# Patient Record
Sex: MALE | Race: WHITE | NOT HISPANIC OR LATINO | Employment: OTHER | URBAN - NONMETROPOLITAN AREA
[De-identification: names, ages, dates, MRNs, and addresses within clinical notes are randomized per-mention and may not be internally consistent; named-entity substitution may affect disease eponyms.]

---

## 2017-01-03 ENCOUNTER — DOCUMENTATION (OUTPATIENT)
Dept: CARDIOLOGY | Facility: CLINIC | Age: 82
End: 2017-01-03

## 2017-01-03 ENCOUNTER — OFFICE VISIT (OUTPATIENT)
Dept: CARDIOLOGY | Facility: CLINIC | Age: 82
End: 2017-01-03

## 2017-01-03 VITALS
DIASTOLIC BLOOD PRESSURE: 72 MMHG | SYSTOLIC BLOOD PRESSURE: 132 MMHG | WEIGHT: 176 LBS | HEART RATE: 80 BPM | HEIGHT: 67 IN | BODY MASS INDEX: 27.62 KG/M2

## 2017-01-03 DIAGNOSIS — E78.5 HYPERLIPIDEMIA, UNSPECIFIED HYPERLIPIDEMIA TYPE: ICD-10-CM

## 2017-01-03 DIAGNOSIS — I36.1 NON-RHEUMATIC TRICUSPID VALVE INSUFFICIENCY: ICD-10-CM

## 2017-01-03 DIAGNOSIS — I48.0 PAROXYSMAL ATRIAL FIBRILLATION (HCC): ICD-10-CM

## 2017-01-03 DIAGNOSIS — I47.9 PAROXYSMAL TACHYCARDIA (HCC): ICD-10-CM

## 2017-01-03 DIAGNOSIS — R06.00 DYSPNEA, UNSPECIFIED TYPE: ICD-10-CM

## 2017-01-03 DIAGNOSIS — I25.10 CORONARY ARTERY DISEASE INVOLVING NATIVE CORONARY ARTERY OF NATIVE HEART WITHOUT ANGINA PECTORIS: Primary | ICD-10-CM

## 2017-01-03 DIAGNOSIS — J44.9 CHRONIC OBSTRUCTIVE BRONCHITIS (HCC): ICD-10-CM

## 2017-01-03 DIAGNOSIS — R00.1 BRADYCARDIA: ICD-10-CM

## 2017-01-03 PROCEDURE — 93000 ELECTROCARDIOGRAM COMPLETE: CPT | Performed by: INTERNAL MEDICINE

## 2017-01-03 PROCEDURE — 99214 OFFICE O/P EST MOD 30 MIN: CPT | Performed by: INTERNAL MEDICINE

## 2017-01-03 RX ORDER — LISINOPRIL 5 MG/1
5 TABLET ORAL DAILY
COMMUNITY
End: 2017-09-14 | Stop reason: SDUPTHER

## 2017-01-03 RX ORDER — PREDNISONE 1 MG/1
5 TABLET ORAL DAILY
COMMUNITY
End: 2017-03-03

## 2017-01-03 RX ORDER — TIOTROPIUM BROMIDE 18 UG/1
1 CAPSULE ORAL; RESPIRATORY (INHALATION) DAILY
Refills: 0 | COMMUNITY
Start: 2016-12-23 | End: 2017-03-03

## 2017-01-03 RX ORDER — HYDROCODONE BITARTRATE AND ACETAMINOPHEN 7.5; 325 MG/1; MG/1
1 TABLET ORAL EVERY 8 HOURS
COMMUNITY
Start: 2016-12-27 | End: 2021-01-01

## 2017-01-03 RX ORDER — UBIDECARENONE 50 MG
600 CAPSULE ORAL 2 TIMES DAILY
COMMUNITY

## 2017-01-03 RX ORDER — IPRATROPIUM BROMIDE AND ALBUTEROL SULFATE 2.5; .5 MG/3ML; MG/3ML
3 SOLUTION RESPIRATORY (INHALATION) EVERY 4 HOURS PRN
COMMUNITY
End: 2017-03-03 | Stop reason: ALTCHOICE

## 2017-01-03 NOTE — PROGRESS NOTES
Hasmukh Ham  83 y.o. male    01/03/2017  1. Coronary artery disease involving native coronary artery of native heart without angina pectoris    2. Non-rheumatic tricuspid valve insufficiency    3. Paroxysmal atrial fibrillation    4. Bradycardia    5. Dyspnea, unspecified type    6. Hyperlipidemia, unspecified hyperlipidemia type    7. Chronic obstructive bronchitis    8. Paroxysmal tachycardia        History of Present Illness: Reason is posthospital follow-up    This is 80 years old gentleman with a history of for paroxysmal atrial tachycardia leading to atrial fibrillations starting on oral amiodarone and her up in the hospital for increasing shortness breath found to bilateral pneumonia treated and subsequent discharge. H/O CAD with stent in RCA system  The patient readmitted for similar problem.  Was scheduled to undergo transesophageal echocardiographic guided electrical cardioversion however was postponed due to underlying lung disease.  He is on oral anticoagulation for more than a month.  EKG done in the office still in supraventricular arrhythmia (atrial flutter with variable ventricular rate.  Complaining of some palpitations and associated shortness breath with exertional activity.  Denies any chest pain.  Denies any nausea vomiting diarrhea.  Had history of coronary to disease treatable post-PTCA stent of RCA and remarkably well from that point of view.  Hypertension good blood pressure control.          SUBJECTIVE    Allergies   Allergen Reactions   • Atorvastatin Anaphylaxis     myalgia   • Azithromycin    • Pravastatin      Myalgia   • Biaxin [Clarithromycin] Rash         Past Medical History   Diagnosis Date   • Arthritis    • Bradycardia    • Chronic obstructive pulmonary disease (COPD)    • Coronary atherosclerosis of native coronary artery    • Diabetes mellitus    • Hyperlipidemia    • Hypertension    • Neuropathy    • Shortness of breath          Past Surgical History   Procedure  Laterality Date   • Appendectomy     • Hernia repair     • Lung biopsy           History reviewed. No pertinent family history.      Social History     Social History   • Marital status:      Spouse name: N/A   • Number of children: N/A   • Years of education: N/A     Occupational History   • Not on file.     Social History Main Topics   • Smoking status: Former Smoker   • Smokeless tobacco: Never Used   • Alcohol use No   • Drug use: No   • Sexual activity: Defer     Other Topics Concern   • Not on file     Social History Narrative         Current Outpatient Prescriptions   Medication Sig Dispense Refill   • amiodarone (PACERONE) 200 MG tablet Take 1 tablet by mouth Daily. 30 tablet 5   • apixaban (ELIQUIS) 5 MG tablet tablet Take 1 tablet by mouth 2 (Two) Times a Day. 60 tablet 12   • clopidogrel (PLAVIX) 75 MG tablet Take 75 mg by mouth Daily.     • donepezil (ARICEPT) 10 MG tablet Take 10 mg by mouth Daily.  0   • famotidine (PEPCID) 20 MG tablet Take 20 mg by mouth 2 (Two) Times a Day.  0   • glimepiride (AMARYL) 2 MG tablet Take 2 mg by mouth Daily.  0   • HYDROcodone-acetaminophen (NORCO) 7.5-325 MG per tablet Take 1 tablet by mouth Every 8 (Eight) Hours.     • ipratropium-albuterol (DUO-NEB) 0.5-2.5 mg/mL nebulizer Take 3 mL by nebulization Every 4 (Four) Hours As Needed for wheezing.     • isosorbide dinitrate (ISORDIL) 30 MG tablet Take 30 mg by mouth Daily.  0   • lisinopril (PRINIVIL,ZESTRIL) 5 MG tablet Take 5 mg by mouth Daily.     • magnesium oxide (MAGOX) 400 (241.3 MG) MG tablet tablet Take 400 mg by mouth Daily.     • predniSONE (DELTASONE) 5 MG tablet Take 5 mg by mouth Daily.     • QVAR 40 MCG/ACT inhaler Take 1 inhaler by mouth 2 (Two) Times a Day.  11   • Red Yeast Rice 600 MG tablet Take 600 mg by mouth 2 (Two) Times a Day.     • SPIRIVA HANDIHALER 18 MCG per inhalation capsule Take 1 inhaler by mouth Daily.  0   • terbutaline (BRETHINE) 5 MG tablet Take 5 mg by mouth 2 (Two) Times a  "Day.  0     No current facility-administered medications for this visit.          OBJECTIVE    Visit Vitals   • /72   • Pulse 80   • Ht 67\" (170.2 cm)   • Wt 176 lb (79.8 kg)   • BMI 27.57 kg/m2           Review of Systems     Constitutional:  Denies recent weight loss, weight gain, fever or chills, no change in exercise tolerance     HENT:  Denies any hearing loss, epistaxis, hoarseness, or difficulty speaking.     Eyes: Wears eyeglasses or contact lenses     Respiratory:  SEE HPI    Cardiovascular: SEE HPI    Gastrointestinal:  Denies change in bowel habits, dyspepsia, ulcer disease, hematochezia, or melena.     Endocrine: Negative for cold intolerance, heat intolerance, polydipsia, polyphagia and polyuria. Denies any history of weight change, heat/cold intolerance, polydipsia, polyuria     Genitourinary: Negative.      Musculoskeletal: Denies any history of arthritic symptoms or back problems     Skin:  Denies any change in hair or nails, rashes, or skin lesions.     Allergic/Immunologic: Negative.  Negative for environmental allergies, food allergies and immunocompromised state.     Neurological:  Denies any history of recurrent headaches, strokes, TIA, or seizure disorder.     Hematological: Denies any food allergies, seasonal allergies, bleeding disorders, or lymphadenopathy.     Psychiatric/Behavioral: Denies any history of depression, substance abuse, or change in cognitive function.         Physical Exam     Constitutional: Cooperative, alert and oriented, well-developed, well-nourished, in no acute distress.     HENT:   Head: Normocephalic, normal hair patterns, no masses or tenderness.  Ears, Nose, and Throat: No gross abnormalities. No pallor or cyanosis. Dentition good.   Eyes: EOMS intact, PERRL, conjunctivae and lids unremarkable. Fundoscopic exam and visual fields not performed.   Neck: No palpable masses or adenopathy, no thyromegaly, no JVD, carotid pulses are full and equal bilaterally and " without  Bruits.     Cardiovascular: Regular rhythm, S1 and S2 normal, no S3 or S4. Apical impulse not displaced. No murmurs, gallops, or rubs detected.     Pulmonary/Chest: Chest: normal symmetry, no tenderness to palpation, normal respiratory excursion, no intercostal retraction, no use of accessory muscles.            Pulmonary: Normal breath sounds. No rales or ronchi.    Abdominal: Abdomen soft, bowel sounds normoactive, no masses, no hepatosplenomegaly, non-tender, no bruits.     Musculoskeletal: No deformities, clubbing, cyanosis, erythema, or edema observed. There are no spinal abnormalities noted. Normal muscle strength and tone. Pulses full and equal in all extremities, no bruits auscultated.     Neurological: No gross motor or sensory deficits noted, affect appropriate, oriented to time, person, place.     Skin: Warm and dry to the touch, no apparent skin lesions or masses noted.     Psychiatric: He has a normal mood and affect. His behavior is normal. Judgment and thought content normal.           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              Lab Results   Component Value Date    WBC 12.9 (H) 12/23/2016    HGB 11.5 (L) 12/23/2016    HCT 35.3 (L) 12/23/2016    MCV 92.7 12/23/2016     12/23/2016     Lab Results   Component Value Date    GLUCOSE 86 12/23/2016    BUN 23 (H) 12/23/2016    CREATININE 0.9 12/23/2016    CO2 23 12/23/2016    CALCIUM 8.5 12/23/2016    ALBUMIN 2.9 (L) 12/23/2016    AST 27 12/23/2016    ALT 70 12/23/2016     No results found for: CHOL  No results found for: TRIG  No results found for: HDL  No results found for: LDLCALC  No results found for: LDL  No results found for: HDLLDLRATIO  No components found for: CHOLHDL  Lab Results   Component Value Date    HGBA1C 6.0 (H) 10/10/2016     Lab  Results   Component Value Date    TSH 0.85 12/14/2016           ASSESSMENT AND PLAN  Clinically there is no sign of any acute cardiac decompensation based on the clinical history physical finding.  Started on  on amiodarone given the history of paroxysmal atrial fibrillation with a rapid ventricular response.  EKG done in the office currently he is in atrial flutter with controlled ventricular rate.  Had history of for coronary to disease status post PTA stent of RCA doing remarkably well.  No further recurrence of for chest pain and being treated with the Plavix.  To decrease risk of cardiac embolic phenomena patient on maximum 5 mg twice a day.  Amiodarone for rate controlling and in anticipation of electrical cardioversions.  Isosorbide and lisinopril for hypertension cognitive disease.  Bronchodilating given the history of COPD.Amaryl for type 2 diabetes.  No change was made in the medical management as his Cardec status seemed to well compensated.  He still in atrial arrhythmia with a variable response given physical activity.  Pros and cons of electrical cardioversion discussed with the patient.  Understand willing to proceed forward.  Risk and procedure explained.  Risk included but not limited to cardiac and stroke.  Patient and family understand willing to proceed forward.    Hasmukh was seen today for follow-up and weakness.    Diagnoses and all orders for this visit:    Coronary artery disease involving native coronary artery of native heart without angina pectoris    Non-rheumatic tricuspid valve insufficiency    Paroxysmal atrial fibrillation  -     ECG 12 Lead    Bradycardia    Dyspnea, unspecified type    Hyperlipidemia, unspecified hyperlipidemia type    Chronic obstructive bronchitis    Paroxysmal tachycardia        Mana Curtis MD  1/3/2017  2:06 PM

## 2017-01-03 NOTE — MR AVS SNAPSHOT
Hasmukh Ham   1/3/2017 1:15 PM   Office Visit    Dept Phone:  [phone]   Encounter #:  [id number]    Provider:  Mana Curtis MD   Department:  [facility] CARDIOLOGY                Your Full Care Plan              Today's Medication Changes          These changes are accurate as of: 1/3/17  2:13 PM.  If you have any questions, ask your nurse or doctor.               Medication(s)that have changed:     lisinopril 5 MG tablet   Commonly known as:  PRINIVIL,ZESTRIL   Take 5 mg by mouth Daily.   What changed:  Another medication with the same name was removed. Continue taking this medication, and follow the directions you see here.   Changed by:  Mana Curtis MD         Stop taking medication(s)listed here:     terbinafine 250 MG tablet   Commonly known as:  lamiSIL   Stopped by:  Mana Curtis MD                      Your Updated Medication List          This list is accurate as of: 1/3/17  2:13 PM.  Always use your most recent med list.                amiodarone 200 MG tablet   Commonly known as:  PACERONE   Take 1 tablet by mouth Daily.       apixaban 5 MG tablet tablet   Commonly known as:  ELIQUIS   Take 1 tablet by mouth 2 (Two) Times a Day.       clopidogrel 75 MG tablet   Commonly known as:  PLAVIX       donepezil 10 MG tablet   Commonly known as:  ARICEPT       famotidine 20 MG tablet   Commonly known as:  PEPCID       glimepiride 2 MG tablet   Commonly known as:  AMARYL       HYDROcodone-acetaminophen 7.5-325 MG per tablet   Commonly known as:  NORCO       ipratropium-albuterol 0.5-2.5 mg/mL nebulizer   Commonly known as:  DUO-NEB       isosorbide dinitrate 30 MG tablet   Commonly known as:  ISORDIL       lisinopril 5 MG tablet   Commonly known as:  PRINIVIL,ZESTRIL       magnesium oxide 400 (241.3 MG) MG tablet tablet   Commonly known as:  MAGOX       predniSONE 5 MG tablet   Commonly known as:  DELTASONE       QVAR 40 MCG/ACT inhaler   Generic drug:   beclomethasone       Red Yeast Rice 600 MG tablet       SPIRIVA HANDIHALER 18 MCG per inhalation capsule   Generic drug:  tiotropium       terbutaline 5 MG tablet   Commonly known as:  BRETHINE               We Performed the Following     ECG 12 Lead     ECG 12 Lead       You Were Diagnosed With        Codes Comments    Coronary artery disease involving native coronary artery of native heart without angina pectoris    -  Primary ICD-10-CM: I25.10  ICD-9-CM: 414.01     Non-rheumatic tricuspid valve insufficiency     ICD-10-CM: I36.1  ICD-9-CM: 424.2     Paroxysmal atrial fibrillation     ICD-10-CM: I48.0  ICD-9-CM: 427.31     Bradycardia     ICD-10-CM: R00.1  ICD-9-CM: 427.89     Dyspnea, unspecified type     ICD-10-CM: R06.00  ICD-9-CM: 786.09     Hyperlipidemia, unspecified hyperlipidemia type     ICD-10-CM: E78.5  ICD-9-CM: 272.4     Chronic obstructive bronchitis     ICD-10-CM: J44.9  ICD-9-CM: 491.20     Paroxysmal tachycardia     ICD-10-CM: I47.9  ICD-9-CM: 427.2       Instructions     None    Patient Instructions History      Upcoming Appointments     Visit Type Date Time Department    HOSPITAL FOLLOW UP 1/3/2017  1:15 PM [facility]    NEW PATIENT 1/10/2017  2:30 PM [facility]      [facility] Signup     [facility] [place] by [facility] [address] allows you to send messages to your doctor, view your test results, renew your prescriptions, schedule appointments, and more. To sign up, go to [employer] and click on the Sign Up Now link in the New User? box. Enter your "[employer]" Activation Code exactly as it appears below along with the last four digits of your Social Security Number and your Date of Birth () to complete the sign-up process. If you do not sign up before the expiration date, you must request a new code.    "[employer]" Activation Code: [email or web address]  Expires: 2017  2:13 PM    If you have questions, you can email [email or web address] or call [phone] to talk to our "[employer]"  "staff. Remember, MyChart is NOT to be used for urgent needs. For medical emergencies, dial 911.               Other Info from Your Visit           Your Appointments     Cecilio 10, 2017  2:30 PM CST   New Patient with Brea Higginbotham MD   [facility] PULMONARY MEDICINE (--)    [address]  [address] [place] [id number] [phone]           Bring all previous medical records and films, along with current medications and insurance information.              Allergies     Atorvastatin  Anaphylaxis    myalgia    Azithromycin      Pravastatin      Myalgia    Biaxin [Clarithromycin]  Rash      Reason for Visit     Follow-up 10 post op    weakness           Vital Signs     Blood Pressure Pulse Height Weight Body Mass Index Smoking Status    132/72 80 67\" (170.2 cm) 176 lb (79.8 kg) 27.57 kg/m2 Former Smoker      Problems and Diagnoses Noted     Bradycardia    Chronic obstructive bronchitis    Coronary artery disease involving native coronary artery of native heart    Shortness of breath    High cholesterol or triglycerides    Leaky heart valve    Atrial fibrillation (irregular heartbeat)    Fast heart beat        "

## 2017-01-05 ENCOUNTER — HOSPITAL ENCOUNTER (OUTPATIENT)
Dept: OTHER | Facility: HOSPITAL | Age: 82
Setting detail: HOSPITAL OUTPATIENT SURGERY
Discharge: HOME | End: 2017-01-05
Attending: INTERNAL MEDICINE | Admitting: INTERNAL MEDICINE

## 2017-01-05 LAB
ANION GAP SERPL CALCULATED.3IONS-SCNC: 11 MMOL/L (ref 5–15)
APTT PPP: 36.9 SECONDS (ref 20–40.3)
BUN SERPL-MCNC: 28 MG/DL (ref 7–21)
CALCIUM SERPL-MCNC: 8.9 MG/DL (ref 8.4–10.2)
CHLORIDE SERPL-SCNC: 103 MMOL/L (ref 95–110)
CO2 SERPL-SCNC: 27 MMOL/L (ref 22–31)
CREAT SERPL-MCNC: 1 MG/DL (ref 0.7–1.3)
GLUCOSE SERPL-MCNC: 92 MG/DL (ref 60–100)
INR PPP: 1.1 (ref 0.8–1.2)
MAGNESIUM SERPL-MCNC: 1.61 MG/DL (ref 1.6–2.3)
POTASSIUM SERPL-SCNC: 4.3 MMOL/L (ref 3.5–5.1)
PROTHROMBIN TIME: 13.8 SECONDS (ref 11.1–15.3)
SODIUM SERPL-SCNC: 141 MMOL/L (ref 137–145)

## 2017-01-06 LAB
GLUCOSE BLDC GLUCOMTR-MCNC: 93 MG/DL (ref 60–100)
GLUCOSE BLDC GLUCOMTR-MCNC: 94 MG/DL (ref 60–100)

## 2017-02-07 ENCOUNTER — OFFICE VISIT (OUTPATIENT)
Dept: CARDIOLOGY | Facility: CLINIC | Age: 82
End: 2017-02-07

## 2017-02-07 VITALS
HEIGHT: 67 IN | BODY MASS INDEX: 27.78 KG/M2 | SYSTOLIC BLOOD PRESSURE: 140 MMHG | WEIGHT: 177 LBS | HEART RATE: 78 BPM | DIASTOLIC BLOOD PRESSURE: 68 MMHG

## 2017-02-07 DIAGNOSIS — R06.00 DYSPNEA, UNSPECIFIED TYPE: ICD-10-CM

## 2017-02-07 DIAGNOSIS — I77.810 DILATED AORTIC ROOT (HCC): ICD-10-CM

## 2017-02-07 DIAGNOSIS — J44.9 CHRONIC OBSTRUCTIVE BRONCHITIS (HCC): ICD-10-CM

## 2017-02-07 DIAGNOSIS — I25.10 CHRONIC CORONARY ARTERY DISEASE: ICD-10-CM

## 2017-02-07 DIAGNOSIS — I10 BENIGN ESSENTIAL HYPERTENSION: ICD-10-CM

## 2017-02-07 DIAGNOSIS — E11.42 DIABETIC POLYNEUROPATHY ASSOCIATED WITH TYPE 2 DIABETES MELLITUS (HCC): ICD-10-CM

## 2017-02-07 DIAGNOSIS — J42 CHRONIC BRONCHITIS, UNSPECIFIED CHRONIC BRONCHITIS TYPE (HCC): ICD-10-CM

## 2017-02-07 DIAGNOSIS — I47.9 PAROXYSMAL TACHYCARDIA (HCC): ICD-10-CM

## 2017-02-07 DIAGNOSIS — I48.0 PAROXYSMAL ATRIAL FIBRILLATION (HCC): Primary | ICD-10-CM

## 2017-02-07 PROCEDURE — 99213 OFFICE O/P EST LOW 20 MIN: CPT | Performed by: INTERNAL MEDICINE

## 2017-02-07 PROCEDURE — 93000 ELECTROCARDIOGRAM COMPLETE: CPT | Performed by: INTERNAL MEDICINE

## 2017-02-07 NOTE — PROGRESS NOTES
Hasmukh Ham  83 y.o. male    02/07/2017  1. Paroxysmal atrial fibrillation    2. Paroxysmal tachycardia    3. Chronic obstructive bronchitis    4. Diabetic polyneuropathy associated with type 2 diabetes mellitus    5. Chronic coronary artery disease    6. Chronic bronchitis, unspecified chronic bronchitis type    7. Dyspnea, unspecified type    8. Benign essential hypertension    9. Dilated aortic root        History of Present Illness: Reason is posthospital follow-up  83 years old patient with history of coronary to disease status post PTCA and stent history of GI AV mal formation and previous history of bleeding, hypertension, advanced COPD, hyperlipidemia and atrial tachycardia causing atrial fibrillation.  She started on oral intake ablation discontinued due to GI problem as described above. able to tolerate antiplatelets agent.  Patient was started on amiodarone due to symptomatic atrial fibrillation.  Patient currently sinus rhythm EKG done and finding discussed the patient.  The patient had history of for her recurrent pneumonia.  He denied any orthopnea PND has baseline shortness of breath multifactorial in etiology.  Clinically he does note patient congestive heart failure.  And dilated aortic root with diameter 4.1 on the echocardiographic study in 2015.            SUBJECTIVE    Allergies   Allergen Reactions   • Atorvastatin Anaphylaxis     myalgia   • Azithromycin    • Pravastatin      Myalgia   • Biaxin [Clarithromycin] Rash         Past Medical History   Diagnosis Date   • Arthritis    • Bradycardia    • Chronic obstructive pulmonary disease (COPD)    • Coronary atherosclerosis of native coronary artery    • Diabetes mellitus    • Hyperlipidemia    • Hypertension    • Neuropathy    • Pneumonia    • Shortness of breath          Past Surgical History   Procedure Laterality Date   • Appendectomy     • Hernia repair     • Lung biopsy           No family history on file.      Social History  "    Social History   • Marital status:      Spouse name: N/A   • Number of children: N/A   • Years of education: N/A     Occupational History   • Not on file.     Social History Main Topics   • Smoking status: Former Smoker   • Smokeless tobacco: Never Used   • Alcohol use No   • Drug use: No   • Sexual activity: Defer     Other Topics Concern   • Not on file     Social History Narrative         Current Outpatient Prescriptions   Medication Sig Dispense Refill   • amiodarone (PACERONE) 200 MG tablet Take 1 tablet by mouth Daily. 30 tablet 5   • clopidogrel (PLAVIX) 75 MG tablet Take 75 mg by mouth Daily.     • donepezil (ARICEPT) 10 MG tablet Take 10 mg by mouth Daily.  0   • famotidine (PEPCID) 20 MG tablet Take 20 mg by mouth 2 (Two) Times a Day.  0   • glimepiride (AMARYL) 2 MG tablet Take 2 mg by mouth Daily.  0   • HYDROcodone-acetaminophen (NORCO) 7.5-325 MG per tablet Take 1 tablet by mouth Every 8 (Eight) Hours.     • ipratropium-albuterol (DUO-NEB) 0.5-2.5 mg/mL nebulizer Take 3 mL by nebulization Every 4 (Four) Hours As Needed for wheezing.     • isosorbide dinitrate (ISORDIL) 30 MG tablet Take 30 mg by mouth Daily.  0   • lisinopril (PRINIVIL,ZESTRIL) 5 MG tablet Take 5 mg by mouth Daily.     • magnesium oxide (MAGOX) 400 (241.3 MG) MG tablet tablet Take 400 mg by mouth Daily.     • predniSONE (DELTASONE) 5 MG tablet Take 5 mg by mouth Daily.     • QVAR 40 MCG/ACT inhaler Take 1 inhaler by mouth 2 (Two) Times a Day.  11   • Red Yeast Rice 600 MG tablet Take 600 mg by mouth 2 (Two) Times a Day.     • SPIRIVA HANDIHALER 18 MCG per inhalation capsule Take 1 inhaler by mouth Daily.  0   • terbutaline (BRETHINE) 5 MG tablet Take 5 mg by mouth 2 (Two) Times a Day.  0     No current facility-administered medications for this visit.          OBJECTIVE    Visit Vitals   • /68   • Pulse 78   • Ht 67\" (170.2 cm)   • Wt 177 lb (80.3 kg)   • BMI 27.72 kg/m2           Review of Systems     Constitutional:  " Denies recent weight loss, weight gain, fever or chills, no change in exercise tolerance     HENT:  Denies any hearing loss, epistaxis, hoarseness, or difficulty speaking.     Eyes: Wears eyeglasses or contact lenses     Respiratory:  dyspnea with exertion.     Cardiovascular: Negative for palpations, chest pain, orthopnea, PND, peripheral edema, syncope, or claudication.     Gastrointestinal:  Denies change in bowel habits, dyspepsia, ulcer disease, hematochezia, or melena.     Endocrine: Negative for cold intolerance, heat intolerance, polydipsia, polyphagia and polyuria. Denies any history of weight change, heat/cold intolerance, polydipsia, polyuria     Genitourinary: Negative.      Musculoskeletal: history of arthritic symptoms or back problems     Skin:  Denies any change in hair or nails, rashes, or skin lesions.     Allergic/Immunologic: Negative.  Negative for environmental allergies, food allergies and immunocompromised state.     Neurological:  Denies any history of recurrent headaches, strokes, TIA, or seizure disorder.     Hematological: Denies any food allergies, seasonal allergies, bleeding disorders, or lymphadenopathy.     Psychiatric/Behavioral: Denies any history of depression, substance abuse, or change in cognitive function.         Physical Exam     Constitutional: Cooperative, alert and oriented, well-developed, well-nourished, in no acute distress.     HENT:   Head: Normocephalic, normal hair patterns, no masses or tenderness.  Ears, Nose, and Throat: No gross abnormalities. No pallor or cyanosis. Dentition good.   Eyes: EOMS intact, PERRL, conjunctivae and lids unremarkable. Fundoscopic exam and visual fields not performed.   Neck: No palpable masses or adenopathy, no thyromegaly, no JVD, carotid pulses are full and equal bilaterally and without  Bruits.     Cardiovascular: Regular rhythm, S1 and S2 normal, no S3 or S4. Apical impulse not displaced. No murmurs, gallops, or rubs detected.      Pulmonary/Chest: Chest: normal symmetry, no tenderness to palpation, normal respiratory excursion, no intercostal retraction, no use of accessory muscles.            Pulmonary: Normal breath sounds. No rales or ronchi.    Abdominal: Abdomen soft, bowel sounds normoactive, no masses, no hepatosplenomegaly, non-tender, no bruits.     Musculoskeletal: No deformities, clubbing, cyanosis, erythema, or edema observed. There are no spinal abnormalities noted. Normal muscle strength and tone. Pulses full and equal in all extremities, no bruits auscultated.     Neurological: No gross motor or sensory deficits noted, affect appropriate, oriented to time, person, place.     Skin: Warm and dry to the touch, no apparent skin lesions or masses noted.     Psychiatric: He has a normal mood and affect. His behavior is normal. Judgment and thought content normal.           ECG 12 Lead  Date/Time: 2/7/2017 3:08 PM  Performed by: ALETHEA MCGARRY  Authorized by: ALETHEA MCGARRY   Comparison: not compared with previous ECG   Rhythm: sinus rhythm  Conduction: LAFB and 1st degree  Comments: Rhythm with prolonged VT interval, right bundle branch block morphology, old septal infarct and leftward axis.              Lab Results   Component Value Date    WBC 9.8 01/08/2017    HGB 11.9 (L) 01/08/2017    HCT 36.8 (L) 01/08/2017    MCV 94.4 01/08/2017     01/08/2017     Lab Results   Component Value Date    GLUCOSE 93 01/08/2017    BUN 14 01/08/2017    CREATININE 0.9 01/08/2017    CO2 29 01/08/2017    CALCIUM 8.4 01/08/2017    ALBUMIN 3.5 01/08/2017    AST 25 01/08/2017    ALT 42 01/08/2017     No results found for: CHOL  No results found for: TRIG  No results found for: HDL  No results found for: LDLCALC  No results found for: LDL  No results found for: HDLLDLRATIO  No components found for: CHOLHDL  Lab Results   Component Value Date    HGBA1C 6.0 (H) 10/10/2016     Lab Results   Component Value Date    TSH 0.85 12/14/2016            ASSESSMENT AND PLAN  #1 paroxysmal atrial fibrillation #2 paroxysmal atrial tachycardia #3 CAD status post PTCA and stenting #4 COPD with recurrent pneumonia and baseline shortness of breath #5 mildly dilated aortic root with diameter 4.1 on previous echo in 2015.    83 years old gentleman who presented for routine follow-up with the past medical history significant for CAD, PTCA stenting and history of recurrent pneumonia with underlying COPD.  The patient to had to atrial tachycardia causing atrial fibrillation underwent cardioversion is currently maintained sinus rhythm with the help of amiodarone.  The patient had history of AV malformation unable to tolerate oral anticoagulation but able to tolerate antiplatelets agent.  Hypertensions good blood pressure control with the lisinopril 5 mg isosorbide 30 mg with history of coronary to disease no further recurrence.  Pepcid for gastritis and diabetes being managed with oral hypoglycemic agent.  The patient will continue amiodarone to keep him in sinus rhythm.  We'll arrange an echocardiographic study in 6 month to reassess the aortic root and LFT, TSH as a part of amiodarone monitoring.  We'll see him back in 6 month R depends on patient clinical conditions.  The patient is on multiple bronchodilates    Diagnoses and all orders for this visit:    Paroxysmal atrial fibrillation  -     Adult Transthoracic Echo Complete  -     ECG 12 Lead  -     Hepatic Function Panel  -     TSH; Future    Paroxysmal tachycardia    Chronic obstructive bronchitis    Diabetic polyneuropathy associated with type 2 diabetes mellitus    Chronic coronary artery disease    Chronic bronchitis, unspecified chronic bronchitis type    Dyspnea, unspecified type    Benign essential hypertension    Dilated aortic root  -     Adult Transthoracic Echo Complete        Mana Curtis MD  2/7/2017  3:02 PM

## 2017-02-12 ENCOUNTER — RESULTS ENCOUNTER (OUTPATIENT)
Dept: CARDIOLOGY | Facility: CLINIC | Age: 82
End: 2017-02-12

## 2017-02-12 DIAGNOSIS — I48.0 PAROXYSMAL ATRIAL FIBRILLATION (HCC): ICD-10-CM

## 2017-03-03 ENCOUNTER — OFFICE VISIT (OUTPATIENT)
Dept: CARDIOLOGY | Facility: CLINIC | Age: 82
End: 2017-03-03

## 2017-03-03 ENCOUNTER — APPOINTMENT (OUTPATIENT)
Dept: LAB | Facility: HOSPITAL | Age: 82
End: 2017-03-03

## 2017-03-03 ENCOUNTER — OFFICE VISIT (OUTPATIENT)
Dept: PULMONOLOGY | Facility: CLINIC | Age: 82
End: 2017-03-03

## 2017-03-03 VITALS
BODY MASS INDEX: 27.58 KG/M2 | WEIGHT: 182 LBS | DIASTOLIC BLOOD PRESSURE: 64 MMHG | SYSTOLIC BLOOD PRESSURE: 144 MMHG | HEIGHT: 68 IN | HEART RATE: 49 BPM | OXYGEN SATURATION: 98 %

## 2017-03-03 VITALS
OXYGEN SATURATION: 98 % | SYSTOLIC BLOOD PRESSURE: 137 MMHG | WEIGHT: 177 LBS | HEART RATE: 53 BPM | DIASTOLIC BLOOD PRESSURE: 68 MMHG | HEIGHT: 67 IN | BODY MASS INDEX: 27.78 KG/M2

## 2017-03-03 DIAGNOSIS — I73.9 CLAUDICATION (HCC): ICD-10-CM

## 2017-03-03 DIAGNOSIS — I71.20 THORACIC AORTIC ANEURYSM WITHOUT RUPTURE (HCC): ICD-10-CM

## 2017-03-03 DIAGNOSIS — R06.09 DYSPNEA ON EXERTION: Primary | ICD-10-CM

## 2017-03-03 DIAGNOSIS — I10 ESSENTIAL HYPERTENSION: ICD-10-CM

## 2017-03-03 DIAGNOSIS — I27.20 PULMONARY HYPERTENSION (HCC): Primary | ICD-10-CM

## 2017-03-03 DIAGNOSIS — I50.9 HEART FAILURE, UNSPECIFIED HEART FAILURE CHRONICITY, UNSPECIFIED HEART FAILURE TYPE: ICD-10-CM

## 2017-03-03 DIAGNOSIS — E78.2 MIXED HYPERLIPIDEMIA: ICD-10-CM

## 2017-03-03 DIAGNOSIS — R60.0 LOCALIZED EDEMA: ICD-10-CM

## 2017-03-03 DIAGNOSIS — R06.09 DYSPNEA ON EXERTION: ICD-10-CM

## 2017-03-03 DIAGNOSIS — T50.905A BRADYCARDIA, DRUG INDUCED: ICD-10-CM

## 2017-03-03 DIAGNOSIS — Z87.891 HISTORY OF TOBACCO USE: ICD-10-CM

## 2017-03-03 DIAGNOSIS — J30.9 CHRONIC ALLERGIC RHINITIS: ICD-10-CM

## 2017-03-03 DIAGNOSIS — I48.0 PAROXYSMAL ATRIAL FIBRILLATION (HCC): ICD-10-CM

## 2017-03-03 DIAGNOSIS — J44.9 MIXED TYPE COPD (CHRONIC OBSTRUCTIVE PULMONARY DISEASE) (HCC): ICD-10-CM

## 2017-03-03 DIAGNOSIS — R09.89 BRUIT OF RIGHT CAROTID ARTERY: ICD-10-CM

## 2017-03-03 DIAGNOSIS — R00.1 BRADYCARDIA, DRUG INDUCED: ICD-10-CM

## 2017-03-03 PROBLEM — Z83.2 FAMILY HISTORY OF DISEASES OF THE BLOOD AND BLOOD-FORMING ORGANS AND CERTAIN DISORDERS INVOLVING THE IMMUNE MECHANISM: Status: ACTIVE | Noted: 2017-01-11

## 2017-03-03 PROBLEM — J84.10 FIBROSIS OF LUNG (HCC): Status: ACTIVE | Noted: 2017-03-02

## 2017-03-03 LAB
ALBUMIN SERPL-MCNC: 4.2 G/DL (ref 3.4–4.8)
ALBUMIN UR-MCNC: 3.7 MG/L
ALBUMIN/GLOB SERPL: 1.7 G/DL (ref 1.1–1.8)
ALP SERPL-CCNC: 57 U/L (ref 38–126)
ALT SERPL W P-5'-P-CCNC: 100 U/L (ref 21–72)
ANION GAP SERPL CALCULATED.3IONS-SCNC: 13 MMOL/L (ref 5–15)
ARTICHOKE IGE QN: 99 MG/DL (ref 1–129)
AST SERPL-CCNC: 50 U/L (ref 17–59)
BILIRUB SERPL-MCNC: 0.5 MG/DL (ref 0.2–1.3)
BUN BLD-MCNC: 27 MG/DL (ref 7–21)
BUN/CREAT SERPL: 22.7 (ref 7–25)
CALCIUM SPEC-SCNC: 9.4 MG/DL (ref 8.4–10.2)
CHLORIDE SERPL-SCNC: 102 MMOL/L (ref 95–110)
CHOLEST SERPL-MCNC: 174 MG/DL (ref 0–199)
CO2 SERPL-SCNC: 26 MMOL/L (ref 22–31)
CREAT BLD-MCNC: 1.19 MG/DL (ref 0.7–1.3)
DEPRECATED RDW RBC AUTO: 54.7 FL (ref 35.1–43.9)
ERYTHROCYTE [DISTWIDTH] IN BLOOD BY AUTOMATED COUNT: 15.9 % (ref 11.5–14.5)
GFR SERPL CREATININE-BSD FRML MDRD: 58 ML/MIN/1.73 (ref 42–98)
GLOBULIN UR ELPH-MCNC: 2.5 GM/DL (ref 2.3–3.5)
GLUCOSE BLD-MCNC: 73 MG/DL (ref 60–100)
HCT VFR BLD AUTO: 39.5 % (ref 39–49)
HDLC SERPL-MCNC: 47 MG/DL (ref 60–200)
HGB BLD-MCNC: 12.8 G/DL (ref 13.7–17.3)
LDLC/HDLC SERPL: 2.2 {RATIO} (ref 0–3.55)
MCH RBC QN AUTO: 30.3 PG (ref 26.5–34)
MCHC RBC AUTO-ENTMCNC: 32.4 G/DL (ref 31.5–36.3)
MCV RBC AUTO: 93.6 FL (ref 80–98)
NT-PROBNP SERPL-MCNC: 1380 PG/ML (ref 0–1800)
PLATELET # BLD AUTO: 206 10*3/MM3 (ref 150–450)
PMV BLD AUTO: 11.4 FL (ref 8–12)
POTASSIUM BLD-SCNC: 5.1 MMOL/L (ref 3.5–5.1)
PROT SERPL-MCNC: 6.7 G/DL (ref 6.3–8.6)
RBC # BLD AUTO: 4.22 10*6/MM3 (ref 4.37–5.74)
SODIUM BLD-SCNC: 141 MMOL/L (ref 137–145)
TRIGL SERPL-MCNC: 119 MG/DL (ref 20–199)
WBC NRBC COR # BLD: 16 10*3/MM3 (ref 3.2–9.8)

## 2017-03-03 PROCEDURE — 83880 ASSAY OF NATRIURETIC PEPTIDE: CPT | Performed by: INTERNAL MEDICINE

## 2017-03-03 PROCEDURE — 85027 COMPLETE CBC AUTOMATED: CPT | Performed by: INTERNAL MEDICINE

## 2017-03-03 PROCEDURE — 36415 COLL VENOUS BLD VENIPUNCTURE: CPT | Performed by: INTERNAL MEDICINE

## 2017-03-03 PROCEDURE — 80061 LIPID PANEL: CPT | Performed by: INTERNAL MEDICINE

## 2017-03-03 PROCEDURE — 80053 COMPREHEN METABOLIC PANEL: CPT | Performed by: INTERNAL MEDICINE

## 2017-03-03 PROCEDURE — 99214 OFFICE O/P EST MOD 30 MIN: CPT | Performed by: INTERNAL MEDICINE

## 2017-03-03 PROCEDURE — 94010 BREATHING CAPACITY TEST: CPT | Performed by: INTERNAL MEDICINE

## 2017-03-03 PROCEDURE — 99203 OFFICE O/P NEW LOW 30 MIN: CPT | Performed by: INTERNAL MEDICINE

## 2017-03-03 PROCEDURE — 82043 UR ALBUMIN QUANTITATIVE: CPT | Performed by: INTERNAL MEDICINE

## 2017-03-03 RX ORDER — FLUTICASONE PROPIONATE 50 MCG
2 SPRAY, SUSPENSION (ML) NASAL DAILY
Qty: 1 EACH | Refills: 11 | Status: SHIPPED | OUTPATIENT
Start: 2017-03-03 | End: 2017-04-02

## 2017-03-03 RX ORDER — FUROSEMIDE 20 MG/1
20 TABLET ORAL DAILY
Qty: 90 TABLET | Refills: 3 | Status: SHIPPED | OUTPATIENT
Start: 2017-03-03 | End: 2021-01-01

## 2017-03-03 RX ORDER — FUROSEMIDE 20 MG/1
20 TABLET ORAL DAILY
Status: DISCONTINUED | OUTPATIENT
Start: 2017-03-03 | End: 2017-03-03

## 2017-03-03 RX ORDER — PREDNISONE 10 MG/1
10 TABLET ORAL
COMMUNITY
Start: 2017-01-24 | End: 2017-03-03 | Stop reason: ALTCHOICE

## 2017-03-03 RX ORDER — PREDNISONE 10 MG/1
TABLET ORAL
Qty: 27 TABLET | Refills: 0 | Status: SHIPPED | OUTPATIENT
Start: 2017-03-03 | End: 2017-03-28

## 2017-03-03 RX ORDER — ALBUTEROL SULFATE 2.5 MG/3ML
2.5 SOLUTION RESPIRATORY (INHALATION) 4 TIMES DAILY PRN
Qty: 125 VIAL | Refills: 11 | Status: ON HOLD | OUTPATIENT
Start: 2017-03-03 | End: 2017-09-28

## 2017-03-03 NOTE — PROGRESS NOTES
Pulmonary Consultation    Subjective     Chief Complaint   Patient presents with   • Pneumonia        History of Present Illness  Hasmukh Ham is a 83 y.o. male with a PMH significant for COPD, past tobacco, CHF, CAD, a. Fib, DM, and HTN who presents for evaluation of dyspnea and cough. Pt complains of cough, dyspnea and congestion. He states this began in September, but feels his breathing worsened when he was placed on amio in Novemeber. Pt was hospitalized in September and December for pneumonia. He was hospitalized again in January from GIB associated with blood thinners. 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 He states he had a lung biopsy in the 1970's with Dr. Ferrer. Pt has been seen by Dr. Lora and was placed on Spiriva, but he has not been seen recently. He is now taking Arnuity and Incruse since his insurance stopped covering Spiriva and Qvar. Pt admits to dyapne with minimal exertion. He states he was feeling better but after taking amiodarone he has trouble again. Pt states his heart has been in rhythm since January. His daughter states he previously walked 0.5mi to the mailbox but has not been able to do it since January. He noted ankle and abdominal swelling, but he is not on diuretics. Pt has noted HR in the 30's at home. He is on chronic prednisone at 10mg but no recent tapers. Pt previously smoked 2ppd but quit over 30yrs ago. 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     Review of Systems: History obtained from chart review and the patient.  Review of Systems   Constitutional: Positive for fatigue. Negative for fever.   HENT: Positive for congestion and postnasal drip.    Respiratory: Positive for cough, shortness of breath and wheezing.    Cardiovascular: Positive for leg swelling. Negative for chest pain.        Bradycardia     Gastrointestinal: Positive for abdominal distention.   Musculoskeletal: Positive for arthralgias.    Psychiatric/Behavioral: Positive for dysphoric mood.     As described in the HPI. Otherwise, remainder of ROS (14 systems) were negative.    Patient Active Problem List   Diagnosis   • Dilated aortic root   • Benign essential hypertension   • Type 2 diabetes mellitus   • Non-rheumatic tricuspid valve insufficiency   • Coronary artery disease involving native coronary artery of native heart   • Pulmonary emphysema   • Dyspnea   • Atrial fibrillation   • Bradycardia   • Shortness of breath   • Chronic obstructive pulmonary disease   • Chronic coronary artery disease   • Neuropathy   • Abdominal hernia   • Neck pain   • Dementia   • Diabetic neuropathy   • Diabetic polyneuropathy   • Gastroesophageal reflux disease without esophagitis   • Generalized osteoarthritis   • 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   • Nocturia   • Chronic obstructive bronchitis   • Osteoarthritis of multiple joints   • Hyperlipidemia   • Pain in joint involving ankle and foot   • Pneumonia in infectious disease   • Arthropathy of hand   • Paroxysmal tachycardia   • Pneumonia   • Family history of diseases of the blood and blood-forming organs and certain disorders involving the immune mechanism   • Fibrosis of lung         Current Outpatient Prescriptions:   •  amiodarone (PACERONE) 200 MG tablet, Take 1 tablet by mouth Daily., Disp: 30 tablet, Rfl: 5  •  clopidogrel (PLAVIX) 75 MG tablet, Take 75 mg by mouth Daily., Disp: , Rfl:   •  donepezil (ARICEPT) 10 MG tablet, Take 10 mg by mouth Daily., Disp: , Rfl: 0  •  famotidine (PEPCID) 20 MG tablet, Take 20 mg by mouth 2 (Two) Times a Day., Disp: , Rfl: 0  •  glimepiride (AMARYL) 2 MG tablet, Take 2 mg by mouth Daily., Disp: , Rfl: 0  •  HYDROcodone-acetaminophen (NORCO) 7.5-325 MG per tablet, Take 1 tablet by mouth Every 8 (Eight) Hours., Disp: , Rfl:   •  isosorbide dinitrate (ISORDIL) 30 MG tablet, Take 30 mg by mouth Daily., Disp: , Rfl: 0  •  lisinopril (PRINIVIL,ZESTRIL) 5  "MG tablet, Take 5 mg by mouth Daily., Disp: , Rfl:   •  magnesium oxide (MAGOX) 400 (241.3 MG) MG tablet tablet, Take 400 mg by mouth Daily., Disp: , Rfl:   •  Red Yeast Rice 600 MG tablet, Take 600 mg by mouth 2 (Two) Times a Day., Disp: , Rfl:   •  terbutaline (BRETHINE) 5 MG tablet, Take 5 mg by mouth 2 (Two) Times a Day., Disp: , Rfl: 0  •  albuterol (PROVENTIL) (2.5 MG/3ML) 0.083% nebulizer solution, Take 2.5 mg by nebulization 4 (Four) Times a Day As Needed for wheezing., Disp: 125 vial, Rfl: 11  •  fluticasone (FLONASE) 50 MCG/ACT nasal spray, 2 sprays into each nostril Daily for 30 days., Disp: 1 each, Rfl: 11  •  predniSONE (DELTASONE) 10 MG tablet, Take 40mg PO daily x 3d, then 30mg PO x 3d, then 20mg PO x 2d, then 10mg PO x 2d then stop, Disp: 27 tablet, Rfl: 0  •  Umeclidinium-Vilanterol 62.5-25 MCG/INH aerosol powder , Inhale 1 puff Daily., Disp: 1 each, Rfl: 11    Past Medical History   Diagnosis Date   • Arthritis    • Bradycardia    • Chronic obstructive pulmonary disease (COPD)    • Coronary atherosclerosis of native coronary artery    • Diabetes mellitus    • Hyperlipidemia    • Hypertension    • Neuropathy    • Pneumonia    • Shortness of breath      Past Surgical History   Procedure Laterality Date   • Appendectomy     • Hernia repair     • Lung biopsy       Social History     Social History   • Marital status:      Spouse name: N/A   • Number of children: N/A   • Years of education: N/A     Social History Main Topics   • Smoking status: Former Smoker   • Smokeless tobacco: Never Used   • Alcohol use No   • Drug use: No   • Sexual activity: Defer     Other Topics Concern   • None     Social History Narrative     History reviewed. No pertinent family history.       Objective     Blood pressure 137/68, pulse 53, height 67\" (170.2 cm), weight 177 lb (80.3 kg), SpO2 98 %.  Physical Exam   Constitutional: He is oriented to person, place, and time. Vital signs are normal. He appears " well-developed and well-nourished.   HENT:   Head: Normocephalic and atraumatic.   Nose: Mucosal edema and rhinorrhea present.   Mouth/Throat: Uvula is midline, oropharynx is clear and moist and mucous membranes are normal.   Mallampati 2, cobblestoning   Eyes: Conjunctivae, EOM and lids are normal. Pupils are equal, round, and reactive to light.   Neck: Trachea normal and normal range of motion. No tracheal tenderness present. No thyroid mass present.   Cardiovascular: Regular rhythm and normal heart sounds.  Bradycardia present.  Exam reveals no gallop.    No murmur heard.  Pulmonary/Chest: Effort normal. No respiratory distress. He has no wheezes. He has rhonchi in the right lower field and the left lower field. Chest wall is not dull to percussion. He exhibits no tenderness.   Abdominal: Soft. Normal appearance and bowel sounds are normal. He exhibits distension. There is no tenderness.   Musculoskeletal:   1+ pitting edema BLE   Lymphadenopathy:        Head (right side): No submandibular adenopathy present.        Head (left side): No submandibular adenopathy present.     He has no cervical adenopathy.        Right: No supraclavicular adenopathy present.        Left: No supraclavicular adenopathy present.   Neurological: He is alert and oriented to person, place, and time.   Skin: Skin is warm and dry. No cyanosis. Nails show no clubbing.   Psychiatric: He has a normal mood and affect. His speech is normal and behavior is normal. Judgment normal.   Nursing note and vitals reviewed.      PFTs: 3/3/17  Ratio 79  FVC 1.66/ 49%  FEV1 1.3/ 55%  Possible restriction.  Daughter would not allow full PFTs to confirm.  No comparative data available.     Radiology (independently reviewed and interpreted by me): CXR 3/1/17 showed right side granuloma, hyperinflation, cardiomegaly, vascular congestion.       Assessment/Plan     Hasmukh was seen today for pneumonia.    Diagnoses and all orders for this visit:    Dyspnea on  exertion    Mixed type COPD (chronic obstructive pulmonary disease)  -     Umeclidinium-Vilanterol 62.5-25 MCG/INH aerosol powder ; Inhale 1 puff Daily.  -     predniSONE (DELTASONE) 10 MG tablet; Take 40mg PO daily x 3d, then 30mg PO x 3d, then 20mg PO x 2d, then 10mg PO x 2d then stop  -     albuterol (PROVENTIL) (2.5 MG/3ML) 0.083% nebulizer solution; Take 2.5 mg by nebulization 4 (Four) Times a Day As Needed for wheezing.    History of tobacco use    Chronic allergic rhinitis  -     fluticasone (FLONASE) 50 MCG/ACT nasal spray; 2 sprays into each nostril Daily for 30 days.    Bradycardia, drug induced  -     Ambulatory Referral to Cardiology    Heart failure, unspecified heart failure chronicity, unspecified heart failure type  -     Ambulatory Referral to Cardiology         Discussion/ Recommendations:   Spirometry suggests restriction, so there is likely a component of both obstruction and restriction consistent with mixed COPD.  I'm concerned that his complaints are multifactorial from underlying COPD, chronic allergic rhinitis, as well as bradycardia and possible heart failure.  I have asked Dr. Maciel to see the patient today given the possibility of underlying heart failure as well as bradycardia into the 30s at home, and he has graciously agreed.  I'm also concerned that patient is not on proper bronchodilator therapy which may be contributing to this.  He's been on chronic prednisone for some time, and I would like to get him off as soon as possible.    -Stop Arnuity, Incruse, and duo nebs, as I am concerned that ICS may be increasing his risk for recurrent pneumonia and the anticholinergics in his Incruse and duo nebs are antagonistic.  -Start Anoro daily and albuterol nebs as needed.  -10 day prednisone taper to off.  -Start Flonase daily.  Instructed on proper technique.  -Consultation with Dr. Maciel this morning.  Appreciate input.  Anticipate need for echocardiogram and possible modification  of antiarrhythmic regimen.  -Monitor heart rate and weight at home.  -Follow-up in 2 weeks to assess symptoms following prednisone taper and on new bronchodilator regimen.         Return in about 2 weeks (around 3/17/2017).      Thank you for allowing me to participate in the care of Hasmukh Ham. Please do not hesitate to contact me with any questions.         This document has been electronically signed by Brea Higginbotham MD on March 3, 2017 9:40 AM      EMR Dragon/Transcription disclaimer:     Much of this encounter note is an electronic transcription/translation of spoken language to printed text. The electronic translation of spoken language may permit erroneous, or at times, nonsensical words or phrases to be inadvertently transcribed; Although I have reviewed the note for such errors, some may still exist.

## 2017-03-03 NOTE — PATIENT INSTRUCTIONS
-Stop albuterol- ipratroprium nebs. Use albuterol sulfate nebs instead.  -Stop Arnuity and Incruse. Use Anoro once daily instead  -Start flonase (store brand) 2 sprays each nostril daily  -Start prednisone taper as directed  -Monitor HR and weight at home.

## 2017-03-03 NOTE — PROGRESS NOTES
Cardiovascular Medicine   Jeff Maciel M.D., Ph.D., [facility]      Dr. Higginbotham:  Thank you for asking me to see Hasmukh Ham for Dyspnea.    History of Present Illness  This is a 83 y.o. male with:    1. ASCAD  A. S/p RCA PCI, 2004  B. S/p PCI to 2.5 x 13mm to LAD, 2004  2. PAT  3. AF  A. S/p JESUS/DCCV 1/2017 with Dr. Curtis  4. Mild TR, 2015  5. PH  A. PASP: 52mmHg  6. TAA  A. Aortic root: 4.2cm on TTE, 2015      Pulmonary Hypertension  He presents for evaluation and treatment of pulmonary hypertension. Patient's last documented PA systolic pressure was 52 mmHg in 2015.  He has not had subsequent evaluation.  He's never been seen by a PAH-specialist. He has known diastolic dysfunction of unknown degree. He has been having marked dyspnea. This has worsened since last fall. He believes his Amiodarone has worsened this. He has been diagnosed with COPD. He had some medication changes made by Dr. Higginbotham. He has had some LE edema. This is a new development. No prior h/o DVT or PE.     ASCAD  Patient carries a diagnosis of coronary artery disease.  In 2004 he underwent PCI to the RCA. He then experienced ISR. This then required a 3.5 mm x 20 mm vision stent that was proximal to the prior stent.  Thereafter, a 2.75 x 28 mm stent was placed within and distal to the previous area of stenting. Patient had recurrent chest pain later in 2004.  Repeat left heart catheterization revealed a LAD lesion. He status post a 2.5 x 13 mm stent to the mid LAD. 2004 was the last date of his last left heart catheterization.  He did have a Persantine stress test in 2005 without evidence of inducible ischemia.his current medications for CAD include ASA, Plavix, Imdur and red yeast rice. He has had severe myalgias with statin medication. He has had a pressure sensation. The last four or five days he has had some mild pressure. This has been associated with dyspnea.     Atrial arrhythmia  Patient has a history of paroxysmal atrial  tachycardia as well as atrial fibrillation. Most recent cardioversion was in January 2017 by Dr. Curtis. He is currently on amiodarone 200 mg a day. He is not prescribed anticoagulation due to a GIB. He was found to have a VR in the 30s at Dr. Higginbotham's office. He has been having dizziness with exertion.     TAA  His aortic root was dilated in 2015.  Measurement by TTE at that time was 4.2 cm.  He's had no follow-up imaging. No significant family history.     HTN  Concerning the patient's hypertension, the patient is currently taking Lisinopril.  The patient is medication compliant.  Denies side effects.  Patient's laboratory evaluations are followed closely by the PCP.      HLD  Patient has a diagnosis of hyperlipidemia.  He's currently on red yeast rice. He had severe myalgias with statin medications.     LE pain/Edema  The pt has been having increase LE pain and edema. Pain worsens with walking. Denies prior DVT or PE.     Review of Systems - History obtained from chart review and the patient  General ROS: positive for  - fatigue  Respiratory ROS: positive for - shortness of breath  Cardiovascular ROS: positive for - chest pain    family history is not on file.     reports that he has quit smoking. He has never used smokeless tobacco. He reports that he does not drink alcohol or use illicit drugs.    Allergies   Allergen Reactions   • Atorvastatin Anaphylaxis     myalgia   • Azithromycin    • Pravastatin      Myalgia   • Biaxin [Clarithromycin] Rash         Current Outpatient Prescriptions:   •  albuterol (PROVENTIL) (2.5 MG/3ML) 0.083% nebulizer solution, Take 2.5 mg by nebulization 4 (Four) Times a Day As Needed for wheezing., Disp: 125 vial, Rfl: 11  •  clopidogrel (PLAVIX) 75 MG tablet, Take 75 mg by mouth Daily., Disp: , Rfl:   •  donepezil (ARICEPT) 10 MG tablet, Take 10 mg by mouth Daily., Disp: , Rfl: 0  •  famotidine (PEPCID) 20 MG tablet, Take 20 mg by mouth 2 (Two) Times a Day., Disp: , Rfl: 0  •   fluticasone (FLONASE) 50 MCG/ACT nasal spray, 2 sprays into each nostril Daily for 30 days., Disp: 1 each, Rfl: 11  •  glimepiride (AMARYL) 2 MG tablet, Take 2 mg by mouth Daily., Disp: , Rfl: 0  •  HYDROcodone-acetaminophen (NORCO) 7.5-325 MG per tablet, Take 1 tablet by mouth Every 8 (Eight) Hours., Disp: , Rfl:   •  isosorbide dinitrate (ISORDIL) 30 MG tablet, Take 30 mg by mouth Daily., Disp: , Rfl: 0  •  lisinopril (PRINIVIL,ZESTRIL) 5 MG tablet, Take 5 mg by mouth Daily., Disp: , Rfl:   •  magnesium oxide (MAGOX) 400 (241.3 MG) MG tablet tablet, Take 400 mg by mouth Daily., Disp: , Rfl:   •  predniSONE (DELTASONE) 10 MG tablet, Take 40mg PO daily x 3d, then 30mg PO x 3d, then 20mg PO x 2d, then 10mg PO x 2d then stop, Disp: 27 tablet, Rfl: 0  •  Red Yeast Rice 600 MG tablet, Take 600 mg by mouth 2 (Two) Times a Day., Disp: , Rfl:   •  terbutaline (BRETHINE) 5 MG tablet, Take 5 mg by mouth 2 (Two) Times a Day., Disp: , Rfl: 0  •  Umeclidinium-Vilanterol 62.5-25 MCG/INH aerosol powder , Inhale 1 puff Daily., Disp: 1 each, Rfl: 11  •  metoprolol tartrate (LOPRESSOR) 25 MG tablet, Take 0.5 tablets by mouth Daily., Disp: 60 tablet, Rfl: 11    Physical Exam:  Vitals:    03/03/17 1005   BP: 144/64   Pulse:    SpO2:      Body mass index is 28.08 kg/(m^2).    GEN: alert, appears stated age and cooperative  Body Habitus: well-nourished  Neuro: CN II-XII grossly intact.   HEENT: Head: Normocephalic, no lesions, without obvious abnormality.  Neck / Thyroid: Supple, no masses, nodes, nodules or enlargement. No arcus senilis, xanthelasma or xanthomas. PERRL. Normal external ears. No drainage. No thyromegaly. Neck supple. No LAD. Trachea midline. Nose, normal.  JVP: 14 cm of water at 45 degrees HJR: absent      Carotid:  Upstroke: easily palpated bilaterally Volume: Normal.    Carotid Bruit:  Present, right Subclavian Bruit: Not present.    Lymph: No overt LAD.   Back: Normal.  Chest:  Normal Excursion: Good    I:E:  Good  Pulmonary:clear to auscultation, no wheezes, rales or rhonchi, symmetric air entry. Equal chest excursion. Chest physical exam is normal. No tenderness.        Precordium:  No palpable heaves or thrusts. P2 is not palpable.   [place]:  normal size and placement Palpable S4: Not present.   Heart rate: normal  Heart Rhythm: regular     Heart Sounds: S1: normal intensity  S2: normal intensity  S3: absent   S4: absent  Opening Snap: absent  A2-OS:  N/A  Pericardial rub: absent    Ejection click: None      Murmurs: Systolic: none  Diastolic: none  Abdomen: Soft, non-tender, normal bowel sounds; no bruits, organomegaly or masses.  Extremity: 1+ lower extremity edema bilaterally  Pulses: Right radial artery has 2+ (normal) pulse and Left radial artery has 2+ (normal) pulse    DATA REVIEWED:     CT Chest  Conclusion-  Chronic obstructive pulmonary disease.  Bilateral linear scarring.  Old granulomatous disease.  No acute infiltrate.  Rather extensive coronary artery calcifications.  Fatty infiltration of the liver.  2.8 cm and 5.5 cm left renal cysts.     Electronically Signed By- Sam Higginbotham MD On: 2016-10-07 20:30:33      1/2017:  DCCV  PROCEDURE PERFORMED: Successful electrical cardioversion with synchronized 200 joules to underlying sinus rhythm. INDICATION FOR PROCEDURE: Symptomatic atrial fibrillation/atrial flutter.       2015, TTE  1. Normal LV systolic function with an EF of 70% with left ventricular hypertrophy and diastolic relaxation abnormality of the left ventricle. 2. Mildly dilated aortic root at 4.2 cm. 3. No significant valvular abnormalities noted.     [facility], 2004    CORONARIES: Left main - Left main is angiographically normal. It divides into left anterior descending and circumflex branches. LAD - LAD is a normal caliber vessel. It tapers sharply at the mid and distal segment. Left anterior descending gives off four diagonal branches. First diagonal is about 2 mm. Other three diagonals are small  caliber less than 2 mm vessels. Left anterior descending has mid focal 80 percent lesion. Left anterior descending had diffuse mild disease distally about 10 to 20 percent. Left anterior descending does not have any other hemodynamically significant lesion. Circumflex - Circumflex is a normal caliber vessel. It gives off two large OM branches. The first OM branch branches earlier into two upper and lower branches. Circumflex has luminal irregularities. Right coronary artery - Right coronary artery is a normal caliber vessel. It is occluded at the distal end at the previous stenting. That site was occluded in the previous stenting in the beginning of this year. There is a stent in the mid segment of ostia that has about 40 to 50 percent InStent restenosis. The RCA gives collaterals to the left side. The distal PDA is visualized by collaterals from left to right. LEFT VENTRICULOGRAM: Shows EF is 55 to 60 percent. Slight anterior hypokinesis is noted. No MR noted. PRESSURES: Left ventricular pressure 175/13. Aortic pressure is 175/85. INTERVENTION: Pre-PTCA 80 percent mid left anterior descending. Post-PTCA 80 percent to 0 percent stenosis with Cypher stent 2.5 x 13 mm. DX: 1. Two vessel coronary artery disease. 2. Successful percutaneous transluminal coronary angioplasty of left anterior descending artery. 3. Aggressive secondary risk reduction with blood pressure management and lipid control.       Assessment/Plan     1. 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    RV status: Unknown.The patient is in a moderately hypervolemic and well-perfused physiologic state.   -6MWT  -Repeat PAH-protocol TTE   -Deferred RHC, possible VD challenge  -Start Lasix 20mg day  -RTC Monday    2. paroxysmal - 7 days or less Atrial fibrillation Now in sinus jaycee  [id number]: CHADSVASC: CHF, HTN and Age >65  EF is 60%.   Anticoagulation:Contraindicated due to GIB  Rate  control agents:requested.  - Agent: beta blocker-Lopressor 12.5mg day  -D/C Amiodarone due to symptomatic bradycardia  -ZIO patch    3. Concerns for CHF. I suspect this is HFpEF, though HFrEF is in the differential. Chest x-ray was significant for cardiomegaly.Last TTE with LVEF preserved. He is moderately hypervolemic, but perfused.   Start Lasix, May consider subcutaneous Lasix on Monday if he is not engaged and active diuresis  Continue Lisinopril  TTE, BNP and other basic labs    4. ASCAD. EF: 60%  last documented 2015. NYHA stage C; FC-NYHA Class III. CCS class II. The patient has been revascularized with PCI.   ASA, Clopidogrel, Red yeast rice  Start Lopressor next week  Deferred Lexiscan MPS    5. HTN, essential.  HD BP noted to be well controlled today in office.    End-organ damage: None known.  DASH; medication compliance; Low sodium diet  TTE for LVH assessment  Microalbumin, CBC, BMP, EKG   Continue current treatment regimen.     6. TAA, 4.2cm. No high-risk features.  -Chest, Abdomen CT  -TTE    7. Cardiac Risk Assessment: Moderate Risk.   Recommended ASA/Statin intolerance--Red Yeast Rice    8. Dyslipidemia.  Under good control.  -Statin:  CI 2nd to myalgias.  -Recommended moderate-intensity statin therapy  -Lipid-lowering medications: Red Yeast Rice     9. Mild MR.   TTE    10. Tobacco status: Former smoker    11. LE edema, pain, unable to exclude claudication.  -ABIs, LE Venous Duplex     12. Right carotid bruit:  Duplex    Plan for follow-up: in 3 days      EMR Dragon/Transcription disclaimer:     Much of this encounter note is an electronic transcription. This may permit erroneous, or at times, nonsensical words or phrases to be inadvertently transcribed; Although I have reviewed the note for such errors, some may still exist.

## 2017-03-06 ENCOUNTER — OFFICE VISIT (OUTPATIENT)
Dept: CARDIOLOGY | Facility: CLINIC | Age: 82
End: 2017-03-06

## 2017-03-06 VITALS
SYSTOLIC BLOOD PRESSURE: 140 MMHG | HEART RATE: 76 BPM | OXYGEN SATURATION: 95 % | DIASTOLIC BLOOD PRESSURE: 58 MMHG | WEIGHT: 180.6 LBS | HEIGHT: 68 IN | BODY MASS INDEX: 27.37 KG/M2

## 2017-03-06 DIAGNOSIS — I10 ESSENTIAL HYPERTENSION: ICD-10-CM

## 2017-03-06 DIAGNOSIS — R09.89 BILATERAL CAROTID BRUITS: ICD-10-CM

## 2017-03-06 DIAGNOSIS — I25.10 CORONARY ARTERY DISEASE INVOLVING NATIVE CORONARY ARTERY OF NATIVE HEART WITHOUT ANGINA PECTORIS: ICD-10-CM

## 2017-03-06 DIAGNOSIS — I50.9 HEART FAILURE, UNSPECIFIED HEART FAILURE CHRONICITY, UNSPECIFIED HEART FAILURE TYPE: ICD-10-CM

## 2017-03-06 DIAGNOSIS — E78.2 MIXED HYPERLIPIDEMIA: Primary | ICD-10-CM

## 2017-03-06 PROCEDURE — 99214 OFFICE O/P EST MOD 30 MIN: CPT | Performed by: INTERNAL MEDICINE

## 2017-03-06 NOTE — PROGRESS NOTES
Cardiovascular Medicine   Jeff Maciel M.D., Ph.D., [facility]    History of Present Illness  This is a 83 y.o. male with:    1. ASCAD  A. S/p RCA PCI, 2004  B. S/p PCI to 2.5 x 13mm to LAD, 2004  2. PAT  3. AF  A. S/p JESUS/DCCV 1/2017 with Dr. Curtis  4. Mild TR, 2015  5. PH  A. PASP: 52mmHg  6. TAA  A. Aortic root: 4.2cm on TTE, 2015      Pulmonary Hypertension  He returns for of pulmonary hypertension. Patient's last documented PA systolic pressure was 52 mmHg in 2015.  He has known diastolic dysfunction of unknown degree. He has been having marked dyspnea. This has worsened since last fall. He believes his Amiodarone has worsened this. He has been diagnosed with COPD. He had some medication changes made by Dr. Higginbotham. He has had some LE edema. This is a new development. No prior h/o DVT or PE.  At his last visit, I asked him to have a repeat PAH-protocol TTE.    ASCAD  Patient carries a diagnosis of coronary artery disease.  In 2004 he underwent PCI to the RCA. He then experienced ISR. This then required a 3.5 mm x 20 mm vision stent that was proximal to the prior stent.  Thereafter, a 2.75 x 28 mm stent was placed within and distal to the previous area of stenting. Patient had recurrent chest pain later in 2004.  Repeat left heart catheterization revealed a LAD lesion. He status post a 2.5 x 13 mm stent to the mid LAD. 2004 was the last date of his last left heart catheterization.  He did have a Persantine stress test in 2005 without evidence of inducible ischemia.his current medications for CAD include ASA, Plavix, Imdur and red yeast rice. He has had severe myalgias with statin medication. He has had a pressure sensation. The last four or five days he has had some mild pressure. This has been associated with dyspnea.  As last visit, we discussed a deferred ischemia evaluation.    Atrial arrhythmia  Patient has a history of paroxysmal atrial tachycardia as well as atrial fibrillation. Most recent cardioversion  was in January 2017 by Dr. Curtis. He is currently on amiodarone 200 mg a day. He is not prescribed anticoagulation due to a GIB. He was found to have a VR in the 30s at Dr. Higginbotham's office. He has been having dizziness with exertion.     TAA  His aortic root was dilated in 2015.  Measurement by TTE at that time was 4.2 cm.  He's had no follow-up imaging. No significant family history.  His last appointment I arranged a chest abdomen and CT scan to evaluate his aortic measurements.    HTN  Concerning the patient's hypertension, the patient is currently taking Lisinopril.  The patient is medication compliant.  Denies side effects.  Patient's laboratory evaluations are followed closely by the PCP.      HLD  Patient has a diagnosis of hyperlipidemia.  He's currently on red yeast rice. He had severe myalgias with statin medications.     LE pain/Edema  The pt has been having increase LE pain and edema. Pain worsens with walking. Denies prior DVT or PE.     Review of Systems - History obtained from chart review and the patient  General ROS: positive for  - fatigue  Respiratory ROS: positive for - shortness of breath  Cardiovascular ROS: positive for - chest pain    family history includes Diabetes in his other; Heart disease in his other; Hypertension in his other; Other in his other; Stroke in his other.     reports that he has quit smoking. He has never used smokeless tobacco. He reports that he does not drink alcohol or use illicit drugs.    Allergies   Allergen Reactions   • Atorvastatin Anaphylaxis     myalgia   • Azithromycin    • Pravastatin      Myalgia   • Biaxin [Clarithromycin] Rash         Current Outpatient Prescriptions:   •  albuterol (PROVENTIL) (2.5 MG/3ML) 0.083% nebulizer solution, Take 2.5 mg by nebulization 4 (Four) Times a Day As Needed for wheezing., Disp: 125 vial, Rfl: 11  •  clopidogrel (PLAVIX) 75 MG tablet, Take 75 mg by mouth Daily., Disp: , Rfl:   •  donepezil (ARICEPT) 10 MG tablet, Take 10  mg by mouth Daily., Disp: , Rfl: 0  •  famotidine (PEPCID) 20 MG tablet, Take 20 mg by mouth 2 (Two) Times a Day., Disp: , Rfl: 0  •  fluticasone (FLONASE) 50 MCG/ACT nasal spray, 2 sprays into each nostril Daily for 30 days., Disp: 1 each, Rfl: 11  •  furosemide (LASIX) 20 MG tablet, Take 1 tablet by mouth Daily., Disp: 90 tablet, Rfl: 3  •  glimepiride (AMARYL) 2 MG tablet, Take 2 mg by mouth Daily., Disp: , Rfl: 0  •  HYDROcodone-acetaminophen (NORCO) 7.5-325 MG per tablet, Take 1 tablet by mouth Every 8 (Eight) Hours., Disp: , Rfl:   •  isosorbide dinitrate (ISORDIL) 30 MG tablet, Take 30 mg by mouth Daily., Disp: , Rfl: 0  •  lisinopril (PRINIVIL,ZESTRIL) 5 MG tablet, Take 5 mg by mouth Daily., Disp: , Rfl:   •  magnesium oxide (MAGOX) 400 (241.3 MG) MG tablet tablet, Take 400 mg by mouth Daily., Disp: , Rfl:   •  predniSONE (DELTASONE) 10 MG tablet, Take 40mg PO daily x 3d, then 30mg PO x 3d, then 20mg PO x 2d, then 10mg PO x 2d then stop, Disp: 27 tablet, Rfl: 0  •  Red Yeast Rice 600 MG tablet, Take 600 mg by mouth 2 (Two) Times a Day., Disp: , Rfl:   •  terbutaline (BRETHINE) 5 MG tablet, Take 5 mg by mouth 2 (Two) Times a Day., Disp: , Rfl: 0  •  Umeclidinium-Vilanterol 62.5-25 MCG/INH aerosol powder , Inhale 1 puff Daily., Disp: 1 each, Rfl: 11    Physical Exam:  Vitals:    03/06/17 1411   BP: 140/58   Pulse: 76   SpO2: 95%     Body mass index is 27.87 kg/(m^2).    GEN: alert, appears stated age and cooperative  Body Habitus: well-nourished  Neuro: CN II-XII grossly intact.   HEENT: Head: Normocephalic, no lesions, without obvious abnormality.  Neck / Thyroid: Supple, no masses, nodes, nodules or enlargement. No arcus senilis, xanthelasma or xanthomas. PERRL. Normal external ears. No drainage. No thyromegaly. Neck supple. No LAD. Trachea midline. Nose, normal.  JVP: 9 cm of water at 45 degrees HJR: absent      Carotid:  Upstroke: easily palpated bilaterally Volume: Normal.    Carotid Bruit:  Present,  right Subclavian Bruit: Not present.    Lymph: No overt LAD.   Back: Normal.  Chest:  Normal Excursion: Good    I:E: Good  Pulmonary:clear to auscultation, no wheezes, rales or rhonchi, symmetric air entry. Equal chest excursion. Chest physical exam is normal. No tenderness.        Precordium:  No palpable heaves or thrusts. P2 is not palpable.   [place]:  normal size and placement Palpable S4: Not present.   Heart rate: normal  Heart Rhythm: regular     Heart Sounds: S1: normal intensity  S2: normal intensity  S3: absent   S4: absent  Opening Snap: absent  A2-OS:  N/A  Pericardial rub: absent    Ejection click: None      Murmurs: Systolic: none  Diastolic: none  Abdomen: Soft, non-tender, normal bowel sounds; no bruits, organomegaly or masses.  Extremity: trace  lower extremity edema bilaterally  Pulses: Right radial artery has 2+ (normal) pulse and Left radial artery has 2+ (normal) pulse    DATA REVIEWED:     CT Chest  Conclusion-  Chronic obstructive pulmonary disease.  Bilateral linear scarring.  Old granulomatous disease.  No acute infiltrate.  Rather extensive coronary artery calcifications.  Fatty infiltration of the liver.  2.8 cm and 5.5 cm left renal cysts.     Electronically Signed By- Sam Higginbotham MD On: 2016-10-07 20:30:33      1/2017:  DCCV  PROCEDURE PERFORMED: Successful electrical cardioversion with synchronized 200 joules to underlying sinus rhythm. INDICATION FOR PROCEDURE: Symptomatic atrial fibrillation/atrial flutter.       2015, TTE  1. Normal LV systolic function with an EF of 70% with left ventricular hypertrophy and diastolic relaxation abnormality of the left ventricle. 2. Mildly dilated aortic root at 4.2 cm. 3. No significant valvular abnormalities noted.     [facility], 2004    CORONARIES: Left main - Left main is angiographically normal. It divides into left anterior descending and circumflex branches. LAD - LAD is a normal caliber vessel. It tapers sharply at the mid and distal segment. Left  anterior descending gives off four diagonal branches. First diagonal is about 2 mm. Other three diagonals are small caliber less than 2 mm vessels. Left anterior descending has mid focal 80 percent lesion. Left anterior descending had diffuse mild disease distally about 10 to 20 percent. Left anterior descending does not have any other hemodynamically significant lesion. Circumflex - Circumflex is a normal caliber vessel. It gives off two large OM branches. The first OM branch branches earlier into two upper and lower branches. Circumflex has luminal irregularities. Right coronary artery - Right coronary artery is a normal caliber vessel. It is occluded at the distal end at the previous stenting. That site was occluded in the previous stenting in the beginning of this year. There is a stent in the mid segment of ostia that has about 40 to 50 percent InStent restenosis. The RCA gives collaterals to the left side. The distal PDA is visualized by collaterals from left to right. LEFT VENTRICULOGRAM: Shows EF is 55 to 60 percent. Slight anterior hypokinesis is noted. No MR noted. PRESSURES: Left ventricular pressure 175/13. Aortic pressure is 175/85. INTERVENTION: Pre-PTCA 80 percent mid left anterior descending. Post-PTCA 80 percent to 0 percent stenosis with Cypher stent 2.5 x 13 mm. DX: 1. Two vessel coronary artery disease. 2. Successful percutaneous transluminal coronary angioplasty of left anterior descending artery. 3. Aggressive secondary risk reduction with blood pressure management and lipid control.       Assessment/Plan     1. 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    RV status: Unknown.The patient is in a moderately hypervolemic and well-perfused physiologic state.   -6MWT  -Repeat PAH-protocol TTE   -Deferred RHC, possible VD challenge  -Lasix 20mg day    2. paroxysmal - 7 days or less Atrial fibrillation Now in sinus jaycee  [id number]:  CHADSVASC: CHF, HTN and Age >65  EF is 60%.   Anticoagulation:Contraindicated due to GIB  Rate control agents:requested.  - Agent: beta blocker-Lopressor 12.5mg day  -ZIO patch    3. Concerns for CHF. I suspect this is HFpEF, though HFrEF is in the differential. Chest x-ray was significant for cardiomegaly.Last TTE with LVEF preserved. He is mildly hypervolemic, but perfused. He has had adequate diuresis with PO lasix.  Lasix  Continue Lisinopril  TTE    4. ASCAD. EF: 60%  last documented 2015. NYHA stage C; FC-NYHA Class III. CCS class II. The patient has been revascularized with PCI.   ASA, Clopidogrel, Red yeast rice  Start Lopressor next week  Deferred Lexiscan MPS    5. HTN, essential.  HD BP noted to be well controlled today in office.    End-organ damage: None known.  DASH; medication compliance; Low sodium diet  TTE for LVH assessment  Continue current treatment regimen.     6. TAA, 4.2cm. No high-risk features.  -Chest, Abdomen CT  -TTE    7. Cardiac Risk Assessment: Moderate Risk.   Recommended ASA/Statin intolerance--Red Yeast Rice    8. Dyslipidemia.  Under good control.  -Statin:  CI 2nd to myalgias.  -Recommended moderate-intensity statin therapy  -Lipid-lowering medications: Red Yeast Rice     9. Mild MR.   TTE    10. Tobacco status: Former smoker    11. LE edema, pain, unable to exclude claudication.  -ABIs, LE Venous Duplex     12. Right carotid bruit:  Duplex    Plan for follow-up: In 1 month      EMR Dragon/Transcription disclaimer:     Much of this encounter note is an electronic transcription. This may permit erroneous, or at times, nonsensical words or phrases to be inadvertently transcribed; Although I have reviewed the note for such errors, some may still exist.

## 2017-03-07 ENCOUNTER — TELEPHONE (OUTPATIENT)
Dept: CARDIOLOGY | Facility: CLINIC | Age: 82
End: 2017-03-07

## 2017-03-07 NOTE — TELEPHONE ENCOUNTER
Patient was instructed he may start lopressor per dr. Maciel. zio patch is a covered cpt code under his insurance plan per chepe bray.

## 2017-03-09 LAB
BH CV ECHO MEAS - ACS: 2.2 CM
BH CV ECHO MEAS - AO MAX PG (FULL): 3.8 MMHG
BH CV ECHO MEAS - AO MAX PG: 11.8 MMHG
BH CV ECHO MEAS - AO MEAN PG (FULL): 3 MMHG
BH CV ECHO MEAS - AO MEAN PG: 7 MMHG
BH CV ECHO MEAS - AO ROOT AREA: 10.2 CM^2
BH CV ECHO MEAS - AO ROOT DIAM: 3.6 CM
BH CV ECHO MEAS - AO V2 MAX: 172 CM/SEC
BH CV ECHO MEAS - AO V2 MEAN: 126 CM/SEC
BH CV ECHO MEAS - AO V2 VTI: 34.9 CM
BH CV ECHO MEAS - AVA(I,A): 4 CM^2
BH CV ECHO MEAS - AVA(I,D): 4 CM^2
BH CV ECHO MEAS - AVA(V,A): 3.7 CM^2
BH CV ECHO MEAS - AVA(V,D): 3.7 CM^2
BH CV ECHO MEAS - EDV(CUBED): 117.6 ML
BH CV ECHO MEAS - EDV(TEICH): 112.8 ML
BH CV ECHO MEAS - EF(CUBED): 81.3 %
BH CV ECHO MEAS - EF(TEICH): 73.8 %
BH CV ECHO MEAS - ESV(CUBED): 22 ML
BH CV ECHO MEAS - ESV(TEICH): 29.6 ML
BH CV ECHO MEAS - FS: 42.9 %
BH CV ECHO MEAS - IVS/LVPW: 1
BH CV ECHO MEAS - IVSD: 1.2 CM
BH CV ECHO MEAS - LA DIMENSION: 4.1 CM
BH CV ECHO MEAS - LA/AO: 1.1
BH CV ECHO MEAS - LV MASS(C)D: 226.4 GRAMS
BH CV ECHO MEAS - LV MAX PG: 8.1 MMHG
BH CV ECHO MEAS - LV MEAN PG: 4 MMHG
BH CV ECHO MEAS - LV V1 MAX: 142 CM/SEC
BH CV ECHO MEAS - LV V1 MEAN: 94.4 CM/SEC
BH CV ECHO MEAS - LV V1 VTI: 30.9 CM
BH CV ECHO MEAS - LVIDD: 4.9 CM
BH CV ECHO MEAS - LVIDS: 2.8 CM
BH CV ECHO MEAS - LVOT AREA (M): 4.5 CM^2
BH CV ECHO MEAS - LVOT AREA: 4.5 CM^2
BH CV ECHO MEAS - LVOT DIAM: 2.4 CM
BH CV ECHO MEAS - LVPWD: 1.2 CM
BH CV ECHO MEAS - MR MAX PG: 92.9 MMHG
BH CV ECHO MEAS - MR MAX VEL: 482 CM/SEC
BH CV ECHO MEAS - MV A MAX VEL: 101 CM/SEC
BH CV ECHO MEAS - MV DEC SLOPE: 465 CM/SEC^2
BH CV ECHO MEAS - MV E MAX VEL: 99.7 CM/SEC
BH CV ECHO MEAS - MV E/A: 0.99
BH CV ECHO MEAS - MV P1/2T MAX VEL: 113 CM/SEC
BH CV ECHO MEAS - MV P1/2T: 71.2 MSEC
BH CV ECHO MEAS - MVA P1/2T LCG: 1.9 CM^2
BH CV ECHO MEAS - MVA(P1/2T): 3.1 CM^2
BH CV ECHO MEAS - PA MAX PG: 6.1 MMHG
BH CV ECHO MEAS - PA V2 MAX: 123 CM/SEC
BH CV ECHO MEAS - RAP SYSTOLE: 5 MMHG
BH CV ECHO MEAS - RVSP: 62.2 MMHG
BH CV ECHO MEAS - SV(AO): 355.2 ML
BH CV ECHO MEAS - SV(CUBED): 95.7 ML
BH CV ECHO MEAS - SV(LVOT): 139.8 ML
BH CV ECHO MEAS - SV(TEICH): 83.3 ML
BH CV ECHO MEAS - TR MAX VEL: 378 CM/SEC

## 2017-03-10 ENCOUNTER — TELEPHONE (OUTPATIENT)
Dept: CARDIOLOGY | Facility: CLINIC | Age: 82
End: 2017-03-10

## 2017-03-10 NOTE — TELEPHONE ENCOUNTER
Patient was being scanned in the vascular lab and had complaints of shortness of breath with the lopressor he just started. Dr. gee spoke with the patient and will have him d/c lopressor

## 2017-03-11 LAB
BH CV LOWER ARTERIAL LEFT ABI RATIO: 1.45
BH CV LOWER ARTERIAL LEFT DORSALIS PEDIS SYS MAX: 172 MMHG
BH CV LOWER ARTERIAL LEFT POST TIBIAL SYS MAX: 183 MMHG
BH CV LOWER ARTERIAL RIGHT ABI RATIO: 1.55
BH CV LOWER ARTERIAL RIGHT DORSALIS PEDIS SYS MAX: 179 MMHG
BH CV LOWER ARTERIAL RIGHT POST TIBIAL SYS MAX: 195 MMHG
BH CV LOWER VASCULAR LEFT COMMON FEMORAL AUGMENT: NORMAL
BH CV LOWER VASCULAR LEFT COMMON FEMORAL COMPRESS: NORMAL
BH CV LOWER VASCULAR LEFT DISTAL FEMORAL AUGMENT: NORMAL
BH CV LOWER VASCULAR LEFT DISTAL FEMORAL COMPRESS: NORMAL
BH CV LOWER VASCULAR LEFT GREATER SAPH AK AUGMENT: NORMAL
BH CV LOWER VASCULAR LEFT GREATER SAPH AK COMPETENT: NORMAL
BH CV LOWER VASCULAR LEFT GREATER SAPH AK COMPRESS: NORMAL
BH CV LOWER VASCULAR LEFT GREATER SAPH BK AUGMENT: NORMAL
BH CV LOWER VASCULAR LEFT GREATER SAPH BK COMPETENT: NORMAL
BH CV LOWER VASCULAR LEFT GREATER SAPH BK COMPRESS: NORMAL
BH CV LOWER VASCULAR LEFT MID FEMORAL AUGMENT: NORMAL
BH CV LOWER VASCULAR LEFT MID FEMORAL COMPRESS: NORMAL
BH CV LOWER VASCULAR LEFT PERONEAL AUGMENT: NORMAL
BH CV LOWER VASCULAR LEFT POPLITEAL AUGMENT: NORMAL
BH CV LOWER VASCULAR LEFT POPLITEAL COMPRESS: NORMAL
BH CV LOWER VASCULAR LEFT POSTERIOR TIBIAL AUGMENT: NORMAL
BH CV LOWER VASCULAR LEFT POSTERIOR TIBIAL COMPRESS: NORMAL
BH CV LOWER VASCULAR LEFT PROFUNDA FEMORAL AUGMENT: NORMAL
BH CV LOWER VASCULAR LEFT PROXIMAL FEMORAL AUGMENT: NORMAL
BH CV LOWER VASCULAR LEFT PROXIMAL FEMORAL COMPRESS: NORMAL
BH CV LOWER VASCULAR RIGHT COMMON FEMORAL AUGMENT: NORMAL
BH CV LOWER VASCULAR RIGHT COMMON FEMORAL COMPRESS: NORMAL
BH CV LOWER VASCULAR RIGHT DISTAL FEMORAL AUGMENT: NORMAL
BH CV LOWER VASCULAR RIGHT DISTAL FEMORAL COMPRESS: NORMAL
BH CV LOWER VASCULAR RIGHT GREATER SAPH AK AUGMENT: NORMAL
BH CV LOWER VASCULAR RIGHT GREATER SAPH AK COMPETENT: NORMAL
BH CV LOWER VASCULAR RIGHT GREATER SAPH AK COMPRESS: NORMAL
BH CV LOWER VASCULAR RIGHT GREATER SAPH BK AUGMENT: NORMAL
BH CV LOWER VASCULAR RIGHT GREATER SAPH BK COMPETENT: NORMAL
BH CV LOWER VASCULAR RIGHT GREATER SAPH BK COMPRESS: NORMAL
BH CV LOWER VASCULAR RIGHT MID FEMORAL AUGMENT: NORMAL
BH CV LOWER VASCULAR RIGHT MID FEMORAL COMPRESS: NORMAL
BH CV LOWER VASCULAR RIGHT PERONEAL AUGMENT: NORMAL
BH CV LOWER VASCULAR RIGHT POPLITEAL AUGMENT: NORMAL
BH CV LOWER VASCULAR RIGHT POPLITEAL COMPRESS: NORMAL
BH CV LOWER VASCULAR RIGHT POSTERIOR TIBIAL AUGMENT: NORMAL
BH CV LOWER VASCULAR RIGHT PROFUNDA FEMORAL AUGMENT: NORMAL
BH CV LOWER VASCULAR RIGHT PROXIMAL FEMORAL AUGMENT: NORMAL
BH CV LOWER VASCULAR RIGHT PROXIMAL FEMORAL COMPRESS: NORMAL
BH CV XLRA MEAS LEFT DIST CCA EDV: 20 CM/SEC
BH CV XLRA MEAS LEFT DIST CCA PSV: 112 CM/SEC
BH CV XLRA MEAS LEFT DIST ICA EDV: 19 CM/SEC
BH CV XLRA MEAS LEFT DIST ICA PSV: 72 CM/SEC
BH CV XLRA MEAS LEFT MID ICA EDV: 20 CM/SEC
BH CV XLRA MEAS LEFT MID ICA PSV: 72 CM/SEC
BH CV XLRA MEAS LEFT PROX CCA EDV: 13 CM/SEC
BH CV XLRA MEAS LEFT PROX CCA PSV: 108 CM/SEC
BH CV XLRA MEAS LEFT PROX ECA EDV: 9 CM/SEC
BH CV XLRA MEAS LEFT PROX ECA PSV: 108 CM/SEC
BH CV XLRA MEAS LEFT PROX ICA EDV: 18 CM/SEC
BH CV XLRA MEAS LEFT PROX ICA PSV: 73 CM/SEC
BH CV XLRA MEAS LEFT VERTEBRAL A EDV: 15 CM/SEC
BH CV XLRA MEAS LEFT VERTEBRAL A PSV: 48 CM/SEC
BH CV XLRA MEAS RIGHT DIST CCA EDV: 23 CM/SEC
BH CV XLRA MEAS RIGHT DIST CCA PSV: 71 CM/SEC
BH CV XLRA MEAS RIGHT DIST ICA EDV: 12 CM/SEC
BH CV XLRA MEAS RIGHT DIST ICA PSV: 55 CM/SEC
BH CV XLRA MEAS RIGHT MID ICA EDV: 15 CM/SEC
BH CV XLRA MEAS RIGHT MID ICA PSV: 110 CM/SEC
BH CV XLRA MEAS RIGHT PROX CCA EDV: 14 CM/SEC
BH CV XLRA MEAS RIGHT PROX CCA PSV: 81 CM/SEC
BH CV XLRA MEAS RIGHT PROX ICA EDV: 19 CM/SEC
BH CV XLRA MEAS RIGHT PROX ICA PSV: 134 CM/SEC
BH CV XLRA MEAS RIGHT VERTEBRAL A EDV: 7 CM/SEC
BH CV XLRA MEAS RIGHT VERTEBRAL A PSV: 28 CM/SEC
UPPER ARTERIAL LEFT ARM BRACHIAL SYS MAX: 126 MMHG
UPPER ARTERIAL RIGHT ARM BRACHIAL SYS MAX: 118 MMHG

## 2017-03-14 ENCOUNTER — HOSPITAL ENCOUNTER (OUTPATIENT)
Dept: CT IMAGING | Facility: HOSPITAL | Age: 82
Discharge: HOME | End: 2017-03-14
Attending: INTERNAL MEDICINE

## 2017-03-14 ENCOUNTER — HOSPITAL ENCOUNTER (OUTPATIENT)
Dept: CT IMAGING | Facility: HOSPITAL | Age: 82
End: 2017-03-14
Attending: INTERNAL MEDICINE

## 2017-03-22 ENCOUNTER — HOSPITAL ENCOUNTER (OUTPATIENT)
Dept: CT IMAGING | Facility: HOSPITAL | Age: 82
Discharge: HOME | End: 2017-03-22
Attending: INTERNAL MEDICINE | Admitting: INTERNAL MEDICINE

## 2017-03-22 PROCEDURE — 74150 CT ABDOMEN W/O CONTRAST: CPT

## 2017-03-22 PROCEDURE — 71250 CT THORAX DX C-: CPT

## 2017-03-28 ENCOUNTER — OFFICE VISIT (OUTPATIENT)
Dept: PULMONOLOGY | Facility: CLINIC | Age: 82
End: 2017-03-28

## 2017-03-28 VITALS
HEIGHT: 68 IN | OXYGEN SATURATION: 97 % | HEART RATE: 68 BPM | SYSTOLIC BLOOD PRESSURE: 120 MMHG | DIASTOLIC BLOOD PRESSURE: 66 MMHG | WEIGHT: 181 LBS | BODY MASS INDEX: 27.43 KG/M2

## 2017-03-28 DIAGNOSIS — R06.02 SHORTNESS OF BREATH: Primary | ICD-10-CM

## 2017-03-28 DIAGNOSIS — J44.9 CHRONIC OBSTRUCTIVE PULMONARY DISEASE, UNSPECIFIED COPD TYPE (HCC): ICD-10-CM

## 2017-03-28 DIAGNOSIS — R53.81 PHYSICAL DECONDITIONING: ICD-10-CM

## 2017-03-28 DIAGNOSIS — I25.10 CORONARY ARTERY DISEASE INVOLVING NATIVE CORONARY ARTERY OF NATIVE HEART WITHOUT ANGINA PECTORIS: ICD-10-CM

## 2017-03-28 PROBLEM — J84.10 FIBROSIS OF LUNG (HCC): Status: RESOLVED | Noted: 2017-03-02 | Resolved: 2017-03-28

## 2017-03-28 PROCEDURE — 99214 OFFICE O/P EST MOD 30 MIN: CPT | Performed by: INTERNAL MEDICINE

## 2017-03-28 RX ORDER — NITROGLYCERIN 0.4 MG/1
TABLET SUBLINGUAL
Refills: 0 | COMMUNITY
Start: 2017-01-11

## 2017-03-28 NOTE — PROGRESS NOTES
Pulmonary Office Follow-up    Subjective     Hasmukh Ham is seen today at the office for   Chief Complaint   Patient presents with   • Follow-up     SOB         HPI  Hasmukh Ham is a 83 y.o. male with a PMH significant for COPD, past tobacco, HFpEF, diastolic dysfunction, CAD, a. Fib, DM, and HTN who presents for follow-up of COPD. Pt was initially seen by me on 3/3/17 at which time I changed him to Anoro, started Flonase, and gave him a prednisone taper.  I also referred him to Dr. Maciel regarding concern for possible heart failure as well as PH.  Since he saw me earlier this month, he has seen Dr. Maciel again and has had multiple studies performed including an echocardiogram, CT of the chest abdomen pelvis, lower extremity Dopplers, and ABIs.  He states he is doing better than when he was initially seen by me, but he continues to have shortness of breath.  Feels like the Anoro is helping more than his other inhalers.  Patient is using albuterol nebs once a day but does admit he feels he could use it more often.  He continues to have dyspnea on minimal exertion as well as weakness.  He continues to use Flonase and states that his nasal congestion and drainage have improved.  No recent ED visits or steroids since he was last seen by me.      Patient Active Problem List   Diagnosis   • Dilated aortic root   • Benign essential hypertension   • Type 2 diabetes mellitus   • Non-rheumatic tricuspid valve insufficiency   • Coronary artery disease involving native coronary artery of native heart   • Pulmonary emphysema   • Dyspnea   • Atrial fibrillation   • Bradycardia   • Shortness of breath   • Chronic obstructive pulmonary disease   • Chronic coronary artery disease   • Neuropathy   • Abdominal hernia   • Neck pain   • Dementia   • Diabetic neuropathy   • Diabetic polyneuropathy   • Gastroesophageal reflux disease without esophagitis   • Generalized osteoarthritis   • 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 Negative for fever.   HENT: Negative for congestion and postnasal drip.    Respiratory: Positive for shortness of breath and wheezing. Negative for cough.    Cardiovascular: Negative for chest pain and leg swelling.        Bradycardia     Musculoskeletal: Positive for arthralgias.     As described in the HPI. Otherwise, remainder of ROS (14 systems) were negative.    Medications, Allergies, Social, and Family Histories reviewed as per EMR.    Objective     Vitals:    03/28/17 1259   BP: 120/66   Pulse: 68   SpO2: 97%     Physical Exam   Constitutional: He is oriented to person, place, and time. Vital signs are normal. He appears well-developed and well-nourished.   HENT:   Head: Normocephalic and atraumatic.   Nose: No mucosal edema or rhinorrhea.   Mouth/Throat: Uvula is midline, oropharynx is clear and moist and mucous membranes are normal.   Mallampati 2   Eyes: Conjunctivae, EOM and lids are normal. Pupils are equal, round, and reactive to light.   Neck: Trachea normal and normal range of motion. No tracheal tenderness present. No thyroid mass present.   Cardiovascular: Normal rate, regular rhythm and normal heart sounds.  Exam reveals no gallop.    No murmur heard.  Pulmonary/Chest: Effort normal. No respiratory distress. He has decreased breath sounds. He has no wheezes. He has no rhonchi. Chest wall is not dull to percussion. He exhibits no tenderness.   Abdominal: Soft. Normal appearance and bowel sounds are normal. He  exhibits no distension. There is no tenderness.       Vascular Status -  His exam exhibits right foot edema. His exam exhibits left foot edema.  Lymphadenopathy:        Head (right side): No submandibular adenopathy present.        Head (left side): No submandibular adenopathy present.     He has no cervical adenopathy.        Right: No supraclavicular adenopathy present.        Left: No supraclavicular adenopathy present.   Neurological: He is alert and oriented to person, place, and time.   Skin: Skin is warm and dry. No cyanosis. Nails show no clubbing.   Psychiatric: He has a normal mood and affect. His speech is normal and behavior is normal. Judgment normal.   Nursing note and vitals reviewed.    IMAGING: CT chest without contrast 3/22/17 (independently reviewed and interpreted by me) showed prior granulomatous disease, stable peripheral and basilar predominant reticular interstitial changes suggesting mild chronic interstitial lung disease, moderate coronary artery calcifications.      TTE 3/9/17:  · Left ventricular function is normal.  · Estimated EF appears to be in the range of 61 - 65%.  · Left ventricular diastolic dysfunction (grade I a) consistent with impaired relaxation and elevated left atrial pressures.  · Left ventricular wall thickness is consistent with moderate concentric hypertrophy.  · Right Ventricle: Normal cavity size, wall thickness, systolic function and septal motion noted  · Estimated right ventricular systolic pressure from tricuspid regurgitation is moderately elevated (45-55 mmHg).  · Mild to moderate pulmonary hypertension is present.  · There is no evidence of pericardial effusion.    Assessment/Plan     Hasmukh was seen today for follow-up.    Diagnoses and all orders for this visit:    Shortness of breath    Chronic obstructive pulmonary disease, unspecified COPD type    Physical deconditioning  -     Ambulatory Referral to Pulmonary Rehab    Coronary artery disease involving  native coronary artery of native heart without angina pectoris         Discussion/ Recommendations:   He seems to be doing better on Anoro than on prior inhalers.  I think his dyspnea is multifactorial from underlying COPD, cardiac disease, and physical deconditioning.  His CTs have showed some very mild peripheral basilar fibrotic changes, but I do not think this is significant enough to cause his symptoms nor contribute to pulmonary hypertension.    -Continue Anoro daily.  -Use albuterol as needed.  Counseled that it will only help if he is having active COPD symptoms and I do not expected to benefit him with other causes.  -Referral to pulmonary rehabilitation.  Patient is somewhat hesitant as he is adamant he cannot walk on a treadmill and will have difficulty with transport.  I have encouraged him to obese go to the initial consultation appointment so he can get more information and, but the plan for therapy.  I explained that if we do not intervene in such a manner that is deconditioning we'll continue to make him have fatigue and shortness of breath.  -Continue Flonase daily.  -Follow up with Dr. Maciel as scheduled.      Return in about 2 months (around 5/28/2017) for Recheck.          This document has been electronically signed by Brea Higginbotham MD on March 28, 2017 1:25 PM      EMR Dragon/Transcription disclaimer:     Much of this encounter note is an electronic transcription/translation of spoken language to printed text. The electronic translation of spoken language may permit erroneous, or at times, nonsensical words or phrases to be inadvertently transcribed; Although I have reviewed the note for such errors, some may still exist.

## 2017-03-30 ENCOUNTER — TELEPHONE (OUTPATIENT)
Dept: CARDIOLOGY | Facility: CLINIC | Age: 82
End: 2017-03-30

## 2017-03-30 NOTE — TELEPHONE ENCOUNTER
Mrs. mendez states she was inquiring who had ordered o2 for mr. Mendez. She states she found out dr. de la o ordered it.

## 2017-04-18 ENCOUNTER — PROCEDURE VISIT (OUTPATIENT)
Dept: CARDIOLOGY | Facility: CLINIC | Age: 82
End: 2017-04-18

## 2017-04-18 DIAGNOSIS — I27.20 PULMONARY HYPERTENSION (HCC): ICD-10-CM

## 2017-04-18 PROCEDURE — 94620 PR PULMONARY STRESS TESTING,SIMPLE: CPT | Performed by: INTERNAL MEDICINE

## 2017-04-18 NOTE — PROGRESS NOTES
Hasmukh Ham  Procedure: 6 Minute Walk Test   4/18/17  Indication:pulmonary hypertension    Pretest: BP:114/60               HR:73               Sa02:93               Dyspnea:5               Fatigue:3    Post Test: BP:118/64               HR:73               Sa02:96               Dyspnea:3               Fatigue:3    First 6MWT:yes    Supplemental oxygen during test:no    Stopped before 6 minutes:no    Pauses:2    Results in distance walked:184.46 m    Did individual experience any pain or discomfort:no    I was present for the above test and agree with the data.     NYHA Functional Class IV    Jeff Maciel MD PhD

## 2017-05-30 ENCOUNTER — OFFICE VISIT (OUTPATIENT)
Dept: CARDIOLOGY | Facility: CLINIC | Age: 82
End: 2017-05-30

## 2017-05-30 VITALS
SYSTOLIC BLOOD PRESSURE: 110 MMHG | DIASTOLIC BLOOD PRESSURE: 54 MMHG | HEIGHT: 68 IN | BODY MASS INDEX: 27.8 KG/M2 | WEIGHT: 183.4 LBS | OXYGEN SATURATION: 97 % | HEART RATE: 42 BPM

## 2017-05-30 DIAGNOSIS — I73.9 CLAUDICATION (HCC): ICD-10-CM

## 2017-05-30 DIAGNOSIS — I65.21 STENOSIS OF RIGHT CAROTID ARTERY: ICD-10-CM

## 2017-05-30 DIAGNOSIS — E78.2 MIXED HYPERLIPIDEMIA: ICD-10-CM

## 2017-05-30 DIAGNOSIS — I48.0 PAROXYSMAL ATRIAL FIBRILLATION (HCC): ICD-10-CM

## 2017-05-30 DIAGNOSIS — I10 ESSENTIAL HYPERTENSION: ICD-10-CM

## 2017-05-30 DIAGNOSIS — R06.09 DYSPNEA ON EXERTION: Primary | ICD-10-CM

## 2017-05-30 DIAGNOSIS — I27.20 PULMONARY HYPERTENSION (HCC): ICD-10-CM

## 2017-05-30 DIAGNOSIS — I25.10 CORONARY ARTERY DISEASE INVOLVING NATIVE CORONARY ARTERY OF NATIVE HEART WITHOUT ANGINA PECTORIS: ICD-10-CM

## 2017-05-30 PROCEDURE — 99214 OFFICE O/P EST MOD 30 MIN: CPT | Performed by: INTERNAL MEDICINE

## 2017-05-30 RX ORDER — CEFDINIR 300 MG/1
300 CAPSULE ORAL
COMMUNITY
Start: 2017-05-24 | End: 2017-06-01

## 2017-05-30 RX ORDER — SODIUM CHLORIDE 0.9 % (FLUSH) 0.9 %
1-10 SYRINGE (ML) INJECTION AS NEEDED
Status: CANCELLED | OUTPATIENT
Start: 2017-05-30

## 2017-05-31 ENCOUNTER — TELEPHONE (OUTPATIENT)
Dept: CARDIOLOGY | Facility: CLINIC | Age: 82
End: 2017-05-31

## 2017-06-06 ENCOUNTER — HOSPITAL ENCOUNTER (OUTPATIENT)
Facility: HOSPITAL | Age: 82
Setting detail: HOSPITAL OUTPATIENT SURGERY
Discharge: HOME | End: 2017-06-06
Attending: INTERNAL MEDICINE | Admitting: INTERNAL MEDICINE

## 2017-06-06 VITALS
RESPIRATION RATE: 20 BRPM | DIASTOLIC BLOOD PRESSURE: 74 MMHG | BODY MASS INDEX: 28.48 KG/M2 | WEIGHT: 181.44 LBS | HEIGHT: 67 IN | TEMPERATURE: 96.9 F | HEART RATE: 47 BPM | SYSTOLIC BLOOD PRESSURE: 171 MMHG | OXYGEN SATURATION: 95 %

## 2017-06-06 DIAGNOSIS — I27.20 PULMONARY HYPERTENSION (HCC): ICD-10-CM

## 2017-06-06 DIAGNOSIS — R06.09 DYSPNEA ON EXERTION: ICD-10-CM

## 2017-06-06 LAB — OXYGEN SATURATION, PULMONARY ARTERY: 66.9 % (ref 94–100)

## 2017-06-06 PROCEDURE — 93451 RIGHT HEART CATH: CPT | Performed by: INTERNAL MEDICINE

## 2017-06-06 PROCEDURE — C1769 GUIDE WIRE: HCPCS | Performed by: INTERNAL MEDICINE

## 2017-06-06 PROCEDURE — C1894 INTRO/SHEATH, NON-LASER: HCPCS | Performed by: INTERNAL MEDICINE

## 2017-06-06 PROCEDURE — 82810 BLOOD GASES O2 SAT ONLY: CPT | Performed by: INTERNAL MEDICINE

## 2017-06-06 RX ORDER — SODIUM CHLORIDE 0.9 % (FLUSH) 0.9 %
1-10 SYRINGE (ML) INJECTION AS NEEDED
Status: DISCONTINUED | OUTPATIENT
Start: 2017-06-06 | End: 2017-06-06 | Stop reason: HOSPADM

## 2017-06-06 RX ORDER — LIDOCAINE HYDROCHLORIDE 20 MG/ML
INJECTION, SOLUTION INFILTRATION; PERINEURAL AS NEEDED
Status: DISCONTINUED | OUTPATIENT
Start: 2017-06-06 | End: 2017-06-06 | Stop reason: HOSPADM

## 2017-06-06 RX ADMIN — Medication 10 ML: at 09:10

## 2017-06-06 NOTE — H&P (VIEW-ONLY)
Cardiovascular Medicine   Jeff Maciel M.D., Ph.D., [facility]    History of Present Illness  This is a 83 y.o. male with:    1. ASCAD  A. S/p RCA PCI, 2004  B. S/p PCI to 2.5 x 13mm to LAD, 2004  2. PAT  3. AF  A. S/p JESUS/DCCV 1/2017 with Dr. Curtis  4. Mild TR  5. PH  A. PASP: 52mmHg, 2015  B. PASP >60mmHg, 2017  6. TAA, excluded  A. CTA 2017  7.  Right carotid bulb stenosis documented 2017      Pulmonary Hypertension  He returns for of pulmonary hypertension. Patient's PA systolic pressure was 52 mmHg in 2015.  He has known diastolic dysfunction. He has been having marked dyspnea. This has worsened since last fall. He believes his Amiodarone has worsened this. He has been diagnosed with COPD. He had some medication changes made by Dr. Higginbotham. He has had some LE edema. This is a new development. No prior h/o DVT or PE.  At his last visit, I asked him to have a repeat PAH-protocol TTE.  His repeat echocardiogram showed a PA systolic pressure greater than 60 mmHg.    ASCAD  Patient carries a diagnosis of coronary artery disease.  In 2004 he underwent PCI to the RCA. He then experienced ISR. This then required a 3.5 mm x 20 mm vision stent that was proximal to the prior stent.  Thereafter, a 2.75 x 28 mm stent was placed within and distal to the previous area of stenting. Patient had recurrent chest pain later in 2004.  Repeat left heart catheterization revealed a LAD lesion. He status post a 2.5 x 13 mm stent to the mid LAD. 2004 was the last date of his last left heart catheterization.  He did have a Persantine stress test in 2005 without evidence of inducible ischemia.his current medications for CAD include ASA, Plavix, Imdur and red yeast rice. He has had severe myalgias with statin medication. He has had a pressure sensation. The last four or five days he has had some mild pressure. This has been associated with dyspnea.  As last visit, we discussed a deferred ischemia evaluation.    Atrial arrhythmia  Patient  has a history of paroxysmal atrial tachycardia as well as atrial fibrillation. Most recent cardioversion was in January 2017 by Dr. Curtis. He is currently on amiodarone 200 mg a day. He is not prescribed anticoagulation due to a GIB. He was found to have a VR in the 30s at Dr. Higginbotham's office. He has been having dizziness with exertion.     HTN  Concerning the patient's hypertension, the patient is currently taking Lisinopril.  The patient is medication compliant.  Denies side effects.  Patient's laboratory evaluations are followed closely by the PCP.      HLD  Patient has a diagnosis of hyperlipidemia.  He's currently on red yeast rice. He had severe myalgias with statin medications.     Review of Systems - History obtained from chart review and the patient  General ROS: positive for  - fatigue  Respiratory ROS: positive for - shortness of breath  Cardiovascular ROS: positive for - chest pain    family history includes Diabetes in his other; Heart disease in his other; Hypertension in his other; Other in his other; Stroke in his other.     reports that he has quit smoking. He has never used smokeless tobacco. He reports that he does not drink alcohol or use illicit drugs.    Allergies   Allergen Reactions   • Atorvastatin Anaphylaxis     myalgia   • Azithromycin    • Pravastatin      Myalgia   • Biaxin [Clarithromycin] Rash         Current Outpatient Prescriptions:   •  albuterol (PROVENTIL) (2.5 MG/3ML) 0.083% nebulizer solution, Take 2.5 mg by nebulization 4 (Four) Times a Day As Needed for wheezing., Disp: 125 vial, Rfl: 11  •  clopidogrel (PLAVIX) 75 MG tablet, Take 75 mg by mouth Daily., Disp: , Rfl:   •  donepezil (ARICEPT) 10 MG tablet, Take 10 mg by mouth Daily., Disp: , Rfl: 0  •  famotidine (PEPCID) 20 MG tablet, Take 20 mg by mouth 2 (Two) Times a Day., Disp: , Rfl: 0  •  furosemide (LASIX) 20 MG tablet, Take 1 tablet by mouth Daily., Disp: 90 tablet, Rfl: 3  •  glimepiride (AMARYL) 2 MG tablet, Take 2  mg by mouth Daily., Disp: , Rfl: 0  •  HYDROcodone-acetaminophen (NORCO) 7.5-325 MG per tablet, Take 1 tablet by mouth Every 8 (Eight) Hours., Disp: , Rfl:   •  isosorbide dinitrate (ISORDIL) 30 MG tablet, Take 30 mg by mouth Daily., Disp: , Rfl: 0  •  lisinopril (PRINIVIL,ZESTRIL) 5 MG tablet, Take 5 mg by mouth Daily., Disp: , Rfl:   •  magnesium oxide (MAGOX) 400 (241.3 MG) MG tablet tablet, Take 400 mg by mouth Daily., Disp: , Rfl:   •  nitroglycerin (NITROSTAT) 0.4 MG SL tablet, place 1 tablet under the tongue if needed every 5 minutes for hair...  (REFER TO PRESCRIPTION NOTES)., Disp: , Rfl: 0  •  Red Yeast Rice 600 MG tablet, Take 600 mg by mouth 2 (Two) Times a Day., Disp: , Rfl:   •  terbutaline (BRETHINE) 5 MG tablet, Take 5 mg by mouth 2 (Two) Times a Day., Disp: , Rfl: 0  •  Umeclidinium-Vilanterol 62.5-25 MCG/INH aerosol powder , Inhale 1 puff Daily., Disp: 1 each, Rfl: 11    Physical Exam:  There were no vitals filed for this visit.  There is no height or weight on file to calculate BMI.    GEN: alert, appears stated age and cooperative  Body Habitus: well-nourished  Neuro: CN II-XII grossly intact.   HEENT: Head: Normocephalic, no lesions, without obvious abnormality.  Neck / Thyroid: Supple, no masses, nodes, nodules or enlargement. No arcus senilis, xanthelasma or xanthomas. PERRL. Normal external ears. No drainage. No thyromegaly. Neck supple. No LAD. Trachea midline. Nose, normal.  JVP: 9 cm of water at 45 degrees HJR: absent      Carotid:  Upstroke: easily palpated bilaterally Volume: Normal.    Carotid Bruit:  Present, right Subclavian Bruit: Not present.    Lymph: No overt LAD.   Back: Normal.  Chest:  Normal Excursion: Good    I:E: Good  Pulmonary:clear to auscultation, no wheezes, rales or rhonchi, symmetric air entry. Equal chest excursion. Chest physical exam is normal. No tenderness.        Precordium:  No palpable heaves or thrusts. P2 is not palpable.   [place]:  normal size and  placement Palpable S4: Not present.   Heart rate: normal  Heart Rhythm: regular     Heart Sounds: S1: normal intensity  S2: normal intensity  S3: absent   S4: absent  Opening Snap: absent  A2-OS:  N/A  Pericardial rub: absent    Ejection click: None      Murmurs: Systolic: none  Diastolic: none  Abdomen: Soft, non-tender, normal bowel sounds; no bruits, organomegaly or masses.  Extremity: trace  lower extremity edema bilaterally  Pulses: Right radial artery has 2+ (normal) pulse and Left radial artery has 2+ (normal) pulse    DATA REVIEWED:     2017, TTE  [facility] CARDIOLOGY  [address] [place] [address] [address]  [facility] [address] [place] [id number] [phone]             Hasmukh Ham   Acquired echocardiogram 2D complete   Order# [id number]   Reading physician: Jeff Maciel MD PhD Ordering physician: Jeff Maciel MD PhD Study date: 3/9/17   Patient Information   Patient Name MRN Sex  (Age)   Hasmukh Ham [id number] Male 1933 (83 y.o.)   Interpretation Summary   · Left ventricular function is normal.  · Estimated EF appears to be in the range of 61 - 65%.  · Left ventricular diastolic dysfunction (grade I a) consistent with impaired relaxation and elevated left atrial pressures.  · Left ventricular wall thickness is consistent with moderate concentric hypertrophy.  · Right Ventricle: Normal cavity size, wall thickness, systolic function and septal motion noted  · Estimated right ventricular systolic pressure from tricuspid regurgitation is moderately elevated (45-55 mmHg).  · Mild to moderate pulmonary hypertension is present.  · There is no evidence of pericardial effusion.     Carotid Duplex, 2017  [facility] CARDIOTHORACIC AND VASCULAR SURGERY  [address] [place] [address] [address]  [facility] [address] [place] [id number] [phone]             [facility]   Duplex scan of extracranial arteries using B-mode, color flow, and/or spectral Doppler;  bilateral   Order# [id number]   Reading physician: Delon Calvillo MD Ordering physician: Jeff Maciel MD PhD Study date: 3/10/17   Patient Information   Patient Name MRN Sex  (Age)   Hasmukh Ham [id number] Male 1933 (83 y.o.)   Interpretation Summary   · RT BULB 50-69%  · Right internal carotid artery stenosis of 0-49%.  · Left internal carotid artery stenosis of 0-49%.         CT Chest  Conclusion-  Chronic obstructive pulmonary disease.  Bilateral linear scarring.  Old granulomatous disease.  No acute infiltrate.  Rather extensive coronary artery calcifications.  Fatty infiltration of the liver.  2.8 cm and 5.5 cm left renal cysts.     Electronically Signed By- Sam Higginbotham MD On: 2016-10-07 20:30:33      2017:  DCCV  PROCEDURE PERFORMED: Successful electrical cardioversion with synchronized 200 joules to underlying sinus rhythm. INDICATION FOR PROCEDURE: Symptomatic atrial fibrillation/atrial flutter.       2015, TTE  1. Normal LV systolic function with an EF of 70% with left ventricular hypertrophy and diastolic relaxation abnormality of the left ventricle. 2. Mildly dilated aortic root at 4.2 cm. 3. No significant valvular abnormalities noted.     [facility], 2004    CORONARIES: Left main - Left main is angiographically normal. It divides into left anterior descending and circumflex branches. LAD - LAD is a normal caliber vessel. It tapers sharply at the mid and distal segment. Left anterior descending gives off four diagonal branches. First diagonal is about 2 mm. Other three diagonals are small caliber less than 2 mm vessels. Left anterior descending has mid focal 80 percent lesion. Left anterior descending had diffuse mild disease distally about 10 to 20 percent. Left anterior descending does not have any other hemodynamically significant lesion. Circumflex - Circumflex is a normal caliber vessel. It gives off two large OM branches. The first OM branch branches earlier into two upper  and lower branches. Circumflex has luminal irregularities. Right coronary artery - Right coronary artery is a normal caliber vessel. It is occluded at the distal end at the previous stenting. That site was occluded in the previous stenting in the beginning of this year. There is a stent in the mid segment of ostia that has about 40 to 50 percent InStent restenosis. The RCA gives collaterals to the left side. The distal PDA is visualized by collaterals from left to right. LEFT VENTRICULOGRAM: Shows EF is 55 to 60 percent. Slight anterior hypokinesis is noted. No MR noted. PRESSURES: Left ventricular pressure 175/13. Aortic pressure is 175/85. INTERVENTION: Pre-PTCA 80 percent mid left anterior descending. Post-PTCA 80 percent to 0 percent stenosis with Cypher stent 2.5 x 13 mm. DX: 1. Two vessel coronary artery disease. 2. Successful percutaneous transluminal coronary angioplasty of left anterior descending artery. 3. Aggressive secondary risk reduction with blood pressure management and lipid control.       Assessment/Plan     1. 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    RV status: Adapted The patient is in a mildly hypervolemic and well-perfused physiologic state.  I had an extended discussion with the patient today.  His PA systolic pressures are beginning to elevate in a manner that are likely out-of-proportion to his known lung disease.  It's also possible he's developed an element of HFpEF.  However, his proBNP was normal.  Despite this he continues to have symptoms that are class III.  Recent 6MWT was also consistent with class III symptoms.  I do want to exclude CpC-PAH.  This will require right heart catheterization.  He was agreeable to this approach.  The indications, risks and benefits of diagnostic right  heart cardiac catheterization, angiography, conscious sedation, and possible blood transfusion were discussed in detail with the  patient. The increased risks that are present with pulmonary arterial hypertension with right heart catheterization were emphasized. I have cited a complication rate of 1.1% with total adverse events. This includes a 0.3% risk of inpatient admission due to a complication. I have cited a 0.5% risk of fatality. This includes the risk of PA perforation. I have cited a risk of hematoma, vagal reactions and pneumothorax as <1%. Pulmonary vasoreactivity testing, if indicated, can cause hypotension, bronchospasm, chest pain and SOB. Pulmonary angiography, if indicated, can rarely cause bronchospasm and one reported death has been cited due to intrapulmonary hemorrhage. I have also discussed the possible risks, though low, of heart block and the need for emergency venous pacing. Additionally, I went over that if a rare complication requires a thoracotomy or median sternotomy that this would be performed emergently by CTS. The patient is willing to proceed. ASA class is ASA 3 - Patient with moderate systemic disease with functional limitations; Mallampati is II (hard and soft palate, upper portion of tonsils anduvula visible).  -RHC, RUE access, possible VD challenge  -continue treatment for hypertension  -Lasix 20mg day    2. paroxysmal - 7 days or less Atrial fibrillation Now in sinus jaycee  [id number]: CHADSVASC: CHF, HTN and Age >65  EF is 60%.   Anticoagulation:Contraindicated due to GIB  Rate control agents:requested.  - Agent: beta blocker-Lopressor 12.5mg day      3. ASCAD. EF: 60%  last documented 2015. NYHA stage C; FC-NYHA Class III. CCS class II. The patient has been revascularized with PCI.   ASA, Clopidogrel, Red yeast rice  Recommend Lexiscan MPS    4.  Lower extremity symptoms that could be consistent with claudication.  ABIs were elevated and consistent with calcification.  -Referral to vascular    5.  Right carotid bulb stenosis, asymptomatic.  -Continue aspirin and Red Yeast Rice is a patient has  documented statin intolerance  -Referral to vascular for surveillance    6. HTN, essential.  HD BP noted to be well controlled today in office.    Continue current treatment regimen.     7. Cardiac Risk Assessment: Moderate Risk.   Recommended ASA/Statin intolerance--Red Yeast Rice    8. Dyslipidemia.  Under good control.  -Statin:  CI 2nd to myalgias.  -Recommended moderate-intensity statin therapy  -Lipid-lowering medications: Red Yeast Rice     9. Tobacco status: Former smoker    Plan for follow-up:  In 2 months      EMR Dragon/Transcription disclaimer:     Much of this encounter note is an electronic transcription. This may permit erroneous, or at times, nonsensical words or phrases to be inadvertently transcribed; Although I have reviewed the note for such errors, some may still exist.

## 2017-06-06 NOTE — DISCHARGE INSTRUCTIONS
Minor Surgery  Outpatient Instructions    General Information  You have had a minor surgical procedure and are not expected to require extensive treatment or a long recovery period.  However, the following information/instructions that are listed serve as guidelines to help you recover at home.    Activity:  Resume normal activity tomorrow    Hygiene:  May shower    Dressing/Wound Care:  Remove dressing tomorrow  Diet:  regular    Important Points:  -Call your doctor to report any of the following:   -unusual or excessive bleeding/drainage   -pain not reduced/controlled by medication   -elevated temperature consistently greater that 100 degrees F   -increased swelling, redness, bruising, or tenderness in surgical area  -Take medications as prescribed by your physician  -If you have any questions, please contact your doctor or the Same Day Surgery Unit at   [phone]  -If a post-operative emergency occurs, report to your nearest ER

## 2017-06-06 NOTE — PLAN OF CARE
Problem: Patient Care Overview (Adult)  Goal: Plan of Care Review  Outcome: Outcome(s) achieved Date Met:  06/06/17  Discharge criteria met

## 2017-06-12 ENCOUNTER — TELEPHONE (OUTPATIENT)
Dept: CARDIOLOGY | Facility: CLINIC | Age: 82
End: 2017-06-12

## 2017-06-12 NOTE — TELEPHONE ENCOUNTER
Patient's daughter contacted the office stating they were told to contact dr. gee should mr. mendez have a headache after his right heart cath. paco lundberg has spoken with ninoska who states they are on their way to be seen at urgent care located in the [facility].

## 2017-06-19 ENCOUNTER — OFFICE VISIT (OUTPATIENT)
Dept: ORTHOPEDIC SURGERY | Facility: CLINIC | Age: 82
End: 2017-06-19

## 2017-06-19 VITALS — WEIGHT: 181 LBS | HEIGHT: 67 IN | BODY MASS INDEX: 28.41 KG/M2

## 2017-06-19 DIAGNOSIS — M25.511 RIGHT SHOULDER PAIN, UNSPECIFIED CHRONICITY: Primary | ICD-10-CM

## 2017-06-19 DIAGNOSIS — M25.511 ACUTE PAIN OF RIGHT SHOULDER: Primary | ICD-10-CM

## 2017-06-19 PROCEDURE — 99213 OFFICE O/P EST LOW 20 MIN: CPT | Performed by: NURSE PRACTITIONER

## 2017-06-19 RX ORDER — CEFUROXIME AXETIL 500 MG/1
TABLET ORAL
Refills: 0 | COMMUNITY
Start: 2017-06-13 | End: 2017-07-10

## 2017-06-19 RX ORDER — PROMETHAZINE HYDROCHLORIDE 25 MG/1
TABLET ORAL
Refills: 0 | COMMUNITY
Start: 2017-06-13 | End: 2017-07-10 | Stop reason: ALTCHOICE

## 2017-06-19 RX ORDER — FLUTICASONE PROPIONATE 50 MCG
2 SPRAY, SUSPENSION (ML) NASAL DAILY
COMMUNITY

## 2017-06-19 RX ORDER — DOXYCYCLINE HYCLATE 100 MG/1
CAPSULE ORAL
Refills: 0 | COMMUNITY
Start: 2017-06-12 | End: 2017-08-07

## 2017-06-19 NOTE — PROGRESS NOTES
Hasmukh Ham is a 84 y.o. male   Primary provider:  Paolo Rey MD       Chief Complaint   Patient presents with   • Right Shoulder - Establish Care     Xray done today in office.        HISTORY OF PRESENT ILLNESS:    Shoulder Injury    The incident occurred at home. The right shoulder is affected. Incident onset: 6/13/17. Injury mechanism: lifting garbage bag. The quality of the pain is described as stabbing. The pain radiates to the right arm. The pain is at a severity of 5/10. The pain is mild. Associated symptoms include muscle weakness. The symptoms are aggravated by movement and overhead lifting. He has tried heat and ice (Norco) for the symptoms. The treatment provided no relief.        CONCURRENT MEDICAL HISTORY:    Past Medical History:   Diagnosis Date   • Acute exacerbation of chronic obstructive airways disease    • Acute interstitial pneumonia     Hamman-Rich Syndrome   • Arthritis    • Backache    • Bradycardia    • Chronic obstructive lung disease    • Chronic obstructive pulmonary disease (COPD)    • Coronary arteriosclerosis    • Coronary atherosclerosis of native coronary artery    • Degenerative joint disease involving multiple joints    • Diabetes mellitus    • Duodenal ulcer disease    • Hernia of abdominal wall    • History of echocardiogram 04/13/2015    Echocardiogram W/ color flow 31669 (1) - Normal LV systolic function with Ef of 70% with LVH and diastolic relaxation abnormality of the left ventricle. Mildly dilated aortic root. No sig.valvular abnormalities.   • Hyperlipidemia    • Hypertension    • Hypertensive disorder    • Left lower lobe pneumonia     Left lower zone pneumonia - clinically improved   • Neuropathy    • Pneumonia     Recent improving   • Shortness of breath    • Type 2 diabetes mellitus        Allergies   Allergen Reactions   • Atorvastatin Anaphylaxis     myalgia   • Pravastatin      Myalgia   • Azithromycin Rash   • Biaxin [Clarithromycin] Rash          Current Outpatient Prescriptions:   •  albuterol (PROVENTIL) (2.5 MG/3ML) 0.083% nebulizer solution, Take 2.5 mg by nebulization 4 (Four) Times a Day As Needed for wheezing., Disp: 125 vial, Rfl: 11  •  clopidogrel (PLAVIX) 75 MG tablet, Take 75 mg by mouth Daily., Disp: , Rfl:   •  donepezil (ARICEPT) 10 MG tablet, Take 10 mg by mouth Daily., Disp: , Rfl: 0  •  famotidine (PEPCID) 20 MG tablet, Take 20 mg by mouth 2 (Two) Times a Day., Disp: , Rfl: 0  •  fluticasone (FLONASE) 50 MCG/ACT nasal spray, 2 sprays into each nostril Daily., Disp: , Rfl:   •  Fluticasone Furoate 200 MCG/ACT aerosol powder , Inhale., Disp: , Rfl:   •  furosemide (LASIX) 20 MG tablet, Take 1 tablet by mouth Daily., Disp: 90 tablet, Rfl: 3  •  glimepiride (AMARYL) 2 MG tablet, Take 2 mg by mouth Daily., Disp: , Rfl: 0  •  HYDROcodone-acetaminophen (NORCO) 7.5-325 MG per tablet, Take 1 tablet by mouth Every 8 (Eight) Hours., Disp: , Rfl:   •  isosorbide dinitrate (ISORDIL) 30 MG tablet, Take 30 mg by mouth Daily., Disp: , Rfl: 0  •  lisinopril (PRINIVIL,ZESTRIL) 5 MG tablet, Take 5 mg by mouth Daily., Disp: , Rfl:   •  magnesium oxide (MAGOX) 400 (241.3 MG) MG tablet tablet, Take 400 mg by mouth Daily., Disp: , Rfl:   •  nitroglycerin (NITROSTAT) 0.4 MG SL tablet, place 1 tablet under the tongue if needed every 5 minutes for hair...  (REFER TO PRESCRIPTION NOTES)., Disp: , Rfl: 0  •  Red Yeast Rice 600 MG tablet, Take 600 mg by mouth 2 (Two) Times a Day., Disp: , Rfl:   •  Umeclidinium-Vilanterol 62.5-25 MCG/INH aerosol powder , Inhale 1 puff Daily., Disp: 1 each, Rfl: 11  •  cefuroxime (CEFTIN) 500 MG tablet, take 1 tablet by mouth twice a day, Disp: , Rfl: 0  •  doxycycline (VIBRAMYCIN) 100 MG capsule, take 1 capsule by mouth twice a day, Disp: , Rfl: 0  •  O2 (OXYGEN), Inhale 2 L/min Every Night. W/ CPAP, Disp: , Rfl:   •  promethazine (PHENERGAN) 25 MG tablet, take 1 tablet by mouth every 8 hours if needed for nausea and vomiting,  "Disp: , Rfl: 0  •  terbutaline (BRETHINE) 5 MG tablet, Take 5 mg by mouth 2 (Two) Times a Day., Disp: , Rfl: 0    Past Surgical History:   Procedure Laterality Date   • APPENDECTOMY     • CARDIAC CATHETERIZATION N/A 6/6/2017    Procedure: Right Heart Cath;  Surgeon: Jeff Maciel MD PhD;  Location: [facility] INVASIVE LOCATION;  Service:    • HERNIA REPAIR     • LUNG BIOPSY     • NECK SURGERY     • THORACOTOMY Left 1977    left with lung biopsy   • VENTRAL HERNIA REPAIR         Family History   Problem Relation Age of Onset   • Diabetes Other    • Heart disease Other    • Hypertension Other    • Other Other      Respiratory disorder   • Stroke Other         Social History     Social History   • Marital status:      Spouse name: N/A   • Number of children: N/A   • Years of education: N/A     Occupational History   • Not on file.     Social History Main Topics   • Smoking status: Former Smoker   • Smokeless tobacco: Never Used   • Alcohol use No   • Drug use: No   • Sexual activity: Defer      Comment:      Other Topics Concern   • Not on file     Social History Narrative        Review of Systems   Constitutional: Positive for fatigue.   Musculoskeletal: Positive for arthralgias, back pain, joint swelling and neck pain.        Right shoulder pain.        PHYSICAL EXAMINATION:       Ht 67\" (170.2 cm)  Wt 181 lb (82.1 kg)  BMI 28.35 kg/m2    Physical Exam   Constitutional: He is oriented to person, place, and time. Vital signs are normal. He appears well-developed and well-nourished.   HENT:   Head: Normocephalic.   Pulmonary/Chest: Effort normal. No respiratory distress.   Abdominal: Soft. He exhibits no distension.   Neurological: He is alert and oriented to person, place, and time. GCS eye subscore is 4. GCS verbal subscore is 5. GCS motor subscore is 6.   Skin: Skin is warm, dry and intact.   Psychiatric: He has a normal mood and affect. His speech is normal and behavior is normal. Judgment " and thought content normal. Cognition and memory are normal.   Vitals reviewed.      GAIT:     [x]  Normal  []  Antalgic    Assistive device: [x]  None  []  Walker     []  Crutches  []  Cane     []  Wheelchair  []  Stretcher    Right Shoulder Exam     Tenderness   Right shoulder tenderness location: Diffuse tenderness.     Range of Motion   Active Abduction: abnormal   Passive Abduction: abnormal   Extension: abnormal   Forward Flexion: abnormal   External Rotation: abnormal   Internal Rotation 0 degrees: abnormal   Internal Rotation 90 degrees: abnormal     Muscle Strength   Abduction: 3/5   Supraspinatus: 3/5     Other   Erythema: absent  Sensation: normal  Pulse: present    Comments:  Significant pain with ROM assessment, especially with forward flexion and abduction. Unable to attempt internal rotation due to pain. Weakness noted with ROM.       Left Shoulder Exam   Left shoulder exam is normal.              Xr Shoulder 2+ View Right    Result Date: 6/19/2017  Narrative: 2 views of the right shoulder reveal degenerative changes with no evidence of fracture.  No acute radiologic abnormalities noted at this time.06/19/17 at 1:49 PM by EVE Abdullahi       ASSESSMENT:    Diagnoses and all orders for this visit:    Acute pain of right shoulder  -     CT shoulder right wo contrast; Future  -     FL Arthrogram Shoulder Right; Future      PLAN    X-rays of right shoulder reviewed today. Recommend CT arthrogram of right shoulder due to significant pain and weakness after injury. Patient is unable to have MRI due to implant. Sling provided per patient and spouse request. Patient instructed to remove his arm from the sling several times daily and perform ROM as tolerated based on pain. Continue Norco 7.5mg. For pain as previously prescribed. Patient unable to take NSAIDS due to hx of CAD. Follow up after CT arthrogram.         Return after CT arthrogram.    EVE Abdullahi

## 2017-06-30 ENCOUNTER — OFFICE VISIT (OUTPATIENT)
Dept: ORTHOPEDIC SURGERY | Facility: CLINIC | Age: 82
End: 2017-06-30

## 2017-06-30 VITALS — BODY MASS INDEX: 28.56 KG/M2 | HEIGHT: 67 IN | WEIGHT: 182 LBS

## 2017-06-30 DIAGNOSIS — M25.511 CHRONIC RIGHT SHOULDER PAIN: ICD-10-CM

## 2017-06-30 DIAGNOSIS — G89.29 CHRONIC RIGHT SHOULDER PAIN: ICD-10-CM

## 2017-06-30 DIAGNOSIS — M75.101 ROTATOR CUFF SYNDROME, RIGHT: Primary | ICD-10-CM

## 2017-06-30 DIAGNOSIS — S46.811A PARTIAL TEAR OF SUBSCAPULARIS TENDON, RIGHT, INITIAL ENCOUNTER: ICD-10-CM

## 2017-06-30 DIAGNOSIS — IMO0001 SUPRASPINATUS TENDON TEAR, RIGHT, INITIAL ENCOUNTER: ICD-10-CM

## 2017-06-30 DIAGNOSIS — S46.811A INFRASPINATUS TENDON TEAR, RIGHT, INITIAL ENCOUNTER: ICD-10-CM

## 2017-06-30 PROBLEM — S46.819A INFRASPINATUS TENDON TEAR: Status: ACTIVE | Noted: 2017-06-30

## 2017-06-30 PROBLEM — M75.100 ROTATOR CUFF SYNDROME: Status: ACTIVE | Noted: 2017-06-30

## 2017-06-30 PROBLEM — S46.819A PARTIAL TEAR OF SUBSCAPULARIS TENDON: Status: ACTIVE | Noted: 2017-06-30

## 2017-06-30 PROBLEM — M75.100 SUPRASPINATUS TENDON TEAR: Status: ACTIVE | Noted: 2017-06-30

## 2017-06-30 PROCEDURE — 20610 DRAIN/INJ JOINT/BURSA W/O US: CPT | Performed by: NURSE PRACTITIONER

## 2017-06-30 PROCEDURE — 99214 OFFICE O/P EST MOD 30 MIN: CPT | Performed by: NURSE PRACTITIONER

## 2017-06-30 RX ORDER — LIDOCAINE HYDROCHLORIDE 10 MG/ML
2 INJECTION, SOLUTION INFILTRATION; PERINEURAL
Status: COMPLETED | OUTPATIENT
Start: 2017-06-30 | End: 2017-06-30

## 2017-06-30 RX ORDER — TRIAMCINOLONE ACETONIDE 40 MG/ML
40 INJECTION, SUSPENSION INTRA-ARTICULAR; INTRAMUSCULAR
Status: COMPLETED | OUTPATIENT
Start: 2017-06-30 | End: 2017-06-30

## 2017-06-30 RX ADMIN — TRIAMCINOLONE ACETONIDE 40 MG: 40 INJECTION, SUSPENSION INTRA-ARTICULAR; INTRAMUSCULAR at 09:50

## 2017-06-30 RX ADMIN — LIDOCAINE HYDROCHLORIDE 2 ML: 10 INJECTION, SOLUTION INFILTRATION; PERINEURAL at 09:50

## 2017-06-30 NOTE — PROGRESS NOTES
Hasmukh Dawn is a 84 y.o. male returns for     Chief Complaint   Patient presents with   • Right Shoulder - Results     CT arthrogram at [facility] 6/28/17       HISTORY OF PRESENT ILLNESS:  Patient is here for review of CT arthrogram of the right shoulder. Continues to complain of right shoulder pain, slightly improved from previous visit. Uses sling and heating pad intermittently for comfort.        CONCURRENT MEDICAL HISTORY:    Past Medical History:   Diagnosis Date   • Acute exacerbation of chronic obstructive airways disease    • Acute interstitial pneumonia     Hamman-Rich Syndrome   • Arthritis    • Backache    • Bradycardia    • Chronic obstructive lung disease    • Chronic obstructive pulmonary disease (COPD)    • Coronary arteriosclerosis    • Coronary atherosclerosis of native coronary artery    • Degenerative joint disease involving multiple joints    • Diabetes mellitus    • Duodenal ulcer disease    • Hernia of abdominal wall    • History of echocardiogram 04/13/2015    Echocardiogram W/ color flow 78157 (1) - Normal LV systolic function with Ef of 70% with LVH and diastolic relaxation abnormality of the left ventricle. Mildly dilated aortic root. No sig.valvular abnormalities.   • Hyperlipidemia    • Hypertension    • Hypertensive disorder    • Left lower lobe pneumonia     Left lower zone pneumonia - clinically improved   • Neuropathy    • Pneumonia     Recent improving   • Shortness of breath    • Type 2 diabetes mellitus        Allergies   Allergen Reactions   • Atorvastatin Anaphylaxis     myalgia   • Pravastatin      Myalgia   • Azithromycin Rash   • Biaxin [Clarithromycin] Rash         Current Outpatient Prescriptions:   •  cefuroxime (CEFTIN) 500 MG tablet, take 1 tablet by mouth twice a day, Disp: , Rfl: 0  •  clopidogrel (PLAVIX) 75 MG tablet, Take 75 mg by mouth Daily., Disp: , Rfl:   •  donepezil (ARICEPT) 10 MG tablet, Take 10 mg by mouth Daily., Disp: , Rfl: 0  •  doxycycline  (VIBRAMYCIN) 100 MG capsule, take 1 capsule by mouth twice a day, Disp: , Rfl: 0  •  famotidine (PEPCID) 20 MG tablet, Take 20 mg by mouth 2 (Two) Times a Day., Disp: , Rfl: 0  •  fluticasone (FLONASE) 50 MCG/ACT nasal spray, 2 sprays into each nostril Daily., Disp: , Rfl:   •  Fluticasone Furoate 200 MCG/ACT aerosol powder , Inhale., Disp: , Rfl:   •  furosemide (LASIX) 20 MG tablet, Take 1 tablet by mouth Daily., Disp: 90 tablet, Rfl: 3  •  glimepiride (AMARYL) 2 MG tablet, Take 2 mg by mouth Daily., Disp: , Rfl: 0  •  HYDROcodone-acetaminophen (NORCO) 7.5-325 MG per tablet, Take 1 tablet by mouth Every 8 (Eight) Hours., Disp: , Rfl:   •  isosorbide dinitrate (ISORDIL) 30 MG tablet, Take 30 mg by mouth Daily., Disp: , Rfl: 0  •  lisinopril (PRINIVIL,ZESTRIL) 5 MG tablet, Take 5 mg by mouth Daily., Disp: , Rfl:   •  magnesium oxide (MAGOX) 400 (241.3 MG) MG tablet tablet, Take 400 mg by mouth Daily., Disp: , Rfl:   •  nitroglycerin (NITROSTAT) 0.4 MG SL tablet, place 1 tablet under the tongue if needed every 5 minutes for hair...  (REFER TO PRESCRIPTION NOTES)., Disp: , Rfl: 0  •  O2 (OXYGEN), Inhale 2 L/min Every Night. W/ CPAP, Disp: , Rfl:   •  promethazine (PHENERGAN) 25 MG tablet, take 1 tablet by mouth every 8 hours if needed for nausea and vomiting, Disp: , Rfl: 0  •  Red Yeast Rice 600 MG tablet, Take 600 mg by mouth 2 (Two) Times a Day., Disp: , Rfl:   •  terbutaline (BRETHINE) 5 MG tablet, Take 5 mg by mouth 2 (Two) Times a Day., Disp: , Rfl: 0  •  Umeclidinium-Vilanterol 62.5-25 MCG/INH aerosol powder , Inhale 1 puff Daily., Disp: 1 each, Rfl: 11  •  albuterol (PROVENTIL) (2.5 MG/3ML) 0.083% nebulizer solution, Take 2.5 mg by nebulization 4 (Four) Times a Day As Needed for wheezing., Disp: 125 vial, Rfl: 11    Past Surgical History:   Procedure Laterality Date   • APPENDECTOMY     • CARDIAC CATHETERIZATION N/A 6/6/2017    Procedure: Right Heart Cath;  Surgeon: Jeff Maciel MD PhD;  Location:   "[facility] INVASIVE LOCATION;  Service:    • HERNIA REPAIR     • LUNG BIOPSY     • NECK SURGERY     • THORACOTOMY Left 1977    left with lung biopsy   • VENTRAL HERNIA REPAIR         ROS  No fevers or chills.  No chest pain or shortness of air.  No GI or  disturbances.    PHYSICAL EXAMINATION:       Ht 67\" (170.2 cm)  Wt 182 lb (82.6 kg)  BMI 28.51 kg/m2    Physical Exam   Constitutional: He is oriented to person, place, and time. Vital signs are normal. He appears well-developed and well-nourished.   HENT:   Head: Normocephalic.   Pulmonary/Chest: Effort normal. No respiratory distress.   Abdominal: Soft. He exhibits no distension.   Neurological: He is alert and oriented to person, place, and time. GCS eye subscore is 4. GCS verbal subscore is 5. GCS motor subscore is 6.   Skin: Skin is warm, dry and intact.   Psychiatric: He has a normal mood and affect. His speech is normal and behavior is normal. Judgment and thought content normal. Cognition and memory are normal.   Vitals reviewed.      GAIT:     [x]  Normal  []  Antalgic    Assistive device: [x]  None  []  Walker     []  Crutches  []  Cane     []  Wheelchair  []  Stretcher    Right Shoulder Exam     Tenderness   Right shoulder tenderness location: Diffuse, mild.    Range of Motion   Active Abduction: abnormal   Passive Abduction: abnormal   Extension: abnormal   Forward Flexion: abnormal   External Rotation: abnormal   Internal Rotation 0 degrees: abnormal   Internal Rotation 90 degrees: abnormal     Muscle Strength   Abduction: 3/5   Supraspinatus: 3/5     Other   Erythema: absent  Sensation: normal  Pulse: present    Comments:  Pain with ROM assessment, especially forward flexion and abduction. Weakness present.       Left Shoulder Exam   Left shoulder exam is normal.            Large Joint Arthrocentesis  Date/Time: 6/30/2017 9:50 AM  Consent given by: patient  Timeout: Immediately prior to procedure a time out was called to verify the correct patient, " procedure, equipment, support staff and site/side marked as required   Supporting Documentation  Indications: pain and diagnostic evaluation   Procedure Details  Location: shoulder - R subacromial bursa  Preparation: Patient was prepped and draped in the usual sterile fashion  Needle size: 22 G  Approach: posterior  Medications administered: 2 mL lidocaine 1 %; 40 mg triamcinolone acetonide 40 MG/ML  Patient tolerance: patient tolerated the procedure well with no immediate complications        Xr Shoulder 2+ View Right    Result Date: 6/19/2017  Narrative: 2 views of the right shoulder reveal degenerative changes with no evidence of fracture.  No acute radiologic abnormalities noted at this time.06/19/17 at 1:49 PM by Marika Tuttle, APRN       2017 June  X-ray arthrogram shoulder right per contrast protocol 6/28/2017  [facility]  Result Narrative   Indication:  Right shoulder pain.    Right Shoulder Arthrogram:    Procedure:  Following standard sterile precautions, fluoroscopy was used to guide a 25-gauge needle into the shoulder joint.  2 cc of Lidocaine and 8 cc of Omnipaque 240 were injected uneventfully.  There was 1 minute 45 seconds of fluoroscopy.  There   were 6 spot films.    Findings:  There is an extensive rotator cuff tear and the humeral head lies under the acromion.  The patient was sent to CT.   Status Results Details     Encounter Summary   CT shoulder right with contrast per contrast protocol 6/28/2017  [facility]  Result Impression       1.  Supraspinatus, infraspinatus, and long head of the biceps are chronically torn and the muscle bellies are atrophic.    2.  The humeral head has migrated superiorly and articulates under the acromion and the AC joint.    3.  The glenohumeral joint is in satisfactory condition.    4.  Most of the subscapularis tendon is attached, but a small portion of the upper tendon is torn.   Result Narrative   Indication:  Shoulder pain.    CT, Right Shoulder  after arthrogram:  No destructive lesion or fracture.  The humeral head has migrated superiorly and is articulating under the acromion and the hypertrophied AC joint.  The supraspinatus and infraspinous tendons are chronically torn and   retracted with a atrophic muscle bellies.  The long head of the biceps is not seen and is also probably chronically torn.  The glenohumeral joint is in satisfactory condition.  The upper third of the subscapularis tendon is chronically torn with most of   the tendon remaining attached.           ASSESSMENT:    Diagnoses and all orders for this visit:    Rotator cuff syndrome, right    Chronic right shoulder pain    Supraspinatus tendon tear, right, initial encounter    Infraspinatus tendon tear, right, initial encounter    Partial tear of subscapularis tendon, right, initial encounter    Other orders  -     Large Joint Arthrocentesis      PLAN    CT arthrogram of right shoulder reviewed today and findings discussed with patient.  Discussion focused on conservative management options for right shoulder pain based on the multiple, chronic abnormalities of his right shoulder scan. Recommend subacromial injection of the right shoulder today for pain management.  Discussed need to monitor blood sugars and diabetic diet more closely following injection due to risk of hyperglycemia. Continue Norco as previously prescribed for pain. NSAID therapy not recommended due to risks related to hx of CAD and diabetes. Continue to wear sling for support to right arm intermittently for comfort (patient states this really helps at times) with progressive, gentle ROM to right arm. Recommend activity modification based on pain. Continue heat therapy for comfort with heating pad (patient states this provides him temporary pain relief as well). Discussed physical therapy referral, but patient states he has been unable to do PT in the past due to his pulmonary problems. Patient declines PT at this time.  Follow up as needed for worsening/returning symptoms. Patient aware that the subacromial injection can be repeated as early as 9/30/17 when needed.       Return if symptoms worsen or fail to improve.    Marika Tuttle, APRN

## 2017-07-10 ENCOUNTER — OFFICE VISIT (OUTPATIENT)
Dept: CARDIAC SURGERY | Facility: CLINIC | Age: 82
End: 2017-07-10

## 2017-07-10 VITALS
DIASTOLIC BLOOD PRESSURE: 60 MMHG | HEART RATE: 45 BPM | WEIGHT: 179.9 LBS | HEIGHT: 68 IN | OXYGEN SATURATION: 97 % | BODY MASS INDEX: 27.26 KG/M2 | SYSTOLIC BLOOD PRESSURE: 123 MMHG

## 2017-07-10 DIAGNOSIS — J43.1 PANLOBULAR EMPHYSEMA (HCC): ICD-10-CM

## 2017-07-10 DIAGNOSIS — I65.23 BILATERAL CAROTID ARTERY STENOSIS: ICD-10-CM

## 2017-07-10 DIAGNOSIS — I48.0 PAROXYSMAL ATRIAL FIBRILLATION (HCC): Primary | ICD-10-CM

## 2017-07-10 DIAGNOSIS — I25.10 CORONARY ARTERY DISEASE INVOLVING NATIVE CORONARY ARTERY OF NATIVE HEART WITHOUT ANGINA PECTORIS: ICD-10-CM

## 2017-07-10 DIAGNOSIS — E78.2 MIXED HYPERLIPIDEMIA: ICD-10-CM

## 2017-07-10 DIAGNOSIS — I10 BENIGN ESSENTIAL HYPERTENSION: ICD-10-CM

## 2017-07-10 PROCEDURE — 99204 OFFICE O/P NEW MOD 45 MIN: CPT | Performed by: THORACIC SURGERY (CARDIOTHORACIC VASCULAR SURGERY)

## 2017-07-10 RX ORDER — PREDNISONE 10 MG/1
10 TABLET ORAL DAILY
COMMUNITY
End: 2017-09-20 | Stop reason: HOSPADM

## 2017-07-10 RX ORDER — ASPIRIN 81 MG/1
81 TABLET, CHEWABLE ORAL DAILY
COMMUNITY
End: 2018-02-15 | Stop reason: HOSPADM

## 2017-07-10 NOTE — PROGRESS NOTES
7/10/2017    Hasmukh Ham  6/1/1933      Chief Complaint:    Chief Complaint   Patient presents with   • Carotid Artery Disease       HPI:      PCP:  Paolo Rey MD  Cardiology:  Dr Maciel    84 y.o. male with COPD(severe, home O2, stable), CAD(stable), HTN(stable), hyperlipidemia(stable).  former smoker.  Asymptomatic carotid stenosis noted on imaging.  Recent hospitalization for atrial fibrillation, pneumonia, currently on abx.  Moderate leg cramps at night x 2 years.  Wears O2 at night. No TIA stroke amaurosis..  No other associated signs, symptoms or modifying factors.    12/2016 CXR:  Mild pulmonary vascular congestion, improving pneumonia.  3/3/2017 Carotid duplex:  ARNIE 0-49% (134/19cm/s), carotid bulb 50-69%, antegrade vert.   LICA 0-49% (73cm/s), antegrade vert.  3/3/2017 CLARE:  RIGHT 1.6 triphasic.  LEFT 1.5 triphasic.  3/3/2017 Lower extremity venous duplex:  Bilateral no dvt or reflux.    3/9/2017 Echocardiogram:  Estimated EF appears to be in the range of 61 - 65%.  Left ventricular wall thickness is consistent with moderate concentric hypertrophy. Estimated right ventricular systolic pressure from tricuspid regurgitation is moderately elevated (45-55 mmHg). Mild to moderate pulmonary hypertension is present.  6/6/2017 RIGHT heart cath:  PA 66/27 (46), wedge 24    The following portions of the patient's history were reviewed and updated as appropriate: allergies, current medications, past family history, past medical history, past social history, past surgical history and problem list.  Recent images independently reviewed.  Available laboratory values reviewed.    PMH:  Past Medical History:   Diagnosis Date   • Acute exacerbation of chronic obstructive airways disease    • Acute interstitial pneumonia     Hamman-Rich Syndrome   • Arthritis    • Backache    • Bradycardia    • Chronic obstructive lung disease    • Chronic obstructive pulmonary disease (COPD)    • Coronary arteriosclerosis     • Coronary atherosclerosis of native coronary artery    • Degenerative joint disease involving multiple joints    • Diabetes mellitus    • Duodenal ulcer disease    • Hernia of abdominal wall    • History of echocardiogram 04/13/2015    Echocardiogram W/ color flow 14608 (1) - Normal LV systolic function with Ef of 70% with LVH and diastolic relaxation abnormality of the left ventricle. Mildly dilated aortic root. No sig.valvular abnormalities.   • Hyperlipidemia    • Hypertension    • Hypertensive disorder    • Left lower lobe pneumonia     Left lower zone pneumonia - clinically improved   • Neuropathy    • Pneumonia     Recent improving   • Shortness of breath    • Type 2 diabetes mellitus        ALLERGIES:  Allergies   Allergen Reactions   • Atorvastatin Anaphylaxis     myalgia   • Pravastatin      Myalgia   • Azithromycin Rash   • Biaxin [Clarithromycin] Rash         MEDICATIONS:    Current Outpatient Prescriptions:   •  albuterol (PROVENTIL) (2.5 MG/3ML) 0.083% nebulizer solution, Take 2.5 mg by nebulization 4 (Four) Times a Day As Needed for wheezing., Disp: 125 vial, Rfl: 11  •  aspirin 81 MG chewable tablet, Chew 81 mg Daily., Disp: , Rfl:   •  clopidogrel (PLAVIX) 75 MG tablet, Take 75 mg by mouth Daily., Disp: , Rfl:   •  donepezil (ARICEPT) 10 MG tablet, Take 10 mg by mouth Daily., Disp: , Rfl: 0  •  doxycycline (VIBRAMYCIN) 100 MG capsule, take 1 capsule by mouth twice a day, Disp: , Rfl: 0  •  famotidine (PEPCID) 20 MG tablet, Take 20 mg by mouth 2 (Two) Times a Day., Disp: , Rfl: 0  •  fluticasone (FLONASE) 50 MCG/ACT nasal spray, 2 sprays into each nostril Daily., Disp: , Rfl:   •  Fluticasone Furoate 200 MCG/ACT aerosol powder , Inhale., Disp: , Rfl:   •  furosemide (LASIX) 20 MG tablet, Take 1 tablet by mouth Daily., Disp: 90 tablet, Rfl: 3  •  glimepiride (AMARYL) 2 MG tablet, Take 2 mg by mouth Daily., Disp: , Rfl: 0  •  HYDROcodone-acetaminophen (NORCO) 7.5-325 MG per tablet, Take 1 tablet by  mouth Every 8 (Eight) Hours., Disp: , Rfl:   •  isosorbide dinitrate (ISORDIL) 30 MG tablet, Take 30 mg by mouth Daily., Disp: , Rfl: 0  •  lisinopril (PRINIVIL,ZESTRIL) 5 MG tablet, Take 5 mg by mouth Daily., Disp: , Rfl:   •  magnesium oxide (MAGOX) 400 (241.3 MG) MG tablet tablet, Take 400 mg by mouth Daily., Disp: , Rfl:   •  nitroglycerin (NITROSTAT) 0.4 MG SL tablet, place 1 tablet under the tongue if needed every 5 minutes for hair...  (REFER TO PRESCRIPTION NOTES)., Disp: , Rfl: 0  •  O2 (OXYGEN), Inhale 2 L/min Every Night. W/ CPAP, Disp: , Rfl:   •  predniSONE (DELTASONE) 10 MG tablet, Take 10 mg by mouth Daily., Disp: , Rfl:   •  Red Yeast Rice 600 MG tablet, Take 600 mg by mouth 2 (Two) Times a Day., Disp: , Rfl:   •  terbutaline (BRETHINE) 5 MG tablet, Take 5 mg by mouth 2 (Two) Times a Day., Disp: , Rfl: 0  •  Umeclidinium-Vilanterol 62.5-25 MCG/INH aerosol powder , Inhale 1 puff Daily., Disp: 1 each, Rfl: 11    Review of Systems   Review of Systems   Constitution: Positive for malaise/fatigue. Negative for night sweats and weight loss.   HENT: Negative for hearing loss, hoarse voice and stridor.    Eyes: Negative for vision loss in left eye, vision loss in right eye and visual disturbance.   Cardiovascular: Positive for irregular heartbeat and palpitations. Negative for chest pain and leg swelling.   Respiratory: Positive for cough, shortness of breath, sputum production and wheezing. Negative for hemoptysis.    Hematologic/Lymphatic: Positive for bleeding problem. Negative for adenopathy. Bruises/bleeds easily.   Skin: Positive for poor wound healing. Negative for color change and rash.   Musculoskeletal: Positive for arthritis, back pain, muscle weakness and neck pain.   Gastrointestinal: Negative for abdominal pain, dysphagia and heartburn.   Neurological: Negative for dizziness, numbness and seizures.   Psychiatric/Behavioral: Negative for altered mental status, depression and memory loss. The  patient is not nervous/anxious.        Physical Exam   Physical Exam   Constitutional: He is oriented to person, place, and time. He is active and cooperative. He does not appear ill. No distress.   HENT:   Head: Atraumatic.   Right Ear: Hearing normal.   Left Ear: Hearing normal.   Nose: No nasal deformity. No epistaxis.   Mouth/Throat: He does not have dentures. Normal dentition.   Eyes: Conjunctivae and lids are normal. Right pupil is round and reactive. Left pupil is round and reactive.   Neck: No JVD present. Carotid bruit is not present. No tracheal deviation present. No thyroid mass and no thyromegaly present.   Cardiovascular: Normal rate and regular rhythm.    No murmur heard.  Pulses:       Carotid pulses are 2+ on the right side with bruit, and 2+ on the left side.       Radial pulses are 2+ on the right side, and 2+ on the left side.        Dorsalis pedis pulses are 2+ on the right side, and 2+ on the left side.        Posterior tibial pulses are 1+ on the right side, and 1+ on the left side.   Pulmonary/Chest: Effort normal. He has rhonchi in the right upper field, the right lower field, the left upper field and the left lower field.   Abdominal: Soft. He exhibits no distension and no mass. There is no splenomegaly or hepatomegaly. There is no tenderness.   Musculoskeletal: He exhibits no deformity.   Gait normal.    Lymphadenopathy:     He has no cervical adenopathy.        Right: No supraclavicular adenopathy present.        Left: No supraclavicular adenopathy present.   Neurological: He is alert and oriented to person, place, and time. He has normal strength.   Skin: Skin is warm and dry. No cyanosis or erythema. No pallor.   No venous staining   Psychiatric: He has a normal mood and affect. His speech is normal. Judgment and thought content normal.     Results for CAMRYN MCKAY (MRN [id number]) as of 7/10/2017 14:27   Ref. Range 3/3/2017 11:27   Creatinine Latest Ref Range: 0.70 - 1.30 mg/dL  1.19   BUN Latest Ref Range: 7 - 21 mg/dL 27 (H)     ASSESSMENT:  Hasmukh was seen today for carotid artery disease.    Diagnoses and all orders for this visit:    Paroxysmal atrial fibrillation    Benign essential hypertension    Coronary artery disease involving native coronary artery of native heart without angina pectoris    Mixed hyperlipidemia    Panlobular emphysema    Bilateral carotid artery stenosis  -     Duplex Carotid Ultrasound CAR; Future    PLAN:  Detailed discussion with Mr Ham regarding situation and options.  New moderate RIGHT carotid stenosis.  Multiple risk factors with severe comorbidities.  No intervention indicated at this time.  Will follow with interval imaging.  Risks, benefits discussed.  Understands and wishes to proceed with plan.    Return in 1 year with Carotid Duplex    Recommended regular physical activity, progressive walking program.  Continue current medications as directed.  Will Obtain relevant old records.    Thank you for the opportunity to participate in this patient's care.    Copy to primary care provider.    EMR Dragon/Transcription disclaimer:   Much of this encounter note is an electronic transcription/translation of spoken language to printed text. The electronic translation of spoken language may permit erroneous, or at times, nonsensical words or phrases to be inadvertently transcribed; Although I have reviewed the note for such errors, some may still exist.

## 2017-08-07 ENCOUNTER — OFFICE VISIT (OUTPATIENT)
Dept: CARDIOLOGY | Facility: CLINIC | Age: 82
End: 2017-08-07

## 2017-08-07 VITALS
HEART RATE: 83 BPM | OXYGEN SATURATION: 96 % | WEIGHT: 183.5 LBS | HEIGHT: 68 IN | SYSTOLIC BLOOD PRESSURE: 136 MMHG | DIASTOLIC BLOOD PRESSURE: 68 MMHG | BODY MASS INDEX: 27.81 KG/M2

## 2017-08-07 DIAGNOSIS — I48.0 PAROXYSMAL ATRIAL FIBRILLATION (HCC): ICD-10-CM

## 2017-08-07 DIAGNOSIS — I27.20 PULMONARY HYPERTENSION (HCC): Primary | ICD-10-CM

## 2017-08-07 DIAGNOSIS — I25.10 CORONARY ARTERY DISEASE INVOLVING NATIVE CORONARY ARTERY OF NATIVE HEART WITHOUT ANGINA PECTORIS: ICD-10-CM

## 2017-08-07 DIAGNOSIS — I25.10 CORONARY ARTERY DISEASE INVOLVING NATIVE HEART WITHOUT ANGINA PECTORIS, UNSPECIFIED VESSEL OR LESION TYPE: Primary | ICD-10-CM

## 2017-08-07 PROCEDURE — 93000 ELECTROCARDIOGRAM COMPLETE: CPT | Performed by: INTERNAL MEDICINE

## 2017-08-07 PROCEDURE — 99214 OFFICE O/P EST MOD 30 MIN: CPT | Performed by: INTERNAL MEDICINE

## 2017-08-07 RX ORDER — IPRATROPIUM BROMIDE AND ALBUTEROL SULFATE 2.5; .5 MG/3ML; MG/3ML
3 SOLUTION RESPIRATORY (INHALATION)
Status: ON HOLD | COMMUNITY
Start: 2017-08-03 | End: 2017-09-28

## 2017-08-07 NOTE — PROGRESS NOTES
"Cardiovascular Medicine   Jeff Maciel M.D., Ph.D., [facility]    History of Present Illness  This is a 84 y.o. male with:    1. ASCAD  A. S/p RCA PCI,   B. S/p PCI to 2.5 x 13mm to LAD,   2. PAT  3. AF  A. S/p JESUS/DCCV 2017 with Dr. Curtis  4. Mild TR  5. PH  A. PASP: 52mmHg,   B. PASP >60mmHg,   C. RHC, 2017  Right heart pressures:  RA:                 27/23 with a mean of 22                                    RV:                 64/22 with a mean of 24                                    PA:                  66/37 with a mean of 46                                    PCWP:            24                                                                   DP  Resistance:  SVR:               1590 dynes · sec/cm5              PVR: 622 dynes · sec/cm5  Saturations:  PA: 67%  Cardiac Outputs:  Thermal CO: 4.4  Thermal CI: 2.3  Isabell CO: 4.8  Isabell CI: 2.5  6. TAA, excluded      Pulmonary Hypertension  He returns for of pulmonary hypertension. Patient's PA systolic pressure was 52 mmHg in .  He has known diastolic dysfunction. He has been having marked dyspnea.  He has been diagnosed with COPD. He had some medication changes made by Dr. Higginbotham. He has had some LE edema. This is a new development. No prior h/o DVT or PE.  At his last visit, I asked him to have a repeat PAH-protocol TTE.  His repeat echocardiogram showed a PA systolic pressure greater than 60 mmHg. he was continuing to have dyspnea.  His PASP was out-of-proportion to his expected pressure so I recommended we proceed with RHC.  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 His RV was normal, so PAH-specific medications were not initiated.  DPG was 13.    Today, he tells me that his breathing is stable.  He does continue to see Dr. Higginbotham.  She did refer him to pulmonary rehabilitation. He has refused to do this. He tells me his breathing is \"not good.\" He is using nocturnal oxygen.     ASCAD  Patient carries a diagnosis of " coronary artery disease.  In 2004 he underwent PCI to the RCA. He then experienced ISR. This then required a 3.5 mm x 20 mm vision stent that was proximal to the prior stent.  Thereafter, a 2.75 x 28 mm stent was placed within and distal to the previous area of stenting. Patient had recurrent chest pain later in 2004.  Repeat left heart catheterization revealed a LAD lesion. He status post a 2.5 x 13 mm stent to the mid LAD. 2004 was the last date of his last left heart catheterization.  He did have a Persantine stress test in 2005 without evidence of inducible ischemia.his current medications for CAD include ASA, Plavix, Imdur and red yeast rice. He has had severe myalgias with statin medication. He has had a pressure sensation. The last four or five days he has had some mild pressure. This has been associated with dyspnea.  As last visit, we discussed a myocardial perfusion stress.  This was actually scheduled, but the patient decided not to proceed with this.  Today, he tells me that his chest has improved.  He does not desire ischemia evaluation at this time.    Atrial arrhythmia  Patient has a history of paroxysmal atrial tachycardia as well as atrial fibrillation. Most recent cardioversion was in January 2017 by Dr. Curtis. He is currently on amiodarone 200 mg a day. He is not prescribed anticoagulation due to a GIB.  He remains on low-dose Lopressor.  He tells me that he's tolerating this well.    HLD  Patient has a diagnosis of hyperlipidemia.  He's currently on red yeast rice. He had severe myalgias with statin medications.     Review of Systems - History obtained from chart review and the patient  General ROS: positive for  - fatigue  Respiratory ROS: positive for - shortness of breath  Cardiovascular ROS: positive for - chest pain    family history includes Diabetes in his other; Heart disease in his other; Hypertension in his other; Other in his other; Stroke in his other.     reports that he has quit  smoking. He has never used smokeless tobacco. He reports that he does not drink alcohol or use illicit drugs.    Allergies   Allergen Reactions   • Atorvastatin Anaphylaxis     myalgia   • Pravastatin      Myalgia   • Azithromycin Rash   • Biaxin [Clarithromycin] Rash         Current Outpatient Prescriptions:   •  albuterol (PROVENTIL) (2.5 MG/3ML) 0.083% nebulizer solution, Take 2.5 mg by nebulization 4 (Four) Times a Day As Needed for wheezing., Disp: 125 vial, Rfl: 11  •  aspirin 81 MG chewable tablet, Chew 81 mg Daily., Disp: , Rfl:   •  clopidogrel (PLAVIX) 75 MG tablet, Take 75 mg by mouth Daily., Disp: , Rfl:   •  donepezil (ARICEPT) 10 MG tablet, Take 10 mg by mouth Daily., Disp: , Rfl: 0  •  doxycycline (VIBRAMYCIN) 100 MG capsule, take 1 capsule by mouth twice a day, Disp: , Rfl: 0  •  famotidine (PEPCID) 20 MG tablet, Take 20 mg by mouth 2 (Two) Times a Day., Disp: , Rfl: 0  •  fluticasone (FLONASE) 50 MCG/ACT nasal spray, 2 sprays into each nostril Daily., Disp: , Rfl:   •  Fluticasone Furoate 200 MCG/ACT aerosol powder , Inhale., Disp: , Rfl:   •  furosemide (LASIX) 20 MG tablet, Take 1 tablet by mouth Daily., Disp: 90 tablet, Rfl: 3  •  glimepiride (AMARYL) 2 MG tablet, Take 2 mg by mouth Daily., Disp: , Rfl: 0  •  HYDROcodone-acetaminophen (NORCO) 7.5-325 MG per tablet, Take 1 tablet by mouth Every 8 (Eight) Hours., Disp: , Rfl:   •  isosorbide dinitrate (ISORDIL) 30 MG tablet, Take 30 mg by mouth Daily., Disp: , Rfl: 0  •  lisinopril (PRINIVIL,ZESTRIL) 5 MG tablet, Take 5 mg by mouth Daily., Disp: , Rfl:   •  magnesium oxide (MAGOX) 400 (241.3 MG) MG tablet tablet, Take 400 mg by mouth Daily., Disp: , Rfl:   •  nitroglycerin (NITROSTAT) 0.4 MG SL tablet, place 1 tablet under the tongue if needed every 5 minutes for hair...  (REFER TO PRESCRIPTION NOTES)., Disp: , Rfl: 0  •  O2 (OXYGEN), Inhale 2 L/min Every Night. W/ CPAP, Disp: , Rfl:   •  predniSONE (DELTASONE) 10 MG tablet, Take 10 mg by mouth  Daily., Disp: , Rfl:   •  Red Yeast Rice 600 MG tablet, Take 600 mg by mouth 2 (Two) Times a Day., Disp: , Rfl:   •  terbutaline (BRETHINE) 5 MG tablet, Take 5 mg by mouth 2 (Two) Times a Day., Disp: , Rfl: 0  •  Umeclidinium-Vilanterol 62.5-25 MCG/INH aerosol powder , Inhale 1 puff Daily., Disp: 1 each, Rfl: 11    Physical Exam:  Vitals:    08/07/17 1341   BP: 136/68   Pulse: 83   SpO2: 96%     Body mass index is 28.32 kg/(m^2).    GEN: alert, appears stated age and cooperative  Body Habitus: well-nourished  HEENT: Head: Normocephalic, no lesions, without obvious abnormality.  JVP: 9 cm of water at 45 degrees HJR: absent      Chest:  Normal Excursion: Good    I:E: Good  Pulmonary:clear to auscultation, no wheezes, rales or rhonchi, symmetric air entry. Equal chest excursion. Chest physical exam is normal. No tenderness.        Precordium:  No palpable heaves or thrusts. P2 is not palpable.   Bixby:  normal size and placement Palpable S4: Not present.   Heart rate: normal  Heart Rhythm: regular     Heart Sounds: S1: normal intensity  S2: normal intensity  S3: absent   S4: absent  Opening Snap: absent  A2-OS:  N/A  Pericardial rub: absent    Ejection click: None      Murmurs: Systolic: none  Diastolic: none  Extremity: trace  lower extremity edema bilaterally  Pulses: Right radial artery has 2+ (normal) pulse and Left radial artery has 2+ (normal) pulse    DATA REVIEWED:    EKG was performed in the office today and interpreted today.  Sinus mechanism with a first-degree AV block and a left anterior fascicular block.  LVH with QRS widening.    2017, TTE  [facility] CARDIOLOGY  [address] [place] [address] [address]  [facility] [address] [place] [id number] [phone]             Hasmukh Ham   Acquired echocardiogram 2D complete   Order# [id number]   Reading physician: Jeff Maciel MD PhD Ordering physician: Jeff Maciel MD PhD Study date: 3/9/17   Patient Information   Patient Name MRN  Sex  (Age)   Hasmukh Ham [id number] Male 1933 (83 y.o.)   Interpretation Summary   · Left ventricular function is normal.  · Estimated EF appears to be in the range of 61 - 65%.  · Left ventricular diastolic dysfunction (grade I a) consistent with impaired relaxation and elevated left atrial pressures.  · Left ventricular wall thickness is consistent with moderate concentric hypertrophy.  · Right Ventricle: Normal cavity size, wall thickness, systolic function and septal motion noted  · Estimated right ventricular systolic pressure from tricuspid regurgitation is moderately elevated (45-55 mmHg).  · Mild to moderate pulmonary hypertension is present.  · There is no evidence of pericardial effusion.     Carotid Duplex, 2017  [facility] CARDIOTHORACIC AND VASCULAR SURGERY  [address] [place] [address]  [facility] [address] [place] [id number] [phone]             Hasmukh Ham   Duplex scan of extracranial arteries using B-mode, color flow, and/or spectral Doppler; bilateral   Order# [id number]   Reading physician: Delon Calvillo MD Ordering physician: Jeff Maciel MD PhD Study date: 3/10/17   Patient Information   Patient Name MRN Sex  (Age)   Hasmukh Ham [id number] Male 1933 (83 y.o.)   Interpretation Summary   · RT BULB 50-69%  · Right internal carotid artery stenosis of 0-49%.  · Left internal carotid artery stenosis of 0-49%.         CT Chest  Conclusion-  Chronic obstructive pulmonary disease.  Bilateral linear scarring.  Old granulomatous disease.  No acute infiltrate.  Rather extensive coronary artery calcifications.  Fatty infiltration of the liver.  2.8 cm and 5.5 cm left renal cysts.     Electronically Signed By- Sam Higginbotham MD On: 2016-10-07 20:30:33      2017:  DCCV  PROCEDURE PERFORMED: Successful electrical cardioversion with synchronized 200 joules to underlying sinus rhythm. INDICATION FOR PROCEDURE: Symptomatic atrial  fibrillation/atrial flutter.       2015, TTE  1. Normal LV systolic function with an EF of 70% with left ventricular hypertrophy and diastolic relaxation abnormality of the left ventricle. 2. Mildly dilated aortic root at 4.2 cm. 3. No significant valvular abnormalities noted.     [facility], 2004    CORONARIES: Left main - Left main is angiographically normal. It divides into left anterior descending and circumflex branches. LAD - LAD is a normal caliber vessel. It tapers sharply at the mid and distal segment. Left anterior descending gives off four diagonal branches. First diagonal is about 2 mm. Other three diagonals are small caliber less than 2 mm vessels. Left anterior descending has mid focal 80 percent lesion. Left anterior descending had diffuse mild disease distally about 10 to 20 percent. Left anterior descending does not have any other hemodynamically significant lesion. Circumflex - Circumflex is a normal caliber vessel. It gives off two large OM branches. The first OM branch branches earlier into two upper and lower branches. Circumflex has luminal irregularities. Right coronary artery - Right coronary artery is a normal caliber vessel. It is occluded at the distal end at the previous stenting. That site was occluded in the previous stenting in the beginning of this year. There is a stent in the mid segment of ostia that has about 40 to 50 percent InStent restenosis. The RCA gives collaterals to the left side. The distal PDA is visualized by collaterals from left to right. LEFT VENTRICULOGRAM: Shows EF is 55 to 60 percent. Slight anterior hypokinesis is noted. No MR noted. PRESSURES: Left ventricular pressure 175/13. Aortic pressure is 175/85. INTERVENTION: Pre-PTCA 80 percent mid left anterior descending. Post-PTCA 80 percent to 0 percent stenosis with Cypher stent 2.5 x 13 mm. DX: 1. Two vessel coronary artery disease. 2. Successful percutaneous transluminal coronary angioplasty of left anterior descending  "artery. 3. Aggressive secondary risk reduction with blood pressure management and lipid control.       Assessment/Plan     1. 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 RV status: Adapted The patient is in a mildly hypervolemic and well-perfused physiologic state. 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  At this point, I have not initiated PAH-specific medications. He says he is not going to see pulmonary any longer here. He is not happy that pulmonology said he does not have \"black lung.\" He says they are trying to \"go against my compensation.\"   -continue treatment for hypertension  -I strongly recommended pulmonary rehab, but he has refused.  -6MWT prior to next appt  -Recommend sleep eval. He has refused.   -I recommended he see pulmonary, but he has refused    2. paroxysmal - 7 days or less Atrial fibrillation. Now in normal rhythm.   EF is 60%.   Anticoagulation:Contraindicated due to GIB  Rate control agents:requested.  - Agent: beta blocker-Lopressor 12.5mg day    3. ASCAD.  CCS class I. The patient has been revascularized with PCI.  He was having chest pain at his last appointment.  I arranged a nuclear stress test.  He actually ended up refusing this.  Today, he tells me he's had no further chest pain.  He does not desire an ischemia evaluation.  ASA, Clopidogrel, Red yeast rice    4. Cardiac Risk Assessment: Moderate Risk.   Recommended ASA/Statin intolerance--Red Yeast Rice    5. Tobacco status: Former smoker    Plan for follow-up:  6 months          This document has been electronically signed by Jeff Maciel MD PhD on August 7, 2017 1:21 PM      "

## 2017-09-14 ENCOUNTER — OFFICE VISIT (OUTPATIENT)
Dept: CARDIOLOGY | Facility: CLINIC | Age: 82
End: 2017-09-14

## 2017-09-14 ENCOUNTER — TELEPHONE (OUTPATIENT)
Dept: CARDIOLOGY | Facility: CLINIC | Age: 82
End: 2017-09-14

## 2017-09-14 VITALS
OXYGEN SATURATION: 94 % | HEIGHT: 67 IN | HEART RATE: 74 BPM | DIASTOLIC BLOOD PRESSURE: 66 MMHG | SYSTOLIC BLOOD PRESSURE: 142 MMHG | BODY MASS INDEX: 29.25 KG/M2 | WEIGHT: 186.38 LBS

## 2017-09-14 DIAGNOSIS — I10 BENIGN ESSENTIAL HYPERTENSION: ICD-10-CM

## 2017-09-14 DIAGNOSIS — I50.9 CONGESTIVE HEART FAILURE, UNSPECIFIED CONGESTIVE HEART FAILURE CHRONICITY, UNSPECIFIED CONGESTIVE HEART FAILURE TYPE: Primary | ICD-10-CM

## 2017-09-14 DIAGNOSIS — I50.30 (HFPEF) HEART FAILURE WITH PRESERVED EJECTION FRACTION (HCC): ICD-10-CM

## 2017-09-14 DIAGNOSIS — I27.20 PULMONARY HTN (HCC): Primary | ICD-10-CM

## 2017-09-14 DIAGNOSIS — J43.2 CENTRILOBULAR EMPHYSEMA (HCC): ICD-10-CM

## 2017-09-14 PROCEDURE — 96372 THER/PROPH/DIAG INJ SC/IM: CPT | Performed by: NURSE PRACTITIONER

## 2017-09-14 PROCEDURE — 99214 OFFICE O/P EST MOD 30 MIN: CPT | Performed by: NURSE PRACTITIONER

## 2017-09-14 RX ORDER — FUROSEMIDE 10 MG/ML
40 INJECTION INTRAMUSCULAR; INTRAVENOUS ONCE
Status: DISCONTINUED | OUTPATIENT
Start: 2017-09-14 | End: 2017-09-20 | Stop reason: HOSPADM

## 2017-09-14 RX ORDER — FUROSEMIDE 10 MG/ML
20 INJECTION INTRAMUSCULAR; INTRAVENOUS ONCE
Status: DISCONTINUED | OUTPATIENT
Start: 2017-09-14 | End: 2017-09-20 | Stop reason: HOSPADM

## 2017-09-14 RX ORDER — LISINOPRIL 10 MG/1
10 TABLET ORAL DAILY
Qty: 30 TABLET | Refills: 6 | Status: SHIPPED | OUTPATIENT
Start: 2017-09-14 | End: 2017-09-20 | Stop reason: HOSPADM

## 2017-09-14 RX ORDER — LISINOPRIL 10 MG/1
10 TABLET ORAL DAILY
Qty: 30 TABLET | Refills: 6 | Status: SHIPPED | OUTPATIENT
Start: 2017-09-14 | End: 2017-09-14 | Stop reason: SDUPTHER

## 2017-09-14 RX ADMIN — FUROSEMIDE 20 MG: 10 INJECTION INTRAMUSCULAR; INTRAVENOUS at 15:51

## 2017-09-14 RX ADMIN — FUROSEMIDE 40 MG: 10 INJECTION INTRAMUSCULAR; INTRAVENOUS at 15:52

## 2017-09-14 NOTE — TELEPHONE ENCOUNTER
Patient is having problems breathing and fluid retention. They have been made an appt to see paco lundberg this afternoon.

## 2017-09-14 NOTE — PROGRESS NOTES
"[facility] CHF CLINIC OFFICE VISIT    Subjective:     Congestive Heart Failure (chief complaint )      History of Present Illness    Urgent care yesterday, saw Mariela Velazco:  \"Pt here for increased sob, and swelling of feet. He also complains of uri symptoms, he has been seen in urgent care and had antibiotics and steroids, he is finishing up levaquin-still has a couple more days. He reports the levaquin is helping, that the congestion is breaking up and the color is getting lighter. He has COPD, is on supplemental O2 as needed, has diabetes well controlled, and multiple comorbidities. He is on a maintenance dose of steroids\"     Mr. Ham is an 84-year-old patient well-known to Dr. Maciel for pulmonary hypertension and HFpEF.  Seen yesterday at [facility] for COPD exacerbation.  He called today concerning increase in weight and leg swelling.  He has persistent dyspnea.  He wears oxygen at night.  He uses his nebulizer treatments 4 times a day.    PCP: Dr. Rey  Cardiologist: Dr. Maciel  Pulmonologist: Had been Dr. Higginbotham- stopped seeing?    Hospitalizations:    Past Medical History:   Diagnosis Date   • Acute exacerbation of chronic obstructive airways disease    • Acute interstitial pneumonia     Hamman-Rich Syndrome   • Arthritis    • Backache    • Bradycardia    • Chronic obstructive lung disease    • Chronic obstructive pulmonary disease (COPD)    • Coronary arteriosclerosis    • Coronary atherosclerosis of native coronary artery    • Degenerative joint disease involving multiple joints    • Diabetes mellitus    • Duodenal ulcer disease    • Hernia of abdominal wall    • History of echocardiogram 04/13/2015    Echocardiogram W/ color flow 62802 (1) - Normal LV systolic function with Ef of 70% with LVH and diastolic relaxation abnormality of the left ventricle. Mildly dilated aortic root. No sig.valvular abnormalities.   • Hyperlipidemia    • Hypertension    • Hypertensive disorder    • Left lower lobe " pneumonia     Left lower zone pneumonia - clinically improved   • Neuropathy    • Pneumonia     Recent improving   • Shortness of breath    • Type 2 diabetes mellitus      Past Surgical History:   Procedure Laterality Date   • APPENDECTOMY     • CARDIAC CATHETERIZATION N/A 6/6/2017    Procedure: Right Heart Cath;  Surgeon: Jeff Maciel MD PhD;  Location: [facility] INVASIVE LOCATION;  Service:    • HERNIA REPAIR     • LUNG BIOPSY     • NECK SURGERY     • THORACOTOMY Left 1977    left with lung biopsy   • VENTRAL HERNIA REPAIR       Social History     Social History   • Marital status:      Spouse name: N/A   • Number of children: N/A   • Years of education: N/A     Social History Main Topics   • Smoking status: Former Smoker   • Smokeless tobacco: Never Used   • Alcohol use No   • Drug use: No   • Sexual activity: Defer      Comment:      Other Topics Concern   • None     Social History Narrative     Allergies   Allergen Reactions   • Atorvastatin Anaphylaxis     myalgia   • Pravastatin      Myalgia   • Azithromycin Rash   • Biaxin [Clarithromycin] Rash       Review of Systems   Cardiovascular: Positive for dyspnea on exertion, leg swelling and orthopnea. Negative for chest pain.   Respiratory: Positive for cough, shortness of breath and wheezing.        Current Outpatient Prescriptions   Medication Sig Dispense Refill   • albuterol (PROVENTIL) (2.5 MG/3ML) 0.083% nebulizer solution Take 2.5 mg by nebulization 4 (Four) Times a Day As Needed for wheezing. 125 vial 11   • aspirin 81 MG chewable tablet Chew 81 mg Daily.     • clopidogrel (PLAVIX) 75 MG tablet Take 75 mg by mouth Daily.     • donepezil (ARICEPT) 10 MG tablet Take 10 mg by mouth Daily.  0   • famotidine (PEPCID) 20 MG tablet Take 20 mg by mouth 2 (Two) Times a Day.  0   • fluticasone (FLONASE) 50 MCG/ACT nasal spray 2 sprays into each nostril Daily.     • furosemide (LASIX) 20 MG tablet Take 1 tablet by mouth Daily. 90 tablet 3  "  • glimepiride (AMARYL) 2 MG tablet Take 2 mg by mouth Daily.  0   • HYDROcodone-acetaminophen (NORCO) 7.5-325 MG per tablet Take 1 tablet by mouth Every 8 (Eight) Hours.     • ipratropium-albuterol (DUO-NEB) 0.5-2.5 mg/mL nebulizer Inhale 3 mL.     • isosorbide dinitrate (ISORDIL) 30 MG tablet Take 30 mg by mouth Daily.  0   • lisinopril (PRINIVIL,ZESTRIL) 10 MG tablet Take 1 tablet by mouth Daily. 30 tablet 6   • magnesium oxide (MAGOX) 400 (241.3 MG) MG tablet tablet Take 400 mg by mouth Daily.     • nitroglycerin (NITROSTAT) 0.4 MG SL tablet place 1 tablet under the tongue if needed every 5 minutes for hair...  (REFER TO PRESCRIPTION NOTES).  0   • O2 (OXYGEN) Inhale 2 L/min Every Night. W/ CPAP     • predniSONE (DELTASONE) 10 MG tablet Take 10 mg by mouth Daily.     • Red Yeast Rice 600 MG tablet Take 600 mg by mouth 2 (Two) Times a Day.     • terbutaline (BRETHINE) 5 MG tablet Take 5 mg by mouth 2 (Two) Times a Day.  0   • Umeclidinium-Vilanterol 62.5-25 MCG/INH aerosol powder  Inhale 1 puff Daily. 1 each 11     No current facility-administered medications for this visit.         Objective:     Vitals:    09/14/17 1517   BP: 142/66   BP Location: Left arm   Patient Position: Sitting   Cuff Size: Adult   Pulse: 74   SpO2: 94%   Weight: 186 lb 6 oz (84.5 kg)   Height: 67\" (170.2 cm)       Wt Readings from Last 3 Encounters:   09/14/17 186 lb 6 oz (84.5 kg)   08/07/17 183 lb 8 oz (83.2 kg)   07/10/17 179 lb 14.4 oz (81.6 kg)          Physical Exam   Constitutional: He is oriented to person, place, and time. He appears well-developed and well-nourished. No distress.   HENT:   Head: Normocephalic.   Neck: JVD (10cm) present.   Cardiovascular: Normal rate, regular rhythm, S1 normal, S2 normal, normal heart sounds and intact distal pulses.    No murmur heard.  Pulmonary/Chest: Breath sounds normal. Accessory muscle usage present. Tachypnea noted. No respiratory distress. He has no wheezes. He has no rales. "   Abdominal: Bowel sounds are normal. He exhibits distension.   Musculoskeletal: Normal range of motion. He exhibits no edema.   Neurological: He is alert and oriented to person, place, and time.   Skin: Skin is warm and dry. No erythema.   Psychiatric: He has a normal mood and affect. His behavior is normal. Judgment and thought content normal.       Cardiographics  Echocardiogram: 3/9/2017  Interpretation Summary   · Left ventricular function is normal.  · Estimated EF appears to be in the range of 61 - 65%.  · Left ventricular diastolic dysfunction (grade I a) consistent with impaired relaxation and elevated left atrial pressures.  · Left ventricular wall thickness is consistent with moderate concentric hypertrophy.  · Right Ventricle: Normal cavity size, wall thickness, systolic function and septal motion noted  · Estimated right ventricular systolic pressure from tricuspid regurgitation is moderately elevated (45-55 mmHg).  · Mild to moderate pulmonary hypertension is present.  · There is no evidence of pericardial effusion.     JIMENEZO 3/2017  Study Description  Monitor hooked-up on 3/23/2017. The patient was monitored for 12 days 13 hours. Indications for this exam include palpitations. Average HR: 63. Min HR: 30. Max HR: 133.   Study Findings  No symptoms reported during the monitoring period. No complications noted. The predominant rhythm noted during the testing period was sinus rhythm. Premature atrial contractions occured rarely. There was no evidence of atrial arrhythmias. There were two episodes of supraventricular tachycardia. The peak heart rate was 124 beats per minute. Premature ventricular contractions occured rarely. There were no episodes of ventricular tachycardia. Pauses were noted in the sinoatrial node. There were 1 pauses. 3 seconds was the longest pause. No atrioventricular block noted.   Study Impressions  A relatively benign monitor study.     Normal ABIs, Normal venous duplex    RHC  2017  Right heart pressures:  RA:                 27/23 with a mean of 22                                    RV:                 64/22 with a mean of 24                                    PA:                  66/37 with a mean of 46                                    PCWP:            24                                                                   DP     Resistance:  SVR:               1590 dynes · sec/cm5              PVR: 622 dynes · sec/cm5     Saturations:  PA: 67%     Cardiac Outputs:  Thermal CO: 4.4  Thermal CI: 2.3  Isabell CO: 4.8  Isabell CI: 2.5     Medications:  1. 2% Lidocaine     Procedure Logistics:   Fluoro: 1.6 minutes     Structural:  RA pressure is elevated. With inspiration there is adequate inspiratory decrease.  RV diastolic pressure is elevated.  PA pressure is markedly elevated.  Wedge pressure is elevated. Pressures measured in both right and left wedge positions are similiar.        IMPRESSION:   1. Left atrial hypertension.  2. CpC-PAH  3. Clinically HFpEF  4. Elevated left-sided filling pressures.   5. Elevated right-sided filling pressures.     Imaging  Chest x-ray: 2017 @ [facility]  Result Impression      No active disease.   Result Narrative   Indication:  Short of breath, COPD.    PA and Lateral Chest:  The bones are intact.  The heart is normal.  There is an old discrete-appearing infiltrate in the left base with mild associated pleural thickening; this is unchanged from .  There is a granuloma in the right mid lung field.    No heart failure.     Lab Review                  The following portions of the patient's history were reviewed and updated as appropriate: allergies, current medications, past family history, past medical history, past social history, past surgical history and problem list.     Assessment/Plan:   Severe lung disease. COPD and PH. Breathing issue is COPD exacerbation and not HFpEF. Yesterday, he received evaluation [facility].  He received  steroids by mouth as well as antibiotics.  Wife is more concerned about breathing the patient has.  At first glance, he is NYHA class IV, however he is not in respiratory distress.  Labs reviewed from yesterday.  He has normal BUN/creatinine as well as negative BNP.  Chest x-ray was within normal limits.  No edema lower extremities.  Severely distended abdomen impending his ability to breathe well.      Diagnosis Plan   1. Pulmonary HTN  Afterload reducing medication increased.  Lisinopril 10 mg daily- take 20 mg in the a.m.    Will titrate Isordil if needed     Oxygen at night and as needed        2. Centrilobular emphysema  Nebulizers 4 times a day as well as inhalers.       3. (HFpEF) heart failure with preserved ejection fraction   EF: 65%. NYHA Class IV, Stage C. Patient appears euvolemic and in a well perfused physiologic state. Hemodynamics are unacceptable  BETA-BLOCKER:    Not given?:severe COPD, bradycardia  ACE/ARB: Lisinopril  ENTRESTO: no  DIURETIC:  ALDOSTERONT ANTAGONIST: Not currently indicated.  IMDUR/HYDRALAZINE: N/A  DIGOXIN: N/A  Fluid restriction: 2 L  Sodium restriction:2 grams       4. Benign essential hypertension  HTN, essential.   BP noted to be moderately elevated today in office.    LVH: moderate.  LVEF:Normal.   Diastolic function:Abnormal:    DASH; medication compliance    Isordil 30mg daily  Lisinopril 5mg increased to 10mg daily  Take 2 tablets in the AM and one extra tonight  Reported BP at home of 180 and 190 systolic.           DUE TO FINDINGS OF HYPERVOLEMIA IN THE SETTING OF HFpEF WE WILL PROCEED WITH LASIX 60 MG SUBCUTANEOUS INFUSION.   TIME:1600  LASIX 60/6ML SUBCUTANEOUS left LOWER QUADRANT OF ABDOMEN INFUSED OVER 10 MINUTES FOLLOWED BY 0.9% NA+ CHLORIDE FLUSH OF 10ML INFUSION PER EVE JACKSON.     Follow-up tomorrow at 9:30    Wife has concerns regarding hospitalization.  I asked her to allow the above interventions to work overnight and we would reevaluate  hospitalization tomorrow.  I honestly do not find him in an acute distressed state, however, if the patient and his wife wish to proceed to the hospital we will certainly accommodate that request.

## 2017-09-14 NOTE — PATIENT INSTRUCTIONS
10mg of Lisinopril at [facility]     Take one tonight    In the morning take 2 tablets of the 10mg in the morning    Lasix take 2 tablets in the morning

## 2017-09-15 ENCOUNTER — APPOINTMENT (OUTPATIENT)
Dept: INTERVENTIONAL RADIOLOGY/VASCULAR | Facility: HOSPITAL | Age: 82
End: 2017-09-15

## 2017-09-15 ENCOUNTER — HOSPITAL ENCOUNTER (INPATIENT)
Facility: HOSPITAL | Age: 82
LOS: 5 days | Discharge: HOME HEALTH | End: 2017-09-20
Attending: FAMILY MEDICINE | Admitting: INTERNAL MEDICINE

## 2017-09-15 ENCOUNTER — APPOINTMENT (OUTPATIENT)
Dept: GENERAL RADIOLOGY | Facility: HOSPITAL | Age: 82
End: 2017-09-15

## 2017-09-15 ENCOUNTER — APPOINTMENT (OUTPATIENT)
Dept: ULTRASOUND IMAGING | Facility: HOSPITAL | Age: 82
End: 2017-09-15

## 2017-09-15 ENCOUNTER — OFFICE VISIT (OUTPATIENT)
Dept: CARDIOLOGY | Facility: CLINIC | Age: 82
End: 2017-09-15

## 2017-09-15 VITALS
HEART RATE: 83 BPM | WEIGHT: 184.25 LBS | SYSTOLIC BLOOD PRESSURE: 134 MMHG | BODY MASS INDEX: 28.92 KG/M2 | HEIGHT: 67 IN | OXYGEN SATURATION: 96 % | DIASTOLIC BLOOD PRESSURE: 60 MMHG

## 2017-09-15 DIAGNOSIS — IMO0001 SUPRASPINATUS TENDON TEAR, RIGHT, INITIAL ENCOUNTER: ICD-10-CM

## 2017-09-15 DIAGNOSIS — I48.0 PAROXYSMAL ATRIAL FIBRILLATION (HCC): ICD-10-CM

## 2017-09-15 DIAGNOSIS — R00.1 BRADYCARDIA: ICD-10-CM

## 2017-09-15 DIAGNOSIS — J44.9 CHRONIC OBSTRUCTIVE BRONCHITIS (HCC): ICD-10-CM

## 2017-09-15 DIAGNOSIS — I25.10 CORONARY ARTERIOSCLEROSIS: ICD-10-CM

## 2017-09-15 DIAGNOSIS — R06.02 SHORTNESS OF BREATH: ICD-10-CM

## 2017-09-15 DIAGNOSIS — I25.10 CHRONIC CORONARY ARTERY DISEASE: ICD-10-CM

## 2017-09-15 DIAGNOSIS — I69.959: ICD-10-CM

## 2017-09-15 DIAGNOSIS — I77.810 DILATED AORTIC ROOT (HCC): ICD-10-CM

## 2017-09-15 DIAGNOSIS — I65.23 BILATERAL CAROTID ARTERY STENOSIS: ICD-10-CM

## 2017-09-15 DIAGNOSIS — I50.30 (HFPEF) HEART FAILURE WITH PRESERVED EJECTION FRACTION (HCC): Primary | ICD-10-CM

## 2017-09-15 DIAGNOSIS — R35.1 NOCTURIA: ICD-10-CM

## 2017-09-15 DIAGNOSIS — I36.1 NON-RHEUMATIC TRICUSPID VALVE INSUFFICIENCY: ICD-10-CM

## 2017-09-15 DIAGNOSIS — M75.101 ROTATOR CUFF SYNDROME, RIGHT: ICD-10-CM

## 2017-09-15 DIAGNOSIS — M19.049 ARTHROPATHY OF HAND: ICD-10-CM

## 2017-09-15 DIAGNOSIS — S46.811A INFRASPINATUS TENDON TEAR, RIGHT, INITIAL ENCOUNTER: ICD-10-CM

## 2017-09-15 DIAGNOSIS — E78.2 MIXED HYPERLIPIDEMIA: ICD-10-CM

## 2017-09-15 DIAGNOSIS — J44.1 ACUTE EXACERBATION OF CHRONIC OBSTRUCTIVE AIRWAYS DISEASE (HCC): ICD-10-CM

## 2017-09-15 DIAGNOSIS — I47.9 PAROXYSMAL TACHYCARDIA (HCC): ICD-10-CM

## 2017-09-15 DIAGNOSIS — S46.811A PARTIAL TEAR OF SUBSCAPULARIS TENDON, RIGHT, INITIAL ENCOUNTER: ICD-10-CM

## 2017-09-15 DIAGNOSIS — M15.9 GENERALIZED OSTEOARTHRITIS: ICD-10-CM

## 2017-09-15 DIAGNOSIS — J43.2 CENTRILOBULAR EMPHYSEMA (HCC): ICD-10-CM

## 2017-09-15 DIAGNOSIS — I27.20 PULMONARY HTN (HCC): ICD-10-CM

## 2017-09-15 DIAGNOSIS — M54.2 NECK PAIN: ICD-10-CM

## 2017-09-15 DIAGNOSIS — J44.1 COPD WITH EXACERBATION (HCC): ICD-10-CM

## 2017-09-15 DIAGNOSIS — K21.9 GASTROESOPHAGEAL REFLUX DISEASE WITHOUT ESOPHAGITIS: ICD-10-CM

## 2017-09-15 DIAGNOSIS — J44.1 COPD EXACERBATION (HCC): ICD-10-CM

## 2017-09-15 DIAGNOSIS — I25.10 CORONARY ARTERY DISEASE INVOLVING NATIVE CORONARY ARTERY OF NATIVE HEART WITHOUT ANGINA PECTORIS: ICD-10-CM

## 2017-09-15 DIAGNOSIS — Z78.9 IMPAIRED MOBILITY AND ACTIVITIES OF DAILY LIVING: ICD-10-CM

## 2017-09-15 DIAGNOSIS — I50.30 (HFPEF) HEART FAILURE WITH PRESERVED EJECTION FRACTION (HCC): ICD-10-CM

## 2017-09-15 DIAGNOSIS — M25.511 CHRONIC RIGHT SHOULDER PAIN: ICD-10-CM

## 2017-09-15 DIAGNOSIS — Z74.09 IMPAIRED MOBILITY AND ACTIVITIES OF DAILY LIVING: ICD-10-CM

## 2017-09-15 DIAGNOSIS — M15.9: ICD-10-CM

## 2017-09-15 DIAGNOSIS — Z83.2 FAMILY HISTORY OF DISEASES OF THE BLOOD AND BLOOD-FORMING ORGANS AND CERTAIN DISORDERS INVOLVING THE IMMUNE MECHANISM: ICD-10-CM

## 2017-09-15 DIAGNOSIS — I10 BENIGN ESSENTIAL HYPERTENSION: Primary | ICD-10-CM

## 2017-09-15 DIAGNOSIS — G62.9 NEUROPATHY: ICD-10-CM

## 2017-09-15 DIAGNOSIS — G89.29 CHRONIC RIGHT SHOULDER PAIN: ICD-10-CM

## 2017-09-15 DIAGNOSIS — J84.9 ACUTE INTERSTITIAL PNEUMONIA (HCC): ICD-10-CM

## 2017-09-15 DIAGNOSIS — Z74.09 IMPAIRED PHYSICAL MOBILITY: ICD-10-CM

## 2017-09-15 LAB
ALBUMIN SERPL-MCNC: 3.7 G/DL (ref 3.4–4.8)
ALBUMIN/GLOB SERPL: 1.7 G/DL (ref 1.1–1.8)
ALP SERPL-CCNC: 54 U/L (ref 38–126)
ALT SERPL W P-5'-P-CCNC: 41 U/L (ref 21–72)
ANION GAP SERPL CALCULATED.3IONS-SCNC: 12 MMOL/L (ref 5–15)
AST SERPL-CCNC: 24 U/L (ref 17–59)
BASOPHILS # BLD AUTO: 0.01 10*3/MM3 (ref 0–0.2)
BASOPHILS NFR BLD AUTO: 0.1 % (ref 0–2)
BILIRUB SERPL-MCNC: 0.5 MG/DL (ref 0.2–1.3)
BUN BLD-MCNC: 33 MG/DL (ref 7–21)
BUN/CREAT SERPL: 29.2 (ref 7–25)
CALCIUM SPEC-SCNC: 9.6 MG/DL (ref 8.4–10.2)
CHLORIDE SERPL-SCNC: 101 MMOL/L (ref 95–110)
CK MB SERPL-CCNC: 5.75 NG/ML (ref 0–5)
CK MB SERPL-CCNC: 6.2 NG/ML (ref 0–5)
CK SERPL-CCNC: 184 U/L (ref 55–170)
CK SERPL-CCNC: 188 U/L (ref 55–170)
CO2 SERPL-SCNC: 24 MMOL/L (ref 22–31)
CREAT BLD-MCNC: 1.13 MG/DL (ref 0.7–1.3)
DEPRECATED RDW RBC AUTO: 52.8 FL (ref 35.1–43.9)
EOSINOPHIL # BLD AUTO: 0 10*3/MM3 (ref 0–0.7)
EOSINOPHIL NFR BLD AUTO: 0 % (ref 0–7)
ERYTHROCYTE [DISTWIDTH] IN BLOOD BY AUTOMATED COUNT: 15.6 % (ref 11.5–14.5)
GFR SERPL CREATININE-BSD FRML MDRD: 62 ML/MIN/1.73 (ref 60–98)
GLOBULIN UR ELPH-MCNC: 2.2 GM/DL (ref 2.3–3.5)
GLUCOSE BLD-MCNC: 168 MG/DL (ref 60–100)
GLUCOSE BLDC GLUCOMTR-MCNC: 103 MG/DL (ref 70–130)
GLUCOSE BLDC GLUCOMTR-MCNC: 219 MG/DL (ref 70–130)
HCT VFR BLD AUTO: 37.7 % (ref 39–49)
HGB BLD-MCNC: 12.3 G/DL (ref 13.7–17.3)
HOLD SPECIMEN: NORMAL
IMM GRANULOCYTES # BLD: 0.14 10*3/MM3 (ref 0–0.02)
IMM GRANULOCYTES NFR BLD: 0.9 % (ref 0–0.5)
LYMPHOCYTES # BLD AUTO: 0.43 10*3/MM3 (ref 0.6–4.2)
LYMPHOCYTES NFR BLD AUTO: 2.9 % (ref 10–50)
MAGNESIUM SERPL-MCNC: 1.9 MG/DL (ref 1.6–2.3)
MCH RBC QN AUTO: 30.3 PG (ref 26.5–34)
MCHC RBC AUTO-ENTMCNC: 32.6 G/DL (ref 31.5–36.3)
MCV RBC AUTO: 92.9 FL (ref 80–98)
MONOCYTES # BLD AUTO: 0.28 10*3/MM3 (ref 0–0.9)
MONOCYTES NFR BLD AUTO: 1.9 % (ref 0–12)
NEUTROPHILS # BLD AUTO: 13.98 10*3/MM3 (ref 2–8.6)
NEUTROPHILS NFR BLD AUTO: 94.2 % (ref 37–80)
PLATELET # BLD AUTO: 182 10*3/MM3 (ref 150–450)
PMV BLD AUTO: 11.2 FL (ref 8–12)
POTASSIUM BLD-SCNC: 4.7 MMOL/L (ref 3.5–5.1)
PROT SERPL-MCNC: 5.9 G/DL (ref 6.3–8.6)
RBC # BLD AUTO: 4.06 10*6/MM3 (ref 4.37–5.74)
S PNEUM AG SPEC QL LA: NEGATIVE
SODIUM BLD-SCNC: 137 MMOL/L (ref 137–145)
TROPONIN I SERPL-MCNC: 0.06 NG/ML
TROPONIN I SERPL-MCNC: 0.08 NG/ML
WBC NRBC COR # BLD: 14.84 10*3/MM3 (ref 3.2–9.8)
WHOLE BLOOD HOLD SPECIMEN: NORMAL

## 2017-09-15 PROCEDURE — 85025 COMPLETE CBC W/AUTO DIFF WBC: CPT | Performed by: INTERNAL MEDICINE

## 2017-09-15 PROCEDURE — 93005 ELECTROCARDIOGRAM TRACING: CPT | Performed by: INTERNAL MEDICINE

## 2017-09-15 PROCEDURE — 82553 CREATINE MB FRACTION: CPT | Performed by: INTERNAL MEDICINE

## 2017-09-15 PROCEDURE — 87086 URINE CULTURE/COLONY COUNT: CPT | Performed by: INTERNAL MEDICINE

## 2017-09-15 PROCEDURE — 25010000002 LEVOFLOXACIN PER 250 MG: Performed by: INTERNAL MEDICINE

## 2017-09-15 PROCEDURE — 93010 ELECTROCARDIOGRAM REPORT: CPT | Performed by: INTERNAL MEDICINE

## 2017-09-15 PROCEDURE — 94799 UNLISTED PULMONARY SVC/PX: CPT

## 2017-09-15 PROCEDURE — G0378 HOSPITAL OBSERVATION PER HR: HCPCS

## 2017-09-15 PROCEDURE — 82962 GLUCOSE BLOOD TEST: CPT

## 2017-09-15 PROCEDURE — 87899 AGENT NOS ASSAY W/OPTIC: CPT | Performed by: INTERNAL MEDICINE

## 2017-09-15 PROCEDURE — 25010000002 FUROSEMIDE PER 20 MG: Performed by: INTERNAL MEDICINE

## 2017-09-15 PROCEDURE — 94760 N-INVAS EAR/PLS OXIMETRY 1: CPT

## 2017-09-15 PROCEDURE — 80053 COMPREHEN METABOLIC PANEL: CPT | Performed by: INTERNAL MEDICINE

## 2017-09-15 PROCEDURE — 82550 ASSAY OF CK (CPK): CPT | Performed by: INTERNAL MEDICINE

## 2017-09-15 PROCEDURE — 83735 ASSAY OF MAGNESIUM: CPT | Performed by: INTERNAL MEDICINE

## 2017-09-15 PROCEDURE — 76937 US GUIDE VASCULAR ACCESS: CPT

## 2017-09-15 PROCEDURE — 87040 BLOOD CULTURE FOR BACTERIA: CPT | Performed by: INTERNAL MEDICINE

## 2017-09-15 PROCEDURE — 84484 ASSAY OF TROPONIN QUANT: CPT | Performed by: INTERNAL MEDICINE

## 2017-09-15 PROCEDURE — C1751 CATH, INF, PER/CENT/MIDLINE: HCPCS

## 2017-09-15 PROCEDURE — 25010000002 HEPARIN (PORCINE) PER 1000 UNITS: Performed by: INTERNAL MEDICINE

## 2017-09-15 PROCEDURE — 71020 HC CHEST PA AND LATERAL: CPT

## 2017-09-15 PROCEDURE — 94640 AIRWAY INHALATION TREATMENT: CPT

## 2017-09-15 PROCEDURE — 99214 OFFICE O/P EST MOD 30 MIN: CPT | Performed by: NURSE PRACTITIONER

## 2017-09-15 PROCEDURE — 02HV33Z INSERTION OF INFUSION DEVICE INTO SUPERIOR VENA CAVA, PERCUTANEOUS APPROACH: ICD-10-PCS | Performed by: INTERNAL MEDICINE

## 2017-09-15 PROCEDURE — B548ZZA ULTRASONOGRAPHY OF SUPERIOR VENA CAVA, GUIDANCE: ICD-10-PCS | Performed by: INTERNAL MEDICINE

## 2017-09-15 RX ORDER — ONDANSETRON 4 MG/1
4 TABLET, ORALLY DISINTEGRATING ORAL EVERY 6 HOURS PRN
Status: DISCONTINUED | OUTPATIENT
Start: 2017-09-15 | End: 2017-09-15 | Stop reason: SDUPTHER

## 2017-09-15 RX ORDER — ASPIRIN 81 MG/1
81 TABLET, CHEWABLE ORAL DAILY
Status: DISCONTINUED | OUTPATIENT
Start: 2017-09-15 | End: 2017-09-20 | Stop reason: HOSPADM

## 2017-09-15 RX ORDER — BISACODYL 10 MG
10 SUPPOSITORY, RECTAL RECTAL DAILY PRN
Status: DISCONTINUED | OUTPATIENT
Start: 2017-09-15 | End: 2017-09-20 | Stop reason: HOSPADM

## 2017-09-15 RX ORDER — ONDANSETRON 4 MG/1
4 TABLET, ORALLY DISINTEGRATING ORAL EVERY 6 HOURS PRN
Status: DISCONTINUED | OUTPATIENT
Start: 2017-09-15 | End: 2017-09-20 | Stop reason: HOSPADM

## 2017-09-15 RX ORDER — DONEPEZIL HYDROCHLORIDE 10 MG/1
10 TABLET, FILM COATED ORAL DAILY
Status: DISCONTINUED | OUTPATIENT
Start: 2017-09-15 | End: 2017-09-20 | Stop reason: HOSPADM

## 2017-09-15 RX ORDER — SODIUM CHLORIDE 9 MG/ML
100 INJECTION, SOLUTION INTRAVENOUS CONTINUOUS
Status: DISCONTINUED | OUTPATIENT
Start: 2017-09-15 | End: 2017-09-15

## 2017-09-15 RX ORDER — DOCUSATE SODIUM 100 MG/1
200 CAPSULE, LIQUID FILLED ORAL 2 TIMES DAILY
Status: DISCONTINUED | OUTPATIENT
Start: 2017-09-15 | End: 2017-09-15 | Stop reason: SDUPTHER

## 2017-09-15 RX ORDER — IPRATROPIUM BROMIDE AND ALBUTEROL SULFATE 2.5; .5 MG/3ML; MG/3ML
3 SOLUTION RESPIRATORY (INHALATION)
Status: DISCONTINUED | OUTPATIENT
Start: 2017-09-15 | End: 2017-09-20 | Stop reason: HOSPADM

## 2017-09-15 RX ORDER — DOCUSATE SODIUM 100 MG/1
200 CAPSULE, LIQUID FILLED ORAL 2 TIMES DAILY
Status: DISCONTINUED | OUTPATIENT
Start: 2017-09-15 | End: 2017-09-20 | Stop reason: HOSPADM

## 2017-09-15 RX ORDER — HEPARIN SODIUM 5000 [USP'U]/ML
5000 INJECTION, SOLUTION INTRAVENOUS; SUBCUTANEOUS EVERY 8 HOURS SCHEDULED
Status: DISCONTINUED | OUTPATIENT
Start: 2017-09-15 | End: 2017-09-20 | Stop reason: HOSPADM

## 2017-09-15 RX ORDER — HYDRALAZINE HYDROCHLORIDE 20 MG/ML
10 INJECTION INTRAMUSCULAR; INTRAVENOUS EVERY 6 HOURS PRN
Status: DISCONTINUED | OUTPATIENT
Start: 2017-09-15 | End: 2017-09-20 | Stop reason: HOSPADM

## 2017-09-15 RX ORDER — FAMOTIDINE 20 MG/1
20 TABLET, FILM COATED ORAL 2 TIMES DAILY
Status: DISCONTINUED | OUTPATIENT
Start: 2017-09-15 | End: 2017-09-20 | Stop reason: HOSPADM

## 2017-09-15 RX ORDER — IPRATROPIUM BROMIDE AND ALBUTEROL SULFATE 2.5; .5 MG/3ML; MG/3ML
3 SOLUTION RESPIRATORY (INHALATION)
Status: DISCONTINUED | OUTPATIENT
Start: 2017-09-15 | End: 2017-09-15 | Stop reason: SDUPTHER

## 2017-09-15 RX ORDER — HYDROCODONE BITARTRATE AND ACETAMINOPHEN 7.5; 325 MG/1; MG/1
1 TABLET ORAL EVERY 8 HOURS
Status: DISCONTINUED | OUTPATIENT
Start: 2017-09-15 | End: 2017-09-20 | Stop reason: HOSPADM

## 2017-09-15 RX ORDER — TERBUTALINE SULFATE 2.5 MG/1
5 TABLET ORAL 2 TIMES DAILY
Status: DISCONTINUED | OUTPATIENT
Start: 2017-09-15 | End: 2017-09-20 | Stop reason: HOSPADM

## 2017-09-15 RX ORDER — FAMOTIDINE 20 MG/1
20 TABLET, FILM COATED ORAL 2 TIMES DAILY
Status: DISCONTINUED | OUTPATIENT
Start: 2017-09-15 | End: 2017-09-15 | Stop reason: SDUPTHER

## 2017-09-15 RX ORDER — ISOSORBIDE DINITRATE 30 MG/1
30 TABLET ORAL DAILY
Status: DISCONTINUED | OUTPATIENT
Start: 2017-09-15 | End: 2017-09-20 | Stop reason: HOSPADM

## 2017-09-15 RX ORDER — LEVOFLOXACIN 5 MG/ML
500 INJECTION, SOLUTION INTRAVENOUS EVERY 24 HOURS
Status: DISCONTINUED | OUTPATIENT
Start: 2017-09-15 | End: 2017-09-16

## 2017-09-15 RX ORDER — ONDANSETRON 2 MG/ML
4 INJECTION INTRAMUSCULAR; INTRAVENOUS EVERY 6 HOURS PRN
Status: DISCONTINUED | OUTPATIENT
Start: 2017-09-15 | End: 2017-09-20 | Stop reason: HOSPADM

## 2017-09-15 RX ORDER — SODIUM CHLORIDE 0.9 % (FLUSH) 0.9 %
1-10 SYRINGE (ML) INJECTION AS NEEDED
Status: DISCONTINUED | OUTPATIENT
Start: 2017-09-15 | End: 2017-09-20 | Stop reason: HOSPADM

## 2017-09-15 RX ORDER — NITROGLYCERIN 0.4 MG/1
0.4 TABLET SUBLINGUAL
Status: DISCONTINUED | OUTPATIENT
Start: 2017-09-15 | End: 2017-09-20 | Stop reason: HOSPADM

## 2017-09-15 RX ORDER — FUROSEMIDE 10 MG/ML
20 INJECTION INTRAMUSCULAR; INTRAVENOUS 3 TIMES DAILY
Status: DISCONTINUED | OUTPATIENT
Start: 2017-09-15 | End: 2017-09-20 | Stop reason: HOSPADM

## 2017-09-15 RX ORDER — FLUTICASONE PROPIONATE 50 MCG
2 SPRAY, SUSPENSION (ML) NASAL DAILY
Status: DISCONTINUED | OUTPATIENT
Start: 2017-09-15 | End: 2017-09-20 | Stop reason: HOSPADM

## 2017-09-15 RX ORDER — ONDANSETRON 4 MG/1
4 TABLET, FILM COATED ORAL EVERY 6 HOURS PRN
Status: DISCONTINUED | OUTPATIENT
Start: 2017-09-15 | End: 2017-09-20 | Stop reason: HOSPADM

## 2017-09-15 RX ORDER — ALBUTEROL SULFATE 2.5 MG/3ML
2.5 SOLUTION RESPIRATORY (INHALATION) 4 TIMES DAILY PRN
Status: DISCONTINUED | OUTPATIENT
Start: 2017-09-15 | End: 2017-09-20 | Stop reason: HOSPADM

## 2017-09-15 RX ORDER — TERBUTALINE SULFATE 5 MG/1
5 TABLET ORAL 2 TIMES DAILY
Status: DISCONTINUED | OUTPATIENT
Start: 2017-09-15 | End: 2017-09-15

## 2017-09-15 RX ORDER — LISINOPRIL 10 MG/1
10 TABLET ORAL DAILY
Status: DISCONTINUED | OUTPATIENT
Start: 2017-09-15 | End: 2017-09-19

## 2017-09-15 RX ORDER — CLOPIDOGREL BISULFATE 75 MG/1
75 TABLET ORAL DAILY
Status: DISCONTINUED | OUTPATIENT
Start: 2017-09-15 | End: 2017-09-20 | Stop reason: HOSPADM

## 2017-09-15 RX ORDER — GLIPIZIDE 5 MG/1
5 TABLET ORAL
Status: DISCONTINUED | OUTPATIENT
Start: 2017-09-16 | End: 2017-09-20 | Stop reason: HOSPADM

## 2017-09-15 RX ORDER — ACETAMINOPHEN 325 MG/1
650 TABLET ORAL EVERY 4 HOURS PRN
Status: DISCONTINUED | OUTPATIENT
Start: 2017-09-15 | End: 2017-09-20 | Stop reason: HOSPADM

## 2017-09-15 RX ORDER — PANTOPRAZOLE SODIUM 40 MG/1
40 TABLET, DELAYED RELEASE ORAL
Status: DISCONTINUED | OUTPATIENT
Start: 2017-09-16 | End: 2017-09-20 | Stop reason: HOSPADM

## 2017-09-15 RX ADMIN — LEVOFLOXACIN 500 MG: 5 INJECTION, SOLUTION INTRAVENOUS at 15:44

## 2017-09-15 RX ADMIN — HEPARIN SODIUM 5000 UNITS: 5000 INJECTION, SOLUTION INTRAVENOUS; SUBCUTANEOUS at 21:55

## 2017-09-15 RX ADMIN — TERBUTALINE SULFATE 5 MG: 2.5 TABLET ORAL at 17:35

## 2017-09-15 RX ADMIN — IPRATROPIUM BROMIDE AND ALBUTEROL SULFATE 3 ML: 2.5; .5 SOLUTION RESPIRATORY (INHALATION) at 14:54

## 2017-09-15 RX ADMIN — FUROSEMIDE 20 MG: 10 INJECTION, SOLUTION INTRAVENOUS at 15:52

## 2017-09-15 RX ADMIN — DONEPEZIL HYDROCHLORIDE 10 MG: 10 TABLET, FILM COATED ORAL at 15:53

## 2017-09-15 RX ADMIN — IPRATROPIUM BROMIDE AND ALBUTEROL SULFATE 3 ML: 2.5; .5 SOLUTION RESPIRATORY (INHALATION) at 22:40

## 2017-09-15 RX ADMIN — HYDROCODONE BITARTRATE AND ACETAMINOPHEN 1 TABLET: 7.5; 325 TABLET ORAL at 15:52

## 2017-09-15 RX ADMIN — HYDROCODONE BITARTRATE AND ACETAMINOPHEN 1 TABLET: 7.5; 325 TABLET ORAL at 21:54

## 2017-09-15 RX ADMIN — HEPARIN SODIUM 5000 UNITS: 5000 INJECTION, SOLUTION INTRAVENOUS; SUBCUTANEOUS at 15:52

## 2017-09-15 RX ADMIN — FLUTICASONE PROPIONATE 2 SPRAY: 50 SPRAY, METERED NASAL at 15:54

## 2017-09-15 RX ADMIN — FUROSEMIDE 20 MG: 10 INJECTION, SOLUTION INTRAVENOUS at 21:55

## 2017-09-15 RX ADMIN — IPRATROPIUM BROMIDE AND ALBUTEROL SULFATE 3 ML: 2.5; .5 SOLUTION RESPIRATORY (INHALATION) at 19:09

## 2017-09-15 RX ADMIN — FAMOTIDINE 20 MG: 20 TABLET ORAL at 17:34

## 2017-09-15 NOTE — H&P
[facility] Medicine Services  INPATIENT HISTORY AND PHYSICAL       Patient Care Team:  Paolo Rey MD as PCP - General    Chief complaint No chief complaint on file.      Subjective     Patient is a 84 y.o. male presents with complaint of having shortness of breath.  Patient states she's been having shortness of breath ongoing for past few weeks.  Patient states his shortness of breath has been worsening.  He has been having coughing spells with thick yellowish to greenish colored sputum.  No nausea vomiting have been reported.  Patient does complain of chest tightness with coughing spells.  Patient states he has been having worsening orthopnea of 3-4 pillows.  Patient also admits of having reduced excess tolerance.  Patient does complain of worsening swelling.  No urinary complaints have been reported.  Patient states he has been compliant with his medications.  Patient states he was recently treated with IV steroids and nebulizer treatment without any benefit.    Review of Systems   Review of Systems   Constitutional: Positive for activity change and fever. Negative for appetite change, chills and diaphoresis.   HENT: Negative for congestion, rhinorrhea, sore throat and trouble swallowing.    Eyes: Negative for visual disturbance.   Respiratory: Positive for cough, chest tightness, shortness of breath and wheezing.    Cardiovascular: Positive for leg swelling. Negative for chest pain and palpitations.   Gastrointestinal: Negative for abdominal pain, blood in stool, diarrhea, nausea and vomiting.   Endocrine: Negative for cold intolerance and heat intolerance.   Genitourinary: Negative for decreased urine volume and difficulty urinating.   Musculoskeletal: Negative for back pain, gait problem and neck pain.   Skin: Negative for rash.   Neurological: Negative for dizziness, syncope, weakness, light-headedness, numbness and headaches.   Psychiatric/Behavioral: The  patient is not nervous/anxious.        History  Past Medical History:   Diagnosis Date   • Acute exacerbation of chronic obstructive airways disease    • Acute interstitial pneumonia     Hamman-Rich Syndrome   • Arthritis    • Backache    • Bradycardia    • Chronic obstructive lung disease    • Chronic obstructive pulmonary disease (COPD)    • Coronary arteriosclerosis    • Coronary atherosclerosis of native coronary artery    • Degenerative joint disease involving multiple joints    • Diabetes mellitus    • Duodenal ulcer disease    • Hernia of abdominal wall    • History of echocardiogram 04/13/2015    Echocardiogram W/ color flow 27505 (1) - Normal LV systolic function with Ef of 70% with LVH and diastolic relaxation abnormality of the left ventricle. Mildly dilated aortic root. No sig.valvular abnormalities.   • Hyperlipidemia    • Hypertension    • Hypertensive disorder    • Left lower lobe pneumonia     Left lower zone pneumonia - clinically improved   • Neuropathy    • Pneumonia     Recent improving   • Shortness of breath    • Type 2 diabetes mellitus      Past Surgical History:   Procedure Laterality Date   • APPENDECTOMY     • CARDIAC CATHETERIZATION N/A 6/6/2017    Procedure: Right Heart Cath;  Surgeon: Jeff Maciel MD PhD;  Location: [facility] INVASIVE LOCATION;  Service:    • HERNIA REPAIR     • LUNG BIOPSY     • NECK SURGERY     • THORACOTOMY Left 1977    left with lung biopsy   • VENTRAL HERNIA REPAIR       Family History   Problem Relation Age of Onset   • Diabetes Other    • Heart disease Other    • Hypertension Other    • Other Other      Respiratory disorder   • Stroke Other      Social History   Substance Use Topics   • Smoking status: Former Smoker   • Smokeless tobacco: Never Used   • Alcohol use No     Facility-Administered Medications Prior to Admission   Medication Dose Route Frequency Provider Last Rate Last Dose   • [COMPLETED] furosemide (LASIX) injection 20 mg  20 mg  Subcutaneous Once Gauri Vargas APRN   20 mg at 09/14/17 1551   • [COMPLETED] furosemide (LASIX) injection 40 mg  40 mg Subcutaneous Once EVE Sewell   40 mg at 09/14/17 1552     Prescriptions Prior to Admission   Medication Sig Dispense Refill Last Dose   • aspirin 81 MG chewable tablet Chew 81 mg Daily.   9/15/2017 at Unknown time   • clopidogrel (PLAVIX) 75 MG tablet Take 75 mg by mouth Daily.   9/15/2017 at Unknown time   • donepezil (ARICEPT) 10 MG tablet Take 10 mg by mouth Daily.  0 9/15/2017 at Unknown time   • famotidine (PEPCID) 20 MG tablet Take 20 mg by mouth 2 (Two) Times a Day.  0 9/15/2017 at Unknown time   • fluticasone (FLONASE) 50 MCG/ACT nasal spray 2 sprays into each nostril Daily.   9/15/2017 at Unknown time   • furosemide (LASIX) 20 MG tablet Take 1 tablet by mouth Daily. 90 tablet 3 9/15/2017 at Unknown time   • glimepiride (AMARYL) 2 MG tablet Take 2 mg by mouth Daily.  0 9/15/2017 at Unknown time   • HYDROcodone-acetaminophen (NORCO) 7.5-325 MG per tablet Take 1 tablet by mouth Every 8 (Eight) Hours.   9/15/2017 at Unknown time   • isosorbide dinitrate (ISORDIL) 30 MG tablet Take 30 mg by mouth Daily.  0 9/15/2017 at Unknown time   • lisinopril (PRINIVIL,ZESTRIL) 10 MG tablet Take 1 tablet by mouth Daily. 30 tablet 6 9/15/2017 at Unknown time   • magnesium oxide (MAGOX) 400 (241.3 MG) MG tablet tablet Take 400 mg by mouth Daily.   9/15/2017 at Unknown time   • O2 (OXYGEN) Inhale 2 L/min Every Night. W/ CPAP   9/14/2017 at Unknown time   • predniSONE (DELTASONE) 10 MG tablet Take 10 mg by mouth Daily.   9/15/2017 at Unknown time   • Red Yeast Rice 600 MG tablet Take 600 mg by mouth 2 (Two) Times a Day.   9/15/2017 at Unknown time   • terbutaline (BRETHINE) 5 MG tablet Take 5 mg by mouth 2 (Two) Times a Day.  0 9/15/2017 at Unknown time   • Umeclidinium-Vilanterol 62.5-25 MCG/INH aerosol powder  Inhale 1 puff Daily. 1 each 11 9/15/2017 at Unknown time   • albuterol (PROVENTIL)  (2.5 MG/3ML) 0.083% nebulizer solution Take 2.5 mg by nebulization 4 (Four) Times a Day As Needed for wheezing. 125 vial 11 Taking   • ipratropium-albuterol (DUO-NEB) 0.5-2.5 mg/mL nebulizer Inhale 3 mL.   Taking   • nitroglycerin (NITROSTAT) 0.4 MG SL tablet place 1 tablet under the tongue if needed every 5 minutes for hair...  (REFER TO PRESCRIPTION NOTES).  0 Taking     Allergies:  Atorvastatin; Pravastatin; Azithromycin; and Biaxin [clarithromycin]  Prior to Admission medications    Medication Sig Start Date End Date Taking? Authorizing Provider   aspirin 81 MG chewable tablet Chew 81 mg Daily.   Yes Historical Provider, MD   clopidogrel (PLAVIX) 75 MG tablet Take 75 mg by mouth Daily. 2/3/16  Yes Historical Provider, MD   donepezil (ARICEPT) 10 MG tablet Take 10 mg by mouth Daily. 11/12/16  Yes Historical Provider, MD   famotidine (PEPCID) 20 MG tablet Take 20 mg by mouth 2 (Two) Times a Day. 10/20/16  Yes Historical Provider, MD   fluticasone (FLONASE) 50 MCG/ACT nasal spray 2 sprays into each nostril Daily.   Yes Historical Provider, MD   furosemide (LASIX) 20 MG tablet Take 1 tablet by mouth Daily. 3/3/17  Yes Jeff Maciel MD PhD   glimepiride (AMARYL) 2 MG tablet Take 2 mg by mouth Daily. 10/12/16  Yes Historical Provider, MD   HYDROcodone-acetaminophen (NORCO) 7.5-325 MG per tablet Take 1 tablet by mouth Every 8 (Eight) Hours. 12/27/16  Yes Historical Provider, MD   isosorbide dinitrate (ISORDIL) 30 MG tablet Take 30 mg by mouth Daily. 9/27/16  Yes Historical Provider, MD   lisinopril (PRINIVIL,ZESTRIL) 10 MG tablet Take 1 tablet by mouth Daily. 9/14/17  Yes EVE Sewlel   magnesium oxide (MAGOX) 400 (241.3 MG) MG tablet tablet Take 400 mg by mouth Daily.   Yes Historical Provider, MD   O2 (OXYGEN) Inhale 2 L/min Every Night. W/ CPAP   Yes Historical Provider, MD   predniSONE (DELTASONE) 10 MG tablet Take 10 mg by mouth Daily.   Yes Historical Provider, MD   Red Yeast Rice 600 MG tablet  Take 600 mg by mouth 2 (Two) Times a Day.   Yes Historical Provider, MD   terbutaline (BRETHINE) 5 MG tablet Take 5 mg by mouth 2 (Two) Times a Day. 10/14/16  Yes Historical Provider, MD   Umeclidinium-Vilanterol 62.5-25 MCG/INH aerosol powder  Inhale 1 puff Daily. 3/3/17  Yes Brea Higginbotham MD   albuterol (PROVENTIL) (2.5 MG/3ML) 0.083% nebulizer solution Take 2.5 mg by nebulization 4 (Four) Times a Day As Needed for wheezing. 3/3/17   Brea Higginbotham MD   ipratropium-albuterol (DUO-NEB) 0.5-2.5 mg/mL nebulizer Inhale 3 mL. 8/3/17   Historical Provider, MD   nitroglycerin (NITROSTAT) 0.4 MG SL tablet place 1 tablet under the tongue if needed every 5 minutes for hair...  (REFER TO PRESCRIPTION NOTES). 1/11/17   Historical Provider, MD       Objective     Vital Signs    Heart Rate:  [50-83] 50  Resp:  [20] 20  BP: (122-134)/(58-60) 122/58    Physical Exam:      Physical Exam   Constitutional: He is oriented to person, place, and time. He appears well-developed and well-nourished.   HENT:   Head: Normocephalic and atraumatic.   Nose: Nose normal.   Eyes: Conjunctivae and EOM are normal. Pupils are equal, round, and reactive to light.   Neck: Normal range of motion. Neck supple. No JVD present. No tracheal deviation present. No thyromegaly present.   Cardiovascular: Normal rate, regular rhythm, normal heart sounds and intact distal pulses.    Pulmonary/Chest: Effort normal. No respiratory distress. He has wheezes. He has rales. He exhibits no tenderness.   Abdominal: Soft. Bowel sounds are normal. He exhibits no distension. There is no tenderness. There is no rebound and no guarding.   Musculoskeletal: Normal range of motion. He exhibits edema.   Lymphadenopathy:     He has no cervical adenopathy.   Neurological: He is alert and oriented to person, place, and time. He has normal reflexes. No cranial nerve deficit.   Skin: Skin is warm and dry.   Intact   Psychiatric: He has a normal mood and affect.   Nursing note  and vitals reviewed.      Results Review:           Invalid input(s): LABALBU, PROT                    Imaging Results (last 7 days)     ** No results found for the last 168 hours. **          Assessment/Plan     Principal Problem:    Acute exacerbation of chronic obstructive airways disease  Active Problems:    Benign essential hypertension    Type 2 diabetes mellitus    Non-rheumatic tricuspid valve insufficiency    Coronary artery disease involving native coronary artery of native heart    Pulmonary emphysema    Atrial fibrillation    Shortness of breath    COPD with exacerbation    (HFpEF) heart failure with preserved ejection fraction    Pulmonary HTN    Acute interstitial pneumonia    Chronic obstructive lung disease    Coronary arteriosclerosis    Diabetes mellitus      -We'll continue with IV antibiotics.  -We'll also give IV diuretics.    -we'll continue with nebulizer treatments.  -We'll resume patient's home medication as prior to coming hospital.  -DVT and GI prophylaxis in place.  -We'll continue monitoring patient hospital setting and treat patient as course dictates.  -We'll recommend aggressive PT OT.    I discussed the patients findings and my recommendations with patient.         This document has been electronically signed by Pamela Griffin MD on September 15, 2017 1:23 PM        Total Time Spent: 45 mins    EMR Dragon/Transcription disclaimer:   Dictated utilizing Dragon dictation.

## 2017-09-15 NOTE — PROGRESS NOTES
"[facility] CHF CLINIC OFFICE VISIT    Subjective:     Congestive Heart Failure (chief complaint )      Congestive Heart Failure   Associated symptoms include shortness of breath. Pertinent negatives include no chest pain.       Urgent care yesterday, saw Mariela Velazco:  \"Pt here for increased sob, and swelling of feet. He also complains of uri symptoms, he has been seen in urgent care and had antibiotics and steroids, he is finishing up levaquin-still has a couple more days. He reports the levaquin is helping, that the congestion is breaking up and the color is getting lighter. He has COPD, is on supplemental O2 as needed, has diabetes well controlled, and multiple comorbidities. He is on a maintenance dose of steroids\"     Mr. Ham is an 84-year-old patient well-known to Dr. Maciel for pulmonary hypertension and HFpEF.  Seen yesterday at [facility] for COPD exacerbation.  He called today concerning increase in weight and leg swelling.  He has persistent dyspnea.  He wears oxygen at night.  He uses his nebulizer treatments 4 times a day.    PCP: Dr. Rey  Cardiologist: Dr. Maciel  Pulmonologist: Had been Dr. Higginbotham- stopped seeing?    Hospitalizations:  9/15/2017-     Past Medical History:   Diagnosis Date   • Acute exacerbation of chronic obstructive airways disease    • Acute interstitial pneumonia     Hamman-Rich Syndrome   • Arthritis    • Backache    • Bradycardia    • Chronic obstructive lung disease    • Chronic obstructive pulmonary disease (COPD)    • Coronary arteriosclerosis    • Coronary atherosclerosis of native coronary artery    • Degenerative joint disease involving multiple joints    • Diabetes mellitus    • Duodenal ulcer disease    • Hernia of abdominal wall    • History of echocardiogram 04/13/2015    Echocardiogram W/ color flow 13208 (1) - Normal LV systolic function with Ef of 70% with LVH and diastolic relaxation abnormality of the left ventricle. Mildly dilated aortic root. No " sig.valvular abnormalities.   • Hyperlipidemia    • Hypertension    • Hypertensive disorder    • Left lower lobe pneumonia     Left lower zone pneumonia - clinically improved   • Neuropathy    • Pneumonia     Recent improving   • Shortness of breath    • Type 2 diabetes mellitus      Past Surgical History:   Procedure Laterality Date   • APPENDECTOMY     • CARDIAC CATHETERIZATION N/A 6/6/2017    Procedure: Right Heart Cath;  Surgeon: Jeff Maciel MD PhD;  Location: [facility] INVASIVE LOCATION;  Service:    • HERNIA REPAIR     • LUNG BIOPSY     • NECK SURGERY     • THORACOTOMY Left 1977    left with lung biopsy   • VENTRAL HERNIA REPAIR       Social History     Social History   • Marital status:      Spouse name: N/A   • Number of children: N/A   • Years of education: N/A     Social History Main Topics   • Smoking status: Former Smoker   • Smokeless tobacco: Never Used   • Alcohol use No   • Drug use: No   • Sexual activity: Defer      Comment:      Other Topics Concern   • None     Social History Narrative     Allergies   Allergen Reactions   • Atorvastatin Anaphylaxis     myalgia   • Pravastatin      Myalgia   • Azithromycin Rash   • Biaxin [Clarithromycin] Rash       Review of Systems   Cardiovascular: Positive for dyspnea on exertion, leg swelling and orthopnea. Negative for chest pain.   Respiratory: Positive for cough, shortness of breath and wheezing.        Current Outpatient Prescriptions   Medication Sig Dispense Refill   • albuterol (PROVENTIL) (2.5 MG/3ML) 0.083% nebulizer solution Take 2.5 mg by nebulization 4 (Four) Times a Day As Needed for wheezing. 125 vial 11   • aspirin 81 MG chewable tablet Chew 81 mg Daily.     • clopidogrel (PLAVIX) 75 MG tablet Take 75 mg by mouth Daily.     • donepezil (ARICEPT) 10 MG tablet Take 10 mg by mouth Daily.  0   • famotidine (PEPCID) 20 MG tablet Take 20 mg by mouth 2 (Two) Times a Day.  0   • fluticasone (FLONASE) 50 MCG/ACT nasal spray 2  "sprays into each nostril Daily.     • furosemide (LASIX) 20 MG tablet Take 1 tablet by mouth Daily. 90 tablet 3   • glimepiride (AMARYL) 2 MG tablet Take 2 mg by mouth Daily.  0   • HYDROcodone-acetaminophen (NORCO) 7.5-325 MG per tablet Take 1 tablet by mouth Every 8 (Eight) Hours.     • ipratropium-albuterol (DUO-NEB) 0.5-2.5 mg/mL nebulizer Inhale 3 mL.     • isosorbide dinitrate (ISORDIL) 30 MG tablet Take 30 mg by mouth Daily.  0   • lisinopril (PRINIVIL,ZESTRIL) 10 MG tablet Take 1 tablet by mouth Daily. 30 tablet 6   • magnesium oxide (MAGOX) 400 (241.3 MG) MG tablet tablet Take 400 mg by mouth Daily.     • nitroglycerin (NITROSTAT) 0.4 MG SL tablet place 1 tablet under the tongue if needed every 5 minutes for hair...  (REFER TO PRESCRIPTION NOTES).  0   • O2 (OXYGEN) Inhale 2 L/min Every Night. W/ CPAP     • predniSONE (DELTASONE) 10 MG tablet Take 10 mg by mouth Daily.     • Red Yeast Rice 600 MG tablet Take 600 mg by mouth 2 (Two) Times a Day.     • terbutaline (BRETHINE) 5 MG tablet Take 5 mg by mouth 2 (Two) Times a Day.  0   • Umeclidinium-Vilanterol 62.5-25 MCG/INH aerosol powder  Inhale 1 puff Daily. 1 each 11     No current facility-administered medications for this visit.         Objective:     Vitals:    09/15/17 0931   BP: 134/60   BP Location: Left arm   Patient Position: Sitting   Cuff Size: Adult   Pulse: 83   SpO2: 96%   Weight: 184 lb 4 oz (83.6 kg)   Height: 67\" (170.2 cm)       Wt Readings from Last 3 Encounters:   09/15/17 184 lb 4 oz (83.6 kg)   09/14/17 186 lb 6 oz (84.5 kg)   08/07/17 183 lb 8 oz (83.2 kg)          Physical Exam   Constitutional: He is oriented to person, place, and time. He appears well-developed and well-nourished. No distress.   HENT:   Head: Normocephalic.   Neck: JVD (10cm) present.   Cardiovascular: Normal rate, regular rhythm, S1 normal, S2 normal, normal heart sounds and intact distal pulses.    No murmur heard.  Pulmonary/Chest: Breath sounds normal. Accessory " muscle usage present. Tachypnea noted. No respiratory distress. He has no wheezes. He has no rales.   Abdominal: Bowel sounds are normal. He exhibits distension.   Musculoskeletal: Normal range of motion. He exhibits no edema.   Neurological: He is alert and oriented to person, place, and time.   Skin: Skin is warm and dry. No erythema.   Psychiatric: He has a normal mood and affect. His behavior is normal. Judgment and thought content normal.       Cardiographics  Echocardiogram: 3/9/2017  Interpretation Summary   · Left ventricular function is normal.  · Estimated EF appears to be in the range of 61 - 65%.  · Left ventricular diastolic dysfunction (grade I a) consistent with impaired relaxation and elevated left atrial pressures.  · Left ventricular wall thickness is consistent with moderate concentric hypertrophy.  · Right Ventricle: Normal cavity size, wall thickness, systolic function and septal motion noted  · Estimated right ventricular systolic pressure from tricuspid regurgitation is moderately elevated (45-55 mmHg).  · Mild to moderate pulmonary hypertension is present.  · There is no evidence of pericardial effusion.     FERNANDA 3/2017  Study Description  Monitor hooked-up on 3/23/2017. The patient was monitored for 12 days 13 hours. Indications for this exam include palpitations. Average HR: 63. Min HR: 30. Max HR: 133.   Study Findings  No symptoms reported during the monitoring period. No complications noted. The predominant rhythm noted during the testing period was sinus rhythm. Premature atrial contractions occured rarely. There was no evidence of atrial arrhythmias. There were two episodes of supraventricular tachycardia. The peak heart rate was 124 beats per minute. Premature ventricular contractions occured rarely. There were no episodes of ventricular tachycardia. Pauses were noted in the sinoatrial node. There were 1 pauses. 3 seconds was the longest pause. No atrioventricular block noted.   Study  Impressions  A relatively benign monitor study.     Normal ABIs, Normal venous duplex    RHC 2017  Right heart pressures:  RA:                 27/23 with a mean of 22                                    RV:                 64/22 with a mean of 24                                    PA:                  66/37 with a mean of 46                                    PCWP:            24                                                                   DP     Resistance:  SVR:               1590 dynes · sec/cm5              PVR: 622 dynes · sec/cm5     Saturations:  PA: 67%     Cardiac Outputs:  Thermal CO: 4.4  Thermal CI: 2.3  Isabell CO: 4.8  Isabell CI: 2.5     Medications:  1. 2% Lidocaine     Procedure Logistics:   Fluoro: 1.6 minutes     Structural:  RA pressure is elevated. With inspiration there is adequate inspiratory decrease.  RV diastolic pressure is elevated.  PA pressure is markedly elevated.  Wedge pressure is elevated. Pressures measured in both right and left wedge positions are similiar.        IMPRESSION:   1. Left atrial hypertension.  2. CpC-PAH  3. Clinically HFpEF  4. Elevated left-sided filling pressures.   5. Elevated right-sided filling pressures.     Imaging  Chest x-ray: 2017 @ [facility]  Result Impression      No active disease.   Result Narrative   Indication:  Short of breath, COPD.    PA and Lateral Chest:  The bones are intact.  The heart is normal.  There is an old discrete-appearing infiltrate in the left base with mild associated pleural thickening; this is unchanged from .  There is a granuloma in the right mid lung field.    No heart failure.     Lab Review                  The following portions of the patient's history were reviewed and updated as appropriate: allergies, current medications, past family history, past medical history, past social history, past surgical history and problem list.     Assessment/Plan:   Severe lung disease. COPD and PH. Breathing issue is  COPD exacerbation and not HFpEF. Yesterday, he received evaluation At [facility].  He received steroids by mouth as well as antibiotics.  Wife is more concerned about breathing the patient has.  At first glance, he is NYHA class IV, however he is not in respiratory distress.  Labs reviewed from yesterday.  He has normal BUN/creatinine as well as negative BNP.  Chest x-ray was within normal limits.  No edema lower extremities.  Severely distended abdomen impending his ability to breathe well.      Diagnosis Plan   1. Pulmonary HTN  Afterload reducing medication increased.  Lisinopril 10 mg daily- take 20 mg in the a.m.    Will titrate Isordil if needed     Oxygen at night and as needed        2. Centrilobular emphysema  Current COPD exacerbation.  PO steroids and abx prescribed by urgent care visit 2 days ago.  Nebulizers 4 times a day as well as inhalers.       3. (HFpEF) heart failure with preserved ejection fraction   EF: 65%. NYHA Class IV, Stage C. Patient appears euvolemic and in a well perfused physiologic state. Hemodynamics are unacceptable  BETA-BLOCKER:    Not given?:severe COPD, bradycardia  ACE/ARB: Lisinopril 10mg daily  ENTRESTO: no  DIURETIC: Lasix 20mg daily  ALDOSTERONT ANTAGONIST: Not currently indicated.  IMDUR/HYDRALAZINE: N/A  DIGOXIN: N/A  Fluid restriction: 2 L  Sodium restriction:2 grams    SQ 60mg Lasix given yesterday. Demonstrated 2lb weight loss but not improvement in SOA.       4. Benign essential hypertension  HTN, essential.   BP noted to be moderately elevated today in office.    LVH: moderate.  LVEF:Normal.   Diastolic function:Abnormal:    DASH; medication compliance    Isordil 30mg daily  Lisinopril 5mg increased to 10mg daily  Take 2 tablets in the AM and one extra tonight  Reported BP at home of 180 and 190 systolic.           Wife has concerns regarding hospitalization. She wishes to proceed. I discussed the possibility of observation status to evaluate overnight. She was  agreeable to this.    Recommend hospital admission/observation. Contacted hospitalist.

## 2017-09-15 NOTE — PROGRESS NOTES
TWO PATIENT IDENTIFIERS WERE USED. CONSENT WAS SIGNED PER PATIENT EDUCATION MATERIAL WAS GIVEN TO PATIENT AND / OR FAMILY. THE PATIENT WAS DRAPED WITH FULL BODY DRAPE AND PATIENT'SRIGHT   ARM WAS PREPPED WITH CHLORAPREP.  ULTRASOUND WAS USED TO LOCALIZE THERIGHT BASILIC VEIN. SUBCUTANEOUS TISSUE AT THE CATHETER SITE WAS INFILTRATED WITH 2% LIDOCAINE. UNDER ULTRASOUND GUIDANCE, THE VEIN WAS ACCESSED WITH A 21GAUGE  NEEDLE. AN 0.018 WIRE WAS THEN THREADED THROUGH THE NEEDLE INTO THE CENTRAL VENOUS SYSTEM. THE 21GAUGE  NEEDLE WAS REMOVED AND A 6 FRENCHPEEL AWAY SHEATH WAS PLACED OVER THE WIRE. THE PICC LINE CATHETER WAS CUT AT 38 CM. THE PICC LINE CATHETER WAS THEN PLACED OVER THE WIRE INTO THE VEIN, THE SHEATH WAS PEELED AWAY,WIRE WAS REMOVED. CATHETER WAS FLUSHED WITH NORMAL SALINE AND TIPS APPLIED. BIOPATCH PLACED. CATHETER SECURED WITHSTATLOCK  AND TEGADERM. PATIENT TOLERATED PROCEDURE WELL. THIS WAS DONE IN   ANGIOSUITE      IMPRESSION: SUCCESSFUL PLACEMENT OF TRIPLE LUMEN SOLO PICC        Mya Joshua RN  9/15/2017  2:39 PM

## 2017-09-16 PROBLEM — J44.1 COPD EXACERBATION: Status: ACTIVE | Noted: 2017-09-16

## 2017-09-16 LAB
ALBUMIN SERPL-MCNC: 3.7 G/DL (ref 3.4–4.8)
ALBUMIN/GLOB SERPL: 1.8 G/DL (ref 1.1–1.8)
ALP SERPL-CCNC: 51 U/L (ref 38–126)
ALT SERPL W P-5'-P-CCNC: 42 U/L (ref 21–72)
ANION GAP SERPL CALCULATED.3IONS-SCNC: 10 MMOL/L (ref 5–15)
AST SERPL-CCNC: 24 U/L (ref 17–59)
BASOPHILS # BLD AUTO: 0.02 10*3/MM3 (ref 0–0.2)
BASOPHILS NFR BLD AUTO: 0.2 % (ref 0–2)
BILIRUB SERPL-MCNC: 0.5 MG/DL (ref 0.2–1.3)
BUN BLD-MCNC: 42 MG/DL (ref 7–21)
BUN/CREAT SERPL: 26.8 (ref 7–25)
CALCIUM SPEC-SCNC: 9.3 MG/DL (ref 8.4–10.2)
CHLORIDE SERPL-SCNC: 98 MMOL/L (ref 95–110)
CO2 SERPL-SCNC: 31 MMOL/L (ref 22–31)
CREAT BLD-MCNC: 1.57 MG/DL (ref 0.7–1.3)
DEPRECATED RDW RBC AUTO: 53.1 FL (ref 35.1–43.9)
EOSINOPHIL # BLD AUTO: 0.01 10*3/MM3 (ref 0–0.7)
EOSINOPHIL NFR BLD AUTO: 0.1 % (ref 0–7)
ERYTHROCYTE [DISTWIDTH] IN BLOOD BY AUTOMATED COUNT: 15.7 % (ref 11.5–14.5)
GFR SERPL CREATININE-BSD FRML MDRD: 42 ML/MIN/1.73 (ref 42–98)
GLOBULIN UR ELPH-MCNC: 2.1 GM/DL (ref 2.3–3.5)
GLUCOSE BLD-MCNC: 122 MG/DL (ref 60–100)
GLUCOSE BLDC GLUCOMTR-MCNC: 112 MG/DL (ref 70–130)
GLUCOSE BLDC GLUCOMTR-MCNC: 125 MG/DL (ref 70–130)
GLUCOSE BLDC GLUCOMTR-MCNC: 130 MG/DL (ref 70–130)
GLUCOSE BLDC GLUCOMTR-MCNC: 73 MG/DL (ref 70–130)
HCT VFR BLD AUTO: 38.2 % (ref 39–49)
HGB BLD-MCNC: 12.3 G/DL (ref 13.7–17.3)
IMM GRANULOCYTES # BLD: 0.14 10*3/MM3 (ref 0–0.02)
IMM GRANULOCYTES NFR BLD: 1.2 % (ref 0–0.5)
LYMPHOCYTES # BLD AUTO: 1.19 10*3/MM3 (ref 0.6–4.2)
LYMPHOCYTES NFR BLD AUTO: 9.9 % (ref 10–50)
MAGNESIUM SERPL-MCNC: 2 MG/DL (ref 1.6–2.3)
MCH RBC QN AUTO: 30.2 PG (ref 26.5–34)
MCHC RBC AUTO-ENTMCNC: 32.2 G/DL (ref 31.5–36.3)
MCV RBC AUTO: 93.9 FL (ref 80–98)
MONOCYTES # BLD AUTO: 0.72 10*3/MM3 (ref 0–0.9)
MONOCYTES NFR BLD AUTO: 6 % (ref 0–12)
NEUTROPHILS # BLD AUTO: 9.91 10*3/MM3 (ref 2–8.6)
NEUTROPHILS NFR BLD AUTO: 82.6 % (ref 37–80)
PLATELET # BLD AUTO: 174 10*3/MM3 (ref 150–450)
PMV BLD AUTO: 11.6 FL (ref 8–12)
POTASSIUM BLD-SCNC: 4.1 MMOL/L (ref 3.5–5.1)
PROT SERPL-MCNC: 5.8 G/DL (ref 6.3–8.6)
RBC # BLD AUTO: 4.07 10*6/MM3 (ref 4.37–5.74)
SODIUM BLD-SCNC: 139 MMOL/L (ref 137–145)
WBC NRBC COR # BLD: 11.99 10*3/MM3 (ref 3.2–9.8)

## 2017-09-16 PROCEDURE — 94760 N-INVAS EAR/PLS OXIMETRY 1: CPT

## 2017-09-16 PROCEDURE — 94799 UNLISTED PULMONARY SVC/PX: CPT

## 2017-09-16 PROCEDURE — 25010000002 HEPARIN (PORCINE) PER 1000 UNITS: Performed by: INTERNAL MEDICINE

## 2017-09-16 PROCEDURE — 25010000002 LEVOFLOXACIN PER 250 MG: Performed by: NURSE PRACTITIONER

## 2017-09-16 PROCEDURE — 97116 GAIT TRAINING THERAPY: CPT

## 2017-09-16 PROCEDURE — 97162 PT EVAL MOD COMPLEX 30 MIN: CPT

## 2017-09-16 PROCEDURE — G8979 MOBILITY GOAL STATUS: HCPCS

## 2017-09-16 PROCEDURE — 82962 GLUCOSE BLOOD TEST: CPT

## 2017-09-16 PROCEDURE — 25010000002 FUROSEMIDE PER 20 MG: Performed by: INTERNAL MEDICINE

## 2017-09-16 PROCEDURE — G8978 MOBILITY CURRENT STATUS: HCPCS

## 2017-09-16 PROCEDURE — 85025 COMPLETE CBC W/AUTO DIFF WBC: CPT | Performed by: INTERNAL MEDICINE

## 2017-09-16 PROCEDURE — 83735 ASSAY OF MAGNESIUM: CPT | Performed by: INTERNAL MEDICINE

## 2017-09-16 PROCEDURE — 80053 COMPREHEN METABOLIC PANEL: CPT | Performed by: INTERNAL MEDICINE

## 2017-09-16 PROCEDURE — 97530 THERAPEUTIC ACTIVITIES: CPT

## 2017-09-16 RX ORDER — LEVOFLOXACIN 5 MG/ML
250 INJECTION, SOLUTION INTRAVENOUS EVERY 24 HOURS
Status: DISCONTINUED | OUTPATIENT
Start: 2017-09-16 | End: 2017-09-20 | Stop reason: HOSPADM

## 2017-09-16 RX ADMIN — IPRATROPIUM BROMIDE AND ALBUTEROL SULFATE 3 ML: 2.5; .5 SOLUTION RESPIRATORY (INHALATION) at 02:46

## 2017-09-16 RX ADMIN — ASPIRIN 81 MG 81 MG: 81 TABLET ORAL at 08:50

## 2017-09-16 RX ADMIN — FAMOTIDINE 20 MG: 20 TABLET ORAL at 17:24

## 2017-09-16 RX ADMIN — IPRATROPIUM BROMIDE AND ALBUTEROL SULFATE 3 ML: 2.5; .5 SOLUTION RESPIRATORY (INHALATION) at 20:37

## 2017-09-16 RX ADMIN — HEPARIN SODIUM 5000 UNITS: 5000 INJECTION, SOLUTION INTRAVENOUS; SUBCUTANEOUS at 06:11

## 2017-09-16 RX ADMIN — IPRATROPIUM BROMIDE AND ALBUTEROL SULFATE 3 ML: 2.5; .5 SOLUTION RESPIRATORY (INHALATION) at 23:56

## 2017-09-16 RX ADMIN — HYDROCODONE BITARTRATE AND ACETAMINOPHEN 1 TABLET: 7.5; 325 TABLET ORAL at 06:10

## 2017-09-16 RX ADMIN — MAGNESIUM OXIDE TAB 400 MG (241.3 MG ELEMENTAL MG) 400 MG: 400 (241.3 MG) TAB at 08:50

## 2017-09-16 RX ADMIN — GLIPIZIDE 5 MG: 5 TABLET ORAL at 08:50

## 2017-09-16 RX ADMIN — ISOSORBIDE DINITRATE 30 MG: 30 TABLET ORAL at 08:50

## 2017-09-16 RX ADMIN — HYDROCODONE BITARTRATE AND ACETAMINOPHEN 1 TABLET: 7.5; 325 TABLET ORAL at 13:16

## 2017-09-16 RX ADMIN — LEVOFLOXACIN 250 MG: 5 INJECTION, SOLUTION INTRAVENOUS at 13:16

## 2017-09-16 RX ADMIN — FUROSEMIDE 20 MG: 10 INJECTION, SOLUTION INTRAVENOUS at 09:00

## 2017-09-16 RX ADMIN — IPRATROPIUM BROMIDE AND ALBUTEROL SULFATE 3 ML: 2.5; .5 SOLUTION RESPIRATORY (INHALATION) at 15:15

## 2017-09-16 RX ADMIN — DOCUSATE SODIUM 200 MG: 100 CAPSULE, LIQUID FILLED ORAL at 08:50

## 2017-09-16 RX ADMIN — DONEPEZIL HYDROCHLORIDE 10 MG: 10 TABLET, FILM COATED ORAL at 08:50

## 2017-09-16 RX ADMIN — HYDROCODONE BITARTRATE AND ACETAMINOPHEN 1 TABLET: 7.5; 325 TABLET ORAL at 21:15

## 2017-09-16 RX ADMIN — FLUTICASONE PROPIONATE 2 SPRAY: 50 SPRAY, METERED NASAL at 09:00

## 2017-09-16 RX ADMIN — FUROSEMIDE 20 MG: 10 INJECTION, SOLUTION INTRAVENOUS at 21:15

## 2017-09-16 RX ADMIN — CLOPIDOGREL BISULFATE 75 MG: 75 TABLET ORAL at 08:50

## 2017-09-16 RX ADMIN — FAMOTIDINE 20 MG: 20 TABLET ORAL at 08:50

## 2017-09-16 RX ADMIN — LISINOPRIL 10 MG: 10 TABLET ORAL at 08:50

## 2017-09-16 RX ADMIN — IPRATROPIUM BROMIDE AND ALBUTEROL SULFATE 3 ML: 2.5; .5 SOLUTION RESPIRATORY (INHALATION) at 07:37

## 2017-09-16 RX ADMIN — HEPARIN SODIUM 5000 UNITS: 5000 INJECTION, SOLUTION INTRAVENOUS; SUBCUTANEOUS at 21:15

## 2017-09-16 RX ADMIN — FUROSEMIDE 20 MG: 10 INJECTION, SOLUTION INTRAVENOUS at 15:21

## 2017-09-16 RX ADMIN — HEPARIN SODIUM 5000 UNITS: 5000 INJECTION, SOLUTION INTRAVENOUS; SUBCUTANEOUS at 13:15

## 2017-09-16 RX ADMIN — TERBUTALINE SULFATE 5 MG: 2.5 TABLET ORAL at 08:50

## 2017-09-16 RX ADMIN — PANTOPRAZOLE SODIUM 40 MG: 40 TABLET, DELAYED RELEASE ORAL at 06:10

## 2017-09-16 RX ADMIN — TERBUTALINE SULFATE 5 MG: 2.5 TABLET ORAL at 17:24

## 2017-09-16 NOTE — THERAPY EVALUATION
Acute Care - Physical Therapy Initial Evaluation  [facility]     Patient Name: Hasmukh Ham  : 1933  MRN: [id number]  Today's Date: 2017   Onset of Illness/Injury or Date of Surgery Date: 09/15/17  Date of Referral to PT: 09/15/17  Referring Physician: EDI Griffin MD      Admit Date: 9/15/2017     Visit Dx:    ICD-10-CM ICD-9-CM   1. Pulmonary HTN I27.2 416.8   2. Impaired physical mobility Z74.09 781.99     Patient Active Problem List   Diagnosis   • Dilated aortic root   • Benign essential hypertension   • Type 2 diabetes mellitus   • Non-rheumatic tricuspid valve insufficiency   • Coronary artery disease involving native coronary artery of native heart   • Pulmonary emphysema   • Dyspnea   • Atrial fibrillation   • Bradycardia   • Shortness of breath   • COPD with exacerbation   • Chronic coronary artery disease   • Neuropathy   • Abdominal hernia   • Neck pain   • Dementia   • Diabetic neuropathy   • Diabetic polyneuropathy   • Gastroesophageal reflux disease without esophagitis   • Generalized osteoarthritis   • 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   • Nocturia   • Chronic obstructive bronchitis   • Osteoarthritis of multiple joints   • Hyperlipidemia   • Pain in joint involving ankle and foot   • Arthropathy of hand   • Paroxysmal tachycardia   • Family history of diseases of the blood and blood-forming organs and certain disorders involving the immune mechanism   • Osteoarthrosis involving more than one site but not generalized   • Right shoulder pain   • Rotator cuff syndrome   • Partial tear of subscapularis tendon   • Infraspinatus tendon tear   • Supraspinatus tendon tear   • Bilateral carotid artery stenosis   • (HFpEF) heart failure with preserved ejection fraction   • Pulmonary HTN   • Acute exacerbation of chronic obstructive airways disease   • Acute interstitial pneumonia   • Chronic obstructive lung disease   • Coronary arteriosclerosis   • Diabetes mellitus      Past Medical History:   Diagnosis Date   • Acute exacerbation of chronic obstructive airways disease    • Acute interstitial pneumonia     Hamman-Rich Syndrome   • Arthritis    • Backache    • Bradycardia    • Chronic obstructive lung disease    • Chronic obstructive pulmonary disease (COPD)    • Coronary arteriosclerosis    • Coronary atherosclerosis of native coronary artery    • Degenerative joint disease involving multiple joints    • Diabetes mellitus    • Duodenal ulcer disease    • Hernia of abdominal wall    • History of echocardiogram 04/13/2015    Echocardiogram W/ color flow 85541 (1) - Normal LV systolic function with Ef of 70% with LVH and diastolic relaxation abnormality of the left ventricle. Mildly dilated aortic root. No sig.valvular abnormalities.   • Hyperlipidemia    • Hypertension    • Hypertensive disorder    • Left lower lobe pneumonia     Left lower zone pneumonia - clinically improved   • Neuropathy    • Pneumonia     Recent improving   • Shortness of breath    • Type 2 diabetes mellitus      Past Surgical History:   Procedure Laterality Date   • APPENDECTOMY     • CARDIAC CATHETERIZATION N/A 6/6/2017    Procedure: Right Heart Cath;  Surgeon: Jeff Maciel MD PhD;  Location: [facility] CATH INVASIVE LOCATION;  Service:    • HERNIA REPAIR     • LUNG BIOPSY     • NECK SURGERY     • THORACOTOMY Left 1977    left with lung biopsy   • VENTRAL HERNIA REPAIR            PT ASSESSMENT (last 72 hours)      PT Evaluation       09/16/17 0845 09/15/17 1155    Rehab Evaluation    Document Type evaluation  -CZ     Subjective Information agree to therapy  -CZ     Patient Effort, Rehab Treatment good  -CZ     Symptoms Noted During/After Treatment none  -CZ     General Information    Patient Profile Review yes  -CZ     Onset of Illness/Injury or Date of Surgery Date 09/15/17  -CZ     Referring Physician EDI Griffin MD  -CZ     General Observations Patient supine in bed, supplemental O2 removed per  nurse, very talkative, cardiac monitor, no apparent distress.  -CZ     Pertinent History Of Current Problem Patient admitted with SOB.  -CZ     Precautions/Limitations fall precautions  -CZ     Prior Level of Function independent:  -CZ     Equipment Currently Used at Home oxygen   O2 at night.  Has SPC, FWW and 4WW, but does not use.  -CZ none  -ME    Plans/Goals Discussed With patient  -CZ     Benefits Reviewed patient:;improve function;increase independence;increase strength;increase balance  -CZ     Living Environment    Lives With spouse  -CZ spouse  -ME    Living Arrangements mobile home  -CZ mobile home  -ME    Home Accessibility stairs to enter home;stairs (2 railings present)  -CZ stairs (2 railings present)  -ME    Number of Stairs to Enter Home 5  -CZ     Stair Railings at Home outside, present at both sides  -CZ none  -ME    Type of Financial/Environmental Concern  none  -ME    Transportation Available  car  -ME    Clinical Impression    Date of Referral to PT 09/15/17  -CZ     PT Diagnosis Impaired physical mobility  -CZ     Prognosis good  -CZ     Functional Level At Time Of Evaluation (I) with bed mobility, SPV with transfers, CGA with gait.   -CZ     Criteria for Skilled Therapeutic Interventions Met treatment indicated  -CZ     Pathology/Pathophysiology Noted (Describe Specifically for Each System) musculoskeletal;pulmonary  -CZ     Impairments Found (describe specific impairments) aerobic capacity/endurance;gait, locomotion, and balance  -CZ     Rehab Potential good, to achieve stated therapy goals  -CZ     Predicted Duration of Therapy Intervention (days/wks) 1-3 days  -CZ     Vital Signs    Pre Systolic BP Rehab 125  -CZ     Pre Treatment Diastolic BP 81  -CZ     Post Systolic BP Rehab 139  -CZ     Post Treatment Diastolic BP 75  -CZ     Pretreatment Heart Rate (beats/min) 56  -CZ     Intratreatment Heart Rate (beats/min) 105   after gait  -CZ     Posttreatment Heart Rate (beats/min) 87  -CZ      Pre SpO2 (%) 98  -CZ     O2 Delivery Pre Treatment supplemental O2   2 LPM  -CZ     Intra SpO2 (%) 97  -CZ     O2 Delivery Intra Treatment room air   supplemental O2 removed per nurse.  -CZ     Post SpO2 (%) 94  -CZ     Pre Patient Position Supine  -CZ     Post Patient Position Sitting  -CZ     Pain Assessment    Pain Assessment 0-10  -CZ     Pain Score 0  -CZ     Post Pain Score 7  -CZ     Pain Type Chronic pain  -CZ     Pain Location Hip  -CZ     Pain Orientation Right  -CZ     Pain Intervention(s) Medication (See MAR)  -CZ     Vision Assessment/Intervention    Visual Impairment WFL  -CZ     Cognitive Assessment/Intervention    Current Cognitive/Communication Assessment functional  -CZ     Orientation Status oriented x 4  -CZ     Follows Commands/Answers Questions 100% of the time  -CZ     ROM (Range of Motion)    General ROM no range of motion deficits identified  -CZ     General ROM Detail WFL BLEs  -CZ     MMT (Manual Muscle Testing)    General MMT Assessment lower extremity strength deficits identified  -CZ     General MMT Assessment Detail BLEs: 3+/5 grossly  -CZ     Bed Mobility, Assessment/Treatment    Bed Mobility, Assistive Device head of bed elevated   Home bed is elevated.  -CZ     Bed Mob, Supine to Sit, [place] conditional independence  -CZ     Bed Mob, Sit to Supine, [place] conditional independence  -CZ     Bed Mobility, Comment successful   -CZ     Transfer Assessment/Treatment    Transfers, Sit-Stand [place] supervision required  -CZ     Transfers, Stand-Sit [place] supervision required  -CZ     Transfers, Sit-Stand-Sit, Assist Device rolling walker  -CZ     Gait Assessment/Treatment    Gait, [place] Level contact guard assist  -CZ     Gait, Assistive Device rolling walker  -CZ     Gait, Distance (Feet) 75   75'x2  -CZ     Gait, Gait Pattern Analysis swing-through gait  -CZ     Gait, Comment Decreased stance time on R, secondary to arthritis  -CZ     Sensory  Assessment/Intervention    Light Touch LLE;RLE  -CZ     LLE Light Touch mild impairment  -CZ     RLE Light Touch mild impairment  -CZ     Positioning and Restraints    Pre-Treatment Position in bed  -CZ     Post Treatment Position bed  -CZ     In Bed supine;call light within reach;exit alarm on  -CZ       User Key  (r) = Recorded By, (t) = Taken By, (c) = Cosigned By    Initials Name Provider Type    ME Yoly Mai, RN Registered Nurse     Bud Lovelace, PT Physical Therapist          Physical Therapy Education     Title: PT OT SLP Therapies (Active)     Topic: Physical Therapy (Active)     Point: Precautions (Active)    Learning Progress Summary    Learner Readiness Method Response Comment Documented by Status   Patient Acceptance E NR Tinetti assessed: 21/28 or moderate fall risk. Discussed benefits of using walker until fall risk is lowered.  09/16/17 1108 Active                      User Key     Initials Effective Dates Name Provider Type Discipline     02/17/17 -  Bud Lovelace, PT Physical Therapist PT                PT Recommendation and Plan  Anticipated Discharge Disposition: home with home health  Planned Therapy Interventions: balance training, gait training, home exercise program, stair training, strengthening, transfer training, patient/family education  PT Frequency: other (see comments) (5-14x/week)  Plan of Care Review  Plan Of Care Reviewed With: patient  Outcome Summary/Follow up Plan: Initial PT evaluation complete.  Patient is alert and cooperative, very talkative.  Patient demonstrates (I) with bed mobility, requires SPV with transfers, CGA with gait, ambulates 75'x1 with a FWW.  Tinetti assessed: 21/28 or moderate fall risk.  Continue skilled PT.           IP PT Goals       09/16/17 0845          Transfer Training PT STG    Transfer Training PT STG, Date Established 09/16/17  -CZ      Transfer Training PT STG, Time to Achieve 2 days  -CZ      Transfer Training PT STG, Activity  Type bed to chair /chair to bed;sit to stand/stand to sit  -CZ      Transfer Training PT STG, [place] Level conditional independence  -CZ      Transfer Training PT STG, Date Goal Reviewed 09/16/17  -CZ      Transfer Training PT STG, Outcome goal ongoing  -CZ      Gait Training PT STG    Gait Training Goal PT STG, Date Established 09/16/17  -CZ      Gait Training Goal PT STG, Time to Achieve 2 days  -CZ      Gait Training Goal PT STG, [place] Level conditional independence  -CZ      Gait Training Goal PT STG, Assist Device walker, rolling  -CZ      Gait Training Goal PT STG, Distance to Achieve 150'x1.  -CZ      Gait Training Goal PT STG, Additional Goal SpO2 >90%.  -CZ      Gait Training Goal PT STG, Date Goal Reviewed 09/16/17  -CZ      Gait Training Goal PT STG, Outcome goal ongoing  -CZ      Stair Training PT LTG    Stair Training Goal PT LTG, Date Established 09/16/17  -CZ      Stair Training Goal PT LTG, Time to Achieve by discharge  -CZ      Stair Training Goal PT LTG, Number of Steps 5  -CZ      Stair Training Goal PT LTG, [place] Level conditional independence  -CZ      Stair Training Goal PT LTG, Assist Device 2 handrails  -CZ      Stair Training Goal PT LTG, Date Goal Reviewed 09/16/17  -CZ      Stair Training Goal PT LTG, Outcome goal ongoing  -CZ      Physical Therapy PT LTG    Physical Therapy PT LTG, Date Established 09/16/17  -CZ      Physical Therapy PT LTG, Time to Achieve by discharge  -CZ      Physical Therapy PT LTG, Activity Type Tinetti fall risk assessment.   -CZ      Physical Therapy PT LTG, Addisional Goal Score 24/28 or low fall risk.   -CZ      Physical Therapy PT LTG, Date Goal Reviewed 09/16/17  -CZ      Physical Therapy PT LTG, Outcome goal ongoing  -CZ        User Key  (r) = Recorded By, (t) = Taken By, (c) = Cosigned By    Initials Name Provider Type    RONDA Lovelace PT Physical Therapist                Outcome Measures       09/16/17 0882          How much help  "from another person do you currently need...    Turning from your back to your side while in flat bed without using bedrails? 4  -CZ      Moving from lying on back to sitting on the side of a flat bed without bedrails? 4  -CZ      Moving to and from a bed to a chair (including a wheelchair)? 3  -CZ      Standing up from a chair using your arms (e.g., wheelchair, bedside chair)? 3  -CZ      Climbing 3-5 steps with a railing? 3  -CZ      To walk in hospital room? 3  -CZ      AM-PAC 6 Clicks Score 20  -CZ      Tinetti Assessment    Tinetti Assessment yes  -CZ      Sitting Balance 1  -CZ      Arises 2  -CZ      Attempts to Rise 2  -CZ      Immediate Standing Balance (first 5 sec) 2  -CZ      Standing Balance 1  -CZ      Sternal Nudge (feet close together) 1  -CZ      Eyes Closed (feet close together) 0  -CZ      Turning 360 Degrees- Steps 1  -CZ      Turning 360 Degrees- Steadiness 1  -CZ      Sitting Down 2  -CZ      Tinetti Balance Score 13  -CZ      Gait Initiation (immediate after told \"go\") 1  -CZ      Step Length- Right Swing 1  -CZ      Step Length- Left Swing 1  -CZ      Foot Clearance- Right Foot 1  -CZ      Foot Clearance- Left Foot 1  -CZ      Step Symmetry 0  -CZ      Step Continuity 1  -CZ      Path (excursion) 1  -CZ      Trunk 0  -CZ      Base of Support 1  -CZ      Gait Score 8  -CZ      Tinetti Total Score 21  -CZ      Tinetti Assistive Device rolling walker  -CZ      Functional Assessment    Outcome Measure Options Tinetti;AM-PAC 6 Clicks Basic Mobility (PT)  -CZ        User Key  (r) = Recorded By, (t) = Taken By, (c) = Cosigned By    Initials Name Provider Type    RONDA Lovelace PT Physical Therapist           Time Calculation:         PT Charges       09/16/17 1113          Time Calculation    Start Time 0845  -CZ      Stop Time 0946  -CZ      Time Calculation (min) 61 min  -CZ      PT Received On 09/16/17  -      PT Goal Re-Cert Due Date 09/29/17  -CZ      Time Calculation- PT    Total Timed " Code Minutes- PT 46 minute(s)  -CZ        User Key  (r) = Recorded By, (t) = Taken By, (c) = Cosigned By    Initials Name Provider Type    RONDA Lovelace, PT Physical Therapist          Therapy Charges for Today     Code Description Service Date Service Provider Modifiers Qty    52215881856 HC PT MOBILITY CURRENT 9/16/2017 Bud Lovelace, PT GP, CJ 1    07164452357 HC PT MOBILITY PROJECTED 9/16/2017 Bud Lovelace, PT GP, CI 1    [id number] HC PT EVAL MOD COMPLEXITY 1 9/16/2017 Bud Lovelace, PT GP 1    71499113077 HC PT THERAPEUTIC ACT EA 15 MIN 9/16/2017 Bud Lovelace, PT GP 1    96258431471 HC GAIT TRAINING EA 15 MIN 9/16/2017 Bud Lovelace, PT GP 2          PT G-Codes  PT Professional Judgement Used?: Yes  Outcome Measure Options: Francisco J AM-PAC 6 Clicks Basic Mobility (PT)  Score: 21  Functional Limitation: Mobility: Walking and moving around  Mobility: Walking and Moving Around Current Status (): At least 20 percent but less than 40 percent impaired, limited or restricted  Mobility: Walking and Moving Around Goal Status (): At least 1 percent but less than 20 percent impaired, limited or restricted      Bud Lovelace PT  9/16/2017

## 2017-09-16 NOTE — PLAN OF CARE
Problem: Patient Care Overview (Adult)  Goal: Plan of Care Review  Outcome: Ongoing (interventions implemented as appropriate)    09/16/17 0948   Coping/Psychosocial Response Interventions   Plan Of Care Reviewed With patient   Outcome Evaluation   Outcome Summary/Follow up Plan Initial PT evaluation complete. Patient is alert and cooperative, very talkative. Patient demonstrates (I) with bed mobility, requires SPV with transfers, CGA with gait, ambulates 75'x1 with a FWW. Tinetti assessed: 21/28 or moderate fall risk. Continue skilled PT.          Problem: Inpatient Physical Therapy  Goal: Transfer Training Goal 1 STG- PT  Outcome: Ongoing (interventions implemented as appropriate)    09/16/17 0845   Transfer Training PT STG   Transfer Training PT STG, Date Established 09/16/17   Transfer Training PT STG, Time to Achieve 2 days   Transfer Training PT STG, Activity Type bed to chair /chair to bed;sit to stand/stand to sit   Transfer Training PT STG, [place] Level conditional independence   Transfer Training PT STG, Date Goal Reviewed 09/16/17   Transfer Training PT STG, Outcome goal ongoing       Goal: Gait Training Goal STG- PT  Outcome: Ongoing (interventions implemented as appropriate)    09/16/17 0845   Gait Training PT STG   Gait Training Goal PT STG, Date Established 09/16/17   Gait Training Goal PT STG, Time to Achieve 2 days   Gait Training Goal PT STG, [place] Level conditional independence   Gait Training Goal PT STG, Assist Device walker, rolling   Gait Training Goal PT STG, Distance to Achieve 150'x1.   Gait Training Goal PT STG, Additional Goal SpO2 >90%.   Gait Training Goal PT STG, Date Goal Reviewed 09/16/17   Gait Training Goal PT STG, Outcome goal ongoing       Goal: Stair Training Goal LTG- PT  Outcome: Ongoing (interventions implemented as appropriate)    09/16/17 0845   Stair Training PT LTG   Stair Training Goal PT LTG, Date Established 09/16/17   Stair Training Goal PT LTG, Time to  Achieve by discharge   Stair Training Goal PT LTG, Number of Steps 5   Stair Training Goal PT LTG, [place] Level conditional independence   Stair Training Goal PT LTG, Assist Device 2 handrails   Stair Training Goal PT LTG, Date Goal Reviewed 09/16/17   Stair Training Goal PT LTG, Outcome goal ongoing       Goal: Physical Therapy Goal LTG- PT  Outcome: Ongoing (interventions implemented as appropriate)    09/16/17 0845   Physical Therapy PT LTG   Physical Therapy PT LTG, Date Established 09/16/17   Physical Therapy PT LTG, Time to Achieve by discharge   Physical Therapy PT LTG, Activity Type Tinetti fall risk assessment.    Physical Therapy PT LTG, Addisional Goal Score 24/28 or low fall risk.    Physical Therapy PT LTG, Date Goal Reviewed 09/16/17   Physical Therapy PT LTG, Outcome goal ongoing

## 2017-09-16 NOTE — PROGRESS NOTES
[facility] Medicine Services  INPATIENT PROGRESS NOTE    Length of Stay: 0  Date of Admission: 9/15/2017  Primary Care Physician: Paolo Rey MD    Subjective   Chief Complaint: No complaints    HPI:      9/16/2017:  The patient has been up walking with physical therapy this morning.  He states he feels a little better this morning, but still has some significant wheezes noted.     9/15/2017 H&P by Dr. Griffin:  Patient is a 84 y.o. male presents with complaint of having shortness of breath.  Patient states she's been having shortness of breath ongoing for past few weeks.  Patient states his shortness of breath has been worsening.  He has been having coughing spells with thick yellowish to greenish colored sputum.  No nausea vomiting have been reported.  Patient does complain of chest tightness with coughing spells.  Patient states he has been having worsening orthopnea of 3-4 pillows.  Patient also admits of having reduced excess tolerance.  Patient does complain of worsening swelling.  No urinary complaints have been reported.  Patient states he has been compliant with his medications.  Patient states he was recently treated with IV steroids and nebulizer treatment without any benefit.    Review of Systems   Constitutional: Positive for fatigue.   Respiratory: Positive for shortness of breath and wheezing.    Cardiovascular: Positive for leg swelling.   Gastrointestinal: Positive for abdominal distention.   All other systems reviewed and are negative.     All pertinent negatives and positives are as above. All other systems have been reviewed and are negative unless otherwise stated.     Objective    Temp:  [97.2 °F (36.2 °C)-97.8 °F (36.6 °C)] 97.4 °F (36.3 °C)  Heart Rate:  [39-91] 58  Resp:  [18-22] 18  BP: (122-164)/(58-75) 156/75    Physical Exam   Constitutional: He is oriented to person, place, and time. He appears well-developed and well-nourished.   HENT:    Head: Normocephalic and atraumatic.   Eyes: EOM are normal. Pupils are equal, round, and reactive to light.   Neck: Normal range of motion. Neck supple.   Cardiovascular: Normal rate and regular rhythm.    Pulmonary/Chest: Effort normal. He has wheezes.   Abdominal: Soft. Bowel sounds are normal.   Neurological: He is alert and oriented to person, place, and time.   Skin: Skin is warm and dry.   Psychiatric: He has a normal mood and affect.     Results Review:  I have reviewed the labs, radiology results, and diagnostic studies.    Laboratory Data:     Results from last 7 days  Lab Units 09/16/17  0503 09/15/17  1457   SODIUM mmol/L 139 137   POTASSIUM mmol/L 4.1 4.7   CHLORIDE mmol/L 98 101   CO2 mmol/L 31.0 24.0   BUN mg/dL 42* 33*   CREATININE mg/dL 1.57* 1.13   GLUCOSE mg/dL 122* 168*   CALCIUM mg/dL 9.3 9.6   BILIRUBIN mg/dL 0.5 0.5   ALK PHOS U/L 51 54   ALT (SGPT) U/L 42 41   AST (SGOT) U/L 24 24   ANION GAP mmol/L 10.0 12.0     Estimated Creatinine Clearance: 36.2 mL/min (by C-G formula based on Cr of 1.57).    Results from last 7 days  Lab Units 09/16/17  0503 09/15/17  1457   MAGNESIUM mg/dL 2.0 1.9           Results from last 7 days  Lab Units 09/16/17  0503 09/15/17  1457   WBC 10*3/mm3 11.99* 14.84*   HEMOGLOBIN g/dL 12.3* 12.3*   HEMATOCRIT % 38.2* 37.7*   PLATELETS 10*3/mm3 174 182           Culture Data:   Blood Culture   Date Value Ref Range Status   09/15/2017 No growth at less than 24 hours  Preliminary   09/15/2017 No growth at less than 24 hours  Preliminary     Urine Culture   Date Value Ref Range Status   09/15/2017 Culture in progress  Preliminary     No results found for: RESPCX  No results found for: WOUNDCX  No results found for: STOOLCX  No components found for: BODYFLD    Radiology Data:   Imaging Results (last 24 hours)     Procedure Component Value Units Date/Time    IR PICC wo fluoro guidance [[id number]] Resulted:  09/15/17 1438     Updated:  09/15/17 1438    Narrative:       This  procedure was auto-finalized with no dictation required.    US Guided Vascular Access [[id number]] Resulted:  09/15/17 1510     Updated:  09/15/17 1510    Narrative:       This procedure was auto-finalized with no dictation required.    XR Chest PA & Lateral [[id number]] Collected:  09/15/17 1429     Updated:  09/15/17 1833    Narrative:         [facility] [place]    Patient Name: CAMRYN MCKAY    ORDERING: BEBETO WILLIS     ATTENDING: SHERI REYEZ     REFERRING: BEBETO WILLIS    -----------------------    PROCEDURE: Two-view chest    COMPARISON: None.    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     Devices: Right upper extremity PICC terminates in the right  atrium. Spinal stimulator lead noted.    Lungs/Pleura: Linear opacities in the mid to lower left lung  could be due to pneumonia, atelectasis, or scarring. Small amount  of linear atelectasis or scarring noted in the right midlung  field. Calcified nodule in the right lung likely due to old  granulomatous disease. The lungs otherwise appear clear.    Cardiomediastinal structures: Normal         Impression:       CONCLUSION:    Tip of the right upper extremity PICC in the right atrium.  Linear opacities in the mid to lower left lung could be due to  pneumonia, atelectasis, or scarring.    Electronically signed by:  Pedro Novoa MD  9/15/2017 6:32 PM CDT  Workstation: [id number]          I have reviewed the patient current medications.     Assessment/Plan     Hospital Problem List     * (Principal)Acute exacerbation of chronic obstructive airways disease    Benign essential hypertension    Type 2 diabetes mellitus    Non-rheumatic tricuspid valve insufficiency    Coronary artery disease involving native coronary artery of native heart    Pulmonary emphysema    Atrial fibrillation    Shortness of breath    COPD with exacerbation    (HFpEF)  heart failure with preserved ejection fraction    Pulmonary HTN    Acute interstitial pneumonia    Overview Signed 9/15/2017  1:22 PM by Pamela Griffin MD     Hamman-Rich Syndrome         Chronic obstructive lung disease    Coronary arteriosclerosis    Diabetes mellitus          Plan:    1.  COPD exacerbation:  Continue antibiotics, nebulizers, and PT/OT.  2.  Heart failure with preserved ejection fraction:  Continue fluid and sodium restrictions.   3.  Acute kidney injury:  Will decrease the strength of Levaquin and cut lasix 20 mg back to twice daily instead of three times daily.  CMP in am.   4. Pulmonary hypertension:  Patient sees Dr. Maciel.  Continue Lisinopril.                Discharge Planning: I expect patient to be discharged to home in 1-2 days.      This document has been electronically signed by EVE Gomez on September 16, 2017 9:26 AM

## 2017-09-16 NOTE — PLAN OF CARE
Problem: Patient Care Overview (Adult)  Goal: Plan of Care Review  Outcome: Ongoing (interventions implemented as appropriate)    Problem: Activity Intolerance (Adult)  Goal: Activity Tolerance  Outcome: Ongoing (interventions implemented as appropriate)  Goal: Effective Energy Conservation Techniques  Outcome: Ongoing (interventions implemented as appropriate)    Problem: Fluid Volume Excess (Adult,Obstetrics,Pediatric)  Goal: Identify Related Risk Factors and Signs and Symptoms  Outcome: Outcome(s) achieved Date Met:  09/16/17  Goal: Stable Weight  Outcome: Ongoing (interventions implemented as appropriate)  Goal: Balanced Intake/Output  Outcome: Ongoing (interventions implemented as appropriate)    Problem: Pain, Chronic (Adult)  Goal: Identify Related Risk Factors and Signs and Symptoms  Outcome: Outcome(s) achieved Date Met:  09/16/17  Goal: Acceptable Pain Control/Comfort Level  Outcome: Ongoing (interventions implemented as appropriate)    Problem: Respiratory Insufficiency (Adult)  Goal: Identify Related Risk Factors and Signs and Symptoms  Outcome: Ongoing (interventions implemented as appropriate)  Goal: Acid/Base Balance  Outcome: Ongoing (interventions implemented as appropriate)  Goal: Effective Ventilation  Outcome: Ongoing (interventions implemented as appropriate)

## 2017-09-17 LAB
ALBUMIN SERPL-MCNC: 4.1 G/DL (ref 3.4–4.8)
ALBUMIN/GLOB SERPL: 1.5 G/DL (ref 1.1–1.8)
ALP SERPL-CCNC: 60 U/L (ref 38–126)
ALT SERPL W P-5'-P-CCNC: 46 U/L (ref 21–72)
ANION GAP SERPL CALCULATED.3IONS-SCNC: 13 MMOL/L (ref 5–15)
AST SERPL-CCNC: 30 U/L (ref 17–59)
BACTERIA SPEC AEROBE CULT: NORMAL
BASOPHILS # BLD AUTO: 0.07 10*3/MM3 (ref 0–0.2)
BASOPHILS NFR BLD AUTO: 0.6 % (ref 0–2)
BILIRUB SERPL-MCNC: 0.7 MG/DL (ref 0.2–1.3)
BUN BLD-MCNC: 50 MG/DL (ref 7–21)
BUN/CREAT SERPL: 36 (ref 7–25)
CALCIUM SPEC-SCNC: 9.4 MG/DL (ref 8.4–10.2)
CHLORIDE SERPL-SCNC: 98 MMOL/L (ref 95–110)
CO2 SERPL-SCNC: 26 MMOL/L (ref 22–31)
CREAT BLD-MCNC: 1.39 MG/DL (ref 0.7–1.3)
DEPRECATED RDW RBC AUTO: 52.5 FL (ref 35.1–43.9)
EOSINOPHIL # BLD AUTO: 0.07 10*3/MM3 (ref 0–0.7)
EOSINOPHIL NFR BLD AUTO: 0.6 % (ref 0–7)
ERYTHROCYTE [DISTWIDTH] IN BLOOD BY AUTOMATED COUNT: 15.5 % (ref 11.5–14.5)
GFR SERPL CREATININE-BSD FRML MDRD: 49 ML/MIN/1.73 (ref 60–98)
GLOBULIN UR ELPH-MCNC: 2.8 GM/DL (ref 2.3–3.5)
GLUCOSE BLD-MCNC: 103 MG/DL (ref 60–100)
GLUCOSE BLDC GLUCOMTR-MCNC: 106 MG/DL (ref 70–130)
GLUCOSE BLDC GLUCOMTR-MCNC: 113 MG/DL (ref 70–130)
GLUCOSE BLDC GLUCOMTR-MCNC: 182 MG/DL (ref 70–130)
GLUCOSE BLDC GLUCOMTR-MCNC: 96 MG/DL (ref 70–130)
HCT VFR BLD AUTO: 44.1 % (ref 39–49)
HGB BLD-MCNC: 14.6 G/DL (ref 13.7–17.3)
IMM GRANULOCYTES # BLD: 0.31 10*3/MM3 (ref 0–0.02)
IMM GRANULOCYTES NFR BLD: 2.6 % (ref 0–0.5)
LYMPHOCYTES # BLD AUTO: 1.74 10*3/MM3 (ref 0.6–4.2)
LYMPHOCYTES NFR BLD AUTO: 14.4 % (ref 10–50)
MAGNESIUM SERPL-MCNC: 2 MG/DL (ref 1.6–2.3)
MCH RBC QN AUTO: 30.5 PG (ref 26.5–34)
MCHC RBC AUTO-ENTMCNC: 33.1 G/DL (ref 31.5–36.3)
MCV RBC AUTO: 92.3 FL (ref 80–98)
MONOCYTES # BLD AUTO: 1.05 10*3/MM3 (ref 0–0.9)
MONOCYTES NFR BLD AUTO: 8.7 % (ref 0–12)
NEUTROPHILS # BLD AUTO: 8.85 10*3/MM3 (ref 2–8.6)
NEUTROPHILS NFR BLD AUTO: 73.1 % (ref 37–80)
PLATELET # BLD AUTO: 168 10*3/MM3 (ref 150–450)
PMV BLD AUTO: 11.3 FL (ref 8–12)
POTASSIUM BLD-SCNC: 4.3 MMOL/L (ref 3.5–5.1)
PROT SERPL-MCNC: 6.9 G/DL (ref 6.3–8.6)
RBC # BLD AUTO: 4.78 10*6/MM3 (ref 4.37–5.74)
RBC MORPH BLD: NORMAL
SMALL PLATELETS BLD QL SMEAR: ADEQUATE
SODIUM BLD-SCNC: 137 MMOL/L (ref 137–145)
WBC MORPH BLD: NORMAL
WBC NRBC COR # BLD: 12.09 10*3/MM3 (ref 3.2–9.8)
WHOLE BLOOD HOLD SPECIMEN: NORMAL

## 2017-09-17 PROCEDURE — 85025 COMPLETE CBC W/AUTO DIFF WBC: CPT | Performed by: INTERNAL MEDICINE

## 2017-09-17 PROCEDURE — 83735 ASSAY OF MAGNESIUM: CPT | Performed by: INTERNAL MEDICINE

## 2017-09-17 PROCEDURE — 97116 GAIT TRAINING THERAPY: CPT

## 2017-09-17 PROCEDURE — 25010000002 LEVOFLOXACIN PER 250 MG: Performed by: NURSE PRACTITIONER

## 2017-09-17 PROCEDURE — 85007 BL SMEAR W/DIFF WBC COUNT: CPT | Performed by: INTERNAL MEDICINE

## 2017-09-17 PROCEDURE — 82962 GLUCOSE BLOOD TEST: CPT

## 2017-09-17 PROCEDURE — 97165 OT EVAL LOW COMPLEX 30 MIN: CPT

## 2017-09-17 PROCEDURE — G8988 SELF CARE GOAL STATUS: HCPCS

## 2017-09-17 PROCEDURE — 80053 COMPREHEN METABOLIC PANEL: CPT | Performed by: INTERNAL MEDICINE

## 2017-09-17 PROCEDURE — 97530 THERAPEUTIC ACTIVITIES: CPT

## 2017-09-17 PROCEDURE — 94799 UNLISTED PULMONARY SVC/PX: CPT

## 2017-09-17 PROCEDURE — 97110 THERAPEUTIC EXERCISES: CPT

## 2017-09-17 PROCEDURE — 25010000002 HEPARIN (PORCINE) PER 1000 UNITS: Performed by: INTERNAL MEDICINE

## 2017-09-17 PROCEDURE — 94760 N-INVAS EAR/PLS OXIMETRY 1: CPT

## 2017-09-17 PROCEDURE — 25010000002 METHYLPREDNISOLONE PER 40 MG: Performed by: NURSE PRACTITIONER

## 2017-09-17 PROCEDURE — G8987 SELF CARE CURRENT STATUS: HCPCS

## 2017-09-17 PROCEDURE — 25010000002 FUROSEMIDE PER 20 MG: Performed by: INTERNAL MEDICINE

## 2017-09-17 RX ORDER — METHYLPREDNISOLONE SODIUM SUCCINATE 40 MG/ML
20 INJECTION, POWDER, LYOPHILIZED, FOR SOLUTION INTRAMUSCULAR; INTRAVENOUS EVERY 8 HOURS
Status: DISCONTINUED | OUTPATIENT
Start: 2017-09-17 | End: 2017-09-18

## 2017-09-17 RX ADMIN — HEPARIN SODIUM 5000 UNITS: 5000 INJECTION, SOLUTION INTRAVENOUS; SUBCUTANEOUS at 06:45

## 2017-09-17 RX ADMIN — PANTOPRAZOLE SODIUM 40 MG: 40 TABLET, DELAYED RELEASE ORAL at 06:45

## 2017-09-17 RX ADMIN — Medication 10 ML: at 15:10

## 2017-09-17 RX ADMIN — TERBUTALINE SULFATE 5 MG: 2.5 TABLET ORAL at 18:21

## 2017-09-17 RX ADMIN — FAMOTIDINE 20 MG: 20 TABLET ORAL at 18:21

## 2017-09-17 RX ADMIN — HEPARIN SODIUM 5000 UNITS: 5000 INJECTION, SOLUTION INTRAVENOUS; SUBCUTANEOUS at 21:18

## 2017-09-17 RX ADMIN — Medication 10 ML: at 11:35

## 2017-09-17 RX ADMIN — HEPARIN SODIUM 5000 UNITS: 5000 INJECTION, SOLUTION INTRAVENOUS; SUBCUTANEOUS at 13:42

## 2017-09-17 RX ADMIN — HYDROCODONE BITARTRATE AND ACETAMINOPHEN 1 TABLET: 7.5; 325 TABLET ORAL at 13:42

## 2017-09-17 RX ADMIN — FAMOTIDINE 20 MG: 20 TABLET ORAL at 08:25

## 2017-09-17 RX ADMIN — FLUTICASONE PROPIONATE 2 SPRAY: 50 SPRAY, METERED NASAL at 08:23

## 2017-09-17 RX ADMIN — METHYLPREDNISOLONE SODIUM SUCCINATE 20 MG: 40 INJECTION, POWDER, FOR SOLUTION INTRAMUSCULAR; INTRAVENOUS at 11:35

## 2017-09-17 RX ADMIN — IPRATROPIUM BROMIDE AND ALBUTEROL SULFATE 3 ML: 2.5; .5 SOLUTION RESPIRATORY (INHALATION) at 22:48

## 2017-09-17 RX ADMIN — TERBUTALINE SULFATE 5 MG: 2.5 TABLET ORAL at 08:25

## 2017-09-17 RX ADMIN — FUROSEMIDE 20 MG: 10 INJECTION, SOLUTION INTRAVENOUS at 15:10

## 2017-09-17 RX ADMIN — LEVOFLOXACIN 250 MG: 5 INJECTION, SOLUTION INTRAVENOUS at 13:42

## 2017-09-17 RX ADMIN — FUROSEMIDE 20 MG: 10 INJECTION, SOLUTION INTRAVENOUS at 08:25

## 2017-09-17 RX ADMIN — HYDROCODONE BITARTRATE AND ACETAMINOPHEN 1 TABLET: 7.5; 325 TABLET ORAL at 06:45

## 2017-09-17 RX ADMIN — MAGNESIUM OXIDE TAB 400 MG (241.3 MG ELEMENTAL MG) 400 MG: 400 (241.3 MG) TAB at 08:25

## 2017-09-17 RX ADMIN — GLIPIZIDE 5 MG: 5 TABLET ORAL at 08:25

## 2017-09-17 RX ADMIN — Medication 10 ML: at 08:26

## 2017-09-17 RX ADMIN — HYDROCODONE BITARTRATE AND ACETAMINOPHEN 1 TABLET: 7.5; 325 TABLET ORAL at 21:18

## 2017-09-17 RX ADMIN — IPRATROPIUM BROMIDE AND ALBUTEROL SULFATE 3 ML: 2.5; .5 SOLUTION RESPIRATORY (INHALATION) at 19:32

## 2017-09-17 RX ADMIN — METHYLPREDNISOLONE SODIUM SUCCINATE 20 MG: 40 INJECTION, POWDER, FOR SOLUTION INTRAMUSCULAR; INTRAVENOUS at 18:20

## 2017-09-17 RX ADMIN — IPRATROPIUM BROMIDE AND ALBUTEROL SULFATE 3 ML: 2.5; .5 SOLUTION RESPIRATORY (INHALATION) at 03:57

## 2017-09-17 RX ADMIN — IPRATROPIUM BROMIDE AND ALBUTEROL SULFATE 3 ML: 2.5; .5 SOLUTION RESPIRATORY (INHALATION) at 11:30

## 2017-09-17 RX ADMIN — ASPIRIN 81 MG 81 MG: 81 TABLET ORAL at 08:25

## 2017-09-17 RX ADMIN — DOCUSATE SODIUM 100 MG: 100 CAPSULE, LIQUID FILLED ORAL at 08:25

## 2017-09-17 RX ADMIN — ISOSORBIDE DINITRATE 30 MG: 30 TABLET ORAL at 08:25

## 2017-09-17 RX ADMIN — FUROSEMIDE 20 MG: 10 INJECTION, SOLUTION INTRAVENOUS at 21:22

## 2017-09-17 RX ADMIN — LISINOPRIL 10 MG: 10 TABLET ORAL at 08:25

## 2017-09-17 RX ADMIN — DONEPEZIL HYDROCHLORIDE 10 MG: 10 TABLET, FILM COATED ORAL at 08:25

## 2017-09-17 RX ADMIN — CLOPIDOGREL BISULFATE 75 MG: 75 TABLET ORAL at 08:25

## 2017-09-17 RX ADMIN — IPRATROPIUM BROMIDE AND ALBUTEROL SULFATE 3 ML: 2.5; .5 SOLUTION RESPIRATORY (INHALATION) at 07:42

## 2017-09-17 NOTE — PROGRESS NOTES
[facility] Medicine Services  INPATIENT PROGRESS NOTE    Length of Stay: 1  Date of Admission: 9/15/2017  Primary Care Physician: Paolo Rey MD    Subjective   Chief Complaint: No complaints    HPI:      9/17/2017:  The patient still has significant wheezes this am.  Will start IV steroids.      9/16/2017:  The patient has been up walking with physical therapy this morning.  He states he feels a little better this morning, but still has some significant wheezes noted.     9/15/2017 H&P by Dr. Griffin:  Patient is a 84 y.o. male presents with complaint of having shortness of breath.  Patient states she's been having shortness of breath ongoing for past few weeks.  Patient states his shortness of breath has been worsening.  He has been having coughing spells with thick yellowish to greenish colored sputum.  No nausea vomiting have been reported.  Patient does complain of chest tightness with coughing spells.  Patient states he has been having worsening orthopnea of 3-4 pillows.  Patient also admits of having reduced excess tolerance.  Patient does complain of worsening swelling.  No urinary complaints have been reported.  Patient states he has been compliant with his medications.  Patient states he was recently treated with IV steroids and nebulizer treatment without any benefit.    Review of Systems   Constitutional: Positive for fatigue.   Respiratory: Positive for shortness of breath and wheezing.    Cardiovascular: Positive for leg swelling.   Gastrointestinal: Positive for abdominal distention.   All other systems reviewed and are negative.     All pertinent negatives and positives are as above. All other systems have been reviewed and are negative unless otherwise stated.     Objective    Temp:  [97.1 °F (36.2 °C)-97.6 °F (36.4 °C)] 97.6 °F (36.4 °C)  Heart Rate:  [] 97  Resp:  [18-20] 20  BP: (108-146)/(61-78) 146/78    Physical Exam   Constitutional: He is  oriented to person, place, and time. He appears well-developed and well-nourished.   HENT:   Head: Normocephalic and atraumatic.   Eyes: EOM are normal. Pupils are equal, round, and reactive to light.   Neck: Normal range of motion. Neck supple.   Cardiovascular: Normal rate and regular rhythm.    Pulmonary/Chest: Effort normal. He has wheezes.   Abdominal: Soft. Bowel sounds are normal.   Neurological: He is alert and oriented to person, place, and time.   Skin: Skin is warm and dry.   Psychiatric: He has a normal mood and affect.     Results Review:  I have reviewed the labs, radiology results, and diagnostic studies.    Laboratory Data:     Results from last 7 days  Lab Units 09/17/17  0747 09/16/17  0503 09/15/17  1457   SODIUM mmol/L 137 139 137   POTASSIUM mmol/L 4.3 4.1 4.7   CHLORIDE mmol/L 98 98 101   CO2 mmol/L 26.0 31.0 24.0   BUN mg/dL 50* 42* 33*   CREATININE mg/dL 1.39* 1.57* 1.13   GLUCOSE mg/dL 103* 122* 168*   CALCIUM mg/dL 9.4 9.3 9.6   BILIRUBIN mg/dL 0.7 0.5 0.5   ALK PHOS U/L 60 51 54   ALT (SGPT) U/L 46 42 41   AST (SGOT) U/L 30 24 24   ANION GAP mmol/L 13.0 10.0 12.0     Estimated Creatinine Clearance: 40.6 mL/min (by C-G formula based on Cr of 1.39).    Results from last 7 days  Lab Units 09/17/17  0747 09/16/17  0503 09/15/17  1457   MAGNESIUM mg/dL 2.0 2.0 1.9           Results from last 7 days  Lab Units 09/17/17  0616 09/16/17  0503 09/15/17  1457   WBC 10*3/mm3 12.09* 11.99* 14.84*   HEMOGLOBIN g/dL 14.6 12.3* 12.3*   HEMATOCRIT % 44.1 38.2* 37.7*   PLATELETS 10*3/mm3 168 174 182           Culture Data:   Blood Culture   Date Value Ref Range Status   09/15/2017 No growth at 24 hours  Preliminary   09/15/2017 No growth at 24 hours  Preliminary     Urine Culture   Date Value Ref Range Status   09/15/2017 No growth at 24 hours  Final     No results found for: RESPCX  No results found for: WOUNDCX  No results found for: STOOLCX  No components found for: BODYFLD    Radiology Data:   Imaging  Results (last 24 hours)     ** No results found for the last 24 hours. **          I have reviewed the patient current medications.     Assessment/Plan     Hospital Problem List     * (Principal)Acute exacerbation of chronic obstructive airways disease    Benign essential hypertension    Type 2 diabetes mellitus    Non-rheumatic tricuspid valve insufficiency    Coronary artery disease involving native coronary artery of native heart    Pulmonary emphysema    Atrial fibrillation    Shortness of breath    COPD with exacerbation    (HFpEF) heart failure with preserved ejection fraction    Pulmonary HTN    Acute interstitial pneumonia    Overview Signed 9/15/2017  1:22 PM by Pamela Griffin MD     Hamman-Rich Syndrome         Chronic obstructive lung disease    Coronary arteriosclerosis    Diabetes mellitus    COPD exacerbation          Plan:    1.  COPD exacerbation:  Continue antibiotics, nebulizers, and PT/OT.  Methylprednisone 20 mg IV q 8 hours.   2.  Heart failure with preserved ejection fraction:  Continue fluid and sodium restrictions.   3.  Acute kidney injury:  Will decrease the strength of Levaquin and cut lasix 20 mg back to twice daily instead of three times daily. Creatinine improved from 1.57 to 1.39 today.   CMP in am.   4. Pulmonary hypertension:  Patient sees Dr. Maciel.  Continue Lisinopril.          Discharge Planning: I expect patient to be discharged to home in 1-2 days.      This document has been electronically signed by EVE Gomez on September 17, 2017 9:37 AM

## 2017-09-17 NOTE — PLAN OF CARE
Problem: Patient Care Overview (Adult)  Goal: Discharge Needs Assessment    09/17/17 8495   Discharge Needs Assessment   Discharge Facility/Level Of Care Needs home with home health

## 2017-09-17 NOTE — THERAPY TREATMENT NOTE
Acute Care - Physical Therapy Treatment Note  [facility]     Patient Name: Hasmukh Ham  : 1933  MRN: [id number]  Today's Date: 2017  Onset of Illness/Injury or Date of Surgery Date: 09/15/17  Date of Referral to PT: 09/15/17  Referring Physician: KETTY Griffin    Admit Date: 9/15/2017    Visit Dx:    ICD-10-CM ICD-9-CM   1. Pulmonary HTN I27.2 416.8   2. Impaired physical mobility Z74.09 781.99   3. Impaired mobility and activities of daily living Z74.09 799.89     Patient Active Problem List   Diagnosis   • Dilated aortic root   • Benign essential hypertension   • Type 2 diabetes mellitus   • Non-rheumatic tricuspid valve insufficiency   • Coronary artery disease involving native coronary artery of native heart   • Pulmonary emphysema   • Dyspnea   • Atrial fibrillation   • Bradycardia   • Shortness of breath   • COPD with exacerbation   • Chronic coronary artery disease   • Neuropathy   • Abdominal hernia   • Neck pain   • Dementia   • Diabetic neuropathy   • Diabetic polyneuropathy   • Gastroesophageal reflux disease without esophagitis   • Generalized osteoarthritis   • 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   • Nocturia   • Chronic obstructive bronchitis   • Osteoarthritis of multiple joints   • Hyperlipidemia   • Pain in joint involving ankle and foot   • Arthropathy of hand   • Paroxysmal tachycardia   • Family history of diseases of the blood and blood-forming organs and certain disorders involving the immune mechanism   • Osteoarthrosis involving more than one site but not generalized   • Right shoulder pain   • Rotator cuff syndrome   • Partial tear of subscapularis tendon   • Infraspinatus tendon tear   • Supraspinatus tendon tear   • Bilateral carotid artery stenosis   • (HFpEF) heart failure with preserved ejection fraction   • Pulmonary HTN   • Acute exacerbation of chronic obstructive airways disease   • Acute interstitial pneumonia   • Chronic obstructive lung disease    • Coronary arteriosclerosis   • Diabetes mellitus   • COPD exacerbation               Adult Rehabilitation Note       09/17/17 1468          Rehab Assessment/Intervention    Discipline physical therapy assistant  -TA      Document Type therapy note (daily note)  -TA      Subjective Information agree to therapy  -TA      Patient Effort, Rehab Treatment good  -TA      Precautions/Limitations fall precautions  -TA      Recorded by [TA] Kayli Kumar PTA      Vital Signs    Post Systolic BP Rehab 128  -TA      Post Treatment Diastolic BP 66  -TA      Pretreatment Heart Rate (beats/min) 83  -TA      Intratreatment Heart Rate (beats/min) 87  -TA      Posttreatment Heart Rate (beats/min) 85  -TA      Pre SpO2 (%) 92  -TA      O2 Delivery Pre Treatment supplemental O2  -TA      Intra SpO2 (%) 96  -TA      Post SpO2 (%) 95  -TA      Pre Patient Position Supine  -TA      Post Patient Position Supine  -TA      Recorded by [TA] Kayli Kumar PTA      Pain Assessment    Pain Assessment 0-10  -TA      Pain Score 8  -TA      Post Pain Score 8  -TA      Pain Type Chronic pain  -TA      Pain Location Generalized  -TA      Pain Intervention(s) Medication (See MAR)  -TA      Recorded by [TA] Kayli Kumar PTA      Cognitive Assessment/Intervention    Current Cognitive/Communication Assessment functional  -TA      Orientation Status oriented x 4  -TA      Follows Commands/Answers Questions 100% of the time  -TA      Personal Safety WNL/WFL  -TA      Personal Safety Interventions gait belt;nonskid shoes/slippers when out of bed  -TA      Recorded by [TA] Kayli Kumar PTA      Bed Mobility, Assessment/Treatment    Bed Mobility, Roll Left, [place] independent  -TA      Bed Mob, Supine to Sit, [place] independent  -TA      Bed Mob, Sit to Supine, [place] independent  -TA      Recorded by [TA] Kayli Kumar PTA      Transfer Assessment/Treatment    Transfers, Sit-Stand [place] stand by assist  -TA       Transfers, Stand-Sit [place] stand by assist  -TA      Transfers, Sit-Stand-Sit, Assist Device rolling walker  -TA      Recorded by [TA] Kayli Kumar PTA      Gait Assessment/Treatment    Gait, [place] Level contact guard assist  -TA      Gait, Assistive Device rolling walker  -TA      Gait, Distance (Feet) 350   & 250`  -TA      Gait, Gait Pattern Analysis swing-through gait  -TA      Gait, Gait Deviations step length decreased  -TA      Recorded by [TA] Kayli Kumar PTA      Therapy Exercises    Bilateral Lower Extremities AROM:;10 reps;sitting;ankle pumps/circles;hip abduction/adduction;hip flexion;LAQ  -TA      Recorded by [WALT] Kayli Kumar PTA      Positioning and Restraints    Pre-Treatment Position in bed  -TA      Post Treatment Position bed  -TA      In Bed supine;call light within reach;with family/caregiver;with other staff  -TA      Recorded by [WALT] Kayli Kumar PTA        User Key  (r) = Recorded By, (t) = Taken By, (c) = Cosigned By    Initials Name Effective Dates    TA Kayli uKmar PTA 10/17/16 -                 IP PT Goals       09/16/17 0845          Transfer Training PT STG    Transfer Training PT STG, Date Established 09/16/17  -CZ      Transfer Training PT STG, Time to Achieve 2 days  -CZ      Transfer Training PT STG, Activity Type bed to chair /chair to bed;sit to stand/stand to sit  -CZ      Transfer Training PT STG, [place] Level conditional independence  -CZ      Transfer Training PT STG, Date Goal Reviewed 09/16/17  -CZ      Transfer Training PT STG, Outcome goal ongoing  -CZ      Gait Training PT STG    Gait Training Goal PT STG, Date Established 09/16/17  -CZ      Gait Training Goal PT STG, Time to Achieve 2 days  -CZ      Gait Training Goal PT STG, [place] Level conditional independence  -CZ      Gait Training Goal PT STG, Assist Device walker, rolling  -CZ      Gait Training Goal PT STG, Distance to Achieve 150'x1.  -CZ      Gait Training Goal PT STG,  Additional Goal SpO2 >90%.  -CZ      Gait Training Goal PT STG, Date Goal Reviewed 09/16/17  -CZ      Gait Training Goal PT STG, Outcome goal ongoing  -      Stair Training PT LTG    Stair Training Goal PT LTG, Date Established 09/16/17  -      Stair Training Goal PT LTG, Time to Achieve by discharge  -      Stair Training Goal PT LTG, Number of Steps 5  -      Stair Training Goal PT LTG, [place] Level conditional independence  -      Stair Training Goal PT LTG, Assist Device 2 handrails  -      Stair Training Goal PT LTG, Date Goal Reviewed 09/16/17  -CZ      Stair Training Goal PT LTG, Outcome goal ongoing  -      Physical Therapy PT LTG    Physical Therapy PT LTG, Date Established 09/16/17  -      Physical Therapy PT LTG, Time to Achieve by discharge  -      Physical Therapy PT LTG, Activity Type Tinetti fall risk assessment.   -      Physical Therapy PT LTG, Addisional Goal Score 24/28 or low fall risk.   -      Physical Therapy PT LTG, Date Goal Reviewed 09/16/17  -      Physical Therapy PT LTG, Outcome goal ongoing  -        User Key  (r) = Recorded By, (t) = Taken By, (c) = Cosigned By    Initials Name Provider Type     Bud Lovelace, PT Physical Therapist          Physical Therapy Education     Title: PT OT SLP Therapies (Active)     Topic: Physical Therapy (Active)     Point: Mobility training (Done)    Learning Progress Summary    Learner Readiness Method Response Comment Documented by Status   Patient Acceptance E DU,VU benefits of exercise & mobility TA 09/17/17 1507 Done               Point: Precautions (Active)    Learning Progress Summary    Learner Readiness Method Response Comment Documented by Status   Patient Acceptance E NR Tinetti assessed: 21/28 or moderate fall risk. Discussed benefits of using walker until fall risk is lowered. CZ 09/16/17 1108 Active                      User Key     Initials Effective Dates Name Provider Type Discipline    TA 10/17/16 -   Kayli Kumar, PTA Physical Therapy Assistant PT    CZ 02/17/17 -  Bud Lovelace, PT Physical Therapist PT                    PT Recommendation and Plan  Anticipated Discharge Disposition: home with home health  Planned Therapy Interventions: balance training, gait training, home exercise program, stair training, strengthening, transfer training, patient/family education  PT Frequency: other (see comments) (5-14x/week)  Plan of Care Review  Progress: improving  Outcome Summary/Follow up Plan: pt transfers with SBA- Independent, pt ambulated 350` with RW & CGA, pt would benefit from HHPT @ D/C          Outcome Measures       09/17/17 1500 09/17/17 0942 09/16/17 0845    How much help from another person do you currently need...    Turning from your back to your side while in flat bed without using bedrails? 4  -TA  4  -CZ    Moving from lying on back to sitting on the side of a flat bed without bedrails? 4  -TA  4  -CZ    Moving to and from a bed to a chair (including a wheelchair)? 3  -TA  3  -CZ    Standing up from a chair using your arms (e.g., wheelchair, bedside chair)? 3  -TA  3  -CZ    Climbing 3-5 steps with a railing? 3  -TA  3  -CZ    To walk in hospital room? 3  -TA  3  -CZ    AM-PAC 6 Clicks Score 20  -TA  20  -CZ    How much help from another is currently needed...    Putting on and taking off regular lower body clothing?  3  -RB     Bathing (including washing, rinsing, and drying)  3  -RB     Toileting (which includes using toilet bed pan or urinal)  3  -RB     Putting on and taking off regular upper body clothing  4  -RB     Taking care of personal grooming (such as brushing teeth)  4  -RB     Eating meals  4  -RB     Score  21  -RB     Tinetti Assessment    Tinetti Assessment   yes  -CZ    Sitting Balance   1  -CZ    Arises   2  -CZ    Attempts to Rise   2  -CZ    Immediate Standing Balance (first 5 sec)   2  -CZ    Standing Balance   1  -CZ    Sternal Nudge (feet close together)   1  -CZ    Eyes  "Closed (feet close together)   0  -CZ    Turning 360 Degrees- Steps   1  -CZ    Turning 360 Degrees- Steadiness   1  -CZ    Sitting Down   2  -CZ    Tinetti Balance Score   13  -CZ    Gait Initiation (immediate after told \"go\")   1  -CZ    Step Length- Right Swing   1  -CZ    Step Length- Left Swing   1  -CZ    Foot Clearance- Right Foot   1  -CZ    Foot Clearance- Left Foot   1  -CZ    Step Symmetry   0  -CZ    Step Continuity   1  -CZ    Path (excursion)   1  -CZ    Trunk   0  -CZ    Base of Support   1  -CZ    Gait Score   8  -CZ    Tinetti Total Score   21  -CZ    Tinetti Assistive Device   rolling walker  -CZ    Functional Assessment    Outcome Measure Options AM-PAC 6 Clicks Basic Mobility (PT)  -TA AM-PAC 6 Clicks Daily Activity (OT)  -RB [place] J;AM-PAC 6 Clicks Basic Mobility (PT)  -CZ      User Key  (r) = Recorded By, (t) = Taken By, (c) = Cosigned By    Initials Name Provider Type    RB Carrington Marin, OT Occupational Therapist    WALT Kumar PTA Physical Therapy Assistant    RONDA Lovelace, PT Physical Therapist           Time Calculation:         PT Charges       09/17/17 1512          Time Calculation    Start Time 1358  -TA      Stop Time 1452  -TA      Time Calculation (min) 54 min  -TA      Time Calculation- PT    Total Timed Code Minutes- PT 54 minute(s)  -TA        User Key  (r) = Recorded By, (t) = Taken By, (c) = Cosigned By    Initials Name Provider Type    WALT Kumar PTA Physical Therapy Assistant          Therapy Charges for Today     Code Description Service Date Service Provider Modifiers Qty    28756794144 HC GAIT TRAINING EA 15 MIN 9/17/2017 Kayli Kumar PTA GP 2    16681958929 HC PT THER PROC EA 15 MIN 9/17/2017 Kayli Kumar PTA GP 1    50051043565 HC PT THERAPEUTIC ACT EA 15 MIN 9/17/2017 Kayli Kumar PTA GP 1          PT G-Codes  PT Professional Judgement Used?: Yes  Outcome Measure Options: AM-PAC 6 Clicks Basic Mobility (PT)  Score: 21  Functional Limitation: " Mobility: Walking and moving around  Mobility: Walking and Moving Around Current Status (): At least 20 percent but less than 40 percent impaired, limited or restricted  Mobility: Walking and Moving Around Goal Status (): At least 1 percent but less than 20 percent impaired, limited or restricted    Kayli Kumar, PTA  9/17/2017

## 2017-09-17 NOTE — PLAN OF CARE
Problem: Patient Care Overview (Adult)  Goal: Plan of Care Review  Outcome: Ongoing (interventions implemented as appropriate)    09/17/17 1350   Coping/Psychosocial Response Interventions   Plan Of Care Reviewed With patient   Outcome Evaluation   Outcome Summary/Follow up Plan OT navya complete this date. Pt required SBA /CGA for ambulation. He needed CGA for log roll from supine to sit.. Pt may benefit from OT services to increase independence with ADLs and functional mobility/transfers.       Goal: Discharge Needs Assessment  Outcome: Ongoing (interventions implemented as appropriate)    Problem: Inpatient Occupational Therapy  Goal: Transfer Training Goal 1 LTG- OT  Outcome: Ongoing (interventions implemented as appropriate)    09/17/17 1350   Transfer Training OT LTG   Transfer Training OT LTG, Date Established 09/17/17   Transfer Training OT LTG, Time to Achieve by discharge   Transfer Training OT LTG, Activity Type all transfers   Transfer Training OT LTG, [place] Level independent;conditional independence   Transfer Training OT LTG, Assist Device walker, rolling       Goal: Dynamic Standing Balance Goal LTG-OT  Outcome: Ongoing (interventions implemented as appropriate)    09/17/17 1350   Dynamic Standing Balance OT LTG   Dynamic Standing Balance OT LTG, Date Established 09/17/17   Dynamic Standing Balance OT LTG, Time to Achieve by discharge   Dynamic Standing Balance OT LTG, [place] Level independent;conditional independence  (5 minutes with functional activity.)   Dynamic Standing Balance OT LTG, Assist Device assistive Device  (R/W.)       Goal: Patient Education Goal LTG- OT  Outcome: Ongoing (interventions implemented as appropriate)    09/17/17 1350   Patient Education OT LTG   Patient Education OT LTG, Date Established 09/17/17   Patient Education OT LTG, Time to Achieve by discharge   Patient Education OT LTG, Education Type home safety;adaptive breathing;joint protection;work  simplification  (fall prevention.)   Patient Education OT LTG, Education Understanding demonstrates adequately;verbalizes understanding       Goal: ADL Goal LTG- OT  Outcome: Ongoing (interventions implemented as appropriate)    09/17/17 1350   ADL OT LTG   ADL OT LTG, Date Established 09/17/17   ADL OT LTG, Time to Achieve by discharge   ADL OT LTG, Activity Type ADL skills  (Sponge bath and dress or walk-in shower.)   ADL OT LTG, [place] Level independent with device;assistive device  (R/W if needed.)

## 2017-09-17 NOTE — THERAPY EVALUATION
Acute Care - Occupational Therapy Initial Evaluation  [facility]     Patient Name: Hasmukh Ham  : 1933  MRN: [id number]  Today's Date: 2017  Onset of Illness/Injury or Date of Surgery Date: 09/15/17  Date of Referral to OT: 09/15/17  Referring Physician: KETTY Griffin    Admit Date: 9/15/2017       ICD-10-CM ICD-9-CM   1. Pulmonary HTN I27.2 416.8   2. Impaired physical mobility Z74.09 781.99   3. Impaired mobility and activities of daily living Z74.09 799.89     Patient Active Problem List   Diagnosis   • Dilated aortic root   • Benign essential hypertension   • Type 2 diabetes mellitus   • Non-rheumatic tricuspid valve insufficiency   • Coronary artery disease involving native coronary artery of native heart   • Pulmonary emphysema   • Dyspnea   • Atrial fibrillation   • Bradycardia   • Shortness of breath   • COPD with exacerbation   • Chronic coronary artery disease   • Neuropathy   • Abdominal hernia   • Neck pain   • Dementia   • Diabetic neuropathy   • Diabetic polyneuropathy   • Gastroesophageal reflux disease without esophagitis   • Generalized osteoarthritis   • 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   • Nocturia   • Chronic obstructive bronchitis   • Osteoarthritis of multiple joints   • Hyperlipidemia   • Pain in joint involving ankle and foot   • Arthropathy of hand   • Paroxysmal tachycardia   • Family history of diseases of the blood and blood-forming organs and certain disorders involving the immune mechanism   • Osteoarthrosis involving more than one site but not generalized   • Right shoulder pain   • Rotator cuff syndrome   • Partial tear of subscapularis tendon   • Infraspinatus tendon tear   • Supraspinatus tendon tear   • Bilateral carotid artery stenosis   • (HFpEF) heart failure with preserved ejection fraction   • Pulmonary HTN   • Acute exacerbation of chronic obstructive airways disease   • Acute interstitial pneumonia   • Chronic obstructive lung disease   •  Coronary arteriosclerosis   • Diabetes mellitus   • COPD exacerbation     Past Medical History:   Diagnosis Date   • Acute exacerbation of chronic obstructive airways disease    • Acute interstitial pneumonia     Hamman-Rich Syndrome   • Arthritis    • Backache    • Bradycardia    • Chronic obstructive lung disease    • Chronic obstructive pulmonary disease (COPD)    • Coronary arteriosclerosis    • Coronary atherosclerosis of native coronary artery    • Degenerative joint disease involving multiple joints    • Diabetes mellitus    • Duodenal ulcer disease    • Hernia of abdominal wall    • History of echocardiogram 04/13/2015    Echocardiogram W/ color flow 95705 (1) - Normal LV systolic function with Ef of 70% with LVH and diastolic relaxation abnormality of the left ventricle. Mildly dilated aortic root. No sig.valvular abnormalities.   • Hyperlipidemia    • Hypertension    • Hypertensive disorder    • Left lower lobe pneumonia     Left lower zone pneumonia - clinically improved   • Neuropathy    • Pneumonia     Recent improving   • Shortness of breath    • Type 2 diabetes mellitus      Past Surgical History:   Procedure Laterality Date   • APPENDECTOMY     • CARDIAC CATHETERIZATION N/A 6/6/2017    Procedure: Right Heart Cath;  Surgeon: Jeff Maciel MD PhD;  Location: [facility] INVASIVE LOCATION;  Service:    • HERNIA REPAIR     • LUNG BIOPSY     • NECK SURGERY     • THORACOTOMY Left 1977    left with lung biopsy   • VENTRAL HERNIA REPAIR            OT ASSESSMENT FLOWSHEET (last 72 hours)      OT Evaluation       09/17/17 0942 09/16/17 0845 09/15/17 1155          Rehab Evaluation    Document Type evaluation  -RB evaluation  -CZ       Subjective Information agree to therapy;complains of;fatigue  -RB agree to therapy  -CZ       Patient Effort, Rehab Treatment good  -RB good  -CZ       Symptoms Noted During/After Treatment significant change in vital signs  -RB none  -CZ       General Information     Patient Profile Review yes  -RB yes  -CZ       Onset of Illness/Injury or Date of Surgery Date 09/15/17  -RB 09/15/17  -CZ       Referring Physician KETTY Griffin  -RB EDI Griffin MD  -CZ       General Observations Supine in bed with nasal canula and telemetry.  -RB Patient supine in bed, supplemental O2 removed per nurse, very talkative, cardiac monitor, no apparent distress.  -CZ       Pertinent History Of Current Problem Hos with SOA.  Dx with pulmonary HTN.  -RB Patient admitted with SOB.  -CZ       Precautions/Limitations fall precautions  -RB fall precautions  -CZ       Prior Level of Function independent:;gait;transfer;ADL's  -RB independent:  -CZ       Equipment Currently Used at Home oxygen   Has R/W and rollator.  -RB oxygen   O2 at night.  Has SPC, FWW and 4WW, but does not use.  -CZ none  -ME      Plans/Goals Discussed With patient  -RB patient  -CZ       Risks Reviewed patient:  -RB        Benefits Reviewed patient:  -RB patient:;improve function;increase independence;increase strength;increase balance  -CZ       Living Environment    Lives With spouse  -RB spouse  -CZ spouse  -ME      Living Arrangements mobile home  -RB mobile home  -CZ mobile home  -ME      Home Accessibility stairs to enter home  -RB stairs to enter home;stairs (2 railings present)  -CZ stairs (2 railings present)  -ME      Number of Stairs to Enter Home 5  -RB 5  -CZ       Stair Railings at Home outside, present at both sides  -RB outside, present at both sides  -CZ none  -ME      Type of Financial/Environmental Concern   none  -ME      Transportation Available family or friend will provide;car  -RB  car  -ME      Clinical Impression    Date of Referral to OT 09/15/17  -RB        OT Diagnosis Impaired mobility and ADLs.  -RB        Functional Level At Time Of Evaluation Impaired mobility and ADLs.  -RB        Impairments Found (describe specific impairments) gait, locomotion, and balance  -RB        Criteria for Skilled Therapeutic  Interventions Met yes;treatment indicated  -RB        Rehab Potential good, to achieve stated therapy goals  -RB        Therapy Frequency --   3-14x/wk  -RB        Predicted Duration of Therapy Intervention (days/wks) Until goals met.  -RB        Anticipated Discharge Disposition home with home health  -RB        Functional Level Prior    Ambulation 0-->independent  -RB  0-->independent  -ME      Transferring 0-->independent  -RB  0-->independent  -ME      Toileting 0-->independent  -RB  0-->independent  -ME      Bathing 0-->independent  -RB  0-->independent  -ME      Dressing 0-->independent  -RB  0-->independent  -ME      Eating 0-->independent  -RB  0-->independent  -ME      Communication 0-->understands/communicates without difficulty  -RB  0-->understands/communicates without difficulty  -ME      Swallowing 0-->swallows foods/liquids without difficulty  -RB  0-->swallows foods/liquids without difficulty  -ME      Prior Functional Level Comment 896  -RB        Vital Signs    Pre Systolic BP Rehab 86  -  -CZ       Pre Treatment Diastolic BP 57  -RB 81  -CZ       Post Systolic BP Rehab 112  -  -CZ       Post Treatment Diastolic BP 82  -RB 75  -CZ       Pretreatment Heart Rate (beats/min) 132  -RB 56  -CZ       Intratreatment Heart Rate (beats/min) 102  -   after gait  -CZ       Posttreatment Heart Rate (beats/min) 103  -RB 87  -CZ       Pre SpO2 (%) 97  -RB 98  -CZ       O2 Delivery Pre Treatment supplemental O2   2 L.  -RB supplemental O2   2 LPM  -CZ       Intra SpO2 (%) 95  -RB 97  -CZ       O2 Delivery Intra Treatment supplemental O2  -RB room air   supplemental O2 removed per nurse.  -CZ       Post SpO2 (%) 95  -RB 94  -CZ       O2 Delivery Post Treatment supplemental O2  -RB        Pre Patient Position Supine  -RB Supine  -CZ       Intra Patient Position Standing  -RB        Post Patient Position Supine  -RB Sitting  -CZ       Pain Assessment    Pain Assessment 0-10  -RB 0-10  -CZ        Pain Score 7  -RB 0  -CZ       Post Pain Score 7  -RB 7  -CZ       Pain Type Chronic pain  -RB Chronic pain  -CZ       Pain Location Generalized  -RB Hip  -CZ       Pain Orientation  Right  -CZ       Pain Intervention(s) Medication (See MAR)  -RB Medication (See MAR)  -CZ       Vision Assessment/Intervention    Visual Impairment WFL with corrective lenses  -RB WFL  -CZ       Visual Impairment Comment Readers.  -RB        Cognitive Assessment/Intervention    Current Cognitive/Communication Assessment functional  -RB functional  -CZ       Orientation Status oriented x 4  -RB oriented x 4  -CZ       Follows Commands/Answers Questions 100% of the time  -% of the time  -CZ       Personal Safety WNL/WFL  -RB        Personal Safety Interventions gait belt;nonskid shoes/slippers when out of bed;supervised activity  -RB        ROM (Range of Motion)    General ROM no range of motion deficits identified  -RB no range of motion deficits identified  -CZ       General ROM Detail B UEs.  -RB WFL BLEs  -CZ       MMT (Manual Muscle Testing)    General MMT Assessment upper extremity strength deficits identified  -RB lower extremity strength deficits identified  -CZ       General MMT Assessment Detail 3+/5 for B shoulders and 4+/5 for rest of UEs  -RB BLEs: 3+/5 grossly  -CZ       Bed Mobility, Assessment/Treatment    Bed Mobility, Assistive Device  head of bed elevated   Home bed is elevated.  -CZ       Bed Mob, Supine to Sit, [place] supervision required   Log roll technique due to back pain.  -RB conditional independence  -CZ       Bed Mob, Sit to Supine, [place] conditional independence  -RB conditional independence  -CZ       Bed Mobility, Comment  successful   -CZ       Transfer Assessment/Treatment    Transfers, Sit-Stand [place] supervision required  -RB supervision required  -CZ       Transfers, Stand-Sit [place] supervision required  -RB supervision required  -CZ       Transfers, Sit-Stand-Sit,  Assist Device rolling walker  -RB rolling walker  -CZ       Toilet Transfer, [place] supervision required  -RB        Toilet Transfer, Assistive Device rolling walker  -RB        Functional Mobility    Functional Mobility- Ind. Level supervision required;contact guard assist  -RB        Functional Mobility- Device rolling walker  -RB        Functional Mobility-Distance (Feet) 150  -RB        Functional Mobility- Safety Issues step length decreased  -RB        Sensory Assessment/Intervention    Light Touch LUE;RUE  -RB LLE;RLE  -CZ       LUE Light Touch WNL  -RB        RUE Light Touch WNL  -RB        LLE Light Touch  mild impairment  -CZ       RLE Light Touch  mild impairment  -CZ       General Therapy Interventions    Planned Therapy Interventions activity intolerance;ADL retraining;balance training;transfer training;strengthening;adaptive equipment training  -RB        Activity Intolerance fair  -RB        Positioning and Restraints    Pre-Treatment Position in bed  -RB in bed  -CZ       Post Treatment Position bed  -RB bed  -CZ       In Bed supine;call light within reach;encouraged to call for assist  -RB supine;call light within reach;exit alarm on  -CZ         User Key  (r) = Recorded By, (t) = Taken By, (c) = Cosigned By    Initials Name Effective Dates    RB Carrington Marin, OT 06/15/16 -     ME Yoly Mai RN 10/17/16 -     CZ Bud Lovelace, PT 02/17/17 -            Occupational Therapy Education     Title: PT OT SLP Therapies (Active)     Topic: Occupational Therapy (Active)     Point: Precautions (Done)    Description: Instruct learner(s) on prescribed precautions during self-care and functional transfers.    Learning Progress Summary    Learner Readiness Method Response Comment Documented by Status   Patient Acceptance E VU,NR Edu pt on use of gait belt and non skid socks when OOB. RB 09/17/17 1347 Done                      User Key     Initials Effective Dates Name Provider Type Discipline      06/15/16 -  Carrington Marin, OT Occupational Therapist OT                  OT Recommendation and Plan  Anticipated Discharge Disposition: home with home health  Planned Therapy Interventions: activity intolerance, ADL retraining, balance training, transfer training, strengthening, adaptive equipment training  Therapy Frequency:  (3-14x/wk)  Plan of Care Review  Plan Of Care Reviewed With: patient  Outcome Summary/Follow up Plan: OT navya complete this date.  Pt required SBA /CGA for ambulation.  He needed CGA for log roll from supine to sit.. Pt  may benefit from OT services to increase independence with ADLs and functional mobility/transfers.          OT Goals       09/17/17 1350          Transfer Training OT LTG    Transfer Training OT LTG, Date Established 09/17/17  -RB      Transfer Training OT LTG, Time to Achieve by discharge  -RB      Transfer Training OT LTG, Activity Type all transfers  -RB      Transfer Training OT LTG, [place] Level independent;conditional independence  -RB      Transfer Training OT LTG, Assist Device walker, rolling  -RB      Dynamic Standing Balance OT LTG    Dynamic Standing Balance OT LTG, Date Established 09/17/17  -RB      Dynamic Standing Balance OT LTG, Time to Achieve by discharge  -RB      Dynamic Standing Balance OT LTG, [place] Level independent;conditional independence   5 minutes with functional activity.  -RB      Dynamic Standing Balance OT LTG, Assist Device assistive Device   R/W.  -RB      Patient Education OT LTG    Patient Education OT LTG, Date Established 09/17/17  -RB      Patient Education OT LTG, Time to Achieve by discharge  -RB      Patient Education OT LTG, Education Type home safety;adaptive breathing;joint protection;work simplification   fall prevention.  -RB      Patient Education OT LTG, Education Understanding demonstrates adequately;verbalizes understanding  -RB      ADL OT LTG    ADL OT LTG, Date Established 09/17/17  -RB      ADL OT LTG, Time  "to Achieve by discharge  -RB      ADL OT LTG, Activity Type ADL skills   Sponge bath and dress or walk-in shower.  -RB      ADL OT LTG, [place] Level independent with device;assistive device   R/W if needed.  -RB        User Key  (r) = Recorded By, (t) = Taken By, (c) = Cosigned By    Initials Name Provider Type    RB Carrington Marin OT Occupational Therapist                Outcome Measures       09/17/17 0942 09/16/17 0845       How much help from another person do you currently need...    Turning from your back to your side while in flat bed without using bedrails?  4  -CZ     Moving from lying on back to sitting on the side of a flat bed without bedrails?  4  -CZ     Moving to and from a bed to a chair (including a wheelchair)?  3  -CZ     Standing up from a chair using your arms (e.g., wheelchair, bedside chair)?  3  -CZ     Climbing 3-5 steps with a railing?  3  -CZ     To walk in hospital room?  3  -CZ     AM-PAC 6 Clicks Score  20  -CZ     How much help from another is currently needed...    Putting on and taking off regular lower body clothing? 3  -RB      Bathing (including washing, rinsing, and drying) 3  -RB      Toileting (which includes using toilet bed pan or urinal) 3  -RB      Putting on and taking off regular upper body clothing 4  -RB      Taking care of personal grooming (such as brushing teeth) 4  -RB      Eating meals 4  -RB      Score 21  -RB      Tinetti Assessment    Tinetti Assessment  yes  -CZ     Sitting Balance  1  -CZ     Arises  2  -CZ     Attempts to Rise  2  -CZ     Immediate Standing Balance (first 5 sec)  2  -CZ     Standing Balance  1  -CZ     Sternal Nudge (feet close together)  1  -CZ     Eyes Closed (feet close together)  0  -CZ     Turning 360 Degrees- Steps  1  -CZ     Turning 360 Degrees- Steadiness  1  -CZ     Sitting Down  2  -CZ     Tinetti Balance Score  13  -CZ     Gait Initiation (immediate after told \"go\")  1  -CZ     Step Length- Right Swing  1  -CZ     Step " Length- Left Swing  1  -CZ     Foot Clearance- Right Foot  1  -CZ     Foot Clearance- Left Foot  1  -CZ     Step Symmetry  0  -CZ     Step Continuity  1  -CZ     Path (excursion)  1  -CZ     Trunk  0  -CZ     Base of Support  1  -CZ     Gait Score  8  -CZ     Tinetti Total Score  21  -CZ     T[place]etti Assistive Device  rolling walker  -CZ     Functional Assessment    Outcome Measure Options AM-PAC 6 Clicks Daily Activity (OT)  -RB Tinetti;AM-PAC 6 Clicks Basic Mobility (PT)  -CZ       User Key  (r) = Recorded By, (t) = Taken By, (c) = Cosigned By    Initials Name Provider Type    RB Carrington Marin OT Occupational Therapist    CZ Bud Lovelace, PT Physical Therapist          Time Calculation:   OT Start Time: 0942  OT Stop Time: 1010  OT Time Calculation (min): 28 min    Therapy Charges for Today     Code Description Service Date Service Provider Modifiers Qty    [id number]  OT SELFCARE CURRENT 9/17/2017 Carrington Marin OT GO, CJ 1    [id number]  OT SELFCARE PROJECTED 9/17/2017 Carrington Marin OT GO, CI 1    [id number]  OT EVAL LOW COMPLEXITY 2 9/17/2017 Carrington Marin OT GO 1          OT G-codes  OT Professional Judgement Used?: Yes  OT Functional Scales Options: AM-PAC 6 Clicks Daily Activity (OT)  Score: 21  Functional Limitation: Self care  Self Care Current Status (): At least 20 percent but less than 40 percent impaired, limited or restricted  Self Care Goal Status (): At least 1 percent but less than 20 percent impaired, limited or restricted    Carrington Marin OT  9/17/2017

## 2017-09-17 NOTE — PLAN OF CARE
Problem: Patient Care Overview (Adult)  Goal: Plan of Care Review  Outcome: Ongoing (interventions implemented as appropriate)    09/17/17 1350 09/17/17 1358   Coping/Psychosocial Response Interventions   Plan Of Care Reviewed With patient --    Patient Care Overview   Progress --  improving   Outcome Evaluation   Outcome Summary/Follow up Plan --  pt transfers with SBA- Independent, pt ambulated 350` with RW & CGA, pt would benefit from HHPT @ D/C         Problem: Inpatient Physical Therapy  Goal: Transfer Training Goal 1 STG- PT  Outcome: Ongoing (interventions implemented as appropriate)    09/16/17 0845   Transfer Training PT STG   Transfer Training PT STG, Date Established 09/16/17   Transfer Training PT STG, Time to Achieve 2 days   Transfer Training PT STG, Activity Type bed to chair /chair to bed;sit to stand/stand to sit   Transfer Training PT STG, [place] Level conditional independence   Transfer Training PT STG, Date Goal Reviewed 09/16/17   Transfer Training PT STG, Outcome goal ongoing       Goal: Gait Training Goal STG- PT  Outcome: Ongoing (interventions implemented as appropriate)    09/16/17 0845   Gait Training PT STG   Gait Training Goal PT STG, Date Established 09/16/17   Gait Training Goal PT STG, Time to Achieve 2 days   Gait Training Goal PT STG, [place] Level conditional independence   Gait Training Goal PT STG, Assist Device walker, rolling   Gait Training Goal PT STG, Distance to Achieve 150'x1.   Gait Training Goal PT STG, Additional Goal SpO2 >90%.   Gait Training Goal PT STG, Date Goal Reviewed 09/16/17   Gait Training Goal PT STG, Outcome goal ongoing       Goal: Stair Training Goal LTG- PT  Outcome: Ongoing (interventions implemented as appropriate)    09/16/17 0845   Stair Training PT LTG   Stair Training Goal PT LTG, Date Established 09/16/17   Stair Training Goal PT LTG, Time to Achieve by discharge   Stair Training Goal PT LTG, Number of Steps 5   Stair Training Goal PT  LTG, [place] Level conditional independence   Stair Training Goal PT LTG, Assist Device 2 handrails   Stair Training Goal PT LTG, Date Goal Reviewed 09/16/17   Stair Training Goal PT LTG, Outcome goal ongoing       Goal: Physical Therapy Goal LTG- PT  Outcome: Ongoing (interventions implemented as appropriate)    09/16/17 0845   Physical Therapy PT LTG   Physical Therapy PT LTG, Date Established 09/16/17   Physical Therapy PT LTG, Time to Achieve by discharge   Physical Therapy PT LTG, Activity Type [place] fall risk assessment.    Physical Therapy PT LTG, Addisional Goal Score 24/28 or low fall risk.    Physical Therapy PT LTG, Date Goal Reviewed 09/16/17   Physical Therapy PT LTG, Outcome goal ongoing

## 2017-09-17 NOTE — PLAN OF CARE
Problem: Activity Intolerance (Adult)  Goal: Identify Related Risk Factors and Signs and Symptoms  Outcome: Outcome(s) achieved Date Met:  09/16/17  Goal: Activity Tolerance  Outcome: Ongoing (interventions implemented as appropriate)  Goal: Effective Energy Conservation Techniques  Outcome: Ongoing (interventions implemented as appropriate)    Problem: Fluid Volume Excess (Adult,Obstetrics,Pediatric)  Goal: Stable Weight  Outcome: Ongoing (interventions implemented as appropriate)  Goal: Balanced Intake/Output  Outcome: Ongoing (interventions implemented as appropriate)    Problem: Pain, Chronic (Adult)  Goal: Acceptable Pain Control/Comfort Level  Outcome: Ongoing (interventions implemented as appropriate)    Problem: Respiratory Insufficiency (Adult)  Goal: Identify Related Risk Factors and Signs and Symptoms  Outcome: Outcome(s) achieved Date Met:  09/16/17  Goal: Acid/Base Balance  Outcome: Ongoing (interventions implemented as appropriate)  Goal: Effective Ventilation  Outcome: Ongoing (interventions implemented as appropriate)

## 2017-09-18 LAB
ALBUMIN SERPL-MCNC: 4.2 G/DL (ref 3.4–4.8)
ALBUMIN/GLOB SERPL: 1.5 G/DL (ref 1.1–1.8)
ALP SERPL-CCNC: 64 U/L (ref 38–126)
ALT SERPL W P-5'-P-CCNC: 39 U/L (ref 21–72)
ANION GAP SERPL CALCULATED.3IONS-SCNC: 14 MMOL/L (ref 5–15)
AST SERPL-CCNC: 23 U/L (ref 17–59)
BASOPHILS # BLD AUTO: 0.02 10*3/MM3 (ref 0–0.2)
BASOPHILS NFR BLD AUTO: 0.1 % (ref 0–2)
BILIRUB SERPL-MCNC: 0.8 MG/DL (ref 0.2–1.3)
BILIRUB UR QL STRIP: NEGATIVE
BUN BLD-MCNC: 61 MG/DL (ref 7–21)
BUN/CREAT SERPL: 38.6 (ref 7–25)
CALCIUM SPEC-SCNC: 9 MG/DL (ref 8.4–10.2)
CHLORIDE SERPL-SCNC: 96 MMOL/L (ref 95–110)
CLARITY UR: CLEAR
CO2 SERPL-SCNC: 27 MMOL/L (ref 22–31)
COLOR UR: YELLOW
CREAT BLD-MCNC: 1.58 MG/DL (ref 0.7–1.3)
DEPRECATED RDW RBC AUTO: 53.2 FL (ref 35.1–43.9)
EOSINOPHIL # BLD AUTO: 0 10*3/MM3 (ref 0–0.7)
EOSINOPHIL NFR BLD AUTO: 0 % (ref 0–7)
ERYTHROCYTE [DISTWIDTH] IN BLOOD BY AUTOMATED COUNT: 15.6 % (ref 11.5–14.5)
GFR SERPL CREATININE-BSD FRML MDRD: 42 ML/MIN/1.73 (ref 60–98)
GLOBULIN UR ELPH-MCNC: 2.8 GM/DL (ref 2.3–3.5)
GLUCOSE BLD-MCNC: 145 MG/DL (ref 60–100)
GLUCOSE BLDC GLUCOMTR-MCNC: 121 MG/DL (ref 70–130)
GLUCOSE BLDC GLUCOMTR-MCNC: 152 MG/DL (ref 70–130)
GLUCOSE BLDC GLUCOMTR-MCNC: 169 MG/DL (ref 70–130)
GLUCOSE BLDC GLUCOMTR-MCNC: 196 MG/DL (ref 70–130)
GLUCOSE UR STRIP-MCNC: NEGATIVE MG/DL
HCT VFR BLD AUTO: 44 % (ref 39–49)
HGB BLD-MCNC: 14.2 G/DL (ref 13.7–17.3)
HGB UR QL STRIP.AUTO: NEGATIVE
IMM GRANULOCYTES # BLD: 0.29 10*3/MM3 (ref 0–0.02)
IMM GRANULOCYTES NFR BLD: 1.6 % (ref 0–0.5)
KETONES UR QL STRIP: NEGATIVE
LEUKOCYTE ESTERASE UR QL STRIP.AUTO: NEGATIVE
LYMPHOCYTES # BLD AUTO: 1.14 10*3/MM3 (ref 0.6–4.2)
LYMPHOCYTES NFR BLD AUTO: 6.2 % (ref 10–50)
MAGNESIUM SERPL-MCNC: 2.1 MG/DL (ref 1.6–2.3)
MCH RBC QN AUTO: 30 PG (ref 26.5–34)
MCHC RBC AUTO-ENTMCNC: 32.3 G/DL (ref 31.5–36.3)
MCV RBC AUTO: 93 FL (ref 80–98)
MONOCYTES # BLD AUTO: 0.81 10*3/MM3 (ref 0–0.9)
MONOCYTES NFR BLD AUTO: 4.4 % (ref 0–12)
NEUTROPHILS # BLD AUTO: 16.24 10*3/MM3 (ref 2–8.6)
NEUTROPHILS NFR BLD AUTO: 87.7 % (ref 37–80)
NITRITE UR QL STRIP: NEGATIVE
PH UR STRIP.AUTO: <=5 [PH] (ref 5–9)
PLATELET # BLD AUTO: 203 10*3/MM3 (ref 150–450)
PMV BLD AUTO: 11.8 FL (ref 8–12)
POTASSIUM BLD-SCNC: 4.5 MMOL/L (ref 3.5–5.1)
PROT SERPL-MCNC: 7 G/DL (ref 6.3–8.6)
PROT UR QL STRIP: NEGATIVE
RBC # BLD AUTO: 4.73 10*6/MM3 (ref 4.37–5.74)
SODIUM BLD-SCNC: 137 MMOL/L (ref 137–145)
SP GR UR STRIP: 1.02 (ref 1–1.03)
UROBILINOGEN UR QL STRIP: NORMAL
WBC NRBC COR # BLD: 18.5 10*3/MM3 (ref 3.2–9.8)

## 2017-09-18 PROCEDURE — 80053 COMPREHEN METABOLIC PANEL: CPT | Performed by: INTERNAL MEDICINE

## 2017-09-18 PROCEDURE — 82962 GLUCOSE BLOOD TEST: CPT

## 2017-09-18 PROCEDURE — 97110 THERAPEUTIC EXERCISES: CPT

## 2017-09-18 PROCEDURE — 94760 N-INVAS EAR/PLS OXIMETRY 1: CPT

## 2017-09-18 PROCEDURE — 94799 UNLISTED PULMONARY SVC/PX: CPT

## 2017-09-18 PROCEDURE — 25010000002 HEPARIN (PORCINE) PER 1000 UNITS: Performed by: INTERNAL MEDICINE

## 2017-09-18 PROCEDURE — 97116 GAIT TRAINING THERAPY: CPT

## 2017-09-18 PROCEDURE — 81003 URINALYSIS AUTO W/O SCOPE: CPT | Performed by: INTERNAL MEDICINE

## 2017-09-18 PROCEDURE — 25010000002 LEVOFLOXACIN PER 250 MG: Performed by: NURSE PRACTITIONER

## 2017-09-18 PROCEDURE — 83735 ASSAY OF MAGNESIUM: CPT | Performed by: INTERNAL MEDICINE

## 2017-09-18 PROCEDURE — 97530 THERAPEUTIC ACTIVITIES: CPT

## 2017-09-18 PROCEDURE — 97535 SELF CARE MNGMENT TRAINING: CPT

## 2017-09-18 PROCEDURE — 85025 COMPLETE CBC W/AUTO DIFF WBC: CPT | Performed by: INTERNAL MEDICINE

## 2017-09-18 PROCEDURE — 25010000002 METHYLPREDNISOLONE PER 40 MG: Performed by: NURSE PRACTITIONER

## 2017-09-18 PROCEDURE — 25010000002 FUROSEMIDE PER 20 MG: Performed by: INTERNAL MEDICINE

## 2017-09-18 RX ORDER — METHYLPREDNISOLONE SODIUM SUCCINATE 40 MG/ML
20 INJECTION, POWDER, LYOPHILIZED, FOR SOLUTION INTRAMUSCULAR; INTRAVENOUS EVERY 12 HOURS
Status: DISCONTINUED | OUTPATIENT
Start: 2017-09-18 | End: 2017-09-19

## 2017-09-18 RX ADMIN — TERBUTALINE SULFATE 5 MG: 2.5 TABLET ORAL at 09:10

## 2017-09-18 RX ADMIN — CLOPIDOGREL BISULFATE 75 MG: 75 TABLET ORAL at 09:10

## 2017-09-18 RX ADMIN — METHYLPREDNISOLONE SODIUM SUCCINATE 20 MG: 40 INJECTION, POWDER, FOR SOLUTION INTRAMUSCULAR; INTRAVENOUS at 03:00

## 2017-09-18 RX ADMIN — LEVOFLOXACIN 250 MG: 5 INJECTION, SOLUTION INTRAVENOUS at 16:11

## 2017-09-18 RX ADMIN — HEPARIN SODIUM 5000 UNITS: 5000 INJECTION, SOLUTION INTRAVENOUS; SUBCUTANEOUS at 06:24

## 2017-09-18 RX ADMIN — HEPARIN SODIUM 5000 UNITS: 5000 INJECTION, SOLUTION INTRAVENOUS; SUBCUTANEOUS at 22:34

## 2017-09-18 RX ADMIN — HYDROCODONE BITARTRATE AND ACETAMINOPHEN 1 TABLET: 7.5; 325 TABLET ORAL at 06:23

## 2017-09-18 RX ADMIN — FAMOTIDINE 20 MG: 20 TABLET ORAL at 09:10

## 2017-09-18 RX ADMIN — METHYLPREDNISOLONE SODIUM SUCCINATE 20 MG: 40 INJECTION, POWDER, FOR SOLUTION INTRAMUSCULAR; INTRAVENOUS at 09:11

## 2017-09-18 RX ADMIN — METHYLPREDNISOLONE SODIUM SUCCINATE 20 MG: 40 INJECTION, POWDER, FOR SOLUTION INTRAMUSCULAR; INTRAVENOUS at 21:17

## 2017-09-18 RX ADMIN — GLIPIZIDE 5 MG: 5 TABLET ORAL at 09:10

## 2017-09-18 RX ADMIN — FAMOTIDINE 20 MG: 20 TABLET ORAL at 17:16

## 2017-09-18 RX ADMIN — FUROSEMIDE 20 MG: 10 INJECTION, SOLUTION INTRAVENOUS at 21:17

## 2017-09-18 RX ADMIN — DONEPEZIL HYDROCHLORIDE 10 MG: 10 TABLET, FILM COATED ORAL at 09:10

## 2017-09-18 RX ADMIN — MAGNESIUM OXIDE TAB 400 MG (241.3 MG ELEMENTAL MG) 400 MG: 400 (241.3 MG) TAB at 09:10

## 2017-09-18 RX ADMIN — ISOSORBIDE DINITRATE 30 MG: 30 TABLET ORAL at 09:10

## 2017-09-18 RX ADMIN — LISINOPRIL 10 MG: 10 TABLET ORAL at 09:10

## 2017-09-18 RX ADMIN — IPRATROPIUM BROMIDE AND ALBUTEROL SULFATE 3 ML: 2.5; .5 SOLUTION RESPIRATORY (INHALATION) at 11:20

## 2017-09-18 RX ADMIN — TERBUTALINE SULFATE 5 MG: 2.5 TABLET ORAL at 17:16

## 2017-09-18 RX ADMIN — HYDROCODONE BITARTRATE AND ACETAMINOPHEN 1 TABLET: 7.5; 325 TABLET ORAL at 16:12

## 2017-09-18 RX ADMIN — IPRATROPIUM BROMIDE AND ALBUTEROL SULFATE 3 ML: 2.5; .5 SOLUTION RESPIRATORY (INHALATION) at 23:27

## 2017-09-18 RX ADMIN — HEPARIN SODIUM 5000 UNITS: 5000 INJECTION, SOLUTION INTRAVENOUS; SUBCUTANEOUS at 16:12

## 2017-09-18 RX ADMIN — IPRATROPIUM BROMIDE AND ALBUTEROL SULFATE 3 ML: 2.5; .5 SOLUTION RESPIRATORY (INHALATION) at 03:18

## 2017-09-18 RX ADMIN — PANTOPRAZOLE SODIUM 40 MG: 40 TABLET, DELAYED RELEASE ORAL at 06:23

## 2017-09-18 RX ADMIN — FLUTICASONE PROPIONATE 2 SPRAY: 50 SPRAY, METERED NASAL at 09:11

## 2017-09-18 RX ADMIN — IPRATROPIUM BROMIDE AND ALBUTEROL SULFATE 3 ML: 2.5; .5 SOLUTION RESPIRATORY (INHALATION) at 07:17

## 2017-09-18 RX ADMIN — FUROSEMIDE 20 MG: 10 INJECTION, SOLUTION INTRAVENOUS at 09:12

## 2017-09-18 RX ADMIN — IPRATROPIUM BROMIDE AND ALBUTEROL SULFATE 3 ML: 2.5; .5 SOLUTION RESPIRATORY (INHALATION) at 19:43

## 2017-09-18 RX ADMIN — HYDROCODONE BITARTRATE AND ACETAMINOPHEN 1 TABLET: 7.5; 325 TABLET ORAL at 22:34

## 2017-09-18 RX ADMIN — ASPIRIN 81 MG 81 MG: 81 TABLET ORAL at 09:10

## 2017-09-18 RX ADMIN — FUROSEMIDE 20 MG: 10 INJECTION, SOLUTION INTRAVENOUS at 17:15

## 2017-09-18 NOTE — PAYOR COMM NOTE
"New inpatient admission   Pending Auth# [id number]    Nicole  [facility]  [phone] phone  [phone] fax        Hasmukh Ham (84 y.o. Male)     Date of Birth Social Security Number Address Home Phone MRN    06/01/1933  [address] [facility] [id number] [phone] [id number]    Confucianist Marital Status          Gnosticism        Admission Date Admission Type Admitting Provider Attending Provider Department, Room/Bed    9/15/17 Urgent Pamela Griffin MD Amsler, Mariola HALE MD [address], 402/1    Discharge Date Discharge Disposition Discharge Destination                      Attending Provider: Mariola Arrieta MD     Allergies:  Atorvastatin, Pravastatin, Azithromycin, Biaxin [Clarithromycin]    Isolation:  None   Infection:  None   Code Status:  FULL    Ht:  67\" (170.2 cm)   Wt:  181 lb (82.1 kg)    Admission Cmt:  None   Principal Problem:  Acute exacerbation of chronic obstructive airways disease [J44.1]                 Active Insurance as of 9/15/2017     Primary Coverage     Payor Plan Insurance Group Employer/Plan Group    AETNA MEDICARE REPLACEMENT AETNA [id number]     Payor Plan Address Payor Plan Phone Number Effective From Effective To    [address] [phone] 4/1/2017     [place] [address]       Subscriber Name Subscriber Birth Date Member ID       HASMUKH HAM 6/1/1933 [id number]           Secondary Coverage     Payor Plan Insurance Group Employer/Plan Group    [employer] [place] [id number]     Payor Plan Address Payor Plan Phone Number Effective From Effective To    [address] [address] [phone] 9/15/2017     [place] [address]       Subscriber Name Subscriber Birth Date Member ID       HASMKUH HAM 6/1/1933 [id number]                 Emergency Contacts      (Rel.) Home Phone Work Phone Mobile Phone    RcGeovanna (Daughter) [phone] -- [phone]    FatoumataRimma (Spouse) " [phone] -- --            Problem List           Codes Noted - Resolved       Hospital    COPD exacerbation ICD-10-CM: J44.1  ICD-9-CM: 491.21 9/16/2017 - Present    * (Principal)Acute exacerbation of chronic obstructive airways disease ICD-10-CM: J44.1  ICD-9-CM: 491.21 Unknown - Present    Acute interstitial pneumonia ICD-10-CM: J84.9  ICD-9-CM: 516.8 Unknown - Present    Chronic obstructive lung disease ICD-10-CM: J44.9  ICD-9-CM: 496 Unknown - Present    Coronary arteriosclerosis ICD-10-CM: I25.10  ICD-9-CM: 414.00 Unknown - Present    Diabetes mellitus ICD-10-CM: E11.9  ICD-9-CM: 250.00 Unknown - Present    (HFpEF) heart failure with preserved ejection fraction ICD-10-CM: I50.30  ICD-9-CM: 428.9 9/14/2017 - Present    Pulmonary HTN ICD-10-CM: I27.2  ICD-9-CM: 416.8 9/14/2017 - Present    Shortness of breath ICD-10-CM: R06.02  ICD-9-CM: 786.05 Unknown - Present    COPD with exacerbation ICD-10-CM: J44.1  ICD-9-CM: 491.21 Unknown - Present    Benign essential hypertension ICD-10-CM: I10  ICD-9-CM: 401.1 12/13/2016 - Present    Type 2 diabetes mellitus ICD-10-CM: E11.9  ICD-9-CM: 250.00 12/13/2016 - Present    Non-rheumatic tricuspid valve insufficiency ICD-10-CM: I36.1  ICD-9-CM: 424.2 12/13/2016 - Present    Coronary artery disease involving native coronary artery of native heart ICD-10-CM: I25.10  ICD-9-CM: 414.01 12/13/2016 - Present    Pulmonary emphysema ICD-10-CM: J43.9  ICD-9-CM: 492.8 12/13/2016 - Present    Atrial fibrillation ICD-10-CM: I48.91  ICD-9-CM: 427.31 12/13/2016 - Present       Non-Hospital    Bilateral carotid artery stenosis ICD-10-CM: I65.23  ICD-9-CM: 433.10, 433.30 7/10/2017 - Present    Rotator cuff syndrome ICD-10-CM: M75.100  ICD-9-CM: 726.10 6/30/2017 - Present    Partial tear of subscapularis tendon ICD-10-CM: S43.80XA  ICD-9-CM: 840.5 6/30/2017 - Present    Infraspinatus tendon tear ICD-10-CM: S46.819A  ICD-9-CM: 840.3 6/30/2017 - Present    Supraspinatus tendon tear ICD-10-CM:  S46.819A  ICD-9-CM: 840.6 6/30/2017 - Present    Right shoulder pain ICD-10-CM: M25.511  ICD-9-CM: 719.41 6/19/2017 - Present    Family history of diseases of the blood and blood-forming organs and certain disorders involving the immune mechanism ICD-10-CM: Z83.2  ICD-9-CM: V18.3 1/11/2017 - Present    Paroxysmal tachycardia ICD-10-CM: I47.9  ICD-9-CM: 427.2 1/3/2017 - Present    Bradycardia ICD-10-CM: R00.1  ICD-9-CM: 427.89 Unknown - Present    Chronic coronary artery disease ICD-10-CM: I25.10  ICD-9-CM: 414.00 Unknown - Present    Neuropathy ICD-10-CM: G62.9  ICD-9-CM: 355.9 Unknown - Present    Dilated aortic root ICD-10-CM: I77.810  ICD-9-CM: 447.71 12/13/2016 - Present    Dyspnea ICD-10-CM: R06.00  ICD-9-CM: 786.09 12/13/2016 - Present    Gastroesophageal reflux disease without esophagitis ICD-10-CM: K21.9  ICD-9-CM: 530.81 2/3/2016 - Present    Nocturia ICD-10-CM: R35.1  ICD-9-CM: 788.43 2/2/2016 - Present    Dementia ICD-10-CM: F03.90  ICD-9-CM: 294.20 12/4/2012 - Present    Diabetic neuropathy ICD-10-CM: E11.40  ICD-9-CM: 250.60, 357.2 8/8/2012 - Present    Abdominal hernia ICD-10-CM: K46.9  ICD-9-CM: 553.9 6/14/2012 - Present    Neck pain ICD-10-CM: M54.2  ICD-9-CM: 723.1 7/18/2011 - Present    Hyperlipidemia ICD-10-CM: E78.5  ICD-9-CM: 272.4 11/14/2010 - Present    Diabetic polyneuropathy ICD-10-CM: E11.42  ICD-9-CM: 250.60, 357.2 2/23/2010 - Present    Generalized osteoarthritis ICD-10-CM: M15.9  ICD-9-CM: 715.00 2/23/2010 - Present    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 ICD-10-CM: I69.959  ICD-9-CM: 438.20 2/23/2010 - Present    Chronic obstructive bronchitis ICD-10-CM: J44.9  ICD-9-CM: 491.20 2/23/2010 - Present    Osteoarthritis of multiple joints ICD-10-CM: M15.9  ICD-9-CM: 715.89 2/23/2010 - Present    Pain in joint involving ankle and foot ICD-10-CM: M25.579  ICD-9-CM: 719.47 2/23/2010 - Present    Arthropathy of hand ICD-10-CM: M12.9  ICD-9-CM: 716.94 2/23/2010 - Present     Osteoarthrosis involving more than one site but not generalized ICD-10-CM: M15.9  ICD-9-CM: 715.80 2/23/2010 - Present             History & Physical      Pamela Griffin MD at 9/15/2017  1:23 PM                [facility] Medicine Services  INPATIENT HISTORY AND PHYSICAL       Patient Care Team:  Paolo Rey MD as PCP - General    Chief complaint No chief complaint on file.      Subjective     Patient is a 84 y.o. male presents with complaint of having shortness of breath.  Patient states she's been having shortness of breath ongoing for past few weeks.  Patient states his shortness of breath has been worsening.  He has been having coughing spells with thick yellowish to greenish colored sputum.  No nausea vomiting have been reported.  Patient does complain of chest tightness with coughing spells.  Patient states he has been having worsening orthopnea of 3-4 pillows.  Patient also admits of having reduced excess tolerance.  Patient does complain of worsening swelling.  No urinary complaints have been reported.  Patient states he has been compliant with his medications.  Patient states he was recently treated with IV steroids and nebulizer treatment without any benefit.    Review of Systems   Review of Systems   Constitutional: Positive for activity change and fever. Negative for appetite change, chills and diaphoresis.   HENT: Negative for congestion, rhinorrhea, sore throat and trouble swallowing.    Eyes: Negative for visual disturbance.   Respiratory: Positive for cough, chest tightness, shortness of breath and wheezing.    Cardiovascular: Positive for leg swelling. Negative for chest pain and palpitations.   Gastrointestinal: Negative for abdominal pain, blood in stool, diarrhea, nausea and vomiting.   Endocrine: Negative for cold intolerance and heat intolerance.   Genitourinary: Negative for decreased urine volume and difficulty urinating.   Musculoskeletal:  Negative for back pain, gait problem and neck pain.   Skin: Negative for rash.   Neurological: Negative for dizziness, syncope, weakness, light-headedness, numbness and headaches.   Psychiatric/Behavioral: The patient is not nervous/anxious.        History  Past Medical History:   Diagnosis Date   • Acute exacerbation of chronic obstructive airways disease    • Acute interstitial pneumonia     Hamman-Rich Syndrome   • Arthritis    • Backache    • Bradycardia    • Chronic obstructive lung disease    • Chronic obstructive pulmonary disease (COPD)    • Coronary arteriosclerosis    • Coronary atherosclerosis of native coronary artery    • Degenerative joint disease involving multiple joints    • Diabetes mellitus    • Duodenal ulcer disease    • Hernia of abdominal wall    • History of echocardiogram 04/13/2015    Echocardiogram W/ color flow 85354 (1) - Normal LV systolic function with Ef of 70% with LVH and diastolic relaxation abnormality of the left ventricle. Mildly dilated aortic root. No sig.valvular abnormalities.   • Hyperlipidemia    • Hypertension    • Hypertensive disorder    • Left lower lobe pneumonia     Left lower zone pneumonia - clinically improved   • Neuropathy    • Pneumonia     Recent improving   • Shortness of breath    • Type 2 diabetes mellitus      Past Surgical History:   Procedure Laterality Date   • APPENDECTOMY     • CARDIAC CATHETERIZATION N/A 6/6/2017    Procedure: Right Heart Cath;  Surgeon: Jeff Maciel MD PhD;  Location: [facility] INVASIVE LOCATION;  Service:    • HERNIA REPAIR     • LUNG BIOPSY     • NECK SURGERY     • THORACOTOMY Left 1977    left with lung biopsy   • VENTRAL HERNIA REPAIR       Family History   Problem Relation Age of Onset   • Diabetes Other    • Heart disease Other    • Hypertension Other    • Other Other      Respiratory disorder   • Stroke Other      Social History   Substance Use Topics   • Smoking status: Former Smoker   • Smokeless tobacco: Never  Used   • Alcohol use No     Facility-Administered Medications Prior to Admission   Medication Dose Route Frequency Provider Last Rate Last Dose   • [COMPLETED] furosemide (LASIX) injection 20 mg  20 mg Subcutaneous Once Gauri Vargas APRN   20 mg at 09/14/17 1551   • [COMPLETED] furosemide (LASIX) injection 40 mg  40 mg Subcutaneous Once Gauri Vargas, APRN   40 mg at 09/14/17 1552     Prescriptions Prior to Admission   Medication Sig Dispense Refill Last Dose   • aspirin 81 MG chewable tablet Chew 81 mg Daily.   9/15/2017 at Unknown time   • clopidogrel (PLAVIX) 75 MG tablet Take 75 mg by mouth Daily.   9/15/2017 at Unknown time   • donepezil (ARICEPT) 10 MG tablet Take 10 mg by mouth Daily.  0 9/15/2017 at Unknown time   • famotidine (PEPCID) 20 MG tablet Take 20 mg by mouth 2 (Two) Times a Day.  0 9/15/2017 at Unknown time   • fluticasone (FLONASE) 50 MCG/ACT nasal spray 2 sprays into each nostril Daily.   9/15/2017 at Unknown time   • furosemide (LASIX) 20 MG tablet Take 1 tablet by mouth Daily. 90 tablet 3 9/15/2017 at Unknown time   • glimepiride (AMARYL) 2 MG tablet Take 2 mg by mouth Daily.  0 9/15/2017 at Unknown time   • HYDROcodone-acetaminophen (NORCO) 7.5-325 MG per tablet Take 1 tablet by mouth Every 8 (Eight) Hours.   9/15/2017 at Unknown time   • isosorbide dinitrate (ISORDIL) 30 MG tablet Take 30 mg by mouth Daily.  0 9/15/2017 at Unknown time   • lisinopril (PRINIVIL,ZESTRIL) 10 MG tablet Take 1 tablet by mouth Daily. 30 tablet 6 9/15/2017 at Unknown time   • magnesium oxide (MAGOX) 400 (241.3 MG) MG tablet tablet Take 400 mg by mouth Daily.   9/15/2017 at Unknown time   • O2 (OXYGEN) Inhale 2 L/min Every Night. W/ CPAP   9/14/2017 at Unknown time   • predniSONE (DELTASONE) 10 MG tablet Take 10 mg by mouth Daily.   9/15/2017 at Unknown time   • Red Yeast Rice 600 MG tablet Take 600 mg by mouth 2 (Two) Times a Day.   9/15/2017 at Unknown time   • terbutaline (BRETHINE) 5 MG tablet Take 5 mg  by mouth 2 (Two) Times a Day.  0 9/15/2017 at Unknown time   • Umeclidinium-Vilanterol 62.5-25 MCG/INH aerosol powder  Inhale 1 puff Daily. 1 each 11 9/15/2017 at Unknown time   • albuterol (PROVENTIL) (2.5 MG/3ML) 0.083% nebulizer solution Take 2.5 mg by nebulization 4 (Four) Times a Day As Needed for wheezing. 125 vial 11 Taking   • ipratropium-albuterol (DUO-NEB) 0.5-2.5 mg/mL nebulizer Inhale 3 mL.   Taking   • nitroglycerin (NITROSTAT) 0.4 MG SL tablet place 1 tablet under the tongue if needed every 5 minutes for hair...  (REFER TO PRESCRIPTION NOTES).  0 Taking     Allergies:  Atorvastatin; Pravastatin; Azithromycin; and Biaxin [clarithromycin]  Prior to Admission medications    Medication Sig Start Date End Date Taking? Authorizing Provider   aspirin 81 MG chewable tablet Chew 81 mg Daily.   Yes Historical Provider, MD   clopidogrel (PLAVIX) 75 MG tablet Take 75 mg by mouth Daily. 2/3/16  Yes Historical Provider, MD   donepezil (ARICEPT) 10 MG tablet Take 10 mg by mouth Daily. 11/12/16  Yes Historical Provider, MD   famotidine (PEPCID) 20 MG tablet Take 20 mg by mouth 2 (Two) Times a Day. 10/20/16  Yes Historical Provider, MD   fluticasone (FLONASE) 50 MCG/ACT nasal spray 2 sprays into each nostril Daily.   Yes Historical Provider, MD   furosemide (LASIX) 20 MG tablet Take 1 tablet by mouth Daily. 3/3/17  Yes Jeff Maciel MD PhD   glimepiride (AMARYL) 2 MG tablet Take 2 mg by mouth Daily. 10/12/16  Yes Historical Provider, MD   HYDROcodone-acetaminophen (NORCO) 7.5-325 MG per tablet Take 1 tablet by mouth Every 8 (Eight) Hours. 12/27/16  Yes Historical Provider, MD   isosorbide dinitrate (ISORDIL) 30 MG tablet Take 30 mg by mouth Daily. 9/27/16  Yes Historical Provider, MD   lisinopril (PRINIVIL,ZESTRIL) 10 MG tablet Take 1 tablet by mouth Daily. 9/14/17  Yes EVE Sewell   magnesium oxide (MAGOX) 400 (241.3 MG) MG tablet tablet Take 400 mg by mouth Daily.   Yes Historical Provider, MD   O2  (OXYGEN) Inhale 2 L/min Every Night. W/ CPAP   Yes Historical Provider, MD   predniSONE (DELTASONE) 10 MG tablet Take 10 mg by mouth Daily.   Yes Historical Provider, MD   Red Yeast Rice 600 MG tablet Take 600 mg by mouth 2 (Two) Times a Day.   Yes Historical Provider, MD   terbutaline (BRETHINE) 5 MG tablet Take 5 mg by mouth 2 (Two) Times a Day. 10/14/16  Yes Historical Provider, MD   Umeclidinium-Vilanterol 62.5-25 MCG/INH aerosol powder  Inhale 1 puff Daily. 3/3/17  Yes Brea Higginbotham MD   albuterol (PROVENTIL) (2.5 MG/3ML) 0.083% nebulizer solution Take 2.5 mg by nebulization 4 (Four) Times a Day As Needed for wheezing. 3/3/17   Brea Higginbotham MD   ipratropium-albuterol (DUO-NEB) 0.5-2.5 mg/mL nebulizer Inhale 3 mL. 8/3/17   Historical Provider, MD   nitroglycerin (NITROSTAT) 0.4 MG SL tablet place 1 tablet under the tongue if needed every 5 minutes for hair...  (REFER TO PRESCRIPTION NOTES). 1/11/17   Historical Provider, MD       Objective     Vital Signs    Heart Rate:  [50-83] 50  Resp:  [20] 20  BP: (122-134)/(58-60) 122/58    Physical Exam:      Physical Exam   Constitutional: He is oriented to person, place, and time. He appears well-developed and well-nourished.   HENT:   Head: Normocephalic and atraumatic.   Nose: Nose normal.   Eyes: Conjunctivae and EOM are normal. Pupils are equal, round, and reactive to light.   Neck: Normal range of motion. Neck supple. No JVD present. No tracheal deviation present. No thyromegaly present.   Cardiovascular: Normal rate, regular rhythm, normal heart sounds and intact distal pulses.    Pulmonary/Chest: Effort normal. No respiratory distress. He has wheezes. He has rales. He exhibits no tenderness.   Abdominal: Soft. Bowel sounds are normal. He exhibits no distension. There is no tenderness. There is no rebound and no guarding.   Musculoskeletal: Normal range of motion. He exhibits edema.   Lymphadenopathy:     He has no cervical adenopathy.   Neurological: He is  alert and oriented to person, place, and time. He has normal reflexes. No cranial nerve deficit.   Skin: Skin is warm and dry.   Intact   Psychiatric: He has a normal mood and affect.   Nursing note and vitals reviewed.      Results Review:           Invalid input(s): LABALBU, PROT                    Imaging Results (last 7 days)     ** No results found for the last 168 hours. **          Assessment/Plan     Principal Problem:    Acute exacerbation of chronic obstructive airways disease  Active Problems:    Benign essential hypertension    Type 2 diabetes mellitus    Non-rheumatic tricuspid valve insufficiency    Coronary artery disease involving native coronary artery of native heart    Pulmonary emphysema    Atrial fibrillation    Shortness of breath    COPD with exacerbation    (HFpEF) heart failure with preserved ejection fraction    Pulmonary HTN    Acute interstitial pneumonia    Chronic obstructive lung disease    Coronary arteriosclerosis    Diabetes mellitus      -We'll continue with IV antibiotics.  -We'll also give IV diuretics.    -we'll continue with nebulizer treatments.  -We'll resume patient's home medication as prior to coming hospital.  -DVT and GI prophylaxis in place.  -We'll continue monitoring patient hospital setting and treat patient as course dictates.  -We'll recommend aggressive PT OT.    I discussed the patients findings and my recommendations with patient.         This document has been electronically signed by Pamela Griffin MD on September 15, 2017 1:23 PM        Total Time Spent: 45 mins    EMR Dragon/Transcription disclaimer:   Dictated utilizing Dragon dictation.            Electronically signed by Pamela Griffin MD at 9/15/2017  7:25 PM        Emergency Department Notes     No notes of this type exist for this encounter.        Vital Signs (last 72 hrs)       09/15 0700  -  09/16 0659 09/16 0700  -  09/17 0659 09/17 0700  -  09/18 0659 09/18 0700  -  09/18 1504    Most Recent    Temp (°F) 97.2 -  97.8    97.1 -  97.6    97.2 -  98.8    96.4 -  96.8     96.6 (35.9)    Heart Rate (!)39 -  91    56 -  89    73 -  101    89 -  107     92    Resp 18 -  22    18 -  20    18 -  20      18     18    /58 -  164/70    108/77 -  156/75    124/65 -  146/78    (!)88/52 -  147/85     (!) 88/52    SpO2 (%) 94 -  98    94 -  98    93 -  98    93 -  96     94          Intake & Output (last 3 days)       09/15 0701 - 09/16 0700 09/16 0701 - 09/17 0700 09/17 0701 - 09/18 0700 09/18 0701 - 09/19 0700    P.O. 1300 100 640 250    Total Intake(mL/kg) 1300 (15.5) 100 (1.2) 640 (7.8) 250 (3)    Net +1300 +100 +640 +250            Unmeasured Urine Occurrence 6 x 10 x 11 x 2 x    Unmeasured Stool Occurrence  2 x 4 x         Lines, Drains & Airways    Active LDAs     Name:   Placement date:   Placement time:   Site:   Days:    PICC Line - Triple Lumen 09/15/17 1436 basilic vein (medial side of arm), right 6 Fr;length (specify)  09/15/17    1436      3                Hospital Medications (active)       Dose Frequency Start End    acetaminophen (TYLENOL) tablet 650 mg 650 mg Every 4 Hours PRN 9/15/2017     Sig - Route: Take 2 tablets by mouth Every 4 (Four) Hours As Needed for Mild Pain . - Oral    albuterol (PROVENTIL) nebulizer solution 0.083% 2.5 mg/3mL 2.5 mg 4 Times Daily PRN 9/15/2017     Sig - Route: Take 2.5 mg by nebulization 4 (Four) Times a Day As Needed for Wheezing. - Nebulization    aspirin chewable tablet 81 mg 81 mg Daily 9/15/2017     Sig - Route: Chew 1 tablet Daily. - Oral    bisacodyl (DULCOLAX) suppository 10 mg 10 mg Daily PRN 9/15/2017     Sig - Route: Insert 1 suppository into the rectum Daily As Needed for Constipation. - Rectal    clopidogrel (PLAVIX) tablet 75 mg 75 mg Daily 9/15/2017     Sig - Route: Take 1 tablet by mouth Daily. - Oral    docusate sodium (COLACE) capsule 200 mg 200 mg 2 Times Daily 9/15/2017     Sig - Route: Take 2 capsules by mouth 2 (Two) Times a Day.  - Oral    donepezil (ARICEPT) tablet 10 mg 10 mg Daily 9/15/2017     Sig - Route: Take 1 tablet by mouth Daily. - Oral    famotidine (PEPCID) tablet 20 mg 20 mg 2 Times Daily 9/15/2017     Sig - Route: Take 1 tablet by mouth 2 (Two) Times a Day. - Oral    fluticasone (FLONASE) 50 MCG/ACT nasal spray 2 spray 2 spray Daily 9/15/2017     Sig - Route: 2 sprays into each nostril Daily. - Nasal    furosemide (LASIX) injection 20 mg 20 mg 3 Times Daily 9/15/2017     Sig - Route: Infuse 2 mL into a venous catheter 3 (Three) Times a Day. - Intravenous    glipiZIDE (GLUCOTROL) tablet 5 mg 5 mg Every Morning Before Breakfast 9/16/2017     Sig - Route: Take 1 tablet by mouth Every Morning Before Breakfast. - Oral    heparin (porcine) 5000 UNIT/ML injection 5,000 Units 5,000 Units Every 8 Hours Scheduled 9/15/2017     Sig - Route: Inject 1 mL under the skin Every 8 (Eight) Hours. - Subcutaneous    hydrALAZINE (APRESOLINE) injection 10 mg 10 mg Every 6 Hours PRN 9/15/2017     Sig - Route: Infuse 0.5 mL into a venous catheter Every 6 (Six) Hours As Needed for High Blood Pressure (SBP > 160). - Intravenous    HYDROcodone-acetaminophen (NORCO) 7.5-325 MG per tablet 1 tablet 1 tablet Every 8 Hours 9/15/2017     Sig - Route: Take 1 tablet by mouth Every 8 (Eight) Hours. - Oral    ipratropium-albuterol (DUO-NEB) nebulizer solution 3 mL 3 mL Every 4 Hours - RT 9/15/2017     Sig - Route: Take 3 mL by nebulization Every 4 (Four) Hours. - Nebulization    isosorbide dinitrate (ISORDIL) tablet 30 mg 30 mg Daily 9/15/2017     Sig - Route: Take 1 tablet by mouth Daily. - Oral    levoFLOXacin (LEVAQUIN) 250 mg/50 mL D5W (premix) 250 mg 250 mg Every 24 Hours 9/16/2017     Sig - Route: Infuse 50 mL into a venous catheter Daily. - Intravenous    lisinopril (PRINIVIL,ZESTRIL) tablet 10 mg 10 mg Daily 9/15/2017     Sig - Route: Take 1 tablet by mouth Daily. - Oral    magnesium hydroxide (MILK OF MAGNESIA) suspension 2400 mg/10mL 10 mL 10 mL Daily  "PRN 9/15/2017     Sig - Route: Take 10 mL by mouth Daily As Needed for Constipation. - Oral    magnesium oxide (MAGOX) tablet 400 mg 400 mg Daily 9/15/2017     Sig - Route: Take 1 tablet by mouth Daily. - Oral    methylPREDNISolone sodium succinate (SOLU-Medrol) injection 20 mg 20 mg Every 12 Hours 9/18/2017     Sig - Route: Infuse 0.5 mL into a venous catheter Every 12 (Twelve) Hours. - Intravenous    nitroglycerin (NITROSTAT) SL tablet 0.4 mg 0.4 mg Every 5 Minutes PRN 9/15/2017     Sig - Route: Place 1 tablet under the tongue Every 5 (Five) Minutes As Needed for Chest Pain. - Sublingual    ondansetron (ZOFRAN) injection 4 mg 4 mg Every 6 Hours PRN 9/15/2017     Sig - Route: Infuse 2 mL into a venous catheter Every 6 (Six) Hours As Needed for Nausea or Vomiting. - Intravenous    Linked Group 1:  \"Or\" Linked Group Details        ondansetron (ZOFRAN) tablet 4 mg 4 mg Every 6 Hours PRN 9/15/2017     Sig - Route: Take 1 tablet by mouth Every 6 (Six) Hours As Needed for Nausea or Vomiting. - Oral    Linked Group 1:  \"Or\" Linked Group Details        ondansetron ODT (ZOFRAN-ODT) disintegrating tablet 4 mg 4 mg Every 6 Hours PRN 9/15/2017     Sig - Route: Take 1 tablet by mouth Every 6 (Six) Hours As Needed for Nausea or Vomiting. - Oral    Linked Group 1:  \"Or\" Linked Group Details        pantoprazole (PROTONIX) EC tablet 40 mg 40 mg Every Early Morning 9/16/2017     Sig - Route: Take 1 tablet by mouth Every Morning. - Oral    sodium chloride 0.9 % flush 1-10 mL 1-10 mL As Needed 9/15/2017     Sig - Route: Infuse 1-10 mL into a venous catheter As Needed for Line Care. - Intravenous    terbutaline (BRETHINE) tablet 5 mg 5 mg 2 Times Daily 9/15/2017     Sig - Route: Take 2 tablets by mouth 2 (Two) Times a Day. - Oral    methylPREDNISolone sodium succinate (SOLU-Medrol) injection 20 mg (Discontinued) 20 mg Every 8 Hours 9/17/2017 9/18/2017    Sig - Route: Infuse 0.5 mL into a venous catheter Every 8 (Eight) Hours. - " Intravenous          Lab Results (last 72 hours)     Procedure Component Value Units Date/Time    CBC & Differential [[id number]] Collected:  09/15/17 1457    Specimen:  Blood Updated:  09/15/17 1506    Narrative:       The following orders were created for panel order CBC & Differential.  Procedure                               Abnormality         Status                     ---------                               -----------         ------                     CBC Auto Differential[[id number]]        Abnormal            Final result                 Please view results for these tests on the individual orders.    CBC Auto Differential [[id number]]  (Abnormal) Collected:  09/15/17 1457    Specimen:  Blood Updated:  09/15/17 1506     WBC 14.84 (H) 10*3/mm3      RBC 4.06 (L) 10*6/mm3      Hemoglobin 12.3 (L) g/dL      Hematocrit 37.7 (L) %      MCV 92.9 fL      MCH 30.3 pg      MCHC 32.6 g/dL      RDW 15.6 (H) %      RDW-SD 52.8 (H) fl      MPV 11.2 fL      Platelets 182 10*3/mm3      Neutrophil % 94.2 (H) %      Lymphocyte % 2.9 (L) %      Monocyte % 1.9 %      Eosinophil % 0.0 %      Basophil % 0.1 %      Immature Grans % 0.9 (H) %      Neutrophils, Absolute 13.98 (H) 10*3/mm3      Lymphocytes, Absolute 0.43 (L) 10*3/mm3      Monocytes, Absolute 0.28 10*3/mm3      Eosinophils, Absolute 0.00 10*3/mm3      Basophils, Absolute 0.01 10*3/mm3      Immature Grans, Absolute 0.14 (H) 10*3/mm3     Magnesium [[id number]]  (Normal) Collected:  09/15/17 1457    Specimen:  Blood Updated:  09/15/17 1518     Magnesium 1.9 mg/dL     CK [[id number]]  (Abnormal) Collected:  09/15/17 1457    Specimen:  Blood Updated:  09/15/17 1518     Creatine Kinase 188 (H) U/L     Comprehensive Metabolic Panel [[id number]]  (Abnormal) Collected:  09/15/17 1457    Specimen:  Blood Updated:  09/15/17 1518     Glucose 168 (H) mg/dL      BUN 33 (H) mg/dL      Creatinine 1.13 mg/dL      Sodium 137 mmol/L      Potassium 4.7 mmol/L      Chloride 101 mmol/L       CO2 24.0 mmol/L      Calcium 9.6 mg/dL      Total Protein 5.9 (L) g/dL      Albumin 3.70 g/dL      ALT (SGPT) 41 U/L      AST (SGOT) 24 U/L      Alkaline Phosphatase 54 U/L      Total Bilirubin 0.5 mg/dL      eGFR Non African Amer 62 mL/min/1.73      Globulin 2.2 (L) gm/dL      A/G Ratio 1.7 g/dL      BUN/Creatinine Ratio 29.2 (H)     Anion Gap 12.0 mmol/L     Narrative:       The MDRD GFR formula is only valid for adults with stable renal function between ages 18 and 70.    Troponin [[id number]]  (Abnormal) Collected:  09/15/17 1457    Specimen:  Blood Updated:  09/15/17 1529     Troponin I 0.064 (H) ng/mL     CK-MB [[id number]]  (Abnormal) Collected:  09/15/17 1457    Specimen:  Blood Updated:  09/15/17 1529     CKMB 5.75 (H) ng/mL     Extra Tubes [[id number]] Collected:  09/15/17 1457    Specimen:  Blood from Blood, Venous Line Updated:  09/15/17 1601    Narrative:       The following orders were created for panel order Extra Tubes.  Procedure                               Abnormality         Status                     ---------                               -----------         ------                     Light Blue Top[[id number]]                                   Final result               Green Top (Gel)[[id number]]                                  Final result                 Please view results for these tests on the individual orders.    Light Blue Top [[id number]] Collected:  09/15/17 1457    Specimen:  Blood Updated:  09/15/17 1601     Extra Tube hold for add-on      Auto resulted       Green Top (Gel) [[id number]] Collected:  09/15/17 1457    Specimen:  Blood Updated:  09/15/17 1601     Extra Tube Hold for add-ons.      Auto resulted.       POC Glucose Fingerstick [[id number]]  (Abnormal) Collected:  09/15/17 1629    Specimen:  Blood Updated:  09/15/17 1747     Glucose 219 (H) mg/dL       RN NotifiedMeter: [id number]: [id number] ANA PAULA Jackson Urine or CSF [[id number]]  (Normal)  Collected:  09/15/17 1734    Specimen:  Urine from Urine, Clean Catch Updated:  09/15/17 1757     Strep Pneumo Ag Negative    CK [[id number]]  (Abnormal) Collected:  09/15/17 1940    Specimen:  Blood Updated:  09/15/17 2023     Creatine Kinase 184 (H) U/L     POC Glucose Fingerstick [[id number]]  (Normal) Collected:  09/15/17 1940    Specimen:  Blood Updated:  09/15/17 2035     Glucose 103 mg/dL       Meter: [id number]: [id number] IRVING IRWIN       CK-MB [[id number]]  (Abnormal) Collected:  09/15/17 1940    Specimen:  Blood Updated:  09/15/17 2035     CKMB 6.20 (H) ng/mL     Troponin [[id number]]  (Abnormal) Collected:  09/15/17 1940    Specimen:  Blood Updated:  09/15/17 2046     Troponin I 0.078 (H) ng/mL     CBC & Differential [[id number]] Collected:  09/16/17 0503    Specimen:  Blood Updated:  09/16/17 0603    Narrative:       The following orders were created for panel order CBC & Differential.  Procedure                               Abnormality         Status                     ---------                               -----------         ------                     CBC Auto Differential[[id number]]        Abnormal            Final result                 Please view results for these tests on the individual orders.    CBC Auto Differential [[id number]]  (Abnormal) Collected:  09/16/17 0503    Specimen:  Blood Updated:  09/16/17 0603     WBC 11.99 (H) 10*3/mm3      RBC 4.07 (L) 10*6/mm3      Hemoglobin 12.3 (L) g/dL      Hematocrit 38.2 (L) %      MCV 93.9 fL      MCH 30.2 pg      MCHC 32.2 g/dL      RDW 15.7 (H) %      RDW-SD 53.1 (H) fl      MPV 11.6 fL      Platelets 174 10*3/mm3      Neutrophil % 82.6 (H) %      Lymphocyte % 9.9 (L) %      Monocyte % 6.0 %      Eosinophil % 0.1 %      Basophil % 0.2 %      Immature Grans % 1.2 (H) %      Neutrophils, Absolute 9.91 (H) 10*3/mm3      Lymphocytes, Absolute 1.19 10*3/mm3      Monocytes, Absolute 0.72 10*3/mm3      Eosinophils, Absolute 0.01 10*3/mm3       Basophils, Absolute 0.02 10*3/mm3      Immature Grans, Absolute 0.14 (H) 10*3/mm3     Magnesium [[id number]]  (Normal) Collected:  09/16/17 0503    Specimen:  Blood Updated:  09/16/17 0629     Magnesium 2.0 mg/dL     Comprehensive Metabolic Panel [[id number]]  (Abnormal) Collected:  09/16/17 0503    Specimen:  Blood Updated:  09/16/17 0647     Glucose 122 (H) mg/dL      BUN 42 (H) mg/dL      Creatinine 1.57 (H) mg/dL      Sodium 139 mmol/L      Potassium 4.1 mmol/L      Chloride 98 mmol/L      CO2 31.0 mmol/L      Calcium 9.3 mg/dL      Total Protein 5.8 (L) g/dL      Albumin 3.70 g/dL      ALT (SGPT) 42 U/L      AST (SGOT) 24 U/L      Alkaline Phosphatase 51 U/L      Total Bilirubin 0.5 mg/dL      eGFR Non African Amer 42 mL/min/1.73      Globulin 2.1 (L) gm/dL      A/G Ratio 1.8 g/dL      BUN/Creatinine Ratio 26.8 (H)     Anion Gap 10.0 mmol/L     Narrative:       The MDRD GFR formula is only valid for adults with stable renal function between ages 18 and 70.    POC Glucose Fingerstick [[id number]]  (Normal) Collected:  09/16/17 0736    Specimen:  Blood Updated:  09/16/17 0909     Glucose 125 mg/dL       RN NotifiedMeter: [id number]: [id number] FUCHS BRANDYN       POC Glucose Fingerstick [[id number]]  (Normal) Collected:  09/16/17 1114    Specimen:  Blood Updated:  09/16/17 1541     Glucose 73 mg/dL       RN NotifiedMeter: [id number]: [id number] FUCHS BRANDYN       POC Glucose Fingerstick [[id number]]  (Normal) Collected:  09/16/17 1606    Specimen:  Blood Updated:  09/16/17 1733     Glucose 112 mg/dL       RN NotifiedMeter: [id number]: [id number] FUCHS BRANDYN       POC Glucose Fingerstick [[id number]]  (Normal) Collected:  09/16/17 1950    Specimen:  Blood Updated:  09/16/17 2006     Glucose 130 mg/dL       RN NotifiedMeter: [id number]: [id number] [facility] TAMIA       Urine Culture [[id number]]  (Normal) Collected:  09/15/17 1124    Specimen:  Urine from Urine, Clean  Catch Updated:  09/17/17 0601     Urine Culture No growth at 24 hours    POC Glucose Fingerstick [[id number]]  (Normal) Collected:  09/17/17 0602    Specimen:  Blood Updated:  09/17/17 0631     Glucose 113 mg/dL       RN NotifiedMeter: [id number]: [id number] REJI VENCES       CBC Auto Differential [[id number]]  (Abnormal) Collected:  09/17/17 0616    Specimen:  Blood Updated:  09/17/17 0702     WBC 12.09 (H) 10*3/mm3      RBC 4.78 10*6/mm3      Hemoglobin 14.6 g/dL      Hematocrit 44.1 %      MCV 92.3 fL      MCH 30.5 pg      MCHC 33.1 g/dL      RDW 15.5 (H) %      RDW-SD 52.5 (H) fl      MPV 11.3 fL      Platelets 168 10*3/mm3      Neutrophil % 73.1 %      Lymphocyte % 14.4 %      Monocyte % 8.7 %      Eosinophil % 0.6 %      Basophil % 0.6 %      Immature Grans % 2.6 (H) %      Neutrophils, Absolute 8.85 (H) 10*3/mm3      Lymphocytes, Absolute 1.74 10*3/mm3      Monocytes, Absolute 1.05 (H) 10*3/mm3      Eosinophils, Absolute 0.07 10*3/mm3      Basophils, Absolute 0.07 10*3/mm3      Immature Grans, Absolute 0.31 (H) 10*3/mm3     POC Glucose Fingerstick [[id number]]  (Normal) Collected:  09/17/17 0722    Specimen:  Blood Updated:  09/17/17 0734     Glucose 106 mg/dL       Meter: [id number]: [id number] HERNESTO LITTLEJOHN       CBC & Differential [[id number]] Collected:  09/17/17 0616    Specimen:  Blood Updated:  09/17/17 0756    Narrative:       The following orders were created for panel order CBC & Differential.  Procedure                               Abnormality         Status                     ---------                               -----------         ------                     Scan Slide[[id number]]                                       Final result               CBC Auto Differential[[id number]]        Abnormal            Final result                 Please view results for these tests on the individual orders.    Scan Slide [[id number]] Collected:  09/17/17 0616    Specimen:  Blood Updated:   09/17/17 0756     RBC Morphology Normal     WBC Morphology Normal     Platelet Estimate Adequate    Magnesium [[id number]]  (Normal) Collected:  09/17/17 0747    Specimen:  Blood Updated:  09/17/17 0814     Magnesium 2.0 mg/dL     Comprehensive Metabolic Panel [[id number]]  (Abnormal) Collected:  09/17/17 0747    Specimen:  Blood Updated:  09/17/17 0821     Glucose 103 (H) mg/dL      BUN 50 (H) mg/dL      Creatinine 1.39 (H) mg/dL      Sodium 137 mmol/L      Potassium 4.3 mmol/L      Chloride 98 mmol/L      CO2 26.0 mmol/L      Calcium 9.4 mg/dL      Total Protein 6.9 g/dL      Albumin 4.10 g/dL      ALT (SGPT) 46 U/L      AST (SGOT) 30 U/L      Alkaline Phosphatase 60 U/L      Total Bilirubin 0.7 mg/dL      eGFR Non African Amer 49 (L) mL/min/1.73      Globulin 2.8 gm/dL      A/G Ratio 1.5 g/dL      BUN/Creatinine Ratio 36.0 (H)     Anion Gap 13.0 mmol/L     Narrative:       The MDRD GFR formula is only valid for adults with stable renal function between ages 18 and 70.    Extra Tubes [[id number]] Collected:  09/17/17 0753    Specimen:  Blood from Blood, Venous Line Updated:  09/17/17 1101    Narrative:       The following orders were created for panel order Extra Tubes.  Procedure                               Abnormality         Status                     ---------                               -----------         ------                     Lavender Top[[id number]]                                     Final result                 Please view results for these tests on the individual orders.    Lavender Top [[id number]] Collected:  09/17/17 0753    Specimen:  Blood Updated:  09/17/17 1101     Extra Tube hold for add-on      Auto resulted       POC Glucose Fingerstick [[id number]]  (Normal) Collected:  09/17/17 1048    Specimen:  Blood Updated:  09/17/17 1103     Glucose 96 mg/dL       Meter: [id number]: [id number] HERNESTO LITTLEJOHN       Blood Culture [[id number]]  (Normal) Collected:  09/15/17 1447    Specimen:   Blood from Arm, Left Updated:  09/17/17 1601     Blood Culture No growth at 2 days    Blood Culture [[id number]]  (Normal) Collected:  09/15/17 1457    Specimen:  Blood from Arm, Right Updated:  09/17/17 1601     Blood Culture No growth at 2 days    POC Glucose Fingerstick [[id number]]  (Abnormal) Collected:  09/17/17 1614    Specimen:  Blood Updated:  09/17/17 1641     Glucose 182 (H) mg/dL       RN NotifiedMeter: [id number]: [id number] HERNESTO LITTLEJOHN       POC Glucose Fingerstick [[id number]]  (Abnormal) Collected:  09/17/17 2000    Specimen:  Blood Updated:  09/18/17 0318     Glucose 196 (H) mg/dL       Meter: [id number]: [id number] NARENDRA ANTONIO       CBC & Differential [[id number]] Collected:  09/18/17 0520    Specimen:  Blood Updated:  09/18/17 0632    Narrative:       The following orders were created for panel order CBC & Differential.  Procedure                               Abnormality         Status                     ---------                               -----------         ------                     CBC Auto Differential[[id number]]        Abnormal            Final result                 Please view results for these tests on the individual orders.    CBC Auto Differential [[id number]]  (Abnormal) Collected:  09/18/17 0520    Specimen:  Blood Updated:  09/18/17 0632     WBC 18.50 (H) 10*3/mm3      RBC 4.73 10*6/mm3      Hemoglobin 14.2 g/dL      Hematocrit 44.0 %      MCV 93.0 fL      MCH 30.0 pg      MCHC 32.3 g/dL      RDW 15.6 (H) %      RDW-SD 53.2 (H) fl      MPV 11.8 fL      Platelets 203 10*3/mm3      Neutrophil % 87.7 (H) %      Lymphocyte % 6.2 (L) %      Monocyte % 4.4 %      Eosinophil % 0.0 %      Basophil % 0.1 %      Immature Grans % 1.6 (H) %      Neutrophils, Absolute 16.24 (H) 10*3/mm3      Lymphocytes, Absolute 1.14 10*3/mm3      Monocytes, Absolute 0.81 10*3/mm3      Eosinophils, Absolute 0.00 10*3/mm3      Basophils, Absolute 0.02 10*3/mm3      Immature Grans,  Absolute 0.29 (H) 10*3/mm3     Comprehensive Metabolic Panel [[id number]]  (Abnormal) Collected:  09/18/17 0520    Specimen:  Blood Updated:  09/18/17 0645     Glucose 145 (H) mg/dL      BUN 61 (H) mg/dL      Creatinine 1.58 (H) mg/dL      Sodium 137 mmol/L      Potassium 4.5 mmol/L      Chloride 96 mmol/L      CO2 27.0 mmol/L      Calcium 9.0 mg/dL      Total Protein 7.0 g/dL      Albumin 4.20 g/dL      ALT (SGPT) 39 U/L      AST (SGOT) 23 U/L      Alkaline Phosphatase 64 U/L      Total Bilirubin 0.8 mg/dL      eGFR Non African Amer 42 (L) mL/min/1.73      Globulin 2.8 gm/dL      A/G Ratio 1.5 g/dL      BUN/Creatinine Ratio 38.6 (H)     Anion Gap 14.0 mmol/L     Narrative:       The MDRD GFR formula is only valid for adults with stable renal function between ages 18 and 70.    Magnesium [[id number]]  (Normal) Collected:  09/18/17 0520    Specimen:  Blood Updated:  09/18/17 0645     Magnesium 2.1 mg/dL     POC Glucose Fingerstick [[id number]]  (Abnormal) Collected:  09/18/17 0729    Specimen:  Blood Updated:  09/18/17 0808     Glucose 152 (H) mg/dL       RN NotifiedMeter: [id number]: [id number] ANA PAULA MIRANDA       POC Glucose Fingerstick [[id number]]  (Normal) Collected:  09/18/17 1034    Specimen:  Blood Updated:  09/18/17 1127     Glucose 121 mg/dL       RN NotifiedMeter: [id number]: [id number] ANA PAULA MIRANDA             Imaging Results (most recent)     Procedure Component Value Units Date/Time    IR PICC wo fluoro guidance [[id number]] Resulted:  09/15/17 1438     Updated:  09/15/17 1438    Narrative:       This procedure was auto-finalized with no dictation required.    US Guided Vascular Access [[id number]] Resulted:  09/15/17 1510     Updated:  09/15/17 1510    Narrative:       This procedure was auto-finalized with no dictation required.    XR Chest PA & Lateral [[id number]] Collected:  09/15/17 1429     Updated:  09/15/17 1833    Narrative:         [facility]  [place]    Patient Name: CAMRYN MCKAY    ORDERING: BEBETO WILLIS     ATTENDING: SHERI REYEZ     REFERRING: BEBETO WILLIS    -----------------------    PROCEDURE: Two-view chest    COMPARISON: None.    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     Devices: Right upper extremity PICC terminates in the right  atrium. Spinal stimulator lead noted.    Lungs/Pleura: Linear opacities in the mid to lower left lung  could be due to pneumonia, atelectasis, or scarring. Small amount  of linear atelectasis or scarring noted in the right midlung  field. Calcified nodule in the right lung likely due to old  granulomatous disease. The lungs otherwise appear clear.    Cardiomediastinal structures: Normal         Impression:       CONCLUSION:    Tip of the right upper extremity PICC in the right atrium.  Linear opacities in the mid to lower left lung could be due to  pneumonia, atelectasis, or scarring.    Electronically signed by:  Pedro Novoa MD  9/15/2017 6:32 PM CDT  Workstation: [id number]          ECG/EMG Results (most recent)     Procedure Component Value Units Date/Time    ECG 12 Lead [[id number]] Collected:  09/15/17 1401     Updated:  09/16/17 0956    Narrative:       Test Reason : SOA  Blood Pressure : **/** mmHG  Vent. Rate : 070 BPM     Atrial Rate : 070 BPM     P-R Int : 246 ms          QRS Dur : 132 ms      QT Int : 436 ms       P-R-T Axes : 052 -51 008 degrees     QTc Int : 470 ms    Sinus rhythm with 1st degree AV block with premature atrial complexes  Left axis deviation  Right bundle branch block  Left anterior fascicular block  ** Bifascicular block **  Voltage criteria for left ventricular hypertrophy  Possible Lateral infarct (cited on or before 05-JAN-2017)  Inferior infarct , age undetermined  Abnormal ECG  When compared with ECG of 07-AUG-2017 13:31,  premature atrial complexes are now present  Right  bundle branch block is now present  Confirmed by TREVER SAXENA MD ([id number]) on 9/16/2017 9:56:25 AM    Referred By:             Confirmed By:TREVER SAXENA MD           Physician Progress Notes (most recent note)      EVE Gomez at 9/18/2017 11:17 AM  Version 1 of 1    Attestation signed by Florencio Garner MD at 9/18/2017  2:12 PM        I agree with the above note and assessment and plan.    Physical exam:    Temp:  [96.4 °F (35.8 °C)-98.8 °F (37.1 °C)] 96.4 °F (35.8 °C)  Heart Rate:  [] 107  Resp:  [18] 18  BP: ()/(65-85) 94/69    CVS: S1S2 RRR  I have reviewed the documentation above and agree.                                     [facility] Medicine Services  INPATIENT PROGRESS NOTE    Length of Stay: 2  Date of Admission: 9/15/2017  Primary Care Physician: Paolo Rey MD    Subjective   Chief Complaint: No complaints    HPI:      9/18/17:  The patient states his breathing is improved today.  Wheezes and no one have decreased.    9/17/2017:  The patient still has significant wheezes this am.  Will start IV steroids.      9/16/2017:  The patient has been up walking with physical therapy this morning.  He states he feels a little better this morning, but still has some significant wheezes noted.     9/15/2017 H&P by Dr. Griffin:  Patient is a 84 y.o. male presents with complaint of having shortness of breath.  Patient states she's been having shortness of breath ongoing for past few weeks.  Patient states his shortness of breath has been worsening.  He has been having coughing spells with thick yellowish to greenish colored sputum.  No nausea vomiting have been reported.  Patient does complain of chest tightness with coughing spells.  Patient states he has been having worsening orthopnea of 3-4 pillows.  Patient also admits of having reduced excess tolerance.  Patient does complain of worsening swelling.  No urinary complaints have been reported.   Patient states he has been compliant with his medications.  Patient states he was recently treated with IV steroids and nebulizer treatment without any benefit.    Review of Systems   Constitutional: Positive for fatigue.   Respiratory: Positive for shortness of breath and wheezing.    Cardiovascular: Positive for leg swelling.   Gastrointestinal: Positive for abdominal distention.   All other systems reviewed and are negative.     All pertinent negatives and positives are as above. All other systems have been reviewed and are negative unless otherwise stated.     Objective    Temp:  [96.8 °F (36 °C)-98.8 °F (37.1 °C)] 96.8 °F (36 °C)  Heart Rate:  [73-94] 91  Resp:  [18-20] 18  BP: (124-147)/(65-85) 147/85    Physical Exam   Constitutional: He is oriented to person, place, and time. He appears well-developed and well-nourished.   HENT:   Head: Normocephalic and atraumatic.   Eyes: EOM are normal. Pupils are equal, round, and reactive to light.   Neck: Normal range of motion. Neck supple.   Cardiovascular: Normal rate and regular rhythm.    Pulmonary/Chest: Effort normal. He has wheezes.   Abdominal: Soft. Bowel sounds are normal.   Neurological: He is alert and oriented to person, place, and time.   Skin: Skin is warm and dry.   Psychiatric: He has a normal mood and affect.     Results Review:  I have reviewed the labs, radiology results, and diagnostic studies.    Laboratory Data:     Results from last 7 days  Lab Units 09/18/17  0520 09/17/17  0747 09/16/17  0503   SODIUM mmol/L 137 137 139   POTASSIUM mmol/L 4.5 4.3 4.1   CHLORIDE mmol/L 96 98 98   CO2 mmol/L 27.0 26.0 31.0   BUN mg/dL 61* 50* 42*   CREATININE mg/dL 1.58* 1.39* 1.57*   GLUCOSE mg/dL 145* 103* 122*   CALCIUM mg/dL 9.0 9.4 9.3   BILIRUBIN mg/dL 0.8 0.7 0.5   ALK PHOS U/L 64 60 51   ALT (SGPT) U/L 39 46 42   AST (SGOT) U/L 23 30 24   ANION GAP mmol/L 14.0 13.0 10.0     Estimated Creatinine Clearance: 35.7 mL/min (by C-G formula based on Cr of  1.58).    Results from last 7 days  Lab Units 09/18/17  0520 09/17/17  0747 09/16/17  0503   MAGNESIUM mg/dL 2.1 2.0 2.0           Results from last 7 days  Lab Units 09/18/17  0520 09/17/17  0616 09/16/17  0503 09/15/17  1457   WBC 10*3/mm3 18.50* 12.09* 11.99* 14.84*   HEMOGLOBIN g/dL 14.2 14.6 12.3* 12.3*   HEMATOCRIT % 44.0 44.1 38.2* 37.7*   PLATELETS 10*3/mm3 203 168 174 182           Culture Data:   Blood Culture   Date Value Ref Range Status   09/15/2017 No growth at 2 days  Preliminary   09/15/2017 No growth at 2 days  Preliminary     Urine Culture   Date Value Ref Range Status   09/15/2017 No growth at 24 hours  Final     No results found for: RESPCX  No results found for: WOUNDCX  No results found for: STOOLCX  No components found for: BODYFLD    Radiology Data:   Imaging Results (last 24 hours)     ** No results found for the last 24 hours. **          I have reviewed the patient current medications.     Assessment/Plan     Hospital Problem List     * (Principal)Acute exacerbation of chronic obstructive airways disease    Benign essential hypertension    Type 2 diabetes mellitus    Non-rheumatic tricuspid valve insufficiency    Coronary artery disease involving native coronary artery of native heart    Pulmonary emphysema    Atrial fibrillation    Shortness of breath    COPD with exacerbation    (HFpEF) heart failure with preserved ejection fraction    Pulmonary HTN    Acute interstitial pneumonia    Overview Signed 9/15/2017  1:22 PM by Pamela Griffin MD     Hamman-Rich Syndrome         Chronic obstructive lung disease    Coronary arteriosclerosis    Diabetes mellitus    COPD exacerbation          Plan:    1.  COPD exacerbation:  Continue antibiotics, nebulizers, and PT/OT.  Methylprednisone 20 mg IV q 12 hours.   2.  Heart failure with preserved ejection fraction:  Continue fluid and sodium restrictions.   3.  Acute kidney injury:  Will decrease the strength of Levaquin and cut lasix 20 mg  back to twice daily instead of three times daily. Creatinine worsened from 1.39 to 1.58 today.   CMP in am.  Will decrease his steroids from 20 mg every 8 hours to every 12 hours.  4. Pulmonary hypertension:  Patient sees Dr. Maciel.  Continue Lisinopril.          Discharge Planning: I expect patient to be discharged to home in 1-2 days.      This document has been electronically signed by EVE Gomez on September 18, 2017 11:17 AM

## 2017-09-18 NOTE — PLAN OF CARE
Problem: Patient Care Overview (Adult)  Goal: Plan of Care Review  Outcome: Ongoing (interventions implemented as appropriate)    Problem: Activity Intolerance (Adult)  Goal: Activity Tolerance  Outcome: Ongoing (interventions implemented as appropriate)  Goal: Effective Energy Conservation Techniques  Outcome: Ongoing (interventions implemented as appropriate)    Problem: Fluid Volume Excess (Adult,Obstetrics,Pediatric)  Goal: Stable Weight  Outcome: Ongoing (interventions implemented as appropriate)  Goal: Balanced Intake/Output  Outcome: Ongoing (interventions implemented as appropriate)    Problem: Pain, Chronic (Adult)  Goal: Acceptable Pain Control/Comfort Level  Outcome: Ongoing (interventions implemented as appropriate)    Problem: Respiratory Insufficiency (Adult)  Goal: Acid/Base Balance  Outcome: Ongoing (interventions implemented as appropriate)  Goal: Effective Ventilation  Outcome: Ongoing (interventions implemented as appropriate)

## 2017-09-18 NOTE — THERAPY TREATMENT NOTE
Acute Care - Physical Therapy Treatment Note  [facility]     Patient Name: Hasmukh Ham  : 1933  MRN: [id number]  Today's Date: 2017  Onset of Illness/Injury or Date of Surgery Date: 09/15/17  Date of Referral to PT: 09/15/17  Referring Physician: KETTY Griffin    Admit Date: 9/15/2017    Visit Dx:    ICD-10-CM ICD-9-CM   1. Pulmonary HTN I27.2 416.8   2. Impaired physical mobility Z74.09 781.99   3. Impaired mobility and activities of daily living Z74.09 799.89     Patient Active Problem List   Diagnosis   • Dilated aortic root   • Benign essential hypertension   • Type 2 diabetes mellitus   • Non-rheumatic tricuspid valve insufficiency   • Coronary artery disease involving native coronary artery of native heart   • Pulmonary emphysema   • Dyspnea   • Atrial fibrillation   • Bradycardia   • Shortness of breath   • COPD with exacerbation   • Chronic coronary artery disease   • Neuropathy   • Abdominal hernia   • Neck pain   • Dementia   • Diabetic neuropathy   • Diabetic polyneuropathy   • Gastroesophageal reflux disease without esophagitis   • Generalized osteoarthritis   • 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   • Nocturia   • Chronic obstructive bronchitis   • Osteoarthritis of multiple joints   • Hyperlipidemia   • Pain in joint involving ankle and foot   • Arthropathy of hand   • Paroxysmal tachycardia   • Family history of diseases of the blood and blood-forming organs and certain disorders involving the immune mechanism   • Osteoarthrosis involving more than one site but not generalized   • Right shoulder pain   • Rotator cuff syndrome   • Partial tear of subscapularis tendon   • Infraspinatus tendon tear   • Supraspinatus tendon tear   • Bilateral carotid artery stenosis   • (HFpEF) heart failure with preserved ejection fraction   • Pulmonary HTN   • Acute exacerbation of chronic obstructive airways disease   • Acute interstitial pneumonia   • Chronic obstructive lung disease    • Coronary arteriosclerosis   • Diabetes mellitus   • COPD exacerbation               Adult Rehabilitation Note       09/18/17 0950 09/17/17 1358       Rehab Assessment/Intervention    Discipline physical therapy assistant  -ZULLY physical therapy assistant  -TA     Document Type therapy note (daily note)  -ZULLY therapy note (daily note)  -TA     Subjective Information agree to therapy;complains of;pain;dyspnea  -ZULLY agree to therapy  -TA     Patient Effort, Rehab Treatment  good  -TA     Symptoms Noted Comment  r  -ZULLY     Precautions/Limitations fall precautions;oxygen therapy device and L/min  -ZULLY fall precautions  -TA     Precautions/Limitations, Hearing hearing impairment, bilaterally  -ZULLY      Recorded by [ZULLY] Sridevi Walters PTA [ZULLY] Sridevi Walters PTA  [TA] Kayli Kumar PTA     Vital Signs    Pre Systolic BP Rehab 117  -ZULLY      Pre Treatment Diastolic BP 61  -ZULLY      Post Systolic BP Rehab 99  -ZULLY 128  -TA     Post Treatment Diastolic BP  66  -TA     Pretreatment Heart Rate (beats/min)  83  -TA     Intratreatment Heart Rate (beats/min)  87  -TA     Posttreatment Heart Rate (beats/min)  85  -TA     Pre SpO2 (%) 96  -ZULLY 92  -TA     O2 Delivery Pre Treatment room air  -ZULLY supplemental O2  -TA     Intra SpO2 (%) 94  -ZULLY 96  -TA     O2 Delivery Intra Treatment room air  -ZULLY      Post SpO2 (%) 96  -ZULLY 95  -TA     O2 Delivery Post Treatment supplemental O2  -ZULLY      Pre Patient Position Supine  -ZULLY Supine  -TA     Intra Patient Position Standing  -ZULLY      Post Patient Position  Supine  -TA     Recorded by [ZULLY] Sridevi Walters PTA [TA] Kayli Kumar PTA     Pain Assessment    Pain Assessment 0-10  -ZULLY 0-10  -TA     Pain Score 10  -ZULLY 8  -TA     Post Pain Score 10  -ZULLY 8  -TA     Pain Type Chronic pain  -ZULLY Chronic pain  -TA     Pain Location Back  -ZULLY Generalized  -TA     Pain Intervention(s) Declines  -ZULLY Medication (See MAR)  -TA     Recorded by [ZULLY] Sridevi Walters PTA [TA] Kayli Kumar PTA     Cognitive  Assessment/Intervention    Current Cognitive/Communication Assessment functional  -ZULLY functional  -TA     Orientation Status oriented x 4  -ZULLY oriented x 4  -TA     Follows Commands/Answers Questions 100% of the time  -ZULLY 100% of the time  -TA     Personal Safety WNL/WFL  -ZULLY WNL/WFL  -TA     Personal Safety Interventions gait belt;supervised activity;nonskid shoes/slippers when out of bed  -ZULLY gait belt;nonskid shoes/slippers when out of bed  -TA     Recorded by [ZULLY] Sridevi Walters PTA [TA] Kayli Kumar PTA     Bed Mobility, Assessment/Treatment    Bed Mobility, Roll Left, [place] independent  -ZULLY independent  -TA     Bed Mob, Supine to Sit, [place] independent  -ZULLY independent  -TA     Bed Mob, Sit to Supine, [place] independent  -ZULLY independent  -TA     Recorded by [ZULLY] Sridevi Walters PTA [TA] Kayli Kumar PTA     Transfer Assessment/Treatment    Transfers, Sit-Stand [place] conditional independence;independent  -ZULLY stand by assist  -TA     Transfers, Stand-Sit [place] conditional independence;independent  -ZULLY stand by assist  -TA     Transfers, Sit-Stand-Sit, Assist Device rolling walker  -ZULLY rolling walker  -TA     Recorded by [ZULLY] Sridevi Walters PTA [TA] Kayli Kumar, NEFTALI     Gait Assessment/Treatment    Gait, [place] Level conditional independence;stand by assist  -ZULLY contact guard assist  -TA     Gait, Assistive Device rolling walker  -ZULLY rolling walker  -TA     Gait, Distance (Feet) 150   250,200  -ZULLY 350   & 250`  -TA     Gait, Gait Pattern Analysis swing-through gait  -ZULLY swing-through gait  -TA     Gait, Gait Deviations step length decreased  -ZULLY step length decreased  -TA     Recorded by [ZULLY] Sridevi Walters PTA [TA] Kayli Kumar PTA     Therapy Exercises    Bilateral Lower Extremities AROM:;25 reps;sitting;ankle pumps/circles;heel raises;LAQ;hip flexion  -ZULLY AROM:;10 reps;sitting;ankle pumps/circles;hip abduction/adduction;hip flexion;LAQ  -TA     Recorded by  [ZULLY] Sridevi Walters, PTA [TA] Kayli Kumar PTA     Positioning and Restraints    Pre-Treatment Position in bed  -ZULLY in bed  -TA     Post Treatment Position chair  -ZULLY bed  -TA     In Bed  supine;call light within reach;with family/caregiver;with other staff  -TA     In Chair sitting;call light within reach  -ZULLY      Recorded by [ZULLY] Sridevi Walters, NEFTALI [TA] Kayli Kumar PTA       User Key  (r) = Recorded By, (t) = Taken By, (c) = Cosigned By    Initials Name Effective Dates    TA Kayli Kumar, PTA 10/17/16 -     ZULLY Sridevi Walters, NEFTALI 10/17/16 -                 IP PT Goals       09/18/17 0950 09/16/17 0845       Transfer Training PT STG    Transfer Training PT STG, Date Established  09/16/17  -CZ     Transfer Training PT STG, Time to Achieve  2 days  -CZ     Transfer Training PT STG, Activity Type  bed to chair /chair to bed;sit to stand/stand to sit  -CZ     Transfer Training PT STG, [place] Level  conditional independence  -CZ     Transfer Training PT STG, Date Goal Reviewed 09/18/17  -ZULLY 09/16/17  -CZ     Transfer Training PT STG, Outcome goal met  -ZULLY goal ongoing  -CZ     Gait Training PT STG    Gait Training Goal PT STG, Date Established  09/16/17  -CZ     Gait Training Goal PT STG, Time to Achieve  2 days  -CZ     Gait Training Goal PT STG, [place] Level  conditional independence  -CZ     Gait Training Goal PT STG, Assist Device  walker, rolling  -CZ     Gait Training Goal PT STG, Distance to Achieve  150'x1.  -CZ     Gait Training Goal PT STG, Additional Goal  SpO2 >90%.  -CZ     Gait Training Goal PT STG, Date Goal Reviewed 09/18/17  -ZULLY 09/16/17  -CZ     Gait Training Goal PT STG, Outcome goal met  -ZULLY goal ongoing  -CZ     Stair Training PT LTG    Stair Training Goal PT LTG, Date Established  09/16/17  -CZ     Stair Training Goal PT LTG, Time to Achieve  by discharge  -CZ     Stair Training Goal PT LTG, Number of Steps  5  -CZ     Stair Training Goal PT LTG, [place] Level   conditional independence  -     Stair Training Goal PT LTG, Assist Device  2 handrails  -     Stair Training Goal PT LTG, Date Goal Reviewed 09/18/17  -ZULLY 09/16/17  -     Stair Training Goal PT LTG, Outcome goal ongoing  - goal ongoing  -     Physical Therapy PT LTG    Physical Therapy PT LTG, Date Established  09/16/17  -     Physical Therapy PT LTG, Time to Achieve  by discharge  -     Physical Therapy PT LTG, Activity Type  Tinetti fall risk assessment.   -     Physical Therapy PT LTG, Addisional Goal  Score 24/28 or low fall risk.   -     Physical Therapy PT LTG, Date Goal Reviewed 09/18/17  -ZULLY 09/16/17  -     Physical Therapy PT LTG, Outcome goal ongoing  - goal ongoing  -       User Key  (r) = Recorded By, (t) = Taken By, (c) = Cosigned By    Initials Name Provider Type    ZULLY Sridevi Walters, PTA Physical Therapy Assistant     Bud Lovelace, PT Physical Therapist          Physical Therapy Education     Title: PT OT SLP Therapies (Active)     Topic: Physical Therapy (Active)     Point: Mobility training (Done)    Learning Progress Summary    Learner Readiness Method Response Comment Documented by Status   Patient Acceptance E DU,VU benefits of exercise & mobility TA 09/17/17 1507 Done               Point: Precautions (Active)    Learning Progress Summary    Learner Readiness Method Response Comment Documented by Status   Patient Acceptance E NR Tinetti assessed: 21/28 or moderate fall risk. Discussed benefits of using walker until fall risk is lowered.  09/16/17 1108 Active                      User Key     Initials Effective Dates Name Provider Type Discipline    TA 10/17/16 -  Kayli Kumar PTA Physical Therapy Assistant PT     02/17/17 -  Bud Lovelace, PT Physical Therapist PT                    PT Recommendation and Plan  Anticipated Discharge Disposition: home with home health  Planned Therapy Interventions: balance training, gait training, home exercise program, stair  training, strengthening, transfer training, patient/family education  PT Frequency: other (see comments) (5-14x/week)  Plan of Care Review  Plan Of Care Reviewed With: patient  Progress: improving  Outcome Summary/Follow up Plan: pt met 2 new goals and demonstrated good safety and endurance with activity.  Pt is progressing well and is expected to return home at D/C.           Outcome Measures       09/18/17 0950 09/17/17 1500 09/17/17 0942    How much help from another person do you currently need...    Turning from your back to your side while in flat bed without using bedrails? 4  -ZULLY 4  -TA     Moving from lying on back to sitting on the side of a flat bed without bedrails? 4  -ZULLY 4  -TA     Moving to and from a bed to a chair (including a wheelchair)? 3  -ZULLY 3  -TA     Standing up from a chair using your arms (e.g., wheelchair, bedside chair)? 3  -ZULLY 3  -TA     Climbing 3-5 steps with a railing? 3  -ZULLY 3  -TA     To walk in hospital room? 3  -ZULLY 3  -TA     AM-PAC 6 Clicks Score 20  -ZULLY 20  -TA     How much help from another is currently needed...    Putting on and taking off regular lower body clothing?   3  -RB    Bathing (including washing, rinsing, and drying)   3  -RB    Toileting (which includes using toilet bed pan or urinal)   3  -RB    Putting on and taking off regular upper body clothing   4  -RB    Taking care of personal grooming (such as brushing teeth)   4  -RB    Eating meals   4  -RB    Score   21  -RB    Functional Assessment    Outcome Measure Options  AM-PAC 6 Clicks Basic Mobility (PT)  -TA AM-PAC 6 Clicks Daily Activity (OT)  -RB      09/16/17 0845          How much help from another person do you currently need...    Turning from your back to your side while in flat bed without using bedrails? 4  -CZ      Moving from lying on back to sitting on the side of a flat bed without bedrails? 4  -CZ      Moving to and from a bed to a chair (including a wheelchair)? 3  -CZ      Standing up from a  "chair using your arms (e.g., wheelchair, bedside chair)? 3  -CZ      Climbing 3-5 steps with a railing? 3  -CZ      To walk in hospital room? 3  -CZ      AM-PAC 6 Clicks Score 20  -CZ      Tinetti Assessment    Tinetti Assessment yes  -CZ      Sitting Balance 1  -CZ      Arises 2  -CZ      Attempts to Rise 2  -CZ      Immediate Standing Balance (first 5 sec) 2  -CZ      Standing Balance 1  -CZ      Sternal Nudge (feet close together) 1  -CZ      Eyes Closed (feet close together) 0  -CZ      Turning 360 Degrees- Steps 1  -CZ      Turning 360 Degrees- Steadiness 1  -CZ      Sitting Down 2  -CZ      Tinetti Balance Score 13  -CZ      Gait Initiation (immediate after told \"go\") 1  -CZ      Step Length- Right Swing 1  -CZ      Step Length- Left Swing 1  -CZ      Foot Clearance- Right Foot 1  -CZ      Foot Clearance- Left Foot 1  -CZ      Step Symmetry 0  -CZ      Step Continuity 1  -CZ      Path (excursion) 1  -CZ      Trunk 0  -CZ      Base of Support 1  -CZ      Gait Score 8  -CZ      Tinetti Total Score 21  -CZ      Tinetti Assistive Device rolling walker  -CZ      Functional Assessment    Outcome Measure Options Tinetti;AM-PAC 6 Clicks Basic Mobility (PT)  -CZ        User Key  (r) = Recorded By, (t) = Taken By, (c) = Cosigned By    Initials Name Provider Type    SOFIA Marin, OT Occupational Therapist    WALT Kumar, [facility] Physical Therapy Assistant    ZULLY Walters, [facility] Physical Therapy Assistant    RONDA Lovelace, PT Physical Therapist           Time Calculation:         PT Charges       09/18/17 1105          Time Calculation    Start Time 0950  -      Stop Time 1048  -      Time Calculation (min) 58 min  -      Time Calculation- PT    Total Timed Code Minutes- PT 58 minute(s)  -        User Key  (r) = Recorded By, (t) = Taken By, (c) = Cosigned By    Initials Name Provider Type    ZULLY Walters, [facility] Physical Therapy Assistant          Therapy Charges for Today     Code Description " Service Date Service Provider Modifiers Qty    79800635844 HC GAIT TRAINING EA 15 MIN 9/18/2017 Sridevi Walters, NEFTALI GP 2    56615761513 HC PT THER PROC EA 15 MIN 9/18/2017 Sridevi Walters, NEFTALI GP 1    61754679695 HC PT THERAPEUTIC ACT EA 15 MIN 9/18/2017 Sridevi Walters PTA GP 1          PT G-Codes  PT Professional Judgement Used?: Yes  Outcome Measure Options: AM-PAC 6 Clicks Basic Mobility (PT)  Score: 21  Functional Limitation: Mobility: Walking and moving around  Mobility: Walking and Moving Around Current Status (): At least 20 percent but less than 40 percent impaired, limited or restricted  Mobility: Walking and Moving Around Goal Status (): At least 1 percent but less than 20 percent impaired, limited or restricted    Sridevi Walters PTA  9/18/2017

## 2017-09-18 NOTE — PROGRESS NOTES
Discharge Planning Assessment  [facility]     Patient Name: Hasmukh Ham  MRN: [id number]  Today's Date: 9/18/2017    Admit Date: 9/15/2017          Discharge Needs Assessment       09/18/17 1118    Living Environment    Lives With spouse    Living Arrangements mobile home    Home Accessibility bed and bath on same level;grab bars present (bathtub);stairs (2 railings present);stairs to enter home    Number of Stairs to Enter Home 5    Stair Railings at Home outside, present at both sides    Type of Financial/Environmental Concern none    Transportation Available family or friend will provide    Living Environment    Provides Primary Care For no one    Quality Of Family Relationships supportive    Able to Return to Prior Living Arrangements yes    Discharge Needs Assessment    Concerns To Be Addressed no discharge needs identified    Outpatient/Agency/Support Group Needs clinic(s) (specify)    Community Agency Name(S) HF clinic    Anticipated Changes Related to Illness none    Equipment Currently Used at Home shower chair;walker, rolling;cane, straight;respiratory supplies;ramp;grab bar;oxygen;glucometer    Equipment Needed After Discharge none    Discharge Facility/Level Of Care Needs home with home health    Discharge Disposition home healthcare service;home or self-care            Discharge Plan       09/18/17 1116    Case Management/Social Work Plan    Plan Home with HH vs Home with transition visit    Patient/Family In Agreement With Plan yes    Additional Comments LACE completed. Facesheet info verified. Pt has all DME needed. O2 supplied with [employer]. Check sugar with glucometer at home. Has prx coverage. Pt agreeable to HH services as well as HH transitional visit. CM to follow MD recommendations..JACOB Meléndez.      09/18/17 0915    Case Management/Social Work Plan    Additional Comments Team rounding completed- Pt breathing improving, start decreasing IV steroids.  CHF- Heart Failure Clinic  consult.   Nicole POSEY RNCM        Discharge Placement     No information found        Expected Discharge Date and Time     Expected Discharge Date Expected Discharge Time    Sep 19, 2017               Demographic Summary     None            Functional Status       09/18/17 1118    Functional Status Prior    Ambulation 1-->assistive equipment    Transferring 0-->independent    Toileting 0-->independent    Bathing 1-->assistive equipment    Dressing 0-->independent    Eating 0-->independent    Communication 0-->understands/communicates without difficulty    Swallowing 0-->swallows foods/liquids without difficulty    IADL    Medications assistive person    Meal Preparation assistive person    Housekeeping assistive person    Laundry assistive person    Shopping assistive person    Oral Care independent    Cognitive/Perceptual/Developmental    Current Mental Status/Cognitive Functioning no deficits noted    Recent Changes in Mental Status/Cognitive Functioning no changes            Psychosocial     None            Abuse/Neglect     None            Legal     None            Substance Abuse     None            Patient Forms     None          Shweta Maldonado

## 2017-09-18 NOTE — THERAPY TREATMENT NOTE
Acute Care - Occupational Therapy Treatment Note  [facility]     Patient Name: Hasmukh Ham  : 1933  MRN: [id number]  Today's Date: 2017  Onset of Illness/Injury or Date of Surgery Date: 09/15/17  Date of Referral to OT: 09/15/17  Referring Physician: KETTY Griffin      Admit Date: 9/15/2017    Visit Dx:     ICD-10-CM ICD-9-CM   1. Pulmonary HTN I27.2 416.8   2. Impaired physical mobility Z74.09 781.99   3. Impaired mobility and activities of daily living Z74.09 799.89     Patient Active Problem List   Diagnosis   • Dilated aortic root   • Benign essential hypertension   • Type 2 diabetes mellitus   • Non-rheumatic tricuspid valve insufficiency   • Coronary artery disease involving native coronary artery of native heart   • Pulmonary emphysema   • Dyspnea   • Atrial fibrillation   • Bradycardia   • Shortness of breath   • COPD with exacerbation   • Chronic coronary artery disease   • Neuropathy   • Abdominal hernia   • Neck pain   • Dementia   • Diabetic neuropathy   • Diabetic polyneuropathy   • Gastroesophageal reflux disease without esophagitis   • Generalized osteoarthritis   • 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   • Nocturia   • Chronic obstructive bronchitis   • Osteoarthritis of multiple joints   • Hyperlipidemia   • Pain in joint involving ankle and foot   • Arthropathy of hand   • Paroxysmal tachycardia   • Family history of diseases of the blood and blood-forming organs and certain disorders involving the immune mechanism   • Osteoarthrosis involving more than one site but not generalized   • Right shoulder pain   • Rotator cuff syndrome   • Partial tear of subscapularis tendon   • Infraspinatus tendon tear   • Supraspinatus tendon tear   • Bilateral carotid artery stenosis   • (HFpEF) heart failure with preserved ejection fraction   • Pulmonary HTN   • Acute exacerbation of chronic obstructive airways disease   • Acute interstitial pneumonia   • Chronic obstructive lung  disease   • Coronary arteriosclerosis   • Diabetes mellitus   • COPD exacerbation             Adult Rehabilitation Note       09/18/17 1345 09/18/17 0950 09/17/17 1358    Rehab Assessment/Intervention    Discipline occupational therapy assistant  -KD physical therapy assistant  -ZULLY physical therapy assistant  -TA    Document Type therapy note (daily note)  -KD therapy note (daily note)  -ZULLY therapy note (daily note)  -TA    Subjective Information agree to therapy  -KD agree to therapy;complains of;pain;dyspnea  -ZULLY agree to therapy  -TA    Patient Effort, Rehab Treatment   good  -TA    Symptoms Noted Comment   r  -ZULLY    Precautions/Limitations fall precautions;oxygen therapy device and L/min  -KD fall precautions;oxygen therapy device and L/min  -ZULLY fall precautions  -TA    Precautions/Limitations, Hearing  hearing impairment, bilaterally  -ZULLY     Recorded by [KD] KRYSTIN Gilmore/GEOFFREY [ZULLY] Sridevi Walters, PTA [ZULLY] Sridevi Walters, PTA  [TA] Kayli Kumar, NEFTALI    Vital Signs    Pre Systolic BP Rehab 143  -  -ZULLY     Pre Treatment Diastolic BP 77  -KD 61  -ZULLY     Post Systolic BP Rehab  99  -ZULLY 128  -TA    Post Treatment Diastolic BP   66  -TA    Pretreatment Heart Rate (beats/min) 96  -KD  83  -TA    Intratreatment Heart Rate (beats/min)   87  -TA    Posttreatment Heart Rate (beats/min) 90  -KD  85  -TA    Pre SpO2 (%) 93  -KD 96  -ZULLY 92  -TA    O2 Delivery Pre Treatment room air  -KD room air  -ZULLY supplemental O2  -TA    Intra SpO2 (%)  94  -ZULLY 96  -TA    O2 Delivery Intra Treatment  room air  -ZULLY     Post SpO2 (%) 94  -KD 96  -ZULLY 95  -TA    O2 Delivery Post Treatment room air  -KD supplemental O2  -ZULLY     Pre Patient Position Sitting  -KD Supine  -ZULLY Supine  -TA    Intra Patient Position Standing  -KD Standing  -ZULLY     Post Patient Position Sitting  -KD  Supine  -TA    Recorded by [KD] KRYSTIN Gilmore/GEOFFREY [ZULLY] Sridevi Walters, PTA [TA] Kayli Kumar PTA    Pain Assessment    Pain Assessment 0-10  -KD  0-10  -ZULLY 0-10  -TA    Pain Score 9  -KD 10  -ZULLY 8  -TA    Post Pain Score 9  -KD 10  -ZULLY 8  -TA    Pain Type Chronic pain  -KD Chronic pain  -ZULLY Chronic pain  -TA    Pain Location Back   neck  -KD Back  -ZULLY Generalized  -TA    Pain Intervention(s) Declines  -KD Declines  -ZULLY Medication (See MAR)  -TA    Recorded by [KD] Erlinda Aguilar, MCCLELLAND/L [ZULLY] Sridevi Walters, PTA [TA] Kayli Kumar PTA    Cognitive Assessment/Intervention    Current Cognitive/Communication Assessment functional  -KD functional  -ZULLY functional  -TA    Orientation Status oriented x 4  -KD oriented x 4  -ZULLY oriented x 4  -TA    Follows Commands/Answers Questions 100% of the time  -% of the time  -ZULLY 100% of the time  -TA    Personal Safety WNL/WFL  -KD WNL/WFL  -ZULLY WNL/WFL  -TA    Personal Safety Interventions gait belt;nonskid shoes/slippers when out of bed  -KD gait belt;supervised activity;nonskid shoes/slippers when out of bed  -ZULLY gait belt;nonskid shoes/slippers when out of bed  -TA    Recorded by [KD] Erlinda Aguilar, MCCLELLAND/L [ZULLY] Sridevi Walters, PTA [TA] Kayli Kumar PTA    Bed Mobility, Assessment/Treatment    Bed Mobility, Roll Left, [place]  independent  -ZULLY independent  -TA    Bed Mob, Supine to Sit, [place]  independent  -ZULLY independent  -TA    Bed Mob, Sit to Supine, [place]  independent  -ZULLY independent  -TA    Recorded by  [ZULLY] Sridevi Walters, PTA [TA] Kayli Kumar PTA    Transfer Assessment/Treatment    Transfers, Sit-Stand [place] conditional independence  -KD conditional independence;independent  -ZULLY stand by assist  -TA    Transfers, Stand-Sit [place] conditional independence  -KD conditional independence;independent  -ZULLY stand by assist  -TA    Transfers, Sit-Stand-Sit, Assist Device --   none  -KD rolling walker  -ZULLY rolling walker  -TA    Toilet Transfer, [place] conditional independence  -KD      Toilet Transfer, Assistive Device --   none  -KD      Recorded by [KD] Erlinda Aguilar,  SONIA [ZULLY] Sridevi Walters PTA [TA] Kayli Kumar PTA    Gait Assessment/Treatment    Gait, [place] Level  conditional independence;stand by assist  -ZULLY contact guard assist  -TA    Gait, Assistive Device  rolling walker  -ZULLY rolling walker  -TA    Gait, Distance (Feet)  150   250,200  -ZULLY 350   & 250`  -TA    Gait, Gait Pattern Analysis  swing-through gait  -ZULLY swing-through gait  -TA    Gait, Gait Deviations  step length decreased  -ZULLY step length decreased  -TA    Recorded by  [ZULLY] Sridevi Walters PTA [TA] Kayli Kumar PTA    Functional Mobility    Functional Mobility- Ind. Level conditional independence  -KD      Functional Mobility- Device --   none  -KD      Functional Mobility-Distance (Feet) --   150 x 2  -KD      Recorded by [KD] SONIA Gilmore      Lower Body Dressing Assessment/Training    LB Dressing Assess/Train, Clothing Type donning:;pants;slipper socks  -KD      LB Dressing Assess/Train, Position standing;sitting  -KD      Recorded by [KD] SONIA Gilmore      Toileting Assessment/Training    Toileting Assess/Train, Assistive Device grab bars  -KD      Toileting Assess/Train, Position sitting  -KD      Toileting Assess/Train, Indepen Level conditional independence  -KD      Recorded by [KD] SONIA Gilmore      Therapy Exercises    Bilateral Lower Extremities  AROM:;25 reps;sitting;ankle pumps/circles;heel raises;LAQ;hip flexion  -ZULLY AROM:;10 reps;sitting;ankle pumps/circles;hip abduction/adduction;hip flexion;LAQ  -TA    Bilateral Upper Extremity AROM:;20 reps;sitting;elbow flexion/extension;pronation/supination;shoulder abduction/adduction;shoulder extension/flexion;shoulder ER/IR  -KD      BUE Resistance manual resistance- minimal  -KD      Recorded by [KD] KRYSTIN Gilmore/GEOFFREY [ZULLY] Sridevi Walters PTA [TA] Kayli Kumar PTA    Positioning and Restraints    Pre-Treatment Position in bed  -KD in bed  -ZULLY in bed  -TA    Post Treatment Position chair  -KD chair   -ZULLY bed  -TA    In Bed   supine;call light within reach;with family/caregiver;with other staff  -TA    In Chair call light within reach;encouraged to call for assist;exit alarm on  -KD sitting;call light within reach  -ZULLY     Recorded by [KD] Erlinda Aguilar, MCCLELLAND/L [ZULLY] Sridevi Walters, PTA [TA] Kayli Kumar, PTA      User Key  (r) = Recorded By, (t) = Taken By, (c) = Cosigned By    Initials Name Effective Dates    TA Kayli Kumar, PTA 10/17/16 -     ZULLY Sridevi Walters, PTA 10/17/16 -     KD Erlinda Aguilar, MCCLELLAND/L 10/17/16 -                 OT Goals       09/18/17 1345 09/17/17 1350       Transfer Training OT LTG    Transfer Training OT LTG, Date Established  09/17/17  -RB     Transfer Training OT LTG, Time to Achieve  by discharge  -RB     Transfer Training OT LTG, Activity Type  all transfers  -RB     Transfer Training OT LTG, [place] Level  independent;conditional independence  -RB     Transfer Training OT LTG, Assist Device  walker, rolling  -RB     Transfer Training OT LTG, Date Goal Reviewed 09/18/17  -KD      Transfer Training OT LTG, Outcome goal not met  -KD      Dynamic Standing Balance OT LTG    Dynamic Standing Balance OT LTG, Date Established  09/17/17  -RB     Dynamic Standing Balance OT LTG, Time to Achieve  by discharge  -RB     Dynamic Standing Balance OT LTG, [place] Level  independent;conditional independence   5 minutes with functional activity.  -RB     Dynamic Standing Balance OT LTG, Assist Device  assistive Device   R/W.  -RB     Dynamic Standing Balance OT LTG, Date Goal Reviewed 09/18/17  -KD      Dynamic Standing Balance OT LTG, Outcome goal not met  -KD      Patient Education OT LTG    Patient Education OT LTG, Date Established  09/17/17  -RB     Patient Education OT LTG, Time to Achieve  by discharge  -RB     Patient Education OT LTG, Education Type  home safety;adaptive breathing;joint protection;work simplification   fall prevention.  -RB     Patient Education OT LTG,  Education Understanding  demonstrates adequately;verbalizes understanding  -     Patient Education OT LTG, Date Goal Reviewed 09/18/17  -      Patient Education OT LTG Outcome goal not met  -KD      ADL OT LTG    ADL OT LTG, Date Established  09/17/17  -     ADL OT LTG, Time to Achieve  by discharge  -RB     ADL OT LTG, Activity Type  ADL skills   Sponge bath and dress or walk-in shower.  -RB     ADL OT LTG, [place] Level  independent with device;assistive device   R/W if needed.  -RB     ADL OT LTG, Date Goal Reviewed 09/18/17  -      ADL OT LTG, Outcome goal not met  -        User Key  (r) = Recorded By, (t) = Taken By, (c) = Cosigned By    Initials Name Provider Type    RB Carrington Marin, OT Occupational Therapist    SONIA Roca Occupational Therapy Assistant          Occupational Therapy Education     Title: PT OT SLP Therapies (Active)     Topic: Occupational Therapy (Active)     Point: Precautions (Done)    Description: Instruct learner(s) on prescribed precautions during self-care and functional transfers.    Learning Progress Summary    Learner Readiness Method Response Comment Documented by Status   Patient Acceptance E VU,NR Edu pt on use of gait belt and non skid socks when OOB. RB 09/17/17 1347 Done                      User Key     Initials Effective Dates Name Provider Type Discipline     06/15/16 -  Carrington Marin OT Occupational Therapist OT                  OT Recommendation and Plan  Anticipated Discharge Disposition: home with home health  Planned Therapy Interventions: activity intolerance, ADL retraining, balance training, transfer training, strengthening, adaptive equipment training  Therapy Frequency:  (3-14x/wk)  Plan of Care Review  Outcome Summary/Follow up Plan: No new goals met this date        Outcome Measures       09/18/17 1345 09/18/17 0950 09/17/17 1500    How much help from another person do you currently need...    Turning from your back to your side  while in flat bed without using bedrails?  4  -ZULLY 4  -TA    Moving from lying on back to sitting on the side of a flat bed without bedrails?  4  -ZULLY 4  -TA    Moving to and from a bed to a chair (including a wheelchair)?  3  -ZULLY 3  -TA    Standing up from a chair using your arms (e.g., wheelchair, bedside chair)?  3  -ZULLY 3  -TA    Climbing 3-5 steps with a railing?  3  -ZULLY 3  -TA    To walk in hospital room?  3  -ZULLY 3  -TA    AM-PAC 6 Clicks Score  20  -ZULLY 20  -TA    How much help from another is currently needed...    Putting on and taking off regular lower body clothing? 3  -KD      Bathing (including washing, rinsing, and drying) 3  -KD      Toileting (which includes using toilet bed pan or urinal) 3  -KD      Putting on and taking off regular upper body clothing 4  -KD      Taking care of personal grooming (such as brushing teeth) 4  -KD      Eating meals 4  -KD      Score 21  -KD      Functional Assessment    Outcome Measure Options   AM-PAC 6 Clicks Basic Mobility (PT)  -TA      09/17/17 0942 09/16/17 0845       How much help from another person do you currently need...    Turning from your back to your side while in flat bed without using bedrails?  4  -CZ     Moving from lying on back to sitting on the side of a flat bed without bedrails?  4  -CZ     Moving to and from a bed to a chair (including a wheelchair)?  3  -CZ     Standing up from a chair using your arms (e.g., wheelchair, bedside chair)?  3  -CZ     Climbing 3-5 steps with a railing?  3  -CZ     To walk in hospital room?  3  -CZ     AM-PAC 6 Clicks Score  20  -CZ     How much help from another is currently needed...    Putting on and taking off regular lower body clothing? 3  -RB      Bathing (including washing, rinsing, and drying) 3  -RB      Toileting (which includes using toilet bed pan or urinal) 3  -RB      Putting on and taking off regular upper body clothing 4  -RB      Taking care of personal grooming (such as brushing teeth) 4  -RB       "Eating meals 4  -RB      Score 21  -RB      Tinetti Assessment    T[place] Assessment  yes  -CZ     Sitting Balance  1  -CZ     Arises  2  -CZ     Attempts to Rise  2  -CZ     Immediate Standing Balance (first 5 sec)  2  -CZ     Standing Balance  1  -CZ     Sternal Nudge (feet close together)  1  -CZ     Eyes Closed (feet close together)  0  -CZ     Turning 360 Degrees- Steps  1  -CZ     Turning 360 Degrees- Steadiness  1  -CZ     Sitting Down  2  -CZ     T[place]etti Balance Score  13  -CZ     Gait Initiation (immediate after told \"go\")  1  -CZ     Step Length- Right Swing  1  -CZ     Step Length- Left Swing  1  -CZ     Foot Clearance- Right Foot  1  -CZ     Foot Clearance- Left Foot  1  -CZ     Step Symmetry  0  -CZ     Step Continuity  1  -CZ     Path (excursion)  1  -CZ     Trunk  0  -CZ     Base of Support  1  -CZ     Gait Score  8  -CZ     Tinetti Total Score  21  -CZ     T[place] Assistive Device  rolling walker  -CZ     Functional Assessment    Outcome Measure Options AM-PAC 6 Clicks Daily Activity (OT)  -RB Tinetti;AM-PAC 6 Clicks Basic Mobility (PT)  -CZ       User Key  (r) = Recorded By, (t) = Taken By, (c) = Cosigned By    Initials Name Provider Type    RB Carrington Marin, OT Occupational Therapist    TA Kayli Kumar, PTA Physical Therapy Assistant    ZULLY Walters, PTA Physical Therapy Assistant    KRYSTIN Roca/L Occupational Therapy Assistant    RONDA Lovelace, PT Physical Therapist           Time Calculation:         Time Calculation- OT       09/18/17 1457          Time Calculation- OT    OT Start Time 1345  -KD      OT Stop Time 1423  -KD      OT Time Calculation (min) 38 min  -KD      Total Timed Code Minutes- OT 38 minute(s)  -KD      OT Non-Billable Time (min) 15 min  -KD      OT Received On 09/18/17  -KD        User Key  (r) = Recorded By, (t) = Taken By, (c) = Cosigned By    Initials Name Provider Type    KRYSTIN Roca/L Occupational Therapy Assistant           Therapy " Charges for Today     Code Description Service Date Service Provider Modifiers Qty    28562678340 HC OT THER PROC EA 15 MIN 9/18/2017 KRYSTIN Gilmore/L GO 1    39579122287 HC OT THERAPEUTIC ACT EA 15 MIN 9/18/2017 KRYSTIN Gilmore/L GO 1    53368293390 HC OT SELF CARE/MGMT/TRAIN EA 15 MIN 9/18/2017 KRYSTIN Gilmore/L GO 1    42041469820 HC OT THER SUPP EA 15 MIN 9/18/2017 KRYSTIN Gilmore/L GO 1          OT G-codes  OT Professional Judgement Used?: Yes  OT Functional Scales Options: AM-PAC 6 Clicks Daily Activity (OT)  Score: 21  Functional Limitation: Self care  Self Care Current Status (): At least 20 percent but less than 40 percent impaired, limited or restricted  Self Care Goal Status (): At least 1 percent but less than 20 percent impaired, limited or restricted    KRYSTIN Gilmore/GEOFFREY  9/18/2017

## 2017-09-18 NOTE — PLAN OF CARE
Problem: Patient Care Overview (Adult)  Goal: Plan of Care Review  Outcome: Ongoing (interventions implemented as appropriate)    09/18/17 0950   Coping/Psychosocial Response Interventions   Plan Of Care Reviewed With patient   Patient Care Overview   Progress improving   Outcome Evaluation   Outcome Summary/Follow up Plan pt met 2 new goals and demonstrated good safety and endurance with activity. Pt is progressing well and is expected to return home at D/C.          Problem: Inpatient Physical Therapy  Goal: Transfer Training Goal 1 STG- PT  Outcome: Outcome(s) achieved Date Met:  09/18/17 09/16/17 0845 09/18/17 0950   Transfer Training PT STG   Transfer Training PT STG, Date Established 09/16/17 --    Transfer Training PT STG, Time to Achieve 2 days --    Transfer Training PT STG, Activity Type bed to chair /chair to bed;sit to stand/stand to sit --    Transfer Training PT STG, [place] Level conditional independence --    Transfer Training PT STG, Date Goal Reviewed --  09/18/17   Transfer Training PT STG, Outcome --  goal met       Goal: Gait Training Goal STG- PT  Outcome: Outcome(s) achieved Date Met:  09/18/17 09/16/17 0845 09/18/17 0950   Gait Training PT STG   Gait Training Goal PT STG, Date Established 09/16/17 --    Gait Training Goal PT STG, Time to Achieve 2 days --    Gait Training Goal PT STG, [place] Level conditional independence --    Gait Training Goal PT STG, Assist Device walker, rolling --    Gait Training Goal PT STG, Distance to Achieve 150'x1. --    Gait Training Goal PT STG, Additional Goal SpO2 >90%. --    Gait Training Goal PT STG, Date Goal Reviewed --  09/18/17   Gait Training Goal PT STG, Outcome --  goal met       Goal: Stair Training Goal LTG- PT  Outcome: Ongoing (interventions implemented as appropriate)    09/16/17 0845 09/18/17 0950   Stair Training PT LTG   Stair Training Goal PT LTG, Date Established 09/16/17 --    Stair Training Goal PT LTG, Time to Achieve by  discharge --    Stair Training Goal PT LTG, Number of Steps 5 --    Stair Training Goal PT LTG, [place] Level conditional independence --    Stair Training Goal PT LTG, Assist Device 2 handrails --    Stair Training Goal PT LTG, Date Goal Reviewed --  09/18/17   Stair Training Goal PT LTG, Outcome --  goal ongoing       Goal: Physical Therapy Goal LTG- PT  Outcome: Ongoing (interventions implemented as appropriate)    09/16/17 0845 09/18/17 0950   Physical Therapy PT LTG   Physical Therapy PT LTG, Date Established 09/16/17 --    Physical Therapy PT LTG, Time to Achieve by discharge --    Physical Therapy PT LTG, Activity Type [place] fall risk assessment.  --    Physical Therapy PT LTG, Addisional Goal Score 24/28 or low fall risk.  --    Physical Therapy PT LTG, Date Goal Reviewed --  09/18/17   Physical Therapy PT LTG, Outcome --  goal ongoing

## 2017-09-18 NOTE — PROGRESS NOTES
[facility] Medicine Services  INPATIENT PROGRESS NOTE    Length of Stay: 2  Date of Admission: 9/15/2017  Primary Care Physician: Paolo Rey MD    Subjective   Chief Complaint: No complaints    HPI:      9/18/17:  The patient states his breathing is improved today.  Wheezes and no one have decreased.    9/17/2017:  The patient still has significant wheezes this am.  Will start IV steroids.      9/16/2017:  The patient has been up walking with physical therapy this morning.  He states he feels a little better this morning, but still has some significant wheezes noted.     9/15/2017 H&P by Dr. Griffin:  Patient is a 84 y.o. male presents with complaint of having shortness of breath.  Patient states she's been having shortness of breath ongoing for past few weeks.  Patient states his shortness of breath has been worsening.  He has been having coughing spells with thick yellowish to greenish colored sputum.  No nausea vomiting have been reported.  Patient does complain of chest tightness with coughing spells.  Patient states he has been having worsening orthopnea of 3-4 pillows.  Patient also admits of having reduced excess tolerance.  Patient does complain of worsening swelling.  No urinary complaints have been reported.  Patient states he has been compliant with his medications.  Patient states he was recently treated with IV steroids and nebulizer treatment without any benefit.    Review of Systems   Constitutional: Positive for fatigue.   Respiratory: Positive for shortness of breath and wheezing.    Cardiovascular: Positive for leg swelling.   Gastrointestinal: Positive for abdominal distention.   All other systems reviewed and are negative.     All pertinent negatives and positives are as above. All other systems have been reviewed and are negative unless otherwise stated.     Objective    Temp:  [96.8 °F (36 °C)-98.8 °F (37.1 °C)] 96.8 °F (36 °C)  Heart Rate:  [73-94]  91  Resp:  [18-20] 18  BP: (124-147)/(65-85) 147/85    Physical Exam   Constitutional: He is oriented to person, place, and time. He appears well-developed and well-nourished.   HENT:   Head: Normocephalic and atraumatic.   Eyes: EOM are normal. Pupils are equal, round, and reactive to light.   Neck: Normal range of motion. Neck supple.   Cardiovascular: Normal rate and regular rhythm.    Pulmonary/Chest: Effort normal. He has wheezes.   Abdominal: Soft. Bowel sounds are normal.   Neurological: He is alert and oriented to person, place, and time.   Skin: Skin is warm and dry.   Psychiatric: He has a normal mood and affect.     Results Review:  I have reviewed the labs, radiology results, and diagnostic studies.    Laboratory Data:     Results from last 7 days  Lab Units 09/18/17  0520 09/17/17  0747 09/16/17  0503   SODIUM mmol/L 137 137 139   POTASSIUM mmol/L 4.5 4.3 4.1   CHLORIDE mmol/L 96 98 98   CO2 mmol/L 27.0 26.0 31.0   BUN mg/dL 61* 50* 42*   CREATININE mg/dL 1.58* 1.39* 1.57*   GLUCOSE mg/dL 145* 103* 122*   CALCIUM mg/dL 9.0 9.4 9.3   BILIRUBIN mg/dL 0.8 0.7 0.5   ALK PHOS U/L 64 60 51   ALT (SGPT) U/L 39 46 42   AST (SGOT) U/L 23 30 24   ANION GAP mmol/L 14.0 13.0 10.0     Estimated Creatinine Clearance: 35.7 mL/min (by C-G formula based on Cr of 1.58).    Results from last 7 days  Lab Units 09/18/17  0520 09/17/17  0747 09/16/17  0503   MAGNESIUM mg/dL 2.1 2.0 2.0           Results from last 7 days  Lab Units 09/18/17  0520 09/17/17  0616 09/16/17  0503 09/15/17  1457   WBC 10*3/mm3 18.50* 12.09* 11.99* 14.84*   HEMOGLOBIN g/dL 14.2 14.6 12.3* 12.3*   HEMATOCRIT % 44.0 44.1 38.2* 37.7*   PLATELETS 10*3/mm3 203 168 174 182           Culture Data:   Blood Culture   Date Value Ref Range Status   09/15/2017 No growth at 2 days  Preliminary   09/15/2017 No growth at 2 days  Preliminary     Urine Culture   Date Value Ref Range Status   09/15/2017 No growth at 24 hours  Final     No results found for:  RESPCX  No results found for: WOUNDCX  No results found for: STOOLCX  No components found for: BODYFLD    Radiology Data:   Imaging Results (last 24 hours)     ** No results found for the last 24 hours. **          I have reviewed the patient current medications.     Assessment/Plan     Hospital Problem List     * (Principal)Acute exacerbation of chronic obstructive airways disease    Benign essential hypertension    Type 2 diabetes mellitus    Non-rheumatic tricuspid valve insufficiency    Coronary artery disease involving native coronary artery of native heart    Pulmonary emphysema    Atrial fibrillation    Shortness of breath    COPD with exacerbation    (HFpEF) heart failure with preserved ejection fraction    Pulmonary HTN    Acute interstitial pneumonia    Overview Signed 9/15/2017  1:22 PM by Pamela Griffin MD     Hamman-Rich Syndrome         Chronic obstructive lung disease    Coronary arteriosclerosis    Diabetes mellitus    COPD exacerbation          Plan:    1.  COPD exacerbation:  Continue antibiotics, nebulizers, and PT/OT.  Methylprednisone 20 mg IV q 12 hours.   2.  Heart failure with preserved ejection fraction:  Continue fluid and sodium restrictions.   3.  Acute kidney injury:  Will decrease the strength of Levaquin and cut lasix 20 mg back to twice daily instead of three times daily. Creatinine worsened from 1.39 to 1.58 today.   CMP in am.  Will decrease his steroids from 20 mg every 8 hours to every 12 hours.  4. Pulmonary hypertension:  Patient sees Dr. Maciel.  Continue Lisinopril.          Discharge Planning: I expect patient to be discharged to home in 1-2 days.      This document has been electronically signed by EVE Gomez on September 18, 2017 11:17 AM

## 2017-09-18 NOTE — PLAN OF CARE
Problem: Patient Care Overview (Adult)  Goal: Adult Individualization and Mutuality  Outcome: Ongoing (interventions implemented as appropriate)    Problem: Activity Intolerance (Adult)  Goal: Effective Energy Conservation Techniques  Outcome: Ongoing (interventions implemented as appropriate)    Problem: Fluid Volume Excess (Adult,Obstetrics,Pediatric)  Goal: Stable Weight  Outcome: Ongoing (interventions implemented as appropriate)  Goal: Balanced Intake/Output  Outcome: Ongoing (interventions implemented as appropriate)    Problem: Pain, Chronic (Adult)  Goal: Acceptable Pain Control/Comfort Level  Outcome: Ongoing (interventions implemented as appropriate)

## 2017-09-18 NOTE — PLAN OF CARE
Problem: Patient Care Overview (Adult)  Goal: Plan of Care Review  Outcome: Ongoing (interventions implemented as appropriate)    09/18/17 0950 09/18/17 1453   Coping/Psychosocial Response Interventions   Plan Of Care Reviewed With patient --    Patient Care Overview   Progress improving --    Outcome Evaluation   Outcome Summary/Follow up Plan --  No new goals met this date       Goal: Discharge Needs Assessment  Outcome: Ongoing (interventions implemented as appropriate)    09/18/17 1118   Discharge Needs Assessment   Concerns To Be Addressed no discharge needs identified   Community Agency Name(S) HF clinic   Equipment Needed After Discharge none   Discharge Facility/Level Of Care Needs home with home health   Discharge Disposition home healthcare service;home or self-care   Current Health   Outpatient/Agency/Support Group Needs clinic(s) (specify)   Anticipated Changes Related to Illness none   Living Environment   Transportation Available family or friend will provide   Self-Care   Equipment Currently Used at Home shower chair;walker, rolling;cane, straight;respiratory supplies;ramp;grab bar;oxygen;glucometer         Problem: Inpatient Occupational Therapy  Goal: Transfer Training Goal 1 LTG- OT  Outcome: Ongoing (interventions implemented as appropriate)    09/17/17 1350 09/18/17 1345   Transfer Training OT LTG   Transfer Training OT LTG, Date Established 09/17/17 --    Transfer Training OT LTG, Time to Achieve by discharge --    Transfer Training OT LTG, Activity Type all transfers --    Transfer Training OT LTG, [place] Level independent;conditional independence --    Transfer Training OT LTG, Assist Device walker, rolling --    Transfer Training OT LTG, Date Goal Reviewed --  09/18/17   Transfer Training OT LTG, Outcome --  goal not met       Goal: Dynamic Standing Balance Goal LTG-OT  Outcome: Ongoing (interventions implemented as appropriate)  Goal: Patient Education Goal LTG- OT  Outcome: Ongoing  (interventions implemented as appropriate)  Goal: ADL Goal LTG- OT  Outcome: Ongoing (interventions implemented as appropriate)    09/17/17 1350 09/18/17 1345   ADL OT LTG   ADL OT LTG, Date Established 09/17/17 --    ADL OT LTG, Time to Achieve by discharge --    ADL OT LTG, Activity Type ADL skills  (Sponge bath and dress or walk-in shower.) --    ADL OT LTG, [place] Level independent with device;assistive device  (R/W if needed.) --    ADL OT LTG, Date Goal Reviewed --  09/18/17   ADL OT LTG, Outcome --  goal not met

## 2017-09-19 LAB
ALBUMIN SERPL-MCNC: 4.4 G/DL (ref 3.4–4.8)
ALBUMIN/GLOB SERPL: 1.8 G/DL (ref 1.1–1.8)
ALP SERPL-CCNC: 56 U/L (ref 38–126)
ALT SERPL W P-5'-P-CCNC: 47 U/L (ref 21–72)
ANION GAP SERPL CALCULATED.3IONS-SCNC: 13 MMOL/L (ref 5–15)
AST SERPL-CCNC: 23 U/L (ref 17–59)
BASOPHILS # BLD AUTO: 0.03 10*3/MM3 (ref 0–0.2)
BASOPHILS NFR BLD AUTO: 0.2 % (ref 0–2)
BILIRUB SERPL-MCNC: 0.7 MG/DL (ref 0.2–1.3)
BUN BLD-MCNC: 72 MG/DL (ref 7–21)
BUN/CREAT SERPL: 41.1 (ref 7–25)
CALCIUM SPEC-SCNC: 8.9 MG/DL (ref 8.4–10.2)
CHLORIDE SERPL-SCNC: 94 MMOL/L (ref 95–110)
CO2 SERPL-SCNC: 29 MMOL/L (ref 22–31)
CREAT BLD-MCNC: 1.75 MG/DL (ref 0.7–1.3)
DEPRECATED RDW RBC AUTO: 53.5 FL (ref 35.1–43.9)
EOSINOPHIL # BLD AUTO: 0 10*3/MM3 (ref 0–0.7)
EOSINOPHIL NFR BLD AUTO: 0 % (ref 0–7)
ERYTHROCYTE [DISTWIDTH] IN BLOOD BY AUTOMATED COUNT: 15.8 % (ref 11.5–14.5)
GFR SERPL CREATININE-BSD FRML MDRD: 37 ML/MIN/1.73 (ref 42–98)
GLOBULIN UR ELPH-MCNC: 2.5 GM/DL (ref 2.3–3.5)
GLUCOSE BLD-MCNC: 150 MG/DL (ref 60–100)
GLUCOSE BLDC GLUCOMTR-MCNC: 116 MG/DL (ref 70–130)
GLUCOSE BLDC GLUCOMTR-MCNC: 137 MG/DL (ref 70–130)
GLUCOSE BLDC GLUCOMTR-MCNC: 185 MG/DL (ref 70–130)
GLUCOSE BLDC GLUCOMTR-MCNC: 63 MG/DL (ref 70–130)
GLUCOSE BLDC GLUCOMTR-MCNC: 84 MG/DL (ref 70–130)
HCT VFR BLD AUTO: 42.1 % (ref 39–49)
HGB BLD-MCNC: 13.5 G/DL (ref 13.7–17.3)
IMM GRANULOCYTES # BLD: 0.4 10*3/MM3 (ref 0–0.02)
IMM GRANULOCYTES NFR BLD: 2.2 % (ref 0–0.5)
LYMPHOCYTES # BLD AUTO: 0.92 10*3/MM3 (ref 0.6–4.2)
LYMPHOCYTES NFR BLD AUTO: 5 % (ref 10–50)
MAGNESIUM SERPL-MCNC: 2.3 MG/DL (ref 1.6–2.3)
MCH RBC QN AUTO: 29.9 PG (ref 26.5–34)
MCHC RBC AUTO-ENTMCNC: 32.1 G/DL (ref 31.5–36.3)
MCV RBC AUTO: 93.1 FL (ref 80–98)
MONOCYTES # BLD AUTO: 0.67 10*3/MM3 (ref 0–0.9)
MONOCYTES NFR BLD AUTO: 3.6 % (ref 0–12)
NEUTROPHILS # BLD AUTO: 16.4 10*3/MM3 (ref 2–8.6)
NEUTROPHILS NFR BLD AUTO: 89 % (ref 37–80)
PLATELET # BLD AUTO: 196 10*3/MM3 (ref 150–450)
PMV BLD AUTO: 11.9 FL (ref 8–12)
POTASSIUM BLD-SCNC: 4.8 MMOL/L (ref 3.5–5.1)
PROT SERPL-MCNC: 6.9 G/DL (ref 6.3–8.6)
RBC # BLD AUTO: 4.52 10*6/MM3 (ref 4.37–5.74)
SODIUM BLD-SCNC: 136 MMOL/L (ref 137–145)
WBC NRBC COR # BLD: 18.42 10*3/MM3 (ref 3.2–9.8)

## 2017-09-19 PROCEDURE — 93010 ELECTROCARDIOGRAM REPORT: CPT | Performed by: INTERNAL MEDICINE

## 2017-09-19 PROCEDURE — 97530 THERAPEUTIC ACTIVITIES: CPT

## 2017-09-19 PROCEDURE — 83735 ASSAY OF MAGNESIUM: CPT | Performed by: INTERNAL MEDICINE

## 2017-09-19 PROCEDURE — 25010000002 FUROSEMIDE PER 20 MG: Performed by: INTERNAL MEDICINE

## 2017-09-19 PROCEDURE — 25010000002 LEVOFLOXACIN PER 250 MG: Performed by: NURSE PRACTITIONER

## 2017-09-19 PROCEDURE — 82962 GLUCOSE BLOOD TEST: CPT

## 2017-09-19 PROCEDURE — 85025 COMPLETE CBC W/AUTO DIFF WBC: CPT | Performed by: INTERNAL MEDICINE

## 2017-09-19 PROCEDURE — 25010000002 METHYLPREDNISOLONE PER 40 MG: Performed by: NURSE PRACTITIONER

## 2017-09-19 PROCEDURE — 94799 UNLISTED PULMONARY SVC/PX: CPT

## 2017-09-19 PROCEDURE — 97116 GAIT TRAINING THERAPY: CPT

## 2017-09-19 PROCEDURE — 25010000002 HEPARIN (PORCINE) PER 1000 UNITS: Performed by: INTERNAL MEDICINE

## 2017-09-19 PROCEDURE — 80053 COMPREHEN METABOLIC PANEL: CPT | Performed by: INTERNAL MEDICINE

## 2017-09-19 PROCEDURE — 93005 ELECTROCARDIOGRAM TRACING: CPT | Performed by: INTERNAL MEDICINE

## 2017-09-19 PROCEDURE — 97535 SELF CARE MNGMENT TRAINING: CPT

## 2017-09-19 PROCEDURE — 94760 N-INVAS EAR/PLS OXIMETRY 1: CPT

## 2017-09-19 RX ORDER — DEXTROSE MONOHYDRATE 25 G/50ML
50 INJECTION, SOLUTION INTRAVENOUS
Status: DISCONTINUED | OUTPATIENT
Start: 2017-09-19 | End: 2017-09-20 | Stop reason: HOSPADM

## 2017-09-19 RX ORDER — METHYLPREDNISOLONE SODIUM SUCCINATE 40 MG/ML
20 INJECTION, POWDER, LYOPHILIZED, FOR SOLUTION INTRAMUSCULAR; INTRAVENOUS EVERY 8 HOURS
Status: DISCONTINUED | OUTPATIENT
Start: 2017-09-19 | End: 2017-09-20 | Stop reason: HOSPADM

## 2017-09-19 RX ADMIN — FAMOTIDINE 20 MG: 20 TABLET ORAL at 17:08

## 2017-09-19 RX ADMIN — IPRATROPIUM BROMIDE AND ALBUTEROL SULFATE 3 ML: 2.5; .5 SOLUTION RESPIRATORY (INHALATION) at 06:56

## 2017-09-19 RX ADMIN — HEPARIN SODIUM 5000 UNITS: 5000 INJECTION, SOLUTION INTRAVENOUS; SUBCUTANEOUS at 05:41

## 2017-09-19 RX ADMIN — IPRATROPIUM BROMIDE AND ALBUTEROL SULFATE 3 ML: 2.5; .5 SOLUTION RESPIRATORY (INHALATION) at 03:09

## 2017-09-19 RX ADMIN — METHYLPREDNISOLONE SODIUM SUCCINATE 20 MG: 40 INJECTION, POWDER, FOR SOLUTION INTRAMUSCULAR; INTRAVENOUS at 17:12

## 2017-09-19 RX ADMIN — CLOPIDOGREL BISULFATE 75 MG: 75 TABLET ORAL at 09:24

## 2017-09-19 RX ADMIN — HYDROCODONE BITARTRATE AND ACETAMINOPHEN 1 TABLET: 7.5; 325 TABLET ORAL at 05:41

## 2017-09-19 RX ADMIN — MAGNESIUM OXIDE TAB 400 MG (241.3 MG ELEMENTAL MG) 400 MG: 400 (241.3 MG) TAB at 09:24

## 2017-09-19 RX ADMIN — METHYLPREDNISOLONE SODIUM SUCCINATE 20 MG: 40 INJECTION, POWDER, FOR SOLUTION INTRAMUSCULAR; INTRAVENOUS at 09:25

## 2017-09-19 RX ADMIN — IPRATROPIUM BROMIDE AND ALBUTEROL SULFATE 3 ML: 2.5; .5 SOLUTION RESPIRATORY (INHALATION) at 23:09

## 2017-09-19 RX ADMIN — TERBUTALINE SULFATE 5 MG: 2.5 TABLET ORAL at 17:08

## 2017-09-19 RX ADMIN — ASPIRIN 81 MG 81 MG: 81 TABLET ORAL at 09:26

## 2017-09-19 RX ADMIN — FUROSEMIDE 20 MG: 10 INJECTION, SOLUTION INTRAVENOUS at 17:03

## 2017-09-19 RX ADMIN — HYDROCODONE BITARTRATE AND ACETAMINOPHEN 1 TABLET: 7.5; 325 TABLET ORAL at 14:43

## 2017-09-19 RX ADMIN — GLIPIZIDE 5 MG: 5 TABLET ORAL at 09:24

## 2017-09-19 RX ADMIN — LEVOFLOXACIN 250 MG: 5 INJECTION, SOLUTION INTRAVENOUS at 15:10

## 2017-09-19 RX ADMIN — HEPARIN SODIUM 5000 UNITS: 5000 INJECTION, SOLUTION INTRAVENOUS; SUBCUTANEOUS at 14:43

## 2017-09-19 RX ADMIN — HEPARIN SODIUM 5000 UNITS: 5000 INJECTION, SOLUTION INTRAVENOUS; SUBCUTANEOUS at 21:43

## 2017-09-19 RX ADMIN — FUROSEMIDE 20 MG: 10 INJECTION, SOLUTION INTRAVENOUS at 10:40

## 2017-09-19 RX ADMIN — ISOSORBIDE DINITRATE 30 MG: 30 TABLET ORAL at 09:24

## 2017-09-19 RX ADMIN — DONEPEZIL HYDROCHLORIDE 10 MG: 10 TABLET, FILM COATED ORAL at 09:24

## 2017-09-19 RX ADMIN — HYDROCODONE BITARTRATE AND ACETAMINOPHEN 1 TABLET: 7.5; 325 TABLET ORAL at 21:43

## 2017-09-19 RX ADMIN — LISINOPRIL 10 MG: 10 TABLET ORAL at 09:25

## 2017-09-19 RX ADMIN — IPRATROPIUM BROMIDE AND ALBUTEROL SULFATE 3 ML: 2.5; .5 SOLUTION RESPIRATORY (INHALATION) at 14:48

## 2017-09-19 RX ADMIN — PANTOPRAZOLE SODIUM 40 MG: 40 TABLET, DELAYED RELEASE ORAL at 05:41

## 2017-09-19 RX ADMIN — DOCUSATE SODIUM 200 MG: 100 CAPSULE, LIQUID FILLED ORAL at 17:08

## 2017-09-19 RX ADMIN — FAMOTIDINE 20 MG: 20 TABLET ORAL at 09:24

## 2017-09-19 RX ADMIN — FUROSEMIDE 20 MG: 10 INJECTION, SOLUTION INTRAVENOUS at 21:43

## 2017-09-19 RX ADMIN — TERBUTALINE SULFATE 5 MG: 2.5 TABLET ORAL at 09:24

## 2017-09-19 RX ADMIN — FLUTICASONE PROPIONATE 2 SPRAY: 50 SPRAY, METERED NASAL at 09:24

## 2017-09-19 NOTE — PLAN OF CARE
Problem: Patient Care Overview (Adult)  Goal: Plan of Care Review  Outcome: Ongoing (interventions implemented as appropriate)    09/19/17 1111   Coping/Psychosocial Response Interventions   Plan Of Care Reviewed With patient   Outcome Evaluation   Outcome Summary/Follow up Plan Pt met physical therapy goal this tx. Pt demonstrated good balance with gait without AD and with gait balance activities without LOB. Pt will continue to benefit from skilled therapy services with HHPT upon D/C       Goal: Discharge Needs Assessment  Outcome: Ongoing (interventions implemented as appropriate)    09/18/17 1118   Discharge Needs Assessment   Concerns To Be Addressed no discharge needs identified   Community Agency Name(S) HF clinic   Equipment Needed After Discharge none   Discharge Facility/Level Of Care Needs home with home health   Discharge Disposition home healthcare service;home or self-care   Current Health   Outpatient/Agency/Support Group Needs clinic(s) (specify)   Anticipated Changes Related to Illness none   Living Environment   Transportation Available family or friend will provide   Self-Care   Equipment Currently Used at Home shower chair;walker, rolling;cane, straight;respiratory supplies;ramp;grab bar;oxygen;glucometer         Problem: Inpatient Physical Therapy  Goal: Stair Training Goal LTG- PT  Outcome: Ongoing (interventions implemented as appropriate)    09/16/17 0845 09/19/17 1111   Stair Training PT LTG   Stair Training Goal PT LTG, Date Established 09/16/17 --    Stair Training Goal PT LTG, Time to Achieve by discharge --    Stair Training Goal PT LTG, Number of Steps 5 --    Stair Training Goal PT LTG, [place] Level conditional independence --    Stair Training Goal PT LTG, Assist Device 2 handrails --    Stair Training Goal PT LTG, Date Goal Reviewed --  09/19/17   Stair Training Goal PT LTG, Outcome --  goal ongoing       Goal: Physical Therapy Goal LTG- PT  Outcome: Outcome(s) achieved Date  Met:  09/19/17 09/16/17 0845 09/19/17 1111   Physical Therapy PT LTG   Physical Therapy PT LTG, Date Established 09/16/17 --    Physical Therapy PT LTG, Time to Achieve by discharge --    Physical Therapy PT LTG, Activity Type [place] fall risk assessment.  --    Physical Therapy PT LTG, Addisional Goal Score 24/28 or low fall risk.  --    Physical Therapy PT LTG, Date Goal Reviewed --  09/19/17   Physical Therapy PT LTG, Outcome --  goal met

## 2017-09-19 NOTE — THERAPY TREATMENT NOTE
Acute Care - Occupational Therapy Treatment Note  [facility]     Patient Name: Hasmukh Ham  : 1933  MRN: [id number]  Today's Date: 2017  Onset of Illness/Injury or Date of Surgery Date: 09/15/17  Date of Referral to OT: 09/15/17  Referring Physician: KETTY Griffin      Admit Date: 9/15/2017    Visit Dx:     ICD-10-CM ICD-9-CM   1. Pulmonary HTN I27.2 416.8   2. Impaired physical mobility Z74.09 781.99   3. Impaired mobility and activities of daily living Z74.09 799.89     Patient Active Problem List   Diagnosis   • Dilated aortic root   • Benign essential hypertension   • Type 2 diabetes mellitus   • Non-rheumatic tricuspid valve insufficiency   • Coronary artery disease involving native coronary artery of native heart   • Pulmonary emphysema   • Dyspnea   • Atrial fibrillation   • Bradycardia   • Shortness of breath   • COPD with exacerbation   • Chronic coronary artery disease   • Neuropathy   • Abdominal hernia   • Neck pain   • Dementia   • Diabetic neuropathy   • Diabetic polyneuropathy   • Gastroesophageal reflux disease without esophagitis   • Generalized osteoarthritis   • 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   • Nocturia   • Chronic obstructive bronchitis   • Osteoarthritis of multiple joints   • Hyperlipidemia   • Pain in joint involving ankle and foot   • Arthropathy of hand   • Paroxysmal tachycardia   • Family history of diseases of the blood and blood-forming organs and certain disorders involving the immune mechanism   • Osteoarthrosis involving more than one site but not generalized   • Right shoulder pain   • Rotator cuff syndrome   • Partial tear of subscapularis tendon   • Infraspinatus tendon tear   • Supraspinatus tendon tear   • Bilateral carotid artery stenosis   • (HFpEF) heart failure with preserved ejection fraction   • Pulmonary HTN   • Acute exacerbation of chronic obstructive airways disease   • Acute interstitial pneumonia   • Chronic obstructive lung  disease   • Coronary arteriosclerosis   • Diabetes mellitus   • COPD exacerbation             Adult Rehabilitation Note       09/19/17 1006 09/19/17 0845 09/18/17 1345    Rehab Assessment/Intervention    Discipline physical therapy assistant  -CH occupational therapy assistant  -LW occupational therapy assistant  -KD    Document Type therapy note (daily note)  -CH therapy note (daily note)  -LW therapy note (daily note)  -KD    Subjective Information agree to therapy  -CH agree to therapy  -LW agree to therapy  -KD    Patient Effort, Rehab Treatment good  -CH good  -LW     Precautions/Limitations fall precautions;oxygen therapy device and L/min  -CH fall precautions;oxygen therapy device and L/min  -LW fall precautions;oxygen therapy device and L/min  -KD    Recorded by [CH] Aviva Lundberg PTA [LW] CHANDU LacyA/GEOFFREY [KD] CHANDU GilmoreA/L    Vital Signs    Pre Systolic BP Rehab 103  -  -  -KD    Pre Treatment Diastolic BP 64  -CH 69  -LW 77  -KD    Post Systolic BP Rehab 112  -CH      Post Treatment Diastolic BP 88  -CH      Pretreatment Heart Rate (beats/min) 88  -CH 86  -LW 96  -KD    Intratreatment Heart Rate (beats/min)  87  -LW     Posttreatment Heart Rate (beats/min) 91  -CH 84  -LW 90  -KD    Pre SpO2 (%) 97  -CH 98  -LW 93  -KD    O2 Delivery Pre Treatment supplemental O2  -CH room air  -LW room air  -KD    Intra SpO2 (%) 95   Pt requested to ambulate on room air  -CH 96  -LW     O2 Delivery Intra Treatment room air  -CH room air  -LW     Post SpO2 (%) 96  -CH 97  -LW 94  -KD    O2 Delivery Post Treatment room air  -CH room air  -LW room air  -KD    Pre Patient Position Supine  -CH Supine  -LW Sitting  -KD    Intra Patient Position Standing  -CH Sitting  -LW Standing  -KD    Post Patient Position Sitting  -CH Sitting  -LW Sitting  -KD    Recorded by [CH] Aviva Lundberg PTA [LW] CHANDU LacyA/GEOFFREY [KD] CHANDU GilmoreA/L    Pain Assessment    Pain Assessment No/denies  pain  - No/denies pain  -LW 0-10  -KD    Pain Score 0  -CH  9  -KD    Post Pain Score 0  -CH  9  -KD    Pain Type   Chronic pain  -KD    Pain Location   Back   neck  -KD    Pain Intervention(s)   Declines  -KD    Recorded by [CH] Aviva Lundberg PTA [LW] Berenice Hickman, MCCLELLAND/L [KD] Erlinda Aguilar, [place]/L    Vision Assessment/Intervention    Visual Impairment WFL with corrective lenses  -CH WFL with corrective lenses  -LW     Visual Impairment Comment Readers  - Readers  -LW     Recorded by [CH] Aviva Lundberg PTA [LW] Berenice Hickman, [place]/L     Cognitive Assessment/Intervention    Current Cognitive/Communication Assessment functional  -CH functional  -LW functional  -KD    Orientation Status oriented x 4  -CH oriented x 4  -LW oriented x 4  -KD    Follows Commands/Answers Questions 100% of the time  -% of the time  -% of the time  -KD    Personal Safety WNL/WFL  -CH WNL/WFL  -LW WNL/WFL  -KD    Personal Safety Interventions fall prevention program maintained;gait belt;muscle strengthening facilitated;nonskid shoes/slippers when out of bed;supervised activity  - fall prevention program maintained  -LW gait belt;nonskid shoes/slippers when out of bed  -KD    Recorded by [CH] Aviva Lundberg PTA [LW] Berenice Hickman, [place]/L [KD] Erlinda Aguilar, [place]/L    Bed Mobility, Assessment/Treatment    Bed Mobility, Assistive Device head of bed elevated  -      Bed Mobility, Roll Left, [place] not tested  -      Bed Mobility, Roll Right, [place] not tested  -      Bed Mob, Supine to Sit, [place] independent  -CH      Bed Mob, Sit to Supine, [place] not tested  -      Recorded by [CH] Aviva Lundberg PTA      Transfer Assessment/Treatment    Transfers, Sit-Stand [place] conditional independence  -CH conditional independence  -LW conditional independence  -KD    Transfers, Stand-Sit [place] conditional independence  -CH conditional independence  -LW conditional  independence  -KD    Transfers, Sit-Stand-Sit, Assist Device other (see comments)   none  -CH --   none  -LW --   none  -KD    Toilet Transfer, [place]  conditional independence  -LW conditional independence  -KD    Toilet Transfer, Assistive Device   --   none  -KD    Recorded by [] Aviva Lundberg PTA [LW] Berenice Hickman, [place]/L [KD] Erlinda Aguilar, [place]/L    Gait Assessment/Treatment    Gait, [place] Level stand by assist  -      Gait, Assistive Device other (see comments)   none  -CH      Gait, Distance (Feet) 200   Gait 2- 150, Gait 3- 78ft, Gait 4- 78ft  -      Gait, Gait Pattern Analysis swing-through gait  -      Gait, Gait Deviations step length decreased  -      Recorded by [] Aviva Lundberg PTA      Stairs Assessment/Treatment    Number of Stairs 6  -      Stairs, Handrail Location both sides  -      Stairs, [place] Level supervision required  -      Recorded by [CH] Aviva Lundberg PTA      Functional Mobility    Functional Mobility- Ind. Level   conditional independence  -KD    Functional Mobility- Device   --   none  -KD    Functional Mobility-Distance (Feet)   --   150 x 2  -KD    Recorded by   [KD] Erlinda Aguilar, [place]/L    Lower Body Dressing Assessment/Training    LB Dressing Assess/Train, Clothing Type   donning:;pants;slipper socks  -KD    LB Dressing Assess/Train, Position   standing;sitting  -KD    Recorded by   [KD] Erlinda Aguilar, [place]/L    Toileting Assessment/Training    Toileting Assess/Train, Assistive Device  grab bars  -LW grab bars  -KD    Toileting Assess/Train, Position  sitting  -LW sitting  -KD    Toileting Assess/Train, Indepen Level  conditional independence  -LW conditional independence  -KD    Recorded by  [LW] Berenice Hickman, [place]/L [KD] Erlinda Aguilar MCCLE[place]AND/L    Grooming Assessment/Training    Grooming Assess/Train, Position  standing;sink side  -LW     Grooming Assess/Train, Indepen Level  independent  -LW     Grooming Assess/Train,  Comment  Wash face hands comb hair oral care, shave.  -LW     Recorded by  [LW] CHANDU LacyA/L     Balance Skills Training    Gait Balance-Level of Assistance Close supervision  -      Gait Balance Support No upper extremity supported  -      Gait Balance Activities stepping over object   weaving in/out of cones, object retrieval  -      Gait Balance # of Minutes 15  -CH      Recorded by [CH] Aviva Lundberg PTA      Therapy Exercises    Bilateral Upper Extremity   AROM:;20 reps;sitting;elbow flexion/extension;pronation/supination;shoulder abduction/adduction;shoulder extension/flexion;shoulder ER/IR  -KD    BUE Resistance   manual resistance- minimal  -KD    Recorded by   [KD] CHANDU GilmoreA/L    Positioning and Restraints    Pre-Treatment Position in bed  -CH in bed  -LW in bed  -KD    Post Treatment Position bed  -CH bed  -LW chair  -KD    In Bed sitting EOB;call light within reach;encouraged to call for assist  - sitting EOB  -LW     In Chair  call light within reach;encouraged to call for assist;exit alarm on  -LW call light within reach;encouraged to call for assist;exit alarm on  -KD    Recorded by [CH] Aviva Lundberg PTA [LW] CHANDU LacyA/GEOFFREY [KD] CHANDU GilmoreA/GEOFFREY      09/18/17 0950 09/17/17 1358       Rehab Assessment/Intervention    Discipline physical therapy assistant  -ZULLY physical therapy assistant  -TA     Document Type therapy note (daily note)  -ZULLY therapy note (daily note)  -TA     Subjective Information agree to therapy;complains of;pain;dyspnea  -ZULLY agree to therapy  -TA     Patient Effort, Rehab Treatment  good  -TA     Symptoms Noted Comment  r  -ZULLY     Precautions/Limitations fall precautions;oxygen therapy device and L/min  -ZULLY fall precautions  -TA     Precautions/Limitations, Hearing hearing impairment, bilaterally  -ZULLY      Recorded by [ZULLY] Sridevi Walters, PTA [ZULLY] Sridevi Walters, NEFTALI  [TA] Kayli Kumar, NEFTALI     Vital Signs    Pre Systolic BP Rehab  117  -ZULLY      Pre Treatment Diastolic BP 61  -ZULLY      Post Systolic BP Rehab 99  -ZULLY 128  -TA     Post Treatment Diastolic BP  66  -TA     Pretreatment Heart Rate (beats/min)  83  -TA     Intratreatment Heart Rate (beats/min)  87  -TA     Posttreatment Heart Rate (beats/min)  85  -TA     Pre SpO2 (%) 96  -ZULLY 92  -TA     O2 Delivery Pre Treatment room air  -ZULLY supplemental O2  -TA     Intra SpO2 (%) 94  -ZULLY 96  -TA     O2 Delivery Intra Treatment room air  -ZULLY      Post SpO2 (%) 96  -ZULLY 95  -TA     O2 Delivery Post Treatment supplemental O2  -ZULLY      Pre Patient Position Supine  -ZULLY Supine  -TA     Intra Patient Position Standing  -ZULLY      Post Patient Position  Supine  -TA     Recorded by [ZULLY] Sridevi Walters PTA [TA] Kayli Kumar PTA     Pain Assessment    Pain Assessment 0-10  -ZULLY 0-10  -TA     Pain Score 10  -ZULLY 8  -TA     Post Pain Score 10  -ZULLY 8  -TA     Pain Type Chronic pain  -ZULLY Chronic pain  -TA     Pain Location Back  -ZULLY Generalized  -TA     Pain Intervention(s) Declines  -ZULLY Medication (See MAR)  -TA     Recorded by [ZULLY] Sridevi Walters PTA [TA] Kayli Kumar PTA     Cognitive Assessment/Intervention    Current Cognitive/Communication Assessment functional  -ZULLY functional  -TA     Orientation Status oriented x 4  -ZULLY oriented x 4  -TA     Follows Commands/Answers Questions 100% of the time  -ZULLY 100% of the time  -TA     Personal Safety WNL/WFL  -ZULLY WNL/WFL  -TA     Personal Safety Interventions gait belt;supervised activity;nonskid shoes/slippers when out of bed  -ZULLY gait belt;nonskid shoes/slippers when out of bed  -TA     Recorded by [ZULLY] Sridevi Walters PTA [TA] Kayli Kumar PTA     Bed Mobility, Assessment/Treatment    Bed Mobility, Roll Left, [place] independent  -ZULLY independent  -TA     Bed Mob, Supine to Sit, [place] independent  -ZULLY independent  -TA     Bed Mob, Sit to Supine, [place] independent  -ZULLY independent  -TA     Recorded by [ZULLY] Sridevi Walters PTA [TA] Kayli SWIFT  NEFTALI Kumar     Transfer Assessment/Treatment    Transfers, Sit-Stand [place] conditional independence;independent  -ZULLY stand by assist  -TA     Transfers, Stand-Sit [place] conditional independence;independent  -ZULLY stand by assist  -TA     Transfers, Sit-Stand-Sit, Assist Device rolling walker  -ZULLY rolling walker  -TA     Recorded by [ZULLY] Sridevi Walters PTA [TA] Kayli Kumar PTA     Gait Assessment/Treatment    Gait, [place] Level conditional independence;stand by assist  -ZULLY contact guard assist  -TA     Gait, Assistive Device rolling walker  -ZULLY rolling walker  -TA     Gait, Distance (Feet) 150   250,200  -ZULLY 350   & 250`  -TA     Gait, Gait Pattern Analysis swing-through gait  -ZULLY swing-through gait  -TA     Gait, Gait Deviations step length decreased  -ZULLY step length decreased  -TA     Recorded by [ZULLY] Sridevi Walters PTA [TA] Kayli Kumar PTA     Therapy Exercises    Bilateral Lower Extremities AROM:;25 reps;sitting;ankle pumps/circles;heel raises;LAQ;hip flexion  -ZULLY AROM:;10 reps;sitting;ankle pumps/circles;hip abduction/adduction;hip flexion;LAQ  -TA     Recorded by [ZULLY] Sridevi Walters PTA [TA] Kayli Kumar PTA     Positioning and Restraints    Pre-Treatment Position in bed  -ZULLY in bed  -TA     Post Treatment Position chair  -ZULLY bed  -TA     In Bed  supine;call light within reach;with family/caregiver;with other staff  -TA     In Chair sitting;call light within reach  -      Recorded by [ZULLY] Sridevi Walters PTA [TA] Kayli Kumar PTA       User Key  (r) = Recorded By, (t) = Taken By, (c) = Cosigned By    Initials Name Effective Dates    TA Kayli Kumar, NEFTALI 10/17/16 -     ZULLY Walters, \[facility]\"" 10/17/16 -     CH Aviva Lundberg, \[facility]\"" 10/17/16 -     KD Erlinda Aguilar, MCCLELLAND/L 10/17/16 -     LW Berenice Hickman, [place]/L 10/17/16 -                 OT Goals       09/19/17 1200 09/18/17 1345 09/17/17 1350    Transfer Training OT LTG    Transfer Training OT LTG, Date Established    09/17/17  -RB    Transfer Training OT LTG, Time to Achieve   by discharge  -RB    Transfer Training OT LTG, Activity Type   all transfers  -RB    Transfer Training OT LTG, [place] Level   independent;conditional independence  -RB    Transfer Training OT LTG, Assist Device   walker, rolling  -RB    Transfer Training OT LTG, Date Goal Reviewed 09/19/17  -LW 09/18/17  -KD     Transfer Training OT LTG, Outcome  goal not met  -KD     Dynamic Standing Balance OT LTG    Dynamic Standing Balance OT LTG, Date Established   09/17/17  -RB    Dynamic Standing Balance OT LTG, Time to Achieve   by discharge  -RB    Dynamic Standing Balance OT LTG, [place] Level   independent;conditional independence   5 minutes with functional activity.  -RB    Dynamic Standing Balance OT LTG, Assist Device   assistive Device   R/W.  -RB    Dynamic Standing Balance OT LTG, Date Goal Reviewed 09/19/17  -LW 09/18/17  -KD     Dynamic Standing Balance OT LTG, Outcome goal partially met  -LW goal not met  -KD     Patient Education OT LTG    Patient Education OT LTG, Date Established   09/17/17  -RB    Patient Education OT LTG, Time to Achieve   by discharge  -RB    Patient Education OT LTG, Education Type   home safety;adaptive breathing;joint protection;work simplification   fall prevention.  -RB    Patient Education OT LTG, Education Understanding   demonstrates adequately;verbalizes understanding  -RB    Patient Education OT LTG, Date Goal Reviewed 09/19/17  -LW 09/18/17  -KD     Patient Education OT LTG Outcome goal not met  -LW goal not met  -KD     ADL OT LTG    ADL OT LTG, Date Established   09/17/17  -RB    ADL OT LTG, Time to Achieve   by discharge  -RB    ADL OT LTG, Activity Type   ADL skills   Sponge bath and dress or walk-in shower.  -RB    ADL OT LTG, [place] Level   independent with device;assistive device   R/W if needed.  -RB    ADL OT LTG, Date Goal Reviewed 09/19/17  -LW 09/18/17  -KD     ADL OT LTG, Outcome goal not  met  -LW goal not met  -KD       User Key  (r) = Recorded By, (t) = Taken By, (c) = Cosigned By    Initials Name Provider Type    RB Carrington Marin, OT Occupational Therapist    KRYSTIN Roca/L Occupational Therapy Assistant    KRYSTIN Bonilla/L Occupational Therapy Assistant          Occupational Therapy Education     Title: PT OT SLP Therapies (Active)     Topic: Occupational Therapy (Done)     Point: ADL training (Done)    Description: Instruct learner(s) on proper safety adaptation and remediation techniques during self care or transfers.   Instruct in proper use of assistive devices.    Learning Progress Summary    Learner Readiness Method Response Comment Documented by Status   Patient Acceptance E VU Discussed fall precautions LW 09/19/17 1200 Done               Point: Home exercise program (Done)    Description: Instruct learner(s) on appropriate technique for monitoring, assisting and/or progressing therapeutic exercises/activities.    Learning Progress Summary    Learner Readiness Method Response Comment Documented by Status   Patient Acceptance E VU Discussed fall precautions LW 09/19/17 1200 Done               Point: Precautions (Done)    Description: Instruct learner(s) on prescribed precautions during self-care and functional transfers.    Learning Progress Summary    Learner Readiness Method Response Comment Documented by Status   Patient Acceptance E VU Discussed fall precautions LW 09/19/17 1200 Done    Acceptance E JANICE,NR Edu pt on use of gait belt and non skid socks when OOB. RB 09/17/17 1347 Done               Point: Body mechanics (Done)    Description: Instruct learner(s) on proper positioning and spine alignment during self-care, functional mobility activities and/or exercises.    Learning Progress Summary    Learner Readiness Method Response Comment Documented by Status   Patient Acceptance E VU Discussed fall precautions LW 09/19/17 1200 Done                      User Key      Initials Effective Dates Name Provider Type Discipline    RB 06/15/16 -  Carrington Marin OT Occupational Therapist OT    LW 10/17/16 -  SONIA Lacy Occupational Therapy Assistant OT                  OT Recommendation and Plan  Anticipated Discharge Disposition: home with home health  Planned Therapy Interventions: activity intolerance, ADL retraining, balance training, transfer training, strengthening, adaptive equipment training  Therapy Frequency:  (3-14x/wk)  Plan of Care Review  Progress: no change  Outcome Summary/Follow up Plan: Pt is doing very well this morning Will continue working on POC        Outcome Measures       09/19/17 1007 09/19/17 1006 09/19/17 0845    How much help from another person do you currently need...    Turning from your back to your side while in flat bed without using bedrails?  4  -CH     Moving from lying on back to sitting on the side of a flat bed without bedrails?  4  -CH     Moving to and from a bed to a chair (including a wheelchair)?  3  -CH     Standing up from a chair using your arms (e.g., wheelchair, bedside chair)?  3  -CH     Climbing 3-5 steps with a railing?  3  -CH     To walk in hospital room?  3  -CH     AM-PAC 6 Clicks Score  20  -CH     How much help from another is currently needed...    Putting on and taking off regular lower body clothing?   3  -LW    Bathing (including washing, rinsing, and drying)   3  -LW    Toileting (which includes using toilet bed pan or urinal)   3  -LW    Putting on and taking off regular upper body clothing   4  -LW    Taking care of personal grooming (such as brushing teeth)   4  -LW    Eating meals   4  -LW    Score   21  -LW    Tinetti Assessment    Tinetti Assessment yes  -CH      Sitting Balance 1  -CH      Arises 2  -CH      Attempts to Rise 2  -CH      Immediate Standing Balance (first 5 sec) 2  -CH      Standing Balance 1  -CH      Sternal Nudge (feet close together) 2  -CH      Eyes Closed (feet close together) 1  -CH  "     Turning 360 Degrees- Steps 1  -CH      Turning 360 Degrees- Steadiness 1  -CH      Sitting Down 2  -CH      Tinetti Balance Score 15  -CH      Gait Initiation (immediate after told \"go\") 1  -CH      Step Length- Right Swing 1  -CH      Step Length- Left Swing 1  -CH      Foot Clearance- Right Foot 1  -CH      Foot Clearance- Left Foot 1  -CH      Step Symmetry 0  -CH      Step Continuity 1  -CH      Path (excursion) 1  -CH      Trunk 1  -CH      Base of Support 1  -CH      Gait Score 9  -CH      Tinetti Total Score 24  -CH      Functional Assessment    Outcome Measure Options Tinetti  -CH AM-PAC 6 Clicks Basic Mobility (PT)  -CH       09/18/17 1345 09/18/17 0950 09/17/17 1500    How much help from another person do you currently need...    Turning from your back to your side while in flat bed without using bedrails?  4  -ZULLY 4  -TA    Moving from lying on back to sitting on the side of a flat bed without bedrails?  4  -ZULLY 4  -TA    Moving to and from a bed to a chair (including a wheelchair)?  3  -ZULLY 3  -TA    Standing up from a chair using your arms (e.g., wheelchair, bedside chair)?  3  -ZULLY 3  -TA    Climbing 3-5 steps with a railing?  3  -ZULLY 3  -TA    To walk in hospital room?  3  -ZULLY 3  -TA    AM-PAC 6 Clicks Score  20  -ZULLY 20  -TA    How much help from another is currently needed...    Putting on and taking off regular lower body clothing? 3  -KD      Bathing (including washing, rinsing, and drying) 3  -KD      Toileting (which includes using toilet bed pan or urinal) 3  -KD      Putting on and taking off regular upper body clothing 4  -KD      Taking care of personal grooming (such as brushing teeth) 4  -KD      Eating meals 4  -KD      Score 21  -KD      Functional Assessment    Outcome Measure Options   AM-PAC 6 Clicks Basic Mobility (PT)  -TA      09/17/17 0942          How much help from another is currently needed...    Putting on and taking off regular lower body clothing? 3  -RB      Bathing " (including washing, rinsing, and drying) 3  -RB      Toileting (which includes using toilet bed pan or urinal) 3  -RB      Putting on and taking off regular upper body clothing 4  -RB      Taking care of personal grooming (such as brushing teeth) 4  -RB      Eating meals 4  -RB      Score 21  -RB      Functional Assessment    Outcome Measure Options AM-PAC 6 Clicks Daily Activity (OT)  -RB        User Key  (r) = Recorded By, (t) = Taken By, (c) = Cosigned By    Initials Name Provider Type    RB Carrington Marin, OT Occupational Therapist    WALT Kumar, PTA Physical Therapy Assistant    ZULLY Sridevi Walters, PTA Physical Therapy Assistant    CH Aviva Lundberg, PTA Physical Therapy Assistant    KD Erlinda Aguilar, M[place]LE[place]/L Occupational Therapy Assistant    KRYSTIN Bonilla/L Occupational Therapy Assistant           Time Calculation:         Time Calculation- OT       09/19/17 1203          Time Calculation- OT    OT Start Time 0845  -LW      OT Stop Time 0930  -LW      OT Time Calculation (min) 45 min  -LW      OT Received On 09/19/17  -        User Key  (r) = Recorded By, (t) = Taken By, (c) = Cosigned By    Initials Name Provider Type    LW KRYSTIN Lacy/L Occupational Therapy Assistant           Therapy Charges for Today     Code Description Service Date Service Provider Modifiers Qty    85309951365 HC OT SELF CARE/MGMT/TRAIN EA 15 MIN 9/19/2017 KRYSTIN Lacy/L GO 2    [id number] HC OT THERAPEUTIC ACT EA 15 MIN 9/19/2017 SONIA Lacy GO 1          OT G-codes  OT Professional Judgement Used?: Yes  OT Functional Scales Options: AM-PAC 6 Clicks Daily Activity (OT)  Score: 21  Functional Limitation: Self care  Self Care Current Status (): At least 20 percent but less than 40 percent impaired, limited or restricted  Self Care Goal Status (): At least 1 percent but less than 20 percent impaired, limited or restricted    SONIA Lacy  9/19/2017

## 2017-09-19 NOTE — PLAN OF CARE
Problem: Patient Care Overview (Adult)  Goal: Plan of Care Review  Outcome: Ongoing (interventions implemented as appropriate)    09/19/17 0335   Coping/Psychosocial Response Interventions   Plan Of Care Reviewed With patient   Patient Care Overview   Progress no change   Outcome Evaluation   Outcome Summary/Follow up Plan No new complaints at this time. Will continue to monitor.        Goal: Adult Individualization and Mutuality  Outcome: Ongoing (interventions implemented as appropriate)  Goal: Discharge Needs Assessment  Outcome: Ongoing (interventions implemented as appropriate)    Problem: Activity Intolerance (Adult)  Goal: Activity Tolerance  Outcome: Ongoing (interventions implemented as appropriate)  Goal: Effective Energy Conservation Techniques  Outcome: Ongoing (interventions implemented as appropriate)    Problem: Fluid Volume Excess (Adult,Obstetrics,Pediatric)  Goal: Stable Weight  Outcome: Ongoing (interventions implemented as appropriate)  Goal: Balanced Intake/Output  Outcome: Ongoing (interventions implemented as appropriate)    Problem: Pain, Chronic (Adult)  Goal: Acceptable Pain Control/Comfort Level  Outcome: Ongoing (interventions implemented as appropriate)    Problem: Respiratory Insufficiency (Adult)  Goal: Acid/Base Balance  Outcome: Ongoing (interventions implemented as appropriate)  Goal: Effective Ventilation  Outcome: Ongoing (interventions implemented as appropriate)

## 2017-09-19 NOTE — SIGNIFICANT NOTE
09/19/17 1555   Rehab Treatment   Discipline physical therapy assistant   Treatment Not Performed patient/family declined treatment  (Pt deferred due to blood sugar becoming very low this AM and not feeling well. )   Recommendation   PT - Next Appointment 09/20/17

## 2017-09-19 NOTE — PROGRESS NOTES
[facility]  INPATIENT PROGRESS NOTE    Length of Stay: 3  Date of Admission: 9/15/2017  Primary Care Physician: Paolo Rey MD    Subjective   Chief Complaint: No complaints    HPI:      9/19/2017:  The patient sounds worse today.  He states his breathing is not as good today.  Kidney function has increased.  Will discontinue his Lisinopril for now and just use PRN Hydralazine for blood pressure control.   Will increase the frequency of his steroids.     9/18/17:  The patient states his breathing is improved today.  Wheezes and no one have decreased.    9/17/2017:  The patient still has significant wheezes this am.  Will start IV steroids.      9/16/2017:  The patient has been up walking with physical therapy this morning.  He states he feels a little better this morning, but still has some significant wheezes noted.     9/15/2017 H&P by Dr. Griffin:  Patient is a 84 y.o. male presents with complaint of having shortness of breath.  Patient states she's been having shortness of breath ongoing for past few weeks.  Patient states his shortness of breath has been worsening.  He has been having coughing spells with thick yellowish to greenish colored sputum.  No nausea vomiting have been reported.  Patient does complain of chest tightness with coughing spells.  Patient states he has been having worsening orthopnea of 3-4 pillows.  Patient also admits of having reduced excess tolerance.  Patient does complain of worsening swelling.  No urinary complaints have been reported.  Patient states he has been compliant with his medications.  Patient states he was recently treated with IV steroids and nebulizer treatment without any benefit.    Review of Systems   Constitutional: Positive for fatigue.   Respiratory: Positive for shortness of breath and wheezing.    Cardiovascular: Positive for leg swelling.   Gastrointestinal: Positive for abdominal distention.   All  other systems reviewed and are negative.     All pertinent negatives and positives are as above. All other systems have been reviewed and are negative unless otherwise stated.     Objective    Temp:  [96.6 °F (35.9 °C)-98.1 °F (36.7 °C)] 97.7 °F (36.5 °C)  Heart Rate:  [54-93] 88  Resp:  [18-32] 22  BP: ()/(51-84) 137/66    Physical Exam   Constitutional: He is oriented to person, place, and time. He appears well-developed and well-nourished.   HENT:   Head: Normocephalic and atraumatic.   Eyes: EOM are normal. Pupils are equal, round, and reactive to light.   Neck: Normal range of motion. Neck supple.   Cardiovascular: Normal rate and regular rhythm.    Pulmonary/Chest: Effort normal. He has wheezes.   Abdominal: Soft. Bowel sounds are normal.   Neurological: He is alert and oriented to person, place, and time.   Skin: Skin is warm and dry.   Psychiatric: He has a normal mood and affect.     Results Review:  I have reviewed the labs, radiology results, and diagnostic studies.    Laboratory Data:     Results from last 7 days  Lab Units 09/19/17  0518 09/18/17  0520 09/17/17  0747   SODIUM mmol/L 136* 137 137   POTASSIUM mmol/L 4.8 4.5 4.3   CHLORIDE mmol/L 94* 96 98   CO2 mmol/L 29.0 27.0 26.0   BUN mg/dL 72* 61* 50*   CREATININE mg/dL 1.75* 1.58* 1.39*   GLUCOSE mg/dL 150* 145* 103*   CALCIUM mg/dL 8.9 9.0 9.4   BILIRUBIN mg/dL 0.7 0.8 0.7   ALK PHOS U/L 56 64 60   ALT (SGPT) U/L 47 39 46   AST (SGOT) U/L 23 23 30   ANION GAP mmol/L 13.0 14.0 13.0     Estimated Creatinine Clearance: 35.2 mL/min (by C-G formula based on Cr of 1.75).    Results from last 7 days  Lab Units 09/19/17  0518 09/18/17  0520 09/17/17  0747   MAGNESIUM mg/dL 2.3 2.1 2.0           Results from last 7 days  Lab Units 09/19/17  0518 09/18/17  0520 09/17/17  0616 09/16/17  0503 09/15/17  1457   WBC 10*3/mm3 18.42* 18.50* 12.09* 11.99* 14.84*   HEMOGLOBIN g/dL 13.5* 14.2 14.6 12.3* 12.3*   HEMATOCRIT % 42.1 44.0 44.1 38.2* 37.7*   PLATELETS  10*3/mm3 196 203 168 174 182           Culture Data:   No results found for: BLOODCX  No results found for: URINECX  No results found for: RESPCX  No results found for: WOUNDCX  No results found for: STOOLCX  No components found for: BODYFLD    Radiology Data:   Imaging Results (last 24 hours)     ** No results found for the last 24 hours. **          I have reviewed the patient current medications.     Assessment/Plan     Hospital Problem List     * (Principal)Acute exacerbation of chronic obstructive airways disease    Benign essential hypertension    Type 2 diabetes mellitus    Non-rheumatic tricuspid valve insufficiency    Coronary artery disease involving native coronary artery of native heart    Pulmonary emphysema    Atrial fibrillation    Shortness of breath    COPD with exacerbation    (HFpEF) heart failure with preserved ejection fraction    Pulmonary HTN    Acute interstitial pneumonia    Overview Signed 9/15/2017  1:22 PM by Pamela Griffin MD     Hamman-Rich Syndrome         Chronic obstructive lung disease    Coronary arteriosclerosis    Diabetes mellitus    COPD exacerbation          Plan:    1.  COPD exacerbation:  Continue antibiotics, nebulizers, and PT/OT.  Methylprednisone 20 mg IV q 8 hours.   2.  Heart failure with preserved ejection fraction:  Continue fluid and sodium restrictions.   3.  Acute kidney injury:  Will decrease the strength of Levaquin and cut lasix 20 mg back to twice daily instead of three times daily. Creatinine 1.75 today.   CMP in am.    4. Pulmonary hypertension:  Patient sees Dr. Maciel.            Discharge Planning: I expect patient to be discharged to home in 1-2 days.      This document has been electronically signed by EVE Gomez on September 19, 2017 1:04 PM

## 2017-09-19 NOTE — THERAPY TREATMENT NOTE
Acute Care - Physical Therapy Treatment Note  [facility]     Patient Name: Hasmukh Ham  : 1933  MRN: [id number]  Today's Date: 2017  Onset of Illness/Injury or Date of Surgery Date: 09/15/17  Date of Referral to PT: 09/15/17  Referring Physician: KETTY Griffin    Admit Date: 9/15/2017    Visit Dx:    ICD-10-CM ICD-9-CM   1. Pulmonary HTN I27.2 416.8   2. Impaired physical mobility Z74.09 781.99   3. Impaired mobility and activities of daily living Z74.09 799.89     Patient Active Problem List   Diagnosis   • Dilated aortic root   • Benign essential hypertension   • Type 2 diabetes mellitus   • Non-rheumatic tricuspid valve insufficiency   • Coronary artery disease involving native coronary artery of native heart   • Pulmonary emphysema   • Dyspnea   • Atrial fibrillation   • Bradycardia   • Shortness of breath   • COPD with exacerbation   • Chronic coronary artery disease   • Neuropathy   • Abdominal hernia   • Neck pain   • Dementia   • Diabetic neuropathy   • Diabetic polyneuropathy   • Gastroesophageal reflux disease without esophagitis   • Generalized osteoarthritis   • 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   • Nocturia   • Chronic obstructive bronchitis   • Osteoarthritis of multiple joints   • Hyperlipidemia   • Pain in joint involving ankle and foot   • Arthropathy of hand   • Paroxysmal tachycardia   • Family history of diseases of the blood and blood-forming organs and certain disorders involving the immune mechanism   • Osteoarthrosis involving more than one site but not generalized   • Right shoulder pain   • Rotator cuff syndrome   • Partial tear of subscapularis tendon   • Infraspinatus tendon tear   • Supraspinatus tendon tear   • Bilateral carotid artery stenosis   • (HFpEF) heart failure with preserved ejection fraction   • Pulmonary HTN   • Acute exacerbation of chronic obstructive airways disease   • Acute interstitial pneumonia   • Chronic obstructive lung disease    • Coronary arteriosclerosis   • Diabetes mellitus   • COPD exacerbation               Adult Rehabilitation Note       09/19/17 1006 09/18/17 1345 09/18/17 0950    Rehab Assessment/Intervention    Discipline physical therapy assistant  -CH occupational therapy assistant  -KD physical therapy assistant  -ZULLY    Document Type therapy note (daily note)  -CH therapy note (daily note)  -KD therapy note (daily note)  -ZULLY    Subjective Information agree to therapy  - agree to therapy  -KD agree to therapy;complains of;pain;dyspnea  -ZULLY    Patient Effort, Rehab Treatment good  -      Precautions/Limitations fall precautions;oxygen therapy device and L/min  -CH fall precautions;oxygen therapy device and L/min  -KD fall precautions;oxygen therapy device and L/min  -ZULLY    Precautions/Limitations, Hearing   hearing impairment, bilaterally  -ZULLY    Recorded by [CH] Aviva Lundberg PTA [KD] KRYSTIN Gilmore/L [ZULLY] Sridevi Walters, NEFTALI    Vital Signs    Pre Systolic BP Rehab 103  -  -  -ZULLY    Pre Treatment Diastolic BP 64  -CH 77  -KD 61  -ZULLY    Post Systolic BP Rehab 112  -CH  99  -ZULLY    Post Treatment Diastolic BP 88  -CH      Pretreatment Heart Rate (beats/min) 88  -CH 96  -KD     Posttreatment Heart Rate (beats/min) 91  -CH 90  -KD     Pre SpO2 (%) 97  -CH 93  -KD 96  -ZULLY    O2 Delivery Pre Treatment supplemental O2  -CH room air  -KD room air  -ZULLY    Intra SpO2 (%) 95   Pt requested to ambulate on room air  -CH  94  -ZULLY    O2 Delivery Intra Treatment room air  -  room air  -ZULLY    Post SpO2 (%) 96  -CH 94  -KD 96  -ZULLY    O2 Delivery Post Treatment room air  - room air  -KD supplemental O2  -ZULLY    Pre Patient Position Supine  -CH Sitting  -KD Supine  -ZULLY    Intra Patient Position Standing  -CH Standing  -KD Standing  -ZULLY    Post Patient Position Sitting  -CH Sitting  -KD     Recorded by [CH] Aviva Lundberg, NEFTALI [KD] KRYSTIN Gilmore/L [ZULLY] Sridevi Walters PTA    Pain Assessment    Pain Assessment  No/denies pain  -CH 0-10  -KD 0-10  -ZULLY    Pain Score 0  -CH 9  -KD 10  -ZULLY    Post Pain Score 0  -CH 9  -KD 10  -ZULLY    Pain Type  Chronic pain  -KD Chronic pain  -ZULLY    Pain Location  Back   neck  -KD Back  -ZULLY    Pain Intervention(s)  Declines  -KD Declines  -ZULLY    Recorded by [CH] Aviva Lundberg PTA [KD] Erlinda Aguilar, MCCLELLAND/L [ZULLY] Sridevi Walters PTA    Vision Assessment/Intervention    Visual Impairment WFL with corrective lenses  -      Visual Impairment Comment Readers  -CH      Recorded by [CH] Aviva Lundberg PTA      Cognitive Assessment/Intervention    Current Cognitive/Communication Assessment functional  -CH functional  -KD functional  -ZULLY    Orientation Status oriented x 4  -CH oriented x 4  -KD oriented x 4  -ZULLY    Follows Commands/Answers Questions 100% of the time  -% of the time  -% of the time  -ZULLY    Personal Safety WNL/WFL  -CH WNL/WFL  -KD WNL/WFL  -ZULLY    Personal Safety Interventions fall prevention program maintained;gait belt;muscle strengthening facilitated;nonskid shoes/slippers when out of bed;supervised activity  - gait belt;nonskid shoes/slippers when out of bed  -KD gait belt;supervised activity;nonskid shoes/slippers when out of bed  -ZULLY    Recorded by [CH] Aviva Lundberg PTA [KD] Erlinda Aguilar, MCCLELLAND/L [ZULLY] Sridevi Walters PTA    Bed Mobility, Assessment/Treatment    Bed Mobility, Assistive Device head of bed elevated  -      Bed Mobility, Roll Left, [place]rell[place] not tested  -  independent  -ZULLY    Bed Mobility, Roll Right, [place] not tested  -      Bed Mob, Supine to Sit, [place] independent  -CH  independent  -ZULLY    Bed Mob, Sit to Supine, [place] not tested  -  independent  -ZULLY    Recorded by [] Aviva Lundberg PTA  [ZULLY] rSidevi Walters PTA    Transfer Assessment/Treatment    Transfers, Sit-Stand [place]rell[place] conditional independence  - conditional independence  -KD conditional independence;independent  -ZULLY    Transfers,  Stand-Sit [place] conditional independence  -CH conditional independence  -KD conditional independence;independent  -ZULLY    Transfers, Sit-Stand-Sit, Assist Device other (see comments)   none  -CH --   none  -KD rolling walker  -ZULLY    Toilet Transfer, Wol[place]  conditional independence  -KD     Toilet Transfer, Assistive Device  --   none  -KD     Recorded by [] Aviva Lundberg, PTA [KD] CHANDU GilmoreA/L [ZULLY] Sridevi Walters PTA    Gait Assessment/Treatment    Gait, Wol[place] Level stand by assist  -  conditional independence;stand by assist  -ZULLY    Gait, Assistive Device other (see comments)   none  -CH  rolling walker  -ZULLY    Gait, Distance (Feet) 200   Gait 2- 150, Gait 3- 78ft, Gait 4- 78ft  -  150   250,200  -ZULLY    Gait, Gait Pattern Analysis swing-through gait  -  swing-through gait  -    Gait, Gait Deviations step length decreased  -  step length decreased  -ZULLY    Recorded by [CH] Aviva Lundberg PTA  [ZULLY] Sridevi Walters PTA    Stairs Assessment/Treatment    Number of Stairs 6  -      Stairs, Handrail Location both sides  -      Stairs, Wol[place] Level supervision required  -      Recorded by [CH] Aviva Lundberg PTA      Functional Mobility    Functional Mobility- Ind. Level  conditional independence  -KD     Functional Mobility- Device  --   none  -     Functional Mobility-Distance (Feet)  --   150 x 2  -KD     Recorded by  [KD] CHANDU GilmoreA/L     Lower Body Dressing Assessment/Training    LB Dressing Assess/Train, Clothing Type  donning:;pants;slipper socks  -     LB Dressing Assess/Train, Position  standing;sitting  -KD     Recorded by  [KD] CHANDU GilmoreA/L     Toileting Assessment/Training    Toileting Assess/Train, Assistive Device  grab bars  -KD     Toileting Assess/Train, Position  sitting  -KD     Toileting Assess/Train, Indepen Level  conditional independence  -KD     Recorded by  [KD] CHANDU GilmoreA/L     Balance Skills Training     Gait Balance-Level of Assistance Close supervision  -      Gait Balance Support No upper extremity supported  -      Gait Balance Activities stepping over object   weaving in/out of cones, object retrieval  -      Gait Balance # of Minutes 15  -CH      Recorded by [CH] Aviva Lundberg PTA      Therapy Exercises    Bilateral Lower Extremities   AROM:;25 reps;sitting;ankle pumps/circles;heel raises;LAQ;hip flexion  -    Bilateral Upper Extremity  AROM:;20 reps;sitting;elbow flexion/extension;pronation/supination;shoulder abduction/adduction;shoulder extension/flexion;shoulder ER/IR  -KD     BUE Resistance  manual resistance- minimal  -     Recorded by  [KD] KRYSTIN Gilmore/GEOFFREY [ZULLY] Sridevi Walters PTA    Positioning and Restraints    Pre-Treatment Position in bed  - in bed  -KD in bed  -    Post Treatment Position bed  - chair  -KD chair  -ZULLY    In Bed sitting EOB;call light within reach;encouraged to call for assist  -      In Chair  call light within reach;encouraged to call for assist;exit alarm on  -KD sitting;call light within reach  -ZULLY    Recorded by [CH] Aviva Lundberg PTA [KD] KRYSTIN Gilmore/GEOFFREY [ZULLY] Sridevi Walters PTA      09/17/17 6968          Rehab Assessment/Intervention    Discipline physical therapy assistant  -TA      Document Type therapy note (daily note)  -TA      Subjective Information agree to therapy  -TA      Patient Effort, Rehab Treatment good  -TA      Symptoms Noted Comment r  -ZULLY      Precautions/Limitations fall precautions  -TA      Recorded by [ZULLY] Sridevi Walters PTA  [TA] Kayli Kumar PTA      Vital Signs    Post Systolic BP Rehab 128  -TA      Post Treatment Diastolic BP 66  -TA      Pretreatment Heart Rate (beats/min) 83  -TA      Intratreatment Heart Rate (beats/min) 87  -TA      Posttreatment Heart Rate (beats/min) 85  -TA      Pre SpO2 (%) 92  -TA      O2 Delivery Pre Treatment supplemental O2  -TA      Intra SpO2 (%) 96  -TA      Post SpO2  (%) 95  -TA      Pre Patient Position Supine  -TA      Post Patient Position Supine  -TA      Recorded by [TA] Kayli Kumar PTA      Pain Assessment    Pain Assessment 0-10  -TA      Pain Score 8  -TA      Post Pain Score 8  -TA      Pain Type Chronic pain  -TA      Pain Location Generalized  -TA      Pain Intervention(s) Medication (See MAR)  -TA      Recorded by [TA] Kayli Kumar PTA      Cognitive Assessment/Intervention    Current Cognitive/Communication Assessment functional  -TA      Orientation Status oriented x 4  -TA      Follows Commands/Answers Questions 100% of the time  -TA      Personal Safety WNL/WFL  -TA      Personal Safety Interventions gait belt;nonskid shoes/slippers when out of bed  -TA      Recorded by [TA] Kayli Kumar PTA      Bed Mobility, Assessment/Treatment    Bed Mobility, Roll Left, [place] independent  -TA      Bed Mob, Supine to Sit, [place] independent  -TA      Bed Mob, Sit to Supine, [place] independent  -TA      Recorded by [TA] Kayli Kumar PTA      Transfer Assessment/Treatment    Transfers, Sit-Stand [place] stand by assist  -TA      Transfers, Stand-Sit [place] stand by assist  -TA      Transfers, Sit-Stand-Sit, Assist Device rolling walker  -TA      Recorded by [TA] Kayli Kumar PTA      Gait Assessment/Treatment    Gait, Gaines Level contact guard assist  -TA      Gait, Assistive Device rolling walker  -TA      Gait, Distance (Feet) 350   & 250`  -TA      Gait, Gait Pattern Analysis swing-through gait  -TA      Gait, Gait Deviations step length decreased  -TA      Recorded by [TA] Kayli Kumar PTA      Therapy Exercises    Bilateral Lower Extremities AROM:;10 reps;sitting;ankle pumps/circles;hip abduction/adduction;hip flexion;LAQ  -TA      Recorded by [TA] Kayli Kumar PTA      Positioning and Restraints    Pre-Treatment Position in bed  -TA      Post Treatment Position bed  -TA      In Bed supine;call light within  reach;with family/caregiver;with other staff  -TA      Recorded by [TA] Kayli Kumar, PTA        User Key  (r) = Recorded By, (t) = Taken By, (c) = Cosigned By    Initials Name Effective Dates    TA Kayli Kumar, PTA 10/17/16 -     ZULLY Sridevi AGUSTIN Romeo, PTA 10/17/16 -     CH Aviva A Toño, PTA 10/17/16 -     KD Erlinda Aguilar, MCCLELLAND/L 10/17/16 -                 IP PT Goals       09/19/17 1111 09/18/17 0950 09/16/17 0845    Transfer Training PT STG    Transfer Training PT STG, Date Established   09/16/17  -CZ    Transfer Training PT STG, Time to Achieve   2 days  -CZ    Transfer Training PT STG, Activity Type   bed to chair /chair to bed;sit to stand/stand to sit  -CZ    Transfer Training PT STG, [place] Level   conditional independence  -CZ    Transfer Training PT STG, Date Goal Reviewed  09/18/17  -ZULLY 09/16/17  -CZ    Transfer Training PT STG, Outcome  goal met  - goal ongoing  -CZ    Gait Training PT STG    Gait Training Goal PT STG, Date Established   09/16/17  -CZ    Gait Training Goal PT STG, Time to Achieve   2 days  -CZ    Gait Training Goal PT STG, [place] Level   conditional independence  -CZ    Gait Training Goal PT STG, Assist Device   walker, rolling  -CZ    Gait Training Goal PT STG, Distance to Achieve   150'x1.  -CZ    Gait Training Goal PT STG, Additional Goal   SpO2 >90%.  -CZ    Gait Training Goal PT STG, Date Goal Reviewed  09/18/17  -ZULLY 09/16/17  -CZ    Gait Training Goal PT STG, Outcome  goal met  - goal ongoing  -CZ    Stair Training PT LTG    Stair Training Goal PT LTG, Date Established   09/16/17  -CZ    Stair Training Goal PT LTG, Time to Achieve   by discharge  -CZ    Stair Training Goal PT LTG, Number of Steps   5  -CZ    Stair Training Goal PT LTG, [place] Level   conditional independence  -CZ    Stair Training Goal PT LTG, Assist Device   2 handrails  -CZ    Stair Training Goal PT LTG, Date Goal Reviewed 09/19/17  -CH 09/18/17  -ZULLY 09/16/17  -CZ    Stair Training  Goal PT LTG, Outcome goal ongoing  - goal ongoing  - goal ongoing  -    Physical Therapy PT LTG    Physical Therapy PT LTG, Date Established   09/16/17  -    Physical Therapy PT LTG, Time to Achieve   by discharge  -    Physical Therapy PT LTG, Activity Type   Tinetti fall risk assessment.   -    Physical Therapy PT LTG, Addisional Goal   Score 24/28 or low fall risk.   -    Physical Therapy PT LTG, Date Goal Reviewed 09/19/17  - 09/18/17  -ZULLY 09/16/17  -    Physical Therapy PT LTG, Outcome goal met  - goal ongoing  - goal ongoing  -      User Key  (r) = Recorded By, (t) = Taken By, (c) = Cosigned By    Initials Name Provider Type    ZULLY Sridevi Walters, PTA Physical Therapy Assistant     Aviva Lundberg, PTA Physical Therapy Assistant     Bud Lovelace, PT Physical Therapist          Physical Therapy Education     Title: PT OT SLP Therapies (Active)     Topic: Physical Therapy (Active)     Point: Mobility training (Done)    Learning Progress Summary    Learner Readiness Method Response Comment Documented by Status   Patient Acceptance E DU,VU benefits of exercise & mobility  09/17/17 1507 Done               Point: Precautions (Active)    Learning Progress Summary    Learner Readiness Method Response Comment Documented by Status   Patient Acceptance E NR Tinetti assessed: 21/28 or moderate fall risk. Discussed benefits of using walker until fall risk is lowered.  09/16/17 1108 Active                      User Key     Initials Effective Dates Name Provider Type Discipline    TA 10/17/16 -  Kayli Kumar PTA Physical Therapy Assistant PT     02/17/17 -  Bud Lovelace, PT Physical Therapist PT                    PT Recommendation and Plan  Anticipated Discharge Disposition: home with home health  Planned Therapy Interventions: balance training, gait training, home exercise program, stair training, strengthening, transfer training, patient/family education  PT Frequency: other (see  "comments) (5-14x/week)  Plan of Care Review  Plan Of Care Reviewed With: patient  Outcome Summary/Follow up Plan: Pt met physical therapy goal this tx. Pt demonstrated good balance with gait without AD and with gait balance activities without LOB. Pt will continue to benefit from skilled therapy services with HHPT upon D/C          Outcome Measures       09/19/17 1007 09/19/17 1006 09/18/17 1345    How much help from another person do you currently need...    Turning from your back to your side while in flat bed without using bedrails?  4  -CH     Moving from lying on back to sitting on the side of a flat bed without bedrails?  4  -CH     Moving to and from a bed to a chair (including a wheelchair)?  3  -CH     Standing up from a chair using your arms (e.g., wheelchair, bedside chair)?  3  -CH     Climbing 3-5 steps with a railing?  3  -CH     To walk in hospital room?  3  -CH     AM-PAC 6 Clicks Score  20  -CH     How much help from another is currently needed...    Putting on and taking off regular lower body clothing?   3  -KD    Bathing (including washing, rinsing, and drying)   3  -KD    Toileting (which includes using toilet bed pan or urinal)   3  -KD    Putting on and taking off regular upper body clothing   4  -KD    Taking care of personal grooming (such as brushing teeth)   4  -KD    Eating meals   4  -KD    Score   21  -KD    Tinetti Assessment    Tinetti Assessment yes  -CH      Sitting Balance 1  -CH      Arises 2  -CH      Attempts to Rise 2  -CH      Immediate Standing Balance (first 5 sec) 2  -CH      Standing Balance 1  -CH      Sternal Nudge (feet close together) 2  -CH      Eyes Closed (feet close together) 1  -CH      Turning 360 Degrees- Steps 1  -CH      Turning 360 Degrees- Steadiness 1  -CH      Sitting Down 2  -CH      Tinetti Balance Score 15  -CH      Gait Initiation (immediate after told \"go\") 1  -CH      Step Length- Right Swing 1  -CH      Step Length- Left Swing 1  -CH      Foot " Clearance- Right Foot 1  -CH      Foot Clearance- Left Foot 1  -CH      Step Symmetry 0  -CH      Step Continuity 1  -CH      Path (excursion) 1  -CH      Trunk 1  -CH      Base of Support 1  -CH      Gait Score 9  -CH      Tinetti Total Score 24  -CH      Functional Assessment    Outcome Measure Options Tinetti  -CH AM-PAC 6 Clicks Basic Mobility (PT)  -CH       09/18/17 0950 09/17/17 1500 09/17/17 0942    How much help from another person do you currently need...    Turning from your back to your side while in flat bed without using bedrails? 4  -ZULLY 4  -TA     Moving from lying on back to sitting on the side of a flat bed without bedrails? 4  -ZULLY 4  -TA     Moving to and from a bed to a chair (including a wheelchair)? 3  -ZULLY 3  -TA     Standing up from a chair using your arms (e.g., wheelchair, bedside chair)? 3  -ZULLY 3  -TA     Climbing 3-5 steps with a railing? 3  -ZULLY 3  -TA     To walk in hospital room? 3  -ZULLY 3  -TA     AM-PAC 6 Clicks Score 20  -ZULLY 20  -TA     How much help from another is currently needed...    Putting on and taking off regular lower body clothing?   3  -RB    Bathing (including washing, rinsing, and drying)   3  -RB    Toileting (which includes using toilet bed pan or urinal)   3  -RB    Putting on and taking off regular upper body clothing   4  -RB    Taking care of personal grooming (such as brushing teeth)   4  -RB    Eating meals   4  -RB    Score   21  -RB    Functional Assessment    Outcome Measure Options  AM-PAC 6 Clicks Basic Mobility (PT)  -TA AM-PAC 6 Clicks Daily Activity (OT)  -RB      User Key  (r) = Recorded By, (t) = Taken By, (c) = Cosigned By    Initials Name Provider Type    SOFIA Marin, OT Occupational Therapist    WALT Kumar, PTA Physical Therapy Assistant    ZULLY Walters, PTA Physical Therapy Assistant    SABINA Lundberg, PTA Physical Therapy Assistant    PAOLA Aguilar, MCCLELLAND/L Occupational Therapy Assistant           Time Calculation:         PT  Charges       09/19/17 1113          Time Calculation    Start Time 1006  -CH      Stop Time 1059  -CH      Time Calculation (min) 53 min  -CH      PT Received On 09/19/17  -CH      Time Calculation- PT    Total Timed Code Minutes- PT 53 minute(s)  -        User Key  (r) = Recorded By, (t) = Taken By, (c) = Cosigned By    Initials Name Provider Type     Aviva Lundberg PTA Physical Therapy Assistant          Therapy Charges for Today     Code Description Service Date Service Provider Modifiers Qty    31558913354 HC PT THERAPEUTIC ACT EA 15 MIN 9/19/2017 Aviva Lundberg PTA GP 2    42770360649 HC GAIT TRAINING EA 15 MIN 9/19/2017 Aviva Lundberg, NEFTALI GP 2          PT G-Codes  PT Professional Judgement Used?: Yes  Outcome Measure Options: Tinetti  Score: 21  Functional Limitation: Mobility: Walking and moving around  Mobility: Walking and Moving Around Current Status (): At least 20 percent but less than 40 percent impaired, limited or restricted  Mobility: Walking and Moving Around Goal Status (): At least 1 percent but less than 20 percent impaired, limited or restricted    Aviva Lundberg PTA  9/19/2017

## 2017-09-19 NOTE — PLAN OF CARE
Problem: Patient Care Overview (Adult)  Goal: Plan of Care Review  Outcome: Ongoing (interventions implemented as appropriate)    Problem: Inpatient Occupational Therapy  Goal: Transfer Training Goal 1 LTG- OT  Outcome: Ongoing (interventions implemented as appropriate)    09/17/17 1350 09/18/17 1345 09/19/17 1200   Transfer Training OT LTG   Transfer Training OT LTG, Date Established 09/17/17 --  --    Transfer Training OT LTG, Time to Achieve by discharge --  --    Transfer Training OT LTG, Activity Type all transfers --  --    Transfer Training OT LTG, [place] Level independent;conditional independence --  --    Transfer Training OT LTG, Assist Device walker, rolling --  --    Transfer Training OT LTG, Date Goal Reviewed --  --  09/19/17   Transfer Training OT LTG, Outcome --  goal not met --        Goal: Dynamic Standing Balance Goal LTG-OT  Outcome: Outcome(s) achieved Date Met:  09/19/17 09/17/17 1350 09/19/17 1200   Dynamic Standing Balance OT LTG   Dynamic Standing Balance OT LTG, Date Established 09/17/17 --    Dynamic Standing Balance OT LTG, Time to Achieve by discharge --    Dynamic Standing Balance OT LTG, [place] Level independent;conditional independence  (5 minutes with functional activity.) --    Dynamic Standing Balance OT LTG, Assist Device assistive Device  (R/W.) --    Dynamic Standing Balance OT LTG, Date Goal Reviewed --  09/19/17   Dynamic Standing Balance OT LTG, Outcome --  goal partially met       Goal: Patient Education Goal LTG- OT  Outcome: Ongoing (interventions implemented as appropriate)    09/17/17 1350 09/19/17 1200   Patient Education OT LTG   Patient Education OT LTG, Date Established 09/17/17 --    Patient Education OT LTG, Time to Achieve by discharge --    Patient Education OT LTG, Education Type home safety;adaptive breathing;joint protection;work simplification  (fall prevention.) --    Patient Education OT LTG, Education Understanding demonstrates  adequately;verbalizes understanding --    Patient Education OT LTG, Date Goal Reviewed --  09/19/17   Patient Education OT LTG Outcome --  goal not met       Goal: ADL Goal LTG- OT  Outcome: Ongoing (interventions implemented as appropriate)    09/17/17 1350 09/19/17 1200   ADL OT LTG   ADL OT LTG, Date Established 09/17/17 --    ADL OT LTG, Time to Achieve by discharge --    ADL OT LTG, Activity Type ADL skills  (Sponge bath and dress or walk-in shower.) --    ADL OT LTG, [place] Level independent with device;assistive device  (R/W if needed.) --    ADL OT LTG, Date Goal Reviewed --  09/19/17   ADL OT LTG, Outcome --  goal not met

## 2017-09-20 VITALS
HEART RATE: 59 BPM | WEIGHT: 172.2 LBS | BODY MASS INDEX: 27.03 KG/M2 | RESPIRATION RATE: 20 BRPM | DIASTOLIC BLOOD PRESSURE: 71 MMHG | OXYGEN SATURATION: 95 % | HEIGHT: 67 IN | SYSTOLIC BLOOD PRESSURE: 142 MMHG | TEMPERATURE: 97.9 F

## 2017-09-20 LAB
ALBUMIN SERPL-MCNC: 3.8 G/DL (ref 3.4–4.8)
ALBUMIN/GLOB SERPL: 1.6 G/DL (ref 1.1–1.8)
ALP SERPL-CCNC: 64 U/L (ref 38–126)
ALT SERPL W P-5'-P-CCNC: 43 U/L (ref 21–72)
ANION GAP SERPL CALCULATED.3IONS-SCNC: 13 MMOL/L (ref 5–15)
AST SERPL-CCNC: 23 U/L (ref 17–59)
BACTERIA SPEC AEROBE CULT: NORMAL
BACTERIA SPEC AEROBE CULT: NORMAL
BASOPHILS # BLD AUTO: 0.03 10*3/MM3 (ref 0–0.2)
BASOPHILS NFR BLD AUTO: 0.2 % (ref 0–2)
BILIRUB SERPL-MCNC: 0.5 MG/DL (ref 0.2–1.3)
BUN BLD-MCNC: 65 MG/DL (ref 7–21)
BUN/CREAT SERPL: 45.8 (ref 7–25)
CALCIUM SPEC-SCNC: 8.8 MG/DL (ref 8.4–10.2)
CHLORIDE SERPL-SCNC: 96 MMOL/L (ref 95–110)
CO2 SERPL-SCNC: 24 MMOL/L (ref 22–31)
CREAT BLD-MCNC: 1.42 MG/DL (ref 0.7–1.3)
DEPRECATED RDW RBC AUTO: 52.4 FL (ref 35.1–43.9)
EOSINOPHIL # BLD AUTO: 0 10*3/MM3 (ref 0–0.7)
EOSINOPHIL NFR BLD AUTO: 0 % (ref 0–7)
ERYTHROCYTE [DISTWIDTH] IN BLOOD BY AUTOMATED COUNT: 15.7 % (ref 11.5–14.5)
GFR SERPL CREATININE-BSD FRML MDRD: 47 ML/MIN/1.73 (ref 60–98)
GLOBULIN UR ELPH-MCNC: 2.4 GM/DL (ref 2.3–3.5)
GLUCOSE BLD-MCNC: 189 MG/DL (ref 60–100)
GLUCOSE BLDC GLUCOMTR-MCNC: 141 MG/DL (ref 70–130)
GLUCOSE BLDC GLUCOMTR-MCNC: 158 MG/DL (ref 70–130)
GLUCOSE BLDC GLUCOMTR-MCNC: 173 MG/DL (ref 70–130)
GLUCOSE BLDC GLUCOMTR-MCNC: 204 MG/DL (ref 70–130)
HCT VFR BLD AUTO: 38.6 % (ref 39–49)
HGB BLD-MCNC: 12.7 G/DL (ref 13.7–17.3)
IMM GRANULOCYTES # BLD: 0.37 10*3/MM3 (ref 0–0.02)
IMM GRANULOCYTES NFR BLD: 2.8 % (ref 0–0.5)
LYMPHOCYTES # BLD AUTO: 0.55 10*3/MM3 (ref 0.6–4.2)
LYMPHOCYTES NFR BLD AUTO: 4.2 % (ref 10–50)
MAGNESIUM SERPL-MCNC: 2.2 MG/DL (ref 1.6–2.3)
MCH RBC QN AUTO: 30.2 PG (ref 26.5–34)
MCHC RBC AUTO-ENTMCNC: 32.9 G/DL (ref 31.5–36.3)
MCV RBC AUTO: 91.7 FL (ref 80–98)
MONOCYTES # BLD AUTO: 0.33 10*3/MM3 (ref 0–0.9)
MONOCYTES NFR BLD AUTO: 2.5 % (ref 0–12)
NEUTROPHILS # BLD AUTO: 11.97 10*3/MM3 (ref 2–8.6)
NEUTROPHILS NFR BLD AUTO: 90.3 % (ref 37–80)
PLATELET # BLD AUTO: 149 10*3/MM3 (ref 150–450)
PMV BLD AUTO: 11.7 FL (ref 8–12)
POTASSIUM BLD-SCNC: 5.1 MMOL/L (ref 3.5–5.1)
PROT SERPL-MCNC: 6.2 G/DL (ref 6.3–8.6)
RBC # BLD AUTO: 4.21 10*6/MM3 (ref 4.37–5.74)
SODIUM BLD-SCNC: 133 MMOL/L (ref 137–145)
WBC NRBC COR # BLD: 13.25 10*3/MM3 (ref 3.2–9.8)

## 2017-09-20 PROCEDURE — 82962 GLUCOSE BLOOD TEST: CPT

## 2017-09-20 PROCEDURE — 25010000002 HEPARIN (PORCINE) PER 1000 UNITS: Performed by: INTERNAL MEDICINE

## 2017-09-20 PROCEDURE — 25010000002 FUROSEMIDE PER 20 MG: Performed by: INTERNAL MEDICINE

## 2017-09-20 PROCEDURE — 83735 ASSAY OF MAGNESIUM: CPT | Performed by: INTERNAL MEDICINE

## 2017-09-20 PROCEDURE — 97116 GAIT TRAINING THERAPY: CPT

## 2017-09-20 PROCEDURE — 25010000002 METHYLPREDNISOLONE PER 40 MG: Performed by: NURSE PRACTITIONER

## 2017-09-20 PROCEDURE — 94799 UNLISTED PULMONARY SVC/PX: CPT

## 2017-09-20 PROCEDURE — G0008 ADMIN INFLUENZA VIRUS VAC: HCPCS | Performed by: INTERNAL MEDICINE

## 2017-09-20 PROCEDURE — 80053 COMPREHEN METABOLIC PANEL: CPT | Performed by: INTERNAL MEDICINE

## 2017-09-20 PROCEDURE — 85025 COMPLETE CBC W/AUTO DIFF WBC: CPT | Performed by: INTERNAL MEDICINE

## 2017-09-20 PROCEDURE — 97535 SELF CARE MNGMENT TRAINING: CPT

## 2017-09-20 PROCEDURE — 94760 N-INVAS EAR/PLS OXIMETRY 1: CPT

## 2017-09-20 PROCEDURE — G8978 MOBILITY CURRENT STATUS: HCPCS

## 2017-09-20 PROCEDURE — 97530 THERAPEUTIC ACTIVITIES: CPT

## 2017-09-20 PROCEDURE — 25010000002 INFLUENZA VAC SPLIT QUAD SUSPENSION: Performed by: INTERNAL MEDICINE

## 2017-09-20 PROCEDURE — G8979 MOBILITY GOAL STATUS: HCPCS

## 2017-09-20 PROCEDURE — 90682 RIV4 VACC RECOMBINANT DNA IM: CPT | Performed by: INTERNAL MEDICINE

## 2017-09-20 RX ORDER — AMLODIPINE BESYLATE 2.5 MG/1
2.5 TABLET ORAL
Status: DISCONTINUED | OUTPATIENT
Start: 2017-09-20 | End: 2017-09-20 | Stop reason: HOSPADM

## 2017-09-20 RX ORDER — LEVOFLOXACIN 250 MG/1
250 TABLET ORAL DAILY
Qty: 5 TABLET | Refills: 0 | Status: SHIPPED | OUTPATIENT
Start: 2017-09-20 | End: 2017-09-28 | Stop reason: HOSPADM

## 2017-09-20 RX ORDER — PREDNISONE 10 MG/1
TABLET ORAL
Qty: 48 EACH | Refills: 0 | Status: SHIPPED | OUTPATIENT
Start: 2017-09-20 | End: 2017-09-28 | Stop reason: HOSPADM

## 2017-09-20 RX ORDER — HYDRALAZINE HYDROCHLORIDE 10 MG/1
10 TABLET, FILM COATED ORAL EVERY 12 HOURS SCHEDULED
Status: DISCONTINUED | OUTPATIENT
Start: 2017-09-20 | End: 2017-09-20 | Stop reason: HOSPADM

## 2017-09-20 RX ORDER — HYDRALAZINE HYDROCHLORIDE 10 MG/1
10 TABLET, FILM COATED ORAL EVERY 12 HOURS SCHEDULED
Qty: 60 TABLET | Refills: 0 | Status: SHIPPED | OUTPATIENT
Start: 2017-09-20 | End: 2017-10-09 | Stop reason: ALTCHOICE

## 2017-09-20 RX ORDER — ISOSORBIDE DINITRATE 10 MG/1
10 TABLET ORAL 3 TIMES DAILY
Qty: 90 TABLET | Refills: 0 | Status: SHIPPED | OUTPATIENT
Start: 2017-09-20 | End: 2017-10-27 | Stop reason: SDUPTHER

## 2017-09-20 RX ADMIN — ISOSORBIDE DINITRATE 30 MG: 30 TABLET ORAL at 10:09

## 2017-09-20 RX ADMIN — TERBUTALINE SULFATE 5 MG: 2.5 TABLET ORAL at 08:23

## 2017-09-20 RX ADMIN — METHYLPREDNISOLONE SODIUM SUCCINATE 20 MG: 40 INJECTION, POWDER, FOR SOLUTION INTRAMUSCULAR; INTRAVENOUS at 08:24

## 2017-09-20 RX ADMIN — HYDRALAZINE HYDROCHLORIDE 10 MG: 10 TABLET, FILM COATED ORAL at 14:43

## 2017-09-20 RX ADMIN — GLIPIZIDE 5 MG: 5 TABLET ORAL at 08:23

## 2017-09-20 RX ADMIN — FLUTICASONE PROPIONATE 2 SPRAY: 50 SPRAY, METERED NASAL at 08:23

## 2017-09-20 RX ADMIN — HEPARIN SODIUM 5000 UNITS: 5000 INJECTION, SOLUTION INTRAVENOUS; SUBCUTANEOUS at 15:00

## 2017-09-20 RX ADMIN — ASPIRIN 81 MG 81 MG: 81 TABLET ORAL at 08:22

## 2017-09-20 RX ADMIN — IPRATROPIUM BROMIDE AND ALBUTEROL SULFATE 3 ML: 2.5; .5 SOLUTION RESPIRATORY (INHALATION) at 07:10

## 2017-09-20 RX ADMIN — HYDROCODONE BITARTRATE AND ACETAMINOPHEN 1 TABLET: 7.5; 325 TABLET ORAL at 06:09

## 2017-09-20 RX ADMIN — FUROSEMIDE 20 MG: 10 INJECTION, SOLUTION INTRAVENOUS at 08:23

## 2017-09-20 RX ADMIN — DOCUSATE SODIUM 200 MG: 100 CAPSULE, LIQUID FILLED ORAL at 08:22

## 2017-09-20 RX ADMIN — MAGNESIUM OXIDE TAB 400 MG (241.3 MG ELEMENTAL MG) 400 MG: 400 (241.3 MG) TAB at 08:23

## 2017-09-20 RX ADMIN — CLOPIDOGREL BISULFATE 75 MG: 75 TABLET ORAL at 08:23

## 2017-09-20 RX ADMIN — IPRATROPIUM BROMIDE AND ALBUTEROL SULFATE 3 ML: 2.5; .5 SOLUTION RESPIRATORY (INHALATION) at 03:12

## 2017-09-20 RX ADMIN — AMLODIPINE BESYLATE 2.5 MG: 2.5 TABLET ORAL at 10:06

## 2017-09-20 RX ADMIN — METHYLPREDNISOLONE SODIUM SUCCINATE 20 MG: 40 INJECTION, POWDER, FOR SOLUTION INTRAMUSCULAR; INTRAVENOUS at 00:53

## 2017-09-20 RX ADMIN — ACETAMINOPHEN 650 MG: 325 TABLET ORAL at 12:09

## 2017-09-20 RX ADMIN — HEPARIN SODIUM 5000 UNITS: 5000 INJECTION, SOLUTION INTRAVENOUS; SUBCUTANEOUS at 06:11

## 2017-09-20 RX ADMIN — PANTOPRAZOLE SODIUM 40 MG: 40 TABLET, DELAYED RELEASE ORAL at 06:09

## 2017-09-20 RX ADMIN — IPRATROPIUM BROMIDE AND ALBUTEROL SULFATE 3 ML: 2.5; .5 SOLUTION RESPIRATORY (INHALATION) at 10:20

## 2017-09-20 RX ADMIN — INFLUENZA A VIRUS A/MICHIGAN/45/2015 X-275 (H1N1) ANTIGEN (FORMALDEHYDE INACTIVATED), INFLUENZA A VIRUS A/HONG KONG/4801/2014 X-263B (H3N2) ANTIGEN (FORMALDEHYDE INACTIVATED), INFLUENZA B VIRUS B/PHUKET/3073/2013 ANTIGEN (FORMALDEHYDE INACTIVATED), AND INFLUENZA B VIRUS B/BRISBANE/60/2008 ANTIGEN (FORMALDEHYDE INACTIVATED) 0.5 ML: 15; 15; 15; 15 INJECTION, SUSPENSION INTRAMUSCULAR at 12:48

## 2017-09-20 RX ADMIN — FAMOTIDINE 20 MG: 20 TABLET ORAL at 08:23

## 2017-09-20 RX ADMIN — DONEPEZIL HYDROCHLORIDE 10 MG: 10 TABLET, FILM COATED ORAL at 08:23

## 2017-09-20 RX ADMIN — HYDROCODONE BITARTRATE AND ACETAMINOPHEN 1 TABLET: 7.5; 325 TABLET ORAL at 15:00

## 2017-09-20 NOTE — CONSULTS
Referring Provider:EVE Birch  Reason for Consultation: Pulm Rehab    Discussed out pt pulm rehab with pt and family; will contact pt once discharged from home health care; pt given pulm rehab info et contact numbers.    Nicole Westfall RRT  09/20/17  2:38 PM

## 2017-09-20 NOTE — THERAPY TREATMENT NOTE
Acute Care - Occupational Therapy Treatment Note  [facility]     Patient Name: Hasmukh Ham  : 1933  MRN: [id number]  Today's Date: 2017  Onset of Illness/Injury or Date of Surgery Date: 09/15/17  Date of Referral to OT: 09/15/17  Referring Physician: KETTY Griffin      Admit Date: 9/15/2017    Visit Dx:     ICD-10-CM ICD-9-CM   1. Pulmonary HTN I27.2 416.8   2. Impaired physical mobility Z74.09 781.99   3. Impaired mobility and activities of daily living Z74.09 799.89     Patient Active Problem List   Diagnosis   • Dilated aortic root   • Benign essential hypertension   • Type 2 diabetes mellitus   • Non-rheumatic tricuspid valve insufficiency   • Coronary artery disease involving native coronary artery of native heart   • Pulmonary emphysema   • Dyspnea   • Atrial fibrillation   • Bradycardia   • Shortness of breath   • COPD with exacerbation   • Chronic coronary artery disease   • Neuropathy   • Abdominal hernia   • Neck pain   • Dementia   • Diabetic neuropathy   • Diabetic polyneuropathy   • Gastroesophageal reflux disease without esophagitis   • Generalized osteoarthritis   • 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   • Nocturia   • Chronic obstructive bronchitis   • Osteoarthritis of multiple joints   • Hyperlipidemia   • Pain in joint involving ankle and foot   • Arthropathy of hand   • Paroxysmal tachycardia   • Family history of diseases of the blood and blood-forming organs and certain disorders involving the immune mechanism   • Osteoarthrosis involving more than one site but not generalized   • Right shoulder pain   • Rotator cuff syndrome   • Partial tear of subscapularis tendon   • Infraspinatus tendon tear   • Supraspinatus tendon tear   • Bilateral carotid artery stenosis   • (HFpEF) heart failure with preserved ejection fraction   • Pulmonary HTN   • Acute exacerbation of chronic obstructive airways disease   • Acute interstitial pneumonia   • Chronic obstructive lung  disease   • Coronary arteriosclerosis   • Diabetes mellitus   • COPD exacerbation             Adult Rehabilitation Note       09/20/17 0825 09/19/17 1006 09/19/17 0845    Rehab Assessment/Intervention    Discipline occupational therapy assistant  -LW physical therapy assistant  -CH occupational therapy assistant  -LW    Document Type therapy note (daily note)  -LW therapy note (daily note)  -CH therapy note (daily note)  -LW    Subjective Information agree to therapy  -LW agree to therapy  -CH agree to therapy  -LW    Patient Effort, Rehab Treatment good  -LW good  -CH good  -LW    Precautions/Limitations  fall precautions;oxygen therapy device and L/min  -CH fall precautions;oxygen therapy device and L/min  -LW    Precautions/Limitations, Hearing hearing aid, bilaterally  -LW      Recorded by [LW] KRYSTIN Lacy/GEOFFREY [CH] Aviva Lundberg PTA [LW] CHANDU LacyA/L    Vital Signs    Pre Systolic BP Rehab 109  -  -  -LW    Pre Treatment Diastolic BP 66  -LW 64  -CH 69  -LW    Post Systolic BP Rehab  112  -CH     Post Treatment Diastolic BP  88  -CH     Pretreatment Heart Rate (beats/min) 83  -LW 88  -CH 86  -LW    Intratreatment Heart Rate (beats/min) 79  -LW  87  -LW    Posttreatment Heart Rate (beats/min) 82  -LW 91  -CH 84  -LW    Pre SpO2 (%) 98  -LW 97  -CH 98  -LW    O2 Delivery Pre Treatment room air  -LW supplemental O2  -CH room air  -LW    Intra SpO2 (%) 98  -LW 95   Pt requested to ambulate on room air  -CH 96  -LW    O2 Delivery Intra Treatment room air  -LW room air  -CH room air  -LW    Post SpO2 (%) 97  -LW 96  -CH 97  -LW    O2 Delivery Post Treatment room air  -LW room air  -CH room air  -LW    Pre Patient Position Supine  -LW Supine  -CH Supine  -LW    Intra Patient Position Sitting  -LW Standing  -CH Sitting  -LW    Post Patient Position Sitting  -LW Sitting  -CH Sitting  -LW    Recorded by [LW] KRYSTIN Lacy/GEOFFREY [CH] Aviva Lundberg PTA [LW] CHANDU LacyA/L     Pain Assessment    Pain Assessment No/denies pain  -LW No/denies pain  -CH No/denies pain  -LW    Pain Score  0  -CH     Post Pain Score  0  -CH     Recorded by [LW] KRYSTIN Lacy/GEOFFREY [CH] Aviva Lundberg PTA [LW] CHANDU LacyA/L    Vision Assessment/Intervention    Visual Impairment WFL with corrective lenses  -LW WFL with corrective lenses  -CH WFL with corrective lenses  -LW    Visual Impairment Comment  Readers  -CH Readers  -LW    Recorded by [LW] KRYSTIN Lacy/GEOFFREY [CH] Aviva Lundberg PTA [LW] CHANDU LacyA/L    Cognitive Assessment/Intervention    Current Cognitive/Communication Assessment functional  -LW functional  -CH functional  -LW    Orientation Status oriented x 4  -LW oriented x 4  -CH oriented x 4  -LW    Follows Commands/Answers Questions 100% of the time  -% of the time  -% of the time  -LW    Personal Safety WNL/WFL  -LW WNL/WFL  -CH WNL/WFL  -LW    Personal Safety Interventions fall prevention program maintained;nonskid shoes/slippers when out of bed  -LW fall prevention program maintained;gait belt;muscle strengthening facilitated;nonskid shoes/slippers when out of bed;supervised activity  - fall prevention program maintained  -LW    Recorded by [LW] KRYSTIN Lacy/GEOFFREY [CH] Aviva Lundberg PTA [LW] CHANDU LacyA/L    Bed Mobility, Assessment/Treatment    Bed Mobility, Assistive Device  head of bed elevated  -CH     Bed Mobility, Roll Left, [place]  not tested  -CH     Bed Mobility, Roll Right, [place]  not tested  -CH     Bed Mob, Supine to Sit, [place]  independent  -CH     Bed Mob, Sit to Supine, [place]  not tested  -CH     Recorded by  [CH] Aviva Lundberg PTA     Transfer Assessment/Treatment    Transfers, Sit-Stand [place] conditional independence  -LW conditional independence  -CH conditional independence  -LW    Transfers, Stand-Sit [place] conditional independence  -LW conditional  independence  -CH conditional independence  -LW    Transfers, Sit-Stand-Sit, Assist Device other (see comments)   no AE  -LW other (see comments)   none  -CH --   none  -LW    Toilet Transfer, [place] conditional independence  -LW  conditional independence  -LW    Toilet Transfer, Assistive Device other (see comments)   No AE  -LW      Recorded by [LW] KRYSTIN Lacy/L [CH] Aviva Lundberg PTA [LW] CHANDU LacyA/L    Gait Assessment/Treatment    Gait, [place] Level  stand by assist  -CH     Gait, Assistive Device  other (see comments)   none  -CH     Gait, Distance (Feet)  200   Gait 2- 150, Gait 3- 78ft, Gait 4- 78ft  -     Gait, Gait Pattern Analysis  swing-through gait  -     Gait, Gait Deviations  step length decreased  -     Recorded by  [CH] Aviva Lundberg PTA     Stairs Assessment/Treatment    Number of Stairs  6  -CH     Stairs, Handrail Location  both sides  -     Stairs, [place] Level  supervision required  -     Recorded by  [CH] Aviva Lundberg PTA     Functional Mobility    Functional Mobility- Ind. Level conditional independence  -LW      Functional Mobility- Device other (see comments)   No AE  -LW      Recorded by [LW] CHANDU LacyA/L      Toileting Assessment/Training    Toileting Assess/Train, Assistive Device grab bars  -LW  grab bars  -LW    Toileting Assess/Train, Position standing  -LW  sitting  -LW    Toileting Assess/Train, Indepen Level conditional independence  -LW  conditional independence  -LW    Recorded by [LW] CHANDU LacyA/L  [LW] CHANDU LacyA/L    Grooming Assessment/Training    Grooming Assess/Train, Position sitting;sink side  -LW  standing;sink side  -LW    Grooming Assess/Train, Indepen Level independent  -LW  independent  -LW    Grooming Assess/Train, Comment Wash hands, face  -LW  Wash face hands comb hair oral care, shave.  -LW    Recorded by [LW] KRYSTIN Lacy/GEOFFREY  [LW] CHANDU LacyA/L     Balance Skills Training    Gait Balance-Level of Assistance  Close supervision  -     Gait Balance Support  No upper extremity supported  -     Gait Balance Activities  stepping over object   weaving in/out of cones, object retrieval  -     Gait Balance # of Minutes  15  -CH     Recorded by  [CH] Aviva Lundberg PTA     Positioning and Restraints    Pre-Treatment Position in bed  -LW in bed  -CH in bed  -LW    Post Treatment Position chair  -LW bed  -CH bed  -LW    In Bed  sitting EOB;call light within reach;encouraged to call for assist  - sitting EOB  -LW    In Chair call light within reach;encouraged to call for assist;exit alarm on  -LW  call light within reach;encouraged to call for assist;exit alarm on  -LW    Recorded by [LW] KRYSTIN Lacy/GEOFFREY [CH] Aviva Lundberg PTA [LW] KRYSTIN Lacy/GEOFFREY      09/18/17 1345 09/18/17 0950 09/17/17 1358    Rehab Assessment/Intervention    Discipline occupational therapy assistant  -KD physical therapy assistant  -ZULLY physical therapy assistant  -TA    Document Type therapy note (daily note)  -KD therapy note (daily note)  -ZULLY therapy note (daily note)  -TA    Subjective Information agree to therapy  -KD agree to therapy;complains of;pain;dyspnea  -ZULLY agree to therapy  -TA    Patient Effort, Rehab Treatment   good  -TA    Symptoms Noted Comment   r  -ZULLY    Precautions/Limitations fall precautions;oxygen therapy device and L/min  -KD fall precautions;oxygen therapy device and L/min  -ZULLY fall precautions  -TA    Precautions/Limitations, Hearing  hearing impairment, bilaterally  -ZULLY     Recorded by [KD] KRYSTIN Gilmore/GEOFFREY [ZULLY] Sridevi Walters PTA [ZULLY] Sridevi Walters, NEFTALI  [TA] Kayli Kumar PTA    Vital Signs    Pre Systolic BP Rehab 143  -  -ZULLY     Pre Treatment Diastolic BP 77  -KD 61  -ZULLY     Post Systolic BP Rehab  99  -ZULLY 128  -TA    Post Treatment Diastolic BP   66  -TA    Pretreatment Heart Rate (beats/min) 96  -KD  83  -TA     Intratreatment Heart Rate (beats/min)   87  -TA    Posttreatment Heart Rate (beats/min) 90  -KD  85  -TA    Pre SpO2 (%) 93  -KD 96  -ZULLY 92  -TA    O2 Delivery Pre Treatment room air  -KD room air  -ZULLY supplemental O2  -TA    Intra SpO2 (%)  94  -ZULLY 96  -TA    O2 Delivery Intra Treatment  room air  -ZULLY     Post SpO2 (%) 94  -KD 96  -ZULLY 95  -TA    O2 Delivery Post Treatment room air  -KD supplemental O2  -ZULLY     Pre Patient Position Sitting  -KD Supine  -ZULLY Supine  -TA    Intra Patient Position Standing  -KD Standing  -ZULLY     Post Patient Position Sitting  -KD  Supine  -TA    Recorded by [KD] CHANDU GilmoreA/L [ZULLY] Sridevi Walters, PTA [TA] Kayli Kumar PTA    Pain Assessment    Pain Assessment 0-10  -KD 0-10  -ZULLY 0-10  -TA    Pain Score 9  -KD 10  -ZULLY 8  -TA    Post Pain Score 9  -KD 10  -ZULLY 8  -TA    Pain Type Chronic pain  -KD Chronic pain  -ZULLY Chronic pain  -TA    Pain Location Back   neck  -KD Back  -ZULLY Generalized  -TA    Pain Intervention(s) Declines  -KD Declines  -ZULLY Medication (See MAR)  -TA    Recorded by [KD] KRYSTIN Gilmore/L [ZULLY] Sridevi Walters, NEFTALI [TA] Kayli Kumar PTA    Cognitive Assessment/Intervention    Current Cognitive/Communication Assessment functional  -KD functional  -ZULLY functional  -TA    Orientation Status oriented x 4  -KD oriented x 4  -ZULLY oriented x 4  -TA    Follows Commands/Answers Questions 100% of the time  -% of the time  -ZULLY 100% of the time  -TA    Personal Safety WNL/WFL  -KD WNL/WFL  -ZULLY WNL/WFL  -TA    Personal Safety Interventions gait belt;nonskid shoes/slippers when out of bed  -KD gait belt;supervised activity;nonskid shoes/slippers when out of bed  -ZULLY gait belt;nonskid shoes/slippers when out of bed  -TA    Recorded by [KD] KRYSTIN Gilmore/L [ZULLY] Sridevi Walters, PTA [TA] Kayli Kumar PTA    Bed Mobility, Assessment/Treatment    Bed Mobility, Roll Left, [place]  independent  -ZULLY independent  -TA    Bed Mob, Supine to Sit, [place]   independent  -ZULLY independent  -TA    Bed Mob, Sit to Supine, [place]  independent  -ZULLY independent  -TA    Recorded by  [ZULLY] Sridevi Walters, PTA [TA] Kayli Kumar, NEFTALI    Transfer Assessment/Treatment    Transfers, Sit-Stand [place] conditional independence  -KD conditional independence;independent  -ZULLY stand by assist  -TA    Transfers, Stand-Sit [place] conditional independence  -KD conditional independence;independent  -ZULLY stand by assist  -TA    Transfers, Sit-Stand-Sit, Assist Device --   none  -KD rolling walker  -ZULLY rolling walker  -TA    Toilet Transfer, [place] conditional independence  -KD      Toilet Transfer, Assistive Device --   none  -KD      Recorded by [KD] KRYSTIN Gilmore/L [ZULLY] Sridevi Walters, PTA [TA] Kayli Kumar, NEFTALI    Gait Assessment/Treatment    Gait, [place] Level  conditional independence;stand by assist  -ZULLY contact guard assist  -TA    Gait, Assistive Device  rolling walker  -ZULLY rolling walker  -TA    Gait, Distance (Feet)  150   250,200  -ZULLY 350   & 250`  -TA    Gait, Gait Pattern Analysis  swing-through gait  -ZULLY swing-through gait  -TA    Gait, Gait Deviations  step length decreased  -ZULLY step length decreased  -TA    Recorded by  [ZULLY] Sridevi Walters, PTA [TA] Kayli Kumar, PTA    Functional Mobility    Functional Mobility- Ind. Level conditional independence  -KD      Functional Mobility- Device --   none  -KD      Functional Mobility-Distance (Feet) --   150 x 2  -KD      Recorded by [KD] CHANDU GilmoreA/L      Lower Body Dressing Assessment/Training    LB Dressing Assess/Train, Clothing Type donning:;pants;slipper socks  -KD      LB Dressing Assess/Train, Position standing;sitting  -KD      Recorded by [KD] CHANDU GilmoreA/L      Toileting Assessment/Training    Toileting Assess/Train, Assistive Device grab bars  -KD      Toileting Assess/Train, Position sitting  -KD      Toileting Assess/Train, Indepen Level conditional independence   -KD      Recorded by [KD] CHANDU GilmoreA/L      Therapy Exercises    Bilateral Lower Extremities  AROM:;25 reps;sitting;ankle pumps/circles;heel raises;LAQ;hip flexion  -ZULLY AROM:;10 reps;sitting;ankle pumps/circles;hip abduction/adduction;hip flexion;LAQ  -TA    Bilateral Upper Extremity AROM:;20 reps;sitting;elbow flexion/extension;pronation/supination;shoulder abduction/adduction;shoulder extension/flexion;shoulder ER/IR  -KD      BUE Resistance manual resistance- minimal  -KD      Recorded by [KD] CHANDU GilmoreA/L [ZULLY] rSidevi Walters PTA [TA] Kayli Kumar PTA    Positioning and Restraints    Pre-Treatment Position in bed  -KD in bed  -ZULLY in bed  -TA    Post Treatment Position chair  -KD chair  -ZULLY bed  -TA    In Bed   supine;call light within reach;with family/caregiver;with other staff  -TA    In Chair call light within reach;encouraged to call for assist;exit alarm on  -KD sitting;call light within reach  -ZULLY     Recorded by [KD] KRYSTIN Gilmore/GEOFFREY [ZULLY] Sridevi Walters PTA [TA] Kayli Kumar PTA      User Key  (r) = Recorded By, (t) = Taken By, (c) = Cosigned By    Initials Name Effective Dates    TA Kayli Kumar, PTA 10/17/16 -     ZULLY Sridevi Walters, PTA 10/17/16 -     CH Aviva Lundberg, PTA 10/17/16 -     KD CHANDU GilmoreA/L 10/17/16 -     LW Berenice Hickman MCCLELLAND/L 10/17/16 -                 OT Goals       09/20/17 1306 09/19/17 1200 09/18/17 1345    Transfer Training OT LTG    Transfer Training OT LTG, Date Goal Reviewed 09/20/17  -LW 09/19/17  -LW 09/18/17  -KD    Transfer Training OT LTG, Outcome goal met  -LW  goal not met  -KD    Dynamic Standing Balance OT LTG    Dynamic Standing Balance OT LTG, Date Goal Reviewed  09/19/17  -LW 09/18/17  -KD    Dynamic Standing Balance OT LTG, Outcome  goal partially met  -LW goal not met  -KD    Patient Education OT LTG    Patient Education OT LTG, Date Goal Reviewed 09/20/17  -LW 09/19/17  -LW 09/18/17  -KD    Patient Education OT LTG  Outcome goal met  -LW goal not met  -LW goal not met  -KD    ADL OT LTG    ADL OT LTG, Date Goal Reviewed 09/20/17  -LW 09/19/17  -LW 09/18/17  -KD    ADL OT LTG, Outcome goal not met  -LW goal not met  -LW goal not met  -KD      09/17/17 1350          Transfer Training OT LTG    Transfer Training OT LTG, Date Established 09/17/17  -RB      Transfer Training OT LTG, Time to Achieve by discharge  -RB      Transfer Training OT LTG, Activity Type all transfers  -RB      Transfer Training OT LTG, [place] Level independent;conditional independence  -RB      Transfer Training OT LTG, Assist Device walker, rolling  -RB      Dynamic Standing Balance OT LTG    Dynamic Standing Balance OT LTG, Date Established 09/17/17  -RB      Dynamic Standing Balance OT LTG, Time to Achieve by discharge  -RB      Dynamic Standing Balance OT LTG, [place] Level independent;conditional independence   5 minutes with functional activity.  -RB      Dynamic Standing Balance OT LTG, Assist Device assistive Device   R/W.  -RB      Patient Education OT LTG    Patient Education OT LTG, Date Established 09/17/17  -RB      Patient Education OT LTG, Time to Achieve by discharge  -RB      Patient Education OT LTG, Education Type home safety;adaptive breathing;joint protection;work simplification   fall prevention.  -RB      Patient Education OT LTG, Education Understanding demonstrates adequately;verbalizes understanding  -RB      ADL OT LTG    ADL OT LTG, Date Established 09/17/17  -RB      ADL OT LTG, Time to Achieve by discharge  -RB      ADL OT LTG, Activity Type ADL skills   Sponge bath and dress or walk-in shower.  -RB      ADL OT LTG, [place] Level independent with device;assistive device   R/W if needed.  -RB        User Key  (r) = Recorded By, (t) = Taken By, (c) = Cosigned By    Initials Name Provider Type    SOFIA Marin OT Occupational Therapist    SONIA Roca Occupational Therapy Assistant    YESSI CARSON  SONIA Hickman Occupational Therapy Assistant          Occupational Therapy Education     Title: PT OT SLP Therapies (Active)     Topic: Occupational Therapy (Done)     Point: ADL training (Done)    Description: Instruct learner(s) on proper safety adaptation and remediation techniques during self care or transfers.   Instruct in proper use of assistive devices.    Learning Progress Summary    Learner Readiness Method Response Comment Documented by Status   Patient Acceptance E,H VU Home Safety, EC/WS LW 09/20/17 1305 Done    Acceptance E VU Discussed fall precautions LW 09/19/17 1200 Done               Point: Home exercise program (Done)    Description: Instruct learner(s) on appropriate technique for monitoring, assisting and/or progressing therapeutic exercises/activities.    Learning Progress Summary    Learner Readiness Method Response Comment Documented by Status   Patient Acceptance E,H VU Home Safety, EC/WS LW 09/20/17 1305 Done    Acceptance E VU Discussed fall precautions LW 09/19/17 1200 Done               Point: Precautions (Done)    Description: Instruct learner(s) on prescribed precautions during self-care and functional transfers.    Learning Progress Summary    Learner Readiness Method Response Comment Documented by Status   Patient Acceptance E,H VU Home Safety, EC/WS LW 09/20/17 1305 Done    Acceptance E VU Discussed fall precautions LW 09/19/17 1200 Done    Acceptance E VU,NR Edu pt on use of gait belt and non skid socks when OOB. RB 09/17/17 1347 Done               Point: Body mechanics (Done)    Description: Instruct learner(s) on proper positioning and spine alignment during self-care, functional mobility activities and/or exercises.    Learning Progress Summary    Learner Readiness Method Response Comment Documented by Status   Patient Acceptance E,H VU Home Safety, EC/WS LW 09/20/17 1305 Done    Acceptance E VU Discussed fall precautions LW 09/19/17 1200 Done                      User Key      Initials Effective Dates Name Provider Type Discipline    RB 06/15/16 -  Carrington Marin OT Occupational Therapist OT    LW 10/17/16 -  SONIA Lacy Occupational Therapy Assistant OT                  OT Recommendation and Plan  Anticipated Discharge Disposition: home with home health  Planned Therapy Interventions: activity intolerance, ADL retraining, balance training, transfer training, strengthening, adaptive equipment training  Therapy Frequency:  (3-14x/wk)  Plan of Care Review  Progress: improving  Outcome Summary/Follow up Plan: Pt doing very well Will contnue POC        Outcome Measures       09/20/17 0825 09/19/17 1007 09/19/17 1006    How much help from another person do you currently need...    Turning from your back to your side while in flat bed without using bedrails?   4  -CH    Moving from lying on back to sitting on the side of a flat bed without bedrails?   4  -CH    Moving to and from a bed to a chair (including a wheelchair)?   3  -CH    Standing up from a chair using your arms (e.g., wheelchair, bedside chair)?   3  -CH    Climbing 3-5 steps with a railing?   3  -CH    To walk in hospital room?   3  -CH    AM-PAC 6 Clicks Score   20  -CH    How much help from another is currently needed...    Putting on and taking off regular lower body clothing? 4  -LW      Bathing (including washing, rinsing, and drying) 4  -LW      Toileting (which includes using toilet bed pan or urinal) 4  -LW      Putting on and taking off regular upper body clothing 4  -LW      Taking care of personal grooming (such as brushing teeth) 4  -LW      Eating meals 4  -LW      Score 24  -LW      Tinetti Assessment    Tinetti Assessment  yes  -CH     Sitting Balance  1  -CH     Arises  2  -CH     Attempts to Rise  2  -CH     Immediate Standing Balance (first 5 sec)  2  -CH     Standing Balance  1  -CH     Sternal Nudge (feet close together)  2  -CH     Eyes Closed (feet close together)  1  -CH     Turning 360  "Degrees- Steps  1  -CH     Turning 360 Degrees- Steadiness  1  -CH     Sitting Down  2  -CH     Tinetti Balance Score  15  -CH     Gait Initiation (immediate after told \"go\")  1  -CH     Step Length- Right Swing  1  -CH     Step Length- Left Swing  1  -CH     Foot Clearance- Right Foot  1  -CH     Foot Clearance- Left Foot  1  -CH     Step Symmetry  0  -CH     Step Continuity  1  -CH     Path (excursion)  1  -CH     Trunk  1  -CH     Base of Support  1  -CH     Gait Score  9  -CH     Tinetti Total Score  24  -CH     Functional Assessment    Outcome Measure Options  Tinetti  -CH AM-PAC 6 Clicks Basic Mobility (PT)  -CH      09/19/17 0845 09/18/17 1345 09/18/17 0950    How much help from another person do you currently need...    Turning from your back to your side while in flat bed without using bedrails?   4  -ZULLY    Moving from lying on back to sitting on the side of a flat bed without bedrails?   4  -ZULLY    Moving to and from a bed to a chair (including a wheelchair)?   3  -ZULLY    Standing up from a chair using your arms (e.g., wheelchair, bedside chair)?   3  -ZULLY    Climbing 3-5 steps with a railing?   3  -ZULLY    To walk in hospital room?   3  -ZULLY    AM-PAC 6 Clicks Score   20  -ZULLY    How much help from another is currently needed...    Putting on and taking off regular lower body clothing? 3  -LW 3  -KD     Bathing (including washing, rinsing, and drying) 3  -LW 3  -KD     Toileting (which includes using toilet bed pan or urinal) 3  -LW 3  -KD     Putting on and taking off regular upper body clothing 4  -LW 4  -KD     Taking care of personal grooming (such as brushing teeth) 4  -LW 4  -KD     Eating meals 4  -LW 4  -KD     Score 21  -LW 21  -KD       09/17/17 1500          How much help from another person do you currently need...    Turning from your back to your side while in flat bed without using bedrails? 4  -TA      Moving from lying on back to sitting on the side of a flat bed without bedrails? 4  -TA      " Moving to and from a bed to a chair (including a wheelchair)? 3  -TA      Standing up from a chair using your arms (e.g., wheelchair, bedside chair)? 3  -TA      Climbing 3-5 steps with a railing? 3  -TA      To walk in hospital room? 3  -TA      AM-PAC 6 Clicks Score 20  -TA      Functional Assessment    Outcome Measure Options AM-PAC 6 Clicks Basic Mobility (PT)  -TA        User Key  (r) = Recorded By, (t) = Taken By, (c) = Cosigned By    Initials Name Provider Type    TA Kayli Kumar, PTA Physical Therapy Assistant    ZULLY Waletrs, PTA Physical Therapy Assistant    CH Aviva Lundberg, PTA Physical Therapy Assistant    KD Erlinda Aguilar, MCCLELLAND/L Occupational Therapy Assistant    KRYSTIN Bonilla/L Occupational Therapy Assistant           Time Calculation:         Time Calculation- OT       09/20/17 1308          Time Calculation- OT    OT Start Time 0825  -LW      OT Stop Time 0925  -LW      OT Time Calculation (min) 60 min  -LW      Total Timed Code Minutes- OT 60 minute(s)  -LW      OT Received On 09/20/17  -LW        User Key  (r) = Recorded By, (t) = Taken By, (c) = Cosigned By    Initials Name Provider Type    LW KRYSTIN Lacy/L Occupational Therapy Assistant           Therapy Charges for Today     Code Description Service Date Service Provider Modifiers Qty    09735242208 HC OT SELF CARE/MGMT/TRAIN EA 15 MIN 9/19/2017 KRYSTIN Lacy/L GO 2    84241866394 HC OT THERAPEUTIC ACT EA 15 MIN 9/19/2017 KRYSTIN Lacy/L GO 1    14290418007 HC OT SELF CARE/MGMT/TRAIN EA 15 MIN 9/20/2017 CHANDU LacyA/L GO 3    73558525055 HC OT THERAPEUTIC ACT EA 15 MIN 9/20/2017 KRYSTIN Lacy/L GO 1          OT G-codes  OT Professional Judgement Used?: Yes  OT Functional Scales Options: AM-PAC 6 Clicks Daily Activity (OT)  Score: 21  Functional Limitation: Self care  Self Care Current Status (): At least 20 percent but less than 40 percent impaired, limited or  restricted  Self Care Goal Status (): At least 1 percent but less than 20 percent impaired, limited or restricted    KRYSTIN Lacy/GEOFFREY  9/20/2017

## 2017-09-20 NOTE — PLAN OF CARE
Problem: Patient Care Overview (Adult)  Goal: Plan of Care Review  Outcome: Ongoing (interventions implemented as appropriate)    09/20/17 1306   Patient Care Overview   Progress improving   Outcome Evaluation   Outcome Summary/Follow up Plan Pt doing very well Will contnue POC         Problem: Inpatient Occupational Therapy  Goal: Transfer Training Goal 1 LTG- OT  Outcome: Outcome(s) achieved Date Met:  09/20/17 09/17/17 1350 09/20/17 1306   Transfer Training OT LTG   Transfer Training OT LTG, Date Established 09/17/17 --    Transfer Training OT LTG, Time to Achieve by discharge --    Transfer Training OT LTG, Activity Type all transfers --    Transfer Training OT LTG, [place] Level independent;conditional independence --    Transfer Training OT LTG, Assist Device walker, rolling --    Transfer Training OT LTG, Date Goal Reviewed --  09/20/17   Transfer Training OT LTG, Outcome --  goal met       Goal: Patient Education Goal LTG- OT  Outcome: Outcome(s) achieved Date Met:  09/20/17 09/17/17 1350 09/20/17 1306   Patient Education OT LTG   Patient Education OT LTG, Date Established 09/17/17 --    Patient Education OT LTG, Time to Achieve by discharge --    Patient Education OT LTG, Education Type home safety;adaptive breathing;joint protection;work simplification  (fall prevention.) --    Patient Education OT LTG, Education Understanding demonstrates adequately;verbalizes understanding --    Patient Education OT LTG, Date Goal Reviewed --  09/20/17   Patient Education OT LTG Outcome --  goal met       Goal: ADL Goal LTG- OT  Outcome: Ongoing (interventions implemented as appropriate)    09/17/17 1350 09/20/17 1306   ADL OT LTG   ADL OT LTG, Date Established 09/17/17 --    ADL OT LTG, Time to Achieve by discharge --    ADL OT LTG, Activity Type ADL skills  (Sponge bath and dress or walk-in shower.) --    ADL OT LTG, [place] Level independent with device;assistive device  (R/W if needed.) --    ADL OT  LTG, Date Goal Reviewed --  09/20/17   ADL OT LTG, Outcome --  goal not met

## 2017-09-20 NOTE — PLAN OF CARE
Problem: Patient Care Overview (Adult)  Goal: Plan of Care Review  Outcome: Ongoing (interventions implemented as appropriate)    09/20/17 1522   Coping/Psychosocial Response Interventions   Plan Of Care Reviewed With patient;spouse;daughter   Outcome Evaluation   Outcome Summary/Follow up Plan Patient seen for PT treatment; goals revised. Patient demonstrates (I) with bed mobility and transfers, requires SPV with gait to improve posture, 200'x2, maintains SpO2 >90%. Continue skilled PT. Very good progress.         Problem: Inpatient Physical Therapy  Goal: Stair Training Goal LTG- PT  Outcome: Ongoing (interventions implemented as appropriate)    09/16/17 0845 09/20/17 1522   Stair Training PT LTG   Stair Training Goal PT LTG, Date Established --  09/20/17   Stair Training Goal PT LTG, Time to Achieve by discharge --    Stair Training Goal PT LTG, Number of Steps 5 --    Stair Training Goal PT LTG, [place] Level conditional independence --    Stair Training Goal PT LTG, Assist Device 2 handrails --    Stair Training Goal PT LTG, Date Goal Reviewed --  09/20/17   Stair Training Goal PT LTG, Outcome --  goal ongoing       Goal: Physical Therapy Goal LTG- PT  Outcome: Revised    09/16/17 0845 09/20/17 1522   Physical Therapy PT LTG   Physical Therapy PT LTG, Date Established --  09/20/17   Physical Therapy PT LTG, Time to Achieve --  by discharge   Physical Therapy PT LTG, Activity Type [place] fall risk assessment.  --    Physical Therapy PT LTG, Addisional Goal --  Score 28/28 or low fall risk.    Physical Therapy PT LTG, Date Goal Reviewed --  09/20/17   Physical Therapy PT LTG, Outcome --  goal ongoing

## 2017-09-20 NOTE — THERAPY PROGRESS REPORT/RE-CERT
Acute Care - Physical Therapy Progress Note  [facility]     Patient Name: Hasmukh Ham  : 1933  MRN: [id number]  Today's Date: 2017  Onset of Illness/Injury or Date of Surgery Date: 09/15/17  Date of Referral to PT: 09/15/17  Referring Physician: KETTY Griffin    Admit Date: 9/15/2017    Visit Dx:    ICD-10-CM ICD-9-CM   1. Benign essential hypertension I10 401.1   2. Pulmonary HTN I27.2 416.8   3. Impaired physical mobility Z74.09 781.99   4. Impaired mobility and activities of daily living Z74.09 799.89   5. Non-rheumatic tricuspid valve insufficiency I36.1 424.2   6. Coronary artery disease involving native coronary artery of native heart without angina pectoris I25.10 414.01   7. Centrilobular emphysema J43.2 492.8   8. Paroxysmal atrial fibrillation I48.0 427.31   9. Shortness of breath R06.02 786.05   10. COPD with exacerbation J44.1 491.21   11. (HFpEF) heart failure with preserved ejection fraction I50.30 428.9   12. Acute exacerbation of chronic obstructive airways disease J44.1 491.21   13. Acute interstitial pneumonia J84.9 516.8   14. Coronary arteriosclerosis I25.10 414.00   15. COPD exacerbation J44.1 491.21   16. Dilated aortic root I77.810 447.71   17. Bradycardia R00.1 427.89   18. Chronic coronary artery disease I25.10 414.00   19. Neuropathy G62.9 355.9   20. Neck pain M54.2 723.1   21. Gastroesophageal reflux disease without esophagitis K21.9 530.81   22. Generalized osteoarthritis M15.9 715.00   23. 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 Nocturia R35.1 788.43   25. Chronic obstructive bronchitis J44.9 491.20   26. Mixed hyperlipidemia E78.2 272.2   27. Arthropathy of hand M12.9 716.94   28. Paroxysmal tachycardia I47.9 427.2   29. Family history of diseases of the blood and blood-forming organs and certain disorders involving the immune mechanism Z83.2 V18.3   30. Osteoarthrosis involving more than one site but not generalized M15.9 715.80   31.  Chronic right shoulder pain M25.511 719.41    G89.29 338.29   32. Rotator cuff syndrome, right M75.101 726.10   33. Partial tear of subscapularis tendon, right, initial encounter S43.81XA 840.5   34. Infraspinatus tendon tear, right, initial encounter S46.811A 840.3   35. Supraspinatus tendon tear, right, initial encounter S46.811A 840.6   36. Bilateral carotid artery stenosis I65.23 433.10     433.30     Patient Active Problem List   Diagnosis   • Dilated aortic root   • Benign essential hypertension   • Type 2 diabetes mellitus   • Non-rheumatic tricuspid valve insufficiency   • Coronary artery disease involving native coronary artery of native heart   • Pulmonary emphysema   • Dyspnea   • Atrial fibrillation   • Bradycardia   • Shortness of breath   • COPD with exacerbation   • Chronic coronary artery disease   • Neuropathy   • Abdominal hernia   • Neck pain   • Dementia   • Diabetic neuropathy   • Diabetic polyneuropathy   • Gastroesophageal reflux disease without esophagitis   • Generalized osteoarthritis   • 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   • Nocturia   • Chronic obstructive bronchitis   • Osteoarthritis of multiple joints   • Hyperlipidemia   • Pain in joint involving ankle and foot   • Arthropathy of hand   • Paroxysmal tachycardia   • Family history of diseases of the blood and blood-forming organs and certain disorders involving the immune mechanism   • Osteoarthrosis involving more than one site but not generalized   • Right shoulder pain   • Rotator cuff syndrome   • Partial tear of subscapularis tendon   • Infraspinatus tendon tear   • Supraspinatus tendon tear   • Bilateral carotid artery stenosis   • (HFpEF) heart failure with preserved ejection fraction   • Pulmonary HTN   • Acute exacerbation of chronic obstructive airways disease   • Acute interstitial pneumonia   • Chronic obstructive lung disease   • Coronary arteriosclerosis   • Diabetes mellitus   • COPD exacerbation                Adult Rehabilitation Note       09/20/17 1522 09/20/17 0825 09/19/17 1006    Rehab Assessment/Intervention    Discipline physical therapist  -CZ occupational therapy assistant  -LW physical therapy assistant  -CH    Document Type therapy note (daily note)  -CZ therapy note (daily note)  -LW therapy note (daily note)  -CH    Subjective Information  agree to therapy  -LW agree to therapy  -CH    Patient Effort, Rehab Treatment excellent  -CZ good  -LW good  -CH    Symptoms Noted During/After Treatment none  -CZ      Precautions/Limitations no known precautions/limitations  -CZ  fall precautions;oxygen therapy device and L/min  -CH    Precautions/Limitations, Hearing  hearing aid, bilaterally  -LW     Recorded by [CZ] Bud Lovelace, PT [LW] KRYSTNI Lacy/GEOFFREY [CH] Aviva Lundberg, PTA    Vital Signs    Pre Systolic BP Rehab 177  -  -  -CH    Pre Treatment Diastolic BP 89  -CZ 66  -LW 64  -CH    Intra Systolic BP Rehab 164  -CZ      Intra Treatment Diastolic BP 80  -CZ      Post Systolic BP Rehab 151  -CZ  112  -CH    Post Treatment Diastolic BP 70  -CZ  88  -CH    Pretreatment Heart Rate (beats/min) 84  -CZ 83  -LW 88  -CH    Intratreatment Heart Rate (beats/min) 92  -CZ 79  -LW     Posttreatment Heart Rate (beats/min) 91  -CZ 82  -LW 91  -CH    Pre SpO2 (%) 96  -CZ 98  -LW 97  -CH    O2 Delivery Pre Treatment room air  -CZ room air  -LW supplemental O2  -CH    Intra SpO2 (%) 94  -CZ 98  -LW 95   Pt requested to ambulate on room air  -CH    O2 Delivery Intra Treatment room air  -CZ room air  -LW room air  -CH    Post SpO2 (%) 95  -CZ 97  -LW 96  -CH    O2 Delivery Post Treatment  room air  -LW room air  -CH    Pre Patient Position Sitting  -CZ Supine  -LW Supine  -CH    Intra Patient Position Sitting  -CZ Sitting  -LW Standing  -CH    Post Patient Position Sitting  -CZ Sitting  -LW Sitting  -CH    Recorded by [CZ] Bud Lovelace, PT [LW] KRYSTIN Lacy/GEOFFREY [CH] Aviva Lundberg,  PTA    Pain Assessment    Pain Assessment 0-10  -CZ No/denies pain  -LW No/denies pain  -CH    Pain Score 7  -CZ  0  -CH    Post Pain Score 0  -CZ  0  -CH    Pain Type Chronic pain  -CZ      Pain Location Back  -CZ      Recorded by [CZ] Bud Lovelace, PT [LW] KRYSTIN Lacy/GEOFFREY [CH] Aviva Lundberg PTA    Vision Assessment/Intervention    Visual Impairment  WFL with corrective lenses  -LW WFL with corrective lenses  -CH    Visual Impairment Comment   Readers  -CH    Recorded by  [LW] KRYSTIN Lacy/GEOFFREY [CH] Aviva Lundberg PTA    Cognitive Assessment/Intervention    Current Cognitive/Communication Assessment functional  -CZ functional  -LW functional  -CH    Orientation Status  oriented x 4  -LW oriented x 4  -CH    Follows Commands/Answers Questions 100% of the time  -% of the time  -% of the time  -CH    Personal Safety  WNL/WFL  -LW WNL/WFL  -CH    Personal Safety Interventions gait belt;nonskid shoes/slippers when out of bed  -CZ fall prevention program maintained;nonskid shoes/slippers when out of bed  -LW fall prevention program maintained;gait belt;muscle strengthening facilitated;nonskid shoes/slippers when out of bed;supervised activity  -CH    Recorded by [CZ] Bud Lovelace, PT [LW] KRYSTIN Lacy/GEOFFREY [CH] Aviva Lundberg PTA    Bed Mobility, Assessment/Treatment    Bed Mobility, Assistive Device head of bed elevated  -CZ  head of bed elevated  -CH    Bed Mobility, Roll Left, [place]   not tested  -CH    Bed Mobility, Roll Right, [place]   not tested  -CH    Bed Mob, Supine to Sit, [place] conditional independence  -CZ  independent  -CH    Bed Mob, Sit to Supine, [place] conditional independence  -CZ  not tested  -CH    Recorded by [CZ] Bud Lovelace, PT  [CH] Aviva Lundberg PTA    Transfer Assessment/Treatment    Transfers, Sit-Stand [place] conditional independence  -CZ conditional independence  -LW conditional independence  -CH     Transfers, Stand-Sit [place] conditional independence  -CZ conditional independence  -LW conditional independence  -CH    Transfers, Sit-Stand-Sit, Assist Device  other (see comments)   no AE  -LW other (see comments)   none  -CH    Toilet Transfer, [place]  conditional independence  -LW     Toilet Transfer, Assistive Device  other (see comments)   No AE  -LW     Recorded by [CZ] Bud Lovelace, PT [LW] KRYSTIN Lacy/GEOFFREY [CH] Aviva Lundberg, PTA    Gait Assessment/Treatment    Gait, [place] Level supervision required  -  stand by assist  -    Gait, Assistive Device --   No AD.  -CZ  other (see comments)   none  -CH    Gait, Distance (Feet) 200   200 x 2  -CZ  200   Gait 2- 150, Gait 3- 78ft, Gait 4- 78ft  -    Gait, Gait Pattern Analysis   swing-through gait  -    Gait, Gait Deviations   step length decreased  -    Gait, Comment No LOB.   -CZ      Recorded by [CZ] Bud Lovelace, PT  [CH] Aviva Lundberg, NEFTALI    Stairs Assessment/Treatment    Number of Stairs   6  -    Stairs, Handrail Location   both sides  -    Stairs, [place] Level   supervision required  -    Recorded by   [CH] Aviva Lundberg PTA    Functional Mobility    Functional Mobility- Ind. Level  conditional independence  -LW     Functional Mobility- Device  other (see comments)   No AE  -LW     Recorded by  [LW] KRYSTIN Lacy/L     Toileting Assessment/Training    Toileting Assess/Train, Assistive Device  grab bars  -LW     Toileting Assess/Train, Position  standing  -LW     Toileting Assess/Train, Indepen Level  conditional independence  -LW     Recorded by  [LW] KRYSTIN Lacy/L     Grooming Assessment/Training    Grooming Assess/Train, Position  sitting;sink side  -LW     Grooming Assess/Train, Indepen Level  independent  -LW     Grooming Assess/Train, Comment  Wash hands, face  -LW     Recorded by  [LW] KRYSTIN Lacy/L     Balance Skills Training    Gait Balance-Level of  Assistance   Close supervision  -    Gait Balance Support   No upper extremity supported  -    Gait Balance Activities   stepping over object   weaving in/out of cones, object retrieval  -    Gait Balance # of Minutes   15  -CH    Recorded by   [CH] Aviva Lundberg PTA    Positioning and Restraints    Pre-Treatment Position in bed  -CZ in bed  -LW in bed  -CH    Post Treatment Position bed  -CZ chair  -LW bed  -CH    In Bed supine;call light within reach;with family/caregiver  -  sitting EOB;call light within reach;encouraged to call for assist  -    In Chair  call light within reach;encouraged to call for assist;exit alarm on  -LW     Recorded by [CZ] Bud Lovelace, PT [LW] KRYSTIN Lacy/GEOFFREY [CH] Aviva Lundberg PTA      09/19/17 0845 09/18/17 1345 09/18/17 0950    Rehab Assessment/Intervention    Discipline occupational therapy assistant  -LW occupational therapy assistant  -KD physical therapy assistant  -ZULLY    Document Type therapy note (daily note)  -LW therapy note (daily note)  -KD therapy note (daily note)  -ZULLY    Subjective Information agree to therapy  -LW agree to therapy  -KD agree to therapy;complains of;pain;dyspnea  -ZULLY    Patient Effort, Rehab Treatment good  -LW      Precautions/Limitations fall precautions;oxygen therapy device and L/min  -LW fall precautions;oxygen therapy device and L/min  -KD fall precautions;oxygen therapy device and L/min  -ZULLY    Precautions/Limitations, Hearing   hearing impairment, bilaterally  -ZULLY    Recorded by [LW] KRYSTIN Lacy/GEOFFREY [KD] KRYSTIN Gilmore/GEOFFREY [ZULLY] Sridevi Walters, NEFTALI    Vital Signs    Pre Systolic BP Rehab 122  -  -  -ZULLY    Pre Treatment Diastolic BP 69  -LW 77  -KD 61  -ZULLY    Post Systolic BP Rehab   99  -ZULLY    Pretreatment Heart Rate (beats/min) 86  -LW 96  -KD     Intratreatment Heart Rate (beats/min) 87  -LW      Posttreatment Heart Rate (beats/min) 84  -LW 90  -KD     Pre SpO2 (%) 98  -LW 93  -KD 96  -ZULLY    O2  Delivery Pre Treatment room air  -LW room air  -KD room air  -ZULLY    Intra SpO2 (%) 96  -LW  94  -ZULLY    O2 Delivery Intra Treatment room air  -LW  room air  -ZULLY    Post SpO2 (%) 97  -LW 94  -KD 96  -ZULLY    O2 Delivery Post Treatment room air  -LW room air  -KD supplemental O2  -ZULLY    Pre Patient Position Supine  -LW Sitting  -KD Supine  -ZULLY    Intra Patient Position Sitting  -LW Standing  -KD Standing  -ZULLY    Post Patient Position Sitting  -LW Sitting  -KD     Recorded by [LW] CHANDU LacyA/L [KD] CHANDU GilmoreA/L [ZULLY] Sridevi Walters, NEFTALI    Pain Assessment    Pain Assessment No/denies pain  -LW 0-10  -KD 0-10  -ZULLY    Pain Score  9  -KD 10  -ZULLY    Post Pain Score  9  -KD 10  -ZULLY    Pain Type  Chronic pain  -KD Chronic pain  -ZULLY    Pain Location  Back   neck  -KD Back  -ZULLY    Pain Intervention(s)  Declines  -KD Declines  -ZULLY    Recorded by [LW] CHANDU LacyA/GEOFFREY [KD] CHANDU GilmoreA/GEOFFREY [ZULLY] Sridevi Walters, NEFTALI    Vision Assessment/Intervention    Visual Impairment WFL with corrective lenses  -LW      Visual Impairment Comment Readers  -LW      Recorded by [LW] CHANDU LacyA/L      Cognitive Assessment/Intervention    Current Cognitive/Communication Assessment functional  -LW functional  -KD functional  -ZULLY    Orientation Status oriented x 4  -LW oriented x 4  -KD oriented x 4  -ZULLY    Follows Commands/Answers Questions 100% of the time  -% of the time  -% of the time  -ZULLY    Personal Safety WNL/WFL  -LW WNL/WFL  -KD WNL/WFL  -ZULLY    Personal Safety Interventions fall prevention program maintained  -LW gait belt;nonskid shoes/slippers when out of bed  -KD gait belt;supervised activity;nonskid shoes/slippers when out of bed  -ZULLY    Recorded by [LW] CHANDU LacyA/L [KD] CHANDU GilmoreA/GEOFFREY [ZULLY] Sridevi Walters, NEFTALI    Bed Mobility, Assessment/Treatment    Bed Mobility, Roll Left, [place]   independent  -ZULLY    Bed Mob, Supine to Sit, [place]   independent  -ZULLY     Bed Mob, Sit to Supine, [place]   independent  -ZULLY    Recorded by   [ZULLY] Sridevi Walters, PTA    Transfer Assessment/Treatment    Transfers, Sit-Stand [place] conditional independence  -LW conditional independence  -KD conditional independence;independent  -ZULLY    Transfers, Stand-Sit [place] conditional independence  -LW conditional independence  -KD conditional independence;independent  -ZULLY    Transfers, Sit-Stand-Sit, Assist Device --   none  -LW --   none  -KD rolling walker  -ZULLY    Toilet Transfer, [place] conditional independence  -LW conditional independence  -KD     Toilet Transfer, Assistive Device  --   none  -KD     Recorded by [LW] Berenice Hickman, [place]/L [KD] Erlinda Aguilar [place]/L [ZULLY] Sridevi Walters, PTA    Gait Assessment/Treatment    Gait, [place] Level   conditional independence;stand by assist  -ZULLY    Gait, Assistive Device   rolling walker  -    Gait, Distance (Feet)   150   250,200  -ZULLY    Gait, Gait Pattern Analysis   swing-through gait  -    Gait, Gait Deviations   step length decreased  -ZULLY    Recorded by   [ZULLY] Sridevi Walters, PTA    Functional Mobility    Functional Mobility- Ind. Level  conditional independence  -KD     Functional Mobility- Device  --   none  -KD     Functional Mobility-Distance (Feet)  --   150 x 2  -KD     Recorded by  [KD] Erlinda Aguilar, [place]/L     Lower Body Dressing Assessment/Training    LB Dressing Assess/Train, Clothing Type  donning:;pants;slipper socks  -KD     LB Dressing Assess/Train, Position  standing;sitting  -KD     Recorded by  [KD] Erlinda Aguilar, [place]/L     Toileting Assessment/Training    Toileting Assess/Train, Assistive Device grab bars  -LW grab bars  -KD     Toileting Assess/Train, Position sitting  -LW sitting  -KD     Toileting Assess/Train, Indepen Level conditional independence  -LW conditional independence  -KD     Recorded by [LW] Berenice Hickman, [place]/L [KD] CHANDU GilmoreA/GEOFFREY     Grooming  Assessment/Training    Grooming Assess/Train, Position standing;sink side  -LW      Grooming Assess/Train, Indepen Level independent  -LW      Grooming Assess/Train, Comment Wash face hands comb hair oral care, shave.  -LW      Recorded by [LW] CHANDU LacyA/L      Therapy Exercises    Bilateral Lower Extremities   AROM:;25 reps;sitting;ankle pumps/circles;heel raises;LAQ;hip flexion  -ZULLY    Bilateral Upper Extremity  AROM:;20 reps;sitting;elbow flexion/extension;pronation/supination;shoulder abduction/adduction;shoulder extension/flexion;shoulder ER/IR  -KD     BUE Resistance  manual resistance- minimal  -KD     Recorded by  [KD] Erlinda Aguilar [place]/L [ZULLY] Sridevi Walters, NEFTALI    Positioning and Restraints    Pre-Treatment Position in bed  -LW in bed  -KD in bed  -ZULLY    Post Treatment Position bed  -LW chair  -KD chair  -ZULLY    In Bed sitting EOB  -LW      In Chair call light within reach;encouraged to call for assist;exit alarm on  -LW call light within reach;encouraged to call for assist;exit alarm on  -KD sitting;call light within reach  -ZULLY    Recorded by [LW] CHANDU LacyA/GEOFFREY [KD] Erlinda Aguilar [place]/L [ZULLY] Sridevi Walters, PTA      User Key  (r) = Recorded By, (t) = Taken By, (c) = Cosigned By    Initials Name Effective Dates     Sridevi Walters, PTA 10/17/16 -     CH Aviva Lundberg, PTA 10/17/16 -     KD Erlinda Aguilar [place]/L 10/17/16 -     LW Berenice Hickman, [place]/L 10/17/16 -     CZ Bud Lovelace, PT 02/17/17 -                 IP PT Goals       09/20/17 1522 09/19/17 1111 09/18/17 0950    Transfer Training PT STG    Transfer Training PT STG, Date Goal Reviewed   09/18/17  -ZULLY    Transfer Training PT STG, Outcome   goal met  -ZULLY    Gait Training PT STG    Gait Training Goal PT STG, Date Goal Reviewed   09/18/17  -ZULLY    Gait Training Goal PT STG, Outcome   goal met  -ZULLY    Stair Training PT LTG    Stair Training Goal PT LTG, Date Established 09/20/17  -CZ      Stair Training Goal PT LTG,  Date Goal Reviewed 09/20/17  -CZ 09/19/17  -CH 09/18/17  -ZULLY    Stair Training Goal PT LTG, Outcome goal ongoing  -CZ goal ongoing  -CH goal ongoing  -ZULLY    Physical Therapy PT LTG    Physical Therapy PT LTG, Date Established 09/20/17  -CZ      Physical Therapy PT LTG, Time to Achieve by discharge  -CZ      Physical Therapy PT LTG, Addisional Goal Score 28/28 or low fall risk.   -CZ      Physical Therapy PT LTG, Date Goal Reviewed 09/20/17  -CZ 09/19/17  -CH 09/18/17  -ZULLY    Physical Therapy PT LTG, Outcome goal ongoing  -CZ goal met  -CH goal ongoing  -ZULLY      09/16/17 0845          Transfer Training PT STG    Transfer Training PT STG, Date Established 09/16/17  -CZ      Transfer Training PT STG, Time to Achieve 2 days  -CZ      Transfer Training PT STG, Activity Type bed to chair /chair to bed;sit to stand/stand to sit  -CZ      Transfer Training PT STG, [place] Level conditional independence  -CZ      Transfer Training PT STG, Date Goal Reviewed 09/16/17  -CZ      Transfer Training PT STG, Outcome goal ongoing  -CZ      Gait Training PT STG    Gait Training Goal PT STG, Date Established 09/16/17  -CZ      Gait Training Goal PT STG, Time to Achieve 2 days  -CZ      Gait Training Goal PT STG, [place] Level conditional independence  -CZ      Gait Training Goal PT STG, Assist Device walker, rolling  -CZ      Gait Training Goal PT STG, Distance to Achieve 150'x1.  -CZ      Gait Training Goal PT STG, Additional Goal SpO2 >90%.  -CZ      Gait Training Goal PT STG, Date Goal Reviewed 09/16/17  -CZ      Gait Training Goal PT STG, Outcome goal ongoing  -CZ      Stair Training PT LTG    Stair Training Goal PT LTG, Date Established 09/16/17  -CZ      Stair Training Goal PT LTG, Time to Achieve by discharge  -CZ      Stair Training Goal PT LTG, Number of Steps 5  -CZ      Stair Training Goal PT LTG, [place] Level conditional independence  -CZ      Stair Training Goal PT LTG, Assist Device 2 handrails  -CZ       Stair Training Goal PT LTG, Date Goal Reviewed 09/16/17  -      Stair Training Goal PT LTG, Outcome goal ongoing  -      Physical Therapy PT LTG    Physical Therapy PT LTG, Date Established 09/16/17  -      Physical Therapy PT LTG, Time to Achieve by discharge  -      Physical Therapy PT LTG, Activity Type Tinetti fall risk assessment.   -      Physical Therapy PT LTG, Addisional Goal Score 24/28 or low fall risk.   -      Physical Therapy PT LTG, Date Goal Reviewed 09/16/17  -      Physical Therapy PT LTG, Outcome goal ongoing  -        User Key  (r) = Recorded By, (t) = Taken By, (c) = Cosigned By    Initials Name Provider Type    ZULLY Walters, PTA Physical Therapy Assistant     Aviva Lundberg, PTA Physical Therapy Assistant     Bud Lovelace, PT Physical Therapist          Physical Therapy Education     Title: PT OT SLP Therapies (Resolved)     Topic: Physical Therapy (Resolved)     Point: Mobility training (Resolved)    Learning Progress Summary    Learner Readiness Method Response Comment Documented by Status   Patient Acceptance E YAKELIN,VU benefits of exercise & mobility  09/17/17 1507 Done               Point: Body mechanics (Resolved)    Learning Progress Summary    Learner Readiness Method Response Comment Documented by Status   Patient Acceptance E VU Discussed proper posture with gait to decrease c/o LBP.  09/20/17 1607 Done               Point: Precautions (Resolved)    Learning Progress Summary    Learner Readiness Method Response Comment Documented by Status   Patient Acceptance E NR Tinetti assessed: 21/28 or moderate fall risk. Discussed benefits of using walker until fall risk is lowered.  09/16/17 1108 Active                      User Key     Initials Effective Dates Name Provider Type Discipline    TA 10/17/16 -  Kayli Kumar, PTA Physical Therapy Assistant PT     02/17/17 -  Bud Lovelace, PT Physical Therapist PT                    PT Recommendation and  Plan  Anticipated Discharge Disposition: home with home health  Planned Therapy Interventions: balance training, gait training, home exercise program, stair training, strengthening, transfer training, patient/family education  PT Frequency: other (see comments) (5-14x/week)  Plan of Care Review  Plan Of Care Reviewed With: patient, spouse, daughter  Outcome Summary/Follow up Plan: Patient seen for PT treatment; goals revised.  Patient demonstrates (I) with bed mobility and transfers, requires SPV with gait  to improve posture, 200'x2, maintains SpO2 >90%.  Continue skilled PT.  Very good progress.          Outcome Measures       09/20/17 0825 09/19/17 1007 09/19/17 1006    How much help from another person do you currently need...    Turning from your back to your side while in flat bed without using bedrails?   4  -CH    Moving from lying on back to sitting on the side of a flat bed without bedrails?   4  -CH    Moving to and from a bed to a chair (including a wheelchair)?   3  -CH    Standing up from a chair using your arms (e.g., wheelchair, bedside chair)?   3  -CH    Climbing 3-5 steps with a railing?   3  -CH    To walk in hospital room?   3  -CH    AM-PAC 6 Clicks Score   20  -CH    How much help from another is currently needed...    Putting on and taking off regular lower body clothing? 4  -LW      Bathing (including washing, rinsing, and drying) 4  -LW      Toileting (which includes using toilet bed pan or urinal) 4  -LW      Putting on and taking off regular upper body clothing 4  -LW      Taking care of personal grooming (such as brushing teeth) 4  -LW      Eating meals 4  -LW      Score 24  -LW      Tinetti Assessment    Tinetti Assessment  yes  -CH     Sitting Balance  1  -CH     Arises  2  -CH     Attempts to Rise  2  -CH     Immediate Standing Balance (first 5 sec)  2  -CH     Standing Balance  1  -CH     Sternal Nudge (feet close together)  2  -CH     Eyes Closed (feet close together)  1  -CH      "Turning 360 Degrees- Steps  1  -CH     Turning 360 Degrees- Steadiness  1  -CH     Sitting Down  2  -CH     Tinetti Balance Score  15  -CH     Gait Initiation (immediate after told \"go\")  1  -CH     Step Length- Right Swing  1  -CH     Step Length- Left Swing  1  -CH     Foot Clearance- Right Foot  1  -CH     Foot Clearance- Left Foot  1  -CH     Step Symmetry  0  -CH     Step Continuity  1  -CH     Path (excursion)  1  -CH     Trunk  1  -CH     Base of Support  1  -CH     Gait Score  9  -CH     Tinetti Total Score  24  -CH     Functional Assessment    Outcome Measure Options  Tinetti  -CH AM-PAC 6 Clicks Basic Mobility (PT)  -      09/19/17 0845 09/18/17 1345 09/18/17 0950    How much help from another person do you currently need...    Turning from your back to your side while in flat bed without using bedrails?   4  -ZULLY    Moving from lying on back to sitting on the side of a flat bed without bedrails?   4  -ZULLY    Moving to and from a bed to a chair (including a wheelchair)?   3  -ZULLY    Standing up from a chair using your arms (e.g., wheelchair, bedside chair)?   3  -ZULLY    Climbing 3-5 steps with a railing?   3  -ZULLY    To walk in hospital room?   3  -ZULLY    AM-PAC 6 Clicks Score   20  -ZULLY    How much help from another is currently needed...    Putting on and taking off regular lower body clothing? 3  -LW 3  -KD     Bathing (including washing, rinsing, and drying) 3  -LW 3  -KD     Toileting (which includes using toilet bed pan or urinal) 3  -LW 3  -KD     Putting on and taking off regular upper body clothing 4  -LW 4  -KD     Taking care of personal grooming (such as brushing teeth) 4  -LW 4  -KD     Eating meals 4  -LW 4  -KD     Score 21  -LW 21  -KD       User Key  (r) = Recorded By, (t) = Taken By, (c) = Cosigned By    Initials Name Provider Type    ZULLY Walters, PTA Physical Therapy Assistant    SABINA Lundberg PTA Physical Therapy Assistant     Erlinda Aguilar, M[place]LELLAND/L Occupational Therapy " Assistant    KRYSTIN Bonilla/L Occupational Therapy Assistant           Time Calculation:         PT Charges       09/20/17 1611          Time Calculation    Start Time 1522  -CZ      Stop Time 1555  -CZ      Time Calculation (min) 33 min  -CZ      PT Received On 09/20/17  -CZ      PT Goal Re-Cert Due Date 10/03/17  -CZ      Time Calculation- PT    Total Timed Code Minutes- PT 33 minute(s)  -CZ        User Key  (r) = Recorded By, (t) = Taken By, (c) = Cosigned By    Initials Name Provider Type     Bud Lovelace, PT Physical Therapist          Therapy Charges for Today     Code Description Service Date Service Provider Modifiers Qty    03618825198 HC PT MOBILITY CURRENT 9/20/2017 Bud Lovelace, PT GP, CI 1    [id number] HC PT MOBILITY PROJECTED 9/20/2017 Bud Lovelace, PT GP, [place] 1    64411169554 HC GAIT TRAINING EA 15 MIN 9/20/2017 Bud Lovelace, PT GP 2          PT G-Codes  PT Professional Judgement Used?: Yes  Outcome Measure Options: Tinetti  Score: 24  Functional Limitation: Mobility: Walking and moving around  Mobility: Walking and Moving Around Current Status (): At least 1 percent but less than 20 percent impaired, limited or restricted  Mobility: Walking and Moving Around Goal Status (): 0 percent impaired, limited or restricted    Bud Lovelace, PT  9/20/2017

## 2017-09-20 NOTE — PAYOR COMM NOTE
"Clinical update for [facility]# [id number]    Nicole  [facility]  [phone] phone  [phone] fax          Hasmukh Ham (84 y.o. Male)     Date of Birth Social Security Number Address Home Phone MRN    06/01/1933  [address] [facility] [address] [phone] [id number]    [employer] Marital Status          [employer]ehovah's witness        Admission Date Admission Type Admitting Provider Attending Provider Department, Room/Bed    9/15/17 Urgent Pamela Griffin MD Amsler, Mariola HALE MD [address], 402/1    Discharge Date Discharge Disposition Discharge Destination                      Attending Provider: Mariola Arrieta MD     Allergies:  Atorvastatin, Pravastatin, Azithromycin, Biaxin [Clarithromycin]    Isolation:  None   Infection:  None   Code Status:  FULL    Ht:  67\" (170.2 cm)   Wt:  172 lb 3.2 oz (78.1 kg)    Admission Cmt:  None   Principal Problem:  Acute exacerbation of chronic obstructive airways disease [J44.1]                 Active Insurance as of 9/15/2017     Primary Coverage     Payor Plan Insurance Group Employer/Plan Group    AETNA MEDICARE REPLACEMENT AETNA [id number]     Payor Plan Address Payor Plan Phone Number Effective From Effective To    [address] [phone] 4/1/2017     [place] [id number]       Subscriber Name Subscriber Birth Date Member ID       HASMUKH HAM 6/1/1933 [id number]           Secondary Coverage     Payor Plan Insurance Group Employer/Plan Group    HEALTH[employer]OPE BENEFITS HEALTH[employer]OPE BENEFITS [id number]     Payor Plan Address Payor Plan Phone Number Effective From Effective To    [address] [address] [phone] 9/15/2017     [place] [address]       Subscriber Name Subscriber Birth Date Member ID       HASMUKH HAM 6/1/1933 [id number]                 Emergency Contacts      (Rel.) Home Phone Work Phone Mobile Phone    Geovanna Tatum (Daughter) [phone] -- [phone]    Rimma Ham (Spouse) [phone] " -- --            Problem List           Codes Noted - Resolved       Hospital    COPD exacerbation ICD-10-CM: J44.1  ICD-9-CM: 491.21 9/16/2017 - Present    * (Principal)Acute exacerbation of chronic obstructive airways disease ICD-10-CM: J44.1  ICD-9-CM: 491.21 Unknown - Present    Acute interstitial pneumonia ICD-10-CM: J84.9  ICD-9-CM: 516.8 Unknown - Present    Chronic obstructive lung disease ICD-10-CM: J44.9  ICD-9-CM: 496 Unknown - Present    Coronary arteriosclerosis ICD-10-CM: I25.10  ICD-9-CM: 414.00 Unknown - Present    Diabetes mellitus ICD-10-CM: E11.9  ICD-9-CM: 250.00 Unknown - Present    (HFpEF) heart failure with preserved ejection fraction ICD-10-CM: I50.30  ICD-9-CM: 428.9 9/14/2017 - Present    Pulmonary HTN ICD-10-CM: I27.2  ICD-9-CM: 416.8 9/14/2017 - Present    Shortness of breath ICD-10-CM: R06.02  ICD-9-CM: 786.05 Unknown - Present    COPD with exacerbation ICD-10-CM: J44.1  ICD-9-CM: 491.21 Unknown - Present    Benign essential hypertension ICD-10-CM: I10  ICD-9-CM: 401.1 12/13/2016 - Present    Type 2 diabetes mellitus ICD-10-CM: E11.9  ICD-9-CM: 250.00 12/13/2016 - Present    Non-rheumatic tricuspid valve insufficiency ICD-10-CM: I36.1  ICD-9-CM: 424.2 12/13/2016 - Present    Coronary artery disease involving native coronary artery of native heart ICD-10-CM: I25.10  ICD-9-CM: 414.01 12/13/2016 - Present    Pulmonary emphysema ICD-10-CM: J43.9  ICD-9-CM: 492.8 12/13/2016 - Present    Atrial fibrillation ICD-10-CM: I48.91  ICD-9-CM: 427.31 12/13/2016 - Present       Non-Hospital    Bilateral carotid artery stenosis ICD-10-CM: I65.23  ICD-9-CM: 433.10, 433.30 7/10/2017 - Present    Rotator cuff syndrome ICD-10-CM: M75.100  ICD-9-CM: 726.10 6/30/2017 - Present    Partial tear of subscapularis tendon ICD-10-CM: S43.80XA  ICD-9-CM: 840.5 6/30/2017 - Present    Infraspinatus tendon tear ICD-10-CM: S46.819A  ICD-9-CM: 840.3 6/30/2017 - Present    Supraspinatus tendon tear ICD-10-CM:  S46.819A  ICD-9-CM: 840.6 6/30/2017 - Present    Right shoulder pain ICD-10-CM: M25.511  ICD-9-CM: 719.41 6/19/2017 - Present    Family history of diseases of the blood and blood-forming organs and certain disorders involving the immune mechanism ICD-10-CM: Z83.2  ICD-9-CM: V18.3 1/11/2017 - Present    Paroxysmal tachycardia ICD-10-CM: I47.9  ICD-9-CM: 427.2 1/3/2017 - Present    Bradycardia ICD-10-CM: R00.1  ICD-9-CM: 427.89 Unknown - Present    Chronic coronary artery disease ICD-10-CM: I25.10  ICD-9-CM: 414.00 Unknown - Present    Neuropathy ICD-10-CM: G62.9  ICD-9-CM: 355.9 Unknown - Present    Dilated aortic root ICD-10-CM: I77.810  ICD-9-CM: 447.71 12/13/2016 - Present    Dyspnea ICD-10-CM: R06.00  ICD-9-CM: 786.09 12/13/2016 - Present    Gastroesophageal reflux disease without esophagitis ICD-10-CM: K21.9  ICD-9-CM: 530.81 2/3/2016 - Present    Nocturia ICD-10-CM: R35.1  ICD-9-CM: 788.43 2/2/2016 - Present    Dementia ICD-10-CM: F03.90  ICD-9-CM: 294.20 12/4/2012 - Present    Diabetic neuropathy ICD-10-CM: E11.40  ICD-9-CM: 250.60, 357.2 8/8/2012 - Present    Abdominal hernia ICD-10-CM: K46.9  ICD-9-CM: 553.9 6/14/2012 - Present    Neck pain ICD-10-CM: M54.2  ICD-9-CM: 723.1 7/18/2011 - Present    Hyperlipidemia ICD-10-CM: E78.5  ICD-9-CM: 272.4 11/14/2010 - Present    Diabetic polyneuropathy ICD-10-CM: E11.42  ICD-9-CM: 250.60, 357.2 2/23/2010 - Present    Generalized osteoarthritis ICD-10-CM: M15.9  ICD-9-CM: 715.00 2/23/2010 - Present    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 ICD-10-CM: I69.959  ICD-9-CM: 438.20 2/23/2010 - Present    Chronic obstructive bronchitis ICD-10-CM: J44.9  ICD-9-CM: 491.20 2/23/2010 - Present    Osteoarthritis of multiple joints ICD-10-CM: M15.9  ICD-9-CM: 715.89 2/23/2010 - Present    Pain in joint involving ankle and foot ICD-10-CM: M25.579  ICD-9-CM: 719.47 2/23/2010 - Present    Arthropathy of hand ICD-10-CM: M12.9  ICD-9-CM: 716.94 2/23/2010 - Present     Osteoarthrosis involving more than one site but not generalized ICD-10-CM: M15.9  ICD-9-CM: 715.80 2/23/2010 - Present             History & Physical      Pamela Griffin MD at 9/15/2017  1:23 PM                [facility]  INPATIENT HISTORY AND PHYSICAL       Patient Care Team:  Paolo Rey MD as PCP - General    Chief complaint No chief complaint on file.      Subjective     Patient is a 84 y.o. male presents with complaint of having shortness of breath.  Patient states she's been having shortness of breath ongoing for past few weeks.  Patient states his shortness of breath has been worsening.  He has been having coughing spells with thick yellowish to greenish colored sputum.  No nausea vomiting have been reported.  Patient does complain of chest tightness with coughing spells.  Patient states he has been having worsening orthopnea of 3-4 pillows.  Patient also admits of having reduced excess tolerance.  Patient does complain of worsening swelling.  No urinary complaints have been reported.  Patient states he has been compliant with his medications.  Patient states he was recently treated with IV steroids and nebulizer treatment without any benefit.    Review of Systems   Review of Systems   Constitutional: Positive for activity change and fever. Negative for appetite change, chills and diaphoresis.   HENT: Negative for congestion, rhinorrhea, sore throat and trouble swallowing.    Eyes: Negative for visual disturbance.   Respiratory: Positive for cough, chest tightness, shortness of breath and wheezing.    Cardiovascular: Positive for leg swelling. Negative for chest pain and palpitations.   Gastrointestinal: Negative for abdominal pain, blood in stool, diarrhea, nausea and vomiting.   Endocrine: Negative for cold intolerance and heat intolerance.   Genitourinary: Negative for decreased urine volume and difficulty urinating.   Musculoskeletal:  Negative for back pain, gait problem and neck pain.   Skin: Negative for rash.   Neurological: Negative for dizziness, syncope, weakness, light-headedness, numbness and headaches.   Psychiatric/Behavioral: The patient is not nervous/anxious.        History  Past Medical History:   Diagnosis Date   • Acute exacerbation of chronic obstructive airways disease    • Acute interstitial pneumonia     Hamman-Rich Syndrome   • Arthritis    • Backache    • Bradycardia    • Chronic obstructive lung disease    • Chronic obstructive pulmonary disease (COPD)    • Coronary arteriosclerosis    • Coronary atherosclerosis of native coronary artery    • Degenerative joint disease involving multiple joints    • Diabetes mellitus    • Duodenal ulcer disease    • Hernia of abdominal wall    • History of echocardiogram 04/13/2015    Echocardiogram W/ color flow 75923 (1) - Normal LV systolic function with Ef of 70% with LVH and diastolic relaxation abnormality of the left ventricle. Mildly dilated aortic root. No sig.valvular abnormalities.   • Hyperlipidemia    • Hypertension    • Hypertensive disorder    • Left lower lobe pneumonia     Left lower zone pneumonia - clinically improved   • Neuropathy    • Pneumonia     Recent improving   • Shortness of breath    • Type 2 diabetes mellitus      Past Surgical History:   Procedure Laterality Date   • APPENDECTOMY     • CARDIAC CATHETERIZATION N/A 6/6/2017    Procedure: Right Heart Cath;  Surgeon: Jeff Maciel MD PhD;  Location: [facility] INVASIVE LOCATION;  Service:    • HERNIA REPAIR     • LUNG BIOPSY     • NECK SURGERY     • THORACOTOMY Left 1977    left with lung biopsy   • VENTRAL HERNIA REPAIR       Family History   Problem Relation Age of Onset   • Diabetes Other    • Heart disease Other    • Hypertension Other    • Other Other      Respiratory disorder   • Stroke Other      Social History   Substance Use Topics   • Smoking status: Former Smoker   • Smokeless tobacco: Never  Used   • Alcohol use No     Facility-Administered Medications Prior to Admission   Medication Dose Route Frequency Provider Last Rate Last Dose   • [COMPLETED] furosemide (LASIX) injection 20 mg  20 mg Subcutaneous Once Gauri Vargas APRN   20 mg at 09/14/17 1551   • [COMPLETED] furosemide (LASIX) injection 40 mg  40 mg Subcutaneous Once Gauri Vargas, APRN   40 mg at 09/14/17 1552     Prescriptions Prior to Admission   Medication Sig Dispense Refill Last Dose   • aspirin 81 MG chewable tablet Chew 81 mg Daily.   9/15/2017 at Unknown time   • clopidogrel (PLAVIX) 75 MG tablet Take 75 mg by mouth Daily.   9/15/2017 at Unknown time   • donepezil (ARICEPT) 10 MG tablet Take 10 mg by mouth Daily.  0 9/15/2017 at Unknown time   • famotidine (PEPCID) 20 MG tablet Take 20 mg by mouth 2 (Two) Times a Day.  0 9/15/2017 at Unknown time   • fluticasone (FLONASE) 50 MCG/ACT nasal spray 2 sprays into each nostril Daily.   9/15/2017 at Unknown time   • furosemide (LASIX) 20 MG tablet Take 1 tablet by mouth Daily. 90 tablet 3 9/15/2017 at Unknown time   • glimepiride (AMARYL) 2 MG tablet Take 2 mg by mouth Daily.  0 9/15/2017 at Unknown time   • HYDROcodone-acetaminophen (NORCO) 7.5-325 MG per tablet Take 1 tablet by mouth Every 8 (Eight) Hours.   9/15/2017 at Unknown time   • isosorbide dinitrate (ISORDIL) 30 MG tablet Take 30 mg by mouth Daily.  0 9/15/2017 at Unknown time   • lisinopril (PRINIVIL,ZESTRIL) 10 MG tablet Take 1 tablet by mouth Daily. 30 tablet 6 9/15/2017 at Unknown time   • magnesium oxide (MAGOX) 400 (241.3 MG) MG tablet tablet Take 400 mg by mouth Daily.   9/15/2017 at Unknown time   • O2 (OXYGEN) Inhale 2 L/min Every Night. W/ CPAP   9/14/2017 at Unknown time   • predniSONE (DELTASONE) 10 MG tablet Take 10 mg by mouth Daily.   9/15/2017 at Unknown time   • Red Yeast Rice 600 MG tablet Take 600 mg by mouth 2 (Two) Times a Day.   9/15/2017 at Unknown time   • terbutaline (BRETHINE) 5 MG tablet Take 5 mg  by mouth 2 (Two) Times a Day.  0 9/15/2017 at Unknown time   • Umeclidinium-Vilanterol 62.5-25 MCG/INH aerosol powder  Inhale 1 puff Daily. 1 each 11 9/15/2017 at Unknown time   • albuterol (PROVENTIL) (2.5 MG/3ML) 0.083% nebulizer solution Take 2.5 mg by nebulization 4 (Four) Times a Day As Needed for wheezing. 125 vial 11 Taking   • ipratropium-albuterol (DUO-NEB) 0.5-2.5 mg/mL nebulizer Inhale 3 mL.   Taking   • nitroglycerin (NITROSTAT) 0.4 MG SL tablet place 1 tablet under the tongue if needed every 5 minutes for hair...  (REFER TO PRESCRIPTION NOTES).  0 Taking     Allergies:  Atorvastatin; Pravastatin; Azithromycin; and Biaxin [clarithromycin]  Prior to Admission medications    Medication Sig Start Date End Date Taking? Authorizing Provider   aspirin 81 MG chewable tablet Chew 81 mg Daily.   Yes Historical Provider, MD   clopidogrel (PLAVIX) 75 MG tablet Take 75 mg by mouth Daily. 2/3/16  Yes Historical Provider, MD   donepezil (ARICEPT) 10 MG tablet Take 10 mg by mouth Daily. 11/12/16  Yes Historical Provider, MD   famotidine (PEPCID) 20 MG tablet Take 20 mg by mouth 2 (Two) Times a Day. 10/20/16  Yes Historical Provider, MD   fluticasone (FLONASE) 50 MCG/ACT nasal spray 2 sprays into each nostril Daily.   Yes Historical Provider, MD   furosemide (LASIX) 20 MG tablet Take 1 tablet by mouth Daily. 3/3/17  Yes Jeff Maciel MD PhD   glimepiride (AMARYL) 2 MG tablet Take 2 mg by mouth Daily. 10/12/16  Yes Historical Provider, MD   HYDROcodone-acetaminophen (NORCO) 7.5-325 MG per tablet Take 1 tablet by mouth Every 8 (Eight) Hours. 12/27/16  Yes Historical Provider, MD   isosorbide dinitrate (ISORDIL) 30 MG tablet Take 30 mg by mouth Daily. 9/27/16  Yes Historical Provider, MD   lisinopril (PRINIVIL,ZESTRIL) 10 MG tablet Take 1 tablet by mouth Daily. 9/14/17  Yes EVE Sewell   magnesium oxide (MAGOX) 400 (241.3 MG) MG tablet tablet Take 400 mg by mouth Daily.   Yes Historical Provider, MD   O2  (OXYGEN) Inhale 2 L/min Every Night. W/ CPAP   Yes Historical Provider, MD   predniSONE (DELTASONE) 10 MG tablet Take 10 mg by mouth Daily.   Yes Historical Provider, MD   Red Yeast Rice 600 MG tablet Take 600 mg by mouth 2 (Two) Times a Day.   Yes Historical Provider, MD   terbutaline (BRETHINE) 5 MG tablet Take 5 mg by mouth 2 (Two) Times a Day. 10/14/16  Yes Historical Provider, MD   Umeclidinium-Vilanterol 62.5-25 MCG/INH aerosol powder  Inhale 1 puff Daily. 3/3/17  Yes Brea Higginbotham MD   albuterol (PROVENTIL) (2.5 MG/3ML) 0.083% nebulizer solution Take 2.5 mg by nebulization 4 (Four) Times a Day As Needed for wheezing. 3/3/17   Brea Higginbotham MD   ipratropium-albuterol (DUO-NEB) 0.5-2.5 mg/mL nebulizer Inhale 3 mL. 8/3/17   Historical Provider, MD   nitroglycerin (NITROSTAT) 0.4 MG SL tablet place 1 tablet under the tongue if needed every 5 minutes for hair...  (REFER TO PRESCRIPTION NOTES). 1/11/17   Historical Provider, MD       Objective     Vital Signs    Heart Rate:  [50-83] 50  Resp:  [20] 20  BP: (122-134)/(58-60) 122/58    Physical Exam:      Physical Exam   Constitutional: He is oriented to person, place, and time. He appears well-developed and well-nourished.   HENT:   Head: Normocephalic and atraumatic.   Nose: Nose normal.   Eyes: Conjunctivae and EOM are normal. Pupils are equal, round, and reactive to light.   Neck: Normal range of motion. Neck supple. No JVD present. No tracheal deviation present. No thyromegaly present.   Cardiovascular: Normal rate, regular rhythm, normal heart sounds and intact distal pulses.    Pulmonary/Chest: Effort normal. No respiratory distress. He has wheezes. He has rales. He exhibits no tenderness.   Abdominal: Soft. Bowel sounds are normal. He exhibits no distension. There is no tenderness. There is no rebound and no guarding.   Musculoskeletal: Normal range of motion. He exhibits edema.   Lymphadenopathy:     He has no cervical adenopathy.   Neurological: He is  alert and oriented to person, place, and time. He has normal reflexes. No cranial nerve deficit.   Skin: Skin is warm and dry.   Intact   Psychiatric: He has a normal mood and affect.   Nursing note and vitals reviewed.      Results Review:           Invalid input(s): LABALBU, PROT                    Imaging Results (last 7 days)     ** No results found for the last 168 hours. **          Assessment/Plan     Principal Problem:    Acute exacerbation of chronic obstructive airways disease  Active Problems:    Benign essential hypertension    Type 2 diabetes mellitus    Non-rheumatic tricuspid valve insufficiency    Coronary artery disease involving native coronary artery of native heart    Pulmonary emphysema    Atrial fibrillation    Shortness of breath    COPD with exacerbation    (HFpEF) heart failure with preserved ejection fraction    Pulmonary HTN    Acute interstitial pneumonia    Chronic obstructive lung disease    Coronary arteriosclerosis    Diabetes mellitus      -We'll continue with IV antibiotics.  -We'll also give IV diuretics.    -we'll continue with nebulizer treatments.  -We'll resume patient's home medication as prior to coming hospital.  -DVT and GI prophylaxis in place.  -We'll continue monitoring patient hospital setting and treat patient as course dictates.  -We'll recommend aggressive PT OT.    I discussed the patients findings and my recommendations with patient.         This document has been electronically signed by Pamela Griffin MD on September 15, 2017 1:23 PM        Total Time Spent: 45 mins    EMR Dragon/Transcription disclaimer:   Dictated utilizing Dragon dictation.            Electronically signed by Pamela Griffin MD at 9/15/2017  7:25 PM        Emergency Department Notes     No notes of this type exist for this encounter.        Vital Signs (last 72 hrs)       09/17 0700  -  09/18 0659 09/18 0700  -  09/19 0659 09/19 0700  -  09/20 0659 09/20 0700  -  09/20 1147    Most Recent    Temp (°F) 97.2 -  98.8    96.4 -  98.1    97.3 -  98.9      98.2     98.2 (36.8)    Heart Rate 73 -  101    54 -  107    54 -  93    55 -  91     91    Resp 18 -  20    18 -  (!)32    20 -  22    20 -  24     24    /65 -  146/78    (!)88/52 -  150/84    120/63 -  137/66      146/71     146/71    SpO2 (%) 93 -  98    (!)89 -  98    94 -  98    92 -  96     95          Intake & Output (last 3 days)       09/17 0701 - 09/18 0700 09/18 0701 - 09/19 0700 09/19 0701 - 09/20 0700 09/20 0701 - 09/21 0700    P.O. 640 830 960 360    Total Intake(mL/kg) 640 (7.8) 830 (10.5) 960 (12.3) 360 (4.6)    Urine (mL/kg/hr)   250 (0.1)     Total Output     250      Net +640 +830 +710 +360            Unmeasured Urine Occurrence 11 x 6 x 2 x     Unmeasured Stool Occurrence 4 x           Lines, Drains & Airways    Active LDAs     Name:   Placement date:   Placement time:   Site:   Days:    PICC Line - Triple Lumen 09/15/17 1436 basilic vein (medial side of arm), right 6 Fr;length (specify)  09/15/17    1436      4                Hospital Medications (active)       Dose Frequency Start End    acetaminophen (TYLENOL) tablet 650 mg 650 mg Every 4 Hours PRN 9/15/2017     Sig - Route: Take 2 tablets by mouth Every 4 (Four) Hours As Needed for Mild Pain . - Oral    albuterol (PROVENTIL) nebulizer solution 0.083% 2.5 mg/3mL 2.5 mg 4 Times Daily PRN 9/15/2017     Sig - Route: Take 2.5 mg by nebulization 4 (Four) Times a Day As Needed for Wheezing. - Nebulization    amLODIPine (NORVASC) tablet 2.5 mg 2.5 mg Every 24 Hours Scheduled 9/20/2017     Sig - Route: Take 1 tablet by mouth Daily. - Oral    aspirin chewable tablet 81 mg 81 mg Daily 9/15/2017     Sig - Route: Chew 1 tablet Daily. - Oral    bisacodyl (DULCOLAX) suppository 10 mg 10 mg Daily PRN 9/15/2017     Sig - Route: Insert 1 suppository into the rectum Daily As Needed for Constipation. - Rectal    clopidogrel (PLAVIX) tablet 75 mg 75 mg Daily 9/15/2017     Sig -  Route: Take 1 tablet by mouth Daily. - Oral    dextrose (D50W) solution 50 mL 50 mL Every 1 Hour PRN 9/19/2017     Sig - Route: Infuse 50 mL into a venous catheter Every 1 (One) Hour As Needed for Low Blood Sugar (for blood sugar less than 60 please). - Intravenous    docusate sodium (COLACE) capsule 200 mg 200 mg 2 Times Daily 9/15/2017     Sig - Route: Take 2 capsules by mouth 2 (Two) Times a Day. - Oral    donepezil (ARICEPT) tablet 10 mg 10 mg Daily 9/15/2017     Sig - Route: Take 1 tablet by mouth Daily. - Oral    famotidine (PEPCID) tablet 20 mg 20 mg 2 Times Daily 9/15/2017     Sig - Route: Take 1 tablet by mouth 2 (Two) Times a Day. - Oral    fluticasone (FLONASE) 50 MCG/ACT nasal spray 2 spray 2 spray Daily 9/15/2017     Sig - Route: 2 sprays into each nostril Daily. - Nasal    furosemide (LASIX) injection 20 mg 20 mg 3 Times Daily 9/15/2017     Sig - Route: Infuse 2 mL into a venous catheter 3 (Three) Times a Day. - Intravenous    glipiZIDE (GLUCOTROL) tablet 5 mg 5 mg Every Morning Before Breakfast 9/16/2017     Sig - Route: Take 1 tablet by mouth Every Morning Before Breakfast. - Oral    heparin (porcine) 5000 UNIT/ML injection 5,000 Units 5,000 Units Every 8 Hours Scheduled 9/15/2017     Sig - Route: Inject 1 mL under the skin Every 8 (Eight) Hours. - Subcutaneous    hydrALAZINE (APRESOLINE) injection 10 mg 10 mg Every 6 Hours PRN 9/15/2017     Sig - Route: Infuse 0.5 mL into a venous catheter Every 6 (Six) Hours As Needed for High Blood Pressure (SBP > 160). - Intravenous    HYDROcodone-acetaminophen (NORCO) 7.5-325 MG per tablet 1 tablet 1 tablet Every 8 Hours 9/15/2017     Sig - Route: Take 1 tablet by mouth Every 8 (Eight) Hours. - Oral    influenza vac split quad (FLUZONE QUADRIVALENT) IM suspension 0.5 mL 0.5 mL During Hospitalization 9/20/2017     Sig - Route: Inject 0.5 mL into the shoulder, thigh, or buttocks During Hospitalization for Immunization. - Intramuscular    ipratropium-albuterol  "(DUO-NEB) nebulizer solution 3 mL 3 mL Every 4 Hours - RT 9/15/2017     Sig - Route: Take 3 mL by nebulization Every 4 (Four) Hours. - Nebulization    isosorbide dinitrate (ISORDIL) tablet 30 mg 30 mg Daily 9/15/2017     Sig - Route: Take 1 tablet by mouth Daily. - Oral    levoFLOXacin (LEVAQUIN) 250 mg/50 mL D5W (premix) 250 mg 250 mg Every 24 Hours 9/16/2017     Sig - Route: Infuse 50 mL into a venous catheter Daily. - Intravenous    magnesium hydroxide (MILK OF MAGNESIA) suspension 2400 mg/10mL 10 mL 10 mL Daily PRN 9/15/2017     Sig - Route: Take 10 mL by mouth Daily As Needed for Constipation. - Oral    magnesium oxide (MAGOX) tablet 400 mg 400 mg Daily 9/15/2017     Sig - Route: Take 1 tablet by mouth Daily. - Oral    methylPREDNISolone sodium succinate (SOLU-Medrol) injection 20 mg 20 mg Every 8 Hours 9/19/2017     Sig - Route: Infuse 0.5 mL into a venous catheter Every 8 (Eight) Hours. - Intravenous    nitroglycerin (NITROSTAT) SL tablet 0.4 mg 0.4 mg Every 5 Minutes PRN 9/15/2017     Sig - Route: Place 1 tablet under the tongue Every 5 (Five) Minutes As Needed for Chest Pain. - Sublingual    ondansetron (ZOFRAN) injection 4 mg 4 mg Every 6 Hours PRN 9/15/2017     Sig - Route: Infuse 2 mL into a venous catheter Every 6 (Six) Hours As Needed for Nausea or Vomiting. - Intravenous    Linked Group 1:  \"Or\" Linked Group Details        ondansetron (ZOFRAN) tablet 4 mg 4 mg Every 6 Hours PRN 9/15/2017     Sig - Route: Take 1 tablet by mouth Every 6 (Six) Hours As Needed for Nausea or Vomiting. - Oral    Linked Group 1:  \"Or\" Linked Group Details        ondansetron ODT (ZOFRAN-ODT) disintegrating tablet 4 mg 4 mg Every 6 Hours PRN 9/15/2017     Sig - Route: Take 1 tablet by mouth Every 6 (Six) Hours As Needed for Nausea or Vomiting. - Oral    Linked Group 1:  \"Or\" Linked Group Details        pantoprazole (PROTONIX) EC tablet 40 mg 40 mg Every Early Morning 9/16/2017     Sig - Route: Take 1 tablet by mouth Every " Morning. - Oral    sodium chloride 0.9 % flush 1-10 mL 1-10 mL As Needed 9/15/2017     Sig - Route: Infuse 1-10 mL into a venous catheter As Needed for Line Care. - Intravenous    terbutaline (BRETHINE) tablet 5 mg 5 mg 2 Times Daily 9/15/2017     Sig - Route: Take 2 tablets by mouth 2 (Two) Times a Day. - Oral          Lab Results (last 72 hours)     Procedure Component Value Units Date/Time    POC Glucose Fingerstick [[id number]]  (Abnormal) Collected:  09/17/17 1614    Specimen:  Blood Updated:  09/17/17 1641     Glucose 182 (H) mg/dL       RN NotifiedMeter: [id number]: [id number] HERNESTO LITTLEJOHN       POC Glucose Fingerstick [[id number]]  (Abnormal) Collected:  09/17/17 2000    Specimen:  Blood Updated:  09/18/17 0318     Glucose 196 (H) mg/dL       Meter: [id number]: [id number] NARENDRA ALVAREZ       CBC & Differential [[id number]] Collected:  09/18/17 0520    Specimen:  Blood Updated:  09/18/17 0632    Narrative:       The following orders were created for panel order CBC & Differential.  Procedure                               Abnormality         Status                     ---------                               -----------         ------                     CBC Auto Differential[[id number]]        Abnormal            Final result                 Please view results for these tests on the individual orders.    CBC Auto Differential [[id number]]  (Abnormal) Collected:  09/18/17 0520    Specimen:  Blood Updated:  09/18/17 0632     WBC 18.50 (H) 10*3/mm3      RBC 4.73 10*6/mm3      Hemoglobin 14.2 g/dL      Hematocrit 44.0 %      MCV 93.0 fL      MCH 30.0 pg      MCHC 32.3 g/dL      RDW 15.6 (H) %      RDW-SD 53.2 (H) fl      MPV 11.8 fL      Platelets 203 10*3/mm3      Neutrophil % 87.7 (H) %      Lymphocyte % 6.2 (L) %      Monocyte % 4.4 %      Eosinophil % 0.0 %      Basophil % 0.1 %      Immature Grans % 1.6 (H) %      Neutrophils, Absolute 16.24 (H) 10*3/mm3      Lymphocytes, Absolute 1.14  10*3/mm3      Monocytes, Absolute 0.81 10*3/mm3      Eosinophils, Absolute 0.00 10*3/mm3      Basophils, Absolute 0.02 10*3/mm3      Immature Grans, Absolute 0.29 (H) 10*3/mm3     Comprehensive Metabolic Panel [[id number]]  (Abnormal) Collected:  09/18/17 0520    Specimen:  Blood Updated:  09/18/17 0645     Glucose 145 (H) mg/dL      BUN 61 (H) mg/dL      Creatinine 1.58 (H) mg/dL      Sodium 137 mmol/L      Potassium 4.5 mmol/L      Chloride 96 mmol/L      CO2 27.0 mmol/L      Calcium 9.0 mg/dL      Total Protein 7.0 g/dL      Albumin 4.20 g/dL      ALT (SGPT) 39 U/L      AST (SGOT) 23 U/L      Alkaline Phosphatase 64 U/L      Total Bilirubin 0.8 mg/dL      eGFR Non African Amer 42 (L) mL/min/1.73      Globulin 2.8 gm/dL      A/G Ratio 1.5 g/dL      BUN/Creatinine Ratio 38.6 (H)     Anion Gap 14.0 mmol/L     Narrative:       The MDRD GFR formula is only valid for adults with stable renal function between ages 18 and 70.    Magnesium [[id number]]  (Normal) Collected:  09/18/17 0520    Specimen:  Blood Updated:  09/18/17 0645     Magnesium 2.1 mg/dL     POC Glucose Fingerstick [[id number]]  (Abnormal) Collected:  09/18/17 0729    Specimen:  Blood Updated:  09/18/17 0808     Glucose 152 (H) mg/dL       RN NotifiedMeter: [id number]: [id number] ANA PAULA MIRANDA       POC Glucose Fingerstick [[id number]]  (Normal) Collected:  09/18/17 1034    Specimen:  Blood Updated:  09/18/17 1127     Glucose 121 mg/dL       RN NotifiedMeter: [id number]: [id number] ANA PAULA MIRANDA       POC Glucose Fingerstick [[id number]]  (Abnormal) Collected:  09/18/17 1612    Specimen:  Blood Updated:  09/18/17 1624     Glucose 169 (H) mg/dL       RN NotifiedMeter: [id number]: [id number] ANA PAULA MIRANDA       Urinalysis With / Microscopic If Indicated [[id number]]  (Normal) Collected:  09/18/17 1743    Specimen:  Urine from Urine, Clean Catch Updated:  09/18/17 1749     Color, UA Yellow     Appearance, UA Clear     pH, UA  <=5.0     Specific Gravity, UA 1.020     Glucose, UA Negative     Ketones, UA Negative     Bilirubin, UA Negative     Blood, UA Negative     Protein, UA Negative     Leuk Esterase, UA Negative     Nitrite, UA Negative     Urobilinogen, UA 0.2 E.U./dL    Narrative:       Urine microscopic not indicated.    CBC & Differential [[id number]] Collected:  09/19/17 0518    Specimen:  Blood Updated:  09/19/17 0646    Narrative:       The following orders were created for panel order CBC & Differential.  Procedure                               Abnormality         Status                     ---------                               -----------         ------                     CBC Auto Differential[[id number]]        Abnormal            Final result                 Please view results for these tests on the individual orders.    CBC Auto Differential [[id number]]  (Abnormal) Collected:  09/19/17 0518    Specimen:  Blood Updated:  09/19/17 0646     WBC 18.42 (H) 10*3/mm3      RBC 4.52 10*6/mm3      Hemoglobin 13.5 (L) g/dL      Hematocrit 42.1 %      MCV 93.1 fL      MCH 29.9 pg      MCHC 32.1 g/dL      RDW 15.8 (H) %      RDW-SD 53.5 (H) fl      MPV 11.9 fL      Platelets 196 10*3/mm3      Neutrophil % 89.0 (H) %      Lymphocyte % 5.0 (L) %      Monocyte % 3.6 %      Eosinophil % 0.0 %      Basophil % 0.2 %      Immature Grans % 2.2 (H) %      Neutrophils, Absolute 16.40 (H) 10*3/mm3      Lymphocytes, Absolute 0.92 10*3/mm3      Monocytes, Absolute 0.67 10*3/mm3      Eosinophils, Absolute 0.00 10*3/mm3      Basophils, Absolute 0.03 10*3/mm3      Immature Grans, Absolute 0.40 (H) 10*3/mm3     Comprehensive Metabolic Panel [[id number]]  (Abnormal) Collected:  09/19/17 0518    Specimen:  Blood Updated:  09/19/17 0712     Glucose 150 (H) mg/dL      BUN 72 (H) mg/dL      Creatinine 1.75 (H) mg/dL      Sodium 136 (L) mmol/L      Potassium 4.8 mmol/L      Chloride 94 (L) mmol/L      CO2 29.0 mmol/L      Calcium 8.9 mg/dL      Total  Protein 6.9 g/dL      Albumin 4.40 g/dL      ALT (SGPT) 47 U/L      AST (SGOT) 23 U/L      Alkaline Phosphatase 56 U/L      Total Bilirubin 0.7 mg/dL      eGFR Non African Amer 37 (L) mL/min/1.73      Globulin 2.5 gm/dL      A/G Ratio 1.8 g/dL      BUN/Creatinine Ratio 41.1 (H)     Anion Gap 13.0 mmol/L     Narrative:       The MDRD GFR formula is only valid for adults with stable renal function between ages 18 and 70.    Magnesium [[id number]]  (Normal) Collected:  09/19/17 0518    Specimen:  Blood Updated:  09/19/17 0712     Magnesium 2.3 mg/dL     POC Glucose Fingerstick [[id number]]  (Abnormal) Collected:  09/19/17 0729    Specimen:  Blood Updated:  09/19/17 0830     Glucose 137 (H) mg/dL       RN NotifiedMeter: [id number]: [id number] ANNETTEER [employer]CONSUELO       POC Glucose Fingerstick [[id number]]  (Abnormal) Collected:  09/18/17 2027    Specimen:  Blood Updated:  09/19/17 1102     Glucose 185 (H) mg/dL       RN NotifiedMeter: [id number]: [id number] REJI RU       POC Glucose Fingerstick [[id number]]  (Abnormal) Collected:  09/19/17 1137    Specimen:  Blood Updated:  09/19/17 1151     Glucose 63 (L) mg/dL       Meter: [id number]: [id number] WASHER [employer]       POC Glucose Fingerstick [[id number]]  (Normal) Collected:  09/19/17 1220    Specimen:  Blood Updated:  09/19/17 1232     Glucose 84 mg/dL       Meter: [id number]: [id number] WASHER KAYLIEANNE       POC Glucose Fingerstick [[id number]]  (Normal) Collected:  09/19/17 1538    Specimen:  Blood Updated:  09/19/17 1553     Glucose 116 mg/dL       RN NotifiedMeter: [id number]: [id number] WASHER KAYLZet UniverseANNE       Blood Culture [[id number]]  (Normal) Collected:  09/15/17 1447    Specimen:  Blood from Arm, Left Updated:  09/19/17 1601     Blood Culture No growth at 4 days    Blood Culture [[id number]]  (Normal) Collected:  09/15/17 1457    Specimen:  Blood from Arm, Right Updated:  09/19/17 1601     Blood Culture No  growth at 4 days    CBC & Differential [[id number]] Collected:  09/20/17 0524    Specimen:  Blood Updated:  09/20/17 0631    Narrative:       The following orders were created for panel order CBC & Differential.  Procedure                               Abnormality         Status                     ---------                               -----------         ------                     CBC Auto Differential[[id number]]        Abnormal            Final result                 Please view results for these tests on the individual orders.    CBC Auto Differential [[id number]]  (Abnormal) Collected:  09/20/17 0524    Specimen:  Blood Updated:  09/20/17 0631     WBC 13.25 (H) 10*3/mm3      RBC 4.21 (L) 10*6/mm3      Hemoglobin 12.7 (L) g/dL      Hematocrit 38.6 (L) %      MCV 91.7 fL      MCH 30.2 pg      MCHC 32.9 g/dL      RDW 15.7 (H) %      RDW-SD 52.4 (H) fl      MPV 11.7 fL      Platelets 149 (L) 10*3/mm3      Neutrophil % 90.3 (H) %      Lymphocyte % 4.2 (L) %      Monocyte % 2.5 %      Eosinophil % 0.0 %      Basophil % 0.2 %      Immature Grans % 2.8 (H) %      Neutrophils, Absolute 11.97 (H) 10*3/mm3      Lymphocytes, Absolute 0.55 (L) 10*3/mm3      Monocytes, Absolute 0.33 10*3/mm3      Eosinophils, Absolute 0.00 10*3/mm3      Basophils, Absolute 0.03 10*3/mm3      Immature Grans, Absolute 0.37 (H) 10*3/mm3     POC Glucose Fingerstick [[id number]]  (Abnormal) Collected:  09/19/17 2057    Specimen:  Blood Updated:  09/20/17 0644     Glucose 204 (H) mg/dL       RN NotifiedMeter: [id number]: [id number] [facility] [place]       Comprehensive Metabolic Panel [[id number]]  (Abnormal) Collected:  09/20/17 0524    Specimen:  Blood Updated:  09/20/17 0727     Glucose 189 (H) mg/dL      BUN 65 (H) mg/dL      Creatinine 1.42 (H) mg/dL      Sodium 133 (L) mmol/L      Potassium 5.1 mmol/L      Chloride 96 mmol/L      CO2 24.0 mmol/L      Calcium 8.8 mg/dL      Total Protein 6.2 (L) g/dL      Albumin 3.80 g/dL      ALT  (SGPT) 43 U/L      AST (SGOT) 23 U/L      Alkaline Phosphatase 64 U/L      Total Bilirubin 0.5 mg/dL      eGFR Non African Amer 47 (L) mL/min/1.73      Globulin 2.4 gm/dL      A/G Ratio 1.6 g/dL      BUN/Creatinine Ratio 45.8 (H)     Anion Gap 13.0 mmol/L     Narrative:       The MDRD GFR formula is only valid for adults with stable renal function between ages 18 and 70.    Magnesium [[id number]]  (Normal) Collected:  09/20/17 0524    Specimen:  Blood Updated:  09/20/17 0727     Magnesium 2.2 mg/dL     POC Glucose Fingerstick [[id number]]  (Abnormal) Collected:  09/20/17 0723    Specimen:  Blood Updated:  09/20/17 0816     Glucose 173 (H) mg/dL       RN NotifiedMeter: [id number]: [id number] WASHER KAYLIEANNE       POC Glucose Fingerstick [[id number]]  (Abnormal) Collected:  09/20/17 0925    Specimen:  Blood Updated:  09/20/17 0937     Glucose 158 (H) mg/dL       RN NotifiedMeter: [id number]: [id number] WASHER KAYLIEANNE       POC Glucose Fingerstick [[id number]]  (Abnormal) Collected:  09/20/17 1025    Specimen:  Blood Updated:  09/20/17 1037     Glucose 141 (H) mg/dL       RN NotifiedMeter: [id number]: [id number] WASHER KAYLIEANNE             Imaging Results (last 72 hours)     ** No results found for the last 72 hours. **        ECG/EMG Results (most recent)     Procedure Component Value Units Date/Time    ECG 12 Lead [[id number]] Collected:  09/15/17 1401     Updated:  09/16/17 0956    Narrative:       Test Reason : SOA  Blood Pressure : **/** mmHG  Vent. Rate : 070 BPM     Atrial Rate : 070 BPM     P-R Int : 246 ms          QRS Dur : 132 ms      QT Int : 436 ms       P-R-T Axes : 052 -51 008 degrees     QTc Int : 470 ms    Sinus rhythm with 1st degree AV block with premature atrial complexes  Left axis deviation  Right bundle branch block  Left anterior fascicular block  ** Bifascicular block **  Voltage criteria for left ventricular hypertrophy  Possible Lateral infarct (cited on or  before 05-JAN-2017)  Inferior infarct , age undetermined  Abnormal ECG  When compared with ECG of 07-AUG-2017 13:31,  premature atrial complexes are now present  Right bundle branch block is now present  Confirmed by TREVER SAXENA MD ([id number]) on 9/16/2017 9:56:25 AM    Referred By:             Confirmed By:TREVER SAXENA MD    SCANNED EKG [[id number]] Resulted:  09/15/17      Updated:  09/19/17 0752    ECG 12 Lead [[id number]] Collected:  09/19/17 0014     Updated:  09/19/17 2109    Narrative:       Test Reason : telemetry wanted ekg done pt having pvc's  Blood Pressure : **/** mmHG  Vent. Rate : 056 BPM     Atrial Rate : 056 BPM     P-R Int : 246 ms          QRS Dur : 122 ms      QT Int : 438 ms       P-R-T Axes : 062 -36 080 degrees     QTc Int : 422 ms    Sinus bradycardia with 1st degree AV block  Left axis deviation  Right bundle branch block  Left ventricular hypertrophy with QRS widening and repolarization  abnormality  Possible Inferior infarct (cited on or before 05-JAN-2017)  Abnormal ECG  When compared with ECG of 15-SEP-2017 14:01,  premature atrial complexes are no longer present  T wave inversion now evident in Lateral leads  Confirmed by REANNA CASANOVA MD ([id number]),  JA POWELL ([id number]) on 9/19/2017  9:09:31 PM    Referred By:             Confirmed By:REANNA CASANOVA MD           Physician Progress Notes (most recent note)      EVE Gomez at 9/19/2017  1:04 PM  Version 1 of 1    Attestation signed by Florencio Garner MD at 9/19/2017  2:25 PM        I agree with the above note and assessment and plan.    Physical exam:    Temp:  [96.6 °F (35.9 °C)-98.1 °F (36.7 °C)] 97.7 °F (36.5 °C)  Heart Rate:  [54-93] 88  Resp:  [18-32] 22  BP: ()/(51-84) 137/66    CVS: S1S2 RRR  I have reviewed the documentation above and agree.                                     [facility] Medicine Services  INPATIENT PROGRESS NOTE    Length of Stay: 3  Date of Admission:  9/15/2017  Primary Care Physician: Paolo Rey MD    Subjective   Chief Complaint: No complaints    HPI:      9/19/2017:  The patient sounds worse today.  He states his breathing is not as good today.  Kidney function has increased.  Will discontinue his Lisinopril for now and just use PRN Hydralazine for blood pressure control.   Will increase the frequency of his steroids.     9/18/17:  The patient states his breathing is improved today.  Wheezes and no one have decreased.    9/17/2017:  The patient still has significant wheezes this am.  Will start IV steroids.      9/16/2017:  The patient has been up walking with physical therapy this morning.  He states he feels a little better this morning, but still has some significant wheezes noted.     9/15/2017 H&P by Dr. Griffin:  Patient is a 84 y.o. male presents with complaint of having shortness of breath.  Patient states she's been having shortness of breath ongoing for past few weeks.  Patient states his shortness of breath has been worsening.  He has been having coughing spells with thick yellowish to greenish colored sputum.  No nausea vomiting have been reported.  Patient does complain of chest tightness with coughing spells.  Patient states he has been having worsening orthopnea of 3-4 pillows.  Patient also admits of having reduced excess tolerance.  Patient does complain of worsening swelling.  No urinary complaints have been reported.  Patient states he has been compliant with his medications.  Patient states he was recently treated with IV steroids and nebulizer treatment without any benefit.    Review of Systems   Constitutional: Positive for fatigue.   Respiratory: Positive for shortness of breath and wheezing.    Cardiovascular: Positive for leg swelling.   Gastrointestinal: Positive for abdominal distention.   All other systems reviewed and are negative.     All pertinent negatives and positives are as above. All other systems have been reviewed and  are negative unless otherwise stated.     Objective    Temp:  [96.6 °F (35.9 °C)-98.1 °F (36.7 °C)] 97.7 °F (36.5 °C)  Heart Rate:  [54-93] 88  Resp:  [18-32] 22  BP: ()/(51-84) 137/66    Physical Exam   Constitutional: He is oriented to person, place, and time. He appears well-developed and well-nourished.   HENT:   Head: Normocephalic and atraumatic.   Eyes: EOM are normal. Pupils are equal, round, and reactive to light.   Neck: Normal range of motion. Neck supple.   Cardiovascular: Normal rate and regular rhythm.    Pulmonary/Chest: Effort normal. He has wheezes.   Abdominal: Soft. Bowel sounds are normal.   Neurological: He is alert and oriented to person, place, and time.   Skin: Skin is warm and dry.   Psychiatric: He has a normal mood and affect.     Results Review:  I have reviewed the labs, radiology results, and diagnostic studies.    Laboratory Data:     Results from last 7 days  Lab Units 09/19/17 0518 09/18/17  0520 09/17/17  0747   SODIUM mmol/L 136* 137 137   POTASSIUM mmol/L 4.8 4.5 4.3   CHLORIDE mmol/L 94* 96 98   CO2 mmol/L 29.0 27.0 26.0   BUN mg/dL 72* 61* 50*   CREATININE mg/dL 1.75* 1.58* 1.39*   GLUCOSE mg/dL 150* 145* 103*   CALCIUM mg/dL 8.9 9.0 9.4   BILIRUBIN mg/dL 0.7 0.8 0.7   ALK PHOS U/L 56 64 60   ALT (SGPT) U/L 47 39 46   AST (SGOT) U/L 23 23 30   ANION GAP mmol/L 13.0 14.0 13.0     Estimated Creatinine Clearance: 35.2 mL/min (by C-G formula based on Cr of 1.75).    Results from last 7 days  Lab Units 09/19/17 0518 09/18/17  0520 09/17/17  0747   MAGNESIUM mg/dL 2.3 2.1 2.0           Results from last 7 days  Lab Units 09/19/17 0518 09/18/17  0520 09/17/17  0616 09/16/17  0503 09/15/17  1457   WBC 10*3/mm3 18.42* 18.50* 12.09* 11.99* 14.84*   HEMOGLOBIN g/dL 13.5* 14.2 14.6 12.3* 12.3*   HEMATOCRIT % 42.1 44.0 44.1 38.2* 37.7*   PLATELETS 10*3/mm3 196 203 168 174 182           Culture Data:   No results found for: BLOODCX  No results found for: URINECX  No results found  for: RESPCX  No results found for: WOUNDCX  No results found for: STOOLCX  No components found for: BODYFLD    Radiology Data:   Imaging Results (last 24 hours)     ** No results found for the last 24 hours. **          I have reviewed the patient current medications.     Assessment/Plan     Hospital Problem List     * (Principal)Acute exacerbation of chronic obstructive airways disease    Benign essential hypertension    Type 2 diabetes mellitus    Non-rheumatic tricuspid valve insufficiency    Coronary artery disease involving native coronary artery of native heart    Pulmonary emphysema    Atrial fibrillation    Shortness of breath    COPD with exacerbation    (HFpEF) heart failure with preserved ejection fraction    Pulmonary HTN    Acute interstitial pneumonia    Overview Signed 9/15/2017  1:22 PM by Pamela Griffin MD     Hamman-Rich Syndrome         Chronic obstructive lung disease    Coronary arteriosclerosis    Diabetes mellitus    COPD exacerbation          Plan:    1.  COPD exacerbation:  Continue antibiotics, nebulizers, and PT/OT.  Methylprednisone 20 mg IV q 8 hours.   2.  Heart failure with preserved ejection fraction:  Continue fluid and sodium restrictions.   3.  Acute kidney injury:  Will decrease the strength of Levaquin and cut lasix 20 mg back to twice daily instead of three times daily. Creatinine 1.75 today.   CMP in am.    4. Pulmonary hypertension:  Patient sees Dr. Maciel.            Discharge Planning: I expect patient to be discharged to home in 1-2 days.      This document has been electronically signed by EVE Gomez on September 19, 2017 1:04 PM

## 2017-09-20 NOTE — DISCHARGE SUMMARY
[facility] Medicine Services  DISCHARGE SUMMARY       Date of Admission: 9/15/2017  Date of Discharge:  9/20/2017  Primary Care Physician: Paolo Rey MD    Presenting Problem/History of Present Illness:  Pulmonary HTN [I27.2]  COPD exacerbation [J44.1]       Final Discharge Diagnoses:  Hospital Problem List     * (Principal)Acute exacerbation of chronic obstructive airways disease    Benign essential hypertension    Type 2 diabetes mellitus    Non-rheumatic tricuspid valve insufficiency    Coronary artery disease involving native coronary artery of native heart    Pulmonary emphysema    Atrial fibrillation    Shortness of breath    COPD with exacerbation    (HFpEF) heart failure with preserved ejection fraction    Pulmonary HTN    Acute interstitial pneumonia    Overview Signed 9/15/2017  1:22 PM by Pamela Griffin MD     Hamman-Rich Syndrome         Chronic obstructive lung disease    Coronary arteriosclerosis    Diabetes mellitus    COPD exacerbation          Consults:   Consults     No orders found from 8/17/2017 to 9/16/2017.                  Pertinent Test Results:   Imaging Results (last 24 hours)     ** No results found for the last 24 hours. **        Lab Results (last 24 hours)     Procedure Component Value Units Date/Time    POC Glucose Fingerstick [[id number]]  (Normal) Collected:  09/19/17 1538    Specimen:  Blood Updated:  09/19/17 1553     Glucose 116 mg/dL       RN NotifiedMeter: [id number]: [id number] MELANIE GRACIA       Blood Culture [[id number]]  (Normal) Collected:  09/15/17 1447    Specimen:  Blood from Arm, Left Updated:  09/19/17 1601     Blood Culture No growth at 4 days    Blood Culture [[id number]]  (Normal) Collected:  09/15/17 1457    Specimen:  Blood from Arm, Right Updated:  09/19/17 1601     Blood Culture No growth at 4 days    CBC & Differential [[id number]] Collected:  09/20/17 0524    Specimen:  Blood Updated:   09/20/17 0631    Narrative:       The following orders were created for panel order CBC & Differential.  Procedure                               Abnormality         Status                     ---------                               -----------         ------                     CBC Auto Differential[[id number]]        Abnormal            Final result                 Please view results for these tests on the individual orders.    CBC Auto Differential [[id number]]  (Abnormal) Collected:  09/20/17 0524    Specimen:  Blood Updated:  09/20/17 0631     WBC 13.25 (H) 10*3/mm3      RBC 4.21 (L) 10*6/mm3      Hemoglobin 12.7 (L) g/dL      Hematocrit 38.6 (L) %      MCV 91.7 fL      MCH 30.2 pg      MCHC 32.9 g/dL      RDW 15.7 (H) %      RDW-SD 52.4 (H) fl      MPV 11.7 fL      Platelets 149 (L) 10*3/mm3      Neutrophil % 90.3 (H) %      Lymphocyte % 4.2 (L) %      Monocyte % 2.5 %      Eosinophil % 0.0 %      Basophil % 0.2 %      Immature Grans % 2.8 (H) %      Neutrophils, Absolute 11.97 (H) 10*3/mm3      Lymphocytes, Absolute 0.55 (L) 10*3/mm3      Monocytes, Absolute 0.33 10*3/mm3      Eosinophils, Absolute 0.00 10*3/mm3      Basophils, Absolute 0.03 10*3/mm3      Immature Grans, Absolute 0.37 (H) 10*3/mm3     POC Glucose Fingerstick [[id number]]  (Abnormal) Collected:  09/19/17 2057    Specimen:  Blood Updated:  09/20/17 0644     Glucose 204 (H) mg/dL       RN NotifiedMeter: [id number]: [id number] [facility]       Comprehensive Metabolic Panel [[id number]]  (Abnormal) Collected:  09/20/17 0524    Specimen:  Blood Updated:  09/20/17 0727     Glucose 189 (H) mg/dL      BUN 65 (H) mg/dL      Creatinine 1.42 (H) mg/dL      Sodium 133 (L) mmol/L      Potassium 5.1 mmol/L      Chloride 96 mmol/L      CO2 24.0 mmol/L      Calcium 8.8 mg/dL      Total Protein 6.2 (L) g/dL      Albumin 3.80 g/dL      ALT (SGPT) 43 U/L      AST (SGOT) 23 U/L      Alkaline Phosphatase 64 U/L      Total Bilirubin 0.5 mg/dL      eGFR  Non  Amer 47 (L) mL/min/1.73      Globulin 2.4 gm/dL      A/G Ratio 1.6 g/dL      BUN/Creatinine Ratio 45.8 (H)     Anion Gap 13.0 mmol/L     Narrative:       The MDRD GFR formula is only valid for adults with stable renal function between ages 18 and 70.    Magnesium [[id number]]  (Normal) Collected:  09/20/17 0524    Specimen:  Blood Updated:  09/20/17 0727     Magnesium 2.2 mg/dL     POC Glucose Fingerstick [[id number]]  (Abnormal) Collected:  09/20/17 0723    Specimen:  Blood Updated:  09/20/17 0816     Glucose 173 (H) mg/dL       RN NotifiedMeter: [id number]: [id number] ANNETTEER TRUECONSUELO       POC Glucose Fingerstick [[id number]]  (Abnormal) Collected:  09/20/17 0925    Specimen:  Blood Updated:  09/20/17 0937     Glucose 158 (H) mg/dL       RN NotifiedMeter: [id number]: [id number] ANNETTEER TRUEFELISAE       POC Glucose Fingerstick [[id number]]  (Abnormal) Collected:  09/20/17 1025    Specimen:  Blood Updated:  09/20/17 1037     Glucose 141 (H) mg/dL       RN NotifiedMeter: [id number]: [id number] MELANIE BASIL             Chief Complaint on Day of Discharge:  No complaints    Hospital Course:    9/20/2017:  The patient states he feels better today. His breathing is better today.  He function has improved with the discontinuation of lisinopril.  Pulmonary rehabilitation has come spoke with the patient and we'll contact him about future appointments.  Spoke with Gauri PRADO, about blood pressure concerns and the discontinuation of lisinopril.  I will start patient on hydralazine 10 mg twice a day.  Follow-up with Mrs. Vargas next week.  Patient is also given for Levaquin and prednisone taper.      9/19/2017:  The patient sounds worse today.  He states his breathing is not as good today.  Kidney function has increased.  Will discontinue his Lisinopril for now and just use PRN Hydralazine for blood pressure control.   Will increase the frequency of his steroids.      9/18/17:   "The patient states his breathing is improved today.  Wheezes and no one have decreased.     9/17/2017:  The patient still has significant wheezes this am.  Will start IV steroids.       9/16/2017:  The patient has been up walking with physical therapy this morning.  He states he feels a little better this morning, but still has some significant wheezes noted.      9/15/2017 H&P by Dr. Griffin:  Patient is a 84 y.o. male presents with complaint of having shortness of breath.  Patient states she's been having shortness of breath ongoing for past few weeks.  Patient states his shortness of breath has been worsening.  He has been having coughing spells with thick yellowish to greenish colored sputum.  No nausea vomiting have been reported.  Patient does complain of chest tightness with coughing spells.  Patient states he has been having worsening orthopnea of 3-4 pillows.  Patient also admits of having reduced excess tolerance.  Patient does complain of worsening swelling.  No urinary complaints have been reported.  Patient states he has been compliant with his medications.  Patient states he was recently treated with IV steroids and nebulizer treatment without any benefit.       Condition on Discharge:  Stable    Physical Exam on Discharge:  /98 (BP Location: Left arm)  Pulse 82  Temp 98.2 °F (36.8 °C) (Oral)   Resp 20  Ht 67\" (170.2 cm)  Wt 172 lb 3.2 oz (78.1 kg)  SpO2 97%  BMI 26.97 kg/m2  Physical Exam   Constitutional: He is oriented to person, place, and time. He appears well-developed and well-nourished.   HENT:   Head: Normocephalic and atraumatic.   Eyes: EOM are normal. Pupils are equal, round, and reactive to light.   Neck: Normal range of motion. Neck supple.   Cardiovascular: Normal rate and regular rhythm.    Pulmonary/Chest: Effort normal. He has wheezes.   Improvement in wheeze   Abdominal: Soft. Bowel sounds are normal.   Musculoskeletal: Normal range of motion.   Neurological: He is alert " and oriented to person, place, and time.   Skin: Skin is warm and dry.   Psychiatric: He has a normal mood and affect.     Discharge Disposition:  Home with home health      Discharge Medications:   Hasmukh Ham   Home Medication Instructions SEBASTIAN:[id number]    Printed on:09/20/17 7156   Medication Information                      albuterol (PROVENTIL) (2.5 MG/3ML) 0.083% nebulizer solution  Take 2.5 mg by nebulization 4 (Four) Times a Day As Needed for wheezing.             aspirin 81 MG chewable tablet  Chew 81 mg Daily.             clopidogrel (PLAVIX) 75 MG tablet  Take 75 mg by mouth Daily.             donepezil (ARICEPT) 10 MG tablet  Take 10 mg by mouth Daily.             famotidine (PEPCID) 20 MG tablet  Take 20 mg by mouth 2 (Two) Times a Day.             fluticasone (FLONASE) 50 MCG/ACT nasal spray  2 sprays into each nostril Daily.             furosemide (LASIX) 20 MG tablet  Take 1 tablet by mouth Daily.             hydrALAZINE (APRESOLINE) 10 MG tablet  Take 1 tablet by mouth Every 12 (Twelve) Hours.             HYDROcodone-acetaminophen (NORCO) 7.5-325 MG per tablet  Take 1 tablet by mouth Every 8 (Eight) Hours.             ipratropium-albuterol (DUO-NEB) 0.5-2.5 mg/mL nebulizer  Inhale 3 mL.             isosorbide dinitrate (ISORDIL) 10 MG tablet  Take 1 tablet by mouth 3 (Three) Times a Day.             levoFLOXacin (LEVAQUIN) 250 MG tablet  Take 1 tablet by mouth Daily.             magnesium oxide (MAGOX) 400 (241.3 MG) MG tablet tablet  Take 400 mg by mouth Daily.             nitroglycerin (NITROSTAT) 0.4 MG SL tablet  place 1 tablet under the tongue if needed every 5 minutes for hair...  (REFER TO PRESCRIPTION NOTES).             O2 (OXYGEN)  Inhale 2 L/min Every Night. W/ CPAP             PredniSONE (DELTASONE) 10 MG (48) tablet pack  Take as directed             Red Yeast Rice 600 MG tablet  Take 600 mg by mouth 2 (Two) Times a Day.             terbutaline (BRETHINE) 5 MG tablet  Take 5 mg  by mouth 2 (Two) Times a Day.             Umeclidinium-Vilanterol 62.5-25 MCG/INH aerosol powder   Inhale 1 puff Daily.                 Discharge Diet:   Diet Instructions     Diet: Cardiac       Discharge Diet:  Cardiac   Low sodium  Fluid restrictions 1.5 liters daily                 Activity at Discharge:   Activity Instructions     Activity as Tolerated                     Discharge Care Plan/Instructions:  Prescriptions given for Levaquin, Hydralazine, and Prednisone taper.  Home health will follow up for CHF/COPD management, PT/OT. Patient will see Dr. Rey in a few days for follow up.  He will return to see GIRMA PRADO with cardiology next week.  Pulmonary rehab will call him to set up an appointment after he is finished with home health.      Follow-up Appointments:   Future Appointments  Date Time Provider Department Center   9/29/2017 1:40 PM EVE Abdullahi [facility] None   1/23/2018 11:00 AM Jeff Maciel MD PhD [facility] None   2/7/2018 1:30 PM Jeff Maciel MD PhD [facility] None   7/12/2018 1:40 PM Delon Calvillo MD [facility] None       Test Results Pending at Discharge:  Order Current Status    Blood Culture Preliminary result    Blood Culture Preliminary result            This document has been electronically signed by EVE Gomez on September 20, 2017 2:44 PM        Time: Greater than 30 minutes.

## 2017-09-21 PROCEDURE — 83036 HEMOGLOBIN GLYCOSYLATED A1C: CPT | Performed by: FAMILY MEDICINE

## 2017-09-21 NOTE — THERAPY DISCHARGE NOTE
Acute Care - Physical Therapy Discharge Summary  [facility]       Patient Name: Hasmukh Ham  : 1933  MRN: [id number]    Today's Date: 2017  Onset of Illness/Injury or Date of Surgery Date: 09/15/17    Date of Referral to PT: 09/15/17  Referring Physician: KETTY Griffin      Admit Date: 9/15/2017      PT Recommendation and Plan    Visit Dx:    ICD-10-CM ICD-9-CM   1. Benign essential hypertension I10 401.1   2. Pulmonary HTN I27.2 416.8   3. Impaired physical mobility Z74.09 781.99   4. Impaired mobility and activities of daily living Z74.09 799.89   5. Non-rheumatic tricuspid valve insufficiency I36.1 424.2   6. Coronary artery disease involving native coronary artery of native heart without angina pectoris I25.10 414.01   7. Centrilobular emphysema J43.2 492.8   8. Paroxysmal atrial fibrillation I48.0 427.31   9. Shortness of breath R06.02 786.05   10. COPD with exacerbation J44.1 491.21   11. (HFpEF) heart failure with preserved ejection fraction I50.30 428.9   12. Acute exacerbation of chronic obstructive airways disease J44.1 491.21   13. Acute interstitial pneumonia J84.9 516.8   14. Coronary arteriosclerosis I25.10 414.00   15. COPD exacerbation J44.1 491.21   16. Dilated aortic root I77.810 447.71   17. Bradycardia R00.1 427.89   18. Chronic coronary artery disease I25.10 414.00   19. Neuropathy G62.9 355.9   20. Neck pain M54.2 723.1   21. Gastroesophageal reflux disease without esophagitis K21.9 530.81   22. Generalized osteoarthritis M15.9 715.00   23. 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 Nocturia R35.1 788.43   25. Chronic obstructive bronchitis J44.9 491.20   26. Mixed hyperlipidemia E78.2 272.2   27. Arthropathy of hand M12.9 716.94   28. Paroxysmal tachycardia I47.9 427.2   29. Family history of diseases of the blood and blood-forming organs and certain disorders involving the immune mechanism Z83.2 V18.3   30. Osteoarthrosis involving more than one  site but not generalized M15.9 715.80   31. Chronic right shoulder pain M25.511 719.41    G89.29 338.29   32. Rotator cuff syndrome, right M75.101 726.10   33. Partial tear of subscapularis tendon, right, initial encounter S43.81XA 840.5   34. Infraspinatus tendon tear, right, initial encounter S46.811A 840.3   35. Supraspinatus tendon tear, right, initial encounter S46.811A 840.6   36. Bilateral carotid artery stenosis I65.23 433.10     433.30             Outcome Measures       09/20/17 0825 09/19/17 1007 09/19/17 1006    How much help from another person do you currently need...    Turning from your back to your side while in flat bed without using bedrails?   4  -CH    Moving from lying on back to sitting on the side of a flat bed without bedrails?   4  -CH    Moving to and from a bed to a chair (including a wheelchair)?   3  -CH    Standing up from a chair using your arms (e.g., wheelchair, bedside chair)?   3  -CH    Climbing 3-5 steps with a railing?   3  -CH    To walk in hospital room?   3  -CH    AM-PAC 6 Clicks Score   20  -CH    How much help from another is currently needed...    Putting on and taking off regular lower body clothing? 4  -LW      Bathing (including washing, rinsing, and drying) 4  -LW      Toileting (which includes using toilet bed pan or urinal) 4  -LW      Putting on and taking off regular upper body clothing 4  -LW      Taking care of personal grooming (such as brushing teeth) 4  -LW      Eating meals 4  -LW      Score 24  -LW      Tinetti Assessment    Tinetti Assessment  yes  -CH     Sitting Balance  1  -CH     Arises  2  -CH     Attempts to Rise  2  -CH     Immediate Standing Balance (first 5 sec)  2  -CH     Standing Balance  1  -CH     Sternal Nudge (feet close together)  2  -CH     Eyes Closed (feet close together)  1  -CH     Turning 360 Degrees- Steps  1  -CH     Turning 360 Degrees- Steadiness  1  -CH     Sitting Down  2  -CH     Tinetti Balance Score  15  -CH     Gait  "Initiation (immediate after told \"go\")  1  -CH     Step Length- Right Swing  1  -CH     Step Length- Left Swing  1  -CH     Foot Clearance- Right Foot  1  -CH     Foot Clearance- Left Foot  1  -CH     Step Symmetry  0  -CH     Step Continuity  1  -CH     Path (excursion)  1  -CH     Trunk  1  -CH     Base of Support  1  -CH     Gait Score  9  -CH     Tinetti Total Score  24  -CH     Functional Assessment    Outcome Measure Options  Tinetti  -CH AM-PAC 6 Clicks Basic Mobility (PT)  -      09/19/17 0845          How much help from another is currently needed...    Putting on and taking off regular lower body clothing? 3  -LW      Bathing (including washing, rinsing, and drying) 3  -LW      Toileting (which includes using toilet bed pan or urinal) 3  -LW      Putting on and taking off regular upper body clothing 4  -LW      Taking care of personal grooming (such as brushing teeth) 4  -LW      Eating meals 4  -LW      Score 21  -LW        User Key  (r) = Recorded By, (t) = Taken By, (c) = Cosigned By    Initials Name Provider Type     Aviva Lundberg, NEFTALI Physical Therapy Assistant    LW KRYSTIN Lacy/L Occupational Therapy Assistant                      IP PT Goals       09/21/17 1617 09/20/17 1522 09/19/17 1111    Stair Training PT LTG    Stair Training Goal PT LTG, Date Established  09/20/17  -     Stair Training Goal PT LTG, Date Goal Reviewed 09/21/17  - 09/20/17  - 09/19/17  -    Stair Training Goal PT LTG, Outcome goal not met  - goal ongoing  - goal ongoing  -    Stair Training Goal PT LTG, Reason Goal Not Met discharged from facility  -      Physical Therapy PT LTG    Physical Therapy PT LTG, Date Established  09/20/17  -     Physical Therapy PT LTG, Time to Achieve  by discharge  -     Physical Therapy PT LTG, Addisional Goal  Score 28/28 or low fall risk.   -     Physical Therapy PT LTG, Date Goal Reviewed 09/21/17  - 09/20/17  - 09/19/17  -    Physical Therapy PT LTG, " Outcome goal not met  -CZ goal ongoing  - goal met  -    Physical Therapy PT LTG, Reason Goal Not Met discharged from facility  -CZ        09/18/17 0950 09/16/17 0845       Transfer Training PT STG    Transfer Training PT STG, Date Established  09/16/17  -CZ     Transfer Training PT STG, Time to Achieve  2 days  -CZ     Transfer Training PT STG, Activity Type  bed to chair /chair to bed;sit to stand/stand to sit  -CZ     Transfer Training PT STG, [place] Level  conditional independence  -CZ     Transfer Training PT STG, Date Goal Reviewed 09/18/17  -ZULLY 09/16/17  -CZ     Transfer Training PT STG, Outcome goal met  - goal ongoing  -     Gait Training PT STG    Gait Training Goal PT STG, Date Established  09/16/17  -CZ     Gait Training Goal PT STG, Time to Achieve  2 days  -CZ     Gait Training Goal PT STG, [place] Level  conditional independence  -CZ     Gait Training Goal PT STG, Assist Device  walker, rolling  -CZ     Gait Training Goal PT STG, Distance to Achieve  150'x1.  -CZ     Gait Training Goal PT STG, Additional Goal  SpO2 >90%.  -CZ     Gait Training Goal PT STG, Date Goal Reviewed 09/18/17  -ZULLY 09/16/17  -CZ     Gait Training Goal PT STG, Outcome goal met  - goal ongoing  -     Stair Training PT LTG    Stair Training Goal PT LTG, Date Established  09/16/17  -     Stair Training Goal PT LTG, Time to Achieve  by discharge  -     Stair Training Goal PT LTG, Number of Steps  5  -CZ     Stair Training Goal PT LTG, [place] Level  conditional independence  -CZ     Stair Training Goal PT LTG, Assist Device  2 handrails  -CZ     Stair Training Goal PT LTG, Date Goal Reviewed 09/18/17  -ZULLY 09/16/17  -     Stair Training Goal PT LTG, Outcome goal ongoing  - goal ongoing  -     Physical Therapy PT LTG    Physical Therapy PT LTG, Date Established  09/16/17  -     Physical Therapy PT LTG, Time to Achieve  by discharge  -     Physical Therapy PT LTG, Activity Type  Tinetti fall  risk assessment.   -CZ     Physical Therapy PT LTG, Addisional Goal  Score 24/28 or low fall risk.   -CZ     Physical Therapy PT LTG, Date Goal Reviewed 09/18/17  -ZULLY 09/16/17  -     Physical Therapy PT LTG, Outcome goal ongoing  -ZULLY goal ongoing  -       User Key  (r) = Recorded By, (t) = Taken By, (c) = Cosigned By    Initials Name Provider Type    ZULLY Walters, PTA Physical Therapy Assistant     Aviva Lundberg, PTA Physical Therapy Assistant     Bud Lovelace, PT Physical Therapist          Therapy Charges for Today     Code Description Service Date Service Provider Modifiers Qty    44943123254 HC PT MOBILITY CURRENT 9/20/2017 Bud Lovelace, PT GP, CI 1    25443640740 HC PT MOBILITY PROJECTED 9/20/2017 Bud Lovelace, PT GP, CH 1    [id number] HC GAIT TRAINING EA 15 MIN 9/20/2017 Bud Lovelace, PT GP 2          PT Discharge Summary  Anticipated Discharge Disposition: home with home health  Reason for Discharge: Per MD order, Discharge from facility  Outcomes Achieved: Patient able to partially acheive established goals  Discharge Destination: Home with home health      Bud Lovelace, PT   9/21/2017

## 2017-09-21 NOTE — PLAN OF CARE
Problem: Inpatient Occupational Therapy  Goal: Transfer Training Goal 1 LTG- OT  Outcome: Outcome(s) achieved Date Met:  09/21/17 09/17/17 1350 09/21/17 1711   Transfer Training OT LTG   Transfer Training OT LTG, Date Established 09/17/17 --    Transfer Training OT LTG, Time to Achieve by discharge --    Transfer Training OT LTG, Activity Type all transfers --    Transfer Training OT LTG, [place] Level independent;conditional independence --    Transfer Training OT LTG, Assist Device walker, rolling --    Transfer Training OT LTG, Date Goal Reviewed --  09/21/17   Transfer Training OT LTG, Outcome --  goal met       Goal: Dynamic Standing Balance Goal LTG-OT  Outcome: Unable to achieve outcome(s) by discharge Date Met:  09/21/17 09/17/17 1350 09/21/17 1711   Dynamic Standing Balance OT LTG   Dynamic Standing Balance OT LTG, Date Established 09/17/17 --    Dynamic Standing Balance OT LTG, Time to Achieve by discharge --    Dynamic Standing Balance OT LTG, [place] Level independent;conditional independence  (5 minutes with functional activity.) --    Dynamic Standing Balance OT LTG, Assist Device assistive Device  (R/W.) --    Dynamic Standing Balance OT LTG, Date Goal Reviewed --  09/21/17   Dynamic Standing Balance OT LTG, Outcome --  goal partially met   Dynamic Standing Balance OT LTG, Reason Goal Not Met --  discharged from facility       Goal: Patient Education Goal LTG- OT  Outcome: Outcome(s) achieved Date Met:  09/21/17 09/17/17 1350 09/21/17 1711   Patient Education OT LTG   Patient Education OT LTG, Date Established 09/17/17 --    Patient Education OT LTG, Time to Achieve by discharge --    Patient Education OT LTG, Education Type home safety;adaptive breathing;joint protection;work simplification  (fall prevention.) --    Patient Education OT LTG, Education Understanding demonstrates adequately;verbalizes understanding --    Patient Education OT LTG, Date Goal Reviewed --  09/21/17    Patient Education OT LTG Outcome --  goal met       Goal: ADL Goal LTG- OT  Outcome: Unable to achieve outcome(s) by discharge Date Met:  09/21/17 09/17/17 1350 09/21/17 1711   ADL OT LTG   ADL OT LTG, Date Established 09/17/17 --    ADL OT LTG, Time to Achieve by discharge --    ADL OT LTG, Activity Type ADL skills  (Sponge bath and dress or walk-in shower.) --    ADL OT LTG, [place] Level independent with device;assistive device  (R/W if needed.) --    ADL OT LTG, Outcome --  goal not met   ADL OT LTG, Reason Goal Not Met --  discharged from facility

## 2017-09-21 NOTE — THERAPY DISCHARGE NOTE
Acute Care - Occupational Therapy Initial Eval/Discharge  [facility]     Patient Name: Hasmukh Ham  : 1933  MRN: [id number]  Today's Date: 2017  Onset of Illness/Injury or Date of Surgery Date: 09/15/17  Date of Referral to OT: 09/15/17  Referring Physician: KETTY Griffin      Admit Date: 9/15/2017       ICD-10-CM ICD-9-CM   1. Benign essential hypertension I10 401.1   2. Pulmonary HTN I27.2 416.8   3. Impaired physical mobility Z74.09 781.99   4. Impaired mobility and activities of daily living Z74.09 799.89   5. Non-rheumatic tricuspid valve insufficiency I36.1 424.2   6. Coronary artery disease involving native coronary artery of native heart without angina pectoris I25.10 414.01   7. Centrilobular emphysema J43.2 492.8   8. Paroxysmal atrial fibrillation I48.0 427.31   9. Shortness of breath R06.02 786.05   10. COPD with exacerbation J44.1 491.21   11. (HFpEF) heart failure with preserved ejection fraction I50.30 428.9   12. Acute exacerbation of chronic obstructive airways disease J44.1 491.21   13. Acute interstitial pneumonia J84.9 516.8   14. Coronary arteriosclerosis I25.10 414.00   15. COPD exacerbation J44.1 491.21   16. Dilated aortic root I77.810 447.71   17. Bradycardia R00.1 427.89   18. Chronic coronary artery disease I25.10 414.00   19. Neuropathy G62.9 355.9   20. Neck pain M54.2 723.1   21. Gastroesophageal reflux disease without esophagitis K21.9 530.81   22. Generalized osteoarthritis M15.9 715.00   23. 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 Nocturia R35.1 788.43   25. Chronic obstructive bronchitis J44.9 491.20   26. Mixed hyperlipidemia E78.2 272.2   27. Arthropathy of hand M12.9 716.94   28. Paroxysmal tachycardia I47.9 427.2   29. Family history of diseases of the blood and blood-forming organs and certain disorders involving the immune mechanism Z83.2 V18.3   30. Osteoarthrosis involving more than one site but not generalized M15.9 714.70    31. Chronic right shoulder pain M25.511 719.41    G89.29 338.29   32. Rotator cuff syndrome, right M75.101 726.10   33. Partial tear of subscapularis tendon, right, initial encounter S43.81XA 840.5   34. Infraspinatus tendon tear, right, initial encounter S46.811A 840.3   35. Supraspinatus tendon tear, right, initial encounter S46.811A 840.6   36. Bilateral carotid artery stenosis I65.23 433.10     433.30     Patient Active Problem List   Diagnosis   • Dilated aortic root   • Benign essential hypertension   • Type 2 diabetes mellitus   • Non-rheumatic tricuspid valve insufficiency   • Coronary artery disease involving native coronary artery of native heart   • Pulmonary emphysema   • Dyspnea   • Atrial fibrillation   • Bradycardia   • Shortness of breath   • COPD with exacerbation   • Chronic coronary artery disease   • Neuropathy   • Abdominal hernia   • Neck pain   • Dementia   • Diabetic neuropathy   • Diabetic polyneuropathy   • Gastroesophageal reflux disease without esophagitis   • Generalized osteoarthritis   • 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   • Nocturia   • Chronic obstructive bronchitis   • Osteoarthritis of multiple joints   • Hyperlipidemia   • Pain in joint involving ankle and foot   • Arthropathy of hand   • Paroxysmal tachycardia   • Family history of diseases of the blood and blood-forming organs and certain disorders involving the immune mechanism   • Osteoarthrosis involving more than one site but not generalized   • Right shoulder pain   • Rotator cuff syndrome   • Partial tear of subscapularis tendon   • Infraspinatus tendon tear   • Supraspinatus tendon tear   • Bilateral carotid artery stenosis   • (HFpEF) heart failure with preserved ejection fraction   • Pulmonary HTN   • Acute exacerbation of chronic obstructive airways disease   • Acute interstitial pneumonia   • Chronic obstructive lung disease   • Coronary arteriosclerosis   • Diabetes mellitus   • COPD exacerbation      Past Medical History:   Diagnosis Date   • Acute exacerbation of chronic obstructive airways disease    • Acute interstitial pneumonia     Hamman-Rich Syndrome   • Arthritis    • Backache    • Bradycardia    • Chronic obstructive lung disease    • Chronic obstructive pulmonary disease (COPD)    • Coronary arteriosclerosis    • Coronary atherosclerosis of native coronary artery    • Degenerative joint disease involving multiple joints    • Diabetes mellitus    • Duodenal ulcer disease    • Hernia of abdominal wall    • History of echocardiogram 04/13/2015    Echocardiogram W/ color flow 25742 (1) - Normal LV systolic function with Ef of 70% with LVH and diastolic relaxation abnormality of the left ventricle. Mildly dilated aortic root. No sig.valvular abnormalities.   • Hyperlipidemia    • Hypertension    • Hypertensive disorder    • Left lower lobe pneumonia     Left lower zone pneumonia - clinically improved   • Neuropathy    • Pneumonia     Recent improving   • Shortness of breath    • Type 2 diabetes mellitus      Past Surgical History:   Procedure Laterality Date   • APPENDECTOMY     • CARDIAC CATHETERIZATION N/A 6/6/2017    Procedure: Right Heart Cath;  Surgeon: Jeff Maciel MD PhD;  Location: [facility] CATH INVASIVE LOCATION;  Service:    • HERNIA REPAIR     • LUNG BIOPSY     • NECK SURGERY     • THORACOTOMY Left 1977    left with lung biopsy   • VENTRAL HERNIA REPAIR            OT ASSESSMENT FLOWSHEET (last 72 hours)      OT Evaluation       09/21/17 1717 09/20/17 1522 09/20/17 0825 09/19/17 1006 09/19/17 0845    Rehab Evaluation    Document Type  therapy note (daily note)  -CZ therapy note (daily note)  -LW therapy note (daily note)  -CH therapy note (daily note)  -LW    Subjective Information   agree to therapy  -LW agree to therapy  -CH agree to therapy  -LW    Patient Effort, Rehab Treatment  excellent  -CZ good  -LW good  -CH good  -LW    Symptoms Noted During/After Treatment  none  -CZ        General Information    Precautions/Limitations  no known precautions/limitations  -CZ  fall precautions;oxygen therapy device and L/min  -CH fall precautions;oxygen therapy device and L/min  -LW    Clinical Impression    Anticipated Discharge Disposition home with home health  -RB        Vital Signs    Pre Systolic BP Rehab  177  -  -  -  -LW    Pre Treatment Diastolic BP  89  -CZ 66  -LW 64  -CH 69  -LW    Intra Systolic BP Rehab  164  -CZ       Intra Treatment Diastolic BP  80  -CZ       Post Systolic BP Rehab  151  -CZ  112  -CH     Post Treatment Diastolic BP  70  -CZ  88  -CH     Pretreatment Heart Rate (beats/min)  84  -CZ 83  -LW 88  -CH 86  -LW    Intratreatment Heart Rate (beats/min)  92  -CZ 79  -LW  87  -LW    Posttreatment Heart Rate (beats/min)  91  -CZ 82  -LW 91  -CH 84  -LW    Pre SpO2 (%)  96  -CZ 98  -LW 97  -CH 98  -LW    O2 Delivery Pre Treatment  room air  -CZ room air  -LW supplemental O2  - room air  -LW    Intra SpO2 (%)  94  -CZ 98  -LW 95   Pt requested to ambulate on room air  - 96  -LW    O2 Delivery Intra Treatment  room air  -CZ room air  -LW room air  - room air  -LW    Post SpO2 (%)  95  -CZ 97  -LW 96  -CH 97  -LW    O2 Delivery Post Treatment   room air  -LW room air  - room air  -LW    Pre Patient Position  Sitting  -CZ Supine  -LW Supine  -CH Supine  -LW    Intra Patient Position  Sitting  -CZ Sitting  -LW Standing  -CH Sitting  -LW    Post Patient Position  Sitting  -CZ Sitting  -LW Sitting  - Sitting  -LW    Pain Assessment    Pain Assessment  0-10  -CZ No/denies pain  -LW No/denies pain  - No/denies pain  -LW    Pain Score  7  -CZ  0  -CH     Post Pain Score  0  -CZ  0  -CH     Pain Type  Chronic pain  -CZ       Pain Location  Back  -CZ       Vision Assessment/Intervention    Visual Impairment   WFL with corrective lenses  -LW WFL with corrective lenses  - WFL with corrective lenses  -LW    Visual Impairment Comment    Readers  - Readers  -LW     Cognitive Assessment/Intervention    Current Cognitive/Communication Assessment  functional  -CZ functional  -LW functional  -CH functional  -LW    Orientation Status   oriented x 4  -LW oriented x 4  -CH oriented x 4  -LW    Follows Commands/Answers Questions  100% of the time  -% of the time  -% of the time  -% of the time  -LW    Personal Safety   WNL/WFL  -LW WNL/WFL  -CH WNL/WFL  -LW    Personal Safety Interventions  gait belt;nonskid shoes/slippers when out of bed  -CZ fall prevention program maintained;nonskid shoes/slippers when out of bed  -LW fall prevention program maintained;gait belt;muscle strengthening facilitated;nonskid shoes/slippers when out of bed;supervised activity  - fall prevention program maintained  -LW    Bed Mobility, Assessment/Treatment    Bed Mobility, Assistive Device  head of bed elevated  -CZ  head of bed elevated  -CH     Bed Mobility, Roll Left, [place]    not tested  -CH     Bed Mobility, Roll Right, [place]    not tested  -CH     Bed Mob, Supine to Sit, [place]  conditional independence  -CZ  independent  -CH     Bed Mob, Sit to Supine, [place]  conditional independence  -CZ  not tested  -CH     Transfer Assessment/Treatment    Transfers, Sit-Stand [place]  conditional independence  -CZ conditional independence  -LW conditional independence  -CH conditional independence  -LW    Transfers, Stand-Sit [place]  conditional independence  -CZ conditional independence  -LW conditional independence  -CH conditional independence  -LW    Transfers, Sit-Stand-Sit, Assist Device   other (see comments)   no AE  -LW other (see comments)   none  -CH --   none  -LW    Toilet Transfer, [place]   conditional independence  -LW  conditional independence  -LW    Toilet Transfer, Assistive Device   other (see comments)   No AE  -LW      Functional Mobility    Functional Mobility- Ind. Level   conditional independence  -LW      Functional  Mobility- Device   other (see comments)   No AE  -LW      Stairs Assessment/Treatment    Number of Stairs    6  -CH     Stairs, Handrail Location    both sides  -     Stairs, [place] Level    supervision required  -     Toileting Assessment/Training    Toileting Assess/Train, Assistive Device   grab bars  -LW  grab bars  -LW    Toileting Assess/Train, Position   standing  -LW  sitting  -LW    Toileting Assess/Train, Indepen Level   conditional independence  -LW  conditional independence  -LW    Grooming Assessment/Training    Grooming Assess/Train, Position   sitting;sink side  -LW  standing;sink side  -LW    Grooming Assess/Train, Indepen Level   independent  -LW  independent  -LW    Grooming Assess/Train, Comment   Wash hands, face  -LW  Wash face hands comb hair oral care, shave.  -    Balance Skills Training    Gait Balance-Level of Assistance    Close supervision  -     Gait Balance Support    No upper extremity supported  -     Gait Balance Activities    stepping over object   weaving in/out of cones, object retrieval  -     Gait Balance # of Minutes    15  -     Positioning and Restraints    Pre-Treatment Position  in bed  -CZ in bed  -LW in bed  -CH in bed  -LW    Post Treatment Position  bed  -CZ chair  -LW bed  -CH bed  -LW    In Bed  supine;call light within reach;with family/caregiver  -  sitting EOB;call light within reach;encouraged to call for assist  - sitting EOB  -LW    In Chair   call light within reach;encouraged to call for assist;exit alarm on  -LW  call light within reach;encouraged to call for assist;exit alarm on  -LW      User Key  (r) = Recorded By, (t) = Taken By, (c) = Cosigned By    Initials Name Effective Dates    SOFIA Marin, GOSIA 06/15/16 -     CH Aviva Lundberg, PTA 10/17/16 -     LW Berenice Hickman, MCCLELLAND/L 10/17/16 -     CZ Bud Lovelace, PT 02/17/17 -           Occupational Therapy Education     Title: PT OT SLP Therapies (Resolved)     Topic:  Occupational Therapy (Resolved)     Point: ADL training (Resolved)    Description: Instruct learner(s) on proper safety adaptation and remediation techniques during self care or transfers.   Instruct in proper use of assistive devices.    Learning Progress Summary    Learner Readiness Method Response Comment Documented by Status   Patient Acceptance E,H VU Home Safety, EC/WS LW 09/20/17 1305 Done    Acceptance E VU Discussed fall precautions LW 09/19/17 1200 Done               Point: Home exercise program (Resolved)    Description: Instruct learner(s) on appropriate technique for monitoring, assisting and/or progressing therapeutic exercises/activities.    Learning Progress Summary    Learner Readiness Method Response Comment Documented by Status   Patient Acceptance E,H VU Home Safety, EC/WS LW 09/20/17 1305 Done    Acceptance E VU Discussed fall precautions LW 09/19/17 1200 Done               Point: Precautions (Resolved)    Description: Instruct learner(s) on prescribed precautions during self-care and functional transfers.    Learning Progress Summary    Learner Readiness Method Response Comment Documented by Status   Patient Acceptance E,H VU Home Safety, EC/WS LW 09/20/17 1305 Done    Acceptance E VU Discussed fall precautions LW 09/19/17 1200 Done    Acceptance E VU,NR Edu pt on use of gait belt and non skid socks when OOB. RB 09/17/17 1347 Done               Point: Body mechanics (Resolved)    Description: Instruct learner(s) on proper positioning and spine alignment during self-care, functional mobility activities and/or exercises.    Learning Progress Summary    Learner Readiness Method Response Comment Documented by Status   Patient Acceptance E,H VU Home Safety, EC/WS LW 09/20/17 1305 Done    Acceptance E VU Discussed fall precautions LW 09/19/17 1200 Done                      User Key     Initials Effective Dates Name Provider Type Discipline     06/15/16 -  Carrington Marin OT Occupational Therapist OT     LW 10/17/16 -  KRYSTIN Lacy/L Occupational Therapy Assistant OT                OT Recommendation and Plan  Anticipated Discharge Disposition: home with home health  Planned Therapy Interventions: activity intolerance, ADL retraining, balance training, transfer training, strengthening, adaptive equipment training  Therapy Frequency:  (3-14x/wk)  Plan of Care Review  Plan Of Care Reviewed With: patient  Outcome Summary/Follow up Plan: OT navya complete this date.  Pt required SBA /CGA for ambulation.  He needed CGA for log roll from supine to sit.. Pt  may benefit from OT services to increase independence with ADLs and functional mobility/transfers.           OT Goals       09/21/17 1711 09/20/17 1306 09/19/17 1200    Transfer Training OT LTG    Transfer Training OT LTG, Date Goal Reviewed 09/21/17  -RB 09/20/17  -LW 09/19/17  -LW    Transfer Training OT LTG, Outcome goal met  -RB goal met  -LW     Dynamic Standing Balance OT LTG    Dynamic Standing Balance OT LTG, Date Goal Reviewed 09/21/17  -RB  09/19/17  -LW    Dynamic Standing Balance OT LTG, Outcome goal partially met  -RB  goal partially met  -LW    Dynamic Standing Balance OT LTG, Reason Goal Not Met discharged from facility  -RB      Patient Education OT LTG    Patient Education OT LTG, Date Goal Reviewed 09/21/17  -RB 09/20/17  -LW 09/19/17  -LW    Patient Education OT LTG Outcome goal met  -RB goal met  -LW goal not met  -LW    ADL OT LTG    ADL OT LTG, Date Goal Reviewed  09/20/17  -LW 09/19/17  -LW    ADL OT LTG, Outcome goal not met  -RB goal not met  -LW goal not met  -LW    ADL OT LTG, Reason Goal Not Met discharged from facility  -RB        09/18/17 1345 09/17/17 1350       Transfer Training OT LTG    Transfer Training OT LTG, Date Established  09/17/17  -RB     Transfer Training OT LTG, Time to Achieve  by discharge  -RB     Transfer Training OT LTG, Activity Type  all transfers  -RB     Transfer Training OT LTG, [place] Level   independent;conditional independence  -RB     Transfer Training OT LTG, Assist Device  walker, rolling  -RB     Transfer Training OT LTG, Date Goal Reviewed 09/18/17  -KD      Transfer Training OT LTG, Outcome goal not met  -KD      Dynamic Standing Balance OT LTG    Dynamic Standing Balance OT LTG, Date Established  09/17/17  -RB     Dynamic Standing Balance OT LTG, Time to Achieve  by discharge  -RB     Dynamic Standing Balance OT LTG, [place] Level  independent;conditional independence   5 minutes with functional activity.  -RB     Dynamic Standing Balance OT LTG, Assist Device  assistive Device   R/W.  -RB     Dynamic Standing Balance OT LTG, Date Goal Reviewed 09/18/17  -KD      Dynamic Standing Balance OT LTG, Outcome goal not met  -KD      Patient Education OT LTG    Patient Education OT LTG, Date Established  09/17/17  -RB     Patient Education OT LTG, Time to Achieve  by discharge  -RB     Patient Education OT LTG, Education Type  home safety;adaptive breathing;joint protection;work simplification   fall prevention.  -RB     Patient Education OT LTG, Education Understanding  demonstrates adequately;verbalizes understanding  -RB     Patient Education OT LTG, Date Goal Reviewed 09/18/17  -KD      Patient Education OT LTG Outcome goal not met  -KD      ADL OT LTG    ADL OT LTG, Date Established  09/17/17  -RB     ADL OT LTG, Time to Achieve  by discharge  -RB     ADL OT LTG, Activity Type  ADL skills   Sponge bath and dress or walk-in shower.  -RB     ADL OT LTG, [place] Level  independent with device;assistive device   R/W if needed.  -RB     ADL OT LTG, Date Goal Reviewed 09/18/17  -KD      ADL OT LTG, Outcome goal not met  -KD        User Key  (r) = Recorded By, (t) = Taken By, (c) = Cosigned By    Initials Name Provider Type    SOFIA Marin, OT Occupational Therapist    KRYSTIN Roca/L Occupational Therapy Assistant    KRYSTIN Bonilla/L Occupational Therapy Assistant     "            Outcome Measures       09/20/17 0825 09/19/17 1007 09/19/17 1006    How much help from another person do you currently need...    Turning from your back to your side while in flat bed without using bedrails?   4  -CH    Moving from lying on back to sitting on the side of a flat bed without bedrails?   4  -CH    Moving to and from a bed to a chair (including a wheelchair)?   3  -CH    Standing up from a chair using your arms (e.g., wheelchair, bedside chair)?   3  -CH    Climbing 3-5 steps with a railing?   3  -CH    To walk in hospital room?   3  -CH    AM-PAC 6 Clicks Score   20  -CH    How much help from another is currently needed...    Putting on and taking off regular lower body clothing? 4  -LW      Bathing (including washing, rinsing, and drying) 4  -LW      Toileting (which includes using toilet bed pan or urinal) 4  -LW      Putting on and taking off regular upper body clothing 4  -LW      Taking care of personal grooming (such as brushing teeth) 4  -LW      Eating meals 4  -LW      Score 24  -LW      Tinetti Assessment    Tinetti Assessment  yes  -CH     Sitting Balance  1  -CH     Arises  2  -CH     Attempts to Rise  2  -CH     Immediate Standing Balance (first 5 sec)  2  -CH     Standing Balance  1  -CH     Sternal Nudge (feet close together)  2  -CH     Eyes Closed (feet close together)  1  -CH     Turning 360 Degrees- Steps  1  -CH     Turning 360 Degrees- Steadiness  1  -CH     Sitting Down  2  -CH     Tinetti Balance Score  15  -CH     Gait Initiation (immediate after told \"go\")  1  -CH     Step Length- Right Swing  1  -CH     Step Length- Left Swing  1  -CH     Foot Clearance- Right Foot  1  -CH     Foot Clearance- Left Foot  1  -CH     Step Symmetry  0  -CH     Step Continuity  1  -CH     Path (excursion)  1  -CH     Trunk  1  -CH     Base of Support  1  -CH     Gait Score  9  -CH     Tinetti Total Score  24  -CH     Functional Assessment    Outcome Measure Options  Tinetti  -CH AM-PAC " 6 Clicks Basic Mobility (PT)  -      09/19/17 0845          How much help from another is currently needed...    Putting on and taking off regular lower body clothing? 3  -LW      Bathing (including washing, rinsing, and drying) 3  -LW      Toileting (which includes using toilet bed pan or urinal) 3  -LW      Putting on and taking off regular upper body clothing 4  -LW      Taking care of personal grooming (such as brushing teeth) 4  -LW      Eating meals 4  -LW      Score 21  -LW        User Key  (r) = Recorded By, (t) = Taken By, (c) = Cosigned By    Initials Name Provider Type     Aviva Lundberg PTA Physical Therapy Assistant    LW SONIA Lacy Occupational Therapy Assistant          Time Calculation:           OT G-codes  OT Professional Judgement Used?: Yes  OT Functional Scales Options: AM-PAC 6 Clicks Daily Activity (OT)  Score: 21  Functional Limitation: Self care  Self Care Current Status (): At least 20 percent but less than 40 percent impaired, limited or restricted  Self Care Goal Status (): At least 1 percent but less than 20 percent impaired, limited or restricted    OT Discharge Summary  Anticipated Discharge Disposition: home with home health  Reason for Discharge: Per MD order, Discharge from facility  Outcomes Achieved: Patient able to partially acheive established goals  Discharge Destination: Home with home health    Carrington Marin OT  9/21/2017

## 2017-09-21 NOTE — PLAN OF CARE
Problem: Inpatient Physical Therapy  Goal: Stair Training Goal LTG- PT  Outcome: Unable to achieve outcome(s) by discharge Date Met:  09/21/17 09/16/17 0845 09/20/17 1522 09/21/17 1617   Stair Training PT LTG   Stair Training Goal PT LTG, Date Established --  09/20/17 --    Stair Training Goal PT LTG, Time to Achieve by discharge --  --    Stair Training Goal PT LTG, Number of Steps 5 --  --    Stair Training Goal PT LTG, [place] Level conditional independence --  --    Stair Training Goal PT LTG, Assist Device 2 handrails --  --    Stair Training Goal PT LTG, Date Goal Reviewed --  --  09/21/17   Stair Training Goal PT LTG, Outcome --  --  goal not met   Stair Training Goal PT LTG, Reason Goal Not Met --  --  discharged from facility       Goal: Physical Therapy Goal LTG- PT  Outcome: Unable to achieve outcome(s) by discharge Date Met:  09/21/17 09/16/17 0845 09/20/17 1522 09/21/17 1617   Physical Therapy PT LTG   Physical Therapy PT LTG, Date Established --  09/20/17 --    Physical Therapy PT LTG, Time to Achieve --  by discharge --    Physical Therapy PT LTG, Activity Type Tinetti fall risk assessment.  --  --    Physical Therapy PT LTG, Addisional Goal --  Score 28/28 or low fall risk.  --    Physical Therapy PT LTG, Date Goal Reviewed --  --  09/21/17   Physical Therapy PT LTG, Outcome --  --  goal not met   Physical Therapy PT LTG, Reason Goal Not Met --  --  discharged from facility

## 2017-09-21 NOTE — PAYOR COMM NOTE
"Hasmukh Ham (84 y.o. Male)     Date of Birth Social Security Number Address Home Phone MRN    06/01/1933  [address] [facility] [address] [phone] [id number]    Quaker Marital Status          Gnosticist        Admission Date Admission Type Admitting Provider Attending Provider Department, Room/Bed    9/15/17 Urgent Pamela Griffin MD  [address], 402/1    Discharge Date Discharge Disposition Discharge Destination        9/20/2017 Home-Health Care Svc             Attending Provider: (none)    Allergies:  Atorvastatin, Pravastatin, Azithromycin, Biaxin [Clarithromycin]    Isolation:  None   Infection:  None   Code Status:  Prior    Ht:  67\" (170.2 cm)   Wt:  172 lb 3.2 oz (78.1 kg)    Admission Cmt:  None   Principal Problem:  Acute exacerbation of chronic obstructive airways disease [J44.1]                 Active Insurance as of 9/15/2017     Primary Coverage     Payor Plan Insurance Group Employer/Plan Group    AETNA MEDICARE REPLACEMENT AETNA [id number]     Payor Plan Address Payor Plan Phone Number Effective From Effective To    [address] [phone] 4/1/2017     [place] [address]       Subscriber Name Subscriber Birth Date Member ID       HASMUKH HAM 6/1/1933 [id number]           Secondary Coverage     Payor Plan Insurance Group Employer/Plan Group    [employer] BENEFITS HE[employer] BENEFITS [id number]     Payor Plan Address Payor Plan Phone Number Effective From Effective To    [address] [address] [phone] 9/15/2017     [place] [address]       Subscriber Name Subscriber Birth Date Member ID       HASMUKH HAM 6/1/1933 [id number]                 Emergency Contacts      (Rel.) Home Phone Work Phone Mobile Phone    Geovanna Tatum (Daughter) [phone] -- [phone]    FatoumataRimma marinelli (Spouse) [phone] -- --               Discharge Summary      EVE Gomez at 9/20/2017  2:44 PM     Attestation signed " by Florencio Garner MD at 9/20/2017  3:04 PM        I agree with the above note and assessment and plan.    Physical exam:    Temp:  [97.3 °F (36.3 °C)-98.9 °F (37.2 °C)] 97.9 °F (36.6 °C)  Heart Rate:  [54-93] 89  Resp:  [20-24] 20  BP: (120-148)/(63-98) 142/71    CVS: S1S2 RRR                                     [facility]  DISCHARGE SUMMARY       Date of Admission: 9/15/2017  Date of Discharge:  9/20/2017  Primary Care Physician: Paolo Rey MD    Presenting Problem/History of Present Illness:  Pulmonary HTN [I27.2]  COPD exacerbation [J44.1]       Final Discharge Diagnoses:  Hospital Problem List     * (Principal)Acute exacerbation of chronic obstructive airways disease    Benign essential hypertension    Type 2 diabetes mellitus    Non-rheumatic tricuspid valve insufficiency    Coronary artery disease involving native coronary artery of native heart    Pulmonary emphysema    Atrial fibrillation    Shortness of breath    COPD with exacerbation    (HFpEF) heart failure with preserved ejection fraction    Pulmonary HTN    Acute interstitial pneumonia    Overview Signed 9/15/2017  1:22 PM by Pamela Griffin MD     Hamman-Rich Syndrome         Chronic obstructive lung disease    Coronary arteriosclerosis    Diabetes mellitus    COPD exacerbation          Consults:   Consults     No orders found from 8/17/2017 to 9/16/2017.                  Pertinent Test Results:   Imaging Results (last 24 hours)     ** No results found for the last 24 hours. **        Lab Results (last 24 hours)     Procedure Component Value Units Date/Time    POC Glucose Fingerstick [[id number]]  (Normal) Collected:  09/19/17 1538    Specimen:  Blood Updated:  09/19/17 1553     Glucose 116 mg/dL       RN NotifiedMeter: [id number]: [id number] MELANIE GRACIA       Blood Culture [[id number]]  (Normal) Collected:  09/15/17 1447    Specimen:  Blood from Arm, Left Updated:   09/19/17 1601     Blood Culture No growth at 4 days    Blood Culture [[id number]]  (Normal) Collected:  09/15/17 1457    Specimen:  Blood from Arm, Right Updated:  09/19/17 1601     Blood Culture No growth at 4 days    CBC & Differential [[id number]] Collected:  09/20/17 0524    Specimen:  Blood Updated:  09/20/17 0631    Narrative:       The following orders were created for panel order CBC & Differential.  Procedure                               Abnormality         Status                     ---------                               -----------         ------                     CBC Auto Differential[[id number]]        Abnormal            Final result                 Please view results for these tests on the individual orders.    CBC Auto Differential [[id number]]  (Abnormal) Collected:  09/20/17 0524    Specimen:  Blood Updated:  09/20/17 0631     WBC 13.25 (H) 10*3/mm3      RBC 4.21 (L) 10*6/mm3      Hemoglobin 12.7 (L) g/dL      Hematocrit 38.6 (L) %      MCV 91.7 fL      MCH 30.2 pg      MCHC 32.9 g/dL      RDW 15.7 (H) %      RDW-SD 52.4 (H) fl      MPV 11.7 fL      Platelets 149 (L) 10*3/mm3      Neutrophil % 90.3 (H) %      Lymphocyte % 4.2 (L) %      Monocyte % 2.5 %      Eosinophil % 0.0 %      Basophil % 0.2 %      Immature Grans % 2.8 (H) %      Neutrophils, Absolute 11.97 (H) 10*3/mm3      Lymphocytes, Absolute 0.55 (L) 10*3/mm3      Monocytes, Absolute 0.33 10*3/mm3      Eosinophils, Absolute 0.00 10*3/mm3      Basophils, Absolute 0.03 10*3/mm3      Immature Grans, Absolute 0.37 (H) 10*3/mm3     POC Glucose Fingerstick [[id number]]  (Abnormal) Collected:  09/19/17 2057    Specimen:  Blood Updated:  09/20/17 0644     Glucose 204 (H) mg/dL       RN NotifiedMeter: [id number]: [id number] NARENDRA ANTONIO       Comprehensive Metabolic Panel [[id number]]  (Abnormal) Collected:  09/20/17 0524    Specimen:  Blood Updated:  09/20/17 0727     Glucose 189 (H) mg/dL      BUN 65 (H) mg/dL      Creatinine 1.42  (H) mg/dL      Sodium 133 (L) mmol/L      Potassium 5.1 mmol/L      Chloride 96 mmol/L      CO2 24.0 mmol/L      Calcium 8.8 mg/dL      Total Protein 6.2 (L) g/dL      Albumin 3.80 g/dL      ALT (SGPT) 43 U/L      AST (SGOT) 23 U/L      Alkaline Phosphatase 64 U/L      Total Bilirubin 0.5 mg/dL      eGFR Non African Amer 47 (L) mL/min/1.73      Globulin 2.4 gm/dL      A/G Ratio 1.6 g/dL      BUN/Creatinine Ratio 45.8 (H)     Anion Gap 13.0 mmol/L     Narrative:       The MDRD GFR formula is only valid for adults with stable renal function between ages 18 and 70.    Magnesium [[id number]]  (Normal) Collected:  09/20/17 0524    Specimen:  Blood Updated:  09/20/17 0727     Magnesium 2.2 mg/dL     POC Glucose Fingerstick [[id number]]  (Abnormal) Collected:  09/20/17 0723    Specimen:  Blood Updated:  09/20/17 0816     Glucose 173 (H) mg/dL       RN NotifiedMeter: [id number]: [id number] WASHER KAYLJEIMY       POC Glucose Fingerstick [[id number]]  (Abnormal) Collected:  09/20/17 0925    Specimen:  Blood Updated:  09/20/17 0937     Glucose 158 (H) mg/dL       RN NotifiedMeter: [id number]: [id number] WASHER KAYLIEANNE       POC Glucose Fingerstick [[id number]]  (Abnormal) Collected:  09/20/17 1025    Specimen:  Blood Updated:  09/20/17 1037     Glucose 141 (H) mg/dL       RN NotifiedMeter: [id number]: [id number] WASHER KANICOLÁS             Chief Complaint on Day of Discharge:  No complaints    Hospital Course:    9/20/2017:  The patient states he feels better today. His breathing is better today.  He function has improved with the discontinuation of lisinopril.  Pulmonary rehabilitation has come spoke with the patient and we'll contact him about future appointments.  Spoke with Gauri PRADO, about blood pressure concerns and the discontinuation of lisinopril.  I will start patient on hydralazine 10 mg twice a day.  Follow-up with Mrs. Vargas next week.  Patient is also given for Levaquin and  "prednisone taper.      9/19/2017:  The patient sounds worse today.  He states his breathing is not as good today.  Kidney function has increased.  Will discontinue his Lisinopril for now and just use PRN Hydralazine for blood pressure control.   Will increase the frequency of his steroids.      9/18/17:  The patient states his breathing is improved today.  Wheezes and no one have decreased.     9/17/2017:  The patient still has significant wheezes this am.  Will start IV steroids.       9/16/2017:  The patient has been up walking with physical therapy this morning.  He states he feels a little better this morning, but still has some significant wheezes noted.      9/15/2017 H&P by Dr. Griffin:  Patient is a 84 y.o. male presents with complaint of having shortness of breath.  Patient states she's been having shortness of breath ongoing for past few weeks.  Patient states his shortness of breath has been worsening.  He has been having coughing spells with thick yellowish to greenish colored sputum.  No nausea vomiting have been reported.  Patient does complain of chest tightness with coughing spells.  Patient states he has been having worsening orthopnea of 3-4 pillows.  Patient also admits of having reduced excess tolerance.  Patient does complain of worsening swelling.  No urinary complaints have been reported.  Patient states he has been compliant with his medications.  Patient states he was recently treated with IV steroids and nebulizer treatment without any benefit.       Condition on Discharge:  Stable    Physical Exam on Discharge:  /98 (BP Location: Left arm)  Pulse 82  Temp 98.2 °F (36.8 °C) (Oral)   Resp 20  Ht 67\" (170.2 cm)  Wt 172 lb 3.2 oz (78.1 kg)  SpO2 97%  BMI 26.97 kg/m2  Physical Exam   Constitutional: He is oriented to person, place, and time. He appears well-developed and well-nourished.   HENT:   Head: Normocephalic and atraumatic.   Eyes: EOM are normal. Pupils are equal, round, and " reactive to light.   Neck: Normal range of motion. Neck supple.   Cardiovascular: Normal rate and regular rhythm.    Pulmonary/Chest: Effort normal. He has wheezes.   Improvement in wheeze   Abdominal: Soft. Bowel sounds are normal.   Musculoskeletal: Normal range of motion.   Neurological: He is alert and oriented to person, place, and time.   Skin: Skin is warm and dry.   Psychiatric: He has a normal mood and affect.     Discharge Disposition:  Home with home health      Discharge Medications:   Hasmukh Ham   Home Medication Instructions SEBASTIAN:[id number]    Printed on:09/20/17 9683   Medication Information                      albuterol (PROVENTIL) (2.5 MG/3ML) 0.083% nebulizer solution  Take 2.5 mg by nebulization 4 (Four) Times a Day As Needed for wheezing.             aspirin 81 MG chewable tablet  Chew 81 mg Daily.             clopidogrel (PLAVIX) 75 MG tablet  Take 75 mg by mouth Daily.             donepezil (ARICEPT) 10 MG tablet  Take 10 mg by mouth Daily.             famotidine (PEPCID) 20 MG tablet  Take 20 mg by mouth 2 (Two) Times a Day.             fluticasone (FLONASE) 50 MCG/ACT nasal spray  2 sprays into each nostril Daily.             furosemide (LASIX) 20 MG tablet  Take 1 tablet by mouth Daily.             hydrALAZINE (APRESOLINE) 10 MG tablet  Take 1 tablet by mouth Every 12 (Twelve) Hours.             HYDROcodone-acetaminophen (NORCO) 7.5-325 MG per tablet  Take 1 tablet by mouth Every 8 (Eight) Hours.             ipratropium-albuterol (DUO-NEB) 0.5-2.5 mg/mL nebulizer  Inhale 3 mL.             isosorbide dinitrate (ISORDIL) 10 MG tablet  Take 1 tablet by mouth 3 (Three) Times a Day.             levoFLOXacin (LEVAQUIN) 250 MG tablet  Take 1 tablet by mouth Daily.             magnesium oxide (MAGOX) 400 (241.3 MG) MG tablet tablet  Take 400 mg by mouth Daily.             nitroglycerin (NITROSTAT) 0.4 MG SL tablet  place 1 tablet under the tongue if needed every 5 minutes for hair...   (REFER TO PRESCRIPTION NOTES).             O2 (OXYGEN)  Inhale 2 L/min Every Night. W/ CPAP             PredniSONE (DELTASONE) 10 MG (48) tablet pack  Take as directed             Red Yeast Rice 600 MG tablet  Take 600 mg by mouth 2 (Two) Times a Day.             terbutaline (BRETHINE) 5 MG tablet  Take 5 mg by mouth 2 (Two) Times a Day.             Umeclidinium-Vilanterol 62.5-25 MCG/INH aerosol powder   Inhale 1 puff Daily.                 Discharge Diet:   Diet Instructions     Diet: Cardiac       Discharge Diet:  Cardiac   Low sodium  Fluid restrictions 1.5 liters daily                 Activity at Discharge:   Activity Instructions     Activity as Tolerated                     Discharge Care Plan/Instructions:  Prescriptions given for Levaquin, Hydralazine, and Prednisone taper.  Home health will follow up for CHF/COPD management, PT/OT. Patient will see Dr. Rey in a few days for follow up.  He will return to see GIRMA PRADO with cardiology next week.  Pulmonary rehab will call him to set up an appointment after he is finished with home health.      Follow-up Appointments:   Future Appointments  Date Time Provider Department Center   9/29/2017 1:40 PM EVE Abdullahi [facility] None   1/23/2018 11:00 AM Jeff Maciel MD PhD [facility] None   2/7/2018 1:30 PM Jeff Maciel MD PhD [facility] None   7/12/2018 1:40 PM Delon Calvillo MD [facility] None       Test Results Pending at Discharge:  Order Current Status    Blood Culture Preliminary result    Blood Culture Preliminary result            This document has been electronically signed by EVE Gomez on September 20, 2017 2:44 PM        Time: Greater than 30 minutes.                  Electronically signed by Florencio Garner MD at 9/20/2017  3:04 PM

## 2017-09-22 ENCOUNTER — TELEPHONE (OUTPATIENT)
Dept: CARDIOLOGY | Facility: CLINIC | Age: 82
End: 2017-09-22

## 2017-09-22 NOTE — TELEPHONE ENCOUNTER
Mrs. Ham called regarding Mr. Ham's heart rate. She said his pulse rate has been as high as 107 after activity. I reviewed his hospital notes. HR varied from . No BB at home due to severe COPD and history of Bradycardia. With recent escalation of steroids and excitement at home- I told Mrs. Ham to focus on how Mr. Ham was feeling and not his pulse rate. It varies. Went back and reviewed ZIO patch from March- HR varied from 30 to 134.   Considered Clonidine if BP not well controlled. However, with the addition of Hydralazine + TID Isordil, his BP has been better controlled in the 130s.   Mr. Ham is coming in to see me in office on Wednesday. Will see sooner if needed.   Of course, if SOA occurs or dizziness/palpitations present to the ER for evaluation.

## 2017-09-25 ENCOUNTER — TELEPHONE (OUTPATIENT)
Dept: CARDIOLOGY | Facility: CLINIC | Age: 82
End: 2017-09-25

## 2017-09-25 ENCOUNTER — HOSPITAL ENCOUNTER (OUTPATIENT)
Facility: HOSPITAL | Age: 82
Setting detail: OBSERVATION
Discharge: HOME HEALTH | End: 2017-09-28
Attending: INTERNAL MEDICINE | Admitting: INTERNAL MEDICINE

## 2017-09-25 DIAGNOSIS — J42 CHRONIC BRONCHITIS, UNSPECIFIED CHRONIC BRONCHITIS TYPE (HCC): Chronic | ICD-10-CM

## 2017-09-25 DIAGNOSIS — I48.91 ATRIAL FIBRILLATION AND FLUTTER (HCC): Primary | Chronic | ICD-10-CM

## 2017-09-25 DIAGNOSIS — I48.92 ATRIAL FIBRILLATION AND FLUTTER (HCC): Primary | Chronic | ICD-10-CM

## 2017-09-25 DIAGNOSIS — J44.9 MIXED TYPE COPD (CHRONIC OBSTRUCTIVE PULMONARY DISEASE) (HCC): ICD-10-CM

## 2017-09-25 PROBLEM — R07.9 CHEST PAIN: Status: ACTIVE | Noted: 2017-09-25

## 2017-09-25 PROBLEM — I50.30 (HFPEF) HEART FAILURE WITH PRESERVED EJECTION FRACTION (HCC): Chronic | Status: ACTIVE | Noted: 2017-09-14

## 2017-09-25 PROBLEM — I10 HYPERTENSION: Chronic | Status: ACTIVE | Noted: 2017-09-25

## 2017-09-25 LAB — GLUCOSE BLDC GLUCOMTR-MCNC: 276 MG/DL (ref 70–130)

## 2017-09-25 PROCEDURE — G0378 HOSPITAL OBSERVATION PER HR: HCPCS

## 2017-09-25 PROCEDURE — 96365 THER/PROPH/DIAG IV INF INIT: CPT

## 2017-09-25 PROCEDURE — 82962 GLUCOSE BLOOD TEST: CPT

## 2017-09-25 PROCEDURE — 94640 AIRWAY INHALATION TREATMENT: CPT

## 2017-09-25 PROCEDURE — 25010000002 FUROSEMIDE PER 20 MG: Performed by: INTERNAL MEDICINE

## 2017-09-25 PROCEDURE — 96375 TX/PRO/DX INJ NEW DRUG ADDON: CPT

## 2017-09-25 RX ORDER — SODIUM CHLORIDE 0.9 % (FLUSH) 0.9 %
1-10 SYRINGE (ML) INJECTION AS NEEDED
Status: DISCONTINUED | OUTPATIENT
Start: 2017-09-25 | End: 2017-09-28 | Stop reason: HOSPADM

## 2017-09-25 RX ORDER — HYDRALAZINE HYDROCHLORIDE 10 MG/1
10 TABLET, FILM COATED ORAL EVERY 12 HOURS SCHEDULED
Status: DISCONTINUED | OUTPATIENT
Start: 2017-09-25 | End: 2017-09-28 | Stop reason: HOSPADM

## 2017-09-25 RX ORDER — ISOSORBIDE DINITRATE 5 MG/1
10 TABLET ORAL 3 TIMES DAILY
Status: DISCONTINUED | OUTPATIENT
Start: 2017-09-25 | End: 2017-09-28 | Stop reason: HOSPADM

## 2017-09-25 RX ORDER — FUROSEMIDE 10 MG/ML
20 INJECTION INTRAMUSCULAR; INTRAVENOUS EVERY 12 HOURS
Status: DISCONTINUED | OUTPATIENT
Start: 2017-09-25 | End: 2017-09-26

## 2017-09-25 RX ORDER — NICOTINE POLACRILEX 4 MG
15 LOZENGE BUCCAL
Status: DISCONTINUED | OUTPATIENT
Start: 2017-09-25 | End: 2017-09-28 | Stop reason: HOSPADM

## 2017-09-25 RX ORDER — FAMOTIDINE 20 MG/1
20 TABLET, FILM COATED ORAL 2 TIMES DAILY
Status: DISCONTINUED | OUTPATIENT
Start: 2017-09-25 | End: 2017-09-28 | Stop reason: HOSPADM

## 2017-09-25 RX ORDER — CLOPIDOGREL BISULFATE 75 MG/1
75 TABLET ORAL DAILY
Status: DISCONTINUED | OUTPATIENT
Start: 2017-09-26 | End: 2017-09-28 | Stop reason: HOSPADM

## 2017-09-25 RX ORDER — HYDROCODONE BITARTRATE AND ACETAMINOPHEN 7.5; 325 MG/1; MG/1
1 TABLET ORAL EVERY 8 HOURS
Status: DISCONTINUED | OUTPATIENT
Start: 2017-09-25 | End: 2017-09-28 | Stop reason: HOSPADM

## 2017-09-25 RX ORDER — ALBUTEROL SULFATE 2.5 MG/3ML
2.5 SOLUTION RESPIRATORY (INHALATION) EVERY 4 HOURS PRN
Status: DISCONTINUED | OUTPATIENT
Start: 2017-09-25 | End: 2017-09-28 | Stop reason: HOSPADM

## 2017-09-25 RX ORDER — DONEPEZIL HYDROCHLORIDE 10 MG/1
10 TABLET, FILM COATED ORAL NIGHTLY
Status: DISCONTINUED | OUTPATIENT
Start: 2017-09-25 | End: 2017-09-28 | Stop reason: HOSPADM

## 2017-09-25 RX ORDER — DEXTROSE MONOHYDRATE 25 G/50ML
25 INJECTION, SOLUTION INTRAVENOUS
Status: DISCONTINUED | OUTPATIENT
Start: 2017-09-25 | End: 2017-09-28 | Stop reason: HOSPADM

## 2017-09-25 RX ORDER — IPRATROPIUM BROMIDE AND ALBUTEROL SULFATE 2.5; .5 MG/3ML; MG/3ML
3 SOLUTION RESPIRATORY (INHALATION)
Status: DISCONTINUED | OUTPATIENT
Start: 2017-09-25 | End: 2017-09-28 | Stop reason: HOSPADM

## 2017-09-25 RX ADMIN — DILTIAZEM HYDROCHLORIDE 2.5 MG/HR: 5 INJECTION INTRAVENOUS at 22:58

## 2017-09-25 RX ADMIN — HYDRALAZINE HYDROCHLORIDE 10 MG: 10 TABLET, FILM COATED ORAL at 21:12

## 2017-09-25 RX ADMIN — DONEPEZIL HYDROCHLORIDE 10 MG: 10 TABLET, FILM COATED ORAL at 21:12

## 2017-09-25 RX ADMIN — HYDROCODONE BITARTRATE AND ACETAMINOPHEN 1 TABLET: 7.5; 325 TABLET ORAL at 21:11

## 2017-09-25 RX ADMIN — IPRATROPIUM BROMIDE AND ALBUTEROL SULFATE 3 ML: 2.5; .5 SOLUTION RESPIRATORY (INHALATION) at 20:00

## 2017-09-25 RX ADMIN — FAMOTIDINE 20 MG: 20 TABLET ORAL at 21:12

## 2017-09-25 RX ADMIN — FUROSEMIDE 20 MG: 10 INJECTION, SOLUTION INTRAVENOUS at 21:11

## 2017-09-25 RX ADMIN — ISOSORBIDE DINITRATE 10 MG: 5 TABLET ORAL at 21:12

## 2017-09-25 NOTE — TELEPHONE ENCOUNTER
9/25/17  Called this morning with continuing shortness of breath. I spoke with him on the phone and he was short of breath between sentences. He told me his vital signs: 137/77, HF 75, SPO2 95%. Weight was 172lbs. He said that he gained two pounds overnight. OK to take additional 20mg Lasix this AM.   ue to continued dyspnea that is chronic, most likely COPD/PH in nature. He is still finishing medrol pack. He has had two nebulizer treatments this am.   I encouraged an ER visit should his dyspnea worsen. He does not seem terribly concerned, however, his wife does. I spoke with him briefly about the worsening of his condition and the possibility of him not feeling much better. He may need the addition of a pallative care approach vs. Hospitalization.     9/22/2017  Mrs. Ham called regarding Mr. Ham's heart rate. She said his pulse rate has been as high as 107 after activity. I reviewed his hospital notes. HR varied from . No BB at home due to severe COPD and history of Bradycardia. With recent escalation of steroids and excitement at home- I told Mrs. Ham to focus on how Mr. Ham was feeling and not his pulse rate. It varies. Went back and reviewed ZIO patch from March- HR varied from 30 to 134.   Considered Clonidine if BP not well controlled. However, with the addition of Hydralazine + TID Isordil, his BP has been better controlled in the 130s.   Mr. Ham is coming in to see me in office on Wednesday. Will see sooner if needed.   Of course, if SOA occurs or dizziness/palpitations present to the ER for evaluation.

## 2017-09-26 ENCOUNTER — APPOINTMENT (OUTPATIENT)
Dept: GENERAL RADIOLOGY | Facility: HOSPITAL | Age: 82
End: 2017-09-26

## 2017-09-26 ENCOUNTER — APPOINTMENT (OUTPATIENT)
Dept: CARDIOLOGY | Facility: HOSPITAL | Age: 82
End: 2017-09-26
Attending: INTERNAL MEDICINE

## 2017-09-26 LAB
ANION GAP SERPL CALCULATED.3IONS-SCNC: 12 MMOL/L (ref 5–15)
BH CV ECHO MEAS - ACS: 1.9 CM
BH CV ECHO MEAS - AO MAX PG (FULL): 4.3 MMHG
BH CV ECHO MEAS - AO MAX PG: 8.9 MMHG
BH CV ECHO MEAS - AO MEAN PG (FULL): 1.6 MMHG
BH CV ECHO MEAS - AO MEAN PG: 4.2 MMHG
BH CV ECHO MEAS - AO ROOT AREA (BSA CORRECTED): 2
BH CV ECHO MEAS - AO ROOT AREA: 8.7 CM^2
BH CV ECHO MEAS - AO ROOT DIAM: 3.3 CM
BH CV ECHO MEAS - AO V2 MAX: 149 CM/SEC
BH CV ECHO MEAS - AO V2 MEAN: 96.3 CM/SEC
BH CV ECHO MEAS - AO V2 VTI: 22.1 CM
BH CV ECHO MEAS - AVA(I,A): 3.3 CM^2
BH CV ECHO MEAS - AVA(I,D): 3.3 CM^2
BH CV ECHO MEAS - AVA(V,A): 2.9 CM^2
BH CV ECHO MEAS - AVA(V,D): 2.9 CM^2
BH CV ECHO MEAS - BSA(HAYCOCK): 1.6 M^2
BH CV ECHO MEAS - BSA: 1.7 M^2
BH CV ECHO MEAS - BZI_BMI: 19.7 KILOGRAMS/M^2
BH CV ECHO MEAS - BZI_METRIC_HEIGHT: 170 CM
BH CV ECHO MEAS - BZI_METRIC_WEIGHT: 57 KG
BH CV ECHO MEAS - EDV(CUBED): 105.9 ML
BH CV ECHO MEAS - EDV(TEICH): 103.9 ML
BH CV ECHO MEAS - EF(CUBED): 54.7 %
BH CV ECHO MEAS - EF(TEICH): 46.4 %
BH CV ECHO MEAS - ESV(CUBED): 48 ML
BH CV ECHO MEAS - ESV(TEICH): 55.7 ML
BH CV ECHO MEAS - FS: 23.2 %
BH CV ECHO MEAS - IVS/LVPW: 1.8
BH CV ECHO MEAS - IVSD: 1.6 CM
BH CV ECHO MEAS - LA DIMENSION: 4.1 CM
BH CV ECHO MEAS - LA/AO: 1.2
BH CV ECHO MEAS - LV MASS(C)D: 237.8 GRAMS
BH CV ECHO MEAS - LV MASS(C)DI: 143.4 GRAMS/M^2
BH CV ECHO MEAS - LV MAX PG: 4.6 MMHG
BH CV ECHO MEAS - LV MEAN PG: 2.6 MMHG
BH CV ECHO MEAS - LV V1 MAX: 107 CM/SEC
BH CV ECHO MEAS - LV V1 MEAN: 76.5 CM/SEC
BH CV ECHO MEAS - LV V1 VTI: 18.3 CM
BH CV ECHO MEAS - LVIDD: 4.7 CM
BH CV ECHO MEAS - LVIDS: 3.6 CM
BH CV ECHO MEAS - LVOT AREA: 4 CM^2
BH CV ECHO MEAS - LVOT DIAM: 2.3 CM
BH CV ECHO MEAS - LVPWD: 0.94 CM
BH CV ECHO MEAS - MR MAX PG: 38.9 MMHG
BH CV ECHO MEAS - MR MAX VEL: 312 CM/SEC
BH CV ECHO MEAS - MV A MAX VEL: 29.5 CM/SEC
BH CV ECHO MEAS - MV E MAX VEL: 113.7 CM/SEC
BH CV ECHO MEAS - MV E/A: 3.9
BH CV ECHO MEAS - MV MAX PG: 4.4 MMHG
BH CV ECHO MEAS - MV MEAN PG: 1.9 MMHG
BH CV ECHO MEAS - MV P1/2T MAX VEL: 104 CM/SEC
BH CV ECHO MEAS - MV V2 MAX: 105 CM/SEC
BH CV ECHO MEAS - MV V2 MEAN: 63.7 CM/SEC
BH CV ECHO MEAS - MV V2 VTI: 22.1 CM
BH CV ECHO MEAS - MVA P1/2T LCG: 2.1 CM^2
BH CV ECHO MEAS - MVA(VTI): 3.3 CM^2
BH CV ECHO MEAS - PA MAX PG: 4.9 MMHG
BH CV ECHO MEAS - PA V2 MAX: 111.2 CM/SEC
BH CV ECHO MEAS - PI END-D VEL: 56.3 CM/SEC
BH CV ECHO MEAS - RAP SYSTOLE: 5 MMHG
BH CV ECHO MEAS - RVDD: 3.4 CM
BH CV ECHO MEAS - RVSP: 35 MMHG
BH CV ECHO MEAS - SI(AO): 115.4 ML/M^2
BH CV ECHO MEAS - SI(CUBED): 34.9 ML/M^2
BH CV ECHO MEAS - SI(LVOT): 43.8 ML/M^2
BH CV ECHO MEAS - SI(TEICH): 29.1 ML/M^2
BH CV ECHO MEAS - SV(AO): 191.4 ML
BH CV ECHO MEAS - SV(CUBED): 57.9 ML
BH CV ECHO MEAS - SV(LVOT): 72.7 ML
BH CV ECHO MEAS - SV(TEICH): 48.3 ML
BUN BLD-MCNC: 48 MG/DL (ref 7–21)
BUN/CREAT SERPL: 39.7 (ref 7–25)
CALCIUM SPEC-SCNC: 9.5 MG/DL (ref 8.4–10.2)
CHLORIDE SERPL-SCNC: 100 MMOL/L (ref 95–110)
CO2 SERPL-SCNC: 25 MMOL/L (ref 22–31)
CREAT BLD-MCNC: 1.21 MG/DL (ref 0.7–1.3)
DEPRECATED RDW RBC AUTO: 53.1 FL (ref 35.1–43.9)
ERYTHROCYTE [DISTWIDTH] IN BLOOD BY AUTOMATED COUNT: 15.8 % (ref 11.5–14.5)
GFR SERPL CREATININE-BSD FRML MDRD: 57 ML/MIN/1.73 (ref 42–98)
GLUCOSE BLD-MCNC: 102 MG/DL (ref 60–100)
GLUCOSE BLDC GLUCOMTR-MCNC: 101 MG/DL (ref 70–130)
GLUCOSE BLDC GLUCOMTR-MCNC: 114 MG/DL (ref 70–130)
GLUCOSE BLDC GLUCOMTR-MCNC: 118 MG/DL (ref 70–130)
GLUCOSE BLDC GLUCOMTR-MCNC: 123 MG/DL (ref 70–130)
GLUCOSE BLDC GLUCOMTR-MCNC: 129 MG/DL (ref 70–130)
GLUCOSE BLDC GLUCOMTR-MCNC: 174 MG/DL (ref 70–130)
HCT VFR BLD AUTO: 36.8 % (ref 39–49)
HGB BLD-MCNC: 12.4 G/DL (ref 13.7–17.3)
MCH RBC QN AUTO: 30.8 PG (ref 26.5–34)
MCHC RBC AUTO-ENTMCNC: 33.7 G/DL (ref 31.5–36.3)
MCV RBC AUTO: 91.5 FL (ref 80–98)
PLATELET # BLD AUTO: 172 10*3/MM3 (ref 150–450)
PMV BLD AUTO: 10.4 FL (ref 8–12)
POTASSIUM BLD-SCNC: 5.1 MMOL/L (ref 3.5–5.1)
RBC # BLD AUTO: 4.02 10*6/MM3 (ref 4.37–5.74)
SODIUM BLD-SCNC: 137 MMOL/L (ref 137–145)
WBC NRBC COR # BLD: 23.05 10*3/MM3 (ref 3.2–9.8)

## 2017-09-26 PROCEDURE — 94760 N-INVAS EAR/PLS OXIMETRY 1: CPT

## 2017-09-26 PROCEDURE — 96376 TX/PRO/DX INJ SAME DRUG ADON: CPT

## 2017-09-26 PROCEDURE — 93005 ELECTROCARDIOGRAM TRACING: CPT | Performed by: INTERNAL MEDICINE

## 2017-09-26 PROCEDURE — 71020 HC CHEST PA AND LATERAL: CPT

## 2017-09-26 PROCEDURE — 93306 TTE W/DOPPLER COMPLETE: CPT | Performed by: INTERNAL MEDICINE

## 2017-09-26 PROCEDURE — 82962 GLUCOSE BLOOD TEST: CPT

## 2017-09-26 PROCEDURE — 93306 TTE W/DOPPLER COMPLETE: CPT

## 2017-09-26 PROCEDURE — 94799 UNLISTED PULMONARY SVC/PX: CPT

## 2017-09-26 PROCEDURE — 80048 BASIC METABOLIC PNL TOTAL CA: CPT | Performed by: INTERNAL MEDICINE

## 2017-09-26 PROCEDURE — 99222 1ST HOSP IP/OBS MODERATE 55: CPT | Performed by: NURSE PRACTITIONER

## 2017-09-26 PROCEDURE — 96366 THER/PROPH/DIAG IV INF ADDON: CPT

## 2017-09-26 PROCEDURE — 85027 COMPLETE CBC AUTOMATED: CPT | Performed by: INTERNAL MEDICINE

## 2017-09-26 PROCEDURE — G0378 HOSPITAL OBSERVATION PER HR: HCPCS

## 2017-09-26 PROCEDURE — 93010 ELECTROCARDIOGRAM REPORT: CPT | Performed by: INTERNAL MEDICINE

## 2017-09-26 PROCEDURE — 25010000002 FUROSEMIDE PER 20 MG: Performed by: INTERNAL MEDICINE

## 2017-09-26 PROCEDURE — 63710000001 INSULIN ASPART PER 5 UNITS: Performed by: INTERNAL MEDICINE

## 2017-09-26 RX ORDER — DILTIAZEM HYDROCHLORIDE 240 MG/1
240 CAPSULE, COATED, EXTENDED RELEASE ORAL
Status: DISCONTINUED | OUTPATIENT
Start: 2017-09-26 | End: 2017-09-28 | Stop reason: HOSPADM

## 2017-09-26 RX ORDER — FUROSEMIDE 20 MG/1
20 TABLET ORAL DAILY
Status: DISCONTINUED | OUTPATIENT
Start: 2017-09-26 | End: 2017-09-28 | Stop reason: HOSPADM

## 2017-09-26 RX ADMIN — DILTIAZEM HYDROCHLORIDE 240 MG: 240 CAPSULE, COATED, EXTENDED RELEASE ORAL at 12:26

## 2017-09-26 RX ADMIN — ISOSORBIDE DINITRATE 10 MG: 5 TABLET ORAL at 09:13

## 2017-09-26 RX ADMIN — ISOSORBIDE DINITRATE 10 MG: 5 TABLET ORAL at 20:21

## 2017-09-26 RX ADMIN — HYDROCODONE BITARTRATE AND ACETAMINOPHEN 1 TABLET: 7.5; 325 TABLET ORAL at 20:21

## 2017-09-26 RX ADMIN — IPRATROPIUM BROMIDE AND ALBUTEROL SULFATE 3 ML: 2.5; .5 SOLUTION RESPIRATORY (INHALATION) at 18:47

## 2017-09-26 RX ADMIN — INSULIN ASPART 2 UNITS: 100 INJECTION, SOLUTION INTRAVENOUS; SUBCUTANEOUS at 20:22

## 2017-09-26 RX ADMIN — CLOPIDOGREL BISULFATE 75 MG: 75 TABLET ORAL at 09:13

## 2017-09-26 RX ADMIN — FAMOTIDINE 20 MG: 20 TABLET ORAL at 09:13

## 2017-09-26 RX ADMIN — HYDROCODONE BITARTRATE AND ACETAMINOPHEN 1 TABLET: 7.5; 325 TABLET ORAL at 11:48

## 2017-09-26 RX ADMIN — ISOSORBIDE DINITRATE 10 MG: 5 TABLET ORAL at 15:45

## 2017-09-26 RX ADMIN — DONEPEZIL HYDROCHLORIDE 10 MG: 10 TABLET, FILM COATED ORAL at 20:21

## 2017-09-26 RX ADMIN — FAMOTIDINE 20 MG: 20 TABLET ORAL at 18:37

## 2017-09-26 RX ADMIN — FUROSEMIDE 20 MG: 10 INJECTION, SOLUTION INTRAVENOUS at 09:13

## 2017-09-26 RX ADMIN — MAGNESIUM OXIDE TAB 400 MG (241.3 MG ELEMENTAL MG) 400 MG: 400 (241.3 MG) TAB at 09:13

## 2017-09-26 RX ADMIN — HYDRALAZINE HYDROCHLORIDE 10 MG: 10 TABLET, FILM COATED ORAL at 20:21

## 2017-09-26 RX ADMIN — HYDROCODONE BITARTRATE AND ACETAMINOPHEN 1 TABLET: 7.5; 325 TABLET ORAL at 03:18

## 2017-09-26 RX ADMIN — HYDRALAZINE HYDROCHLORIDE 10 MG: 10 TABLET, FILM COATED ORAL at 09:13

## 2017-09-27 LAB
GLUCOSE BLDC GLUCOMTR-MCNC: 105 MG/DL (ref 70–130)
GLUCOSE BLDC GLUCOMTR-MCNC: 119 MG/DL (ref 70–130)
GLUCOSE BLDC GLUCOMTR-MCNC: 149 MG/DL (ref 70–130)
GLUCOSE BLDC GLUCOMTR-MCNC: 209 MG/DL (ref 70–130)

## 2017-09-27 PROCEDURE — 82962 GLUCOSE BLOOD TEST: CPT

## 2017-09-27 PROCEDURE — 94760 N-INVAS EAR/PLS OXIMETRY 1: CPT

## 2017-09-27 PROCEDURE — G0378 HOSPITAL OBSERVATION PER HR: HCPCS

## 2017-09-27 PROCEDURE — 94799 UNLISTED PULMONARY SVC/PX: CPT

## 2017-09-27 PROCEDURE — 63710000001 INSULIN ASPART PER 5 UNITS: Performed by: INTERNAL MEDICINE

## 2017-09-27 RX ADMIN — HYDRALAZINE HYDROCHLORIDE 10 MG: 10 TABLET, FILM COATED ORAL at 08:40

## 2017-09-27 RX ADMIN — FAMOTIDINE 20 MG: 20 TABLET ORAL at 08:39

## 2017-09-27 RX ADMIN — HYDROCODONE BITARTRATE AND ACETAMINOPHEN 1 TABLET: 7.5; 325 TABLET ORAL at 05:25

## 2017-09-27 RX ADMIN — HYDROCODONE BITARTRATE AND ACETAMINOPHEN 1 TABLET: 7.5; 325 TABLET ORAL at 20:28

## 2017-09-27 RX ADMIN — DILTIAZEM HYDROCHLORIDE 240 MG: 240 CAPSULE, COATED, EXTENDED RELEASE ORAL at 08:39

## 2017-09-27 RX ADMIN — IPRATROPIUM BROMIDE AND ALBUTEROL SULFATE 3 ML: 2.5; .5 SOLUTION RESPIRATORY (INHALATION) at 06:39

## 2017-09-27 RX ADMIN — FUROSEMIDE 20 MG: 20 TABLET ORAL at 08:47

## 2017-09-27 RX ADMIN — CLOPIDOGREL BISULFATE 75 MG: 75 TABLET ORAL at 08:39

## 2017-09-27 RX ADMIN — FAMOTIDINE 20 MG: 20 TABLET ORAL at 17:20

## 2017-09-27 RX ADMIN — DONEPEZIL HYDROCHLORIDE 10 MG: 10 TABLET, FILM COATED ORAL at 20:27

## 2017-09-27 RX ADMIN — ISOSORBIDE DINITRATE 10 MG: 5 TABLET ORAL at 08:39

## 2017-09-27 RX ADMIN — IPRATROPIUM BROMIDE AND ALBUTEROL SULFATE 3 ML: 2.5; .5 SOLUTION RESPIRATORY (INHALATION) at 19:41

## 2017-09-27 RX ADMIN — ISOSORBIDE DINITRATE 10 MG: 5 TABLET ORAL at 17:20

## 2017-09-27 RX ADMIN — IPRATROPIUM BROMIDE AND ALBUTEROL SULFATE 3 ML: 2.5; .5 SOLUTION RESPIRATORY (INHALATION) at 12:12

## 2017-09-27 RX ADMIN — HYDROCODONE BITARTRATE AND ACETAMINOPHEN 1 TABLET: 7.5; 325 TABLET ORAL at 13:03

## 2017-09-27 RX ADMIN — HYDRALAZINE HYDROCHLORIDE 10 MG: 10 TABLET, FILM COATED ORAL at 20:27

## 2017-09-27 RX ADMIN — INSULIN ASPART 3 UNITS: 100 INJECTION, SOLUTION INTRAVENOUS; SUBCUTANEOUS at 17:20

## 2017-09-27 RX ADMIN — MAGNESIUM OXIDE TAB 400 MG (241.3 MG ELEMENTAL MG) 400 MG: 400 (241.3 MG) TAB at 08:40

## 2017-09-27 RX ADMIN — ISOSORBIDE DINITRATE 10 MG: 5 TABLET ORAL at 20:27

## 2017-09-28 VITALS
HEIGHT: 67 IN | HEART RATE: 82 BPM | TEMPERATURE: 97.1 F | BODY MASS INDEX: 27.69 KG/M2 | OXYGEN SATURATION: 95 % | RESPIRATION RATE: 18 BRPM | WEIGHT: 176.4 LBS | SYSTOLIC BLOOD PRESSURE: 142 MMHG | DIASTOLIC BLOOD PRESSURE: 71 MMHG

## 2017-09-28 LAB — GLUCOSE BLDC GLUCOMTR-MCNC: 140 MG/DL (ref 70–130)

## 2017-09-28 PROCEDURE — 93010 ELECTROCARDIOGRAM REPORT: CPT | Performed by: INTERNAL MEDICINE

## 2017-09-28 PROCEDURE — 94799 UNLISTED PULMONARY SVC/PX: CPT

## 2017-09-28 PROCEDURE — G0378 HOSPITAL OBSERVATION PER HR: HCPCS

## 2017-09-28 PROCEDURE — 82962 GLUCOSE BLOOD TEST: CPT

## 2017-09-28 PROCEDURE — 94760 N-INVAS EAR/PLS OXIMETRY 1: CPT

## 2017-09-28 PROCEDURE — 93005 ELECTROCARDIOGRAM TRACING: CPT | Performed by: NURSE PRACTITIONER

## 2017-09-28 RX ORDER — IPRATROPIUM BROMIDE AND ALBUTEROL SULFATE 2.5; .5 MG/3ML; MG/3ML
3 SOLUTION RESPIRATORY (INHALATION) 4 TIMES DAILY
Qty: 120 VIAL | Refills: 1 | Status: SHIPPED | OUTPATIENT
Start: 2017-09-28

## 2017-09-28 RX ORDER — ALBUTEROL SULFATE 2.5 MG/3ML
2.5 SOLUTION RESPIRATORY (INHALATION) EVERY 4 HOURS PRN
Qty: 125 VIAL | Refills: 11 | Status: SHIPPED | OUTPATIENT
Start: 2017-09-28 | End: 2017-12-23

## 2017-09-28 RX ORDER — DILTIAZEM HYDROCHLORIDE 240 MG/1
240 CAPSULE, COATED, EXTENDED RELEASE ORAL
Qty: 30 CAPSULE | Refills: 1 | Status: SHIPPED | OUTPATIENT
Start: 2017-09-29 | End: 2017-10-17 | Stop reason: HOSPADM

## 2017-09-28 RX ORDER — LOSARTAN POTASSIUM 25 MG/1
12.5 TABLET ORAL DAILY
Qty: 30 TABLET | Refills: 1 | Status: SHIPPED | OUTPATIENT
Start: 2017-09-28 | End: 2017-10-17 | Stop reason: HOSPADM

## 2017-09-28 RX ADMIN — IPRATROPIUM BROMIDE AND ALBUTEROL SULFATE 3 ML: 2.5; .5 SOLUTION RESPIRATORY (INHALATION) at 06:24

## 2017-09-28 RX ADMIN — FUROSEMIDE 20 MG: 20 TABLET ORAL at 08:50

## 2017-09-28 RX ADMIN — DILTIAZEM HYDROCHLORIDE 240 MG: 240 CAPSULE, COATED, EXTENDED RELEASE ORAL at 08:50

## 2017-09-28 RX ADMIN — HYDROCODONE BITARTRATE AND ACETAMINOPHEN 1 TABLET: 7.5; 325 TABLET ORAL at 04:30

## 2017-09-28 RX ADMIN — CLOPIDOGREL BISULFATE 75 MG: 75 TABLET ORAL at 08:49

## 2017-09-28 RX ADMIN — FAMOTIDINE 20 MG: 20 TABLET ORAL at 08:50

## 2017-09-28 RX ADMIN — IPRATROPIUM BROMIDE AND ALBUTEROL SULFATE 3 ML: 2.5; .5 SOLUTION RESPIRATORY (INHALATION) at 12:15

## 2017-09-28 RX ADMIN — HYDRALAZINE HYDROCHLORIDE 10 MG: 10 TABLET, FILM COATED ORAL at 08:50

## 2017-09-28 RX ADMIN — MAGNESIUM OXIDE TAB 400 MG (241.3 MG ELEMENTAL MG) 400 MG: 400 (241.3 MG) TAB at 08:50

## 2017-09-28 RX ADMIN — ISOSORBIDE DINITRATE 10 MG: 5 TABLET ORAL at 08:49

## 2017-10-09 ENCOUNTER — APPOINTMENT (OUTPATIENT)
Dept: GENERAL RADIOLOGY | Facility: HOSPITAL | Age: 82
End: 2017-10-09

## 2017-10-09 ENCOUNTER — HOSPITAL ENCOUNTER (OUTPATIENT)
Facility: HOSPITAL | Age: 82
Setting detail: OBSERVATION
Discharge: HOME HEALTH | End: 2017-10-17
Attending: EMERGENCY MEDICINE | Admitting: FAMILY MEDICINE

## 2017-10-09 ENCOUNTER — TELEPHONE (OUTPATIENT)
Dept: CARDIOLOGY | Facility: CLINIC | Age: 82
End: 2017-10-09

## 2017-10-09 DIAGNOSIS — J18.9 PNEUMONIA OF BOTH LOWER LOBES DUE TO INFECTIOUS ORGANISM: ICD-10-CM

## 2017-10-09 DIAGNOSIS — R77.8 TROPONIN I ABOVE REFERENCE RANGE: ICD-10-CM

## 2017-10-09 DIAGNOSIS — R06.02 SHORTNESS OF BREATH: Primary | ICD-10-CM

## 2017-10-09 DIAGNOSIS — J42 CHRONIC BRONCHITIS, UNSPECIFIED CHRONIC BRONCHITIS TYPE (HCC): Chronic | ICD-10-CM

## 2017-10-09 DIAGNOSIS — Z74.09 IMPAIRED FUNCTIONAL MOBILITY, BALANCE, GAIT, AND ENDURANCE: ICD-10-CM

## 2017-10-09 DIAGNOSIS — Z78.9 IMPAIRED MOBILITY AND ADLS: ICD-10-CM

## 2017-10-09 DIAGNOSIS — I50.30 (HFPEF) HEART FAILURE WITH PRESERVED EJECTION FRACTION (HCC): ICD-10-CM

## 2017-10-09 DIAGNOSIS — L27.0 DRUG RASH: ICD-10-CM

## 2017-10-09 DIAGNOSIS — Z74.09 IMPAIRED MOBILITY AND ADLS: ICD-10-CM

## 2017-10-09 DIAGNOSIS — J43.2 CENTRILOBULAR EMPHYSEMA (HCC): ICD-10-CM

## 2017-10-09 LAB
ALBUMIN SERPL-MCNC: 3.7 G/DL (ref 3.4–4.8)
ALBUMIN/GLOB SERPL: 1.5 G/DL (ref 1.1–1.8)
ALP SERPL-CCNC: 72 U/L (ref 38–126)
ALT SERPL W P-5'-P-CCNC: 41 U/L (ref 21–72)
ANION GAP SERPL CALCULATED.3IONS-SCNC: 13 MMOL/L (ref 5–15)
ANION GAP SERPL CALCULATED.3IONS-SCNC: 13 MMOL/L (ref 5–15)
APTT PPP: 34 SECONDS (ref 20–40.3)
AST SERPL-CCNC: 21 U/L (ref 17–59)
BASOPHILS # BLD AUTO: 0.01 10*3/MM3 (ref 0–0.2)
BASOPHILS # BLD AUTO: 0.01 10*3/MM3 (ref 0–0.2)
BASOPHILS NFR BLD AUTO: 0.1 % (ref 0–2)
BASOPHILS NFR BLD AUTO: 0.1 % (ref 0–2)
BILIRUB SERPL-MCNC: 0.4 MG/DL (ref 0.2–1.3)
BUN BLD-MCNC: 20 MG/DL (ref 7–21)
BUN BLD-MCNC: 22 MG/DL (ref 7–21)
BUN/CREAT SERPL: 20.4 (ref 7–25)
BUN/CREAT SERPL: 21.2 (ref 7–25)
CALCIUM SPEC-SCNC: 8.9 MG/DL (ref 8.4–10.2)
CALCIUM SPEC-SCNC: 9.1 MG/DL (ref 8.4–10.2)
CHLORIDE SERPL-SCNC: 102 MMOL/L (ref 95–110)
CHLORIDE SERPL-SCNC: 103 MMOL/L (ref 95–110)
CO2 SERPL-SCNC: 24 MMOL/L (ref 22–31)
CO2 SERPL-SCNC: 24 MMOL/L (ref 22–31)
CREAT BLD-MCNC: 0.98 MG/DL (ref 0.7–1.3)
CREAT BLD-MCNC: 1.04 MG/DL (ref 0.7–1.3)
D-LACTATE SERPL-SCNC: 3.9 MMOL/L (ref 0.5–2)
D-LACTATE SERPL-SCNC: 4.7 MMOL/L (ref 0.5–2)
DEPRECATED RDW RBC AUTO: 53.5 FL (ref 35.1–43.9)
DEPRECATED RDW RBC AUTO: 53.6 FL (ref 35.1–43.9)
EOSINOPHIL # BLD AUTO: 0 10*3/MM3 (ref 0–0.7)
EOSINOPHIL # BLD AUTO: 0.01 10*3/MM3 (ref 0–0.7)
EOSINOPHIL NFR BLD AUTO: 0 % (ref 0–7)
EOSINOPHIL NFR BLD AUTO: 0.1 % (ref 0–7)
ERYTHROCYTE [DISTWIDTH] IN BLOOD BY AUTOMATED COUNT: 15.6 % (ref 11.5–14.5)
ERYTHROCYTE [DISTWIDTH] IN BLOOD BY AUTOMATED COUNT: 15.8 % (ref 11.5–14.5)
GFR SERPL CREATININE-BSD FRML MDRD: 68 ML/MIN/1.73 (ref 42–98)
GFR SERPL CREATININE-BSD FRML MDRD: 73 ML/MIN/1.73 (ref 60–98)
GLOBULIN UR ELPH-MCNC: 2.4 GM/DL (ref 2.3–3.5)
GLUCOSE BLD-MCNC: 124 MG/DL (ref 60–100)
GLUCOSE BLD-MCNC: 206 MG/DL (ref 60–100)
GLUCOSE BLDC GLUCOMTR-MCNC: 164 MG/DL (ref 70–130)
HCT VFR BLD AUTO: 33.3 % (ref 39–49)
HCT VFR BLD AUTO: 34.6 % (ref 39–49)
HGB BLD-MCNC: 10.7 G/DL (ref 13.7–17.3)
HGB BLD-MCNC: 11 G/DL (ref 13.7–17.3)
HOLD SPECIMEN: NORMAL
HOLD SPECIMEN: NORMAL
IMM GRANULOCYTES # BLD: 0.06 10*3/MM3 (ref 0–0.02)
IMM GRANULOCYTES # BLD: 0.09 10*3/MM3 (ref 0–0.02)
IMM GRANULOCYTES NFR BLD: 0.4 % (ref 0–0.5)
IMM GRANULOCYTES NFR BLD: 0.6 % (ref 0–0.5)
INR PPP: 1.03 (ref 0.8–1.2)
LYMPHOCYTES # BLD AUTO: 0.36 10*3/MM3 (ref 0.6–4.2)
LYMPHOCYTES # BLD AUTO: 0.52 10*3/MM3 (ref 0.6–4.2)
LYMPHOCYTES NFR BLD AUTO: 2.6 % (ref 10–50)
LYMPHOCYTES NFR BLD AUTO: 3.6 % (ref 10–50)
MAGNESIUM SERPL-MCNC: 2 MG/DL (ref 1.6–2.3)
MCH RBC QN AUTO: 30.1 PG (ref 26.5–34)
MCH RBC QN AUTO: 30.4 PG (ref 26.5–34)
MCHC RBC AUTO-ENTMCNC: 31.8 G/DL (ref 31.5–36.3)
MCHC RBC AUTO-ENTMCNC: 32.1 G/DL (ref 31.5–36.3)
MCV RBC AUTO: 94.5 FL (ref 80–98)
MCV RBC AUTO: 94.6 FL (ref 80–98)
MONOCYTES # BLD AUTO: 0.28 10*3/MM3 (ref 0–0.9)
MONOCYTES # BLD AUTO: 0.47 10*3/MM3 (ref 0–0.9)
MONOCYTES NFR BLD AUTO: 2 % (ref 0–12)
MONOCYTES NFR BLD AUTO: 3.2 % (ref 0–12)
NEUTROPHILS # BLD AUTO: 12.96 10*3/MM3 (ref 2–8.6)
NEUTROPHILS # BLD AUTO: 13.39 10*3/MM3 (ref 2–8.6)
NEUTROPHILS NFR BLD AUTO: 92.4 % (ref 37–80)
NEUTROPHILS NFR BLD AUTO: 94.9 % (ref 37–80)
NT-PROBNP SERPL-MCNC: 444 PG/ML (ref 0–1800)
NT-PROBNP SERPL-MCNC: 494 PG/ML (ref 0–1800)
PHOSPHATE SERPL-MCNC: 2.8 MG/DL (ref 2.4–4.4)
PLATELET # BLD AUTO: 239 10*3/MM3 (ref 150–450)
PLATELET # BLD AUTO: 251 10*3/MM3 (ref 150–450)
PMV BLD AUTO: 10.8 FL (ref 8–12)
PMV BLD AUTO: 11.1 FL (ref 8–12)
POTASSIUM BLD-SCNC: 4 MMOL/L (ref 3.5–5.1)
POTASSIUM BLD-SCNC: 4.3 MMOL/L (ref 3.5–5.1)
PROT SERPL-MCNC: 6.1 G/DL (ref 6.3–8.6)
PROTHROMBIN TIME: 13.4 SECONDS (ref 11.1–15.3)
RBC # BLD AUTO: 3.52 10*6/MM3 (ref 4.37–5.74)
RBC # BLD AUTO: 3.66 10*6/MM3 (ref 4.37–5.74)
SODIUM BLD-SCNC: 139 MMOL/L (ref 137–145)
SODIUM BLD-SCNC: 140 MMOL/L (ref 137–145)
T4 FREE SERPL-MCNC: 0.83 NG/DL (ref 0.78–2.19)
TROPONIN I SERPL-MCNC: 0.04 NG/ML
TROPONIN I SERPL-MCNC: 0.04 NG/ML
TROPONIN I SERPL-MCNC: 0.05 NG/ML
WBC NRBC COR # BLD: 13.67 10*3/MM3 (ref 3.2–9.8)
WBC NRBC COR # BLD: 14.49 10*3/MM3 (ref 3.2–9.8)
WHOLE BLOOD HOLD SPECIMEN: NORMAL
WHOLE BLOOD HOLD SPECIMEN: NORMAL

## 2017-10-09 PROCEDURE — 93005 ELECTROCARDIOGRAM TRACING: CPT | Performed by: EMERGENCY MEDICINE

## 2017-10-09 PROCEDURE — 71020 HC CHEST PA AND LATERAL: CPT

## 2017-10-09 PROCEDURE — 85025 COMPLETE CBC W/AUTO DIFF WBC: CPT | Performed by: EMERGENCY MEDICINE

## 2017-10-09 PROCEDURE — 93010 ELECTROCARDIOGRAM REPORT: CPT | Performed by: INTERNAL MEDICINE

## 2017-10-09 PROCEDURE — 94799 UNLISTED PULMONARY SVC/PX: CPT

## 2017-10-09 PROCEDURE — 94760 N-INVAS EAR/PLS OXIMETRY 1: CPT

## 2017-10-09 PROCEDURE — 85730 THROMBOPLASTIN TIME PARTIAL: CPT | Performed by: EMERGENCY MEDICINE

## 2017-10-09 PROCEDURE — 96361 HYDRATE IV INFUSION ADD-ON: CPT

## 2017-10-09 PROCEDURE — 87040 BLOOD CULTURE FOR BACTERIA: CPT | Performed by: EMERGENCY MEDICINE

## 2017-10-09 PROCEDURE — 63710000001 INSULIN ASPART PER 5 UNITS: Performed by: FAMILY MEDICINE

## 2017-10-09 PROCEDURE — 84484 ASSAY OF TROPONIN QUANT: CPT | Performed by: FAMILY MEDICINE

## 2017-10-09 PROCEDURE — 99285 EMERGENCY DEPT VISIT HI MDM: CPT

## 2017-10-09 PROCEDURE — 25010000002 PIPERACILLIN-TAZOBACTAM: Performed by: FAMILY MEDICINE

## 2017-10-09 PROCEDURE — 83735 ASSAY OF MAGNESIUM: CPT | Performed by: EMERGENCY MEDICINE

## 2017-10-09 PROCEDURE — 84439 ASSAY OF FREE THYROXINE: CPT | Performed by: EMERGENCY MEDICINE

## 2017-10-09 PROCEDURE — G0378 HOSPITAL OBSERVATION PER HR: HCPCS

## 2017-10-09 PROCEDURE — 84100 ASSAY OF PHOSPHORUS: CPT | Performed by: EMERGENCY MEDICINE

## 2017-10-09 PROCEDURE — 84484 ASSAY OF TROPONIN QUANT: CPT | Performed by: EMERGENCY MEDICINE

## 2017-10-09 PROCEDURE — 96374 THER/PROPH/DIAG INJ IV PUSH: CPT

## 2017-10-09 PROCEDURE — 82962 GLUCOSE BLOOD TEST: CPT

## 2017-10-09 PROCEDURE — 94640 AIRWAY INHALATION TREATMENT: CPT

## 2017-10-09 PROCEDURE — 25010000002 METHYLPREDNISOLONE PER 125 MG: Performed by: FAMILY MEDICINE

## 2017-10-09 PROCEDURE — 85610 PROTHROMBIN TIME: CPT | Performed by: EMERGENCY MEDICINE

## 2017-10-09 PROCEDURE — 83880 ASSAY OF NATRIURETIC PEPTIDE: CPT | Performed by: FAMILY MEDICINE

## 2017-10-09 PROCEDURE — 83880 ASSAY OF NATRIURETIC PEPTIDE: CPT | Performed by: EMERGENCY MEDICINE

## 2017-10-09 PROCEDURE — 25010000002 LEVOFLOXACIN PER 250 MG: Performed by: EMERGENCY MEDICINE

## 2017-10-09 PROCEDURE — 85025 COMPLETE CBC W/AUTO DIFF WBC: CPT | Performed by: FAMILY MEDICINE

## 2017-10-09 PROCEDURE — 80053 COMPREHEN METABOLIC PANEL: CPT | Performed by: EMERGENCY MEDICINE

## 2017-10-09 PROCEDURE — 83605 ASSAY OF LACTIC ACID: CPT | Performed by: EMERGENCY MEDICINE

## 2017-10-09 RX ORDER — HYDRALAZINE HYDROCHLORIDE 10 MG/1
10 TABLET, FILM COATED ORAL EVERY 12 HOURS SCHEDULED
Status: DISCONTINUED | OUTPATIENT
Start: 2017-10-09 | End: 2017-10-17 | Stop reason: HOSPADM

## 2017-10-09 RX ORDER — ISOSORBIDE DINITRATE 5 MG/1
10 TABLET ORAL 3 TIMES DAILY
Status: DISCONTINUED | OUTPATIENT
Start: 2017-10-09 | End: 2017-10-17 | Stop reason: HOSPADM

## 2017-10-09 RX ORDER — NITROGLYCERIN 0.4 MG/1
0.4 TABLET SUBLINGUAL
Status: DISCONTINUED | OUTPATIENT
Start: 2017-10-09 | End: 2017-10-17 | Stop reason: HOSPADM

## 2017-10-09 RX ORDER — SODIUM CHLORIDE 9 MG/ML
25 INJECTION, SOLUTION INTRAVENOUS CONTINUOUS
Status: DISCONTINUED | OUTPATIENT
Start: 2017-10-09 | End: 2017-10-11

## 2017-10-09 RX ORDER — DOXYCYCLINE HYCLATE 100 MG/1
100 CAPSULE ORAL 2 TIMES DAILY
COMMUNITY
Start: 2017-10-06 | End: 2017-10-17

## 2017-10-09 RX ORDER — DONEPEZIL HYDROCHLORIDE 10 MG/1
10 TABLET, FILM COATED ORAL DAILY
Status: DISCONTINUED | OUTPATIENT
Start: 2017-10-10 | End: 2017-10-17 | Stop reason: HOSPADM

## 2017-10-09 RX ORDER — GLIMEPIRIDE 2 MG/1
2 TABLET ORAL
COMMUNITY

## 2017-10-09 RX ORDER — NITROGLYCERIN 0.4 MG/1
0.4 TABLET SUBLINGUAL
Status: DISCONTINUED | OUTPATIENT
Start: 2017-10-09 | End: 2017-10-09 | Stop reason: SDUPTHER

## 2017-10-09 RX ORDER — ASPIRIN 81 MG/1
81 TABLET, CHEWABLE ORAL DAILY
Status: DISCONTINUED | OUTPATIENT
Start: 2017-10-10 | End: 2017-10-17 | Stop reason: HOSPADM

## 2017-10-09 RX ORDER — ASPIRIN 81 MG/1
81 TABLET, CHEWABLE ORAL ONCE
Status: COMPLETED | OUTPATIENT
Start: 2017-10-09 | End: 2017-10-09

## 2017-10-09 RX ORDER — FAMOTIDINE 20 MG/1
20 TABLET, FILM COATED ORAL 2 TIMES DAILY
Status: DISCONTINUED | OUTPATIENT
Start: 2017-10-09 | End: 2017-10-17 | Stop reason: HOSPADM

## 2017-10-09 RX ORDER — ASPIRIN 81 MG/1
81 TABLET ORAL DAILY
Status: DISCONTINUED | OUTPATIENT
Start: 2017-10-10 | End: 2017-10-09 | Stop reason: SDUPTHER

## 2017-10-09 RX ORDER — LEVOFLOXACIN 5 MG/ML
750 INJECTION, SOLUTION INTRAVENOUS ONCE
Status: COMPLETED | OUTPATIENT
Start: 2017-10-09 | End: 2017-10-09

## 2017-10-09 RX ORDER — SODIUM CHLORIDE 0.9 % (FLUSH) 0.9 %
1-10 SYRINGE (ML) INJECTION AS NEEDED
Status: DISCONTINUED | OUTPATIENT
Start: 2017-10-09 | End: 2017-10-09 | Stop reason: SDUPTHER

## 2017-10-09 RX ORDER — PANTOPRAZOLE SODIUM 40 MG/1
40 TABLET, DELAYED RELEASE ORAL
Status: DISCONTINUED | OUTPATIENT
Start: 2017-10-10 | End: 2017-10-17 | Stop reason: HOSPADM

## 2017-10-09 RX ORDER — ASPIRIN 81 MG/1
324 TABLET, CHEWABLE ORAL ONCE
Status: COMPLETED | OUTPATIENT
Start: 2017-10-09 | End: 2017-10-09

## 2017-10-09 RX ORDER — FUROSEMIDE 20 MG/1
20 TABLET ORAL DAILY
Status: DISCONTINUED | OUTPATIENT
Start: 2017-10-10 | End: 2017-10-17 | Stop reason: HOSPADM

## 2017-10-09 RX ORDER — DIPHENHYDRAMINE HCL 25 MG
25 CAPSULE ORAL EVERY 8 HOURS PRN
Status: DISCONTINUED | OUTPATIENT
Start: 2017-10-09 | End: 2017-10-17 | Stop reason: HOSPADM

## 2017-10-09 RX ORDER — IPRATROPIUM BROMIDE AND ALBUTEROL SULFATE 2.5; .5 MG/3ML; MG/3ML
3 SOLUTION RESPIRATORY (INHALATION) 4 TIMES DAILY
Status: DISCONTINUED | OUTPATIENT
Start: 2017-10-09 | End: 2017-10-09 | Stop reason: SDUPTHER

## 2017-10-09 RX ORDER — ONDANSETRON 2 MG/ML
4 INJECTION INTRAMUSCULAR; INTRAVENOUS EVERY 6 HOURS PRN
Status: DISCONTINUED | OUTPATIENT
Start: 2017-10-09 | End: 2017-10-17 | Stop reason: HOSPADM

## 2017-10-09 RX ORDER — METHYLPREDNISOLONE SODIUM SUCCINATE 125 MG/2ML
60 INJECTION, POWDER, LYOPHILIZED, FOR SOLUTION INTRAMUSCULAR; INTRAVENOUS EVERY 8 HOURS
Status: DISCONTINUED | OUTPATIENT
Start: 2017-10-09 | End: 2017-10-14

## 2017-10-09 RX ORDER — IPRATROPIUM BROMIDE AND ALBUTEROL SULFATE 2.5; .5 MG/3ML; MG/3ML
3 SOLUTION RESPIRATORY (INHALATION)
Status: DISCONTINUED | OUTPATIENT
Start: 2017-10-09 | End: 2017-10-17 | Stop reason: HOSPADM

## 2017-10-09 RX ORDER — HYDROCODONE BITARTRATE AND ACETAMINOPHEN 7.5; 325 MG/1; MG/1
1 TABLET ORAL EVERY 8 HOURS
Status: DISCONTINUED | OUTPATIENT
Start: 2017-10-09 | End: 2017-10-17 | Stop reason: HOSPADM

## 2017-10-09 RX ORDER — LEVOFLOXACIN 5 MG/ML
500 INJECTION, SOLUTION INTRAVENOUS EVERY 24 HOURS
Status: DISCONTINUED | OUTPATIENT
Start: 2017-10-10 | End: 2017-10-11

## 2017-10-09 RX ORDER — DILTIAZEM HYDROCHLORIDE 240 MG/1
240 CAPSULE, COATED, EXTENDED RELEASE ORAL
Status: DISCONTINUED | OUTPATIENT
Start: 2017-10-10 | End: 2017-10-11

## 2017-10-09 RX ORDER — DEXTROSE MONOHYDRATE 25 G/50ML
25 INJECTION, SOLUTION INTRAVENOUS
Status: DISCONTINUED | OUTPATIENT
Start: 2017-10-09 | End: 2017-10-17 | Stop reason: HOSPADM

## 2017-10-09 RX ORDER — FLUTICASONE PROPIONATE 50 MCG
2 SPRAY, SUSPENSION (ML) NASAL DAILY
Status: DISCONTINUED | OUTPATIENT
Start: 2017-10-10 | End: 2017-10-17 | Stop reason: HOSPADM

## 2017-10-09 RX ORDER — NICOTINE POLACRILEX 4 MG
15 LOZENGE BUCCAL
Status: DISCONTINUED | OUTPATIENT
Start: 2017-10-09 | End: 2017-10-17 | Stop reason: HOSPADM

## 2017-10-09 RX ORDER — FAMOTIDINE 20 MG/1
20 TABLET, FILM COATED ORAL DAILY
Status: DISCONTINUED | OUTPATIENT
Start: 2017-10-09 | End: 2017-10-09 | Stop reason: DRUGHIGH

## 2017-10-09 RX ORDER — ALBUTEROL SULFATE 2.5 MG/3ML
2.5 SOLUTION RESPIRATORY (INHALATION) EVERY 4 HOURS PRN
Status: DISCONTINUED | OUTPATIENT
Start: 2017-10-09 | End: 2017-10-17 | Stop reason: HOSPADM

## 2017-10-09 RX ADMIN — ASPIRIN 81 MG 324 MG: 81 TABLET ORAL at 16:57

## 2017-10-09 RX ADMIN — HYDROCODONE BITARTRATE AND ACETAMINOPHEN 1 TABLET: 7.5; 325 TABLET ORAL at 22:21

## 2017-10-09 RX ADMIN — ISOSORBIDE DINITRATE 10 MG: 5 TABLET ORAL at 22:05

## 2017-10-09 RX ADMIN — INSULIN ASPART 2 UNITS: 100 INJECTION, SOLUTION INTRAVENOUS; SUBCUTANEOUS at 22:05

## 2017-10-09 RX ADMIN — METHYLPREDNISOLONE SODIUM SUCCINATE 60 MG: 125 INJECTION, POWDER, FOR SOLUTION INTRAMUSCULAR; INTRAVENOUS at 19:57

## 2017-10-09 RX ADMIN — ASPIRIN 81 MG 81 MG: 81 TABLET ORAL at 22:06

## 2017-10-09 RX ADMIN — SODIUM CHLORIDE 100 ML/HR: 9 INJECTION, SOLUTION INTRAVENOUS at 22:04

## 2017-10-09 RX ADMIN — HYDRALAZINE HYDROCHLORIDE 10 MG: 10 TABLET, FILM COATED ORAL at 22:05

## 2017-10-09 RX ADMIN — IPRATROPIUM BROMIDE AND ALBUTEROL SULFATE 3 ML: 2.5; .5 SOLUTION RESPIRATORY (INHALATION) at 20:16

## 2017-10-09 RX ADMIN — SODIUM CHLORIDE 1000 ML: 9 INJECTION, SOLUTION INTRAVENOUS at 18:21

## 2017-10-09 RX ADMIN — FAMOTIDINE 20 MG: 20 TABLET ORAL at 22:05

## 2017-10-09 RX ADMIN — PIPERACILLIN SODIUM,TAZOBACTAM SODIUM 3.38 G: 3; .375 INJECTION, POWDER, FOR SOLUTION INTRAVENOUS at 22:05

## 2017-10-09 RX ADMIN — LEVOFLOXACIN 750 MG: 5 INJECTION, SOLUTION INTRAVENOUS at 16:58

## 2017-10-09 NOTE — ED TRIAGE NOTES
Pt reports started on 2 new medicines (losartan and cartia) when DC from hospital last week. Woke up with rash and SOA on Friday morning. Has been seen daily since without improvement. Started on Medrol Dosepak 10/8/17. Been taking Benadryl a few times each day. Reports rash is worse today. Fine, red raised rash over trunk/extremities.

## 2017-10-09 NOTE — ED NOTES
Pt is presented to ED with c/o generalized hives with itching and shortness of air.  Pt states this has been going on since Friday.  Pt states he has been seen at [facility] Friday, Saturday and Sunday.     Theresa Naranjo RN  10/09/17 3072

## 2017-10-09 NOTE — H&P
[facility] Admission      Date of Admission: 10/9/2017      Primary Care Physician: Paolo Rey MD      Chief Complaint   Patient presents with   • Shortness of Breath   • Itching       HPI:  Patient complains of 3 days history of cough, dyspnea, chills, and rash on his chest and abdomen .patient was recently discharged from the hospital and started don losartan and cardizem .  Patient denies headache , dizziness, visual changes, change in appetite , chest pain or palpitations, , abdominal pain , N/V/D , blood in the stool or urine or urinary symptoms, weakness numbness or tingling in the extremities.    Past Medical History:   Past Medical History:   Diagnosis Date   • Bilateral carotid artery stenosis    • Chronic obstructive pulmonary disease (COPD)    • Coronary arteriosclerosis    • Degenerative joint disease involving multiple joints    • Dementia    • Diabetes mellitus    • Hyperlipidemia    • Hypertension        Past Surgical History:   Past Surgical History:   Procedure Laterality Date   • CARDIAC CATHETERIZATION N/A 6/6/2017    Procedure: Right Heart Cath;  Surgeon: Jeff Maciel MD PhD;  Location: [facility] INVASIVE LOCATION;  Service:    • HERNIA REPAIR     • LUNG BIOPSY     • THORACOTOMY Left 1977   • VENTRAL HERNIA REPAIR         Family History:   Family History   Problem Relation Age of Onset   • Hypertension Father        Social History:   Social History     Social History   • Marital status:      Spouse name: N/A   • Number of children: N/A   • Years of education: N/A     Social History Main Topics   • Smoking status: Former Smoker   • Smokeless tobacco: Never Used   • Alcohol use No   • Drug use: No   • Sexual activity: Not Currently      Comment:      Other Topics Concern   • None     Social History Narrative       Allergies:   Allergies   Allergen Reactions   • Atorvastatin Anaphylaxis   • Pravastatin      Myalgia    • Azithromycin Rash   • Biaxin [Clarithromycin] Rash       Medications:   Prior to Admission medications    Medication Sig Start Date End Date Taking? Authorizing Provider   albuterol (PROVENTIL) (2.5 MG/3ML) 0.083% nebulizer solution Take 2.5 mg by nebulization Every 4 (Four) Hours As Needed for Wheezing. 9/28/17  Yes Jc Alba MD   aspirin 81 MG chewable tablet Chew 81 mg Daily.   Yes Historical Provider, MD   diltiaZEM CD (CARDIZEM CD) 240 MG 24 hr capsule Take 1 capsule by mouth Daily. 9/29/17  Yes Jc Alba MD   donepezil (ARICEPT) 10 MG tablet Take 10 mg by mouth Daily. 11/12/16  Yes Historical Provider, MD   doxycycline (VIBRAMYCIN) 100 MG capsule Take 100 mg by mouth 2 (Two) Times a Day. 10/6/17 10/17/17 Yes Historical Provider, MD   famotidine (PEPCID) 20 MG tablet Take 20 mg by mouth 2 (Two) Times a Day. 10/20/16  Yes Historical Provider, MD   fluticasone (FLONASE) 50 MCG/ACT nasal spray 2 sprays into each nostril Daily.   Yes Historical Provider, MD   furosemide (LASIX) 20 MG tablet Take 1 tablet by mouth Daily. 3/3/17  Yes Jeff Maciel MD PhD   hydrALAZINE (APRESOLINE) 10 MG tablet Take 1 tablet by mouth Every 12 (Twelve) Hours. 9/20/17  Yes Daylin G Levill, APRN   HYDROcodone-acetaminophen (NORCO) 7.5-325 MG per tablet Take 1 tablet by mouth Every 8 (Eight) Hours. 12/27/16  Yes Historical Provider, MD   ipratropium-albuterol (DUO-NEB) 0.5-2.5 mg/mL nebulizer Take 3 mL by nebulization 4 (Four) Times a Day. 9/28/17  Yes Jc Alba MD   isosorbide dinitrate (ISORDIL) 10 MG tablet Take 1 tablet by mouth 3 (Three) Times a Day. 9/20/17  Yes Daylin G Levill, APRN   losartan (COZAAR) 25 MG tablet Take 0.5 tablets by mouth Daily. 9/28/17  Yes Jc Alba MD   magnesium oxide (MAGOX) 400 (241.3 MG) MG tablet tablet Take 400 mg by mouth Daily.   Yes Historical Provider, MD   nitroglycerin (NITROSTAT) 0.4 MG SL tablet place 1 tablet under the tongue if needed every 5 minutes for hair...   (REFER TO PRESCRIPTION NOTES). 1/11/17  Yes Historical Provider, MD   O2 (OXYGEN) Inhale 2 L/min Every Night. W/ CPAP   Yes Historical Provider, MD   Red Yeast Rice 600 MG tablet Take 600 mg by mouth 2 (Two) Times a Day.   Yes Historical Provider, MD   clopidogrel (PLAVIX) 75 MG tablet Take 75 mg by mouth Daily. 2/3/16   Historical Provider, MD   Umeclidinium-Vilanterol 62.5-25 MCG/INH aerosol powder  Inhale 1 puff Daily. 3/3/17   Brea Higginbotham MD       Review of Systems:    Otherwise complete ROS is negative except as mentioned above.    Physical Exam:    Physical Exam     Temp:  [98 °F (36.7 °C)] 98 °F (36.7 °C)  Heart Rate:  [80-86] 81  Resp:  [18-20] 18  BP: (106-132)/(56-69) 106/69    -General:Patient is oriented to person, place, and time. Patient appears well-developed and well-nourished. No distress.   -HEENT: Head: Normocephalic and atraumatic. Right Ear: External ear normal. Left Ear: External ear normal. Nose: Nose normal. Mouth/Throat: Oropharynx is clear and moist.   Eyes: Conjunctivae and EOM are normal. Pupils are equal, round, and reactive to light. Right eye exhibits no discharge. Left eye exhibits no discharge.   Neck: Normal range of motion. Neck supple. No JVD present. No tracheal deviation present. No thyromegaly present.   -CVS: Normal rate, regular rhythm, normal heart sounds and intact distal pulses.  Exam reveals no gallop and no friction rub.    No murmur heard.  -Pulmonary: dismished b/l. B/l wheezes .  -Abdominal: Soft, Bowel sounds are normal. No distension and no mass. There is no tenderness. There is no rebound and no guarding. No hernia.   -Musc: No Cyanosis clubbing or edema.  -Lymph: No cervical adenopathy.   -Neuro: patient is alert and oriented to person, place, and time.Patient has normal reflexes. No cranial nerve deficit. Patient exhibits normal muscle tone , strength and sensation bilaterally. Coordination normal.   -Skin:macuopapular rash on chest abd , back and  thighs  -Psych: Patient  has a normal mood and affect. behavior is normal. Judgment and thought content normal. Denies suicidal ideations or plans.    Nursing note and vitals reviewed.    Results Reviewed:  I have personally reviewed current lab, radiology, and data and agree with results.  Lab Results (last 24 hours)     Procedure Component Value Units Date/Time    CBC & Differential [[id number]] Collected:  10/09/17 1532    Specimen:  Blood Updated:  10/09/17 1553    Narrative:       The following orders were created for panel order CBC & Differential.  Procedure                               Abnormality         Status                     ---------                               -----------         ------                     CBC Auto Differential[[id number]]        Abnormal            Final result                 Please view results for these tests on the individual orders.    CBC Auto Differential [[id number]]  (Abnormal) Collected:  10/09/17 1532    Specimen:  Blood Updated:  10/09/17 1553     WBC 14.49 (H) 10*3/mm3      RBC 3.66 (L) 10*6/mm3      Hemoglobin 11.0 (L) g/dL      Hematocrit 34.6 (L) %      MCV 94.5 fL      MCH 30.1 pg      MCHC 31.8 g/dL      RDW 15.6 (H) %      RDW-SD 53.6 (H) fl      MPV 10.8 fL      Platelets 239 10*3/mm3      Neutrophil % 92.4 (H) %      Lymphocyte % 3.6 (L) %      Monocyte % 3.2 %      Eosinophil % 0.1 %      Basophil % 0.1 %      Immature Grans % 0.6 (H) %      Neutrophils, Absolute 13.39 (H) 10*3/mm3      Lymphocytes, Absolute 0.52 (L) 10*3/mm3      Monocytes, Absolute 0.47 10*3/mm3      Eosinophils, Absolute 0.01 10*3/mm3      Basophils, Absolute 0.01 10*3/mm3      Immature Grans, Absolute 0.09 (H) 10*3/mm3     Protime-INR [[id number]]  (Normal) Collected:  10/09/17 1532    Specimen:  Blood Updated:  10/09/17 1553     Protime 13.4 Seconds      INR 1.03    Narrative:       Therapeutic range for most indications is 2.0-3.0 INR,  or 2.5-3.5 for mechanical heart valves.    aPTT  [[id number]]  (Normal) Collected:  10/09/17 1532    Specimen:  Blood Updated:  10/09/17 1553     PTT 34.0 seconds     Narrative:       The recommended Heparin therapeutic range is 68-97 seconds.    Phosphorus [[id number]]  (Normal) Collected:  10/09/17 1532    Specimen:  Blood Updated:  10/09/17 1605     Phosphorus 2.8 mg/dL     Magnesium [[id number]]  (Normal) Collected:  10/09/17 1532    Specimen:  Blood Updated:  10/09/17 1605     Magnesium 2.0 mg/dL     Comprehensive Metabolic Panel [[id number]]  (Abnormal) Collected:  10/09/17 1532    Specimen:  Blood Updated:  10/09/17 1607     Glucose 124 (H) mg/dL      BUN 22 (H) mg/dL      Creatinine 1.04 mg/dL      Sodium 140 mmol/L      Potassium 4.0 mmol/L      Chloride 103 mmol/L      CO2 24.0 mmol/L      Calcium 9.1 mg/dL      Total Protein 6.1 (L) g/dL      Albumin 3.70 g/dL      ALT (SGPT) 41 U/L      AST (SGOT) 21 U/L      Alkaline Phosphatase 72 U/L      Total Bilirubin 0.4 mg/dL      eGFR Non African Amer 68 mL/min/1.73      Globulin 2.4 gm/dL      A/G Ratio 1.5 g/dL      BUN/Creatinine Ratio 21.2     Anion Gap 13.0 mmol/L     Narrative:       The MDRD GFR formula is only valid for adults with stable renal function between ages 18 and 70.    BNP [[id number]]  (Normal) Collected:  10/09/17 1532    Specimen:  Blood Updated:  10/09/17 1614     proBNP 494.0 pg/mL     Troponin [[id number]]  (Abnormal) Collected:  10/09/17 1532    Specimen:  Blood Updated:  10/09/17 1619     Troponin I 0.048 (H) ng/mL     T4, Free [[id number]]  (Normal) Collected:  10/09/17 1532    Specimen:  Blood Updated:  10/09/17 1619     Free T4 0.83 ng/dL     Lactic Acid, Plasma [[id number]] Collected:  10/09/17 1638    Specimen:  Blood Updated:  10/09/17 1657    Blood Culture - Blood, [[id number]] Collected:  10/09/17 1638    Specimen:  Blood from Arm, Left Updated:  10/09/17 1659        Imaging Results (last 24 hours)     Procedure Component Value Units Date/Time    XR Chest 2 View [[id number]] Collected:   10/09/17 1427     Updated:  10/09/17 1440    Narrative:       Patient Name:  CAMRYN MCKAY  Patient ID:  [id number]   Ordering:  RAMBO DEE  Attending:  RAMBO DEE  Referring:  RAMBO DEE  ------------------------------------------------    TWO VIEW CHEST    HISTORY: Shortness of breath. Hives.    Frontal and lateral films of the chest were obtained.  COMPARISON: September 26, 2017    Subsegmental atelectasis or infiltrate each lung base.  Old granulomatous disease is present.  The heart is not enlarged.  The pulmonary vasculature is not increased.  No pleural effusion.  No pneumothorax.  No acute osseous abnormality.  Epidural stimulator electrodes mid thoracic spine.      Impression:       CONCLUSION:  Subsegmental atelectasis or infiltrate each lung base.    [id number]    Electronically signed by:  Sam Higginbotham MD  10/9/2017 2:39 PM CDT  Workstation: [employer]          Assessment/Plan         Hospital Problem List     Shortness of breath          Assessment / Plan  --dyspnea and cough  WBC 14.4  CXR as above  Pnemonia  IV zosyn and levaquin ,O2    --COPD exacerbation  IV solumedeol , duonebs, AB    --maculopapular rash  Possible Drug reaction  Held newly started losartan  solumedrol as above    --elevtated trop  0.048  Trop on 9/15/17  0.078  Trend trops  --hx of CAD    --dementia  Aricept    --afib   Rate controlled  carizem    --hx of GI bleed      I discussed the patients findings and my recommendations with the patient, and the patient verbalized understanding of the plan, risks and benefits of the plan.    This document has been electronically signed by Florencio Garner MD on October 9, 2017 5:24 PM

## 2017-10-09 NOTE — ED NOTES
TC to lab-spoke with Olga. Informed of need for someone to draw labs due to pt already being stuck x 2 unsuccessfully. Olga states she will send someone.     Siri Astudillo RN  10/09/17 5014

## 2017-10-09 NOTE — ED NOTES
Called tele-confirmed pt showing up on tele. Called [phone] to update that 20g PIV has been started in left hand and pt is currently receiving first bolus. Informed that ER physician wants pt re-evaluated before continuing with further boluses due to pt with CHF.      Siri Astudillo RN  10/09/17 3328

## 2017-10-09 NOTE — ED NOTES
Per Dr. Narvaez, pt's lactic acid noted to be elevated. States pt has hx of CHF. Orders to infuse 1L NS at this time bolus and MD will re-evaluate after bolus.     Siri Astudillo RN  10/09/17 1429

## 2017-10-09 NOTE — ED NOTES
TC to lab re: drawing second set of blood cultures. States someone will come.     Siri Astudillo RN  10/09/17 5015

## 2017-10-09 NOTE — ED NOTES
Attempt x 2 for second iv-unsuccessful. Charge nurse notified of need for IV access.     Siri Astudillo RN  10/09/17 8198

## 2017-10-09 NOTE — ED PROVIDER NOTES
"Subjective   HPI Comments: Is 84-year-old gentleman that was recently admitted to our facility for an abnormal heart rate\" fluid overload\".  Patient was discharged Thursday on new medicines for his abnormal heart rhythm.  Patient states that since that time he has been having continuous shortness of breath and difficulty breathing.  He says he has been feeling somewhat febrile with chills, but has no thermometer at home.  He does have exhibits bilateral lower extremity edema that is new since his just prior to his hospital admission.  He denies any chest pain but does state that he is dizzy and has \"staggers\".  Patient was sent to the emergency department by his primary care doctor for admission and further evaluation.    Of note, the patient does have a diffuse blanching erythematous rash.  He states that it is quite itchy but he is not scratching it.  He states that this rash began after starting his cardiac medications in the hospital.    Patient is a 84 y.o. male presenting with shortness of breath.   History provided by:  Patient and spouse   used: No    Shortness of Breath   Severity:  Mild  Onset quality:  Gradual  Duration:  4 days  Timing:  Constant  Associated symptoms: fever (no thermometer ahome the james been feeling febrilewith chills), rash (use rash over the chest, lower abdomen, the entire back.  Patient states that brandyh started icines when he was discharged home from the hospital rece) and wheezing    Risk factors: obesity        Review of Systems   Constitutional: Positive for fever (no thermometer ahome the james been feeling febrilewith chills).   Respiratory: Positive for shortness of breath and wheezing.    Skin: Positive for rash (use rash over the chest, lower abdomen, the entire back.  Patient states that christine started icines when he was discharged home from the hospital recen).   Neurological: Positive for dizziness.   All other systems reviewed and are " negative.      Past Medical History:   Diagnosis Date   • Bilateral carotid artery stenosis    • Chronic obstructive pulmonary disease (COPD)    • Coronary arteriosclerosis    • Degenerative joint disease involving multiple joints    • Dementia    • Diabetes mellitus    • Hyperlipidemia    • Hypertension        Allergies   Allergen Reactions   • Atorvastatin Anaphylaxis   • Pravastatin      Myalgia   • Azithromycin Rash   • Biaxin [Clarithromycin] Rash       Past Surgical History:   Procedure Laterality Date   • CARDIAC CATHETERIZATION N/A 6/6/2017    Procedure: Right Heart Cath;  Surgeon: Jeff Maciel MD PhD;  Location: [facility] INVASIVE LOCATION;  Service:    • HERNIA REPAIR     • LUNG BIOPSY     • THORACOTOMY Left 1977   • VENTRAL HERNIA REPAIR         Family History   Problem Relation Age of Onset   • Hypertension Father        Social History     Social History   • Marital status:      Spouse name: N/A   • Number of children: N/A   • Years of education: N/A     Social History Main Topics   • Smoking status: Former Smoker   • Smokeless tobacco: Never Used   • Alcohol use No   • Drug use: No   • Sexual activity: Not Currently      Comment:      Other Topics Concern   • None     Social History Narrative           Objective   Physical Exam   Constitutional: He is oriented to person, place, and time. He appears well-developed and well-nourished.   HENT:   Head: Normocephalic and atraumatic.   Neck: Normal range of motion. Neck supple.   Cardiovascular: Normal rate and regular rhythm.    Pulmonary/Chest: No respiratory distress. He has wheezes. He has rales.   Diffuse coarse breath sounds, with diminished lung sounds on the left compared to the right.   Abdominal: Soft.   Genitourinary: Penis normal.   Musculoskeletal: He exhibits edema (lower extremity edema bilaterally, 1+ pitting to mid calf.).   Neurological: He is alert and oriented to person, place, and time.   Skin: There is erythema  (diffuse erythematous rash to the chest, lower abdomen and groin, and entire back.).   Psychiatric: He has a normal mood and affect. His behavior is normal. Judgment and thought content normal.   Nursing note and vitals reviewed.      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  On rhythm strips while in the emergency department patient does appear to be going in and out of A. fib or a flutter into a normal sinus rhythm.                 ED Course  ED Course                  MDM  Number of Diagnoses or Management Options  Drug rash: new and requires workup  Pneumonia of both lower lobes due to infectious organism: new and requires workup  Shortness of breath: new and requires workup  Troponin I above reference range: established and improving  Diagnosis management comments: 74-year-old gentleman who was recently discharged from our facility to be diagnosed with a cardiac arrhythmia and be treated with meds.  Now he returns with continued/worsening shortness of breath, some night sweats, and some persistent lower extremity edema..  His BNP is normal, his chest x-ray reveals bilateral lower lobe infiltrates left greater than right.  His oxygen saturation is in the high 90s on room air, but he does have decreased breath sounds on the left.  He has an elevated white blood cell count and a mildly elevated troponin.  His EKG is unchanged from previous, with first-degree AV block, left axis deviation, and findings consistent with a left ventricular hypertrophy.  We will admit the  patient to the hospitalist service for continued evaluation and treatment.  The cultures are pending and Levaquin was started in the emergency department.       Amount and/or Complexity of Data Reviewed  Clinical lab tests: ordered and reviewed  Tests in the radiology section of CPT®: ordered and reviewed    Risk of Complications, Morbidity, and/or Mortality  Presenting problems: high  Diagnostic procedures: low  Management options: low    Patient Progress  Patient progress: stable      Final diagnoses:   Shortness of breath   Pneumonia of both lower lobes due to infectious organism   Troponin I above reference range   Drug rash            Chino Narvaez MD  10/10/17 0218

## 2017-10-10 ENCOUNTER — TELEPHONE (OUTPATIENT)
Dept: CARDIOLOGY | Facility: CLINIC | Age: 82
End: 2017-10-10

## 2017-10-10 LAB
ANION GAP SERPL CALCULATED.3IONS-SCNC: 13 MMOL/L (ref 5–15)
ARTICHOKE IGE QN: 119 MG/DL (ref 1–129)
BASOPHILS # BLD AUTO: 0.01 10*3/MM3 (ref 0–0.2)
BASOPHILS NFR BLD AUTO: 0.1 % (ref 0–2)
BUN BLD-MCNC: 26 MG/DL (ref 7–21)
BUN/CREAT SERPL: 25 (ref 7–25)
CALCIUM SPEC-SCNC: 8.8 MG/DL (ref 8.4–10.2)
CHLORIDE SERPL-SCNC: 105 MMOL/L (ref 95–110)
CHOLEST SERPL-MCNC: 170 MG/DL (ref 0–199)
CO2 SERPL-SCNC: 23 MMOL/L (ref 22–31)
CREAT BLD-MCNC: 1.04 MG/DL (ref 0.7–1.3)
CRP SERPL-MCNC: 1.7 MG/DL (ref 0–1)
D-DIMER, QUANTITATIVE (MAD,POW, STR): 616 NG/ML (FEU) (ref 0–470)
D-LACTATE SERPL-SCNC: 4.4 MMOL/L (ref 0.5–2)
DEPRECATED RDW RBC AUTO: 55.5 FL (ref 35.1–43.9)
EOSINOPHIL # BLD AUTO: 0 10*3/MM3 (ref 0–0.7)
EOSINOPHIL NFR BLD AUTO: 0 % (ref 0–7)
ERYTHROCYTE [DISTWIDTH] IN BLOOD BY AUTOMATED COUNT: 16 % (ref 11.5–14.5)
FOLATE SERPL-MCNC: 12.5 NG/ML (ref 2.76–21)
GFR SERPL CREATININE-BSD FRML MDRD: 68 ML/MIN/1.73 (ref 42–98)
GLUCOSE BLD-MCNC: 172 MG/DL (ref 60–100)
GLUCOSE BLDC GLUCOMTR-MCNC: 156 MG/DL (ref 70–130)
GLUCOSE BLDC GLUCOMTR-MCNC: 166 MG/DL (ref 70–130)
GLUCOSE BLDC GLUCOMTR-MCNC: 193 MG/DL (ref 70–130)
GLUCOSE BLDC GLUCOMTR-MCNC: 198 MG/DL (ref 70–130)
HCT VFR BLD AUTO: 32.7 % (ref 39–49)
HDLC SERPL-MCNC: 38 MG/DL (ref 60–200)
HGB BLD-MCNC: 10.4 G/DL (ref 13.7–17.3)
HOLD SPECIMEN: NORMAL
IMM GRANULOCYTES # BLD: 0.13 10*3/MM3 (ref 0–0.02)
IMM GRANULOCYTES NFR BLD: 1.1 % (ref 0–0.5)
IRON 24H UR-MRATE: 19 MCG/DL (ref 49–181)
IRON SATN MFR SERPL: 7 % (ref 20–55)
LDLC/HDLC SERPL: 2.85 {RATIO} (ref 0–3.55)
LYMPHOCYTES # BLD AUTO: 0.43 10*3/MM3 (ref 0.6–4.2)
LYMPHOCYTES NFR BLD AUTO: 3.5 % (ref 10–50)
MAGNESIUM SERPL-MCNC: 1.9 MG/DL (ref 1.6–2.3)
MCH RBC QN AUTO: 30.3 PG (ref 26.5–34)
MCHC RBC AUTO-ENTMCNC: 31.8 G/DL (ref 31.5–36.3)
MCV RBC AUTO: 95.3 FL (ref 80–98)
MONOCYTES # BLD AUTO: 0.23 10*3/MM3 (ref 0–0.9)
MONOCYTES NFR BLD AUTO: 1.9 % (ref 0–12)
NEUTROPHILS # BLD AUTO: 11.43 10*3/MM3 (ref 2–8.6)
NEUTROPHILS NFR BLD AUTO: 93.4 % (ref 37–80)
PLATELET # BLD AUTO: 241 10*3/MM3 (ref 150–450)
PMV BLD AUTO: 10.9 FL (ref 8–12)
POTASSIUM BLD-SCNC: 4.6 MMOL/L (ref 3.5–5.1)
RBC # BLD AUTO: 3.43 10*6/MM3 (ref 4.37–5.74)
SODIUM BLD-SCNC: 141 MMOL/L (ref 137–145)
TIBC SERPL-MCNC: 278 MCG/DL (ref 261–462)
TRIGL SERPL-MCNC: 119 MG/DL (ref 20–199)
TROPONIN I SERPL-MCNC: 0.04 NG/ML
VIT B12 BLD-MCNC: 936 PG/ML (ref 239–931)
WBC NRBC COR # BLD: 12.23 10*3/MM3 (ref 3.2–9.8)

## 2017-10-10 PROCEDURE — 83540 ASSAY OF IRON: CPT | Performed by: FAMILY MEDICINE

## 2017-10-10 PROCEDURE — 86618 LYME DISEASE ANTIBODY: CPT | Performed by: FAMILY MEDICINE

## 2017-10-10 PROCEDURE — 85379 FIBRIN DEGRADATION QUANT: CPT | Performed by: FAMILY MEDICINE

## 2017-10-10 PROCEDURE — 25010000002 METHYLPREDNISOLONE PER 125 MG: Performed by: FAMILY MEDICINE

## 2017-10-10 PROCEDURE — 80061 LIPID PANEL: CPT | Performed by: FAMILY MEDICINE

## 2017-10-10 PROCEDURE — G0378 HOSPITAL OBSERVATION PER HR: HCPCS

## 2017-10-10 PROCEDURE — 94799 UNLISTED PULMONARY SVC/PX: CPT

## 2017-10-10 PROCEDURE — 82607 VITAMIN B-12: CPT | Performed by: FAMILY MEDICINE

## 2017-10-10 PROCEDURE — 25010000002 PIPERACILLIN-TAZOBACTAM: Performed by: FAMILY MEDICINE

## 2017-10-10 PROCEDURE — 63710000001 INSULIN ASPART PER 5 UNITS: Performed by: FAMILY MEDICINE

## 2017-10-10 PROCEDURE — 82962 GLUCOSE BLOOD TEST: CPT

## 2017-10-10 PROCEDURE — 84484 ASSAY OF TROPONIN QUANT: CPT | Performed by: FAMILY MEDICINE

## 2017-10-10 PROCEDURE — 83735 ASSAY OF MAGNESIUM: CPT | Performed by: FAMILY MEDICINE

## 2017-10-10 PROCEDURE — 63710000001 DIPHENHYDRAMINE PER 50 MG: Performed by: FAMILY MEDICINE

## 2017-10-10 PROCEDURE — 80048 BASIC METABOLIC PNL TOTAL CA: CPT | Performed by: FAMILY MEDICINE

## 2017-10-10 PROCEDURE — 25010000002 LEVOFLOXACIN PER 250 MG: Performed by: FAMILY MEDICINE

## 2017-10-10 PROCEDURE — 85025 COMPLETE CBC W/AUTO DIFF WBC: CPT | Performed by: FAMILY MEDICINE

## 2017-10-10 PROCEDURE — 83550 IRON BINDING TEST: CPT | Performed by: FAMILY MEDICINE

## 2017-10-10 PROCEDURE — 83605 ASSAY OF LACTIC ACID: CPT | Performed by: FAMILY MEDICINE

## 2017-10-10 PROCEDURE — 96376 TX/PRO/DX INJ SAME DRUG ADON: CPT

## 2017-10-10 PROCEDURE — 82746 ASSAY OF FOLIC ACID SERUM: CPT | Performed by: FAMILY MEDICINE

## 2017-10-10 PROCEDURE — 86140 C-REACTIVE PROTEIN: CPT | Performed by: FAMILY MEDICINE

## 2017-10-10 PROCEDURE — 94760 N-INVAS EAR/PLS OXIMETRY 1: CPT

## 2017-10-10 RX ADMIN — LEVOFLOXACIN 500 MG: 5 INJECTION, SOLUTION INTRAVENOUS at 17:06

## 2017-10-10 RX ADMIN — FAMOTIDINE 20 MG: 20 TABLET ORAL at 08:23

## 2017-10-10 RX ADMIN — PIPERACILLIN SODIUM,TAZOBACTAM SODIUM 3.38 G: 3; .375 INJECTION, POWDER, FOR SOLUTION INTRAVENOUS at 13:37

## 2017-10-10 RX ADMIN — HYDROCODONE BITARTRATE AND ACETAMINOPHEN 1 TABLET: 7.5; 325 TABLET ORAL at 13:38

## 2017-10-10 RX ADMIN — IPRATROPIUM BROMIDE AND ALBUTEROL SULFATE 3 ML: 2.5; .5 SOLUTION RESPIRATORY (INHALATION) at 07:19

## 2017-10-10 RX ADMIN — ISOSORBIDE DINITRATE 10 MG: 5 TABLET ORAL at 22:00

## 2017-10-10 RX ADMIN — MAGNESIUM OXIDE TAB 400 MG (241.3 MG ELEMENTAL MG) 400 MG: 400 (241.3 MG) TAB at 08:23

## 2017-10-10 RX ADMIN — METHYLPREDNISOLONE SODIUM SUCCINATE 60 MG: 125 INJECTION, POWDER, FOR SOLUTION INTRAMUSCULAR; INTRAVENOUS at 01:49

## 2017-10-10 RX ADMIN — DIPHENHYDRAMINE HYDROCHLORIDE 25 MG: 25 CAPSULE ORAL at 13:38

## 2017-10-10 RX ADMIN — ISOSORBIDE DINITRATE 10 MG: 5 TABLET ORAL at 08:22

## 2017-10-10 RX ADMIN — DILTIAZEM HYDROCHLORIDE 240 MG: 240 CAPSULE, COATED, EXTENDED RELEASE ORAL at 08:23

## 2017-10-10 RX ADMIN — PANTOPRAZOLE SODIUM 40 MG: 40 TABLET, DELAYED RELEASE ORAL at 06:32

## 2017-10-10 RX ADMIN — METHYLPREDNISOLONE SODIUM SUCCINATE 60 MG: 125 INJECTION, POWDER, FOR SOLUTION INTRAMUSCULAR; INTRAVENOUS at 13:28

## 2017-10-10 RX ADMIN — INSULIN ASPART 2 UNITS: 100 INJECTION, SOLUTION INTRAVENOUS; SUBCUTANEOUS at 13:27

## 2017-10-10 RX ADMIN — ASPIRIN 81 MG 81 MG: 81 TABLET ORAL at 08:29

## 2017-10-10 RX ADMIN — IPRATROPIUM BROMIDE AND ALBUTEROL SULFATE 3 ML: 2.5; .5 SOLUTION RESPIRATORY (INHALATION) at 19:47

## 2017-10-10 RX ADMIN — FAMOTIDINE 20 MG: 20 TABLET ORAL at 17:05

## 2017-10-10 RX ADMIN — IPRATROPIUM BROMIDE AND ALBUTEROL SULFATE 3 ML: 2.5; .5 SOLUTION RESPIRATORY (INHALATION) at 11:23

## 2017-10-10 RX ADMIN — FLUTICASONE PROPIONATE 2 SPRAY: 50 SPRAY, METERED NASAL at 08:25

## 2017-10-10 RX ADMIN — HYDRALAZINE HYDROCHLORIDE 10 MG: 10 TABLET, FILM COATED ORAL at 22:00

## 2017-10-10 RX ADMIN — DONEPEZIL HYDROCHLORIDE 10 MG: 10 TABLET, FILM COATED ORAL at 08:23

## 2017-10-10 RX ADMIN — METHYLPREDNISOLONE SODIUM SUCCINATE 60 MG: 125 INJECTION, POWDER, FOR SOLUTION INTRAMUSCULAR; INTRAVENOUS at 17:04

## 2017-10-10 RX ADMIN — INSULIN ASPART 2 UNITS: 100 INJECTION, SOLUTION INTRAVENOUS; SUBCUTANEOUS at 22:01

## 2017-10-10 RX ADMIN — DIPHENHYDRAMINE HYDROCHLORIDE 25 MG: 25 CAPSULE ORAL at 19:31

## 2017-10-10 RX ADMIN — ISOSORBIDE DINITRATE 10 MG: 5 TABLET ORAL at 17:05

## 2017-10-10 RX ADMIN — PIPERACILLIN SODIUM,TAZOBACTAM SODIUM 3.38 G: 3; .375 INJECTION, POWDER, FOR SOLUTION INTRAVENOUS at 01:50

## 2017-10-10 RX ADMIN — INSULIN ASPART 2 UNITS: 100 INJECTION, SOLUTION INTRAVENOUS; SUBCUTANEOUS at 18:31

## 2017-10-10 RX ADMIN — PIPERACILLIN SODIUM,TAZOBACTAM SODIUM 3.38 G: 3; .375 INJECTION, POWDER, FOR SOLUTION INTRAVENOUS at 22:00

## 2017-10-10 RX ADMIN — PIPERACILLIN SODIUM,TAZOBACTAM SODIUM 3.38 G: 3; .375 INJECTION, POWDER, FOR SOLUTION INTRAVENOUS at 08:29

## 2017-10-10 RX ADMIN — IPRATROPIUM BROMIDE AND ALBUTEROL SULFATE 3 ML: 2.5; .5 SOLUTION RESPIRATORY (INHALATION) at 15:30

## 2017-10-10 RX ADMIN — FUROSEMIDE 20 MG: 20 TABLET ORAL at 08:22

## 2017-10-10 RX ADMIN — INSULIN ASPART 2 UNITS: 100 INJECTION, SOLUTION INTRAVENOUS; SUBCUTANEOUS at 08:23

## 2017-10-10 RX ADMIN — HYDROCODONE BITARTRATE AND ACETAMINOPHEN 1 TABLET: 7.5; 325 TABLET ORAL at 06:32

## 2017-10-10 RX ADMIN — DOXYCYCLINE 100 MG: 100 INJECTION, POWDER, LYOPHILIZED, FOR SOLUTION INTRAVENOUS at 18:31

## 2017-10-10 RX ADMIN — HYDROCODONE BITARTRATE AND ACETAMINOPHEN 1 TABLET: 7.5; 325 TABLET ORAL at 22:01

## 2017-10-10 RX ADMIN — HYDRALAZINE HYDROCHLORIDE 10 MG: 10 TABLET, FILM COATED ORAL at 08:23

## 2017-10-10 NOTE — PLAN OF CARE
Problem: Patient Care Overview (Adult)  Goal: Plan of Care Review  Outcome: Ongoing (interventions implemented as appropriate)    10/10/17 9428   Coping/Psychosocial Response Interventions   Plan Of Care Reviewed With patient;spouse   Patient Care Overview   Progress no change

## 2017-10-10 NOTE — PROGRESS NOTES
[facility]  INPATIENT PROGRESS NOTE    Length of Stay: 1  Date of Admission: 10/9/2017  Primary Care Physician: Paolo Rey MD    Subjective   Chief Complaint:   HPI:  ( Copied from H and P done by  )  Patient complains of 3 days history of cough, dyspnea, chills, and rash on his chest and abdomen .patient was recently discharged from the hospital and started don losartan and cardizem .  Patient denies headache , dizziness, visual changes, change in appetite , chest pain or palpitations, , abdominal pain , N/V/D , blood in the stool or urine or urinary symptoms, weakness numbness or tingling in the extremities.    September 10, 2017: Patient has no shortness shortness of air.  Patient has diffuse macular papular rash on his back, groin and petechiae on his legs.      Review of Systems   Constitutional: Negative for activity change, appetite change, fatigue and fever.   HENT: Negative for ear pain and sore throat.    Eyes: Negative for pain and visual disturbance.   Respiratory: Negative for cough and shortness of breath.    Cardiovascular: Negative for chest pain and palpitations.   Gastrointestinal: Negative for abdominal pain and nausea.   Endocrine: Negative for cold intolerance and heat intolerance.   Genitourinary: Negative for difficulty urinating and dysuria.   Musculoskeletal: Negative for arthralgias and gait problem.   Skin: Negative for color change and rash.   Neurological: Negative for dizziness, weakness and headaches.   Hematological: Negative for adenopathy. Does not bruise/bleed easily.   Psychiatric/Behavioral: Negative for agitation, confusion and sleep disturbance.        All pertinent negatives and positives are as above. All other systems have been reviewed and are negative unless otherwise stated.     Objective    Temp:  [96.7 °F (35.9 °C)-97.8 °F (36.6 °C)] 97.5 °F (36.4 °C)  Heart Rate:  [53-88] 76  Resp:  [18-24]  20  BP: (132-149)/(63-69) 136/64    Physical Exam   Constitutional: He is oriented to person, place, and time. He appears well-developed and well-nourished. No distress.   HENT:   Head: Normocephalic and atraumatic.   Right Ear: External ear normal.   Left Ear: External ear normal.   Mouth/Throat: Oropharynx is clear and moist.   Eyes: Conjunctivae and EOM are normal. Pupils are equal, round, and reactive to light. Left eye exhibits no discharge. No scleral icterus.   Neck: No tracheal deviation present. No thyromegaly present.   Cardiovascular: Normal rate, regular rhythm and normal heart sounds.  Exam reveals no gallop and no friction rub.    No murmur heard.  Pulmonary/Chest: Effort normal and breath sounds normal. No stridor. No respiratory distress. He has no wheezes. He has no rales.   Abdominal: Soft. Bowel sounds are normal. He exhibits no distension. There is no tenderness. There is no rebound.   Musculoskeletal: He exhibits no edema, tenderness or deformity.   Neurological: He is alert and oriented to person, place, and time.   Skin: Skin is warm and dry. No rash noted. No erythema. No pallor.   Psychiatric: He has a normal mood and affect. His behavior is normal. Thought content normal.           aspirin 81 mg Oral Daily   diltiaZEM  mg Oral Q24H   donepezil 10 mg Oral Daily   famotidine 20 mg Oral BID   fluticasone 2 spray Nasal Daily   furosemide 20 mg Oral Daily   hydrALAZINE 10 mg Oral Q12H   HYDROcodone-acetaminophen 1 tablet Oral Q8H   insulin aspart 0-7 Units Subcutaneous 4x Daily AC & at Bedtime   ipratropium-albuterol 3 mL Nebulization 4x Daily - RT   isosorbide dinitrate 10 mg Oral TID   levoFLOXacin 500 mg Intravenous Q24H   magnesium oxide 400 mg Oral Daily   methylPREDNISolone sodium succinate 60 mg Intravenous Q8H   pantoprazole 40 mg Oral Q AM   piperacillin-tazobactam 3.375 g Intravenous Q6H         Results Review:  I have reviewed the labs, radiology results, and diagnostic  studies.    Laboratory Data:     Results from last 7 days  Lab Units 10/10/17  0600 10/09/17  1720 10/09/17  1532   SODIUM mmol/L 141 139 140   POTASSIUM mmol/L 4.6 4.3 4.0   CHLORIDE mmol/L 105 102 103   CO2 mmol/L 23.0 24.0 24.0   BUN mg/dL 26* 20 22*   CREATININE mg/dL 1.04 0.98 1.04   GLUCOSE mg/dL 172* 206* 124*   CALCIUM mg/dL 8.8 8.9 9.1   BILIRUBIN mg/dL  --   --  0.4   ALK PHOS U/L  --   --  72   ALT (SGPT) U/L  --   --  41   AST (SGOT) U/L  --   --  21   ANION GAP mmol/L 13.0 13.0 13.0     Estimated Creatinine Clearance: 53.5 mL/min (by C-G formula based on Cr of 1.04).    Results from last 7 days  Lab Units 10/10/17  0600 10/09/17  1532   MAGNESIUM mg/dL 1.9 2.0   PHOSPHORUS mg/dL  --  2.8           Results from last 7 days  Lab Units 10/10/17  0600 10/09/17  1720 10/09/17  1532   WBC 10*3/mm3 12.23* 13.67* 14.49*   HEMOGLOBIN g/dL 10.4* 10.7* 11.0*   HEMATOCRIT % 32.7* 33.3* 34.6*   PLATELETS 10*3/mm3 241 251 239       Results from last 7 days  Lab Units 10/09/17  1532   INR  1.03       Culture Data:   Blood Culture   Date Value Ref Range Status   10/09/2017 No growth at less than 24 hours  Preliminary   10/09/2017 No growth at 24 hours  Preliminary     No results found for: URINECX  No results found for: RESPCX  No results found for: WOUNDCX  No results found for: STOOLCX  No components found for: BODYFLD    Radiology Data:   Imaging Results (last 24 hours)     ** No results found for the last 24 hours. **          I have reviewed the patient current medications.     Assessment/Plan     Hospital Problem List     Shortness of breath        1.  Maculopapular papular rash  This could be because of Cardizem or losartan because these are the  2 new medicines started.  We'll continue current Cardizem and discontinue losartan.  Will order tick panel and patient will be started on doxycycline.     #2.  Shortness of air could be due to pneumonia versus PE versus CHF versus: Coronary artery disease  CHF: BNP is  444  PE: D dimer pending   CAD: 0.038-->0.038-->0.038  EKG: Afib; currently in NSR       #3.  Community-acquired pneumonia  Continue with Zosyn and Levaquin  CrP:pending   Sputum cultures:pending   Tapering steroids     #4.  Elevated lactic acid  Will order  sputum culture, blood culture, urine culture  Continue with  IV fluids    #5.  Acute blood loss anemia  Will order iron studies and Hemoccult    #6. Afib  -Rhythm control:Cardizem  -Rate control: Controlled  -Anticoagulation: None; will d/w cardiology to start him on anticoagulation.           DVT Prophylaxis: SCD/NADINE     Discharge Planning: I expect patient to be discharged to home in 3-4 days.          This document has been electronically signed by Joo Bowser MD on October 10, 2017 5:52 PM

## 2017-10-10 NOTE — TELEPHONE ENCOUNTER
Attempted to contact the patient several times and was unable to reach him. Evidently he reported to the er and is currently admitted.

## 2017-10-10 NOTE — PLAN OF CARE
Problem: Patient Care Overview (Adult)  Goal: Plan of Care Review  Outcome: Ongoing (interventions implemented as appropriate)    10/10/17 1017   Coping/Psychosocial Response Interventions   Plan Of Care Reviewed With patient   Patient Care Overview   Progress no change         Problem: Acute Coronary Syndrome (ACS) (Adult)  Goal: Signs and Symptoms of Listed Potential Problems Will be Absent or Manageable (Acute Coronary Syndrome)  Outcome: Ongoing (interventions implemented as appropriate)    Problem: Diabetes, Type 2 (Adult)  Goal: Signs and Symptoms of Listed Potential Problems Will be Absent or Manageable (Diabetes, Type 2)  Outcome: Ongoing (interventions implemented as appropriate)    Problem: Pain, Chronic (Adult)  Goal: Identify Related Risk Factors and Signs and Symptoms  Outcome: Ongoing (interventions implemented as appropriate)    Problem: COPD, Chronic Bronchitis/Emphysema (Adult)  Goal: Signs and Symptoms of Listed Potential Problems Will be Absent or Manageable (COPD, Chronic Bronchitis/Emphysema)  Outcome: Ongoing (interventions implemented as appropriate)

## 2017-10-11 LAB
ANION GAP SERPL CALCULATED.3IONS-SCNC: 15 MMOL/L (ref 5–15)
ARTERIAL PATENCY WRIST A: ABNORMAL
ATMOSPHERIC PRESS: ABNORMAL MMHG
BASE EXCESS BLDA CALC-SCNC: -0.4 MMOL/L (ref -2.4–2.4)
BASOPHILS # BLD AUTO: 0.02 10*3/MM3 (ref 0–0.2)
BASOPHILS NFR BLD AUTO: 0.1 % (ref 0–2)
BDY SITE: ABNORMAL
BUN BLD-MCNC: 28 MG/DL (ref 7–21)
BUN/CREAT SERPL: 24.3 (ref 7–25)
CA-I BLD-MCNC: 4.6 MG/DL (ref 4.5–4.9)
CALCIUM SPEC-SCNC: 8.8 MG/DL (ref 8.4–10.2)
CHLORIDE SERPL-SCNC: 104 MMOL/L (ref 95–110)
CO2 BLDA-SCNC: 24.9 MMOL/L (ref 23–27)
CO2 SERPL-SCNC: 23 MMOL/L (ref 22–31)
CREAT BLD-MCNC: 1.15 MG/DL (ref 0.7–1.3)
D-LACTATE SERPL-SCNC: 4.6 MMOL/L (ref 0.5–2)
DEPRECATED RDW RBC AUTO: 55.3 FL (ref 35.1–43.9)
EOSINOPHIL # BLD AUTO: 0 10*3/MM3 (ref 0–0.7)
EOSINOPHIL NFR BLD AUTO: 0 % (ref 0–7)
ERYTHROCYTE [DISTWIDTH] IN BLOOD BY AUTOMATED COUNT: 16.2 % (ref 11.5–14.5)
GFR SERPL CREATININE-BSD FRML MDRD: 61 ML/MIN/1.73 (ref 42–98)
GLUCOSE BLD-MCNC: 130 MG/DL (ref 60–100)
GLUCOSE BLDA-MCNC: 134 MMOL/L
GLUCOSE BLDC GLUCOMTR-MCNC: 150 MG/DL (ref 70–130)
GLUCOSE BLDC GLUCOMTR-MCNC: 162 MG/DL (ref 70–130)
GLUCOSE BLDC GLUCOMTR-MCNC: 166 MG/DL (ref 70–130)
GLUCOSE BLDC GLUCOMTR-MCNC: 176 MG/DL (ref 70–130)
HCO3 BLDA-SCNC: 23.7 MMOL/L (ref 22–26)
HCT VFR BLD AUTO: 33.9 % (ref 39–49)
HCT VFR BLD CALC: 33 % (ref 40–54)
HGB BLD-MCNC: 10.7 G/DL (ref 13.7–17.3)
HGB BLDA-MCNC: 11.3 G/DL (ref 14–18)
HOLD SPECIMEN: NORMAL
IMM GRANULOCYTES # BLD: 0.24 10*3/MM3 (ref 0–0.02)
IMM GRANULOCYTES NFR BLD: 1.5 % (ref 0–0.5)
LYMPHOCYTES # BLD AUTO: 0.85 10*3/MM3 (ref 0.6–4.2)
LYMPHOCYTES NFR BLD AUTO: 5.2 % (ref 10–50)
MCH RBC QN AUTO: 29.9 PG (ref 26.5–34)
MCHC RBC AUTO-ENTMCNC: 31.6 G/DL (ref 31.5–36.3)
MCV RBC AUTO: 94.7 FL (ref 80–98)
MODALITY: ABNORMAL
MONOCYTES # BLD AUTO: 1 10*3/MM3 (ref 0–0.9)
MONOCYTES NFR BLD AUTO: 6.1 % (ref 0–12)
NEUTROPHILS # BLD AUTO: 14.35 10*3/MM3 (ref 2–8.6)
NEUTROPHILS NFR BLD AUTO: 87.1 % (ref 37–80)
NRBC BLD MANUAL-RTO: 0 /100 WBC (ref 0–0)
PCO2 BLDA: 37 MM HG (ref 35–45)
PH BLDA: 7.42 PH UNITS (ref 7.35–7.45)
PLATELET # BLD AUTO: 276 10*3/MM3 (ref 150–450)
PMV BLD AUTO: 10.6 FL (ref 8–12)
PO2 BLDA: 95.4 MM HG (ref 80–105)
POTASSIUM BLD-SCNC: 4.3 MMOL/L (ref 3.5–5.1)
POTASSIUM BLDA-SCNC: 3.77 MMOL/L (ref 3.6–4.9)
RBC # BLD AUTO: 3.58 10*6/MM3 (ref 4.37–5.74)
SAO2 % BLDCOA: 96.9 %
SODIUM BLD-SCNC: 142 MMOL/L (ref 137–145)
SODIUM BLDA-SCNC: 137.3 MMOL/L (ref 138–146)
WBC NRBC COR # BLD: 16.46 10*3/MM3 (ref 3.2–9.8)
WHOLE BLOOD HOLD SPECIMEN: NORMAL
WHOLE BLOOD HOLD SPECIMEN: NORMAL

## 2017-10-11 PROCEDURE — 82803 BLOOD GASES ANY COMBINATION: CPT | Performed by: FAMILY MEDICINE

## 2017-10-11 PROCEDURE — 94760 N-INVAS EAR/PLS OXIMETRY 1: CPT

## 2017-10-11 PROCEDURE — G0378 HOSPITAL OBSERVATION PER HR: HCPCS

## 2017-10-11 PROCEDURE — 82962 GLUCOSE BLOOD TEST: CPT

## 2017-10-11 PROCEDURE — 83605 ASSAY OF LACTIC ACID: CPT | Performed by: FAMILY MEDICINE

## 2017-10-11 PROCEDURE — 63710000001 INSULIN ASPART PER 5 UNITS: Performed by: FAMILY MEDICINE

## 2017-10-11 PROCEDURE — 96376 TX/PRO/DX INJ SAME DRUG ADON: CPT

## 2017-10-11 PROCEDURE — 87086 URINE CULTURE/COLONY COUNT: CPT | Performed by: FAMILY MEDICINE

## 2017-10-11 PROCEDURE — 25010000002 METHYLPREDNISOLONE PER 125 MG: Performed by: FAMILY MEDICINE

## 2017-10-11 PROCEDURE — 80048 BASIC METABOLIC PNL TOTAL CA: CPT | Performed by: FAMILY MEDICINE

## 2017-10-11 PROCEDURE — 25010000002 PIPERACILLIN-TAZOBACTAM: Performed by: FAMILY MEDICINE

## 2017-10-11 PROCEDURE — 85025 COMPLETE CBC W/AUTO DIFF WBC: CPT | Performed by: FAMILY MEDICINE

## 2017-10-11 PROCEDURE — 94799 UNLISTED PULMONARY SVC/PX: CPT

## 2017-10-11 PROCEDURE — 36600 WITHDRAWAL OF ARTERIAL BLOOD: CPT

## 2017-10-11 RX ORDER — LEVOFLOXACIN 5 MG/ML
250 INJECTION, SOLUTION INTRAVENOUS EVERY 24 HOURS
Status: DISCONTINUED | OUTPATIENT
Start: 2017-10-12 | End: 2017-10-11

## 2017-10-11 RX ORDER — LEVOFLOXACIN 5 MG/ML
500 INJECTION, SOLUTION INTRAVENOUS ONCE
Status: DISCONTINUED | OUTPATIENT
Start: 2017-10-11 | End: 2017-10-11

## 2017-10-11 RX ADMIN — HYDRALAZINE HYDROCHLORIDE 10 MG: 10 TABLET, FILM COATED ORAL at 21:42

## 2017-10-11 RX ADMIN — DOXYCYCLINE 100 MG: 100 INJECTION, POWDER, LYOPHILIZED, FOR SOLUTION INTRAVENOUS at 06:11

## 2017-10-11 RX ADMIN — INSULIN ASPART 2 UNITS: 100 INJECTION, SOLUTION INTRAVENOUS; SUBCUTANEOUS at 13:06

## 2017-10-11 RX ADMIN — ISOSORBIDE DINITRATE 10 MG: 5 TABLET ORAL at 21:43

## 2017-10-11 RX ADMIN — FLUTICASONE PROPIONATE 2 SPRAY: 50 SPRAY, METERED NASAL at 08:38

## 2017-10-11 RX ADMIN — PIPERACILLIN SODIUM,TAZOBACTAM SODIUM 3.38 G: 3; .375 INJECTION, POWDER, FOR SOLUTION INTRAVENOUS at 14:42

## 2017-10-11 RX ADMIN — PIPERACILLIN SODIUM,TAZOBACTAM SODIUM 3.38 G: 3; .375 INJECTION, POWDER, FOR SOLUTION INTRAVENOUS at 08:38

## 2017-10-11 RX ADMIN — METHYLPREDNISOLONE SODIUM SUCCINATE 60 MG: 125 INJECTION, POWDER, FOR SOLUTION INTRAMUSCULAR; INTRAVENOUS at 17:54

## 2017-10-11 RX ADMIN — HYDROCODONE BITARTRATE AND ACETAMINOPHEN 1 TABLET: 7.5; 325 TABLET ORAL at 21:43

## 2017-10-11 RX ADMIN — IPRATROPIUM BROMIDE AND ALBUTEROL SULFATE 3 ML: 2.5; .5 SOLUTION RESPIRATORY (INHALATION) at 15:57

## 2017-10-11 RX ADMIN — FAMOTIDINE 20 MG: 20 TABLET ORAL at 08:38

## 2017-10-11 RX ADMIN — ISOSORBIDE DINITRATE 10 MG: 5 TABLET ORAL at 08:39

## 2017-10-11 RX ADMIN — METHYLPREDNISOLONE SODIUM SUCCINATE 60 MG: 125 INJECTION, POWDER, FOR SOLUTION INTRAMUSCULAR; INTRAVENOUS at 08:40

## 2017-10-11 RX ADMIN — HYDROCODONE BITARTRATE AND ACETAMINOPHEN 1 TABLET: 7.5; 325 TABLET ORAL at 13:11

## 2017-10-11 RX ADMIN — DONEPEZIL HYDROCHLORIDE 10 MG: 10 TABLET, FILM COATED ORAL at 08:39

## 2017-10-11 RX ADMIN — FAMOTIDINE 20 MG: 20 TABLET ORAL at 17:54

## 2017-10-11 RX ADMIN — HYDRALAZINE HYDROCHLORIDE 10 MG: 10 TABLET, FILM COATED ORAL at 08:38

## 2017-10-11 RX ADMIN — IPRATROPIUM BROMIDE AND ALBUTEROL SULFATE 3 ML: 2.5; .5 SOLUTION RESPIRATORY (INHALATION) at 20:13

## 2017-10-11 RX ADMIN — PIPERACILLIN SODIUM,TAZOBACTAM SODIUM 3.38 G: 3; .375 INJECTION, POWDER, FOR SOLUTION INTRAVENOUS at 01:43

## 2017-10-11 RX ADMIN — ISOSORBIDE DINITRATE 10 MG: 5 TABLET ORAL at 17:53

## 2017-10-11 RX ADMIN — HYDROCODONE BITARTRATE AND ACETAMINOPHEN 1 TABLET: 7.5; 325 TABLET ORAL at 06:11

## 2017-10-11 RX ADMIN — ASPIRIN 81 MG 81 MG: 81 TABLET ORAL at 08:40

## 2017-10-11 RX ADMIN — DILTIAZEM HYDROCHLORIDE 240 MG: 240 CAPSULE, COATED, EXTENDED RELEASE ORAL at 08:39

## 2017-10-11 RX ADMIN — METHYLPREDNISOLONE SODIUM SUCCINATE 60 MG: 125 INJECTION, POWDER, FOR SOLUTION INTRAMUSCULAR; INTRAVENOUS at 01:43

## 2017-10-11 RX ADMIN — MAGNESIUM OXIDE TAB 400 MG (241.3 MG ELEMENTAL MG) 400 MG: 400 (241.3 MG) TAB at 08:39

## 2017-10-11 RX ADMIN — FUROSEMIDE 20 MG: 20 TABLET ORAL at 08:39

## 2017-10-11 RX ADMIN — IPRATROPIUM BROMIDE AND ALBUTEROL SULFATE 3 ML: 2.5; .5 SOLUTION RESPIRATORY (INHALATION) at 07:20

## 2017-10-11 RX ADMIN — PANTOPRAZOLE SODIUM 40 MG: 40 TABLET, DELAYED RELEASE ORAL at 06:11

## 2017-10-11 NOTE — PROGRESS NOTES
[facility]  INPATIENT PROGRESS NOTE    Length of Stay: 2  Date of Admission: 10/9/2017  Primary Care Physician: Paolo Rey MD    Subjective   Chief Complaint:   HPI:  ( Copied from H and P done by  )  Patient complains of 3 days history of cough, dyspnea, chills, and rash on his chest and abdomen .patient was recently discharged from the hospital and started don losartan and cardizem .  Patient denies headache , dizziness, visual changes, change in appetite , chest pain or palpitations, , abdominal pain , N/V/D , blood in the stool or urine or urinary symptoms, weakness numbness or tingling in the extremities.    October 10, 2017: Patient has no shortness shortness of air.  Patient has diffuse macular papular rash on his back, groin and petechiae on his legs.    October 11, 2017: Patient has itching on the rash.  Patient wife said that rash has not improved.  Patient  also short of breath because of the fluids.    Review of Systems   Constitutional: Negative for activity change, appetite change, fatigue and fever.   HENT: Negative for ear pain and sore throat.    Eyes: Negative for pain and visual disturbance.   Respiratory: Negative for cough and shortness of breath.    Cardiovascular: Negative for chest pain and palpitations.   Gastrointestinal: Negative for abdominal pain and nausea.   Endocrine: Negative for cold intolerance and heat intolerance.   Genitourinary: Negative for difficulty urinating and dysuria.   Musculoskeletal: Negative for arthralgias and gait problem.   Skin: Positive for rash. Negative for color change.   Neurological: Negative for dizziness, weakness and headaches.   Hematological: Negative for adenopathy. Does not bruise/bleed easily.   Psychiatric/Behavioral: Negative for agitation, confusion and sleep disturbance.        All pertinent negatives and positives are as above. All other systems have been reviewed and are  negative unless otherwise stated.     Objective    Temp:  [96.7 °F (35.9 °C)-98.2 °F (36.8 °C)] 97 °F (36.1 °C)  Heart Rate:  [60-95] 82  Resp:  [20-22] 20  BP: (110-179)/(52-79) 179/79    Physical Exam   Constitutional: He is oriented to person, place, and time. He appears well-developed and well-nourished. No distress.   HENT:   Head: Normocephalic and atraumatic.   Right Ear: External ear normal.   Left Ear: External ear normal.   Mouth/Throat: Oropharynx is clear and moist.   Eyes: Conjunctivae and EOM are normal. Pupils are equal, round, and reactive to light. Left eye exhibits no discharge. No scleral icterus.   Neck: No tracheal deviation present. No thyromegaly present.   Cardiovascular: Normal rate, regular rhythm and normal heart sounds.  Exam reveals no gallop and no friction rub.    No murmur heard.  Pulmonary/Chest: Effort normal and breath sounds normal. No stridor. No respiratory distress. He has no wheezes. He has no rales.   Abdominal: Soft. Bowel sounds are normal. He exhibits no distension. There is no tenderness. There is no rebound.   Musculoskeletal: He exhibits no edema, tenderness or deformity.   Neurological: He is alert and oriented to person, place, and time.   Skin: Skin is warm and dry. No rash noted. No erythema. No pallor.        Psychiatric: He has a normal mood and affect. His behavior is normal. Thought content normal.           aspirin 81 mg Oral Daily   diltiaZEM  mg Oral Q24H   donepezil 10 mg Oral Daily   doxycycline 100 mg Intravenous Q12H   famotidine 20 mg Oral BID   fluticasone 2 spray Nasal Daily   furosemide 20 mg Oral Daily   hydrALAZINE 10 mg Oral Q12H   HYDROcodone-acetaminophen 1 tablet Oral Q8H   insulin aspart 0-7 Units Subcutaneous 4x Daily AC & at Bedtime   ipratropium-albuterol 3 mL Nebulization 4x Daily - RT   isosorbide dinitrate 10 mg Oral TID   levoFLOXacin 500 mg Intravenous Q24H   magnesium oxide 400 mg Oral Daily   methylPREDNISolone sodium succinate  60 mg Intravenous Q8H   pantoprazole 40 mg Oral Q AM   piperacillin-tazobactam 3.375 g Intravenous Q6H         Results Review:  I have reviewed the labs, radiology results, and diagnostic studies.    Laboratory Data:     Results from last 7 days  Lab Units 10/11/17  0701 10/10/17  0600 10/09/17  1720 10/09/17  1532   SODIUM mmol/L 142 141 139 140   POTASSIUM mmol/L 4.3 4.6 4.3 4.0   CHLORIDE mmol/L 104 105 102 103   CO2 mmol/L 23.0 23.0 24.0 24.0   BUN mg/dL 28* 26* 20 22*   CREATININE mg/dL 1.15 1.04 0.98 1.04   GLUCOSE mg/dL 130* 172* 206* 124*   CALCIUM mg/dL 8.8 8.8 8.9 9.1   BILIRUBIN mg/dL  --   --   --  0.4   ALK PHOS U/L  --   --   --  72   ALT (SGPT) U/L  --   --   --  41   AST (SGOT) U/L  --   --   --  21   ANION GAP mmol/L 15.0 13.0 13.0 13.0     Estimated Creatinine Clearance: 48.4 mL/min (by C-G formula based on Cr of 1.15).    Results from last 7 days  Lab Units 10/10/17  0600 10/09/17  1532   MAGNESIUM mg/dL 1.9 2.0   PHOSPHORUS mg/dL  --  2.8           Results from last 7 days  Lab Units 10/11/17  0701 10/10/17  0600 10/09/17  1720 10/09/17  1532   WBC 10*3/mm3 16.46* 12.23* 13.67* 14.49*   HEMOGLOBIN g/dL 10.7* 10.4* 10.7* 11.0*   HEMATOCRIT % 33.9* 32.7* 33.3* 34.6*   PLATELETS 10*3/mm3 276 241 251 239       Results from last 7 days  Lab Units 10/09/17  1532   INR  1.03       Culture Data:   Blood Culture   Date Value Ref Range Status   10/09/2017 No growth at 24 hours  Preliminary   10/09/2017 No growth at 24 hours  Preliminary     No results found for: URINECX  No results found for: RESPCX  No results found for: WOUNDCX  No results found for: STOOLCX  No components found for: BODYFLD    Radiology Data:   Imaging Results (last 24 hours)     ** No results found for the last 24 hours. **          I have reviewed the patient current medications.     Assessment/Plan     Hospital Problem List     Shortness of breath        1.  Maculopapular papular rash  I have discontinued losartan and Cardizem  because the only 2 medicines which was started recently.  If patient's heart rate increases we'll start patient on Lopressor.  If rash does not improve we will talk to he hematology oncology regarding petechia.  Take panel is pending but patient is currently on doxycycline    #2.  Shortness of air could be due to pneumonia versus PE versus CHF versus: Coronary artery disease  CHF: BNP is 444  PE: 600's; will obtain V/Q scan  CAD: 0.038-->0.038-->0.038  EKG: Afib; currently in NSR       #3.  Community-acquired pneumonia  Continue with doxycycline; w  CrP:pending   Sputum cultures:pending   Tapering steroids     #4.  Elevated lactic acid  sputum culture, blood culture, urine culture pending  Discontinue with  IV fluids  Elevated white count due to steroids   Patient clinically doesn't look sick     #5.  Acute blood loss anemia  Will order iron studies and Hemoccult    #6. Afib  -Rhythm control:Cardizem  -Rate control: Controlled  -Anticoagulation: None; will d/w cardiology to start him on anticoagulation.           DVT Prophylaxis: SCD/NADINE     Discharge Planning: I expect patient to be discharged to home in 3-4 days.          This document has been electronically signed by Joo Bowser MD on October 11, 2017 2:35 PM

## 2017-10-11 NOTE — PLAN OF CARE
Problem: Patient Care Overview (Adult)  Goal: Plan of Care Review  Outcome: Ongoing (interventions implemented as appropriate)    10/11/17 6071   Coping/Psychosocial Response Interventions   Plan Of Care Reviewed With patient;spouse   Patient Care Overview   Progress no change

## 2017-10-11 NOTE — PAYOR COMM NOTE
"Hasmukh Ham (84 y.o. Male)     Date of Birth Social Security Number Address Home Phone MRN    06/01/1933  [address] [facility] [address] [phone] [id number]    Sabianist Marital Status          Orthodox        Admission Date Admission Type Admitting Provider Attending Provider Department, Room/Bed    10/9/17 Emergency Florencio Garner MD Bleibel, Fadi, MD [address], 331/[address]    Discharge Date Discharge Disposition Discharge Destination                      Attending Provider: Florencio Garner MD     Allergies:  Atorvastatin, Pravastatin, Azithromycin, Biaxin [Clarithromycin]    Isolation:  None   Infection:  None   Code Status:  FULL    Ht:  66\" (167.6 cm)   Wt:  183 lb (83 kg)    Admission Cmt:  None   Principal Problem:  None                Active Insurance as of 10/9/2017     Primary Coverage     Payor Plan Insurance Group Employer/Plan Group    AETNA MEDICARE REPLACEMENT AETNA [id number]     Payor Plan Address Payor Plan Phone Number Effective From Effective To    [address] [phone] 4/1/2017     [place] [address]       Subscriber Name Subscriber Birth Date Member ID       HASMUKH HAM 6/1/1933 [id number]                 Emergency Contacts      (Rel.) Home Phone Work Phone Mobile Phone    Geovanna Tatum (Daughter) [phone] -- [phone]    Rimma Ham (Spouse) [phone] -- --               History & Physical      Florencio Garner MD at 10/9/2017  5:23 PM                [facility] Medicine Admission      Date of Admission: 10/9/2017      Primary Care Physician: Paolo Rey MD      Chief Complaint   Patient presents with   • Shortness of Breath   • Itching       HPI:  Patient complains of 3 days history of cough, dyspnea, chills, and rash on his chest and abdomen .patient was recently discharged from the hospital and started don losartan and cardizem .  Patient denies headache , " dizziness, visual changes, change in appetite , chest pain or palpitations, , abdominal pain , N/V/D , blood in the stool or urine or urinary symptoms, weakness numbness or tingling in the extremities.    Past Medical History:   Past Medical History:   Diagnosis Date   • Bilateral carotid artery stenosis    • Chronic obstructive pulmonary disease (COPD)    • Coronary arteriosclerosis    • Degenerative joint disease involving multiple joints    • Dementia    • Diabetes mellitus    • Hyperlipidemia    • Hypertension        Past Surgical History:   Past Surgical History:   Procedure Laterality Date   • CARDIAC CATHETERIZATION N/A 6/6/2017    Procedure: Right Heart Cath;  Surgeon: Jeff Maciel MD PhD;  Location: [facility] INVASIVE LOCATION;  Service:    • HERNIA REPAIR     • LUNG BIOPSY     • THORACOTOMY Left 1977   • VENTRAL HERNIA REPAIR         Family History:   Family History   Problem Relation Age of Onset   • Hypertension Father        Social History:   Social History     Social History   • Marital status:      Spouse name: N/A   • Number of children: N/A   • Years of education: N/A     Social History Main Topics   • Smoking status: Former Smoker   • Smokeless tobacco: Never Used   • Alcohol use No   • Drug use: No   • Sexual activity: Not Currently      Comment:      Other Topics Concern   • None     Social History Narrative       Allergies:   Allergies   Allergen Reactions   • Atorvastatin Anaphylaxis   • Pravastatin      Myalgia   • Azithromycin Rash   • Biaxin [Clarithromycin] Rash       Medications:   Prior to Admission medications    Medication Sig Start Date End Date Taking? Authorizing Provider   albuterol (PROVENTIL) (2.5 MG/3ML) 0.083% nebulizer solution Take 2.5 mg by nebulization Every 4 (Four) Hours As Needed for Wheezing. 9/28/17  Yes Jc Alba MD   aspirin 81 MG chewable tablet Chew 81 mg Daily.   Yes Historical Provider, MD   diltiaZEM CD (CARDIZEM CD) 240 MG 24 hr  capsule Take 1 capsule by mouth Daily. 9/29/17  Yes Jc Alba MD   donepezil (ARICEPT) 10 MG tablet Take 10 mg by mouth Daily. 11/12/16  Yes Historical Provider, MD   doxycycline (VIBRAMYCIN) 100 MG capsule Take 100 mg by mouth 2 (Two) Times a Day. 10/6/17 10/17/17 Yes Historical Provider, MD   famotidine (PEPCID) 20 MG tablet Take 20 mg by mouth 2 (Two) Times a Day. 10/20/16  Yes Historical Provider, MD   fluticasone (FLONASE) 50 MCG/ACT nasal spray 2 sprays into each nostril Daily.   Yes Historical Provider, MD   furosemide (LASIX) 20 MG tablet Take 1 tablet by mouth Daily. 3/3/17  Yes Jeff Maciel MD PhD   hydrALAZINE (APRESOLINE) 10 MG tablet Take 1 tablet by mouth Every 12 (Twelve) Hours. 9/20/17  Yes Daylin G Levill, APRN   HYDROcodone-acetaminophen (NORCO) 7.5-325 MG per tablet Take 1 tablet by mouth Every 8 (Eight) Hours. 12/27/16  Yes Historical Provider, MD   ipratropium-albuterol (DUO-NEB) 0.5-2.5 mg/mL nebulizer Take 3 mL by nebulization 4 (Four) Times a Day. 9/28/17  Yes Jc Alba MD   isosorbide dinitrate (ISORDIL) 10 MG tablet Take 1 tablet by mouth 3 (Three) Times a Day. 9/20/17  Yes Daylin G Domill, APRN   losartan (COZAAR) 25 MG tablet Take 0.5 tablets by mouth Daily. 9/28/17  Yes Jc Alba MD   magnesium oxide (MAGOX) 400 (241.3 MG) MG tablet tablet Take 400 mg by mouth Daily.   Yes Historical Provider, MD   nitroglycerin (NITROSTAT) 0.4 MG SL tablet place 1 tablet under the tongue if needed every 5 minutes for hair...  (REFER TO PRESCRIPTION NOTES). 1/11/17  Yes Historical Provider, MD   O2 (OXYGEN) Inhale 2 L/min Every Night. W/ CPAP   Yes Historical Provider, MD   Red Yeast Rice 600 MG tablet Take 600 mg by mouth 2 (Two) Times a Day.   Yes Historical Provider, MD   clopidogrel (PLAVIX) 75 MG tablet Take 75 mg by mouth Daily. 2/3/16   Historical Provider, MD   Umeclidinium-Vilanterol 62.5-25 MCG/INH aerosol powder  Inhale 1 puff Daily. 3/3/17   Brea Higginbotham MD        Review of Systems:    Otherwise complete ROS is negative except as mentioned above.    Physical Exam:    Physical Exam     Temp:  [98 °F (36.7 °C)] 98 °F (36.7 °C)  Heart Rate:  [80-86] 81  Resp:  [18-20] 18  BP: (106-132)/(56-69) 106/69    -General:Patient is oriented to person, place, and time. Patient appears well-developed and well-nourished. No distress.   -HEENT: Head: Normocephalic and atraumatic. Right Ear: External ear normal. Left Ear: External ear normal. Nose: Nose normal. Mouth/Throat: Oropharynx is clear and moist.   Eyes: Conjunctivae and EOM are normal. Pupils are equal, round, and reactive to light. Right eye exhibits no discharge. Left eye exhibits no discharge.   Neck: Normal range of motion. Neck supple. No JVD present. No tracheal deviation present. No thyromegaly present.   -CVS: Normal rate, regular rhythm, normal heart sounds and intact distal pulses.  Exam reveals no gallop and no friction rub.    No murmur heard.  -Pulmonary: dismished b/l. B/l wheezes .  -Abdominal: Soft, Bowel sounds are normal. No distension and no mass. There is no tenderness. There is no rebound and no guarding. No hernia.   -Musc: No Cyanosis clubbing or edema.  -Lymph: No cervical adenopathy.   -Neuro: patient is alert and oriented to person, place, and time.Patient has normal reflexes. No cranial nerve deficit. Patient exhibits normal muscle tone , strength and sensation bilaterally. Coordination normal.   -Skin:macuopapular rash on chest abd , back and thighs  -Psych: Patient  has a normal mood and affect. behavior is normal. Judgment and thought content normal. Denies suicidal ideations or plans.    Nursing note and vitals reviewed.    Results Reviewed:  I have personally reviewed current lab, radiology, and data and agree with results.  Lab Results (last 24 hours)     Procedure Component Value Units Date/Time    CBC & Differential [[id number]] Collected:  10/09/17 1532    Specimen:  Blood Updated:  10/09/17  8133    Narrative:       The following orders were created for panel order CBC & Differential.  Procedure                               Abnormality         Status                     ---------                               -----------         ------                     CBC Auto Differential[[id number]]        Abnormal            Final result                 Please view results for these tests on the individual orders.    CBC Auto Differential [[id number]]  (Abnormal) Collected:  10/09/17 1532    Specimen:  Blood Updated:  10/09/17 1553     WBC 14.49 (H) 10*3/mm3      RBC 3.66 (L) 10*6/mm3      Hemoglobin 11.0 (L) g/dL      Hematocrit 34.6 (L) %      MCV 94.5 fL      MCH 30.1 pg      MCHC 31.8 g/dL      RDW 15.6 (H) %      RDW-SD 53.6 (H) fl      MPV 10.8 fL      Platelets 239 10*3/mm3      Neutrophil % 92.4 (H) %      Lymphocyte % 3.6 (L) %      Monocyte % 3.2 %      Eosinophil % 0.1 %      Basophil % 0.1 %      Immature Grans % 0.6 (H) %      Neutrophils, Absolute 13.39 (H) 10*3/mm3      Lymphocytes, Absolute 0.52 (L) 10*3/mm3      Monocytes, Absolute 0.47 10*3/mm3      Eosinophils, Absolute 0.01 10*3/mm3      Basophils, Absolute 0.01 10*3/mm3      Immature Grans, Absolute 0.09 (H) 10*3/mm3     Protime-INR [[id number]]  (Normal) Collected:  10/09/17 1532    Specimen:  Blood Updated:  10/09/17 1553     Protime 13.4 Seconds      INR 1.03    Narrative:       Therapeutic range for most indications is 2.0-3.0 INR,  or 2.5-3.5 for mechanical heart valves.    aPTT [[id number]]  (Normal) Collected:  10/09/17 1532    Specimen:  Blood Updated:  10/09/17 1553     PTT 34.0 seconds     Narrative:       The recommended Heparin therapeutic range is 68-97 seconds.    Phosphorus [[id number]]  (Normal) Collected:  10/09/17 1532    Specimen:  Blood Updated:  10/09/17 1605     Phosphorus 2.8 mg/dL     Magnesium [[id number]]  (Normal) Collected:  10/09/17 1532    Specimen:  Blood Updated:  10/09/17 1605     Magnesium 2.0 mg/dL      Comprehensive Metabolic Panel [[id number]]  (Abnormal) Collected:  10/09/17 1532    Specimen:  Blood Updated:  10/09/17 1607     Glucose 124 (H) mg/dL      BUN 22 (H) mg/dL      Creatinine 1.04 mg/dL      Sodium 140 mmol/L      Potassium 4.0 mmol/L      Chloride 103 mmol/L      CO2 24.0 mmol/L      Calcium 9.1 mg/dL      Total Protein 6.1 (L) g/dL      Albumin 3.70 g/dL      ALT (SGPT) 41 U/L      AST (SGOT) 21 U/L      Alkaline Phosphatase 72 U/L      Total Bilirubin 0.4 mg/dL      eGFR Non African Amer 68 mL/min/1.73      Globulin 2.4 gm/dL      A/G Ratio 1.5 g/dL      BUN/Creatinine Ratio 21.2     Anion Gap 13.0 mmol/L     Narrative:       The MDRD GFR formula is only valid for adults with stable renal function between ages 18 and 70.    BNP [[id number]]  (Normal) Collected:  10/09/17 1532    Specimen:  Blood Updated:  10/09/17 1614     proBNP 494.0 pg/mL     Troponin [[id number]]  (Abnormal) Collected:  10/09/17 1532    Specimen:  Blood Updated:  10/09/17 1619     Troponin I 0.048 (H) ng/mL     T4, Free [[id number]]  (Normal) Collected:  10/09/17 1532    Specimen:  Blood Updated:  10/09/17 1619     Free T4 0.83 ng/dL     Lactic Acid, Plasma [[id number]] Collected:  10/09/17 1638    Specimen:  Blood Updated:  10/09/17 1657    Blood Culture - Blood, [[id number]] Collected:  10/09/17 1638    Specimen:  Blood from Arm, Left Updated:  10/09/17 1659        Imaging Results (last 24 hours)     Procedure Component Value Units Date/Time    XR Chest 2 View [[id number]] Collected:  10/09/17 1427     Updated:  10/09/17 1440    Narrative:       Patient Name:  CAMRYN MCKAY  Patient ID:  [id number]   Ordering:  RAMBO DEE  Attending:  RAMBO DEE  Referring:  RAMBO DEE  ------------------------------------------------    TWO VIEW CHEST    HISTORY: Shortness of breath. Hives.    Frontal and lateral films of the chest were obtained.  COMPARISON: September 26, 2017    Subsegmental atelectasis or infiltrate each lung  base.  Old granulomatous disease is present.  The heart is not enlarged.  The pulmonary vasculature is not increased.  No pleural effusion.  No pneumothorax.  No acute osseous abnormality.  Epidural stimulator electrodes mid thoracic spine.      Impression:       CONCLUSION:  Subsegmental atelectasis or infiltrate each lung base.    [id number]    Electronically signed by:  Sam Higginbotham MD  10/9/2017 2:39 PM CDT  Workstation: [employer]          Assessment/Plan         Hospital Problem List     Shortness of breath          Assessment / Plan  --dyspnea and cough  WBC 14.4  CXR as above  Pnemonia  IV zosyn and levaquin ,O2    --COPD exacerbation  IV solumedeol , duonebs, AB    --maculopapular rash  Possible Drug reaction  Held newly started losartan  solumedrol as above    --elevtated trop  0.048  Trop on 9/15/17  0.078  Trend trops  --hx of CAD    --dementia  Aricept    --afib   Rate controlled  carizem    --hx of GI bleed      I discussed the patients findings and my recommendations with the patient, and the patient verbalized understanding of the plan, risks and benefits of the plan.    This document has been electronically signed by Florencio Garner MD on October 9, 2017 5:24 PM                       Electronically signed by Florencio Garner MD at 10/9/2017  5:51 PM           Emergency Department Notes      Theresa Naranjo RN at 10/9/2017  1:15 PM          Pt is presented to ED with c/o generalized hives with itching and shortness of air.  Pt states this has been going on since Friday.  [facility].     Theresa Naranjo RN  10/09/17 1316       Electronically signed by Theresa Naranjo RN at 10/9/2017  1:16 PM      Siri Astudillo RN at 10/9/2017  1:33 PM          Pt reports started on 2 new medicines (losartan and cartia) when DC from hospital last week. Woke up with rash and SOA on Friday morning. Has been seen daily since without improvement. Started on Medrol Dosepak  "10/8/17. Been taking Benadryl a few times each day. Reports rash is worse today. Fine, red raised rash over trunk/extremities.     Electronically signed by Siri Astudillo RN at 10/9/2017  1:36 PM      Siri Astudillo RN at 10/9/2017  2:31 PM          TC to lab-spoke with Olga. Informed of need for someone to draw labs due to pt already being stuck x 2 unsuccessfully. Olga states she will send someone.     Siri Astudillo RN  10/09/17 1431       Electronically signed by Siri Astudillo RN at 10/9/2017  2:31 PM      Chino Narvaez MD at 10/9/2017  2:38 PM      Procedure Orders:    1. ECG 12 Lead [[id number]] ordered by Chino Narvaez MD at 10/09/17 1413                Subjective   HPI Comments: Is 84-year-old gentleman that was recently admitted to our facility for an abnormal heart rate\" fluid overload\".  Patient was discharged Thursday on new medicines for his abnormal heart rhythm.  Patient states that since that time he has been having continuous shortness of breath and difficulty breathing.  He says he has been feeling somewhat febrile with chills, but has no thermometer at home.  He does have exhibits bilateral lower extremity edema that is new since his just prior to his hospital admission.  He denies any chest pain but does state that he is dizzy and has \"staggers\".  Patient was sent to the emergency department by his primary care doctor for admission and further evaluation.    Of note, the patient does have a diffuse blanching erythematous rash.  He states that it is quite itchy but he is not scratching it.  He states that this rash began after starting his cardiac medications in the hospital.    Patient is a 84 y.o. male presenting with shortness of breath.   History provided by:  Patient and spouse   used: No    Shortness of Breath   Severity:  Mild  Onset quality:  Gradual  Duration:  4 days  Timing:  Constant  Associated symptoms: fever (no thermometer ahome the james been " feeling febrilewith chills), rash (use rash over the chest, lower abdomen, the entire back.  Patient states that thisrash started icines when he was discharged home from the hospital [facility]) and wheezing    Risk factors: obesity        Review of Systems   Constitutional: Positive for fever (no thermometer ahome the james been feeling febrilewith chills).   Respiratory: Positive for shortness of breath and wheezing.    Skin: Positive for rash (use rash over the chest, lower abdomen, the entire back.  Patient states that christine started icines when he was discharged home from the hospital [facility]).   Neurological: Positive for dizziness.   All other systems reviewed and are negative.      Past Medical History:   Diagnosis Date   • Bilateral carotid artery stenosis    • Chronic obstructive pulmonary disease (COPD)    • Coronary arteriosclerosis    • Degenerative joint disease involving multiple joints    • Dementia    • Diabetes mellitus    • Hyperlipidemia    • Hypertension        Allergies   Allergen Reactions   • Atorvastatin Anaphylaxis   • Pravastatin      Myalgia   • Azithromycin Rash   • Biaxin [Clarithromycin] Rash       Past Surgical History:   Procedure Laterality Date   • CARDIAC CATHETERIZATION N/A 6/6/2017    Procedure: Right Heart Cath;  Surgeon: Jeff Maciel MD PhD;  Location: [facility] INVASIVE LOCATION;  Service:    • HERNIA REPAIR     • LUNG BIOPSY     • THORACOTOMY Left 1977   • VENTRAL HERNIA REPAIR         Family History   Problem Relation Age of Onset   • Hypertension Father        Social History     Social History   • Marital status:      Spouse name: N/A   • Number of children: N/A   • Years of education: N/A     Social History Main Topics   • Smoking status: Former Smoker   • Smokeless tobacco: Never Used   • Alcohol use No   • Drug use: No   • Sexual activity: Not Currently      Comment:      Other Topics Concern   • None     Social History Narrative           Objective    Physical Exam   Constitutional: He is oriented to person, place, and time. He appears well-developed and well-nourished.   HENT:   Head: Normocephalic and atraumatic.   Neck: Normal range of motion. Neck supple.   Cardiovascular: Normal rate and regular rhythm.    Pulmonary/Chest: No respiratory distress. He has wheezes. He has rales.   Diffuse coarse breath sounds, with diminished lung sounds on the left compared to the right.   Abdominal: Soft.   Genitourinary: Penis normal.   Musculoskeletal: He exhibits edema (lower extremity edema bilaterally, 1+ pitting to mid calf.).   Neurological: He is alert and oriented to person, place, and time.   Skin: There is erythema (diffuse erythematous rash to the chest, lower abdomen and groin, and entire back.).   Psychiatric: He has a normal mood and affect. His behavior is normal. Judgment and thought content normal.   Nursing note and vitals reviewed.      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  On rhythm strips while in the emergency department patient does appear to be going in and out of A. fib or a flutter into a normal sinus rhythm.                ED Course  ED Course                  MDM  Number of Diagnoses or Management Options  Drug rash: new and requires workup  Pneumonia of both lower lobes due to infectious organism: new and requires workup  Shortness of breath: new and requires workup  Troponin I above reference range: established and improving  Diagnosis management comments: 74-year-old gentleman who was  recently discharged from our facility to be diagnosed with a cardiac arrhythmia and be treated with meds.  Now he returns with continued/worsening shortness of breath, some night sweats, and some persistent lower extremity edema..  His BNP is normal, his chest x-ray reveals bilateral lower lobe infiltrates left greater than right.  His oxygen saturation is in the high 90s on room air, but he does have decreased breath sounds on the left.  He has an elevated white blood cell count and a mildly elevated troponin.  His EKG is unchanged from previous, with first-degree AV block, left axis deviation, and findings consistent with a left ventricular hypertrophy.  We will admit the patient to the hospitalist service for continued evaluation and treatment.  The cultures are pending and Levaquin was started in the emergency department.       Amount and/or Complexity of Data Reviewed  Clinical lab tests: ordered and reviewed  Tests in the radiology section of CPT®:  ordered and reviewed    Risk of Complications, Morbidity, and/or Mortality  Presenting problems: high  Diagnostic procedures: low  Management options: low    Patient Progress  Patient progress: stable      Final diagnoses:   Shortness of breath   Pneumonia of both lower lobes due to infectious organism   Troponin I above reference range   Drug rash            Chino Narvaez MD  10/10/17 0218       Electronically signed by Chino Narvaez MD at 10/10/2017  2:18 AM      Siri Astudillo RN at 10/9/2017  2:48 PM          Lab at bedside attempting to draw specimens     Siri Astudillo RN  10/09/17 1446       Electronically signed by Siri Astudillo RN at 10/9/2017  2:48 PM      Gracy Carpio RN at 10/9/2017  2:50 PM           to bedside for blood sample collection      Gracy Carpio RN  10/09/17 8683       Electronically signed by Gracy Carpio RN at 10/9/2017  2:51 PM      Siri Astudillo RN at 10/9/2017  4:51 PM          TC to lab re:  drawing second set of blood cultures. States someone will come.     Siri Astudillo RN  10/09/17 1659       Electronically signed by Siri Astudillo RN at 10/9/2017  4:59 PM      Siri Astudillo RN at 10/9/2017  5:08 PM          Orders noted for admission. Awaiting room assignment.     Siri Astudillo RN  10/09/17 1709       Electronically signed by Siri Astudillo RN at 10/9/2017  5:09 PM      Siri Astudillo RN at 10/9/2017  5:21 PM          Lab at bedside for second set of blood cultures     Siri Astudillo RN  10/09/17 1721       Electronically signed by Siri Astudillo RN at 10/9/2017  5:21 PM      Siri Astudillo RN at 10/9/2017  5:29 PM          Per Dr. Narvaez, pt's lactic acid noted to be elevated. States pt has hx of CHF. Orders to infuse 1L NS at this time bolus and MD will re-evaluate after bolus.     Siri Astudillo RN  10/09/17 3590       Electronically signed by Siri Astudillo RN at 10/9/2017  5:30 PM      Siri Astudillo RN at 10/9/2017  5:50 PM          Attempt x 2 for second iv-unsuccessful. Charge nurse notified of need for IV access.     Siri Astudillo RN  10/09/17 5765       Electronically signed by Siri Astudillo RN at 10/9/2017  5:50 PM      Siri Astudillo RN at 10/9/2017  6:08 PM          Nursing report called to ANA Silva [id number]     Siri Astudillo RN  10/09/17 1512       Electronically signed by Siri Astudillo RN at 10/9/2017  6:09 PM      Siri Astudillo RN at 10/9/2017  6:25 PM          Called tele-confirmed pt showing up on tele. Called [phone] to update that 20g PIV has been started in left hand and pt is currently receiving first bolus. Informed that ER physician wants pt re-evaluated before continuing with further boluses due to pt with CHF.      Siri Astudillo RN  10/09/17 8397       Electronically signed by Siri Astudillo RN at 10/9/2017  6:26 PM        Hospital Medications (active)       Dose Frequency Start End     albuterol (PROVENTIL) nebulizer solution 0.083% 2.5 mg/3mL 2.5 mg Every 4 Hours PRN 10/9/2017     Sig - Route: Take 2.5 mg by nebulization Every 4 (Four) Hours As Needed for Wheezing. - Nebulization    aspirin chewable tablet 81 mg 81 mg Daily 10/10/2017     Sig - Route: Chew 1 tablet Daily. - Oral    dextrose (D50W) solution 25 g 25 g Every 15 Minutes PRN 10/9/2017     Sig - Route: Infuse 50 mL into a venous catheter Every 15 (Fifteen) Minutes As Needed for Low Blood Sugar (Blood Sugar Less Than 70, Patient Has IV Access - Unresponsive, NPO or Unable To Safely Swallow). - Intravenous    dextrose (GLUTOSE) oral gel 15 g 15 g Every 15 Minutes PRN 10/9/2017     Sig - Route: Take 15 g by mouth Every 15 (Fifteen) Minutes As Needed for Low Blood Sugar (Blood Sugar Less Than 70, Patient Alert, Is Not NPO & Can Safely Swallow). - Oral    diphenhydrAMINE (BENADRYL) capsule 25 mg 25 mg Every 8 Hours PRN 10/9/2017     Sig - Route: Take 1 capsule by mouth Every 8 (Eight) Hours As Needed for Itching. - Oral    donepezil (ARICEPT) tablet 10 mg 10 mg Daily 10/10/2017     Sig - Route: Take 1 tablet by mouth Daily. - Oral    doxycycline (VIBRAMYCIN) 100 mg/100 mL 0.9% NS  mg Every 12 Hours 10/10/2017     Sig - Route: Infuse 100 mL into a venous catheter Every 12 (Twelve) Hours. - Intravenous    famotidine (PEPCID) tablet 20 mg 20 mg 2 Times Daily 10/9/2017     Sig - Route: Take 1 tablet by mouth 2 (Two) Times a Day. - Oral    fluticasone (FLONASE) 50 MCG/ACT nasal spray 2 spray 2 spray Daily 10/10/2017     Sig - Route: 2 sprays into each nostril Daily. - Nasal    furosemide (LASIX) tablet 20 mg 20 mg Daily 10/10/2017     Sig - Route: Take 1 tablet by mouth Daily. - Oral    glucagon (human recombinant) (GLUCAGEN DIAGNOSTIC) injection 1 mg 1 mg Every 15 Minutes PRN 10/9/2017     Sig - Route: Inject 1 mg under the skin Every 15 (Fifteen) Minutes As Needed (Blood Glucose Less Than 70 - Patient Without IV Access - Unresponsive,  NPO or Unable To Safely Swallow). - Subcutaneous    hydrALAZINE (APRESOLINE) tablet 10 mg 10 mg Every 12 Hours Scheduled 10/9/2017     Sig - Route: Take 1 tablet by mouth Every 12 (Twelve) Hours. - Oral    HYDROcodone-acetaminophen (NORCO) 7.5-325 MG per tablet 1 tablet 1 tablet Every 8 Hours 10/9/2017     Sig - Route: Take 1 tablet by mouth Every 8 (Eight) Hours. - Oral    insulin aspart (novoLOG) injection 0-7 Units 0-7 Units 4 Times Daily Before Meals & Nightly 10/9/2017     Sig - Route: Inject 0-7 Units under the skin 4 (Four) Times a Day Before Meals & at Bedtime. - Subcutaneous    ipratropium-albuterol (DUO-NEB) nebulizer solution 3 mL 3 mL 4 Times Daily - RT 10/9/2017     Sig - Route: Take 3 mL by nebulization 4 (Four) Times a Day. - Nebulization    isosorbide dinitrate (ISORDIL) tablet 10 mg 10 mg 3 Times Daily 10/9/2017     Sig - Route: Take 2 tablets by mouth 3 (Three) Times a Day. - Oral    levoFLOXacin (LEVAQUIN) 250 mg/50 mL D5W (premix) 250 mg 250 mg Every 24 Hours 10/12/2017 10/21/2017    Sig - Route: Infuse 50 mL into a venous catheter Daily. - Intravenous    Cosign for Ordering: Required by Joo Bowser MD    levoFLOXacin (LEVAQUIN) 500 mg/100 mL D5W (premix) (LEVAQUIN) 500 mg 500 mg Once 10/11/2017     Sig - Route: Infuse 100 mL into a venous catheter 1 (One) Time. - Intravenous    magnesium oxide (MAGOX) tablet 400 mg 400 mg Daily 10/10/2017     Sig - Route: Take 1 tablet by mouth Daily. - Oral    methylPREDNISolone sodium succinate (SOLU-Medrol) injection 60 mg 60 mg Every 8 Hours 10/9/2017     Sig - Route: Infuse 0.96 mL into a venous catheter Every 8 (Eight) Hours. - Intravenous    nitroglycerin (NITROSTAT) SL tablet 0.4 mg 0.4 mg Every 5 Minutes PRN 10/9/2017     Sig - Route: Place 1 tablet under the tongue Every 5 (Five) Minutes As Needed for Chest Pain. - Sublingual    ondansetron (ZOFRAN) injection 4 mg 4 mg Every 6 Hours PRN 10/9/2017     Sig - Route: Infuse 2 mL into a venous  catheter Every 6 (Six) Hours As Needed for Nausea or Vomiting. - Intravenous    pantoprazole (PROTONIX) EC tablet 40 mg 40 mg Every Early Morning 10/10/2017     Sig - Route: Take 1 tablet by mouth Every Morning. - Oral    Pharmacy Consult  Continuous PRN 10/11/2017     Sig - Route: Continuous As Needed for Consult. - Does not apply    Pharmacy to Dose Zosyn  Continuous PRN 10/11/2017     Sig - Route: Continuous As Needed for Consult. - Does not apply    piperacillin-tazobactam (ZOSYN) 3.375 g/100 mL 0.9% NS IVPB (mbp) 3.375 g Every 6 Hours 10/11/2017     Sig - Route: Infuse 100 mL into a venous catheter Every 6 (Six) Hours. - Intravenous    sodium chloride 0.9 % infusion 25 mL/hr Continuous 10/9/2017     Sig - Route: Infuse 25 mL/hr into a venous catheter Continuous. - Intravenous    diltiaZEM CD (CARDIZEM CD) 24 hr capsule 240 mg (Discontinued) 240 mg Every 24 Hours Scheduled 10/10/2017 10/11/2017    Sig - Route: Take 1 capsule by mouth Daily. - Oral    levoFLOXacin (LEVAQUIN) 500 mg/100 mL D5W (premix) (LEVAQUIN) 500 mg (Discontinued) 500 mg Every 24 Hours 10/10/2017 10/11/2017    Sig - Route: Infuse 100 mL into a venous catheter Daily. - Intravenous    Pharmacy Consult (Discontinued)  Continuous PRN 10/11/2017 10/11/2017    Sig - Route: Continuous As Needed for Consult. - Does not apply    Pharmacy to Dose Zosyn (Discontinued)  Continuous PRN 10/9/2017 10/11/2017    Sig - Route: Continuous As Needed for Consult. - Does not apply    Reason for Discontinue: Duplicate order    piperacillin-tazobactam (ZOSYN) 3.375 g/100 mL 0.9% NS IVPB (mbp) (Discontinued) 3.375 g Every 6 Hours 10/9/2017 10/11/2017    Sig - Route: Infuse 100 mL into a venous catheter Every 6 (Six) Hours. - Intravenous          Orders (last 24 hrs)     Start     Ordered    10/12/17 1600  levoFLOXacin (LEVAQUIN) 250 mg/50 mL D5W (premix) 250 mg  Every 24 Hours      10/11/17 1527    10/11/17 2100  piperacillin-tazobactam (ZOSYN) 3.375 g/100 mL 0.9% NS  IVPB (mbp)  Every 6 Hours      10/11/17 1523    10/11/17 1530  levoFLOXacin (LEVAQUIN) 500 mg/100 mL D5W (premix) (LEVAQUIN) 500 mg  Once      10/11/17 1516    10/11/17 1517  Pharmacy Consult  Continuous PRN      10/11/17 1517    10/11/17 1516  Pharmacy Consult  Continuous PRN,   Status:  Discontinued      10/11/17 1516    10/11/17 1515  Pharmacy to Dose Zosyn  Continuous PRN      10/11/17 1516    10/11/17 1515  Lactic Acid, Plasma  Every 24 Hours      10/11/17 1514    10/11/17 1038  POC Glucose Fingerstick  Once      10/11/17 1037    10/11/17 0913  Extra Tubes  Once      10/11/17 0912    10/11/17 0913  Light Blue Top  PROCEDURE ONCE      10/11/17 0912    10/11/17 0913  Lavender Top  PROCEDURE ONCE      10/11/17 0912    10/11/17 0913  Gold Top - SST  PROCEDURE ONCE,   Status:  Canceled      10/11/17 0912    10/11/17 0913  Green Top (Gel)  PROCEDURE ONCE      10/11/17 0912    10/11/17 0837  [place] Spotted Fever, IgM  Once      10/10/17 1743    10/11/17 0837  Ehrlichia Antibody Panel  Once      10/10/17 1743    10/11/17 0837  Babesia Microti Antibody Panel  Once      10/10/17 1743    10/11/17 0723  POC Glucose Fingerstick  Once      10/11/17 0722    10/11/17 0600  CBC Auto Differential  PROCEDURE ONCE      10/11/17 0000    10/10/17 2232  POC Glucose Fingerstick  Once      10/10/17 2231    10/10/17 1815  doxycycline (VIBRAMYCIN) 100 mg/100 mL 0.9% NS MBP  Every 12 Hours      10/10/17 1743    10/10/17 1749  Iron Profile  Once      10/10/17 1748    10/10/17 1749  Folate  Once      10/10/17 1748    10/10/17 1749  Occult Blood X 1, Stool - Stool, Per Rectum  Once      10/10/17 1748    10/10/17 1749  Vitamin B12  Once      10/10/17 1748    10/10/17 1748  Respiratory Culture - Sputum, Nasopharynx  Once      10/10/17 1747    10/10/17 1748  Urine Culture - Urine, Urine, Clean Catch  Once      10/10/17 1747    10/10/17 1747  C-reactive Protein  Every 48 Hours      10/10/17 1746    10/10/17 1747  D-dimer, Quantitative   Once      10/10/17 1746    10/10/17 1744  [facility] Spotted Fever, IgG  Once      10/10/17 1743    10/10/17 1744  B. Burgdorferi Antibodies, WB Reflex  Once      10/10/17 1743    10/10/17 1700  levoFLOXacin (LEVAQUIN) 500 mg/100 mL D5W (premix) (LEVAQUIN) 500 mg  Every 24 Hours,   Status:  Discontinued      10/09/17 1748    10/10/17 1658  POC Glucose Fingerstick  Once      10/10/17 1657    10/10/17 1654  Cardiac Monitoring  Continuous      10/10/17 1653    10/10/17 0900  donepezil (ARICEPT) tablet 10 mg  Daily      10/09/17 2059    10/10/17 0900  magnesium oxide (MAGOX) tablet 400 mg  Daily      10/09/17 2059    10/10/17 0900  furosemide (LASIX) tablet 20 mg  Daily      10/09/17 2059    10/10/17 0900  fluticasone (FLONASE) 50 MCG/ACT nasal spray 2 spray  Daily      10/09/17 2059    10/10/17 0900  aspirin chewable tablet 81 mg  Daily      10/09/17 2059    10/10/17 0900  diltiaZEM CD (CARDIZEM CD) 24 hr capsule 240 mg  Every 24 Hours Scheduled,   Status:  Discontinued      10/09/17 2059    10/10/17 0600  pantoprazole (PROTONIX) EC tablet 40 mg  Every Early Morning      10/09/17 1841    10/09/17 2130  famotidine (PEPCID) tablet 20 mg  2 Times Daily      10/09/17 2059    10/09/17 2130  HYDROcodone-acetaminophen (NORCO) 7.5-325 MG per tablet 1 tablet  Every 8 Hours      10/09/17 2059    10/09/17 2130  isosorbide dinitrate (ISORDIL) tablet 10 mg  3 Times Daily      10/09/17 2059    10/09/17 2130  hydrALAZINE (APRESOLINE) tablet 10 mg  Every 12 Hours Scheduled      10/09/17 2059    10/09/17 2100  POC Glucose Fingerstick  4 Times Daily Before Meals & at Bedtime      10/09/17 1731    10/09/17 2100  insulin aspart (novoLOG) injection 0-7 Units  4 Times Daily Before Meals & Nightly      10/09/17 1731    10/09/17 2059  albuterol (PROVENTIL) nebulizer solution 0.083% 2.5 mg/3mL  Every 4 Hours PRN      10/09/17 2059    10/09/17 2030  ipratropium-albuterol (DUO-NEB) nebulizer solution 3 mL  4 Times Daily - RT      10/09/17 2967     10/09/17 1945  piperacillin-tazobactam (ZOSYN) 3.375 g/100 mL 0.9% NS IVPB (mbp)  Every 6 Hours,   Status:  Discontinued      10/09/17 1904    10/09/17 1915  sodium chloride 0.9 % infusion  Continuous      10/09/17 1841    10/09/17 1842  Basic Metabolic Panel  Daily      10/09/17 1841    10/09/17 1842  CBC & Differential  Daily      10/09/17 1841    10/09/17 1841  nitroglycerin (NITROSTAT) SL tablet 0.4 mg  Every 5 Minutes PRN      10/09/17 1841    10/09/17 1840  ondansetron (ZOFRAN) injection 4 mg  Every 6 Hours PRN      10/09/17 1841    10/09/17 1748  diphenhydrAMINE (BENADRYL) capsule 25 mg  Every 8 Hours PRN      10/09/17 1749    10/09/17 1731  dextrose (GLUTOSE) oral gel 15 g  Every 15 Minutes PRN      10/09/17 1731    10/09/17 1731  dextrose (D50W) solution 25 g  Every 15 Minutes PRN      10/09/17 1731    10/09/17 1731  glucagon (human recombinant) (GLUCAGEN DIAGNOSTIC) injection 1 mg  Every 15 Minutes PRN      10/09/17 1731    10/09/17 1725  methylPREDNISolone sodium succinate (SOLU-Medrol) injection 60 mg  Every 8 Hours      10/09/17 1723    10/09/17 1723  Pharmacy to Dose Zosyn  Continuous PRN,   Status:  Discontinued      10/09/17 1723    10/06/17 0000  doxycycline (VIBRAMYCIN) 100 MG capsule  2 Times Daily      10/09/17 1622    Unscheduled  ECG 12 Lead  As Needed     Comments:  Nurse to Release ECG Order for Unrelieved or New Chest Pain    10/09/17 1841    --  glimepiride (AMARYL) 2 MG tablet  Every Morning Before Breakfast      10/09/17 1733    --  SCANNED - TELEMETRY        10/09/17 0000    --  SCANNED - TELEMETRY        10/09/17 0000    --  SCANNED - TELEMETRY        10/09/17 0000    --  SCANNED EKG      10/09/17 0000    --  SCANNED EKG      10/09/17 0000    --  SCANNED - TELEMETRY        10/09/17 0000    --  SCANNED - TELEMETRY        10/09/17 0000    --  SCANNED - TELEMETRY        10/09/17 0000    --  SCANNED - TELEMETRY        10/09/17 0000             Physician Progress Notes (last 24 hours) (Notes  from 10/10/2017  3:29 PM through 10/11/2017  3:29 PM)      Joo Bowser MD at 10/10/2017  5:36 PM  Version 1 of 1           [facility] Medicine Services  INPATIENT PROGRESS NOTE    Length of Stay: 1  Date of Admission: 10/9/2017  Primary Care Physician: Paolo Rey MD    Subjective   Chief Complaint:   HPI:  ( Copied from H and P done by  )  Patient complains of 3 days history of cough, dyspnea, chills, and rash on his chest and abdomen .patient was recently discharged from the hospital and started don losartan and cardizem .  Patient denies headache , dizziness, visual changes, change in appetite , chest pain or palpitations, , abdominal pain , N/V/D , blood in the stool or urine or urinary symptoms, weakness numbness or tingling in the extremities.    September 10, 2017: Patient has no shortness shortness of air.  Patient has diffuse macular papular rash on his back, groin and petechiae on his legs.      Review of Systems   Constitutional: Negative for activity change, appetite change, fatigue and fever.   HENT: Negative for ear pain and sore throat.    Eyes: Negative for pain and visual disturbance.   Respiratory: Negative for cough and shortness of breath.    Cardiovascular: Negative for chest pain and palpitations.   Gastrointestinal: Negative for abdominal pain and nausea.   Endocrine: Negative for cold intolerance and heat intolerance.   Genitourinary: Negative for difficulty urinating and dysuria.   Musculoskeletal: Negative for arthralgias and gait problem.   Skin: Negative for color change and rash.   Neurological: Negative for dizziness, weakness and headaches.   Hematological: Negative for adenopathy. Does not bruise/bleed easily.   Psychiatric/Behavioral: Negative for agitation, confusion and sleep disturbance.        All pertinent negatives and positives are as above. All other systems have been reviewed and are negative unless otherwise  stated.     Objective    Temp:  [96.7 °F (35.9 °C)-97.8 °F (36.6 °C)] 97.5 °F (36.4 °C)  Heart Rate:  [53-88] 76  Resp:  [18-24] 20  BP: (132-149)/(63-69) 136/64    Physical Exam   Constitutional: He is oriented to person, place, and time. He appears well-developed and well-nourished. No distress.   HENT:   Head: Normocephalic and atraumatic.   Right Ear: External ear normal.   Left Ear: External ear normal.   Mouth/Throat: Oropharynx is clear and moist.   Eyes: Conjunctivae and EOM are normal. Pupils are equal, round, and reactive to light. Left eye exhibits no discharge. No scleral icterus.   Neck: No tracheal deviation present. No thyromegaly present.   Cardiovascular: Normal rate, regular rhythm and normal heart sounds.  Exam reveals no gallop and no friction rub.    No murmur heard.  Pulmonary/Chest: Effort normal and breath sounds normal. No stridor. No respiratory distress. He has no wheezes. He has no rales.   Abdominal: Soft. Bowel sounds are normal. He exhibits no distension. There is no tenderness. There is no rebound.   Musculoskeletal: He exhibits no edema, tenderness or deformity.   Neurological: He is alert and oriented to person, place, and time.   Skin: Skin is warm and dry. No rash noted. No erythema. No pallor.   Psychiatric: He has a normal mood and affect. His behavior is normal. Thought content normal.           aspirin 81 mg Oral Daily   diltiaZEM  mg Oral Q24H   donepezil 10 mg Oral Daily   famotidine 20 mg Oral BID   fluticasone 2 spray Nasal Daily   furosemide 20 mg Oral Daily   hydrALAZINE 10 mg Oral Q12H   HYDROcodone-acetaminophen 1 tablet Oral Q8H   insulin aspart 0-7 Units Subcutaneous 4x Daily AC & at Bedtime   ipratropium-albuterol 3 mL Nebulization 4x Daily - RT   isosorbide dinitrate 10 mg Oral TID   levoFLOXacin 500 mg Intravenous Q24H   magnesium oxide 400 mg Oral Daily   methylPREDNISolone sodium succinate 60 mg Intravenous Q8H   pantoprazole 40 mg Oral Q AM    piperacillin-tazobactam 3.375 g Intravenous Q6H         Results Review:  I have reviewed the labs, radiology results, and diagnostic studies.    Laboratory Data:     Results from last 7 days  Lab Units 10/10/17  0600 10/09/17  1720 10/09/17  1532   SODIUM mmol/L 141 139 140   POTASSIUM mmol/L 4.6 4.3 4.0   CHLORIDE mmol/L 105 102 103   CO2 mmol/L 23.0 24.0 24.0   BUN mg/dL 26* 20 22*   CREATININE mg/dL 1.04 0.98 1.04   GLUCOSE mg/dL 172* 206* 124*   CALCIUM mg/dL 8.8 8.9 9.1   BILIRUBIN mg/dL  --   --  0.4   ALK PHOS U/L  --   --  72   ALT (SGPT) U/L  --   --  41   AST (SGOT) U/L  --   --  21   ANION GAP mmol/L 13.0 13.0 13.0     Estimated Creatinine Clearance: 53.5 mL/min (by C-G formula based on Cr of 1.04).    Results from last 7 days  Lab Units 10/10/17  0600 10/09/17  1532   MAGNESIUM mg/dL 1.9 2.0   PHOSPHORUS mg/dL  --  2.8           Results from last 7 days  Lab Units 10/10/17  0600 10/09/17  1720 10/09/17  1532   WBC 10*3/mm3 12.23* 13.67* 14.49*   HEMOGLOBIN g/dL 10.4* 10.7* 11.0*   HEMATOCRIT % 32.7* 33.3* 34.6*   PLATELETS 10*3/mm3 241 251 239       Results from last 7 days  Lab Units 10/09/17  1532   INR  1.03       Culture Data:   Blood Culture   Date Value Ref Range Status   10/09/2017 No growth at less than 24 hours  Preliminary   10/09/2017 No growth at 24 hours  Preliminary     No results found for: URINECX  No results found for: RESPCX  No results found for: WOUNDCX  No results found for: STOOLCX  No components found for: BODYFLD    Radiology Data:   Imaging Results (last 24 hours)     ** No results found for the last 24 hours. **          I have reviewed the patient current medications.     Assessment/Plan     Hospital Problem List     Shortness of breath        1.  Maculopapular papular rash  This could be because of Cardizem or losartan because these are the  2 new medicines started.  We'll continue current Cardizem and discontinue losartan.  Will order tick panel and patient will be started  on doxycycline.     #2.  Shortness of air could be due to pneumonia versus PE versus CHF versus: Coronary artery disease  CHF: BNP is 444  PE: D dimer pending   CAD: 0.038-->0.038-->0.038  EKG: Afib; currently in NSR       #3.  Community-acquired pneumonia  Continue with Zosyn and Levaquin  CrP:pending   Sputum cultures:pending   Tapering steroids     #4.  Elevated lactic acid  Will order  sputum culture, blood culture, urine culture  Continue with  IV fluids    #5.  Acute blood loss anemia  Will order iron studies and Hemoccult    #6. Afib  -Rhythm control:Cardizem  -Rate control: Controlled  -Anticoagulation: None; will d/w cardiology to start him on anticoagulation.           DVT Prophylaxis: SCD/NADINE     Discharge Planning: I expect patient to be discharged to home in 3-4 days.          This document has been electronically signed by Joo Bowser MD on October 10, 2017 5:52 PM               Electronically signed by Joo Bowser MD at 10/10/2017  5:52 PM      Joo Bowser MD at 10/11/2017  2:35 PM  Version 1 of 1           [facility] Medicine Services  INPATIENT PROGRESS NOTE    Length of Stay: 2  Date of Admission: 10/9/2017  Primary Care Physician: Paolo Rey MD    Subjective   Chief Complaint:   HPI:  ( Copied from H and P done by  )  Patient complains of 3 days history of cough, dyspnea, chills, and rash on his chest and abdomen .patient was recently discharged from the hospital and started don losartan and cardizem .  Patient denies headache , dizziness, visual changes, change in appetite , chest pain or palpitations, , abdominal pain , N/V/D , blood in the stool or urine or urinary symptoms, weakness numbness or tingling in the extremities.    October 10, 2017: Patient has no shortness shortness of air.  Patient has diffuse macular papular rash on his back, groin and petechiae on his legs.    October 11, 2017: Patient has itching  on the rash.  Patient wife said that rash has not improved.  Patient  also short of breath because of the fluids.    Review of Systems   Constitutional: Negative for activity change, appetite change, fatigue and fever.   HENT: Negative for ear pain and sore throat.    Eyes: Negative for pain and visual disturbance.   Respiratory: Negative for cough and shortness of breath.    Cardiovascular: Negative for chest pain and palpitations.   Gastrointestinal: Negative for abdominal pain and nausea.   Endocrine: Negative for cold intolerance and heat intolerance.   Genitourinary: Negative for difficulty urinating and dysuria.   Musculoskeletal: Negative for arthralgias and gait problem.   Skin: Positive for rash. Negative for color change.   Neurological: Negative for dizziness, weakness and headaches.   Hematological: Negative for adenopathy. Does not bruise/bleed easily.   Psychiatric/Behavioral: Negative for agitation, confusion and sleep disturbance.        All pertinent negatives and positives are as above. All other systems have been reviewed and are negative unless otherwise stated.     Objective    Temp:  [96.7 °F (35.9 °C)-98.2 °F (36.8 °C)] 97 °F (36.1 °C)  Heart Rate:  [60-95] 82  Resp:  [20-22] 20  BP: (110-179)/(52-79) 179/79    Physical Exam   Constitutional: He is oriented to person, place, and time. He appears well-developed and well-nourished. No distress.   HENT:   Head: Normocephalic and atraumatic.   Right Ear: External ear normal.   Left Ear: External ear normal.   Mouth/Throat: Oropharynx is clear and moist.   Eyes: Conjunctivae and EOM are normal. Pupils are equal, round, and reactive to light. Left eye exhibits no discharge. No scleral icterus.   Neck: No tracheal deviation present. No thyromegaly present.   Cardiovascular: Normal rate, regular rhythm and normal heart sounds.  Exam reveals no gallop and no friction rub.    No murmur heard.  Pulmonary/Chest: Effort normal and breath sounds normal. No  stridor. No respiratory distress. He has no wheezes. He has no rales.   Abdominal: Soft. Bowel sounds are normal. He exhibits no distension. There is no tenderness. There is no rebound.   Musculoskeletal: He exhibits no edema, tenderness or deformity.   Neurological: He is alert and oriented to person, place, and time.   Skin: Skin is warm and dry. No rash noted. No erythema. No pallor.        Psychiatric: He has a normal mood and affect. His behavior is normal. Thought content normal.           aspirin 81 mg Oral Daily   diltiaZEM  mg Oral Q24H   donepezil 10 mg Oral Daily   doxycycline 100 mg Intravenous Q12H   famotidine 20 mg Oral BID   fluticasone 2 spray Nasal Daily   furosemide 20 mg Oral Daily   hydrALAZINE 10 mg Oral Q12H   HYDROcodone-acetaminophen 1 tablet Oral Q8H   insulin aspart 0-7 Units Subcutaneous 4x Daily AC & at Bedtime   ipratropium-albuterol 3 mL Nebulization 4x Daily - RT   isosorbide dinitrate 10 mg Oral TID   levoFLOXacin 500 mg Intravenous Q24H   magnesium oxide 400 mg Oral Daily   methylPREDNISolone sodium succinate 60 mg Intravenous Q8H   pantoprazole 40 mg Oral Q AM   piperacillin-tazobactam 3.375 g Intravenous Q6H         Results Review:  I have reviewed the labs, radiology results, and diagnostic studies.    Laboratory Data:     Results from last 7 days  Lab Units 10/11/17  0701 10/10/17  0600 10/09/17  1720 10/09/17  1532   SODIUM mmol/L 142 141 139 140   POTASSIUM mmol/L 4.3 4.6 4.3 4.0   CHLORIDE mmol/L 104 105 102 103   CO2 mmol/L 23.0 23.0 24.0 24.0   BUN mg/dL 28* 26* 20 22*   CREATININE mg/dL 1.15 1.04 0.98 1.04   GLUCOSE mg/dL 130* 172* 206* 124*   CALCIUM mg/dL 8.8 8.8 8.9 9.1   BILIRUBIN mg/dL  --   --   --  0.4   ALK PHOS U/L  --   --   --  72   ALT (SGPT) U/L  --   --   --  41   AST (SGOT) U/L  --   --   --  21   ANION GAP mmol/L 15.0 13.0 13.0 13.0     Estimated Creatinine Clearance: 48.4 mL/min (by C-G formula based on Cr of 1.15).    Results from last 7 days  Lab  Units 10/10/17  0600 10/09/17  1532   MAGNESIUM mg/dL 1.9 2.0   PHOSPHORUS mg/dL  --  2.8           Results from last 7 days  Lab Units 10/11/17  0701 10/10/17  0600 10/09/17  1720 10/09/17  1532   WBC 10*3/mm3 16.46* 12.23* 13.67* 14.49*   HEMOGLOBIN g/dL 10.7* 10.4* 10.7* 11.0*   HEMATOCRIT % 33.9* 32.7* 33.3* 34.6*   PLATELETS 10*3/mm3 276 241 251 239       Results from last 7 days  Lab Units 10/09/17  1532   INR  1.03       Culture Data:   Blood Culture   Date Value Ref Range Status   10/09/2017 No growth at 24 hours  Preliminary   10/09/2017 No growth at 24 hours  Preliminary     No results found for: URINECX  No results found for: RESPCX  No results found for: WOUNDCX  No results found for: STOOLCX  No components found for: BODYFLD    Radiology Data:   Imaging Results (last 24 hours)     ** No results found for the last 24 hours. **          I have reviewed the patient current medications.     Assessment/Plan     Hospital Problem List     Shortness of breath        1.  Maculopapular papular rash  I have discontinued losartan and Cardizem because the only 2 medicines which was started recently.  If patient's heart rate increases we'll start patient on Lopressor.  If rash does not improve we will talk to he hematology oncology regarding petechia.  Take panel is pending but patient is currently on doxycycline    #2.  Shortness of air could be due to pneumonia versus PE versus CHF versus: Coronary artery disease  CHF: BNP is 444  PE: 600's; will obtain V/Q scan  CAD: 0.038-->0.038-->0.038  EKG: Afib; currently in NSR       #3.  Community-acquired pneumonia  Continue with doxycycline; w  CrP:pending   Sputum cultures:pending   Tapering steroids     #4.  Elevated lactic acid  sputum culture, blood culture, urine culture pending  Discontinue with  IV fluids  Elevated white count due to steroids   Patient clinically doesn't look sick     #5.  Acute blood loss anemia  Will order iron studies and Hemoccult    #6.  Afib  -Rhythm control:Cardizem  -Rate control: Controlled  -Anticoagulation: None; will d/w cardiology to start him on anticoagulation.           DVT Prophylaxis: SCD/NADINE     Discharge Planning: I expect patient to be discharged to home in 3-4 days.          This document has been electronically signed by Joo Bowser MD on October 11, 2017 2:35 PM               Electronically signed by Joo Bowser MD at 10/11/2017  3:17 PM      ref#[id number]  Henrietta Rose RN [facility]  [phone]  Fax [phone]

## 2017-10-11 NOTE — PROGRESS NOTES
Discharge Planning Assessment  [facility]     Patient Name: Hasmukh Ham  MRN: [id number]  Today's Date: 10/11/2017    Admit Date: 10/9/2017          Discharge Needs Assessment       10/11/17 1101    Living Environment    Lives With spouse    Living Arrangements house   Contact information on face sheet confirmed with patient.     Transportation Available car;family or friend will provide   Driving is limited. Patient reports spouse and/or daughter assist with transportation.     Discharge Needs Assessment    Concerns To Be Addressed other (see comments)   Patient is agreeable to continue with home health services at d/c. Patient was active prior to admission.     Readmission Within The Last 30 Days other (see comments)   Patient admitted on 9- dx: afib wiht RVR. Presents with SOB and rash.     Outpatient/Agency/Support Group Needs other (see comments)   Patient has HF clinic appointment scheduled on 10-    Community Agency Name(S) Patient uses DME provided by [facility] and [facility] services for therapy and CHF telehealth.    Anticipated Changes Related to Illness none    Equipment Currently Used at Home cane, straight;walker, rolling;other (see comments);nebulizer;oxygen   Telehealth machine  thru home health. Patient also has home oxygen 2 liters for nightly use.     Equipment Needed After Discharge none    Discharge Facility/Level Of Care Needs home with home health    Current Discharge Risk chronically ill   COPD and CHF            Discharge Plan       10/11/17 1115    Case Management/Social Work Plan    Plan home with home health services    Additional Comments LACE complete. Note left for MD re: restart orders for home health PT/OT and telehealth COPD/CHF. Patient reports compliance with performing daily weights, Bp checks and low sodium diet. Patient reports visit with [facility] Friday, Saturday and Sunday secondary to rash. Patient reports he was not improving and  presented to ER for further treatment. Patient was active with telehealth and use of home nebulizer 4 x per time. Patient agreeable to resume HH at d/c.....Shari JAMES      10/11/17 1042    Case Management/Social Work Plan    Additional Comments team rounding complete, pt still complaining of rash all over body, pt has SOA, per MD she is trending lactic acid and will d/c IVF's, continue with IV antibiotics. ...Henrietta Rose RN        Discharge Placement     No information found        Expected Discharge Date and Time     Expected Discharge Date Expected Discharge Time    Oct 16, 2017               Demographic Summary       10/11/17 1059    Referral Information    Admission Type inpatient    Arrived From home or self-care    Referral Source high risk screening    Reason For Consult discharge planning    Record Reviewed clinical discipline documentation;history and physical;medical record    Primary Care Physician Information    Name Dr Paolo Rey            Functional Status       10/11/17 1059    Functional Status Prior    Ambulation 0-->independent   Patient has a walker available for home use, if needed. Patient reports being a short distance ambulator.     Transferring 0-->independent    Toileting 0-->independent    Bathing 0-->independent    Dressing 0-->independent    Eating 0-->independent    Communication 0-->understands/communicates without difficulty    Swallowing 0-->swallows foods/liquids without difficulty    IADL    Medications independent   Pharmacy, [facility], and coverage confirmed with patient.     Meal Preparation assistive person    Housekeeping assistive person    Laundry assistive person    Shopping assistive person    Oral Care independent    IADL Comments Patient has supprt/assistance fro his spouse, as needed, for IADL's.     Activity Tolerance    Usual Activity Tolerance good    Cognitive/Perceptual/Developmental    Current Mental Status/Cognitive Functioning no deficits noted    Recent  Changes in Mental Status/Cognitive Functioning no changes    Developmental Stage (Eriksson's Stages of Development) Stage 8 (65 years-death/Late Adulthood) Integrity vs. Despair            Psychosocial     None            Abuse/Neglect     None            Legal     None            Substance Abuse     None            Patient Forms     None          Mercedez Singh, JACOB

## 2017-10-11 NOTE — PLAN OF CARE
Problem: Patient Care Overview (Adult)  Goal: Plan of Care Review  Outcome: Ongoing (interventions implemented as appropriate)    Problem: Acute Coronary Syndrome (ACS) (Adult)  Goal: Signs and Symptoms of Listed Potential Problems Will be Absent or Manageable (Acute Coronary Syndrome)  Outcome: Ongoing (interventions implemented as appropriate)    Problem: Diabetes, Type 2 (Adult)  Goal: Signs and Symptoms of Listed Potential Problems Will be Absent or Manageable (Diabetes, Type 2)  Outcome: Ongoing (interventions implemented as appropriate)    Problem: Pain, Chronic (Adult)  Goal: Identify Related Risk Factors and Signs and Symptoms  Outcome: Ongoing (interventions implemented as appropriate)    Problem: COPD, Chronic Bronchitis/Emphysema (Adult)  Goal: Signs and Symptoms of Listed Potential Problems Will be Absent or Manageable (COPD, Chronic Bronchitis/Emphysema)  Outcome: Ongoing (interventions implemented as appropriate)

## 2017-10-12 ENCOUNTER — APPOINTMENT (OUTPATIENT)
Dept: NUCLEAR MEDICINE | Facility: HOSPITAL | Age: 82
End: 2017-10-12

## 2017-10-12 ENCOUNTER — APPOINTMENT (OUTPATIENT)
Dept: GENERAL RADIOLOGY | Facility: HOSPITAL | Age: 82
End: 2017-10-12

## 2017-10-12 LAB
ANION GAP SERPL CALCULATED.3IONS-SCNC: 9 MMOL/L (ref 5–15)
B BURGDOR IGG+IGM SER-ACNC: <0.91 ISR (ref 0–0.9)
B BURGDOR IGM SER-ACNC: <0.8 INDEX (ref 0–0.79)
BASOPHILS # BLD AUTO: 0.01 10*3/MM3 (ref 0–0.2)
BASOPHILS NFR BLD AUTO: 0.1 % (ref 0–2)
BUN BLD-MCNC: 32 MG/DL (ref 7–21)
BUN/CREAT SERPL: 34.4 (ref 7–25)
CALCIUM SPEC-SCNC: 8.3 MG/DL (ref 8.4–10.2)
CHLORIDE SERPL-SCNC: 104 MMOL/L (ref 95–110)
CO2 SERPL-SCNC: 24 MMOL/L (ref 22–31)
CREAT BLD-MCNC: 0.93 MG/DL (ref 0.7–1.3)
CRP SERPL-MCNC: 0.8 MG/DL (ref 0–1)
D-LACTATE SERPL-SCNC: 4 MMOL/L (ref 0.5–2)
DEPRECATED RDW RBC AUTO: 56.4 FL (ref 35.1–43.9)
EOSINOPHIL # BLD AUTO: 0 10*3/MM3 (ref 0–0.7)
EOSINOPHIL NFR BLD AUTO: 0 % (ref 0–7)
ERYTHROCYTE [DISTWIDTH] IN BLOOD BY AUTOMATED COUNT: 16.5 % (ref 11.5–14.5)
GFR SERPL CREATININE-BSD FRML MDRD: 77 ML/MIN/1.73 (ref 42–98)
GLUCOSE BLD-MCNC: 181 MG/DL (ref 60–100)
GLUCOSE BLDC GLUCOMTR-MCNC: 145 MG/DL (ref 70–130)
GLUCOSE BLDC GLUCOMTR-MCNC: 163 MG/DL (ref 70–130)
GLUCOSE BLDC GLUCOMTR-MCNC: 167 MG/DL (ref 70–130)
GLUCOSE BLDC GLUCOMTR-MCNC: 218 MG/DL (ref 70–130)
HCT VFR BLD AUTO: 31.2 % (ref 39–49)
HEMOCCULT STL QL: NEGATIVE
HGB BLD-MCNC: 10.3 G/DL (ref 13.7–17.3)
IMM GRANULOCYTES # BLD: 0.35 10*3/MM3 (ref 0–0.02)
IMM GRANULOCYTES NFR BLD: 2.5 % (ref 0–0.5)
LYMPHOCYTES # BLD AUTO: 0.67 10*3/MM3 (ref 0.6–4.2)
LYMPHOCYTES NFR BLD AUTO: 4.8 % (ref 10–50)
MCH RBC QN AUTO: 30.7 PG (ref 26.5–34)
MCHC RBC AUTO-ENTMCNC: 33 G/DL (ref 31.5–36.3)
MCV RBC AUTO: 93.1 FL (ref 80–98)
MONOCYTES # BLD AUTO: 0.83 10*3/MM3 (ref 0–0.9)
MONOCYTES NFR BLD AUTO: 5.9 % (ref 0–12)
NEUTROPHILS # BLD AUTO: 12.16 10*3/MM3 (ref 2–8.6)
NEUTROPHILS NFR BLD AUTO: 86.7 % (ref 37–80)
NRBC BLD MANUAL-RTO: 0.6 /100 WBC (ref 0–0)
PLATELET # BLD AUTO: 270 10*3/MM3 (ref 150–450)
PMV BLD AUTO: 10.1 FL (ref 8–12)
POTASSIUM BLD-SCNC: 4 MMOL/L (ref 3.5–5.1)
RBC # BLD AUTO: 3.35 10*6/MM3 (ref 4.37–5.74)
SODIUM BLD-SCNC: 137 MMOL/L (ref 137–145)
WBC NRBC COR # BLD: 14.02 10*3/MM3 (ref 3.2–9.8)
WHOLE BLOOD HOLD SPECIMEN: NORMAL

## 2017-10-12 PROCEDURE — 86003 ALLG SPEC IGE CRUDE XTRC EA: CPT | Performed by: NURSE PRACTITIONER

## 2017-10-12 PROCEDURE — G0378 HOSPITAL OBSERVATION PER HR: HCPCS

## 2017-10-12 PROCEDURE — 93010 ELECTROCARDIOGRAM REPORT: CPT | Performed by: INTERNAL MEDICINE

## 2017-10-12 PROCEDURE — 86140 C-REACTIVE PROTEIN: CPT | Performed by: FAMILY MEDICINE

## 2017-10-12 PROCEDURE — 25010000002 HYDRALAZINE PER 20 MG: Performed by: FAMILY MEDICINE

## 2017-10-12 PROCEDURE — 94799 UNLISTED PULMONARY SVC/PX: CPT

## 2017-10-12 PROCEDURE — 80048 BASIC METABOLIC PNL TOTAL CA: CPT | Performed by: FAMILY MEDICINE

## 2017-10-12 PROCEDURE — 63710000001 INSULIN ASPART PER 5 UNITS: Performed by: FAMILY MEDICINE

## 2017-10-12 PROCEDURE — 85025 COMPLETE CBC W/AUTO DIFF WBC: CPT | Performed by: FAMILY MEDICINE

## 2017-10-12 PROCEDURE — 93005 ELECTROCARDIOGRAM TRACING: CPT | Performed by: FAMILY MEDICINE

## 2017-10-12 PROCEDURE — 86666 EHRLICHIA ANTIBODY: CPT | Performed by: FAMILY MEDICINE

## 2017-10-12 PROCEDURE — 94760 N-INVAS EAR/PLS OXIMETRY 1: CPT

## 2017-10-12 PROCEDURE — 82272 OCCULT BLD FECES 1-3 TESTS: CPT | Performed by: FAMILY MEDICINE

## 2017-10-12 PROCEDURE — 63710000001 DIPHENHYDRAMINE PER 50 MG: Performed by: FAMILY MEDICINE

## 2017-10-12 PROCEDURE — 71010 HC CHEST PA OR AP: CPT

## 2017-10-12 PROCEDURE — 96376 TX/PRO/DX INJ SAME DRUG ADON: CPT

## 2017-10-12 PROCEDURE — 82962 GLUCOSE BLOOD TEST: CPT

## 2017-10-12 PROCEDURE — 83605 ASSAY OF LACTIC ACID: CPT | Performed by: FAMILY MEDICINE

## 2017-10-12 PROCEDURE — 25010000002 METHYLPREDNISOLONE PER 125 MG: Performed by: FAMILY MEDICINE

## 2017-10-12 RX ORDER — HYDRALAZINE HYDROCHLORIDE 20 MG/ML
20 INJECTION INTRAMUSCULAR; INTRAVENOUS EVERY 6 HOURS PRN
Status: DISCONTINUED | OUTPATIENT
Start: 2017-10-12 | End: 2017-10-17 | Stop reason: HOSPADM

## 2017-10-12 RX ORDER — LORAZEPAM 0.5 MG/1
0.5 TABLET ORAL ONCE
Status: COMPLETED | OUTPATIENT
Start: 2017-10-12 | End: 2017-10-12

## 2017-10-12 RX ORDER — PROMETHAZINE HYDROCHLORIDE AND CODEINE PHOSPHATE 6.25; 1 MG/5ML; MG/5ML
5 SYRUP ORAL EVERY 4 HOURS PRN
Status: DISCONTINUED | OUTPATIENT
Start: 2017-10-12 | End: 2017-10-17 | Stop reason: HOSPADM

## 2017-10-12 RX ADMIN — HYDRALAZINE HYDROCHLORIDE 10 MG: 10 TABLET, FILM COATED ORAL at 08:34

## 2017-10-12 RX ADMIN — FAMOTIDINE 20 MG: 20 TABLET ORAL at 17:05

## 2017-10-12 RX ADMIN — LORAZEPAM 0.5 MG: 0.5 TABLET ORAL at 19:53

## 2017-10-12 RX ADMIN — FAMOTIDINE 20 MG: 20 TABLET ORAL at 08:26

## 2017-10-12 RX ADMIN — HYDROCODONE BITARTRATE AND ACETAMINOPHEN 1 TABLET: 7.5; 325 TABLET ORAL at 06:23

## 2017-10-12 RX ADMIN — INSULIN ASPART 3 UNITS: 100 INJECTION, SOLUTION INTRAVENOUS; SUBCUTANEOUS at 21:09

## 2017-10-12 RX ADMIN — ISOSORBIDE DINITRATE 10 MG: 5 TABLET ORAL at 08:27

## 2017-10-12 RX ADMIN — DIPHENHYDRAMINE HYDROCHLORIDE 25 MG: 25 CAPSULE ORAL at 13:46

## 2017-10-12 RX ADMIN — INSULIN ASPART 2 UNITS: 100 INJECTION, SOLUTION INTRAVENOUS; SUBCUTANEOUS at 11:06

## 2017-10-12 RX ADMIN — IPRATROPIUM BROMIDE AND ALBUTEROL SULFATE 3 ML: 2.5; .5 SOLUTION RESPIRATORY (INHALATION) at 08:07

## 2017-10-12 RX ADMIN — METHYLPREDNISOLONE SODIUM SUCCINATE 60 MG: 125 INJECTION, POWDER, FOR SOLUTION INTRAMUSCULAR; INTRAVENOUS at 01:12

## 2017-10-12 RX ADMIN — ASPIRIN 81 MG 81 MG: 81 TABLET ORAL at 08:27

## 2017-10-12 RX ADMIN — HYDROCODONE BITARTRATE AND ACETAMINOPHEN 1 TABLET: 7.5; 325 TABLET ORAL at 20:28

## 2017-10-12 RX ADMIN — PANTOPRAZOLE SODIUM 40 MG: 40 TABLET, DELAYED RELEASE ORAL at 06:24

## 2017-10-12 RX ADMIN — FUROSEMIDE 20 MG: 20 TABLET ORAL at 08:27

## 2017-10-12 RX ADMIN — DIPHENHYDRAMINE HYDROCHLORIDE 25 MG: 25 CAPSULE ORAL at 04:28

## 2017-10-12 RX ADMIN — HYDRALAZINE HYDROCHLORIDE 10 MG: 10 TABLET, FILM COATED ORAL at 21:04

## 2017-10-12 RX ADMIN — IPRATROPIUM BROMIDE AND ALBUTEROL SULFATE 3 ML: 2.5; .5 SOLUTION RESPIRATORY (INHALATION) at 16:18

## 2017-10-12 RX ADMIN — METHYLPREDNISOLONE SODIUM SUCCINATE 60 MG: 125 INJECTION, POWDER, FOR SOLUTION INTRAMUSCULAR; INTRAVENOUS at 08:26

## 2017-10-12 RX ADMIN — ISOSORBIDE DINITRATE 10 MG: 5 TABLET ORAL at 15:15

## 2017-10-12 RX ADMIN — ISOSORBIDE DINITRATE 10 MG: 5 TABLET ORAL at 21:55

## 2017-10-12 RX ADMIN — MAGNESIUM OXIDE TAB 400 MG (241.3 MG ELEMENTAL MG) 400 MG: 400 (241.3 MG) TAB at 08:27

## 2017-10-12 RX ADMIN — METOPROLOL TARTRATE 12.5 MG: 25 TABLET, FILM COATED ORAL at 21:55

## 2017-10-12 RX ADMIN — DONEPEZIL HYDROCHLORIDE 10 MG: 10 TABLET, FILM COATED ORAL at 08:27

## 2017-10-12 RX ADMIN — METHYLPREDNISOLONE SODIUM SUCCINATE 60 MG: 125 INJECTION, POWDER, FOR SOLUTION INTRAMUSCULAR; INTRAVENOUS at 17:06

## 2017-10-12 RX ADMIN — FLUTICASONE PROPIONATE 2 SPRAY: 50 SPRAY, METERED NASAL at 08:26

## 2017-10-12 RX ADMIN — HYDROCODONE BITARTRATE AND ACETAMINOPHEN 1 TABLET: 7.5; 325 TABLET ORAL at 13:46

## 2017-10-12 RX ADMIN — IPRATROPIUM BROMIDE AND ALBUTEROL SULFATE 3 ML: 2.5; .5 SOLUTION RESPIRATORY (INHALATION) at 19:30

## 2017-10-12 RX ADMIN — IPRATROPIUM BROMIDE AND ALBUTEROL SULFATE 3 ML: 2.5; .5 SOLUTION RESPIRATORY (INHALATION) at 10:58

## 2017-10-12 RX ADMIN — INSULIN ASPART 2 UNITS: 100 INJECTION, SOLUTION INTRAVENOUS; SUBCUTANEOUS at 07:54

## 2017-10-12 NOTE — PLAN OF CARE
Problem: Patient Care Overview (Adult)  Goal: Plan of Care Review  Outcome: Ongoing (interventions implemented as appropriate)    10/12/17 3019   Coping/Psychosocial Response Interventions   Plan Of Care Reviewed With patient;spouse   Patient Care Overview   Progress improving

## 2017-10-12 NOTE — PROGRESS NOTES
[facility] Medicine Services  INPATIENT PROGRESS NOTE    Length of Stay: 3  Date of Admission: 10/9/2017  Primary Care Physician: Paolo Rey MD    Subjective   Chief Complaint:   HPI:  ( Copied from H and P done by  )  Patient complains of 3 days history of cough, dyspnea, chills, and rash on his chest and abdomen .patient was recently discharged from the hospital and started don losartan and cardizem .  Patient denies headache , dizziness, visual changes, change in appetite , chest pain or palpitations, , abdominal pain , N/V/D , blood in the stool or urine or urinary symptoms, weakness numbness or tingling in the extremities.    October 10, 2017: Patient has no shortness shortness of air.  Patient has diffuse macular papular rash on his back, groin and petechiae on his legs.    October 11, 2017: Patient has itching on the rash.  Patient wife said that rash has not improved.  Patient  also short of breath because of the fluids.    October 12, 2017: Pt noticed improvement in rash; feels better.       Review of Systems   Constitutional: Negative for activity change, appetite change, fatigue and fever.   HENT: Negative for ear pain and sore throat.    Eyes: Negative for pain and visual disturbance.   Respiratory: Negative for cough and shortness of breath.    Cardiovascular: Negative for chest pain and palpitations.   Gastrointestinal: Negative for abdominal pain and nausea.   Endocrine: Negative for cold intolerance and heat intolerance.   Genitourinary: Negative for difficulty urinating and dysuria.   Musculoskeletal: Negative for arthralgias and gait problem.   Skin: Positive for rash. Negative for color change.   Neurological: Negative for dizziness, weakness and headaches.   Hematological: Negative for adenopathy. Does not bruise/bleed easily.   Psychiatric/Behavioral: Negative for agitation, confusion and sleep disturbance.        All pertinent negatives  and positives are as above. All other systems have been reviewed and are negative unless otherwise stated.     Objective    Temp:  [97 °F (36.1 °C)-97.7 °F (36.5 °C)] 97.2 °F (36.2 °C)  Heart Rate:  [65-94] 65  Resp:  [18-20] 18  BP: (133-163)/(65-81) 133/72    Physical Exam   Constitutional: He is oriented to person, place, and time. He appears well-developed and well-nourished. No distress.   HENT:   Head: Normocephalic and atraumatic.   Right Ear: External ear normal.   Left Ear: External ear normal.   Mouth/Throat: Oropharynx is clear and moist.   Eyes: Conjunctivae and EOM are normal. Pupils are equal, round, and reactive to light. Left eye exhibits no discharge. No scleral icterus.   Neck: No tracheal deviation present. No thyromegaly present.   Cardiovascular: Normal rate, regular rhythm and normal heart sounds.  Exam reveals no gallop and no friction rub.    No murmur heard.  Pulmonary/Chest: Effort normal and breath sounds normal. No stridor. No respiratory distress. He has no wheezes. He has no rales.   Abdominal: Soft. Bowel sounds are normal. He exhibits no distension. There is no tenderness. There is no rebound.   Musculoskeletal: He exhibits no edema, tenderness or deformity.   Neurological: He is alert and oriented to person, place, and time.   Skin: Skin is warm and dry. No rash noted. No erythema. No pallor.        Psychiatric: He has a normal mood and affect. His behavior is normal. Thought content normal.           aspirin 81 mg Oral Daily   donepezil 10 mg Oral Daily   famotidine 20 mg Oral BID   fluticasone 2 spray Nasal Daily   furosemide 20 mg Oral Daily   hydrALAZINE 10 mg Oral Q12H   HYDROcodone-acetaminophen 1 tablet Oral Q8H   insulin aspart 0-7 Units Subcutaneous 4x Daily AC & at Bedtime   ipratropium-albuterol 3 mL Nebulization 4x Daily - RT   isosorbide dinitrate 10 mg Oral TID   magnesium oxide 400 mg Oral Daily   methylPREDNISolone sodium succinate 60 mg Intravenous Q8H   pantoprazole  40 mg Oral Q AM         Results Review:  I have reviewed the labs, radiology results, and diagnostic studies.    Laboratory Data:     Results from last 7 days  Lab Units 10/12/17  0522 10/11/17  1720 10/11/17  0701 10/10/17  0600  10/09/17  1532   SODIUM mmol/L 137  --  142 141  < > 140   SODIUM, ARTERIAL mmol/L  --  137.3*  --   --   --   --    POTASSIUM mmol/L 4.0  --  4.3 4.6  < > 4.0   CHLORIDE mmol/L 104  --  104 105  < > 103   CO2 mmol/L 24.0  --  23.0 23.0  < > 24.0   BUN mg/dL 32*  --  28* 26*  < > 22*   CREATININE mg/dL 0.93  --  1.15 1.04  < > 1.04   GLUCOSE mg/dL 181*  --  130* 172*  < > 124*   GLUCOSE, ARTERIAL mmol/L  --  134  --   --   --   --    CALCIUM mg/dL 8.3*  --  8.8 8.8  < > 9.1   BILIRUBIN mg/dL  --   --   --   --   --  0.4   ALK PHOS U/L  --   --   --   --   --  72   ALT (SGPT) U/L  --   --   --   --   --  41   AST (SGOT) U/L  --   --   --   --   --  21   ANION GAP mmol/L 9.0  --  15.0 13.0  < > 13.0   < > = values in this interval not displayed.  Estimated Creatinine Clearance: 60 mL/min (by C-G formula based on Cr of 0.93).    Results from last 7 days  Lab Units 10/10/17  0600 10/09/17  1532   MAGNESIUM mg/dL 1.9 2.0   PHOSPHORUS mg/dL  --  2.8           Results from last 7 days  Lab Units 10/12/17  0522 10/11/17  0701 10/10/17  0600 10/09/17  1720 10/09/17  1532   WBC 10*3/mm3 14.02* 16.46* 12.23* 13.67* 14.49*   HEMOGLOBIN g/dL 10.3* 10.7* 10.4* 10.7* 11.0*   HEMATOCRIT % 31.2* 33.9* 32.7* 33.3* 34.6*   PLATELETS 10*3/mm3 270 276 241 251 239       Results from last 7 days  Lab Units 10/09/17  1532   INR  1.03       Culture Data:   Blood Culture   Date Value Ref Range Status   10/09/2017 No growth at 2 days  Preliminary   10/09/2017 No growth at 2 days  Preliminary     Urine Culture   Date Value Ref Range Status   10/11/2017 Culture in progress  Preliminary     No results found for: RESPCX  No results found for: WOUNDCX  No results found for: STOOLCX  No components found for:  [facility]    Radiology Data:   Imaging Results (last 24 hours)     ** No results found for the last 24 hours. **          I have reviewed the patient current medications.     Assessment/Plan     Hospital Problem List     Shortness of breath        1.  Maculopapular papular rash  Discontinue losartan and Cardizem.  Patient's rash is improving.  Continue with tapering dose of steroids..  Start Metoporolol     #2.  Shortness of air could be due to pneumonia versus PE versus CHF versus: Coronary artery disease  CHF: BNP is 444  PE: 600's; will obtain V/Q scan  CAD: 0.038-->0.038-->0.038  EKG: Afib; currently in NSR; started low dose metoprolol; stopped cardizem       #3.  Community-acquired pneumonia  Discontinued doxy; patient's lactic acid improving   CrP:0.80  Sputum cultures:pending   Tapering steroids     #4.  Elevated lactic acid  Trending downwards after stopping losartan and Cardizem.    #5.  Acute blood loss anemia  Will order iron studies and Hemoccult    #6. Afib  -Rhythm control: None due to lung condition.  -Rate control:Cardizem is continued.  Started on metoprolol  -Anticoagulation: None; will d/w cardiology to start him on anticoagulation. Has-bled score is 3; major bleeding           DVT Prophylaxis: SCD/NADINE     Discharge Planning: I expect patient to be discharged to home in 3-4 days.          This document has been electronically signed by Joo Bowser MD on October 12, 2017 4:37 PM

## 2017-10-13 LAB
ANION GAP SERPL CALCULATED.3IONS-SCNC: 12 MMOL/L (ref 5–15)
ANISOCYTOSIS BLD QL: ABNORMAL
BACTERIA SPEC AEROBE CULT: NORMAL
BUN BLD-MCNC: 34 MG/DL (ref 7–21)
BUN/CREAT SERPL: 35.1 (ref 7–25)
CALCIUM SPEC-SCNC: 8.2 MG/DL (ref 8.4–10.2)
CHLORIDE SERPL-SCNC: 102 MMOL/L (ref 95–110)
CO2 SERPL-SCNC: 26 MMOL/L (ref 22–31)
CREAT BLD-MCNC: 0.97 MG/DL (ref 0.7–1.3)
D-LACTATE SERPL-SCNC: 4.3 MMOL/L (ref 0.5–2)
DEPRECATED RDW RBC AUTO: 54.7 FL (ref 35.1–43.9)
ERYTHROCYTE [DISTWIDTH] IN BLOOD BY AUTOMATED COUNT: 16.1 % (ref 11.5–14.5)
GFR SERPL CREATININE-BSD FRML MDRD: 74 ML/MIN/1.73 (ref 60–98)
GLUCOSE BLD-MCNC: 161 MG/DL (ref 60–100)
GLUCOSE BLDC GLUCOMTR-MCNC: 136 MG/DL (ref 70–130)
GLUCOSE BLDC GLUCOMTR-MCNC: 181 MG/DL (ref 70–130)
HCT VFR BLD AUTO: 32.6 % (ref 39–49)
HGB BLD-MCNC: 10.4 G/DL (ref 13.7–17.3)
LYMPHOCYTES # BLD MANUAL: 0.75 10*3/MM3 (ref 0.6–4.2)
LYMPHOCYTES NFR BLD MANUAL: 4 % (ref 0–12)
LYMPHOCYTES NFR BLD MANUAL: 5 % (ref 10–50)
MCH RBC QN AUTO: 30 PG (ref 26.5–34)
MCHC RBC AUTO-ENTMCNC: 31.9 G/DL (ref 31.5–36.3)
MCV RBC AUTO: 93.9 FL (ref 80–98)
METAMYELOCYTES NFR BLD MANUAL: 1 % (ref 0–0)
MONOCYTES # BLD AUTO: 0.6 10*3/MM3 (ref 0–0.9)
MYELOCYTES NFR BLD MANUAL: 2 % (ref 0–0)
NEUTROPHILS # BLD AUTO: 13.2 10*3/MM3 (ref 2–8.6)
NEUTROPHILS NFR BLD MANUAL: 86 % (ref 37–80)
NEUTS BAND NFR BLD MANUAL: 2 % (ref 0–5)
NRBC SPEC MANUAL: 4 /100 WBC (ref 0–0)
PLATELET # BLD AUTO: 284 10*3/MM3 (ref 150–450)
PMV BLD AUTO: 10.9 FL (ref 8–12)
POLYCHROMASIA BLD QL SMEAR: ABNORMAL
POTASSIUM BLD-SCNC: 4.2 MMOL/L (ref 3.5–5.1)
R RICKETTSI IGG SER QL IA: NEGATIVE
RBC # BLD AUTO: 3.47 10*6/MM3 (ref 4.37–5.74)
SMALL PLATELETS BLD QL SMEAR: ADEQUATE
SODIUM BLD-SCNC: 140 MMOL/L (ref 137–145)
WBC MORPH BLD: NORMAL
WBC NRBC COR # BLD: 15 10*3/MM3 (ref 3.2–9.8)

## 2017-10-13 PROCEDURE — 94799 UNLISTED PULMONARY SVC/PX: CPT

## 2017-10-13 PROCEDURE — 82962 GLUCOSE BLOOD TEST: CPT

## 2017-10-13 PROCEDURE — 25010000002 HYDRALAZINE PER 20 MG: Performed by: FAMILY MEDICINE

## 2017-10-13 PROCEDURE — 85007 BL SMEAR W/DIFF WBC COUNT: CPT | Performed by: FAMILY MEDICINE

## 2017-10-13 PROCEDURE — 83605 ASSAY OF LACTIC ACID: CPT | Performed by: FAMILY MEDICINE

## 2017-10-13 PROCEDURE — 96376 TX/PRO/DX INJ SAME DRUG ADON: CPT

## 2017-10-13 PROCEDURE — 80048 BASIC METABOLIC PNL TOTAL CA: CPT | Performed by: FAMILY MEDICINE

## 2017-10-13 PROCEDURE — 85025 COMPLETE CBC W/AUTO DIFF WBC: CPT | Performed by: FAMILY MEDICINE

## 2017-10-13 PROCEDURE — 96375 TX/PRO/DX INJ NEW DRUG ADDON: CPT

## 2017-10-13 PROCEDURE — 25010000002 METHYLPREDNISOLONE PER 125 MG: Performed by: FAMILY MEDICINE

## 2017-10-13 PROCEDURE — G0378 HOSPITAL OBSERVATION PER HR: HCPCS

## 2017-10-13 PROCEDURE — 63710000001 INSULIN ASPART PER 5 UNITS: Performed by: FAMILY MEDICINE

## 2017-10-13 RX ORDER — SODIUM CHLORIDE 9 MG/ML
INJECTION, SOLUTION INTRAVENOUS
Status: DISPENSED
Start: 2017-10-13 | End: 2017-10-14

## 2017-10-13 RX ORDER — LEVOFLOXACIN 750 MG/1
750 TABLET ORAL EVERY 24 HOURS
Status: DISCONTINUED | OUTPATIENT
Start: 2017-10-13 | End: 2017-10-17 | Stop reason: HOSPADM

## 2017-10-13 RX ORDER — ENALAPRILAT 2.5 MG/2ML
1.25 INJECTION INTRAVENOUS EVERY 6 HOURS PRN
Status: DISCONTINUED | OUTPATIENT
Start: 2017-10-13 | End: 2017-10-17 | Stop reason: HOSPADM

## 2017-10-13 RX ORDER — HEPARIN SODIUM 5000 [USP'U]/ML
5000 INJECTION, SOLUTION INTRAVENOUS; SUBCUTANEOUS EVERY 8 HOURS SCHEDULED
Status: DISCONTINUED | OUTPATIENT
Start: 2017-10-13 | End: 2017-10-13

## 2017-10-13 RX ADMIN — ENALAPRILAT 1.25 MG: 1.25 INJECTION INTRAVENOUS at 14:42

## 2017-10-13 RX ADMIN — ASPIRIN 81 MG 81 MG: 81 TABLET ORAL at 08:11

## 2017-10-13 RX ADMIN — PANTOPRAZOLE SODIUM 40 MG: 40 TABLET, DELAYED RELEASE ORAL at 05:48

## 2017-10-13 RX ADMIN — ISOSORBIDE DINITRATE 10 MG: 5 TABLET ORAL at 21:25

## 2017-10-13 RX ADMIN — HYDROCODONE BITARTRATE AND ACETAMINOPHEN 1 TABLET: 7.5; 325 TABLET ORAL at 13:12

## 2017-10-13 RX ADMIN — METHYLPREDNISOLONE SODIUM SUCCINATE 60 MG: 125 INJECTION, POWDER, FOR SOLUTION INTRAMUSCULAR; INTRAVENOUS at 10:38

## 2017-10-13 RX ADMIN — IPRATROPIUM BROMIDE AND ALBUTEROL SULFATE 3 ML: 2.5; .5 SOLUTION RESPIRATORY (INHALATION) at 20:42

## 2017-10-13 RX ADMIN — HYDRALAZINE HYDROCHLORIDE 20 MG: 20 INJECTION INTRAMUSCULAR; INTRAVENOUS at 12:09

## 2017-10-13 RX ADMIN — METOPROLOL TARTRATE 12.5 MG: 25 TABLET, FILM COATED ORAL at 21:24

## 2017-10-13 RX ADMIN — LEVOFLOXACIN 750 MG: 750 TABLET, FILM COATED ORAL at 21:24

## 2017-10-13 RX ADMIN — FAMOTIDINE 20 MG: 20 TABLET ORAL at 21:25

## 2017-10-13 RX ADMIN — HYDROCODONE BITARTRATE AND ACETAMINOPHEN 1 TABLET: 7.5; 325 TABLET ORAL at 21:31

## 2017-10-13 RX ADMIN — INSULIN ASPART 2 UNITS: 100 INJECTION, SOLUTION INTRAVENOUS; SUBCUTANEOUS at 06:09

## 2017-10-13 RX ADMIN — FUROSEMIDE 20 MG: 20 TABLET ORAL at 08:12

## 2017-10-13 RX ADMIN — IPRATROPIUM BROMIDE AND ALBUTEROL SULFATE 3 ML: 2.5; .5 SOLUTION RESPIRATORY (INHALATION) at 07:24

## 2017-10-13 RX ADMIN — HYDROCODONE BITARTRATE AND ACETAMINOPHEN 1 TABLET: 7.5; 325 TABLET ORAL at 05:48

## 2017-10-13 RX ADMIN — HYDRALAZINE HYDROCHLORIDE 10 MG: 10 TABLET, FILM COATED ORAL at 08:12

## 2017-10-13 RX ADMIN — HYDRALAZINE HYDROCHLORIDE 10 MG: 10 TABLET, FILM COATED ORAL at 21:26

## 2017-10-13 RX ADMIN — ALBUTEROL SULFATE 2.5 MG: 2.5 SOLUTION RESPIRATORY (INHALATION) at 11:55

## 2017-10-13 RX ADMIN — METHYLPREDNISOLONE SODIUM SUCCINATE 60 MG: 125 INJECTION, POWDER, FOR SOLUTION INTRAMUSCULAR; INTRAVENOUS at 02:40

## 2017-10-13 RX ADMIN — METOPROLOL TARTRATE 12.5 MG: 25 TABLET, FILM COATED ORAL at 08:15

## 2017-10-13 RX ADMIN — ISOSORBIDE DINITRATE 10 MG: 5 TABLET ORAL at 08:12

## 2017-10-13 RX ADMIN — INSULIN ASPART 2 UNITS: 100 INJECTION, SOLUTION INTRAVENOUS; SUBCUTANEOUS at 21:36

## 2017-10-13 RX ADMIN — FAMOTIDINE 20 MG: 20 TABLET ORAL at 08:12

## 2017-10-13 RX ADMIN — DONEPEZIL HYDROCHLORIDE 10 MG: 10 TABLET, FILM COATED ORAL at 08:12

## 2017-10-13 RX ADMIN — MAGNESIUM OXIDE TAB 400 MG (241.3 MG ELEMENTAL MG) 400 MG: 400 (241.3 MG) TAB at 08:11

## 2017-10-13 RX ADMIN — ISOSORBIDE DINITRATE 10 MG: 5 TABLET ORAL at 15:07

## 2017-10-13 NOTE — PROGRESS NOTES
Pt was in nuclear medicine to get his V/Q scan done; had respiratory distress. Improved after few seconds. His BP was 183/100 and pulse ox was 98%.   Currently he is in ICU; comfortable. Ordered portable CXR.           This document has been electronically signed by Joo Bowser MD on October 12, 2017 7:30 PM

## 2017-10-13 NOTE — NURSING NOTE
Patient post RRT for altered mental status, diaphoretic while in nuclear medicine. When arrived to nuclear scan patient was talking but anxious and short of breath with very diminished lung bases. Patient is on 2 L nasal canula, rash generalized throughout legs, chest, abdomen. Patient is now alert and oriented x 4. Placed patient on stretcher and took to CCU bed 8- settled patient in bed until Samia RN arrived to care as nurse. Dr Bowser at bedside and explaining overall status to patient and wife. Gave patient ativan and reported off to Samia at 2000

## 2017-10-13 NOTE — PLAN OF CARE
Problem: Patient Care Overview (Adult)  Goal: Plan of Care Review  Outcome: Ongoing (interventions implemented as appropriate)    10/13/17 0727   Coping/Psychosocial Response Interventions   Plan Of Care Reviewed With patient   Patient Care Overview   Progress improving   Outcome Evaluation   Outcome Summary/Follow up Plan pt rested well. pt bp controlled. pt alert when awake. pt ambulates well. pt sat's % on 2L NC.        Goal: Adult Individualization and Mutuality  Outcome: Ongoing (interventions implemented as appropriate)    Problem: Acute Coronary Syndrome (ACS) (Adult)  Goal: Signs and Symptoms of Listed Potential Problems Will be Absent or Manageable (Acute Coronary Syndrome)  Outcome: Ongoing (interventions implemented as appropriate)    Problem: Diabetes, Type 2 (Adult)  Goal: Signs and Symptoms of Listed Potential Problems Will be Absent or Manageable (Diabetes, Type 2)  Outcome: Ongoing (interventions implemented as appropriate)    Problem: Pain, Chronic (Adult)  Goal: Acceptable Pain Control/Comfort Level  Outcome: Ongoing (interventions implemented as appropriate)    Problem: COPD, Chronic Bronchitis/Emphysema (Adult)  Goal: Signs and Symptoms of Listed Potential Problems Will be Absent or Manageable (COPD, Chronic Bronchitis/Emphysema)  Outcome: Ongoing (interventions implemented as appropriate)    Problem: Skin Integrity Impairment, Risk/Actual (Adult)  Goal: Identify Related Risk Factors and Signs and Symptoms  Outcome: Ongoing (interventions implemented as appropriate)  Goal: Skin Integrity/Wound Healing  Outcome: Ongoing (interventions implemented as appropriate)    Problem: Hypertensive Disease/Crisis (Arterial) (Adult)  Goal: Signs and Symptoms of Listed Potential Problems Will be Absent or Manageable (Hypertensive Disease/Crisis)  Outcome: Ongoing (interventions implemented as appropriate)

## 2017-10-13 NOTE — CONSULTS
"Adult Nutrition  Assessment    Patient Name:  Hasmukh Ham  YOB: 1933  MRN: [id number]  Admit Date:  10/9/2017    Assessment Date:  10/13/2017    Comments:  Pt currently eating well.  Denies any hx of wt loss or change in appetite.  Follows a Heart healthy diet at home.  Suspected medication reaction being the reason for hospitalization.  RD will monitor prn.           Reason for Assessment       10/13/17 1502    Reason for Assessment    Reason For Assessment/Visit multidisciplinary rounds    Identified At Risk By Screening Criteria other (see comments)   CCU LOS                Nutrition/Diet History       10/13/17 1503    Nutrition/Diet History    Patient Reported Diet/Restrictions/Preferences low salt;heart healthy    Typical Food/Fluid Intake Pt claims that his appetite is good.  HE follows a low sodium diet at home.  NO hx of wt loss              Labs/Tests/Procedures/Meds       10/13/17 1504    Labs/Tests/Procedures/Meds    Labs/Tests Review Reviewed    Medication Review Reviewed, pertinent            Physical Findings       10/13/17 1504    Physical Findings/Assessment    Additional Documentation Physical Appearance (Group)    Physical Appearance    Overall Physical Appearance on oxygen therapy            Estimated/Assessed Needs       10/13/17 1504    Calculation Measurements    Height Used for Calculations 1.702 m (5' 7\")            Nutrition Prescription Ordered       10/13/17 1504    Nutrition Prescription PO    Current PO Diet Regular    Fluid Consistency Thin    Common Modifiers Cardiac            Evaluation of Received Nutrient/Fluid Intake       10/13/17 1504    PO Evaluation    Number of Days PO Intake Evaluated Insufficient Data    Number of Meals 2    % PO Intake 50/75            Electronically signed by:  Zehra Zee RD  10/13/17 3:10 PM  "

## 2017-10-13 NOTE — PROGRESS NOTES
[facility] Medicine Services  INPATIENT PROGRESS NOTE    Length of Stay: 3  Date of Admission: 10/9/2017  Primary Care Physician: Paolo Rey MD    Subjective   Chief Complaint: SOA  HPI:  Patient reports SOA improving. He went for a nuclear scan yesterday, however had an acute episode of shortness of breath. For closer monitoring, patient was transferred to CCU. Today, patient states that he is breathing better. No chest pain or tightness reported. HR stable. No additional events overnight. Oxygenating well. CXR was done last night, infiltrates shown. From patient history, it appears he was being treated outpatient for a pneumonia infection.      Review of Systems   Constitutional: Negative for appetite change, chills and fever.   HENT: Negative for congestion, ear pain, rhinorrhea, sneezing and sore throat.    Respiratory: Positive for shortness of breath. Negative for cough.    Cardiovascular: Negative for chest pain, palpitations and leg swelling.   Gastrointestinal: Negative for diarrhea, nausea and vomiting.   Endocrine: Negative for polyphagia and polyuria.   Musculoskeletal: Negative for back pain and neck pain.   Skin: Negative for color change and rash.   Neurological: Negative for dizziness, syncope and weakness.   Psychiatric/Behavioral: Negative for agitation, confusion and dysphoric mood.        All pertinent negatives and positives are as above. All other systems have been reviewed and are negative unless otherwise stated.     Objective    Temp:  [97.2 °F (36.2 °C)-98.5 °F (36.9 °C)] 98.5 °F (36.9 °C)  Heart Rate:  [40-98] 96  Resp:  [16-24] 16  BP: (114-194)/() 175/79    Physical Exam   Constitutional: He is oriented to person, place, and time. He appears well-developed and well-nourished.   Cardiovascular: Normal rate, regular rhythm and normal heart sounds.  Exam reveals no gallop and no friction rub.    No murmur heard.  Pulmonary/Chest: Effort  normal and breath sounds normal. No respiratory distress. He has no wheezes. He has no rales.   Abdominal: Soft. Bowel sounds are normal. There is no tenderness.   Musculoskeletal: Normal range of motion. He exhibits no edema.   Neurological: He is alert and oriented to person, place, and time.   Skin: Skin is warm and dry.   Psychiatric: He has a normal mood and affect. His behavior is normal.   Vitals reviewed.          Results Review:  I have reviewed the labs, radiology results, and diagnostic studies.    Laboratory Data:     Results from last 7 days  Lab Units 10/13/17  0416 10/12/17  0522 10/11/17  1720 10/11/17  0701  10/09/17  1532   SODIUM mmol/L 140 137  --  142  < > 140   SODIUM, ARTERIAL mmol/L  --   --  137.3*  --   --   --    POTASSIUM mmol/L 4.2 4.0  --  4.3  < > 4.0   CHLORIDE mmol/L 102 104  --  104  < > 103   CO2 mmol/L 26.0 24.0  --  23.0  < > 24.0   BUN mg/dL 34* 32*  --  28*  < > 22*   CREATININE mg/dL 0.97 0.93  --  1.15  < > 1.04   GLUCOSE mg/dL 161* 181*  --  130*  < > 124*   GLUCOSE, ARTERIAL mmol/L  --   --  134  --   --   --    CALCIUM mg/dL 8.2* 8.3*  --  8.8  < > 9.1   BILIRUBIN mg/dL  --   --   --   --   --  0.4   ALK PHOS U/L  --   --   --   --   --  72   ALT (SGPT) U/L  --   --   --   --   --  41   AST (SGOT) U/L  --   --   --   --   --  21   ANION GAP mmol/L 12.0 9.0  --  15.0  < > 13.0   < > = values in this interval not displayed.  Estimated Creatinine Clearance: 59.1 mL/min (by C-G formula based on Cr of 0.97).    Results from last 7 days  Lab Units 10/10/17  0600 10/09/17  1532   MAGNESIUM mg/dL 1.9 2.0   PHOSPHORUS mg/dL  --  2.8           Results from last 7 days  Lab Units 10/13/17  0416 10/12/17  0522 10/11/17  0701 10/10/17  0600 10/09/17  1720   WBC 10*3/mm3 15.00* 14.02* 16.46* 12.23* 13.67*   HEMOGLOBIN g/dL 10.4* 10.3* 10.7* 10.4* 10.7*   HEMATOCRIT % 32.6* 31.2* 33.9* 32.7* 33.3*   PLATELETS 10*3/mm3 284 270 276 241 251       Results from last 7 days  Lab Units  10/09/17  1532   INR  1.03       Culture Data:   No results found for: BLOODCX  Urine Culture   Date Value Ref Range Status   10/11/2017 No growth at 24 hours  Final     No results found for: RESPCX  No results found for: WOUNDCX  No results found for: STOOLCX  No components found for: BODYFLD    Radiology Data:   Imaging Results (last 24 hours)     Procedure Component Value Units Date/Time    XR Chest 1 View [[id number]] Collected:  10/12/17 1941     Updated:  10/12/17 2003    Narrative:       Patient Name:  CAMRYN MCKAY  Patient ID:  [id number]   Ordering:  SHERI REYEZ  Attending:  FELIPE SAL  Referring:  SHERI REYEZ  ------------------------------------------------    PORTABLE CHEST    HISTORY: Dyspnea. Pneumonia.    Portable AP film of the chest was obtained at 7:42 PM.  COMPARISON: October 9, 2017    Increased atelectasis or infiltrate at the left lung base.  Improved right lung base.  Old granulomatous disease is present.  The heart is not enlarged.  The pulmonary vasculature is not increased.  No pleural effusion.  No pneumothorax.  No acute osseous abnormality.  Epidural electrodes mid to lower thoracic spine.      Impression:       CONCLUSION:  Increased atelectasis or infiltrate at the left lung base.  Improved right lung base.    [id number]    Electronically signed by:  Sam Higginbotham MD  10/12/2017 8:02 PM  CDT Workstation: [employer]          I have reviewed the patient current medications.     Assessment/Plan     Hospital Problem List     Shortness of breath        1) Acute dyspnea - Patient's SOA improving. Likely secondary to patient's underlying pneumonia infection. V/Q scan was attempted, but patient could not tolerate procedure, if continues to have SOA, may need CTA to rule out PE.  Wean oxygen as tolerated, follow sputum cultures. Patient was on doxycycline, recent CXR showed increasing infiltrate. Will plan on restarting Doxycycline to complete full course of CAP treatment.  Continue SoluMedrol, taper as tolerated.   2) Atrial fibrillation - Continue Metoprolol and Cardizem. Holding anticoagulation d/t history of GI bleed  3) Anemia - Currently stable. FOBT negative  4) DVT PPx - SCDs/TEDs bilaterally    Transfer to medical floor            Discharge Planning: I expect patient to be discharged to home in 1-2 days.    Nik Boyer MD   10/13/17   3:19 PM

## 2017-10-14 ENCOUNTER — APPOINTMENT (OUTPATIENT)
Dept: GENERAL RADIOLOGY | Facility: HOSPITAL | Age: 82
End: 2017-10-14

## 2017-10-14 LAB
ANION GAP SERPL CALCULATED.3IONS-SCNC: 14 MMOL/L (ref 5–15)
BACTERIA SPEC AEROBE CULT: NORMAL
BACTERIA SPEC AEROBE CULT: NORMAL
BASOPHILS # BLD AUTO: 0.15 10*3/MM3 (ref 0–0.2)
BASOPHILS NFR BLD AUTO: 0.9 % (ref 0–2)
BUN BLD-MCNC: 45 MG/DL (ref 7–21)
BUN/CREAT SERPL: 41.3 (ref 7–25)
CALCIUM SPEC-SCNC: 8.1 MG/DL (ref 8.4–10.2)
CHLORIDE SERPL-SCNC: 101 MMOL/L (ref 95–110)
CO2 SERPL-SCNC: 24 MMOL/L (ref 22–31)
CREAT BLD-MCNC: 1.09 MG/DL (ref 0.7–1.3)
CRP SERPL-MCNC: <0.5 MG/DL (ref 0–1)
D-LACTATE SERPL-SCNC: 3.6 MMOL/L (ref 0.5–2)
DEPRECATED RDW RBC AUTO: 53.8 FL (ref 35.1–43.9)
EOSINOPHIL # BLD AUTO: 0 10*3/MM3 (ref 0–0.7)
EOSINOPHIL NFR BLD AUTO: 0 % (ref 0–7)
ERYTHROCYTE [DISTWIDTH] IN BLOOD BY AUTOMATED COUNT: 16.3 % (ref 11.5–14.5)
GFR SERPL CREATININE-BSD FRML MDRD: 64 ML/MIN/1.73 (ref 60–98)
GLUCOSE BLD-MCNC: 168 MG/DL (ref 60–100)
GLUCOSE BLDC GLUCOMTR-MCNC: 120 MG/DL (ref 70–130)
GLUCOSE BLDC GLUCOMTR-MCNC: 152 MG/DL (ref 70–130)
GLUCOSE BLDC GLUCOMTR-MCNC: 165 MG/DL (ref 70–130)
GLUCOSE BLDC GLUCOMTR-MCNC: 186 MG/DL (ref 70–130)
HCT VFR BLD AUTO: 36.2 % (ref 39–49)
HGB BLD-MCNC: 12.3 G/DL (ref 13.7–17.3)
IMM GRANULOCYTES # BLD: 1.49 10*3/MM3 (ref 0–0.02)
IMM GRANULOCYTES NFR BLD: 9.2 % (ref 0–0.5)
LYMPHOCYTES # BLD AUTO: 0.5 10*3/MM3 (ref 0.6–4.2)
LYMPHOCYTES NFR BLD AUTO: 3.1 % (ref 10–50)
MCH RBC QN AUTO: 31.4 PG (ref 26.5–34)
MCHC RBC AUTO-ENTMCNC: 34 G/DL (ref 31.5–36.3)
MCV RBC AUTO: 92.3 FL (ref 80–98)
MONOCYTES # BLD AUTO: 1.1 10*3/MM3 (ref 0–0.9)
MONOCYTES NFR BLD AUTO: 6.8 % (ref 0–12)
NEUTROPHILS # BLD AUTO: 12.88 10*3/MM3 (ref 2–8.6)
NEUTROPHILS NFR BLD AUTO: 80 % (ref 37–80)
NRBC BLD MANUAL-RTO: 4.9 /100 WBC (ref 0–0)
PLATELET # BLD AUTO: 319 10*3/MM3 (ref 150–450)
PMV BLD AUTO: 10.3 FL (ref 8–12)
POTASSIUM BLD-SCNC: 4.4 MMOL/L (ref 3.5–5.1)
RBC # BLD AUTO: 3.92 10*6/MM3 (ref 4.37–5.74)
SODIUM BLD-SCNC: 139 MMOL/L (ref 137–145)
WBC NRBC COR # BLD: 16.12 10*3/MM3 (ref 3.2–9.8)

## 2017-10-14 PROCEDURE — 86140 C-REACTIVE PROTEIN: CPT | Performed by: FAMILY MEDICINE

## 2017-10-14 PROCEDURE — 94760 N-INVAS EAR/PLS OXIMETRY 1: CPT

## 2017-10-14 PROCEDURE — 82962 GLUCOSE BLOOD TEST: CPT

## 2017-10-14 PROCEDURE — 94799 UNLISTED PULMONARY SVC/PX: CPT

## 2017-10-14 PROCEDURE — 73630 X-RAY EXAM OF FOOT: CPT

## 2017-10-14 PROCEDURE — 85025 COMPLETE CBC W/AUTO DIFF WBC: CPT | Performed by: FAMILY MEDICINE

## 2017-10-14 PROCEDURE — 25010000002 METHYLPREDNISOLONE PER 125 MG: Performed by: FAMILY MEDICINE

## 2017-10-14 PROCEDURE — G0378 HOSPITAL OBSERVATION PER HR: HCPCS

## 2017-10-14 PROCEDURE — 83605 ASSAY OF LACTIC ACID: CPT | Performed by: FAMILY MEDICINE

## 2017-10-14 PROCEDURE — 80048 BASIC METABOLIC PNL TOTAL CA: CPT | Performed by: FAMILY MEDICINE

## 2017-10-14 PROCEDURE — 96376 TX/PRO/DX INJ SAME DRUG ADON: CPT

## 2017-10-14 PROCEDURE — 63710000001 INSULIN ASPART PER 5 UNITS: Performed by: FAMILY MEDICINE

## 2017-10-14 PROCEDURE — 25010000002 METHYLPREDNISOLONE PER 125 MG: Performed by: NURSE PRACTITIONER

## 2017-10-14 RX ORDER — METHYLPREDNISOLONE SODIUM SUCCINATE 40 MG/ML
40 INJECTION, POWDER, LYOPHILIZED, FOR SOLUTION INTRAMUSCULAR; INTRAVENOUS EVERY 8 HOURS
Status: DISCONTINUED | OUTPATIENT
Start: 2017-10-14 | End: 2017-10-15

## 2017-10-14 RX ADMIN — IPRATROPIUM BROMIDE AND ALBUTEROL SULFATE 3 ML: 2.5; .5 SOLUTION RESPIRATORY (INHALATION) at 12:19

## 2017-10-14 RX ADMIN — INSULIN ASPART 3 UNITS: 100 INJECTION, SOLUTION INTRAVENOUS; SUBCUTANEOUS at 20:34

## 2017-10-14 RX ADMIN — IPRATROPIUM BROMIDE AND ALBUTEROL SULFATE 3 ML: 2.5; .5 SOLUTION RESPIRATORY (INHALATION) at 19:28

## 2017-10-14 RX ADMIN — FUROSEMIDE 20 MG: 20 TABLET ORAL at 09:21

## 2017-10-14 RX ADMIN — DONEPEZIL HYDROCHLORIDE 10 MG: 10 TABLET, FILM COATED ORAL at 09:21

## 2017-10-14 RX ADMIN — ISOSORBIDE DINITRATE 10 MG: 5 TABLET ORAL at 09:21

## 2017-10-14 RX ADMIN — INSULIN ASPART 2 UNITS: 100 INJECTION, SOLUTION INTRAVENOUS; SUBCUTANEOUS at 16:59

## 2017-10-14 RX ADMIN — ISOSORBIDE DINITRATE 10 MG: 5 TABLET ORAL at 15:54

## 2017-10-14 RX ADMIN — HYDROCODONE BITARTRATE AND ACETAMINOPHEN 1 TABLET: 7.5; 325 TABLET ORAL at 05:34

## 2017-10-14 RX ADMIN — MAGNESIUM OXIDE TAB 400 MG (241.3 MG ELEMENTAL MG) 400 MG: 400 (241.3 MG) TAB at 09:21

## 2017-10-14 RX ADMIN — IPRATROPIUM BROMIDE AND ALBUTEROL SULFATE 3 ML: 2.5; .5 SOLUTION RESPIRATORY (INHALATION) at 16:01

## 2017-10-14 RX ADMIN — HYDRALAZINE HYDROCHLORIDE 10 MG: 10 TABLET, FILM COATED ORAL at 09:21

## 2017-10-14 RX ADMIN — FAMOTIDINE 20 MG: 20 TABLET ORAL at 09:21

## 2017-10-14 RX ADMIN — ASPIRIN 81 MG 81 MG: 81 TABLET ORAL at 09:22

## 2017-10-14 RX ADMIN — METHYLPREDNISOLONE SODIUM SUCCINATE 60 MG: 125 INJECTION, POWDER, FOR SOLUTION INTRAMUSCULAR; INTRAVENOUS at 01:48

## 2017-10-14 RX ADMIN — LEVOFLOXACIN 750 MG: 750 TABLET, FILM COATED ORAL at 20:32

## 2017-10-14 RX ADMIN — PANTOPRAZOLE SODIUM 40 MG: 40 TABLET, DELAYED RELEASE ORAL at 05:34

## 2017-10-14 RX ADMIN — METOPROLOL TARTRATE 12.5 MG: 25 TABLET, FILM COATED ORAL at 20:32

## 2017-10-14 RX ADMIN — METHYLPREDNISOLONE SODIUM SUCCINATE 40 MG: 125 INJECTION, POWDER, FOR SOLUTION INTRAMUSCULAR; INTRAVENOUS at 16:59

## 2017-10-14 RX ADMIN — HYDROCODONE BITARTRATE AND ACETAMINOPHEN 1 TABLET: 7.5; 325 TABLET ORAL at 13:33

## 2017-10-14 RX ADMIN — ISOSORBIDE DINITRATE 10 MG: 5 TABLET ORAL at 20:32

## 2017-10-14 RX ADMIN — HYDROCODONE BITARTRATE AND ACETAMINOPHEN 1 TABLET: 7.5; 325 TABLET ORAL at 20:32

## 2017-10-14 RX ADMIN — FLUTICASONE PROPIONATE 2 SPRAY: 50 SPRAY, METERED NASAL at 09:23

## 2017-10-14 RX ADMIN — IPRATROPIUM BROMIDE AND ALBUTEROL SULFATE 3 ML: 2.5; .5 SOLUTION RESPIRATORY (INHALATION) at 08:23

## 2017-10-14 RX ADMIN — FAMOTIDINE 20 MG: 20 TABLET ORAL at 17:00

## 2017-10-14 RX ADMIN — HYDRALAZINE HYDROCHLORIDE 10 MG: 10 TABLET, FILM COATED ORAL at 20:32

## 2017-10-14 RX ADMIN — METOPROLOL TARTRATE 12.5 MG: 25 TABLET, FILM COATED ORAL at 09:21

## 2017-10-14 RX ADMIN — METHYLPREDNISOLONE SODIUM SUCCINATE 60 MG: 125 INJECTION, POWDER, FOR SOLUTION INTRAMUSCULAR; INTRAVENOUS at 09:23

## 2017-10-14 RX ADMIN — INSULIN ASPART 2 UNITS: 100 INJECTION, SOLUTION INTRAVENOUS; SUBCUTANEOUS at 11:11

## 2017-10-14 NOTE — PROGRESS NOTES
[facility] Medicine Services  INPATIENT PROGRESS NOTE    Length of Stay: 3  Date of Admission: 10/9/2017  Primary Care Physician: Paolo Rey MD    Subjective   Chief Complaint: No complaints     HPI:      This is an 84-year-old man presented to the emergency room 10/9/2017 with complaints of cough, dyspnea, chills and rash on his chest and abdomen.  He has a history of COPD and was found to have left lower lobe pneumonia.  The patient states his breathing is improved today and reports no chest pain.    Review of Systems   Constitutional: Positive for fatigue.   Respiratory: Positive for shortness of breath.    All other systems reviewed and are negative.     All pertinent negatives and positives are as above. All other systems have been reviewed and are negative unless otherwise stated.     Objective    Temp:  [96.3 °F (35.7 °C)-98.2 °F (36.8 °C)] 96.8 °F (36 °C)  Heart Rate:  [] 83  Resp:  [18-24] 20  BP: (118-171)/(69-83) 118/83    Physical Exam   Constitutional: He is oriented to person, place, and time. He appears well-developed and well-nourished.   HENT:   Head: Normocephalic and atraumatic.   Eyes: EOM are normal. Pupils are equal, round, and reactive to light.   Neck: Normal range of motion. Neck supple.   Cardiovascular: Normal rate and regular rhythm.    Pulmonary/Chest: Effort normal and breath sounds normal.   Abdominal: Soft. Bowel sounds are normal.   Musculoskeletal: Normal range of motion.   Neurological: He is alert and oriented to person, place, and time.   Skin: Skin is warm.   Psychiatric: He has a normal mood and affect.     Results Review:  I have reviewed the labs, radiology results, and diagnostic studies.    Laboratory Data:     Results from last 7 days  Lab Units 10/14/17  0853 10/13/17  0416 10/12/17  0522  10/09/17  1532   SODIUM mmol/L 139 140 137  < > 140   SODIUM, ARTERIAL   --   --   --   < >  --    POTASSIUM mmol/L 4.4 4.2 4.0  <  > 4.0   CHLORIDE mmol/L 101 102 104  < > 103   CO2 mmol/L 24.0 26.0 24.0  < > 24.0   BUN mg/dL 45* 34* 32*  < > 22*   CREATININE mg/dL 1.09 0.97 0.93  < > 1.04   GLUCOSE mg/dL 168* 161* 181*  < > 124*   GLUCOSE, ARTERIAL   --   --   --   < >  --    CALCIUM mg/dL 8.1* 8.2* 8.3*  < > 9.1   BILIRUBIN mg/dL  --   --   --   --  0.4   ALK PHOS U/L  --   --   --   --  72   ALT (SGPT) U/L  --   --   --   --  41   AST (SGOT) U/L  --   --   --   --  21   ANION GAP mmol/L 14.0 12.0 9.0  < > 13.0   < > = values in this interval not displayed.  Estimated Creatinine Clearance: 52.6 mL/min (by C-G formula based on Cr of 1.09).    Results from last 7 days  Lab Units 10/10/17  0600 10/09/17  1532   MAGNESIUM mg/dL 1.9 2.0   PHOSPHORUS mg/dL  --  2.8           Results from last 7 days  Lab Units 10/14/17  0853 10/13/17  0416 10/12/17  0522 10/11/17  0701 10/10/17  0600   WBC 10*3/mm3 16.12* 15.00* 14.02* 16.46* 12.23*   HEMOGLOBIN g/dL 12.3* 10.4* 10.3* 10.7* 10.4*   HEMATOCRIT % 36.2* 32.6* 31.2* 33.9* 32.7*   PLATELETS 10*3/mm3 319 284 270 276 241       Results from last 7 days  Lab Units 10/09/17  1532   INR  1.03       Culture Data:   No results found for: BLOODCX  Urine Culture   Date Value Ref Range Status   10/11/2017 No growth at 24 hours  Final     No results found for: RESPCX  No results found for: WOUNDCX  No results found for: STOOLCX  No components found for: BODYFLD    Radiology Data:   Imaging Results (last 24 hours)     ** No results found for the last 24 hours. **          I have reviewed the patient current medications.     Assessment/Plan     Plan:    1.  Pneumonia, left lung/ COPD:  Day #4 Levaquin. Blood cultures negative. Methylprednisolone decreased from 60 mg q 8 hrs to 40 q 8 hours.  Continue nebulizers. PT/OT and incentive spirometer ordered.   2. Atrial fibrillation:  Continue Metoprolol and Cardizem.  Hold anticoagulation due to history of GI bleed.   3.  Anemia, improved:  Will continue to monitor.            Discharge Planning: I expect patient to be discharged to home in 1-2 days.      This document has been electronically signed by EVE Gomez on October 14, 2017 2:34 PM

## 2017-10-14 NOTE — PLAN OF CARE
Problem: Patient Care Overview (Adult)  Goal: Plan of Care Review  Outcome: Ongoing (interventions implemented as appropriate)    10/14/17 0443   Coping/Psychosocial Response Interventions   Plan Of Care Reviewed With patient   Patient Care Overview   Progress no change   Outcome Evaluation   Outcome Summary/Follow up Plan Pt rested at times, blood pressure meds given at HS, remains elevated , monitoring pt        Goal: Adult Individualization and Mutuality  Outcome: Ongoing (interventions implemented as appropriate)  Goal: Discharge Needs Assessment  Outcome: Ongoing (interventions implemented as appropriate)    Problem: Acute Coronary Syndrome (ACS) (Adult)  Goal: Signs and Symptoms of Listed Potential Problems Will be Absent or Manageable (Acute Coronary Syndrome)  Outcome: Ongoing (interventions implemented as appropriate)    Problem: Diabetes, Type 2 (Adult)  Goal: Signs and Symptoms of Listed Potential Problems Will be Absent or Manageable (Diabetes, Type 2)  Outcome: Ongoing (interventions implemented as appropriate)    Problem: Pain, Chronic (Adult)  Goal: Acceptable Pain Control/Comfort Level  Outcome: Ongoing (interventions implemented as appropriate)    Problem: COPD, Chronic Bronchitis/Emphysema (Adult)  Goal: Signs and Symptoms of Listed Potential Problems Will be Absent or Manageable (COPD, Chronic Bronchitis/Emphysema)  Outcome: Ongoing (interventions implemented as appropriate)    Problem: Skin Integrity Impairment, Risk/Actual (Adult)  Goal: Identify Related Risk Factors and Signs and Symptoms  Outcome: Outcome(s) achieved Date Met:  10/14/17  Goal: Skin Integrity/Wound Healing  Outcome: Ongoing (interventions implemented as appropriate)    Problem: Hypertensive Disease/Crisis (Arterial) (Adult)  Goal: Signs and Symptoms of Listed Potential Problems Will be Absent or Manageable (Hypertensive Disease/Crisis)  Outcome: Ongoing (interventions implemented as appropriate)

## 2017-10-15 LAB
ANION GAP SERPL CALCULATED.3IONS-SCNC: 9 MMOL/L (ref 5–15)
BASOPHILS # BLD AUTO: 0.23 10*3/MM3 (ref 0–0.2)
BASOPHILS NFR BLD AUTO: 1.4 % (ref 0–2)
BUN BLD-MCNC: 38 MG/DL (ref 7–21)
BUN/CREAT SERPL: 40 (ref 7–25)
CALCIUM SPEC-SCNC: 8.5 MG/DL (ref 8.4–10.2)
CHLORIDE SERPL-SCNC: 99 MMOL/L (ref 95–110)
CO2 SERPL-SCNC: 27 MMOL/L (ref 22–31)
CREAT BLD-MCNC: 0.95 MG/DL (ref 0.7–1.3)
D-LACTATE SERPL-SCNC: 4.3 MMOL/L (ref 0.5–2)
DEPRECATED RDW RBC AUTO: 52 FL (ref 35.1–43.9)
EOSINOPHIL # BLD AUTO: 0 10*3/MM3 (ref 0–0.7)
EOSINOPHIL NFR BLD AUTO: 0 % (ref 0–7)
ERYTHROCYTE [DISTWIDTH] IN BLOOD BY AUTOMATED COUNT: 15.9 % (ref 11.5–14.5)
GFR SERPL CREATININE-BSD FRML MDRD: 76 ML/MIN/1.73 (ref 42–98)
GLUCOSE BLD-MCNC: 150 MG/DL (ref 60–100)
GLUCOSE BLDC GLUCOMTR-MCNC: 126 MG/DL (ref 70–130)
GLUCOSE BLDC GLUCOMTR-MCNC: 144 MG/DL (ref 70–130)
GLUCOSE BLDC GLUCOMTR-MCNC: 151 MG/DL (ref 70–130)
GLUCOSE BLDC GLUCOMTR-MCNC: 221 MG/DL (ref 70–130)
HCT VFR BLD AUTO: 36 % (ref 39–49)
HGB BLD-MCNC: 12.2 G/DL (ref 13.7–17.3)
IMM GRANULOCYTES # BLD: 1.39 10*3/MM3 (ref 0–0.02)
IMM GRANULOCYTES NFR BLD: 8.7 % (ref 0–0.5)
LYMPHOCYTES # BLD AUTO: 0.34 10*3/MM3 (ref 0.6–4.2)
LYMPHOCYTES NFR BLD AUTO: 2.1 % (ref 10–50)
MCH RBC QN AUTO: 30.7 PG (ref 26.5–34)
MCHC RBC AUTO-ENTMCNC: 33.9 G/DL (ref 31.5–36.3)
MCV RBC AUTO: 90.7 FL (ref 80–98)
MONOCYTES # BLD AUTO: 1.16 10*3/MM3 (ref 0–0.9)
MONOCYTES NFR BLD AUTO: 7.3 % (ref 0–12)
NEUTROPHILS # BLD AUTO: 12.8 10*3/MM3 (ref 2–8.6)
NEUTROPHILS NFR BLD AUTO: 80.5 % (ref 37–80)
NRBC BLD MANUAL-RTO: 2.2 /100 WBC (ref 0–0)
PLATELET # BLD AUTO: 293 10*3/MM3 (ref 150–450)
PMV BLD AUTO: 10.3 FL (ref 8–12)
POTASSIUM BLD-SCNC: 4.3 MMOL/L (ref 3.5–5.1)
RBC # BLD AUTO: 3.97 10*6/MM3 (ref 4.37–5.74)
SODIUM BLD-SCNC: 135 MMOL/L (ref 137–145)
WBC NRBC COR # BLD: 15.92 10*3/MM3 (ref 3.2–9.8)

## 2017-10-15 PROCEDURE — 96376 TX/PRO/DX INJ SAME DRUG ADON: CPT

## 2017-10-15 PROCEDURE — 94760 N-INVAS EAR/PLS OXIMETRY 1: CPT

## 2017-10-15 PROCEDURE — 97530 THERAPEUTIC ACTIVITIES: CPT

## 2017-10-15 PROCEDURE — 63710000001 INSULIN ASPART PER 5 UNITS: Performed by: FAMILY MEDICINE

## 2017-10-15 PROCEDURE — 63710000001 DIPHENHYDRAMINE PER 50 MG: Performed by: FAMILY MEDICINE

## 2017-10-15 PROCEDURE — 94799 UNLISTED PULMONARY SVC/PX: CPT

## 2017-10-15 PROCEDURE — 82962 GLUCOSE BLOOD TEST: CPT

## 2017-10-15 PROCEDURE — G0378 HOSPITAL OBSERVATION PER HR: HCPCS

## 2017-10-15 PROCEDURE — 80048 BASIC METABOLIC PNL TOTAL CA: CPT | Performed by: FAMILY MEDICINE

## 2017-10-15 PROCEDURE — G8978 MOBILITY CURRENT STATUS: HCPCS

## 2017-10-15 PROCEDURE — 97116 GAIT TRAINING THERAPY: CPT

## 2017-10-15 PROCEDURE — 83605 ASSAY OF LACTIC ACID: CPT | Performed by: FAMILY MEDICINE

## 2017-10-15 PROCEDURE — G8979 MOBILITY GOAL STATUS: HCPCS

## 2017-10-15 PROCEDURE — 97162 PT EVAL MOD COMPLEX 30 MIN: CPT

## 2017-10-15 PROCEDURE — 25010000002 METHYLPREDNISOLONE PER 40 MG: Performed by: NURSE PRACTITIONER

## 2017-10-15 PROCEDURE — 85025 COMPLETE CBC W/AUTO DIFF WBC: CPT | Performed by: FAMILY MEDICINE

## 2017-10-15 RX ORDER — METHYLPREDNISOLONE SODIUM SUCCINATE 40 MG/ML
40 INJECTION, POWDER, LYOPHILIZED, FOR SOLUTION INTRAMUSCULAR; INTRAVENOUS EVERY 12 HOURS
Status: DISCONTINUED | OUTPATIENT
Start: 2017-10-15 | End: 2017-10-16

## 2017-10-15 RX ADMIN — FAMOTIDINE 20 MG: 20 TABLET ORAL at 18:00

## 2017-10-15 RX ADMIN — FLUTICASONE PROPIONATE 2 SPRAY: 50 SPRAY, METERED NASAL at 10:32

## 2017-10-15 RX ADMIN — FUROSEMIDE 20 MG: 20 TABLET ORAL at 10:34

## 2017-10-15 RX ADMIN — INSULIN ASPART 2 UNITS: 100 INJECTION, SOLUTION INTRAVENOUS; SUBCUTANEOUS at 18:03

## 2017-10-15 RX ADMIN — HYDROCODONE BITARTRATE AND ACETAMINOPHEN 1 TABLET: 7.5; 325 TABLET ORAL at 20:47

## 2017-10-15 RX ADMIN — ASPIRIN 81 MG 81 MG: 81 TABLET ORAL at 10:33

## 2017-10-15 RX ADMIN — METOPROLOL TARTRATE 12.5 MG: 25 TABLET, FILM COATED ORAL at 10:32

## 2017-10-15 RX ADMIN — IPRATROPIUM BROMIDE AND ALBUTEROL SULFATE 3 ML: 2.5; .5 SOLUTION RESPIRATORY (INHALATION) at 21:06

## 2017-10-15 RX ADMIN — METOPROLOL TARTRATE 12.5 MG: 25 TABLET, FILM COATED ORAL at 20:29

## 2017-10-15 RX ADMIN — HYDRALAZINE HYDROCHLORIDE 10 MG: 10 TABLET, FILM COATED ORAL at 20:29

## 2017-10-15 RX ADMIN — ISOSORBIDE DINITRATE 10 MG: 5 TABLET ORAL at 10:31

## 2017-10-15 RX ADMIN — IPRATROPIUM BROMIDE AND ALBUTEROL SULFATE 3 ML: 2.5; .5 SOLUTION RESPIRATORY (INHALATION) at 11:05

## 2017-10-15 RX ADMIN — FAMOTIDINE 20 MG: 20 TABLET ORAL at 10:32

## 2017-10-15 RX ADMIN — DIPHENHYDRAMINE HYDROCHLORIDE 25 MG: 25 CAPSULE ORAL at 15:57

## 2017-10-15 RX ADMIN — PANTOPRAZOLE SODIUM 40 MG: 40 TABLET, DELAYED RELEASE ORAL at 05:29

## 2017-10-15 RX ADMIN — HYDROCODONE BITARTRATE AND ACETAMINOPHEN 1 TABLET: 7.5; 325 TABLET ORAL at 05:28

## 2017-10-15 RX ADMIN — HYDRALAZINE HYDROCHLORIDE 10 MG: 10 TABLET, FILM COATED ORAL at 10:31

## 2017-10-15 RX ADMIN — IPRATROPIUM BROMIDE AND ALBUTEROL SULFATE 3 ML: 2.5; .5 SOLUTION RESPIRATORY (INHALATION) at 07:45

## 2017-10-15 RX ADMIN — METHYLPREDNISOLONE SODIUM SUCCINATE 40 MG: 40 INJECTION, POWDER, FOR SOLUTION INTRAMUSCULAR; INTRAVENOUS at 22:14

## 2017-10-15 RX ADMIN — ISOSORBIDE DINITRATE 10 MG: 5 TABLET ORAL at 15:59

## 2017-10-15 RX ADMIN — DONEPEZIL HYDROCHLORIDE 10 MG: 10 TABLET, FILM COATED ORAL at 10:33

## 2017-10-15 RX ADMIN — METHYLPREDNISOLONE SODIUM SUCCINATE 40 MG: 125 INJECTION, POWDER, FOR SOLUTION INTRAMUSCULAR; INTRAVENOUS at 01:13

## 2017-10-15 RX ADMIN — HYDROCODONE BITARTRATE AND ACETAMINOPHEN 1 TABLET: 7.5; 325 TABLET ORAL at 13:48

## 2017-10-15 RX ADMIN — MAGNESIUM OXIDE TAB 400 MG (241.3 MG ELEMENTAL MG) 400 MG: 400 (241.3 MG) TAB at 10:33

## 2017-10-15 RX ADMIN — INSULIN ASPART 2 UNITS: 100 INJECTION, SOLUTION INTRAVENOUS; SUBCUTANEOUS at 20:30

## 2017-10-15 RX ADMIN — ISOSORBIDE DINITRATE 10 MG: 5 TABLET ORAL at 20:29

## 2017-10-15 RX ADMIN — IPRATROPIUM BROMIDE AND ALBUTEROL SULFATE 3 ML: 2.5; .5 SOLUTION RESPIRATORY (INHALATION) at 15:23

## 2017-10-15 RX ADMIN — METHYLPREDNISOLONE SODIUM SUCCINATE 40 MG: 125 INJECTION, POWDER, FOR SOLUTION INTRAMUSCULAR; INTRAVENOUS at 10:34

## 2017-10-15 RX ADMIN — LEVOFLOXACIN 750 MG: 750 TABLET, FILM COATED ORAL at 20:29

## 2017-10-15 NOTE — PLAN OF CARE
Problem: Patient Care Overview (Adult)  Goal: Plan of Care Review  Outcome: Ongoing (interventions implemented as appropriate)    10/15/17 1336   Coping/Psychosocial Response Interventions   Plan Of Care Reviewed With patient;spouse   Outcome Evaluation   Outcome Summary/Follow up Plan Pt evaluation completed. Pt ambulated 100ft withSBA. function limited primarily by decreased strength, balance, and tolerance for functional mobility and activities. Pt will benefit from skilled PT to regain lost function.If pt discharged home prior to goals being met, PT recommends home with assisance with HHPT/OT to follow.          Problem: Inpatient Physical Therapy  Goal: Transfer Training Goal 1 LTG- PT  Outcome: Ongoing (interventions implemented as appropriate)    10/15/17 1336   Transfer Training PT LTG   Transfer Training PT LTG, Date Established 10/15/17   Transfer Training PT LTG, Time to Achieve by discharge   Transfer Training PT LTG, Activity Type bed to chair /chair to bed;sit to stand/stand to sit   Transfer Training PT LTG, [place] Level independent   Transfer Training PT LTG, Outcome goal ongoing       Goal: Gait Training Goal LTG- PT  Outcome: Ongoing (interventions implemented as appropriate)    10/15/17 1336   Gait Training PT LTG   Gait Training Goal PT LTG, Date Established 10/15/17   Gait Training Goal PT LTG, Time to Achieve by discharge   Gait Training Goal PT LTG, [place] Level independent   Gait Training Goal PT LTG, Distance to Achieve 150ft;O2 sats will not drop below 92%       Goal: Stair Training Goal LTG- PT  Outcome: Ongoing (interventions implemented as appropriate)    10/15/17 1336   Stair Training PT LTG   Stair Training Goal PT LTG, Date Established 10/15/17   Stair Training Goal PT LTG, Number of Steps 5   Stair Training Goal PT LTG, [place] Level conditional independence   Stair Training Goal PT LTG, Assist Device 2 handrails       Goal: Patient Education Goal LTG- PT  Outcome:  Ongoing (interventions implemented as appropriate)    10/15/17 1336   Patient Education PT LTG   Patient Education PT LTG, Date Established 10/15/17   Patient Education PT LTG, Time to Achieve by discharge   Patient Education PT LTG, Education Type gait;transfers;home safety   Patient Education PT LTG Outcome goal ongoing

## 2017-10-15 NOTE — THERAPY EVALUATION
Acute Care - Physical Therapy Initial Evaluation  [facility]     Patient Name: Hasmukh Ham  : 1933  MRN: [id number]  Today's Date: 10/15/2017   Onset of Illness/Injury or Date of Surgery Date: 10/09/17  Date of Referral to PT: 10/14/17  Referring Physician: EVE Birch      Admit Date: 10/9/2017     Visit Dx:    ICD-10-CM ICD-9-CM   1. Shortness of breath R06.02 786.05   2. Pneumonia of both lower lobes due to infectious organism J18.9 483.8   3. Troponin I above reference range R74.8 790.6   4. Drug rash L27.0 693.0   5. 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   • Nocturia   • Osteoarthritis of multiple joints   • Hyperlipidemia   • Pain in joint involving ankle and foot   • Arthropathy of hand   • Paroxysmal tachycardia   • Osteoarthrosis involving more than one site but not generalized   • Right shoulder pain   • Rotator cuff syndrome   • Partial tear of subscapularis tendon   • Infraspinatus tendon tear   • Supraspinatus tendon tear   • Bilateral carotid artery stenosis   • (HFpEF) heart failure with preserved ejection fraction   • Pulmonary HTN   • Acute interstitial pneumonia   • Chronic obstructive lung disease   • Coronary arteriosclerosis   • Diabetes mellitus   • Hypertension   • Chest pain   • Shortness of breath     Past Medical History:   Diagnosis Date   • Bilateral carotid artery stenosis    • Chronic obstructive pulmonary disease (COPD)    • Coronary arteriosclerosis    • Degenerative joint  disease involving multiple joints    • Dementia    • Diabetes mellitus    • Hyperlipidemia    • Hypertension      Past Surgical History:   Procedure Laterality Date   • CARDIAC CATHETERIZATION N/A 6/6/2017    Procedure: Right Heart Cath;  Surgeon: Jeff Maciel MD PhD;  Location: [facility] CATH INVASIVE LOC[facility];  Service:    • HERNIA REPAIR     • LUNG BIOPSY     • THORACOTOMY Left 1977   • VENTRAL HERNIA REPAIR            PT ASSESSMENT (last 72 hours)      PT Evaluation       10/15/17 1013       Rehab Evaluation    Document Type evaluation  -TESS     Subjective Information agree to therapy  -TESS     Patient Effort, Rehab Treatment good  -TESS     Symptoms Noted During/After Treatment shortness of breath  -TESS     General Information    Patient Profile Review yes  -TESS     Onset of Illness/Injury or Date of Surgery Date 10/09/17  -TESS     Referring Physician EVE Birch  -TESS     General Observations pt sitting  in bedside chair;noted O2 per nasal cannula, IV, telemetry  -TESS     Pertinent History Of Current Problem Pt admitted to [facility] with pneumonia, COPD and atrial fib  -TESS     Precautions/Limitations oxygen therapy device and L/min;other (see comments)   vital signs  -TESS     Prior Level of Function independent:;all household mobility;ADL's;gait;transfer;using stairs  -TESS     Equipment Currently Used at Home oxygen   has RW, SC  -TESS     Plans/Goals Discussed With patient;spouse/S.O.  -TESS     Risks Reviewed patient:;spouse/S.O.:  -TESS     Benefits Reviewed patient:;spouse/S.O.:  -TESS     Barriers to Rehab none identified  -TESS     Living Environment    Lives With spouse  -TESS     Living Arrangements house  -TESS     Home Accessibility stairs to enter home;stairs (2 railings present)  -TESS     Number of Stairs to Enter Home 5  -TESS     Stair Railings at Home outside, present at both sides  -TESS     Transportation Available car;family or friend will provide  -TESS     Clinical Impression    Date of Referral to PT 10/14/17   "-TESS     PT Diagnosis impaired  mobility due to deconditioning from pneumonia, COPD, and atrial fib  -TESS     Prognosis per MD  -TESS     Patient/Family Goals Statement return to PLOF  -TESS     Criteria for Skilled Therapeutic Interventions Met yes  -TESS     Rehab Potential good, to achieve stated therapy goals  -     Predicted Duration of Therapy Intervention (days/wks) until discharge or goals met  -TESS     Vital Signs    Pre Systolic BP Rehab 144  -TESS     Pre Treatment Diastolic BP 76  -TESS     Post Systolic BP Rehab 150  -TESS     Post Treatment Diastolic BP 80  -TESS     Pretreatment Heart Rate (beats/min) 85  -TESS     Intratreatment Heart Rate (beats/min) 124  -TESS     Posttreatment Heart Rate (beats/min) 98  -TESS     Pre SpO2 (%) 98  -TESS     O2 Delivery Pre Treatment supplemental O2  -TESS     Intra SpO2 (%) 96  -TESS     O2 Delivery Intra Treatment supplemental O2  -TESS     Post SpO2 (%) 96  -TESS     O2 Delivery Post Treatment supplemental O2  -TESS     Pre Patient Position Sitting  -TESS     Intra Patient Position Standing  -TESS     Post Patient Position Supine  -TESS     Pain Assessment    Pain Assessment No/denies pain  -TESS     Vision Assessment/Intervention    Visual Impairment WFL with corrective lenses  -     Cognitive Assessment/Intervention    Current Cognitive/Communication Assessment functional  -     Orientation Status oriented x 4  -TESS     Follows Commands/Answers Questions 100% of the time  -TESS     Personal Safety decreased awareness, need for safety;decreased awareness, need for assist  -TESS     Personal Safety Interventions gait belt;nonskid shoes/slippers when out of bed;supervised activity  -TESS     ROM (Range of Motion)    General ROM upper extremity range of motion deficits identified  -     General ROM Detail AROM WFL BLE's  -     General UE Assessment    ROM Detail AROM WFL BUE's except R shoulder limited to 100 degrees active-assistive flexion due to \"rotator cuff iinjury\"  -TESS     MMT (Manual Muscle " Testing)    General MMT Assessment upper extremity strength deficits identified;lower extremity strength deficits identified  -TESS     Upper Extremity    Upper Ext Manual Muscle Testing Detail RUE: 5/5 , wrist, 4/5 elbow, shoulder not formally tested due to pain;LUE: 5/5 grossly  -TESS     Lower Extremity    Lower Ext Manual Muscle Testing Detail BLE: 4/5 ankles/feet, knees hips  -TESS     Bed Mobility, Assessment/Treatment    Bed Mob, Supine to Sit, [place] supervision required  -TESS     Bed Mob, Sit to Supine, [place] supervision required  -TESS     Transfer Assessment/Treatment    Transfers, Chair-Bed [place] supervision required  -TESS     Transfers, Sit-Stand [place] supervision required  -TESS     Transfers, Stand-Sit [place] supervision required  -TESS     Gait Assessment/Treatment    Gait, [place] Level supervision required  -TESS     Gait, Distance (Feet) 100   20+80  -TESS     Gait, Comment Pt SOA with ambulation. O2 sats remained high throughout on O2. HR clara to 124 bpm and recovered to 98Bpm in 1 minute  -TESS     Edema Management    Edema Amount right:;left:;minimal  -TESS     Positioning and Restraints    Pre-Treatment Position sitting in chair/recliner  -TESS     Post Treatment Position bed  -TESS     In Bed call light within reach;encouraged to call for assist;with family/caregiver  -       User Key  (r) = Recorded By, (t) = Taken By, (c) = Cosigned By    Initials Name Provider Type    TESS Joshua PT Physical Therapist          Physical Therapy Education     Title: PT OT SLP Therapies (Active)     Topic: Physical Therapy (Active)     Point: Mobility training (Active)    Learning Progress Summary    Learner Readiness Method Response Comment Documented by Status   Patient Acceptance E NR   10/15/17 1336 Active   Significant Other Acceptance E NR   10/15/17 1336 Active               Point: Precautions (Active)    Learning Progress Summary    Learner Readiness Method Response Comment  Documented by Status   Patient Acceptance E NR   10/15/17 1336 Active   Significant Other Acceptance E NR   10/15/17 1336 Active                      User Key     Initials Effective Dates Name Provider Type Discipline     10/17/16 -  Gilda Joshua, PT Physical Therapist PT                PT Recommendation and Plan  Anticipated Discharge Disposition: home with assist, home with home health  Planned Therapy Interventions: balance training, gait training, patient/family education, stair training, transfer training  PT Frequency: other (see comments) (5-14 times per week)  Plan of Care Review  Plan Of Care Reviewed With: patient, spouse  Outcome Summary/Follow up Plan: Pt evaluation completed. Pt ambulated 100ft withSBA. function limited primarily by decreased strength, balance, and tolerance for functional mobility and activities. Pt will benefit from skilled PT to regain lost function.If pt discharged home prior to goals being met, PT recommends home with assisance with HHPT/OT to follow.           IP PT Goals       10/15/17 1336          Transfer Training PT LTG    Transfer Training PT LTG, Date Established 10/15/17  -      Transfer Training PT LTG, Time to Achieve by discharge  -      Transfer Training PT LTG, Activity Type bed to chair /chair to bed;sit to stand/stand to sit  -      Transfer Training PT LTG, [place] Level independent  -      Transfer Training PT LTG, Outcome goal ongoing  -      Gait Training PT LTG    Gait Training Goal PT LTG, Date Established 10/15/17  -      Gait Training Goal PT LTG, Time to Achieve by discharge  -      Gait Training Goal PT LTG, [place] Level independent  -      Gait Training Goal PT LTG, Distance to Achieve 150ft;O2 sats will not drop below 92%  -      Stair Training PT LTG    Stair Training Goal PT LTG, Date Established 10/15/17  -      Stair Training Goal PT LTG, Number of Steps 5  -      Stair Training Goal PT LTG, [place] Level  conditional independence  -      Stair Training Goal PT LTG, Assist Device 2 handrails  -      Patient Education PT LTG    Patient Education PT LTG, Date Established 10/15/17  -      Patient Education PT LTG, Time to Achieve by discharge  -      Patient Education PT LTG, Education Type gait;transfers;home safety  -      Patient Education PT LTG Outcome goal ongoing  -        User Key  (r) = Recorded By, (t) = Taken By, (c) = Cosigned By    Initials Name Provider Type    TESS Joshua PT Physical Therapist                Outcome Measures       10/15/17 1013          How much help from another person do you currently need...    Turning from your back to your side while in flat bed without using bedrails? 4  -TESS      Moving from lying on back to sitting on the side of a flat bed without bedrails? 3  -TESS      Moving to and from a bed to a chair (including a wheelchair)? 3  -TESS      Standing up from a chair using your arms (e.g., wheelchair, bedside chair)? 3  -TESS      Climbing 3-5 steps with a railing? 3  -TESS      To walk in hospital room? 3  -TESS      AM-[facility] 6 Clicks Score 19  -      Functional Assessment    Outcome Measure Options AM-PAC 6 Clicks Basic Mobility (PT)  -        User Key  (r) = Recorded By, (t) = Taken By, (c) = Cosigned By    Initials Name Provider Type    TESS Joshua PT Physical Therapist           Time Calculation:         PT Charges       10/15/17 1340          Time Calculation    Start Time 1013  -      Stop Time 1100  -      Time Calculation (min) 47 min  -      PT Received On 10/15/17  -      PT Goal Re-Cert Due Date 10/28/17  -      Time Calculation- PT    Total Timed Code Minutes- PT 32 minute(s)  -        User Key  (r) = Recorded By, (t) = Taken By, (c) = Cosigned By    Initials Name Provider Type    TESS Joshua PT Physical Therapist          Therapy Charges for Today     Code Description Service Date Service Provider Modifiers Qty    89087421940  PT  MOBILITY CURRENT 10/15/2017 Gilda Joshua, PT GP,  1    73577834153 HC PT MOBILITY PROJECTED 10/15/2017 Gilda Joshua, PT GP,  1    00259832575 HC PT EVAL MOD COMPLEXITY 1 10/15/2017 Gilda Joshua, PT GP 1    36147632444 HC GAIT TRAINING EA 15 MIN 10/15/2017 Gilda Joshua, PT GP 1    60055298038 HC PT THERAPEUTIC ACT EA 15 MIN 10/15/2017 Gilda Joshua, PT GP 1          PT G-Codes  PT Professional Judgement Used?: Yes  Outcome Measure Options: AM-PAC 6 Clicks Basic Mobility (PT)  Score: 19  Functional Limitation: Mobility: Walking and moving around  Mobility: Walking and Moving Around Current Status (): At least 20 percent but less than 40 percent impaired, limited or restricted  Mobility: Walking and Moving Around Goal Status (): 0 percent impaired, limited or restricted      Gilda oJshua, PT  10/15/2017

## 2017-10-15 NOTE — PROGRESS NOTES
[facility] Medicine Services  INPATIENT PROGRESS NOTE    Length of Stay: 3  Date of Admission: 10/9/2017  Primary Care Physician: Paolo Rye MD    Subjective   Chief Complaint: No complaints     HPI:      10/15/2017:    Patient states his breathing is better today.  He has been working well with PT OT.  During the afternoon the patient developed a rash on his back and legs.  The rash was red but not raised.  The patient was given Levaquin the night before but did not have Levaquin that day, as it is a nighttime medication.  We will order a food allergy test and a tick titer to rule out other causes of rash.  The patient had recently taken a Lortab within the hour, but takes it regularly at home.  The patient had no stridor, respiratory distress, or feeling of narrowing of airway.    This is an 84-year-old man presented to the emergency room 10/9/2017 with complaints of cough, dyspnea, chills and rash on his chest and abdomen.  He has a history of COPD and was found to have left lower lobe pneumonia.  The patient states his breathing is improved today and reports no chest pain.    Review of Systems   Constitutional: Positive for fatigue.   Respiratory: Positive for shortness of breath.    All other systems reviewed and are negative.     All pertinent negatives and positives are as above. All other systems have been reviewed and are negative unless otherwise stated.     Objective    Temp:  [96.9 °F (36.1 °C)-97.8 °F (36.6 °C)] 97.4 °F (36.3 °C)  Heart Rate:  [] 81  Resp:  [20-28] 20  BP: (135-142)/(77-94) 139/77    Physical Exam   Constitutional: He is oriented to person, place, and time. He appears well-developed and well-nourished.   HENT:   Head: Normocephalic and atraumatic.   Eyes: EOM are normal. Pupils are equal, round, and reactive to light.   Neck: Normal range of motion. Neck supple.   Cardiovascular: Normal rate and regular rhythm.    Pulmonary/Chest:  Effort normal and breath sounds normal.   Abdominal: Soft. Bowel sounds are normal.   Musculoskeletal: Normal range of motion.   Neurological: He is alert and oriented to person, place, and time.   Skin: Skin is warm.   Psychiatric: He has a normal mood and affect.     Results Review:  I have reviewed the labs, radiology results, and diagnostic studies.    Laboratory Data:     Results from last 7 days  Lab Units 10/15/17  0526 10/14/17  0853 10/13/17  0416  10/09/17  1532   SODIUM mmol/L 135* 139 140  < > 140   SODIUM, ARTERIAL   --   --   --   < >  --    POTASSIUM mmol/L 4.3 4.4 4.2  < > 4.0   CHLORIDE mmol/L 99 101 102  < > 103   CO2 mmol/L 27.0 24.0 26.0  < > 24.0   BUN mg/dL 38* 45* 34*  < > 22*   CREATININE mg/dL 0.95 1.09 0.97  < > 1.04   GLUCOSE mg/dL 150* 168* 161*  < > 124*   GLUCOSE, ARTERIAL   --   --   --   < >  --    CALCIUM mg/dL 8.5 8.1* 8.2*  < > 9.1   BILIRUBIN mg/dL  --   --   --   --  0.4   ALK PHOS U/L  --   --   --   --  72   ALT (SGPT) U/L  --   --   --   --  41   AST (SGOT) U/L  --   --   --   --  21   ANION GAP mmol/L 9.0 14.0 12.0  < > 13.0   < > = values in this interval not displayed.  Estimated Creatinine Clearance: 59.4 mL/min (by C-G formula based on Cr of 0.95).    Results from last 7 days  Lab Units 10/10/17  0600 10/09/17  1532   MAGNESIUM mg/dL 1.9 2.0   PHOSPHORUS mg/dL  --  2.8           Results from last 7 days  Lab Units 10/15/17  0526 10/14/17  0853 10/13/17  0416 10/12/17  0522 10/11/17  0701   WBC 10*3/mm3 15.92* 16.12* 15.00* 14.02* 16.46*   HEMOGLOBIN g/dL 12.2* 12.3* 10.4* 10.3* 10.7*   HEMATOCRIT % 36.0* 36.2* 32.6* 31.2* 33.9*   PLATELETS 10*3/mm3 293 319 284 270 276       Results from last 7 days  Lab Units 10/09/17  1532   INR  1.03       Culture Data:   No results found for: BLOODCX  No results found for: URINECX  No results found for: RESPCX  No results found for: WOUNDCX  No results found for: STOOLCX  No components found for: BODYFLD    Radiology Data:   Imaging  Results (last 24 hours)     Procedure Component Value Units Date/Time    XR Foot 3+ View Right [[id number]] Collected:  10/14/17 2123     Updated:  10/14/17 2138    Narrative:       Right foot three view on 10/14/2017    CLINICAL INDICATION: Acute pain in right lateral heel    COMPARISON: None    FINDINGS: Vascular calcifications are noted. There are no  fractures. Visualized joints are well aligned. No bony  abnormality is noted.      Impression:       No acute bony abnormality.    Electronically signed by:  Thong Lennon  10/14/2017 9:36 PM  CDT Workstation: [id number]          I have reviewed the patient current medications.     Assessment/Plan     Plan:    1.  Pneumonia, left lung/ COPD:  Day #5 Levaquin. Blood cultures negative. Methylprednisolone decreased to 40 mg q 12 hours.  Continue nebulizers. PT/OT and incentive spirometer ordered.   2. Atrial fibrillation/ rate controlled:  Continue Metoprolol.  Hold anticoagulation due to history of GI bleed.   3.  Anemia, improved:  Will continue to monitor.     4.  Rash, unknown cause:  The patient has had several episodes of rash, which prompted the discontinuation of Penicillins, Losartan and Cardizem.  Food allergy panel and Rickettsial serology ordered. Benadryl ordered prn.         Discharge Planning: I expect patient to be discharged to home in 1-2 days.      This document has been electronically signed by EVE Gomez on October 15, 2017 4:23 PM

## 2017-10-15 NOTE — PLAN OF CARE
Problem: Patient Care Overview (Adult)  Goal: Plan of Care Review  Outcome: Ongoing (interventions implemented as appropriate)    10/15/17 5486   Coping/Psychosocial Response Interventions   Plan Of Care Reviewed With patient   Patient Care Overview   Progress improving   Outcome Evaluation   Outcome Summary/Follow up Plan Pt experiencing some SOA when active. Urine output good, hypertension well controlled today. Pt skin rash has exacerbated this afternoon, benadryl given, MD aware, testing ordered.       Goal: Adult Individualization and Mutuality  Outcome: Ongoing (interventions implemented as appropriate)    Problem: Acute Coronary Syndrome (ACS) (Adult)  Goal: Signs and Symptoms of Listed Potential Problems Will be Absent or Manageable (Acute Coronary Syndrome)  Outcome: Ongoing (interventions implemented as appropriate)    Problem: Diabetes, Type 2 (Adult)  Goal: Signs and Symptoms of Listed Potential Problems Will be Absent or Manageable (Diabetes, Type 2)  Outcome: Ongoing (interventions implemented as appropriate)    Problem: Pain, Chronic (Adult)  Goal: Acceptable Pain Control/Comfort Level  Outcome: Ongoing (interventions implemented as appropriate)    Problem: COPD, Chronic Bronchitis/Emphysema (Adult)  Goal: Signs and Symptoms of Listed Potential Problems Will be Absent or Manageable (COPD, Chronic Bronchitis/Emphysema)  Outcome: Ongoing (interventions implemented as appropriate)    Problem: Skin Integrity Impairment, Risk/Actual (Adult)  Goal: Skin Integrity/Wound Healing  Outcome: Ongoing (interventions implemented as appropriate)    Problem: Hypertensive Disease/Crisis (Arterial) (Adult)  Goal: Signs and Symptoms of Listed Potential Problems Will be Absent or Manageable (Hypertensive Disease/Crisis)  Outcome: Ongoing (interventions implemented as appropriate)

## 2017-10-16 LAB
A PHAGOCYTOPH IGM TITR SER IF: NEGATIVE {TITER}
ANION GAP SERPL CALCULATED.3IONS-SCNC: 10 MMOL/L (ref 5–15)
BASOPHILS # BLD AUTO: 0.09 10*3/MM3 (ref 0–0.2)
BASOPHILS NFR BLD AUTO: 0.6 % (ref 0–2)
BUN BLD-MCNC: 42 MG/DL (ref 7–21)
BUN/CREAT SERPL: 40.8 (ref 7–25)
CALCIUM SPEC-SCNC: 8.8 MG/DL (ref 8.4–10.2)
CHLORIDE SERPL-SCNC: 97 MMOL/L (ref 95–110)
CO2 SERPL-SCNC: 29 MMOL/L (ref 22–31)
CONV HGE IGG TITER: NEGATIVE
CREAT BLD-MCNC: 1.03 MG/DL (ref 0.7–1.3)
CRP SERPL-MCNC: <0.5 MG/DL (ref 0–1)
D-LACTATE SERPL-SCNC: 2.9 MMOL/L (ref 0.5–2)
D-LACTATE SERPL-SCNC: 4.3 MMOL/L (ref 0.5–2)
DEPRECATED RDW RBC AUTO: 53.2 FL (ref 35.1–43.9)
E CHAFFEENSIS IGG TITR SER IF: NEGATIVE {TITER}
E. CHAFFEENSIS (HME) IGM TITER: NEGATIVE
EOSINOPHIL # BLD AUTO: 0.01 10*3/MM3 (ref 0–0.7)
EOSINOPHIL NFR BLD AUTO: 0.1 % (ref 0–7)
ERYTHROCYTE [DISTWIDTH] IN BLOOD BY AUTOMATED COUNT: 15.8 % (ref 11.5–14.5)
GFR SERPL CREATININE-BSD FRML MDRD: 69 ML/MIN/1.73 (ref 42–98)
GLUCOSE BLD-MCNC: 151 MG/DL (ref 60–100)
GLUCOSE BLDC GLUCOMTR-MCNC: 116 MG/DL (ref 70–130)
GLUCOSE BLDC GLUCOMTR-MCNC: 127 MG/DL (ref 70–130)
GLUCOSE BLDC GLUCOMTR-MCNC: 135 MG/DL (ref 70–130)
GLUCOSE BLDC GLUCOMTR-MCNC: 144 MG/DL (ref 70–130)
GLUCOSE BLDC GLUCOMTR-MCNC: 191 MG/DL (ref 70–130)
HCT VFR BLD AUTO: 37.5 % (ref 39–49)
HGB BLD-MCNC: 12.1 G/DL (ref 13.7–17.3)
IMM GRANULOCYTES # BLD: 1.61 10*3/MM3 (ref 0–0.02)
IMM GRANULOCYTES NFR BLD: 10.4 % (ref 0–0.5)
LYMPHOCYTES # BLD AUTO: 1 10*3/MM3 (ref 0.6–4.2)
LYMPHOCYTES NFR BLD AUTO: 6.5 % (ref 10–50)
MCH RBC QN AUTO: 30.3 PG (ref 26.5–34)
MCHC RBC AUTO-ENTMCNC: 32.3 G/DL (ref 31.5–36.3)
MCV RBC AUTO: 93.8 FL (ref 80–98)
MONOCYTES # BLD AUTO: 0.87 10*3/MM3 (ref 0–0.9)
MONOCYTES NFR BLD AUTO: 5.6 % (ref 0–12)
NEUTROPHILS # BLD AUTO: 11.85 10*3/MM3 (ref 2–8.6)
NEUTROPHILS NFR BLD AUTO: 76.8 % (ref 37–80)
PLATELET # BLD AUTO: 260 10*3/MM3 (ref 150–450)
PMV BLD AUTO: 10.8 FL (ref 8–12)
POTASSIUM BLD-SCNC: 4.9 MMOL/L (ref 3.5–5.1)
R RICKETTSI IGM TITR SER: 0.22 INDEX (ref 0–0.89)
RBC # BLD AUTO: 4 10*6/MM3 (ref 4.37–5.74)
SODIUM BLD-SCNC: 136 MMOL/L (ref 137–145)
WBC NRBC COR # BLD: 15.43 10*3/MM3 (ref 3.2–9.8)

## 2017-10-16 PROCEDURE — 86140 C-REACTIVE PROTEIN: CPT | Performed by: FAMILY MEDICINE

## 2017-10-16 PROCEDURE — 96376 TX/PRO/DX INJ SAME DRUG ADON: CPT

## 2017-10-16 PROCEDURE — 97110 THERAPEUTIC EXERCISES: CPT

## 2017-10-16 PROCEDURE — G0378 HOSPITAL OBSERVATION PER HR: HCPCS

## 2017-10-16 PROCEDURE — 83605 ASSAY OF LACTIC ACID: CPT | Performed by: FAMILY MEDICINE

## 2017-10-16 PROCEDURE — 80048 BASIC METABOLIC PNL TOTAL CA: CPT | Performed by: FAMILY MEDICINE

## 2017-10-16 PROCEDURE — 25010000002 METHYLPREDNISOLONE PER 40 MG: Performed by: NURSE PRACTITIONER

## 2017-10-16 PROCEDURE — 94799 UNLISTED PULMONARY SVC/PX: CPT

## 2017-10-16 PROCEDURE — 94760 N-INVAS EAR/PLS OXIMETRY 1: CPT

## 2017-10-16 PROCEDURE — 97116 GAIT TRAINING THERAPY: CPT

## 2017-10-16 PROCEDURE — 83605 ASSAY OF LACTIC ACID: CPT | Performed by: INTERNAL MEDICINE

## 2017-10-16 PROCEDURE — 85025 COMPLETE CBC W/AUTO DIFF WBC: CPT | Performed by: FAMILY MEDICINE

## 2017-10-16 PROCEDURE — 63710000001 DIPHENHYDRAMINE PER 50 MG: Performed by: FAMILY MEDICINE

## 2017-10-16 PROCEDURE — 82962 GLUCOSE BLOOD TEST: CPT

## 2017-10-16 PROCEDURE — 99218 PR INITIAL OBSERVATION CARE/DAY 30 MINUTES: CPT | Performed by: NURSE PRACTITIONER

## 2017-10-16 RX ORDER — METHYLPREDNISOLONE SODIUM SUCCINATE 40 MG/ML
40 INJECTION, POWDER, LYOPHILIZED, FOR SOLUTION INTRAMUSCULAR; INTRAVENOUS DAILY
Status: DISCONTINUED | OUTPATIENT
Start: 2017-10-17 | End: 2017-10-17 | Stop reason: HOSPADM

## 2017-10-16 RX ORDER — LISINOPRIL 10 MG/1
10 TABLET ORAL
Status: DISCONTINUED | OUTPATIENT
Start: 2017-10-16 | End: 2017-10-17 | Stop reason: HOSPADM

## 2017-10-16 RX ADMIN — ISOSORBIDE DINITRATE 10 MG: 5 TABLET ORAL at 15:49

## 2017-10-16 RX ADMIN — PANTOPRAZOLE SODIUM 40 MG: 40 TABLET, DELAYED RELEASE ORAL at 06:19

## 2017-10-16 RX ADMIN — DIPHENHYDRAMINE HYDROCHLORIDE 25 MG: 25 CAPSULE ORAL at 21:27

## 2017-10-16 RX ADMIN — HYDROCODONE BITARTRATE AND ACETAMINOPHEN 1 TABLET: 7.5; 325 TABLET ORAL at 21:24

## 2017-10-16 RX ADMIN — HYDROCODONE BITARTRATE AND ACETAMINOPHEN 1 TABLET: 7.5; 325 TABLET ORAL at 06:19

## 2017-10-16 RX ADMIN — LISINOPRIL 10 MG: 10 TABLET ORAL at 11:47

## 2017-10-16 RX ADMIN — METOPROLOL TARTRATE 12.5 MG: 25 TABLET, FILM COATED ORAL at 08:12

## 2017-10-16 RX ADMIN — HYDRALAZINE HYDROCHLORIDE 10 MG: 10 TABLET, FILM COATED ORAL at 21:24

## 2017-10-16 RX ADMIN — ISOSORBIDE DINITRATE 10 MG: 5 TABLET ORAL at 21:25

## 2017-10-16 RX ADMIN — IPRATROPIUM BROMIDE AND ALBUTEROL SULFATE 3 ML: 2.5; .5 SOLUTION RESPIRATORY (INHALATION) at 08:53

## 2017-10-16 RX ADMIN — FAMOTIDINE 20 MG: 20 TABLET ORAL at 17:13

## 2017-10-16 RX ADMIN — MAGNESIUM OXIDE TAB 400 MG (241.3 MG ELEMENTAL MG) 400 MG: 400 (241.3 MG) TAB at 08:11

## 2017-10-16 RX ADMIN — METHYLPREDNISOLONE SODIUM SUCCINATE 40 MG: 40 INJECTION, POWDER, FOR SOLUTION INTRAMUSCULAR; INTRAVENOUS at 11:00

## 2017-10-16 RX ADMIN — FAMOTIDINE 20 MG: 20 TABLET ORAL at 08:12

## 2017-10-16 RX ADMIN — IPRATROPIUM BROMIDE AND ALBUTEROL SULFATE 3 ML: 2.5; .5 SOLUTION RESPIRATORY (INHALATION) at 13:06

## 2017-10-16 RX ADMIN — ISOSORBIDE DINITRATE 10 MG: 5 TABLET ORAL at 08:11

## 2017-10-16 RX ADMIN — HYDRALAZINE HYDROCHLORIDE 10 MG: 10 TABLET, FILM COATED ORAL at 08:11

## 2017-10-16 RX ADMIN — FLUTICASONE PROPIONATE 2 SPRAY: 50 SPRAY, METERED NASAL at 08:13

## 2017-10-16 RX ADMIN — LEVOFLOXACIN 750 MG: 750 TABLET, FILM COATED ORAL at 21:24

## 2017-10-16 RX ADMIN — DONEPEZIL HYDROCHLORIDE 10 MG: 10 TABLET, FILM COATED ORAL at 08:09

## 2017-10-16 RX ADMIN — IPRATROPIUM BROMIDE AND ALBUTEROL SULFATE 3 ML: 2.5; .5 SOLUTION RESPIRATORY (INHALATION) at 19:49

## 2017-10-16 RX ADMIN — ASPIRIN 81 MG 81 MG: 81 TABLET ORAL at 08:08

## 2017-10-16 RX ADMIN — HYDROCODONE BITARTRATE AND ACETAMINOPHEN 1 TABLET: 7.5; 325 TABLET ORAL at 14:24

## 2017-10-16 RX ADMIN — FUROSEMIDE 20 MG: 20 TABLET ORAL at 08:13

## 2017-10-16 RX ADMIN — METOPROLOL TARTRATE 12.5 MG: 25 TABLET, FILM COATED ORAL at 21:25

## 2017-10-16 NOTE — SIGNIFICANT NOTE
10/16/17 1614   Rehab Treatment   Discipline occupational therapist   Rehab Evaluation   Evaluation Not Performed unable to evaluate, medical status change  (Per nursing Heart rate 130's-to 140's hold this date.)

## 2017-10-16 NOTE — CONSULTS
"[facility]   LOS: 3 days   Patient Care Team:  Paolo Rey MD as PCP - General    Referring Provider: EVE Birch    Date of Consult: 10/16/17    Chief Complaint:    Chief Complaint   Patient presents with   • Shortness of Breath   • Itching        Subjective     HPI:  Mr. Ham is a 84 year old patient known to Dr. Maciel who was readmitted due to rash, SOA, and pneumonia. He appears to possible have an allergic reaction to new medication. However, the source is not known exactly. New medications include Cardizem PO and Losartan from last admission for AF RVR.   He does not appear to be in CHF exacerbation at this time. According to him and his wife, \"he is better than yesterday. Yesterday his breathing was bad\".   The Atrial flutter he was found to be in last admission was a new finding. The tachy/jaycee that he had on his ZIO report and on telemetry is not. Rate control medications have been cautiously added due to history of bradycardia. He had not been on BB secondary to wheezing and COPD but appears to be tolerating it at this time. His heart rate is very responsive to activity and anxiety. Cardizem and Losartan had been on hold since arrival- and rash returned last night.     History  Past Medical History:   Diagnosis Date   • Bilateral carotid artery stenosis    • Chronic obstructive pulmonary disease (COPD)    • Coronary arteriosclerosis    • Degenerative joint disease involving multiple joints    • Dementia    • Diabetes mellitus    • Hyperlipidemia    • Hypertension    , Past Surgical History:   Procedure Laterality Date   • CARDIAC CATHETERIZATION N/A 6/6/2017    Procedure: Right Heart Cath;  Surgeon: Jeff Maciel MD PhD;  Location: [facility] INVASIVE LOCATION;  Service:    • HERNIA REPAIR     • LUNG BIOPSY     • THORACOTOMY Left 1977   • VENTRAL HERNIA REPAIR     , Family History   Problem Relation Age of Onset   • Hypertension Father    , Social History " "  Substance Use Topics   • Smoking status: Former Smoker   • Smokeless tobacco: Never Used   • Alcohol use No   , ALLERGIES: Atorvastatin; Penicillins; Pravastatin; Azithromycin; and Biaxin [clarithromycin]    Review of Systems:   Review of Systems   Respiratory: Positive for shortness of breath.    Cardiovascular: Negative for chest pain, palpitations and leg swelling.   Gastrointestinal: Positive for abdominal distention.   Skin: Positive for rash.   Allergic/Immunologic:        Non-raised red rash. No itching- c/o burning       Objective     Vital Sign Min/Max for last 24 hours  Temp  Min: 97.2 °F (36.2 °C)  Max: 97.8 °F (36.6 °C)   BP  Min: 137/87  Max: 169/77   Pulse  Min: 79  Max: 105   Resp  Min: 18  Max: 22   SpO2  Min: 95 %  Max: 99 %   Flow (L/min)  Min: 2  Max: 2   Weight  Min: 177 lb 3.2 oz (80.4 kg)  Max: 177 lb 3.2 oz (80.4 kg)     Flowsheet Rows         First Filed Value    Admission Height  66\" (167.6 cm) Documented at 10/09/2017 1317    Admission Weight  180 lb (81.6 kg) Documented at 10/09/2017 1317        Last 3 weights    10/13/17  0600 10/15/17  0608 10/16/17  0500   Weight: 187 lb 13.3 oz (85.2 kg) 181 lb 4.8 oz (82.2 kg) (with 2 pillows and 2 blankets ) 177 lb 3.2 oz (80.4 kg)       Physical Exam:  Physical Exam   Constitutional: He is oriented to person, place, and time. He appears well-developed and well-nourished. No distress.   HENT:   Head: Normocephalic.   Eyes: Conjunctivae are normal.   Neck: No JVD present.   Cardiovascular: Normal rate, regular rhythm, S1 normal, S2 normal, normal heart sounds and intact distal pulses.  Exam reveals no gallop and no friction rub.    No murmur heard.  Pulmonary/Chest: Effort normal. No respiratory distress. He has decreased breath sounds. He has no wheezes. He has no rales.   Abdominal: Soft. Bowel sounds are normal. He exhibits no distension.   Musculoskeletal: Normal range of motion. He exhibits no edema.   Neurological: He is alert and oriented to " person, place, and time.   Skin: Skin is warm and dry. Rash (gerda thighs, abdomen and back) noted. He is not diaphoretic. No erythema.   Psychiatric: He has a normal mood and affect. His behavior is normal. Judgment and thought content normal.   Nursing note and vitals reviewed.          Results Review:     Results from last 7 days  Lab Units 10/16/17  0229 10/15/17  0526 10/14/17  0853  10/09/17  1532   SODIUM mmol/L 136* 135* 139  < > 140   SODIUM, ARTERIAL   --   --   --   < >  --    POTASSIUM mmol/L 4.9 4.3 4.4  < > 4.0   CHLORIDE mmol/L 97 99 101  < > 103   CO2 mmol/L 29.0 27.0 24.0  < > 24.0   BUN mg/dL 42* 38* 45*  < > 22*   CREATININE mg/dL 1.03 0.95 1.09  < > 1.04   CALCIUM mg/dL 8.8 8.5 8.1*  < > 9.1   BILIRUBIN mg/dL  --   --   --   --  0.4   ALK PHOS U/L  --   --   --   --  72   ALT (SGPT) U/L  --   --   --   --  41   AST (SGOT) U/L  --   --   --   --  21   GLUCOSE mg/dL 151* 150* 168*  < > 124*   GLUCOSE, ARTERIAL   --   --   --   < >  --    < > = values in this interval not displayed.    Estimated Creatinine Clearance: 54.2 mL/min (by C-G formula based on Cr of 1.03).      Results from last 7 days  Lab Units 10/10/17  0600 10/09/17  1532   MAGNESIUM mg/dL 1.9 2.0   PHOSPHORUS mg/dL  --  2.8       Results from last 7 days  Lab Units 10/16/17  0229 10/15/17  0526 10/14/17  0853 10/13/17  0416 10/12/17  0522   WBC 10*3/mm3 15.43* 15.92* 16.12* 15.00* 14.02*   HEMOGLOBIN g/dL 12.1* 12.2* 12.3* 10.4* 10.3*   PLATELETS 10*3/mm3 260 293 319 284 270       Lab Results   Component Value Date    HGBA1C 6.6 (H) 09/21/2017       Lab Results   Component Value Date    CKTOTAL 184 (H) 09/15/2017    CKMB 6.20 (H) 09/15/2017    TROPONINI 0.038 (H) 10/10/2017       I/O last 3 completed shifts:  In: 1460 [P.O.:1460]  Out: 2100 [Urine:2100]    Cardiographics  ECG/EMG Results (last 24 hours)     ** No results found for the last 24 hours. **          Intake/Output       10/15/17 0700 - 10/16/17 0659    Intake (ml) 1460     Output (ml) 2100    Net (ml) -640    Last Weight 177 lb 3.2 oz (80.4 kg)            Medication Review:   Prescriptions Prior to Admission   Medication Sig Dispense Refill Last Dose   • albuterol (PROVENTIL) (2.5 MG/3ML) 0.083% nebulizer solution Take 2.5 mg by nebulization Every 4 (Four) Hours As Needed for Wheezing. 125 vial 11    • aspirin 81 MG chewable tablet Chew 81 mg Daily.   9/25/2017   • clopidogrel (PLAVIX) 75 MG tablet Take 75 mg by mouth Daily.   10/9/2017 at Unknown time   • diltiaZEM CD (CARDIZEM CD) 240 MG 24 hr capsule Take 1 capsule by mouth Daily. 30 capsule 1    • donepezil (ARICEPT) 10 MG tablet Take 10 mg by mouth Daily.  0 9/24/2017 at Unknown time   • doxycycline (VIBRAMYCIN) 100 MG capsule Take 100 mg by mouth 2 (Two) Times a Day.      • famotidine (PEPCID) 20 MG tablet Take 20 mg by mouth 2 (Two) Times a Day.  0 9/25/2017 at Unknown time   • fluticasone (FLONASE) 50 MCG/ACT nasal spray 2 sprays into each nostril Daily.   9/25/2017 at Unknown time   • furosemide (LASIX) 20 MG tablet Take 1 tablet by mouth Daily. 90 tablet 3 9/25/2017 at Unknown time   • glimepiride (AMARYL) 2 MG tablet Take 2 mg by mouth Every Morning Before Breakfast.   10/9/2017 at Unknown time   • HYDROcodone-acetaminophen (NORCO) 7.5-325 MG per tablet Take 1 tablet by mouth Every 8 (Eight) Hours.   9/25/2017 at Unknown time   • ipratropium-albuterol (DUO-NEB) 0.5-2.5 mg/mL nebulizer Take 3 mL by nebulization 4 (Four) Times a Day. 120 vial 1    • isosorbide dinitrate (ISORDIL) 10 MG tablet Take 1 tablet by mouth 3 (Three) Times a Day. 90 tablet 0 9/25/2017 at Unknown time   • losartan (COZAAR) 25 MG tablet Take 0.5 tablets by mouth Daily. 30 tablet 1    • magnesium oxide (MAGOX) 400 (241.3 MG) MG tablet tablet Take 400 mg by mouth Daily.   9/25/2017 at Unknown time   • nitroglycerin (NITROSTAT) 0.4 MG SL tablet place 1 tablet under the tongue if needed every 5 minutes for hair...  (REFER TO PRESCRIPTION NOTES).  0 Unknown  at Unknown time   • O2 (OXYGEN) Inhale 2 L/min Every Night. W/ CPAP   9/24/2017 at Unknown time   • Red Yeast Rice 600 MG tablet Take 600 mg by mouth 2 (Two) Times a Day.   9/25/2017 at Unknown time   • Umeclidinium-Vilanterol 62.5-25 MCG/INH aerosol powder  Inhale 1 puff Daily. 1 each 11 9/25/2017 at Unknown time       Current Facility-Administered Medications:   •  albuterol (PROVENTIL) nebulizer solution 0.083% 2.5 mg/3mL, 2.5 mg, Nebulization, Q4H PRN, Florencio Garner MD, 2.5 mg at 10/13/17 1155  •  aspirin chewable tablet 81 mg, 81 mg, Oral, Daily, Florencio Garner MD, 81 mg at 10/16/17 0808  •  dextrose (D50W) solution 25 g, 25 g, Intravenous, Q15 Min PRN, Florencio Garner MD  •  dextrose (GLUTOSE) oral gel 15 g, 15 g, Oral, Q15 Min PRN, Florencio Garner MD  •  diphenhydrAMINE (BENADRYL) capsule 25 mg, 25 mg, Oral, Q8H PRN, Florencio Garner MD, 25 mg at 10/15/17 1557  •  donepezil (ARICEPT) tablet 10 mg, 10 mg, Oral, Daily, Florencio Garner MD, 10 mg at 10/16/17 0809  •  enalaprilat (VASOTEC) injection 1.25 mg, 1.25 mg, Intravenous, Q6H PRN, Nik Boyer MD, 1.25 mg at 10/13/17 1442  •  famotidine (PEPCID) tablet 20 mg, 20 mg, Oral, BID, Florencio Garner MD, 20 mg at 10/16/17 0812  •  fluticasone (FLONASE) 50 MCG/ACT nasal spray 2 spray, 2 spray, Nasal, Daily, Florencio Garner MD, 2 spray at 10/16/17 0813  •  furosemide (LASIX) tablet 20 mg, 20 mg, Oral, Daily, Florencio Garner MD, 20 mg at 10/16/17 0813  •  glucagon (human recombinant) (GLUCAGEN DIAGNOSTIC) injection 1 mg, 1 mg, Subcutaneous, Q15 Min PRN, Florencio Garner MD  •  hydrALAZINE (APRESOLINE) injection 20 mg, 20 mg, Intravenous, Q6H PRN, Joo Bowser MD, 20 mg at 10/13/17 1209  •  hydrALAZINE (APRESOLINE) tablet 10 mg, 10 mg, Oral, Q12H, Joo Bowser MD, 10 mg at 10/16/17 0811  •  HYDROcodone-acetaminophen (NORCO) 7.5-325 MG per tablet 1 tablet, 1 tablet, Oral, Q8H, Florencio Garner MD, 1 tablet at 10/16/17 0619  •  insulin aspart (novoLOG) injection 0-7  Units, 0-7 Units, Subcutaneous, 4x Daily AC & at Bedtime, Florencio Garner MD, 2 Units at 10/15/17 2030  •  ipratropium-albuterol (DUO-NEB) nebulizer solution 3 mL, 3 mL, Nebulization, 4x Daily - RT, Florencio Garner MD, 3 mL at 10/16/17 0853  •  isosorbide dinitrate (ISORDIL) tablet 10 mg, 10 mg, Oral, TID, Florencio Garner MD, 10 mg at 10/16/17 0811  •  levoFLOXacin (LEVAQUIN) tablet 750 mg, 750 mg, Oral, Q24H, Florencio Garner MD, 750 mg at 10/15/17 2029  •  lisinopril (PRINIVIL,ZESTRIL) tablet 10 mg, 10 mg, Oral, Q24H, Daylin G Levill, APRN  •  magnesium oxide (MAGOX) tablet 400 mg, 400 mg, Oral, Daily, Florencio Garner MD, 400 mg at 10/16/17 0811  •  methylPREDNISolone sodium succinate (SOLU-Medrol) injection 40 mg, 40 mg, Intravenous, Q12H, Daylin G Levill, APRN, 40 mg at 10/16/17 1100  •  metoprolol tartrate (LOPRESSOR) half tablet 12.5 mg, 12.5 mg, Oral, Q12H, Joo Bowser MD, 12.5 mg at 10/16/17 0812  •  nitroglycerin (NITROSTAT) SL tablet 0.4 mg, 0.4 mg, Sublingual, Q5 Min PRN, Florencio Garner MD  •  ondansetron (ZOFRAN) injection 4 mg, 4 mg, Intravenous, Q6H PRN, Florencio Garner MD  •  pantoprazole (PROTONIX) EC tablet 40 mg, 40 mg, Oral, Q AM, Florencio Garner MD, 40 mg at 10/16/17 0619  •  Pharmacy to Dose LevoFLOXacin (LEVAQUIN), , Does not apply, Continuous PRN, Florencio Garner MD  •  promethazine-codeine (PHENERGAN with CODEINE) 6.25-10 MG/5ML syrup 5 mL, 5 mL, Oral, Q4H PRN, Joo Bowser MD    Assessment/Plan     Active Problems:  Chronic HFpEF  - HTN control  - DC Losartan and restart Lisinopril 10mg that he was on prior. GFR ok- 69  - Isordil/Hydralazine  - Lasix 20mg daily    Atrial Flutter  - No AC due to GI bleed history  - cautious with rate control secondary to bradycardia.  - Metoprolol tartrate 12.5mg BID currently  - DC cardizem secondary to possible allergy    Pulmonary HTN  COPD  Allergic reaction  - continue supportive care      Plan for disposition: He had appt today with [facility]  follow up- CARDIOLOGY. Will need follow-up with Dr. Maciel in 2-3 weeks regarding new medications. Can see APRN as needed as determined by Dr. Maciel         This document has been electronically signed by EVE Antony on October 16, 2017 11:07 AM

## 2017-10-16 NOTE — SIGNIFICANT NOTE
10/16/17 1530   Rehab Treatment   Discipline physical therapy assistant   Treatment Not Performed other (see comments)  (Pt's HR elevated in 130's-140's and nursing requests that pt rest this date)   Recommendation   PT - Next Appointment 10/17/17

## 2017-10-16 NOTE — THERAPY TREATMENT NOTE
Acute Care - Physical Therapy Treatment Note  [facility]     Patient Name: Hasmukh Ham  : 1933  MRN: [id number]  Today's Date: 10/16/2017  Onset of Illness/Injury or Date of Surgery Date: 10/09/17  Date of Referral to PT: 10/14/17  Referring Physician: EVE Birch    Admit Date: 10/9/2017    Visit Dx:    ICD-10-CM ICD-9-CM   1. Shortness of breath R06.02 786.05   2. Pneumonia of both lower lobes due to infectious organism J18.9 483.8   3. Troponin I above reference range R74.8 790.6   4. Drug rash L27.0 693.0   5. 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   • Nocturia   • Osteoarthritis of multiple joints   • Hyperlipidemia   • Pain in joint involving ankle and foot   • Arthropathy of hand   • Paroxysmal tachycardia   • Osteoarthrosis involving more than one site but not generalized   • Right shoulder pain   • Rotator cuff syndrome   • Partial tear of subscapularis tendon   • Infraspinatus tendon tear   • Supraspinatus tendon tear   • Bilateral carotid artery stenosis   • (HFpEF) heart failure with preserved ejection fraction   • Pulmonary HTN   • Acute interstitial pneumonia   • Chronic obstructive lung disease   • Coronary arteriosclerosis   • Diabetes mellitus   • Hypertension   • Chest pain   • Shortness of breath               Adult Rehabilitation Note       10/16/17 1106          Rehab Assessment/Intervention    Discipline physical therapy assistant  -      Document Type therapy note (daily note)  -       Subjective Information agree to therapy;no complaints  -      Patient Effort, Rehab Treatment good  -      Precautions/Limitations oxygen therapy device and L/min   vital signs  -      Recorded by [CH] Aviva Lundberg PTA      Vital Signs    Pre Systolic BP Rehab 155  -CH      Pre Treatment Diastolic BP 83  -CH      Post Systolic BP Rehab 162  -CH      Post Treatment Diastolic BP 93  -CH      Pretreatment Heart Rate (beats/min) 96  -CH      Posttreatment Heart Rate (beats/min) 91  -CH      Pre SpO2 (%) 98  -CH      O2 Delivery Pre Treatment supplemental O2  -CH      Post SpO2 (%) 98  -CH      O2 Delivery Post Treatment supplemental O2  -CH      Pre Patient Position Supine  -CH      Intra Patient Position Standing  -CH      Post Patient Position Supine  -CH      Recorded by [CH] Aviva Lundberg PTA      Pain Assessment    Pain Assessment No/denies pain  -CH      Recorded by [CH] Aviva Lundberg PTA      Vision Assessment/Intervention    Visual Impairment WFL with corrective lenses  -      Recorded by [CH] Aviva Lundberg PTA      Cognitive Assessment/Intervention    Current Cognitive/Communication Assessment functional  -      Orientation Status oriented x 4  -      Follows Commands/Answers Questions 100% of the time  -      Personal Safety decreased awareness, need for assist;decreased awareness, need for safety  -      Personal Safety Interventions fall prevention program maintained;gait belt;muscle strengthening facilitated;nonskid shoes/slippers when out of bed;supervised activity  -      Recorded by [CH] Aviva Lundberg PTA      Bed Mobility, Assessment/Treatment    Bed Mobility, Roll Left, [place] not tested  -      Bed Mobility, Roll Right, [place] not tested  -      Bed Mobility, Scoot/Bridge, [place] supervision required  -      Bed Mob, Supine to Sit, [place] supervision required  -      Bed Mob, Sit to Supine, [place] supervision required  -       Recorded by [] Aviva Lundberg PTA      Transfer Assessment/Treatment    Transfers, Sit-Stand [place] stand by assist  -CH      Transfers, Stand-Sit [place] stand by assist  -CH      Transfers, Sit-Stand-Sit, Assist Device other (see comments)   none  -CH      Toilet Transfer, [place] contact guard assist  -CH      Toilet Transfer, Assistive Device other (see comments)   none  -CH      Transfer, Comment pt defers use of RW  -CH      Recorded by [] Aviva Lundberg PTA      Gait Assessment/Treatment    Gait, [place] Level stand by assist  -CH      Gait, Assistive Device other (see comments)   none  -CH      Gait, Distance (Feet) 120   X2  -CH      Gait, Comment Pt SOA with ambulation, pt's O2 98% throughout gait. Pt's HR 91-98 throughout gait  -CH      Recorded by [] Aviva Lundberg PTA      Therapy Exercises    Bilateral Lower Extremities AROM:;20 reps;sitting;ankle pumps/circles;glut sets;hip abduction/adduction;hip flexion;LAQ  -CH      Recorded by [] Aviva Lundberg PTA      Positioning and Restraints    Pre-Treatment Position in bed  -      Post Treatment Position bed  -      In Bed supine;call light within reach;encouraged to call for assist;with family/caregiver;side rails up x2  -CH      Recorded by [] Aviva Lundberg PTA        User Key  (r) = Recorded By, (t) = Taken By, (c) = Cosigned By    Initials Name Effective Dates     Aviva Lundberg PTA 10/17/16 -                 IP PT Goals       10/16/17 1156 10/15/17 1336       Transfer Training PT LTG    Transfer Training PT LTG, Date Established  10/15/17  -TESS     Transfer Training PT LTG, Time to Achieve  by discharge  -     Transfer Training PT LTG, Activity Type  bed to chair /chair to bed;sit to stand/stand to sit  -TESS     Transfer Training PT LTG, [place] Level  independent  -TESS     Transfer Training PT  LTG, Date Goal Reviewed 10/16/17  -      Transfer Training PT LTG, Outcome goal ongoing  -  goal ongoing  -     Gait Training PT LTG    Gait Training Goal PT LTG, Date Established  10/15/17  -     Gait Training Goal PT LTG, Time to Achieve  by discharge  -     Gait Training Goal PT LTG, [place] Level  independent  -     Gait Training Goal PT LTG, Distance to Achieve  150ft;O2 sats will not drop below 92%  -     Gait Training Goal PT LTG, Date Goal Reviewed 10/16/17  -      Gait Training Goal PT LTG, Outcome goal ongoing  -      Stair Training PT LTG    Stair Training Goal PT LTG, Date Established  10/15/17  -     Stair Training Goal PT LTG, Number of Steps  5  -     Stair Training Goal PT LTG, [place] Level  conditional independence  -     Stair Training Goal PT LTG, Assist Device  2 handrails  -     Stair Training Goal PT LTG, Date Goal Reviewed 10/16/17  -      Stair Training Goal PT LTG, Outcome goal ongoing  [facility]      Patient Education PT LTG    Patient Education PT LTG, Date Established  10/15/17  -     Patient Education PT LTG, Time to Achieve  by discharge  -     Patient Education PT LTG, Education Type  gait;transfers;home safety  -     Patient Education PT LTG, Date Goal Reviewed 10/16/17  -      Patient Education PT LTG Outcome goal ongoing  - goal ongoing  -       User Key  (r) = Recorded By, (t) = Taken By, (c) = Cosigned By    Initials Name Provider Type    TESS Joshua, PT Physical Therapist    SABINA Lundberg, PTA Physical Therapy Assistant          Physical Therapy Education     Title: PT OT SLP Therapies (Active)     Topic: Physical Therapy (Active)     Point: Mobility training (Active)    Learning Progress Summary    Learner Readiness Method Response Comment Documented by Status   Patient Acceptance E NR   10/15/17 1336 Active   Significant Other Acceptance E NR  TESS 10/15/17 1336 Active               Point: Precautions (Active)    Learning Progress Summary    Learner Readiness Method Response Comment Documented by Status   Patient  Acceptance E NR   10/15/17 1336 Active   Significant Other Acceptance E NR   10/15/17 1336 Active                      User Key     Initials Effective Dates Name Provider Type Discipline     10/17/16 -  Gilda Joshua, PT Physical Therapist PT                    PT Recommendation and Plan  Anticipated Discharge Disposition: home with assist, home with home health  Planned Therapy Interventions: balance training, gait training, patient/family education, stair training, transfer training  PT Frequency: other (see comments) (5-14 times per week)  Plan of Care Review  Plan Of Care Reviewed With: patient  Outcome Summary/Follow up Plan: No new goals met this tx. Pt ambulated 120ft X2 trips CGA X1 with VSS and no LOB. Pt will continue to benefit from skilled therapy services upon D/C          Outcome Measures       10/16/17 1106 10/15/17 1013       How much help from another person do you currently need...    Turning from your back to your side while in flat bed without using bedrails? 4  - 4  -TESS     Moving from lying on back to sitting on the side of a flat bed without bedrails? 4  - 3  -TESS     Moving to and from a bed to a chair (including a wheelchair)? 3  - 3  -TESS     Standing up from a chair using your arms (e.g., wheelchair, bedside chair)? 3  - 3  -TESS     Climbing 3-5 steps with a railing? 3  -CH 3  -TESS     To walk in hospital room? 3  -CH 3  -TESS     AM-PAC 6 Clicks Score 20  - 19  -TESS     Functional Assessment    Outcome Measure Options AM-PAC 6 Clicks Basic Mobility (PT)  - AM-PAC 6 Clicks Basic Mobility (PT)  -TESS       User Key  (r) = Recorded By, (t) = Taken By, (c) = Cosigned By    Initials Name Provider Type    TESS Joshua, PT Physical Therapist    CH Aviva Lundberg, NEFTALI Physical Therapy Assistant           Time Calculation:         PT Charges       10/16/17 1159          Time Calculation    Start Time 1106  -      Stop Time 1142  -      Time Calculation (min) 36 min  -CH      PT  Received On 10/16/17  -      Time Calculation- PT    Total Timed Code Minutes- PT 36 minute(s)  -        User Key  (r) = Recorded By, (t) = Taken By, (c) = Cosigned By    Initials Name Provider Type     Aviva Lundberg PTA Physical Therapy Assistant          Therapy Charges for Today     Code Description Service Date Service Provider Modifiers Qty    [id number] HC GAIT TRAINING EA 15 MIN 10/16/2017 Aviva Lundberg PTA GP 1     Duration:  20m (11:06 AM - 11:26 AM)      03494972926 HC PT THER PROC EA 15 MIN 10/16/2017 Aviva Lundberg PTA GP 1     Duration:  16m (11:26 AM - 11:42 AM)            PT G-Codes  PT Professional Judgement Used?: Yes  Outcome Measure Options: AM-PAC 6 Clicks Basic Mobility (PT)  Score: 19  Functional Limitation: Mobility: Walking and moving around  Mobility: Walking and Moving Around Current Status (): At least 20 percent but less than 40 percent impaired, limited or restricted  Mobility: Walking and Moving Around Goal Status (): 0 percent impaired, limited or restricted    Aviva Lundberg PTA  10/16/2017

## 2017-10-16 NOTE — PLAN OF CARE
Problem: Patient Care Overview (Adult)  Goal: Plan of Care Review  Outcome: Ongoing (interventions implemented as appropriate)    10/16/17 0614   Coping/Psychosocial Response Interventions   Plan Of Care Reviewed With patient   Patient Care Overview   Progress improving   Outcome Evaluation   Outcome Summary/Follow up Plan Pt rested well, experiences SOA with activity, blood pressure controlled, takes scheduled pain med for chronic pain; rash improved; no reactions noted this PM/AM monitoring pt ,       Goal: Adult Individualization and Mutuality  Outcome: Ongoing (interventions implemented as appropriate)  Goal: Discharge Needs Assessment  Outcome: Ongoing (interventions implemented as appropriate)    Problem: Acute Coronary Syndrome (ACS) (Adult)  Goal: Signs and Symptoms of Listed Potential Problems Will be Absent or Manageable (Acute Coronary Syndrome)  Outcome: Ongoing (interventions implemented as appropriate)    Problem: Diabetes, Type 2 (Adult)  Goal: Signs and Symptoms of Listed Potential Problems Will be Absent or Manageable (Diabetes, Type 2)  Outcome: Ongoing (interventions implemented as appropriate)    Problem: Pain, Chronic (Adult)  Goal: Acceptable Pain Control/Comfort Level  Outcome: Ongoing (interventions implemented as appropriate)    Problem: COPD, Chronic Bronchitis/Emphysema (Adult)  Goal: Signs and Symptoms of Listed Potential Problems Will be Absent or Manageable (COPD, Chronic Bronchitis/Emphysema)  Outcome: Ongoing (interventions implemented as appropriate)    Problem: Skin Integrity Impairment, Risk/Actual (Adult)  Goal: Skin Integrity/Wound Healing  Outcome: Ongoing (interventions implemented as appropriate)    Problem: Hypertensive Disease/Crisis (Arterial) (Adult)  Goal: Signs and Symptoms of Listed Potential Problems Will be Absent or Manageable (Hypertensive Disease/Crisis)  Outcome: Ongoing (interventions implemented as appropriate)

## 2017-10-16 NOTE — PROGRESS NOTES
[facility] Medicine Services  INPATIENT PROGRESS NOTE    Length of Stay: 3  Date of Admission: 10/9/2017  Primary Care Physician: Paolo Rey MD    Subjective   Chief Complaint: No complaints     HPI:      10/16/2017:  The patient continues to improve.  Areas are still present where the rash appeared yesterday.  If he continues to improve, we will most likely discharge him tomorrow.     10/15/2017:    Patient states his breathing is better today.  He has been working well with PT OT.  During the afternoon the patient developed a rash on his back and legs.  The rash was red but not raised.  The patient was given Levaquin the night before but did not have Levaquin that day, as it is a nighttime medication.  We will order a food allergy test and a tick titer to rule out other causes of rash.  The patient had recently taken a Lortab within the hour, but takes it regularly at home.  The patient had no stridor, respiratory distress, or feeling of narrowing of airway.    This is an 84-year-old man presented to the emergency room 10/9/2017 with complaints of cough, dyspnea, chills and rash on his chest and abdomen.  He has a history of COPD and was found to have left lower lobe pneumonia.  The patient states his breathing is improved today and reports no chest pain.    Review of Systems   Constitutional: Positive for fatigue.   Respiratory: Positive for shortness of breath.    All other systems reviewed and are negative.     All pertinent negatives and positives are as above. All other systems have been reviewed and are negative unless otherwise stated.     Objective    Temp:  [97.2 °F (36.2 °C)-98 °F (36.7 °C)] 98 °F (36.7 °C)  Heart Rate:  [] 85  Resp:  [18-22] 18  BP: (137-169)/(70-87) 154/80    Physical Exam   Constitutional: He is oriented to person, place, and time. He appears well-developed and well-nourished.   HENT:   Head: Normocephalic and atraumatic.   Eyes:  EOM are normal. Pupils are equal, round, and reactive to light.   Neck: Normal range of motion. Neck supple.   Cardiovascular: Normal rate and regular rhythm.    Pulmonary/Chest: Effort normal and breath sounds normal.   Abdominal: Soft. Bowel sounds are normal.   Musculoskeletal: Normal range of motion.   Neurological: He is alert and oriented to person, place, and time.   Skin: Skin is warm.   Psychiatric: He has a normal mood and affect.     Results Review:  I have reviewed the labs, radiology results, and diagnostic studies.    Laboratory Data:     Results from last 7 days  Lab Units 10/16/17  0229 10/15/17  0526 10/14/17  0853   SODIUM mmol/L 136* 135* 139   POTASSIUM mmol/L 4.9 4.3 4.4   CHLORIDE mmol/L 97 99 101   CO2 mmol/L 29.0 27.0 24.0   BUN mg/dL 42* 38* 45*   CREATININE mg/dL 1.03 0.95 1.09   GLUCOSE mg/dL 151* 150* 168*   CALCIUM mg/dL 8.8 8.5 8.1*   ANION GAP mmol/L 10.0 9.0 14.0     Estimated Creatinine Clearance: 54.2 mL/min (by C-G formula based on Cr of 1.03).    Results from last 7 days  Lab Units 10/10/17  0600   MAGNESIUM mg/dL 1.9           Results from last 7 days  Lab Units 10/16/17  0229 10/15/17  0526 10/14/17  0853 10/13/17  0416 10/12/17  0522   WBC 10*3/mm3 15.43* 15.92* 16.12* 15.00* 14.02*   HEMOGLOBIN g/dL 12.1* 12.2* 12.3* 10.4* 10.3*   HEMATOCRIT % 37.5* 36.0* 36.2* 32.6* 31.2*   PLATELETS 10*3/mm3 260 293 319 284 270           Culture Data:   No results found for: BLOODCX  No results found for: URINECX  No results found for: RESPCX  No results found for: WOUNDCX  No results found for: STOOLCX  No components found for: BODYFLD    Radiology Data:   Imaging Results (last 24 hours)     ** No results found for the last 24 hours. **          I have reviewed the patient current medications.     Assessment/Plan     Plan:    1.  Pneumonia, left lung/ COPD:  Day #6 Levaquin. Blood cultures negative. Methylprednisolone decreased to 40 mg q daily.  Continue nebulizers. PT/OT and incentive  spirometer ordered.   2. Atrial fibrillation/ rate controlled:  Continue Metoprolol.  Hold anticoagulation due to history of GI bleed.   3.  Anemia, improved:  Will continue to monitor.     4.  Rash, unknown cause:  The patient has had several episodes of rash, which prompted the discontinuation of Penicillins, Losartan and Cardizem.  Food allergy panel and Rickettsial serology ordered. Benadryl ordered prn.         Discharge Planning: I expect patient to be discharged to home in 1-2 days.      This document has been electronically signed by EVE Gomez on October 16, 2017 3:45 PM

## 2017-10-16 NOTE — PLAN OF CARE
Problem: Patient Care Overview (Adult)  Goal: Plan of Care Review  Outcome: Ongoing (interventions implemented as appropriate)    10/16/17 1156   Coping/Psychosocial Response Interventions   Plan Of Care Reviewed With patient   Outcome Evaluation   Outcome Summary/Follow up Plan No new goals met this tx. Pt ambulated 120ft X2 trips CGA X1 with VSS and no LOB. Pt will continue to benefit from skilled therapy services upon D/C       Goal: Discharge Needs Assessment  Outcome: Ongoing (interventions implemented as appropriate)    10/11/17 1101 10/12/17 0404 10/14/17 0443   Self-Care   Equipment Currently Used at Home --  --  --    Living Environment   Transportation Available --  --  --    Discharge Needs Assessment   Concerns To Be Addressed --  --  denies needs/concerns at this time   Readmission Within The Last 30 Days other (see comments)  (Patient admitted on 9- dx: jo-ann lynch RVR. Presents with SOB and rash. ) --  --    Community Agency Name(S) Patient uses DME provided by [facility] and [facility] services for therapy and CHF telehealth. --  --    Equipment Needed After Discharge none --  --    Discharge Facility/Level Of Care Needs home with home health --  --    Current Discharge Risk --  chronically ill --    Discharge Disposition --  still a patient --    Current Health   Outpatient/Agency/Support Group Needs other (see comments)  (Patient has HF clinic appointment scheduled on 10-) --  --    Anticipated Changes Related to Illness none --  --      10/15/17 1013   Self-Care   Equipment Currently Used at Home oxygen  (has RW, [place])   Living Environment   Transportation Available car;family or friend will provide   Discharge Needs Assessment   Concerns To Be Addressed --    Readmission Within The Last 30 Days --    Community Agency Name(S) --    Equipment Needed After Discharge --    Discharge Facility/Level Of Care Needs --    Current Discharge Risk --    Discharge Disposition --     Current Health   Outpatient/Agency/Support Group Needs --    Anticipated Changes Related to Illness --          Problem: Inpatient Physical Therapy  Goal: Transfer Training Goal 1 LTG- PT  Outcome: Ongoing (interventions implemented as appropriate)    10/15/17 1336 10/16/17 1156   Transfer Training PT LTG   Transfer Training PT LTG, Date Established 10/15/17 --    Transfer Training PT LTG, Time to Achieve by discharge --    Transfer Training PT LTG, Activity Type bed to chair /chair to bed;sit to stand/stand to sit --    Transfer Training PT LTG, [place] Level independent --    Transfer Training PT LTG, Date Goal Reviewed --  10/16/17   Transfer Training PT LTG, Outcome --  goal ongoing       Goal: Gait Training Goal LTG- PT  Outcome: Ongoing (interventions implemented as appropriate)    10/15/17 1336 10/16/17 1156   Gait Training PT LTG   Gait Training Goal PT LTG, Date Established 10/15/17 --    Gait Training Goal PT LTG, Time to Achieve by discharge --    Gait Training Goal PT LTG, [place] Level independent --    Gait Training Goal PT LTG, Distance to Achieve 150ft;O2 sats will not drop below 92% --    Gait Training Goal PT LTG, Date Goal Reviewed --  10/16/17   Gait Training Goal PT LTG, Outcome --  goal ongoing       Goal: Stair Training Goal LTG- PT  Outcome: Ongoing (interventions implemented as appropriate)    10/15/17 1336 10/16/17 1156   Stair Training PT LTG   Stair Training Goal PT LTG, Date Established 10/15/17 --    Stair Training Goal PT LTG, Number of Steps 5 --    Stair Training Goal PT LTG, [place] Level conditional independence --    Stair Training Goal PT LTG, Assist Device 2 handrails --    Stair Training Goal PT LTG, Date Goal Reviewed --  10/16/17   Stair Training Goal PT LTG, Outcome --  goal ongoing       Goal: Patient Education Goal LTG- PT  Outcome: Ongoing (interventions implemented as appropriate)    10/15/17 1336 10/16/17 1156   Patient Education PT LTG   Patient  Education PT LTG, Date Established 10/15/17 --    Patient Education PT LTG, Time to Achieve by discharge --    Patient Education PT LTG, Education Type gait;transfers;home safety --    Patient Education PT LTG, Date Goal Reviewed --  10/16/17   Patient Education PT LTG Outcome --  goal ongoing

## 2017-10-17 VITALS
TEMPERATURE: 97.4 F | HEART RATE: 87 BPM | DIASTOLIC BLOOD PRESSURE: 95 MMHG | HEIGHT: 67 IN | SYSTOLIC BLOOD PRESSURE: 153 MMHG | RESPIRATION RATE: 20 BRPM | BODY MASS INDEX: 27.44 KG/M2 | WEIGHT: 174.8 LBS | OXYGEN SATURATION: 98 %

## 2017-10-17 LAB
ANION GAP SERPL CALCULATED.3IONS-SCNC: 9 MMOL/L (ref 5–15)
ANISOCYTOSIS BLD QL: ABNORMAL
B MICROTI IGG TITR SER: NORMAL {TITER}
B MICROTI IGM TITR SER: NORMAL {TITER}
BUN BLD-MCNC: 53 MG/DL (ref 7–21)
BUN/CREAT SERPL: 46.5 (ref 7–25)
CALCIUM SPEC-SCNC: 9 MG/DL (ref 8.4–10.2)
CHLORIDE SERPL-SCNC: 99 MMOL/L (ref 95–110)
CO2 SERPL-SCNC: 27 MMOL/L (ref 22–31)
CREAT BLD-MCNC: 1.14 MG/DL (ref 0.7–1.3)
D-LACTATE SERPL-SCNC: 3.6 MMOL/L (ref 0.5–2)
DEPRECATED RDW RBC AUTO: 54 FL (ref 35.1–43.9)
ERYTHROCYTE [DISTWIDTH] IN BLOOD BY AUTOMATED COUNT: 16.2 % (ref 11.5–14.5)
GFR SERPL CREATININE-BSD FRML MDRD: 61 ML/MIN/1.73 (ref 42–98)
GLUCOSE BLD-MCNC: 100 MG/DL (ref 60–100)
GLUCOSE BLDC GLUCOMTR-MCNC: 108 MG/DL (ref 70–130)
GLUCOSE BLDC GLUCOMTR-MCNC: 136 MG/DL (ref 70–130)
GLUCOSE BLDC GLUCOMTR-MCNC: 92 MG/DL (ref 70–130)
HCT VFR BLD AUTO: 37.9 % (ref 39–49)
HGB BLD-MCNC: 12.4 G/DL (ref 13.7–17.3)
LYMPHOCYTES # BLD MANUAL: 0.91 10*3/MM3 (ref 0.6–4.2)
LYMPHOCYTES NFR BLD MANUAL: 6 % (ref 0–12)
LYMPHOCYTES NFR BLD MANUAL: 6 % (ref 10–50)
MCH RBC QN AUTO: 30.5 PG (ref 26.5–34)
MCHC RBC AUTO-ENTMCNC: 32.7 G/DL (ref 31.5–36.3)
MCV RBC AUTO: 93.3 FL (ref 80–98)
METAMYELOCYTES NFR BLD MANUAL: 4 % (ref 0–0)
MONOCYTES # BLD AUTO: 0.91 10*3/MM3 (ref 0–0.9)
NEUTROPHILS # BLD AUTO: 12.57 10*3/MM3 (ref 2–8.6)
NEUTROPHILS NFR BLD MANUAL: 82 % (ref 37–80)
NEUTS BAND NFR BLD MANUAL: 1 % (ref 0–5)
NRBC SPEC MANUAL: 2 /100 WBC (ref 0–0)
PLATELET # BLD AUTO: 243 10*3/MM3 (ref 150–450)
PMV BLD AUTO: 10.9 FL (ref 8–12)
POLYCHROMASIA BLD QL SMEAR: ABNORMAL
POTASSIUM BLD-SCNC: 4.6 MMOL/L (ref 3.5–5.1)
RBC # BLD AUTO: 4.06 10*6/MM3 (ref 4.37–5.74)
SMALL PLATELETS BLD QL SMEAR: ADEQUATE
SODIUM BLD-SCNC: 135 MMOL/L (ref 137–145)
VARIANT LYMPHS NFR BLD MANUAL: 1 % (ref 0–5)
WBC MORPH BLD: NORMAL
WBC NRBC COR # BLD: 15.14 10*3/MM3 (ref 3.2–9.8)

## 2017-10-17 PROCEDURE — 85007 BL SMEAR W/DIFF WBC COUNT: CPT | Performed by: FAMILY MEDICINE

## 2017-10-17 PROCEDURE — 97535 SELF CARE MNGMENT TRAINING: CPT

## 2017-10-17 PROCEDURE — 97530 THERAPEUTIC ACTIVITIES: CPT

## 2017-10-17 PROCEDURE — 94799 UNLISTED PULMONARY SVC/PX: CPT

## 2017-10-17 PROCEDURE — 25010000002 METHYLPREDNISOLONE PER 40 MG: Performed by: NURSE PRACTITIONER

## 2017-10-17 PROCEDURE — G0378 HOSPITAL OBSERVATION PER HR: HCPCS

## 2017-10-17 PROCEDURE — 80048 BASIC METABOLIC PNL TOTAL CA: CPT | Performed by: FAMILY MEDICINE

## 2017-10-17 PROCEDURE — 85025 COMPLETE CBC W/AUTO DIFF WBC: CPT | Performed by: FAMILY MEDICINE

## 2017-10-17 PROCEDURE — 96376 TX/PRO/DX INJ SAME DRUG ADON: CPT

## 2017-10-17 PROCEDURE — G8987 SELF CARE CURRENT STATUS: HCPCS

## 2017-10-17 PROCEDURE — 83605 ASSAY OF LACTIC ACID: CPT | Performed by: FAMILY MEDICINE

## 2017-10-17 PROCEDURE — G8988 SELF CARE GOAL STATUS: HCPCS

## 2017-10-17 PROCEDURE — 82962 GLUCOSE BLOOD TEST: CPT

## 2017-10-17 PROCEDURE — 97110 THERAPEUTIC EXERCISES: CPT

## 2017-10-17 PROCEDURE — 97166 OT EVAL MOD COMPLEX 45 MIN: CPT

## 2017-10-17 PROCEDURE — 94760 N-INVAS EAR/PLS OXIMETRY 1: CPT

## 2017-10-17 PROCEDURE — 97116 GAIT TRAINING THERAPY: CPT

## 2017-10-17 RX ORDER — LISINOPRIL 10 MG/1
10 TABLET ORAL
Qty: 30 TABLET | Refills: 0 | Status: SHIPPED | OUTPATIENT
Start: 2017-10-18 | End: 2017-11-10 | Stop reason: SDUPTHER

## 2017-10-17 RX ORDER — LEVOFLOXACIN 750 MG/1
750 TABLET ORAL EVERY 24 HOURS
Qty: 5 TABLET | Refills: 0 | Status: SHIPPED | OUTPATIENT
Start: 2017-10-17 | End: 2017-11-10

## 2017-10-17 RX ADMIN — PANTOPRAZOLE SODIUM 40 MG: 40 TABLET, DELAYED RELEASE ORAL at 05:48

## 2017-10-17 RX ADMIN — DONEPEZIL HYDROCHLORIDE 10 MG: 10 TABLET, FILM COATED ORAL at 08:58

## 2017-10-17 RX ADMIN — HYDRALAZINE HYDROCHLORIDE 10 MG: 10 TABLET, FILM COATED ORAL at 08:58

## 2017-10-17 RX ADMIN — METOPROLOL TARTRATE 12.5 MG: 25 TABLET, FILM COATED ORAL at 08:59

## 2017-10-17 RX ADMIN — FAMOTIDINE 20 MG: 20 TABLET ORAL at 08:58

## 2017-10-17 RX ADMIN — FUROSEMIDE 20 MG: 20 TABLET ORAL at 08:58

## 2017-10-17 RX ADMIN — IPRATROPIUM BROMIDE AND ALBUTEROL SULFATE 3 ML: 2.5; .5 SOLUTION RESPIRATORY (INHALATION) at 10:50

## 2017-10-17 RX ADMIN — LISINOPRIL 10 MG: 10 TABLET ORAL at 08:58

## 2017-10-17 RX ADMIN — HYDROCODONE BITARTRATE AND ACETAMINOPHEN 1 TABLET: 7.5; 325 TABLET ORAL at 05:48

## 2017-10-17 RX ADMIN — ISOSORBIDE DINITRATE 10 MG: 5 TABLET ORAL at 08:58

## 2017-10-17 RX ADMIN — ASPIRIN 81 MG 81 MG: 81 TABLET ORAL at 08:58

## 2017-10-17 RX ADMIN — HYDROCODONE BITARTRATE AND ACETAMINOPHEN 1 TABLET: 7.5; 325 TABLET ORAL at 12:31

## 2017-10-17 RX ADMIN — IPRATROPIUM BROMIDE AND ALBUTEROL SULFATE 3 ML: 2.5; .5 SOLUTION RESPIRATORY (INHALATION) at 07:23

## 2017-10-17 RX ADMIN — METHYLPREDNISOLONE SODIUM SUCCINATE 40 MG: 40 INJECTION, POWDER, FOR SOLUTION INTRAMUSCULAR; INTRAVENOUS at 08:59

## 2017-10-17 RX ADMIN — MAGNESIUM OXIDE TAB 400 MG (241.3 MG ELEMENTAL MG) 400 MG: 400 (241.3 MG) TAB at 08:58

## 2017-10-17 RX ADMIN — FLUTICASONE PROPIONATE 2 SPRAY: 50 SPRAY, METERED NASAL at 08:59

## 2017-10-17 NOTE — DISCHARGE INSTR - OTHER ORDERS
[facility] will contact you with date and time of their visit  [phone]

## 2017-10-17 NOTE — PLAN OF CARE
Problem: Patient Care Overview (Adult)  Goal: Plan of Care Review  Outcome: Ongoing (interventions implemented as appropriate)    10/17/17 1022   Coping/Psychosocial Response Interventions   Plan Of Care Reviewed With patient   Patient Care Overview   Progress no change   Outcome Evaluation   Outcome Summary/Follow up Plan Patient was able to work with therapy today. No complaints of pain. Still some shortness of air and wheezing at time. Will continue to monitor.         Problem: Diabetes, Type 2 (Adult)  Goal: Signs and Symptoms of Listed Potential Problems Will be Absent or Manageable (Diabetes, Type 2)  Outcome: Ongoing (interventions implemented as appropriate)    Problem: Pain, Chronic (Adult)  Goal: Acceptable Pain Control/Comfort Level  Outcome: Ongoing (interventions implemented as appropriate)    Problem: COPD, Chronic Bronchitis/Emphysema (Adult)  Goal: Signs and Symptoms of Listed Potential Problems Will be Absent or Manageable (COPD, Chronic Bronchitis/Emphysema)  Outcome: Ongoing (interventions implemented as appropriate)

## 2017-10-17 NOTE — PLAN OF CARE
Problem: Inpatient Occupational Therapy  Goal: Dynamic Sitting Balance Goal LTG- OT  Outcome: Ongoing (interventions implemented as appropriate)    10/17/17 1159   Dynamic Sitting Balance OT LTG   Dynamic Sitting Balance OT LTG, Date Established 10/17/17   Dynamic Sitting Balance OT LTG, Time to Achieve 5 days   Dynamic Sitting Balance OT LTG, [place] conditional independence   Dynamic Sitting Balance OT LTG, Additional Goal To complete bathing at EOB with Heart Rate.          10/17/17 1159   Dynamic Sitting Balance OT LTG   Dynamic Sitting Balance OT LTG, Date Established 10/17/17   Dynamic Sitting Balance OT LTG, Time to Achieve 5 days   Dynamic Sitting Balance OT LTG, [place] conditional independence   Dynamic Sitting Balance OT LTG, Additional Goal To complete bathing at EOB with Heart Rate.        Goal: Patient Education Goal STG- OT  Outcome: Ongoing (interventions implemented as appropriate)    10/17/17 1159   Patient Education OT STG   Patient Education OT STG, Date Established 10/17/17   Patient Education OT STG, Time to Achieve 5 days   Patient Education OT STG, Education Type HEP   Patient Education OT STG, Additional Goal BUE pulley exercises. RUE deficits with pain, no weights       Goal: ADL Goal LTG- OT  Outcome: Ongoing (interventions implemented as appropriate)    10/17/17 1159   ADL OT LTG   ADL OT LTG, Date Established 10/17/17   ADL OT LTG, Time to Achieve 5 days   ADL OT LTG, [place] modified independent   ADL OT LTG, Additional Goal dressing and bathing.

## 2017-10-17 NOTE — THERAPY TREATMENT NOTE
Acute Care - Physical Therapy Treatment Note  [facility]     Patient Name: Hasmukh Ham  : 1933  MRN: [id number]  Today's Date: 10/17/2017  Onset of Illness/Injury or Date of Surgery Date: 10/09/17  Date of Referral to PT: 10/14/17  Referring Physician: EVE Birch    Admit Date: 10/9/2017    Visit Dx:    ICD-10-CM ICD-9-CM   1. Shortness of breath R06.02 786.05   2. Pneumonia of both lower lobes due to infectious organism J18.9 483.8   3. Troponin I above reference range R74.8 790.6   4. Drug rash L27.0 693.0   5. 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   • Nocturia   • Osteoarthritis of multiple joints   • Hyperlipidemia   • Pain in joint involving ankle and foot   • Arthropathy of hand   • Paroxysmal tachycardia   • Osteoarthrosis involving more than one site but not generalized   • Right shoulder pain   • Rotator cuff syndrome   • Partial tear of subscapularis tendon   • Infraspinatus tendon tear   • Supraspinatus tendon tear   • Bilateral carotid artery stenosis   • (HFpEF) heart failure with preserved ejection fraction   • Pulmonary HTN   • Acute interstitial pneumonia   • Chronic obstructive lung disease   • Coronary arteriosclerosis   • Diabetes mellitus   • Hypertension   • Chest pain   • Shortness of breath               Adult Rehabilitation Note       10/17/17 0815 10/16/17 1106       Rehab Assessment/Intervention    Discipline physical therapy assistant  -JW physical therapy assistant  -CH      Document Type therapy note (daily note)  - therapy note (daily note)  -     Subjective Information agree to therapy  - agree to therapy;no complaints  -     Patient Effort, Rehab Treatment good  - good  -     Symptoms Noted During/After Treatment shortness of breath  -      Precautions/Limitations oxygen therapy device and L/min;other (see comments)   vital signs  - oxygen therapy device and L/min   vital signs  -     Recorded by [JW] Kayli Albarran PTA [] Aviva Lundberg PTA     Vital Signs    Pre Systolic BP Rehab  155  -CH     Pre Treatment Diastolic BP  83  -CH     Post Systolic BP Rehab  162  -CH     Post Treatment Diastolic BP  93  -CH     Pretreatment Heart Rate (beats/min) 99  -JW 96  -CH     Intratreatment Heart Rate (beats/min) 121  -      Posttreatment Heart Rate (beats/min) 112  -JW 91  -CH     Pre SpO2 (%) 98  -JW 98  -CH     O2 Delivery Pre Treatment supplemental O2  - supplemental O2  -     Intra SpO2 (%) 98  -      O2 Delivery Intra Treatment supplemental O2  -      Post SpO2 (%) 98  - 98  -CH     O2 Delivery Post Treatment supplemental O2  - supplemental O2  -CH     Pre Patient Position Supine  -JW Supine  -CH     Intra Patient Position  Standing  -CH     Post Patient Position Supine  - Supine  -CH     Recorded by [JW] Kayli Albarran PTA [] Aviva Lundberg PTA     Pain Assessment    Pain Assessment  No/denies pain  -     Recorded by  [] Aviva Lundberg PTA     Vision Assessment/Intervention    Visual Impairment  WFL with corrective lenses  -     Recorded by  [] Aviva Lundberg PTA     Cognitive Assessment/Intervention    Current Cognitive/Communication Assessment functional  - functional  -     Orientation Status oriented x 4  -JW oriented x 4  -     Follows Commands/Answers Questions 100% of the time  - 100% of the time  -     Personal Safety  decreased awareness, need for assist;decreased awareness, need for safety  -      Personal Safety Interventions fall prevention program maintained;gait belt;nonskid shoes/slippers when out of bed  - fall prevention program maintained;gait belt;muscle strengthening facilitated;nonskid shoes/slippers when out of bed;supervised activity  -     Recorded by [JW] Kayli Albarran, NEFTALI [CH] Aviva Lundberg PTA     Bed Mobility, Assessment/Treatment    Bed Mobility, Assistive Device bed rails;head of bed elevated  -      Bed Mobility, Roll Left, Upper Tract  not tested  -CH     Bed Mobility, Roll Right, Upper Tract  not tested  -     Bed Mobility, Scoot/Bridge, Upper Tract  supervision required  -     Bed Mob, Supine to Sit, Upper Tract conditional independence  - supervision required  -     Bed Mob, Sit to Supine, Upper Tract conditional independence  - supervision required  -     Recorded by [JW] Kayli Albarran, PTA [CH] Aviva Lundberg PTA     Transfer Assessment/Treatment    Transfers, Sit-Stand Upper Tract independent  - stand by assist  -     Transfers, Stand-Sit Upper Tract independent  - stand by assist  -     Transfers, Sit-Stand-Sit, Assist Device other (see comments)   none  - other (see comments)   none  -     Toilet Transfer, Upper Tract  contact guard assist  -     Toilet Transfer, Assistive Device  other (see comments)   none  -     Transfer, Comment  pt defers use of RW  -     Recorded by [JW] Kayli Albarran, PTA [CH] Aviva Lundberg PTA     Gait Assessment/Treatment    Gait, Upper Tract Level stand by assist  - stand by assist  -     Gait, Assistive Device other (see comments)   none  - other (see comments)   none  -     Gait, Distance (Feet) 186   236  -   X2  -     Gait, Safety Issues supplemental O2  -      Gait, Comment Pt SOA w/ gait, but good O2 sats   - Pt SOA with ambulation, pt's O2 98% throughout gait. Pt's HR 91-98 throughout gait  -     Recorded by [JW] Kayli Albarran, PTA [CH]  Aviva Lundberg PTA     Therapy Exercises    Bilateral Lower Extremities AROM:;20 reps;sitting;ankle pumps/circles;glut sets;hip flexion   LAQ x 10 reps secondary to knee pain  - AROM:;20 reps;sitting;ankle pumps/circles;glut sets;hip abduction/adduction;hip flexion;LAQ  -CH     Recorded by [JW] Kayli Albarran, NEFTALI [CH] Aviva Lundberg PTA     Positioning and Restraints    Pre-Treatment Position in bed  -JW in bed  -CH     Post Treatment Position bed  -JW bed  -CH     In Bed supine;call light within reach;encouraged to call for assist;with family/caregiver  -JW supine;call light within reach;encouraged to call for assist;with family/caregiver;side rails up x2  -CH     Recorded by [JW] Kayli Albarran, NEFTALI [CH] Aviva Lundberg PTA       User Key  (r) = Recorded By, (t) = Taken By, (c) = Cosigned By    Initials Name Effective Dates     Kayli Albarran, PTA 08/11/15 -      Aviva Lundberg, \[facility] [place] [facility]\"" 10/17/16 -                 IP PT Goals       10/17/17 0815 10/16/17 1156 10/15/17 1336    Transfer Training PT LTG    Transfer Training PT LTG, Date Established   10/15/17  -    Transfer Training PT LTG, Time to Achieve   by discharge  -    Transfer Training PT LTG, Activity Type   bed to chair /chair to bed;sit to stand/stand to sit  -    Transfer Training PT LTG, [place] Level   independent  -    Transfer Training PT  LTG, Date Goal Reviewed 10/17/17  - 10/16/17  -     Transfer Training PT LTG, Outcome goal partially met  - goal ongoing  - goal ongoing  -    Gait Training PT LTG    Gait Training Goal PT LTG, Date Established   10/15/17  -    Gait Training Goal PT LTG, Time to Achieve   by discharge  -    Gait Training Goal PT LTG, [place] Level   independent  -    Gait Training Goal PT LTG, Distance to Achieve   150ft;O2 sats will not drop below 92%  -    Gait Training Goal PT LTG, Date Goal Reviewed 10/17/17  -JW 10/16/17  -CH     Gait Training Goal PT LTG,  Outcome goal partially met  - goal ongoing  -     Stair Training PT LTG    Stair Training Goal PT LTG, Date Established   10/15/17  -    Stair Training Goal PT LTG, Number of Steps   5  -    Stair Training Goal PT LTG, [place] Level   conditional independence  -    Stair Training Goal PT LTG, Assist Device   2 handrails  -    Stair Training Goal PT LTG, Date Goal Reviewed 10/17/17  - 10/16/17  -     Stair Training Goal PT LTG, Outcome  goal ongoing  -     Patient Education PT LTG    Patient Education PT LTG, Date Established   10/15/17  -    Patient Education PT LTG, Time to Achieve   by discharge  -    Patient Education PT LTG, Education Type   gait;transfers;home safety  -    Patient Education PT LTG, Date Goal Reviewed 10/17/17  - 10/16/17  -     Patient Education PT LTG Outcome goal ongoing  - goal ongoing  - goal ongoing  -      User Key  (r) = Recorded By, (t) = Taken By, (c) = Cosigned By    Initials Name Provider Type     Kayli Albarran, PTA Physical Therapy Assistant    TESS Joshua, PT Physical Therapist     Aviva Lundberg, PTA Physical Therapy Assistant          Physical Therapy Education     Title: PT OT SLP Therapies (Active)     Topic: Physical Therapy (Active)     Point: Mobility training (Active)    Learning Progress Summary    Learner Readiness Method Response Comment Documented by Status   Patient Acceptance E NR   10/15/17 1336 Active   Significant Other Acceptance E NR   10/15/17 1336 Active               Point: Precautions (Active)    Learning Progress Summary    Learner Readiness Method Response Comment Documented by Status   Patient Acceptance E NR   10/15/17 1336 Active   Significant Other Acceptance E NR   10/15/17 1336 Active                      User Key     Initials Effective Dates Name Provider Type [facility] 10/17/16 -  Gilda Joshua, PT Physical Therapist PT                    PT Recommendation and Plan  Anticipated  Discharge Disposition: home with assist, home with home health  Planned Therapy Interventions: balance training, gait training, patient/family education, stair training, transfer training  PT Frequency: other (see comments) (5-14 times per week)  Plan of Care Review  Plan Of Care Reviewed With: patient  Progress: improving  Outcome Summary/Follow up Plan: pt w/ Mod Ind w/ bed mobility, Ind sit<->stands, ambulates w/ no AD ~186, 236 w/ SBA, pt becomes SOA w/ gt but his sats didn't drop below 98 throughout tx, plan steps next tx, if d/c home today pt could benefit from  PT          Outcome Measures       10/17/17 0815 10/16/17 1106 10/15/17 1013    How much help from another person do you currently need...    Turning from your back to your side while in flat bed without using bedrails? 4  - 4  - 4  -TESS    Moving from lying on back to sitting on the side of a flat bed without bedrails? 4  - 4  - 3  -TESS    Moving to and from a bed to a chair (including a wheelchair)? 3  - 3  - 3  -TESS    Standing up from a chair using your arms (e.g., wheelchair, bedside chair)? 3  - 3  - 3  -TESS    Climbing 3-5 steps with a railing? 3  - 3  -CH 3  -TESS    To walk in hospital room? 3  - 3  -CH 3  -TESS    AM-PAC 6 Clicks Score 20  - 20  - 19  -    Functional Assessment    Outcome Measure Options AM-PAC 6 Clicks Basic Mobility (PT)  - AM-PAC 6 Clicks Basic Mobility (PT)  - AM-PAC 6 Clicks Basic Mobility (PT)  -      User Key  (r) = Recorded By, (t) = Taken By, (c) = Cosigned By    Initials Name Provider Type    SAGE Albarran, PTA Physical Therapy Assistant    TESS Joshua, PT Physical Therapist    SABINA Lundberg, PTA Physical Therapy Assistant           Time Calculation:         PT Charges       10/17/17 0815          Time Calculation    Start Time 0815  -      Stop Time 0845  -      Time Calculation (min) 30 min  -      PT Received On 10/17/17  -      Time Calculation- PT    Total  Timed Code Minutes- PT 30 minute(s)  -SAGE        User Key  (r) = Recorded By, (t) = Taken By, (c) = Cosigned By    Initials Name Provider Type    SAGE Albarran PTA Physical Therapy Assistant          Therapy Charges for Today     Code Description Service Date Service Provider Modifiers Qty    25550793572 HC GAIT TRAINING EA 15 MIN 10/17/2017 Kayli Albarran PTA GP 1    91715809042 HC PT THER PROC EA 15 MIN 10/17/2017 Kayli Albarran PTA GP 1          PT G-Codes  PT Professional Judgement Used?: Yes  Outcome Measure Options: AM-PAC 6 Clicks Basic Mobility (PT)  Score: 19  Functional Limitation: Mobility: Walking and moving around  Mobility: Walking and Moving Around Current Status (): At least 20 percent but less than 40 percent impaired, limited or restricted  Mobility: Walking and Moving Around Goal Status (): 0 percent impaired, limited or restricted    Kayli Albarran PTA  10/17/2017

## 2017-10-17 NOTE — THERAPY EVALUATION
Acute Care - Occupational Therapy Initial Evaluation  [facility]     Patient Name: Hasmukh Ham  : 1933  MRN: [id number]  Today's Date: 10/17/2017  Onset of Illness/Injury or Date of Surgery Date: 10/09/17  Date of Referral to OT: 10/14/17  Referring Physician: EVE Birch    Admit Date: 10/9/2017       ICD-10-CM ICD-9-CM   1. Shortness of breath R06.02 786.05   2. Pneumonia of both lower lobes due to infectious organism J18.9 483.8   3. Troponin I above reference range R74.8 790.6   4. Drug rash L27.0 693.0   5. Impaired functional mobility, balance, gait, and endurance Z74.09 V49.89   6. Impaired mobility and ADLs Z74.09 799.89     Patient Active Problem List   Diagnosis   • Dilated aortic root   • Non-rheumatic tricuspid valve insufficiency   • Pulmonary emphysema   • Atrial fibrillation and flutter   • Bradycardia   • Chronic coronary artery disease   • Neuropathy   • Abdominal hernia   • Neck pain   • Dementia   • Diabetic neuropathy   • Gastroesophageal reflux disease without esophagitis   • Generalized osteoarthritis   • 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   • Nocturia   • Osteoarthritis of multiple joints   • Hyperlipidemia   • Pain in joint involving ankle and foot   • Arthropathy of hand   • Paroxysmal tachycardia   • Osteoarthrosis involving more than one site but not generalized   • Right shoulder pain   • Rotator cuff syndrome   • Partial tear of subscapularis tendon   • Infraspinatus tendon tear   • Supraspinatus tendon tear   • Bilateral carotid artery stenosis   • (HFpEF) heart failure with preserved ejection fraction   • Pulmonary HTN   • Acute interstitial pneumonia   • Chronic obstructive lung disease   • Coronary arteriosclerosis   • Diabetes mellitus   • Hypertension   • Chest pain   • Shortness of breath     Past Medical History:   Diagnosis Date   • Bilateral carotid artery stenosis    • Chronic obstructive pulmonary disease (COPD)    • Coronary  arteriosclerosis    • Degenerative joint disease involving multiple joints    • Dementia    • Diabetes mellitus    • Hyperlipidemia    • Hypertension      Past Surgical History:   Procedure Laterality Date   • CARDIAC CATHETERIZATION N/A 6/6/2017    Procedure: Right Heart Cath;  Surgeon: Jeff Maciel MD PhD;  Location: Health system CATH INVASIVE LOCATION;  Service:    • HERNIA REPAIR     • LUNG BIOPSY     • THORACOTOMY Left 1977   • VENTRAL HERNIA REPAIR            OT ASSESSMENT FLOWSHEET (last 72 hours)      OT Evaluation       10/17/17 1008 10/17/17 0815 10/16/17 1614 10/16/17 1106 10/15/17 1013    Rehab Evaluation    Document Type evaluation  -SG therapy note (daily note)  -JW  therapy note (daily note)  - evaluation  -TESS    Subjective Information agree to therapy;complains of;pain  -SG agree to therapy  -JW  agree to therapy;no complaints  - agree to therapy  -TESS    Evaluation Not Performed   unable to evaluate, medical status change   Per nursing Heart rate 130's-to 140's hold this date.  -SG      Patient Effort, Rehab Treatment good  -SG good  -JW  good  -CH good  -TESS    Symptoms Noted During/After Treatment shortness of breath  -SG shortness of breath  -JW   shortness of breath  -TESS    General Information    Patient Profile Review yes  -SG    yes  -TESS    Onset of Illness/Injury or Date of Surgery Date 10/09/17  -SG    10/09/17  -TESS    Referring Physician EVE Birch  -EVE To  -TESS    General Observations     pt sitting  in bedside chair;noted O2 per nasal cannula, IV, telemetry  -TESS    Pertinent History Of Current Problem Bilateral Lower Lung pneumonia.    -SG    Pt admitted to [facility] with pneumonia, COPD and atrial fib  -TESS    Precautions/Limitations fall precautions;oxygen therapy device and L/min   RUE shoulder joint is very arthritic  -SG oxygen therapy device and L/min;other (see comments)   vital signs  -JW  oxygen therapy device and L/min   vital signs  - oxygen therapy  device and L/min;other (see comments)   vital signs  -TESS    Prior Level of Function independent:;all household mobility;ADL's;gait;transfer;feeding;grooming;dressing;bathing;cooking;cleaning  -SG    independent:;all household mobility;ADL's;gait;transfer;using stairs  -TESS    Equipment Currently Used at Home none  -SG    oxygen   has RW, SC  -TESS    Plans/Goals Discussed With     patient;spouse/S.O.  -TESS    Risks Reviewed     patient:;spouse/S.O.:  -TESS    Benefits Reviewed     patient:;spouse/S.O.:  -TESS    Barriers to Rehab     none identified  -TESS    Living Environment    Lives With spouse  -SG    spouse  -TESS    Living Arrangements mobile home  -SG    house  -TESS    Home Accessibility stairs to enter home  -SG    stairs to enter home;stairs (2 railings present)  -TESS    Number of Stairs to Enter Home --   5  -SG    5  -TESS    Stair Railings at Home outside, present at both sides  -SG    outside, present at both sides  -TESS    Transportation Available family or friend will provide  -SG    car;family or friend will provide  -TESS    Clinical Impression    Date of Referral to OT 10/14/17  -SG        OT Diagnosis Impaired Mobility and ADL's  -SG        Functional Level At Time Of Evaluation Resident alert and cooperative.  To EOB, to bathroom.    -SG        Impairments Found (describe specific impairments) aerobic capacity/endurance;ergonomics and body mechanics;gait, locomotion, and balance;muscle performance;ROM  -SG        Criteria for Skilled Therapeutic Interventions Met yes  -SG        Rehab Potential good, to achieve stated therapy goals  -SG        Therapy Frequency --   3/14 Times/wk  -SG        Predicted Duration of Therapy Intervention (days/wks) --   2 weeks  -SG        Anticipated Discharge Disposition home with home health  -SG        Functional Level Prior    Ambulation 0-->independent  -SG        Transferring 0-->independent  -SG        Toileting 0-->independent  -SG        Bathing 0-->independent  -SG         Dressing 0-->independent  -SG        Eating 0-->independent  -SG        Communication 0-->understands/communicates without difficulty  -SG        Swallowing 0-->swallows foods/liquids without difficulty  -SG        Vital Signs    Pre Systolic BP Rehab 153  -SG   155  -  -TESS    Pre Treatment Diastolic BP 72  -SG   83  -CH 76  -TESS    Post Systolic BP Rehab 152  -SG   162  -  -TESS    Post Treatment Diastolic BP 74  -SG   93  -CH 80  -TESS    Pretreatment Heart Rate (beats/min) 85  -SG 99  -JW  96  -CH 85  -TESS    Intratreatment Heart Rate (beats/min)  121  -JW   124  -TESS    Posttreatment Heart Rate (beats/min) 85  -  -JW  91  -CH 98  -TESS    Pre SpO2 (%) 99  -SG 98  -JW  98  -CH 98  -TESS    O2 Delivery Pre Treatment supplemental O2  -SG supplemental O2  -JW  supplemental O2  - supplemental O2  -TESS    Intra SpO2 (%) 98  -SG 98  -JW   96  -TESS    O2 Delivery Intra Treatment supplemental O2  -SG supplemental O2  -JW   supplemental O2  -TESS    Post SpO2 (%) 98  -SG 98  -JW  98  - 96  -TESS    O2 Delivery Post Treatment supplemental O2  -SG supplemental O2  -JW  supplemental O2  - supplemental O2  -TESS    Pre Patient Position  Supine  -  Supine  - Sitting  -TESS    Intra Patient Position    Standing  - Standing  -TESS    Post Patient Position  Supine  -  Supine  - Supine  -TESS    Pain Assessment    Pain Assessment 0-10  -SG   No/denies pain  - No/denies pain  -    Pain Score 7  -SG        Post Pain Score 7  -SG        Pain Type Chronic pain  -        Pain Location Back  -SG        Pain Orientation Lower  -SG        Pain Intervention(s) Medication (See MAR)  -SG        Vision Assessment/Intervention    Visual Impairment WFL with corrective lenses  -   WFL with corrective lenses  - WFL with corrective lenses  -    Cognitive Assessment/Intervention    Current Cognitive/Communication Assessment functional  - functional  -  functional  - functional  -    Orientation Status oriented x 4  -SG  "oriented x 4  -JW  oriented x 4  -CH oriented x 4  -TESS    Follows Commands/Answers Questions 100% of the time  -% of the time  -JW  100% of the time  - 100% of the time  -TESS    Personal Safety    decreased awareness, need for assist;decreased awareness, need for safety  - decreased awareness, need for safety;decreased awareness, need for assist  -TESS    Personal Safety Interventions nonskid shoes/slippers when out of bed;supervised activity  -SG fall prevention program maintained;gait belt;nonskid shoes/slippers when out of bed  -  fall prevention program maintained;gait belt;muscle strengthening facilitated;nonskid shoes/slippers when out of bed;supervised activity  - gait belt;nonskid shoes/slippers when out of bed;supervised activity  -    ROM (Range of Motion)    General ROM no range of motion deficits identified;upper extremity range of motion deficits identified  -    upper extremity range of motion deficits identified  -    General ROM Detail     AROM WFL BLE's  -TESS    Additional Documentation --   RUE shoulder flexon, painful with joint stiffness  -        General UE Assessment    ROM Detail     AROM WFL BUE's except R shoulder limited to 100 degrees active-assistive flexion due to \"rotator cuff iinjury\"  -    MMT (Manual Muscle Testing)    General MMT Assessment upper extremity strength deficits identified  -    upper extremity strength deficits identified;lower extremity strength deficits identified  -    Additional Documentation --   Right UE  -        Upper Extremity    Upper Ext Manual Muscle Testing Detail     RUE: 5/5 , wrist, 4/5 elbow, shoulder not formally tested due to pain;LUE: 5/5 grossly  -TESS    Bed Mobility, Assessment/Treatment    Bed Mobility, Assistive Device bed rails;head of bed elevated  -SG bed rails;head of bed elevated  -JW       Bed Mobility, Roll Left, [place]    not tested  -     Bed Mobility, Roll Right, [place] conditional independence "  -SG   not tested  -CH     Bed Mobility, Scoot/Bridge, [place]    supervision required  -CH     Bed Mob, Supine to Sit, [place] conditional independence  -SG conditional independence  -JW  supervision required  -CH supervision required  -TESS    Bed Mob, Sit to Supine, [place] conditional independence  -SG conditional independence  -JW  supervision required  -CH supervision required  -TESS    Transfer Assessment/Treatment    Transfers, Chair-Bed [place]     supervision required  -TESS    Transfers, Sit-Stand [place] conditional independence  -SG independent  -JW  stand by assist  -CH supervision required  -TESS    Transfers, Stand-Sit [place] conditional independence  -SG independent  -JW  stand by assist  -CH supervision required  -TESS    Transfers, Sit-Stand-Sit, Assist Device  other (see comments)   none  -JW  other (see comments)   none  -CH     Toilet Transfer, [place] supervision required  -SG   contact guard assist  -CH     Toilet Transfer, Assistive Device    other (see comments)   none  -CH     Transfer, Comment    pt defers use of RW  -CH     Therapy Exercises    Bilateral Lower Extremities  AROM:;20 reps;sitting;ankle pumps/circles;glut sets;hip flexion   LAQ x 10 reps secondary to knee pain  -JW  AROM:;20 reps;sitting;ankle pumps/circles;glut sets;hip abduction/adduction;hip flexion;LAQ  -CH     Bilateral Upper Extremity AROM:;15 reps;elbow flexion/extension;pronation/supination;shoulder abduction/adduction;shoulder extension/flexion  -SG        Edema Management    Edema Amount     right:;left:;minimal  -TESS    General Therapy Interventions    Planned Therapy Interventions activity intolerance;ADL retraining;balance training;energy conservation;home exercise program;neuromuscular re-education;ROM (Range of Motion);strengthening;stretching  -SG        Positioning and Restraints    Pre-Treatment Position in bed  -SG in bed  -JW  in bed  -CH sitting in chair/recliner  -TESS    Post  Treatment Position bed  -SG bed  -JW  bed  -CH bed  -TESS    In Bed supine;call light within reach;encouraged to call for assist  -SG supine;call light within reach;encouraged to call for assist;with family/caregiver  -JW  supine;call light within reach;encouraged to call for assist;with family/caregiver;side rails up x2  -CH call light within reach;encouraged to call for assist;with family/caregiver  -TESS    Lower Extremity    Lower Ext Manual Muscle Testing Detail     BLE: 4/5 ankles/feet, knees hips  -TESS      User Key  (r) = Recorded By, (t) = Taken By, (c) = Cosigned By    Initials Name Effective Dates    JW Kayli JAMISON Albarran, PTA 08/11/15 -     TESS Gilda Joshua, PT 10/17/16 -     CH Aviva Lundberg, PTA 10/17/16 -     SG Kriss Marino, OT 08/03/17 -            Occupational Therapy Education     Title: PT OT SLP Therapies (Active)     Topic: Occupational Therapy (Done)     Point: ADL training (Done)    Description: Instruct learner(s) on proper safety adaptation and remediation techniques during self care or transfers.   Instruct in proper use of assistive devices.    Learning Progress Summary    Learner Readiness Method Response Comment Documented by Status   Patient Eager E VU Use call light for assistance.  10/17/17 1151 Done               Point: Home exercise program (Done)    Description: Instruct learner(s) on appropriate technique for monitoring, assisting and/or progressing therapeutic exercises/activities.    Learning Progress Summary    Learner Readiness Method Response Comment Documented by Status   Patient Eager E VU Use call light for assistance.  10/17/17 1151 Done               Point: Precautions (Done)    Description: Instruct learner(s) on prescribed precautions during self-care and functional transfers.    Learning Progress Summary    Learner Readiness Method Response Comment Documented by Status   Patient Eager E VU Use call light for assistance.  10/17/17 1151 Done               Point:  Body mechanics (Done)    Description: Instruct learner(s) on proper positioning and spine alignment during self-care, functional mobility activities and/or exercises.    Learning Progress Summary    Learner Readiness Method Response Comment Documented by Status   Patient Eager E VU Use call light for assistance.  10/17/17 1151 Done                      User Key     Initials Effective Dates Name Provider Type Discipline     08/03/17 -  Kriss Marino OT Occupational Therapist OT                  OT Recommendation and Plan  Anticipated Discharge Disposition: home with home health  Planned Therapy Interventions: activity intolerance, ADL retraining, balance training, energy conservation, home exercise program, neuromuscular re-education, ROM (Range of Motion), strengthening, stretching  Therapy Frequency:  (3/14 Times/wk)             OT Goals       10/17/17 1159          Dynamic Sitting Balance OT LTG    Dynamic Sitting Balance OT LTG, Date Established 10/17/17  -      Dynamic Sitting Balance OT LTG, Time to Achieve 5 days  -SG      Dynamic Sitting Balance OT LTG, [place] Level conditional independence  -SG      Dynamic Sitting Balance OT LTG, Additional Goal To complete bathing at EOB with Heart Rate.    -SG      Patient Education OT STG    Patient Education OT STG, Date Established 10/17/17  -      Patient Education OT STG, Time to Achieve 5 days  -SG      Patient Education OT STG, Education Type HEP  -SG      Patient Education OT STG, Additional Goal BUE pulley exercises.  RUE deficits with pain, no weights  -SG      ADL OT LTG    ADL OT LTG, Date Established 10/17/17  -SG      ADL OT LTG, Time to Achieve 5 days  -SG      ADL OT LTG, [place] Level modified independent  -SG      ADL OT LTG, Additional Goal dressing and bathing.  -        User Key  (r) = Recorded By, (t) = Taken By, (c) = Cosigned By    Initials Name Provider Type    SG Kriss Marino OT Occupational Therapist                Outcome  Measures       10/17/17 1008 10/17/17 0815 10/16/17 1106    How much help from another person do you currently need...    Turning from your back to your side while in flat bed without using bedrails?  4  -JW 4  -CH    Moving from lying on back to sitting on the side of a flat bed without bedrails?  4  - 4  -CH    Moving to and from a bed to a chair (including a wheelchair)?  3  - 3  -CH    Standing up from a chair using your arms (e.g., wheelchair, bedside chair)?  3  - 3  -CH    Climbing 3-5 steps with a railing?  3  -JW 3  -CH    To walk in hospital room?  3  - 3  -CH    AM-PAC 6 Clicks Score  20  - 20  -CH    How much help from another is currently needed...    Putting on and taking off regular lower body clothing? 3  -SG      Bathing (including washing, rinsing, and drying) 3  -SG      Toileting (which includes using toilet bed pan or urinal) 4  -SG      Putting on and taking off regular upper body clothing 4  -SG      Taking care of personal grooming (such as brushing teeth) 4  -SG      Eating meals 4  -SG      Score 22  -SG      Functional Assessment    Outcome Measure Options AM-PAC 6 Clicks Daily Activity (OT)  -[facility] 6 Clicks Basic Mobility (PT)  -[facility] 6 Clicks Basic Mobility (PT)  -      10/15/17 1013          How much help from another person do you currently need...    Turning from your back to your side while in flat bed without using bedrails? 4  -TESS      Moving from lying on back to sitting on the side of a flat bed without bedrails? 3  -TESS      Moving to and from a bed to a chair (including a wheelchair)? 3  -TESS      Standing up from a chair using your arms (e.g., wheelchair, bedside chair)? 3  -TESS      Climbing 3-5 steps with a railing? 3  -TESS      To walk in hospital room? 3  -TESS      AM-PAC 6 Clicks Score 19  -TESS      Functional Assessment    Outcome Measure Options AM-PAC 6 Clicks Basic Mobility (PT)  -        User Key  (r) = Recorded By, (t) = Taken By, (c) = Cosigned By     Initials Name Provider Type    JW Kayli Albarran, PTA Physical Therapy Assistant    TESS Gilda Joshua, PT Physical Therapist    CH Aviva Lundberg, PTA Physical Therapy Assistant    SG Kriss Marino OT Occupational Therapist          Time Calculation:   OT Start Time: 1008  OT Stop Time: 1050  OT Time Calculation (min): 42 min    Therapy Charges for Today     Code Description Service Date Service Provider Modifiers Qty    48331494734 HC OT SELFCARE CURRENT 10/17/2017 Kriss GOSIA Marino GO, CJ 1    88058421397 HC OT SELFCARE PROJECTED 10/17/2017 Kriss GOSIA Marino GO, CI 1    91086961382 HC OT EVAL MOD COMPLEXITY 1 10/17/2017 Kriss Ned OT GO 1    21809110626 HC OT THERAPEUTIC ACT EA 15 MIN 10/17/2017 Kriss Marino OT GO 1    80574230193 HC OT SELF CARE/MGMT/TRAIN EA 15 MIN 10/17/2017 Kriss Ned OT GO 1          OT G-codes  OT Professional Judgement Used?: Yes  OT Functional Scales Options: AM-PAC 6 Clicks Daily Activity (OT)  Score: 22  Functional Limitation: Self care  Self Care Current Status (): At least 20 percent but less than 40 percent impaired, limited or restricted  Self Care Goal Status (): At least 1 percent but less than 20 percent impaired, limited or restricted    Kriss Marino OT  10/17/2017

## 2017-10-17 NOTE — SIGNIFICANT NOTE
10/17/17 1517   Rehab Treatment   Discipline physical therapy assistant   Treatment Not Performed patient/family declined treatment  (pt states he is getting his things ready and discussing things w/ his family concerning his d/c this pm, defers gt, steps, HEP etc.)

## 2017-10-17 NOTE — NURSING NOTE
Called report to Kelley PEREZ at [facility]. Follow-up appointments made and documented in AVS.

## 2017-10-17 NOTE — DISCHARGE SUMMARY
"    [facility] Medicine Services  DISCHARGE SUMMARY       Date of Admission: 10/9/2017  Date of Discharge:  10/17/2017  Primary Care Physician: Paolo Rey MD    Presenting Problem/History of Present Illness:  Shortness of breath [R06.02]  Drug rash [L27.0]  Troponin I above reference range [R74.8]  Pneumonia of both lower lobes due to infectious organism [J18.9]  Shortness of breath [R06.02]       Final Discharge Diagnoses:  Hospital Problem List     Shortness of breath          Consults:   Consults     Date and Time Order Name Status Description    10/9/2017 1640 Hospitalist (on-call MD unless specified)      9/25/2017 1927 Inpatient Consult to Cardiology Completed           Procedures Performed:                 Pertinent Test Results:  WBC 15.14 and trending down.      Chief Complaint on Day of Discharge: Shortness of breath resolving.    Hospital Course:  The patient is a 84 y.o. male who presented to [facility] with cough and shortness of breath.  He also had a rash that was attributed to medication.  He was noted to have pneumonia, and was started on appropriate antibiotics.  He was seen by cardiology as he is followed in the heart failure clinic.  Medication changes were made in an attempt to ascertain which drug caused the rash.  He was kept off of anticoagulation due to history of GI bleed.  Steroids were weaned.  He was discharged in good condition after ambulating without significant shortness of breath.  He has home O2 already.    Condition on Discharge:  Stable    Physical Exam on Discharge:  /95 (BP Location: Right arm, Patient Position: Lying)  Pulse 87  Temp 97.4 °F (36.3 °C) (Temporal Artery )   Resp 20  Ht 67\" (170.2 cm)  Wt 174 lb 12.8 oz (79.3 kg)  SpO2 98%  BMI 27.38 kg/m2     Physical Exam   Constitutional: He appears well-developed and well-nourished.   HENT:   Head: Normocephalic and atraumatic.   Eyes: EOM are normal. " Pupils are equal, round, and reactive to light.   Neck: Normal range of motion. Neck supple.   Cardiovascular: Normal rate, regular rhythm, normal heart sounds and intact distal pulses.  Exam reveals no gallop and no friction rub.    No murmur heard.  Pulmonary/Chest: Effort normal and breath sounds normal. No respiratory distress. He has no wheezes. He has no rales. He exhibits no tenderness.   Abdominal: Soft. Bowel sounds are normal. He exhibits no distension. There is no tenderness.   Psychiatric: He has a normal mood and affect. His behavior is normal.   Vitals reviewed.        Discharge Disposition:  [facility] [place] [facility]    Discharge Medications:   Hasmukh Ham   Home Medication Instructions SEBASTIAN:[id number]    Printed on:10/17/17 5490   Medication Information                      albuterol (PROVENTIL) (2.5 MG/3ML) 0.083% nebulizer solution  Take 2.5 mg by nebulization Every 4 (Four) Hours As Needed for Wheezing.             aspirin 81 MG chewable tablet  Chew 81 mg Daily.             clopidogrel (PLAVIX) 75 MG tablet  Take 75 mg by mouth Daily.             donepezil (ARICEPT) 10 MG tablet  Take 10 mg by mouth Daily.             doxycycline (VIBRAMYCIN) 100 MG capsule  Take 100 mg by mouth 2 (Two) Times a Day.             famotidine (PEPCID) 20 MG tablet  Take 20 mg by mouth 2 (Two) Times a Day.             fluticasone (FLONASE) 50 MCG/ACT nasal spray  2 sprays into each nostril Daily.             furosemide (LASIX) 20 MG tablet  Take 1 tablet by mouth Daily.             glimepiride (AMARYL) 2 MG tablet  Take 2 mg by mouth Every Morning Before Breakfast.             HYDROcodone-acetaminophen (NORCO) 7.5-325 MG per tablet  Take 1 tablet by mouth Every 8 (Eight) Hours.             ipratropium-albuterol (DUO-NEB) 0.5-2.5 mg/mL nebulizer  Take 3 mL by nebulization 4 (Four) Times a Day.             isosorbide dinitrate (ISORDIL) 10 MG tablet  Take 1 tablet by mouth 3 (Three) Times a Day.              levoFLOXacin (LEVAQUIN) 750 MG tablet  Take 1 tablet by mouth Daily. Indications: Pneumonia             lisinopril (PRINIVIL,ZESTRIL) 10 MG tablet  Take 1 tablet by mouth Daily.             magnesium oxide (MAGOX) 400 (241.3 MG) MG tablet tablet  Take 400 mg by mouth Daily.             metoprolol tartrate (LOPRESSOR) 25 MG tablet  Take 0.5 tablets by mouth Every 12 (Twelve) Hours.             nitroglycerin (NITROSTAT) 0.4 MG SL tablet  place 1 tablet under the tongue if needed every 5 minutes for hair...  (REFER TO PRESCRIPTION NOTES).             O2 (OXYGEN)  Inhale 2 L/min Every Night. W/ CPAP             Red Yeast Rice 600 MG tablet  Take 600 mg by mouth 2 (Two) Times a Day.             Umeclidinium-Vilanterol 62.5-25 MCG/INH aerosol powder   Inhale 1 puff Daily.                 Discharge Diet:   Diet Instructions     Diet: Regular, Consistent Carbohydrate; Thin       Discharge Diet:   Regular  Consistent Carbohydrate      Fluid Consistency:  Thin   Fluid Restriction per day:  1500 mL Fluid                 Activity at Discharge:   Activity Instructions     Activity as Tolerated                         Follow-up Appointments:   Future Appointments  Date Time Provider Department Center   1/23/2018 11:00 AM Jeff Maciel MD PhD [facility] None   2/7/2018 1:30 PM Jeff Maciel MD PhD [facility] None   7/12/2018 1:40 PM Delon Calvillo MD [facility] None   Dr. Maciel 2 weeks  Dr. Rey 1 week    Test Results Pending at Discharge:  Order Current Status    Babesia Microti Antibody Panel In process    Food Allergy Profile In process    Lactic Acid, Plasma In process              This document has been electronically signed by Tay Puente MD on October 17, 2017 3:26 PM      Time: 40 min

## 2017-10-17 NOTE — PLAN OF CARE
Problem: Patient Care Overview (Adult)  Goal: Adult Individualization and Mutuality  Outcome: Ongoing (interventions implemented as appropriate)    Problem: Skin Integrity Impairment, Risk/Actual (Adult)  Goal: Skin Integrity/Wound Healing  Outcome: Ongoing (interventions implemented as appropriate)    Problem: Hypertensive Disease/Crisis (Arterial) (Adult)  Goal: Signs and Symptoms of Listed Potential Problems Will be Absent or Manageable (Hypertensive Disease/Crisis)  Outcome: Ongoing (interventions implemented as appropriate)

## 2017-10-17 NOTE — PLAN OF CARE
Problem: Patient Care Overview (Adult)  Goal: Plan of Care Review  Outcome: Ongoing (interventions implemented as appropriate)    10/17/17 0815   Coping/Psychosocial Response Interventions   Plan Of Care Reviewed With patient   Patient Care Overview   Progress improving   Outcome Evaluation   Outcome Summary/Follow up Plan pt w/ Mod Ind w/ bed mobility, Ind sit<->stands, ambulates w/ no AD ~186, 236 w/ SBA, pt becomes SOA w/ gt but his sats didn't drop below 98 throughout tx, plan steps next tx, if d/c home today pt could benefit from HH PT         Problem: Inpatient Physical Therapy  Goal: Transfer Training Goal 1 LTG- PT  Outcome: Ongoing (interventions implemented as appropriate)  Goal: Gait Training Goal LTG- PT  Outcome: Ongoing (interventions implemented as appropriate)  Goal: Stair Training Goal LTG- PT  Outcome: Ongoing (interventions implemented as appropriate)    10/15/17 1336 10/16/17 1156 10/17/17 0815   Stair Training PT LTG   Stair Training Goal PT LTG, Date Established 10/15/17 --  --    Stair Training Goal PT LTG, Number of Steps 5 --  --    Stair Training Goal PT LTG, [place] Level conditional independence --  --    Stair Training Goal PT LTG, Assist Device 2 handrails --  --    Stair Training Goal PT LTG, Date Goal Reviewed --  --  10/17/17   Stair Training Goal PT LTG, Outcome --  goal ongoing --        Goal: Patient Education Goal LTG- PT  Outcome: Ongoing (interventions implemented as appropriate)    10/15/17 1336 10/17/17 0815   Patient Education PT LTG   Patient Education PT LTG, Date Established 10/15/17 --    Patient Education PT LTG, Time to Achieve by discharge --    Patient Education PT LTG, Education Type gait;transfers;home safety --    Patient Education PT LTG, Date Goal Reviewed --  10/17/17   Patient Education PT LTG Outcome --  goal ongoing

## 2017-10-18 NOTE — THERAPY DISCHARGE NOTE
Acute Care - Physical Therapy Discharge Summary  [facility]       Patient Name: Hasmukh Ham  : 1933  MRN: [id number]    Today's Date: 10/18/2017  Onset of Illness/Injury or Date of Surgery Date: 10/09/17    Date of Referral to PT: 10/14/17  Referring Physician: EVE Birch      Admit Date: 10/9/2017      PT Recommendation and Plan    Visit Dx:    ICD-10-CM ICD-9-CM   1. Shortness of breath R06.02 786.05   2. Pneumonia of both lower lobes due to infectious organism J18.9 483.8   3. Troponin I above reference range R74.8 790.6   4. Drug rash L27.0 693.0   5. Impaired functional mobility, balance, gait, and endurance Z74.09 V49.89   6. Impaired mobility and ADLs Z74.09 799.89   7. Centrilobular emphysema J43.2 492.8   8. (HFpEF) heart failure with preserved ejection fraction I50.30 428.9   9. Chronic bronchitis, unspecified chronic bronchitis type J42 491.9             Outcome Measures       10/17/17 1008 10/17/17 0815 10/16/17 1106    How much help from another person do you currently need...    Turning from your back to your side while in flat bed without using bedrails?  4  -JW 4  -CH    Moving from lying on back to sitting on the side of a flat bed without bedrails?  4  -JW 4  -CH    Moving to and from a bed to a chair (including a wheelchair)?  3  -JW 3  -CH    Standing up from a chair using your arms (e.g., wheelchair, bedside chair)?  3  -JW 3  -CH    Climbing 3-5 steps with a railing?  3  -JW 3  -CH    To walk in hospital room?  3  -JW 3  -CH    AM-PAC 6 Clicks Score  20  -JW 20  -CH    How much help from another is currently needed...    Putting on and taking off regular lower body clothing? 3  -SG      Bathing (including washing, rinsing, and drying) 3  -SG      Toileting (which includes using toilet bed pan or urinal) 4  -SG      Putting on and taking off regular upper body clothing 4  -SG      Taking care of personal grooming (such as brushing teeth) 4  -SG      Eating meals 4  -SG       Score 22  -      Functional Assessment    Outcome Measure Options AM-[facility] 6 Clicks Daily Activity (OT)  -[facility] 6 Clicks Basic Mobility (PT)  -[facility] 6 Clicks Basic Mobility (PT)  -      10/15/17 1013          How much help from another person do you currently need...    Turning from your back to your side while in flat bed without using bedrails? 4  -TESS      Moving from lying on back to sitting on the side of a flat bed without bedrails? 3  -TESS      Moving to and from a bed to a chair (including a wheelchair)? 3  -TESS      Standing up from a chair using your arms (e.g., wheelchair, bedside chair)? 3  -TESS      Climbing 3-5 steps with a railing? 3  -TESS      To walk in hospital room? 3  -TESS      AM-[facility] 6 Clicks Score 19  -      Functional Assessment    Outcome Measure Options AM-[facility] 6 Clicks Basic Mobility (PT)  -        User Key  (r) = Recorded By, (t) = Taken By, (c) = Cosigned By    Initials Name Provider Type     Kayli Albarran, PTA Physical Therapy Assistant    TESS Joshua, PT Physical Therapist     Aviva Lundberg, PTA Physical Therapy Assistant     Kriss Marino, OT Occupational Therapist                      IP PT Goals       10/17/17 0815 10/16/17 1156 10/15/17 1336    Transfer Training PT LTG    Transfer Training PT LTG, Date Established   10/15/17  -    Transfer Training PT LTG, Time to Achieve   by discharge  -    Transfer Training PT LTG, Activity Type   bed to chair /chair to bed;sit to stand/stand to sit  -    Transfer Training PT LTG, [place] Level   independent  -    Transfer Training PT  LTG, Date Goal Reviewed 10/17/17  - 10/16/17  -     Transfer Training PT LTG, Outcome goal partially met  - goal ongoing  - goal ongoing  -    Gait Training PT LTG    Gait Training Goal PT LTG, Date Established   10/15/17  -    Gait Training Goal PT LTG, Time to Achieve   by discharge  -    Gait Training Goal PT LTG, [place] Level   independent  -     Gait Training Goal PT LTG, Distance to Achieve   150ft;O2 sats will not drop below 92%  -    Gait Training Goal PT LTG, Date Goal Reviewed 10/17/17  - 10/16/17  -     Gait Training Goal PT LTG, Outcome goal partially met  - goal ongoing  -     Stair Training PT LTG    Stair Training Goal PT LTG, Date Established   10/15/17  -    Stair Training Goal PT LTG, Number of Steps   5  -    Stair Training Goal PT LTG, [place] Level   conditional independence  -    Stair Training Goal PT LTG, Assist Device   2 handrails  -    Stair Training Goal PT LTG, Date Goal Reviewed 10/17/17  - 10/16/17  -     Stair Training Goal PT LTG, Outcome  goal ongoing  -     Patient Education PT LTG    Patient Education PT LTG, Date Established   10/15/17  -    Patient Education PT LTG, Time to Achieve   by discharge  -    Patient Education PT LTG, Education Type   gait;transfers;home safety  -    Patient Education PT LTG, Date Goal Reviewed 10/17/17  - 10/16/17  -     Patient Education PT LTG Outcome goal ongoing  - goal ongoing  - goal ongoing  -      User Key  (r) = Recorded By, (t) = Taken By, (c) = Cosigned By    Initials Name Provider Type     Kayli Albarran, PTA Physical Therapy Assistant    TESS Joshua, PT Physical Therapist     Aviva Lundberg, PTA Physical Therapy Assistant              PT Discharge Summary  Anticipated Discharge Disposition: home with assist, home with home health  Reason for Discharge: Discharge from facility, Per MD order  Outcomes Achieved: Unable to make functional progress toward goals at this time  Discharge Destination: Home with home health      Manjinder Mcduffie, PTA   10/18/2017

## 2017-10-20 LAB
CALIF WALNUT POLN IGE QN: <0.1 KU/L
CLAM IGE QN: <0.1 KU/L
CODFISH IGE QN: <0.1 KU/L
CONV CLASS DESCRIPTION: NORMAL
CORN IGE QN: <0.1 KU/L
COW MILK IGE QN: <0.1 KU/L
EGG WHITE IGE QN: <0.1 KU/L
PEANUT IGE QN: <0.1 KU/L
SCALLOP IGE QN: <0.1 KU/L
SESAME SEED IGE: <0.1 KU/L
SHRIMP IGE: <0.1 KU/L
SOYBEAN IGE QN: <0.1 KU/L
WHEAT IGE QN: <0.1 KU/L

## 2017-10-20 NOTE — PLAN OF CARE
Problem: Inpatient Occupational Therapy  Goal: Dynamic Sitting Balance Goal LTG- OT  Outcome: Unable to achieve outcome(s) by discharge Date Met:  10/20/17    10/17/17 1159 10/20/17 0811   Dynamic Sitting Balance OT LTG   Dynamic Sitting Balance OT LTG, Date Established 10/17/17 --    Dynamic Sitting Balance OT LTG, Time to Achieve 5 days --    Dynamic Sitting Balance OT LTG, [place] Level conditional independence --    Dynamic Sitting Balance OT LTG, Additional Goal To complete bathing at EOB with Heart Rate.  --    Dynamic Sitting Balance OT LTG, Date Goal Reviewed --  10/20/17   Dynamic Sitting Balance OT LTG, Outcome --  goal not met   Dynamic Sitting Balance OT LTG, Reason Goal Not Met --  discharged from facility       Goal: Patient Education Goal STG- OT  Outcome: Unable to achieve outcome(s) by discharge Date Met:  10/20/17    10/17/17 1159 10/20/17 0811   Patient Education OT STG   Patient Education OT STG, Date Established 10/17/17 --    Patient Education OT STG, Time to Achieve 5 days --    Patient Education OT STG, Education Type HEP --    Patient Education OT STG, Additional Goal BUE pulley exercises. RUE deficits with pain, no weights --    Patient Education OT STG, Date Goal Reviewed --  10/20/17   Patient Education OT STG Outcome --  goal not met   Patient Education OT STG, Reason Goal Not Met --  discharged from facility       Goal: ADL Goal LTG- OT  Outcome: Unable to achieve outcome(s) by discharge Date Met:  10/20/17    10/20/17 0811   ADL OT LTG   ADL OT LTG, Date Goal Reviewed 10/20/17   ADL OT LTG, Outcome goal not met   ADL OT LTG, Reason Goal Not Met discharged from facility

## 2017-10-20 NOTE — THERAPY DISCHARGE NOTE
Acute Care - Occupational Therapy Initial Eval/Discharge  [facility]     Patient Name: Hasmukh Ham  : 1933  MRN: [id number]  Today's Date: 10/20/2017  Onset of Illness/Injury or Date of Surgery Date: 10/09/17  Date of Referral to OT: 10/14/17  Referring Physician: EVE Birch      Admit Date: 10/9/2017       ICD-10-CM ICD-9-CM   1. Shortness of breath R06.02 786.05   2. Pneumonia of both lower lobes due to infectious organism J18.9 483.8   3. Troponin I above reference range R74.8 790.6   4. Drug rash L27.0 693.0   5. Impaired functional mobility, balance, gait, and endurance Z74.09 V49.89   6. Impaired mobility and ADLs Z74.09 799.89   7. Centrilobular emphysema J43.2 492.8   8. (HFpEF) heart failure with preserved ejection fraction I50.30 428.9   9. Chronic bronchitis, unspecified chronic bronchitis type J42 491.9     Patient Active Problem List   Diagnosis   • Dilated aortic root   • Non-rheumatic tricuspid valve insufficiency   • Pulmonary emphysema   • Atrial fibrillation and flutter   • Bradycardia   • Chronic coronary artery disease   • Neuropathy   • Abdominal hernia   • Neck pain   • Dementia   • Diabetic neuropathy   • Gastroesophageal reflux disease without esophagitis   • Generalized osteoarthritis   • 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   • Nocturia   • Osteoarthritis of multiple joints   • Hyperlipidemia   • Pain in joint involving ankle and foot   • Arthropathy of hand   • Paroxysmal tachycardia   • Osteoarthrosis involving more than one site but not generalized   • Right shoulder pain   • Rotator cuff syndrome   • Partial tear of subscapularis tendon   • Infraspinatus tendon tear   • Supraspinatus tendon tear   • Bilateral carotid artery stenosis   • (HFpEF) heart failure with preserved ejection fraction   • Pulmonary HTN   • Acute interstitial pneumonia   • Chronic obstructive lung disease   • Coronary arteriosclerosis   • Diabetes mellitus   • Hypertension    • Chest pain   • Shortness of breath     Past Medical History:   Diagnosis Date   • Bilateral carotid artery stenosis    • Chronic obstructive pulmonary disease (COPD)    • Coronary arteriosclerosis    • Degenerative joint disease involving multiple joints    • Dementia    • Diabetes mellitus    • Hyperlipidemia    • Hypertension      Past Surgical History:   Procedure Laterality Date   • CARDIAC CATHETERIZATION N/A 6/6/2017    Procedure: Right Heart Cath;  Surgeon: Jeff Maciel MD PhD;  Location: [facility] INVASIVE LOCATION;  Service:    • HERNIA REPAIR     • LUNG BIOPSY     • THORACOTOMY Left 1977   • VENTRAL HERNIA REPAIR            OT ASSESSMENT FLOWSHEET (last 72 hours)      OT Evaluation       10/20/17 0813 10/17/17 1008             Rehab Evaluation    Document Type  evaluation  -SG       Subjective Information  agree to therapy;complains of;pain  -SG       Patient Effort, Rehab Treatment  good  -SG       Symptoms Noted During/After Treatment  shortness of breath  -SG       General Information    Patient Profile Review  yes  -SG       Onset of Illness/Injury or Date of Surgery Date  10/09/17  -SG       Referring Physician  EVE Birch  -SG       Pertinent History Of Current Problem  Bilateral Lower Lung pneumonia.    -SG       Precautions/Limitations  fall precautions;oxygen therapy device and L/min   RUE shoulder joint is very arthritic  -SG       Prior Level of Function  independent:;all household mobility;ADL's;gait;transfer;feeding;grooming;dressing;bathing;cooking;cleaning  -SG       Equipment Currently Used at Home  none  -SG       Living Environment    Lives With  spouse  -SG       Living Arrangements  mobile home  -SG       Home Accessibility  stairs to enter home  -SG       Number of Stairs to Enter Home  --   5  -SG       Stair Railings at Home  outside, present at both sides  -SG       Transportation Available  family or friend will provide  -SG       Clinical Impression    Date  of Referral to OT  10/14/17  -SG       OT Diagnosis  Impaired Mobility and ADL's  -SG       Functional Level At Time Of Evaluation  Resident alert and cooperative.  To EOB, to bathroom.    -SG       Impairments Found (describe specific impairments)  aerobic capacity/endurance;ergonomics and body mechanics;gait, locomotion, and balance;muscle performance;ROM  -SG       Criteria for Skilled Therapeutic Interventions Met  yes  -SG       Rehab Potential  good, to achieve stated therapy goals  -SG       Therapy Frequency  --   3/14 Times/wk  -SG       Predicted Duration of Therapy Intervention (days/wks)  --   2 weeks  -SG       Anticipated Discharge Disposition home with home health  -RB home with home health  -SG       Functional Level Prior    Ambulation  0-->independent  -SG       Transferring  0-->independent  -SG       Toileting  0-->independent  -SG       Bathing  0-->independent  -SG       Dressing  0-->independent  -SG       Eating  0-->independent  -SG       Communication  0-->understands/communicates without difficulty  -SG       Swallowing  0-->swallows foods/liquids without difficulty  -SG       Vital Signs    Pre Systolic BP Rehab  153  -SG       Pre Treatment Diastolic BP  72  -SG       Post Systolic BP Rehab  152  -SG       Post Treatment Diastolic BP  74  -SG       Pretreatment Heart Rate (beats/min)  85  -SG       Posttreatment Heart Rate (beats/min)  85  -SG       Pre SpO2 (%)  99  -SG       O2 Delivery Pre Treatment  supplemental O2  -SG       Intra SpO2 (%)  98  -SG       O2 Delivery Intra Treatment  supplemental O2  -SG       Post SpO2 (%)  98  -SG       O2 Delivery Post Treatment  supplemental O2  -SG       Pain Assessment    Pain Assessment  0-10  -SG       Pain Score  7  -SG       Post Pain Score  7  -SG       Pain Type  Chronic pain  -SG       Pain Location  Back  -SG       Pain Orientation  Lower  -SG       Pain Intervention(s)  Medication (See MAR)  -SG       Vision Assessment/Intervention     Visual Impairment  WFL with corrective lenses  -SG       Cognitive Assessment/Intervention    Current Cognitive/Communication Assessment  functional  -SG       Orientation Status  oriented x 4  -SG       Follows Commands/Answers Questions  100% of the time  -SG       Personal Safety Interventions  nonskid shoes/slippers when out of bed;supervised activity  -SG       ROM (Range of Motion)    General ROM  no range of motion deficits identified;upper extremity range of motion deficits identified  -SG       Additional Documentation  --   RUE shoulder flexon, painful with joint stiffness  -SG       MMT (Manual Muscle Testing)    General MMT Assessment  upper extremity strength deficits identified  -SG       Additional Documentation  --   Right UE  -SG       Bed Mobility, Assessment/Treatment    Bed Mobility, Assistive Device  bed rails;head of bed elevated  -SG       Bed Mobility, Roll Right, [place]  conditional independence  -SG       Bed Mob, Supine to Sit, [place]  conditional independence  -SG       Bed Mob, Sit to Supine, [place]  conditional independence  -SG       Transfer Assessment/Treatment    Transfers, Sit-Stand [place]  conditional independence  -SG       Transfers, Stand-Sit [place]  conditional independence  -SG       Toilet Transfer, [place]  supervision required  -SG       Therapy Exercises    Bilateral Upper Extremity  AROM:;15 reps;elbow flexion/extension;pronation/supination;shoulder abduction/adduction;shoulder extension/flexion  -SG       General Therapy Interventions    Planned Therapy Interventions  activity intolerance;ADL retraining;balance training;energy conservation;home exercise program;neuromuscular re-education;ROM (Range of Motion);strengthening;stretching  -SG       Positioning and Restraints    Pre-Treatment Position  in bed  -SG       Post Treatment Position  bed  -SG       In Bed  supine;call light within reach;encouraged to call for assist  -SG          User Key  (r) = Recorded By, (t) = Taken By, (c) = Cosigned By    Initials Name Effective Dates    RB Carrington Marin OT 06/15/16 -      Kriss Marino OT 08/03/17 -           Occupational Therapy Education     Title: PT OT SLP Therapies (Resolved)     Topic: Occupational Therapy (Resolved)     Point: ADL training (Resolved)    Description: Instruct learner(s) on proper safety adaptation and remediation techniques during self care or transfers.   Instruct in proper use of assistive devices.    Learning Progress Summary    Learner Readiness Method Response Comment Documented by Status   Patient Eager E VU Use call light for assistance.  10/17/17 1151 Done               Point: Home exercise program (Resolved)    Description: Instruct learner(s) on appropriate technique for monitoring, assisting and/or progressing therapeutic exercises/activities.    Learning Progress Summary    Learner Readiness Method Response Comment Documented by Status   Patient Eager E VU Use call light for assistance.  10/17/17 1151 Done               Point: Precautions (Resolved)    Description: Instruct learner(s) on prescribed precautions during self-care and functional transfers.    Learning Progress Summary    Learner Readiness Method Response Comment Documented by Status   Patient Eager E VU Use call light for assistance.  10/17/17 1151 Done               Point: Body mechanics (Resolved)    Description: Instruct learner(s) on proper positioning and spine alignment during self-care, functional mobility activities and/or exercises.    Learning Progress Summary    Learner Readiness Method Response Comment Documented by Status   Patient Eager E VU Use call light for assistance.  10/17/17 1151 Done                      User Key     Initials Effective Dates Name Provider Type Discipline     08/03/17 -  Kriss Marino, OT Occupational Therapist OT                OT Recommendation and Plan  Anticipated Discharge Disposition: home with home  health  Planned Therapy Interventions: activity intolerance, ADL retraining, balance training, energy conservation, home exercise program, neuromuscular re-education, ROM (Range of Motion), strengthening, stretching  Therapy Frequency:  (3/14 Times/wk)              OT Goals       10/20/17 0811 10/17/17 1159       Dynamic Sitting Balance OT LTG    Dynamic Sitting Balance OT LTG, Date Established  10/17/17  -SG     Dynamic Sitting Balance OT LTG, Time to Achieve  5 days  -SG     Dynamic Sitting Balance OT LTG, [place] Level  conditional independence  -SG     Dynamic Sitting Balance OT LTG, Additional Goal  To complete bathing at EOB with Heart Rate.    -SG     Dynamic Sitting Balance OT LTG, Date Goal Reviewed 10/20/17  -RB      Dynamic Sitting Balance OT LTG, Outcome goal not met  -RB      Dynamic Sitting Balance OT LTG, Reason Goal Not Met discharged from facility  -RB      Patient Education OT STG    Patient Education OT STG, Date Established  10/17/17  -SG     Patient Education OT STG, Time to Achieve  5 days  -SG     Patient Education OT STG, Education Type  HEP  -SG     Patient Education OT STG, Additional Goal  BUE pulley exercises.  RUE deficits with pain, no weights  -SG     Patient Education OT STG, Date Goal Reviewed 10/20/17  -RB      Patient Education OT STG Outcome goal not met  -RB      Patient Education OT STG, Reason Goal Not Met discharged from facility  -RB      ADL OT LTG    ADL OT LTG, Date Established  10/17/17  -SG     ADL OT LTG, Time to Achieve  5 days  -SG     ADL OT LTG, [place] Level  modified independent  -SG     ADL OT LTG, Additional Goal  dressing and bathing.  -SG     ADL OT LTG, Date Goal Reviewed 10/20/17  -RB      ADL OT LTG, Outcome goal not met  -RB      ADL OT LTG, Reason Goal Not Met discharged from facility  -RB        User Key  (r) = Recorded By, (t) = Taken By, (c) = Cosigned By    Initials Name Provider Type    SOFIA Marin, OT Occupational Therapist    TREVON Monterroso  GOSIA Marino Occupational Therapist                Outcome Measures       10/17/17 1008          How much help from another is currently needed...    Putting on and taking off regular lower body clothing? 3  -SG      Bathing (including washing, rinsing, and drying) 3  -SG      Toileting (which includes using toilet bed pan or urinal) 4  -SG      Putting on and taking off regular upper body clothing 4  -SG      Taking care of personal grooming (such as brushing teeth) 4  -SG      Eating meals 4  -SG      Score 22  -SG      Functional Assessment    Outcome Measure Options AM-PAC 6 Clicks Daily Activity (OT)  -SG        User Key  (r) = Recorded By, (t) = Taken By, (c) = Cosigned By    Initials Name Provider Type    TREVON Marino OT Occupational Therapist          Time Calculation:           OT G-codes  OT Professional Judgement Used?: Yes  OT Functional Scales Options: AM-PAC 6 Clicks Daily Activity (OT)  Score: 22  Functional Limitation: Self care  Self Care Current Status (): At least 20 percent but less than 40 percent impaired, limited or restricted  Self Care Goal Status (): At least 1 percent but less than 20 percent impaired, limited or restricted    OT Discharge Summary  Anticipated Discharge Disposition: home with home health  Reason for Discharge: Per MD order, Discharge from facility  Outcomes Achieved: Discharge from facility occurred on same date as evluation  Discharge Destination: Home with home health    Carrington Marin OT  10/20/2017

## 2017-10-26 ENCOUNTER — TELEPHONE (OUTPATIENT)
Dept: CARDIOLOGY | Facility: CLINIC | Age: 82
End: 2017-10-26

## 2017-10-26 NOTE — TELEPHONE ENCOUNTER
HH nurse called regarding HR of 48 and difficulty to arouse. He had a similar occurrence on Saturday and was evaluated by the paramedics. They monitored and released.   I asked them to stop the Metoprolol 12.5mg BID.   He had received benadryl the past two nights- Asked them to stop that also.   Concerning the difficulty arousing: he was up for breakfast and most of the morning. He laid down to take a nap and the daughter had a difficult time waking him. After a few shakes, he woke up and was A&O x3. Most likely just sleepy from benadryl.     Should concerns of dyspnea and decreased LOC persist, do not hesitate to take to the ER for ABG testing and evaluation.   Otherwise, we will see in office at 1pm tomorrow.

## 2017-10-27 ENCOUNTER — OFFICE VISIT (OUTPATIENT)
Dept: CARDIOLOGY | Facility: CLINIC | Age: 82
End: 2017-10-27

## 2017-10-27 VITALS
OXYGEN SATURATION: 96 % | BODY MASS INDEX: 27.47 KG/M2 | HEIGHT: 67 IN | WEIGHT: 175 LBS | SYSTOLIC BLOOD PRESSURE: 104 MMHG | DIASTOLIC BLOOD PRESSURE: 68 MMHG | HEART RATE: 105 BPM

## 2017-10-27 DIAGNOSIS — J43.8 OTHER EMPHYSEMA (HCC): Primary | ICD-10-CM

## 2017-10-27 DIAGNOSIS — I27.20 PULMONARY HTN (HCC): ICD-10-CM

## 2017-10-27 PROCEDURE — 99214 OFFICE O/P EST MOD 30 MIN: CPT | Performed by: NURSE PRACTITIONER

## 2017-10-27 RX ORDER — ISOSORBIDE DINITRATE 10 MG/1
10 TABLET ORAL 3 TIMES DAILY
Qty: 90 TABLET | Refills: 3 | Status: SHIPPED | OUTPATIENT
Start: 2017-10-27 | End: 2018-02-15 | Stop reason: HOSPADM

## 2017-10-27 NOTE — PROGRESS NOTES
[facility] CHF CLINIC OFFICE VISIT    Subjective:     Congestive Heart Failure      Congestive Heart Failure   Presents for follow-up visit. Associated symptoms include shortness of breath. Pertinent negatives include no abdominal pain, chest pain, claudication or palpitations. The symptoms have been stable. Compliance with total regimen is 51-75%. Compliance with diet is 51-75%. Compliance with exercise is 51-75%. Compliance with medications is %.     Mr. Ham is an 84-year-old patient well-known to Dr. Maciel for pulmonary hypertension and HFpEF. He has persistent dyspnea.  He wears oxygen at night.  He uses his nebulizer treatments 4 times a day. He has had recent hospitalizations regarding COPD, Atrial flutter, and allergic reaction.   After being discharged from the AF trip, he had medication reaction. It was suspected to be Cardizem.   Cardizem was stopped and Metoprolol started.  He experienced Bradycardia and 43 and home health nurse called to notify. Metoprolol was stopped. Rate varies from 60s-130s as it has in the past.     PCP: Dr. Rey  Cardiologist: Dr. Maciel  Pulmonologist: Had been Dr. Higginbotham- stopped seeing?    Hospitalizations:  9/15/2017-     Past Medical History:   Diagnosis Date   • Bilateral carotid artery stenosis    • Chronic obstructive pulmonary disease (COPD)    • Coronary arteriosclerosis    • Degenerative joint disease involving multiple joints    • Dementia    • Diabetes mellitus    • Hyperlipidemia    • Hypertension      Past Surgical History:   Procedure Laterality Date   • CARDIAC CATHETERIZATION N/A 6/6/2017    Procedure: Right Heart Cath;  Surgeon: Jeff Maciel MD PhD;  Location: [facility] INVASIVE LOCATION;  Service:    • HERNIA REPAIR     • LUNG BIOPSY     • THORACOTOMY Left 1977   • VENTRAL HERNIA REPAIR       Social History     Social History   • Marital status:      Spouse name: N/A   • Number of children: N/A   • Years of education: N/A      Social History Main Topics   • Smoking status: Former Smoker   • Smokeless tobacco: Never Used   • Alcohol use No   • Drug use: No   • Sexual activity: Not Currently      Comment:      Other Topics Concern   • None     Social History Narrative     Allergies   Allergen Reactions   • Atorvastatin Anaphylaxis   • Penicillins Rash   • Pravastatin      Myalgia   • Azithromycin Rash   • Biaxin [Clarithromycin] Rash       Review of Systems   Constitution: Negative for chills, decreased appetite, fever and weakness.   HENT: Negative.    Eyes: Negative.    Cardiovascular: Positive for dyspnea on exertion, leg swelling and orthopnea. Negative for chest pain, claudication, irregular heartbeat and palpitations.   Respiratory: Positive for cough, shortness of breath and wheezing.    Endocrine: Negative.    Skin: Negative for dry skin, flushing and rash.   Musculoskeletal: Negative for falls and myalgias.   Gastrointestinal: Negative for abdominal pain, change in bowel habit and melena.   Genitourinary: Negative for frequency and hematuria.   Neurological: Negative for dizziness, light-headedness and loss of balance.   Psychiatric/Behavioral: Negative for altered mental status and memory loss. The patient is not nervous/anxious.        Current Outpatient Prescriptions   Medication Sig Dispense Refill   • albuterol (PROVENTIL) (2.5 MG/3ML) 0.083% nebulizer solution Take 2.5 mg by nebulization Every 4 (Four) Hours As Needed for Wheezing. 125 vial 11   • aspirin 81 MG chewable tablet Chew 81 mg Daily.     • clopidogrel (PLAVIX) 75 MG tablet Take 75 mg by mouth Daily.     • donepezil (ARICEPT) 10 MG tablet Take 10 mg by mouth Daily.  0   • famotidine (PEPCID) 20 MG tablet Take 20 mg by mouth 2 (Two) Times a Day.  0   • fluticasone (FLONASE) 50 MCG/ACT nasal spray 2 sprays into each nostril Daily.     • furosemide (LASIX) 20 MG tablet Take 1 tablet by mouth Daily. 90 tablet 3   • glimepiride (AMARYL) 2 MG tablet Take 2 mg  "by mouth Every Morning Before Breakfast.     • HYDROcodone-acetaminophen (NORCO) 7.5-325 MG per tablet Take 1 tablet by mouth Every 8 (Eight) Hours.     • ipratropium-albuterol (DUO-NEB) 0.5-2.5 mg/mL nebulizer Take 3 mL by nebulization 4 (Four) Times a Day. 120 vial 1   • isosorbide dinitrate (ISORDIL) 10 MG tablet Take 1 tablet by mouth 3 (Three) Times a Day. 90 tablet 0   • levoFLOXacin (LEVAQUIN) 750 MG tablet Take 1 tablet by mouth Daily. Indications: Pneumonia 5 tablet 0   • lisinopril (PRINIVIL,ZESTRIL) 10 MG tablet Take 1 tablet by mouth Daily. 30 tablet 0   • magnesium oxide (MAGOX) 400 (241.3 MG) MG tablet tablet Take 400 mg by mouth Daily.     • nitroglycerin (NITROSTAT) 0.4 MG SL tablet place 1 tablet under the tongue if needed every 5 minutes for hair...  (REFER TO PRESCRIPTION NOTES).  0   • O2 (OXYGEN) Inhale 2 L/min Every Night. W/ CPAP     • Red Yeast Rice 600 MG tablet Take 600 mg by mouth 2 (Two) Times a Day.     • Umeclidinium-Vilanterol 62.5-25 MCG/INH aerosol powder  Inhale 1 puff Daily. 1 each 11     No current facility-administered medications for this visit.         Objective:     Vitals:    10/27/17 1306   BP: 104/68   BP Location: Left arm   Patient Position: Sitting   Cuff Size: Adult   Pulse: 105   SpO2: 96%   Weight: 175 lb (79.4 kg)   Height: 67\" (170.2 cm)       Wt Readings from Last 3 Encounters:   10/27/17 175 lb (79.4 kg)   10/17/17 174 lb 12.8 oz (79.3 kg)   09/28/17 176 lb 6.4 oz (80 kg)        Physical Exam   Constitutional: He is oriented to person, place, and time. He appears well-developed and well-nourished. No distress.   HENT:   Head: Normocephalic.   Neck: No JVD present.   Cardiovascular: Normal rate, regular rhythm, S1 normal, S2 normal, normal heart sounds and intact distal pulses.    No murmur heard.  Pulmonary/Chest: Breath sounds normal. Tachypnea noted. No respiratory distress. He has no wheezes. He has no rales.   Abdominal: Bowel sounds are normal. He exhibits no " distension.   Musculoskeletal: Normal range of motion. He exhibits no edema.   Neurological: He is alert and oriented to person, place, and time.   Skin: Skin is warm and dry. No erythema.   Psychiatric: He has a normal mood and affect. His behavior is normal. Judgment and thought content normal.       Cardiographics  Echocardiogram: 3/9/2017  Interpretation Summary   · Left ventricular function is normal.  · Estimated EF appears to be in the range of 61 - 65%.  · Left ventricular diastolic dysfunction (grade I a) consistent with impaired relaxation and elevated left atrial pressures.  · Left ventricular wall thickness is consistent with moderate concentric hypertrophy.  · Right Ventricle: Normal cavity size, wall thickness, systolic function and septal motion noted  · Estimated right ventricular systolic pressure from tricuspid regurgitation is moderately elevated (45-55 mmHg).  · Mild to moderate pulmonary hypertension is present.  · There is no evidence of pericardial effusion.     JIMENEZO 3/2017  Study Description  Monitor hooked-up on 3/23/2017. The patient was monitored for 12 days 13 hours. Indications for this exam include palpitations. Average HR: 63. Min HR: 30. Max HR: 133.   Study Findings  No symptoms reported during the monitoring period. No complications noted. The predominant rhythm noted during the testing period was sinus rhythm. Premature atrial contractions occured rarely. There was no evidence of atrial arrhythmias. There were two episodes of supraventricular tachycardia. The peak heart rate was 124 beats per minute. Premature ventricular contractions occured rarely. There were no episodes of ventricular tachycardia. Pauses were noted in the sinoatrial node. There were 1 pauses. 3 seconds was the longest pause. No atrioventricular block noted.   Study Impressions  A relatively benign monitor study.     Normal ABIs, Normal venous duplex    RHC 6/6/2017  Right heart pressures:  RA:                  27/23 with a mean of 22                                    RV:                 64/22 with a mean of 24                                    PA:                  66/37 with a mean of 46                                    PCWP:            24                                                                   DP     Resistance:  SVR:               1590 dynes · sec/cm5              PVR: 622 dynes · sec/cm5     Saturations:  PA: 67%     Cardiac Outputs:  Thermal CO: 4.4  Thermal CI: 2.3  Isabell CO: 4.8  Isabell CI: 2.5     Medications:  1. 2% Lidocaine     Procedure Logistics:   Fluoro: 1.6 minutes     Structural:  RA pressure is elevated. With inspiration there is adequate inspiratory decrease.  RV diastolic pressure is elevated.  PA pressure is markedly elevated.  Wedge pressure is elevated. Pressures measured in both right and left wedge positions are similiar.        IMPRESSION:   1. Left atrial hypertension.  2. CpC-PAH  3. Clinically HFpEF  4. Elevated left-sided filling pressures.   5. Elevated right-sided filling pressures.     Imaging  Chest x-ray: 2017 @ [facility]  Result Impression      No active disease.   Result Narrative   Indication:  Short of breath, COPD.    PA and Lateral Chest:  The bones are intact.  The heart is normal.  There is an old discrete-appearing infiltrate in the left base with mild associated pleural thickening; this is unchanged from .  There is a granuloma in the right mid lung field.    No heart failure.     Lab Review    Ref. Range 10/17/2017 06:09   Glucose Latest Ref Range: 60 - 100 mg/dL 100   Sodium Latest Ref Range: 137 - 145 mmol/L 135 (L)   Potassium Latest Ref Range: 3.5 - 5.1 mmol/L 4.6   CO2 Latest Ref Range: 22.0 - 31.0 mmol/L 27.0   Chloride Latest Ref Range: 95 - 110 mmol/L 99   Anion Gap Latest Ref Range: 5.0 - 15.0 mmol/L 9.0   Creatinine Latest Ref Range: 0.70 - 1.30 mg/dL 1.14   BUN Latest Ref Range: 7 - 21 mg/dL 53 (H)   BUN/Creatinine Ratio Latest Ref Range:  "7.0 - 25.0  46.5 (H)   Calcium Latest Ref Range: 8.4 - 10.2 mg/dL 9.0   eGFR Non African Amer Latest Ref Range: 42 - 98 mL/min/1.73 61   WBC Latest Ref Range: 3.20 - 9.80 10*3/mm3 15.14 (H)   RBC Latest Ref Range: 4.37 - 5.74 10*6/mm3 4.06 (L)   Hemoglobin Latest Ref Range: 13.7 - 17.3 g/dL 12.4 (L)   Hematocrit Latest Ref Range: 39.0 - 49.0 % 37.9 (L)   RDW Latest Ref Range: 11.5 - 14.5 % 16.2 (H)   MCV Latest Ref Range: 80.0 - 98.0 fL 93.3   MCH Latest Ref Range: 26.5 - 34.0 pg 30.5   MCHC Latest Ref Range: 31.5 - 36.3 g/dL 32.7   MPV Latest Ref Range: 8.0 - 12.0 fL 10.9   Platelets Latest Ref Range: 150 - 450 10*3/mm3 243       The following portions of the patient's history were reviewed and updated as appropriate: allergies, current medications, past family history, past medical history, past social history, past surgical history and problem list.     Assessment/Plan:   Severe lung disease. COPD and PH. Breathing issue is COPD exacerbation and not HFpEF.  He is not in clinical CHF.  Complaints are unable to be addressed at this visit. He is \"tired of taking medicine\". He is \"tired of feeling bad\". I offered hospice for the 2nd time. They are absolutely unwilling to even discuss the possibility. Daughter is insistent that \"she will take him home with her if they can't care for himself\". I explained that unfortunately that may not be the case. He will need morphine/anxiety medications that Hospice specifically provides for relief during his final times.     Mr. Ham is not acutely exacerbated from either of his illnesses in my opinion. Should he have worsening symptoms he should present to the ER for evaluation.      Diagnosis Plan   1. Pulmonary HTN  Afterload reducing medication increased.  Lisinopril 10 mg daily- take 20 mg in the a.m.    Will titrate Isordil if needed     Oxygen 2L continuous       2. Centrilobular emphysema  COPD exacerbation.  Nebulizers 4 times a day as well as inhalers.       3. " "(HFpEF) heart failure with preserved ejection fraction   EF: 65%. NYHA Class IV, Stage C. Patient appears euvolemic and in a well perfused physiologic state. Hemodynamics are acceptable  BETA-BLOCKER:    Not given?:severe COPD, bradycardia  ACE/ARB: Lisinopril 10mg daily  ENTRESTO: no  DIURETIC: Lasix 20mg daily  ALDOSTERONT ANTAGONIST: Not currently indicated.  IMDUR/HYDRALAZINE: N/A  DIGOXIN: N/A  Fluid restriction: 2 L  Sodium restriction:2 grams       4. Benign essential hypertension  HTN, essential.   BP noted to be controlled today in office.    LVH: moderate.  LVEF:Normal.   Diastolic function:Abnormal:    DASH; medication compliance    Lisinopril 10mg daily  Isordil 10mg TID- Dr. Rey has changed to 30mg Imdur due to patient reported medication fatigue. Well, not he is reporting \"not feeling right\" since the change. So, I changed it back to the TID.            Follow up as scheduled with Rosa M next week for CARDIOLOGY/hospital DC. No CHF exacerbation.   "

## 2017-11-10 ENCOUNTER — OFFICE VISIT (OUTPATIENT)
Dept: CARDIOLOGY | Facility: CLINIC | Age: 82
End: 2017-11-10

## 2017-11-10 VITALS
WEIGHT: 176 LBS | DIASTOLIC BLOOD PRESSURE: 70 MMHG | SYSTOLIC BLOOD PRESSURE: 126 MMHG | HEART RATE: 80 BPM | BODY MASS INDEX: 27.62 KG/M2 | HEIGHT: 67 IN

## 2017-11-10 DIAGNOSIS — I48.92 ATRIAL FIBRILLATION AND FLUTTER (HCC): Chronic | ICD-10-CM

## 2017-11-10 DIAGNOSIS — I50.30 (HFPEF) HEART FAILURE WITH PRESERVED EJECTION FRACTION (HCC): Primary | ICD-10-CM

## 2017-11-10 DIAGNOSIS — E78.2 MIXED HYPERLIPIDEMIA: ICD-10-CM

## 2017-11-10 DIAGNOSIS — I10 ESSENTIAL HYPERTENSION: Chronic | ICD-10-CM

## 2017-11-10 DIAGNOSIS — I48.91 ATRIAL FIBRILLATION AND FLUTTER (HCC): Chronic | ICD-10-CM

## 2017-11-10 PROCEDURE — 99214 OFFICE O/P EST MOD 30 MIN: CPT | Performed by: INTERNAL MEDICINE

## 2017-11-10 RX ORDER — LISINOPRIL 10 MG/1
5 TABLET ORAL
Qty: 30 TABLET | Refills: 6
Start: 2017-11-10 | End: 2021-01-01 | Stop reason: HOSPADM

## 2017-11-10 NOTE — PROGRESS NOTES
Hasmukh Ham  84 y.o. male    11/10/2017  1. (HFpEF) heart failure with preserved ejection fraction    2. Atrial fibrillation and flutter    3. Essential hypertension    4. Mixed hyperlipidemia        History of Present Illness    Mr. Ham is an 84-year-old patient with long-standing history of  HFpEF, paroxysmal atrial fibrillation, COPD on home oxygen.  He is here to establish with me.  Apparently he has been seen by Dr. Maciel, and several other cardiologists in our system.  Patterns are there was a discussion about hospice but the patient and family is not ready for it.  He has had several hospital admissions for shortness of breath and the most recent one was for rash which the family strongly believes is secondary to penicillin.  They believe that the allergic reaction is what caused all his problems and worsening of shortness of breath.   He has persistent dyspnea.  He wears oxygen at night.  He uses his nebulizer treatments 4 times a day. He has had recent hospitalizations regarding COPD, Atrial flutter, and allergic reaction.    Apparently he has had problems with both Cardizem and metoprolol with bradycardia.  His last EKG in October 2017 showed sinus rhythm with right bundle branch block, left axis deviation, left and hypertrophy and nonspecific ST-T changes.  His past history was reviewed in detail.  Fortunately he seems to be tolerating his present combination of medicines.  He denied any chest pain or palpitation.  He has been unable to tolerate anticoagulation secondary to bleeding.        SUBJECTIVE    Allergies   Allergen Reactions   • Atorvastatin Anaphylaxis   • Penicillins Rash   • Pravastatin      Myalgia   • Azithromycin Rash   • Biaxin [Clarithromycin] Rash         Past Medical History:   Diagnosis Date   • Bilateral carotid artery stenosis    • Chronic obstructive pulmonary disease (COPD)    • Coronary arteriosclerosis    • Degenerative joint disease involving multiple joints    •  Dementia    • Diabetes mellitus    • Hyperlipidemia    • Hypertension    • Myocardial infarction          Past Surgical History:   Procedure Laterality Date   • CARDIAC CATHETERIZATION N/A 6/6/2017    Procedure: Right Heart Cath;  Surgeon: Jeff Maciel MD PhD;  Location: [facility] CATH INVASIVE LOCATION;  Service:    • CORONARY STENT PLACEMENT     • HERNIA REPAIR     • LUNG BIOPSY     • NECK SURGERY     • THORACOTOMY Left 1977   • VENTRAL HERNIA REPAIR           Family History   Problem Relation Age of Onset   • Hypertension Father          Social History     Social History   • Marital status:      Spouse name: N/A   • Number of children: N/A   • Years of education: N/A     Occupational History   • Not on file.     Social History Main Topics   • Smoking status: Former Smoker   • Smokeless tobacco: Never Used   • Alcohol use No   • Drug use: No   • Sexual activity: Not Currently      Comment:      Other Topics Concern   • Not on file     Social History Narrative         Current Outpatient Prescriptions   Medication Sig Dispense Refill   • albuterol (PROVENTIL) (2.5 MG/3ML) 0.083% nebulizer solution Take 2.5 mg by nebulization Every 4 (Four) Hours As Needed for Wheezing. 125 vial 11   • aspirin 81 MG chewable tablet Chew 81 mg Daily.     • clopidogrel (PLAVIX) 75 MG tablet Take 75 mg by mouth Daily.     • donepezil (ARICEPT) 10 MG tablet Take 10 mg by mouth Daily.  0   • famotidine (PEPCID) 20 MG tablet Take 20 mg by mouth 2 (Two) Times a Day.  0   • fluticasone (FLONASE) 50 MCG/ACT nasal spray 2 sprays into each nostril Daily.     • Fluticasone Furoate (ARNUITY ELLIPTA) 200 MCG/ACT aerosol powder  Inhale.     • furosemide (LASIX) 20 MG tablet Take 1 tablet by mouth Daily. 90 tablet 3   • glimepiride (AMARYL) 2 MG tablet Take 2 mg by mouth Every Morning Before Breakfast.     • HYDROcodone-acetaminophen (NORCO) 7.5-325 MG per tablet Take 1 tablet by mouth Every 8 (Eight) Hours.     •  "ipratropium-albuterol (DUO-NEB) 0.5-2.5 mg/mL nebulizer Take 3 mL by nebulization 4 (Four) Times a Day. 120 vial 1   • isosorbide dinitrate (ISORDIL) 10 MG tablet Take 1 tablet by mouth 3 (Three) Times a Day. 90 tablet 3   • lisinopril (PRINIVIL,ZESTRIL) 10 MG tablet Take 0.5 tablets by mouth Daily. 30 tablet 6   • magnesium oxide (MAGOX) 400 (241.3 MG) MG tablet tablet Take 400 mg by mouth Daily.     • nitroglycerin (NITROSTAT) 0.4 MG SL tablet place 1 tablet under the tongue if needed every 5 minutes for hair...  (REFER TO PRESCRIPTION NOTES).  0   • O2 (OXYGEN) Inhale 2 L/min Every Night. W/ CPAP     • Red Yeast Rice 600 MG tablet Take 600 mg by mouth 2 (Two) Times a Day.     • Umeclidinium-Vilanterol 62.5-25 MCG/INH aerosol powder  Inhale 1 puff Daily. 1 each 11     No current facility-administered medications for this visit.          OBJECTIVE    /70  Pulse 80  Ht 67\" (170.2 cm)  Wt 176 lb (79.8 kg)  BMI 27.57 kg/m2        Review of Systems     Constitutional:  Denies recent weight loss, weight gain, fever or chills     HENT:  Denies any hearing loss, epistaxis, hoarseness, or difficulty speaking.     Eyes: Wears eyeglasses or contact lenses     Respiratory:  Dyspnea NYHA 3, no cough, wheezing, or hemoptysis.     Cardiovascular: Negative for palpations, chest pain, orthopnea, PND, peripheral edema, syncope, or claudication.     Gastrointestinal:  Denies change in bowel habits, dyspepsia, ulcer disease, hematochezia, or melena.     Endocrine: Negative for cold intolerance, heat intolerance, polydipsia, polyphagia and polyuria.     Genitourinary: Negative.      Musculoskeletal: Denies any history of arthritic symptoms or back problems     Skin:  Denies any change in hair or nails, rashes, or skin lesions.     Allergic/Immunologic: Negative.  Negative for environmental allergies, food allergies and immunocompromised state.     Neurological:  ? Dementia    Hematological: h/o bleeding. ? Von Willebrand's " disease    Psychiatric/Behavioral: Denies any history of depression, substance abuse, or change in cognitive function.         Physical Exam     Constitutional: Cooperative, alert and oriented,  in no acute distress.  on home oxygen    HENT:   Head: Normocephalic, normal hair patterns, no masses or tenderness.  Ears, Nose, and Throat: No gross abnormalities. No pallor or cyanosis.   Eyes: EOMS intact, PERRL, conjunctivae and lids unremarkable. Fundoscopic exam and visual fields not performed.   Neck: No palpable masses or adenopathy, no thyromegaly, no JVD, carotid pulses are full and equal bilaterally and without  Bruits.     Cardiovascular: Regular rhythm, S1 and S2 normal, no S3 or S4.  No murmurs, gallops, or rubs detected.     Pulmonary/Chest: Chest: Increased AP diameter of the chest, no intercostal retraction, no use of accessory muscles.            Pulmonary: Normal breath sounds. No rales or ronchi.    Abdominal: Abdomen soft, bowel sounds normoactive, no masses, no hepatosplenomegaly, non-tender, no bruits.     Musculoskeletal: No deformities, clubbing, cyanosis, erythema, or edema observed.     Neurological: No gross motor or sensory deficits noted, affect appropriate, oriented to time, person, place.     Skin: Warm and dry to the touch, no apparent skin lesions or masses noted.     Psychiatric: He has a normal mood and affect. His behavior is normal. Judgment and thought content normal.         Procedures      Lab Results   Component Value Date    WBC 15.14 (H) 10/17/2017    HGB 12.4 (L) 10/17/2017    HCT 37.9 (L) 10/17/2017    MCV 93.3 10/17/2017     10/17/2017     Lab Results   Component Value Date    GLUCOSE 100 10/17/2017    BUN 53 (H) 10/17/2017    CREATININE 1.14 10/17/2017    EGFRIFNONA 61 10/17/2017    BCR 46.5 (H) 10/17/2017    CO2 27.0 10/17/2017    CALCIUM 9.0 10/17/2017    ALBUMIN 3.70 10/09/2017    LABIL2 1.5 10/09/2017    AST 21 10/09/2017    ALT 41 10/09/2017     Lab Results    Component Value Date    CHOL 170 10/10/2017    CHOL 174 03/03/2017     Lab Results   Component Value Date    TRIG 119 10/10/2017    TRIG 119 03/03/2017     Lab Results   Component Value Date    HDL 38 (L) 10/10/2017    HDL 47 (L) 03/03/2017     No results found for: LDLCALC  No results found for: LDL  No results found for: HDLLDLRATIO  No components found for: CHOLHDL  Lab Results   Component Value Date    HGBA1C 6.6 (H) 09/21/2017     Lab Results   Component Value Date    TSH 0.85 12/14/2016           ASSESSMENT AND PLAN    Mr. Ham has multiple medical issues as described above, fortunately is stable at the present time.  He feels that the present combination of medicines is working for him.  His present antiplatelet therapy with aspirin and Plavix, low-dose Lasix 20 mg daily, low-dose isosorbide dinitrate 10 mg 3 times a day, lisinopril 10 mg daily has been continued.  There was a combination and dosing is unusual, this seems to be working for him.  He has multiple comorbidities, and is at high risk for infections, reoccurrence of arrhythmia, angina.      Hasmukh was seen today for establish care.    Diagnoses and all orders for this visit:    (HFpEF) heart failure with preserved ejection fraction    Atrial fibrillation and flutter    Essential hypertension    Mixed hyperlipidemia    Other orders  -     lisinopril (PRINIVIL,ZESTRIL) 10 MG tablet; Take 0.5 tablets by mouth Daily.        Caitlyn Garcia MD  11/10/2017  5:44 PM

## 2017-12-13 ENCOUNTER — APPOINTMENT (OUTPATIENT)
Dept: GENERAL RADIOLOGY | Facility: HOSPITAL | Age: 82
End: 2017-12-13

## 2017-12-13 ENCOUNTER — HOSPITAL ENCOUNTER (INPATIENT)
Facility: HOSPITAL | Age: 82
LOS: 6 days | Discharge: HOME HEALTH | End: 2017-12-19
Attending: EMERGENCY MEDICINE | Admitting: FAMILY MEDICINE

## 2017-12-13 ENCOUNTER — APPOINTMENT (OUTPATIENT)
Dept: CT IMAGING | Facility: HOSPITAL | Age: 82
End: 2017-12-13

## 2017-12-13 DIAGNOSIS — R93.89 ABNORMAL X-RAY: ICD-10-CM

## 2017-12-13 DIAGNOSIS — R77.8 ELEVATED TROPONIN: ICD-10-CM

## 2017-12-13 DIAGNOSIS — Z74.09 IMPAIRED FUNCTIONAL MOBILITY, BALANCE, GAIT, AND ENDURANCE: Primary | ICD-10-CM

## 2017-12-13 DIAGNOSIS — R07.9 CHEST PAIN, UNSPECIFIED TYPE: ICD-10-CM

## 2017-12-13 DIAGNOSIS — Z74.09 IMPAIRED MOBILITY AND ADLS: ICD-10-CM

## 2017-12-13 DIAGNOSIS — Z78.9 IMPAIRED MOBILITY AND ADLS: ICD-10-CM

## 2017-12-13 LAB
ALBUMIN SERPL-MCNC: 4 G/DL (ref 3.4–4.8)
ALBUMIN/GLOB SERPL: 1.4 G/DL (ref 1.1–1.8)
ALP SERPL-CCNC: 56 U/L (ref 38–126)
ALT SERPL W P-5'-P-CCNC: 39 U/L (ref 21–72)
ANION GAP SERPL CALCULATED.3IONS-SCNC: 10 MMOL/L (ref 5–15)
APTT PPP: 31.5 SECONDS (ref 20–40.3)
AST SERPL-CCNC: 27 U/L (ref 17–59)
BASOPHILS # BLD AUTO: 0.02 10*3/MM3 (ref 0–0.2)
BASOPHILS NFR BLD AUTO: 0.2 % (ref 0–2)
BILIRUB SERPL-MCNC: 0.5 MG/DL (ref 0.2–1.3)
BUN BLD-MCNC: 31 MG/DL (ref 7–21)
BUN/CREAT SERPL: 28.4 (ref 7–25)
CALCIUM SPEC-SCNC: 9.4 MG/DL (ref 8.4–10.2)
CHLORIDE SERPL-SCNC: 103 MMOL/L (ref 95–110)
CO2 SERPL-SCNC: 26 MMOL/L (ref 22–31)
CREAT BLD-MCNC: 1.09 MG/DL (ref 0.7–1.3)
D-DIMER, QUANTITATIVE (MAD,POW, STR): 629 NG/ML (FEU) (ref 0–470)
D-LACTATE SERPL-SCNC: 1.4 MMOL/L (ref 0.5–2)
DEPRECATED RDW RBC AUTO: 54.8 FL (ref 35.1–43.9)
EOSINOPHIL # BLD AUTO: 0.1 10*3/MM3 (ref 0–0.7)
EOSINOPHIL NFR BLD AUTO: 0.9 % (ref 0–7)
ERYTHROCYTE [DISTWIDTH] IN BLOOD BY AUTOMATED COUNT: 16.1 % (ref 11.5–14.5)
GFR SERPL CREATININE-BSD FRML MDRD: 64 ML/MIN/1.73 (ref 42–98)
GLOBULIN UR ELPH-MCNC: 2.8 GM/DL (ref 2.3–3.5)
GLUCOSE BLD-MCNC: 82 MG/DL (ref 60–100)
GLUCOSE BLDC GLUCOMTR-MCNC: 106 MG/DL (ref 70–130)
GLUCOSE BLDC GLUCOMTR-MCNC: 114 MG/DL (ref 70–130)
HCT VFR BLD AUTO: 39.3 % (ref 39–49)
HGB BLD-MCNC: 12.5 G/DL (ref 13.7–17.3)
HOLD SPECIMEN: NORMAL
HOLD SPECIMEN: NORMAL
IMM GRANULOCYTES # BLD: 0.1 10*3/MM3 (ref 0–0.02)
IMM GRANULOCYTES NFR BLD: 0.9 % (ref 0–0.5)
INR PPP: 0.92 (ref 0.8–1.2)
LIPASE SERPL-CCNC: 212 U/L (ref 23–300)
LYMPHOCYTES # BLD AUTO: 2.21 10*3/MM3 (ref 0.6–4.2)
LYMPHOCYTES NFR BLD AUTO: 20.3 % (ref 10–50)
MCH RBC QN AUTO: 29.6 PG (ref 26.5–34)
MCHC RBC AUTO-ENTMCNC: 31.8 G/DL (ref 31.5–36.3)
MCV RBC AUTO: 93.1 FL (ref 80–98)
MONOCYTES # BLD AUTO: 0.77 10*3/MM3 (ref 0–0.9)
MONOCYTES NFR BLD AUTO: 7.1 % (ref 0–12)
NEUTROPHILS # BLD AUTO: 7.69 10*3/MM3 (ref 2–8.6)
NEUTROPHILS NFR BLD AUTO: 70.6 % (ref 37–80)
NT-PROBNP SERPL-MCNC: 215 PG/ML (ref 0–1800)
PLATELET # BLD AUTO: 211 10*3/MM3 (ref 150–450)
PMV BLD AUTO: 10.8 FL (ref 8–12)
POTASSIUM BLD-SCNC: 4.7 MMOL/L (ref 3.5–5.1)
PROT SERPL-MCNC: 6.8 G/DL (ref 6.3–8.6)
PROTHROMBIN TIME: 12.3 SECONDS (ref 11.1–15.3)
RBC # BLD AUTO: 4.22 10*6/MM3 (ref 4.37–5.74)
SODIUM BLD-SCNC: 139 MMOL/L (ref 137–145)
TROPONIN I SERPL-MCNC: 0.04 NG/ML
TROPONIN I SERPL-MCNC: 0.04 NG/ML
TROPONIN I SERPL-MCNC: 0.05 NG/ML
WBC NRBC COR # BLD: 10.89 10*3/MM3 (ref 3.2–9.8)
WHOLE BLOOD HOLD SPECIMEN: NORMAL

## 2017-12-13 PROCEDURE — 85610 PROTHROMBIN TIME: CPT | Performed by: EMERGENCY MEDICINE

## 2017-12-13 PROCEDURE — G0378 HOSPITAL OBSERVATION PER HR: HCPCS

## 2017-12-13 PROCEDURE — 94760 N-INVAS EAR/PLS OXIMETRY 1: CPT

## 2017-12-13 PROCEDURE — 85025 COMPLETE CBC W/AUTO DIFF WBC: CPT | Performed by: EMERGENCY MEDICINE

## 2017-12-13 PROCEDURE — 0 IOPAMIDOL PER 1 ML: Performed by: EMERGENCY MEDICINE

## 2017-12-13 PROCEDURE — 93005 ELECTROCARDIOGRAM TRACING: CPT | Performed by: EMERGENCY MEDICINE

## 2017-12-13 PROCEDURE — 82962 GLUCOSE BLOOD TEST: CPT

## 2017-12-13 PROCEDURE — 84484 ASSAY OF TROPONIN QUANT: CPT | Performed by: NURSE PRACTITIONER

## 2017-12-13 PROCEDURE — 83605 ASSAY OF LACTIC ACID: CPT | Performed by: NURSE PRACTITIONER

## 2017-12-13 PROCEDURE — 25010000002 LEVOFLOXACIN PER 250 MG: Performed by: NURSE PRACTITIONER

## 2017-12-13 PROCEDURE — 25010000002 METHYLPREDNISOLONE PER 125 MG: Performed by: NURSE PRACTITIONER

## 2017-12-13 PROCEDURE — 25010000002 FUROSEMIDE PER 20 MG: Performed by: NURSE PRACTITIONER

## 2017-12-13 PROCEDURE — 80053 COMPREHEN METABOLIC PANEL: CPT | Performed by: EMERGENCY MEDICINE

## 2017-12-13 PROCEDURE — 71275 CT ANGIOGRAPHY CHEST: CPT

## 2017-12-13 PROCEDURE — 85730 THROMBOPLASTIN TIME PARTIAL: CPT | Performed by: EMERGENCY MEDICINE

## 2017-12-13 PROCEDURE — 93010 ELECTROCARDIOGRAM REPORT: CPT | Performed by: INTERNAL MEDICINE

## 2017-12-13 PROCEDURE — 99285 EMERGENCY DEPT VISIT HI MDM: CPT

## 2017-12-13 PROCEDURE — 84484 ASSAY OF TROPONIN QUANT: CPT | Performed by: EMERGENCY MEDICINE

## 2017-12-13 PROCEDURE — 94799 UNLISTED PULMONARY SVC/PX: CPT

## 2017-12-13 PROCEDURE — 85379 FIBRIN DEGRADATION QUANT: CPT | Performed by: NURSE PRACTITIONER

## 2017-12-13 PROCEDURE — 71250 CT THORAX DX C-: CPT

## 2017-12-13 PROCEDURE — 71020 HC CHEST PA AND LATERAL: CPT

## 2017-12-13 PROCEDURE — 83880 ASSAY OF NATRIURETIC PEPTIDE: CPT | Performed by: EMERGENCY MEDICINE

## 2017-12-13 PROCEDURE — 83690 ASSAY OF LIPASE: CPT | Performed by: EMERGENCY MEDICINE

## 2017-12-13 RX ORDER — IPRATROPIUM BROMIDE AND ALBUTEROL SULFATE 2.5; .5 MG/3ML; MG/3ML
3 SOLUTION RESPIRATORY (INHALATION)
Status: DISCONTINUED | OUTPATIENT
Start: 2017-12-13 | End: 2017-12-19 | Stop reason: HOSPADM

## 2017-12-13 RX ORDER — FLUTICASONE PROPIONATE 50 MCG
2 SPRAY, SUSPENSION (ML) NASAL DAILY
Status: DISCONTINUED | OUTPATIENT
Start: 2017-12-13 | End: 2017-12-19 | Stop reason: HOSPADM

## 2017-12-13 RX ORDER — FAMOTIDINE 20 MG/1
20 TABLET, FILM COATED ORAL 2 TIMES DAILY
Status: DISCONTINUED | OUTPATIENT
Start: 2017-12-13 | End: 2017-12-13 | Stop reason: SDUPTHER

## 2017-12-13 RX ORDER — SODIUM CHLORIDE 0.9 % (FLUSH) 0.9 %
1-10 SYRINGE (ML) INJECTION AS NEEDED
Status: DISCONTINUED | OUTPATIENT
Start: 2017-12-13 | End: 2017-12-19 | Stop reason: HOSPADM

## 2017-12-13 RX ORDER — ASPIRIN 81 MG/1
81 TABLET, CHEWABLE ORAL DAILY
Status: DISCONTINUED | OUTPATIENT
Start: 2017-12-13 | End: 2017-12-19 | Stop reason: HOSPADM

## 2017-12-13 RX ORDER — FUROSEMIDE 10 MG/ML
40 INJECTION INTRAMUSCULAR; INTRAVENOUS EVERY 12 HOURS
Status: DISCONTINUED | OUTPATIENT
Start: 2017-12-13 | End: 2017-12-14

## 2017-12-13 RX ORDER — HYDROCODONE BITARTRATE AND ACETAMINOPHEN 7.5; 325 MG/1; MG/1
1 TABLET ORAL EVERY 6 HOURS PRN
Status: DISCONTINUED | OUTPATIENT
Start: 2017-12-13 | End: 2017-12-19 | Stop reason: HOSPADM

## 2017-12-13 RX ORDER — ALBUTEROL SULFATE 2.5 MG/3ML
2.5 SOLUTION RESPIRATORY (INHALATION) EVERY 4 HOURS PRN
Status: DISCONTINUED | OUTPATIENT
Start: 2017-12-13 | End: 2017-12-19 | Stop reason: HOSPADM

## 2017-12-13 RX ORDER — ACETAMINOPHEN 325 MG/1
650 TABLET ORAL EVERY 4 HOURS PRN
Status: DISCONTINUED | OUTPATIENT
Start: 2017-12-13 | End: 2017-12-19 | Stop reason: HOSPADM

## 2017-12-13 RX ORDER — LEVOFLOXACIN 5 MG/ML
500 INJECTION, SOLUTION INTRAVENOUS EVERY 24 HOURS
Status: DISCONTINUED | OUTPATIENT
Start: 2017-12-13 | End: 2017-12-15

## 2017-12-13 RX ORDER — IPRATROPIUM BROMIDE AND ALBUTEROL SULFATE 2.5; .5 MG/3ML; MG/3ML
3 SOLUTION RESPIRATORY (INHALATION)
Status: DISCONTINUED | OUTPATIENT
Start: 2017-12-13 | End: 2017-12-13 | Stop reason: SDUPTHER

## 2017-12-13 RX ORDER — LISINOPRIL 5 MG/1
5 TABLET ORAL
Status: DISCONTINUED | OUTPATIENT
Start: 2017-12-14 | End: 2017-12-19 | Stop reason: HOSPADM

## 2017-12-13 RX ORDER — IPRATROPIUM BROMIDE AND ALBUTEROL SULFATE 2.5; .5 MG/3ML; MG/3ML
3 SOLUTION RESPIRATORY (INHALATION) 4 TIMES DAILY
Status: DISCONTINUED | OUTPATIENT
Start: 2017-12-13 | End: 2017-12-13 | Stop reason: SDUPTHER

## 2017-12-13 RX ORDER — SODIUM CHLORIDE 0.9 % (FLUSH) 0.9 %
10 SYRINGE (ML) INJECTION AS NEEDED
Status: DISCONTINUED | OUTPATIENT
Start: 2017-12-13 | End: 2017-12-19 | Stop reason: HOSPADM

## 2017-12-13 RX ORDER — DONEPEZIL HYDROCHLORIDE 10 MG/1
10 TABLET, FILM COATED ORAL DAILY
Status: DISCONTINUED | OUTPATIENT
Start: 2017-12-13 | End: 2017-12-19 | Stop reason: HOSPADM

## 2017-12-13 RX ORDER — DEXTROSE MONOHYDRATE 25 G/50ML
25 INJECTION, SOLUTION INTRAVENOUS
Status: DISCONTINUED | OUTPATIENT
Start: 2017-12-13 | End: 2017-12-19 | Stop reason: HOSPADM

## 2017-12-13 RX ORDER — NICOTINE POLACRILEX 4 MG
15 LOZENGE BUCCAL
Status: DISCONTINUED | OUTPATIENT
Start: 2017-12-13 | End: 2017-12-19 | Stop reason: HOSPADM

## 2017-12-13 RX ORDER — NITROGLYCERIN 0.4 MG/1
0.4 TABLET SUBLINGUAL
Status: DISCONTINUED | OUTPATIENT
Start: 2017-12-13 | End: 2017-12-19 | Stop reason: HOSPADM

## 2017-12-13 RX ORDER — CLOPIDOGREL BISULFATE 75 MG/1
75 TABLET ORAL DAILY
Status: DISCONTINUED | OUTPATIENT
Start: 2017-12-13 | End: 2017-12-19 | Stop reason: HOSPADM

## 2017-12-13 RX ORDER — ISOSORBIDE DINITRATE 5 MG/1
10 TABLET ORAL 3 TIMES DAILY
Status: DISCONTINUED | OUTPATIENT
Start: 2017-12-13 | End: 2017-12-14

## 2017-12-13 RX ORDER — FAMOTIDINE 40 MG/1
40 TABLET, FILM COATED ORAL DAILY
Status: DISCONTINUED | OUTPATIENT
Start: 2017-12-14 | End: 2017-12-19 | Stop reason: HOSPADM

## 2017-12-13 RX ORDER — ONDANSETRON 2 MG/ML
4 INJECTION INTRAMUSCULAR; INTRAVENOUS EVERY 6 HOURS PRN
Status: DISCONTINUED | OUTPATIENT
Start: 2017-12-13 | End: 2017-12-19 | Stop reason: HOSPADM

## 2017-12-13 RX ORDER — METHYLPREDNISOLONE SODIUM SUCCINATE 125 MG/2ML
60 INJECTION, POWDER, LYOPHILIZED, FOR SOLUTION INTRAMUSCULAR; INTRAVENOUS EVERY 6 HOURS
Status: DISCONTINUED | OUTPATIENT
Start: 2017-12-13 | End: 2017-12-15

## 2017-12-13 RX ORDER — GLIPIZIDE 5 MG/1
5 TABLET ORAL
Status: DISCONTINUED | OUTPATIENT
Start: 2017-12-14 | End: 2017-12-19 | Stop reason: HOSPADM

## 2017-12-13 RX ADMIN — CLOPIDOGREL BISULFATE 75 MG: 75 TABLET ORAL at 18:15

## 2017-12-13 RX ADMIN — HYDROCODONE BITARTRATE AND ACETAMINOPHEN 1 TABLET: 7.5; 325 TABLET ORAL at 18:20

## 2017-12-13 RX ADMIN — IPRATROPIUM BROMIDE AND ALBUTEROL SULFATE 3 ML: 2.5; .5 SOLUTION RESPIRATORY (INHALATION) at 16:51

## 2017-12-13 RX ADMIN — IOPAMIDOL 78 ML: 755 INJECTION, SOLUTION INTRAVENOUS at 14:01

## 2017-12-13 RX ADMIN — FUROSEMIDE 40 MG: 10 INJECTION, SOLUTION INTRAMUSCULAR; INTRAVENOUS at 15:40

## 2017-12-13 RX ADMIN — MAGNESIUM OXIDE TAB 400 MG (241.3 MG ELEMENTAL MG) 400 MG: 400 (241.3 MG) TAB at 18:15

## 2017-12-13 RX ADMIN — IPRATROPIUM BROMIDE AND ALBUTEROL SULFATE 3 ML: 2.5; .5 SOLUTION RESPIRATORY (INHALATION) at 21:14

## 2017-12-13 RX ADMIN — NITROGLYCERIN 1 INCH: 20 OINTMENT TOPICAL at 09:21

## 2017-12-13 RX ADMIN — ISOSORBIDE DINITRATE 10 MG: 5 TABLET ORAL at 20:52

## 2017-12-13 RX ADMIN — DONEPEZIL HYDROCHLORIDE 10 MG: 10 TABLET ORAL at 18:15

## 2017-12-13 RX ADMIN — LEVOFLOXACIN 500 MG: 5 INJECTION, SOLUTION INTRAVENOUS at 18:17

## 2017-12-13 RX ADMIN — METHYLPREDNISOLONE SODIUM SUCCINATE 60 MG: 125 INJECTION, POWDER, FOR SOLUTION INTRAMUSCULAR; INTRAVENOUS at 23:00

## 2017-12-13 RX ADMIN — METHYLPREDNISOLONE SODIUM SUCCINATE 60 MG: 125 INJECTION, POWDER, FOR SOLUTION INTRAMUSCULAR; INTRAVENOUS at 18:16

## 2017-12-13 RX ADMIN — FLUTICASONE PROPIONATE 2 SPRAY: 50 SPRAY, METERED NASAL at 18:16

## 2017-12-13 NOTE — H&P
[facility] Medicine Admission      Date of Admission: 12/13/2017      Primary Care Physician: Paolo Rey MD      Chief Complaint: Chest pain and shortness of air    HPI: This is an 84-year-old gentleman that presented to [facility] on 12/13/2017 with complaints of shortness of air and chest pain. The patient has known COPD, HFpEF, and paroxysmal atrial fibrillation.  The patient has chronic persistent dyspnea and has home oxygen.  From previous notes, there have been discussions about hospice, but the patient and family were not ready to address that at this point.  The patient states he would like to be a full code.  The patient was found to be bradycardic in the emergency room, and during my examination with him his heart rate dropped to 34 without any symptoms from the patient.  Metoprolol and Cardizem have previously been discontinued due to bradycardia and the patient currently takes no medications that should cause this.      Concurrent Medical History:  has a past medical history of Bilateral carotid artery stenosis; CHF (congestive heart failure); Chronic obstructive pulmonary disease (COPD); Coronary arteriosclerosis; Degenerative joint disease involving multiple joints; Dementia; Diabetes mellitus; Hyperlipidemia; Hypertension; and Myocardial infarction.    Past Surgical History:  has a past surgical history that includes Hernia repair; Lung biopsy; Ventral hernia repair; Thoracotomy (Left, 1977); Cardiac catheterization (N/A, 6/6/2017); Coronary stent placement; and Neck surgery.    Family History: family history includes Hypertension in his father.    Social History:  reports that he has quit smoking. He has never used smokeless tobacco. He reports that he does not drink alcohol or use illicit drugs.    Allergies:   Allergies   Allergen Reactions   • Atorvastatin Anaphylaxis   • Penicillins Rash   • Pravastatin      Myalgia   •  Azithromycin Rash   • Biaxin [Clarithromycin] Rash       Medications:   Prior to Admission medications    Medication Sig Start Date End Date Taking? Authorizing Provider   albuterol (PROVENTIL) (2.5 MG/3ML) 0.083% nebulizer solution Take 2.5 mg by nebulization Every 4 (Four) Hours As Needed for Wheezing. 9/28/17  Yes Jc Alba MD   aspirin 81 MG chewable tablet Chew 81 mg Daily.   Yes Historical Provider, MD   clopidogrel (PLAVIX) 75 MG tablet Take 75 mg by mouth Daily. 2/3/16  Yes Historical Provider, MD   fluticasone (FLONASE) 50 MCG/ACT nasal spray 2 sprays into each nostril Daily.   Yes Historical Provider, MD   Fluticasone Furoate (ARNUITY ELLIPTA) 200 MCG/ACT aerosol powder  Inhale.   Yes Historical Provider, MD   furosemide (LASIX) 20 MG tablet Take 1 tablet by mouth Daily. 3/3/17  Yes Jeff Maciel MD PhD   glimepiride (AMARYL) 2 MG tablet Take 2 mg by mouth Every Morning Before Breakfast.   Yes Historical Provider, MD   HYDROcodone-acetaminophen (NORCO) 7.5-325 MG per tablet Take 1 tablet by mouth Every 8 (Eight) Hours. 12/27/16  Yes Historical Provider, MD   ipratropium-albuterol (DUO-NEB) 0.5-2.5 mg/mL nebulizer Take 3 mL by nebulization 4 (Four) Times a Day. 9/28/17  Yes Jc Alba MD   isosorbide dinitrate (ISORDIL) 10 MG tablet Take 1 tablet by mouth 3 (Three) Times a Day. 10/27/17  Yes EVE Sewell   lisinopril (PRINIVIL,ZESTRIL) 10 MG tablet Take 0.5 tablets by mouth Daily. 11/10/17  Yes Caitlyn Garcia MD   magnesium oxide (MAGOX) 400 (241.3 MG) MG tablet tablet Take 400 mg by mouth Daily.   Yes Historical Provider, MD   nitroglycerin (NITROSTAT) 0.4 MG SL tablet place 1 tablet under the tongue if needed every 5 minutes for hair...  (REFER TO PRESCRIPTION NOTES). 1/11/17  Yes Historical Provider, MD   O2 (OXYGEN) Inhale 2 L/min Every Night. W/ CPAP   Yes Historical Provider, MD   Red Yeast Rice 600 MG tablet Take 600 mg by mouth 2 (Two) Times a Day.   Yes  Historical Provider, MD   Umeclidinium-Vilanterol 62.5-25 MCG/INH aerosol powder  Inhale 1 puff Daily. 3/3/17  Yes Brea Higginbotham MD   donepezil (ARICEPT) 10 MG tablet Take 10 mg by mouth Daily. 11/12/16 12/13/17 Yes Historical Provider, MD   famotidine (PEPCID) 20 MG tablet Take 20 mg by mouth 2 (Two) Times a Day. 10/20/16 12/13/17 Yes Historical Provider, MD       Review of Systems:  Review of Systems   Respiratory: Positive for shortness of breath.    Cardiovascular: Positive for chest pain.   All other systems reviewed and are negative.     Otherwise complete ROS is negative except as mentioned above.    Physical Exam:   Temp:  [97.7 °F (36.5 °C)] 97.7 °F (36.5 °C)  Heart Rate:  [43-55] 43  Resp:  [18-20] 18  BP: (127-160)/(57-76) 133/63  Physical Exam   Constitutional: He is oriented to person, place, and time. He appears well-developed and well-nourished.   HENT:   Head: Normocephalic and atraumatic.   Eyes: EOM are normal. Pupils are equal, round, and reactive to light.   Neck: Normal range of motion. Neck supple.   Cardiovascular: Normal rate and regular rhythm.    Pulmonary/Chest: Effort normal.   Abdominal: Soft. Bowel sounds are normal.   Musculoskeletal: Normal range of motion.   Neurological: He is alert and oriented to person, place, and time.   Skin: Skin is warm and dry.   Psychiatric: He has a normal mood and affect.     Results Reviewed:  I have personally reviewed current lab, radiology, and data and agree with results.  Lab Results (last 24 hours)     Procedure Component Value Units Date/Time    CBC & Differential [[id number]] Collected:  12/13/17 0840    Specimen:  Blood Updated:  12/13/17 0844    Narrative:       The following orders were created for panel order CBC & Differential.  Procedure                               Abnormality         Status                     ---------                               -----------         ------                     CBC Auto Differential[[id number]]         Abnormal            Final result                 Please view results for these tests on the individual orders.    CBC Auto Differential [[id number]]  (Abnormal) Collected:  12/13/17 0840    Specimen:  Blood Updated:  12/13/17 0844     WBC 10.89 (H) 10*3/mm3      RBC 4.22 (L) 10*6/mm3      Hemoglobin 12.5 (L) g/dL      Hematocrit 39.3 %      MCV 93.1 fL      MCH 29.6 pg      MCHC 31.8 g/dL      RDW 16.1 (H) %      RDW-SD 54.8 (H) fl      MPV 10.8 fL      Platelets 211 10*3/mm3      Neutrophil % 70.6 %      Lymphocyte % 20.3 %      Monocyte % 7.1 %      Eosinophil % 0.9 %      Basophil % 0.2 %      Immature Grans % 0.9 (H) %      Neutrophils, Absolute 7.69 10*3/mm3      Lymphocytes, Absolute 2.21 10*3/mm3      Monocytes, Absolute 0.77 10*3/mm3      Eosinophils, Absolute 0.10 10*3/mm3      Basophils, Absolute 0.02 10*3/mm3      Immature Grans, Absolute 0.10 (H) 10*3/mm3     Comprehensive Metabolic Panel [[id number]]  (Abnormal) Collected:  12/13/17 0840    Specimen:  Blood Updated:  12/13/17 0856     Glucose 82 mg/dL      BUN 31 (H) mg/dL      Creatinine 1.09 mg/dL      Sodium 139 mmol/L      Potassium 4.7 mmol/L      Chloride 103 mmol/L      CO2 26.0 mmol/L      Calcium 9.4 mg/dL      Total Protein 6.8 g/dL      Albumin 4.00 g/dL      ALT (SGPT) 39 U/L      AST (SGOT) 27 U/L      Alkaline Phosphatase 56 U/L      Total Bilirubin 0.5 mg/dL      eGFR Non African Amer 64 mL/min/1.73      Globulin 2.8 gm/dL      A/G Ratio 1.4 g/dL      BUN/Creatinine Ratio 28.4 (H)     Anion Gap 10.0 mmol/L     Narrative:       The MDRD GFR formula is only valid for adults with stable renal function between ages 18 and 70.    Lipase [[id number]]  (Normal) Collected:  12/13/17 0840    Specimen:  Blood Updated:  12/13/17 0856     Lipase 212 U/L     BNP [[id number]]  (Normal) Collected:  12/13/17 0840    Specimen:  Blood Updated:  12/13/17 0909     proBNP 215.0 pg/mL     Troponin [[id number]]  (Abnormal) Collected:  12/13/17 0840    Specimen:   Blood Updated:  12/13/17 0909     Troponin I 0.042 (H) ng/mL     aPTT [[id number]]  (Normal) Collected:  12/13/17 0840    Specimen:  Blood Updated:  12/13/17 0915     PTT 31.5 seconds     Narrative:       The recommended Heparin therapeutic range is 68-97 seconds.    Protime-INR [[id number]]  (Normal) Collected:  12/13/17 0840    Specimen:  Blood Updated:  12/13/17 0915     Protime 12.3 Seconds      INR 0.92    Narrative:       Therapeutic range for most indications is 2.0-3.0 INR,  or 2.5-3.5 for mechanical heart valves.    [facility] Draw [[id number]] Collected:  12/13/17 0840    Specimen:  Blood Updated:  12/13/17 0946    Narrative:       The following orders were created for panel order [facility] Draw.  Procedure                               Abnormality         Status                     ---------                               -----------         ------                     Light Blue Top[[id number]]                                   Final result               Green Top (Gel)[[id number]]                                  Final result               Lavender Top[[id number]]                                     Final result               Gold Top - SST[[id number]]                                   Final result                 Please view results for these tests on the individual orders.    Light Blue Top [[id number]] Collected:  12/13/17 0840    Specimen:  Blood Updated:  12/13/17 0946     Extra Tube hold for add-on      Auto resulted       Green Top (Gel) [[id number]] Collected:  12/13/17 0840    Specimen:  Blood Updated:  12/13/17 0946     Extra Tube Hold for add-ons.      Auto resulted.       Lavender Top [[id number]] Collected:  12/13/17 0840    Specimen:  Blood Updated:  12/13/17 0946     Extra Tube hold for add-on      Auto resulted       Gold Top - SST [[id number]] Collected:  12/13/17 0840    Specimen:  Blood Updated:  12/13/17 0946     Extra Tube Hold for add-ons.      Auto resulted.       D-dimer, Quantitative [[id number]]   (Abnormal) Collected:  12/13/17 0840    Specimen:  Blood Updated:  12/13/17 1137     D-Dimer, Quantitative 629 (H) ng/mL (FEU)     Narrative:       Dimer values <500 ng/ml FEU are FDA approved as aid in diagnosis of deep venous thrombosis and pulmonary embolism.  This test should not be used in an exclusion strategy with pretest probability alone.    A recent guideline regarding diagnosis for pulmonary thomboembolism recommends an adjusted exclusion criterion of age x 10 ng/ml FEU for patients >50 years of age (Jenny Intern Med 2015; 163: 701-711).        Imaging Results (last 24 hours)     Procedure Component Value Units Date/Time    XR Chest 2 View [[id number]] Collected:  12/13/17 0856     Updated:  12/13/17 0919    Narrative:       Chest pain.    Chest, AP and lateral views.    Comparison is made with study dated to October 12, 2017.    Examination again revealed stable density in the left midlung  field and left lung base consistent with atelectasis or  infiltrate. There are mild bilateral interstitial markings. No  pleural effusion is identified. No acute bony abnormality is  seen.      Impression:       CONCLUSION: Persistent density in the left midlung field and left  lung base. Recommend CT chest for further evaluation.    Electronically signed by:  Moisés Lopez MD  12/13/2017 9:18  AM CST Workstation: [id number]    CT Chest Without Contrast [[id number]] Collected:  12/13/17 1147     Updated:  12/13/17 1208    Narrative:         EXAMINATION:  Computed Tomography      REGION:    Chest         INDICATION:   cp, xray abnormality, R07.9 Chest pain, unspecified  R74.8 Abnormal levels of other serum enzymes R93.8 Abnormal  findings on diagnostic imaging of other specified body structures     HISTORY:  PAUL[place]. IMAGING:  CXR: 12/13/17           TECHNIQUE:         - reconstructions:    axial, coronal, sagittal         - contrast:    oral:  none  ;  intravenous:  None  This exam was performed according to the  departmental  dose-optimization program which includes automated exposure  control, adjustment of the mA and/or kV according to patient size  and/or use of iterative reconstruction technique.             COMMENTS:              PULMONARY PARENCHYMA:            - air spaces:      negative            - interstitium:     grossly within normal limits for age              - misc.:      no pulmonary nodules or mass           MEDIASTINUM / RIO:           - heart:      normal size , no pericardial fluid           - aorta/great vessels:    normal caliber and configuration  for age          - misc.:      no mediastinal mass / significant adenopathy           PLEURAL COMPARTMENT:            - misc.:      no pleural fluid or mass          MISC:          - inferior neck:     negative          - osseous/body wall:  negative          - subdiaphragmatic:    as visualized, limited, grossly  unremarkable          - misc:  .       Impression:       CONCLUSION:          1. No pulmonary mass or airspace disease visualized.  2. Chest radiographic findings likely correspond to  pericardial/pleural fat.                          Electronically signed by:  GOOD Joyce MD  12/13/2017 12:07  PM CST Workstation: [id number]    CT Angiogram Chest With Contrast [[id number]] Collected:  12/13/17 1355     Updated:  12/13/17 1428    Narrative:         EXAM:  Computed Tomography with CTA         REGION:  Chest       INDICATION:   Dyspnea, elevated d-dimer, chest pain    - rule out pulmonary embolism       CLINICAL HISTORY:  CORRELATIVE IMAGING:  None                         TECHNIQUE:     - PE / vascular protocol     - reconstructions:  axial, coronal, sagittal, obliques     - computer-generated 3D reconstructions (MIPS) were performed.     - contrast:  intravenous Isovue 370, 78 mL                                 This exam was performed according to the departmental  dose-optimization program which includes automated exposure  control, adjustment  of the mA and/or kV according to patient size  and/or use of iterative reconstruction technique.         COMMENTS:    - Pulmonary arterial system:      - Main pulmonary artery trunk:  negative      - Left, right main pulmonary arteries: negative      - Lobar arteries: negative       - Segmental arteries: negative      - Systemic vascularity (as visualized):        - Aorta:  grossly negative / normal caliber / no dissection        - roots of great vessels:  grossly negative / normal  caliber        - SVC / IVC:  grossly negative / normal caliber     - Misc (limited visualization):      - pulmonary parenchyma:  negative      - pleura:  negative      - mediastinal / danyelle:  negative      - neck, inferior:  grossly wnl      - subdiaphragmatic structures:  grossly negative (limited  evaluation)       - osseous:      - misc:       .        Impression:       CONCLUSION:          1.  No evidence of pulmonary embolism.            2.  No evidence of pathology associated with the visualized  aorta.                                              Electronically signed by:  GOOD Joyce MD  12/13/2017 2:27  PM CST Workstation: [id number]              Assessment/ Plan:  1.  Chest pain/ HFpEF:  Consult to Dr. Garcia and CHF clinic (currently at patient).  Will start IV Lasix.  Sodium and fluid restrictions. Troponins are chronically elevated.  Daily weights.   2.  COPD:  IV steroids, duonebs and oxygen.  Levaquin started due to leukocytosis. PT/OT.  3.  Hypertension: Continue lisinopril.   4.  Paroxsymal atrial fibrillation:  Unable to tolerate anticoagulation due to bleeding. TEDs/SCDs.  5.  Diabetes mellitis, type II:  SSI initiated.    6.  Bradycardia:  Will give Atropine if the patient becomes symptomatic.   7.  Lactic acid elevation:  Serial monitoring until resolved.     I discussed the patients findings and my recommendations with: Patient and wife.      This document has been electronically signed by EVE Gomez  on December 13, 2017 3:16 PM

## 2017-12-13 NOTE — PLAN OF CARE
Problem: Acute Coronary Syndrome (ACS) (Adult)  Goal: Signs and Symptoms of Listed Potential Problems Will be Absent or Manageable (Acute Coronary Syndrome)  Outcome: Ongoing (interventions implemented as appropriate)    12/13/17 8541   Acute Coronary Syndrome (ACS)   Problems Assessed (Acute Coronary Syndrome (ACS)) all   Problems Present (Acute Coronary Syndrome (ACS)) dysrhythmia/arrhythmia         Problem: Pain, Acute (Adult)  Goal: Identify Related Risk Factors and Signs and Symptoms  Outcome: Ongoing (interventions implemented as appropriate)  Goal: Acceptable Pain Control/Comfort Level  Outcome: Ongoing (interventions implemented as appropriate)    Problem: Patient Care Overview (Adult)  Goal: Plan of Care Review  Outcome: Ongoing (interventions implemented as appropriate)  Goal: Adult Individualization and Mutuality  Outcome: Ongoing (interventions implemented as appropriate)  Goal: Discharge Needs Assessment  Outcome: Ongoing (interventions implemented as appropriate)

## 2017-12-13 NOTE — ED PROVIDER NOTES
Subjective   History of Present Illness  Patient is an 84-year-old male who per her eyes via EMS.  Apparently said chest pain in morning past couple mornings.  He is asymptomatic at this time.  He is here to be checked out to 'find out if this is his heart'  Patient of Dr. Garcia.  He has a history of 5 stents.  Also has a history of CHF and COPD.  Review of his previous medical records reveal that there has been some discussion of hospice placement.  Review of Systems   Respiratory: Positive for shortness of breath. Negative for apnea, cough, choking, chest tightness, wheezing and stridor.    Cardiovascular: Positive for chest pain. Negative for palpitations and leg swelling.   Gastrointestinal: Negative for abdominal distention, abdominal pain, anal bleeding, blood in stool, constipation, diarrhea, nausea, rectal pain and vomiting.   All other systems reviewed and are negative.      Past Medical History:   Diagnosis Date   • Bilateral carotid artery stenosis    • CHF (congestive heart failure)    • Chronic obstructive pulmonary disease (COPD)    • Coronary arteriosclerosis    • Degenerative joint disease involving multiple joints    • Dementia    • Diabetes mellitus    • Hyperlipidemia    • Hypertension    • Myocardial infarction        Allergies   Allergen Reactions   • Atorvastatin Anaphylaxis   • Penicillins Rash   • Pravastatin      Myalgia   • Azithromycin Rash   • Biaxin [Clarithromycin] Rash       Past Surgical History:   Procedure Laterality Date   • CARDIAC CATHETERIZATION N/A 6/6/2017    Procedure: Right Heart Cath;  Surgeon: Jeff Maciel MD PhD;  Location: [facility] CATH INVASIVE LOCATION;  Service:    • CORONARY STENT PLACEMENT     • HERNIA REPAIR     • LUNG BIOPSY     • NECK SURGERY     • THORACOTOMY Left 1977   • VENTRAL HERNIA REPAIR         Family History   Problem Relation Age of Onset   • Hypertension Father        Social History     Social History   • Marital status:      Spouse  name: N/A   • Number of children: N/A   • Years of education: N/A     Social History Main Topics   • Smoking status: Former Smoker   • Smokeless tobacco: Never Used   • Alcohol use No   • Drug use: No   • Sexual activity: Not Currently      Comment:      Other Topics Concern   • None     Social History Narrative   • None           Objective   Physical Exam   Constitutional: He is oriented to person, place, and time. He appears well-developed and well-nourished. No distress.   HENT:   Head: Normocephalic and atraumatic.   Mouth/Throat: Oropharynx is clear and moist.   Neck: Normal range of motion. Neck supple. No JVD present.   Cardiovascular:   Bradycardia 38 however with stimulation his heart rate clara to 60.  Irregularly irregular rhythm   Pulmonary/Chest:   Bilateral coarseness and rhonchi.  Patient had no orthopnea.  He also does not appear to have any current dyspnea.   Abdominal: Soft. Bowel sounds are normal. He exhibits no distension. There is no tenderness. There is no rebound and no guarding.   Easily reducible superior ventral hernia.   Musculoskeletal: He exhibits no edema.   Neurological: He is alert and oriented to person, place, and time. He exhibits normal muscle tone.   Skin: Skin is warm and dry. No rash noted. He is not diaphoretic. No erythema. No pallor.   Psychiatric: He has a normal mood and affect. His behavior is normal. Judgment and thought content normal.   Nursing note and vitals reviewed.      ECG 12 Lead    Date/Time: 12/13/2017 8:15 AM  Performed by: ANURAG RUFFIN  Authorized by: ANURAG RUFFIN   Interpreted by physician  Comparison: not compared with previous ECG   Rhythm: sinus bradycardia and atrial fibrillation  Rate: normal  BPM: 38  QRS axis: left  Conduction: incomplete RBBB  Clinical impression: abnormal ECG  Comments: No acute injury pattern.               ED Course  ED Course    Patient initially told me he was pain-free.  Then he mentioned to nursing staff that he  was still 7 out of 10.  Inconsistent answers from him.  However notable bradycardia on arrival.  Slightly elevated troponin of 0.042.  He has an abnormal chest x-ray.  Radiologist suggests CT chest.  I spoke with Dr. Parth Birmingham of the hospitalist service who will admit.              MDM  Number of Diagnoses or Management Options     Amount and/or Complexity of Data Reviewed  Clinical lab tests: ordered and reviewed  Tests in the radiology section of CPT®: ordered and reviewed  Tests in the medicine section of CPT®: ordered and reviewed  Decide to obtain previous medical records or to obtain history from someone other than the patient: yes  Review and summarize past medical records: yes  Independent visualization of images, tracings, or specimens: yes    Risk of Complications, Morbidity, and/or Mortality  Presenting problems: high  Diagnostic procedures: high  Management options: high        Final diagnoses:   Chest pain, unspecified type   Elevated troponin   Abnormal x-ray            Jose Angel Galarza MD  12/13/17 9316

## 2017-12-14 ENCOUNTER — APPOINTMENT (OUTPATIENT)
Dept: CARDIOLOGY | Facility: HOSPITAL | Age: 82
End: 2017-12-14

## 2017-12-14 LAB
ANION GAP SERPL CALCULATED.3IONS-SCNC: 14 MMOL/L (ref 5–15)
BASOPHILS # BLD AUTO: 0.01 10*3/MM3 (ref 0–0.2)
BASOPHILS NFR BLD AUTO: 0.1 % (ref 0–2)
BH CV ECHO MEAS - ACS: 2.2 CM
BH CV ECHO MEAS - AO ISTHMUS: 2.7 CM
BH CV ECHO MEAS - AO MAX PG (FULL): 0.89 MMHG
BH CV ECHO MEAS - AO MAX PG: 6 MMHG
BH CV ECHO MEAS - AO MEAN PG (FULL): 0.78 MMHG
BH CV ECHO MEAS - AO MEAN PG: 3.3 MMHG
BH CV ECHO MEAS - AO ROOT AREA (BSA CORRECTED): 1.7
BH CV ECHO MEAS - AO ROOT AREA: 8.3 CM^2
BH CV ECHO MEAS - AO ROOT DIAM: 3.3 CM
BH CV ECHO MEAS - AO V2 MAX: 122.7 CM/SEC
BH CV ECHO MEAS - AO V2 MEAN: 84.9 CM/SEC
BH CV ECHO MEAS - AO V2 VTI: 15.5 CM
BH CV ECHO MEAS - ASC AORTA: 2.9 CM
BH CV ECHO MEAS - AVA(I,A): 4.2 CM^2
BH CV ECHO MEAS - AVA(I,D): 4.2 CM^2
BH CV ECHO MEAS - AVA(V,A): 3.8 CM^2
BH CV ECHO MEAS - AVA(V,D): 3.8 CM^2
BH CV ECHO MEAS - BSA(HAYCOCK): 1.9 M^2
BH CV ECHO MEAS - BSA: 1.9 M^2
BH CV ECHO MEAS - BZI_BMI: 26 KILOGRAMS/M^2
BH CV ECHO MEAS - BZI_METRIC_HEIGHT: 170.2 CM
BH CV ECHO MEAS - BZI_METRIC_WEIGHT: 75.3 KG
BH CV ECHO MEAS - EDV(CUBED): 52.8 ML
BH CV ECHO MEAS - EDV(TEICH): 60.1 ML
BH CV ECHO MEAS - EF(CUBED): 57.9 %
BH CV ECHO MEAS - EF(TEICH): 50.3 %
BH CV ECHO MEAS - ESV(CUBED): 22.3 ML
BH CV ECHO MEAS - ESV(TEICH): 29.9 ML
BH CV ECHO MEAS - FS: 25 %
BH CV ECHO MEAS - IVS/LVPW: 2.2
BH CV ECHO MEAS - IVSD: 1.8 CM
BH CV ECHO MEAS - LA DIMENSION: 3.7 CM
BH CV ECHO MEAS - LA/AO: 1.1
BH CV ECHO MEAS - LV MASS(C)D: 168.8 GRAMS
BH CV ECHO MEAS - LV MASS(C)DI: 90.4 GRAMS/M^2
BH CV ECHO MEAS - LV MAX PG: 5.1 MMHG
BH CV ECHO MEAS - LV MEAN PG: 2.5 MMHG
BH CV ECHO MEAS - LV V1 MAX: 113.2 CM/SEC
BH CV ECHO MEAS - LV V1 MEAN: 73.7 CM/SEC
BH CV ECHO MEAS - LV V1 VTI: 15.6 CM
BH CV ECHO MEAS - LVIDD: 3.8 CM
BH CV ECHO MEAS - LVIDS: 2.8 CM
BH CV ECHO MEAS - LVOT AREA (M): 4.2 CM^2
BH CV ECHO MEAS - LVOT AREA: 4.2 CM^2
BH CV ECHO MEAS - LVOT DIAM: 2.3 CM
BH CV ECHO MEAS - LVPWD: 0.8 CM
BH CV ECHO MEAS - MV A MAX VEL: 85.9 CM/SEC
BH CV ECHO MEAS - MV DEC SLOPE: 355.9 CM/SEC^2
BH CV ECHO MEAS - MV E MAX VEL: 43.2 CM/SEC
BH CV ECHO MEAS - MV E/A: 0.5
BH CV ECHO MEAS - MV MAX PG: 3.5 MMHG
BH CV ECHO MEAS - MV MEAN PG: 1.3 MMHG
BH CV ECHO MEAS - MV P1/2T MAX VEL: 48.9 CM/SEC
BH CV ECHO MEAS - MV P1/2T: 40.2 MSEC
BH CV ECHO MEAS - MV V2 MAX: 93.1 CM/SEC
BH CV ECHO MEAS - MV V2 MEAN: 52.8 CM/SEC
BH CV ECHO MEAS - MV V2 VTI: 17 CM
BH CV ECHO MEAS - MVA P1/2T LCG: 4.5 CM^2
BH CV ECHO MEAS - MVA(P1/2T): 5.5 CM^2
BH CV ECHO MEAS - MVA(VTI): 3.8 CM^2
BH CV ECHO MEAS - PA MAX PG: 6.1 MMHG
BH CV ECHO MEAS - PA V2 MAX: 123.8 CM/SEC
BH CV ECHO MEAS - PI END-D VEL: 61.9 CM/SEC
BH CV ECHO MEAS - RVDD: 2.8 CM
BH CV ECHO MEAS - SI(AO): 68.8 ML/M^2
BH CV ECHO MEAS - SI(CUBED): 16.4 ML/M^2
BH CV ECHO MEAS - SI(LVOT): 34.6 ML/M^2
BH CV ECHO MEAS - SI(TEICH): 16.2 ML/M^2
BH CV ECHO MEAS - SV(AO): 128.4 ML
BH CV ECHO MEAS - SV(CUBED): 30.6 ML
BH CV ECHO MEAS - SV(LVOT): 64.7 ML
BH CV ECHO MEAS - SV(TEICH): 30.2 ML
BH CV ECHO MEAS - TR MAX VEL: 212.4 CM/SEC
BILIRUB UR QL STRIP: NEGATIVE
BUN BLD-MCNC: 41 MG/DL (ref 7–21)
BUN/CREAT SERPL: 30.1 (ref 7–25)
CALCIUM SPEC-SCNC: 10.1 MG/DL (ref 8.4–10.2)
CHLORIDE SERPL-SCNC: 99 MMOL/L (ref 95–110)
CLARITY UR: CLEAR
CO2 SERPL-SCNC: 23 MMOL/L (ref 22–31)
COLOR UR: YELLOW
CREAT BLD-MCNC: 1.36 MG/DL (ref 0.7–1.3)
CRP SERPL-MCNC: <0.5 MG/DL (ref 0–1)
D-LACTATE SERPL-SCNC: 2.4 MMOL/L (ref 0.5–2)
D-LACTATE SERPL-SCNC: 2.5 MMOL/L (ref 0.5–2)
D-LACTATE SERPL-SCNC: 3.6 MMOL/L (ref 0.5–2)
D-LACTATE SERPL-SCNC: 3.9 MMOL/L (ref 0.5–2)
D-LACTATE SERPL-SCNC: 4.8 MMOL/L (ref 0.5–2)
DEPRECATED RDW RBC AUTO: 52.2 FL (ref 35.1–43.9)
EOSINOPHIL # BLD AUTO: 0 10*3/MM3 (ref 0–0.7)
EOSINOPHIL NFR BLD AUTO: 0 % (ref 0–7)
ERYTHROCYTE [DISTWIDTH] IN BLOOD BY AUTOMATED COUNT: 15.7 % (ref 11.5–14.5)
GFR SERPL CREATININE-BSD FRML MDRD: 50 ML/MIN/1.73 (ref 60–98)
GLUCOSE BLD-MCNC: 159 MG/DL (ref 60–100)
GLUCOSE BLDC GLUCOMTR-MCNC: 113 MG/DL (ref 70–130)
GLUCOSE BLDC GLUCOMTR-MCNC: 149 MG/DL (ref 70–130)
GLUCOSE BLDC GLUCOMTR-MCNC: 170 MG/DL (ref 70–130)
GLUCOSE BLDC GLUCOMTR-MCNC: 171 MG/DL (ref 70–130)
GLUCOSE UR STRIP-MCNC: NEGATIVE MG/DL
HCT VFR BLD AUTO: 39.8 % (ref 39–49)
HGB BLD-MCNC: 13 G/DL (ref 13.7–17.3)
HGB UR QL STRIP.AUTO: NEGATIVE
HOLD SPECIMEN: NORMAL
IMM GRANULOCYTES # BLD: 0.06 10*3/MM3 (ref 0–0.02)
IMM GRANULOCYTES NFR BLD: 0.5 % (ref 0–0.5)
KETONES UR QL STRIP: NEGATIVE
LEUKOCYTE ESTERASE UR QL STRIP.AUTO: NEGATIVE
LYMPHOCYTES # BLD AUTO: 0.83 10*3/MM3 (ref 0.6–4.2)
LYMPHOCYTES NFR BLD AUTO: 7.3 % (ref 10–50)
MAXIMAL PREDICTED HEART RATE: 136 BPM
MCH RBC QN AUTO: 29.7 PG (ref 26.5–34)
MCHC RBC AUTO-ENTMCNC: 32.7 G/DL (ref 31.5–36.3)
MCV RBC AUTO: 90.9 FL (ref 80–98)
MONOCYTES # BLD AUTO: 0.19 10*3/MM3 (ref 0–0.9)
MONOCYTES NFR BLD AUTO: 1.7 % (ref 0–12)
NEUTROPHILS # BLD AUTO: 10.32 10*3/MM3 (ref 2–8.6)
NEUTROPHILS NFR BLD AUTO: 90.4 % (ref 37–80)
NITRITE UR QL STRIP: NEGATIVE
PH UR STRIP.AUTO: <=5 [PH] (ref 5–9)
PLATELET # BLD AUTO: 225 10*3/MM3 (ref 150–450)
PMV BLD AUTO: 11.1 FL (ref 8–12)
POTASSIUM BLD-SCNC: 4.7 MMOL/L (ref 3.5–5.1)
POTASSIUM BLD-SCNC: 5.2 MMOL/L (ref 3.5–5.1)
PROT UR QL STRIP: NEGATIVE
RBC # BLD AUTO: 4.38 10*6/MM3 (ref 4.37–5.74)
SODIUM BLD-SCNC: 136 MMOL/L (ref 137–145)
SP GR UR STRIP: 1.02 (ref 1–1.03)
STRESS TARGET HR: 116 BPM
UROBILINOGEN UR QL STRIP: NORMAL
WBC NRBC COR # BLD: 11.41 10*3/MM3 (ref 3.2–9.8)

## 2017-12-14 PROCEDURE — 81003 URINALYSIS AUTO W/O SCOPE: CPT | Performed by: FAMILY MEDICINE

## 2017-12-14 PROCEDURE — 97162 PT EVAL MOD COMPLEX 30 MIN: CPT

## 2017-12-14 PROCEDURE — G8978 MOBILITY CURRENT STATUS: HCPCS

## 2017-12-14 PROCEDURE — 25010000002 FUROSEMIDE PER 20 MG: Performed by: NURSE PRACTITIONER

## 2017-12-14 PROCEDURE — 82962 GLUCOSE BLOOD TEST: CPT

## 2017-12-14 PROCEDURE — G8988 SELF CARE GOAL STATUS: HCPCS

## 2017-12-14 PROCEDURE — 85025 COMPLETE CBC W/AUTO DIFF WBC: CPT | Performed by: NURSE PRACTITIONER

## 2017-12-14 PROCEDURE — 86140 C-REACTIVE PROTEIN: CPT | Performed by: FAMILY MEDICINE

## 2017-12-14 PROCEDURE — G0378 HOSPITAL OBSERVATION PER HR: HCPCS

## 2017-12-14 PROCEDURE — 93306 TTE W/DOPPLER COMPLETE: CPT | Performed by: INTERNAL MEDICINE

## 2017-12-14 PROCEDURE — 94799 UNLISTED PULMONARY SVC/PX: CPT

## 2017-12-14 PROCEDURE — 25010000002 METHYLPREDNISOLONE PER 125 MG: Performed by: NURSE PRACTITIONER

## 2017-12-14 PROCEDURE — G8979 MOBILITY GOAL STATUS: HCPCS

## 2017-12-14 PROCEDURE — 83605 ASSAY OF LACTIC ACID: CPT | Performed by: NURSE PRACTITIONER

## 2017-12-14 PROCEDURE — 25010000002 FUROSEMIDE PER 20 MG: Performed by: FAMILY MEDICINE

## 2017-12-14 PROCEDURE — 99220 PR INITIAL OBSERVATION CARE/DAY 70 MINUTES: CPT | Performed by: INTERNAL MEDICINE

## 2017-12-14 PROCEDURE — 97166 OT EVAL MOD COMPLEX 45 MIN: CPT

## 2017-12-14 PROCEDURE — 93306 TTE W/DOPPLER COMPLETE: CPT

## 2017-12-14 PROCEDURE — 63710000001 INSULIN ASPART PER 5 UNITS: Performed by: NURSE PRACTITIONER

## 2017-12-14 PROCEDURE — 84132 ASSAY OF SERUM POTASSIUM: CPT | Performed by: FAMILY MEDICINE

## 2017-12-14 PROCEDURE — 80048 BASIC METABOLIC PNL TOTAL CA: CPT | Performed by: NURSE PRACTITIONER

## 2017-12-14 PROCEDURE — 83605 ASSAY OF LACTIC ACID: CPT | Performed by: FAMILY MEDICINE

## 2017-12-14 PROCEDURE — 25010000002 LEVOFLOXACIN PER 250 MG: Performed by: NURSE PRACTITIONER

## 2017-12-14 PROCEDURE — G8987 SELF CARE CURRENT STATUS: HCPCS

## 2017-12-14 PROCEDURE — 87086 URINE CULTURE/COLONY COUNT: CPT | Performed by: FAMILY MEDICINE

## 2017-12-14 RX ORDER — SODIUM POLYSTYRENE SULFONATE 15 G/60ML
15 SUSPENSION ORAL; RECTAL ONCE
Status: COMPLETED | OUTPATIENT
Start: 2017-12-14 | End: 2017-12-14

## 2017-12-14 RX ORDER — RANOLAZINE 500 MG/1
500 TABLET, EXTENDED RELEASE ORAL EVERY 12 HOURS SCHEDULED
Status: DISCONTINUED | OUTPATIENT
Start: 2017-12-14 | End: 2017-12-19 | Stop reason: HOSPADM

## 2017-12-14 RX ORDER — SODIUM CHLORIDE 9 MG/ML
50 INJECTION, SOLUTION INTRAVENOUS CONTINUOUS
Status: DISCONTINUED | OUTPATIENT
Start: 2017-12-14 | End: 2017-12-19 | Stop reason: HOSPADM

## 2017-12-14 RX ORDER — DOCUSATE SODIUM 100 MG/1
100 CAPSULE, LIQUID FILLED ORAL DAILY
Status: DISCONTINUED | OUTPATIENT
Start: 2017-12-14 | End: 2017-12-19 | Stop reason: HOSPADM

## 2017-12-14 RX ORDER — ISOSORBIDE MONONITRATE 30 MG/1
30 TABLET, EXTENDED RELEASE ORAL
Status: DISCONTINUED | OUTPATIENT
Start: 2017-12-14 | End: 2017-12-19 | Stop reason: HOSPADM

## 2017-12-14 RX ORDER — FUROSEMIDE 10 MG/ML
20 INJECTION INTRAMUSCULAR; INTRAVENOUS EVERY 12 HOURS
Status: DISCONTINUED | OUTPATIENT
Start: 2017-12-14 | End: 2017-12-15

## 2017-12-14 RX ADMIN — RANOLAZINE 500 MG: 500 TABLET, FILM COATED, EXTENDED RELEASE ORAL at 20:12

## 2017-12-14 RX ADMIN — HYDROCODONE BITARTRATE AND ACETAMINOPHEN 1 TABLET: 7.5; 325 TABLET ORAL at 08:17

## 2017-12-14 RX ADMIN — MAGNESIUM OXIDE TAB 400 MG (241.3 MG ELEMENTAL MG) 400 MG: 400 (241.3 MG) TAB at 08:08

## 2017-12-14 RX ADMIN — DOCUSATE SODIUM 100 MG: 100 CAPSULE, LIQUID FILLED ORAL at 20:13

## 2017-12-14 RX ADMIN — ISOSORBIDE DINITRATE 10 MG: 5 TABLET ORAL at 08:08

## 2017-12-14 RX ADMIN — SODIUM POLYSTYRENE SULFONATE 15 G: 15 SUSPENSION ORAL; RECTAL at 10:47

## 2017-12-14 RX ADMIN — FLUTICASONE PROPIONATE 2 SPRAY: 50 SPRAY, METERED NASAL at 10:42

## 2017-12-14 RX ADMIN — ALBUTEROL SULFATE 2.5 MG: 2.5 SOLUTION RESPIRATORY (INHALATION) at 06:40

## 2017-12-14 RX ADMIN — METHYLPREDNISOLONE SODIUM SUCCINATE 60 MG: 125 INJECTION, POWDER, FOR SOLUTION INTRAMUSCULAR; INTRAVENOUS at 06:20

## 2017-12-14 RX ADMIN — FUROSEMIDE 40 MG: 10 INJECTION, SOLUTION INTRAMUSCULAR; INTRAVENOUS at 06:20

## 2017-12-14 RX ADMIN — RANOLAZINE 500 MG: 500 TABLET, FILM COATED, EXTENDED RELEASE ORAL at 13:05

## 2017-12-14 RX ADMIN — METHYLPREDNISOLONE SODIUM SUCCINATE 60 MG: 125 INJECTION, POWDER, FOR SOLUTION INTRAMUSCULAR; INTRAVENOUS at 10:43

## 2017-12-14 RX ADMIN — FUROSEMIDE 20 MG: 10 INJECTION, SOLUTION INTRAMUSCULAR; INTRAVENOUS at 17:46

## 2017-12-14 RX ADMIN — IPRATROPIUM BROMIDE AND ALBUTEROL SULFATE 3 ML: 2.5; .5 SOLUTION RESPIRATORY (INHALATION) at 14:20

## 2017-12-14 RX ADMIN — INSULIN ASPART 2 UNITS: 100 INJECTION, SOLUTION INTRAVENOUS; SUBCUTANEOUS at 17:43

## 2017-12-14 RX ADMIN — METHYLPREDNISOLONE SODIUM SUCCINATE 60 MG: 125 INJECTION, POWDER, FOR SOLUTION INTRAMUSCULAR; INTRAVENOUS at 17:44

## 2017-12-14 RX ADMIN — ASPIRIN 81 MG 81 MG: 81 TABLET ORAL at 08:08

## 2017-12-14 RX ADMIN — ISOSORBIDE MONONITRATE 30 MG: 30 TABLET, EXTENDED RELEASE ORAL at 13:05

## 2017-12-14 RX ADMIN — FAMOTIDINE 40 MG: 40 TABLET ORAL at 08:17

## 2017-12-14 RX ADMIN — IPRATROPIUM BROMIDE AND ALBUTEROL SULFATE 3 ML: 2.5; .5 SOLUTION RESPIRATORY (INHALATION) at 23:50

## 2017-12-14 RX ADMIN — LEVOFLOXACIN 500 MG: 5 INJECTION, SOLUTION INTRAVENOUS at 16:01

## 2017-12-14 RX ADMIN — DONEPEZIL HYDROCHLORIDE 10 MG: 10 TABLET ORAL at 08:08

## 2017-12-14 RX ADMIN — HYDROCODONE BITARTRATE AND ACETAMINOPHEN 1 TABLET: 7.5; 325 TABLET ORAL at 14:30

## 2017-12-14 RX ADMIN — CLOPIDOGREL BISULFATE 75 MG: 75 TABLET ORAL at 08:08

## 2017-12-14 RX ADMIN — LISINOPRIL 5 MG: 5 TABLET ORAL at 13:05

## 2017-12-14 RX ADMIN — GLIPIZIDE 5 MG: 5 TABLET ORAL at 08:08

## 2017-12-14 RX ADMIN — INSULIN ASPART 2 UNITS: 100 INJECTION, SOLUTION INTRAVENOUS; SUBCUTANEOUS at 08:10

## 2017-12-14 NOTE — THERAPY EVALUATION
Acute Care - Occupational Therapy Initial Evaluation  [facility]     Patient Name: Hasmukh Ham  : 1933  MRN: [id number]  Today's Date: 2017     Date of Referral to OT: 17  Referring Physician: EVE Birch    Admit Date: 2017       ICD-10-CM ICD-9-CM   1. Chest pain, unspecified type R07.9 786.50   2. Elevated troponin R74.8 790.6   3. Abnormal x-ray R93.8 793.99   4. Impaired mobility and ADLs Z74.09 799.89     Patient Active Problem List   Diagnosis   • Dilated aortic root   • Non-rheumatic tricuspid valve insufficiency   • Pulmonary emphysema   • Atrial fibrillation and flutter   • Bradycardia   • Chronic coronary artery disease   • Neuropathy   • Abdominal hernia   • Neck pain   • Dementia   • Diabetic neuropathy   • Gastroesophageal reflux disease without esophagitis   • Generalized osteoarthritis   • 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   • Nocturia   • Osteoarthritis of multiple joints   • Hyperlipidemia   • Pain in joint involving ankle and foot   • Arthropathy of hand   • Paroxysmal tachycardia   • Osteoarthrosis involving more than one site but not generalized   • Right shoulder pain   • Rotator cuff syndrome   • Partial tear of subscapularis tendon   • Infraspinatus tendon tear   • Supraspinatus tendon tear   • Bilateral carotid artery stenosis   • (HFpEF) heart failure with preserved ejection fraction   • Pulmonary HTN   • Acute interstitial pneumonia   • Chronic obstructive lung disease   • Coronary arteriosclerosis   • Diabetes mellitus   • Hypertension   • Chest pain   • Shortness of breath     Past Medical History:   Diagnosis Date   • Bilateral carotid artery stenosis    • CHF (congestive heart failure)    • Chronic obstructive pulmonary disease (COPD)    • Coronary arteriosclerosis    • Degenerative joint disease involving multiple joints    • Dementia    • Diabetes mellitus    • Hyperlipidemia    • Hypertension    • Myocardial infarction       Past Surgical History:   Procedure Laterality Date   • CARDIAC CATHETERIZATION N/A 6/6/2017    Procedure: Right Heart Cath;  Surgeon: Jeff Maciel MD PhD;  Location: [facility] CATH INVASIVE LOC[facility];  Service:    • CORONARY STENT PLACEMENT     • HERNIA REPAIR     • LUNG BIOPSY     • NECK SURGERY     • THORACOTOMY Left 1977   • VENTRAL HERNIA REPAIR            OT ASSESSMENT FLOWSHEET (last 72 hours)      OT Evaluation       12/14/17 1538 12/14/17 1333 12/14/17 1331 12/13/17 1613 12/13/17 1610    Rehab Evaluation    Document Type evaluation  -RW        Subjective Information agree to therapy;complains of;pain  -RW        Patient Effort, Rehab Treatment good  -RW        Symptoms Noted During/After Treatment none  -RW        General Information    Patient Profile Review yes  -RW        Referring Physician EVE Birch  -RW        Precautions/Limitations fall precautions;oxygen therapy device and L/min  -RW        Prior Level of Function independent:;ADL's;all household mobility  -RW        Equipment Currently Used at Home walker, rolling;oxygen;shower chair  -RW walker, rolling;cane, straight;nebulizer;oxygen   Patient is on 2 liters continuously @ home. He has portable oxygen for travel. Patient uses [employer] for DME needs.   -   nebulizer;oxygen  -    Plans/Goals Discussed With patient;agreed upon  -RW        Risks Reviewed patient:;LOB;dizziness;increased discomfort;change in vital signs  -RW        Benefits Reviewed patient:;increase independence  -RW        Barriers to Rehab medically complex;previous functional deficit  -RW        Living Environment    Lives With spouse  -RW spouse  -  spouse  -     Living Arrangements mobile home  -RW house   Contact information on face sheet confirmed with patient.  -  mobile home  -     Home Accessibility stairs to enter home;tub/shower is not walk in;grab bars present (bathtub);grab bars present (toilet)  -RW   stairs to enter home  -MM      Number of Stairs to Enter Home 5  -RW        Stair Railings at Home outside, present at both sides  -RW   outside, present at both sides  -MM     Type of Financial/Environmental Concern    none  -MM     Transportation Available  car;family or friend will provide   Patient no longer drives. Per patient, his daughter and/or spouse assists with transportation.   -  car;family or friend will provide  -MM     Clinical Impression    Date of Referral to OT 12/13/17  -RW        OT Diagnosis impaired mobility & ADLs  -RW        Impairments Found (describe specific impairments) aerobic capacity/endurance;gait, locomotion, and balance;joint integrity and mobility;muscle performance;ROM;other (see comments)   ADLs, functional mobility/transfers, safety, ax tolerance  -RW        Patient/Family Goals Statement home  -RW        Criteria for Skilled Therapeutic Interventions Met yes;treatment indicated  -RW        Rehab Potential good, to achieve stated therapy goals  -RW        Therapy Frequency other (see comments)   3-14 times/wk  -RW        Predicted Duration of Therapy Intervention (days/wks) until discharge from facility or all goals met  -RW        Anticipated Discharge Disposition home with home health  -RW        Functional Level Prior    Ambulation   1-->assistive equipment   Patient uses a rolling walker @ times to assist with ambulation.   -AH  0-->independent  -MM    Transferring   1-->assistive equipment  -AH  0-->independent  -MM    Toileting   0-->independent  -AH  0-->independent  -MM    Bathing   0-->independent  -AH  0-->independent  -MM    Dressing   0-->independent  -AH  0-->independent  -MM    Eating   0-->independent  -AH  0-->independent  -MM    Communication   0-->understands/communicates without difficulty  -AH  0-->understands/communicates without difficulty  -MM    Swallowing     0-->swallows foods/liquids without difficulty  -MM    Vital Signs    Pre Systolic BP Rehab 123  -RW        Pre Treatment  Diastolic BP 68  -RW        Post Systolic BP Rehab 126  -RW        Post Treatment Diastolic BP 69  -RW        Pretreatment Heart Rate (beats/min) 96  -RW        Intratreatment Heart Rate (beats/min) 103  -RW        Posttreatment Heart Rate (beats/min) 104  -RW        Pre SpO2 (%) 94  -RW        O2 Delivery Pre Treatment supplemental O2  -RW        Intra SpO2 (%) 98  -RW        O2 Delivery Intra Treatment supplemental O2  -RW        Post SpO2 (%) 96  -RW        O2 Delivery Post Treatment supplemental O2  -RW        Pre Patient Position Supine  -RW        Intra Patient Position Sitting   EOB  -RW        Post Patient Position Sitting  -RW        Pain Assessment    Pain Assessment 0-10  -RW        Pain Score 7  -RW        Post Pain Score 7  -RW        Pain Type Chronic pain  -RW        Pain Location Back  -RW        Pain Intervention(s) Repositioned;Ambulation/increased activity  -RW        Response to Interventions no change reported  -RW        Cognitive Assessment/Intervention    Current Cognitive/Communication Assessment functional  -RW        Orientation Status oriented x 4  -RW        Follows Commands/Answers Questions 100% of the time  -RW        Personal Safety mild impairment  -RW        Personal Safety Interventions fall prevention program maintained;gait belt;nonskid shoes/slippers when out of bed;supervised activity  -RW        ROM (Range of Motion)    General ROM upper extremity range of motion deficits identified  -RW        General UE Assessment    ROM shoulder, right: UE ROM deficit;elbow/forearm, right: UE ROM deficit  -RW        ROM Detail shoulder flexion ~70-80*; elbow flexion limited but functional  -RW        MMT (Manual Muscle Testing)    General MMT Assessment upper extremity strength deficits identified  -RW        General MMT Assessment Detail RUE: shoulder/elbow 3-/5, wrist 3/5,  4-/5; LUE: shoulder/elbow 4/5, wrist 3/5,  4-/5   -RW        Bed Mobility, Assessment/Treatment    Bed  Mobility, Assistive Device bed rails;head of bed elevated  -RW        Bed Mob, Supine to Sit, [place] conditional independence  -RW        Bed Mob, Sit to Supine, [place] conditional independence  -RW        Transfer Assessment/Treatment    Transfers, Sit-Stand [place] contact guard assist  -RW        Transfers, Stand-Sit [place] contact guard assist  -RW        Transfer, Safety Issues impulsivity  -RW        Functional Mobility    Functional Mobility- Ind. Level contact guard assist  -RW        Functional Mobility- Device other (see comments)   no AD  -RW        Functional Mobility-Distance (Feet) 30  -RW        Functional Mobility- Safety Issues supplemental O2  -RW        Sensory Assessment/Intervention    Sensory Impairment burning;tingling   Pt reports neuropathy.  -RW        Light Touch LUE;RUE  -RW        LUE Light Touch WNL  -RW        RUE Light Touch mild impairment  -RW        General Therapy Interventions    Planned Therapy Interventions activity intolerance;adaptive equipment training;ADL retraining;balance training;energy conservation;home exercise program;strengthening;transfer training  -RW        Positioning and Restraints    Pre-Treatment Position in bed  -RW        Post Treatment Position bed  -RW        In Bed sitting EOB;call light within reach;encouraged to call for assist;with family/caregiver  -RW          User Key  (r) = Recorded By, (t) = Taken By, (c) = Cosigned By    Initials Name Effective Dates    RW Deborah Mcqueen, OTR/L 10/17/16 -      JACOB Goldstein 10/17/16 -     MM Corry Lynch, ANA 10/17/16 -            Occupational Therapy Education     Title: PT OT SLP Therapies (Active)     Topic: Occupational Therapy (Active)     Point: ADL training (Active)    Description: Instruct learner(s) on proper safety adaptation and remediation techniques during self care or transfers.   Instruct in proper use of assistive devices.    Learning Progress Summary     Learner Readiness Method Response Comment Documented by Status   Patient Acceptance E NR fall precautions, transfer safety, OT POC RW 12/14/17 1647 Active   Family Acceptance E NR fall precautions, transfer safety, OT POC RW 12/14/17 1647 Active               Point: Precautions (Active)    Description: Instruct learner(s) on prescribed precautions during self-care and functional transfers.    Learning Progress Summary    Learner Readiness Method Response Comment Documented by Status   Patient Acceptance E NR fall precautions, transfer safety, OT POC RW 12/14/17 1647 Active   Family Acceptance E NR fall precautions, transfer safety, OT POC RW 12/14/17 1647 Active                      User Key     Initials Effective Dates Name Provider Type Discipline     10/17/16 -  Deborah Mcqueen, OTR/L Occupational Therapist OT                  OT Recommendation and Plan  Anticipated Discharge Disposition: home with home health  Planned Therapy Interventions: activity intolerance, adaptive equipment training, ADL retraining, balance training, energy conservation, home exercise program, strengthening, transfer training  Therapy Frequency: other (see comments) (3-14 times/wk)  Plan of Care Review  Plan Of Care Reviewed With: patient, spouse  Outcome Summary/Follow up Plan: OT evaluation completed. Pt mod(I) for bed mobility but required CGA for sit-stand & functional mobility without AD. Pt somewhat impulsive & unsteady. He would benefit from skilled OT services to increase independence & safety with ADLs & functional mobility prior to return home. Recommend HH OT/PT at discharge.          OT Goals       12/14/17 1648          Transfer Training OT LTG    Transfer Training OT LTG, Date Established 12/14/17  -      Transfer Training OT LTG, Time to Achieve by discharge  -RW      Transfer Training OT LTG, Activity Type toilet;tub   simulated tub t/f  -RW      Transfer Training OT LTG, [place] Level conditional independence   -RW      Dynamic Standing Balance OT LTG    Dynamic Standing Balance OT LTG, Date Established 12/14/17  -RW      Dynamic Standing Balance OT LTG, Time to Achieve by discharge  -RW      Dynamic Standing Balance OT LTG, [place] Level conditional independence  -RW      Dynamic Standing Balance OT LTG, Additional Goal 5 min functional activity  -RW      Safety Awareness OT LTG    Safety Awareness OT LTG, Date Established 12/14/17  -RW      Safety Awareness OT LTG, Time to Achieve by discharge  -RW      Safety Awareness OT LTG, Activity Type good safety awareness;with ADL's  -RW      ADL OT LTG    ADL OT LTG, Date Established 12/14/17  -RW      ADL OT LTG, Time to Achieve by discharge  -RW      ADL OT LTG, Activity Type ADL skills  -RW      ADL OT LTG, [place] Level modified independent  -RW        User Key  (r) = Recorded By, (t) = Taken By, (c) = Cosigned By    Initials Name Provider Type    LATRICE Mcqueen, OTR/L Occupational Therapist                Outcome Measures       12/14/17 1538          How much help from another is currently needed...    Putting on and taking off regular lower body clothing? 3  -RW      Bathing (including washing, rinsing, and drying) 3  -RW      Toileting (which includes using toilet bed pan or urinal) 3  -RW      Putting on and taking off regular upper body clothing 3  -RW      Taking care of personal grooming (such as brushing teeth) 4  -RW      Eating meals 4  -RW      Score 20  -RW      Functional Assessment    Outcome Measure Options AM-PAC 6 Clicks Daily Activity (OT)  -RW        User Key  (r) = Recorded By, (t) = Taken By, (c) = Cosigned By    Initials Name Provider Type    LATRICE Mcqueen, OTR/L Occupational Therapist          Time Calculation:   OT Start Time: 1538  OT Stop Time: 1615  OT Time Calculation (min): 37 min    Therapy Charges for Today     Code Description Service Date Service Provider Modifiers Qty    [id number]  OT SELFCARE CURRENT 12/14/2017  Deborah Mcqueen OTR/L GO, CJ 1    [id number]  OT SELFCARE PROJECTED 12/14/2017 Deborah Mcqueen OTR/L GO, CI 1    [id number]  OT EVAL MOD COMPLEXITY 1 12/14/2017 Deborah Mcqueen OTR/L GO 1          OT G-codes  OT Professional Judgement Used?: Yes  OT Functional Scales Options: AM-PAC 6 Clicks Daily Activity (OT)  Score: 20  Functional Limitation: Self care  Self Care Current Status (): At least 20 percent but less than 40 percent impaired, limited or restricted  Self Care Goal Status (): At least 1 percent but less than 20 percent impaired, limited or restricted    Deborah Mcqueen OTR/L  12/14/2017

## 2017-12-14 NOTE — CONSULTS
Cardiology at [facility]  Cardiovascular Consultation Note      Hasmukh Ham  301/1  [id number]  6/1/1933    DATE OF ADMISSION: 12/13/2017  DATE OF CONSULTATION:  12/14/2017    Paolo Rey MD  Treatment Team:   Attending Provider: Parth Birmingham MD  Nurse Practitioner: EVE Gomez  Consulting Physician: Caitlyn Garcia MD  Consulting Physician: Felipe Chan MD  Admitting Provider: Parth Birmingham MD    Chief Complaint   Patient presents with   • Chest Pain   • Shortness of Breath       Chief complaint/ Reason for Consultation:Sinus bradycardia with heart rate 38 and history of chronic chest pain syndrome chest pain, COPD on home O2    History of Present Illness: 84 years old patient with a background history of hypertension, hypertensive disease, pulmonary hypertension evaluated by Dr. Maciel, CAD status post PTA stent of RCA 2004 then in-stent restenosis managed with second stent.  Patient had recurrent chest pain and late part 0f 2004 repeat cardiac catheterization performed which revealed LAD stenosis and  PTCA stent.  history of atrial tachycardia with associated atrial fibrillation status post electrical cardioversions and early part of 2017.  Patient had recurrent hospitalization for COPD exacerbation secondary to pneumonia.  Had  documented bradyarrhythmia.  AV tom and antiarrhythmic medications discontinued.  Hospice discussed previously however the family doesn't like the idea.  He presented for evaluations of increasing shortness of breath with associated mild chest discomfort.  The patient persistent abnormal troponin dated back in December 2016.  His lactic acid is elevated at the time of evaluation the patient is in atrial flutter with a heart rate 104 bpm.  CT scan of the chest performed.  No evidence of pulmonary embolism or other pathology reported.  Information obtained from patient , family and current /previous medical records patient recently  evaluated with Dr. Ashkan Leach.    Past Medical History:   Diagnosis Date   • Bilateral carotid artery stenosis    • CHF (congestive heart failure)    • Chronic obstructive pulmonary disease (COPD)    • Coronary arteriosclerosis    • Degenerative joint disease involving multiple joints    • Dementia    • Diabetes mellitus    • Hyperlipidemia    • Hypertension    • Myocardial infarction        Past Surgical History:   Procedure Laterality Date   • CARDIAC CATHETERIZATION N/A 6/6/2017    Procedure: Right Heart Cath;  Surgeon: Jeff Maciel MD PhD;  Location: [facility] INVASIVE LOCATION;  Service:    • CORONARY STENT PLACEMENT     • HERNIA REPAIR     • LUNG BIOPSY     • NECK SURGERY     • THORACOTOMY Left 1977   • VENTRAL HERNIA REPAIR         Allergies   Allergen Reactions   • Atorvastatin Anaphylaxis   • Penicillins Rash   • Pravastatin      Myalgia   • Azithromycin Rash   • Biaxin [Clarithromycin] Rash       No current facility-administered medications on file prior to encounter.      Current Outpatient Prescriptions on File Prior to Encounter   Medication Sig Dispense Refill   • albuterol (PROVENTIL) (2.5 MG/3ML) 0.083% nebulizer solution Take 2.5 mg by nebulization Every 4 (Four) Hours As Needed for Wheezing. 125 vial 11   • aspirin 81 MG chewable tablet Chew 81 mg Daily.     • clopidogrel (PLAVIX) 75 MG tablet Take 75 mg by mouth Daily.     • fluticasone (FLONASE) 50 MCG/ACT nasal spray 2 sprays into each nostril Daily.     • Fluticasone Furoate (ARNUITY ELLIPTA) 200 MCG/ACT aerosol powder  Inhale.     • furosemide (LASIX) 20 MG tablet Take 1 tablet by mouth Daily. 90 tablet 3   • glimepiride (AMARYL) 2 MG tablet Take 2 mg by mouth Every Morning Before Breakfast.     • HYDROcodone-acetaminophen (NORCO) 7.5-325 MG per tablet Take 1 tablet by mouth Every 8 (Eight) Hours.     • ipratropium-albuterol (DUO-NEB) 0.5-2.5 mg/mL nebulizer Take 3 mL by nebulization 4 (Four) Times a Day. 120 vial 1   • isosorbide  dinitrate (ISORDIL) 10 MG tablet Take 1 tablet by mouth 3 (Three) Times a Day. 90 tablet 3   • lisinopril (PRINIVIL,ZESTRIL) 10 MG tablet Take 0.5 tablets by mouth Daily. 30 tablet 6   • magnesium oxide (MAGOX) 400 (241.3 MG) MG tablet tablet Take 400 mg by mouth Daily.     • nitroglycerin (NITROSTAT) 0.4 MG SL tablet place 1 tablet under the tongue if needed every 5 minutes for hair...  (REFER TO PRESCRIPTION NOTES).  0   • O2 (OXYGEN) Inhale 2 L/min Every Night. W/ CPAP     • Red Yeast Rice 600 MG tablet Take 600 mg by mouth 2 (Two) Times a Day.     • Umeclidinium-Vilanterol 62.5-25 MCG/INH aerosol powder  Inhale 1 puff Daily. 1 each 11       Social History     Social History   • Marital status:      Spouse name: N/A   • Number of children: N/A   • Years of education: N/A     Occupational History   • Not on file.     Social History Main Topics   • Smoking status: Former Smoker   • Smokeless tobacco: Never Used   • Alcohol use No   • Drug use: No   • Sexual activity: Not Currently      Comment:      Other Topics Concern   • Not on file     Social History Narrative           REVIEW OF SYSTEMS:   Review of Systems   Constitution: Positive for weakness and malaise/fatigue. Negative for chills and decreased appetite.   HENT: Negative for congestion and hearing loss.    Eyes: Negative for blurred vision and double vision.   Cardiovascular: Positive for chest pain. Negative for paroxysmal nocturnal dyspnea and syncope.   Respiratory: Positive for cough and shortness of breath.    Endocrine: Negative for cold intolerance and heat intolerance.   Skin: Negative for color change and flushing.   Musculoskeletal: Positive for joint pain.   Gastrointestinal: Negative for abdominal pain and change in bowel habit.   Genitourinary: Positive for dysuria. Negative for bladder incontinence.   Neurological: Negative for dizziness.   Psychiatric/Behavioral: Negative for altered mental status.         Objective:     Vitals:  "   12/14/17 0307 12/14/17 0640 12/14/17 0703 12/14/17 0841   BP: 104/60  108/62    BP Location: Left arm  Left arm    Patient Position: Lying      Pulse: 85 84 97 104   Resp: 18 18 18    Temp: 97.5 °F (36.4 °C)  96.9 °F (36.1 °C)    TempSrc: Oral  Oral    SpO2: 96% 97% 96%    Weight:       Height:         Body mass index is 26.07 kg/(m^2).  Flowsheet Rows         First Filed Value    Admission Height  170.2 cm (67\") Documented at 12/13/2017 0815    Admission Weight  69.4 kg (153 lb) Documented at 12/13/2017 0815          Intake/Output Summary (Last 24 hours) at 12/14/17 1035  Last data filed at 12/14/17 0900   Gross per 24 hour   Intake              240 ml   Output             1000 ml   Net             -760 ml         Physical Exam   Physical Exam  Physical Exam   Constitutional: He is oriented to person, place, and time. He appears well-developed and well-nourished.   HENT:   Head: Normocephalic and atraumatic.   Eyes: EOM are normal. Pupils are equal, round, and reactive to light.   Neck: Normal range of motion. Neck supple.   Cardiovascular: Irregular rate and rhythm mild tachycardia.    Pulmonary/Chest: Showed decreased bilateral breath sound no definitive rales.   Abdominal: Soft. Bowel sounds are normal.   Musculoskeletal: Normal range of motion.   Neurological: He is alert and oriented to person, place, and time.   Skin: Skin is warm and dry.   Psychiatric: He has a normal mood and affect.      Results Reviewed:  I have personally reviewed current lab, radiology, and data and agree with results  Radiology Review    CT Angiogram Chest With Contrast   Final Result   CONCLUSION:            1.  No evidence of pulmonary embolism.             2.  No evidence of pathology associated with the visualized   aorta.                                                  Electronically signed by:  GOOD Joyce MD  12/13/2017 2:27   PM CST Workstation: [id number]      CT Chest Without Contrast   Final Result   CONCLUSION:    "         1. No pulmonary mass or airspace disease visualized.   2. Chest radiographic findings likely correspond to   pericardial/pleural fat.                               Electronically signed by:  GOOD Joyce MD  12/13/2017 12:07   PM CST Workstation: [id number]      XR Chest 2 View   Final Result   CONCLUSION: Persistent density in the left midlung field and left   lung base. Recommend CT chest for further evaluation.      Electronically signed by:  Moisés Lopez MD  12/13/2017 9:18   AM CST Workstation: [employer]          Lab Results:  Lab Results (last 24 hours)     Procedure Component Value Units Date/Time    D-dimer, Quantitative [[id number]]  (Abnormal) Collected:  12/13/17 0840    Specimen:  Blood Updated:  12/13/17 1137     D-Dimer, Quantitative 629 (H) ng/mL (FEU)     Narrative:       Dimer values <500 ng/ml FEU are FDA approved as aid in diagnosis of deep venous thrombosis and pulmonary embolism.  This test should not be used in an exclusion strategy with pretest probability alone.    A recent guideline regarding diagnosis for pulmonary thomboembolism recommends an adjusted exclusion criterion of age x 10 ng/ml FEU for patients >50 years of age (Jenny Intern Med 2015; 163: 701-711).    POC Glucose Once [[id number]]  (Normal) Collected:  12/13/17 1626    Specimen:  Blood Updated:  12/13/17 1702     Glucose 106 mg/dL       RN NotifiedMeter: [id number]: [id number] SHANDA CAN       Lactic Acid, Plasma [[id number]]  (Normal) Collected:  12/13/17 1643    Specimen:  Blood Updated:  12/13/17 1709     Lactate 1.4 mmol/L     Troponin [[id number]]  (Abnormal) Collected:  12/13/17 1643    Specimen:  Blood Updated:  12/13/17 1724     Troponin I 0.047 (H) ng/mL     Extra Tubes [[id number]] Collected:  12/13/17 1643    Specimen:  Blood from Blood, Venous Line Updated:  12/13/17 1746    Narrative:       The following orders were created for panel order Extra Tubes.  Procedure                                Abnormality         Status                     ---------                               -----------         ------                     Light Blue Top[[id number]]                                   Final result                 Please view results for these tests on the individual orders.    Light Blue Top [[id number]] Collected:  12/13/17 1643    Specimen:  Blood Updated:  12/13/17 1746     Extra Tube hold for add-on      Auto resulted       Troponin [[id number]]  (Abnormal) Collected:  12/13/17 1922    Specimen:  Blood Updated:  12/13/17 2055     Troponin I 0.044 (H) ng/mL     POC Glucose Once [[id number]]  (Normal) Collected:  12/13/17 1929    Specimen:  Blood Updated:  12/13/17 2136     Glucose 114 mg/dL       RN NotifiedMeter: [id number]: [id number] Lamona BRIANA       Lactic Acid, Plasma [[id number]]  (Abnormal) Collected:  12/13/17 2357    Specimen:  Blood Updated:  12/14/17 0047     Lactate 2.5 (C) mmol/L     Lactic Acid, Reflex Timer [[id number]] Collected:  12/13/17 2357    Specimen:  Blood Updated:  12/14/17 0401     Extra Tube Hold for add-ons.      Auto resulted.       CBC Auto Differential [[id number]]  (Abnormal) Collected:  12/14/17 0602    Specimen:  Blood Updated:  12/14/17 0632     WBC 11.41 (H) 10*3/mm3      RBC 4.38 10*6/mm3      Hemoglobin 13.0 (L) g/dL      Hematocrit 39.8 %      MCV 90.9 fL      MCH 29.7 pg      MCHC 32.7 g/dL      RDW 15.7 (H) %      RDW-SD 52.2 (H) fl      MPV 11.1 fL      Platelets 225 10*3/mm3      Neutrophil % 90.4 (H) %      Lymphocyte % 7.3 (L) %      Monocyte % 1.7 %      Eosinophil % 0.0 %      Basophil % 0.1 %      Immature Grans % 0.5 %      Neutrophils, Absolute 10.32 (H) 10*3/mm3      Lymphocytes, Absolute 0.83 10*3/mm3      Monocytes, Absolute 0.19 10*3/mm3      Eosinophils, Absolute 0.00 10*3/mm3      Basophils, Absolute 0.01 10*3/mm3      Immature Grans, Absolute 0.06 (H) 10*3/mm3     CBC & Differential [[id number]] Collected:  12/14/17 0602    Specimen:   Blood Updated:  12/14/17 0633    Narrative:       The following orders were created for panel order CBC & Differential.  Procedure                               Abnormality         Status                     ---------                               -----------         ------                     Scan Slide[[id number]]                                                                  CBC Auto Differential[[id number]]        Abnormal            Final result                 Please view results for these tests on the individual orders.    Lactic Acid, Plasma [[id number]]  (Abnormal) Collected:  12/14/17 0602    Specimen:  Blood Updated:  12/14/17 0646     Lactate 2.4 (C) mmol/L     POC Glucose Once [[id number]]  (Abnormal) Collected:  12/14/17 0610    Specimen:  Blood Updated:  12/14/17 0711     Glucose 170 (H) mg/dL       RN NotifiedMeter: [id number]: [id number] [facility]       Basic Metabolic Panel [[id number]]  (Abnormal) Collected:  12/14/17 0716    Specimen:  Blood Updated:  12/14/17 0755     Glucose 159 (H) mg/dL      BUN 41 (H) mg/dL      Creatinine 1.36 (H) mg/dL      Sodium 136 (L) mmol/L      Potassium 5.2 (H) mmol/L      Chloride 99 mmol/L      CO2 23.0 mmol/L      Calcium 10.1 mg/dL      eGFR Non African Amer 50 (L) mL/min/1.73      BUN/Creatinine Ratio 30.1 (H)     Anion Gap 14.0 mmol/L     Narrative:       The MDRD GFR formula is only valid for adults with stable renal function between ages 18 and 70.    Lactic Acid, Plasma [[id number]]  (Abnormal) Collected:  12/14/17 0716    Specimen:  Blood Updated:  12/14/17 0756     Lactate 3.6 (C) mmol/L     POC Glucose Once [[id number]]  (Normal) Collected:  12/14/17 1007    Specimen:  Blood Updated:  12/14/17 1033     Glucose 113 mg/dL       RN NotifiedMeter: [id number]: [id number] EKNDRA APRIL I personally viewed and interpreted the patient's EKG/Telemetry data      Assessment/Plan:       #1 sinus bradycardia #2 atrial flutter #3  history of GI bleed secondary to AV malformation #3 chronic chest pain syndrome with history of CAD status post PTCA stent of RCA and LAD #4 COPD on home O2 with chronic baseline shortness breath #5 hypertension hypertensive heart disease      84 years old patient with a background history of CAD status post PTA stent, hypertension with hypertensive heart disease, moderate pulmonary hypertension, COPD with a persistent baseline shortness of breath, mildly abnormal troponin dated back in December 2016 with history of atrial fibrillation/flutter status post electrical cardioversions and documented history of significant bradyarrhythmia requiring discontinuations of AV tom and antiarrhythmic medications.  Patient admitted for evaluation shortness of breath and chest pain and noted  sinus bradycardia at rate of 38 bpm.  Patient now in atrial flutter at a rate of 104 bpm.  Risk stratification and further evaluation discussed with the patient and the family.  Pacemaker implantation  also discussed.  Pros and cons of pacemaker implantations explained to the patient and the family.  They decided not to have any cardiac evaluation or pacemaker implanted.  I will add Imdur and Ranexa given the history of CAD and chest pain.  He is not a candidate for long-term oral intake ablation with history of GI bleed and AV malformations.  Will not consider AV tom blocking drug given the history of bradyarrhythmia and controlled heart rate.  Present patient is being treated with aspirin and Plavix with history of CAD,  Aricept with history of dementia, Lasix with history of congestive heart failure on the basis of diastolic dysfunction compensated.  Isosorbide 10 mg 3 times a day we increase the dose and add Ranexa.  Hypertension being managed with lisinopril 5 mg.        Thank you for allowing me to participate in the care of Hasmukh Ham. Feel free to contact me directly with any further questions or concerns.    Mana  MD Kirsten  12/14/17  10:35 AM.      EMR Dragon/Transcription disclaimer:   Much of this encounter note is an electronic transcription/translation of spoken language to printed text. The electronic translation of spoken language may permit erroneous, or at times, nonsensical words or phrases to be inadvertently transcribed; Although I have reviewed the note for such errors, some may still exist.

## 2017-12-14 NOTE — PROGRESS NOTES
Discharge Planning Assessment  [facility]     Patient Name: Hasmukh Ham  MRN: [id number]  Today's Date: 12/14/2017    Admit Date: 12/13/2017          Discharge Needs Assessment       12/14/17 1333    Living Environment    Lives With spouse    Living Arrangements house   Contact information on face sheet confirmed with patient.    Transportation Available car;family or friend will provide   Patient no longer drives. Per patient, his daughter and/or spouse assists with transportation.     Living Environment    Provides Primary Care For no one    Quality Of Family Relationships supportive;involved    Able to Return to Prior Living Arrangements yes    Discharge Needs Assessment    Concerns To Be Addressed adjustment to diagnosis/illness concerns    Concerns Comments Noted hospice was discussed with patient. He nor fmaly are agreeable and request to remain full code.    Readmission Within The Last 30 Days no previous admission in last 30 days    Anticipated Changes Related to Illness none    Equipment Currently Used at Home walker, rolling;cane, straight;nebulizer;oxygen   Patient is on 2 liters continuously @ home. He has portable oxygen for travel. Patient uses [employer] for DME needs.     Equipment Needed After Discharge none    Discharge Facility/Level Of Care Needs home with home health   Note left for MD with request for HH services skilled v's transition visit.     Current Discharge Risk chronically ill   COPD and CHF            Discharge Plan       12/14/17 1336    Case Management/Social Work Plan    Plan home with home health transition visit v's skilled services    Additional Comments LACE complete. HF navigator consult ordered and noted patient is not appropriate for  HF. Also noted in chart review, hospice was discussed and patient nor family are agreeable. Noted patient wishes to remain full code. DELILAH discussed home health services for skilled care v's transition visit. Patient is  agreeable, if needed. Note left for MD with request. Patient reports compliance with CHF managements. He voiced is his compliant with diet and performs daily weights. Patient reports that he monitors his blood sugars and blood pressure daily. Continue with medical management.....Shari Singh [facility]        Discharge Placement     No information found                Demographic Summary       12/14/17 1330    Referral Information    Admission Type observation    Arrived From home healthcare service    Referral Source high risk screening   LACE score 8    Record Reviewed clinical discipline documentation;history and physical;medical record    Primary Care Physician Information    Name Dr Paolo Rey            Functional Status       12/14/17 1331    Functional Status Prior    Ambulation 1-->assistive equipment   Patient uses a rolling walker @ times to assist with ambulation.     Transferring 1-->assistive equipment    Toileting 0-->independent    Bathing 0-->independent    Dressing 0-->independent    Eating 0-->independent    Communication 0-->understands/communicates without difficulty    IADL    Medications independent   Pharmacy, [facility], and coverage verified with patient.    Meal Preparation assistive person    Housekeeping assistive person    Laundry assistive person    Shopping assistive person    Oral Care independent    IADL Comments Patient and spouse perform IADL's.     Activity Tolerance    Current Activity Limitations none    Usual Activity Tolerance good    Cognitive/Perceptual/Developmental    Current Mental Status/Cognitive Functioning no deficits noted    Recent Changes in Mental Status/Cognitive Functioning no changes    Developmental Stage (Eriksson's Stages of Development) Stage 8 (65 years-death/Late Adulthood) Integrity vs. Despair            Psychosocial     None            Abuse/Neglect     None            Legal     None            Substance Abuse     None            Patient Forms     None           Mercedez Singh, ANGELYW

## 2017-12-14 NOTE — PLAN OF CARE
Problem: Patient Care Overview (Adult)  Goal: Plan of Care Review  Outcome: Ongoing (interventions implemented as appropriate)    12/14/17 1721   Coping/Psychosocial Response Interventions   Plan Of Care Reviewed With patient;spouse;daughter   Outcome Evaluation   Outcome Summary/Follow up Plan PT evaluation completed. Pt transferred sit to stand with CGA and ambulated 60 ft with RW with CGA. Pt required verbal cues to slow down and for safe use of RW. Function limited primarily by decreased strength, balance, and tolerance for functional mobility and activities. Anticipate pt will benefit from home with assistance with HH PT/OT to follow.         Problem: Inpatient Physical Therapy  Goal: Transfer Training Goal 1 LTG- PT  Outcome: Ongoing (interventions implemented as appropriate)    12/14/17 1721   Transfer Training PT LTG   Transfer Training PT LTG, Date Established 12/14/17   Transfer Training PT LTG, Time to Achieve by discharge   Transfer Training PT LTG, Activity Type bed to chair /chair to bed;sit to stand/stand to sit   Transfer Training PT LTG, [place] Level conditional independence   Transfer Training PT LTG, Outcome goal ongoing       Goal: Gait Training Goal LTG- PT  Outcome: Ongoing (interventions implemented as appropriate)    12/14/17 1721   Gait Training PT LTG   Gait Training Goal PT LTG, Date Established 12/14/17   Gait Training Goal PT LTG, Time to Achieve by discharge   Gait Training Goal PT LTG, [place] Level conditional independence   Gait Training Goal PT LTG, Distance to Achieve [id number]   Gait Training Goal PT LTG, Outcome goal ongoing       Goal: Stair Training Goal LTG- PT  Outcome: Ongoing (interventions implemented as appropriate)    12/14/17 1721   Stair Training PT LTG   Stair Training Goal PT LTG, Date Established 12/14/17   Stair Training Goal PT LTG, Time to Achieve by discharge   Stair Training Goal PT LTG, Number of Steps 5   Stair Training Goal PT LTG, [place]  Level conditional independence   Stair Training Goal PT LTG, Assist Device 2 handrails   Stair Training Goal PT LTG, Outcome goal ongoing       Goal: Strength Goal LTG- PT  Outcome: Ongoing (interventions implemented as appropriate)    12/14/17 1721   Strength Goal PT LTG   Strength Goal PT LTG, Date Established 12/14/17   Strength Goal PT LTG, Time to Achieve by discharge   Strength Goal PT LTG, Measure to Achieve Pt will tolerate 15 reps fo AROM exericises   Strength Goal PT LTG, Outcome goal ongoing       Goal: Patient Education Goal LTG- PT  Outcome: Ongoing (interventions implemented as appropriate)    12/14/17 1721   Patient Education PT LTG   Patient Education PT LTG, Date Established 12/14/17   Patient Education PT LTG, Time to Achieve by discharge   Patient Education PT LTG, Education Type gait;transfers;home safety   Patient Education PT LTG Outcome goal ongoing

## 2017-12-14 NOTE — CONSULTS
Indication: HFpEF  Attached Cardiologist: Dr Garcia     Current admission indication: Chest Pain      After reviewing EHR, along with interview with Hasmukh Ham it is felt that this patient is not appropriate for the  HF Program due to negative proBNP and negative clinical evidence of hyervolemia.       I would recommend continuation of attached cardiology evaluation, recommendations and management.     I would like to thank EVE Birch  for this HF consultation.           This document has been electronically signed by EVE Angelo on December 14, 2017 9:21 AM

## 2017-12-14 NOTE — PROGRESS NOTES
[facility]  INPATIENT PROGRESS NOTE    Length of Stay: 0  Date of Admission: 12/13/2017  Primary Care Physician: Paolo Rey MD    Subjective     Chief Complaint   Patient presents with   • Chest Pain   • Shortness of Breath     HPI:  84 year old male with presented to [facility] with complaints of shortness of air and chest pain. The patient has known COPD, HFpEF, and paroxysmal atrial fibrillation. Pt has chronic persistent dyspnea and has home oxygen.    12/13/2017: Pt is doing well this morning, breathing better. Echo show normal EF how ever he has diastolic dysfunction. No acute event overnight, currently chest pain free.     Review of Systems   Constitutional: Positive for fatigue. Negative for chills and fever.   HENT: Negative for ear pain and rhinorrhea.    Eyes: Negative for discharge and itching.   Respiratory: Positive for shortness of breath. Negative for chest tightness and wheezing.    Cardiovascular: Negative for chest pain, palpitations and leg swelling.   Gastrointestinal: Negative for abdominal pain, constipation, diarrhea, nausea and vomiting.   Genitourinary: Negative for dysuria and urgency.   Musculoskeletal: Negative for arthralgias and joint swelling.   Neurological: Negative for dizziness, weakness and light-headedness.   Psychiatric/Behavioral: Negative for agitation and behavioral problems.        All pertinent negatives and positives are as above. All other systems have been reviewed and are negative unless otherwise stated.     Objective      Vital Sign Min/Max for last 24 hours  Temp  Min: 96.1 °F (35.6 °C)  Max: 97.9 °F (36.6 °C)   BP  Min: 104/60  Max: 165/72   Pulse  Min: 47  Max: 104   Resp  Min: 18  Max: 20   SpO2  Min: 95 %  Max: 98 %   Flow (L/min)  Min: 2  Max: 3   Weight  Min: 75.5 kg (166 lb 7 oz)  Max: 75.5 kg (166 lb 7 oz)         Physical Exam   Constitutional: He is oriented to person, place, and time.  He appears well-developed and well-nourished.   HENT:   Head: Normocephalic and atraumatic.   Eyes: EOM are normal. Pupils are equal, round, and reactive to light.   Neck: Normal range of motion.   Cardiovascular: Normal rate and regular rhythm.    Pulmonary/Chest: Effort normal. He has rales.   On 2L oxygen.    Abdominal: Soft. Bowel sounds are normal.   Musculoskeletal: Normal range of motion.   Neurological: He is alert and oriented to person, place, and time.   Skin: Skin is warm.   Psychiatric: He has a normal mood and affect. His behavior is normal.   Vitals reviewed.      Current Facility-Administered Medications   Medication Dose Route Frequency Provider Last Rate Last Dose   • acetaminophen (TYLENOL) tablet 650 mg  650 mg Oral Q4H PRN Daylin G Levill, APRN       • albuterol (PROVENTIL) nebulizer solution 0.083% 2.5 mg/3mL  2.5 mg Nebulization Q4H PRN Daylin G Levill, APRN   2.5 mg at 12/14/17 0640   • aspirin chewable tablet 81 mg  81 mg Oral Daily Daylin G Levill, APRN   81 mg at 12/14/17 0808   • clopidogrel (PLAVIX) tablet 75 mg  75 mg Oral Daily Daylin G Levill, APRN   75 mg at 12/14/17 0808   • dextrose (D50W) solution 25 g  25 g Intravenous Q15 Min PRN Daylin G Levill, APRN       • dextrose (GLUTOSE) oral gel 15 g  15 g Oral Q15 Min PRN Daylin G Levill, APRN       • donepezil (ARICEPT) tablet 10 mg  10 mg Oral Daily Daylin G Levill, APRN   10 mg at 12/14/17 0808   • famotidine (PEPCID) tablet 40 mg  40 mg Oral Daily Daylin G Levill, APRN   40 mg at 12/14/17 0817   • fluticasone (FLONASE) 50 MCG/ACT nasal spray 2 spray  2 spray Nasal Daily Daylin G Levill, APRN   2 spray at 12/14/17 1042   • furosemide (LASIX) injection 20 mg  20 mg Intravenous Q12H Felipe Chan MD       • glipiZIDE (GLUCOTROL) tablet 5 mg  5 mg Oral QAM AC Daylin G Levill, APRN   5 mg at 12/14/17 0808   • glucagon (human recombinant) (GLUCAGEN DIAGNOSTIC) injection 1 mg  1 mg Subcutaneous Q15 Min PRN EVE Gomez       •  HYDROcodone-acetaminophen (NORCO) 7.5-325 MG per tablet 1 tablet  1 tablet Oral Q6H PRN Parth Birmingham MD   1 tablet at 12/14/17 0817   • insulin aspart (novoLOG) injection 0-9 Units  0-9 Units Subcutaneous 4x Daily AC & at Bedtime Daylin G Levill, APRN   2 Units at 12/14/17 0810   • ipratropium-albuterol (DUO-NEB) nebulizer solution 3 mL  3 mL Nebulization Q8H - RT Daylin G Levill, APRN   3 mL at 12/13/17 2114   • isosorbide mononitrate (IMDUR) 24 hr tablet 30 mg  30 mg Oral Q24H Mana Curtis MD   30 mg at 12/14/17 1305   • levoFLOXacin (LEVAQUIN) 500 mg/100 mL D5W (premix) (LEVAQUIN) 500 mg  500 mg Intravenous Q24H Daylin G Levill, APRN   500 mg at 12/13/17 1817   • lisinopril (PRINIVIL,ZESTRIL) tablet 5 mg  5 mg Oral Q24H Daylin G Levill, APRN   5 mg at 12/14/17 1305   • magnesium oxide (MAGOX) tablet 400 mg  400 mg Oral Daily Daylin G Levill, APRN   400 mg at 12/14/17 0808   • methylPREDNISolone sodium succinate (SOLU-Medrol) injection 60 mg  60 mg Intravenous Q6H Daylin G Levill, APRN   60 mg at 12/14/17 1043   • nitroglycerin (NITROSTAT) SL tablet 0.4 mg  0.4 mg Sublingual Q5 Min PRN Daylin G Levill, APRN       • ondansetron (ZOFRAN) injection 4 mg  4 mg Intravenous Q6H PRN Daylin G Levill, APRN       • ranolazine (RANEXA) 12 hr tablet 500 mg  500 mg Oral Q12H Mana Curtis MD   500 mg at 12/14/17 1305   • sodium chloride 0.9 % flush 1-10 mL  1-10 mL Intravenous PRN Daylin G Levill, APRN       • sodium chloride 0.9 % flush 10 mL  10 mL Intravenous PRN Jose Angel Galarza MD       • sodium chloride 0.9 % infusion  50 mL/hr Intravenous Continuous Martinez Cerda MD               Results Review:  I have reviewed the labs, radiology results, and diagnostic studies.    Laboratory Data:     Results from last 7 days  Lab Units 12/14/17  0716 12/13/17  0840   SODIUM mmol/L 136* 139   POTASSIUM mmol/L 5.2* 4.7   CHLORIDE mmol/L 99 103   CO2 mmol/L 23.0 26.0   BUN mg/dL 41* 31*   CREATININE mg/dL 1.36* 1.09   GLUCOSE mg/dL  159* 82   CALCIUM mg/dL 10.1 9.4   BILIRUBIN mg/dL  --  0.5   ALK PHOS U/L  --  56   ALT (SGPT) U/L  --  39   AST (SGOT) U/L  --  27   ANION GAP mmol/L 14.0 10.0     Estimated Creatinine Clearance: 43.2 mL/min (by C-G formula based on Cr of 1.36).            Results from last 7 days  Lab Units 12/14/17  0602 12/13/17  0840   WBC 10*3/mm3 11.41* 10.89*   HEMOGLOBIN g/dL 13.0* 12.5*   HEMATOCRIT % 39.8 39.3   PLATELETS 10*3/mm3 225 211       Results from last 7 days  Lab Units 12/13/17  0840   INR  0.92       Results from last 7 days  Lab Units 12/14/17  0716   CRP mg/dL <0.50       Culture Data:   No results found for: BLOODCX  No results found for: URINECX  No results found for: RESPCX  No results found for: WOUNDCX  No results found for: STOOLCX  No components found for: BODYFLD      Radiology Data:   Imaging Results (last 24 hours)     Procedure Component Value Units Date/Time    CT Angiogram Chest With Contrast [[id number]] Collected:  12/13/17 1355     Updated:  12/13/17 1428    Narrative:         EXAM:  Computed Tomography with CTA         REGION:  Chest       INDICATION:   Dyspnea, elevated d-dimer, chest pain    - rule out pulmonary embolism       CLINICAL HISTORY:  CORRELATIVE IMAGING:  None                         TECHNIQUE:     - PE / vascular protocol     - reconstructions:  axial, coronal, sagittal, obliques     - computer-generated 3D reconstructions (MIPS) were performed.     - contrast:  intravenous Isovue 370, 78 mL                                 This exam was performed according to the departmental  dose-optimization program which includes automated exposure  control, adjustment of the mA and/or kV according to patient size  and/or use of iterative reconstruction technique.         COMMENTS:    - Pulmonary arterial system:      - Main pulmonary artery trunk:  negative      - Left, right main pulmonary arteries: negative      - Lobar arteries: negative       - Segmental arteries: negative      -  Systemic vascularity (as visualized):        - Aorta:  grossly negative / normal caliber / no dissection        - roots of great vessels:  grossly negative / normal  caliber        - SVC / IVC:  grossly negative / normal caliber     - Misc (limited visualization):      - pulmonary parenchyma:  negative      - pleura:  negative      - mediastinal / danyelle:  negative      - neck, inferior:  grossly wnl      - subdiaphragmatic structures:  grossly negative (limited  evaluation)       - osseous:      - misc:       .        Impression:       CONCLUSION:          1.  No evidence of pulmonary embolism.            2.  No evidence of pathology associated with the visualized  aorta.                                              Electronically signed by:  GOOD Joyce MD  12/13/2017 2:27  PM CST Workstation: [id number]          I have reviewed the patient current medications.     Assessment/Plan     Active Hospital Problems (** Indicates Principal Problem)    Diagnosis Date Noted   • Chest pain [R07.9] 09/25/2017      Resolved Hospital Problems    Diagnosis Date Noted Date Resolved   No resolved problems to display.     1. Acute on chronic diastolic dysfunction with normal EF: Pt is currently on lasix. He is not on BB due to his bradycardia.   2. Bradyarrhythmia: Pt had long discussion with Dr. Curtis regarding the pacemaker and anticoagulation. Pt declined any further cardiac evaluation. Currently he is not on any AV node blocker. Pt is currently in atrial flutter with HR of 100, no anticoagulation recommended at this point.   3. Hyperkalemia: will give Kayexalate, recheck potassium.   4. COPD exacerbation with pneumonia: noted on the first xray, currently on levaquin and steroids with bronchodilators. Pt is doing well.   5. HTN: lisinopril   6. JILL: with Cr elevation to 1.36, will lower the lasix to 20 mg BID.         Discharge Planning: I expect patient to be discharged to home in 1-2 days.      DVT Prophylaxis: SCD/TEDs              This document has been electronically signed by Felipe Chan MD on December 14, 2017 1:41 PM

## 2017-12-14 NOTE — THERAPY EVALUATION
Acute Care - Physical Therapy Initial Evaluation  [facility]     Patient Name: Hasmukh Ham  : 1933  MRN: [id number]  Today's Date: 2017   Onset of Illness/Injury or Date of Surgery Date: 17  Date of Referral to PT: 17  Referring Physician: EVE Birch      Admit Date: 2017     Visit Dx:    ICD-10-CM ICD-9-CM   1. Chest pain, unspecified type R07.9 786.50   2. Elevated troponin R74.8 790.6   3. Abnormal x-ray R93.8 793.99   4. Impaired mobility and ADLs Z74.09 799.89   5. 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   • Nocturia   • Osteoarthritis of multiple joints   • Hyperlipidemia   • Pain in joint involving ankle and foot   • Arthropathy of hand   • Paroxysmal tachycardia   • Osteoarthrosis involving more than one site but not generalized   • Right shoulder pain   • Rotator cuff syndrome   • Partial tear of subscapularis tendon   • Infraspinatus tendon tear   • Supraspinatus tendon tear   • Bilateral carotid artery stenosis   • (HFpEF) heart failure with preserved ejection fraction   • Pulmonary HTN   • Acute interstitial pneumonia   • Chronic obstructive lung disease   • Coronary arteriosclerosis   • Diabetes mellitus   • Hypertension   • Chest pain   • Shortness of breath     Past Medical History:   Diagnosis Date   • Bilateral carotid artery stenosis    • CHF (congestive heart failure)    • Chronic obstructive pulmonary disease (COPD)    • Coronary arteriosclerosis    • Degenerative  joint disease involving multiple joints    • Dementia    • Diabetes mellitus    • Hyperlipidemia    • Hypertension    • Myocardial infarction      Past Surgical History:   Procedure Laterality Date   • CARDIAC CATHETERIZATION N/A 6/6/2017    Procedure: Right Heart Cath;  Surgeon: Jeff Maciel MD PhD;  Location: [facility] CATH INVASIVE LOCATION;  Service:    • CORONARY STENT PLACEMENT     • HERNIA REPAIR     • LUNG BIOPSY     • NECK SURGERY     • THORACOTOMY Left 1977   • VENTRAL HERNIA REPAIR            PT ASSESSMENT (last 72 hours)      PT Evaluation       12/14/17 1539 12/14/17 1538    Rehab Evaluation    Document Type evaluation  -TESS evaluation  -RW    Subjective Information complains of;pain  -TESS agree to therapy;complains of;pain  -RW    Patient Effort, Rehab Treatment good  -TESS good  -RW    Symptoms Noted During/After Treatment  none  -RW    General Information    Patient Profile Review yes  -TESS yes  -RW    Onset of Illness/Injury or Date of Surgery Date 12/13/17  -TESS     Referring Physician EVE Birch  -EVE Castaneda  -LATRICE    General Observations Pt supine;noted IV, O2 per nasal cannula, telemetry  -TESS     Pertinent History Of Current Problem Pt admitted to [facility] with chest pain with diastolic dysfunction, bradycardia, COPD with pneumonia  -TESS     Precautions/Limitations fall precautions;oxygen therapy device and L/min  -TESS fall precautions;oxygen therapy device and L/min  -RW    Prior Level of Function independent:;all household mobility;ADL's  -TESS independent:;ADL's;all household mobility  -RW    Equipment Currently Used at Home walker, rolling;oxygen;shower chair;commode   uses BSC over toilet;has SCdoes not use  -TESS walker, rolling;oxygen;shower chair  -RW    Plans/Goals Discussed With patient;agreed upon  -TESS patient;agreed upon  -RW    Risks Reviewed patient:;LOB;dizziness;increased discomfort;change in vital signs  -TESS patient:;LOB;dizziness;increased discomfort;change in vital  signs  -RW    Benefits Reviewed patient:;increase independence  -TESS patient:;increase independence  -RW    Barriers to Rehab medically complex;previous functional deficit  -TESS medically complex;previous functional deficit  -RW    Living Environment    Lives With spouse  -TESS spouse  -RW    Living Arrangements mobile home  -TESS mobile home  -RW    Home Accessibility stairs to enter home;tub/shower is not walk in;grab bars present (toilet);grab bars present (bathtub)  -TESS stairs to enter home;tub/shower is not walk in;grab bars present (bathtub);grab bars present (toilet)  -RW    Number of Stairs to Enter Home 5  -TESS 5  -RW    Stair Railings at Home outside, present at both sides  -TESS outside, present at both sides  -RW    Transportation Available car;family or friend will provide  -TESS     Living Environment Comment Dtr next door and assists with housekeeping and as needed  -TESS     Clinical Impression    Date of Referral to PT 12/13/17  -TESS     PT Diagnosis impaired physical mobility due to COPD/pneumonia, diastolic dysfunction  -TESS     Prognosis per MD  -TESS     Patient/Family Goals Statement Be stronger;able to assist with care  -TESS     Criteria for Skilled Therapeutic Interventions Met yes  -TESS     Pathology/Pathophysiology Noted (Describe Specifically for Each System) musculoskeletal;pulmonary;cardiovascular  -TESS     Impairments Found (describe specific impairments) aerobic capacity/endurance;gait, locomotion, and balance  -TESS     Functional Limitations in Following Categories (Describe Specific Limitations) community/leisure  -TESS     Disability: Inability to Perform Actions/Activities of Required Roles (describe specific disability) community/leisure  -TESS     Rehab Potential good, to achieve stated therapy goals  -TESS     Predicted Duration of Therapy Intervention (days/wks) until discharge of goals met  -TESS     Vital Signs    Pre Systolic BP Rehab 123  -TESS 123  -RW    Pre Treatment Diastolic BP 68  -TESS 68  -RW     Post Systolic BP Rehab 126  -TESS 126  -RW    Post Treatment Diastolic BP 69  -TESS 69  -RW    Pretreatment Heart Rate (beats/min) 96  -TESS 96  -RW    Intratreatment Heart Rate (beats/min) 103  -TESS 103  -RW    Posttreatment Heart Rate (beats/min) 104  -TESS 104  -RW    Pre SpO2 (%) 94  -TESS 94  -RW    O2 Delivery Pre Treatment supplemental O2  -TESS supplemental O2  -RW    Intra SpO2 (%) 98  -TSES 98  -RW    O2 Delivery Intra Treatment supplemental O2  -TESS supplemental O2  -RW    Post SpO2 (%) 96  -TESS 96  -RW    O2 Delivery Post Treatment supplemental O2  -TESS supplemental O2  -RW    Pre Patient Position Supine  -TESS Supine  -RW    Intra Patient Position Sitting  -TESS Sitting   EOB  -RW    Post Patient Position Sitting  -TESS Sitting  -RW    Pain Assessment    Pain Assessment 0-10  -TESS 0-10  -RW    Pain Score 7  -TESS 7  -RW    Post Pain Score 7  -TESS 7  -RW    Pain Type Chronic pain  -TESS Chronic pain  -RW    Pain Location Back  -TESS Back  -RW    Pain Orientation Lower  -TESS     Pain Intervention(s) Repositioned;Ambulation/increased activity  -TESS Repositioned;Ambulation/increased activity  -RW    Response to Interventions  no change reported  -RW    Vision Assessment/Intervention    Visual Impairment WFL with corrective lenses  -     Cognitive Assessment/Intervention    Current Cognitive/Communication Assessment functional  - functional  -RW    Orientation Status oriented x 4  -TESS oriented x 4  -RW    Follows Commands/Answers Questions 100% of the time  -TESS 100% of the time  -RW    Personal Safety mild impairment;impulsive  -TESS mild impairment  -RW    Personal Safety Interventions fall prevention program maintained;gait belt;nonskid shoes/slippers when out of bed;supervised activity  - fall prevention program maintained;gait belt;nonskid shoes/slippers when out of bed;supervised activity  -RW    ROM (Range of Motion)    General ROM upper extremity range of motion deficits identified  - upper extremity range of motion deficits  identified  -    General ROM Detail BLE: AROM WFL  -     General UE Assessment    ROM  shoulder, right: UE ROM deficit;elbow/forearm, right: UE ROM deficit  -RW    ROM Detail Please refer to OT evaluation for BUE range detail  - shoulder flexion ~70-80*; elbow flexion limited but functional  -    MMT (Manual Muscle Testing)    General MMT Assessment upper extremity strength deficits identified;lower extremity strength deficits identified  - upper extremity strength deficits identified  -    General MMT Assessment Detail  RUE: shoulder/elbow 3-/5, wrist 3/5,  4-/5; LUE: shoulder/elbow 4/5, wrist 3/5,  4-/5   -RW    Upper Extremity    Upper Ext Manual Muscle Testing Detail Please refer to OT evaluation for BUE strength detail  -     Lower Extremity    Lower Ext Manual Muscle Testing Detail BLE: 4-/5 ankles/feet, 3+/5 knees, 3/5 hips  -     Bed Mobility, Assessment/Treatment    Bed Mobility, Assistive Device bed rails;head of bed elevated  - bed rails;head of bed elevated  -    Bed Mob, Supine to Sit, [place] conditional independence  -TESS conditional independence  -    Bed Mob, Sit to Supine, [place] conditional independence  -TESS conditional independence  -RW    Transfer Assessment/Treatment    Transfers, Sit-Stand [place] contact guard assist  - contact guard assist  -    Transfers, Stand-Sit [place] contact guard assist  - contact guard assist  -    Transfers, Sit-Stand-Sit, Assist Device rolling walker  -     Transfer, Safety Issues impulsivity  - impulsivity  -    Gait Assessment/Treatment    Gait, [place] Level contact guard assist  -     Gait, Assistive Device rolling walker  -     Gait, Distance (Feet) 60  -     Gait, Safety Issues supplemental O2  -     Sensory Assessment/Intervention    Sensory Impairment  burning;tingling   Pt reports neuropathy.  -RW    Light Touch LLE;RLE  -TESS LUE;RUE  -RW    LUE Light Touch  WNL  -    RUE Light  Touch  mild impairment  -RW    LLE Light Touch moderate impairment  -TESS     RLE Light Touch moderate impairment  -TESS     Edema Management    Edema Amount right:;left:;minimal  -TESS     Positioning and Restraints    Pre-Treatment Position in bed  -TESS in bed  -RW    Post Treatment Position bed  -TESS bed  -RW    In Bed sitting EOB;call light within reach;encouraged to call for assist;with family/caregiver;side rails up x2   sitting EOB eating dinner  - sitting EOB;call light within reach;encouraged to call for assist;with family/caregiver  -      12/14/17 1333 12/13/17 1613    General Information    Equipment Currently Used at Home walker, rolling;cane, straight;nebulizer;oxygen   Patient is on 2 liters continuously @ home. He has portable oxygen for travel. Patient uses [employer] for DME needs.   -     Living Environment    Lives With spouse  - spouse  -    Living Arrangements house   Contact information on face sheet confirmed with patient.  - mobile home  -    Home Accessibility  stairs to enter home  -    Stair Railings at Home  outside, present at both sides  -    Type of Financial/Environmental Concern  none  -    Transportation Available car;family or friend will provide   Patient no longer drives. Per patient, his daughter and/or spouse assists with transportation.   - car;family or friend will provide  -      12/13/17 1610       General Information    Equipment Currently Used at Home nebulizer;oxygen  -MM       User Key  (r) = Recorded By, (t) = Taken By, (c) = Cosigned By    Initials Name Provider Type     Deborah Mcqueen, OTR/L Occupational Therapist     Mercedez Singh, ANGELYW     TESS Joshua, PT Physical Therapist    MM Corry Lynch, RN Registered Nurse          Physical Therapy Education     Title: PT OT SLP Therapies (Active)     Topic: Physical Therapy (Active)     Point: Mobility training (Active)    Learning Progress Summary    Learner Readiness  Method Response Comment Documented by Status   Patient Acceptance E NR  TESS 12/14/17 1721 Active   Family Acceptance E NR  TESS 12/14/17 1721 Active               Point: Precautions (Active)    Learning Progress Summary    Learner Readiness Method Response Comment Documented by Status   Patient Acceptance E NR  TESS 12/14/17 1721 Active   Family Acceptance E NR  TESS 12/14/17 1721 Active                      User Key     Initials Effective Dates Name Provider Type Discipline     10/17/16 -  Gilda Joshua, PT Physical Therapist PT                PT Recommendation and Plan  Anticipated Discharge Disposition: home with assist, home with home health  Planned Therapy Interventions: balance training, gait training, patient/family education, stair training, strengthening, transfer training  PT Frequency: other (see comments) (5-14 times per week)  Plan of Care Review  Plan Of Care Reviewed With: patient, spouse, daughter  Outcome Summary/Follow up Plan: PT evaluation completed. Pt transferred sit to stand with CGA and ambulated 60 ft with RW with CGA. Pt required verbal cues to slow down and for safe use of RW. Function limited primarily by decreased strength, balance, and tolerance for functional mobility and activities. Anticipate pt will benefit from home with assistance with  PT/OT to follow.          IP PT Goals       12/14/17 1721          Transfer Training PT LTG    Transfer Training PT LTG, Date Established 12/14/17  -      Transfer Training PT LTG, Time to Achieve by discharge  -      Transfer Training PT LTG, Activity Type bed to chair /chair to bed;sit to stand/stand to sit  -      Transfer Training PT LTG, [place] Level conditional independence  -      Transfer Training PT LTG, Outcome goal ongoing  -      Gait Training PT LTG    Gait Training Goal PT LTG, Date Established 12/14/17  -      Gait Training Goal PT LTG, Time to Achieve by discharge  -      Gait Training Goal PT LTG, [place]  Level conditional independence  -      Gait Training Goal PT LTG, Distance to Achieve [id number]  -      Gait Training Goal PT LTG, Outcome goal ongoing  -      Stair Training PT LTG    Stair Training Goal PT LTG, Date Established 12/14/17  -      Stair Training Goal PT LTG, Time to Achieve by discharge  -      Stair Training Goal PT LTG, Number of Steps 5  -      Stair Training Goal PT LTG, [place] Level conditional independence  -      Stair Training Goal PT LTG, Assist Device 2 handrails  -      Stair Training Goal PT LTG, Outcome goal ongoing  -      Strength Goal PT LTG    Strength Goal PT LTG, Date Established 12/14/17  -      Strength Goal PT LTG, Time to Achieve by discharge  -      Strength Goal PT LTG, Measure to Achieve Pt will tolerate 15 reps fo AROM exericises  -      Strength Goal PT LTG, Outcome goal ongoing  [facility]      Patient Education PT LTG    Patient Education PT LTG, Date Established 12/14/17  -      Patient Education PT LTG, Time to Achieve by discharge  -      Patient Education PT LTG, Education Type gait;transfers;home safety  -      Patient Education PT LTG Outcome goal [facility]        User Key  (r) = Recorded By, (t) = Taken By, (c) = Cosigned By    Initials Name Provider Type    TESS Joshua, PT Physical Therapist                Outcome Measures       12/14/17 1539 12/14/17 1538       How much help from another person do you currently need...    Turning from your back to your side while in flat bed without using bedrails? 4  -TESS      Moving from lying on back to sitting on the side of a flat bed without bedrails? 3  -TESS      Moving to and from a bed to a chair (including a wheelchair)? 3  -TESS      Standing up from a chair using your arms (e.g., wheelchair, bedside chair)? 3  -TESS      Climbing 3-5 steps with a railing? 3  -TSES      To walk in hospital room? 3  -TESS      AM-PAC 6 Clicks Score 19  -TESS      How much help from another is currently needed...     Putting on and taking off regular lower body clothing?  3  -RW     Bathing (including washing, rinsing, and drying)  3  -RW     Toileting (which includes using toilet bed pan or urinal)  3  -RW     Putting on and taking off regular upper body clothing  3  -RW     Taking care of personal grooming (such as brushing teeth)  4  -RW     Eating meals  4  -RW     Score  20  -RW     Functional Assessment    Outcome Measure Options AM-PAC 6 Clicks Basic Mobility (PT)  -TESS AM-PAC 6 Clicks Daily Activity (OT)  -RW       User Key  (r) = Recorded By, (t) = Taken By, (c) = Cosigned By    Initials Name Provider Type    RW Deborah Mcqueen, OTR/L Occupational Therapist    TESS Joshua, PT Physical Therapist           Time Calculation:         PT Charges       12/14/17 1725          Time Calculation    Start Time 1539  -      Stop Time 1615  -      Time Calculation (min) 36 min  -TESS      PT Received On 12/14/17  -TESS      PT Goal Re-Cert Due Date 12/27/17  -      Time Calculation- PT    Total Timed Code Minutes- PT 0 minute(s)   co-eval with OT  -TESS        User Key  (r) = Recorded By, (t) = Taken By, (c) = Cosigned By    Initials Name Provider Type    TESS Gilda Joshua, PT Physical Therapist          Therapy Charges for Today     Code Description Service Date Service Provider Modifiers Qty    79109977477 HC PT MOBILITY CURRENT 12/14/2017 Gilda Joshua, PT GP, CJ 1    59820468587 HC PT MOBILITY PROJECTED 12/14/2017 Gilda Joshua PT GP, CI 1    39143604023 HC PT EVAL MOD COMPLEXITY 1 12/14/2017 Gilda Joshua PT GP 1          PT G-Codes  PT Professional Judgement Used?: Yes  Outcome Measure Options: AM-PAC 6 Clicks Basic Mobility (PT)  Score: 19  Functional Limitation: Mobility: Walking and moving around  Mobility: Walking and Moving Around Current Status (): At least 20 percent but less than 40 percent impaired, limited or restricted  Mobility: Walking and Moving Around Goal Status (): At least 1 percent but  less than 20 percent impaired, limited or restricted      Gilda Joshua, PT  12/14/2017

## 2017-12-14 NOTE — PLAN OF CARE
Problem: Patient Care Overview (Adult)  Goal: Plan of Care Review  Outcome: Ongoing (interventions implemented as appropriate)    12/14/17 1648   Coping/Psychosocial Response Interventions   Plan Of Care Reviewed With patient;spouse   Outcome Evaluation   Outcome Summary/Follow up Plan OT evaluation completed. Pt mod(I) for bed mobility but required CGA for sit-stand & functional mobility without AD. Pt somewhat impulsive & unsteady. He would benefit from skilled OT services to increase independence & safety with ADLs & functional mobility prior to return home. Recommend  OT/PT at discharge.         Problem: Inpatient Occupational Therapy  Goal: Transfer Training Goal 1 LTG- OT  Outcome: Ongoing (interventions implemented as appropriate)    12/14/17 1648   Transfer Training OT LTG   Transfer Training OT LTG, Date Established 12/14/17   Transfer Training OT LTG, Time to Achieve by discharge   Transfer Training OT LTG, Activity Type toilet;tub  (simulated tub t/f)   Transfer Training OT LTG, [place] Level conditional independence       Goal: Dynamic Standing Balance Goal LTG-OT  Outcome: Ongoing (interventions implemented as appropriate)    12/14/17 1648   Dynamic Standing Balance OT LTG   Dynamic Standing Balance OT LTG, Date Established 12/14/17   Dynamic Standing Balance OT LTG, Time to Achieve by discharge   Dynamic Standing Balance OT LTG, [place] Level conditional independence   Dynamic Standing Balance OT LTG, Additional Goal 5 min functional activity       Goal: Safety Awareness Goal LTG- OT  Outcome: Ongoing (interventions implemented as appropriate)    12/14/17 1648   Safety Awareness OT LTG   Safety Awareness OT LTG, Date Established 12/14/17   Safety Awareness OT LTG, Time to Achieve by discharge   Safety Awareness OT LTG, Activity Type good safety awareness;with ADL's       Goal: ADL Goal LTG- OT  Outcome: Ongoing (interventions implemented as appropriate)    12/14/17 1648   ADL OT LTG   ADL  OT LTG, Date Established 12/14/17   ADL OT LTG, Time to Achieve by discharge   ADL OT LTG, Activity Type ADL skills   ADL OT LTG, [place] Level modified independent

## 2017-12-15 ENCOUNTER — APPOINTMENT (OUTPATIENT)
Dept: CT IMAGING | Facility: HOSPITAL | Age: 82
End: 2017-12-15

## 2017-12-15 LAB
ANION GAP SERPL CALCULATED.3IONS-SCNC: 16 MMOL/L (ref 5–15)
BACTERIA SPEC AEROBE CULT: NORMAL
BACTERIA SPEC AEROBE CULT: NORMAL
BASOPHILS # BLD AUTO: 0.01 10*3/MM3 (ref 0–0.2)
BASOPHILS NFR BLD AUTO: 0.1 % (ref 0–2)
BUN BLD-MCNC: 48 MG/DL (ref 7–21)
BUN/CREAT SERPL: 36.9 (ref 7–25)
CALCIUM SPEC-SCNC: 9.6 MG/DL (ref 8.4–10.2)
CHLORIDE SERPL-SCNC: 102 MMOL/L (ref 95–110)
CO2 SERPL-SCNC: 22 MMOL/L (ref 22–31)
CREAT BLD-MCNC: 1.3 MG/DL (ref 0.7–1.3)
D-LACTATE SERPL-SCNC: 2.9 MMOL/L (ref 0.5–2)
D-LACTATE SERPL-SCNC: 3.2 MMOL/L (ref 0.5–2)
D-LACTATE SERPL-SCNC: 3.7 MMOL/L (ref 0.5–2)
D-LACTATE SERPL-SCNC: 4.4 MMOL/L (ref 0.5–2)
DEPRECATED RDW RBC AUTO: 56.7 FL (ref 35.1–43.9)
EOSINOPHIL # BLD AUTO: 0 10*3/MM3 (ref 0–0.7)
EOSINOPHIL NFR BLD AUTO: 0 % (ref 0–7)
ERYTHROCYTE [DISTWIDTH] IN BLOOD BY AUTOMATED COUNT: 16.4 % (ref 11.5–14.5)
GFR SERPL CREATININE-BSD FRML MDRD: 53 ML/MIN/1.73 (ref 60–98)
GLUCOSE BLD-MCNC: 192 MG/DL (ref 60–100)
GLUCOSE BLDC GLUCOMTR-MCNC: 121 MG/DL (ref 70–130)
GLUCOSE BLDC GLUCOMTR-MCNC: 146 MG/DL (ref 70–130)
GLUCOSE BLDC GLUCOMTR-MCNC: 197 MG/DL (ref 70–130)
GLUCOSE BLDC GLUCOMTR-MCNC: 308 MG/DL (ref 70–130)
HCT VFR BLD AUTO: 39.5 % (ref 39–49)
HGB BLD-MCNC: 12.6 G/DL (ref 13.7–17.3)
IMM GRANULOCYTES # BLD: 0.11 10*3/MM3 (ref 0–0.02)
IMM GRANULOCYTES NFR BLD: 0.6 % (ref 0–0.5)
L PNEUMO1 AG UR QL IA: NEGATIVE
LYMPHOCYTES # BLD AUTO: 1.01 10*3/MM3 (ref 0.6–4.2)
LYMPHOCYTES NFR BLD AUTO: 5.6 % (ref 10–50)
MCH RBC QN AUTO: 30.1 PG (ref 26.5–34)
MCHC RBC AUTO-ENTMCNC: 31.9 G/DL (ref 31.5–36.3)
MCV RBC AUTO: 94.5 FL (ref 80–98)
MONOCYTES # BLD AUTO: 0.85 10*3/MM3 (ref 0–0.9)
MONOCYTES NFR BLD AUTO: 4.7 % (ref 0–12)
MYCOPLASMAE PNEUMONIAE BY PCR: NEGATIVE
NEUTROPHILS # BLD AUTO: 15.96 10*3/MM3 (ref 2–8.6)
NEUTROPHILS NFR BLD AUTO: 89 % (ref 37–80)
PLATELET # BLD AUTO: 245 10*3/MM3 (ref 150–450)
PMV BLD AUTO: 10.9 FL (ref 8–12)
POTASSIUM BLD-SCNC: 4.2 MMOL/L (ref 3.5–5.1)
RBC # BLD AUTO: 4.18 10*6/MM3 (ref 4.37–5.74)
S PNEUM AG SPEC QL LA: NEGATIVE
SODIUM BLD-SCNC: 140 MMOL/L (ref 137–145)
TROPONIN I SERPL-MCNC: 0.05 NG/ML
WBC NRBC COR # BLD: 17.94 10*3/MM3 (ref 3.2–9.8)

## 2017-12-15 PROCEDURE — 25010000002 FUROSEMIDE PER 20 MG: Performed by: FAMILY MEDICINE

## 2017-12-15 PROCEDURE — 93010 ELECTROCARDIOGRAM REPORT: CPT | Performed by: INTERNAL MEDICINE

## 2017-12-15 PROCEDURE — 97116 GAIT TRAINING THERAPY: CPT

## 2017-12-15 PROCEDURE — 97535 SELF CARE MNGMENT TRAINING: CPT

## 2017-12-15 PROCEDURE — 82962 GLUCOSE BLOOD TEST: CPT

## 2017-12-15 PROCEDURE — 80048 BASIC METABOLIC PNL TOTAL CA: CPT | Performed by: NURSE PRACTITIONER

## 2017-12-15 PROCEDURE — 25010000002 VANCOMYCIN PER 500 MG: Performed by: FAMILY MEDICINE

## 2017-12-15 PROCEDURE — 94760 N-INVAS EAR/PLS OXIMETRY 1: CPT

## 2017-12-15 PROCEDURE — 87899 AGENT NOS ASSAY W/OPTIC: CPT | Performed by: FAMILY MEDICINE

## 2017-12-15 PROCEDURE — 83605 ASSAY OF LACTIC ACID: CPT | Performed by: NURSE PRACTITIONER

## 2017-12-15 PROCEDURE — 87040 BLOOD CULTURE FOR BACTERIA: CPT | Performed by: FAMILY MEDICINE

## 2017-12-15 PROCEDURE — 93005 ELECTROCARDIOGRAM TRACING: CPT | Performed by: FAMILY MEDICINE

## 2017-12-15 PROCEDURE — 99232 SBSQ HOSP IP/OBS MODERATE 35: CPT | Performed by: INTERNAL MEDICINE

## 2017-12-15 PROCEDURE — 63710000001 INSULIN ASPART PER 5 UNITS: Performed by: NURSE PRACTITIONER

## 2017-12-15 PROCEDURE — 87581 M.PNEUMON DNA AMP PROBE: CPT | Performed by: FAMILY MEDICINE

## 2017-12-15 PROCEDURE — 97530 THERAPEUTIC ACTIVITIES: CPT

## 2017-12-15 PROCEDURE — 84484 ASSAY OF TROPONIN QUANT: CPT | Performed by: FAMILY MEDICINE

## 2017-12-15 PROCEDURE — 25010000002 METHYLPREDNISOLONE PER 125 MG: Performed by: NURSE PRACTITIONER

## 2017-12-15 PROCEDURE — 85025 COMPLETE CBC W/AUTO DIFF WBC: CPT | Performed by: NURSE PRACTITIONER

## 2017-12-15 PROCEDURE — 94799 UNLISTED PULMONARY SVC/PX: CPT

## 2017-12-15 PROCEDURE — 74176 CT ABD & PELVIS W/O CONTRAST: CPT

## 2017-12-15 RX ORDER — PREDNISONE 20 MG/1
40 TABLET ORAL
Status: DISCONTINUED | OUTPATIENT
Start: 2017-12-16 | End: 2017-12-16

## 2017-12-15 RX ADMIN — METHYLPREDNISOLONE SODIUM SUCCINATE 60 MG: 125 INJECTION, POWDER, FOR SOLUTION INTRAMUSCULAR; INTRAVENOUS at 00:00

## 2017-12-15 RX ADMIN — INSULIN ASPART 2 UNITS: 100 INJECTION, SOLUTION INTRAVENOUS; SUBCUTANEOUS at 17:57

## 2017-12-15 RX ADMIN — METHYLPREDNISOLONE SODIUM SUCCINATE 60 MG: 125 INJECTION, POWDER, FOR SOLUTION INTRAMUSCULAR; INTRAVENOUS at 05:00

## 2017-12-15 RX ADMIN — NITROGLYCERIN 0.4 MG: 0.4 TABLET SUBLINGUAL at 08:28

## 2017-12-15 RX ADMIN — WATER 1 G: 1 INJECTION INTRAMUSCULAR; INTRAVENOUS; SUBCUTANEOUS at 16:11

## 2017-12-15 RX ADMIN — METHYLPREDNISOLONE SODIUM SUCCINATE 60 MG: 125 INJECTION, POWDER, FOR SOLUTION INTRAMUSCULAR; INTRAVENOUS at 11:09

## 2017-12-15 RX ADMIN — MAGNESIUM OXIDE TAB 400 MG (241.3 MG ELEMENTAL MG) 400 MG: 400 (241.3 MG) TAB at 08:17

## 2017-12-15 RX ADMIN — FUROSEMIDE 20 MG: 10 INJECTION, SOLUTION INTRAMUSCULAR; INTRAVENOUS at 06:00

## 2017-12-15 RX ADMIN — GLIPIZIDE 5 MG: 5 TABLET ORAL at 08:17

## 2017-12-15 RX ADMIN — NITROGLYCERIN 0.4 MG: 0.4 TABLET SUBLINGUAL at 08:07

## 2017-12-15 RX ADMIN — INSULIN ASPART 7 UNITS: 100 INJECTION, SOLUTION INTRAVENOUS; SUBCUTANEOUS at 20:57

## 2017-12-15 RX ADMIN — CLOPIDOGREL BISULFATE 75 MG: 75 TABLET ORAL at 08:17

## 2017-12-15 RX ADMIN — FAMOTIDINE 40 MG: 40 TABLET ORAL at 08:17

## 2017-12-15 RX ADMIN — DOCUSATE SODIUM 100 MG: 100 CAPSULE, LIQUID FILLED ORAL at 08:17

## 2017-12-15 RX ADMIN — FLUTICASONE PROPIONATE 2 SPRAY: 50 SPRAY, METERED NASAL at 08:20

## 2017-12-15 RX ADMIN — DONEPEZIL HYDROCHLORIDE 10 MG: 10 TABLET ORAL at 08:17

## 2017-12-15 RX ADMIN — RANOLAZINE 500 MG: 500 TABLET, FILM COATED, EXTENDED RELEASE ORAL at 08:17

## 2017-12-15 RX ADMIN — ISOSORBIDE MONONITRATE 30 MG: 30 TABLET, EXTENDED RELEASE ORAL at 08:17

## 2017-12-15 RX ADMIN — RANOLAZINE 500 MG: 500 TABLET, FILM COATED, EXTENDED RELEASE ORAL at 20:56

## 2017-12-15 RX ADMIN — HYDROCODONE BITARTRATE AND ACETAMINOPHEN 1 TABLET: 7.5; 325 TABLET ORAL at 16:31

## 2017-12-15 RX ADMIN — ALBUTEROL SULFATE 2.5 MG: 2.5 SOLUTION RESPIRATORY (INHALATION) at 07:06

## 2017-12-15 RX ADMIN — IPRATROPIUM BROMIDE AND ALBUTEROL SULFATE 3 ML: 2.5; .5 SOLUTION RESPIRATORY (INHALATION) at 14:42

## 2017-12-15 RX ADMIN — LISINOPRIL 5 MG: 5 TABLET ORAL at 08:17

## 2017-12-15 RX ADMIN — VANCOMYCIN HYDROCHLORIDE 1250 MG: 5 INJECTION, POWDER, LYOPHILIZED, FOR SOLUTION INTRAVENOUS at 16:11

## 2017-12-15 RX ADMIN — SODIUM CHLORIDE 50 ML/HR: 900 INJECTION, SOLUTION INTRAVENOUS at 04:12

## 2017-12-15 RX ADMIN — ASPIRIN 81 MG 81 MG: 81 TABLET ORAL at 08:17

## 2017-12-15 RX ADMIN — HYDROCODONE BITARTRATE AND ACETAMINOPHEN 1 TABLET: 7.5; 325 TABLET ORAL at 08:44

## 2017-12-15 NOTE — CONSULTS
Adult Nutrition  Assessment    Patient Name:  Hasmukh Ham  YOB: 1933  MRN: [id number]  Admit Date:  12/13/2017    Assessment Date:  12/15/2017    Comments:  Pt with dx CHF. Mild Sodium Restricted Diet info used to provide ed regarding Low Sodium Diet and diet copy given.          Reason for Assessment       12/15/17 1205    Reason for Assessment    Reason For Assessment/Visit education;per organizational policy   Low Sodium diet    Identified At Risk By Screening Criteria need for education;diagnosis   dx CHF                              Electronically signed by:  Patricia Sanchez RD  12/15/17 12:05 PM

## 2017-12-15 NOTE — PLAN OF CARE
Problem: Patient Care Overview (Adult)  Goal: Plan of Care Review  Outcome: Ongoing (interventions implemented as appropriate)    12/15/17 1358   Coping/Psychosocial Response Interventions   Plan Of Care Reviewed With patient   Patient Care Overview   Progress progress toward functional goals as expected   Outcome Evaluation   Outcome Summary/Follow up Plan pt responded well to therapy. pt mod indep w/ bed mob. pt w/ increased gait to 430 ft SBA-indep. pt indep w/ stair training. pt met 1 new goal this tx. pt is progressing and would continue to benefit from PT services. Recommend  PT upon DC.       Goal: Discharge Needs Assessment  Outcome: Ongoing (interventions implemented as appropriate)    12/14/17 1333 12/14/17 1423 12/14/17 1539   Discharge Needs Assessment   Concerns To Be Addressed --  adjustment to diagnosis/illness concerns --    Concerns Comments Noted hospice was discussed with patient. He nor fmaly are agreeable and request to remain full code. --  --    Readmission Within The Last 30 Days no previous admission in last 30 days --  --    Equipment Needed After Discharge none --  --    Discharge Facility/Level Of Care Needs home with home health  (Note left for MD with request for  services skilled v's transition visit. ) --  --    Current Discharge Risk chronically ill  (COPD and CHF) --  --    Current Health   Anticipated Changes Related to Illness none --  --    Living Environment   Transportation Available --  --  car;family or friend will provide   Self-Care   Equipment Currently Used at Home --  --  walker, rolling;oxygen;shower chair;commode  (uses BSC over toilet;has SCdoes not use)         Problem: Inpatient Physical Therapy  Goal: Transfer Training Goal 1 LTG- PT  Outcome: Ongoing (interventions implemented as appropriate)    12/14/17 1721 12/15/17 1358   Transfer Training PT LTG   Transfer Training PT LTG, Date Established 12/14/17 --    Transfer Training PT LTG, Time to Achieve by  discharge --    Transfer Training PT LTG, Activity Type bed to chair /chair to bed;sit to stand/stand to sit --    Transfer Training PT LTG, [place] Level conditional independence --    Transfer Training PT LTG, Date Goal Reviewed --  12/15/17   Transfer Training PT LTG, Outcome --  goal ongoing       Goal: Gait Training Goal LTG- PT  Outcome: Ongoing (interventions implemented as appropriate)    12/14/17 1721 12/15/17 1358   Gait Training PT LTG   Gait Training Goal PT LTG, Date Established 12/14/17 --    Gait Training Goal PT LTG, Time to Achieve by discharge --    Gait Training Goal PT LTG, [place] Level conditional independence --    Gait Training Goal PT LTG, Distance to Achieve [id number] --    Gait Training Goal PT LTG, Date Goal Reviewed --  12/15/17   Gait Training Goal PT LTG, Outcome --  goal ongoing       Goal: Stair Training Goal LTG- PT  Outcome: Outcome(s) achieved Date Met:  12/15/17    12/14/17 1721 12/15/17 1358   Stair Training PT LTG   Stair Training Goal PT LTG, Date Established 12/14/17 --    Stair Training Goal PT LTG, Time to Achieve by discharge --    Stair Training Goal PT LTG, Number of Steps 5 --    Stair Training Goal PT LTG, [place] Level conditional independence --    Stair Training Goal PT LTG, Assist Device 2 handrails --    Stair Training Goal PT LTG, Date Goal Reviewed --  12/15/17   Stair Training Goal PT LTG, Outcome --  goal met       Goal: Strength Goal LTG- PT  Outcome: Ongoing (interventions implemented as appropriate)    12/14/17 1721 12/15/17 1358   Strength Goal PT LTG   Strength Goal PT LTG, Date Established 12/14/17 --    Strength Goal PT LTG, Time to Achieve by discharge --    Strength Goal PT LTG, Measure to Achieve Pt will tolerate 15 reps fo AROM exericises --    Strength Goal PT LTG, Date Goal Reviewed --  12/15/17   Strength Goal PT LTG, Outcome --  goal ongoing       Goal: Patient Education Goal LTG- PT  Outcome: Ongoing (interventions implemented as  appropriate)    12/14/17 1721 12/15/17 1358   Patient Education PT LTG   Patient Education PT LTG, Date Established 12/14/17 --    Patient Education PT LTG, Time to Achieve by discharge --    Patient Education PT LTG, Education Type gait;transfers;home safety --    Patient Education PT LTG, Date Goal Reviewed --  12/15/17   Patient Education PT LTG Outcome --  goal ongoing

## 2017-12-15 NOTE — THERAPY TREATMENT NOTE
Acute Care - Occupational Therapy Treatment Note  [facility]     Patient Name: Hasmukh Ham  : 1933  MRN: [id number]  Today's Date: 12/15/2017  Onset of Illness/Injury or Date of Surgery Date: 17  Date of Referral to OT: 17  Referring Physician: EVE Birch      Admit Date: 2017    Visit Dx:     ICD-10-CM ICD-9-CM   1. Chest pain, unspecified type R07.9 786.50   2. Elevated troponin R74.8 790.6   3. Abnormal x-ray R93.8 793.99   4. Impaired mobility and ADLs Z74.09 799.89   5. 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   • Nocturia   • Osteoarthritis of multiple joints   • Hyperlipidemia   • Pain in joint involving ankle and foot   • Arthropathy of hand   • Paroxysmal tachycardia   • Osteoarthrosis involving more than one site but not generalized   • Right shoulder pain   • Rotator cuff syndrome   • Partial tear of subscapularis tendon   • Infraspinatus tendon tear   • Supraspinatus tendon tear   • Bilateral carotid artery stenosis   • (HFpEF) heart failure with preserved ejection fraction   • Pulmonary HTN   • Acute interstitial pneumonia   • Chronic obstructive lung disease   • Coronary arteriosclerosis   • Diabetes mellitus   • Hypertension   • Chest pain   • Shortness of breath             Adult Rehabilitation Note       12/15/17 0805          Rehab Assessment/Intervention    Discipline occupational therapy assistant  -PAOLA      Document Type therapy note (daily note)  -PAOLA      Subjective  Information agree to therapy  -KD      Patient Effort, Rehab Treatment fair  -KD      Symptoms Noted During/After Treatment other (see comments)   Pt c/o 10/10 CP, Vitals taken, Nsg notified, tx ended  -KD      Recorded by [KD] KRYSTIN Gilmore/L      Vital Signs    Pre Systolic BP Rehab 153  -KD      Pre Treatment Diastolic BP 73  -KD      Pretreatment Heart Rate (beats/min) 85  -KD      Posttreatment Heart Rate (beats/min) 89  -KD      Pre SpO2 (%) 99  -KD      O2 Delivery Pre Treatment room air  -KD      Post SpO2 (%) 96  -KD      O2 Delivery Post Treatment room air  -KD      Pre Patient Position Sitting  -KD      Intra Patient Position Standing  -KD      Post Patient Position Sitting  -KD      Recorded by [KD] KRYSTIN Gilmore/L      Pain Assessment    Pain Assessment 0-10  -KD      Pain Score 5  -KD      Post Pain Score 10  -KD      Pain Location Chest  -KD      Recorded by [KD] KRYSTIN Gilmore/L      Cognitive Assessment/Intervention    Current Cognitive/Communication Assessment functional  -KD      Orientation Status oriented x 4  -KD      Follows Commands/Answers Questions 100% of the time  -KD      Personal Safety Interventions fall prevention program maintained  -KD      Recorded by [KD] KRYSTIN Gilmore/L      Bed Mobility, Assessment/Treatment    Bed Mob, Supine to Sit, [place] conditional independence  -KD      Bed Mob, Sit to Supine, [place] conditional independence  -KD      Recorded by [KD] KRYSTIN Gilmore/L      Transfer Assessment/Treatment    Transfers, Sit-Stand [place] supervision required  -KD      Transfers, Stand-Sit [place] contact guard assist;supervision required  -KD      Transfers, Sit-Stand-Sit, Assist Device rolling walker  -KD      Toilet Transfer, [place] supervision required  -KD      Toilet Transfer, Assistive Device other (see comments)   none  -KD      Recorded by [KD] KRYSTIN Gilmore/L      Functional Mobility    Functional  Mobility- Ind. Level supervision required  -KD      Functional Mobility-Distance (Feet) --   15 x 2  -KD      Recorded by [KD] KRYSTIN Gilmore/L      Toileting Assessment/Training    Toileting Assess/Train, Assistive Device grab bars  -KD      Toileting Assess/Train, Position sitting;standing  -KD      Toileting Assess/Train, Indepen Level supervision required  -KD      Recorded by [KD] KRYSTIN Gilmore/L      Grooming Assessment/Training    Grooming Assess/Train, Assistive Device --   wash hands  -KD      Grooming Assess/Train, Position standing  -KD      Grooming Assess/Train, Indepen Level supervision required  -KD      Recorded by [KD] CHANDU GilmoreA/L      Positioning and Restraints    Pre-Treatment Position in bed  -KD      Post Treatment Position bed  -KD      In Bed supine;call light within reach;encouraged to call for assist;exit alarm on;with nsg  -KD      Recorded by [KD] KRYSTIN Gilmore/L        User Key  (r) = Recorded By, (t) = Taken By, (c) = Cosigned By    Initials Name Effective Dates    KD KRYSTIN Gilmore/GEOFFREY 10/17/16 -                 OT Goals       12/15/17 0843 12/14/17 1648       Transfer Training OT LTG    Transfer Training OT LTG, Date Established  12/14/17  -RW     Transfer Training OT LTG, Time to Achieve  by discharge  -RW     Transfer Training OT LTG, Activity Type  toilet;tub   simulated tub t/f  -RW     Transfer Training OT LTG, [place] Level  conditional independence  -RW     Transfer Training OT LTG, Date Goal Reviewed 12/15/17  -KD      Transfer Training OT LTG, Outcome goal not met  -KD      Dynamic Standing Balance OT LTG    Dynamic Standing Balance OT LTG, Date Established  12/14/17  -RW     Dynamic Standing Balance OT LTG, Time to Achieve  by discharge  -RW     Dynamic Standing Balance OT LTG, [place] Level  conditional independence  -RW     Dynamic Standing Balance OT LTG, Additional Goal  5 min functional activity  -RW     Dynamic Standing  Balance OT LTG, Date Goal Reviewed 12/15/17  -KD      Dynamic Standing Balance OT LTG, Outcome goal not met  -KD      Safety Awareness OT LTG    Safety Awareness OT LTG, Date Established  12/14/17  -RW     Safety Awareness OT LTG, Time to Achieve  by discharge  -RW     Safety Awareness OT LTG, Activity Type  good safety awareness;with ADL's  -RW     Safety Awareness OT LTG, Date Goal Reviewed 12/15/17  -KD      Safety Awareness OT LTG, Outcome goal not met  -KD      ADL OT LTG    ADL OT LTG, Date Established  12/14/17  -RW     ADL OT LTG, Time to Achieve  by discharge  -RW     ADL OT LTG, Activity Type  ADL skills  -RW     ADL OT LTG, [place] Level  modified independent  -RW     ADL OT LTG, Date Goal Reviewed 12/15/17  -KD      ADL OT LTG, Outcome goal not met  -KD        User Key  (r) = Recorded By, (t) = Taken By, (c) = Cosigned By    Initials Name Provider Type     Deborah Mcqueen OTR/L Occupational Therapist    KRYSTIN Roca/L Occupational Therapy Assistant          Occupational Therapy Education     Title: PT OT SLP Therapies (Active)     Topic: Occupational Therapy (Active)     Point: ADL training (Active)    Description: Instruct learner(s) on proper safety adaptation and remediation techniques during self care or transfers.   Instruct in proper use of assistive devices.    Learning Progress Summary    Learner Readiness Method Response Comment Documented by Status   Patient Acceptance E NR fall precautions, transfer safety, OT POC RW 12/14/17 1647 Active   Family Acceptance E NR fall precautions, transfer safety, OT POC RW 12/14/17 1647 Active               Point: Precautions (Active)    Description: Instruct learner(s) on prescribed precautions during self-care and functional transfers.    Learning Progress Summary    Learner Readiness Method Response Comment Documented by Status   Patient Acceptance E NR fall precautions, transfer safety, OT POC RW 12/14/17 1647 Active   Family  Acceptance E NR fall precautions, transfer safety, OT POC  12/14/17 8327 Active                      User Key     Initials Effective Dates Name Provider Type Discipline     10/17/16 -  Deborah Mcqueen, OTR/L Occupational Therapist OT                  OT Recommendation and Plan  Anticipated Discharge Disposition: home with home health  Planned Therapy Interventions: activity intolerance, adaptive equipment training, ADL retraining, balance training, energy conservation, home exercise program, strengthening, transfer training  Therapy Frequency: other (see comments) (3-14 times/wk)  Plan of Care Review  Outcome Summary/Follow up Plan: Bed mobility-ind; sit-stand-sit-SBA/CGA; Pt t/f'd to BR-CGA. Pt was indp with toileting . Pt c/o 10/10 CP while at sink washing hands, vitals taken, nsg notified        Outcome Measures       12/15/17 0805 12/14/17 1539 12/14/17 1538    How much help from another person do you currently need...    Turning from your back to your side while in flat bed without using bedrails?  4  -TESS     Moving from lying on back to sitting on the side of a flat bed without bedrails?  3  -TESS     Moving to and from a bed to a chair (including a wheelchair)?  3  -TESS     Standing up from a chair using your arms (e.g., wheelchair, bedside chair)?  3  -TESS     Climbing 3-5 steps with a railing?  3  -TESS     To walk in hospital room?  3  -TESS     AM-PAC 6 Clicks Score  19  -TESS     How much help from another is currently needed...    Putting on and taking off regular lower body clothing? 3  -KD  3  -RW    Bathing (including washing, rinsing, and drying) 3  -KD  3  -RW    Toileting (which includes using toilet bed pan or urinal) 3  -KD  3  -RW    Putting on and taking off regular upper body clothing 4  -KD  3  -RW    Taking care of personal grooming (such as brushing teeth) 4  -KD  4  -RW    Eating meals 4  -KD  4  -RW    Score 21  -KD  20  -RW    Functional Assessment    Outcome Measure Options  AM-PAC 6  Clicks Basic Mobility (PT)  -TESS AM-PAC 6 Clicks Daily Activity (OT)  -      User Key  (r) = Recorded By, (t) = Taken By, (c) = Cosigned By    Initials Name Provider Type    LATRICE Mcqueen, OTR/L Occupational Therapist    TESS Joshua, PT Physical Therapist    PAOLA Aguilar MCCLELLAND/L Occupational Therapy Assistant           Time Calculation:         Time Calculation- OT       12/15/17 0845          Time Calculation- OT    OT Start Time 0805  -KD      OT Stop Time 0830  -KD      OT Time Calculation (min) 25 min  -KD      Total Timed Code Minutes- OT 25 minute(s)  -KD      OT Received On 12/15/17  -KD        User Key  (r) = Recorded By, (t) = Taken By, (c) = Cosigned By    Initials Name Provider Type    PAOLA Aguilar MCCLELLAND/L Occupational Therapy Assistant           Therapy Charges for Today     Code Description Service Date Service Provider Modifiers Qty    [id number] HC OT SELF CARE/MGMT/TRAIN EA 15 MIN 12/15/2017 KRYSTIN Gilmore/L GO 2          OT G-codes  OT Professional Judgement Used?: Yes  OT Functional Scales Options: AM-PAC 6 Clicks Daily Activity (OT)  Score: 20  Functional Limitation: Self care  Self Care Current Status (): At least 20 percent but less than 40 percent impaired, limited or restricted  Self Care Goal Status (): At least 1 percent but less than 20 percent impaired, limited or restricted    KRYSTIN Gilmore/GEOFFREY  12/15/2017

## 2017-12-15 NOTE — PROGRESS NOTES
"  Pharmacokinetics by Pharmacy - Vancomycin    Hasmukh Ham is a 84 y.o. male   [Ht: 170.2 cm (67\"); Wt: 75.5 kg (166 lb 7 oz)]    Estimated Creatinine Clearance: 45.2 mL/min (by C-G formula based on Cr of 1.3).   Lab Results   Component Value Date    CREATININE 1.30 12/15/2017    CREATININE 1.36 (H) 12/14/2017    CREATININE 1.09 12/13/2017      Lab Results   Component Value Date    WBC 17.94 (H) 12/15/2017    WBC 11.41 (H) 12/14/2017    WBC 10.89 (H) 12/13/2017      Temp Readings from Last 1 Encounters:   12/15/17 97.5 °F (36.4 °C) (Temporal Artery )       Indication for use: sepsis     Baseline culture results:  Microbiology Results (last 10 days)       Procedure Component Value - Date/Time      S. Pneumo Ag Urine or CSF - Urine, Urine, Clean Catch [[id number]]  (Normal) Collected:  12/15/17 1137    Lab Status:  Final result Specimen:  Urine from Urine, Clean Catch Updated:  12/15/17 1241     Strep Pneumo Ag Negative    Mycoplasma Pneumoniae PCR - Swab, Throat [[id number]] Collected:  12/15/17 1109    Lab Status:  Final result Specimen:  Swab from Throat Updated:  12/15/17 1237     MYCOPLASMAE PNEUMONIAE BY PCR Negative    Narrative:       This test is performed by utilizing real time polymerace chain recation (PCR).     Urine Culture - Urine, Urine, Clean Catch [[id number]] Collected:  12/14/17 1041    Lab Status:  Final result Specimen:  Urine from Urine, Clean Catch Updated:  12/15/17 0642     Urine Culture --      Mixed Roge Isolated    Narrative:         Specimen contains mixed organisms of questionable pathogenicity which indicates contamination with commensal roge.  Further identification is unlikely to provide clinically useful information.  Suggest recollection.               Assessment/Plan  Initiated Vancomycin 1000 mg IVPB Q24H. Will order Vancomycin peak/trough levels 12/17 @ 1830 and 12/18 @ 1530, respectively    Estimated levels: , peak 29, trough 11, t1/2 16.5 hrs    Pharmacy will " monitor renal function and adjust dose accordingly.    Berto Vickers, [facility]  12/15/17 5:00 PM

## 2017-12-15 NOTE — PROGRESS NOTES
[facility] Medicine Services  INPATIENT PROGRESS NOTE    Length of Stay: 0  Date of Admission: 12/13/2017  Primary Care Physician: Paolo Rey MD    Subjective     Chief Complaint   Patient presents with   • Chest Pain   • Shortness of Breath     HPI:  84 year old male with presented to [facility] with complaints of shortness of air and chest pain. The patient has known COPD, HFpEF, and paroxysmal atrial fibrillation. Pt has chronic persistent dyspnea and has home oxygen.    12/13/2017: Pt is doing well this morning, breathing better. Echo show normal EF how ever he has diastolic dysfunction. No acute event overnight, currently chest pain free.     12/15/2017: Pt is clinically doing better right now. However his WBC and lactic acid is on the raise. Pt will be started on broader spectrum antibiotics. Will stop levaquin. Pan cultures. CT of the abdomen is negative for any acute finding.     Review of Systems   Constitutional: Positive for fatigue. Negative for chills and fever.   HENT: Negative for ear pain and rhinorrhea.    Eyes: Negative for discharge and itching.   Respiratory: Positive for shortness of breath. Negative for chest tightness and wheezing.    Cardiovascular: Negative for chest pain, palpitations and leg swelling.   Gastrointestinal: Negative for abdominal pain, constipation, diarrhea, nausea and vomiting.   Genitourinary: Negative for dysuria and urgency.   Musculoskeletal: Negative for arthralgias and joint swelling.   Neurological: Negative for dizziness, weakness and light-headedness.   Psychiatric/Behavioral: Negative for agitation and behavioral problems.        All pertinent negatives and positives are as above. All other systems have been reviewed and are negative unless otherwise stated.     Objective      Vital Sign Min/Max for last 24 hours  Temp  Min: 96.8 °F (36 °C)  Max: 97.8 °F (36.6 °C)   BP  Min: 104/66  Max: 152/73   Pulse  Min: 57   Max: 102   Resp  Min: 16  Max: 20   SpO2  Min: 96 %  Max: 99 %   Flow (L/min)  Min: 2  Max: 2   No Data Recorded         Physical Exam   Constitutional: He is oriented to person, place, and time. He appears well-developed and well-nourished.   HENT:   Head: Normocephalic and atraumatic.   Eyes: EOM are normal. Pupils are equal, round, and reactive to light.   Neck: Normal range of motion.   Cardiovascular: Normal rate and regular rhythm.    Pulmonary/Chest: Effort normal. He has rales.   On 2L oxygen.    Abdominal: Soft. Bowel sounds are normal.   Musculoskeletal: Normal range of motion.   Neurological: He is alert and oriented to person, place, and time.   Skin: Skin is warm.   Psychiatric: He has a normal mood and affect. His behavior is normal.   Vitals reviewed.      Current Facility-Administered Medications   Medication Dose Route Frequency Provider Last Rate Last Dose   • acetaminophen (TYLENOL) tablet 650 mg  650 mg Oral Q4H PRN Daylin G Levill, APRN       • albuterol (PROVENTIL) nebulizer solution 0.083% 2.5 mg/3mL  2.5 mg Nebulization Q4H PRN Daylin G Levill, APRN   2.5 mg at 12/15/17 0706   • aspirin chewable tablet 81 mg  81 mg Oral Daily Daylin G Levill, APRN   81 mg at 12/15/17 0817   • aztreonam (AZACTAM) in [facility] 1 gram/10ml IV PUSH syringe  1 g Intravenous Q8H Felipe Chan MD   1 g at 12/15/17 1611   • clopidogrel (PLAVIX) tablet 75 mg  75 mg Oral Daily Daylin G Levill, APRN   75 mg at 12/15/17 0817   • dextrose (D50W) solution 25 g  25 g Intravenous Q15 Min PRN Daylin G Levill, APRN       • dextrose (GLUTOSE) oral gel 15 g  15 g Oral Q15 Min PRN Daylin G Levill, APRN       • docusate sodium (COLACE) capsule 100 mg  100 mg Oral Daily Felipe Chan MD   100 mg at 12/15/17 0817   • donepezil (ARICEPT) tablet 10 mg  10 mg Oral Daily Daylin G Levill, APRN   10 mg at 12/15/17 0817   • famotidine (PEPCID) tablet 40 mg  40 mg Oral Daily Daylin G Levjulianna, APRN   40 mg at 12/15/17 0817   • fluticasone (FLONASE) 50  MCG/ACT nasal spray 2 spray  2 spray Nasal Daily Daylin G Levill, APRN   2 spray at 12/15/17 0820   • glipiZIDE (GLUCOTROL) tablet 5 mg  5 mg Oral QAM AC Daylin G Levill, APRN   5 mg at 12/15/17 0817   • glucagon (human recombinant) (GLUCAGEN DIAGNOSTIC) injection 1 mg  1 mg Subcutaneous Q15 Min PRN Daylin G Levill, APRN       • HYDROcodone-acetaminophen (NORCO) 7.5-325 MG per tablet 1 tablet  1 tablet Oral Q6H PRN Parth Birmingham MD   1 tablet at 12/15/17 0844   • insulin aspart (novoLOG) injection 0-9 Units  0-9 Units Subcutaneous 4x Daily AC & at Bedtime Daylin G Levill, APRN   2 Units at 12/14/17 1743   • ipratropium-albuterol (DUO-NEB) nebulizer solution 3 mL  3 mL Nebulization Q8H - RT Daylin G Levill, APRN   3 mL at 12/15/17 1442   • isosorbide mononitrate (IMDUR) 24 hr tablet 30 mg  30 mg Oral Q24H Mana Curtis MD   30 mg at 12/15/17 0817   • lisinopril (PRINIVIL,ZESTRIL) tablet 5 mg  5 mg Oral Q24H Daylin G Levill, APRN   5 mg at 12/15/17 0817   • magnesium oxide (MAGOX) tablet 400 mg  400 mg Oral Daily Daylin G Levill, APRN   400 mg at 12/15/17 0817   • nitroglycerin (NITROSTAT) SL tablet 0.4 mg  0.4 mg Sublingual Q5 Min PRN Daylin G Levill, APRN   0.4 mg at 12/15/17 0828   • ondansetron (ZOFRAN) injection 4 mg  4 mg Intravenous Q6H PRN Daylin G Levill, APRN       • Pharmacy to dose vancomycin   Does not apply Continuous PRN Felipe Chan MD       • [START ON 12/16/2017] predniSONE (DELTASONE) tablet 40 mg  40 mg Oral Daily With Breakfast Felipe Chan MD       • ranolazine (RANEXA) 12 hr tablet 500 mg  500 mg Oral Q12H Mana Curtis MD   500 mg at 12/15/17 0817   • sodium chloride 0.9 % flush 1-10 mL  1-10 mL Intravenous PRN EVE Gomez       • sodium chloride 0.9 % flush 10 mL  10 mL Intravenous PRN Jose Angel Galarza MD       • sodium chloride 0.9 % infusion  50 mL/hr Intravenous Continuous Martinez Cerda MD 50 mL/hr at 12/15/17 0821 50 mL/hr at 12/15/17 0821   • vancomycin (VANCOCIN) 1,250 mg in  sodium chloride 0.9 % 250 mL IVPB  1,250 mg Intravenous Q24H Felipe Chan MD   1,250 mg at 12/15/17 1611           Results Review:  I have reviewed the labs, radiology results, and diagnostic studies.    Laboratory Data:     Results from last 7 days  Lab Units 12/15/17  0812 12/14/17  1357 12/14/17  0716 12/13/17  0840   SODIUM mmol/L 140  --  136* 139   POTASSIUM mmol/L 4.2 4.7 5.2* 4.7   CHLORIDE mmol/L 102  --  99 103   CO2 mmol/L 22.0  --  23.0 26.0   BUN mg/dL 48*  --  41* 31*   CREATININE mg/dL 1.30  --  1.36* 1.09   GLUCOSE mg/dL 192*  --  159* 82   CALCIUM mg/dL 9.6  --  10.1 9.4   BILIRUBIN mg/dL  --   --   --  0.5   ALK PHOS U/L  --   --   --  56   ALT (SGPT) U/L  --   --   --  39   AST (SGOT) U/L  --   --   --  27   ANION GAP mmol/L 16.0*  --  14.0 10.0     Estimated Creatinine Clearance: 45.2 mL/min (by C-G formula based on Cr of 1.3).            Results from last 7 days  Lab Units 12/15/17  0653 12/14/17  0602 12/13/17  0840   WBC 10*3/mm3 17.94* 11.41* 10.89*   HEMOGLOBIN g/dL 12.6* 13.0* 12.5*   HEMATOCRIT % 39.5 39.8 39.3   PLATELETS 10*3/mm3 245 225 211       Results from last 7 days  Lab Units 12/13/17  0840   INR  0.92       Results from last 7 days  Lab Units 12/14/17  0716   CRP mg/dL <0.50       Culture Data:   No results found for: BLOODCX  Urine Culture   Date Value Ref Range Status   12/14/2017 Mixed [place] Isolated  Final     No results found for: RESPCX  No results found for: WOUNDCX  No results found for: STOOLCX  No components found for: BODYFLD      Radiology Data:   Imaging Results (last 24 hours)     Procedure Component Value Units Date/Time    CT Abdomen Pelvis Without Contrast [[id number]] Collected:  12/15/17 0913     Updated:  12/15/17 1055    Narrative:       PROCEDURE: CT abdomen and pelvis without contrast    COMPARISON: CT abdomen pelvis without contrast January 5, 2017    HISTORY: Chest pain, unspecified. Abnormal levels of other serum  enzymes. Abnormal findings on diagnostic  imaging of other  specified body structures. Unremarkable abdominal pain    TECHNIQUE: Multiple contiguous noncontrast axial images are  obtained of the abdomen and pelvis. Coronal and sagittal  multiplanar reformatted images are also reconstructed and  reviewed.     This exam was performed according to our departmental  dose-optimization program, which includes automated exposure  control, adjustment of the mA and/or kV according to patient size  and/or use of iterative reconstruction technique.    FINDINGS:   Minimal chronic changes in lung bases which are otherwise clear.  Heart size is normal.    Several small low-attenuation lesions in liver which are  indeterminate are stable. Liver is otherwise unremarkable for  noncontrast exam. The gallbladder is collapsed. The spleen,  pancreas, adrenal glands and right kidney are unremarkable for  noncontrast exam. Large left renal cyst, stable. No renal or  ureteral stone. No hydronephrosis.    No lymphadenopathy by CT size criteria.    Bladder is unremarkable. Bowel is unremarkable. No bowel  obstruction. No free fluid. Prior ventral hernia repair with  mesh. There is probably a tiny hernia immediately below the mesh  containing a single sidewall of small bowel.    No acute osseous abnormality. 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      Impression:       1. No acute findings identified in the abdomen or pelvis.  2. Prior ventral hernia repair with mesh. There is a small  midline abdominal hernia just below the mesh containing only a  single sidewall small bowel.    Electronically signed by:  Landon Gallo MD  12/15/2017  10:54 AM CST Workstation: [id number]          I have reviewed the patient current medications.     Assessment/Plan     Active Hospital Problems (** Indicates Principal Problem)    Diagnosis Date Noted   • Chest pain [R07.9] 09/25/2017      Resolved Hospital Problems    Diagnosis Date Noted Date Resolved   No  resolved problems to display.     1. Acute on chronic diastolic dysfunction with normal EF: Pt is currently on lasix. He is not on BB due to his bradycardia.   2. Bradyarrhythmia: Pt had long discussion with Dr. Curtis regarding the pacemaker and anticoagulation. Pt declined any further cardiac evaluation. Currently he is not on any AV node blocker. Pt is currently in atrial flutter with HR of 100, no anticoagulation recommended at this point.   3. Hyperkalemia: will give Kayexalate, recheck potassium. Resolved   4. COPD exacerbation with pneumonia: noted on the first xray, currently started on broader spectrum antibiotics and transition to po steroids with bronchodilators. Pt is doing well.   5. HTN: lisinopril   6. JILL: with Cr elevation to 1.30, hold lasix, on IVF.   7. Sepsis from pneumonia: pan cultures, results pending, started on broad spectrum antibiotics, follow lactic acid and cultures.          Discharge Planning: I expect patient to be discharged to home in 1-2 days.      DVT Prophylaxis: SCD/TEDs             This document has been electronically signed by Felipe Chan MD on December 15, 2017 4:22 PM

## 2017-12-15 NOTE — PROGRESS NOTES
LOS: 0 days   Patient Care Team:  Paolo Rey MD as PCP - General    Chief Complaint:  Sinus bradycardia with heart rate 38 and history of chronic chest pain syndrome chest pain, COPD on home O2.  Monitor atrial tachycardia with a heart rate 100 bpm  Left Ventricle  Normal left ventricular cavity size noted. All left ventricular wall segments contract normally. Left ventricular wall thickness is consistent with mild concentric hypertrophy.     Review of Systems:   ROS  Constitutional: Positive for fatigue. Negative for chills and fever.   HENT: Negative for ear pain and rhinorrhea.    Eyes: Negative for discharge and itching.   Respiratory: Positive for shortness of breath. Negative for chest tightness and wheezing.    Cardiovascular: Negative for chest pain, palpitations and leg swelling.   Gastrointestinal: Negative for abdominal pain, constipation, diarrhea, nausea and vomiting.   Genitourinary: Negative for dysuria and urgency.   Musculoskeletal: Negative for arthralgias and joint swelling.   Neurological: Negative for dizziness, weakness and light-headedness.   Psychiatric/Behavioral: Negative for agitation and behavioral problems  Objective     Current Facility-Administered Medications   Medication Dose Route Frequency Provider Last Rate Last Dose   • acetaminophen (TYLENOL) tablet 650 mg  650 mg Oral Q4H PRN Daylin G Levill, APRN       • albuterol (PROVENTIL) nebulizer solution 0.083% 2.5 mg/3mL  2.5 mg Nebulization Q4H PRN Daylin G Levill, APRN   2.5 mg at 12/15/17 0706   • aspirin chewable tablet 81 mg  81 mg Oral Daily Daylin G Levill, APRN   81 mg at 12/15/17 0817   • clopidogrel (PLAVIX) tablet 75 mg  75 mg Oral Daily Daylin G Levill, APRN   75 mg at 12/15/17 0817   • dextrose (D50W) solution 25 g  25 g Intravenous Q15 Min PRN Daylin G Levill, APRN       • dextrose (GLUTOSE) oral gel 15 g  15 g Oral Q15 Min PRN Daylin G Levill, APRN       • docusate sodium (COLACE) capsule 100 mg  100 mg Oral Daily  Felipe Chan MD   100 mg at 12/15/17 0817   • donepezil (ARICEPT) tablet 10 mg  10 mg Oral Daily Daylin G Levill, APRN   10 mg at 12/15/17 0817   • famotidine (PEPCID) tablet 40 mg  40 mg Oral Daily Daylin G Levill, APRN   40 mg at 12/15/17 0817   • fluticasone (FLONASE) 50 MCG/ACT nasal spray 2 spray  2 spray Nasal Daily Daylni G Levill, APRN   2 spray at 12/15/17 0820   • furosemide (LASIX) injection 20 mg  20 mg Intravenous Q12H Felipe Chan MD   20 mg at 12/15/17 0600   • glipiZIDE (GLUCOTROL) tablet 5 mg  5 mg Oral QAM AC Daylin G Levill, APRN   5 mg at 12/15/17 0817   • glucagon (human recombinant) (GLUCAGEN DIAGNOSTIC) injection 1 mg  1 mg Subcutaneous Q15 Min PRN Daylin G Levill, APRN       • HYDROcodone-acetaminophen (NORCO) 7.5-325 MG per tablet 1 tablet  1 tablet Oral Q6H PRN Parth Birmingham MD   1 tablet at 12/15/17 0844   • insulin aspart (novoLOG) injection 0-9 Units  0-9 Units Subcutaneous 4x Daily AC & at Bedtime Daylin G Levill, APRN   2 Units at 12/14/17 1743   • ipratropium-albuterol (DUO-NEB) nebulizer solution 3 mL  3 mL Nebulization Q8H - RT Daylin G Levill, APRN   3 mL at 12/14/17 2350   • isosorbide mononitrate (IMDUR) 24 hr tablet 30 mg  30 mg Oral Q24H Mana Curtis MD   30 mg at 12/15/17 0817   • levoFLOXacin (LEVAQUIN) 500 mg/100 mL D5W (premix) (LEVAQUIN) 500 mg  500 mg Intravenous Q24H Daylin G Levill, APRN   Stopped at 12/14/17 1745   • lisinopril (PRINIVIL,ZESTRIL) tablet 5 mg  5 mg Oral Q24H Daylin G Levill, APRN   5 mg at 12/15/17 0817   • magnesium oxide (MAGOX) tablet 400 mg  400 mg Oral Daily Daylin G Levill, APRN   400 mg at 12/15/17 0817   • methylPREDNISolone sodium succinate (SOLU-Medrol) injection 60 mg  60 mg Intravenous Q6H Daylin G Levill, APRN   60 mg at 12/15/17 1109   • nitroglycerin (NITROSTAT) SL tablet 0.4 mg  0.4 mg Sublingual Q5 Min PRN Daylin G Levill, APRN   0.4 mg at 12/15/17 0828   • ondansetron (ZOFRAN) injection 4 mg  4 mg Intravenous Q6H PRN Daylin G Levill, APRN      "  • ranolazine (RANEXA) 12 hr tablet 500 mg  500 mg Oral Q12H Mana Curtis MD   500 mg at 12/15/17 0817   • sodium chloride 0.9 % flush 1-10 mL  1-10 mL Intravenous PRN EVE Gomez       • sodium chloride 0.9 % flush 10 mL  10 mL Intravenous PRN Jose Angel Galarza MD       • sodium chloride 0.9 % infusion  50 mL/hr Intravenous Continuous Martinez Cerda MD 50 mL/hr at 12/15/17 0821 50 mL/hr at 12/15/17 0821       Vital Sign Min/Max for last 24 hours  Temp  Min: 96.8 °F (36 °C)  Max: 97.8 °F (36.6 °C)   BP  Min: 104/66  Max: 152/73   Pulse  Min: 57  Max: 109   Resp  Min: 16  Max: 20   SpO2  Min: 96 %  Max: 99 %   Flow (L/min)  Min: 2  Max: 2   No Data Recorded     Flowsheet Rows         First Filed Value    Admission Height  170.2 cm (67\") Documented at 12/13/2017 0815    Admission Weight  69.4 kg (153 lb) Documented at 12/13/2017 0815            Intake/Output Summary (Last 24 hours) at 12/15/17 1211  Last data filed at 12/15/17 0835   Gross per 24 hour   Intake             1046 ml   Output             1025 ml   Net               21 ml       Physical Exam:     General Appearance:    Alert, cooperative, in no acute distress   Lungs:     Clear to auscultation,respirations regular, even and                  Unlabored. ON O2    Heart:    Regular rhythm and normal rate, normal S1 and S2, no            murmur, no gallop, no rub, no click   Chest Wall:    No abnormalities observed   Abdomen:     Normal bowel sounds, no masses, no organomegaly, soft        non-tender, non-distended, no guarding, no rebound                tenderness   Extremities:   Moves all extremities well, no edema, no cyanosis, no             redness   Pulses:   Pulses palpable and equal bilaterally   Skin:   No bleeding, bruising or rash        Results Review:   I reviewed the patient's new clinical results.  Lab Results   Component Value Date    WBC 17.94 (H) 12/15/2017    HGB 12.6 (L) 12/15/2017    HCT 39.5 12/15/2017    MCV 94.5 " 12/15/2017     12/15/2017     Lab Results   Component Value Date    GLUCOSE 192 (H) 12/15/2017    BUN 48 (H) 12/15/2017    CREATININE 1.30 12/15/2017    EGFRIFNONA 53 (L) 12/15/2017    BCR 36.9 (H) 12/15/2017    CO2 22.0 12/15/2017    CALCIUM 9.6 12/15/2017    ALBUMIN 4.00 12/13/2017    LABIL2 1.4 12/13/2017    AST 27 12/13/2017    ALT 39 12/13/2017     Lab Results   Component Value Date    TSH 0.85 12/14/2016     Lab Results   Component Value Date    INR 0.92 12/13/2017    INR 1.03 10/09/2017    INR 1.0 01/07/2017    PROTIME 12.3 12/13/2017    PROTIME 13.4 10/09/2017    PROTIME 13.2 01/07/2017     Lab Results   Component Value Date    PTT 31.5 12/13/2017       EKG:     Telemetry:    Ejection Fraction  No results found for: EF    Echo EF Estimated  No results found for: ECHOEFEST      Present on Admission:  • Chest pain    Assessment/Plan            #1 sinus bradycardia #2 atrial flutter #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  The patient have for issue with his of breathing and getting treatment for possible bronchitis/pneumonia.  He is not a candidate for aggressive cardiac management.  His heart rate 100 bpm secondary to atrial tachycardia.  He medicated needed for long-term oral and ablation.  Hasn't patient being treated with the pelvis with history of CAD and aspirin.  Aricept with history of dementia and frusemide with history of diastolic dysfunction.  Lisinopril for hypertension with a decent blood pressure control.  We added Ranexa and Imdur for history of chest discomfort no further recurrence.WILL SEE AS NEEDED  Mana Curtis MD  12/15/17  12:11 PM      EMR Dragon/Transcription disclaimer:   Much  of this encounter note is an electronic transcription/translation of spoken language to printed text. The electronic translation of spoken language may permit erroneous, or at times, nonsensical words or phrases to be inadvertently transcribed; Although I have reviewed the note for such errors, some may still exist.

## 2017-12-15 NOTE — SIGNIFICANT NOTE
12/15/17 0835   Rehab Treatment   Discipline physical therapy assistant   Treatment Not Performed other (see comments)  (nsg request no tx at this time due to chest pain)

## 2017-12-15 NOTE — PLAN OF CARE
Problem: Patient Care Overview (Adult)  Goal: Plan of Care Review  Outcome: Ongoing (interventions implemented as appropriate)  Mild Sodium Restricted Diet info used to provide ed regarding Low Sodium diet and diet copy given.    12/15/17 0776   Coping/Psychosocial Response Interventions   Plan Of Care Reviewed With patient   Patient Care Overview   Progress no change   Outcome Evaluation   Outcome Summary/Follow up Plan initial visit

## 2017-12-15 NOTE — PROGRESS NOTES
Cardiac Rehab Evaluation: Does not meet criteria for CR.  Home O2 COPD may meet criteria for MN. PFT will be needed for MN.

## 2017-12-15 NOTE — PAYOR COMM NOTE
"  Clinicals for ref # [id number]. Julianna Chacon RN Case Management Phone: [phone] Fax: [phone]      Hasmukh Mckay (84 y.o. Male)     Date of Birth Social Security Number Address Home Phone MRN    06/01/1933  [address] [place] [address] [phone] [id number]    Congregation Marital Status          Mormon        Admission Date Admission Type Admitting Provider Attending Provider Department, Room/Bed    12/13/17 Emergency Parth Birmingham MD Gibson, Bryce, MD [address], 301/[address]    Discharge Date Discharge Disposition Discharge Destination                      Attending Provider: Parth Birmingham MD     Allergies:  Atorvastatin, Penicillins, Pravastatin, Azithromycin, Biaxin [Clarithromycin]    Isolation:  None   Infection:  None   Code Status:  FULL    Ht:  170.2 cm (67\")   Wt:  75.5 kg (166 lb 7 oz)    Admission Cmt:  None   Principal Problem:  None                Active Insurance as of 12/13/2017     Primary Coverage     Payor Plan Insurance Group Employer/Plan Group    AETNA MEDICARE REPLACEMENT AETNA [id number]     Payor Plan Address Payor Plan Phone Number Effective From Effective To    [address] [phone] 4/1/2017     [place] [address]       Subscriber Name Subscriber Birth Date Member ID       HASMUKH MCKAY 6/1/1933 [id number]                 Emergency Contacts      (Rel.) Home Phone Work Phone Mobile Phone    Geovanna Tatum (Daughter) [phone] -- [phone]    Rimma Mckay (Spouse) [phone] -- --            Emergency Contact Information     Name Relation Home Work Mobile    Geovanna Tatum Daughter [phone]    Rimma Mckay Spouse [phone]            Insurance Information                AETNA MEDICARE REPLACEMENT/AETNA Phone: [phone]    Subscriber: Hasmukh Mckay Subscriber#: [id number]    Group#: [id number] Precert#:              History & Physical      Daylin Horan, EVE " at 12/13/2017  3:12 PM     Attestation signed by Parth Birmingham MD at 12/13/2017 10:18 PM        I personally evaluated and examined the patient in conjunction with EVE Birch and agree with the assessment, treatment plan, and disposition of the patient as recorded.    Exam:  General: Alert and oriented x3  CV: S1 + S2            This document has been electronically signed by Parth Birmingham MD on December 13, 2017 10:17 PM                                           [facility] Medicine Admission      Date of Admission: 12/13/2017      Primary Care Physician: Paolo Rey MD      Chief Complaint: Chest pain and shortness of air    HPI: This is an 84-year-old gentleman that presented to [facility] on 12/13/2017 with complaints of shortness of air and chest pain. The patient has known COPD, HFpEF, and paroxysmal atrial fibrillation.  The patient has chronic persistent dyspnea and has home oxygen.  From previous notes, there have been discussions about hospice, but the patient and family were not ready to address that at this point.  The patient states he would like to be a full code.  The patient was found to be bradycardic in the emergency room, and during my examination with him his heart rate dropped to 34 without any symptoms from the patient.  Metoprolol and Cardizem have previously been discontinued due to bradycardia and the patient currently takes no medications that should cause this.      Concurrent Medical History:  has a past medical history of Bilateral carotid artery stenosis; CHF (congestive heart failure); Chronic obstructive pulmonary disease (COPD); Coronary arteriosclerosis; Degenerative joint disease involving multiple joints; Dementia; Diabetes mellitus; Hyperlipidemia; Hypertension; and Myocardial infarction.    Past Surgical History:  has a past surgical history that includes Hernia repair; Lung biopsy; Ventral hernia repair;  Thoracotomy (Left, 1977); Cardiac catheterization (N/A, 6/6/2017); Coronary stent placement; and Neck surgery.    Family History: family history includes Hypertension in his father.    Social History:  reports that he has quit smoking. He has never used smokeless tobacco. He reports that he does not drink alcohol or use illicit drugs.    Allergies:   Allergies   Allergen Reactions   • Atorvastatin Anaphylaxis   • Penicillins Rash   • Pravastatin      Myalgia   • Azithromycin Rash   • Biaxin [Clarithromycin] Rash       Medications:   Prior to Admission medications    Medication Sig Start Date End Date Taking? Authorizing Provider   albuterol (PROVENTIL) (2.5 MG/3ML) 0.083% nebulizer solution Take 2.5 mg by nebulization Every 4 (Four) Hours As Needed for Wheezing. 9/28/17  Yes Jc Alba MD   aspirin 81 MG chewable tablet Chew 81 mg Daily.   Yes Historical Provider, MD   clopidogrel (PLAVIX) 75 MG tablet Take 75 mg by mouth Daily. 2/3/16  Yes Historical Provider, MD   fluticasone (FLONASE) 50 MCG/ACT nasal spray 2 sprays into each nostril Daily.   Yes Historical Provider, MD   Fluticasone Furoate (ARNUITY ELLIPTA) 200 MCG/ACT aerosol powder  Inhale.   Yes Historical Provider, MD   furosemide (LASIX) 20 MG tablet Take 1 tablet by mouth Daily. 3/3/17  Yes Jeff Maciel MD PhD   glimepiride (AMARYL) 2 MG tablet Take 2 mg by mouth Every Morning Before Breakfast.   Yes Historical Provider, MD   HYDROcodone-acetaminophen (NORCO) 7.5-325 MG per tablet Take 1 tablet by mouth Every 8 (Eight) Hours. 12/27/16  Yes Historical Provider, MD   ipratropium-albuterol (DUO-NEB) 0.5-2.5 mg/mL nebulizer Take 3 mL by nebulization 4 (Four) Times a Day. 9/28/17  Yes Jc Alba MD   isosorbide dinitrate (ISORDIL) 10 MG tablet Take 1 tablet by mouth 3 (Three) Times a Day. 10/27/17  Yes EVE Sewell   lisinopril (PRINIVIL,ZESTRIL) 10 MG tablet Take 0.5 tablets by mouth Daily. 11/10/17  Yes Caitlyn Garcia,  MD   magnesium oxide (MAGOX) 400 (241.3 MG) MG tablet tablet Take 400 mg by mouth Daily.   Yes Historical Provider, MD   nitroglycerin (NITROSTAT) 0.4 MG SL tablet place 1 tablet under the tongue if needed every 5 minutes for hair...  (REFER TO PRESCRIPTION NOTES). 1/11/17  Yes Historical Provider, MD   O2 (OXYGEN) Inhale 2 L/min Every Night. W/ CPAP   Yes Historical Provider, MD   Red Yeast Rice 600 MG tablet Take 600 mg by mouth 2 (Two) Times a Day.   Yes Historical Provider, MD   Umeclidinium-Vilanterol 62.5-25 MCG/INH aerosol powder  Inhale 1 puff Daily. 3/3/17  Yes Brea Higginbotham MD   donepezil (ARICEPT) 10 MG tablet Take 10 mg by mouth Daily. 11/12/16 12/13/17 Yes Historical Provider, MD   famotidine (PEPCID) 20 MG tablet Take 20 mg by mouth 2 (Two) Times a Day. 10/20/16 12/13/17 Yes Historical Provider, MD       Review of Systems:  Review of Systems   Respiratory: Positive for shortness of breath.    Cardiovascular: Positive for chest pain.   All other systems reviewed and are negative.     Otherwise complete ROS is negative except as mentioned above.    Physical Exam:   Temp:  [97.7 °F (36.5 °C)] 97.7 °F (36.5 °C)  Heart Rate:  [43-55] 43  Resp:  [18-20] 18  BP: (127-160)/(57-76) 133/63  Physical Exam   Constitutional: He is oriented to person, place, and time. He appears well-developed and well-nourished.   HENT:   Head: Normocephalic and atraumatic.   Eyes: EOM are normal. Pupils are equal, round, and reactive to light.   Neck: Normal range of motion. Neck supple.   Cardiovascular: Normal rate and regular rhythm.    Pulmonary/Chest: Effort normal.   Abdominal: Soft. Bowel sounds are normal.   Musculoskeletal: Normal range of motion.   Neurological: He is alert and oriented to person, place, and time.   Skin: Skin is warm and dry.   Psychiatric: He has a normal mood and affect.     Results Reviewed:  I have personally reviewed current lab, radiology, and data and agree with results.  Lab Results (last 24  hours)     Procedure Component Value Units Date/Time    CBC & Differential [[id number]] Collected:  12/13/17 0840    Specimen:  Blood Updated:  12/13/17 0844    Narrative:       The following orders were created for panel order CBC & Differential.  Procedure                               Abnormality         Status                     ---------                               -----------         ------                     CBC Auto Differential[[id number]]        Abnormal            Final result                 Please view results for these tests on the individual orders.    CBC Auto Differential [[id number]]  (Abnormal) Collected:  12/13/17 0840    Specimen:  Blood Updated:  12/13/17 0844     WBC 10.89 (H) 10*3/mm3      RBC 4.22 (L) 10*6/mm3      Hemoglobin 12.5 (L) g/dL      Hematocrit 39.3 %      MCV 93.1 fL      MCH 29.6 pg      MCHC 31.8 g/dL      RDW 16.1 (H) %      RDW-SD 54.8 (H) fl      MPV 10.8 fL      Platelets 211 10*3/mm3      Neutrophil % 70.6 %      Lymphocyte % 20.3 %      Monocyte % 7.1 %      Eosinophil % 0.9 %      Basophil % 0.2 %      Immature Grans % 0.9 (H) %      Neutrophils, Absolute 7.69 10*3/mm3      Lymphocytes, Absolute 2.21 10*3/mm3      Monocytes, Absolute 0.77 10*3/mm3      Eosinophils, Absolute 0.10 10*3/mm3      Basophils, Absolute 0.02 10*3/mm3      Immature Grans, Absolute 0.10 (H) 10*3/mm3     Comprehensive Metabolic Panel [[id number]]  (Abnormal) Collected:  12/13/17 0840    Specimen:  Blood Updated:  12/13/17 0856     Glucose 82 mg/dL      BUN 31 (H) mg/dL      Creatinine 1.09 mg/dL      Sodium 139 mmol/L      Potassium 4.7 mmol/L      Chloride 103 mmol/L      CO2 26.0 mmol/L      Calcium 9.4 mg/dL      Total Protein 6.8 g/dL      Albumin 4.00 g/dL      ALT (SGPT) 39 U/L      AST (SGOT) 27 U/L      Alkaline Phosphatase 56 U/L      Total Bilirubin 0.5 mg/dL      eGFR Non African Amer 64 mL/min/1.73      Globulin 2.8 gm/dL      A/G Ratio 1.4 g/dL      BUN/Creatinine Ratio 28.4 (H)      Anion Gap 10.0 mmol/L     Narrative:       The MDRD GFR formula is only valid for adults with stable renal function between ages 18 and 70.    Lipase [[id number]]  (Normal) Collected:  12/13/17 0840    Specimen:  Blood Updated:  12/13/17 0856     Lipase 212 U/L     BNP [[id number]]  (Normal) Collected:  12/13/17 0840    Specimen:  Blood Updated:  12/13/17 0909     proBNP 215.0 pg/mL     Troponin [[id number]]  (Abnormal) Collected:  12/13/17 0840    Specimen:  Blood Updated:  12/13/17 0909     Troponin I 0.042 (H) ng/mL     aPTT [[id number]]  (Normal) Collected:  12/13/17 0840    Specimen:  Blood Updated:  12/13/17 0915     PTT 31.5 seconds     Narrative:       The recommended Heparin therapeutic range is 68-97 seconds.    Protime-INR [[id number]]  (Normal) Collected:  12/13/17 0840    Specimen:  Blood Updated:  12/13/17 0915     Protime 12.3 Seconds      INR 0.92    Narrative:       Therapeutic range for most indications is 2.0-3.0 INR,  or 2.5-3.5 for mechanical heart valves.    Jefferson Draw [[id number]] Collected:  12/13/17 0840    Specimen:  Blood Updated:  12/13/17 0946    Narrative:       The following orders were created for panel order Jefferson Draw.  Procedure                               Abnormality         Status                     ---------                               -----------         ------                     Light Blue Top[[id number]]                                   Final result               Green Top (Gel)[[id number]]                                  Final result               Lavender Top[[id number]]                                     Final result               Gold Top - SST[[id number]]                                   Final result                 Please view results for these tests on the individual orders.    Light Blue Top [[id number]] Collected:  12/13/17 0840    Specimen:  Blood Updated:  12/13/17 0946     Extra Tube hold for add-on      Auto resulted       Green Top (Gel) [[id number]]  Collected:  12/13/17 0840    Specimen:  Blood Updated:  12/13/17 0946     Extra Tube Hold for add-ons.      Auto resulted.       Lavender Top [[id number]] Collected:  12/13/17 0840    Specimen:  Blood Updated:  12/13/17 0946     Extra Tube hold for add-on      Auto resulted       Gold Top - SST [[id number]] Collected:  12/13/17 0840    Specimen:  Blood Updated:  12/13/17 0946     Extra Tube Hold for add-ons.      Auto resulted.       D-dimer, Quantitative [[id number]]  (Abnormal) Collected:  12/13/17 0840    Specimen:  Blood Updated:  12/13/17 1137     D-Dimer, Quantitative 629 (H) ng/mL (FEU)     Narrative:       Dimer values <500 ng/ml FEU are FDA approved as aid in diagnosis of deep venous thrombosis and pulmonary embolism.  This test should not be used in an exclusion strategy with pretest probability alone.    A recent guideline regarding diagnosis for pulmonary thomboembolism recommends an adjusted exclusion criterion of age x 10 ng/ml FEU for patients >50 years of age (Jenny Intern Med 2015; 163: 701-711).        Imaging Results (last 24 hours)     Procedure Component Value Units Date/Time    XR Chest 2 View [[id number]] Collected:  12/13/17 0856     Updated:  12/13/17 0919    Narrative:       Chest pain.    Chest, AP and lateral views.    Comparison is made with study dated to October 12, 2017.    Examination again revealed stable density in the left midlung  field and left lung base consistent with atelectasis or  infiltrate. There are mild bilateral interstitial markings. No  pleural effusion is identified. No acute bony abnormality is  seen.      Impression:       CONCLUSION: Persistent density in the left midlung field and left  lung base. Recommend CT chest for further evaluation.    Electronically signed by:  Moisés Lopez MD  12/13/2017 9:18  AM CST Workstation: [id number]    CT Chest Without Contrast [[id number]] Collected:  12/13/17 1147     Updated:  12/13/17 1208    Narrative:         EXAMINATION:   Computed Tomography      REGION:    Chest         INDICATION:   cp, xray abnormality, R07.9 Chest pain, unspecified  R74.8 Abnormal levels of other serum enzymes R93.8 Abnormal  findings on diagnostic imaging of other specified body structures     HISTORY:  PAULINA. IMAGING:  CXR: 12/13/17           TECHNIQUE:         - reconstructions:    axial, coronal, sagittal         - contrast:    oral:  none  ;  intravenous:  None  This exam was performed according to the departmental  dose-optimization program which includes automated exposure  control, adjustment of the mA and/or kV according to patient size  and/or use of iterative reconstruction technique.             COMMENTS:              PULMONARY PARENCHYMA:            - air spaces:      negative            - interstitium:     grossly within normal limits for age              - misc.:      no pulmonary nodules or mass           MEDIASTINUM / RIO:           - heart:      normal size , no pericardial fluid           - aorta/great vessels:    normal caliber and configuration  for age          - misc.:      no mediastinal mass / significant adenopathy           PLEURAL COMPARTMENT:            - misc.:      no pleural fluid or mass          MISC:          - inferior neck:     negative          - osseous/body wall:  negative          - subdiaphragmatic:    as visualized, limited, grossly  unremarkable          - misc:  .       Impression:       CONCLUSION:          1. No pulmonary mass or airspace disease visualized.  2. Chest radiographic findings likely correspond to  pericardial/pleural fat.                          Electronically signed by:  GOOD Joyce MD  12/13/2017 12:07  PM CST Workstation: [id number]    CT Angiogram Chest With Contrast [[id number]] Collected:  12/13/17 1355     Updated:  12/13/17 1428    Narrative:         EXAM:  Computed Tomography with CTA         REGION:  Chest       INDICATION:   Dyspnea, elevated d-dimer, chest pain    - rule out pulmonary  embolism       CLINICAL HISTORY:  CORRELATIVE IMAGING:  None                         TECHNIQUE:     - PE / vascular protocol     - reconstructions:  axial, coronal, sagittal, obliques     - computer-generated 3D reconstructions (MIPS) were performed.     - contrast:  intravenous Isovue 370, 78 mL                                 This exam was performed according to the departmental  dose-optimization program which includes automated exposure  control, adjustment of the mA and/or kV according to patient size  and/or use of iterative reconstruction technique.         COMMENTS:    - Pulmonary arterial system:      - Main pulmonary artery trunk:  negative      - Left, right main pulmonary arteries: negative      - Lobar arteries: negative       - Segmental arteries: negative      - Systemic vascularity (as visualized):        - Aorta:  grossly negative / normal caliber / no dissection        - roots of great vessels:  grossly negative / normal  caliber        - SVC / IVC:  grossly negative / normal caliber     - Misc (limited visualization):      - pulmonary parenchyma:  negative      - pleura:  negative      - mediastinal / danyelle:  negative      - neck, inferior:  grossly wnl      - subdiaphragmatic structures:  grossly negative (limited  evaluation)       - osseous:      - misc:       .        Impression:       CONCLUSION:          1.  No evidence of pulmonary embolism.            2.  No evidence of pathology associated with the visualized  aorta.                                              Electronically signed by:  GOOD Joyce MD  12/13/2017 2:27  PM CST Workstation: [id number]              Assessment/ Plan:  1.  Chest pain/ HFpEF:  Consult to Dr. Garcia and CHF clinic (currently at patient).  Will start IV Lasix.  Sodium and fluid restrictions. Troponins are chronically elevated.  Daily weights.   2.  COPD:  IV steroids, duonebs and oxygen.  Levaquin started due to leukocytosis. PT/OT.  3.  Hypertension:  Continue lisinopril.   4.  Paroxsymal atrial fibrillation:  Unable to tolerate anticoagulation due to bleeding. TEDs/SCDs.  5.  Diabetes mellitis, type II:  SSI initiated.    6.  Bradycardia:  Will give Atropine if the patient becomes symptomatic.   7.  Lactic acid elevation:  Serial monitoring until resolved.     I discussed the patients findings and my recommendations with: Patient and wife.      This document has been electronically signed by EVE Gomez on December 13, 2017 3:16 PM                       Electronically signed by Parth Birmingham MD at 12/13/2017 10:18 PM           Emergency Department Notes      Jose Angel Galarza MD at 12/13/2017  8:28 AM      Procedure Orders:    1. ECG 12 Lead [[id number]] ordered by Jose Angel Galarza MD at 12/13/17 0815                Subjective   History of Present Illness  Patient is an 84-year-old male who per her eyes via EMS.  Apparently said chest pain in morning past couple mornings.  He is asymptomatic at this time.  He is here to be checked out to 'find out if this is his heart'  Patient of Dr. Garcia.  He has a history of 5 stents.  Also has a history of CHF and COPD.  Review of his previous medical records reveal that there has been some discussion of hospice placement.  Review of Systems   Respiratory: Positive for shortness of breath. Negative for apnea, cough, choking, chest tightness, wheezing and stridor.    Cardiovascular: Positive for chest pain. Negative for palpitations and leg swelling.   Gastrointestinal: Negative for abdominal distention, abdominal pain, anal bleeding, blood in stool, constipation, diarrhea, nausea, rectal pain and vomiting.   All other systems reviewed and are negative.      Past Medical History:   Diagnosis Date   • Bilateral carotid artery stenosis    • CHF (congestive heart failure)    • Chronic obstructive pulmonary disease (COPD)    • Coronary arteriosclerosis    • Degenerative joint disease involving multiple joints    •  Dementia    • Diabetes mellitus    • Hyperlipidemia    • Hypertension    • Myocardial infarction        Allergies   Allergen Reactions   • Atorvastatin Anaphylaxis   • Penicillins Rash   • Pravastatin      Myalgia   • Azithromycin Rash   • Biaxin [Clarithromycin] Rash       Past Surgical History:   Procedure Laterality Date   • CARDIAC CATHETERIZATION N/A 6/6/2017    Procedure: Right Heart Cath;  Surgeon: Jeff Maciel MD PhD;  Location: [facility] INVASIVE LOCATION;  Service:    • CORONARY STENT PLACEMENT     • HERNIA REPAIR     • LUNG BIOPSY     • NECK SURGERY     • THORACOTOMY Left 1977   • VENTRAL HERNIA REPAIR         Family History   Problem Relation Age of Onset   • Hypertension Father        Social History     Social History   • Marital status:      Spouse name: N/A   • Number of children: N/A   • Years of education: N/A     Social History Main Topics   • Smoking status: Former Smoker   • Smokeless tobacco: Never Used   • Alcohol use No   • Drug use: No   • Sexual activity: Not Currently      Comment:      Other Topics Concern   • None     Social History Narrative   • None           Objective   Physical Exam   Constitutional: He is oriented to person, place, and time. He appears well-developed and well-nourished. No distress.   HENT:   Head: Normocephalic and atraumatic.   Mouth/Throat: Oropharynx is clear and moist.   Neck: Normal range of motion. Neck supple. No JVD present.   Cardiovascular:   Bradycardia 38 however with stimulation his heart rate clara to 60.  Irregularly irregular rhythm   Pulmonary/Chest:   Bilateral coarseness and rhonchi.  Patient had no orthopnea.  He also does not appear to have any current dyspnea.   Abdominal: Soft. Bowel sounds are normal. He exhibits no distension. There is no tenderness. There is no rebound and no guarding.   Easily reducible superior ventral hernia.   Musculoskeletal: He exhibits no edema.   Neurological: He is alert and oriented to  person, place, and time. He exhibits normal muscle tone.   Skin: Skin is warm and dry. No rash noted. He is not diaphoretic. No erythema. No pallor.   Psychiatric: He has a normal mood and affect. His behavior is normal. Judgment and thought content normal.   Nursing note and vitals reviewed.      ECG 12 Lead    Date/Time: 12/13/2017 8:15 AM  Performed by: ANURAG GALARZA  Authorized by: ANURAG GALARZA   Interpreted by physician  Comparison: not compared with previous ECG   Rhythm: sinus bradycardia and atrial fibrillation  Rate: normal  BPM: 38  QRS axis: left  Conduction: incomplete RBBB  Clinical impression: abnormal ECG  Comments: No acute injury pattern.              ED Course  ED Course    Patient initially told me he was pain-free.  Then he mentioned to nursing staff that he was still 7 out of 10.  Inconsistent answers from him.  However notable bradycardia on arrival.  Slightly elevated troponin of 0.042.  He has an abnormal chest x-ray.  Radiologist suggests CT chest.  I spoke with Dr. Parth Birmingham of the hospitalist service who will admit.              MDM  Number of Diagnoses or Management Options     Amount and/or Complexity of Data Reviewed  Clinical lab tests: ordered and reviewed  Tests in the radiology section of CPT®:  ordered and reviewed  Tests in the medicine section of CPT®:  ordered and reviewed  Decide to obtain previous medical records or to obtain history from someone other than the patient: yes  Review and summarize past medical records: yes  Independent visualization of images, tracings, or specimens: yes    Risk of Complications, Morbidity, and/or Mortality  Presenting problems: high  Diagnostic procedures: high  Management options: high        Final diagnoses:   Chest pain, unspecified type   Elevated troponin   Abnormal x-ray            Anurag Galarza MD  12/13/17 0927       Electronically signed by Anurag Galarza MD at 12/13/2017  9:27 AM           Physician Progress Notes (most  recent note)      Felipe Chan MD at 12/14/2017  1:19 PM  Version 1 of 1             [facility] Medicine Services  INPATIENT PROGRESS NOTE    Length of Stay: 0  Date of Admission: 12/13/2017  Primary Care Physician: Paolo Rey MD    Subjective     Chief Complaint   Patient presents with   • Chest Pain   • Shortness of Breath     HPI:  84 year old male with presented to [facility] with complaints of shortness of air and chest pain. The patient has known COPD, HFpEF, and paroxysmal atrial fibrillation. Pt has chronic persistent dyspnea and has home oxygen.    12/13/2017: Pt is doing well this morning, breathing better. Echo show normal EF how ever he has diastolic dysfunction. No acute event overnight, currently chest pain free.     Review of Systems   Constitutional: Positive for fatigue. Negative for chills and fever.   HENT: Negative for ear pain and rhinorrhea.    Eyes: Negative for discharge and itching.   Respiratory: Positive for shortness of breath. Negative for chest tightness and wheezing.    Cardiovascular: Negative for chest pain, palpitations and leg swelling.   Gastrointestinal: Negative for abdominal pain, constipation, diarrhea, nausea and vomiting.   Genitourinary: Negative for dysuria and urgency.   Musculoskeletal: Negative for arthralgias and joint swelling.   Neurological: Negative for dizziness, weakness and light-headedness.   Psychiatric/Behavioral: Negative for agitation and behavioral problems.        All pertinent negatives and positives are as above. All other systems have been reviewed and are negative unless otherwise stated.     Objective      Vital Sign Min/Max for last 24 hours  Temp  Min: 96.1 °F (35.6 °C)  Max: 97.9 °F (36.6 °C)   BP  Min: 104/60  Max: 165/72   Pulse  Min: 47  Max: 104   Resp  Min: 18  Max: 20   SpO2  Min: 95 %  Max: 98 %   Flow (L/min)  Min: 2  Max: 3   Weight  Min: 75.5 kg (166 lb 7 oz)  Max: 75.5 kg (166 lb 7 oz)          Physical Exam   Constitutional: He is oriented to person, place, and time. He appears well-developed and well-nourished.   HENT:   Head: Normocephalic and atraumatic.   Eyes: EOM are normal. Pupils are equal, round, and reactive to light.   Neck: Normal range of motion.   Cardiovascular: Normal rate and regular rhythm.    Pulmonary/Chest: Effort normal. He has rales.   On 2L oxygen.    Abdominal: Soft. Bowel sounds are normal.   Musculoskeletal: Normal range of motion.   Neurological: He is alert and oriented to person, place, and time.   Skin: Skin is warm.   Psychiatric: He has a normal mood and affect. His behavior is normal.   Vitals reviewed.      Current Facility-Administered Medications   Medication Dose Route Frequency Provider Last Rate Last Dose   • acetaminophen (TYLENOL) tablet 650 mg  650 mg Oral Q4H PRN Daylin G Levill, APRN       • albuterol (PROVENTIL) nebulizer solution 0.083% 2.5 mg/3mL  2.5 mg Nebulization Q4H PRN Daylin G Levill, APRN   2.5 mg at 12/14/17 0640   • aspirin chewable tablet 81 mg  81 mg Oral Daily Daylin G Levill, APRN   81 mg at 12/14/17 0808   • clopidogrel (PLAVIX) tablet 75 mg  75 mg Oral Daily Daylin G Levill, APRN   75 mg at 12/14/17 0808   • dextrose (D50W) solution 25 g  25 g Intravenous Q15 Min PRN Daylin G Levill, APRN       • dextrose (GLUTOSE) oral gel 15 g  15 g Oral Q15 Min PRN Daylin G Levill, APRN       • donepezil (ARICEPT) tablet 10 mg  10 mg Oral Daily Daylin G Levill, APRN   10 mg at 12/14/17 0808   • famotidine (PEPCID) tablet 40 mg  40 mg Oral Daily Daylin G Levill, APRN   40 mg at 12/14/17 0817   • fluticasone (FLONASE) 50 MCG/ACT nasal spray 2 spray  2 spray Nasal Daily Daylin G Levill, APRN   2 spray at 12/14/17 1042   • furosemide (LASIX) injection 20 mg  20 mg Intravenous Q12H Felipe Chan MD       • glipiZIDE (GLUCOTROL) tablet 5 mg  5 mg Oral QAM AC Daylin G Levill, APRN   5 mg at 12/14/17 0808   • glucagon (human recombinant) (GLUCAGEN DIAGNOSTIC)  injection 1 mg  1 mg Subcutaneous Q15 Min PRN Daylin G Levill, APRN       • HYDROcodone-acetaminophen (NORCO) 7.5-325 MG per tablet 1 tablet  1 tablet Oral Q6H PRN Parth Birmingham MD   1 tablet at 12/14/17 0817   • insulin aspart (novoLOG) injection 0-9 Units  0-9 Units Subcutaneous 4x Daily AC & at Bedtime Daylin G Levill, APRN   2 Units at 12/14/17 0810   • ipratropium-albuterol (DUO-NEB) nebulizer solution 3 mL  3 mL Nebulization Q8H - RT Daylin G Levill, APRN   3 mL at 12/13/17 2114   • isosorbide mononitrate (IMDUR) 24 hr tablet 30 mg  30 mg Oral Q24H Mana Curtis MD   30 mg at 12/14/17 1305   • levoFLOXacin (LEVAQUIN) 500 mg/100 mL D5W (premix) (LEVAQUIN) 500 mg  500 mg Intravenous Q24H Daylin G Levill, APRN   500 mg at 12/13/17 1817   • lisinopril (PRINIVIL,ZESTRIL) tablet 5 mg  5 mg Oral Q24H Daylin G Levill, APRN   5 mg at 12/14/17 1305   • magnesium oxide (MAGOX) tablet 400 mg  400 mg Oral Daily Daylin G Levill, APRN   400 mg at 12/14/17 0808   • methylPREDNISolone sodium succinate (SOLU-Medrol) injection 60 mg  60 mg Intravenous Q6H Daylin G Levill, APRN   60 mg at 12/14/17 1043   • nitroglycerin (NITROSTAT) SL tablet 0.4 mg  0.4 mg Sublingual Q5 Min PRN Daylin G Levill, APRN       • ondansetron (ZOFRAN) injection 4 mg  4 mg Intravenous Q6H PRN Daylin G Levill, APRN       • ranolazine (RANEXA) 12 hr tablet 500 mg  500 mg Oral Q12H Mana Curtis MD   500 mg at 12/14/17 1305   • sodium chloride 0.9 % flush 1-10 mL  1-10 mL Intravenous PRN Daylin G Levill, APRN       • sodium chloride 0.9 % flush 10 mL  10 mL Intravenous PRN Jose Angel Galarza MD       • sodium chloride 0.9 % infusion  50 mL/hr Intravenous Continuous Martinez Cerda MD               Results Review:  I have reviewed the labs, radiology results, and diagnostic studies.    Laboratory Data:     Results from last 7 days  Lab Units 12/14/17  0716 12/13/17  0840   SODIUM mmol/L 136* 139   POTASSIUM mmol/L 5.2* 4.7   CHLORIDE mmol/L 99 103   CO2 mmol/L  23.0 26.0   BUN mg/dL 41* 31*   CREATININE mg/dL 1.36* 1.09   GLUCOSE mg/dL 159* 82   CALCIUM mg/dL 10.1 9.4   BILIRUBIN mg/dL  --  0.5   ALK PHOS U/L  --  56   ALT (SGPT) U/L  --  39   AST (SGOT) U/L  --  27   ANION GAP mmol/L 14.0 10.0     Estimated Creatinine Clearance: 43.2 mL/min (by C-G formula based on Cr of 1.36).            Results from last 7 days  Lab Units 12/14/17  0602 12/13/17  0840   WBC 10*3/mm3 11.41* 10.89*   HEMOGLOBIN g/dL 13.0* 12.5*   HEMATOCRIT % 39.8 39.3   PLATELETS 10*3/mm3 225 211       Results from last 7 days  Lab Units 12/13/17  0840   INR  0.92       Results from last 7 days  Lab Units 12/14/17  0716   CRP mg/dL <0.50       Culture Data:   No results found for: BLOODCX  No results found for: URINECX  No results found for: RESPCX  No results found for: WOUNDCX  No results found for: STOOLCX  No components found for: BODYFLD      Radiology Data:   Imaging Results (last 24 hours)     Procedure Component Value Units Date/Time    CT Angiogram Chest With Contrast [[id number]] Collected:  12/13/17 1355     Updated:  12/13/17 1428    Narrative:         EXAM:  Computed Tomography with CTA         REGION:  Chest       INDICATION:   Dyspnea, elevated d-dimer, chest pain    - rule out pulmonary embolism       CLINICAL HISTORY:  CORRELATIVE IMAGING:  None                         TECHNIQUE:     - PE / vascular protocol     - reconstructions:  axial, coronal, sagittal, obliques     - computer-generated 3D reconstructions (MIPS) were performed.     - contrast:  intravenous Isovue 370, 78 mL                                 This exam was performed according to the departmental  dose-optimization program which includes automated exposure  control, adjustment of the mA and/or kV according to patient size  and/or use of iterative reconstruction technique.         COMMENTS:    - Pulmonary arterial system:      - Main pulmonary artery trunk:  negative      - Left, right main pulmonary arteries: negative       - Lobar arteries: negative       - Segmental arteries: negative      - Systemic vascularity (as visualized):        - Aorta:  grossly negative / normal caliber / no dissection        - roots of great vessels:  grossly negative / normal  caliber        - SVC / IVC:  grossly negative / normal caliber     - Misc (limited visualization):      - pulmonary parenchyma:  negative      - pleura:  negative      - mediastinal / danyelle:  negative      - neck, inferior:  grossly wnl      - subdiaphragmatic structures:  grossly negative (limited  evaluation)       - osseous:      - misc:       .        Impression:       CONCLUSION:          1.  No evidence of pulmonary embolism.            2.  No evidence of pathology associated with the visualized  aorta.                                              Electronically signed by:  GOOD Joyce MD  12/13/2017 2:27  PM CST Workstation: [id number]          I have reviewed the patient current medications.     Assessment/Plan     Active Hospital Problems (** Indicates Principal Problem)    Diagnosis Date Noted   • Chest pain [R07.9] 09/25/2017      Resolved Hospital Problems    Diagnosis Date Noted Date Resolved   No resolved problems to display.     1. Acute on chronic diastolic dysfunction with normal EF: Pt is currently on lasix. He is not on BB due to his bradycardia.   2. Bradyarrhythmia: Pt had long discussion with Dr. Curtis regarding the pacemaker and anticoagulation. Pt declined any further cardiac evaluation. Currently he is not on any AV node blocker. Pt is currently in atrial flutter with HR of 100, no anticoagulation recommended at this point.   3. Hyperkalemia: will give Kayexalate, recheck potassium.   4. COPD exacerbation with pneumonia: noted on the first xray, currently on levaquin and steroids with bronchodilators. Pt is doing well.   5. HTN: lisinopril   6. JILL: with Cr elevation to 1.36, will lower the lasix to 20 mg BID.         Discharge Planning: I expect patient  to be discharged to home in 1-2 days.      DVT Prophylaxis: SCD/TEDs             This document has been electronically signed by Felipe Chan MD on December 14, 2017 1:41 PM           Electronically signed by Felipe Chan MD at 12/14/2017  1:41 PM           Consult Notes (most recent note)      Mana Curtis MD at 12/14/2017 10:33 AM  Version 1 of 1     Consult Orders:    1. Inpatient Consult to Cardiology [[id number]] ordered by EVE Gomez at 12/13/17 1429                 Cardiology at [facility]  Cardiovascular Consultation Note      Hasmukh Ham  301/1  [id number]  6/1/1933    DATE OF ADMISSION: 12/13/2017  DATE OF CONSULTATION:  12/14/2017    Paolo Rey MD  Treatment Team:   Attending Provider: Parth Birmingham MD  Nurse Practitioner: EVE Gomez  Consulting Physician: Caitlyn Garcia MD  Consulting Physician: Felipe Chan MD  Admitting Provider: Parth Birmingham MD    Chief Complaint   Patient presents with   • Chest Pain   • Shortness of Breath       Chief complaint/ Reason for Consultation:Sinus bradycardia with heart rate 38 and history of chronic chest pain syndrome chest pain, COPD on home O2    History of Present Illness: 84 years old patient with a background history of hypertension, hypertensive disease, pulmonary hypertension evaluated by Dr. Maciel, CAD status post PTA stent of RCA 2004 then in-stent restenosis managed with second stent.  Patient had recurrent chest pain and late part 0f 2004 repeat cardiac catheterization performed which revealed LAD stenosis and  PTCA stent.  history of atrial tachycardia with associated atrial fibrillation status post electrical cardioversions and early part of 2017.  Patient had recurrent hospitalization for COPD exacerbation secondary to pneumonia.  Had  documented bradyarrhythmia.  AV tom and antiarrhythmic medications discontinued.  Hospice discussed previously however the family doesn't like the idea.   He presented for evaluations of increasing shortness of breath with associated mild chest discomfort.  The patient persistent abnormal troponin dated back in December 2016.  His lactic acid is elevated at the time of evaluation the patient is in atrial flutter with a heart rate 104 bpm.  CT scan of the chest performed.  No evidence of pulmonary embolism or other pathology reported.  Information obtained from patient , family and current /previous medical records patient recently evaluated with Dr. Ashkan Leach.    Past Medical History:   Diagnosis Date   • Bilateral carotid artery stenosis    • CHF (congestive heart failure)    • Chronic obstructive pulmonary disease (COPD)    • Coronary arteriosclerosis    • Degenerative joint disease involving multiple joints    • Dementia    • Diabetes mellitus    • Hyperlipidemia    • Hypertension    • Myocardial infarction        Past Surgical History:   Procedure Laterality Date   • CARDIAC CATHETERIZATION N/A 6/6/2017    Procedure: Right Heart Cath;  Surgeon: Jeff Maciel MD PhD;  Location: [facility] CATH INVASIVE LOCATION;  Service:    • CORONARY STENT PLACEMENT     • HERNIA REPAIR     • LUNG BIOPSY     • NECK SURGERY     • THORACOTOMY Left 1977   • VENTRAL HERNIA REPAIR         Allergies   Allergen Reactions   • Atorvastatin Anaphylaxis   • Penicillins Rash   • Pravastatin      Myalgia   • Azithromycin Rash   • Biaxin [Clarithromycin] Rash       No current facility-administered medications on file prior to encounter.      Current Outpatient Prescriptions on File Prior to Encounter   Medication Sig Dispense Refill   • albuterol (PROVENTIL) (2.5 MG/3ML) 0.083% nebulizer solution Take 2.5 mg by nebulization Every 4 (Four) Hours As Needed for Wheezing. 125 vial 11   • aspirin 81 MG chewable tablet Chew 81 mg Daily.     • clopidogrel (PLAVIX) 75 MG tablet Take 75 mg by mouth Daily.     • fluticasone (FLONASE) 50 MCG/ACT nasal spray 2 sprays into each nostril Daily.     •  Fluticasone Furoate (ARNUITY ELLIPTA) 200 MCG/ACT aerosol powder  Inhale.     • furosemide (LASIX) 20 MG tablet Take 1 tablet by mouth Daily. 90 tablet 3   • glimepiride (AMARYL) 2 MG tablet Take 2 mg by mouth Every Morning Before Breakfast.     • HYDROcodone-acetaminophen (NORCO) 7.5-325 MG per tablet Take 1 tablet by mouth Every 8 (Eight) Hours.     • ipratropium-albuterol (DUO-NEB) 0.5-2.5 mg/mL nebulizer Take 3 mL by nebulization 4 (Four) Times a Day. 120 vial 1   • isosorbide dinitrate (ISORDIL) 10 MG tablet Take 1 tablet by mouth 3 (Three) Times a Day. 90 tablet 3   • lisinopril (PRINIVIL,ZESTRIL) 10 MG tablet Take 0.5 tablets by mouth Daily. 30 tablet 6   • magnesium oxide (MAGOX) 400 (241.3 MG) MG tablet tablet Take 400 mg by mouth Daily.     • nitroglycerin (NITROSTAT) 0.4 MG SL tablet place 1 tablet under the tongue if needed every 5 minutes for hair...  (REFER TO PRESCRIPTION NOTES).  0   • O2 (OXYGEN) Inhale 2 L/min Every Night. W/ CPAP     • Red Yeast Rice 600 MG tablet Take 600 mg by mouth 2 (Two) Times a Day.     • Umeclidinium-Vilanterol 62.5-25 MCG/INH aerosol powder  Inhale 1 puff Daily. 1 each 11       Social History     Social History   • Marital status:      Spouse name: N/A   • Number of children: N/A   • Years of education: N/A     Occupational History   • Not on file.     Social History Main Topics   • Smoking status: Former Smoker   • Smokeless tobacco: Never Used   • Alcohol use No   • Drug use: No   • Sexual activity: Not Currently      Comment:      Other Topics Concern   • Not on file     Social History Narrative           REVIEW OF SYSTEMS:   Review of Systems   Constitution: Positive for weakness and malaise/fatigue. Negative for chills and decreased appetite.   HENT: Negative for congestion and hearing loss.    Eyes: Negative for blurred vision and double vision.   Cardiovascular: Positive for chest pain. Negative for paroxysmal nocturnal dyspnea and syncope.  "  Respiratory: Positive for cough and shortness of breath.    Endocrine: Negative for cold intolerance and heat intolerance.   Skin: Negative for color change and flushing.   Musculoskeletal: Positive for joint pain.   Gastrointestinal: Negative for abdominal pain and change in bowel habit.   Genitourinary: Positive for dysuria. Negative for bladder incontinence.   Neurological: Negative for dizziness.   Psychiatric/Behavioral: Negative for altered mental status.         Objective:     Vitals:    12/14/17 0307 12/14/17 0640 12/14/17 0703 12/14/17 0841   BP: 104/60  108/62    BP Location: Left arm  Left arm    Patient Position: Lying      Pulse: 85 84 97 104   Resp: 18 18 18    Temp: 97.5 °F (36.4 °C)  96.9 °F (36.1 °C)    TempSrc: Oral  Oral    SpO2: 96% 97% 96%    Weight:       Height:         Body mass index is 26.07 kg/(m^2).  Flowsheet Rows         First Filed Value    Admission Height  170.2 cm (67\") Documented at 12/13/2017 0815    Admission Weight  69.4 kg (153 lb) Documented at 12/13/2017 0815          Intake/Output Summary (Last 24 hours) at 12/14/17 1035  Last data filed at 12/14/17 0900   Gross per 24 hour   Intake              240 ml   Output             1000 ml   Net             -760 ml         Physical Exam   Physical Exam  Physical Exam   Constitutional: He is oriented to person, place, and time. He appears well-developed and well-nourished.   HENT:   Head: Normocephalic and atraumatic.   Eyes: EOM are normal. Pupils are equal, round, and reactive to light.   Neck: Normal range of motion. Neck supple.   Cardiovascular: Irregular rate and rhythm mild tachycardia.    Pulmonary/Chest: Showed decreased bilateral breath sound no definitive rales.   Abdominal: Soft. Bowel sounds are normal.   Musculoskeletal: Normal range of motion.   Neurological: He is alert and oriented to person, place, and time.   Skin: Skin is warm and dry.   Psychiatric: He has a normal mood and affect.      Results Reviewed:  I have " personally reviewed current lab, radiology, and data and agree with results  Radiology Review    CT Angiogram Chest With Contrast   Final Result   CONCLUSION:            1.  No evidence of pulmonary embolism.             2.  No evidence of pathology associated with the visualized   aorta.                                                  Electronically signed by:  GOOD Joyce MD  12/13/2017 2:27   PM CST Workstation: [id number] by [id number]      CT Chest Without Contrast   Final Result   CONCLUSION:            1. No pulmonary mass or airspace disease visualized.   2. Chest radiographic findings likely correspond to   pericardial/pleural fat.                               Electronically signed by:  GOOD Joyce MD  12/13/2017 12:07   PM CST Workstation: [id number]      XR Chest 2 View   Final Result   CONCLUSION: Persistent density in the left midlung field and left   lung base. Recommend CT chest for further evaluation.      Electronically signed by:  Moisés Lopez MD  12/13/2017 9:18   AM CST Workstation: [email or web address]          Lab Results:  Lab Results (last 24 hours)     Procedure Component Value Units Date/Time    D-dimer, Quantitative [[id number]]  (Abnormal) Collected:  12/13/17 0840    Specimen:  Blood Updated:  12/13/17 1137     D-Dimer, Quantitative 629 (H) ng/mL (FEU)     Narrative:       Dimer values <500 ng/ml FEU are FDA approved as aid in diagnosis of deep venous thrombosis and pulmonary embolism.  This test should not be used in an exclusion strategy with pretest probability alone.    A recent guideline regarding diagnosis for pulmonary thomboembolism recommends an adjusted exclusion criterion of age x 10 ng/ml FEU for patients >50 years of age (Jenny Intern Med 2015; 163: 701-711).    POC Glucose Once [[id number]]  (Normal) Collected:  12/13/17 1626    Specimen:  Blood Updated:  12/13/17 1702     Glucose 106 mg/dL       RN NotifiedMeter: [id number]: [id number] SHANDA CAN       Lactic Acid,  Plasma [[id number]]  (Normal) Collected:  12/13/17 1643    Specimen:  Blood Updated:  12/13/17 1709     Lactate 1.4 mmol/L     Troponin [[id number]]  (Abnormal) Collected:  12/13/17 1643    Specimen:  Blood Updated:  12/13/17 1724     Troponin I 0.047 (H) ng/mL     Extra Tubes [[id number]] Collected:  12/13/17 1643    Specimen:  Blood from Blood, Venous Line Updated:  12/13/17 1746    Narrative:       The following orders were created for panel order Extra Tubes.  Procedure                               Abnormality         Status                     ---------                               -----------         ------                     Light Blue Top[[id number]]                                   Final result                 Please view results for these tests on the individual orders.    Light Blue Top [[id number]] Collected:  12/13/17 1643    Specimen:  Blood Updated:  12/13/17 1746     Extra Tube hold for add-on      Auto resulted       Troponin [[id number]]  (Abnormal) Collected:  12/13/17 1922    Specimen:  Blood Updated:  12/13/17 2055     Troponin I 0.044 (H) ng/mL     POC Glucose Once [[id number]]  (Normal) Collected:  12/13/17 1929    Specimen:  Blood Updated:  12/13/17 2136     Glucose 114 mg/dL       RN NotifiedMeter: [id number]: [id number] [place] BRIANA       Lactic Acid, Plasma [[id number]]  (Abnormal) Collected:  12/13/17 2357    Specimen:  Blood Updated:  12/14/17 0047     Lactate 2.5 (C) mmol/L     Lactic Acid, Reflex Timer [[id number]] Collected:  12/13/17 2357    Specimen:  Blood Updated:  12/14/17 0401     Extra Tube Hold for add-ons.      Auto resulted.       CBC Auto Differential [[id number]]  (Abnormal) Collected:  12/14/17 0602    Specimen:  Blood Updated:  12/14/17 0632     WBC 11.41 (H) 10*3/mm3      RBC 4.38 10*6/mm3      Hemoglobin 13.0 (L) g/dL      Hematocrit 39.8 %      MCV 90.9 fL      MCH 29.7 pg      MCHC 32.7 g/dL      RDW 15.7 (H) %      RDW-SD 52.2 (H) fl      MPV 11.1 fL       Platelets 225 10*3/mm3      Neutrophil % 90.4 (H) %      Lymphocyte % 7.3 (L) %      Monocyte % 1.7 %      Eosinophil % 0.0 %      Basophil % 0.1 %      Immature Grans % 0.5 %      Neutrophils, Absolute 10.32 (H) 10*3/mm3      Lymphocytes, Absolute 0.83 10*3/mm3      Monocytes, Absolute 0.19 10*3/mm3      Eosinophils, Absolute 0.00 10*3/mm3      Basophils, Absolute 0.01 10*3/mm3      Immature Grans, Absolute 0.06 (H) 10*3/mm3     CBC & Differential [[id number]] Collected:  12/14/17 0602    Specimen:  Blood Updated:  12/14/17 0633    Narrative:       The following orders were created for panel order CBC & Differential.  Procedure                               Abnormality         Status                     ---------                               -----------         ------                     Scan Slide[[id number]]                                                                  CBC Auto Differential[[id number]]        Abnormal            Final result                 Please view results for these tests on the individual orders.    Lactic Acid, Plasma [[id number]]  (Abnormal) Collected:  12/14/17 0602    Specimen:  Blood Updated:  12/14/17 0646     Lactate 2.4 (C) mmol/L     POC Glucose Once [[id number]]  (Abnormal) Collected:  12/14/17 0610    Specimen:  Blood Updated:  12/14/17 0711     Glucose 170 (H) mg/dL       RN NotifiedMeter: [id number]: [id number] [facility]       Basic Metabolic Panel [[id number]]  (Abnormal) Collected:  12/14/17 0716    Specimen:  Blood Updated:  12/14/17 0755     Glucose 159 (H) mg/dL      BUN 41 (H) mg/dL      Creatinine 1.36 (H) mg/dL      Sodium 136 (L) mmol/L      Potassium 5.2 (H) mmol/L      Chloride 99 mmol/L      CO2 23.0 mmol/L      Calcium 10.1 mg/dL      eGFR Non African Amer 50 (L) mL/min/1.73      BUN/Creatinine Ratio 30.1 (H)     Anion Gap 14.0 mmol/L     Narrative:       The MDRD GFR formula is only valid for adults with stable renal function between ages 18 and  70.    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 #3 chronic chest pain syndrome with history of CAD status post PTCA stent of RCA and LAD #4 COPD on home O2 with chronic baseline shortness breath #5 hypertension hypertensive heart disease      84 years old patient with a background history of CAD status post PTA stent, hypertension with hypertensive heart disease, moderate pulmonary hypertension, COPD with a persistent baseline shortness of breath, mildly abnormal troponin dated back in December 2016 with history of atrial fibrillation/flutter status post electrical cardioversions and documented history of significant bradyarrhythmia requiring discontinuations of AV tom and antiarrhythmic medications.  Patient admitted for evaluation shortness of breath and chest pain and noted  sinus bradycardia at rate of 38 bpm.  Patient now in atrial flutter at a rate of 104 bpm.  Risk stratification and further evaluation discussed with the patient and the family.  Pacemaker implantation  also discussed.  Pros and cons of pacemaker implantations explained to the patient and the family.  They decided not to have any cardiac evaluation or pacemaker implanted.  I will add Imdur and Ranexa given the history of CAD and chest pain.  He is not a candidate for long-term oral intake ablation with history of GI bleed and AV malformations.  Will not consider AV tom blocking drug given the history of bradyarrhythmia and controlled heart  rate.  Present patient is being treated with aspirin and Plavix with history of CAD,  Aricept with history of dementia, Lasix with history of congestive heart failure on the basis of diastolic dysfunction compensated.  Isosorbide 10 mg 3 times a day we increase the dose and add Ranexa.  Hypertension being managed with lisinopril 5 mg.        Thank you for allowing me to participate in the care of Hasmukh Ham. Feel free to contact me directly with any further questions or concerns.    Mana Curtis MD  12/14/17  10:35 AM.      EMR Dragon/Transcription disclaimer:   Much of this encounter note is an electronic transcription/translation of spoken language to printed text. The electronic translation of spoken language may permit erroneous, or at times, nonsensical words or phrases to be inadvertently transcribed; Although I have reviewed the note for such errors, some may still exist.          Electronically signed by Mana Curtis MD at 12/14/2017 10:52 AM

## 2017-12-15 NOTE — PLAN OF CARE
Problem: Patient Care Overview (Adult)  Goal: Plan of Care Review  Outcome: Ongoing (interventions implemented as appropriate)    12/15/17 0614 12/15/17 0843   Coping/Psychosocial Response Interventions   Plan Of Care Reviewed With patient;spouse --    Patient Care Overview   Progress no change --    Outcome Evaluation   Outcome Summary/Follow up Plan --  Bed mobility-ind; sit-stand-sit-SBA/CGA; Pt t/f'd to BR-CGA. Pt was indp with toileting . Pt c/o 10/10 CP while at sink washing hands, vitals taken, nsg notified       Goal: Discharge Needs Assessment  Outcome: Ongoing (interventions implemented as appropriate)    12/14/17 1333 12/14/17 1423 12/14/17 1539   Discharge Needs Assessment   Concerns To Be Addressed --  adjustment to diagnosis/illness concerns --    Concerns Comments Noted hospice was discussed with patient. He nor fmaly are agreeable and request to remain full code. --  --    Readmission Within The Last 30 Days no previous admission in last 30 days --  --    Equipment Needed After Discharge none --  --    Discharge Facility/Level Of Care Needs home with home health  (Note left for MD with request for  services skilled v's transition visit. ) --  --    Current Discharge Risk chronically ill  (COPD and CHF) --  --    Current Health   Anticipated Changes Related to Illness none --  --    Living Environment   Transportation Available --  --  car;family or friend will provide   Self-Care   Equipment Currently Used at Home --  --  walker, rolling;oxygen;shower chair;commode  (uses BSC over toilet;has SCdoes not use)         Problem: Inpatient Occupational Therapy  Goal: Transfer Training Goal 1 LTG- OT  Outcome: Ongoing (interventions implemented as appropriate)    12/14/17 1648 12/15/17 0843   Transfer Training OT LTG   Transfer Training OT LTG, Date Established 12/14/17 --    Transfer Training OT LTG, Time to Achieve by discharge --    Transfer Training OT LTG, Activity Type toilet;tub  (simulated tub  t/f) --    Transfer Training OT LTG, [place] Level conditional independence --    Transfer Training OT LTG, Date Goal Reviewed --  12/15/17   Transfer Training OT LTG, Outcome --  goal not met       Goal: Dynamic Standing Balance Goal LTG-OT  Outcome: Ongoing (interventions implemented as appropriate)    12/14/17 1648 12/15/17 0843   Dynamic Standing Balance OT LTG   Dynamic Standing Balance OT LTG, Date Established 12/14/17 --    Dynamic Standing Balance OT LTG, Time to Achieve by discharge --    Dynamic Standing Balance OT LTG, [place] Level conditional independence --    Dynamic Standing Balance OT LTG, Additional Goal 5 min functional activity --    Dynamic Standing Balance OT LTG, Date Goal Reviewed --  12/15/17   Dynamic Standing Balance OT LTG, Outcome --  goal not met       Goal: Safety Awareness Goal LTG- OT  Outcome: Ongoing (interventions implemented as appropriate)    12/14/17 1648 12/15/17 0843   Safety Awareness OT LTG   Safety Awareness OT LTG, Date Established 12/14/17 --    Safety Awareness OT LTG, Time to Achieve by discharge --    Safety Awareness OT LTG, Activity Type good safety awareness;with ADL's --    Safety Awareness OT LTG, Date Goal Reviewed --  12/15/17   Safety Awareness OT LTG, Outcome --  goal not met       Goal: ADL Goal LTG- OT  Outcome: Ongoing (interventions implemented as appropriate)    12/14/17 1648 12/15/17 0843   ADL OT LTG   ADL OT LTG, Date Established 12/14/17 --    ADL OT LTG, Time to Achieve by discharge --    ADL OT LTG, Activity Type ADL skills --    ADL OT LTG, [place] Level modified independent --    ADL OT LTG, Date Goal Reviewed --  12/15/17   ADL OT LTG, Outcome --  goal not met

## 2017-12-15 NOTE — PLAN OF CARE
Problem: Acute Coronary Syndrome (ACS) (Adult)  Goal: Signs and Symptoms of Listed Potential Problems Will be Absent or Manageable (Acute Coronary Syndrome)  Outcome: Ongoing (interventions implemented as appropriate)    Problem: Patient Care Overview (Adult)  Goal: Plan of Care Review  Outcome: Ongoing (interventions implemented as appropriate)  Goal: Adult Individualization and Mutuality  Outcome: Ongoing (interventions implemented as appropriate)  Goal: Discharge Needs Assessment  Outcome: Ongoing (interventions implemented as appropriate)    Problem: Pain, Chronic (Adult)  Goal: Acceptable Pain Control/Comfort Level  Outcome: Ongoing (interventions implemented as appropriate)    Problem: Fall Risk (Adult)  Goal: Absence of Falls  Outcome: Ongoing (interventions implemented as appropriate)

## 2017-12-15 NOTE — THERAPY TREATMENT NOTE
Acute Care - Physical Therapy Treatment Note  [facility]     Patient Name: Hasmukh Ham  : 1933  MRN: [id number]  Today's Date: 12/15/2017  Onset of Illness/Injury or Date of Surgery Date: 17  Date of Referral to PT: 17  Referring Physician: EVE Birch    Admit Date: 2017    Visit Dx:    ICD-10-CM ICD-9-CM   1. Chest pain, unspecified type R07.9 786.50   2. Elevated troponin R74.8 790.6   3. Abnormal x-ray R93.8 793.99   4. Impaired mobility and ADLs Z74.09 799.89   5. 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   • Nocturia   • Osteoarthritis of multiple joints   • Hyperlipidemia   • Pain in joint involving ankle and foot   • Arthropathy of hand   • Paroxysmal tachycardia   • Osteoarthrosis involving more than one site but not generalized   • Right shoulder pain   • Rotator cuff syndrome   • Partial tear of subscapularis tendon   • Infraspinatus tendon tear   • Supraspinatus tendon tear   • Bilateral carotid artery stenosis   • (HFpEF) heart failure with preserved ejection fraction   • Pulmonary HTN   • Acute interstitial pneumonia   • Chronic obstructive lung disease   • Coronary arteriosclerosis   • Diabetes mellitus   • Hypertension   • Chest pain   • Shortness of breath               Adult Rehabilitation Note       12/15/17 1358 12/15/17 0805       Rehab Assessment/Intervention    Discipline physical therapy assistant  -TESS occupational therapy assistant  -KD     Document Type therapy note  (daily note)  -TESS therapy note (daily note)  -KD     Subjective Information agree to therapy  -TESS agree to therapy  -KD     Patient Effort, Rehab Treatment excellent  -TESS fair  -KD     Patient Effort, Rehab Treatment Comment nsg agrees to therapy.  -TESS      Symptoms Noted During/After Treatment  other (see comments)   Pt c/o 10/10 CP, Vitals taken, Nsg notified, tx ended  -KD     Precautions/Limitations fall precautions;oxygen therapy device and L/min  -TESS      Recorded by [TESS] Cristobal Mansfield PTA [KD] CHANDU GilmoreA/L     Vital Signs    Pre Systolic BP Rehab 115  -TESS 153  -KD     Pre Treatment Diastolic BP 64  -TESS 73  -KD     Post Systolic BP Rehab 149  -TESS      Post Treatment Diastolic BP 67  -TESS      Pretreatment Heart Rate (beats/min) 85  -TESS 85  -KD     Intratreatment Heart Rate (beats/min) 84  -TESS      Posttreatment Heart Rate (beats/min) 82  -TESS 89  -KD     Pre SpO2 (%) 98  -TESS 99  -KD     O2 Delivery Pre Treatment supplemental O2  -TESS room air  -KD     Intra SpO2 (%) 98  -TESS      O2 Delivery Intra Treatment supplemental O2  -TESS      Post SpO2 (%) 98  -TESS 96  -KD     O2 Delivery Post Treatment supplemental O2  -TESS room air  -KD     Pre Patient Position Supine  -TESS Sitting  -KD     Intra Patient Position  Standing  -KD     Post Patient Position Supine  -TESS Sitting  -KD     Recorded by [TESS] Cristobal Mansfield PTA [KD] CHANDU GilmoreA/L     Pain Assessment    Pain Assessment 0-10  -TESS 0-10  -KD     Pain Score 6  -TESS 5  -KD     Post Pain Score 6  -TESS 10  -KD     Pain Type Chronic pain  -TESS      Pain Location Back  -TESS Chest  -KD     Pain Intervention(s) Ambulation/increased activity;Repositioned  -TESS      Recorded by [TESS] Cristobal Mansfield PTA [KD] Erlinda Aguilar MCCLELLAND/L     Vision Assessment/Intervention    Visual Impairment WFL with corrective lenses  -TESS      Recorded by [TESS] Cristobal Mansfield PTA      Cognitive Assessment/Intervention    Current Cognitive/Communication Assessment functional  -TESS  functional  -KD     Orientation Status oriented x 4  -TESS oriented x 4  -KD     Follows Commands/Answers Questions 100% of the time  -TESS 100% of the time  -KD     Personal Safety Interventions gait belt;muscle strengthening facilitated;nonskid shoes/slippers when out of bed;supervised activity  -TESS fall prevention program maintained  -KD     Recorded by [TESS] Cristobal Mansfield PTA [KD] Erlinda Aguilar, [place]/L     Bed Mobility, Assessment/Treatment    Bed Mobility, Assistive Device bed rails;head of bed elevated  -TESS      Bed Mob, Supine to Sit, [place] conditional independence  -TESS conditional independence  -KD     Bed Mob, Sit to Supine, [place] conditional independence  -TESS conditional independence  -KD     Recorded by [TESS] Cristobal Mansfield PTA [KD] Erlinda Aguilar, [place]/L     Transfer Assessment/Treatment    Transfers, Sit-Stand [place] conditional independence;supervision required  -TESS supervision required  -KD     Transfers, Stand-Sit [place] conditional independence;supervision required  -TESS contact guard assist;supervision required  -KD     Transfers, Sit-Stand-Sit, Assist Device  rolling walker  -KD     Toilet Transfer, [place]  supervision required  -KD     Toilet Transfer, Assistive Device  other (see comments)   none  -KD     Recorded by [TESS] Cristobal Mansfield PTA [KD] Erlinda Aguilar, [place]/L     Gait Assessment/Treatment    Gait, [place] Level supervision required;conditional independence  -TESS      Gait, Assistive Device --   no AD  -TESS      Gait, Distance (Feet) --   430, 292  -TESS      Gait, Gait Pattern Analysis swing-through gait  -TESS      Gait, Safety Issues supplemental O2  -TESS      Recorded by [TESS] Cristobal Mansfield PTA      Stairs Assessment/Treatment    Number of Stairs 5  -TESS      Stairs, Handrail Location both sides  -TESS      Stairs, [place] Level independent  -TESS      Recorded by [TESS] Cristobal Mansfield PTA      Functional Mobility    Functional Mobility- Ind. Level   supervision required  -KD     Functional Mobility-Distance (Feet)  --   15 x 2  -KD     Recorded by  [KD] KRYSTIN Gilmore/L     Toileting Assessment/Training    Toileting Assess/Train, Assistive Device  grab bars  -KD     Toileting Assess/Train, Position  sitting;standing  -KD     Toileting Assess/Train, Indepen Level  supervision required  -KD     Recorded by  [KD] CHANDU GilmoreA/L     Grooming Assessment/Training    Grooming Assess/Train, Assistive Device  --   wash hands  -KD     Grooming Assess/Train, Position  standing  -KD     Grooming Assess/Train, Indepen Level  supervision required  -KD     Recorded by  [KD] CHANDU GilmoreA/L     Positioning and Restraints    Pre-Treatment Position in bed  -TESS in bed  -KD     Post Treatment Position bed  -TESS bed  -KD     In Bed supine;call light within reach;encouraged to call for assist;with family/caregiver   all needs met.   -TESS supine;call light within reach;encouraged to call for assist;exit alarm on;with nsg  -KD     Recorded by [TESS] Cristobal Mansfield PTA [KD] KRYSTIN Gilmore/L       User Key  (r) = Recorded By, (t) = Taken By, (c) = Cosigned By    Initials Name Effective Dates     Cristobal Mansfield PTA 10/17/16 -     KD SONIA Gilmore 10/17/16 -                 IP PT Goals       12/15/17 1358 12/14/17 1721       Transfer Training PT LTG    Transfer Training PT LTG, Date Established  12/14/17  -     Transfer Training PT LTG, Time to Achieve  by discharge  -     Transfer Training PT LTG, Activity Type  bed to chair /chair to bed;sit to stand/stand to sit  -     Transfer Training PT LTG, [place] Level  conditional independence  -TESS     Transfer Training PT  LTG, Date Goal Reviewed 12/15/17  -JCA      Transfer Training PT LTG, Outcome goal ongoing  -A goal ongoing  -     Gait Training PT LTG    Gait Training Goal PT LTG, Date Established  12/14/17  -     Gait Training Goal PT LTG, Time to Achieve  by discharge  -     Gait  Training Goal PT LTG, [place] Level  conditional independence  -     Gait Training Goal PT LTG, Distance to Achieve  [id number]  -     Gait Training Goal PT LTG, Date Goal Reviewed 12/15/17  -[facility]      Gait Training Goal PT LTG, Outcome goal ongoing  -[facility] goal ongoing  -     Stair Training PT LTG    Stair Training Goal PT LTG, Date Established  12/14/17  -     Stair Training Goal PT LTG, Time to Achieve  by discharge  -     Stair Training Goal PT LTG, Number of Steps  5  -     Stair Training Goal PT LTG, [place] Level  conditional independence  -     Stair Training Goal PT LTG, Assist Device  2 handrails  -     Stair Training Goal PT LTG, Date Goal Reviewed 12/15/17  -[facility]      Stair Training Goal PT LTG, Outcome goal met  -[facility] goal ongoing  -     Strength Goal PT LTG    Strength Goal PT LTG, Date Established  12/14/17  -     Strength Goal PT LTG, Time to Achieve  by discharge  -     Strength Goal PT LTG, Measure to Achieve  Pt will tolerate 15 reps fo AROM exericises  -     Strength Goal PT LTG, Date Goal Reviewed 12/15/17  -[facility]      Strength Goal PT LTG, Outcome goal ongoing  -[facility] goal ongoing  -     Patient Education PT LTG    Patient Education PT LTG, Date Established  12/14/17  -     Patient Education PT LTG, Time to Achieve  by discharge  -     Patient Education PT LTG, Education Type  gait;transfers;home safety  -     Patient Education PT LTG, Date Goal Reviewed 12/15/17  -[facility]      Patient Education PT LTG Outcome goal ongoing  -[facility] goal [facility]       User Key  (r) = Recorded By, (t) = Taken By, (c) = Cosigned By    Initials Name Provider Type    TESS Joshua, JASMINA Physical Therapist    DOROTHY Mansfield, NEFTALI Physical Therapy Assistant          Physical Therapy Education     Title: PT OT SLP Therapies (Active)     Topic: Physical Therapy (Active)     Point: Mobility training (Active)    Learning Progress Summary    Learner Readiness Method Response Comment  Documented by Status   Patient Acceptance E NR  TESS 12/15/17 1448 Active    Acceptance E NR  JC 12/14/17 1721 Active   Family Acceptance E NR  [facility] 12/14/17 1721 Active               Point: Precautions (Active)    Learning Progress Summary    Learner Readiness Method Response Comment Documented by Status   Patient Acceptance E NR  1 12/14/17 1721 Active   Family Acceptance E NR  [facility] 12/14/17 1721 Active                      User Key     Initials Effective Dates Name Provider Type Discipline    [facility] 10/17/16 -  Gilda Joshua, PT Physical Therapist PT     10/17/16 -  Cristobal Mansfield PTA Physical Therapy Assistant PT                    PT Recommendation and Plan  Anticipated Discharge Disposition: home with assist, home with home health  Planned Therapy Interventions: balance training, gait training, patient/family education, stair training, strengthening, transfer training  PT Frequency: other (see comments) (5-14 times per week)  Plan of Care Review  Plan Of Care Reviewed With: patient  Progress: progress toward functional goals as expected  Outcome Summary/Follow up Plan: pt responded well to therapy. pt mod indep w/ bed mob. pt w/ increased gait to 430 ft SBA-indep. pt indep w/ stiar training. pt met 1 new goal this tx. pt is progressing and would continue to benefit from PT services. REcommend  PT upon DC.          Outcome Measures       12/15/17 1358 12/15/17 0805 12/14/17 1539    How much help from another person do you currently need...    Turning from your back to your side while in flat bed without using bedrails? 4  -TESS  4  -JCA    Moving from lying on back to sitting on the side of a flat bed without bedrails? 4  -TESS  3  -JCA    Moving to and from a bed to a chair (including a wheelchair)? 3  -TESS  3  -JCA    Standing up from a chair using your arms (e.g., wheelchair, bedside chair)? 3  -TESS  3  -JCA    Climbing 3-5 steps with a railing? 4  -TESS  3  -JCA    To walk in hospital room? 3  -TESS  3  -JCA     AM-PAC 6 Clicks Score 21  -TESS  19  -JCA    How much help from another is currently needed...    Putting on and taking off regular lower body clothing?  3  -KD     Bathing (including washing, rinsing, and drying)  3  -KD     Toileting (which includes using toilet bed pan or urinal)  3  -KD     Putting on and taking off regular upper body clothing  4  -KD     Taking care of personal grooming (such as brushing teeth)  4  -KD     Eating meals  4  -KD     Score  21  -KD     Functional Assessment    Outcome Measure Options AM-PAC 6 Clicks Basic Mobility (PT)  -TESS  AM-PAC 6 Clicks Basic Mobility (PT)  -JCA      12/14/17 1538          How much help from another is currently needed...    Putting on and taking off regular lower body clothing? 3  -RW      Bathing (including washing, rinsing, and drying) 3  -RW      Toileting (which includes using toilet bed pan or urinal) 3  -RW      Putting on and taking off regular upper body clothing 3  -RW      Taking care of personal grooming (such as brushing teeth) 4  -RW      Eating meals 4  -RW      Score 20  -RW      Functional Assessment    Outcome Measure Options AM-PAC 6 Clicks Daily Activity (OT)  -RW        User Key  (r) = Recorded By, (t) = Taken By, (c) = Cosigned By    Initials Name Provider Type    RW Deborah Mcqueen, OTR/L Occupational Therapist    DOROTHY Joshua, PT Physical Therapist    TESS Mansfield PTA Physical Therapy Assistant    PAOLA Aguilar, [place]/L Occupational Therapy Assistant           Time Calculation:         PT Charges       12/15/17 1452          Time Calculation    Start Time 1358  -TESS      Stop Time 1430  -TESS      Time Calculation (min) 32 min  -TESS      Time Calculation- PT    Total Timed Code Minutes- PT 32 minute(s)  -TESS        User Key  (r) = Recorded By, (t) = Taken By, (c) = Cosigned By    Initials Name Provider Type    TESS Mansfield PTA Physical Therapy Assistant          Therapy Charges for Today     Code Description Service  Date Service Provider Modifiers Qty    52695889277 HC GAIT TRAINING EA 15 MIN 12/15/2017 Cristobal Mansfield PTA GP 1    14719310120 HC PT THERAPEUTIC ACT EA 15 MIN 12/15/2017 Cristobal Mansfield PTA GP 1          PT G-Codes  PT Professional Judgement Used?: Yes  Outcome Measure Options: AM-PAC 6 Clicks Basic Mobility (PT)  Score: 19  Functional Limitation: Mobility: Walking and moving around  Mobility: Walking and Moving Around Current Status (): At least 20 percent but less than 40 percent impaired, limited or restricted  Mobility: Walking and Moving Around Goal Status (): At least 1 percent but less than 20 percent impaired, limited or restricted    Cristobal Mansfield PTA  12/15/2017

## 2017-12-15 NOTE — PLAN OF CARE
Problem: Patient Care Overview (Adult)  Goal: Plan of Care Review  Outcome: Ongoing (interventions implemented as appropriate)    12/15/17 7099   Coping/Psychosocial Response Interventions   Plan Of Care Reviewed With patient   Patient Care Overview   Progress progress toward functional goals as expected   Outcome Evaluation   Outcome Summary/Follow up Plan Patient experienced some chest pain this am which was reported to Dr. Chan. Gave 2 nitro, did an ecg and david one trop. Since early this morning pt has not complained of any chest pain even when working with physical therapy        Goal: Adult Individualization and Mutuality  Outcome: Ongoing (interventions implemented as appropriate)  Goal: Discharge Needs Assessment  Outcome: Ongoing (interventions implemented as appropriate)    Problem: Pain, Chronic (Adult)  Goal: Acceptable Pain Control/Comfort Level  Outcome: Ongoing (interventions implemented as appropriate)    Problem: Fall Risk (Adult)  Goal: Absence of Falls  Outcome: Ongoing (interventions implemented as appropriate)    Problem: Cardiac: Heart Failure (Adult)  Goal: Signs and Symptoms of Listed Potential Problems Will be Absent or Manageable (Cardiac: Heart Failure)  Outcome: Ongoing (interventions implemented as appropriate)

## 2017-12-16 LAB
ANION GAP SERPL CALCULATED.3IONS-SCNC: 9 MMOL/L (ref 5–15)
BASOPHILS # BLD AUTO: 0.01 10*3/MM3 (ref 0–0.2)
BASOPHILS NFR BLD AUTO: 0.1 % (ref 0–2)
BUN BLD-MCNC: 33 MG/DL (ref 7–21)
BUN/CREAT SERPL: 33.3 (ref 7–25)
CALCIUM SPEC-SCNC: 8.6 MG/DL (ref 8.4–10.2)
CHLORIDE SERPL-SCNC: 104 MMOL/L (ref 95–110)
CO2 SERPL-SCNC: 24 MMOL/L (ref 22–31)
CREAT BLD-MCNC: 0.99 MG/DL (ref 0.7–1.3)
CRP SERPL-MCNC: <0.5 MG/DL (ref 0–1)
D-LACTATE SERPL-SCNC: 1.7 MMOL/L (ref 0.5–2)
D-LACTATE SERPL-SCNC: 2.2 MMOL/L (ref 0.5–2)
D-LACTATE SERPL-SCNC: 2.5 MMOL/L (ref 0.5–2)
D-LACTATE SERPL-SCNC: 2.6 MMOL/L (ref 0.5–2)
DEPRECATED RDW RBC AUTO: 53.4 FL (ref 35.1–43.9)
EOSINOPHIL # BLD AUTO: 0 10*3/MM3 (ref 0–0.7)
EOSINOPHIL NFR BLD AUTO: 0 % (ref 0–7)
ERYTHROCYTE [DISTWIDTH] IN BLOOD BY AUTOMATED COUNT: 16 % (ref 11.5–14.5)
GFR SERPL CREATININE-BSD FRML MDRD: 72 ML/MIN/1.73
GLUCOSE BLD-MCNC: 98 MG/DL (ref 60–100)
GLUCOSE BLDC GLUCOMTR-MCNC: 102 MG/DL (ref 70–130)
GLUCOSE BLDC GLUCOMTR-MCNC: 107 MG/DL (ref 70–130)
GLUCOSE BLDC GLUCOMTR-MCNC: 223 MG/DL (ref 70–130)
GLUCOSE BLDC GLUCOMTR-MCNC: 90 MG/DL (ref 70–130)
HCT VFR BLD AUTO: 34.6 % (ref 39–49)
HGB BLD-MCNC: 11.1 G/DL (ref 13.7–17.3)
IMM GRANULOCYTES # BLD: 0.08 10*3/MM3 (ref 0–0.02)
IMM GRANULOCYTES NFR BLD: 0.5 % (ref 0–0.5)
LYMPHOCYTES # BLD AUTO: 1.44 10*3/MM3 (ref 0.6–4.2)
LYMPHOCYTES NFR BLD AUTO: 9.8 % (ref 10–50)
MCH RBC QN AUTO: 29.4 PG (ref 26.5–34)
MCHC RBC AUTO-ENTMCNC: 32.1 G/DL (ref 31.5–36.3)
MCV RBC AUTO: 91.8 FL (ref 80–98)
MONOCYTES # BLD AUTO: 1.16 10*3/MM3 (ref 0–0.9)
MONOCYTES NFR BLD AUTO: 7.9 % (ref 0–12)
NEUTROPHILS # BLD AUTO: 12 10*3/MM3 (ref 2–8.6)
NEUTROPHILS NFR BLD AUTO: 81.7 % (ref 37–80)
PLATELET # BLD AUTO: 189 10*3/MM3 (ref 150–450)
PMV BLD AUTO: 10.9 FL (ref 8–12)
POTASSIUM BLD-SCNC: 4.2 MMOL/L (ref 3.5–5.1)
RBC # BLD AUTO: 3.77 10*6/MM3 (ref 4.37–5.74)
SODIUM BLD-SCNC: 137 MMOL/L (ref 137–145)
WBC NRBC COR # BLD: 14.69 10*3/MM3 (ref 3.2–9.8)

## 2017-12-16 PROCEDURE — 63710000001 INSULIN ASPART PER 5 UNITS: Performed by: NURSE PRACTITIONER

## 2017-12-16 PROCEDURE — 94799 UNLISTED PULMONARY SVC/PX: CPT

## 2017-12-16 PROCEDURE — 94760 N-INVAS EAR/PLS OXIMETRY 1: CPT

## 2017-12-16 PROCEDURE — 80048 BASIC METABOLIC PNL TOTAL CA: CPT | Performed by: NURSE PRACTITIONER

## 2017-12-16 PROCEDURE — 86140 C-REACTIVE PROTEIN: CPT | Performed by: FAMILY MEDICINE

## 2017-12-16 PROCEDURE — 85025 COMPLETE CBC W/AUTO DIFF WBC: CPT | Performed by: NURSE PRACTITIONER

## 2017-12-16 PROCEDURE — 63710000001 PREDNISONE PER 1 MG: Performed by: FAMILY MEDICINE

## 2017-12-16 PROCEDURE — 82962 GLUCOSE BLOOD TEST: CPT

## 2017-12-16 PROCEDURE — 83605 ASSAY OF LACTIC ACID: CPT | Performed by: NURSE PRACTITIONER

## 2017-12-16 RX ORDER — PREDNISONE 20 MG/1
20 TABLET ORAL
Status: DISCONTINUED | OUTPATIENT
Start: 2017-12-17 | End: 2017-12-19 | Stop reason: HOSPADM

## 2017-12-16 RX ADMIN — INSULIN ASPART 4 UNITS: 100 INJECTION, SOLUTION INTRAVENOUS; SUBCUTANEOUS at 21:00

## 2017-12-16 RX ADMIN — WATER 1 G: 1 INJECTION INTRAMUSCULAR; INTRAVENOUS; SUBCUTANEOUS at 15:25

## 2017-12-16 RX ADMIN — RANOLAZINE 500 MG: 500 TABLET, FILM COATED, EXTENDED RELEASE ORAL at 08:05

## 2017-12-16 RX ADMIN — ISOSORBIDE MONONITRATE 30 MG: 30 TABLET, EXTENDED RELEASE ORAL at 08:04

## 2017-12-16 RX ADMIN — RANOLAZINE 500 MG: 500 TABLET, FILM COATED, EXTENDED RELEASE ORAL at 20:58

## 2017-12-16 RX ADMIN — CLOPIDOGREL BISULFATE 75 MG: 75 TABLET ORAL at 08:05

## 2017-12-16 RX ADMIN — GLIPIZIDE 5 MG: 5 TABLET ORAL at 07:56

## 2017-12-16 RX ADMIN — HYDROCODONE BITARTRATE AND ACETAMINOPHEN 1 TABLET: 7.5; 325 TABLET ORAL at 15:25

## 2017-12-16 RX ADMIN — MAGNESIUM OXIDE TAB 400 MG (241.3 MG ELEMENTAL MG) 400 MG: 400 (241.3 MG) TAB at 08:05

## 2017-12-16 RX ADMIN — WATER 1 G: 1 INJECTION INTRAMUSCULAR; INTRAVENOUS; SUBCUTANEOUS at 07:55

## 2017-12-16 RX ADMIN — FLUTICASONE PROPIONATE 2 SPRAY: 50 SPRAY, METERED NASAL at 08:06

## 2017-12-16 RX ADMIN — DOCUSATE SODIUM 100 MG: 100 CAPSULE, LIQUID FILLED ORAL at 08:06

## 2017-12-16 RX ADMIN — DONEPEZIL HYDROCHLORIDE 10 MG: 10 TABLET ORAL at 08:04

## 2017-12-16 RX ADMIN — WATER 1 G: 1 INJECTION INTRAMUSCULAR; INTRAVENOUS; SUBCUTANEOUS at 00:05

## 2017-12-16 RX ADMIN — LISINOPRIL 5 MG: 5 TABLET ORAL at 08:05

## 2017-12-16 RX ADMIN — IPRATROPIUM BROMIDE AND ALBUTEROL SULFATE 3 ML: 2.5; .5 SOLUTION RESPIRATORY (INHALATION) at 08:23

## 2017-12-16 RX ADMIN — PREDNISONE 40 MG: 20 TABLET ORAL at 07:56

## 2017-12-16 RX ADMIN — WATER 1 G: 1 INJECTION INTRAMUSCULAR; INTRAVENOUS; SUBCUTANEOUS at 23:55

## 2017-12-16 RX ADMIN — SODIUM CHLORIDE 50 ML/HR: 900 INJECTION, SOLUTION INTRAVENOUS at 17:50

## 2017-12-16 RX ADMIN — IPRATROPIUM BROMIDE AND ALBUTEROL SULFATE 3 ML: 2.5; .5 SOLUTION RESPIRATORY (INHALATION) at 22:41

## 2017-12-16 RX ADMIN — ASPIRIN 81 MG 81 MG: 81 TABLET ORAL at 08:05

## 2017-12-16 RX ADMIN — VANCOMYCIN HYDROCHLORIDE 1000 MG: 1 INJECTION, POWDER, LYOPHILIZED, FOR SOLUTION INTRAVENOUS at 16:06

## 2017-12-16 RX ADMIN — HYDROCODONE BITARTRATE AND ACETAMINOPHEN 1 TABLET: 7.5; 325 TABLET ORAL at 08:04

## 2017-12-16 RX ADMIN — FAMOTIDINE 40 MG: 40 TABLET ORAL at 08:04

## 2017-12-16 NOTE — PLAN OF CARE
Problem: Acute Coronary Syndrome (ACS) (Adult)  Goal: Signs and Symptoms of Listed Potential Problems Will be Absent or Manageable (Acute Coronary Syndrome)  Outcome: Ongoing (interventions implemented as appropriate)    12/16/17 0432   Acute Coronary Syndrome (ACS)   Problems Assessed (Acute Coronary Syndrome (ACS)) all   Problems Present (Acute Coronary Syndrome (ACS)) none         Problem: Patient Care Overview (Adult)  Goal: Plan of Care Review  Outcome: Ongoing (interventions implemented as appropriate)  Goal: Adult Individualization and Mutuality  Outcome: Ongoing (interventions implemented as appropriate)    Problem: Pain, Chronic (Adult)  Goal: Acceptable Pain Control/Comfort Level  Outcome: Ongoing (interventions implemented as appropriate)    Problem: Fall Risk (Adult)  Goal: Absence of Falls  Outcome: Ongoing (interventions implemented as appropriate)    Problem: Cardiac: Heart Failure (Adult)  Goal: Signs and Symptoms of Listed Potential Problems Will be Absent or Manageable (Cardiac: Heart Failure)  Outcome: Ongoing (interventions implemented as appropriate)

## 2017-12-16 NOTE — PROGRESS NOTES
[facility]  INPATIENT PROGRESS NOTE    Length of Stay: 1  Date of Admission: 12/13/2017  Primary Care Physician: Paolo Rey MD    Subjective     Chief Complaint   Patient presents with   • Chest Pain   • Shortness of Breath     HPI:  84 year old male with presented to [facility] with complaints of shortness of air and chest pain. The patient has known COPD, HFpEF, and paroxysmal atrial fibrillation. Pt has chronic persistent dyspnea and has home oxygen.    12/13/2017: Pt is doing well this morning, breathing better. Echo show normal EF how ever he has diastolic dysfunction. No acute event overnight, currently chest pain free.     12/15/2017: Pt is clinically doing better right now. However his WBC and lactic acid is on the raise. Pt will be started on broader spectrum antibiotics. Will stop levaquin. Pan cultures. CT of the abdomen is negative for any acute finding.     12/16/2017: Pt states that he is doing much better today, his WBC has improved. Pt has lactic acid which is hovering around 2.2. Continue with IVF. CRP is negative.     Review of Systems   Constitutional: Positive for fatigue. Negative for chills and fever.   HENT: Negative for ear pain and rhinorrhea.    Eyes: Negative for discharge and itching.   Respiratory: Positive for shortness of breath. Negative for chest tightness and wheezing.    Cardiovascular: Negative for chest pain, palpitations and leg swelling.   Gastrointestinal: Negative for abdominal pain, constipation, diarrhea, nausea and vomiting.   Genitourinary: Negative for dysuria and urgency.   Musculoskeletal: Negative for arthralgias and joint swelling.   Neurological: Negative for dizziness, weakness and light-headedness.   Psychiatric/Behavioral: Negative for agitation and behavioral problems.        All pertinent negatives and positives are as above. All other systems have been reviewed and are negative unless  otherwise stated.     Objective      Vital Sign Min/Max for last 24 hours  Temp  Min: 97.2 °F (36.2 °C)  Max: 98.5 °F (36.9 °C)   BP  Min: 113/58  Max: 139/63   Pulse  Min: 40  Max: 90   Resp  Min: 16  Max: 22   SpO2  Min: 96 %  Max: 99 %   Flow (L/min)  Min: 2  Max: 2   Weight  Min: 76.3 kg (168 lb 3.2 oz)  Max: 76.3 kg (168 lb 3.2 oz)         Physical Exam   Constitutional: He is oriented to person, place, and time. He appears well-developed and well-nourished.   HENT:   Head: Normocephalic and atraumatic.   Eyes: EOM are normal. Pupils are equal, round, and reactive to light.   Neck: Normal range of motion.   Cardiovascular: Normal rate and regular rhythm.    Pulmonary/Chest: Effort normal. He has rales.   On 2L oxygen.    Abdominal: Soft. Bowel sounds are normal.   Musculoskeletal: Normal range of motion.   Neurological: He is alert and oriented to person, place, and time.   Skin: Skin is warm.   Psychiatric: He has a normal mood and affect. His behavior is normal.   Vitals reviewed.      Current Facility-Administered Medications   Medication Dose Route Frequency Provider Last Rate Last Dose   • acetaminophen (TYLENOL) tablet 650 mg  650 mg Oral Q4H PRN Daylin G Levill, APRN       • albuterol (PROVENTIL) nebulizer solution 0.083% 2.5 mg/3mL  2.5 mg Nebulization Q4H PRN Daylin G Levill, APRN   2.5 mg at 12/15/17 0706   • aspirin chewable tablet 81 mg  81 mg Oral Daily Daylin G Levill, APRN   81 mg at 12/16/17 0805   • aztreonam (AZACTAM) in FI 1 gram/10ml IV PUSH syringe  1 g Intravenous Q8H Felipe Chan MD   1 g at 12/16/17 0755   • clopidogrel (PLAVIX) tablet 75 mg  75 mg Oral Daily Daylin G Levill, APRN   75 mg at 12/16/17 0805   • dextrose (D50W) solution 25 g  25 g Intravenous Q15 Min PRN Daylin G Levill, APRN       • dextrose (GLUTOSE) oral gel 15 g  15 g Oral Q15 Min PRN Daylin G Levill, APRN       • docusate sodium (COLACE) capsule 100 mg  100 mg Oral Daily Felipe Chan MD   100 mg at 12/16/17 0806   •  donepezil (ARICEPT) tablet 10 mg  10 mg Oral Daily Daylin G Levill, APRN   10 mg at 12/16/17 0804   • famotidine (PEPCID) tablet 40 mg  40 mg Oral Daily Daylin G Levill, APRN   40 mg at 12/16/17 0804   • fluticasone (FLONASE) 50 MCG/ACT nasal spray 2 spray  2 spray Nasal Daily Daylin G Levill, APRN   2 spray at 12/16/17 0806   • glipiZIDE (GLUCOTROL) tablet 5 mg  5 mg Oral QAM AC Daylin G Levill, APRN   5 mg at 12/16/17 0756   • glucagon (human recombinant) (GLUCAGEN DIAGNOSTIC) injection 1 mg  1 mg Subcutaneous Q15 Min PRN Daylin G Levill, APRN       • HYDROcodone-acetaminophen (NORCO) 7.5-325 MG per tablet 1 tablet  1 tablet Oral Q6H PRN Parth Birmingham MD   1 tablet at 12/16/17 0804   • insulin aspart (novoLOG) injection 0-9 Units  0-9 Units Subcutaneous 4x Daily AC & at Bedtime Daylin G Levill, APRN   7 Units at 12/15/17 2057   • ipratropium-albuterol (DUO-NEB) nebulizer solution 3 mL  3 mL Nebulization Q8H - RT Daylin G Levill, APRN   3 mL at 12/16/17 0823   • isosorbide mononitrate (IMDUR) 24 hr tablet 30 mg  30 mg Oral Q24H Mana Curtis MD   30 mg at 12/16/17 0804   • lisinopril (PRINIVIL,ZESTRIL) tablet 5 mg  5 mg Oral Q24H Daylin G Levill, APRN   5 mg at 12/16/17 0805   • magnesium oxide (MAGOX) tablet 400 mg  400 mg Oral Daily Daylin G Levill, APRN   400 mg at 12/16/17 0805   • nitroglycerin (NITROSTAT) SL tablet 0.4 mg  0.4 mg Sublingual Q5 Min PRN Daylin G Levill, APRN   0.4 mg at 12/15/17 0828   • ondansetron (ZOFRAN) injection 4 mg  4 mg Intravenous Q6H PRN Daylin G Levill, APRN       • Pharmacy to dose vancomycin   Does not apply Continuous PRN Felipe Ahmed, MD       • [START ON 12/17/2017] predniSONE (DELTASONE) tablet 20 mg  20 mg Oral Daily With Breakfast Felipe Chan MD       • ranolazine (RANEXA) 12 hr tablet 500 mg  500 mg Oral Q12H Mana Curtis MD   500 mg at 12/16/17 0805   • sodium chloride 0.9 % flush 1-10 mL  1-10 mL Intravenous PRN EVE Gomez       • sodium chloride 0.9 % flush 10 mL   10 mL Intravenous PRN Jose Angel Galarza MD       • sodium chloride 0.9 % infusion  50 mL/hr Intravenous Continuous Martinez Cerda MD 50 mL/hr at 12/15/17 0821 50 mL/hr at 12/15/17 0821   • vancomycin 1 g/250 mL 0.9% NS (vial-mate)  1,000 mg Intravenous Q24H Felipe Chan MD               Results Review:  I have reviewed the labs, radiology results, and diagnostic studies.    Laboratory Data:     Results from last 7 days  Lab Units 12/16/17  0604 12/15/17  0812 12/14/17  1357 12/14/17  0716 12/13/17  0840   SODIUM mmol/L 137 140  --  136* 139   POTASSIUM mmol/L 4.2 4.2 4.7 5.2* 4.7   CHLORIDE mmol/L 104 102  --  99 103   CO2 mmol/L 24.0 22.0  --  23.0 26.0   BUN mg/dL 33* 48*  --  41* 31*   CREATININE mg/dL 0.99 1.30  --  1.36* 1.09   GLUCOSE mg/dL 98 192*  --  159* 82   CALCIUM mg/dL 8.6 9.6  --  10.1 9.4   BILIRUBIN mg/dL  --   --   --   --  0.5   ALK PHOS U/L  --   --   --   --  56   ALT (SGPT) U/L  --   --   --   --  39   AST (SGOT) U/L  --   --   --   --  27   ANION GAP mmol/L 9.0 16.0*  --  14.0 10.0     Estimated Creatinine Clearance: 59.9 mL/min (by C-G formula based on Cr of 0.99).            Results from last 7 days  Lab Units 12/16/17  0604 12/15/17  0653 12/14/17  0602 12/13/17  0840   WBC 10*3/mm3 14.69* 17.94* 11.41* 10.89*   HEMOGLOBIN g/dL 11.1* 12.6* 13.0* 12.5*   HEMATOCRIT % 34.6* 39.5 39.8 39.3   PLATELETS 10*3/mm3 189 245 225 211       Results from last 7 days  Lab Units 12/13/17  0840   INR  0.92       Results from last 7 days  Lab Units 12/16/17  0604 12/14/17  0716   CRP mg/dL <0.50 <0.50       Culture Data:   Blood Culture   Date Value Ref Range Status   12/15/2017 No growth at 24 hours  Preliminary     Urine Culture   Date Value Ref Range Status   12/14/2017 Mixed [place] Isolated  Final     No results found for: RESPCX  No results found for: WOUNDCX  No results found for: STOOLCX  No components found for: BODYFLD      Radiology Data:   Imaging Results (last 24 hours)     ** No results found  for the last 24 hours. **          I have reviewed the patient current medications.     Assessment/Plan     Active Hospital Problems (** Indicates Principal Problem)    Diagnosis Date Noted   • Chest pain [R07.9] 09/25/2017      Resolved Hospital Problems    Diagnosis Date Noted Date Resolved   No resolved problems to display.     1. Acute on chronic diastolic dysfunction with normal EF: Pt is currently lasix on hold. He is not on BB due to his bradycardia.   2. Bradyarrhythmia: Pt had long discussion with Dr. Curtis regarding the pacemaker and anticoagulation. Pt declined any further cardiac evaluation. Currently he is not on any AV node blocker. Pt is currently in atrial flutter with HR of 100, no anticoagulation recommended at this point.   3. Hyperkalemia: will give Kayexalate, recheck potassium. Resolved   4. COPD exacerbation with pneumonia: noted on the first xray, currently started on broader spectrum antibiotics and transition to po steroids with bronchodilators. Pt is doing well.   5. HTN: lisinopril   6. JILL: with Cr elevation to 1.30, hold lasix, on IVF.   7. Sepsis from pneumonia: pan cultures, results negative thus far, started on broad spectrum antibiotics, follow lactic acid and cultures.          Discharge Planning: I expect patient to be discharged to home in 1-2 days.      DVT Prophylaxis: SCD/TEDs             This document has been electronically signed by Felipe Chan MD on December 16, 2017 1:02 PM

## 2017-12-16 NOTE — PAYOR COMM NOTE
"Hasmukh Ham (84 y.o. Male)     Date of Birth Social Security Number Address Home Phone MRN    06/01/1933  [address] [facility] [address] [phone] [id number]    [place] Marital Status          Episcopal        Admission Date Admission Type Admitting Provider Attending Provider Department, Room/Bed    12/13/17 Emergency Parth Birmingham MD Gibson, Bryce, MD [address], 301/1    Discharge Date Discharge Disposition Discharge Destination                      Attending Provider: Parth Birmingham MD     Allergies:  Atorvastatin, Penicillins, Pravastatin, Azithromycin, Biaxin [Clarithromycin]    Isolation:  None   Infection:  None   Code Status:  FULL    Ht:  170.2 cm (67\")   Wt:  76.3 kg (168 lb 3.2 oz)    Admission Cmt:  None   Principal Problem:  None                Active Insurance as of 12/13/2017     Primary Coverage     Payor Plan Insurance Group Employer/Plan Group    AETNA MEDICARE REPLACEMENT AETNA [id number]     Payor Plan Address Payor Plan Phone Number Effective From Effective To    [address] [phone] 4/1/2017     [place] [address]       Subscriber Name Subscriber Birth Date Member ID       HASMUKH HAM 6/1/1933 [id number]                 Emergency Contacts      (Rel.) Home Phone Work Phone Mobile Phone    Geovanna Tatum (Daughter) [phone] -- [phone]    Rimma Ham (Spouse) [phone] -- --        [facility]  [phone]   Phone  [phone]    Fax  RECONSIDERATION         History & Physical      EVE Gomez at 12/13/2017  3:12 PM     Attestation signed by Parth Birmingham MD at 12/13/2017 10:18 PM        I personally evaluated and examined the patient in conjunction with EVE Birch and agree with the assessment, treatment plan, and disposition of the patient as recorded.    Exam:  General: Alert and oriented x3  CV: S1 + S2            This document has " been electronically signed by Parth Birmingham MD on December 13, 2017 10:17 PM                                           [facility] Medicine Admission      Date of Admission: 12/13/2017      Primary Care Physician: Paolo Rey MD      Chief Complaint: Chest pain and shortness of air    HPI: This is an 84-year-old gentleman that presented to [facility] on 12/13/2017 with complaints of shortness of air and chest pain. The patient has known COPD, HFpEF, and paroxysmal atrial fibrillation.  The patient has chronic persistent dyspnea and has home oxygen.  From previous notes, there have been discussions about hospice, but the patient and family were not ready to address that at this point.  The patient states he would like to be a full code.  The patient was found to be bradycardic in the emergency room, and during my examination with him his heart rate dropped to 34 without any symptoms from the patient.  Metoprolol and Cardizem have previously been discontinued due to bradycardia and the patient currently takes no medications that should cause this.      Concurrent Medical History:  has a past medical history of Bilateral carotid artery stenosis; CHF (congestive heart failure); Chronic obstructive pulmonary disease (COPD); Coronary arteriosclerosis; Degenerative joint disease involving multiple joints; Dementia; Diabetes mellitus; Hyperlipidemia; Hypertension; and Myocardial infarction.    Past Surgical History:  has a past surgical history that includes Hernia repair; Lung biopsy; Ventral hernia repair; Thoracotomy (Left, 1977); Cardiac catheterization (N/A, 6/6/2017); Coronary stent placement; and Neck surgery.    Family History: family history includes Hypertension in his father.    Social History:  reports that he has quit smoking. He has never used smokeless tobacco. He reports that he does not drink alcohol or use illicit drugs.    Allergies:   Allergies    Allergen Reactions   • Atorvastatin Anaphylaxis   • Penicillins Rash   • Pravastatin      Myalgia   • Azithromycin Rash   • Biaxin [Clarithromycin] Rash       Medications:   Prior to Admission medications    Medication Sig Start Date End Date Taking? Authorizing Provider   albuterol (PROVENTIL) (2.5 MG/3ML) 0.083% nebulizer solution Take 2.5 mg by nebulization Every 4 (Four) Hours As Needed for Wheezing. 9/28/17  Yes Jc Alba MD   aspirin 81 MG chewable tablet Chew 81 mg Daily.   Yes Historical Provider, MD   clopidogrel (PLAVIX) 75 MG tablet Take 75 mg by mouth Daily. 2/3/16  Yes Historical Provider, MD   fluticasone (FLONASE) 50 MCG/ACT nasal spray 2 sprays into each nostril Daily.   Yes Historical Provider, MD   Fluticasone Furoate (ARNUITY ELLIPTA) 200 MCG/ACT aerosol powder  Inhale.   Yes Historical Provider, MD   furosemide (LASIX) 20 MG tablet Take 1 tablet by mouth Daily. 3/3/17  Yes Jeff Maciel MD PhD   glimepiride (AMARYL) 2 MG tablet Take 2 mg by mouth Every Morning Before Breakfast.   Yes Historical Provider, MD   HYDROcodone-acetaminophen (NORCO) 7.5-325 MG per tablet Take 1 tablet by mouth Every 8 (Eight) Hours. 12/27/16  Yes Historical Provider, MD   ipratropium-albuterol (DUO-NEB) 0.5-2.5 mg/mL nebulizer Take 3 mL by nebulization 4 (Four) Times a Day. 9/28/17  Yes Jc Alba MD   isosorbide dinitrate (ISORDIL) 10 MG tablet Take 1 tablet by mouth 3 (Three) Times a Day. 10/27/17  Yes EVE Sewell   lisinopril (PRINIVIL,ZESTRIL) 10 MG tablet Take 0.5 tablets by mouth Daily. 11/10/17  Yes Caitlyn Garcia MD   magnesium oxide (MAGOX) 400 (241.3 MG) MG tablet tablet Take 400 mg by mouth Daily.   Yes Historical Provider, MD   nitroglycerin (NITROSTAT) 0.4 MG SL tablet place 1 tablet under the tongue if needed every 5 minutes for hair...  (REFER TO PRESCRIPTION NOTES). 1/11/17  Yes Historical Provider, MD   O2 (OXYGEN) Inhale 2 L/min Every Night. W/ CPAP   Yes  Historical Provider, MD   Red Yeast Rice 600 MG tablet Take 600 mg by mouth 2 (Two) Times a Day.   Yes Historical Provider, MD   Umeclidinium-Vilanterol 62.5-25 MCG/INH aerosol powder  Inhale 1 puff Daily. 3/3/17  Yes Brea Higginbotham MD   donepezil (ARICEPT) 10 MG tablet Take 10 mg by mouth Daily. 11/12/16 12/13/17 Yes Historical Provider, MD   famotidine (PEPCID) 20 MG tablet Take 20 mg by mouth 2 (Two) Times a Day. 10/20/16 12/13/17 Yes Historical Provider, MD       Review of Systems:  Review of Systems   Respiratory: Positive for shortness of breath.    Cardiovascular: Positive for chest pain.   All other systems reviewed and are negative.     Otherwise complete ROS is negative except as mentioned above.    Physical Exam:   Temp:  [97.7 °F (36.5 °C)] 97.7 °F (36.5 °C)  Heart Rate:  [43-55] 43  Resp:  [18-20] 18  BP: (127-160)/(57-76) 133/63  Physical Exam   Constitutional: He is oriented to person, place, and time. He appears well-developed and well-nourished.   HENT:   Head: Normocephalic and atraumatic.   Eyes: EOM are normal. Pupils are equal, round, and reactive to light.   Neck: Normal range of motion. Neck supple.   Cardiovascular: Normal rate and regular rhythm.    Pulmonary/Chest: Effort normal.   Abdominal: Soft. Bowel sounds are normal.   Musculoskeletal: Normal range of motion.   Neurological: He is alert and oriented to person, place, and time.   Skin: Skin is warm and dry.   Psychiatric: He has a normal mood and affect.     Results Reviewed:  I have personally reviewed current lab, radiology, and data and agree with results.  Lab Results (last 24 hours)     Procedure Component Value Units Date/Time    CBC & Differential [[id number]] Collected:  12/13/17 0840    Specimen:  Blood Updated:  12/13/17 0844    Narrative:       The following orders were created for panel order CBC & Differential.  Procedure                               Abnormality         Status                     ---------                                -----------         ------                     CBC Auto Differential[[id number]]        Abnormal            Final result                 Please view results for these tests on the individual orders.    CBC Auto Differential [[id number]]  (Abnormal) Collected:  12/13/17 0840    Specimen:  Blood Updated:  12/13/17 0844     WBC 10.89 (H) 10*3/mm3      RBC 4.22 (L) 10*6/mm3      Hemoglobin 12.5 (L) g/dL      Hematocrit 39.3 %      MCV 93.1 fL      MCH 29.6 pg      MCHC 31.8 g/dL      RDW 16.1 (H) %      RDW-SD 54.8 (H) fl      MPV 10.8 fL      Platelets 211 10*3/mm3      Neutrophil % 70.6 %      Lymphocyte % 20.3 %      Monocyte % 7.1 %      Eosinophil % 0.9 %      Basophil % 0.2 %      Immature Grans % 0.9 (H) %      Neutrophils, Absolute 7.69 10*3/mm3      Lymphocytes, Absolute 2.21 10*3/mm3      Monocytes, Absolute 0.77 10*3/mm3      Eosinophils, Absolute 0.10 10*3/mm3      Basophils, Absolute 0.02 10*3/mm3      Immature Grans, Absolute 0.10 (H) 10*3/mm3     Comprehensive Metabolic Panel [[id number]]  (Abnormal) Collected:  12/13/17 0840    Specimen:  Blood Updated:  12/13/17 0856     Glucose 82 mg/dL      BUN 31 (H) mg/dL      Creatinine 1.09 mg/dL      Sodium 139 mmol/L      Potassium 4.7 mmol/L      Chloride 103 mmol/L      CO2 26.0 mmol/L      Calcium 9.4 mg/dL      Total Protein 6.8 g/dL      Albumin 4.00 g/dL      ALT (SGPT) 39 U/L      AST (SGOT) 27 U/L      Alkaline Phosphatase 56 U/L      Total Bilirubin 0.5 mg/dL      eGFR Non African Amer 64 mL/min/1.73      Globulin 2.8 gm/dL      A/G Ratio 1.4 g/dL      BUN/Creatinine Ratio 28.4 (H)     Anion Gap 10.0 mmol/L     Narrative:       The MDRD GFR formula is only valid for adults with stable renal function between ages 18 and 70.    Lipase [[id number]]  (Normal) Collected:  12/13/17 0840    Specimen:  Blood Updated:  12/13/17 0856     Lipase 212 U/L     BNP [[id number]]  (Normal) Collected:  12/13/17 0840    Specimen:  Blood Updated:  12/13/17 0909      proBNP 215.0 pg/mL     Troponin [[id number]]  (Abnormal) Collected:  12/13/17 0840    Specimen:  Blood Updated:  12/13/17 0909     Troponin I 0.042 (H) ng/mL     aPTT [[id number]]  (Normal) Collected:  12/13/17 0840    Specimen:  Blood Updated:  12/13/17 0915     PTT 31.5 seconds     Narrative:       The recommended Heparin therapeutic range is 68-97 seconds.    Protime-INR [[id number]]  (Normal) Collected:  12/13/17 0840    Specimen:  Blood Updated:  12/13/17 0915     Protime 12.3 Seconds      INR 0.92    Narrative:       Therapeutic range for most indications is 2.0-3.0 INR,  or 2.5-3.5 for mechanical heart valves.    [place] Draw [[id number]] Collected:  12/13/17 0840    Specimen:  Blood Updated:  12/13/17 0946    Narrative:       The following orders were created for panel order [place] Draw.  Procedure                               Abnormality         Status                     ---------                               -----------         ------                     Light Blue Top[[id number]]                                   Final result               Green Top (Gel)[[id number]]                                  Final result               Lavender Top[[id number]]                                     Final result               Gold Top - SST[[id number]]                                   Final result                 Please view results for these tests on the individual orders.    Light Blue Top [[id number]] Collected:  12/13/17 0840    Specimen:  Blood Updated:  12/13/17 0946     Extra Tube hold for add-on      Auto resulted       Green Top (Gel) [[id number]] Collected:  12/13/17 0840    Specimen:  Blood Updated:  12/13/17 0946     Extra Tube Hold for add-ons.      Auto resulted.       Lavender Top [[id number]] Collected:  12/13/17 0840    Specimen:  Blood Updated:  12/13/17 0946     Extra Tube hold for add-on      Auto resulted       Gold Top - SST [[id number]] Collected:  12/13/17 0840    Specimen:  Blood Updated:   12/13/17 0946     Extra Tube Hold for add-ons.      Auto resulted.       D-dimer, Quantitative [[id number]]  (Abnormal) Collected:  12/13/17 0840    Specimen:  Blood Updated:  12/13/17 1137     D-Dimer, Quantitative 629 (H) ng/mL (FEU)     Narrative:       Dimer values <500 ng/ml FEU are FDA approved as aid in diagnosis of deep venous thrombosis and pulmonary embolism.  This test should not be used in an exclusion strategy with pretest probability alone.    A recent guideline regarding diagnosis for pulmonary thomboembolism recommends an adjusted exclusion criterion of age x 10 ng/ml FEU for patients >50 years of age (Jenny Intern Med 2015; 163: 701-711).        Imaging Results (last 24 hours)     Procedure Component Value Units Date/Time    XR Chest 2 View [[id number]] Collected:  12/13/17 0856     Updated:  12/13/17 0919    Narrative:       Chest pain.    Chest, AP and lateral views.    Comparison is made with study dated to October 12, 2017.    Examination again revealed stable density in the left midlung  field and left lung base consistent with atelectasis or  infiltrate. There are mild bilateral interstitial markings. No  pleural effusion is identified. No acute bony abnormality is  seen.      Impression:       CONCLUSION: Persistent density in the left midlung field and left  lung base. Recommend CT chest for further evaluation.    Electronically signed by:  Moisés Lopez MD  12/13/2017 9:18  AM CST Workstation: [id number]    CT Chest Without Contrast [[id number]] Collected:  12/13/17 1147     Updated:  12/13/17 1208    Narrative:         EXAMINATION:  Computed Tomography      REGION:    Chest         INDICATION:   cp, xray abnormality, R07.9 Chest pain, unspecified  R74.8 Abnormal levels of other serum enzymes R93.8 Abnormal  findings on diagnostic imaging of other specified body structures     HISTORY:  PAULINA. IMAGING:  CXR: 12/13/17           TECHNIQUE:         - reconstructions:    axial, coronal,  sagittal         - contrast:    oral:  none  ;  intravenous:  None  This exam was performed according to the departmental  dose-optimization program which includes automated exposure  control, adjustment of the mA and/or kV according to patient size  and/or use of iterative reconstruction technique.             COMMENTS:              PULMONARY PARENCHYMA:            - air spaces:      negative            - interstitium:     grossly within normal limits for age              - misc.:      no pulmonary nodules or mass           MEDIASTINUM / RIO:           - heart:      normal size , no pericardial fluid           - aorta/great vessels:    normal caliber and configuration  for age          - misc.:      no mediastinal mass / significant adenopathy           PLEURAL COMPARTMENT:            - misc.:      no pleural fluid or mass          MISC:          - inferior neck:     negative          - osseous/body wall:  negative          - subdiaphragmatic:    as visualized, limited, grossly  unremarkable          - misc:  .       Impression:       CONCLUSION:          1. No pulmonary mass or airspace disease visualized.  2. Chest radiographic findings likely correspond to  pericardial/pleural fat.                          Electronically signed by:  GOOD Joyce MD  12/13/2017 12:07  PM CST Workstation: [id number]    CT Angiogram Chest With Contrast [[id number]] Collected:  12/13/17 1355     Updated:  12/13/17 1428    Narrative:         EXAM:  Computed Tomography with CTA         REGION:  Chest       INDICATION:   Dyspnea, elevated d-dimer, chest pain    - rule out pulmonary embolism       CLINICAL HISTORY:  CORRELATIVE IMAGING:  None                         TECHNIQUE:     - PE / vascular protocol     - reconstructions:  axial, coronal, sagittal, obliques     - computer-generated 3D reconstructions (MIPS) were performed.     - contrast:  intravenous Isovue 370, 78 mL                                 This exam was performed  according to the departmental  dose-optimization program which includes automated exposure  control, adjustment of the mA and/or kV according to patient size  and/or use of iterative reconstruction technique.         COMMENTS:    - Pulmonary arterial system:      - Main pulmonary artery trunk:  negative      - Left, right main pulmonary arteries: negative      - Lobar arteries: negative       - Segmental arteries: negative      - Systemic vascularity (as visualized):        - Aorta:  grossly negative / normal caliber / no dissection        - roots of great vessels:  grossly negative / normal  caliber        - SVC / IVC:  grossly negative / normal caliber     - Misc (limited visualization):      - pulmonary parenchyma:  negative      - pleura:  negative      - mediastinal / danyelle:  negative      - neck, inferior:  grossly wnl      - subdiaphragmatic structures:  grossly negative (limited  evaluation)       - osseous:      - misc:       .        Impression:       CONCLUSION:          1.  No evidence of pulmonary embolism.            2.  No evidence of pathology associated with the visualized  aorta.                                              Electronically signed by:  GOOD Joyce MD  12/13/2017 2:27  PM CST Workstation: [id number]              Assessment/ Plan:  1.  Chest pain/ HFpEF:  Consult to Dr. Garcia and CHF clinic (currently at patient).  Will start IV Lasix.  Sodium and fluid restrictions. Troponins are chronically elevated.  Daily weights.   2.  COPD:  IV steroids, duonebs and oxygen.  Levaquin started due to leukocytosis. PT/OT.  3.  Hypertension: Continue lisinopril.   4.  Paroxsymal atrial fibrillation:  Unable to tolerate anticoagulation due to bleeding. TEDs/SCDs.  5.  Diabetes mellitis, type II:  SSI initiated.    6.  Bradycardia:  Will give Atropine if the patient becomes symptomatic.   7.  Lactic acid elevation:  Serial monitoring until resolved.     I discussed the patients findings and my  recommendations with: Patient and wife.      This document has been electronically signed by EVE Gomez on December 13, 2017 3:16 PM                       Electronically signed by Parth Birmingham MD at 12/13/2017 10:18 PM           Emergency Department Notes      Jose Angel Galarza MD at 12/13/2017  8:28 AM      Procedure Orders:    1. ECG 12 Lead [[id number]] ordered by Jose Angel Galarza MD at 12/13/17 0815                Subjective   History of Present Illness  Patient is an 84-year-old male who per her eyes via EMS.  Apparently said chest pain in morning past couple mornings.  He is asymptomatic at this time.  He is here to be checked out to 'find out if this is his heart'  Patient of Dr. Garcia.  He has a history of 5 stents.  Also has a history of CHF and COPD.  Review of his previous medical records reveal that there has been some discussion of hospice placement.  Review of Systems   Respiratory: Positive for shortness of breath. Negative for apnea, cough, choking, chest tightness, wheezing and stridor.    Cardiovascular: Positive for chest pain. Negative for palpitations and leg swelling.   Gastrointestinal: Negative for abdominal distention, abdominal pain, anal bleeding, blood in stool, constipation, diarrhea, nausea, rectal pain and vomiting.   All other systems reviewed and are negative.      Past Medical History:   Diagnosis Date   • Bilateral carotid artery stenosis    • CHF (congestive heart failure)    • Chronic obstructive pulmonary disease (COPD)    • Coronary arteriosclerosis    • Degenerative joint disease involving multiple joints    • Dementia    • Diabetes mellitus    • Hyperlipidemia    • Hypertension    • Myocardial infarction        Allergies   Allergen Reactions   • Atorvastatin Anaphylaxis   • Penicillins Rash   • Pravastatin      Myalgia   • Azithromycin Rash   • Biaxin [Clarithromycin] Rash       Past Surgical History:   Procedure Laterality Date   • CARDIAC CATHETERIZATION N/A  6/6/2017    Procedure: Right Heart Cath;  Surgeon: Jeff Maciel MD PhD;  Location: [facility] CATH INVASIVE LOCATION;  Service:    • CORONARY STENT PLACEMENT     • HERNIA REPAIR     • LUNG BIOPSY     • NECK SURGERY     • THORACOTOMY Left 1977   • VENTRAL HERNIA REPAIR         Family History   Problem Relation Age of Onset   • Hypertension Father        Social History     Social History   • Marital status:      Spouse name: N/A   • Number of children: N/A   • Years of education: N/A     Social History Main Topics   • Smoking status: Former Smoker   • Smokeless tobacco: Never Used   • Alcohol use No   • Drug use: No   • Sexual activity: Not Currently      Comment:      Other Topics Concern   • None     Social History Narrative   • None           Objective   Physical Exam   Constitutional: He is oriented to person, place, and time. He appears well-developed and well-nourished. No distress.   HENT:   Head: Normocephalic and atraumatic.   Mouth/Throat: Oropharynx is clear and moist.   Neck: Normal range of motion. Neck supple. No JVD present.   Cardiovascular:   Bradycardia 38 however with stimulation his heart rate clara to 60.  Irregularly irregular rhythm   Pulmonary/Chest:   Bilateral coarseness and rhonchi.  Patient had no orthopnea.  He also does not appear to have any current dyspnea.   Abdominal: Soft. Bowel sounds are normal. He exhibits no distension. There is no tenderness. There is no rebound and no guarding.   Easily reducible superior ventral hernia.   Musculoskeletal: He exhibits no edema.   Neurological: He is alert and oriented to person, place, and time. He exhibits normal muscle tone.   Skin: Skin is warm and dry. No rash noted. He is not diaphoretic. No erythema. No pallor.   Psychiatric: He has a normal mood and affect. His behavior is normal. Judgment and thought content normal.   Nursing note and vitals reviewed.      ECG 12 Lead    Date/Time: 12/13/2017 8:15 AM  Performed by:  ANURAG GALARZA  Authorized by: ANURAG GALARZA   Interpreted by physician  Comparison: not compared with previous ECG   Rhythm: sinus bradycardia and atrial fibrillation  Rate: normal  BPM: 38  QRS axis: left  Conduction: incomplete RBBB  Clinical impression: abnormal ECG  Comments: No acute injury pattern.              ED Course  ED Course    Patient initially told me he was pain-free.  Then he mentioned to nursing staff that he was still 7 out of 10.  Inconsistent answers from him.  However notable bradycardia on arrival.  Slightly elevated troponin of 0.042.  He has an abnormal chest x-ray.  Radiologist suggests CT chest.  I spoke with Dr. Parth Birmingham of the hospitalist service who will admit.              MDM  Number of Diagnoses or Management Options     Amount and/or Complexity of Data Reviewed  Clinical lab tests: ordered and reviewed  Tests in the radiology section of CPT®:  ordered and reviewed  Tests in the medicine section of CPT®:  ordered and reviewed  Decide to obtain previous medical records or to obtain history from someone other than the patient: yes  Review and summarize past medical records: yes  Independent visualization of images, tracings, or specimens: yes    Risk of Complications, Morbidity, and/or Mortality  Presenting problems: high  Diagnostic procedures: high  Management options: high        Final diagnoses:   Chest pain, unspecified type   Elevated troponin   Abnormal x-ray            Anurag Galarza MD  12/13/17 0927       Electronically signed by Anurag Galarza MD at 12/13/2017  9:27 AM        Hospital Medications (all)       Dose Frequency Start End    acetaminophen (TYLENOL) tablet 650 mg 650 mg Every 4 Hours PRN 12/13/2017     Sig - Route: Take 2 tablets by mouth Every 4 (Four) Hours As Needed for Mild Pain . - Oral    albuterol (PROVENTIL) nebulizer solution 0.083% 2.5 mg/3mL 2.5 mg Every 4 Hours PRN 12/13/2017     Sig - Route: Take 2.5 mg by nebulization Every 4 (Four) Hours As  Needed for Wheezing or Shortness of Air. - Nebulization    aspirin chewable tablet 81 mg 81 mg Daily 12/13/2017     Sig - Route: Chew 1 tablet Daily. - Oral    aztreonam (AZACTAM) in SWFI 1 gram/10ml IV PUSH syringe 1 g Every 8 Hours 12/15/2017 12/25/2017    Sig - Route: Infuse 10 mL into a venous catheter Every 8 (Eight) Hours. - Intravenous    clopidogrel (PLAVIX) tablet 75 mg 75 mg Daily 12/13/2017     Sig - Route: Take 1 tablet by mouth Daily. - Oral    dextrose (D50W) solution 25 g 25 g Every 15 Minutes PRN 12/13/2017     Sig - Route: Infuse 50 mL into a venous catheter Every 15 (Fifteen) Minutes As Needed for Low Blood Sugar (Blood Sugar Less Than 70, Patient Has IV Access - Unresponsive, NPO or Unable To Safely Swallow). - Intravenous    dextrose (GLUTOSE) oral gel 15 g 15 g Every 15 Minutes PRN 12/13/2017     Sig - Route: Take 15 g by mouth Every 15 (Fifteen) Minutes As Needed for Low Blood Sugar (Blood Sugar Less Than 70, Patient Alert, Is Not NPO & Can Safely Swallow). - Oral    docusate sodium (COLACE) capsule 100 mg 100 mg Daily 12/14/2017     Sig - Route: Take 1 capsule by mouth Daily. - Oral    donepezil (ARICEPT) tablet 10 mg 10 mg Daily 12/13/2017     Sig - Route: Take 1 tablet by mouth Daily. - Oral    famotidine (PEPCID) tablet 40 mg 40 mg Daily 12/14/2017     Sig - Route: Take 1 tablet by mouth Daily. - Oral    fluticasone (FLONASE) 50 MCG/ACT nasal spray 2 spray 2 spray Daily 12/13/2017     Sig - Route: 2 sprays into each nostril Daily. - Nasal    glipiZIDE (GLUCOTROL) tablet 5 mg 5 mg Every Morning Before Breakfast 12/14/2017     Sig - Route: Take 1 tablet by mouth Every Morning Before Breakfast. - Oral    glucagon (human recombinant) (GLUCAGEN DIAGNOSTIC) injection 1 mg 1 mg Every 15 Minutes PRN 12/13/2017     Sig - Route: Inject 1 mg under the skin Every 15 (Fifteen) Minutes As Needed (Blood Glucose Less Than 70 - Patient Without IV Access - Unresponsive, NPO or Unable To Safely Swallow). -  Subcutaneous    HYDROcodone-acetaminophen (NORCO) 7.5-325 MG per tablet 1 tablet 1 tablet Every 6 Hours PRN 12/13/2017 12/23/2017    Sig - Route: Take 1 tablet by mouth Every 6 (Six) Hours As Needed for Moderate Pain . - Oral    insulin aspart (novoLOG) injection 0-9 Units 0-9 Units 4 Times Daily Before Meals & Nightly 12/13/2017     Sig - Route: Inject 0-9 Units under the skin 4 (Four) Times a Day Before Meals & at Bedtime. - Subcutaneous    iopamidol (ISOVUE-370) 76 % injection 100 mL 100 mL Once in Imaging 12/13/2017 12/13/2017    Sig - Route: Infuse 100 mL into a venous catheter Once. - Intravenous    ipratropium-albuterol (DUO-NEB) nebulizer solution 3 mL 3 mL Every 8 Hours - RT 12/13/2017     Sig - Route: Take 3 mL by nebulization Every 8 (Eight) Hours. - Nebulization    isosorbide mononitrate (IMDUR) 24 hr tablet 30 mg 30 mg Every 24 Hours Scheduled 12/14/2017     Sig - Route: Take 1 tablet by mouth Daily. - Oral    lisinopril (PRINIVIL,ZESTRIL) tablet 5 mg 5 mg Every 24 Hours Scheduled 12/14/2017     Sig - Route: Take 1 tablet by mouth Daily. - Oral    magnesium oxide (MAGOX) tablet 400 mg 400 mg Daily 12/13/2017     Sig - Route: Take 1 tablet by mouth Daily. - Oral    nitroglycerin (NITROSTAT) ointment 1 inch 1 inch Once 12/13/2017 12/13/2017    Sig - Route: Apply 1 inch topically 1 (One) Time. - Topical    nitroglycerin (NITROSTAT) SL tablet 0.4 mg 0.4 mg Every 5 Minutes PRN 12/13/2017     Sig - Route: Place 1 tablet under the tongue Every 5 (Five) Minutes As Needed for Chest Pain. - Sublingual    ondansetron (ZOFRAN) injection 4 mg 4 mg Every 6 Hours PRN 12/13/2017     Sig - Route: Infuse 2 mL into a venous catheter Every 6 (Six) Hours As Needed for Nausea or Vomiting. - Intravenous    Pharmacy to dose vancomycin  Continuous PRN 12/15/2017 12/25/2017    Sig - Route: Continuous As Needed for Consult. - Does not apply    predniSONE (DELTASONE) tablet 20 mg 20 mg Daily With Breakfast 12/17/2017     Sig -  "Route: Take 1 tablet by mouth Daily With Breakfast. - Oral    ranolazine (RANEXA) 12 hr tablet 500 mg 500 mg Every 12 Hours Scheduled 12/14/2017     Sig - Route: Take 1 tablet by mouth Every 12 (Twelve) Hours. - Oral    sodium chloride 0.9 % flush 1-10 mL 1-10 mL As Needed 12/13/2017     Sig - Route: Infuse 1-10 mL into a venous catheter As Needed for Line Care. - Intravenous    sodium chloride 0.9 % flush 10 mL 10 mL As Needed 12/13/2017     Sig - Route: Infuse 10 mL into a venous catheter As Needed for Line Care. - Intravenous    Linked Group 1:  \"And\" Linked Group Details        sodium chloride 0.9 % infusion 50 mL/hr Continuous 12/14/2017     Sig - Route: Infuse 50 mL/hr into a venous catheter Continuous. - Intravenous    sodium polystyrene (KAYEXALATE) 15 GM/60ML suspension 15 g 15 g Once 12/14/2017 12/14/2017    Sig - Route: Take 60 mL by mouth 1 (One) Time. - Oral    vancomycin 1 g/250 mL 0.9% NS (vial-mate) 1,000 mg Every 24 Hours 12/16/2017 12/25/2017    Sig - Route: Infuse 250 mL into a venous catheter Daily. - Intravenous    famotidine (PEPCID) tablet 20 mg (Discontinued) 20 mg 2 Times Daily 12/13/2017 12/13/2017    Sig - Route: Take 1 tablet by mouth 2 (Two) Times a Day. - Oral    Reason for Discontinue: Duplicate order    furosemide (LASIX) injection 20 mg (Discontinued) 20 mg Every 12 Hours 12/14/2017 12/15/2017    Sig - Route: Infuse 2 mL into a venous catheter Every 12 (Twelve) Hours. - Intravenous    furosemide (LASIX) injection 40 mg (Discontinued) 40 mg Every 12 Hours 12/13/2017 12/14/2017    Sig - Route: Infuse 4 mL into a venous catheter Every 12 (Twelve) Hours. - Intravenous    ipratropium-albuterol (DUO-NEB) nebulizer solution 3 mL (Discontinued) 3 mL 4 Times Daily 12/13/2017 12/13/2017    Sig - Route: Take 3 mL by nebulization 4 (Four) Times a Day. - Nebulization    Reason for Discontinue: Duplicate order    ipratropium-albuterol (DUO-NEB) nebulizer solution 3 mL (Discontinued) 3 mL 4 Times " Daily - RT 12/13/2017 12/13/2017    Sig - Route: Take 3 mL by nebulization 4 (Four) Times a Day. - Nebulization    Reason for Discontinue: Duplicate order    isosorbide dinitrate (ISORDIL) tablet 10 mg (Discontinued) 10 mg 3 Times Daily 12/13/2017 12/14/2017    Sig - Route: Take 2 tablets by mouth 3 (Three) Times a Day. - Oral    levoFLOXacin (LEVAQUIN) 500 mg/100 mL D5W (premix) (LEVAQUIN) 500 mg (Discontinued) 500 mg Every 24 Hours 12/13/2017 12/15/2017    Sig - Route: Infuse 100 mL into a venous catheter Daily. - Intravenous    methylPREDNISolone sodium succinate (SOLU-Medrol) injection 60 mg (Discontinued) 60 mg Every 6 Hours 12/13/2017 12/15/2017    Sig - Route: Infuse 0.96 mL into a venous catheter Every 6 (Six) Hours. - Intravenous    predniSONE (DELTASONE) tablet 40 mg (Discontinued) 40 mg Daily With Breakfast 12/16/2017 12/16/2017    Sig - Route: Take 2 tablets by mouth Daily With Breakfast. - Oral    vancomycin (VANCOCIN) 1,250 mg in sodium chloride 0.9 % 250 mL IVPB (Discontinued) 1,250 mg Every 24 Hours 12/15/2017 12/15/2017    Sig - Route: Infuse 1,250 mg into a venous catheter Daily. - Intravenous    vancomycin 1 g/250 mL 0.9% NS (vial-mate) (Discontinued) 1,000 mg Every 12 Hours 12/15/2017 12/15/2017    Sig - Route: Infuse 250 mL into a venous catheter Every 12 (Twelve) Hours. - Intravenous    Reason for Discontinue: Dose adjustment    vancomycin 1 g/250 mL 0.9% NS (vial-mate) (Discontinued) 1,000 mg Every 24 Hours 12/16/2017 12/15/2017    Sig - Route: Infuse 250 mL into a venous catheter Daily. - Intravenous    Reason for Discontinue: Reorder             Physician Progress Notes (last 7 days) (Notes from 12/9/2017 12:07 PM through 12/16/2017 12:07 PM)      Felipe Chan MD at 12/14/2017  1:19 PM  Version 1 of 1             [facility] Medicine Services  INPATIENT PROGRESS NOTE    Length of Stay: 0  Date of Admission: 12/13/2017  Primary Care Physician: Paolo SHAW  MD Candido    Subjective     Chief Complaint   Patient presents with   • Chest Pain   • Shortness of Breath     HPI:  84 year old male with presented to [facility] with complaints of shortness of air and chest pain. The patient has known COPD, HFpEF, and paroxysmal atrial fibrillation. Pt has chronic persistent dyspnea and has home oxygen.    12/13/2017: Pt is doing well this morning, breathing better. Echo show normal EF how ever he has diastolic dysfunction. No acute event overnight, currently chest pain free.     Review of Systems   Constitutional: Positive for fatigue. Negative for chills and fever.   HENT: Negative for ear pain and rhinorrhea.    Eyes: Negative for discharge and itching.   Respiratory: Positive for shortness of breath. Negative for chest tightness and wheezing.    Cardiovascular: Negative for chest pain, palpitations and leg swelling.   Gastrointestinal: Negative for abdominal pain, constipation, diarrhea, nausea and vomiting.   Genitourinary: Negative for dysuria and urgency.   Musculoskeletal: Negative for arthralgias and joint swelling.   Neurological: Negative for dizziness, weakness and light-headedness.   Psychiatric/Behavioral: Negative for agitation and behavioral problems.        All pertinent negatives and positives are as above. All other systems have been reviewed and are negative unless otherwise stated.     Objective      Vital Sign Min/Max for last 24 hours  Temp  Min: 96.1 °F (35.6 °C)  Max: 97.9 °F (36.6 °C)   BP  Min: 104/60  Max: 165/72   Pulse  Min: 47  Max: 104   Resp  Min: 18  Max: 20   SpO2  Min: 95 %  Max: 98 %   Flow (L/min)  Min: 2  Max: 3   Weight  Min: 75.5 kg (166 lb 7 oz)  Max: 75.5 kg (166 lb 7 oz)         Physical Exam   Constitutional: He is oriented to person, place, and time. He appears well-developed and well-nourished.   HENT:   Head: Normocephalic and atraumatic.   Eyes: EOM are normal. Pupils are equal, round, and reactive to light.   Neck: Normal  range of motion.   Cardiovascular: Normal rate and regular rhythm.    Pulmonary/Chest: Effort normal. He has rales.   On 2L oxygen.    Abdominal: Soft. Bowel sounds are normal.   Musculoskeletal: Normal range of motion.   Neurological: He is alert and oriented to person, place, and time.   Skin: Skin is warm.   Psychiatric: He has a normal mood and affect. His behavior is normal.   Vitals reviewed.      Current Facility-Administered Medications   Medication Dose Route Frequency Provider Last Rate Last Dose   • acetaminophen (TYLENOL) tablet 650 mg  650 mg Oral Q4H PRN Daylin G Levill, APRN       • albuterol (PROVENTIL) nebulizer solution 0.083% 2.5 mg/3mL  2.5 mg Nebulization Q4H PRN Daylin G Levill, APRN   2.5 mg at 12/14/17 0640   • aspirin chewable tablet 81 mg  81 mg Oral Daily Daylin G Levill, APRN   81 mg at 12/14/17 0808   • clopidogrel (PLAVIX) tablet 75 mg  75 mg Oral Daily Daylin G Levill, APRN   75 mg at 12/14/17 0808   • dextrose (D50W) solution 25 g  25 g Intravenous Q15 Min PRN Daylin G Levill, APRN       • dextrose (GLUTOSE) oral gel 15 g  15 g Oral Q15 Min PRN Daylin G Levill, APRN       • donepezil (ARICEPT) tablet 10 mg  10 mg Oral Daily Daylin G Levill, APRN   10 mg at 12/14/17 0808   • famotidine (PEPCID) tablet 40 mg  40 mg Oral Daily Daylin G Levill, APRN   40 mg at 12/14/17 0817   • fluticasone (FLONASE) 50 MCG/ACT nasal spray 2 spray  2 spray Nasal Daily Daylin G Levill, APRN   2 spray at 12/14/17 1042   • furosemide (LASIX) injection 20 mg  20 mg Intravenous Q12H Felipe Chan MD       • glipiZIDE (GLUCOTROL) tablet 5 mg  5 mg Oral QAM AC Daylin G Levill, APRN   5 mg at 12/14/17 0808   • glucagon (human recombinant) (GLUCAGEN DIAGNOSTIC) injection 1 mg  1 mg Subcutaneous Q15 Min PRN Daylin G Levill, APRN       • HYDROcodone-acetaminophen (NORCO) 7.5-325 MG per tablet 1 tablet  1 tablet Oral Q6H PRN Parth Birmingham MD   1 tablet at 12/14/17 0817   • insulin aspart (novoLOG) injection 0-9 Units  0-9 Units  Subcutaneous 4x Daily AC & at Bedtime Daylin G Levill, APRN   2 Units at 12/14/17 0810   • ipratropium-albuterol (DUO-NEB) nebulizer solution 3 mL  3 mL Nebulization Q8H - RT Daylin G Levill, APRN   3 mL at 12/13/17 2114   • isosorbide mononitrate (IMDUR) 24 hr tablet 30 mg  30 mg Oral Q24H Mana Curtis MD   30 mg at 12/14/17 1305   • levoFLOXacin (LEVAQUIN) 500 mg/100 mL D5W (premix) (LEVAQUIN) 500 mg  500 mg Intravenous Q24H Daylin G Levill, APRN   500 mg at 12/13/17 1817   • lisinopril (PRINIVIL,ZESTRIL) tablet 5 mg  5 mg Oral Q24H Daylin G Levill, APRN   5 mg at 12/14/17 1305   • magnesium oxide (MAGOX) tablet 400 mg  400 mg Oral Daily Daylin G Levill, APRN   400 mg at 12/14/17 0808   • methylPREDNISolone sodium succinate (SOLU-Medrol) injection 60 mg  60 mg Intravenous Q6H Daylin G Levill, APRN   60 mg at 12/14/17 1043   • nitroglycerin (NITROSTAT) SL tablet 0.4 mg  0.4 mg Sublingual Q5 Min PRN Daylin G Levill, APRN       • ondansetron (ZOFRAN) injection 4 mg  4 mg Intravenous Q6H PRN Daylin G Levill, APRN       • ranolazine (RANEXA) 12 hr tablet 500 mg  500 mg Oral Q12H Mana Curtis MD   500 mg at 12/14/17 1305   • sodium chloride 0.9 % flush 1-10 mL  1-10 mL Intravenous PRN Daylin G Levill, APRN       • sodium chloride 0.9 % flush 10 mL  10 mL Intravenous PRN Jose Angel Galarza MD       • sodium chloride 0.9 % infusion  50 mL/hr Intravenous Continuous Martinez Cerda MD               Results Review:  I have reviewed the labs, radiology results, and diagnostic studies.    Laboratory Data:     Results from last 7 days  Lab Units 12/14/17  0716 12/13/17  0840   SODIUM mmol/L 136* 139   POTASSIUM mmol/L 5.2* 4.7   CHLORIDE mmol/L 99 103   CO2 mmol/L 23.0 26.0   BUN mg/dL 41* 31*   CREATININE mg/dL 1.36* 1.09   GLUCOSE mg/dL 159* 82   CALCIUM mg/dL 10.1 9.4   BILIRUBIN mg/dL  --  0.5   ALK PHOS U/L  --  56   ALT (SGPT) U/L  --  39   AST (SGOT) U/L  --  27   ANION GAP mmol/L 14.0 10.0     Estimated Creatinine  Clearance: 43.2 mL/min (by C-G formula based on Cr of 1.36).            Results from last 7 days  Lab Units 12/14/17  0602 12/13/17  0840   WBC 10*3/mm3 11.41* 10.89*   HEMOGLOBIN g/dL 13.0* 12.5*   HEMATOCRIT % 39.8 39.3   PLATELETS 10*3/mm3 225 211       Results from last 7 days  Lab Units 12/13/17  0840   INR  0.92       Results from last 7 days  Lab Units 12/14/17  0716   CRP mg/dL <0.50       Culture Data:   No results found for: BLOODCX  No results found for: URINECX  No results found for: RESPCX  No results found for: WOUNDCX  No results found for: STOOLCX  No components found for: BODYFLD      Radiology Data:   Imaging Results (last 24 hours)     Procedure Component Value Units Date/Time    CT Angiogram Chest With Contrast [[id number]] Collected:  12/13/17 1355     Updated:  12/13/17 1428    Narrative:         EXAM:  Computed Tomography with CTA         REGION:  Chest       INDICATION:   Dyspnea, elevated d-dimer, chest pain    - rule out pulmonary embolism       CLINICAL HISTORY:  CORRELATIVE IMAGING:  None                         TECHNIQUE:     - PE / vascular protocol     - reconstructions:  axial, coronal, sagittal, obliques     - computer-generated 3D reconstructions (MIPS) were performed.     - contrast:  intravenous Isovue 370, 78 mL                                 This exam was performed according to the departmental  dose-optimization program which includes automated exposure  control, adjustment of the mA and/or kV according to patient size  and/or use of iterative reconstruction technique.         COMMENTS:    - Pulmonary arterial system:      - Main pulmonary artery trunk:  negative      - Left, right main pulmonary arteries: negative      - Lobar arteries: negative       - Segmental arteries: negative      - Systemic vascularity (as visualized):        - Aorta:  grossly negative / normal caliber / no dissection        - roots of great vessels:  grossly negative / normal  caliber        - SVC /  IVC:  grossly negative / normal caliber     - Misc (limited visualization):      - pulmonary parenchyma:  negative      - pleura:  negative      - mediastinal / danyelle:  negative      - neck, inferior:  grossly wnl      - subdiaphragmatic structures:  grossly negative (limited  evaluation)       - osseous:      - misc:       .        Impression:       CONCLUSION:          1.  No evidence of pulmonary embolism.            2.  No evidence of pathology associated with the visualized  aorta.                                              Electronically signed by:  GOOD Joyce MD  12/13/2017 2:27  PM CST Workstation: [id number]          I have reviewed the patient current medications.     Assessment/Plan     Active Hospital Problems (** Indicates Principal Problem)    Diagnosis Date Noted   • Chest pain [R07.9] 09/25/2017      Resolved Hospital Problems    Diagnosis Date Noted Date Resolved   No resolved problems to display.     1. Acute on chronic diastolic dysfunction with normal EF: Pt is currently on lasix. He is not on BB due to his bradycardia.   2. Bradyarrhythmia: Pt had long discussion with Dr. Curtis regarding the pacemaker and anticoagulation. Pt declined any further cardiac evaluation. Currently he is not on any AV node blocker. Pt is currently in atrial flutter with HR of 100, no anticoagulation recommended at this point.   3. Hyperkalemia: will give Kayexalate, recheck potassium.   4. COPD exacerbation with pneumonia: noted on the first xray, currently on levaquin and steroids with bronchodilators. Pt is doing well.   5. HTN: lisinopril   6. JILL: with Cr elevation to 1.36, will lower the lasix to 20 mg BID.         Discharge Planning: I expect patient to be discharged to home in 1-2 days.      DVT Prophylaxis: SCD/TEDs             This document has been electronically signed by Felipe Chan MD on December 14, 2017 1:41 PM           Electronically signed by Felipe Chan MD at 12/14/2017  1:41 PM       Mana Curtis MD at 12/15/2017 12:09 PM  Version 1 of 1          LOS: 0 days   Patient Care Team:  Paolo Rey MD as PCP - General    Chief Complaint:  Sinus bradycardia with heart rate 38 and history of chronic chest pain syndrome chest pain, COPD on home O2.  Monitor atrial tachycardia with a heart rate 100 bpm  Left Ventricle  Normal left ventricular cavity size noted. All left ventricular wall segments contract normally. Left ventricular wall thickness is consistent with mild concentric hypertrophy.     Review of Systems:   ROS  Constitutional: Positive for fatigue. Negative for chills and fever.   HENT: Negative for ear pain and rhinorrhea.    Eyes: Negative for discharge and itching.   Respiratory: Positive for shortness of breath. Negative for chest tightness and wheezing.    Cardiovascular: Negative for chest pain, palpitations and leg swelling.   Gastrointestinal: Negative for abdominal pain, constipation, diarrhea, nausea and vomiting.   Genitourinary: Negative for dysuria and urgency.   Musculoskeletal: Negative for arthralgias and joint swelling.   Neurological: Negative for dizziness, weakness and light-headedness.   Psychiatric/Behavioral: Negative for agitation and behavioral problems  Objective     Current Facility-Administered Medications   Medication Dose Route Frequency Provider Last Rate Last Dose   • acetaminophen (TYLENOL) tablet 650 mg  650 mg Oral Q4H PRN Daylin G Levill, APRN       • albuterol (PROVENTIL) nebulizer solution 0.083% 2.5 mg/3mL  2.5 mg Nebulization Q4H PRN Daylin G Levill, APRN   2.5 mg at 12/15/17 0706   • aspirin chewable tablet 81 mg  81 mg Oral Daily Daylin G Levill, APRN   81 mg at 12/15/17 0817   • clopidogrel (PLAVIX) tablet 75 mg  75 mg Oral Daily Daylin G Levill, APRN   75 mg at 12/15/17 0817   • dextrose (D50W) solution 25 g  25 g Intravenous Q15 Min PRN Daylin G Levill, APRN       • dextrose (GLUTOSE) oral gel 15 g  15 g Oral Q15 Min PRN Daylin G Levill, APRN        • docusate sodium (COLACE) capsule 100 mg  100 mg Oral Daily Felipe Chan MD   100 mg at 12/15/17 0817   • donepezil (ARICEPT) tablet 10 mg  10 mg Oral Daily Daylin G Levill, APRN   10 mg at 12/15/17 0817   • famotidine (PEPCID) tablet 40 mg  40 mg Oral Daily Daylin G Levill, APRN   40 mg at 12/15/17 0817   • fluticasone (FLONASE) 50 MCG/ACT nasal spray 2 spray  2 spray Nasal Daily Daylin G Levill, APRN   2 spray at 12/15/17 0820   • furosemide (LASIX) injection 20 mg  20 mg Intravenous Q12H Felipe Chan MD   20 mg at 12/15/17 0600   • glipiZIDE (GLUCOTROL) tablet 5 mg  5 mg Oral QAM AC Daylin G Levill, APRN   5 mg at 12/15/17 0817   • glucagon (human recombinant) (GLUCAGEN DIAGNOSTIC) injection 1 mg  1 mg Subcutaneous Q15 Min PRN Daylin G Levill, APRN       • HYDROcodone-acetaminophen (NORCO) 7.5-325 MG per tablet 1 tablet  1 tablet Oral Q6H PRN Parth Birmingham MD   1 tablet at 12/15/17 0844   • insulin aspart (novoLOG) injection 0-9 Units  0-9 Units Subcutaneous 4x Daily AC & at Bedtime Daylin G Levill, APRN   2 Units at 12/14/17 1743   • ipratropium-albuterol (DUO-NEB) nebulizer solution 3 mL  3 mL Nebulization Q8H - RT Daylin G Levill, APRN   3 mL at 12/14/17 2350   • isosorbide mononitrate (IMDUR) 24 hr tablet 30 mg  30 mg Oral Q24H Mana Curtis MD   30 mg at 12/15/17 0817   • levoFLOXacin (LEVAQUIN) 500 mg/100 mL D5W (premix) (LEVAQUIN) 500 mg  500 mg Intravenous Q24H Daylin G Levill, APRN   Stopped at 12/14/17 1745   • lisinopril (PRINIVIL,ZESTRIL) tablet 5 mg  5 mg Oral Q24H Daylin G Levill, APRN   5 mg at 12/15/17 0817   • magnesium oxide (MAGOX) tablet 400 mg  400 mg Oral Daily Daylin G Levill, APRN   400 mg at 12/15/17 0817   • methylPREDNISolone sodium succinate (SOLU-Medrol) injection 60 mg  60 mg Intravenous Q6H Daylin G Levill, APRN   60 mg at 12/15/17 1109   • nitroglycerin (NITROSTAT) SL tablet 0.4 mg  0.4 mg Sublingual Q5 Min PRN Daylin G Levill, APRN   0.4 mg at 12/15/17 0828   • ondansetron (ZOFRAN)  "injection 4 mg  4 mg Intravenous Q6H PRN Daylin G Levill, APRN       • ranolazine (RANEXA) 12 hr tablet 500 mg  500 mg Oral Q12H Mana Curtis MD   500 mg at 12/15/17 0817   • sodium chloride 0.9 % flush 1-10 mL  1-10 mL Intravenous PRN Daylin G Levill, APRN       • sodium chloride 0.9 % flush 10 mL  10 mL Intravenous PRN Jose Angel Galarza MD       • sodium chloride 0.9 % infusion  50 mL/hr Intravenous Continuous Martinez Cerda MD 50 mL/hr at 12/15/17 0821 50 mL/hr at 12/15/17 0821       Vital Sign Min/Max for last 24 hours  Temp  Min: 96.8 °F (36 °C)  Max: 97.8 °F (36.6 °C)   BP  Min: 104/66  Max: 152/73   Pulse  Min: 57  Max: 109   Resp  Min: 16  Max: 20   SpO2  Min: 96 %  Max: 99 %   Flow (L/min)  Min: 2  Max: 2   No Data Recorded     Flowsheet Rows         First Filed Value    Admission Height  170.2 cm (67\") Documented at 12/13/2017 0815    Admission Weight  69.4 kg (153 lb) Documented at 12/13/2017 0815            Intake/Output Summary (Last 24 hours) at 12/15/17 1211  Last data filed at 12/15/17 0835   Gross per 24 hour   Intake             1046 ml   Output             1025 ml   Net               21 ml       Physical Exam:     General Appearance:    Alert, cooperative, in no acute distress   Lungs:     Clear to auscultation,respirations regular, even and                  Unlabored. ON O2    Heart:    Regular rhythm and normal rate, normal S1 and S2, no            murmur, no gallop, no rub, no click   Chest Wall:    No abnormalities observed   Abdomen:     Normal bowel sounds, no masses, no organomegaly, soft        non-tender, non-distended, no guarding, no rebound                tenderness   Extremities:   Moves all extremities well, no edema, no cyanosis, no             redness   Pulses:   Pulses palpable and equal bilaterally   Skin:   No bleeding, bruising or rash        Results Review:   I reviewed the patient's new clinical results.  Lab Results   Component Value Date    WBC 17.94 (H) 12/15/2017    " HGB 12.6 (L) 12/15/2017    HCT 39.5 12/15/2017    MCV 94.5 12/15/2017     12/15/2017     Lab Results   Component Value Date    GLUCOSE 192 (H) 12/15/2017    BUN 48 (H) 12/15/2017    CREATININE 1.30 12/15/2017    EGFRIFNONA 53 (L) 12/15/2017    BCR 36.9 (H) 12/15/2017    CO2 22.0 12/15/2017    CALCIUM 9.6 12/15/2017    ALBUMIN 4.00 12/13/2017    LABIL2 1.4 12/13/2017    AST 27 12/13/2017    ALT 39 12/13/2017     Lab Results   Component Value Date    TSH 0.85 12/14/2016     Lab Results   Component Value Date    INR 0.92 12/13/2017    INR 1.03 10/09/2017    INR 1.0 01/07/2017    PROTIME 12.3 12/13/2017    PROTIME 13.4 10/09/2017    PROTIME 13.2 01/07/2017     Lab Results   Component Value Date    PTT 31.5 12/13/2017       EKG:     Telemetry:    Ejection Fraction  No results found for: EF    Echo EF Estimated  No results found for: ECHOEFEST      Present on Admission:  • Chest pain    Assessment/Plan            #1 sinus bradycardia #2 atrial flutter #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  The patient have for issue with his of breathing and getting treatment for possible bronchitis/pneumonia.  He is not a candidate for aggressive cardiac management.  His heart rate 100 bpm secondary to atrial tachycardia.  He medicated needed for long-term oral and ablation.  Hasn't patient being treated with the pelvis with history of CAD and aspirin.  Aricept with history of dementia and frusemide with history of diastolic dysfunction.  Lisinopril for hypertension with a decent blood pressure control.  We added Ranexa and Imdur for history of chest discomfort no further recurrence.WILL SEE AS NEEDED  Mana Curtis  MD  12/15/17  12:11 PM      EMR Dragon/Transcription disclaimer:   Much of this encounter note is an electronic transcription/translation of spoken language to printed text. The electronic translation of spoken language may permit erroneous, or at times, nonsensical words or phrases to be inadvertently transcribed; Although I have reviewed the note for such errors, some may still exist.          Electronically signed by Mana Curtis MD at 12/15/2017 12:16 PM      Felipe Chan MD at 12/15/2017  3:05 PM  Version 1 of 1             [facility] Medicine Services  INPATIENT PROGRESS NOTE    Length of Stay: 0  Date of Admission: 12/13/2017  Primary Care Physician: Paolo Rey MD    Subjective     Chief Complaint   Patient presents with   • Chest Pain   • Shortness of Breath     HPI:  84 year old male with presented to [facility] with complaints of shortness of air and chest pain. The patient has known COPD, HFpEF, and paroxysmal atrial fibrillation. Pt has chronic persistent dyspnea and has home oxygen.    12/13/2017: Pt is doing well this morning, breathing better. Echo show normal EF how ever he has diastolic dysfunction. No acute event overnight, currently chest pain free.     12/15/2017: Pt is clinically doing better right now. However his WBC and lactic acid is on the raise. Pt will be started on broader spectrum antibiotics. Will stop levaquin. Pan cultures. CT of the abdomen is negative for any acute finding.     Review of Systems   Constitutional: Positive for fatigue. Negative for chills and fever.   HENT: Negative for ear pain and rhinorrhea.    Eyes: Negative for discharge and itching.   Respiratory: Positive for shortness of breath. Negative for chest tightness and wheezing.    Cardiovascular: Negative for chest pain, palpitations and leg swelling.   Gastrointestinal: Negative for abdominal pain, constipation, diarrhea, nausea and vomiting.   Genitourinary:  Negative for dysuria and urgency.   Musculoskeletal: Negative for arthralgias and joint swelling.   Neurological: Negative for dizziness, weakness and light-headedness.   Psychiatric/Behavioral: Negative for agitation and behavioral problems.        All pertinent negatives and positives are as above. All other systems have been reviewed and are negative unless otherwise stated.     Objective      Vital Sign Min/Max for last 24 hours  Temp  Min: 96.8 °F (36 °C)  Max: 97.8 °F (36.6 °C)   BP  Min: 104/66  Max: 152/73   Pulse  Min: 57  Max: 102   Resp  Min: 16  Max: 20   SpO2  Min: 96 %  Max: 99 %   Flow (L/min)  Min: 2  Max: 2   No Data Recorded         Physical Exam   Constitutional: He is oriented to person, place, and time. He appears well-developed and well-nourished.   HENT:   Head: Normocephalic and atraumatic.   Eyes: EOM are normal. Pupils are equal, round, and reactive to light.   Neck: Normal range of motion.   Cardiovascular: Normal rate and regular rhythm.    Pulmonary/Chest: Effort normal. He has rales.   On 2L oxygen.    Abdominal: Soft. Bowel sounds are normal.   Musculoskeletal: Normal range of motion.   Neurological: He is alert and oriented to person, place, and time.   Skin: Skin is warm.   Psychiatric: He has a normal mood and affect. His behavior is normal.   Vitals reviewed.      Current Facility-Administered Medications   Medication Dose Route Frequency Provider Last Rate Last Dose   • acetaminophen (TYLENOL) tablet 650 mg  650 mg Oral Q4H PRN Daylin G Levill, APRN       • albuterol (PROVENTIL) nebulizer solution 0.083% 2.5 mg/3mL  2.5 mg Nebulization Q4H PRN Daylin G Levill, APRN   2.5 mg at 12/15/17 0706   • aspirin chewable tablet 81 mg  81 mg Oral Daily Daylin G Levill, APRN   81 mg at 12/15/17 0817   • aztreonam (AZACTAM) in SWFI 1 gram/10ml IV PUSH syringe  1 g Intravenous Q8H Felipe Chan MD   1 g at 12/15/17 1611   • clopidogrel (PLAVIX) tablet 75 mg  75 mg Oral Daily Daylin G Levill, APRN    75 mg at 12/15/17 0817   • dextrose (D50W) solution 25 g  25 g Intravenous Q15 Min PRN Daylin G Levill, APRN       • dextrose (GLUTOSE) oral gel 15 g  15 g Oral Q15 Min PRN Daylin G Levill, APRN       • docusate sodium (COLACE) capsule 100 mg  100 mg Oral Daily Felipe Chan MD   100 mg at 12/15/17 0817   • donepezil (ARICEPT) tablet 10 mg  10 mg Oral Daily Daylin G Levill, APRN   10 mg at 12/15/17 0817   • famotidine (PEPCID) tablet 40 mg  40 mg Oral Daily Daylin G Levill, APRN   40 mg at 12/15/17 0817   • fluticasone (FLONASE) 50 MCG/ACT nasal spray 2 spray  2 spray Nasal Daily Daylin G Levill, APRN   2 spray at 12/15/17 0820   • glipiZIDE (GLUCOTROL) tablet 5 mg  5 mg Oral QAM AC Daylin G Levill, APRN   5 mg at 12/15/17 0817   • glucagon (human recombinant) (GLUCAGEN DIAGNOSTIC) injection 1 mg  1 mg Subcutaneous Q15 Min PRN Daylin G Levill, APRN       • HYDROcodone-acetaminophen (NORCO) 7.5-325 MG per tablet 1 tablet  1 tablet Oral Q6H PRN Parth Birmingham MD   1 tablet at 12/15/17 0844   • insulin aspart (novoLOG) injection 0-9 Units  0-9 Units Subcutaneous 4x Daily AC & at Bedtime Daylin G Levill, APRN   2 Units at 12/14/17 1743   • ipratropium-albuterol (DUO-NEB) nebulizer solution 3 mL  3 mL Nebulization Q8H - RT Daylin G Levill, APRN   3 mL at 12/15/17 1442   • isosorbide mononitrate (IMDUR) 24 hr tablet 30 mg  30 mg Oral Q24H Mana Curtis MD   30 mg at 12/15/17 0817   • lisinopril (PRINIVIL,ZESTRIL) tablet 5 mg  5 mg Oral Q24H Daylin G Levill, APRN   5 mg at 12/15/17 0817   • magnesium oxide (MAGOX) tablet 400 mg  400 mg Oral Daily Dalyin G Levill, APRN   400 mg at 12/15/17 0817   • nitroglycerin (NITROSTAT) SL tablet 0.4 mg  0.4 mg Sublingual Q5 Min PRN Daylin G Levill, APRN   0.4 mg at 12/15/17 0828   • ondansetron (ZOFRAN) injection 4 mg  4 mg Intravenous Q6H PRN Daylin G Levill, APRN       • Pharmacy to dose vancomycin   Does not apply Continuous PRN Felipe Chan MD       • [START ON 12/16/2017] predniSONE  (DELTASONE) tablet 40 mg  40 mg Oral Daily With Breakfast Felipe Chan MD       • ranolazine (RANEXA) 12 hr tablet 500 mg  500 mg Oral Q12H Mana Curtis MD   500 mg at 12/15/17 0817   • sodium chloride 0.9 % flush 1-10 mL  1-10 mL Intravenous PRN EVE Gomez       • sodium chloride 0.9 % flush 10 mL  10 mL Intravenous PRN Jose Angel Galarza MD       • sodium chloride 0.9 % infusion  50 mL/hr Intravenous Continuous Martinez Cerda MD 50 mL/hr at 12/15/17 0821 50 mL/hr at 12/15/17 0821   • vancomycin (VANCOCIN) 1,250 mg in sodium chloride 0.9 % 250 mL IVPB  1,250 mg Intravenous Q24H Felipe Chan MD   1,250 mg at 12/15/17 1611           Results Review:  I have reviewed the labs, radiology results, and diagnostic studies.    Laboratory Data:     Results from last 7 days  Lab Units 12/15/17  0812 12/14/17  1357 12/14/17  0716 12/13/17  0840   SODIUM mmol/L 140  --  136* 139   POTASSIUM mmol/L 4.2 4.7 5.2* 4.7   CHLORIDE mmol/L 102  --  99 103   CO2 mmol/L 22.0  --  23.0 26.0   BUN mg/dL 48*  --  41* 31*   CREATININE mg/dL 1.30  --  1.36* 1.09   GLUCOSE mg/dL 192*  --  159* 82   CALCIUM mg/dL 9.6  --  10.1 9.4   BILIRUBIN mg/dL  --   --   --  0.5   ALK PHOS U/L  --   --   --  56   ALT (SGPT) U/L  --   --   --  39   AST (SGOT) U/L  --   --   --  27   ANION GAP mmol/L 16.0*  --  14.0 10.0     Estimated Creatinine Clearance: 45.2 mL/min (by C-G formula based on Cr of 1.3).            Results from last 7 days  Lab Units 12/15/17  0653 12/14/17  0602 12/13/17  0840   WBC 10*3/mm3 17.94* 11.41* 10.89*   HEMOGLOBIN g/dL 12.6* 13.0* 12.5*   HEMATOCRIT % 39.5 39.8 39.3   PLATELETS 10*3/mm3 245 225 211       Results from last 7 days  Lab Units 12/13/17  0840   INR  0.92       Results from last 7 days  Lab Units 12/14/17  0716   CRP mg/dL <0.50       Culture Data:   No results found for: BLOODCX  Urine Culture   Date Value Ref Range Status   12/14/2017 Mixed [place] Isolated  Final     No results found for: RESPCX  No  results found for: WOUNDCX  No results found for: STOOLCX  No components found for: BODYFLD      Radiology Data:   Imaging Results (last 24 hours)     Procedure Component Value Units Date/Time    CT Abdomen Pelvis Without Contrast [[id number]] Collected:  12/15/17 0913     Updated:  12/15/17 1055    Narrative:       PROCEDURE: CT abdomen and pelvis without contrast    COMPARISON: CT abdomen pelvis without contrast January 5, 2017    HISTORY: Chest pain, unspecified. Abnormal levels of other serum  enzymes. Abnormal findings on diagnostic imaging of other  specified body structures. Unremarkable abdominal pain    TECHNIQUE: Multiple contiguous noncontrast axial images are  obtained of the abdomen and pelvis. Coronal and sagittal  multiplanar reformatted images are also reconstructed and  reviewed.     This exam was performed according to our departmental  dose-optimization program, which includes automated exposure  control, adjustment of the mA and/or kV according to patient size  and/or use of iterative reconstruction technique.    FINDINGS:   Minimal chronic changes in lung bases which are otherwise clear.  Heart size is normal.    Several small low-attenuation lesions in liver which are  indeterminate are stable. Liver is otherwise unremarkable for  noncontrast exam. The gallbladder is collapsed. The spleen,  pancreas, adrenal glands and right kidney are unremarkable for  noncontrast exam. Large left renal cyst, stable. No renal or  ureteral stone. No hydronephrosis.    No lymphadenopathy by CT size criteria.    Bladder is unremarkable. Bowel is unremarkable. No bowel  obstruction. No free fluid. Prior ventral hernia repair with  mesh. There is probably a tiny hernia immediately below the mesh  containing a single sidewall of small bowel.    No acute osseous abnormality. 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      Impression:       1. No acute findings identified in the abdomen or  pelvis.  2. Prior ventral hernia repair with mesh. There is a small  midline abdominal hernia just below the mesh containing only a  single sidewall small bowel.    Electronically signed by:  Landon Gallo MD  12/15/2017  10:54 AM CST Workstation: [email or web address]          I have reviewed the patient current medications.     Assessment/Plan     Active Hospital Problems (** Indicates Principal Problem)    Diagnosis Date Noted   • Chest pain [R07.9] 09/25/2017      Resolved Hospital Problems    Diagnosis Date Noted Date Resolved   No resolved problems to display.     1. Acute on chronic diastolic dysfunction with normal EF: Pt is currently on lasix. He is not on BB due to his bradycardia.   2. Bradyarrhythmia: Pt had long discussion with Dr. Curtis regarding the pacemaker and anticoagulation. Pt declined any further cardiac evaluation. Currently he is not on any AV node blocker. Pt is currently in atrial flutter with HR of 100, no anticoagulation recommended at this point.   3. Hyperkalemia: will give Kayexalate, recheck potassium. Resolved   4. COPD exacerbation with pneumonia: noted on the first xray, currently started on broader spectrum antibiotics and transition to po steroids with bronchodilators. Pt is doing well.   5. HTN: lisinopril   6. JILL: with Cr elevation to 1.30, hold lasix, on IVF.   7. Sepsis from pneumonia: pan cultures, results pending, started on broad spectrum antibiotics, follow lactic acid and cultures.          Discharge Planning: I expect patient to be discharged to home in 1-2 days.      DVT Prophylaxis: SCD/TEDs             This document has been electronically signed by Felipe Chan MD on December 15, 2017 4:22 PM           Electronically signed by Felipe Chan MD at 12/15/2017  4:22 PM        Medical Student Notes (last 7 days) (Notes from 12/9/2017 12:07 PM through 12/16/2017 12:07 PM)     No notes of this type exist for this encounter.           Consult Notes (last 7 days) (Notes  from 12/09/17 through 12/16/17)      EVE Angelo at 12/14/2017  9:21 AM  Version 1 of 1     Consult Orders:    1. Inpatient Consult to Heart Failure Navigator [[id number]] ordered by EVE Gomez at 12/13/17 1429                Indication: HFpEF  Attached Cardiologist: Dr Garcia     Current admission indication: Chest Pain      After reviewing EHR, along with interview with Hasmukh Ham it is felt that this patient is not appropriate for the  HF Program due to negative proBNP and negative clinical evidence of hyervolemia.       I would recommend continuation of attached cardiology evaluation, recommendations and management.     I would like to thank EVE Birch  for this HF consultation.           This document has been electronically signed by EVE Angelo on December 14, 2017 9:21 AM             Electronically signed by EVE Angelo at 12/14/2017  9:23 AM      Mana Curtis MD at 12/14/2017 10:33 AM  Version 1 of 1     Consult Orders:    1. Inpatient Consult to Cardiology [[id number]] ordered by EVE Gomez at 12/13/17 1429                 Cardiology at [facility]  Cardiovascular Consultation Note      Hasmukh Ham  301/1  [id number]  6/1/1933    DATE OF ADMISSION: 12/13/2017  DATE OF CONSULTATION:  12/14/2017    Paolo Rey MD  Treatment Team:   Attending Provider: Parth Birmingham MD  Nurse Practitioner: EVE Gomez  Consulting Physician: Caitlyn Garcia MD  Consulting Physician: Felipe Chan MD  Admitting Provider: Parth Birmingham MD    Chief Complaint   Patient presents with   • Chest Pain   • Shortness of Breath       Chief complaint/ Reason for Consultation:Sinus bradycardia with heart rate 38 and history of chronic chest pain syndrome chest pain, COPD on home O2    History of Present Illness: 84 years old patient with a background history of hypertension, hypertensive disease, pulmonary  hypertension evaluated by Dr. Maciel, CAD status post PTA stent of RCA 2004 then in-stent restenosis managed with second stent.  Patient had recurrent chest pain and late part 0f 2004 repeat cardiac catheterization performed which revealed LAD stenosis and  PTCA stent.  history of atrial tachycardia with associated atrial fibrillation status post electrical cardioversions and early part of 2017.  Patient had recurrent hospitalization for COPD exacerbation secondary to pneumonia.  Had  documented bradyarrhythmia.  AV tom and antiarrhythmic medications discontinued.  Hospice discussed previously however the family doesn't like the idea.  He presented for evaluations of increasing shortness of breath with associated mild chest discomfort.  The patient persistent abnormal troponin dated back in December 2016.  His lactic acid is elevated at the time of evaluation the patient is in atrial flutter with a heart rate 104 bpm.  CT scan of the chest performed.  No evidence of pulmonary embolism or other pathology reported.  Information obtained from patient , family and current /previous medical records patient recently evaluated with Dr. Ashkan Leach.    Past Medical History:   Diagnosis Date   • Bilateral carotid artery stenosis    • CHF (congestive heart failure)    • Chronic obstructive pulmonary disease (COPD)    • Coronary arteriosclerosis    • Degenerative joint disease involving multiple joints    • Dementia    • Diabetes mellitus    • Hyperlipidemia    • Hypertension    • Myocardial infarction        Past Surgical History:   Procedure Laterality Date   • CARDIAC CATHETERIZATION N/A 6/6/2017    Procedure: Right Heart Cath;  Surgeon: Jeff Maciel MD PhD;  Location: [facility] INVASIVE LOC[facility];  Service:    • CORONARY STENT PLACEMENT     • HERNIA REPAIR     • LUNG BIOPSY     • NECK SURGERY     • THORACOTOMY Left 1977   • VENTRAL HERNIA REPAIR         Allergies   Allergen Reactions   • Atorvastatin  Anaphylaxis   • Penicillins Rash   • Pravastatin      Myalgia   • Azithromycin Rash   • Biaxin [Clarithromycin] Rash       No current facility-administered medications on file prior to encounter.      Current Outpatient Prescriptions on File Prior to Encounter   Medication Sig Dispense Refill   • albuterol (PROVENTIL) (2.5 MG/3ML) 0.083% nebulizer solution Take 2.5 mg by nebulization Every 4 (Four) Hours As Needed for Wheezing. 125 vial 11   • aspirin 81 MG chewable tablet Chew 81 mg Daily.     • clopidogrel (PLAVIX) 75 MG tablet Take 75 mg by mouth Daily.     • fluticasone (FLONASE) 50 MCG/ACT nasal spray 2 sprays into each nostril Daily.     • Fluticasone Furoate (ARNUITY ELLIPTA) 200 MCG/ACT aerosol powder  Inhale.     • furosemide (LASIX) 20 MG tablet Take 1 tablet by mouth Daily. 90 tablet 3   • glimepiride (AMARYL) 2 MG tablet Take 2 mg by mouth Every Morning Before Breakfast.     • HYDROcodone-acetaminophen (NORCO) 7.5-325 MG per tablet Take 1 tablet by mouth Every 8 (Eight) Hours.     • ipratropium-albuterol (DUO-NEB) 0.5-2.5 mg/mL nebulizer Take 3 mL by nebulization 4 (Four) Times a Day. 120 vial 1   • isosorbide dinitrate (ISORDIL) 10 MG tablet Take 1 tablet by mouth 3 (Three) Times a Day. 90 tablet 3   • lisinopril (PRINIVIL,ZESTRIL) 10 MG tablet Take 0.5 tablets by mouth Daily. 30 tablet 6   • magnesium oxide (MAGOX) 400 (241.3 MG) MG tablet tablet Take 400 mg by mouth Daily.     • nitroglycerin (NITROSTAT) 0.4 MG SL tablet place 1 tablet under the tongue if needed every 5 minutes for hair...  (REFER TO PRESCRIPTION NOTES).  0   • O2 (OXYGEN) Inhale 2 L/min Every Night. W/ CPAP     • Red Yeast Rice 600 MG tablet Take 600 mg by mouth 2 (Two) Times a Day.     • Umeclidinium-Vilanterol 62.5-25 MCG/INH aerosol powder  Inhale 1 puff Daily. 1 each 11       Social History     Social History   • Marital status:      Spouse name: N/A   • Number of children: N/A   • Years of education: N/A     Occupational  "History   • Not on file.     Social History Main Topics   • Smoking status: Former Smoker   • Smokeless tobacco: Never Used   • Alcohol use No   • Drug use: No   • Sexual activity: Not Currently      Comment:      Other Topics Concern   • Not on file     Social History Narrative           REVIEW OF SYSTEMS:   Review of Systems   Constitution: Positive for weakness and malaise/fatigue. Negative for chills and decreased appetite.   HENT: Negative for congestion and hearing loss.    Eyes: Negative for blurred vision and double vision.   Cardiovascular: Positive for chest pain. Negative for paroxysmal nocturnal dyspnea and syncope.   Respiratory: Positive for cough and shortness of breath.    Endocrine: Negative for cold intolerance and heat intolerance.   Skin: Negative for color change and flushing.   Musculoskeletal: Positive for joint pain.   Gastrointestinal: Negative for abdominal pain and change in bowel habit.   Genitourinary: Positive for dysuria. Negative for bladder incontinence.   Neurological: Negative for dizziness.   Psychiatric/Behavioral: Negative for altered mental status.         Objective:     Vitals:    12/14/17 0307 12/14/17 0640 12/14/17 0703 12/14/17 0841   BP: 104/60  108/62    BP Location: Left arm  Left arm    Patient Position: Lying      Pulse: 85 84 97 104   Resp: 18 18 18    Temp: 97.5 °F (36.4 °C)  96.9 °F (36.1 °C)    TempSrc: Oral  Oral    SpO2: 96% 97% 96%    Weight:       Height:         Body mass index is 26.07 kg/(m^2).  Flowsheet Rows         First Filed Value    Admission Height  170.2 cm (67\") Documented at 12/13/2017 0815    Admission Weight  69.4 kg (153 lb) Documented at 12/13/2017 0815          Intake/Output Summary (Last 24 hours) at 12/14/17 1035  Last data filed at 12/14/17 0900   Gross per 24 hour   Intake              240 ml   Output             1000 ml   Net             -760 ml         Physical Exam   Physical Exam  Physical Exam   Constitutional: He is oriented " to person, place, and time. He appears well-developed and well-nourished.   HENT:   Head: Normocephalic and atraumatic.   Eyes: EOM are normal. Pupils are equal, round, and reactive to light.   Neck: Normal range of motion. Neck supple.   Cardiovascular: Irregular rate and rhythm mild tachycardia.    Pulmonary/Chest: Showed decreased bilateral breath sound no definitive rales.   Abdominal: Soft. Bowel sounds are normal.   Musculoskeletal: Normal range of motion.   Neurological: He is alert and oriented to person, place, and time.   Skin: Skin is warm and dry.   Psychiatric: He has a normal mood and affect.      Results Reviewed:  I have personally reviewed current lab, radiology, and data and agree with results  Radiology Review    CT Angiogram Chest With Contrast   Final Result   CONCLUSION:            1.  No evidence of pulmonary embolism.             2.  No evidence of pathology associated with the visualized   aorta.                                                  Electronically signed by:  GOOD Joyce MD  12/13/2017 2:27   PM CST Workstation: [employer]      CT Chest Without Contrast   Final Result   CONCLUSION:            1. No pulmonary mass or airspace disease visualized.   2. Chest radiographic findings likely correspond to   pericardial/pleural fat.                               Electronically signed by:  GOOD Joyce MD  12/13/2017 12:07   PM CST Workstation: [employer]      XR Chest 2 View   Final Result   CONCLUSION: Persistent density in the left midlung field and left   lung base. Recommend CT chest for further evaluation.      Electronically signed by:  Moisés Lopez MD  12/13/2017 9:18   AM CST Workstation: [employer]          Lab Results:  Lab Results (last 24 hours)     Procedure Component Value Units Date/Time    D-dimer, Quantitative [[id number]]  (Abnormal) Collected:  12/13/17 0840    Specimen:  Blood Updated:  12/13/17 1137     D-Dimer, Quantitative 629 (H) ng/mL (FEU)      Narrative:       Dimer values <500 ng/ml FEU are FDA approved as aid in diagnosis of deep venous thrombosis and pulmonary embolism.  This test should not be used in an exclusion strategy with pretest probability alone.    A recent guideline regarding diagnosis for pulmonary thomboembolism recommends an adjusted exclusion criterion of age x 10 ng/ml FEU for patients >50 years of age (Jenny Intern Med 2015; 163: 701-711).    POC Glucose Once [[id number]]  (Normal) Collected:  12/13/17 1626    Specimen:  Blood Updated:  12/13/17 1702     Glucose 106 mg/dL       RN NotifiedMeter: [id number]: [id number] SHANDA PAMELLA       Lactic Acid, Plasma [[id number]]  (Normal) Collected:  12/13/17 1643    Specimen:  Blood Updated:  12/13/17 1709     Lactate 1.4 mmol/L     Troponin [[id number]]  (Abnormal) Collected:  12/13/17 1643    Specimen:  Blood Updated:  12/13/17 1724     Troponin I 0.047 (H) ng/mL     Extra Tubes [[id number]] Collected:  12/13/17 1643    Specimen:  Blood from Blood, Venous Line Updated:  12/13/17 1746    Narrative:       The following orders were created for panel order Extra Tubes.  Procedure                               Abnormality         Status                     ---------                               -----------         ------                     Light Blue Top[[id number]]                                   Final result                 Please view results for these tests on the individual orders.    Light Blue Top [[id number]] Collected:  12/13/17 1643    Specimen:  Blood Updated:  12/13/17 1746     Extra Tube hold for add-on      Auto resulted       Troponin [[id number]]  (Abnormal) Collected:  12/13/17 1922    Specimen:  Blood Updated:  12/13/17 2055     Troponin I 0.044 (H) ng/mL     POC Glucose Once [[id number]]  (Normal) Collected:  12/13/17 1929    Specimen:  Blood Updated:  12/13/17 2136     Glucose 114 mg/dL       RN NotifiedMeter: [id number]: [id number] CHLOE WARREN        Lactic Acid, Plasma [[id number]]  (Abnormal) Collected:  12/13/17 2357    Specimen:  Blood Updated:  12/14/17 0047     Lactate 2.5 (C) mmol/L     Lactic Acid, Reflex Timer [[id number]] Collected:  12/13/17 2357    Specimen:  Blood Updated:  12/14/17 0401     Extra Tube Hold for add-ons.      Auto resulted.       CBC Auto Differential [[id number]]  (Abnormal) Collected:  12/14/17 0602    Specimen:  Blood Updated:  12/14/17 0632     WBC 11.41 (H) 10*3/mm3      RBC 4.38 10*6/mm3      Hemoglobin 13.0 (L) g/dL      Hematocrit 39.8 %      MCV 90.9 fL      MCH 29.7 pg      MCHC 32.7 g/dL      RDW 15.7 (H) %      RDW-SD 52.2 (H) fl      MPV 11.1 fL      Platelets 225 10*3/mm3      Neutrophil % 90.4 (H) %      Lymphocyte % 7.3 (L) %      Monocyte % 1.7 %      Eosinophil % 0.0 %      Basophil % 0.1 %      Immature Grans % 0.5 %      Neutrophils, Absolute 10.32 (H) 10*3/mm3      Lymphocytes, Absolute 0.83 10*3/mm3      Monocytes, Absolute 0.19 10*3/mm3      Eosinophils, Absolute 0.00 10*3/mm3      Basophils, Absolute 0.01 10*3/mm3      Immature Grans, Absolute 0.06 (H) 10*3/mm3     CBC & Differential [[id number]] Collected:  12/14/17 0602    Specimen:  Blood Updated:  12/14/17 0633    Narrative:       The following orders were created for panel order CBC & Differential.  Procedure                               Abnormality         Status                     ---------                               -----------         ------                     Scan Slide[[id number]]                                                                  CBC Auto Differential[[id number]]        Abnormal            Final result                 Please view results for these tests on the individual orders.    Lactic Acid, Plasma [[id number]]  (Abnormal) Collected:  12/14/17 0602    Specimen:  Blood Updated:  12/14/17 0646     Lactate 2.4 (C) mmol/L     POC Glucose Once [[id number]]  (Abnormal) Collected:  12/14/17 0610    Specimen:  Blood Updated:  12/14/17 0711      Glucose 170 (H) mg/dL       RN NotifiedMeter: [id number]: [id number] Murdock BRIANA       Basic Metabolic Panel [[id number]]  (Abnormal) Collected:  12/14/17 0716    Specimen:  Blood Updated:  12/14/17 0755     Glucose 159 (H) mg/dL      BUN 41 (H) mg/dL      Creatinine 1.36 (H) mg/dL      Sodium 136 (L) mmol/L      Potassium 5.2 (H) mmol/L      Chloride 99 mmol/L      CO2 23.0 mmol/L      Calcium 10.1 mg/dL      eGFR Non African Amer 50 (L) mL/min/1.73      BUN/Creatinine Ratio 30.1 (H)     Anion Gap 14.0 mmol/L     Narrative:       The MDRD GFR formula is only valid for adults with stable renal function between ages 18 and 70.    Lactic Acid, Plasma [[id number]]  (Abnormal) Collected:  12/14/17 0716    Specimen:  Blood Updated:  12/14/17 0756     Lactate 3.6 (C) mmol/L     POC Glucose Once [[id number]]  (Normal) Collected:  12/14/17 1007    Specimen:  Blood Updated:  12/14/17 1033     Glucose 113 mg/dL       RN NotifiedMeter: [id number]: [id number] KENDRA APRIL I personally viewed and interpreted the patient's EKG/Telemetry data      Assessment/Plan:       #1 sinus bradycardia #2 atrial flutter #3 history of GI bleed secondary to AV malformation #3 chronic chest pain syndrome with history of CAD status post PTCA stent of RCA and LAD #4 COPD on home O2 with chronic baseline shortness breath #5 hypertension hypertensive heart disease      84 years old patient with a background history of CAD status post PTA stent, hypertension with hypertensive heart disease, moderate pulmonary hypertension, COPD with a persistent baseline shortness of breath, mildly abnormal troponin dated back in December 2016 with history of atrial fibrillation/flutter status post electrical cardioversions and documented history of significant bradyarrhythmia requiring discontinuations of AV tom and antiarrhythmic medications.  Patient admitted for evaluation shortness of breath and chest pain and noted   sinus bradycardia at rate of 38 bpm.  Patient now in atrial flutter at a rate of 104 bpm.  Risk stratification and further evaluation discussed with the patient and the family.  Pacemaker implantation  also discussed.  Pros and cons of pacemaker implantations explained to the patient and the family.  They decided not to have any cardiac evaluation or pacemaker implanted.  I will add Imdur and Ranexa given the history of CAD and chest pain.  He is not a candidate for long-term oral intake ablation with history of GI bleed and AV malformations.  Will not consider AV tom blocking drug given the history of bradyarrhythmia and controlled heart rate.  Present patient is being treated with aspirin and Plavix with history of CAD,  Aricept with history of dementia, Lasix with history of congestive heart failure on the basis of diastolic dysfunction compensated.  Isosorbide 10 mg 3 times a day we increase the dose and add Ranexa.  Hypertension being managed with lisinopril 5 mg.        Thank you for allowing me to participate in the care of Hasmukh Ham. Feel free to contact me directly with any further questions or concerns.    Mana Curtis MD  12/14/17  10:35 AM.      EMR Dragon/Transcription disclaimer:   Much of this encounter note is an electronic transcription/translation of spoken language to printed text. The electronic translation of spoken language may permit erroneous, or at times, nonsensical words or phrases to be inadvertently transcribed; Although I have reviewed the note for such errors, some may still exist.          Electronically signed by Mana Curtis MD at 12/14/2017 10:52 AM

## 2017-12-17 LAB
ANION GAP SERPL CALCULATED.3IONS-SCNC: 9 MMOL/L (ref 5–15)
BASOPHILS # BLD AUTO: 0 10*3/MM3 (ref 0–0.2)
BASOPHILS NFR BLD AUTO: 0 % (ref 0–2)
BUN BLD-MCNC: 24 MG/DL (ref 7–21)
BUN/CREAT SERPL: 22.6 (ref 7–25)
CALCIUM SPEC-SCNC: 8.8 MG/DL (ref 8.4–10.2)
CHLORIDE SERPL-SCNC: 108 MMOL/L (ref 95–110)
CO2 SERPL-SCNC: 23 MMOL/L (ref 22–31)
CREAT BLD-MCNC: 1.06 MG/DL (ref 0.7–1.3)
D-LACTATE SERPL-SCNC: 1.8 MMOL/L (ref 0.5–2)
D-LACTATE SERPL-SCNC: 2.1 MMOL/L (ref 0.5–2)
D-LACTATE SERPL-SCNC: 2.3 MMOL/L (ref 0.5–2)
D-LACTATE SERPL-SCNC: 2.7 MMOL/L (ref 0.5–2)
DEPRECATED RDW RBC AUTO: 53.9 FL (ref 35.1–43.9)
EOSINOPHIL # BLD AUTO: 0.01 10*3/MM3 (ref 0–0.7)
EOSINOPHIL NFR BLD AUTO: 0.1 % (ref 0–7)
ERYTHROCYTE [DISTWIDTH] IN BLOOD BY AUTOMATED COUNT: 15.9 % (ref 11.5–14.5)
GFR SERPL CREATININE-BSD FRML MDRD: 67 ML/MIN/1.73 (ref 60–98)
GLUCOSE BLD-MCNC: 127 MG/DL (ref 60–100)
GLUCOSE BLDC GLUCOMTR-MCNC: 120 MG/DL (ref 70–130)
GLUCOSE BLDC GLUCOMTR-MCNC: 140 MG/DL (ref 70–130)
GLUCOSE BLDC GLUCOMTR-MCNC: 161 MG/DL (ref 70–130)
GLUCOSE BLDC GLUCOMTR-MCNC: 94 MG/DL (ref 70–130)
HCT VFR BLD AUTO: 36.2 % (ref 39–49)
HGB BLD-MCNC: 11.4 G/DL (ref 13.7–17.3)
IMM GRANULOCYTES # BLD: 0.1 10*3/MM3 (ref 0–0.02)
IMM GRANULOCYTES NFR BLD: 0.9 % (ref 0–0.5)
LYMPHOCYTES # BLD AUTO: 1.3 10*3/MM3 (ref 0.6–4.2)
LYMPHOCYTES NFR BLD AUTO: 12.2 % (ref 10–50)
MCH RBC QN AUTO: 29.2 PG (ref 26.5–34)
MCHC RBC AUTO-ENTMCNC: 31.5 G/DL (ref 31.5–36.3)
MCV RBC AUTO: 92.6 FL (ref 80–98)
MONOCYTES # BLD AUTO: 0.88 10*3/MM3 (ref 0–0.9)
MONOCYTES NFR BLD AUTO: 8.3 % (ref 0–12)
NEUTROPHILS # BLD AUTO: 8.37 10*3/MM3 (ref 2–8.6)
NEUTROPHILS NFR BLD AUTO: 78.5 % (ref 37–80)
PLATELET # BLD AUTO: 173 10*3/MM3 (ref 150–450)
PMV BLD AUTO: 10.7 FL (ref 8–12)
POTASSIUM BLD-SCNC: 4 MMOL/L (ref 3.5–5.1)
RBC # BLD AUTO: 3.91 10*6/MM3 (ref 4.37–5.74)
SODIUM BLD-SCNC: 140 MMOL/L (ref 137–145)
VANCOMYCIN PEAK SERPL-MCNC: 20.38 MCG/ML (ref 30–40)
WBC NRBC COR # BLD: 10.66 10*3/MM3 (ref 3.2–9.8)

## 2017-12-17 PROCEDURE — 63710000001 PREDNISONE PER 1 MG: Performed by: FAMILY MEDICINE

## 2017-12-17 PROCEDURE — 83605 ASSAY OF LACTIC ACID: CPT | Performed by: NURSE PRACTITIONER

## 2017-12-17 PROCEDURE — 80048 BASIC METABOLIC PNL TOTAL CA: CPT | Performed by: NURSE PRACTITIONER

## 2017-12-17 PROCEDURE — 97110 THERAPEUTIC EXERCISES: CPT

## 2017-12-17 PROCEDURE — 97535 SELF CARE MNGMENT TRAINING: CPT

## 2017-12-17 PROCEDURE — 63710000001 INSULIN ASPART PER 5 UNITS: Performed by: NURSE PRACTITIONER

## 2017-12-17 PROCEDURE — 94799 UNLISTED PULMONARY SVC/PX: CPT

## 2017-12-17 PROCEDURE — 80202 ASSAY OF VANCOMYCIN: CPT | Performed by: FAMILY MEDICINE

## 2017-12-17 PROCEDURE — 82962 GLUCOSE BLOOD TEST: CPT

## 2017-12-17 PROCEDURE — 97116 GAIT TRAINING THERAPY: CPT

## 2017-12-17 PROCEDURE — 94760 N-INVAS EAR/PLS OXIMETRY 1: CPT

## 2017-12-17 PROCEDURE — 85025 COMPLETE CBC W/AUTO DIFF WBC: CPT | Performed by: NURSE PRACTITIONER

## 2017-12-17 RX ADMIN — WATER 1 G: 1 INJECTION INTRAMUSCULAR; INTRAVENOUS; SUBCUTANEOUS at 08:36

## 2017-12-17 RX ADMIN — DOCUSATE SODIUM 100 MG: 100 CAPSULE, LIQUID FILLED ORAL at 08:35

## 2017-12-17 RX ADMIN — DONEPEZIL HYDROCHLORIDE 10 MG: 10 TABLET ORAL at 08:35

## 2017-12-17 RX ADMIN — FAMOTIDINE 40 MG: 40 TABLET ORAL at 08:35

## 2017-12-17 RX ADMIN — PREDNISONE 20 MG: 20 TABLET ORAL at 08:35

## 2017-12-17 RX ADMIN — FLUTICASONE PROPIONATE 2 SPRAY: 50 SPRAY, METERED NASAL at 08:36

## 2017-12-17 RX ADMIN — RANOLAZINE 500 MG: 500 TABLET, FILM COATED, EXTENDED RELEASE ORAL at 08:35

## 2017-12-17 RX ADMIN — INSULIN ASPART 2 UNITS: 100 INJECTION, SOLUTION INTRAVENOUS; SUBCUTANEOUS at 21:45

## 2017-12-17 RX ADMIN — SODIUM CHLORIDE 50 ML/HR: 900 INJECTION, SOLUTION INTRAVENOUS at 14:35

## 2017-12-17 RX ADMIN — IPRATROPIUM BROMIDE AND ALBUTEROL SULFATE 3 ML: 2.5; .5 SOLUTION RESPIRATORY (INHALATION) at 06:36

## 2017-12-17 RX ADMIN — LISINOPRIL 5 MG: 5 TABLET ORAL at 08:34

## 2017-12-17 RX ADMIN — VANCOMYCIN HYDROCHLORIDE 1000 MG: 1 INJECTION, POWDER, LYOPHILIZED, FOR SOLUTION INTRAVENOUS at 17:13

## 2017-12-17 RX ADMIN — RANOLAZINE 500 MG: 500 TABLET, FILM COATED, EXTENDED RELEASE ORAL at 21:44

## 2017-12-17 RX ADMIN — IPRATROPIUM BROMIDE AND ALBUTEROL SULFATE 3 ML: 2.5; .5 SOLUTION RESPIRATORY (INHALATION) at 14:02

## 2017-12-17 RX ADMIN — CLOPIDOGREL BISULFATE 75 MG: 75 TABLET ORAL at 08:35

## 2017-12-17 RX ADMIN — WATER 1 G: 1 INJECTION INTRAMUSCULAR; INTRAVENOUS; SUBCUTANEOUS at 16:03

## 2017-12-17 RX ADMIN — ISOSORBIDE MONONITRATE 30 MG: 30 TABLET, EXTENDED RELEASE ORAL at 08:34

## 2017-12-17 RX ADMIN — GLIPIZIDE 5 MG: 5 TABLET ORAL at 08:34

## 2017-12-17 RX ADMIN — HYDROCODONE BITARTRATE AND ACETAMINOPHEN 1 TABLET: 7.5; 325 TABLET ORAL at 08:36

## 2017-12-17 RX ADMIN — HYDROCODONE BITARTRATE AND ACETAMINOPHEN 1 TABLET: 7.5; 325 TABLET ORAL at 15:09

## 2017-12-17 RX ADMIN — MAGNESIUM OXIDE TAB 400 MG (241.3 MG ELEMENTAL MG) 400 MG: 400 (241.3 MG) TAB at 08:34

## 2017-12-17 RX ADMIN — IPRATROPIUM BROMIDE AND ALBUTEROL SULFATE 3 ML: 2.5; .5 SOLUTION RESPIRATORY (INHALATION) at 22:43

## 2017-12-17 RX ADMIN — ASPIRIN 81 MG 81 MG: 81 TABLET ORAL at 08:34

## 2017-12-17 NOTE — THERAPY TREATMENT NOTE
Acute Care - Occupational Therapy Treatment Note  [facility]     Patient Name: Hasmukh Ham  : 1933  MRN: [id number]  Today's Date: 2017  Onset of Illness/Injury or Date of Surgery Date: 17  Date of Referral to OT: 17  Referring Physician: EVE Birch      Admit Date: 2017    Visit Dx:     ICD-10-CM ICD-9-CM   1. Chest pain, unspecified type R07.9 786.50   2. Elevated troponin R74.8 790.6   3. Abnormal x-ray R93.8 793.99   4. Impaired mobility and ADLs Z74.09 799.89   5. 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   • Nocturia   • Osteoarthritis of multiple joints   • Hyperlipidemia   • Pain in joint involving ankle and foot   • Arthropathy of hand   • Paroxysmal tachycardia   • Osteoarthrosis involving more than one site but not generalized   • Right shoulder pain   • Rotator cuff syndrome   • Partial tear of subscapularis tendon   • Infraspinatus tendon tear   • Supraspinatus tendon tear   • Bilateral carotid artery stenosis   • (HFpEF) heart failure with preserved ejection fraction   • Pulmonary HTN   • Acute interstitial pneumonia   • Chronic obstructive lung disease   • Coronary arteriosclerosis   • Diabetes mellitus   • Hypertension   • Chest pain   • Shortness of breath             Adult Rehabilitation Note       17 1013 17 0850 12/15/17 1358    Rehab Assessment/Intervention    Discipline occupational therapy assistant  -BB physical therapy assistant  -TESS physical therapy  assistant  -TESS    Document Type therapy note (daily note)  -BB therapy note (daily note)  -TESS therapy note (daily note)  -TESS    Subjective Information agree to therapy  -BB agree to therapy  -TESS agree to therapy  -TESS    Patient Effort, Rehab Treatment good  -BB good  -TESS excellent  -TESS    Patient Effort, Rehab Treatment Comment  pt reports haveing low HR last night. nsg agrees to tx.   -TESS nsg agrees to therapy.  -TESS    Symptoms Noted During/After Treatment none  -BB      Precautions/Limitations fall precautions;oxygen therapy device and L/min  -BB fall precautions;oxygen therapy device and L/min  -TESS fall precautions;oxygen therapy device and L/min  -TESS    Recorded by [BB] CHANDU MorrisonA/L [TESS] Cristobal Mansfield PTA [TESS] Cristobal Mansfield PTA    Vital Signs    Pre Systolic BP Rehab 136  -  -TESS 115  -TESS    Pre Treatment Diastolic BP 68  -BB 74  -TESS 64  -TESS    Post Systolic BP Rehab  148  -TESS 149  -TESS    Post Treatment Diastolic BP  72  -TESS 67  -TESS    Pretreatment Heart Rate (beats/min) 76  -BB 74  -TESS 85  -TESS    Intratreatment Heart Rate (beats/min)  90  -TESS 84  -TESS    Posttreatment Heart Rate (beats/min) 73  -BB 72  -TESS 82  -TESS    Pre SpO2 (%) 98  -BB 99  -TESS 98  -TESS    O2 Delivery Pre Treatment supplemental O2  -BB supplemental O2  -TESS supplemental O2  -TESS    Intra SpO2 (%) 97  -BB 98  -TESS 98  -TESS    O2 Delivery Intra Treatment supplemental O2  -BB supplemental O2  -TESS supplemental O2  -TESS    Post SpO2 (%) 99  -BB 99  -TESS 98  -TESS    O2 Delivery Post Treatment supplemental O2  -BB supplemental O2  -TESS supplemental O2  -TESS    Pre Patient Position Supine  -BB Supine  -TESS Supine  -TESS    Intra Patient Position Sitting  -BB      Post Patient Position Supine  -BB Supine  -TESS Supine  -TESS    Recorded by [BB] CHANDU MorrisonA/L [TESS] Cristobal Mansfield PTA [TESS] Cristobal Mansfield PTA    Pain Assessment    Pain Assessment 0-10  -BB 0-10  -TESS 0-10  -TESS    Pain Score 3  -BB 7  -TESS 6  -TESS    Post Pain Score 2   -BB 7  -TESS 6  -TESS    Pain Type Chronic pain  -BB  Chronic pain  -TESS    Pain Location Generalized  -BB Generalized  -TESS Back  -TESS    Pain Intervention(s) Repositioned  -BB Ambulation/increased activity;Repositioned  -TESS Ambulation/increased activity;Repositioned  -TESS    Recorded by [BB] KRYSTIN Morrison/GEOFFREY [TESS] Cristobal Mansfield PTA [TESS] Cristobal Mansfield PTA    Vision Assessment/Intervention    Visual Impairment  WFL with corrective lenses  -TESS WFL with corrective lenses  -TESS    Recorded by  [TESS] Cristobal Mansfield PTA [TESS] Cristobal Mansfield PTA    Cognitive Assessment/Intervention    Current Cognitive/Communication Assessment functional  -BB functional  -TESS functional  -TESS    Orientation Status oriented x 4  -BB oriented x 4  -TESS oriented x 4  -TESS    Follows Commands/Answers Questions 100% of the time  -% of the time  -TESS 100% of the time  -TESS    Personal Safety mild impairment   discussed AE, safety, EC/W/S,PLB  -BB      Personal Safety Interventions gait belt;nonskid shoes/slippers when out of bed;supervised activity  -BB gait belt;muscle strengthening facilitated;nonskid shoes/slippers when out of bed;supervised activity  -TESS gait belt;muscle strengthening facilitated;nonskid shoes/slippers when out of bed;supervised activity  -TESS    Recorded by [BB] KRYSTIN Morrison/GEOFFREY [TESS] Cristobal Mansfield PTA [TESS] Cristobal Mansfield PTA    Bed Mobility, Assessment/Treatment    Bed Mobility, Assistive Device bed rails;head of bed elevated  -BB bed rails;head of bed elevated  -TESS bed rails;head of bed elevated  -TESS    Bed Mob, Supine to Sit, [place] conditional independence  -BB conditional independence  -TESS conditional independence  -TESS    Bed Mob, Sit to Supine, [place] conditional independence  -BB conditional independence  -TESS conditional independence  -TESS    Recorded by [BB] KRYSTIN Morrison/GEOFFREY [TESS] Cristobal Mansfield PTA [TESS] Cristobal Mansfield PTA    Transfer Assessment/Treatment    Transfers,  Sit-Stand [place] conditional independence  -BB conditional independence  -TESS conditional independence;supervision required  -TESS    Transfers, Stand-Sit [place] conditional independence  -BB conditional independence  -TESS conditional independence;supervision required  -TESS    Transfers, Sit-Stand-Sit, Assist Device  --   no AD  -TESS     Toilet Transfer, [place] supervision required  -BB      Recorded by [BB] KRYSTIN Morrison/GEOFFREY [TESS] Cristobal Mansfield PTA [TESS] Cristobal Mansfield PTA    Gait Assessment/Treatment    Gait, [place] Level  stand by assist;conditional independence  -TESS supervision required;conditional independence  -TESS    Gait, Assistive Device  --   70, 305  -TESS --   no AD  -TESS    Gait, Distance (Feet)   --   430, 292  -TESS    Gait, Gait Pattern Analysis  swing-through gait  -TESS swing-through gait  -TESS    Gait, Safety Issues  supplemental O2  -TESS supplemental O2  -TESS    Gait, Comment  pt likes to talk throughout gait trip but sometimes needs cueing to focus on breathing.   -TESS     Recorded by  [TESS] Cristobal Mansfield PTA [TESS] Cristobal Mansfield PTA    Stairs Assessment/Treatment    Number of Stairs   5  -TESS    Stairs, Handrail Location   both sides  -TESS    Stairs, [place] Level   independent  -TESS    Recorded by   [TESS] Cristobal Mansfield PTA    Upper Body Bathing Assessment/Training    UB Bathing Assess/Train, Comment pt defers ADL  -BB      Recorded by [BB] CHANDU MorrisonA/L      Toileting Assessment/Training    Toileting Assess/Train, Assistive Device grab bars  -BB      Toileting Assess/Train, Position sitting;standing  -BB      Toileting Assess/Train, [place]ep[place] Level supervision required  -BB      Recorded by [BB] CHANDU MorrisonA/L      Grooming Assessment/Training    Grooming Assess/Train, Position sink side;standing  -BB      Grooming Assess/Train, Comment wash face and hands  -BB      Recorded by [BB] KRYSTIN Morrison/L      Therapy Exercises    Bilateral  Lower Extremities  AROM:;15 reps;sitting;ankle pumps/circles;LAQ;hip flexion;hip abduction/adduction   10 reps of ab/ad  -TESS     Recorded by  [TESS] Cristobal Mansfield PTA     Positioning and Restraints    Pre-Treatment Position in bed  -BB in bed  -TESS in bed  -TESS    Post Treatment Position bed  -BB bed  -TESS bed  -TESS    In Bed supine;encouraged to call for assist;exit alarm on;call light within reach  -BB supine;call light within reach;encouraged to call for assist;with family/caregiver   all needs met.   -TESS supine;call light within reach;encouraged to call for assist;with family/caregiver   all needs met.   -TESS    Recorded by [BB] KRYSTIN Morrison/GEOFFREY [TESS] Cristobal Mansfield PTA [TESS] Cristobal Mansfield PTA      12/15/17 0805          Rehab Assessment/Intervention    Discipline occupational therapy assistant  -KD      Document Type therapy note (daily note)  -KD      Subjective Information agree to therapy  -KD      Patient Effort, Rehab Treatment fair  -KD      Symptoms Noted During/After Treatment other (see comments)   Pt c/o 10/10 CP, Vitals taken, Nsg notified, tx ended  -KD      Recorded by [KD] KRYSTIN Gilmore/L      Vital Signs    Pre Systolic BP Rehab 153  -KD      Pre Treatment Diastolic BP 73  -KD      Pretreatment Heart Rate (beats/min) 85  -KD      Posttreatment Heart Rate (beats/min) 89  -KD      Pre SpO2 (%) 99  -KD      O2 Delivery Pre Treatment room air  -KD      Post SpO2 (%) 96  -KD      O2 Delivery Post Treatment room air  -KD      Pre Patient Position Sitting  -KD      Intra Patient Position Standing  -KD      Post Patient Position Sitting  -KD      Recorded by [KD] KRYSTIN Gilmore/L      Pain Assessment    Pain Assessment 0-10  -KD      Pain Score 5  -KD      Post Pain Score 10  -KD      Pain Location Chest  -KD      Recorded by [KD] KRYSTIN Gilmore/L      Cognitive Assessment/Intervention    Current Cognitive/Communication Assessment functional  -KD      Orientation Status  oriented x 4  -KD      Follows Commands/Answers Questions 100% of the time  -KD      Personal Safety Interventions fall prevention program maintained  -KD      Recorded by [KD] KRYSTIN Gilmore/L      Bed Mobility, Assessment/Treatment    Bed Mob, Supine to Sit, [place] conditional independence  -KD      Bed Mob, Sit to Supine, [place] conditional independence  -KD      Recorded by [KD] KRYSTIN Gilmore/L      Transfer Assessment/Treatment    Transfers, Sit-Stand [place] supervision required  -KD      Transfers, Stand-Sit [place] contact guard assist;supervision required  -KD      Transfers, Sit-Stand-Sit, Assist Device rolling walker  -KD      Toilet Transfer, [place] supervision required  -KD      Toilet Transfer, Assistive Device other (see comments)   none  -KD      Recorded by [KD] KRYSTIN Gilmore/L      Functional Mobility    Functional Mobility- Ind. Level supervision required  -KD      Functional Mobility-Distance (Feet) --   15 x 2  -KD      Recorded by [KD] KRYSTIN Gilmore/L      Toileting Assessment/Training    Toileting Assess/Train, Assistive Device grab bars  -KD      Toileting Assess/Train, Position sitting;standing  -KD      Toileting Assess/Train, Indep[place] Level supervision required  -KD      Recorded by [KD] KRYSTIN Gilmore/L      Grooming Assessment/Training    Grooming Assess/Train, Assistive Device --   wash hands  -KD      Grooming Assess/Train, Position standing  -KD      Grooming Assess/Train, Indepen Level supervision required  -KD      Recorded by [KD] KRYSTIN Gilmore/L      Positioning and Restraints    Pre-Treatment Position in bed  -KD      Post Treatment Position bed  -KD      In Bed supine;call light within reach;encouraged to call for assist;exit alarm on;with nsg  -KD      Recorded by [KD] KRYSTIN Gilmore/L        User Key  (r) = Recorded By, (t) = Taken By, (c) = Cosigned By    Initials Name Effective Dates    TESS CALLOWAY  Donal, PTA 10/17/16 -     BB Susannah Villalta, MCCLELLAND/L 10/17/16 -     KD Erlinda Aguilar, [place]/L 10/17/16 -                 OT Goals       12/17/17 1435 12/15/17 0843 12/14/17 1648    Transfer Training OT LTG    Transfer Training OT LTG, Date Established   12/14/17  -RW    Transfer Training OT LTG, Time to Achieve   by discharge  -RW    Transfer Training OT LTG, Activity Type   toilet;tub   simulated tub t/f  -RW    Transfer Training OT LTG, [place] Level   conditional independence  -RW    Transfer Training OT LTG, Date Goal Reviewed  12/15/17  -KD     Transfer Training OT LTG, Outcome  goal not met  -KD     Dynamic Standing Balance OT LTG    Dynamic Standing Balance OT LTG, Date Established   12/14/17  -RW    Dynamic Standing Balance OT LTG, Time to Achieve   by discharge  -RW    Dynamic Standing Balance OT LTG, [place] Level   conditional independence  -RW    Dynamic Standing Balance OT LTG, Additional Goal   5 min functional activity  -RW    Dynamic Standing Balance OT LTG, Date Goal Reviewed 12/17/17  -BB 12/15/17  -KD     Dynamic Standing Balance OT LTG, Outcome goal ongoing  -BB goal not met  -KD     Safety Awareness OT LTG    Safety Awareness OT LTG, Date Established   12/14/17  -RW    Safety Awareness OT LTG, Time to Achieve   by discharge  -RW    Safety Awareness OT LTG, Activity Type   good safety awareness;with ADL's  -RW    Safety Awareness OT LTG, Date Goal Reviewed 12/17/17  -BB 12/15/17  -KD     Safety Awareness OT LTG, Outcome  goal not met  -KD     ADL OT LTG    ADL OT LTG, Date Established   12/14/17  -RW    ADL OT LTG, Time to Achieve   by discharge  -RW    ADL OT LTG, Activity Type   ADL skills  -RW    ADL OT LTG, [place] Level   modified independent  -RW    ADL OT LTG, Date Goal Reviewed 12/17/17  -BB 12/15/17  -KD     ADL OT LTG, Outcome  goal not met  -KD       User Key  (r) = Recorded By, (t) = Taken By, (c) = Cosigned By    Initials Name Provider Type    LATRICE DIAZ  GOSIA McqueenR/L Occupational Therapist    BB CHANDU MorrisonA/L Occupational Therapy Assistant     KRYSTIN Gilmore/L Occupational Therapy Assistant          Occupational Therapy Education     Title: PT OT SLP Therapies (Active)     Topic: Occupational Therapy (Active)     Point: ADL training (Active)    Description: Instruct learner(s) on proper safety adaptation and remediation techniques during self care or transfers.   Instruct in proper use of assistive devices.    Learning Progress Summary    Learner Readiness Method Response Comment Documented by Status   Patient Acceptance E VU   12/17/17 1434 Done    Acceptance E NR fall precautions, transfer safety, OT POC  12/14/17 1647 Active   Family Acceptance E NR fall precautions, transfer safety, OT POC  12/14/17 1647 Active               Point: Precautions (Active)    Description: Instruct learner(s) on prescribed precautions during self-care and functional transfers.    Learning Progress Summary    Learner Readiness Method Response Comment Documented by Status   Patient Acceptance E VU   12/17/17 1434 Done    Acceptance E NR fall precautions, transfer safety, OT POC  12/14/17 1647 Active   Family Acceptance E NR fall precautions, transfer safety, OT POC  12/14/17 1647 Active               Point: Body mechanics (Done)    Description: Instruct learner(s) on proper positioning and spine alignment during self-care, functional mobility activities and/or exercises.    Learning Progress Summary    Learner Readiness Method Response Comment Documented by Status   Patient Acceptance E [facility] 12/17/17 1434 Done                      User Key     Initials Effective Dates Name Provider Type Discipline     10/17/16 -  Deborah Mcqueen OTR/L Occupational Therapist OT     10/17/16 -  CHANDU MorrisonA/L Occupational Therapy Assistant OT                  OT Recommendation and Plan  Anticipated Discharge Disposition: home with home  health  Planned Therapy Interventions: activity intolerance, adaptive equipment training, ADL retraining, balance training, energy conservation, home exercise program, strengthening, transfer training  Therapy Frequency: other (see comments) (3-14 times/wk)  Plan of Care Review  Plan Of Care Reviewed With: patient  Progress: progress toward functional goals as expected  Outcome Summary/Follow up Plan: Pt SBA for bed to toilet t/f. Pt participated in education for home safety, E/C, PLB, W/S, AE. No new goals met this date.         Outcome Measures       12/17/17 0850 12/15/17 1358 12/15/17 0805    How much help from another person do you currently need...    Turning from your back to your side while in flat bed without using bedrails? 4  -TESS 4  -TESS     Moving from lying on back to sitting on the side of a flat bed without bedrails? 4  -TESS 4  -TESS     Moving to and from a bed to a chair (including a wheelchair)? 3  -TESS 3  -TESS     Standing up from a chair using your arms (e.g., wheelchair, bedside chair)? 3  -TESS 3  -TESS     Climbing 3-5 steps with a railing? 4  -TESS 4  -TESS     To walk in hospital room? 3  -TESS 3  -TESS     AM-PAC 6 Clicks Score 21  -TESS 21  -TESS     How much help from another is currently needed...    Putting on and taking off regular lower body clothing?   3  -KD    Bathing (including washing, rinsing, and drying)   3  -KD    Toileting (which includes using toilet bed pan or urinal)   3  -KD    Putting on and taking off regular upper body clothing   4  -KD    Taking care of personal grooming (such as brushing teeth)   4  -KD    Eating meals   4  -KD    Score   21  -KD    Functional Assessment    Outcome Measure Options AM-PAC 6 Clicks Basic Mobility (PT)  -TESS AM-PAC 6 Clicks Basic Mobility (PT)  -TESS       12/14/17 1539 12/14/17 1538       How much help from another person do you currently need...    Turning from your back to your side while in flat bed without using bedrails? 4  -JCA      Moving from lying on  back to sitting on the side of a flat bed without bedrails? 3  -JCA      Moving to and from a bed to a chair (including a wheelchair)? 3  -JCA      Standing up from a chair using your arms (e.g., wheelchair, bedside chair)? 3  -JCA      Climbing 3-5 steps with a railing? 3  -JCA      To walk in hospital room? 3  -JCA      AM-PAC 6 Clicks Score 19  -JCA      How much help from another is currently needed...    Putting on and taking off regular lower body clothing?  3  -RW     Bathing (including washing, rinsing, and drying)  3  -RW     Toileting (which includes using toilet bed pan or urinal)  3  -RW     Putting on and taking off regular upper body clothing  3  -RW     Taking care of personal grooming (such as brushing teeth)  4  -RW     Eating meals  4  -RW     Score  20  -RW     Functional Assessment    Outcome Measure Options AM-PAC 6 Clicks Basic Mobility (PT)  -JCA AM-PAC 6 Clicks Daily Activity (OT)  -RW       User Key  (r) = Recorded By, (t) = Taken By, (c) = Cosigned By    Initials Name Provider Type    RW Deborah Mcqueen, OTR/L Occupational Therapist    DOROTHY Joshua, PT Physical Therapist    TESS Mansfield, PTA Physical Therapy Assistant    KD KRYSTIN Gilmore/L Occupational Therapy Assistant           Time Calculation:         Time Calculation- OT       12/17/17 1437          Time Calculation- OT    OT Start Time 1013  -BB      OT Stop Time 1053  -BB      OT Time Calculation (min) 40 min  -BB      Total Timed Code Minutes- OT 40 minute(s)  -BB      OT Received On 12/17/17  -BB        User Key  (r) = Recorded By, (t) = Taken By, (c) = Cosigned By    Initials Name Provider Type    BB KRYSTIN Morrison/L Occupational Therapy Assistant           Therapy Charges for Today     Code Description Service Date Service Provider Modifiers Qty    [id number]  OT SELF CARE/MGMT/TRAIN EA 15 MIN 12/17/2017 KRYSTIN Morrison/L GO 3          OT G-codes  OT Professional Judgement Used?: Yes  OT  Functional Scales Options: AM-PAC 6 Clicks Daily Activity (OT)  Score: 20  Functional Limitation: Self care  Self Care Current Status (): At least 20 percent but less than 40 percent impaired, limited or restricted  Self Care Goal Status (): At least 1 percent but less than 20 percent impaired, limited or restricted    KRYSTIN Morrison/GEOFFREY  12/17/2017

## 2017-12-17 NOTE — PLAN OF CARE
Problem: Patient Care Overview (Adult)  Goal: Plan of Care Review  Outcome: Ongoing (interventions implemented as appropriate)    12/17/17 0850   Coping/Psychosocial Response Interventions   Plan Of Care Reviewed With patient   Patient Care Overview   Progress progress toward functional goals as expected   Outcome Evaluation   Outcome Summary/Follow up Plan pt responded well to therapy. pt mod indep w/ bed mob. pt w/ multiple gait trips up to 305 ft CGA. pt participated in LE ther ex. pt is progressing and would continue to benefit from PT services. Recommend HH PT upon DC.       Goal: Discharge Needs Assessment  Outcome: Ongoing (interventions implemented as appropriate)    12/14/17 1333 12/14/17 1423 12/14/17 1539   Discharge Needs Assessment   Concerns To Be Addressed --  adjustment to diagnosis/illness concerns --    Concerns Comments Noted hospice was discussed with patient. He nor fmaly are agreeable and request to remain full code. --  --    Readmission Within The Last 30 Days no previous admission in last 30 days --  --    Equipment Needed After Discharge none --  --    Discharge Facility/Level Of Care Needs home with home health  (Note left for MD with request for  services skilled v's transition visit. ) --  --    Current Discharge Risk chronically ill  (COPD and CHF) --  --    Current Health   Anticipated Changes Related to Illness none --  --    Living Environment   Transportation Available --  --  car;family or friend will provide   Self-Care   Equipment Currently Used at Home --  --  walker, rolling;oxygen;shower chair;commode  (uses BSC over toilet;has SCdoes not use)         Problem: Inpatient Physical Therapy  Goal: Transfer Training Goal 1 LTG- PT  Outcome: Ongoing (interventions implemented as appropriate)    12/14/17 1721 12/17/17 0850   Transfer Training PT LTG   Transfer Training PT LTG, Date Established 12/14/17 --    Transfer Training PT LTG, Time to Achieve by discharge --    Transfer  Training PT LTG, Activity Type bed to chair /chair to bed;sit to stand/stand to sit --    Transfer Training PT LTG, [place] Level conditional independence --    Transfer Training PT LTG, Date Goal Reviewed --  12/17/17   Transfer Training PT LTG, Outcome --  goal ongoing       Goal: Gait Training Goal LTG- PT  Outcome: Ongoing (interventions implemented as appropriate)    12/14/17 1721 12/17/17 0850   Gait Training PT LTG   Gait Training Goal PT LTG, Date Established 12/14/17 --    Gait Training Goal PT LTG, Time to Achieve by discharge --    Gait Training Goal PT LTG, [place] Level conditional independence --    Gait Training Goal PT LTG, Distance to Achieve [id number] --    Gait Training Goal PT LTG, Date Goal Reviewed --  12/17/17   Gait Training Goal PT LTG, Outcome --  goal ongoing       Goal: Strength Goal LTG- PT  Outcome: Ongoing (interventions implemented as appropriate)    12/14/17 1721 12/15/17 1358 12/17/17 0850   Strength Goal PT LTG   Strength Goal PT LTG, Date Established 12/14/17 --  --    Strength Goal PT LTG, Time to Achieve by discharge --  --    Strength Goal PT LTG, Measure to Achieve Pt will tolerate 15 reps fo AROM exericises --  --    Strength Goal PT LTG, Date Goal Reviewed --  12/15/17 --    Strength Goal PT LTG, Outcome --  --  goal ongoing       Goal: Patient Education Goal LTG- PT  Outcome: Ongoing (interventions implemented as appropriate)    12/14/17 1721 12/17/17 0850   Patient Education PT LTG   Patient Education PT LTG, Date Established 12/14/17 --    Patient Education PT LTG, Time to Achieve by discharge --    Patient Education PT LTG, Education Type gait;transfers;home safety --    Patient Education PT LTG, Date Goal Reviewed --  12/17/17   Patient Education PT LTG Outcome --  goal ongoing

## 2017-12-17 NOTE — PROGRESS NOTES
[facility] Medicine Services  INPATIENT PROGRESS NOTE    Length of Stay: 2  Date of Admission: 12/13/2017  Primary Care Physician: Paolo Rey MD    Subjective   Subjective:  Feels better no cp no sob , no abd pain     Review of Systems     All pertinent negatives and positives are as above. All other systems have been reviewed and are negative unless otherwise stated.  No cp no sob       Objective    Temp:  [96.7 °F (35.9 °C)-97.5 °F (36.4 °C)] 96.7 °F (35.9 °C)  Heart Rate:  [45-81] 71  Resp:  [18-20] 18  BP: (128-172)/(64-86) 151/67  Physical Exam    Patient appears well, alert and oriented x 3, pleasant, cooperative.   Neck supple  No JVD No thyromegaly.  Lungs are clear to auscultation. No crackles no wheezing   CV S1S2 normal, no murmurs, clicks, gallops or rubs  Abdomen is soft, no tenderness, masses or organomegaly.    Extremities: no clubbing cyanosis or edema   Neurological CN 2-12 intact       No current facility-administered medications on file prior to encounter.      Current Outpatient Prescriptions on File Prior to Encounter   Medication Sig Dispense Refill   • albuterol (PROVENTIL) (2.5 MG/3ML) 0.083% nebulizer solution Take 2.5 mg by nebulization Every 4 (Four) Hours As Needed for Wheezing. 125 vial 11   • aspirin 81 MG chewable tablet Chew 81 mg Daily.     • clopidogrel (PLAVIX) 75 MG tablet Take 75 mg by mouth Daily.     • fluticasone (FLONASE) 50 MCG/ACT nasal spray 2 sprays into each nostril Daily.     • Fluticasone Furoate (ARNUITY ELLIPTA) 200 MCG/ACT aerosol powder  Inhale.     • furosemide (LASIX) 20 MG tablet Take 1 tablet by mouth Daily. 90 tablet 3   • glimepiride (AMARYL) 2 MG tablet Take 2 mg by mouth Every Morning Before Breakfast.     • HYDROcodone-acetaminophen (NORCO) 7.5-325 MG per tablet Take 1 tablet by mouth Every 8 (Eight) Hours.     • ipratropium-albuterol (DUO-NEB) 0.5-2.5 mg/mL nebulizer Take 3 mL by nebulization 4 (Four) Times  a Day. 120 vial 1   • isosorbide dinitrate (ISORDIL) 10 MG tablet Take 1 tablet by mouth 3 (Three) Times a Day. 90 tablet 3   • lisinopril (PRINIVIL,ZESTRIL) 10 MG tablet Take 0.5 tablets by mouth Daily. 30 tablet 6   • magnesium oxide (MAGOX) 400 (241.3 MG) MG tablet tablet Take 400 mg by mouth Daily.     • nitroglycerin (NITROSTAT) 0.4 MG SL tablet place 1 tablet under the tongue if needed every 5 minutes for hair...  (REFER TO PRESCRIPTION NOTES).  0   • O2 (OXYGEN) Inhale 2 L/min Every Night. W/ CPAP     • Red Yeast Rice 600 MG tablet Take 600 mg by mouth 2 (Two) Times a Day.     • Umeclidinium-Vilanterol 62.5-25 MCG/INH aerosol powder  Inhale 1 puff Daily. 1 each 11         Results Review:  I have reviewed the labs, radiology results, and diagnostic studies.    Laboratory Data:  [unfilled]  Lab Results (last 24 hours)     Procedure Component Value Units Date/Time    Blood Culture - Blood, [[id number]]  (Normal) Collected:  12/15/17 1131    Specimen:  Blood from Arm, Right Updated:  12/16/17 1146     Blood Culture No growth at 24 hours    Lactic Acid, Plasma [[id number]]  (Abnormal) Collected:  12/16/17 1203    Specimen:  Blood Updated:  12/16/17 1239     Lactate 2.6 (C) mmol/L     POC Glucose Once [[id number]]  (Normal) Collected:  12/16/17 1645    Specimen:  Blood Updated:  12/16/17 1714     Glucose 102 mg/dL       Meter: [id number]: [id number] ZORAIDA GARCIA       Lactic Acid, Plasma [[id number]]  (Normal) Collected:  12/16/17 1744    Specimen:  Blood Updated:  12/16/17 1811     Lactate 1.7 mmol/L     POC Glucose Once [[id number]]  (Abnormal) Collected:  12/16/17 1945    Specimen:  Blood Updated:  12/16/17 2009     Glucose 223 (H) mg/dL       RN NotifiedMeter: [id number]: [id number] NOE IRWIN       Lactic Acid, Plasma [[id number]]  (Abnormal) Collected:  12/16/17 2342    Specimen:  Blood Updated:  12/17/17 0046     Lactate 2.3 (C) mmol/L     CBC & Differential [[id number]] Collected:  12/17/17  0456    Specimen:  Blood Updated:  12/17/17 0459    Narrative:       The following orders were created for panel order CBC & Differential.  Procedure                               Abnormality         Status                     ---------                               -----------         ------                     CBC Auto Differential[[id number]]        Abnormal            Final result                 Please view results for these tests on the individual orders.    CBC Auto Differential [[id number]]  (Abnormal) Collected:  12/17/17 0456    Specimen:  Blood Updated:  12/17/17 0459     WBC 10.66 (H) 10*3/mm3      RBC 3.91 (L) 10*6/mm3      Hemoglobin 11.4 (L) g/dL      Hematocrit 36.2 (L) %      MCV 92.6 fL      MCH 29.2 pg      MCHC 31.5 g/dL      RDW 15.9 (H) %      RDW-SD 53.9 (H) fl      MPV 10.7 fL      Platelets 173 10*3/mm3      Neutrophil % 78.5 %      Lymphocyte % 12.2 %      Monocyte % 8.3 %      Eosinophil % 0.1 %      Basophil % 0.0 %      Immature Grans % 0.9 (H) %      Neutrophils, Absolute 8.37 10*3/mm3      Lymphocytes, Absolute 1.30 10*3/mm3      Monocytes, Absolute 0.88 10*3/mm3      Eosinophils, Absolute 0.01 10*3/mm3      Basophils, Absolute 0.00 10*3/mm3      Immature Grans, Absolute 0.10 (H) 10*3/mm3     Basic Metabolic Panel [[id number]]  (Abnormal) Collected:  12/17/17 0455    Specimen:  Blood Updated:  12/17/17 0511     Glucose 127 (H) mg/dL      BUN 24 (H) mg/dL      Creatinine 1.06 mg/dL      Sodium 140 mmol/L      Potassium 4.0 mmol/L      Chloride 108 mmol/L      CO2 23.0 mmol/L      Calcium 8.8 mg/dL      eGFR Non African Amer 67 mL/min/1.73      BUN/Creatinine Ratio 22.6     Anion Gap 9.0 mmol/L     Narrative:       The MDRD GFR formula is only valid for adults with stable renal function between ages 18 and 70.    Lactic Acid, Plasma [[id number]]  (Abnormal) Collected:  12/17/17 0455    Specimen:  Blood Updated:  12/17/17 0514     Lactate 2.7 (C) mmol/L     POC Glucose Once [[id number]]   (Normal) Collected:  12/17/17 0608    Specimen:  Blood Updated:  12/17/17 0653     Glucose 94 mg/dL       Meter: [id number]: [id number] CHLOE WARREN             Culture Data:   Blood Culture   Date Value Ref Range Status   12/15/2017 No growth at 24 hours  Preliminary     No results found for: URINECX  No results found for: RESPCX  No results found for: WOUNDCX  No results found for: STOOLCX  No components found for: BODYFLD    Radiology Data:   Imaging Results (last 24 hours)     ** No results found for the last 24 hours. **          I have reviewed the patient current medications.     Assessment/Plan       1. Acute on chronic diastolic dysfunction with normal EF:  currently lasix on hold. He is not on BB due to his bradycardia, clinically better.  2. Bradyarrhythmia:monitor   3. Hyperkalemia:Resolved   4. COPD exacerbation with pneumonia: continue antibiotics steroids and bronchodilators.  5. HTN: lisinopril   6. JILL:resolved   7. Sepsis from pneumonia: continue abxs   Dispo; home in 1-2 days     Barrett Garner MD   12/17/17   10:52 AM

## 2017-12-17 NOTE — THERAPY TREATMENT NOTE
Acute Care - Physical Therapy Treatment Note  [facility]     Patient Name: Hasmukh Ham  : 1933  MRN: [id number]  Today's Date: 2017  Onset of Illness/Injury or Date of Surgery Date: 17  Date of Referral to PT: 17  Referring Physician: EVE Birch    Admit Date: 2017    Visit Dx:    ICD-10-CM ICD-9-CM   1. Chest pain, unspecified type R07.9 786.50   2. Elevated troponin R74.8 790.6   3. Abnormal x-ray R93.8 793.99   4. Impaired mobility and ADLs Z74.09 799.89   5. 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   • Nocturia   • Osteoarthritis of multiple joints   • Hyperlipidemia   • Pain in joint involving ankle and foot   • Arthropathy of hand   • Paroxysmal tachycardia   • Osteoarthrosis involving more than one site but not generalized   • Right shoulder pain   • Rotator cuff syndrome   • Partial tear of subscapularis tendon   • Infraspinatus tendon tear   • Supraspinatus tendon tear   • Bilateral carotid artery stenosis   • (HFpEF) heart failure with preserved ejection fraction   • Pulmonary HTN   • Acute interstitial pneumonia   • Chronic obstructive lung disease   • Coronary arteriosclerosis   • Diabetes mellitus   • Hypertension   • Chest pain   • Shortness of breath               Adult Rehabilitation Note       17 0850 12/15/17 1358 12/15/17 0805    Rehab Assessment/Intervention    Discipline physical therapy assistant  -TESS physical therapy assistant  -TESS occupational therapy  assistant  -KD    Document Type therapy note (daily note)  -TESS therapy note (daily note)  -TESS therapy note (daily note)  -KD    Subjective Information agree to therapy  -TESS agree to therapy  -TESS agree to therapy  -KD    Patient Effort, Rehab Treatment good  -TESS excellent  -TESS fair  -KD    Patient Effort, Rehab Treatment Comment pt reports haveing low HR last night. nsg agrees to tx.   -TESS nsg agrees to therapy.  -TESS     Symptoms Noted During/After Treatment   other (see comments)   Pt c/o 10/10 CP, Vitals taken, Nsg notified, tx ended  -KD    Precautions/Limitations fall precautions;oxygen therapy device and L/min  -TESS fall precautions;oxygen therapy device and L/min  -TESS     Recorded by [TESS] Cristobal Mansfield PTA [TESS] Cristobal Mansfield PTA [KD] Erlinda Aguilar, [place]/L    Vital Signs    Pre Systolic BP Rehab 140  -TESS 115  -TESS 153  -KD    Pre Treatment Diastolic BP 74  -TESS 64  -TESS 73  -KD    Post Systolic BP Rehab 148  -TESS 149  -TESS     Post Treatment Diastolic BP 72  -TESS 67  -TESS     Pretreatment Heart Rate (beats/min) 74  -TESS 85  -TESS 85  -KD    Intratreatment Heart Rate (beats/min) 90  -TESS 84  -TESS     Posttreatment Heart Rate (beats/min) 72  -TESS 82  -TESS 89  -KD    Pre SpO2 (%) 99  -TESS 98  -TESS 99  -KD    O2 Delivery Pre Treatment supplemental O2  -TESS supplemental O2  -TESS room air  -KD    Intra SpO2 (%) 98  -TESS 98  -TESS     O2 Delivery Intra Treatment supplemental O2  -TESS supplemental O2  -TESS     Post SpO2 (%) 99  -TESS 98  -TESS 96  -KD    O2 Delivery Post Treatment supplemental O2  -TESS supplemental O2  -TESS room air  -KD    Pre Patient Position Supine  -TESS Supine  -TESS Sitting  -KD    Intra Patient Position   Standing  -KD    Post Patient Position Supine  -TESS Supine  -TESS Sitting  -KD    Recorded by [TESS] Cristobal Mansfield PTA [TESS] Cristobal Mansfield PTA [KD] Erlinda Aguilar, [place]/L    Pain Assessment    Pain Assessment 0-10  -TESS 0-10  -TESS 0-10  -KD    Pain Score 7  -TESS 6  -TESS 5  -KD    Post Pain Score 7  -TESS 6  -TESS 10  -KD    Pain  Type  Chronic pain  -TESS     Pain Location Generalized  -TESS Back  -TESS Chest  -KD    Pain Intervention(s) Ambulation/increased activity;Repositioned  -TESS Ambulation/increased activity;Repositioned  -TESS     Recorded by [TESS] Cristobal Mansfield PTA [TESS] Cristobal Mansfield PTA [KD] Erlinda Aguilar, [place]/L    Vision Assessment/Intervention    Visual Impairment WFL with corrective lenses  -TESS WFL with corrective lenses  -TESS     Recorded by [TESS] Cristobal Mansfield PTA [TESS] Cristobal Mansfield PTA     Cognitive Assessment/Intervention    Current Cognitive/Communication Assessment functional  -TESS functional  -TESS functional  -KD    Orientation Status oriented x 4  -TESS oriented x 4  -TESS oriented x 4  -KD    Follows Commands/Answers Questions 100% of the time  -TESS 100% of the time  -TESS 100% of the time  -KD    Personal Safety Interventions gait belt;muscle strengthening facilitated;nonskid shoes/slippers when out of bed;supervised activity  -TESS gait belt;muscle strengthening facilitated;nonskid shoes/slippers when out of bed;supervised activity  -TESS fall prevention program maintained  -KD    Recorded by [TESS] Cristobal Mansfield PTA [TESS] Cristobal Mansfield PTA [KD] Erlinda Aguilar, [place]/L    Bed Mobility, Assessment/Treatment    Bed Mobility, Assistive Device bed rails;head of bed elevated  -TESS bed rails;head of bed elevated  -TESS     Bed Mob, Supine to Sit, [place] conditional independence  -TESS conditional independence  -TESS conditional independence  -KD    Bed Mob, Sit to Supine, [place] conditional independence  -TESS conditional independence  -TESS conditional independence  -KD    Recorded by [TESS] Cristobal Mansfield PTA [TESS] Cristobal Mansfield PTA [KD] Erlinda Aguilar, [place]/L    Transfer Assessment/Treatment    Transfers, Sit-Stand [place] conditional independence  -TESS conditional independence;supervision required  -TESS supervision required  -KD    Transfers, Stand-Sit [place] conditional independence  -TESS conditional  independence;supervision required  -TESS contact guard assist;supervision required  -KD    Transfers, Sit-Stand-Sit, Assist Device --   no AD  -TESS  rolling walker  -KD    Toilet Transfer, [place]   supervision required  -KD    Toilet Transfer, Assistive Device   other (see comments)   none  -KD    Recorded by [TESS] Cristobal Mansfield PTA [TESS] Cristobal Mansfield PTA [KD] CHANDU GilmoreA/L    Gait Assessment/Treatment    Gait, [place] Level stand by assist;conditional independence  -TESS supervision required;conditional independence  -TESS     Gait, Assistive Device --   70, 305  -TESS --   no AD  -TESS     Gait, Distance (Feet)  --   430, 292  -TESS     Gait, Gait Pattern Analysis swing-through gait  -TESS swing-through gait  -TESS     Gait, Safety Issues supplemental O2  -TESS supplemental O2  -TESS     Gait, Comment pt likes to talk throughout gait trip but sometimes needs cueing to focus on breathing.   -TESS      Recorded by [TESS] Cristobal Mansfield PTA [TESS] Cristobal Mansfield PTA     Stairs Assessment/Treatment    Number of Stairs  5  -TESS     Stairs, Handrail Location  both sides  -TESS     Stairs, [place] Level  independent  -TESS     Recorded by  [TESS] Cristobal Mansfield PTA     Functional Mobility    Functional Mobility- Ind. Level   supervision required  -KD    Functional Mobility-Distance (Feet)   --   15 x 2  -KD    Recorded by   [KD] KRYSTIN Gilmore/L    Toileting Assessment/Training    Toileting Assess/Train, Assistive Device   grab bars  -KD    Toileting Assess/Train, Position   sitting;standing  -KD    Toileting Assess/Train, Indepen Level   supervision required  -KD    Recorded by   [KD] KRYSTIN Gilmore/L    Grooming Assessment/Training    Grooming Assess/Train, Assistive Device   --   wash hands  -KD    Grooming Assess/Train, Position   standing  -KD    Grooming Assess/Train, Indepen Level   supervision required  -KD    Recorded by   [KD] KRYSTIN Gilmore/L    Therapy Exercises    Bilateral Lower  Extremities AROM:;15 reps;sitting;ankle pumps/circles;LAQ;hip flexion;hip abduction/adduction   10 reps of ab/ad  -TSES      Recorded by [TESS] Cristobal Mansfield PTA      Positioning and Restraints    Pre-Treatment Position in bed  -TESS in bed  -TESS in bed  -KD    Post Treatment Position bed  -TESS bed  -TESS bed  -KD    In Bed supine;call light within reach;encouraged to call for assist;with family/caregiver   all needs met.   -TESS supine;call light within reach;encouraged to call for assist;with family/caregiver   all needs met.   -TESS supine;call light within reach;encouraged to call for assist;exit alarm on;with nsg  -KD    Recorded by [TESS] Cristobal Mansfield PTA [TESS] Cristobal Mansfield PTA [KD] KRYSTIN Gilmore/L      User Key  (r) = Recorded By, (t) = Taken By, (c) = Cosigned By    Initials Name Effective Dates     Cristobal Mansfield PTA 10/17/16 -     KD KRYSTIN Gilmore/GEOFFREY 10/17/16 -                 IP PT Goals       12/17/17 0850 12/15/17 1358 12/14/17 1721    Transfer Training PT LTG    Transfer Training PT LTG, Date Established   12/14/17  -    Transfer Training PT LTG, Time to Achieve   by discharge  -    Transfer Training PT LTG, Activity Type   bed to chair /chair to bed;sit to stand/stand to sit  -    Transfer Training PT LTG, [place] Level   conditional independence  -TESS    Transfer Training PT  LTG, Date Goal Reviewed 12/17/17  -JCA 12/15/17  -JCA     Transfer Training PT LTG, Outcome goal ongoing  -JCA goal ongoing  -JCA goal ongoing  -    Gait Training PT LTG    Gait Training Goal PT LTG, Date Established   12/14/17  -TESS    Gait Training Goal PT LTG, Time to Achieve   by discharge  -    Gait Training Goal PT LTG, [place] Level   conditional independence  -TESS    Gait Training Goal PT LTG, Distance to Achieve   [id number]  -    Gait Training Goal PT LTG, Date Goal Reviewed 12/17/17  -JCA 12/15/17  -JCA     Gait Training Goal PT LTG, Outcome goal ongoing  -JCA goal ongoing  -JCA goal ongoing   -    Stair Training PT LTG    Stair Training Goal PT LTG, Date Established   12/14/17  -    Stair Training Goal PT LTG, Time to Achieve   by discharge  -    Stair Training Goal PT LTG, Number of Steps   5  -    Stair Training Goal PT LTG, [place] Level   conditional independence  -    Stair Training Goal PT LTG, Assist Device   2 handrails  -    Stair Training Goal PT LTG, Date Goal Reviewed  12/15/17  -A     Stair Training Goal PT LTG, Outcome  goal met  -A goal ongoing  -    Strength Goal PT LTG    Strength Goal PT LTG, Date Established   12/14/17  -    Strength Goal PT LTG, Time to Achieve   by discharge  -    Strength Goal PT LTG, Measure to Achieve   Pt will tolerate 15 reps fo AROM exericises  -    Strength Goal PT LTG, Date Goal Reviewed  12/15/17  -[facility]     Strength Goal PT LTG, Outcome goal ongoing  -A goal ongoing  -A goal ongoing  -    Patient Education PT LTG    Patient Education PT LTG, Date Established   12/14/17  -    Patient Education PT LTG, Time to Achieve   by discharge  -    Patient Education PT LTG, Education Type   gait;transfers;home safety  -    Patient Education PT LTG, Date Goal Reviewed 12/17/17  -A 12/15/17  -[facility]     Patient Education PT LTG Outcome goal ongoing  -A goal ongoing  -A goal ongoing  -      User Key  (r) = Recorded By, (t) = Taken By, (c) = Cosigned By    Initials Name Provider Type    TESS Gilda Joshua, PT Physical Therapist    DOROTHY Mansfield, PTA Physical Therapy Assistant          Physical Therapy Education     Title: PT OT SLP Therapies (Active)     Topic: Physical Therapy (Active)     Point: Mobility training (Active)    Learning Progress Summary    Learner Readiness Method Response Comment Documented by Status   Patient Acceptance E DU edu given to back up until legs touch surface prior to sitting. TESS 12/17/17 1244 Done    Acceptance E NR  TESS 12/15/17 1448 Active    Acceptance E NR  JC1 12/14/17 1721 Active   Family  Acceptance E NR  1 12/14/17 1721 Active               Point: Precautions (Active)    Learning Progress Summary    Learner Readiness Method Response Comment Documented by Status   Patient Acceptance E NR  JC1 12/14/17 1721 Active   Family Acceptance E NR  1 12/14/17 1721 Active                      User Key     Initials Effective Dates Name Provider Type Discipline    [facility] 10/17/16 -  Gilda Joshua, PT Physical Therapist PT     10/17/16 -  Cristobal Mansfield, PTA Physical Therapy Assistant PT                    PT Recommendation and Plan  Anticipated Discharge Disposition: home with assist, home with home health  Planned Therapy Interventions: balance training, gait training, patient/family education, stair training, strengthening, transfer training  PT Frequency: other (see comments) (5-14 times per week)  Plan of Care Review  Plan Of Care Reviewed With: patient  Progress: progress toward functional goals as expected  Outcome Summary/Follow up Plan: pt responded well to therapy. pt mod indep w/ bed mob. pt w/ multiple gait trips up to 305 ft CGA. pt participated in LE ther ex. pt is progressing and would continue to benefit from PT services. Recommend  PT upon DC.          Outcome Measures       12/17/17 0850 12/15/17 1358 12/15/17 0805    How much help from another person do you currently need...    Turning from your back to your side while in flat bed without using bedrails? 4  -TESS 4  -TESS     Moving from lying on back to sitting on the side of a flat bed without bedrails? 4  -TESS 4  -TESS     Moving to and from a bed to a chair (including a wheelchair)? 3  -TESS 3  -TESS     Standing up from a chair using your arms (e.g., wheelchair, bedside chair)? 3  -TESS 3  -TESS     Climbing 3-5 steps with a railing? 4  -TESS 4  -TESS     To walk in hospital room? 3  -TESS 3  -TESS     AM-PAC 6 Clicks Score 21  -TESS 21  -TESS     How much help from another is currently needed...    Putting on and taking off regular lower body clothing?   3   -KD    Bathing (including washing, rinsing, and drying)   3  -KD    Toileting (which includes using toilet bed pan or urinal)   3  -KD    Putting on and taking off regular upper body clothing   4  -KD    Taking care of personal grooming (such as brushing teeth)   4  -KD    Eating meals   4  -KD    Score   21  -KD    Functional Assessment    Outcome Measure Options AM-PAC 6 Clicks Basic Mobility (PT)  -TESS AM-PAC 6 Clicks Basic Mobility (PT)  -       12/14/17 1539 12/14/17 1538       How much help from another person do you currently need...    Turning from your back to your side while in flat bed without using bedrails? 4  -JCA      Moving from lying on back to sitting on the side of a flat bed without bedrails? 3  -JCA      Moving to and from a bed to a chair (including a wheelchair)? 3  -JCA      Standing up from a chair using your arms (e.g., wheelchair, bedside chair)? 3  -JCA      Climbing 3-5 steps with a railing? 3  -JCA      To walk in hospital room? 3  -JCA      AM-PAC 6 Clicks Score 19  -JCA      How much help from another is currently needed...    Putting on and taking off regular lower body clothing?  3  -RW     Bathing (including washing, rinsing, and drying)  3  -RW     Toileting (which includes using toilet bed pan or urinal)  3  -RW     Putting on and taking off regular upper body clothing  3  -RW     Taking care of personal grooming (such as brushing teeth)  4  -RW     Eating meals  4  -RW     Score  20  -RW     Functional Assessment    Outcome Measure Options AM-PAC 6 Clicks Basic Mobility (PT)  -JCA -[facility] 6 Clicks Daily Activity (OT)  -RW       User Key  (r) = Recorded By, (t) = Taken By, (c) = Cosigned By    Initials Name Provider Type    RW Deborah Mcqueen, OTR/L Occupational Therapist    DOROTHY Joshua, PT Physical Therapist    TESS Mansfield, PTA Physical Therapy Assistant    PAOLA Aguilar, [place]/L Occupational Therapy Assistant           Time Calculation:         PT Charges        12/17/17 1247          Time Calculation    Start Time 0850  -TESS      Stop Time 0918  -TESS      Time Calculation (min) 28 min  -TESS      Time Calculation- PT    Total Timed Code Minutes- PT 28 minute(s)  -TESS        User Key  (r) = Recorded By, (t) = Taken By, (c) = Cosigned By    Initials Name Provider Type    TESS Mansfield PTA Physical Therapy Assistant          Therapy Charges for Today     Code Description Service Date Service Provider Modifiers Qty    13688690864 HC GAIT TRAINING EA 15 MIN 12/17/2017 Cristobal Mansfield PTA GP 1    69911282098 HC PT THER PROC EA 15 MIN 12/17/2017 Cristobal Mansfield PTA GP 1          PT G-Codes  PT Professional Judgement Used?: Yes  Outcome Measure Options: AM-PAC 6 Clicks Basic Mobility (PT)  Score: 19  Functional Limitation: Mobility: Walking and moving around  Mobility: Walking and Moving Around Current Status (): At least 20 percent but less than 40 percent impaired, limited or restricted  Mobility: Walking and Moving Around Goal Status (): At least 1 percent but less than 20 percent impaired, limited or restricted    Cristobal Mansfield PTA  12/17/2017

## 2017-12-17 NOTE — PLAN OF CARE
Problem: Patient Care Overview (Adult)  Goal: Plan of Care Review  Outcome: Ongoing (interventions implemented as appropriate)    12/17/17 1435   Coping/Psychosocial Response Interventions   Plan Of Care Reviewed With patient   Patient Care Overview   Progress progress toward functional goals as expected   Outcome Evaluation   Outcome Summary/Follow up Plan Pt SBA for bed to toilet t/f. Pt participated in education for home safety, E/C, PLB, W/S, AE. No new goals met this date.          Problem: Inpatient Occupational Therapy  Goal: Transfer Training Goal 1 LTG- OT  Outcome: Ongoing (interventions implemented as appropriate)    12/14/17 1648 12/15/17 0843   Transfer Training OT LTG   Transfer Training OT LTG, Date Established 12/14/17 --    Transfer Training OT LTG, Time to Achieve by discharge --    Transfer Training OT LTG, Activity Type toilet;tub  (simulated tub t/f) --    Transfer Training OT LTG, [place] Level conditional independence --    Transfer Training OT LTG, Date Goal Reviewed --  12/15/17   Transfer Training OT LTG, Outcome --  goal not met       Goal: Dynamic Standing Balance Goal LTG-OT  Outcome: Ongoing (interventions implemented as appropriate)  Goal: Safety Awareness Goal LTG- OT  Outcome: Ongoing (interventions implemented as appropriate)    12/14/17 1648 12/15/17 0843 12/17/17 1435   Safety Awareness OT LTG   Safety Awareness OT LTG, Date Established 12/14/17 --  --    Safety Awareness OT LTG, Time to Achieve by discharge --  --    Safety Awareness OT LTG, Activity Type good safety awareness;with ADL's --  --    Safety Awareness OT LTG, Date Goal Reviewed --  --  12/17/17   Safety Awareness OT LTG, Outcome --  goal not met --        Goal: ADL Goal LTG- OT  Outcome: Ongoing (interventions implemented as appropriate)    12/14/17 1648 12/15/17 0843 12/17/17 1435   ADL OT LTG   ADL OT LTG, Date Established 12/14/17 --  --    ADL OT LTG, Time to Achieve by discharge --  --    ADL OT LTG,  Activity Type ADL skills --  --    ADL OT LTG, [place] Level modified independent --  --    ADL OT LTG, Date Goal Reviewed --  --  12/17/17   ADL OT LTG, Outcome --  goal not met --

## 2017-12-18 LAB
ANION GAP SERPL CALCULATED.3IONS-SCNC: 11 MMOL/L (ref 5–15)
BASOPHILS # BLD AUTO: 0.01 10*3/MM3 (ref 0–0.2)
BASOPHILS NFR BLD AUTO: 0.1 % (ref 0–2)
BUN BLD-MCNC: 21 MG/DL (ref 7–21)
BUN/CREAT SERPL: 25 (ref 7–25)
CALCIUM SPEC-SCNC: 8.5 MG/DL (ref 8.4–10.2)
CHLORIDE SERPL-SCNC: 107 MMOL/L (ref 95–110)
CO2 SERPL-SCNC: 22 MMOL/L (ref 22–31)
CREAT BLD-MCNC: 0.84 MG/DL (ref 0.7–1.3)
CRP SERPL-MCNC: 0.6 MG/DL (ref 0–1)
D-LACTATE SERPL-SCNC: 1.7 MMOL/L (ref 0.5–2)
D-LACTATE SERPL-SCNC: 1.9 MMOL/L (ref 0.5–2)
D-LACTATE SERPL-SCNC: 2 MMOL/L (ref 0.5–2)
D-LACTATE SERPL-SCNC: 2.1 MMOL/L (ref 0.5–2)
DEPRECATED RDW RBC AUTO: 56.2 FL (ref 35.1–43.9)
EOSINOPHIL # BLD AUTO: 0.06 10*3/MM3 (ref 0–0.7)
EOSINOPHIL NFR BLD AUTO: 0.6 % (ref 0–7)
ERYTHROCYTE [DISTWIDTH] IN BLOOD BY AUTOMATED COUNT: 16.1 % (ref 11.5–14.5)
GFR SERPL CREATININE-BSD FRML MDRD: 87 ML/MIN/1.73 (ref 60–98)
GLUCOSE BLD-MCNC: 84 MG/DL (ref 60–100)
GLUCOSE BLDC GLUCOMTR-MCNC: 118 MG/DL (ref 70–130)
GLUCOSE BLDC GLUCOMTR-MCNC: 165 MG/DL (ref 70–130)
GLUCOSE BLDC GLUCOMTR-MCNC: 83 MG/DL (ref 70–130)
GLUCOSE BLDC GLUCOMTR-MCNC: 89 MG/DL (ref 70–130)
HCT VFR BLD AUTO: 34.2 % (ref 39–49)
HGB BLD-MCNC: 10.9 G/DL (ref 13.7–17.3)
IMM GRANULOCYTES # BLD: 0.17 10*3/MM3 (ref 0–0.02)
IMM GRANULOCYTES NFR BLD: 1.8 % (ref 0–0.5)
LYMPHOCYTES # BLD AUTO: 1.44 10*3/MM3 (ref 0.6–4.2)
LYMPHOCYTES NFR BLD AUTO: 15.3 % (ref 10–50)
MCH RBC QN AUTO: 30.1 PG (ref 26.5–34)
MCHC RBC AUTO-ENTMCNC: 31.9 G/DL (ref 31.5–36.3)
MCV RBC AUTO: 94.5 FL (ref 80–98)
MONOCYTES # BLD AUTO: 0.74 10*3/MM3 (ref 0–0.9)
MONOCYTES NFR BLD AUTO: 7.8 % (ref 0–12)
NEUTROPHILS # BLD AUTO: 7.02 10*3/MM3 (ref 2–8.6)
NEUTROPHILS NFR BLD AUTO: 74.4 % (ref 37–80)
PLATELET # BLD AUTO: 170 10*3/MM3 (ref 150–450)
PMV BLD AUTO: 10.3 FL (ref 8–12)
POTASSIUM BLD-SCNC: 4.3 MMOL/L (ref 3.5–5.1)
RBC # BLD AUTO: 3.62 10*6/MM3 (ref 4.37–5.74)
SODIUM BLD-SCNC: 140 MMOL/L (ref 137–145)
VANCOMYCIN TROUGH SERPL-MCNC: 5.85 MCG/ML (ref 10–15)
WBC NRBC COR # BLD: 9.44 10*3/MM3 (ref 3.2–9.8)

## 2017-12-18 PROCEDURE — 80048 BASIC METABOLIC PNL TOTAL CA: CPT | Performed by: NURSE PRACTITIONER

## 2017-12-18 PROCEDURE — 86140 C-REACTIVE PROTEIN: CPT | Performed by: FAMILY MEDICINE

## 2017-12-18 PROCEDURE — 25010000002 VANCOMYCIN PER 500 MG: Performed by: FAMILY MEDICINE

## 2017-12-18 PROCEDURE — 63710000001 INSULIN ASPART PER 5 UNITS: Performed by: NURSE PRACTITIONER

## 2017-12-18 PROCEDURE — 82962 GLUCOSE BLOOD TEST: CPT

## 2017-12-18 PROCEDURE — 94799 UNLISTED PULMONARY SVC/PX: CPT

## 2017-12-18 PROCEDURE — 94760 N-INVAS EAR/PLS OXIMETRY 1: CPT

## 2017-12-18 PROCEDURE — 93005 ELECTROCARDIOGRAM TRACING: CPT | Performed by: FAMILY MEDICINE

## 2017-12-18 PROCEDURE — 85025 COMPLETE CBC W/AUTO DIFF WBC: CPT | Performed by: NURSE PRACTITIONER

## 2017-12-18 PROCEDURE — 93010 ELECTROCARDIOGRAM REPORT: CPT | Performed by: INTERNAL MEDICINE

## 2017-12-18 PROCEDURE — 83605 ASSAY OF LACTIC ACID: CPT | Performed by: NURSE PRACTITIONER

## 2017-12-18 PROCEDURE — 97530 THERAPEUTIC ACTIVITIES: CPT

## 2017-12-18 PROCEDURE — 97110 THERAPEUTIC EXERCISES: CPT

## 2017-12-18 PROCEDURE — 63710000001 PREDNISONE PER 1 MG: Performed by: FAMILY MEDICINE

## 2017-12-18 PROCEDURE — 97116 GAIT TRAINING THERAPY: CPT

## 2017-12-18 PROCEDURE — 97535 SELF CARE MNGMENT TRAINING: CPT

## 2017-12-18 PROCEDURE — 80202 ASSAY OF VANCOMYCIN: CPT | Performed by: FAMILY MEDICINE

## 2017-12-18 RX ADMIN — IPRATROPIUM BROMIDE AND ALBUTEROL SULFATE 3 ML: 2.5; .5 SOLUTION RESPIRATORY (INHALATION) at 23:18

## 2017-12-18 RX ADMIN — PREDNISONE 20 MG: 20 TABLET ORAL at 08:31

## 2017-12-18 RX ADMIN — MAGNESIUM OXIDE TAB 400 MG (241.3 MG ELEMENTAL MG) 400 MG: 400 (241.3 MG) TAB at 08:31

## 2017-12-18 RX ADMIN — GLIPIZIDE 5 MG: 5 TABLET ORAL at 08:30

## 2017-12-18 RX ADMIN — WATER 1 G: 1 INJECTION INTRAMUSCULAR; INTRAVENOUS; SUBCUTANEOUS at 09:08

## 2017-12-18 RX ADMIN — WATER 1 G: 1 INJECTION INTRAMUSCULAR; INTRAVENOUS; SUBCUTANEOUS at 00:15

## 2017-12-18 RX ADMIN — WATER 1 G: 1 INJECTION INTRAMUSCULAR; INTRAVENOUS; SUBCUTANEOUS at 14:56

## 2017-12-18 RX ADMIN — FLUTICASONE PROPIONATE 2 SPRAY: 50 SPRAY, METERED NASAL at 08:30

## 2017-12-18 RX ADMIN — SODIUM CHLORIDE 50 ML/HR: 900 INJECTION, SOLUTION INTRAVENOUS at 13:32

## 2017-12-18 RX ADMIN — IPRATROPIUM BROMIDE AND ALBUTEROL SULFATE 3 ML: 2.5; .5 SOLUTION RESPIRATORY (INHALATION) at 07:07

## 2017-12-18 RX ADMIN — RANOLAZINE 500 MG: 500 TABLET, FILM COATED, EXTENDED RELEASE ORAL at 08:30

## 2017-12-18 RX ADMIN — IPRATROPIUM BROMIDE AND ALBUTEROL SULFATE 3 ML: 2.5; .5 SOLUTION RESPIRATORY (INHALATION) at 14:34

## 2017-12-18 RX ADMIN — HYDROCODONE BITARTRATE AND ACETAMINOPHEN 1 TABLET: 7.5; 325 TABLET ORAL at 13:34

## 2017-12-18 RX ADMIN — CLOPIDOGREL BISULFATE 75 MG: 75 TABLET ORAL at 08:30

## 2017-12-18 RX ADMIN — LISINOPRIL 5 MG: 5 TABLET ORAL at 08:30

## 2017-12-18 RX ADMIN — DOCUSATE SODIUM 100 MG: 100 CAPSULE, LIQUID FILLED ORAL at 08:30

## 2017-12-18 RX ADMIN — VANCOMYCIN HYDROCHLORIDE 1000 MG: 1 INJECTION, POWDER, LYOPHILIZED, FOR SOLUTION INTRAVENOUS at 17:09

## 2017-12-18 RX ADMIN — HYDROCODONE BITARTRATE AND ACETAMINOPHEN 1 TABLET: 7.5; 325 TABLET ORAL at 08:32

## 2017-12-18 RX ADMIN — RANOLAZINE 500 MG: 500 TABLET, FILM COATED, EXTENDED RELEASE ORAL at 20:48

## 2017-12-18 RX ADMIN — INSULIN ASPART 2 UNITS: 100 INJECTION, SOLUTION INTRAVENOUS; SUBCUTANEOUS at 20:49

## 2017-12-18 RX ADMIN — DONEPEZIL HYDROCHLORIDE 10 MG: 10 TABLET ORAL at 08:30

## 2017-12-18 RX ADMIN — ASPIRIN 81 MG 81 MG: 81 TABLET ORAL at 08:30

## 2017-12-18 RX ADMIN — ISOSORBIDE MONONITRATE 30 MG: 30 TABLET, EXTENDED RELEASE ORAL at 08:30

## 2017-12-18 RX ADMIN — FAMOTIDINE 40 MG: 40 TABLET ORAL at 08:30

## 2017-12-18 NOTE — THERAPY TREATMENT NOTE
Acute Care - Occupational Therapy Treatment Note  [facility]     Patient Name: Hasmukh Ham  : 1933  MRN: [id number]  Today's Date: 2017  Onset of Illness/Injury or Date of Surgery Date: 17  Date of Referral to OT: 17  Referring Physician: EVE Birch      Admit Date: 2017    Visit Dx:     ICD-10-CM ICD-9-CM   1. Chest pain, unspecified type R07.9 786.50   2. Elevated troponin R74.8 790.6   3. Abnormal x-ray R93.8 793.99   4. Impaired mobility and ADLs Z74.09 799.89   5. 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   • Nocturia   • Osteoarthritis of multiple joints   • Hyperlipidemia   • Pain in joint involving ankle and foot   • Arthropathy of hand   • Paroxysmal tachycardia   • Osteoarthrosis involving more than one site but not generalized   • Right shoulder pain   • Rotator cuff syndrome   • Partial tear of subscapularis tendon   • Infraspinatus tendon tear   • Supraspinatus tendon tear   • Bilateral carotid artery stenosis   • (HFpEF) heart failure with preserved ejection fraction   • Pulmonary HTN   • Acute interstitial pneumonia   • Chronic obstructive lung disease   • Coronary arteriosclerosis   • Diabetes mellitus   • Hypertension   • Chest pain   • Shortness of breath             Adult Rehabilitation Note       17 0930 17 0745 17 1013    Rehab Assessment/Intervention    Discipline physical therapy assistant  -TA occupational therapy assistant  -KD occupational  therapy assistant  -BB    Document Type therapy note (daily note)  -TA therapy note (daily note)  -KD therapy note (daily note)  -BB    Subjective Information agree to therapy  -TA agree to therapy  -KD agree to therapy  -BB    Patient Effort, Rehab Treatment good  -TA good  -KD good  -BB    Symptoms Noted During/After Treatment   none  -BB    Precautions/Limitations fall precautions;oxygen therapy device and L/min  -TA fall precautions;oxygen therapy device and L/min  -KD fall precautions;oxygen therapy device and L/min  -BB    Recorded by [TA] Kayli Kumar PTA [KD] CHANDU GilmoreA/L [BB] Susannah Villalta, MCCLELLAND/L    Vital Signs    Pre Systolic BP Rehab  182  -  -BB    Pre Treatment Diastolic BP  83  -KD 68  -BB    Pretreatment Heart Rate (beats/min) 74  -TA 89  -KD 76  -BB    Intratreatment Heart Rate (beats/min) 90  -TA      Posttreatment Heart Rate (beats/min) 91  -TA 90  -KD 73  -BB    Pre SpO2 (%) 98  -  -KD 98  -BB    O2 Delivery Pre Treatment supplemental O2  -TA supplemental O2  -KD supplemental O2  -BB    Intra SpO2 (%) 97  -TA  97  -BB    O2 Delivery Intra Treatment   supplemental O2  -BB    Post SpO2 (%) 97  -TA 96  -KD 99  -BB    O2 Delivery Post Treatment  supplemental O2  -KD supplemental O2  -BB    Pre Patient Position Supine  -TA Sitting  -KD Supine  -BB    Intra Patient Position  Standing  -KD Sitting  -BB    Post Patient Position Sitting  -TA Sitting  -KD Supine  -BB    Recorded by [TA] Kayli Kumar PTA [KD] Erlinda Aguilar MCCLELLAND/L [BB] Susannah Villalta, MCC[place]LLAND/L    Pain Assessment    Pain Assessment No/denies pain  -TA No/denies pain  -KD 0-10  -BB    Pain Score  0  -KD 3  -BB    Post Pain Score  0  -KD 2  -BB    Pain Type   Chronic pain  -BB    Pain Location   Generalized  -BB    Pain Intervention(s)   Repositioned  -BB    Recorded by [TA] Kayli Kumar PTA [KD] Erlinda Aguilar MCCLELLAND/L [BB] Susannah Villalta MCCLELLAND/L    Cognitive Assessment/Intervention    Current  Cognitive/Communication Assessment functional  -TA functional  -KD functional  -BB    Orientation Status oriented x 4  -TA oriented x 4  -KD oriented x 4  -BB    Follows Commands/Answers Questions 100% of the time  -% of the time  -% of the time  -BB    Personal Safety mild impairment  -TA mild impairment  -KD mild impairment   discussed AE, safety, EC/W/S,PLB  -BB    Personal Safety Interventions gait belt;nonskid shoes/slippers when out of bed  -TA gait belt;nonskid shoes/slippers when out of bed  -KD gait belt;nonskid shoes/slippers when out of bed;supervised activity  -BB    Recorded by [TA] Kayli Kumar PTA [KD] KRYSTIN Gilmore/GEOFFREY [BB] CHANDU MorrisonA/L    Bed Mobility, Assessment/Treatment    Bed Mobility, Assistive Device   bed rails;head of bed elevated  -BB    Bed Mob, Supine to Sit, [place] independent  -TA  conditional independence  -BB    Bed Mob, Sit to Supine, [place] independent  -TA  conditional independence  -BB    Recorded by [TA] Kayli Kumar PTA  [BB] CHANDU MorrisonA/L    Transfer Assessment/Treatment    Transfers, Sit-Stand [place] independent  -TA supervision required  -KD conditional independence  -BB    Transfers, Stand-Sit [place] independent  -TA supervision required  -KD conditional independence  -BB    Transfers, Sit-Stand-Sit, Assist Device  rolling walker  -KD     Toilet Transfer, [place]   supervision required  -BB    Transfer, Safety Issues impulsivity  -TA      Recorded by [TA] Kayli Kumar PTA [KD] CHANDU GilmoreA/GEOFFREY [BB] CHANDU MorrisonA/L    Gait Assessment/Treatment    Gait, [place] Level stand by assist  -TA      Gait, Assistive Device other (see comments)   no AD  -TA      Gait, Distance (Feet) 400  -TA      Recorded by [TA] Kayli Kumar PTA      Functional Mobility    Functional Mobility- Ind. Level  conditional independence  -KD     Functional Mobility- Device  --   none  -KD      Functional Mobility-Distance (Feet)  --   45 then 280'  -KD     Recorded by  [KD] KRYSTIN Gilmore/L     Upper Body Bathing Assessment/Training    UB Bathing Assess/Train, Comment   pt defers ADL  -BB    Recorded by   [BB] SONIA Morrison    Toileting Assessment/Training    Toileting Assess/Train, Assistive Device   grab bars  -BB    Toileting Assess/Train, Position   sitting;standing  -BB    Toileting Assess/Train, Indepen Level   supervision required  -BB    Recorded by   [BB] KRYSTIN Morrison/L    Grooming Assessment/Training    Grooming Assess/Train, Position   sink side;standing  -BB    Grooming Assess/Train, Comment   wash face and hands  -BB    Recorded by   [BB] SONIA Morrison    Balance Skills Training    Standing-Level of Assistance  Close supervision  -KD     Standing-Balance Activities  --   dynamic  -KD     Standing Balance # of Minutes  --   2.5  -KD     Recorded by  [KD] KRYSTIN Gilmore/L     Therapy Exercises    Bilateral Lower Extremities AROM:;sitting;ankle pumps/circles;15 reps;hip abduction/adduction;hip flexion;LAQ  -TA      Bilateral Upper Extremity  AROM:;20 reps;sitting;elbow flexion/extension;pronation/supination;shoulder abduction/adduction;shoulder extension/flexion;shoulder ER/IR;shoulder horizontal abd/add  -KD     BUE Resistance  manual resistance- moderate  -KD     Recorded by [TA] Kayli Kumar PTA [KD] KRYSTIN Gilmore/L     Positioning and Restraints    Pre-Treatment Position in bed  -TA in bed  -KD in bed  -BB    Post Treatment Position bed  -TA bed  -KD bed  -BB    In Bed sitting EOB;call light within reach;with family/caregiver  -TA sitting EOB;call light within reach;encouraged to call for assist;exit alarm on  -KD supine;encouraged to call for assist;exit alarm on;call light within reach  -BB    Recorded by [TA] Kayli Kumar PTA [KD] CHANDU GilmoreA/GEOFFREY [BB] SONIA Morrison      12/17/17 0850 12/15/17 3836        Rehab Assessment/Intervention    Discipline physical therapy assistant  -TESS physical therapy assistant  -TESS     Document Type therapy note (daily note)  -TESS therapy note (daily note)  -TESS     Subjective Information agree to therapy  -TESS agree to therapy  -TESS     Patient Effort, Rehab Treatment good  -TESS excellent  -TESS     Patient Effort, Rehab Treatment Comment pt reports haveing low HR last night. nsg agrees to tx.   -TESS nsg agrees to therapy.  -TESS     Precautions/Limitations fall precautions;oxygen therapy device and L/min  -TESS fall precautions;oxygen therapy device and L/min  -TESS     Recorded by [TESS] Cristobal Mansfield PTA [TESS] Cristobal Mansfield PTA     Vital Signs    Pre Systolic BP Rehab 140  -TESS 115  -TESS     Pre Treatment Diastolic BP 74  -TESS 64  -TESS     Post Systolic BP Rehab 148  -TESS 149  -TESS     Post Treatment Diastolic BP 72  -TESS 67  -TESS     Pretreatment Heart Rate (beats/min) 74  -TESS 85  -TESS     Intratreatment Heart Rate (beats/min) 90  -TESS 84  -TESS     Posttreatment Heart Rate (beats/min) 72  -TESS 82  -TESS     Pre SpO2 (%) 99  -TESS 98  -TESS     O2 Delivery Pre Treatment supplemental O2  -TESS supplemental O2  -TESS     Intra SpO2 (%) 98  -TESS 98  -TESS     O2 Delivery Intra Treatment supplemental O2  -TESS supplemental O2  -TESS     Post SpO2 (%) 99  -TESS 98  -TESS     O2 Delivery Post Treatment supplemental O2  -TESS supplemental O2  -TESS     Pre Patient Position Supine  -TESS Supine  -TESS     Post Patient Position Supine  -TESS Supine  -TESS     Recorded by [TESS] Cristobal Mansfield PTA [TESS] Cristobal Mansfield PTA     Pain Assessment    Pain Assessment 0-10  -TESS 0-10  -TESS     Pain Score 7  -TESS 6  -TESS     Post Pain Score 7  -TESS 6  -TESS     Pain Type  Chronic pain  -TESS     Pain Location Generalized  -TESS Back  -TESS     Pain Intervention(s) Ambulation/increased activity;Repositioned  -TESS Ambulation/increased activity;Repositioned  -TESS     Recorded by [TESS] Cristobal Mansfield PTA [TESS] Cristobal Mansfield PTA     Vision Assessment/Intervention    Visual  Impairment WFL with corrective lenses  -TESS WFL with corrective lenses  -TESS     Recorded by [TESS] Cristobal Mansfield PTA [TESS] Cristobal Mansfield PTA     Cognitive Assessment/Intervention    Current Cognitive/Communication Assessment functional  -TESS functional  -TESS     Orientation Status oriented x 4  -TESS oriented x 4  -TESS     Follows Commands/Answers Questions 100% of the time  -TESS 100% of the time  -TESS     Personal Safety Interventions gait belt;muscle strengthening facilitated;nonskid shoes/slippers when out of bed;supervised activity  -TESS gait belt;muscle strengthening facilitated;nonskid shoes/slippers when out of bed;supervised activity  -TESS     Recorded by [TESS] Cristobal Mansfield PTA [TESS] Cristobal Mansfield PTA     Bed Mobility, Assessment/Treatment    Bed Mobility, Assistive Device bed rails;head of bed elevated  -TESS bed rails;head of bed elevated  -TESS     Bed Mob, Supine to Sit, [place] conditional independence  -TESS conditional independence  -TESS     Bed Mob, Sit to Supine, [place] conditional independence  -TESS conditional independence  -TESS     Recorded by [TESS] Cristobal Mansfield PTA [TESS] Cristobal Mansfield PTA     Transfer Assessment/Treatment    Transfers, Sit-Stand [place] conditional independence  -TESS conditional independence;supervision required  -TESS     Transfers, Stand-Sit [place] conditional independence  -TESS conditional independence;supervision required  -TESS     Transfers, Sit-Stand-Sit, Assist Device --   no AD  -TESS      Recorded by [TESS] Cristobal Mansfield PTA [TESS] Cristobal Mansfield PTA     Gait Assessment/Treatment    Gait, [place] Level stand by assist;conditional independence  -TESS supervision required;conditional independence  -TESS     Gait, Assistive Device --   70, 305  -TESS --   no AD  -TESS     Gait, Distance (Feet)  --   430, 292  -TESS     Gait, Gait Pattern Analysis swing-through gait  -TESS swing-through gait  -TESS     Gait, Safety Issues supplemental O2  -TESS supplemental O2  -TESS     Gait,  Comment pt likes to talk throughout gait trip but sometimes needs cueing to focus on breathing.   -TESS      Recorded by [TESS] Cristobal Mansfield PTA [TESS] Cristobal Mansfield PTA     Stairs Assessment/Treatment    Number of Stairs  5  -TSES     Stairs, Handrail Location  both sides  -TESS     Stairs, [place] Level  independent  -TESS     Recorded by  [TESS] Cristobal Mansfield PTA     Therapy Exercises    Bilateral Lower Extremities AROM:;15 reps;sitting;ankle pumps/circles;LAQ;hip flexion;hip abduction/adduction   10 reps of ab/ad  -TESS      Recorded by [TESS] Cristobal Mansfield PTA      Positioning and Restraints    Pre-Treatment Position in bed  -TESS in bed  -TESS     Post Treatment Position bed  -TESS bed  -TESS     In Bed supine;call light within reach;encouraged to call for assist;with family/caregiver   all needs met.   -TESS supine;call light within reach;encouraged to call for assist;with family/caregiver   all needs met.   -TESS     Recorded by [TESS] Cristobal Mansfield PTA [TESS] Cristobal Mansfield PTA       User Key  (r) = Recorded By, (t) = Taken By, (c) = Cosigned By    Initials Name Effective Dates    TA Kayli Kumar, PTA 10/17/16 -     TESS Cristobal Mansfield, PTA 10/17/16 -     BB Susannah Villalta, MCCLELLAND/L 10/17/16 -     KD Erlinda Aguilar, MCCLELLAND/L 10/17/16 -                 OT Goals       12/18/17 1317 12/17/17 1435 12/15/17 0843    Transfer Training OT LTG    Transfer Training OT LTG, Date Goal Reviewed 12/18/17  -KD  12/15/17  -KD    Transfer Training OT LTG, Outcome goal not met  -KD  goal not met  -KD    Dynamic Standing Balance OT LTG    Dynamic Standing Balance OT LTG, Date Goal Reviewed 12/18/17  -KD 12/17/17  -BB 12/15/17  -KD    Dynamic Standing Balance OT LTG, Outcome goal not met  -KD goal ongoing  -BB goal not met  -KD    Safety Awareness OT LTG    Safety Awareness OT LTG, Date Goal Reviewed 12/18/17  -KD 12/17/17  -BB 12/15/17  -KD    Safety Awareness OT LTG, Outcome goal not met  -KD  goal not met  -KD    ADL OT LTG    ADL OT  LTG, Date Goal Reviewed 12/18/17  -KD 12/17/17  -BB 12/15/17  -KD    ADL OT LTG, Outcome goal not met  -KD  goal not met  -KD      12/14/17 1648          Transfer Training OT LTG    Transfer Training OT LTG, Date Established 12/14/17  -RW      Transfer Training OT LTG, Time to Achieve by discharge  -RW      Transfer Training OT LTG, Activity Type toilet;tub   simulated tub t/f  -RW      Transfer Training OT LTG, [place] Level conditional independence  -RW      Dynamic Standing Balance OT LTG    Dynamic Standing Balance OT LTG, Date Established 12/14/17  -RW      Dynamic Standing Balance OT LTG, Time to Achieve by discharge  -RW      Dynamic Standing Balance OT LTG, [place] Level conditional independence  -RW      Dynamic Standing Balance OT LTG, Additional Goal 5 min functional activity  -RW      Safety Awareness OT LTG    Safety Awareness OT LTG, Date Established 12/14/17  -RW      Safety Awareness OT LTG, Time to Achieve by discharge  -RW      Safety Awareness OT LTG, Activity Type good safety awareness;with ADL's  -RW      ADL OT LTG    ADL OT LTG, Date Established 12/14/17  -RW      ADL OT LTG, Time to Achieve by discharge  -RW      ADL OT LTG, Activity Type ADL skills  -RW      ADL OT LTG, [place] Level modified independent  -RW        User Key  (r) = Recorded By, (t) = Taken By, (c) = Cosigned By    Initials Name Provider Type    RW Deborah Mcqueen, OTR/L Occupational Therapist    BB Susannah Villalta, MCCLELLAND/L Occupational Therapy Assistant    KD Erlinda Aguilar MCCLELLAND/L Occupational Therapy Assistant          Occupational Therapy Education     Title: PT OT SLP Therapies (Active)     Topic: Occupational Therapy (Active)     Point: ADL training (Active)    Description: Instruct learner(s) on proper safety adaptation and remediation techniques during self care or transfers.   Instruct in proper use of assistive devices.    Learning Progress Summary    Learner Readiness Method Response Comment  Documented by Status   Patient Acceptance E [facility] 12/17/17 1434 Done    Acceptance E NR fall precautions, transfer safety, OT POC RW 12/14/17 1647 Active   Family Acceptance E NR fall precautions, transfer safety, OT POC RW 12/14/17 1647 Active               Point: Precautions (Active)    Description: Instruct learner(s) on prescribed precautions during self-care and functional transfers.    Learning Progress Summary    Learner Readiness Method Response Comment Documented by Status   Patient Acceptance E [facility] 12/17/17 1434 Done    Acceptance E NR fall precautions, transfer safety, OT POC RW 12/14/17 1647 Active   Family Acceptance E NR fall precautions, transfer safety, OT POC RW 12/14/17 1647 Active               Point: Body mechanics (Done)    Description: Instruct learner(s) on proper positioning and spine alignment during self-care, functional mobility activities and/or exercises.    Learning Progress Summary    Learner Readiness Method Response Comment Documented by Status   Patient Acceptance E [facility] 12/17/17 1434 Done                      User Key     Initials Effective Dates Name Provider Type Discipline     10/17/16 -  Deborah Mcqueen OTR/L Occupational Therapist OT     10/17/16 -  KRYSTIN Morrison/L Occupational Therapy Assistant OT                  OT Recommendation and Plan  Anticipated Discharge Disposition: home with home health  Planned Therapy Interventions: activity intolerance, adaptive equipment training, ADL retraining, balance training, energy conservation, home exercise program, strengthening, transfer training  Therapy Frequency: other (see comments) (3-14 times/wk)  Plan of Care Review  Outcome Summary/Follow up Plan: No new goals met this date        Outcome Measures       12/18/17 1100 12/18/17 0745 12/17/17 0850    How much help from another person do you currently need...    Turning from your back to your side while in flat bed without using bedrails? 4  -TA  4  -TESS     Moving from lying on back to sitting on the side of a flat bed without bedrails? 4  -TA  4  -TESS    Moving to and from a bed to a chair (including a wheelchair)? 3  -TA  3  -TESS    Standing up from a chair using your arms (e.g., wheelchair, bedside chair)? 3  -TA  3  -TESS    Climbing 3-5 steps with a railing? 4  -TA  4  -TESS    To walk in hospital room? 3  -TA  3  -TESS    AM-PAC 6 Clicks Score 21  -TA  21  -TESS    How much help from another is currently needed...    Putting on and taking off regular lower body clothing?  3  -KD     Bathing (including washing, rinsing, and drying)  3  -KD     Toileting (which includes using toilet bed pan or urinal)  3  -KD     Putting on and taking off regular upper body clothing  4  -KD     Taking care of personal grooming (such as brushing teeth)  4  -KD     Eating meals  4  -KD     Score  21  -KD     Functional Assessment    Outcome Measure Options AM-PAC 6 Clicks Basic Mobility (PT)  -TA  AM-PAC 6 Clicks Basic Mobility (PT)  -TESS      12/15/17 6678          How much help from another person do you currently need...    Turning from your back to your side while in flat bed without using bedrails? 4  -TESS      Moving from lying on back to sitting on the side of a flat bed without bedrails? 4  -TESS      Moving to and from a bed to a chair (including a wheelchair)? 3  -TESS      Standing up from a chair using your arms (e.g., wheelchair, bedside chair)? 3  -TESS      Climbing 3-5 steps with a railing? 4  -TESS      To walk in hospital room? 3  -TESS      AM-PAC 6 Clicks Score 21  -TESS      Functional Assessment    Outcome Measure Options AM-PAC 6 Clicks Basic Mobility (PT)  -TESS        User Key  (r) = Recorded By, (t) = Taken By, (c) = Cosigned By    Initials Name Provider Type    WALT Kumar PTA Physical Therapy Assistant    TESS Mansfield PTA Physical Therapy Assistant    SONIA Roca Occupational Therapy Assistant           Time Calculation:         Time Calculation- OT        12/18/17 1320          Time Calculation- OT    OT Start Time 0745  -KD      OT Stop Time 0900  -KD      OT Time Calculation (min) 75 min  -KD      Total Timed Code Minutes- OT 75 minute(s)  -KD      OT Received On 12/18/17  -KD        User Key  (r) = Recorded By, (t) = Taken By, (c) = Cosigned By    Initials Name Provider Type     KRYSTIN Gilmore/L Occupational Therapy Assistant           Therapy Charges for Today     Code Description Service Date Service Provider Modifiers Qty    57347731096 HC OT THER PROC EA 15 MIN 12/18/2017 KRYSTIN Gilmore/L GO 3    63621496239 HC OT THERAPEUTIC ACT EA 15 MIN 12/18/2017 KRYSTIN Gilmore/L GO 1    76540898817 HC OT SELF CARE/MGMT/TRAIN EA 15 MIN 12/18/2017 KRYSTIN Gilmore/L GO 1          OT G-codes  OT Professional Judgement Used?: Yes  OT Functional Scales Options: AM-PAC 6 Clicks Daily Activity (OT)  Score: 20  Functional Limitation: Self care  Self Care Current Status (): At least 20 percent but less than 40 percent impaired, limited or restricted  Self Care Goal Status (): At least 1 percent but less than 20 percent impaired, limited or restricted    SONIA Gilmore  12/18/2017

## 2017-12-18 NOTE — PLAN OF CARE
Problem: Acute Coronary Syndrome (ACS) (Adult)  Goal: Signs and Symptoms of Listed Potential Problems Will be Absent or Manageable (Acute Coronary Syndrome)  Outcome: Ongoing (interventions implemented as appropriate)    Problem: Patient Care Overview (Adult)  Goal: Plan of Care Review  Outcome: Ongoing (interventions implemented as appropriate)    12/17/17 4773   Coping/Psychosocial Response Interventions   Plan Of Care Reviewed With patient   Patient Care Overview   Progress progress toward functional goals as expected   Outcome Evaluation   Outcome Summary/Follow up Plan Increased swelling in BLE, lactic acid levels are improving. HR runs 70's but sometimes dips down in the 40's       Goal: Adult Individualization and Mutuality  Outcome: Ongoing (interventions implemented as appropriate)  Goal: Discharge Needs Assessment  Outcome: Ongoing (interventions implemented as appropriate)    Problem: Pain, Chronic (Adult)  Goal: Acceptable Pain Control/Comfort Level  Outcome: Ongoing (interventions implemented as appropriate)    Problem: Fall Risk (Adult)  Goal: Absence of Falls  Outcome: Ongoing (interventions implemented as appropriate)    Problem: Cardiac: Heart Failure (Adult)  Goal: Signs and Symptoms of Listed Potential Problems Will be Absent or Manageable (Cardiac: Heart Failure)  Outcome: Ongoing (interventions implemented as appropriate)

## 2017-12-18 NOTE — PROGRESS NOTES
"Pharmacokinetics by Pharmacy - Vancomycin    Hasmukh Ham is a 84 y.o. male  [Ht: 170.2 cm (67\"); Wt: 76.6 kg (168 lb 14.4 oz)]    Estimated Creatinine Clearance: 70.9 mL/min (by C-G formula based on Cr of 0.84).   Lab Results   Component Value Date    CREATININE 0.84 12/18/2017    CREATININE 1.06 12/17/2017    CREATININE 0.99 12/16/2017      Lab Results   Component Value Date    WBC 9.44 12/18/2017    WBC 10.66 (H) 12/17/2017    WBC 14.69 (H) 12/16/2017      Temp Readings from Last 1 Encounters:   12/18/17 97.5 °F (36.4 °C) (Temporal Artery )      Lab Results   Component Value Date    VANCOPEAK 20.38 (L) 12/17/2017         Culture Results:  Microbiology Results (last 10 days)       Procedure Component Value - Date/Time      S. Pneumo Ag Urine or CSF - Urine, Urine, Clean Catch [[id number]]  (Normal) Collected:  12/15/17 1137    Lab Status:  Final result Specimen:  Urine from Urine, Clean Catch Updated:  12/15/17 1241     Strep Pneumo Ag Negative    Blood Culture - Blood, [[id number]]  (Normal) Collected:  12/15/17 1131    Lab Status:  Preliminary result Specimen:  Blood from Arm, Right Updated:  12/17/17 1146     Blood Culture No growth at 2 days    Mycoplasma Pneumoniae PCR - Swab, Throat [[id number]] Collected:  12/15/17 1109    Lab Status:  Final result Specimen:  Swab from Throat Updated:  12/15/17 1237     MYCOPLASMAE PNEUMONIAE BY PCR Negative    Narrative:       This test is performed by utilizing real time polymerace chain recation (PCR).     Urine Culture - Urine, Urine, Clean Catch [[id number]] Collected:  12/14/17 1041    Lab Status:  Final result Specimen:  Urine from Urine, Clean Catch Updated:  12/15/17 0642     Urine Culture --      Mixed Roge Isolated    Narrative:         Specimen contains mixed organisms of questionable pathogenicity which indicates contamination with commensal roge.  Further identification is unlikely to provide clinically useful information.  Suggest recollection.           "     Indication for use: sepsis d/t pneumonia    Current Vancomycin Dose:  1000 mg IVPB every 24 hours, day 4 of therapy.      Assessment/Plan:  Reviewed above labs and cultures. Vancomycin peak level 12/17 at 1922 was 20.38, slightly lower than calculated. Level was drawn 1 hour late as well, making actual level higher. Vancomycin trough level is scheduled for today at 1530. Pharmacy will continue to monitor renal function and adjust dose accordingly.    Shanika Dickey RPH   12/18/17 10:30 AM

## 2017-12-18 NOTE — PLAN OF CARE
Problem: Patient Care Overview (Adult)  Goal: Plan of Care Review  Outcome: Ongoing (interventions implemented as appropriate)  Goal: Discharge Needs Assessment  Outcome: Ongoing (interventions implemented as appropriate)    12/14/17 1333 12/14/17 1423 12/14/17 1539   Discharge Needs Assessment   Concerns To Be Addressed --  adjustment to diagnosis/illness concerns --    Concerns Comments Noted hospice was discussed with patient. He nor fmaly are agreeable and request to remain full code. --  --    Readmission Within The Last 30 Days no previous admission in last 30 days --  --    Equipment Needed After Discharge none --  --    Discharge Facility/Level Of Care Needs home with home health  (Note left for MD with request for  services skilled v's transition visit. ) --  --    Current Discharge Risk chronically ill  (COPD and CHF) --  --    Current Health   Anticipated Changes Related to Illness none --  --    Living Environment   Transportation Available --  --  car;family or friend will provide   Self-Care   Equipment Currently Used at Home --  --  walker, rolling;oxygen;shower chair;commode  (uses BSC over toilet;has SCdoes not use)         Problem: Inpatient Occupational Therapy  Goal: Transfer Training Goal 1 LTG- OT  Outcome: Ongoing (interventions implemented as appropriate)    12/14/17 1648 12/18/17 1317   Transfer Training OT LTG   Transfer Training OT LTG, Date Established 12/14/17 --    Transfer Training OT LTG, Time to Achieve by discharge --    Transfer Training OT LTG, Activity Type toilet;tub  (simulated tub t/f) --    Transfer Training OT LTG, [place] Level conditional independence --    Transfer Training OT LTG, Date Goal Reviewed --  12/18/17   Transfer Training OT LTG, Outcome --  goal not met       Goal: Dynamic Standing Balance Goal LTG-OT  Outcome: Ongoing (interventions implemented as appropriate)    12/14/17 1648 12/18/17 1317   Dynamic Standing Balance OT LTG   Dynamic Standing Balance  OT LTG, Date Established 12/14/17 --    Dynamic Standing Balance OT LTG, Time to Achieve by discharge --    Dynamic Standing Balance OT LTG, [place] Level conditional independence --    Dynamic Standing Balance OT LTG, Additional Goal 5 min functional activity --    Dynamic Standing Balance OT LTG, Date Goal Reviewed --  12/18/17   Dynamic Standing Balance OT LTG, Outcome --  goal not met       Goal: Safety Awareness Goal LTG- OT  Outcome: Ongoing (interventions implemented as appropriate)    12/14/17 1648 12/18/17 1317   Safety Awareness OT LTG   Safety Awareness OT LTG, Date Established 12/14/17 --    Safety Awareness OT LTG, Time to Achieve by discharge --    Safety Awareness OT LTG, Activity Type good safety awareness;with ADL's --    Safety Awareness OT LTG, Date Goal Reviewed --  12/18/17   Safety Awareness OT LTG, Outcome --  goal not met       Goal: ADL Goal LTG- OT  Outcome: Ongoing (interventions implemented as appropriate)    12/14/17 1648 12/18/17 1317   ADL OT LTG   ADL OT LTG, Date Established 12/14/17 --    ADL OT LTG, Time to Achieve by discharge --    ADL OT LTG, Activity Type ADL skills --    ADL OT LTG, [place] Level modified independent --    ADL OT LTG, Date Goal Reviewed --  12/18/17   ADL OT LTG, Outcome --  goal not met

## 2017-12-18 NOTE — THERAPY TREATMENT NOTE
Acute Care - Physical Therapy Treatment Note  [facility]     Patient Name: Hasmukh Ham  : 1933  MRN: [id number]  Today's Date: 2017  Onset of Illness/Injury or Date of Surgery Date: 17  Date of Referral to PT: 17  Referring Physician: EVE Birch    Admit Date: 2017    Visit Dx:    ICD-10-CM ICD-9-CM   1. Chest pain, unspecified type R07.9 786.50   2. Elevated troponin R74.8 790.6   3. Abnormal x-ray R93.8 793.99   4. Impaired mobility and ADLs Z74.09 799.89   5. 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   • Nocturia   • Osteoarthritis of multiple joints   • Hyperlipidemia   • Pain in joint involving ankle and foot   • Arthropathy of hand   • Paroxysmal tachycardia   • Osteoarthrosis involving more than one site but not generalized   • Right shoulder pain   • Rotator cuff syndrome   • Partial tear of subscapularis tendon   • Infraspinatus tendon tear   • Supraspinatus tendon tear   • Bilateral carotid artery stenosis   • (HFpEF) heart failure with preserved ejection fraction   • Pulmonary HTN   • Acute interstitial pneumonia   • Chronic obstructive lung disease   • Coronary arteriosclerosis   • Diabetes mellitus   • Hypertension   • Chest pain   • Shortness of breath               Adult Rehabilitation Note       17 0930 17 1013 17 0850    Rehab Assessment/Intervention    Discipline physical therapy assistant  -TA occupational therapy assistant  -BB physical therapy  assistant  -TESS    Document Type therapy note (daily note)  -TA therapy note (daily note)  -BB therapy note (daily note)  -TESS    Subjective Information agree to therapy  -TA agree to therapy  -BB agree to therapy  -TESS    Patient Effort, Rehab Treatment good  -TA good  -BB good  -TESS    Patient Effort, Rehab Treatment Comment   pt reports haveing low HR last night. nsg agrees to tx.   -TESS    Symptoms Noted During/After Treatment  none  -BB     Precautions/Limitations fall precautions;oxygen therapy device and L/min  -TA fall precautions;oxygen therapy device and L/min  -BB fall precautions;oxygen therapy device and L/min  -TESS    Recorded by [TA] Kayli Kumar PTA [BB] CHANDU MorrisonA/L [TESS] Cristobal Mansfield PTA    Vital Signs    Pre Systolic BP Rehab  136  -  -TESS    Pre Treatment Diastolic BP  68  -BB 74  -TESS    Post Systolic BP Rehab   148  -TESS    Post Treatment Diastolic BP   72  -TESS    Pretreatment Heart Rate (beats/min) 74  -TA 76  -BB 74  -TESS    Intratreatment Heart Rate (beats/min) 90  -TA  90  -TESS    Posttreatment Heart Rate (beats/min) 91  -TA 73  -BB 72  -TESS    Pre SpO2 (%) 98  -TA 98  -BB 99  -TESS    O2 Delivery Pre Treatment supplemental O2  -TA supplemental O2  -BB supplemental O2  -TESS    Intra SpO2 (%) 97  -TA 97  -BB 98  -TESS    O2 Delivery Intra Treatment  supplemental O2  -BB supplemental O2  -TESS    Post SpO2 (%) 97  -TA 99  -BB 99  -TESS    O2 Delivery Post Treatment  supplemental O2  -BB supplemental O2  -TESS    Pre Patient Position Supine  -TA Supine  -BB Supine  -TESS    Intra Patient Position  Sitting  -BB     Post Patient Position Sitting  -TA Supine  -BB Supine  -TESS    Recorded by [TA] Kayli Kumar PTA [BB] CHANDU MorrisonA/L [TESS] Cristobal Mansfield PTA    Pain Assessment    Pain Assessment No/denies pain  -TA 0-10  -BB 0-10  -TESS    Pain Score  3  -BB 7  -TESS    Post Pain Score  2  -BB 7  -TESS    Pain Type  Chronic pain  -BB     Pain Location  Generalized  -BB Generalized  -TESS     Pain Intervention(s)  Repositioned  -BB Ambulation/increased activity;Repositioned  -TESS    Recorded by [TA] Kayli Kumar PTA [BB] KRYSTIN Morrison/GEOFFREY [TESS] Cristobal Mansfield PTA    Vision Assessment/Intervention    Visual Impairment   WFL with corrective lenses  -TESS    Recorded by   [TESS] Cristobal Mansfield PTA    Cognitive Assessment/Intervention    Current Cognitive/Communication Assessment functional  -TA functional  -BB functional  -TESS    Orientation Status oriented x 4  -TA oriented x 4  -BB oriented x 4  -TESS    Follows Commands/Answers Questions 100% of the time  -% of the time  -% of the time  -TESS    Personal Safety mild impairment  -TA mild impairment   discussed AE, safety, EC/W/S,PLB  -BB     Personal Safety Interventions gait belt;nonskid shoes/slippers when out of bed  -TA gait belt;nonskid shoes/slippers when out of bed;supervised activity  -BB gait belt;muscle strengthening facilitated;nonskid shoes/slippers when out of bed;supervised activity  -TESS    Recorded by [TA] Kayli Kumar PTA [BB] KRYSTIN Morrison/L [TESS] Cristobal Mansfield PTA    Bed Mobility, Assessment/Treatment    Bed Mobility, Assistive Device  bed rails;head of bed elevated  -BB bed rails;head of bed elevated  -TESS    Bed Mob, Supine to Sit, [place] independent  -TA conditional independence  -BB conditional independence  -TESS    Bed Mob, Sit to Supine, [place] independent  -TA conditional independence  -BB conditional independence  -TESS    Recorded by [TA] Kayli Kumar PTA [BB] KRYSTIN Morrison/L [TESS] Cristobal Mansfield PTA    Transfer Assessment/Treatment    Transfers, Sit-Stand [place] independent  -TA conditional independence  -BB conditional independence  -TESS    Transfers, Stand-Sit [place] independent  -TA conditional independence  -BB conditional independence  -TESS    Transfers, Sit-Stand-Sit, Assist Device   --   no AD  -TESS    Toilet Transfer, [place]  supervision required   -BB     Transfer, Safety Issues impulsivity  -TA      Recorded by [TA] Kayli Kumar PTA [BB] KRYSTIN Morrison/GEOFFREY [TESS] Cristobal Mansfield PTA    Gait Assessment/Treatment    Gait, [place] stand by assist  -TA  stand by assist;conditional independence  -TESS    Gait, Assistive Device other (see comments)   no AD  -TA  --   70, 305  -TESS    Gait, Distance (Feet) 400  -TA      Gait, Gait Pattern Analysis   swing-through gait  -TESS    Gait, Safety Issues   supplemental O2  -TESS    Gait, Comment   pt likes to talk throughout gait trip but sometimes needs cueing to focus on breathing.   -TESS    Recorded by [TA] Kayli Kumar PTA  [TESS] Cristoabl Mansfield PTA    Upper Body Bathing Assessment/Training    UB Bathing Assess/Train, Comment  pt defers ADL  -BB     Recorded by  [BB] CHANDU MorrisonA/L     Toileting Assessment/Training    Toileting Assess/Train, Assistive Device  grab bars  -BB     Toileting Assess/Train, Position  sitting;standing  -BB     Toileting Assess/Train, Indepen Level  supervision required  -BB     Recorded by  [BB] CHANDU MorrisonA/L     Grooming Assessment/Training    Grooming Assess/Train, Position  sink side;standing  -BB     Grooming Assess/Train, Comment  wash face and hands  -BB     Recorded by  [BB] KRYSTIN Morrison/L     Therapy Exercises    Bilateral Lower Extremities AROM:;sitting;ankle pumps/circles;15 reps;hip abduction/adduction;hip flexion;LAQ  -TA  AROM:;15 reps;sitting;ankle pumps/circles;LAQ;hip flexion;hip abduction/adduction   10 reps of ab/ad  -TESS    Recorded by [TA] Kayli Kumar PTA  [TESS] Cristobal Mansfield PTA    Positioning and Restraints    Pre-Treatment Position in bed  -TA in bed  -BB in bed  -TESS    Post Treatment Position bed  -TA bed  -BB bed  -TESS    In Bed sitting EOB;call light within reach;with family/caregiver  -TA supine;encouraged to call for assist;exit alarm on;call light within reach  -BB supine;call light within reach;encouraged  to call for assist;with family/caregiver   all needs met.   -TESS    Recorded by [TA] Kayli Kumar PTA [BB] KRYSTIN Morrison/GEOFFREY [TESS] Cristobal Mansfield PTA      12/15/17 8326          Rehab Assessment/Intervention    Discipline physical therapy assistant  -TESS      Document Type therapy note (daily note)  -TESS      Subjective Information agree to therapy  -TESS      Patient Effort, Rehab Treatment excellent  -TESS      Patient Effort, Rehab Treatment Comment nsg agrees to therapy.  -TESS      Precautions/Limitations fall precautions;oxygen therapy device and L/min  -TESS      Recorded by [TESS] Cristobal Mansfield PTA      Vital Signs    Pre Systolic BP Rehab 115  -TESS      Pre Treatment Diastolic BP 64  -TESS      Post Systolic BP Rehab 149  -TESS      Post Treatment Diastolic BP 67  -TESS      Pretreatment Heart Rate (beats/min) 85  -TESS      Intratreatment Heart Rate (beats/min) 84  -TESS      Posttreatment Heart Rate (beats/min) 82  -TESS      Pre SpO2 (%) 98  -TESS      O2 Delivery Pre Treatment supplemental O2  -TESS      Intra SpO2 (%) 98  -TESS      O2 Delivery Intra Treatment supplemental O2  -TESS      Post SpO2 (%) 98  -TESS      O2 Delivery Post Treatment supplemental O2  -TESS      Pre Patient Position Supine  -TESS      Post Patient Position Supine  -TESS      Recorded by [TESS] Cristobal Mansfield PTA      Pain Assessment    Pain Assessment 0-10  -TESS      Pain Score 6  -TESS      Post Pain Score 6  -TESS      Pain Type Chronic pain  -TESS      Pain Location Back  -TESS      Pain Intervention(s) Ambulation/increased activity;Repositioned  -TESS      Recorded by [TESS] Cristobal Mansfield PTA      Vision Assessment/Intervention    Visual Impairment WFL with corrective lenses  -TESS      Recorded by [TESS] Crsitobal Mansfield PTA      Cognitive Assessment/Intervention    Current Cognitive/Communication Assessment functional  -TESS      Orientation Status oriented x 4  -TESS      Follows Commands/Answers Questions 100% of the time  -TESS      Personal Safety Interventions  gait belt;muscle strengthening facilitated;nonskid shoes/slippers when out of bed;supervised activity  -TESS      Recorded by [TESS] Cristobal Mansfield PTA      Bed Mobility, Assessment/Treatment    Bed Mobility, Assistive Device bed rails;head of bed elevated  -TESS      Bed Mob, Supine to Sit, [place] conditional independence  -TESS      Bed Mob, Sit to Supine, [place] conditional independence  -TESS      Recorded by [TESS] Cristobal Mansfield PTA      Transfer Assessment/Treatment    Transfers, Sit-Stand [place] conditional independence;supervision required  -TESS      Transfers, Stand-Sit [place] conditional independence;supervision required  -TESS      Recorded by [TESS] Cristobal Mansfield PTA      Gait Assessment/Treatment    Gait, [place] Level supervision required;conditional independence  -TESS      Gait, Assistive Device --   no AD  -TESS      Gait, Distance (Feet) --   430, 292  -TESS      Gait, Gait Pattern Analysis swing-through gait  -TESS      Gait, Safety Issues supplemental O2  -TESS      Recorded by [TESS] Cristobal Mansfield PTA      Stairs Assessment/Treatment    Number of Stairs 5  -TESS      Stairs, Handrail Location both sides  -TESS      Stairs, [place] Level independent  -TESS      Recorded by [TESS] Cristobal Mansfield PTA      Positioning and Restraints    Pre-Treatment Position in bed  -TESS      Post Treatment Position bed  -TESS      In Bed supine;call light within reach;encouraged to call for assist;with family/caregiver   all needs met.   -TESS      Recorded by [TESS] Cristobal Mansfield PTA        User Key  (r) = Recorded By, (t) = Taken By, (c) = Cosigned By    Initials Name Effective Dates    TA Kayli Kumar, [facility] 10/17/16 -     TESS Mansfield PTA 10/17/16 -     BB KRYSTIN Morrison/GEOFFREY 10/17/16 -                 IP PT Goals       12/18/17 0930 12/17/17 0850 12/15/17 1358    Transfer Training PT LTG    Transfer Training PT  LTG, Date Goal Reviewed  12/17/17  -TESS 12/15/17  -TESS    Transfer Training PT  LTG, Outcome  goal ongoing  -TESS goal ongoing  -TESS    Gait Training PT LTG    Gait Training Goal PT LTG, Date Goal Reviewed  12/17/17  -TESS 12/15/17  -TESS    Gait Training Goal PT LTG, Outcome  goal ongoing  -TESS goal ongoing  -    Stair Training PT LTG    Stair Training Goal PT LTG, Date Goal Reviewed   12/15/17  -TESS    Stair Training Goal PT LTG, Outcome   goal met  -TESS    Strength Goal PT LTG    Strength Goal PT LTG, Date Goal Reviewed   12/15/17  -TESS    Strength Goal PT LTG, Outcome goal met  -TA goal ongoing  -TESS goal ongoing  -    Patient Education PT LTG    Patient Education PT LTG, Date Goal Reviewed  12/17/17  -TESS 12/15/17  -TESS    Patient Education PT LTG Outcome  goal ongoing  - goal ongoing  -      12/14/17 1721          Transfer Training PT LTG    Transfer Training PT LTG, Date Established 12/14/17  -[facility]      Transfer Training PT LTG, Time to Achieve by discharge  -JCA      Transfer Training PT LTG, Activity Type bed to chair /chair to bed;sit to stand/stand to sit  -JCA      Transfer Training PT LTG, [place] Level conditional independence  -JCA      Transfer Training PT LTG, Outcome goal ongoing  -JCA      Gait Training PT LTG    Gait Training Goal PT LTG, Date Established 12/14/17  -JCA      Gait Training Goal PT LTG, Time to Achieve by discharge  -JCA      Gait Training Goal PT LTG, [place] Level conditional independence  -JCA      Gait Training Goal PT LTG, Distance to Achieve [id number]  -JCA      Gait Training Goal PT LTG, Outcome goal ongoing  -JCA      Stair Training PT LTG    Stair Training Goal PT LTG, Date Established 12/14/17  -JCA      Stair Training Goal PT LTG, Time to Achieve by discharge  -JCA      Stair Training Goal PT LTG, Number of Steps 5  -JCA      Stair Training Goal PT LTG, [place] Level conditional independence  -JCA      Stair Training Goal PT LTG, Assist Device 2 handrails  -JCA      Stair Training Goal PT LTG, Outcome goal ongoing  -JCA      Strength Goal PT  LTG    Strength Goal PT LTG, Date Established 12/14/17  -[facility]      Strength Goal PT LTG, Time to Achieve by discharge  -[facility]      Strength Goal PT LTG, Measure to Achieve Pt will tolerate 15 reps fo AROM exericises  -[facility]      Strength Goal PT LTG, Outcome goal ongoing  -[facility]      Patient Education PT LTG    Patient Education PT LTG, Date Established 12/14/17  -[facility]      Patient Education PT LTG, Time to Achieve by discharge  -[facility]      Patient Education PT LTG, Education Type gait;transfers;home safety  -[facility]      Patient Education PT LTG Outcome goal ongoing  -[facility]        User Key  (r) = Recorded By, (t) = Taken By, (c) = Cosigned By    Initials Name Provider Type    DOROTHY Joshua, PT Physical Therapist    WALT Kumar, PTA Physical Therapy Assistant    TESS Mansfield, NEFTALI Physical Therapy Assistant          Physical Therapy Education     Title: PT OT SLP Therapies (Active)     Topic: Physical Therapy (Active)     Point: Mobility training (Active)    Learning Progress Summary    Learner Readiness Method Response Comment Documented by Status   Patient Acceptance E DU edu given to back up until legs touch surface prior to sitting.  12/17/17 1244 Done    Acceptance E NR  TESS 12/15/17 1448 Active    Acceptance E NR  [facility] 12/14/17 1721 Active   Family Acceptance E NR  [facility] 12/14/17 1721 Active               Point: Precautions (Active)    Learning Progress Summary    Learner Readiness Method Response Comment Documented by Status   Patient Acceptance E NR  [facility] 12/14/17 1721 Active   Family Acceptance E NR  [facility] 12/14/17 1721 Active                      User Key     Initials Effective Dates Name Provider Type Discipline    [facility] 10/17/16 -  Gilda Joshua, PT Physical Therapist PT     10/17/16 -  Cristobal Mansfield, [facility] Physical Therapy Assistant PT                    PT Recommendation and Plan  Anticipated Discharge Disposition: home with assist, home with home health  Planned Therapy Interventions: balance training, gait  training, patient/family education, stair training, strengthening, transfer training  PT Frequency: other (see comments) (5-14 times per week)  Plan of Care Review  Outcome Summary/Follow up Plan: pt transfers with independence, pt ambulated 400` without AD with SBA, pt would benefit from HHPT @ D/C          Outcome Measures       12/18/17 1100 12/17/17 0850 12/15/17 1358    How much help from another person do you currently need...    Turning from your back to your side while in flat bed without using bedrails? 4  -TA 4  -TESS 4  -TESS    Moving from lying on back to sitting on the side of a flat bed without bedrails? 4  -TA 4  -TESS 4  -TESS    Moving to and from a bed to a chair (including a wheelchair)? 3  -TA 3  -TESS 3  -TESS    Standing up from a chair using your arms (e.g., wheelchair, bedside chair)? 3  -TA 3  -TESS 3  -TESS    Climbing 3-5 steps with a railing? 4  -TA 4  -TESS 4  -TESS    To walk in hospital room? 3  -TA 3  -TESS 3  -TESS    AM-PAC 6 Clicks Score 21  -TA 21  -TESS 21  -TESS    Functional Assessment    Outcome Measure Options AM-PAC 6 Clicks Basic Mobility (PT)  -TA AM-PAC 6 Clicks Basic Mobility (PT)  -TESS AM-PAC 6 Clicks Basic Mobility (PT)  -TESS      User Key  (r) = Recorded By, (t) = Taken By, (c) = Cosigned By    Initials Name Provider Type    WALT Kumar PTA Physical Therapy Assistant    TESS Mansfield PTA Physical Therapy Assistant           Time Calculation:         PT Charges       12/18/17 1134          Time Calculation    Start Time 0930  -TA      Stop Time 1008  -TA      Time Calculation (min) 38 min  -TA      Time Calculation- PT    Total Timed Code Minutes- PT 38 minute(s)  -TA        User Key  (r) = Recorded By, (t) = Taken By, (c) = Cosigned By    Initials Name Provider Type    WALT Kumar PTA Physical Therapy Assistant          Therapy Charges for Today     Code Description Service Date Service Provider Modifiers Qty    99207073085 HC GAIT TRAINING EA 15 MIN 12/18/2017 Kayli Kumar  PTA GP 1    92425300988 HC PT THERAPEUTIC ACT EA 15 MIN 12/18/2017 Kayli Kumar PTA GP 1    20737893372 HC PT THER PROC EA 15 MIN 12/18/2017 Kayli Kumar PTA GP 1          PT G-Codes  PT Professional Judgement Used?: Yes  Outcome Measure Options: AM-PAC 6 Clicks Basic Mobility (PT)  Score: 19  Functional Limitation: Mobility: Walking and moving around  Mobility: Walking and Moving Around Current Status (): At least 20 percent but less than 40 percent impaired, limited or restricted  Mobility: Walking and Moving Around Goal Status (): At least 1 percent but less than 20 percent impaired, limited or restricted    Kayli Kumar PTA  12/18/2017

## 2017-12-18 NOTE — PLAN OF CARE
Problem: Patient Care Overview (Adult)  Goal: Plan of Care Review  Outcome: Ongoing (interventions implemented as appropriate)    12/18/17 8233   Coping/Psychosocial Response Interventions   Plan Of Care Reviewed With patient   Patient Care Overview   Progress progress toward functional goals as expected   Outcome Evaluation   Outcome Summary/Follow up Plan pt ambulated and worked with therapy today no new complaints       Goal: Discharge Needs Assessment  Outcome: Ongoing (interventions implemented as appropriate)    Problem: Pain, Chronic (Adult)  Goal: Acceptable Pain Control/Comfort Level  Outcome: Ongoing (interventions implemented as appropriate)    Problem: Fall Risk (Adult)  Goal: Absence of Falls  Outcome: Ongoing (interventions implemented as appropriate)    Problem: Cardiac: Heart Failure (Adult)  Goal: Signs and Symptoms of Listed Potential Problems Will be Absent or Manageable (Cardiac: Heart Failure)  Outcome: Ongoing (interventions implemented as appropriate)

## 2017-12-18 NOTE — PLAN OF CARE
Problem: Patient Care Overview (Adult)  Goal: Plan of Care Review  Outcome: Ongoing (interventions implemented as appropriate)    12/17/17 1835 12/18/17 0909 12/18/17 0930   Coping/Psychosocial Response Interventions   Plan Of Care Reviewed With --  patient --    Patient Care Overview   Progress progress toward functional goals as expected --  --    Outcome Evaluation   Outcome Summary/Follow up Plan --  --  pt transfers with independence, pt ambulated 400` without AD with SBA, pt would benefit from HHPT @ D/C         Problem: Inpatient Physical Therapy  Goal: Transfer Training Goal 1 LTG- PT  Outcome: Ongoing (interventions implemented as appropriate)    12/14/17 1721 12/17/17 0850   Transfer Training PT LTG   Transfer Training PT LTG, Date Established 12/14/17 --    Transfer Training PT LTG, Time to Achieve by discharge --    Transfer Training PT LTG, Activity Type bed to chair /chair to bed;sit to stand/stand to sit --    Transfer Training PT LTG, [place] Level conditional independence --    Transfer Training PT LTG, Date Goal Reviewed --  12/17/17   Transfer Training PT LTG, Outcome --  goal ongoing       Goal: Gait Training Goal LTG- PT  Outcome: Ongoing (interventions implemented as appropriate)    12/14/17 1721 12/17/17 0850   Gait Training PT LTG   Gait Training Goal PT LTG, Date Established 12/14/17 --    Gait Training Goal PT LTG, Time to Achieve by discharge --    Gait Training Goal PT LTG, [place] Level conditional independence --    Gait Training Goal PT LTG, Distance to Achieve [id number] --    Gait Training Goal PT LTG, Date Goal Reviewed --  12/17/17   Gait Training Goal PT LTG, Outcome --  goal ongoing       Goal: Strength Goal LTG- PT  Outcome: Outcome(s) achieved Date Met:  12/18/17 12/14/17 1721 12/15/17 1358 12/18/17 0930   Strength Goal PT LTG   Strength Goal PT LTG, Date Established 12/14/17 --  --    Strength Goal PT LTG, Time to Achieve by discharge --  --    Strength Goal PT LTG,  Measure to Achieve Pt will tolerate 15 reps fo AROM exericises --  --    Strength Goal PT LTG, Date Goal Reviewed --  12/15/17 --    Strength Goal PT LTG, Outcome --  --  goal met       Goal: Patient Education Goal LTG- PT  Outcome: Ongoing (interventions implemented as appropriate)    12/14/17 1721 12/17/17 0850   Patient Education PT LTG   Patient Education PT LTG, Date Established 12/14/17 --    Patient Education PT LTG, Time to Achieve by discharge --    Patient Education PT LTG, Education Type gait;transfers;home safety --    Patient Education PT LTG, Date Goal Reviewed --  12/17/17   Patient Education PT LTG Outcome --  goal ongoing

## 2017-12-18 NOTE — PROGRESS NOTES
"Pharmacokinetics by Pharmacy - Vancomycin    Hasmukh Ham is a 84 y.o. male  [Ht: 170.2 cm (67\"); Wt: 76.6 kg (168 lb 14.4 oz)]    Estimated Creatinine Clearance: 70.9 mL/min (by C-G formula based on Cr of 0.84).   Lab Results   Component Value Date    CREATININE 0.84 12/18/2017    CREATININE 1.06 12/17/2017    CREATININE 0.99 12/16/2017      Lab Results   Component Value Date    WBC 9.44 12/18/2017    WBC 10.66 (H) 12/17/2017    WBC 14.69 (H) 12/16/2017      Temp Readings from Last 1 Encounters:   12/18/17 96.6 °F (35.9 °C) (Tympanic)      Lab Results   Component Value Date    VANCOPEAK 20.38 (L) 12/17/2017    VANCOTROUGH 5.85 (L) 12/18/2017         Culture Results:  Microbiology Results (last 10 days)       Procedure Component Value - Date/Time      S. Pneumo Ag Urine or CSF - Urine, Urine, Clean Catch [[id number]]  (Normal) Collected:  12/15/17 1137    Lab Status:  Final result Specimen:  Urine from Urine, Clean Catch Updated:  12/15/17 1241     Strep Pneumo Ag Negative    Blood Culture - Blood, [[id number]]  (Normal) Collected:  12/15/17 1131    Lab Status:  Preliminary result Specimen:  Blood from Arm, Right Updated:  12/18/17 1146     Blood Culture No growth at 3 days    Mycoplasma Pneumoniae PCR - Swab, Throat [[id number]] Collected:  12/15/17 1109    Lab Status:  Final result Specimen:  Swab from Throat Updated:  12/15/17 1237     MYCOPLASMAE PNEUMONIAE BY PCR Negative    Narrative:       This test is performed by utilizing real time polymerace chain recation (PCR).     Urine Culture - Urine, Urine, Clean Catch [[id number]] Collected:  12/14/17 1041    Lab Status:  Final result Specimen:  Urine from Urine, Clean Catch Updated:  12/15/17 0642     Urine Culture --      Mixed Roge Isolated    Narrative:         Specimen contains mixed organisms of questionable pathogenicity which indicates contamination with commensal roge.  Further identification is unlikely to provide clinically useful information.  " Suggest recollection.               Indication for use: Sepsis due to pneumonia    Current Vancomycin Dose:  1000 mg IVPB every 12 hours, day 4 of therapy.      Assessment/Plan:  Vancomycin levels were too low.  Changed vancomycin to 1000 mg iv every 12 hours.  This should provide a peak 31.54 trough 15.38 and AUC of 540.  Will check trough prior to the 4th dose. Pharmacy will continue to monitor renal function and adjust dose accordingly.    Catrachito Velazquez III, [facility]   12/18/17 5:06 PM

## 2017-12-19 VITALS
DIASTOLIC BLOOD PRESSURE: 71 MMHG | BODY MASS INDEX: 26.35 KG/M2 | HEART RATE: 68 BPM | TEMPERATURE: 97.1 F | HEIGHT: 67 IN | WEIGHT: 167.9 LBS | SYSTOLIC BLOOD PRESSURE: 149 MMHG | OXYGEN SATURATION: 97 % | RESPIRATION RATE: 18 BRPM

## 2017-12-19 LAB
ANION GAP SERPL CALCULATED.3IONS-SCNC: 8 MMOL/L (ref 5–15)
BASOPHILS # BLD AUTO: 0.01 10*3/MM3 (ref 0–0.2)
BASOPHILS NFR BLD AUTO: 0.1 % (ref 0–2)
BUN BLD-MCNC: 21 MG/DL (ref 7–21)
BUN/CREAT SERPL: 22.1 (ref 7–25)
CALCIUM SPEC-SCNC: 9.4 MG/DL (ref 8.4–10.2)
CHLORIDE SERPL-SCNC: 103 MMOL/L (ref 95–110)
CO2 SERPL-SCNC: 26 MMOL/L (ref 22–31)
CREAT BLD-MCNC: 0.95 MG/DL (ref 0.7–1.3)
D-LACTATE SERPL-SCNC: 1.4 MMOL/L (ref 0.5–2)
D-LACTATE SERPL-SCNC: 1.6 MMOL/L (ref 0.5–2)
D-LACTATE SERPL-SCNC: 1.8 MMOL/L (ref 0.5–2)
DEPRECATED RDW RBC AUTO: 53 FL (ref 35.1–43.9)
EOSINOPHIL # BLD AUTO: 0.14 10*3/MM3 (ref 0–0.7)
EOSINOPHIL NFR BLD AUTO: 1.1 % (ref 0–7)
ERYTHROCYTE [DISTWIDTH] IN BLOOD BY AUTOMATED COUNT: 15.6 % (ref 11.5–14.5)
GFR SERPL CREATININE-BSD FRML MDRD: 76 ML/MIN/1.73 (ref 42–98)
GLUCOSE BLD-MCNC: 79 MG/DL (ref 60–100)
GLUCOSE BLDC GLUCOMTR-MCNC: 77 MG/DL (ref 70–130)
GLUCOSE BLDC GLUCOMTR-MCNC: 97 MG/DL (ref 70–130)
HCT VFR BLD AUTO: 37.7 % (ref 39–49)
HGB BLD-MCNC: 11.9 G/DL (ref 13.7–17.3)
IMM GRANULOCYTES # BLD: 0.3 10*3/MM3 (ref 0–0.02)
IMM GRANULOCYTES NFR BLD: 2.4 % (ref 0–0.5)
LYMPHOCYTES # BLD AUTO: 1.87 10*3/MM3 (ref 0.6–4.2)
LYMPHOCYTES NFR BLD AUTO: 15 % (ref 10–50)
MCH RBC QN AUTO: 29.1 PG (ref 26.5–34)
MCHC RBC AUTO-ENTMCNC: 31.6 G/DL (ref 31.5–36.3)
MCV RBC AUTO: 92.2 FL (ref 80–98)
MONOCYTES # BLD AUTO: 0.82 10*3/MM3 (ref 0–0.9)
MONOCYTES NFR BLD AUTO: 6.6 % (ref 0–12)
NEUTROPHILS # BLD AUTO: 9.35 10*3/MM3 (ref 2–8.6)
NEUTROPHILS NFR BLD AUTO: 74.8 % (ref 37–80)
PLATELET # BLD AUTO: 184 10*3/MM3 (ref 150–450)
PMV BLD AUTO: 11.1 FL (ref 8–12)
POTASSIUM BLD-SCNC: 4.4 MMOL/L (ref 3.5–5.1)
RBC # BLD AUTO: 4.09 10*6/MM3 (ref 4.37–5.74)
SODIUM BLD-SCNC: 137 MMOL/L (ref 137–145)
WBC NRBC COR # BLD: 12.49 10*3/MM3 (ref 3.2–9.8)

## 2017-12-19 PROCEDURE — 85025 COMPLETE CBC W/AUTO DIFF WBC: CPT | Performed by: NURSE PRACTITIONER

## 2017-12-19 PROCEDURE — 97535 SELF CARE MNGMENT TRAINING: CPT

## 2017-12-19 PROCEDURE — 94799 UNLISTED PULMONARY SVC/PX: CPT

## 2017-12-19 PROCEDURE — 25010000002 VANCOMYCIN PER 500 MG: Performed by: FAMILY MEDICINE

## 2017-12-19 PROCEDURE — 63710000001 PREDNISONE PER 1 MG: Performed by: FAMILY MEDICINE

## 2017-12-19 PROCEDURE — 97530 THERAPEUTIC ACTIVITIES: CPT

## 2017-12-19 PROCEDURE — 97116 GAIT TRAINING THERAPY: CPT

## 2017-12-19 PROCEDURE — 82962 GLUCOSE BLOOD TEST: CPT

## 2017-12-19 PROCEDURE — 80048 BASIC METABOLIC PNL TOTAL CA: CPT | Performed by: NURSE PRACTITIONER

## 2017-12-19 PROCEDURE — 83605 ASSAY OF LACTIC ACID: CPT | Performed by: NURSE PRACTITIONER

## 2017-12-19 PROCEDURE — 94760 N-INVAS EAR/PLS OXIMETRY 1: CPT

## 2017-12-19 RX ORDER — DONEPEZIL HYDROCHLORIDE 10 MG/1
10 TABLET, FILM COATED ORAL DAILY
Qty: 30 TABLET | Refills: 0 | Status: SHIPPED | OUTPATIENT
Start: 2017-12-19 | End: 2018-01-31

## 2017-12-19 RX ORDER — RANOLAZINE 500 MG/1
500 TABLET, EXTENDED RELEASE ORAL EVERY 12 HOURS SCHEDULED
Qty: 60 TABLET | Refills: 0 | Status: SHIPPED | OUTPATIENT
Start: 2017-12-19 | End: 2018-01-18

## 2017-12-19 RX ADMIN — WATER 1 G: 1 INJECTION INTRAMUSCULAR; INTRAVENOUS; SUBCUTANEOUS at 08:13

## 2017-12-19 RX ADMIN — ISOSORBIDE MONONITRATE 30 MG: 30 TABLET, EXTENDED RELEASE ORAL at 08:13

## 2017-12-19 RX ADMIN — DOCUSATE SODIUM 100 MG: 100 CAPSULE, LIQUID FILLED ORAL at 08:13

## 2017-12-19 RX ADMIN — FLUTICASONE PROPIONATE 2 SPRAY: 50 SPRAY, METERED NASAL at 08:12

## 2017-12-19 RX ADMIN — IPRATROPIUM BROMIDE AND ALBUTEROL SULFATE 3 ML: 2.5; .5 SOLUTION RESPIRATORY (INHALATION) at 10:18

## 2017-12-19 RX ADMIN — ASPIRIN 81 MG 81 MG: 81 TABLET ORAL at 08:13

## 2017-12-19 RX ADMIN — WATER 1 G: 1 INJECTION INTRAMUSCULAR; INTRAVENOUS; SUBCUTANEOUS at 00:33

## 2017-12-19 RX ADMIN — CLOPIDOGREL BISULFATE 75 MG: 75 TABLET ORAL at 08:13

## 2017-12-19 RX ADMIN — PREDNISONE 20 MG: 20 TABLET ORAL at 08:13

## 2017-12-19 RX ADMIN — MAGNESIUM OXIDE TAB 400 MG (241.3 MG ELEMENTAL MG) 400 MG: 400 (241.3 MG) TAB at 08:13

## 2017-12-19 RX ADMIN — GLIPIZIDE 5 MG: 5 TABLET ORAL at 08:13

## 2017-12-19 RX ADMIN — HYDROCODONE BITARTRATE AND ACETAMINOPHEN 1 TABLET: 7.5; 325 TABLET ORAL at 07:11

## 2017-12-19 RX ADMIN — DONEPEZIL HYDROCHLORIDE 10 MG: 10 TABLET ORAL at 08:13

## 2017-12-19 RX ADMIN — VANCOMYCIN HYDROCHLORIDE 1000 MG: 1 INJECTION, POWDER, LYOPHILIZED, FOR SOLUTION INTRAVENOUS at 05:12

## 2017-12-19 RX ADMIN — RANOLAZINE 500 MG: 500 TABLET, FILM COATED, EXTENDED RELEASE ORAL at 08:13

## 2017-12-19 RX ADMIN — LISINOPRIL 5 MG: 5 TABLET ORAL at 08:13

## 2017-12-19 RX ADMIN — FAMOTIDINE 40 MG: 40 TABLET ORAL at 08:13

## 2017-12-19 NOTE — NURSING NOTE
Pt d/c paper work reviewed with pt and wife  Pt had appointments with multiple cardiologist that were made prior to admit  Pt states that he is going to cancel some of the appts

## 2017-12-19 NOTE — THERAPY DISCHARGE NOTE
Acute Care - Occupational Therapy Initial Eval/Discharge  [facility]     Patient Name: Hasmukh Ham  : 1933  MRN: [id number]  Today's Date: 2017  Onset of Illness/Injury or Date of Surgery Date: 17  Date of Referral to OT: 17  Referring Physician: EVE Birch      Admit Date: 2017       ICD-10-CM ICD-9-CM   1. Impaired functional mobility, balance, gait, and endurance Z74.09 V49.89   2. Chest pain, unspecified type R07.9 786.50   3. Elevated troponin R74.8 790.6   4. Abnormal x-ray R93.8 793.99   5. Impaired mobility and ADLs Z74.09 799.89     Patient Active Problem List   Diagnosis   • Dilated aortic root   • Non-rheumatic tricuspid valve insufficiency   • Pulmonary emphysema   • Atrial fibrillation and flutter   • Bradycardia   • Chronic coronary artery disease   • Neuropathy   • Abdominal hernia   • Neck pain   • Dementia   • Diabetic neuropathy   • Gastroesophageal reflux disease without esophagitis   • Generalized osteoarthritis   • 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   • Nocturia   • Osteoarthritis of multiple joints   • Hyperlipidemia   • Pain in joint involving ankle and foot   • Arthropathy of hand   • Paroxysmal tachycardia   • Osteoarthrosis involving more than one site but not generalized   • Right shoulder pain   • Rotator cuff syndrome   • Partial tear of subscapularis tendon   • Infraspinatus tendon tear   • Supraspinatus tendon tear   • Bilateral carotid artery stenosis   • (HFpEF) heart failure with preserved ejection fraction   • Pulmonary HTN   • Acute interstitial pneumonia   • Chronic obstructive lung disease   • Coronary arteriosclerosis   • Diabetes mellitus   • Hypertension   • Chest pain   • Shortness of breath     Past Medical History:   Diagnosis Date   • Bilateral carotid artery stenosis    • CHF (congestive heart failure)    • Chronic obstructive pulmonary disease (COPD)    • Coronary arteriosclerosis    • Degenerative  joint disease involving multiple joints    • Dementia    • Diabetes mellitus    • Hyperlipidemia    • Hypertension    • Myocardial infarction      Past Surgical History:   Procedure Laterality Date   • CARDIAC CATHETERIZATION N/A 6/6/2017    Procedure: Right Heart Cath;  Surgeon: Jeff Maciel MD PhD;  Location: [facility] CATH INVASIVE LOCATION;  Service:    • CORONARY STENT PLACEMENT     • HERNIA REPAIR     • LUNG BIOPSY     • NECK SURGERY     • THORACOTOMY Left 1977   • VENTRAL HERNIA REPAIR            OT ASSESSMENT FLOWSHEET (last 72 hours)      OT Evaluation       12/19/17 1533 12/19/17 1100 12/19/17 0904 12/18/17 0930 12/18/17 0745    Rehab Evaluation    Document Type  therapy note (daily note)  -KD therapy note (daily note)  -TA therapy note (daily note)  -TA therapy note (daily note)  -KD    Subjective Information  agree to therapy  -KD agree to therapy  -TA agree to therapy  -TA agree to therapy  -KD    Patient Effort, Rehab Treatment  good  -KD good  -TA good  -TA good  -KD    General Information    Precautions/Limitations  fall precautions;oxygen therapy device and L/min  -KD fall precautions;oxygen therapy device and L/min  -TA fall precautions;oxygen therapy device and L/min  -TA fall precautions;oxygen therapy device and L/min  -KD    Clinical Impression    Anticipated Discharge Disposition home with home health  -        Vital Signs    Pre Systolic BP Rehab     182  -KD    Pre Treatment Diastolic BP     83  -KD    Pretreatment Heart Rate (beats/min)  75  -KD 76  -TA 74  -TA 89  -KD    Intratreatment Heart Rate (beats/min)    90  -TA     Posttreatment Heart Rate (beats/min)  85  -KD 86  -TA 91  -TA 90  -KD    Pre SpO2 (%)  96  -KD 97  -TA 98  -  -KD    O2 Delivery Pre Treatment  supplemental O2  -KD supplemental O2  -TA supplemental O2  -TA supplemental O2  -KD    Intra SpO2 (%)    97  -TA     Post SpO2 (%)  97  -KD 98  -TA 97  -TA 96  -KD    O2 Delivery Post Treatment  supplemental O2   -KD   supplemental O2  -KD    Pre Patient Position  Sitting  -KD Sitting  -TA Supine  -TA Sitting  -KD    Intra Patient Position  Sitting  -KD   Standing  -KD    Post Patient Position  Sitting  -KD Sitting  -TA Sitting  -TA Sitting  -KD    Pain Assessment    Pain Assessment  0-10  -KD 0-10  -TA No/denies pain  -TA No/denies pain  -KD    Pain Score  5  -KD 7  -TA  0  -KD    Post Pain Score  5  -KD 7  -TA  0  -KD    Pain Type  Chronic pain  -KD Chronic pain  -TA      Pain Location  Generalized  -KD Generalized  -TA      Vision Assessment/Intervention    Visual Impairment  WFL with corrective lenses  -KD       Cognitive Assessment/Intervention    Current Cognitive/Communication Assessment  functional  -KD functional  -TA functional  -TA functional  -KD    Orientation Status  oriented x 4  -KD oriented x 4  -TA oriented x 4  -TA oriented x 4  -KD    Follows Commands/Answers Questions  100% of the time  -% of the time  -% of the time  -% of the time  -KD    Personal Safety  mild impairment  -KD mild impairment  -TA mild impairment  -TA mild impairment  -KD    Personal Safety Interventions  gait belt;nonskid shoes/slippers when out of bed  -KD gait belt;nonskid shoes/slippers when out of bed  -TA gait belt;nonskid shoes/slippers when out of bed  -TA gait belt;nonskid shoes/slippers when out of bed  -KD    Bed Mobility, Assessment/Treatment    Bed Mob, Supine to Sit, Andrew    independent  -TA     Bed Mob, Sit to Supine, Andrew    independent  -TA     Transfer Assessment/Treatment    Transfers, Sit-Stand Andrew   independent  -TA independent  -TA supervision required  -KD    Transfers, Stand-Sit Andrew   independent  -TA independent  -TA supervision required  -KD    Transfers, Sit-Stand-Sit, Assist Device     rolling walker  -KD    Transfer, Safety Issues   impulsivity  -TA impulsivity  -TA     Functional Mobility    Functional Mobility- Ind. Level     conditional independence  -KD     Functional Mobility- Device     --   none  -KD    Functional Mobility-Distance (Feet)     --   45 then 280'  -KD    Stairs Assessment/Treatment    Number of Stairs   5  -TA      Stairs, Handrail Location   both sides  -TA      Stairs, [place] Level   independent  -TA      Balance Skills Training    Standing-Level of Assistance     Close supervision  -KD    Standing-Balance Activities     --   dynamic  -KD    Standing Balance # of Minutes     --   2.5  -KD    Therapy Exercises    Bilateral Lower Extremities   AROM:;20 reps;sitting;ankle pumps/circles;hip abduction/adduction;hip flexion;LAQ;glut sets   issued HEP  -TA AROM:;sitting;ankle pumps/circles;15 reps;hip abduction/adduction;hip flexion;LAQ  -TA     Bilateral Upper Extremity     AROM:;20 reps;sitting;elbow flexion/extension;pronation/supination;shoulder abduction/adduction;shoulder extension/flexion;shoulder ER/IR;shoulder horizontal abd/add  -KD    BUE Resistance     manual resistance- moderate  -KD    Positioning and Restraints    Pre-Treatment Position  in bed  -KD in bed  -TA in bed  -TA in bed  -KD    Post Treatment Position  bed  -KD bed  -TA bed  -TA bed  -KD    In Bed  sitting EOB;call light within reach;encouraged to call for assist;exit alarm on;with family/caregiver  -KD sitting EOB;call light within reach;with family/caregiver  -TA sitting EOB;call light within reach;with family/caregiver  -TA sitting EOB;call light within reach;encouraged to call for assist;exit alarm on  -KD      12/17/17 1013 12/17/17 0850             Rehab Evaluation    Document Type therapy note (daily note)  -BB therapy note (daily note)  -TESS       Subjective Information agree to therapy  -BB agree to therapy  -TESS       Patient Effort, Rehab Treatment good  -BB good  -TESS       Patient Effort, Rehab Treatment Comment  pt reports haveing low HR last night. nsg agrees to tx.   -TESS       Symptoms Noted During/After Treatment none  -BB        General Information     Precautions/Limitations fall precautions;oxygen therapy device and L/min  -BB fall precautions;oxygen therapy device and L/min  -TESS       Vital Signs    Pre Systolic BP Rehab 136  -  -TESS       Pre Treatment Diastolic BP 68  -BB 74  -TESS       Post Systolic BP Rehab  148  -TESS       Post Treatment Diastolic BP  72  -TESS       Pretreatment Heart Rate (beats/min) 76  -BB 74  -TESS       Intratreatment Heart Rate (beats/min)  90  -TESS       Posttreatment Heart Rate (beats/min) 73  -BB 72  -TESS       Pre SpO2 (%) 98  -BB 99  -TESS       O2 Delivery Pre Treatment supplemental O2  -BB supplemental O2  -TESS       Intra SpO2 (%) 97  -BB 98  -TESS       O2 Delivery Intra Treatment supplemental O2  -BB supplemental O2  -TESS       Post SpO2 (%) 99  -BB 99  -TESS       O2 Delivery Post Treatment supplemental O2  -BB supplemental O2  -TESS       Pre Patient Position Supine  -BB Supine  -TESS       Intra Patient Position Sitting  -BB        Post Patient Position Supine  -BB Supine  -TESS       Pain Assessment    Pain Assessment 0-10  -BB 0-10  -TESS       Pain Score 3  -BB 7  -TESS       Post Pain Score 2  -BB 7  -TESS       Pain Type Chronic pain  -BB        Pain Location Generalized  -BB Generalized  -TESS       Pain Intervention(s) Repositioned  -BB Ambulation/increased activity;Repositioned  -TESS       Vision Assessment/Intervention    Visual Impairment  WFL with corrective lenses  -TESS       Cognitive Assessment/Intervention    Current Cognitive/Communication Assessment functional  -BB functional  -TESS       Orientation Status oriented x 4  -BB oriented x 4  -TESS       Follows Commands/Answers Questions 100% of the time  -% of the time  -TESS       Personal Safety mild impairment   discussed AE, safety, EC/W/S,PLB  -BB        Personal Safety Interventions gait belt;nonskid shoes/slippers when out of bed;supervised activity  -BB gait belt;muscle strengthening facilitated;nonskid shoes/slippers when out of bed;supervised activity  -TESS       Bed  Mobility, Assessment/Treatment    Bed Mobility, Assistive Device bed rails;head of bed elevated  -BB bed rails;head of bed elevated  -TESS       Bed Mob, Supine to Sit, Lynn conditional independence  -BB conditional independence  -TESS       Bed Mob, Sit to Supine, Lynn conditional independence  -BB conditional independence  -TESS       Transfer Assessment/Treatment    Transfers, Sit-Stand Lynn conditional independence  -BB conditional independence  -TESS       Transfers, Stand-Sit Lynn conditional independence  -BB conditional independence  -TESS       Transfers, Sit-Stand-Sit, Assist Device  --   no AD  -TESS       Toilet Transfer, Lynn supervision required  -BB        Upper Body Bathing Assessment/Training    UB Bathing Assess/Train, Comment pt defers ADL  -BB        Toileting Assessment/Training    Toileting Assess/Train, Assistive Device grab bars  -BB        Toileting Assess/Train, Position sitting;standing  -BB        Toileting Assess/Train, Indepen Level supervision required  -BB        Grooming Assessment/Training    Grooming Assess/Train, Position sink side;standing  -BB        Grooming Assess/Train, Comment wash face and hands  -BB        Therapy Exercises    Bilateral Lower Extremities  AROM:;15 reps;sitting;ankle pumps/circles;LAQ;hip flexion;hip abduction/adduction   10 reps of ab/ad  -TESS       Positioning and Restraints    Pre-Treatment Position in bed  -BB in bed  -TESS       Post Treatment Position bed  -BB bed  -TESS       In Bed supine;encouraged to call for assist;exit alarm on;call light within reach  -BB supine;call light within reach;encouraged to call for assist;with family/caregiver   all needs met.   -TESS         User Key  (r) = Recorded By, (t) = Taken By, (c) = Cosigned By    Initials Name Effective Dates    TA Kayli Kumar, PTA 10/17/16 -     TESS Cristobal Mansfield, PTA 10/17/16 -     BB Susannah Villalta, MCCLELLAND/L 10/17/16 -     KD Erlinda Aguilar, MCCLELLAND/L 10/17/16 -      AMARILIS Rodriguez, OTR/L 11/08/17 -           Occupational Therapy Education     Title: PT OT SLP Therapies (Active)     Topic: Occupational Therapy (Resolved)     Point: ADL training (Resolved)    Description: Instruct learner(s) on proper safety adaptation and remediation techniques during self care or transfers.   Instruct in proper use of assistive devices.    Learning Progress Summary    Learner Readiness Method Response Comment Documented by Status   Patient Acceptance E,D,H VU,DU issued HEP & Home Safety TA 12/19/17 1148 Done    Acceptance E VU  BB 12/17/17 1434 Done    Acceptance E NR fall precautions, transfer safety, OT POC RW 12/14/17 1647 Active   Family Acceptance E NR fall precautions, transfer safety, OT POC RW 12/14/17 1647 Active               Point: Home exercise program (Resolved)    Description: Instruct learner(s) on appropriate technique for monitoring, assisting and/or progressing therapeutic exercises/activities.    Learning Progress Summary    Learner Readiness Method Response Comment Documented by Status   Patient Acceptance D,H VU Educated pt and spouse on CABG precautions and PLB technique. Handouts were given on both topics also. KD 12/19/17 1331 Done   Significant Other Acceptance D,H VU Educated pt and spouse on CABG precautions and PLB technique. Handouts were given on both topics also. KD 12/19/17 1331 Done               Point: Precautions (Resolved)    Description: Instruct learner(s) on prescribed precautions during self-care and functional transfers.    Learning Progress Summary    Learner Readiness Method Response Comment Documented by Status   Patient Acceptance D,H VU Educated pt and spouse on CABG precautions and PLB technique. Handouts were given on both topics also. KD 12/19/17 1331 Done    Acceptance E VU  BB 12/17/17 1434 Done    Acceptance E NR fall precautions, transfer safety, OT POC RW 12/14/17 1647 Active   Family Acceptance E NR fall precautions, transfer  safety, OT POC  12/14/17 1647 Active   Significant Other Acceptance D,H VU Educated pt and spouse on CABG precautions and PLB technique. Handouts were given on both topics also.  12/19/17 1331 Done               Point: Body mechanics (Resolved)    Description: Instruct learner(s) on proper positioning and spine alignment during self-care, functional mobility activities and/or exercises.    Learning Progress Summary    Learner Readiness Method Response Comment Documented by Status   Patient Acceptance D,H VU Educated pt and spouse on CABG precautions and PLB technique. Handouts were given on both topics also.  12/19/17 1331 Done    Acceptance E VU  BB 12/17/17 1434 Done   Significant Other Acceptance D,H VU Educated pt and spouse on CABG precautions and PLB technique. Handouts were given on both topics also.  12/19/17 1331 Done                      User Key     Initials Effective Dates Name Provider Type Discipline     10/17/16 -  Deborah Mcqueen, OTR/L Occupational Therapist OT    TA 10/17/16 -  Kayli Kumar PTA Physical Therapy Assistant PT     10/17/16 -  Susannah Villalta MCCLELLAND/L Occupational Therapy Assistant OT     10/17/16 -  Erlinda Aguilar MCCLELLAND/L Occupational Therapy Assistant OT                OT Recommendation and Plan  Anticipated Discharge Disposition: home with home health  Planned Therapy Interventions: activity intolerance, adaptive equipment training, ADL retraining, balance training, energy conservation, home exercise program, strengthening, transfer training  Therapy Frequency: other (see comments) (3-14 times/wk)              OT Goals       12/19/17 1532 12/18/17 1317 12/17/17 1435    Transfer Training OT LTG    Transfer Training OT LTG, Date Goal Reviewed 12/19/17  -NN 12/18/17  -KD     Transfer Training OT LTG, Outcome goal not met  -NN goal not met  -KD     Transfer Training OT LTG, Reason Goal Not Met discharged from facility  -NN      Dynamic Standing Balance OT LTG     Dynamic Standing Balance OT LTG, Date Goal Reviewed 12/19/17  -NN 12/18/17  -KD 12/17/17  -BB    Dynamic Standing Balance OT LTG, Outcome goal not met  -NN goal not met  -KD goal ongoing  -BB    Dynamic Standing Balance OT LTG, Reason Goal Not Met discharged from facility  -NN      Safety Awareness OT LTG    Safety Awareness OT LTG, Date Goal Reviewed 12/19/17  -NN 12/18/17  -KD 12/17/17  -BB    Safety Awareness OT LTG, Outcome goal not met  -NN goal not met  -KD     Safety Awareness OT LTG, Reason Goal Not Met discharged from facility  -NN      ADL OT LTG    ADL OT LTG, Date Goal Reviewed 12/19/17  -NN 12/18/17  -KD 12/17/17  -BB    ADL OT LTG, Outcome goal not met  -NN goal not met  -KD     ADL OT LTG, Reason Goal Not Met discharged from facility  -NN        12/15/17 0843 12/14/17 1648       Transfer Training OT LTG    Transfer Training OT LTG, Date Established  12/14/17  -RW     Transfer Training OT LTG, Time to Achieve  by discharge  -RW     Transfer Training OT LTG, Activity Type  toilet;tub   simulated tub t/f  -RW     Transfer Training OT LTG, [place] Level  conditional independence  -RW     Transfer Training OT LTG, Date Goal Reviewed 12/15/17  -KD      Transfer Training OT LTG, Outcome goal not met  -KD      Dynamic Standing Balance OT LTG    Dynamic Standing Balance OT LTG, Date Established  12/14/17  -RW     Dynamic Standing Balance OT LTG, Time to Achieve  by discharge  -RW     Dynamic Standing Balance OT LTG, [place] Level  conditional independence  -RW     Dynamic Standing Balance OT LTG, Additional Goal  5 min functional activity  -RW     Dynamic Standing Balance OT LTG, Date Goal Reviewed 12/15/17  -KD      Dynamic Standing Balance OT LTG, Outcome goal not met  -KD      Safety Awareness OT LTG    Safety Awareness OT LTG, Date Established  12/14/17  -RW     Safety Awareness OT LTG, Time to Achieve  by discharge  -RW     Safety Awareness OT LTG, Activity Type  good safety awareness;with ADL's   -RW     Safety Awareness OT LTG, Date Goal Reviewed 12/15/17  -KD      Safety Awareness OT LTG, Outcome goal not met  -KD      ADL OT LTG    ADL OT LTG, Date Established  12/14/17  -RW     ADL OT LTG, Time to Achieve  by discharge  -RW     ADL OT LTG, Activity Type  ADL skills  -RW     ADL OT LTG, [place] Level  modified independent  -RW     ADL OT LTG, Date Goal Reviewed 12/15/17  -KD      ADL OT LTG, Outcome goal not met  -KD        User Key  (r) = Recorded By, (t) = Taken By, (c) = Cosigned By    Initials Name Provider Type    RW Deborah Mcqueen, OTR/L Occupational Therapist    BB Susannah Villalta, MCCLELLAND/L Occupational Therapy Assistant    KD Erlinda Aguilar MCCLELLAND/L Occupational Therapy Assistant    NN Irma Rodriguez, OTR/L Occupational Therapist                Outcome Measures       12/19/17 1100 12/18/17 1100 12/18/17 0745    How much help from another person do you currently need...    Turning from your back to your side while in flat bed without using bedrails? 4  -TA 4  -TA     Moving from lying on back to sitting on the side of a flat bed without bedrails? 4  -TA 4  -TA     Moving to and from a bed to a chair (including a wheelchair)? 4  -TA 3  -TA     Standing up from a chair using your arms (e.g., wheelchair, bedside chair)? 4  -TA 3  -TA     Climbing 3-5 steps with a railing? 4  -TA 4  -TA     To walk in hospital room? 4  -TA 3  -TA     AM-PAC 6 Clicks Score 24  -TA 21  -TA     How much help from another is currently needed...    Putting on and taking off regular lower body clothing? 3  -KD  3  -KD    Bathing (including washing, rinsing, and drying) 4  -KD  3  -KD    Toileting (which includes using toilet bed pan or urinal) 4  -KD  3  -KD    Putting on and taking off regular upper body clothing 4  -KD  4  -KD    Taking care of personal grooming (such as brushing teeth) 4  -KD  4  -KD    Eating meals 4  -KD  4  -KD    Score 23  -KD  21  -KD    Functional Assessment    Outcome Measure Options  AM-PAC 6 Clicks Basic Mobility (PT)  -TA AM-PAC 6 Clicks Basic Mobility (PT)  -TA       12/17/17 0850          How much help from another person do you currently need...    Turning from your back to your side while in flat bed without using bedrails? 4  -TESS      Moving from lying on back to sitting on the side of a flat bed without bedrails? 4  -TESS      Moving to and from a bed to a chair (including a wheelchair)? 3  -TESS      Standing up from a chair using your arms (e.g., wheelchair, bedside chair)? 3  -TESS      Climbing 3-5 steps with a railing? 4  -TESS      To walk in hospital room? 3  -TESS      AM-PAC 6 Clicks Score 21  -TESS      Functional Assessment    Outcome Measure Options AM-PAC 6 Clicks Basic Mobility (PT)  -TESS        User Key  (r) = Recorded By, (t) = Taken By, (c) = Cosigned By    Initials Name Provider Type    TA Kayli Kumar, PTA Physical Therapy Assistant    TESS Mansfield PTA Physical Therapy Assistant    CHANDU RocaA/L Occupational Therapy Assistant          Time Calculation:         Time Calculation- OT       12/19/17 1333          Time Calculation- OT    OT Start Time 1100  -KD      OT Stop Time 1111  -KD      OT Time Calculation (min) 11 min  -KD      Total Timed Code Minutes- OT 11 minute(s)  -KD      OT Received On 12/19/17  -KD        User Key  (r) = Recorded By, (t) = Taken By, (c) = Cosigned By    Initials Name Provider Type    PAOLA Aguilar MCCLELLAND/L Occupational Therapy Assistant              OT G-codes  OT Professional Judgement Used?: Yes  OT Functional Scales Options: AM-PAC 6 Clicks Daily Activity (OT)  Score: 20  Functional Limitation: Self care  Self Care Current Status (): At least 20 percent but less than 40 percent impaired, limited or restricted  Self Care Goal Status (): At least 1 percent but less than 20 percent impaired, limited or restricted    OT Discharge Summary  Anticipated Discharge Disposition: home with home health  Reason for Discharge:  Discharge from facility, Per MD order  Outcomes Achieved: Refer to plan of care for updates on goals achieved  Discharge Destination: Home with home health    Irma Rodriguez OTR/GEOFFREY  12/19/2017

## 2017-12-19 NOTE — PLAN OF CARE
Problem: Inpatient Occupational Therapy  Goal: Transfer Training Goal 1 LTG- OT  Outcome: Unable to achieve outcome(s) by discharge Date Met:  12/19/17 12/14/17 1648 12/19/17 1532   Transfer Training OT LTG   Transfer Training OT LTG, Date Established 12/14/17 --    Transfer Training OT LTG, Time to Achieve by discharge --    Transfer Training OT LTG, Activity Type toilet;tub  (simulated tub t/f) --    Transfer Training OT LTG, [place] Level conditional independence --    Transfer Training OT LTG, Date Goal Reviewed --  12/19/17   Transfer Training OT LTG, Outcome --  goal not met   Transfer Training OT LTG, Reason Goal Not Met --  discharged from facility       Goal: Dynamic Standing Balance Goal LTG-OT  Outcome: Unable to achieve outcome(s) by discharge Date Met:  12/19/17 12/14/17 1648 12/19/17 1532   Dynamic Standing Balance OT LTG   Dynamic Standing Balance OT LTG, Date Established 12/14/17 --    Dynamic Standing Balance OT LTG, Time to Achieve by discharge --    Dynamic Standing Balance OT LTG, [place] Level conditional independence --    Dynamic Standing Balance OT LTG, Additional Goal 5 min functional activity --    Dynamic Standing Balance OT LTG, Date Goal Reviewed --  12/19/17   Dynamic Standing Balance OT LTG, Outcome --  goal not met   Dynamic Standing Balance OT LTG, Reason Goal Not Met --  discharged from facility       Goal: Safety Awareness Goal LTG- OT  Outcome: Unable to achieve outcome(s) by discharge Date Met:  12/19/17 12/14/17 1648 12/19/17 1532   Safety Awareness OT LTG   Safety Awareness OT LTG, Date Established 12/14/17 --    Safety Awareness OT LTG, Time to Achieve by discharge --    Safety Awareness OT LTG, Activity Type good safety awareness;with ADL's --    Safety Awareness OT LTG, Date Goal Reviewed --  12/19/17   Safety Awareness OT LTG, Outcome --  goal not met   Safety Awareness OT LTG, Reason Goal Not Met --  discharged from facility       Goal: ADL Goal LTG-  OT  Outcome: Unable to achieve outcome(s) by discharge Date Met:  12/19/17 12/14/17 1648 12/19/17 1532   ADL OT LTG   ADL OT LTG, Date Established 12/14/17 --    ADL OT LTG, Time to Achieve by discharge --    ADL OT LTG, Activity Type ADL skills --    ADL OT LTG, [place] Level modified independent --    ADL OT LTG, Date Goal Reviewed --  12/19/17   ADL OT LTG, Outcome --  goal not met   ADL OT LTG, Reason Goal Not Met --  discharged from facility

## 2017-12-19 NOTE — THERAPY TREATMENT NOTE
Acute Care - Physical Therapy Treatment Note  [facility]     Patient Name: Hasmukh Ham  : 1933  MRN: [id number]  Today's Date: 2017  Onset of Illness/Injury or Date of Surgery Date: 17  Date of Referral to PT: 17  Referring Physician: EVE Birch    Admit Date: 2017    Visit Dx:    ICD-10-CM ICD-9-CM   1. Impaired functional mobility, balance, gait, and endurance Z74.09 V49.89   2. Chest pain, unspecified type R07.9 786.50   3. Elevated troponin R74.8 790.6   4. Abnormal x-ray R93.8 793.99   5. Impaired mobility and ADLs Z74.09 799.89     Patient Active Problem List   Diagnosis   • Dilated aortic root   • Non-rheumatic tricuspid valve insufficiency   • Pulmonary emphysema   • Atrial fibrillation and flutter   • Bradycardia   • Chronic coronary artery disease   • Neuropathy   • Abdominal hernia   • Neck pain   • Dementia   • Diabetic neuropathy   • Gastroesophageal reflux disease without esophagitis   • Generalized osteoarthritis   • 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   • Nocturia   • Osteoarthritis of multiple joints   • Hyperlipidemia   • Pain in joint involving ankle and foot   • Arthropathy of hand   • Paroxysmal tachycardia   • Osteoarthrosis involving more than one site but not generalized   • Right shoulder pain   • Rotator cuff syndrome   • Partial tear of subscapularis tendon   • Infraspinatus tendon tear   • Supraspinatus tendon tear   • Bilateral carotid artery stenosis   • (HFpEF) heart failure with preserved ejection fraction   • Pulmonary HTN   • Acute interstitial pneumonia   • Chronic obstructive lung disease   • Coronary arteriosclerosis   • Diabetes mellitus   • Hypertension   • Chest pain   • Shortness of breath               Adult Rehabilitation Note       17 0904 17 0930 17 0745    Rehab Assessment/Intervention    Discipline physical therapy assistant  -TA physical therapy assistant  -TA occupational therapy  assistant  -KD    Document Type therapy note (daily note)  -TA therapy note (daily note)  -TA therapy note (daily note)  -KD    Subjective Information agree to therapy  -TA agree to therapy  -TA agree to therapy  -KD    Patient Effort, Rehab Treatment good  -TA good  -TA good  -KD    Precautions/Limitations fall precautions;oxygen therapy device and L/min  -TA fall precautions;oxygen therapy device and L/min  -TA fall precautions;oxygen therapy device and L/min  -KD    Recorded by [TA] Kayli Kumar PTA [TA] Kayli Kumar PTA [KD] Erlinda Aguilar, MCCLELLAND/L    Vital Signs    Pre Systolic BP Rehab   182  -KD    Pre Treatment Diastolic BP   83  -KD    Pretreatment Heart Rate (beats/min) 76  -TA 74  -TA 89  -KD    Intratreatment Heart Rate (beats/min)  90  -TA     Posttreatment Heart Rate (beats/min) 86  -TA 91  -TA 90  -KD    Pre SpO2 (%) 97  -TA 98  -  -KD    O2 Delivery Pre Treatment supplemental O2  -TA supplemental O2  -TA supplemental O2  -KD    Intra SpO2 (%)  97  -TA     Post SpO2 (%) 98  -TA 97  -TA 96  -KD    O2 Delivery Post Treatment   supplemental O2  -KD    Pre Patient Position Sitting  -TA Supine  -TA Sitting  -KD    Intra Patient Position   Standing  -KD    Post Patient Position Sitting  -TA Sitting  -TA Sitting  -KD    Recorded by [TA] Kayli Kumar PTA [TA] Kayli Kumar, NEFTALI [KD] Erlinda gAuilar, [place]/L    Pain Assessment    Pain Assessment 0-10  -TA No/denies pain  -TA No/denies pain  -KD    Pain Score 7  -TA  0  -KD    Post Pain Score 7  -TA  0  -KD    Pain Type Chronic pain  -TA      Pain Location Generalized  -TA      Recorded by [TA] Kayli Kumar PTA [TA] Kayli Kumar, PTA [KD] Erlinda Aguilar, [place]/L    Cognitive Assessment/Intervention    Current Cognitive/Communication Assessment functional  -TA functional  -TA functional  -KD    Orientation Status oriented x 4  -TA oriented x 4  -TA oriented x 4  -KD    Follows Commands/Answers Questions 100% of the time  -% of the time   -% of the time  -KD    Personal Safety mild impairment  -TA mild impairment  -TA mild impairment  -KD    Personal Safety Interventions gait belt;nonskid shoes/slippers when out of bed  -TA gait belt;nonskid shoes/slippers when out of bed  -TA gait belt;nonskid shoes/slippers when out of bed  -KD    Recorded by [TA] Kayli Kumar PTA [TA] Kayli Kumar, NEFTALI [KD] Erlinda Aguilar, [place]/L    Bed Mobility, Assessment/Treatment    Bed Mob, Supine to Sit, [place]  independent  -TA     Bed Mob, Sit to Supine, [place]  independent  -TA     Recorded by  [TA] Kayli Kumar PTA     Transfer Assessment/Treatment    Transfers, Sit-Stand [place] independent  -TA independent  -TA supervision required  -KD    Transfers, Stand-Sit [place] independent  -TA independent  -TA supervision required  -KD    Transfers, Sit-Stand-Sit, Assist Device   rolling walker  -KD    Transfer, Safety Issues impulsivity  -TA impulsivity  -TA     Recorded by [TA] Kayli Kumar PTA [TA] Kayli Kumar, NEFTALI [KD] Erlinda Aguilar, [place]/L    Gait Assessment/Treatment    Gait, [place] Level independent  -TA stand by assist  -TA     Gait, Assistive Device other (see comments)   no  AD  -TA other (see comments)   no AD  -TA     Gait, Distance (Feet) 400   &150`  -  -TA     Recorded by [TA] Kayli Kumar PTA [TA] Kayli Kumar PTA     Stairs Assessment/Treatment    Number of Stairs 5  -TA      Stairs, Handrail Location both sides  -TA      Stairs, [place] Level independent  -TA      Recorded by [TA] Kayli Kumar PTA      Functional Mobility    Functional Mobility- Ind. Level   conditional independence  -KD    Functional Mobility- Device   --   none  -KD    Functional Mobility-Distance (Feet)   --   45 then 280'  -KD    Recorded by   [KD] Erlinda Aguilar, [place]/L    Balance Skills Training    Standing-Level of Assistance   Close supervision  -KD    Standing-Balance Activities   --   dynamic  -KD    Standing  Balance # of Minutes   --   2.5  -KD    Recorded by   [KD] KRYSTIN Gilmore/L    Therapy Exercises    Bilateral Lower Extremities AROM:;20 reps;sitting;ankle pumps/circles;hip abduction/adduction;hip flexion;LAQ;glut sets   issued HEP  -TA AROM:;sitting;ankle pumps/circles;15 reps;hip abduction/adduction;hip flexion;LAQ  -TA     Bilateral Upper Extremity   AROM:;20 reps;sitting;elbow flexion/extension;pronation/supination;shoulder abduction/adduction;shoulder extension/flexion;shoulder ER/IR;shoulder horizontal abd/add  -KD    BUE Resistance   manual resistance- moderate  -KD    Recorded by [TA] Kayli Kumar PTA [TA] Kayli Kumar PTA [KD] CHANDU GilmoreA/L    Positioning and Restraints    Pre-Treatment Position in bed  -TA in bed  -TA in bed  -KD    Post Treatment Position bed  -TA bed  -TA bed  -KD    In Bed sitting EOB;call light within reach;with family/caregiver  -TA sitting EOB;call light within reach;with family/caregiver  -TA sitting EOB;call light within reach;encouraged to call for assist;exit alarm on  -KD    Recorded by [TA] Kayli Kumar PTA [TA] Kayli Kumar PTA [KD] KRYSTIN Gilmore/GEOFFREY      12/17/17 1013 12/17/17 0850       Rehab Assessment/Intervention    Discipline occupational therapy assistant  -BB physical therapy assistant  -TESS     Document Type therapy note (daily note)  -BB therapy note (daily note)  -TESS     Subjective Information agree to therapy  -BB agree to therapy  -TESS     Patient Effort, Rehab Treatment good  -BB good  -TESS     Patient Effort, Rehab Treatment Comment  pt reports haveing low HR last night. nsg agrees to tx.   -TESS     Symptoms Noted During/After Treatment none  -BB      Precautions/Limitations fall precautions;oxygen therapy device and L/min  -BB fall precautions;oxygen therapy device and L/min  -TESS     Recorded by [BB] CHANDU MorrisonA/L [TESS] Cristobal Mansfield PTA     Vital Signs    Pre Systolic BP Rehab 136  -  -TESS     Pre Treatment  Diastolic BP 68  -BB 74  -TESS     Post Systolic BP Rehab  148  -TESS     Post Treatment Diastolic BP  72  -TESS     Pretreatment Heart Rate (beats/min) 76  -BB 74  -TESS     Intratreatment Heart Rate (beats/min)  90  -TESS     Posttreatment Heart Rate (beats/min) 73  -BB 72  -TESS     Pre SpO2 (%) 98  -BB 99  -TESS     O2 Delivery Pre Treatment supplemental O2  -BB supplemental O2  -TESS     Intra SpO2 (%) 97  -BB 98  -TESS     O2 Delivery Intra Treatment supplemental O2  -BB supplemental O2  -TESS     Post SpO2 (%) 99  -BB 99  -TESS     O2 Delivery Post Treatment supplemental O2  -BB supplemental O2  -TESS     Pre Patient Position Supine  -BB Supine  -TESS     Intra Patient Position Sitting  -BB      Post Patient Position Supine  -BB Supine  -TESS     Recorded by [BB] KRYSTIN Morrison/L [TESS] Cristobal Mansfield PTA     Pain Assessment    Pain Assessment 0-10  -BB 0-10  -TESS     Pain Score 3  -BB 7  -TESS     Post Pain Score 2  -BB 7  -TESS     Pain Type Chronic pain  -BB      Pain Location Generalized  -BB Generalized  -TESS     Pain Intervention(s) Repositioned  -BB Ambulation/increased activity;Repositioned  -TESS     Recorded by [BB] KRYSTIN Morrison/GEOFFREY [TESS] Cristobal Mansfield PTA     Vision Assessment/Intervention    Visual Impairment  WFL with corrective lenses  -TESS     Recorded by  [TESS] Cristobal Mansfield PTA     Cognitive Assessment/Intervention    Current Cognitive/Communication Assessment functional  -BB functional  -TESS     Orientation Status oriented x 4  -BB oriented x 4  -TESS     Follows Commands/Answers Questions 100% of the time  -% of the time  -TESS     Personal Safety mild impairment   discussed AE, safety, EC/W/S,PLB  -BB      Personal Safety Interventions gait belt;nonskid shoes/slippers when out of bed;supervised activity  -BB gait belt;muscle strengthening facilitated;nonskid shoes/slippers when out of bed;supervised activity  -TESS     Recorded by [BB] KRYSTIN Morrison/GEOFFREY [TESS] Cristobal Mansfield PTA     Bed  Mobility, Assessment/Treatment    Bed Mobility, Assistive Device bed rails;head of bed elevated  -BB bed rails;head of bed elevated  -TESS     Bed Mob, Supine to Sit, [place] conditional independence  -BB conditional independence  -TESS     Bed Mob, Sit to Supine, [place] conditional independence  -BB conditional independence  -TESS     Recorded by [BB] KRYSTIN Morrison/GEOFFREY [TESS] Cristobal Mansfield, PTA     Transfer Assessment/Treatment    Transfers, Sit-Stand [place] conditional independence  -BB conditional independence  -TESS     Transfers, Stand-Sit [place] conditional independence  -BB conditional independence  -TESS     Transfers, Sit-Stand-Sit, Assist Device  --   no AD  -TESS     Toilet Transfer, [place] supervision required  -BB      Recorded by [BB] KRYSTIN Morrison/GEOFFREY [TESS] Cristobal Mansfield PTA     Gait Assessment/Treatment    Gait, [place] Level  stand by assist;conditional independence  -TESS     Gait, Assistive Device  --   70, 305  -TESS     Gait, Gait Pattern Analysis  swing-through gait  -TESS     Gait, Safety Issues  supplemental O2  -TESS     Gait, Comment  pt likes to talk throughout gait trip but sometimes needs cueing to focus on breathing.   -TESS     Recorded by  [TESS] Cristobal Mansfield, PTA     Upper Body Bathing Assessment/Training    UB Bathing Assess/Train, Comment pt defers ADL  -BB      Recorded by [BB] KRYSTIN Morrison/L      Toileting Assessment/Training    Toileting Assess/Train, Assistive Device grab bars  -BB      Toileting Assess/Train, Position sitting;standing  -BB      Toileting Assess/Train, Indepen Level supervision required  -BB      Recorded by [BB] CHANDU MorrisonA/L      Grooming Assessment/Training    Grooming Assess/Train, Position sink side;standing  -BB      Grooming Assess/Train, Comment wash face and hands  -BB      Recorded by [BB] KRYSTIN Morrison/L      Therapy Exercises    Bilateral Lower Extremities  AROM:;15  reps;sitting;ankle pumps/circles;LAQ;hip flexion;hip abduction/adduction   10 reps of ab/ad  -TESS     Recorded by  [TESS] Cristobal Mansfield PTA     Positioning and Restraints    Pre-Treatment Position in bed  -BB in bed  -TESS     Post Treatment Position bed  -BB bed  -TESS     In Bed supine;encouraged to call for assist;exit alarm on;call light within reach  -BB supine;call light within reach;encouraged to call for assist;with family/caregiver   all needs met.   -TESS     Recorded by [BB] Susannah Villatla, MCCLELLAND/L [TESS] Cristobal Mansfield PTA       User Key  (r) = Recorded By, (t) = Taken By, (c) = Cosigned By    Initials Name Effective Dates    TA Kayli Kumar, PTA 10/17/16 -     TESS Cristobal Mansfield PTA 10/17/16 -     BB Susannah Villalta, MCCLELLAND/L 10/17/16 -     KD Erlinda Aguilar, [place]/L 10/17/16 -                 IP PT Goals       12/19/17 0904 12/18/17 0930 12/17/17 0850    Transfer Training PT LTG    Transfer Training PT  LTG, Date Goal Reviewed   12/17/17  -    Transfer Training PT LTG, Outcome goal met  -TA  goal ongoing  -    Gait Training PT LTG    Gait Training Goal PT LTG, Date Goal Reviewed   12/17/17  -    Gait Training Goal PT LTG, Outcome goal met  -TA  goal ongoing  -    Strength Goal PT LTG    Strength Goal PT LTG, Outcome  goal met  -TA goal ongoing  -    Patient Education PT LTG    Patient Education PT LTG, Date Goal Reviewed   12/17/17  -    Patient Education PT LTG Outcome goal met  -TA  goal ongoing  -      12/15/17 1358 12/14/17 1721       Transfer Training PT LTG    Transfer Training PT LTG, Date Established  12/14/17  -[facility]     Transfer Training PT LTG, Time to Achieve  by discharge  -[facility]     Transfer Training PT LTG, Activity Type  bed to chair /chair to bed;sit to stand/stand to sit  -[facility]     Transfer Training PT LTG, [place] Level  conditional independence  -[facility]     Transfer Training PT  LTG, Date Goal Reviewed 12/15/17  -      Transfer Training PT LTG, Outcome goal ongoing  -  goal ongoing  -[facility]     Gait Training PT LTG    Gait Training Goal PT LTG, Date Established  12/14/17  -[facility]     Gait Training Goal PT LTG, Time to Achieve  by discharge  -[facility]     Gait Training Goal PT LTG, [place] Level  conditional independence  -[facility]     Gait Training Goal PT LTG, Distance to Achieve  [id number]  -[facility]     Gait Training Goal PT LTG, Date Goal Reviewed 12/15/17  -      Gait Training Goal PT LTG, Outcome goal ongoing  - goal ongoing  -[facility]     Stair Training PT LTG    Stair Training Goal PT LTG, Date Established  12/14/17  -[facility]     Stair Training Goal PT LTG, Time to Achieve  by discharge  -[facility]     Stair Training Goal PT LTG, Number of Steps  5  -[facility]     Stair Training Goal PT LTG, [place] Level  conditional independence  -[facility]     Stair Training Goal PT LTG, Assist Device  2 handrails  -[facility]     Stair Training Goal PT LTG, Date Goal Reviewed 12/15/17  -      Stair Training Goal PT LTG, Outcome goal met  - goal ongoing  -[facility]     Strength Goal PT LTG    Strength Goal PT LTG, Date Established  12/14/17  -[facility]     Strength Goal PT LTG, Time to Achieve  by discharge  -[facility]     Strength Goal PT LTG, Measure to Achieve  Pt will tolerate 15 reps fo AROM exericises  -[facility]     Strength Goal PT LTG, Date Goal Reviewed 12/15/17  -      Strength Goal PT LTG, Outcome goal ongoing  - goal ongoing  -[facility]     Patient Education PT LTG    Patient Education PT LTG, Date Established  12/14/17  -[facility]     Patient Education PT LTG, Time to Achieve  by discharge  -[facility]     Patient Education PT LTG, Education Type  gait;transfers;home safety  -[facility]     Patient Education PT LTG, Date Goal Reviewed 12/15/17  -      Patient Education PT LTG Outcome goal ongoing  - goal ongoing  -[facility]       User Key  (r) = Recorded By, (t) = Taken By, (c) = Cosigned By    Initials Name Provider Type    DOROTHY Joshua, PT Physical Therapist    WALT Kumar, PTA Physical Therapy Assistant    TESS Mansfield, PTA Physical  Therapy Assistant          Physical Therapy Education     Title: PT OT SLP Therapies (Active)     Topic: Physical Therapy (Active)     Point: Mobility training (Active)    Learning Progress Summary    Learner Readiness Method Response Comment Documented by Status   Patient Acceptance E,RYAN,YAKELIN AWAD issued HEP & Home Safety TA 12/19/17 1148 Done    Acceptance E DU edu given to back up until legs touch surface prior to sitting.  12/17/17 1244 Done    Acceptance E NR  TESS 12/15/17 1448 Active    Acceptance E NR  [facility] 12/14/17 1721 Active   Family Acceptance E NR  [facility] 12/14/17 1721 Active               Point: Precautions (Active)    Learning Progress Summary    Learner Readiness Method Response Comment Documented by Status   Patient Acceptance E,D,YAKELIN AWAD issued HEP & Home Safety TA 12/19/17 1148 Done    Acceptance E NR  [facility] 12/14/17 1721 Active   Family Acceptance E NR  [facility] 12/14/17 1721 Active                      User Key     Initials Effective Dates Name Provider Type Discipline    [facility] 10/17/16 -  Gilda Joshua, PT Physical Therapist PT     10/17/16 -  Kayli Kumar, PTA Physical Therapy Assistant PT     10/17/16 -  Cristobal Mansfield PTA Physical Therapy Assistant PT                    PT Recommendation and Plan  Anticipated Discharge Disposition: home with assist, home with home health  Planned Therapy Interventions: balance training, gait training, patient/family education, stair training, strengthening, transfer training  PT Frequency: other (see comments) (5-14 times per week)  Plan of Care Review  Outcome Summary/Follow up Plan: all goals met          Outcome Measures       12/19/17 1100 12/18/17 1100 12/18/17 0745    How much help from another person do you currently need...    Turning from your back to your side while in flat bed without using bedrails? 4  -TA 4  -TA     Moving from lying on back to sitting on the side of a flat bed without bedrails? 4  -TA 4  -TA     Moving to and from a bed to a chair  (including a wheelchair)? 4  -TA 3  -TA     Standing up from a chair using your arms (e.g., wheelchair, bedside chair)? 4  -TA 3  -TA     Climbing 3-5 steps with a railing? 4  -TA 4  -TA     To walk in hospital room? 4  -TA 3  -TA     AM-PAC 6 Clicks Score 24  -TA 21  -TA     How much help from another is currently needed...    Putting on and taking off regular lower body clothing?   3  -KD    Bathing (including washing, rinsing, and drying)   3  -KD    Toileting (which includes using toilet bed pan or urinal)   3  -KD    Putting on and taking off regular upper body clothing   4  -KD    Taking care of personal grooming (such as brushing teeth)   4  -KD    Eating meals   4  -KD    Score   21  -KD    Functional Assessment    Outcome Measure Options AM-PAC 6 Clicks Basic Mobility (PT)  -TA AM-PAC 6 Clicks Basic Mobility (PT)  -TA       12/17/17 0850          How much help from another person do you currently need...    Turning from your back to your side while in flat bed without using bedrails? 4  -TESS      Moving from lying on back to sitting on the side of a flat bed without bedrails? 4  -TESS      Moving to and from a bed to a chair (including a wheelchair)? 3  -TESS      Standing up from a chair using your arms (e.g., wheelchair, bedside chair)? 3  -TESS      Climbing 3-5 steps with a railing? 4  -TESS      To walk in hospital room? 3  -TESS      AM-PAC 6 Clicks Score 21  -TESS      Functional Assessment    Outcome Measure Options AM-PAC 6 Clicks Basic Mobility (PT)  -TESS        User Key  (r) = Recorded By, (t) = Taken By, (c) = Cosigned By    Initials Name Provider Type    WALT Kumar PTA Physical Therapy Assistant    TESS Mansfield PTA Physical Therapy Assistant    SONIA Roca Occupational Therapy Assistant           Time Calculation:         PT Charges       12/19/17 1148          Time Calculation    Start Time 0904  -TA      Stop Time 0944  -TA      Time Calculation (min) 40 min  -TA      Time  Calculation- PT    Total Timed Code Minutes- PT 40 minute(s)  -TA        User Key  (r) = Recorded By, (t) = Taken By, (c) = Cosigned By    Initials Name Provider Type    WALT Kumar PTA Physical Therapy Assistant          Therapy Charges for Today     Code Description Service Date Service Provider Modifiers Qty    39831160819 HC GAIT TRAINING EA 15 MIN 12/18/2017 Kayli Kumar, PTA GP 1    32885214532 HC PT THERAPEUTIC ACT EA 15 MIN 12/18/2017 Kayli Kumar PTA GP 1    19765951392 HC PT THER PROC EA 15 MIN 12/18/2017 Kayli Kumar PTA GP 1    93555285061 HC GAIT TRAINING EA 15 MIN 12/19/2017 Kayli Kumar, NEFTALI GP 1    73865142889 HC PT SELF CARE/MGMT/TRAIN EA 15 MIN 12/19/2017 Kayli Kumar, NEFTALI GP 1    77631075090 HC PT THERAPEUTIC ACT EA 15 MIN 12/19/2017 Kayli Kumar, NEFTALI GP 1          PT G-Codes  PT Professional Judgement Used?: Yes  Outcome Measure Options: AM-PAC 6 Clicks Basic Mobility (PT)  Score: 19  Functional Limitation: Mobility: Walking and moving around  Mobility: Walking and Moving Around Current Status (): At least 20 percent but less than 40 percent impaired, limited or restricted  Mobility: Walking and Moving Around Goal Status (): At least 1 percent but less than 20 percent impaired, limited or restricted    Kayli Kumar PTA  12/19/2017

## 2017-12-19 NOTE — PROGRESS NOTES
[facility] Medicine Services  INPATIENT PROGRESS NOTE    Length of Stay: 3  Date of Admission: 12/13/2017  Primary Care Physician: Paolo Rey MD    Subjective   Chief Complaint: shortness of breath  HPI:  Feeling better.  No specific complaints.  Patient's wife has concerns about him having bradycardia overnight.    Review of Systems   Constitutional: Negative for appetite change, chills, fatigue and fever.   Respiratory: Positive for shortness of breath. Negative for chest tightness.    Cardiovascular: Negative for chest pain, palpitations and leg swelling.   Gastrointestinal: Negative for abdominal pain, constipation, diarrhea, nausea and vomiting.   Skin: Negative for wound.   Neurological: Negative for dizziness, weakness, light-headedness, numbness and headaches.        All pertinent negatives and positives are as above. All other systems have been reviewed and are negative unless otherwise stated.     Objective    Temp:  [96.6 °F (35.9 °C)-97.5 °F (36.4 °C)] 97 °F (36.1 °C)  Heart Rate:  [44-93] 53  Resp:  [18-20] 18  BP: (133-182)/(69-83) 152/71    Physical Exam   Constitutional: He appears well-developed and well-nourished.   HENT:   Head: Normocephalic and atraumatic.   Eyes: EOM are normal. Pupils are equal, round, and reactive to light.   Neck: Normal range of motion. Neck supple.   Cardiovascular: Normal rate, regular rhythm, normal heart sounds and intact distal pulses.  Exam reveals no gallop and no friction rub.    No murmur heard.  Pulmonary/Chest: Effort normal and breath sounds normal. No respiratory distress. He has no wheezes. He has no rales. He exhibits no tenderness.   Abdominal: Soft. Bowel sounds are normal. He exhibits no distension. There is no tenderness.   Psychiatric: He has a normal mood and affect. His behavior is normal.   Vitals reviewed.          Results Review:  I have reviewed the labs, radiology results, and diagnostic  studies.    Laboratory Data:     Results from last 7 days  Lab Units 12/18/17  0545 12/17/17  0455 12/16/17  0604  12/13/17  0840   SODIUM mmol/L 140 140 137  < > 139   POTASSIUM mmol/L 4.3 4.0 4.2  < > 4.7   CHLORIDE mmol/L 107 108 104  < > 103   CO2 mmol/L 22.0 23.0 24.0  < > 26.0   BUN mg/dL 21 24* 33*  < > 31*   CREATININE mg/dL 0.84 1.06 0.99  < > 1.09   GLUCOSE mg/dL 84 127* 98  < > 82   CALCIUM mg/dL 8.5 8.8 8.6  < > 9.4   BILIRUBIN mg/dL  --   --   --   --  0.5   ALK PHOS U/L  --   --   --   --  56   ALT (SGPT) U/L  --   --   --   --  39   AST (SGOT) U/L  --   --   --   --  27   ANION GAP mmol/L 11.0 9.0 9.0  < > 10.0   < > = values in this interval not displayed.  Estimated Creatinine Clearance: 70.9 mL/min (by C-G formula based on Cr of 0.84).            Results from last 7 days  Lab Units 12/18/17  0551 12/17/17  0456 12/16/17  0604 12/15/17  0653 12/14/17  0602   WBC 10*3/mm3 9.44 10.66* 14.69* 17.94* 11.41*   HEMOGLOBIN g/dL 10.9* 11.4* 11.1* 12.6* 13.0*   HEMATOCRIT % 34.2* 36.2* 34.6* 39.5 39.8   PLATELETS 10*3/mm3 170 173 189 245 225       Results from last 7 days  Lab Units 12/13/17  0840   INR  0.92         Radiology Data:   Imaging Results (last 24 hours)     ** No results found for the last 24 hours. **          I have reviewed the patient current medications.     Assessment/Plan     Hospital Problem List     Chest pain          Plan:    1.  Acute on chronic diastolic congestive heart failure:  Feeling better.  No beta blocker due to bradycardia  2.  Bradycardia:  Better currently.  Wife has concern for going home as it normally happens at night.  3.  COPD Exacerbation:  Continue treatment.  4.  Pneumonia:  Continue antibiotics.  5.  HTN:  Continue lisinopril.              Discharge Planning: I expect patient to be discharged to home in 1-2 days.        This document has been electronically signed by Tay Puente MD on December 18, 2017 7:46 PM

## 2017-12-19 NOTE — PLAN OF CARE
Problem: Acute Coronary Syndrome (ACS) (Adult)  Goal: Signs and Symptoms of Listed Potential Problems Will be Absent or Manageable (Acute Coronary Syndrome)  Outcome: Ongoing (interventions implemented as appropriate)    12/19/17 0612   Acute Coronary Syndrome (ACS)   Problems Assessed (Acute Coronary Syndrome (ACS)) all   Problems Present (Acute Coronary Syndrome (ACS)) none         12/19/17 0612   Acute Coronary Syndrome (ACS)   Problems Assessed (Acute Coronary Syndrome (ACS)) all   Problems Present (Acute Coronary Syndrome (ACS)) none

## 2017-12-19 NOTE — PLAN OF CARE
Problem: Patient Care Overview (Adult)  Goal: Plan of Care Review  Outcome: Outcome(s) achieved Date Met:  12/19/17 12/18/17 1409 12/19/17 0806 12/19/17 0904   Coping/Psychosocial Response Interventions   Plan Of Care Reviewed With --  patient;spouse --    Patient Care Overview   Progress progress toward functional goals as expected --  --    Outcome Evaluation   Outcome Summary/Follow up Plan --  --  all goals met         Problem: Inpatient Physical Therapy  Goal: Transfer Training Goal 1 LTG- PT  Outcome: Outcome(s) achieved Date Met:  12/19/17 12/14/17 1721 12/17/17 0850 12/19/17 0904   Transfer Training PT LTG   Transfer Training PT LTG, Date Established 12/14/17 --  --    Transfer Training PT LTG, Time to Achieve by discharge --  --    Transfer Training PT LTG, Activity Type bed to chair /chair to bed;sit to stand/stand to sit --  --    Transfer Training PT LTG, [place] conditional independence --  --    Transfer Training PT LTG, Date Goal Reviewed --  12/17/17 --    Transfer Training PT LTG, Outcome --  --  goal met       Goal: Gait Training Goal LTG- PT  Outcome: Outcome(s) achieved Date Met:  12/19/17 12/14/17 1721 12/17/17 0850 12/19/17 0904   Gait Training PT LTG   Gait Training Goal PT LTG, Date Established 12/14/17 --  --    Gait Training Goal PT LTG, Time to Achieve by discharge --  --    Gait Training Goal PT LTG, [place] conditional independence --  --    Gait Training Goal PT LTG, Distance to Achieve [id number] --  --    Gait Training Goal PT LTG, Date Goal Reviewed --  12/17/17 --    Gait Training Goal PT LTG, Outcome --  --  goal met       Goal: Patient Education Goal LTG- PT  Outcome: Outcome(s) achieved Date Met:  12/19/17 12/14/17 1721 12/17/17 0850 12/19/17 0904   Patient Education PT LTG   Patient Education PT LTG, Date Established 12/14/17 --  --    Patient Education PT LTG, Time to Achieve by discharge --  --    Patient Education PT LTG, Education Type  gait;transfers;home safety --  --    Patient Education PT LTG, Date Goal Reviewed --  12/17/17 --    Patient Education PT LTG Outcome --  --  goal met

## 2017-12-19 NOTE — DISCHARGE SUMMARY
[facility] Medicine Services  DISCHARGE SUMMARY       Date of Admission: 12/13/2017  Date of Discharge:  12/19/2017  Primary Care Physician: Paolo Rey MD      Final Discharge Diagnoses:  1. Acute on chronic diastolic dysfunction with normal EF:    2. Bradyarrhythmia:  3. Hyperkalemia  4. COPD exacerbation with pneumonia:  5. HTN:  6. JILL  7. Sepsis from pneumonia      Consults:   Consults     Date and Time Order Name Status Description    12/13/2017 1624 Inpatient Consult to Cardiology Completed           Procedures Performed:                   Hospital Course:  The patient is a 84 y.o. male who presented to [facility] with  shortness of air and chest pain.  The patient has chronic persistent dyspnea and has home oxygen. He was found to have bradycardia , Metoprolol and Cardizem have previously been discontinued due to bradycardia , He was evaluated by cardiology, no pacemaker at this time secondary to multiple comorbidities.  He was found to have acute on chronic diastolic congestive heart failure and was treated with IV Lasix.  He also was found to be septic secondary to pneumonia and was treated with IV antibiotics, white count is back to normal prior to discharge.  He had acute kidney injury secondary to sepsis which also resolved.  He was also treated for COPD exacerbation with oxygen, bronchodilators, IV steroids and IV antibiotics.  He feels better and will be discharged home to follow-up with PCP and cardiology as an outpatient.    Condition on Discharge:   Stable         Discharge Disposition:  Home-Health Care [facility] [place]    Discharge Medications:   Hasmukh Ham   Home Medication Instructions SEBASTIAN:[id number]    Printed on:12/19/17 2395   Medication Information                      albuterol (PROVENTIL) (2.5 MG/3ML) 0.083% nebulizer solution  Take 2.5 mg by nebulization Every 4 (Four) Hours As Needed for Wheezing.             aspirin 81 MG  chewable tablet  Chew 81 mg Daily.             clopidogrel (PLAVIX) 75 MG tablet  Take 75 mg by mouth Daily.             fluticasone (FLONASE) 50 MCG/ACT nasal spray  2 sprays into each nostril Daily.             Fluticasone Furoate (ARNUITY ELLIPTA) 200 MCG/ACT aerosol powder   Inhale.             furosemide (LASIX) 20 MG tablet  Take 1 tablet by mouth Daily.             glimepiride (AMARYL) 2 MG tablet  Take 2 mg by mouth Every Morning Before Breakfast.             HYDROcodone-acetaminophen (NORCO) 7.5-325 MG per tablet  Take 1 tablet by mouth Every 8 (Eight) Hours.             ipratropium-albuterol (DUO-NEB) 0.5-2.5 mg/mL nebulizer  Take 3 mL by nebulization 4 (Four) Times a Day.             isosorbide dinitrate (ISORDIL) 10 MG tablet  Take 1 tablet by mouth 3 (Three) Times a Day.             lisinopril (PRINIVIL,ZESTRIL) 10 MG tablet  Take 0.5 tablets by mouth Daily.             magnesium oxide (MAGOX) 400 (241.3 MG) MG tablet tablet  Take 400 mg by mouth Daily.             nitroglycerin (NITROSTAT) 0.4 MG SL tablet  place 1 tablet under the tongue if needed every 5 minutes for hair...  (REFER TO PRESCRIPTION NOTES).             O2 (OXYGEN)  Inhale 2 L/min Every Night. W/ CPAP             ranolazine (RANEXA) 500 MG 12 hr tablet  Take 1 tablet by mouth Every 12 (Twelve) Hours for 30 days.             Red Yeast Rice 600 MG tablet  Take 600 mg by mouth 2 (Two) Times a Day.             Umeclidinium-Vilanterol 62.5-25 MCG/INH aerosol powder   Inhale 1 puff Daily.                 Discharge Diet:   Diet Instructions     Diet: Cardiac       Discharge Diet:  Cardiac                 Activity at Discharge:   Activity Instructions     Activity as Tolerated                     Follow-up Appointments:   Future Appointments  Date Time Provider Department Center   1/23/2018 11:00 AM Jeff Maciel MD PhD [facility] None   1/31/2018 1:00 PM MD KATIE Munoz CD [facility] None   2/7/2018 1:30 PM Jeff Maciel MD  PhD [facility] None   3/12/2018 1:15 PM Caitlyn Garcia MD [facility] None   7/12/2018 1:40 PM Delon Calvillo MD [facility] None       Test Results Pending at Discharge:  Order Current Status    Blood Culture - Blood, Preliminary result          Barrett Garner MD  12/19/17  10:59 AM

## 2017-12-19 NOTE — THERAPY TREATMENT NOTE
Acute Care - Occupational Therapy Treatment Note  [facility]     Patient Name: Hasmukh Ham  : 1933  MRN: [id number]  Today's Date: 2017  Onset of Illness/Injury or Date of Surgery Date: 17  Date of Referral to OT: 17  Referring Physician: EVE Birch      Admit Date: 2017    Visit Dx:     ICD-10-CM ICD-9-CM   1. Impaired functional mobility, balance, gait, and endurance Z74.09 V49.89   2. Chest pain, unspecified type R07.9 786.50   3. Elevated troponin R74.8 790.6   4. Abnormal x-ray R93.8 793.99   5. Impaired mobility and ADLs Z74.09 799.89     Patient Active Problem List   Diagnosis   • Dilated aortic root   • Non-rheumatic tricuspid valve insufficiency   • Pulmonary emphysema   • Atrial fibrillation and flutter   • Bradycardia   • Chronic coronary artery disease   • Neuropathy   • Abdominal hernia   • Neck pain   • Dementia   • Diabetic neuropathy   • Gastroesophageal reflux disease without esophagitis   • Generalized osteoarthritis   • 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   • Nocturia   • Osteoarthritis of multiple joints   • Hyperlipidemia   • Pain in joint involving ankle and foot   • Arthropathy of hand   • Paroxysmal tachycardia   • Osteoarthrosis involving more than one site but not generalized   • Right shoulder pain   • Rotator cuff syndrome   • Partial tear of subscapularis tendon   • Infraspinatus tendon tear   • Supraspinatus tendon tear   • Bilateral carotid artery stenosis   • (HFpEF) heart failure with preserved ejection fraction   • Pulmonary HTN   • Acute interstitial pneumonia   • Chronic obstructive lung disease   • Coronary arteriosclerosis   • Diabetes mellitus   • Hypertension   • Chest pain   • Shortness of breath             Adult Rehabilitation Note       17 1100 17 0904 17 0930    Rehab Assessment/Intervention    Discipline occupational therapy assistant  -KD physical therapy assistant  -TA physical therapy  assistant  -TA    Document Type therapy note (daily note)  -KD therapy note (daily note)  -TA therapy note (daily note)  -TA    Subjective Information agree to therapy  -KD agree to therapy  -TA agree to therapy  -TA    Patient Effort, Rehab Treatment good  -KD good  -TA good  -TA    Precautions/Limitations fall precautions;oxygen therapy device and L/min  -KD fall precautions;oxygen therapy device and L/min  -TA fall precautions;oxygen therapy device and L/min  -TA    Recorded by [KD] CHANDU GilmoreA/L [TA] Kayli Kumar PTA [TA] Kayli Kumar PTA    Vital Signs    Pretreatment Heart Rate (beats/min) 75  -KD 76  -TA 74  -TA    Intratreatment Heart Rate (beats/min)   90  -TA    Posttreatment Heart Rate (beats/min) 85  -KD 86  -TA 91  -TA    Pre SpO2 (%) 96  -KD 97  -TA 98  -TA    O2 Delivery Pre Treatment supplemental O2  -KD supplemental O2  -TA supplemental O2  -TA    Intra SpO2 (%)   97  -TA    Post SpO2 (%) 97  -KD 98  -TA 97  -TA    O2 Delivery Post Treatment supplemental O2  -KD      Pre Patient Position Sitting  -KD Sitting  -TA Supine  -TA    Intra Patient Position Sitting  -KD      Post Patient Position Sitting  -KD Sitting  -TA Sitting  -TA    Recorded by [KD] CHANDU GilmoreA/L [TA] Kayli Kumar PTA [TA] Kayli Kumar, NEFTALI    Pain Assessment    Pain Assessment 0-10  -KD 0-10  -TA No/denies pain  -TA    Pain Score 5  -KD 7  -TA     Post Pain Score 5  -KD 7  -TA     Pain Type Chronic pain  -KD Chronic pain  -TA     Pain Location Generalized  -KD Generalized  -TA     Recorded by [KD] CHANDU GilmoreA/L [TA] Kayli Kumar PTA [TA] Kayli Kumar, PTA    Vision Assessment/Intervention    Visual Impairment WFL with corrective lenses  -KD      Recorded by [KD] CHANDU GilmoreA/L      Cognitive Assessment/Intervention    Current Cognitive/Communication Assessment functional  -KD functional  -TA functional  -TA    Orientation Status oriented x 4  -KD oriented x 4  -TA oriented x 4  -TA     Follows Commands/Answers Questions 100% of the time  -% of the time  -% of the time  -TA    Personal Safety mild impairment  -KD mild impairment  -TA mild impairment  -TA    Personal Safety Interventions gait belt;nonskid shoes/slippers when out of bed  -KD gait belt;nonskid shoes/slippers when out of bed  -TA gait belt;nonskid shoes/slippers when out of bed  -TA    Recorded by [KD] KRYSTIN Gilmore/GEOFFREY [TA] Kayli Kumar PTA [TA] Kayli Kumar PTA    Bed Mobility, Assessment/Treatment    Bed Mob, Supine to Sit, [place]   independent  -TA    Bed Mob, Sit to Supine, [place]   independent  -TA    Recorded by   [TA] Kayli Kumar PTA    Transfer Assessment/Treatment    Transfers, Sit-Stand [place]  independent  -TA independent  -TA    Transfers, Stand-Sit [place]  independent  -TA independent  -TA    Transfer, Safety Issues  impulsivity  -TA impulsivity  -TA    Recorded by  [TA] Kayli Kumar PTA [TA] Kayli Kumar PTA    Gait Assessment/Treatment    Gait, [place] Level  independent  -TA stand by assist  -TA    Gait, Assistive Device  other (see comments)   no  AD  -TA other (see comments)   no AD  -TA    Gait, Distance (Feet)  400   &150`  -  -TA    Recorded by  [TA] Kayli Kumar PTA [TA] Kayli Kumar PTA    Stairs Assessment/Treatment    Number of Stairs  5  -TA     Stairs, Handrail Location  both sides  -TA     Stairs, [place] Level  independent  -TA     Recorded by  [TA] Kayli Kumar PTA     Therapy Exercises    Bilateral Lower Extremities  AROM:;20 reps;sitting;ankle pumps/circles;hip abduction/adduction;hip flexion;LAQ;glut sets   issued HEP  -TA AROM:;sitting;ankle pumps/circles;15 reps;hip abduction/adduction;hip flexion;LAQ  -TA    Recorded by  [TA] Kayli Kumar PTA [TA] Kayli Kumar PTA    Positioning and Restraints    Pre-Treatment Position in bed  -KD in bed  -TA in bed  -TA    Post Treatment Position bed  -KD bed  -TA bed  -TA     In Bed sitting EOB;call light within reach;encouraged to call for assist;exit alarm on;with family/caregiver  -KD sitting EOB;call light within reach;with family/caregiver  -TA sitting EOB;call light within reach;with family/caregiver  -TA    Recorded by [KD] KRYSTIN Gilmore/GEOFFREY [TA] Kayli Kumar PTA [TA] Kayli Kumar, NEFTALI      12/18/17 0745 12/17/17 1013 12/17/17 0850    Rehab Assessment/Intervention    Discipline occupational therapy assistant  -KD occupational therapy assistant  -BB physical therapy assistant  -TESS    Document Type therapy note (daily note)  -KD therapy note (daily note)  -BB therapy note (daily note)  -TESS    Subjective Information agree to therapy  -KD agree to therapy  -BB agree to therapy  -TESS    Patient Effort, Rehab Treatment good  -KD good  -BB good  -TESS    Patient Effort, Rehab Treatment Comment   pt reports haveing low HR last night. nsg agrees to tx.   -TESS    Symptoms Noted During/After Treatment  none  -BB     Precautions/Limitations fall precautions;oxygen therapy device and L/min  -KD fall precautions;oxygen therapy device and L/min  -BB fall precautions;oxygen therapy device and L/min  -TESS    Recorded by [KD] KRYSTIN Gilmore/GEOFFREY [BB] KRYSTIN Morrison/GEOFFREY [TESS] Cristobal Mansfield, NEFTALI    Vital Signs    Pre Systolic BP Rehab 182  -  -  -TESS    Pre Treatment Diastolic BP 83  -KD 68  -BB 74  -TESS    Post Systolic BP Rehab   148  -TESS    Post Treatment Diastolic BP   72  -TESS    Pretreatment Heart Rate (beats/min) 89  -KD 76  -BB 74  -TESS    Intratreatment Heart Rate (beats/min)   90  -TESS    Posttreatment Heart Rate (beats/min) 90  -KD 73  -BB 72  -TESS    Pre SpO2 (%) 100  -KD 98  -BB 99  -TESS    O2 Delivery Pre Treatment supplemental O2  -KD supplemental O2  -BB supplemental O2  -TESS    Intra SpO2 (%)  97  -BB 98  -TESS    O2 Delivery Intra Treatment  supplemental O2  -BB supplemental O2  -TESS    Post SpO2 (%) 96  -KD 99  -BB 99  -TESS    O2 Delivery Post Treatment  supplemental O2  -KD supplemental O2  -BB supplemental O2  -TESS    Pre Patient Position Sitting  -KD Supine  -BB Supine  -TESS    Intra Patient Position Standing  -KD Sitting  -BB     Post Patient Position Sitting  -KD Supine  -BB Supine  -TESS    Recorded by [KD] CHANDU GilmoreA/L [BB] CHANDU MorrisonA/L [TESS] Cristobal Mansfield PTA    Pain Assessment    Pain Assessment No/denies pain  -KD 0-10  -BB 0-10  -TESS    Pain Score 0  -KD 3  -BB 7  -TESS    Post Pain Score 0  -KD 2  -BB 7  -TESS    Pain Type  Chronic pain  -BB     Pain Location  Generalized  -BB Generalized  -TESS    Pain Intervention(s)  Repositioned  -BB Ambulation/increased activity;Repositioned  -TESS    Recorded by [KD] KRYSTIN Gilmore/GEOFFREY [BB] CHANDU MorrisonA/L [TESS] Cristobal Mansfield PTA    Vision Assessment/Intervention    Visual Impairment   WFL with corrective lenses  -TESS    Recorded by   [TESS] Cristobal Mansfield PTA    Cognitive Assessment/Intervention    Current Cognitive/Communication Assessment functional  -KD functional  -BB functional  -TESS    Orientation Status oriented x 4  -KD oriented x 4  -BB oriented x 4  -TESS    Follows Commands/Answers Questions 100% of the time  -% of the time  -% of the time  -TESS    Personal Safety mild impairment  -KD mild impairment   discussed AE, safety, EC/W/S,PLB  -BB     Personal Safety Interventions gait belt;nonskid shoes/slippers when out of bed  -KD gait belt;nonskid shoes/slippers when out of bed;supervised activity  -BB gait belt;muscle strengthening facilitated;nonskid shoes/slippers when out of bed;supervised activity  -TESS    Recorded by [KD] CHANDU GilmoreA/L [BB] CHANDU MorrisonA/L [TESS] Cirstobal Mansfield, NEFTALI    Bed Mobility, Assessment/Treatment    Bed Mobility, Assistive Device  bed rails;head of bed elevated  -BB bed rails;head of bed elevated  -TESS    Bed Mob, Supine to Sit, [place]  conditional independence  -BB conditional independence  -TESS    Bed Mob, Sit to  Supine, [place]  conditional independence  -BB conditional independence  -TESS    Recorded by  [BB] Susannah Villalta, MCCLELLAND/L [TESS] Cristobal Mansfield, PTA    Transfer Assessment/Treatment    Transfers, Sit-Stand [place] supervision required  -KD conditional independence  -BB conditional independence  -TESS    Transfers, Stand-Sit [place] supervision required  -KD conditional independence  -BB conditional independence  -TESS    Transfers, Sit-Stand-Sit, Assist Device rolling walker  -KD  --   no AD  -TESS    Toilet Transfer, [place]  supervision required  -BB     Recorded by [KD] Erlinda Aguilar, MCCLELLAND/L [BB] Susannah Villalta, MCCLELLAND/L [TESS] Cristobal Mansfield, PTA    Gait Assessment/Treatment    Gait, [place] Level   stand by assist;conditional independence  -TESS    Gait, Assistive Device   --   70, 305  -TESS    Gait, Gait Pattern Analysis   swing-through gait  -TESS    Gait, Safety Issues   supplemental O2  -TESS    Gait, Comment   pt likes to talk throughout gait trip but sometimes needs cueing to focus on breathing.   -TESS    Recorded by   [TESS] Cristobal Mansfield, PTA    Functional Mobility    Functional Mobility- Ind. Level conditional independence  -KD      Functional Mobility- Device --   none  -KD      Functional Mobility-Distance (Feet) --   45 then 280'  -KD      Recorded by [KD] Erlinda Aguilar, MCCLELLAND/L      Upper Body Bathing Assessment/Training    UB Bathing Assess/Train, Comment  pt defers ADL  -BB     Recorded by  [BB] CHANDU MorrisonA/L     Toileting Assessment/Training    Toileting Assess/Train, Assistive Device  grab bars  -BB     Toileting Assess/Train, Position  sitting;standing  -BB     Toileting Assess/Train, Indepen Level  supervision required  -BB     Recorded by  [BB] CHANDU MorrisonA/L     Grooming Assessment/Training    Grooming Assess/Train, Position  sink side;standing  -BB     Grooming Assess/Train, Comment  wash face and hands  -BB     Recorded by  [BB] Susannah Villalta,  KRYSTIN/L     Balance Skills Training    Standing-Level of Assistance Close supervision  -KD      Standing-Balance Activities --   dynamic  -KD      Standing Balance # of Minutes --   2.5  -KD      Recorded by [KD] SONIA Gilmore      Therapy Exercises    Bilateral Lower Extremities   AROM:;15 reps;sitting;ankle pumps/circles;LAQ;hip flexion;hip abduction/adduction   10 reps of ab/ad  -TESS    Bilateral Upper Extremity AROM:;20 reps;sitting;elbow flexion/extension;pronation/supination;shoulder abduction/adduction;shoulder extension/flexion;shoulder ER/IR;shoulder horizontal abd/add  -KD      BUE Resistance manual resistance- moderate  -KD      Recorded by [KD] KRYSTIN Gilmore/GEOFFREY  [TESS] Cristobal Mansfield PTA    Positioning and Restraints    Pre-Treatment Position in bed  -KD in bed  -BB in bed  -TESS    Post Treatment Position bed  -KD bed  -BB bed  -TESS    In Bed sitting EOB;call light within reach;encouraged to call for assist;exit alarm on  -KD supine;encouraged to call for assist;exit alarm on;call light within reach  -BB supine;call light within reach;encouraged to call for assist;with family/caregiver   all needs met.   -TESS    Recorded by [KD] SONIA Gilmore [BB] SONIA Morrison [TESS] Cristobal Mansfield PTA      User Key  (r) = Recorded By, (t) = Taken By, (c) = Cosigned By    Initials Name Effective Dates    TA Kayli Kumar, PTA 10/17/16 -     TESS Mansfield PTA 10/17/16 -     KRYSTIN Hansen/L 10/17/16 -     SONIA Roca 10/17/16 -                 OT Goals       12/18/17 1317 12/17/17 1435 12/15/17 0843    Transfer Training OT LTG    Transfer Training OT LTG, Date Goal Reviewed 12/18/17  -KD  12/15/17  -KD    Transfer Training OT LTG, Outcome goal not met  -KD  goal not met  -KD    Dynamic Standing Balance OT LTG    Dynamic Standing Balance OT LTG, Date Goal Reviewed 12/18/17  -KD 12/17/17  -BB 12/15/17  -KD    Dynamic Standing Balance OT LTG, Outcome goal not  met  -KD goal ongoing  -BB goal not met  -KD    Safety Awareness OT LTG    Safety Awareness OT LTG, Date Goal Reviewed 12/18/17  -KD 12/17/17  -BB 12/15/17  -KD    Safety Awareness OT LTG, Outcome goal not met  -KD  goal not met  -KD    ADL OT LTG    ADL OT LTG, Date Goal Reviewed 12/18/17  -KD 12/17/17  -BB 12/15/17  -KD    ADL OT LTG, Outcome goal not met  -KD  goal not met  -KD      12/14/17 1648          Transfer Training OT LTG    Transfer Training OT LTG, Date Established 12/14/17  -RW      Transfer Training OT LTG, Time to Achieve by discharge  -RW      Transfer Training OT LTG, Activity Type toilet;tub   simulated tub t/f  -RW      Transfer Training OT LTG, [place] Level conditional independence  -RW      Dynamic Standing Balance OT LTG    Dynamic Standing Balance OT LTG, Date Established 12/14/17  -RW      Dynamic Standing Balance OT LTG, Time to Achieve by discharge  -RW      Dynamic Standing Balance OT LTG, [place] Level conditional independence  -RW      Dynamic Standing Balance OT LTG, Additional Goal 5 min functional activity  -RW      Safety Awareness OT LTG    Safety Awareness OT LTG, Date Established 12/14/17  -RW      Safety Awareness OT LTG, Time to Achieve by discharge  -RW      Safety Awareness OT LTG, Activity Type good safety awareness;with ADL's  -RW      ADL OT LTG    ADL OT LTG, Date Established 12/14/17  -RW      ADL OT LTG, Time to Achieve by discharge  -RW      ADL OT LTG, Activity Type ADL skills  -RW      ADL OT LTG, [place] Level modified independent  -RW        User Key  (r) = Recorded By, (t) = Taken By, (c) = Cosigned By    Initials Name Provider Type    RW Deborah Mcqueen OTR/L Occupational Therapist    BB Susannah Villalta, MCCLELLAND/L Occupational Therapy Assistant    CHANDU RocaA/L Occupational Therapy Assistant          Occupational Therapy Education     Title: PT OT SLP Therapies (Active)     Topic: Occupational Therapy (Active)     Point: ADL  training (Active)    Description: Instruct learner(s) on proper safety adaptation and remediation techniques during self care or transfers.   Instruct in proper use of assistive devices.    Learning Progress Summary    Learner Readiness Method Response Comment Documented by Status   Patient Acceptance E,D,H JANICE,DU issued HEP & Home Safety TA 12/19/17 1148 Done    Acceptance E VU  BB 12/17/17 1434 Done    Acceptance E NR fall precautions, transfer safety, OT POC RW 12/14/17 1647 Active   Family Acceptance E NR fall precautions, transfer safety, OT POC RW 12/14/17 1647 Active               Point: Home exercise program (Done)    Description: Instruct learner(s) on appropriate technique for monitoring, assisting and/or progressing therapeutic exercises/activities.    Learning Progress Summary    Learner Readiness Method Response Comment Documented by Status   Patient Acceptance D,H JANICE Educated pt and spouse on CABG precautions and PLB technique. Handouts were given on both topics also. KD 12/19/17 1331 Done   Significant Other Acceptance D,H JANICE Educated pt and spouse on CABG precautions and PLB technique. Handouts were given on both topics also. KD 12/19/17 1331 Done               Point: Precautions (Active)    Description: Instruct learner(s) on prescribed precautions during self-care and functional transfers.    Learning Progress Summary    Learner Readiness Method Response Comment Documented by Status   Patient Acceptance D,H VU Educated pt and spouse on CABG precautions and PLB technique. Handouts were given on both topics also. KD 12/19/17 1331 Done    Acceptance E VU  BB 12/17/17 1434 Done    Acceptance E NR fall precautions, transfer safety, OT POC RW 12/14/17 1647 Active   Family Acceptance E NR fall precautions, transfer safety, OT POC RW 12/14/17 1647 Active   Significant Other Acceptance D,H VU Educated pt and spouse on CABG precautions and PLB technique. Handouts were given on both topics also. KD 12/19/17  1331 Done               Point: Body mechanics (Done)    Description: Instruct learner(s) on proper positioning and spine alignment during self-care, functional mobility activities and/or exercises.    Learning Progress Summary    Learner Readiness Method Response Comment Documented by Status   Patient Acceptance RYAN,H JANICE Educated pt and spouse on CABG precautions and PLB technique. Handouts were given on both topics also.  12/19/17 1331 Done    Acceptance E VU  BB 12/17/17 1434 Done   Significant Other Acceptance D,H JANICE Educated pt and spouse on CABG precautions and PLB technique. Handouts were given on both topics also.  12/19/17 1331 Done                      User Key     Initials Effective Dates Name Provider Type Discipline    RW 10/17/16 -  Deborah Mcqueen, OTR/L Occupational Therapist OT    TA 10/17/16 -  Kayli Kumar PTA Physical Therapy Assistant PT    BB 10/17/16 -  KRYSTIN Morrison/L Occupational Therapy Assistant OT    KD 10/17/16 -  Erlinda Aguilar MCCLELLAND/L Occupational Therapy Assistant OT                  OT Recommendation and Plan  Anticipated Discharge Disposition: home with home health  Planned Therapy Interventions: activity intolerance, adaptive equipment training, ADL retraining, balance training, energy conservation, home exercise program, strengthening, transfer training  Therapy Frequency: other (see comments) (3-14 times/wk)  Plan of Care Review  Outcome Summary/Follow up Plan: Pt to d/c this date        Outcome Measures       12/19/17 1100 12/18/17 1100 12/18/17 0745    How much help from another person do you currently need...    Turning from your back to your side while in flat bed without using bedrails? 4  -TA 4  -TA     Moving from lying on back to sitting on the side of a flat bed without bedrails? 4  -TA 4  -TA     Moving to and from a bed to a chair (including a wheelchair)? 4  -TA 3  -TA     Standing up from a chair using your arms (e.g., wheelchair, bedside chair)? 4   -TA 3  -TA     Climbing 3-5 steps with a railing? 4  -TA 4  -TA     To walk in hospital room? 4  -TA 3  -TA     AM-PAC 6 Clicks Score 24  -TA 21  -TA     How much help from another is currently needed...    Putting on and taking off regular lower body clothing? 3  -KD  3  -KD    Bathing (including washing, rinsing, and drying) 4  -KD  3  -KD    Toileting (which includes using toilet bed pan or urinal) 4  -KD  3  -KD    Putting on and taking off regular upper body clothing 4  -KD  4  -KD    Taking care of personal grooming (such as brushing teeth) 4  -KD  4  -KD    Eating meals 4  -KD  4  -KD    Score 23  -KD  21  -KD    Functional Assessment    Outcome Measure Options AM-PAC 6 Clicks Basic Mobility (PT)  -TA AM-PAC 6 Clicks Basic Mobility (PT)  -TA       12/17/17 0850          How much help from another person do you currently need...    Turning from your back to your side while in flat bed without using bedrails? 4  -TESS      Moving from lying on back to sitting on the side of a flat bed without bedrails? 4  -TESS      Moving to and from a bed to a chair (including a wheelchair)? 3  -TESS      Standing up from a chair using your arms (e.g., wheelchair, bedside chair)? 3  -TESS      Climbing 3-5 steps with a railing? 4  -TESS      To walk in hospital room? 3  -TESS      AM-PAC 6 Clicks Score 21  -TESS      Functional Assessment    Outcome Measure Options AM-PAC 6 Clicks Basic Mobility (PT)  -TESS        User Key  (r) = Recorded By, (t) = Taken By, (c) = Cosigned By    Initials Name Provider Type    WALT Kumar PTA Physical Therapy Assistant    TESS Mansfield PTA Physical Therapy Assistant    SONIA Roca Occupational Therapy Assistant           Time Calculation:         Time Calculation- OT       12/19/17 1333          Time Calculation- OT    OT Start Time 1100  -KD      OT Stop Time 1111  -KD      OT Time Calculation (min) 11 min  -KD      Total Timed Code Minutes- OT 11 minute(s)  -KD      OT Received On  12/19/17  -PAOLA        User Key  (r) = Recorded By, (t) = Taken By, (c) = Cosigned By    Initials Name Provider Type    KRYSTIN Roca/L Occupational Therapy Assistant           Therapy Charges for Today     Code Description Service Date Service Provider Modifiers Qty    74773433596 HC OT THER PROC EA 15 MIN 12/18/2017 SONIA Gilmore GO 3    50388204512 HC OT THERAPEUTIC ACT EA 15 MIN 12/18/2017 SONIA Gilmore GO 1    12830133009 HC OT SELF CARE/MGMT/TRAIN EA 15 MIN 12/18/2017 KRYSTIN Gilmore/L GO 1    68234564045 HC OT SELF CARE/MGMT/TRAIN EA 15 MIN 12/19/2017 KRYSTIN Gilmore/L GO 1          OT G-codes  OT Professional Judgement Used?: Yes  OT Functional Scales Options: AM-PAC 6 Clicks Daily Activity (OT)  Score: 20  Functional Limitation: Self care  Self Care Current Status (): At least 20 percent but less than 40 percent impaired, limited or restricted  Self Care Goal Status (): At least 1 percent but less than 20 percent impaired, limited or restricted    SONIA Gilmore  12/19/2017

## 2017-12-19 NOTE — PLAN OF CARE
Problem: Patient Care Overview (Adult)  Goal: Plan of Care Review  Outcome: Ongoing (interventions implemented as appropriate)    12/18/17 1409 12/19/17 0806 12/19/17 1333   Coping/Psychosocial Response Interventions   Plan Of Care Reviewed With --  patient;spouse --    Patient Care Overview   Progress progress toward functional goals as expected --  --    Outcome Evaluation   Outcome Summary/Follow up Plan --  --  Pt to d/c this date

## 2017-12-19 NOTE — NURSING NOTE
Pt called to let nurse know that his daughter was here to take him home, went to room shortly after call and pt daughter had took pt to the car herself

## 2017-12-20 LAB — BACTERIA SPEC AEROBE CULT: NORMAL

## 2017-12-20 NOTE — THERAPY DISCHARGE NOTE
Acute Care - Physical Therapy Discharge Summary  [facility]       Patient Name: Hasmukh Ham  : 1933  MRN: [id number]    Today's Date: 2017  Onset of Illness/Injury or Date of Surgery Date: 17    Date of Referral to PT: 17  Referring Physician: EVE Birch      Admit Date: 2017      PT Recommendation and Plan    Visit Dx:    ICD-10-CM ICD-9-CM   1. Impaired functional mobility, balance, gait, and endurance Z74.09 V49.89   2. Chest pain, unspecified type R07.9 786.50   3. Elevated troponin R74.8 790.6   4. Abnormal x-ray R93.8 793.99   5. Impaired mobility and ADLs Z74.09 799.89             Outcome Measures       17 1100 17 1100 17 0745    How much help from another person do you currently need...    Turning from your back to your side while in flat bed without using bedrails? 4  -TA 4  -TA     Moving from lying on back to sitting on the side of a flat bed without bedrails? 4  -TA 4  -TA     Moving to and from a bed to a chair (including a wheelchair)? 4  -TA 3  -TA     Standing up from a chair using your arms (e.g., wheelchair, bedside chair)? 4  -TA 3  -TA     Climbing 3-5 steps with a railing? 4  -TA 4  -TA     To walk in hospital room? 4  -TA 3  -TA     AM-PAC 6 Clicks Score 24  -TA 21  -TA     How much help from another is currently needed...    Putting on and taking off regular lower body clothing? 3  -KD  3  -KD    Bathing (including washing, rinsing, and drying) 4  -KD  3  -KD    Toileting (which includes using toilet bed pan or urinal) 4  -KD  3  -KD    Putting on and taking off regular upper body clothing 4  -KD  4  -KD    Taking care of personal grooming (such as brushing teeth) 4  -KD  4  -KD    Eating meals 4  -KD  4  -KD    Score 23  -KD  21  -KD    Functional Assessment    Outcome Measure Options AM-PAC 6 Clicks Basic Mobility (PT)  -TA AM-PAC 6 Clicks Basic Mobility (PT)  -TA       User Key  (r) = Recorded By, (t) = Taken By, (c) =  Cosigned By    Initials Name Provider Type    TA Kayli Kumar, NEFTALI Physical Therapy Assistant    CHANDU RocaA/L Occupational Therapy Assistant                      IP PT Goals       12/20/17 1617 12/19/17 0904 12/18/17 0930    Transfer Training PT LTG    Transfer Training PT  LTG, Date Goal Reviewed 12/20/17  -MN      Transfer Training PT LTG, Outcome goal met  -MN goal met  -TA     Transfer Training PT LTG, Reason Goal Not Met discharged from facility  -MN      Gait Training PT LTG    Gait Training Goal PT LTG, Date Goal Reviewed 12/20/17  -MN      Gait Training Goal PT LTG, Outcome goal met  -MN goal met  -TA     Gait Training Goal PT LTG, Reason Goal Not Met discharged from [facility]      Stair Training PT LTG    Stair Training Goal PT LTG, Date Goal Reviewed 12/20/17  -MN      Stair Training Goal PT LTG, Outcome goal met  -MN      Stair Training Goal PT LTG, Reason Goal Not Met discharged from [facility]  -MN      Strength Goal PT LTG    Strength Goal PT LTG, Date Goal Reviewed 12/20/17  -MN      Strength Goal PT LTG, Outcome goal met  -MN  goal met  -TA    Strength Goal PT LTG, Reason Goal Not Met discharged from [facility]  -MN      Patient Education PT LTG    Patient Education PT LTG, Date Goal Reviewed 12/20/17  -MN      Patient Education PT LTG Outcome goal met  -MN goal met  -TA     Patient Education PT LTG, Reason Goal Not Met discharged from facility  -MN        12/17/17 0850 12/15/17 1358 12/14/17 1721    Transfer Training PT LTG    Transfer Training PT LTG, Date Established   12/14/17  -TESS    Transfer Training PT LTG, Time to Achieve   by discharge  -TESS    Transfer Training PT LTG, Activity Type   bed to chair /chair to bed;sit to stand/stand to sit  -TESS    Transfer Training PT LTG, [place] Level   conditional independence  -TESS    Transfer Training PT  LTG, Date Goal Reviewed 12/17/17  -JCA 12/15/17  -JCA     Transfer Training PT LTG, Outcome goal ongoing  -JCA goal ongoing  -JCA goal  ongoing  -    Gait Training PT LTG    Gait Training Goal PT LTG, Date Established   12/14/17  -    Gait Training Goal PT LTG, Time to Achieve   by discharge  -    Gait Training Goal PT LTG, [place] Level   conditional independence  -    Gait Training Goal PT LTG, Distance to Achieve   [id number]  -    Gait Training Goal PT LTG, Date Goal Reviewed 12/17/17  -A 12/15/17  -A     Gait Training Goal PT LTG, Outcome goal ongoing  -A goal ongoing  -A goal ongoing  -    Stair Training PT LTG    Stair Training Goal PT LTG, Date Established   12/14/17  -    Stair Training Goal PT LTG, Time to Achieve   by discharge  -    Stair Training Goal PT LTG, Number of Steps   5  -    Stair Training Goal PT LTG, [place] Level   conditional independence  -    Stair Training Goal PT LTG, Assist Device   2 handrails  -    Stair Training Goal PT LTG, Date Goal Reviewed  12/15/17  -[facility]     Stair Training Goal PT LTG, Outcome  goal met  -A goal ongoing  [facility]    Strength Goal PT LTG    Strength Goal PT LTG, Date Established   12/14/17  -    Strength Goal PT LTG, Time to Achieve   by discharge  -    Strength Goal PT LTG, Measure to Achieve   Pt will tolerate 15 reps fo AROM exericises  -    Strength Goal PT LTG, Date Goal Reviewed  12/15/17  -[facility]     Strength Goal PT LTG, Outcome goal ongoing  -A goal ongoing  -A goal ongoing  -    Patient Education PT LTG    Patient Education PT LTG, Date Established   12/14/17  -    Patient Education PT LTG, Time to Achieve   by discharge  -    Patient Education PT LTG, Education Type   gait;transfers;home safety  -    Patient Education PT LTG, Date Goal Reviewed 12/17/17  -A 12/15/17  -[facility]     Patient Education PT LTG Outcome goal ongoing  -A goal ongoing  -A goal ongoing  [facility]      User Key  (r) = Recorded By, (t) = Taken By, (c) = Cosigned By    Initials Name Provider Type    VICTOR M Beal, PT Physical Therapist    TESS Gilda Joshua, PT  Physical Therapist    WALT Kumar, PTA Physical Therapy Assistant    DOROTHY Mansfield, PTA Physical Therapy Assistant              PT Discharge Summary  Anticipated Discharge Disposition: home with assist, home with home health  Reason for Discharge: Discharge from facility, Per MD order  Outcomes Achieved: Able to achieve all goals within established timeline  Discharge Destination: Home with home health      Jen Beal, PT   12/20/2017

## 2017-12-20 NOTE — PLAN OF CARE
Problem: Inpatient Physical Therapy  Goal: Transfer Training Goal 1 LTG- PT  Outcome: Outcome(s) achieved Date Met: 12/20/17 12/14/17 1721 12/20/17 1617   Transfer Training PT LTG   Transfer Training PT LTG, Date Established 12/14/17 --    Transfer Training PT LTG, Time to Achieve by discharge --    Transfer Training PT LTG, Activity Type bed to chair /chair to bed;sit to stand/stand to sit --    Transfer Training PT LTG, [place] Level conditional independence --    Transfer Training PT LTG, Date Goal Reviewed --  12/20/17   Transfer Training PT LTG, Outcome --  goal met   Transfer Training PT LTG, Reason Goal Not Met --  discharged from facility     Goal: Gait Training Goal LTG- PT  Outcome: Outcome(s) achieved Date Met: 12/20/17 12/14/17 1721 12/20/17 1617   Gait Training PT LTG   Gait Training Goal PT LTG, Date Established 12/14/17 --    Gait Training Goal PT LTG, Time to Achieve by discharge --    Gait Training Goal PT LTG, [place] Level conditional independence --    Gait Training Goal PT LTG, Distance to Achieve [id number] --    Gait Training Goal PT LTG, Date Goal Reviewed --  12/20/17   Gait Training Goal PT LTG, Outcome --  goal met   Gait Training Goal PT LTG, Reason Goal Not Met --  discharged from facility     Goal: Stair Training Goal LTG- PT  Outcome: Outcome(s) achieved Date Met: 12/20/17 12/14/17 1721 12/20/17 1617   Stair Training PT LTG   Stair Training Goal PT LTG, Date Established 12/14/17 --    Stair Training Goal PT LTG, Time to Achieve by discharge --    Stair Training Goal PT LTG, Number of Steps 5 --    Stair Training Goal PT LTG, [place] Level conditional independence --    Stair Training Goal PT LTG, Assist Device 2 handrails --    Stair Training Goal PT LTG, Date Goal Reviewed --  12/20/17   Stair Training Goal PT LTG, Outcome --  goal met   Stair Training Goal PT LTG, Reason Goal Not Met --  discharged from facility     Goal: Strength Goal LTG- PT  Outcome: Outcome(s)  achieved Date Met: 12/20/17 12/14/17 1721 12/20/17 1617   Strength Goal PT LTG   Strength Goal PT LTG, Date Established 12/14/17 --    Strength Goal PT LTG, Time to Achieve by discharge --    Strength Goal PT LTG, Measure to Achieve Pt will tolerate 15 reps fo AROM exericises --    Strength Goal PT LTG, Date Goal Reviewed --  12/20/17   Strength Goal PT LTG, Outcome --  goal met   Strength Goal PT LTG, Reason Goal Not Met --  discharged from facility     Goal: Patient Education Goal LTG- PT  Outcome: Outcome(s) achieved Date Met: 12/20/17 12/14/17 1721 12/20/17 1617   Patient Education PT LTG   Patient Education PT LTG, Date Established 12/14/17 --    Patient Education PT LTG, Time to Achieve by discharge --    Patient Education PT LTG, Education Type gait;transfers;home safety --    Patient Education PT LTG, Date Goal Reviewed --  12/20/17   Patient Education PT LTG Outcome --  goal met   Patient Education PT LTG, Reason Goal Not Met --  discharged from facility

## 2017-12-23 ENCOUNTER — HOSPITAL ENCOUNTER (EMERGENCY)
Facility: HOSPITAL | Age: 82
Discharge: HOME | End: 2017-12-23
Attending: EMERGENCY MEDICINE | Admitting: EMERGENCY MEDICINE

## 2017-12-23 ENCOUNTER — APPOINTMENT (OUTPATIENT)
Dept: GENERAL RADIOLOGY | Facility: HOSPITAL | Age: 82
End: 2017-12-23

## 2017-12-23 VITALS
TEMPERATURE: 98.8 F | HEART RATE: 88 BPM | HEIGHT: 67 IN | SYSTOLIC BLOOD PRESSURE: 122 MMHG | DIASTOLIC BLOOD PRESSURE: 60 MMHG | BODY MASS INDEX: 28.12 KG/M2 | OXYGEN SATURATION: 98 % | RESPIRATION RATE: 20 BRPM | WEIGHT: 179.2 LBS

## 2017-12-23 DIAGNOSIS — J44.9 MIXED TYPE COPD (CHRONIC OBSTRUCTIVE PULMONARY DISEASE) (HCC): ICD-10-CM

## 2017-12-23 DIAGNOSIS — J44.1 COPD WITH EXACERBATION (HCC): Primary | ICD-10-CM

## 2017-12-23 LAB
ALBUMIN SERPL-MCNC: 3.5 G/DL (ref 3.4–4.8)
ALBUMIN/GLOB SERPL: 1.3 G/DL (ref 1.1–1.8)
ALP SERPL-CCNC: 65 U/L (ref 38–126)
ALT SERPL W P-5'-P-CCNC: 30 U/L (ref 21–72)
ANION GAP SERPL CALCULATED.3IONS-SCNC: 10 MMOL/L (ref 5–15)
AST SERPL-CCNC: 17 U/L (ref 17–59)
BASOPHILS # BLD AUTO: 0.02 10*3/MM3 (ref 0–0.2)
BASOPHILS NFR BLD AUTO: 0.1 % (ref 0–2)
BILIRUB SERPL-MCNC: 0.2 MG/DL (ref 0.2–1.3)
BUN BLD-MCNC: 31 MG/DL (ref 7–21)
BUN/CREAT SERPL: 22 (ref 7–25)
CALCIUM SPEC-SCNC: 8.9 MG/DL (ref 8.4–10.2)
CHLORIDE SERPL-SCNC: 102 MMOL/L (ref 95–110)
CO2 SERPL-SCNC: 24 MMOL/L (ref 22–31)
CREAT BLD-MCNC: 1.41 MG/DL (ref 0.7–1.3)
D-LACTATE SERPL-SCNC: 1.7 MMOL/L (ref 0.5–2)
DEPRECATED RDW RBC AUTO: 54.1 FL (ref 35.1–43.9)
EOSINOPHIL # BLD AUTO: 0.18 10*3/MM3 (ref 0–0.7)
EOSINOPHIL NFR BLD AUTO: 1.2 % (ref 0–7)
ERYTHROCYTE [DISTWIDTH] IN BLOOD BY AUTOMATED COUNT: 16.2 % (ref 11.5–14.5)
GFR SERPL CREATININE-BSD FRML MDRD: 48 ML/MIN/1.73 (ref 60–98)
GLOBULIN UR ELPH-MCNC: 2.7 GM/DL (ref 2.3–3.5)
GLUCOSE BLD-MCNC: 105 MG/DL (ref 60–100)
HCT VFR BLD AUTO: 37.8 % (ref 39–49)
HGB BLD-MCNC: 12.1 G/DL (ref 13.7–17.3)
HOLD SPECIMEN: NORMAL
HOLD SPECIMEN: NORMAL
IMM GRANULOCYTES # BLD: 0.21 10*3/MM3 (ref 0–0.02)
IMM GRANULOCYTES NFR BLD: 1.4 % (ref 0–0.5)
INR PPP: 0.95 (ref 0.8–1.2)
LYMPHOCYTES # BLD AUTO: 2.25 10*3/MM3 (ref 0.6–4.2)
LYMPHOCYTES NFR BLD AUTO: 14.6 % (ref 10–50)
MCH RBC QN AUTO: 29.6 PG (ref 26.5–34)
MCHC RBC AUTO-ENTMCNC: 32 G/DL (ref 31.5–36.3)
MCV RBC AUTO: 92.4 FL (ref 80–98)
MONOCYTES # BLD AUTO: 1.07 10*3/MM3 (ref 0–0.9)
MONOCYTES NFR BLD AUTO: 7 % (ref 0–12)
NEUTROPHILS # BLD AUTO: 11.63 10*3/MM3 (ref 2–8.6)
NEUTROPHILS NFR BLD AUTO: 75.7 % (ref 37–80)
NT-PROBNP SERPL-MCNC: 292 PG/ML (ref 0–1800)
PLATELET # BLD AUTO: 176 10*3/MM3 (ref 150–450)
PMV BLD AUTO: 10.7 FL (ref 8–12)
POTASSIUM BLD-SCNC: 4.9 MMOL/L (ref 3.5–5.1)
PROT SERPL-MCNC: 6.2 G/DL (ref 6.3–8.6)
PROTHROMBIN TIME: 12.6 SECONDS (ref 11.1–15.3)
RBC # BLD AUTO: 4.09 10*6/MM3 (ref 4.37–5.74)
SODIUM BLD-SCNC: 136 MMOL/L (ref 137–145)
TROPONIN I SERPL-MCNC: 0.03 NG/ML
TROPONIN I SERPL-MCNC: 0.04 NG/ML
WBC NRBC COR # BLD: 15.36 10*3/MM3 (ref 3.2–9.8)
WHOLE BLOOD HOLD SPECIMEN: NORMAL
WHOLE BLOOD HOLD SPECIMEN: NORMAL

## 2017-12-23 PROCEDURE — 96374 THER/PROPH/DIAG INJ IV PUSH: CPT

## 2017-12-23 PROCEDURE — 85610 PROTHROMBIN TIME: CPT | Performed by: EMERGENCY MEDICINE

## 2017-12-23 PROCEDURE — 80053 COMPREHEN METABOLIC PANEL: CPT | Performed by: EMERGENCY MEDICINE

## 2017-12-23 PROCEDURE — 99284 EMERGENCY DEPT VISIT MOD MDM: CPT

## 2017-12-23 PROCEDURE — 93010 ELECTROCARDIOGRAM REPORT: CPT | Performed by: INTERNAL MEDICINE

## 2017-12-23 PROCEDURE — 71010 HC CHEST PA OR AP: CPT

## 2017-12-23 PROCEDURE — 83605 ASSAY OF LACTIC ACID: CPT | Performed by: EMERGENCY MEDICINE

## 2017-12-23 PROCEDURE — 87040 BLOOD CULTURE FOR BACTERIA: CPT | Performed by: EMERGENCY MEDICINE

## 2017-12-23 PROCEDURE — 84484 ASSAY OF TROPONIN QUANT: CPT | Performed by: EMERGENCY MEDICINE

## 2017-12-23 PROCEDURE — 83880 ASSAY OF NATRIURETIC PEPTIDE: CPT | Performed by: EMERGENCY MEDICINE

## 2017-12-23 PROCEDURE — 85025 COMPLETE CBC W/AUTO DIFF WBC: CPT | Performed by: EMERGENCY MEDICINE

## 2017-12-23 PROCEDURE — 93005 ELECTROCARDIOGRAM TRACING: CPT | Performed by: EMERGENCY MEDICINE

## 2017-12-23 PROCEDURE — 25010000002 METHYLPREDNISOLONE PER 125 MG: Performed by: EMERGENCY MEDICINE

## 2017-12-23 RX ORDER — METHYLPREDNISOLONE SODIUM SUCCINATE 125 MG/2ML
125 INJECTION, POWDER, LYOPHILIZED, FOR SOLUTION INTRAMUSCULAR; INTRAVENOUS ONCE
Status: COMPLETED | OUTPATIENT
Start: 2017-12-23 | End: 2017-12-23

## 2017-12-23 RX ORDER — IPRATROPIUM BROMIDE AND ALBUTEROL SULFATE 2.5; .5 MG/3ML; MG/3ML
3 SOLUTION RESPIRATORY (INHALATION) ONCE
Status: COMPLETED | OUTPATIENT
Start: 2017-12-23 | End: 2017-12-23

## 2017-12-23 RX ORDER — ASPIRIN 325 MG
325 TABLET ORAL ONCE
Status: DISCONTINUED | OUTPATIENT
Start: 2017-12-23 | End: 2017-12-23

## 2017-12-23 RX ORDER — ASPIRIN 81 MG/1
243 TABLET, CHEWABLE ORAL ONCE
Status: COMPLETED | OUTPATIENT
Start: 2017-12-23 | End: 2017-12-23

## 2017-12-23 RX ORDER — PREDNISONE 20 MG/1
60 TABLET ORAL DAILY
Qty: 12 TABLET | Refills: 0 | Status: SHIPPED | OUTPATIENT
Start: 2017-12-23 | End: 2017-12-27

## 2017-12-23 RX ORDER — SODIUM CHLORIDE 0.9 % (FLUSH) 0.9 %
10 SYRINGE (ML) INJECTION AS NEEDED
Status: DISCONTINUED | OUTPATIENT
Start: 2017-12-23 | End: 2017-12-24 | Stop reason: HOSPADM

## 2017-12-23 RX ORDER — ALBUTEROL SULFATE 2.5 MG/3ML
2.5 SOLUTION RESPIRATORY (INHALATION) EVERY 4 HOURS PRN
Qty: 125 VIAL | Refills: 0 | Status: SHIPPED | OUTPATIENT
Start: 2017-12-23

## 2017-12-23 RX ADMIN — METHYLPREDNISOLONE SODIUM SUCCINATE 125 MG: 125 INJECTION, POWDER, FOR SOLUTION INTRAMUSCULAR; INTRAVENOUS at 18:29

## 2017-12-23 RX ADMIN — IPRATROPIUM BROMIDE AND ALBUTEROL SULFATE 3 ML: 2.5; .5 SOLUTION RESPIRATORY (INHALATION) at 18:30

## 2017-12-23 RX ADMIN — ASPIRIN 81 MG 243 MG: 81 TABLET ORAL at 18:00

## 2017-12-23 RX ADMIN — IPRATROPIUM BROMIDE AND ALBUTEROL SULFATE 3 ML: 2.5; .5 SOLUTION RESPIRATORY (INHALATION) at 18:29

## 2017-12-23 NOTE — ED PROVIDER NOTES
Subjective   HPI Comments: Patient with history of COPD and CHF coronary artery disease presents to urgent private with a chief complaint of shortness of breath shortness breath has been going on for the last 12 hours he was initially discharged from the hospital on the 19th for a double pneumonia he was feeling fine at home but got short of breath this morning and symptoms progressively got worse she presented for evaluation he has any fevers he does report a cough productive of yellowish sputum no chest pain nausea vomiting lower extremity swelling he hasn't tried any medications for his shortness of breath and presents for evaluation      History provided by:  Patient   used: No        Review of Systems   Constitutional: Negative.    HENT: Negative.    Eyes: Negative.    Respiratory: Positive for cough and shortness of breath. Negative for apnea, choking, chest tightness, wheezing and stridor.    Cardiovascular: Negative for chest pain, palpitations and leg swelling.   Gastrointestinal: Negative.    Musculoskeletal: Negative.    Skin: Negative.    Neurological: Negative.    Hematological: Negative.        Past Medical History:   Diagnosis Date   • Bilateral carotid artery stenosis    • CHF (congestive heart failure)    • Chronic obstructive pulmonary disease (COPD)    • Coronary arteriosclerosis    • Degenerative joint disease involving multiple joints    • Dementia    • Diabetes mellitus    • Hyperlipidemia    • Hypertension    • Myocardial infarction        Allergies   Allergen Reactions   • Atorvastatin Anaphylaxis   • Penicillins Rash   • Pravastatin      Myalgia   • Azithromycin Rash   • Biaxin [Clarithromycin] Rash       Past Surgical History:   Procedure Laterality Date   • CARDIAC CATHETERIZATION N/A 6/6/2017    Procedure: Right Heart Cath;  Surgeon: Jeff Maciel MD PhD;  Location: [facility] INVASIVE LOCATION;  Service:    • CORONARY STENT PLACEMENT     • HERNIA REPAIR      • LUNG BIOPSY     • NECK SURGERY     • THORACOTOMY Left 1977   • VENTRAL HERNIA REPAIR         Family History   Problem Relation Age of Onset   • Hypertension Father        Social History     Social History   • Marital status:      Spouse name: N/A   • Number of children: N/A   • Years of education: N/A     Social History Main Topics   • Smoking status: Former Smoker   • Smokeless tobacco: Never Used   • Alcohol use No   • Drug use: No   • Sexual activity: Not Currently      Comment:      Other Topics Concern   • None     Social History Narrative           Objective   Physical Exam   Constitutional: He is oriented to person, place, and time. He appears well-developed and well-nourished. No distress.   HENT:   Head: Normocephalic and atraumatic.   Nose: Nose normal.   Mouth/Throat: Oropharynx is clear and moist.   Eyes: Conjunctivae and EOM are normal. Pupils are equal, round, and reactive to light.   Neck: Normal range of motion. Neck supple. No JVD present.   Cardiovascular: Normal rate, regular rhythm, normal heart sounds and intact distal pulses.  Exam reveals no gallop and no friction rub.    No murmur heard.  Pulmonary/Chest: Effort normal. No stridor. No respiratory distress. He has wheezes. He has no rales. He exhibits no tenderness.   Bilateral and expiratory wheezes scattered rhonchi   Abdominal: Soft. Bowel sounds are normal. He exhibits no distension and no mass. There is no tenderness. There is no rebound and no guarding. No hernia.   Musculoskeletal: Normal range of motion. He exhibits no edema, tenderness or deformity.   Neurological: He is alert and oriented to person, place, and time.   Skin: Skin is warm and dry. No rash noted. No erythema. No pallor.   Nursing note and vitals reviewed.      ECG 12 Lead    Date/Time: 12/23/2017 5:47 PM  Performed by: THIEN DUNAWAY  Authorized by: THIEN DUNAWAY   Interpreted by physician  Comparison: compared with previous ECG from  12/15/2017  Similar to previous ECG  Rhythm: sinus rhythm  Rate: normal  BPM: 90  QRS axis: left  Other findings: LVH with strain  Q waves: II, III and aVF  Clinical impression: non-specific ECG               ED Course  ED Course        Labs Reviewed   TROPONIN (IN-HOUSE) - Abnormal; Notable for the following:        Result Value    Troponin I 0.040 (*)     All other components within normal limits   COMPREHENSIVE METABOLIC PANEL - Abnormal; Notable for the following:     Glucose 105 (*)     BUN 31 (*)     Creatinine 1.41 (*)     Sodium 136 (*)     Total Protein 6.2 (*)     eGFR Non  Amer 48 (*)     All other components within normal limits    Narrative:     The MDRD GFR formula is only valid for adults with stable renal function between ages 18 and 70.   CBC WITH AUTO DIFFERENTIAL - Abnormal; Notable for the following:     WBC 15.36 (*)     RBC 4.09 (*)     Hemoglobin 12.1 (*)     Hematocrit 37.8 (*)     RDW 16.2 (*)     RDW-SD 54.1 (*)     Immature Grans % 1.4 (*)     Neutrophils, Absolute 11.63 (*)     Monocytes, Absolute 1.07 (*)     Immature Grans, Absolute 0.21 (*)     All other components within normal limits   TROPONIN (IN-HOUSE) - Normal   BNP (IN-HOUSE) - Normal   PROTIME-INR - Normal    Narrative:     Therapeutic range for most indications is 2.0-3.0 INR,  or 2.5-3.5 for mechanical heart valves.   LACTIC ACID, PLASMA - Normal   BLOOD CULTURE   BLOOD CULTURE   RAINBOW DRAW    Narrative:     The following orders were created for panel order [place] Draw.  Procedure                               Abnormality         Status                     ---------                               -----------         ------                     Light Blue Top[[id number]]                                   Final result               Green Top (Gel)[[id number]]                                  Final result               Lavender Top[[id number]]                                     Final result               Gold Top -  SST[[id number]]                                   Final result                 Please view results for these tests on the individual orders.   CBC AND DIFFERENTIAL    Narrative:     The following orders were created for panel order CBC & Differential.  Procedure                               Abnormality         Status                     ---------                               -----------         ------                     CBC Auto Differential[[id number]]        Abnormal            Final result                 Please view results for these tests on the individual orders.   LIGHT BLUE TOP   GREEN TOP   LAVENDER TOP   GOLD TOP - SST       XR Chest 1 View   Final Result   Similar chronic changes. No acute pulmonary or pleural finding.      Electronically signed by:  Paolo Palomares MD  12/23/2017 6:16 PM   [facility] Workstation: [id number]                          MDM  Number of Diagnoses or Management Options  COPD with exacerbation:   Diagnosis management comments: COPD exacerbation ACS less likely PE or pneumothorax    Patient Progress  Patient progress: (Patient improved here in the emergency Department without any complaints he never had any chest discomfort just a cough patient's had elevated troponin urine 0.40 but troponin was 0.504 days ago when he was in the hospital to be secondary to his pulmonary infection I did speak with the patient he does not want to stay for that so the plan was to repeat a troponin to see if there was any trend upward the repeat troponin is trending downward he has no complaints and desires to go home to celebrate the Christmas holiday his breathing has improved after nebulizer treatments and steroids as lungs are clear on repeat examination is stable for discharge home)      Final diagnoses:   COPD with exacerbation            Adi Puente MD  12/23/17 0597

## 2017-12-24 NOTE — ED NOTES
Asked Dr. Puente if a repeat ECG was necessary, he said it was not.     Sridevi Daniels  12/23/17 1825

## 2017-12-24 NOTE — DISCHARGE INSTRUCTIONS
Hypertension  Hypertension is another name for high blood pressure. High blood pressure forces your heart to work harder to pump blood. A blood pressure reading has two numbers, which includes a higher number over a lower number (example: 110/72).  HOME CARE   · Have your blood pressure rechecked by your doctor.  · Only take medicine as told by your doctor. Follow the directions carefully. The medicine does not work as well if you skip doses. Skipping doses also puts you at risk for problems.  · Do not smoke.  · Monitor your blood pressure at home as told by your doctor.  GET HELP IF:  · You think you are having a reaction to the medicine you are taking.  · You have repeat headaches or feel dizzy.  · You have puffiness (swelling) in your ankles.  · You have trouble with your vision.  GET HELP RIGHT AWAY IF:   · You get a very bad headache and are confused.  · You feel weak, numb, or faint.  · You get chest or belly (abdominal) pain.  · You throw up (vomit).  · You cannot breathe very well.  MAKE SURE YOU:   · Understand these instructions.  · Will watch your condition.  · Will get help right away if you are not doing well or get worse.     This information is not intended to replace advice given to you by your health care provider. Make sure you discuss any questions you have with your health care provider.     Document Released: 06/05/2009 Document Revised: 12/23/2014 Document Reviewed: 10/10/2014  [employer] Interactive Patient Education ©2017 [employer]

## 2017-12-28 LAB
BACTERIA SPEC AEROBE CULT: NORMAL
BACTERIA SPEC AEROBE CULT: NORMAL

## 2018-01-31 ENCOUNTER — OFFICE VISIT (OUTPATIENT)
Dept: CARDIOLOGY | Facility: CLINIC | Age: 83
End: 2018-01-31

## 2018-01-31 VITALS
HEIGHT: 67 IN | DIASTOLIC BLOOD PRESSURE: 78 MMHG | HEART RATE: 85 BPM | WEIGHT: 175 LBS | SYSTOLIC BLOOD PRESSURE: 122 MMHG | BODY MASS INDEX: 27.47 KG/M2

## 2018-01-31 DIAGNOSIS — I77.810 DILATED AORTIC ROOT (HCC): ICD-10-CM

## 2018-01-31 DIAGNOSIS — I36.1 NON-RHEUMATIC TRICUSPID VALVE INSUFFICIENCY: ICD-10-CM

## 2018-01-31 DIAGNOSIS — I48.92 ATRIAL FIBRILLATION AND FLUTTER (HCC): Primary | Chronic | ICD-10-CM

## 2018-01-31 DIAGNOSIS — I25.10 CHRONIC CORONARY ARTERY DISEASE: Chronic | ICD-10-CM

## 2018-01-31 DIAGNOSIS — J42 CHRONIC BRONCHITIS, UNSPECIFIED CHRONIC BRONCHITIS TYPE (HCC): Chronic | ICD-10-CM

## 2018-01-31 DIAGNOSIS — I48.91 ATRIAL FIBRILLATION AND FLUTTER (HCC): Primary | Chronic | ICD-10-CM

## 2018-01-31 DIAGNOSIS — I10 ESSENTIAL HYPERTENSION: Chronic | ICD-10-CM

## 2018-01-31 DIAGNOSIS — R06.02 SHORTNESS OF BREATH: ICD-10-CM

## 2018-01-31 PROCEDURE — 99213 OFFICE O/P EST LOW 20 MIN: CPT | Performed by: INTERNAL MEDICINE

## 2018-01-31 RX ORDER — FAMOTIDINE 20 MG/1
20 TABLET, FILM COATED ORAL 2 TIMES DAILY
COMMUNITY

## 2018-01-31 NOTE — PROGRESS NOTES
Hasmukh Ham  84 y.o. male    01/31/2018  1. Atrial fibrillation and flutter    2. Chronic coronary artery disease    3. Chronic bronchitis, unspecified chronic bronchitis type    4. Essential hypertension    5. Dilated aortic root    6. Non-rheumatic tricuspid valve insufficiency    7. Shortness of breath        History of Present Illness:Posthospital follow-up    84 years old patient with history of hypertension, hypertensive heart disease, diastolic dysfunction, congestive heart failure on the basis of diastolic dysfunction, COPD on home O2 with recurrent bronchitis.  Patient also had history of for atrial flutter status post electrical cardioversion.  Unable to maintain sinus rhythm.  Had history of GI bleed and AV malformation.  Not a good candidate for long-term oral intake ablation.  Patient noted to have sinus bradycardia with a recovered very well.  Not a candidate for invasive cardiac evaluations of her pacemaker implantations.  He is a pleased with the clinical outcome.  He denies orthopnea PND.  Has baseline shortness of breath no change in that.            SUBJECTIVE    Allergies   Allergen Reactions   • Atorvastatin Anaphylaxis   • Penicillins Rash   • Pravastatin      Myalgia   • Azithromycin Rash   • Biaxin [Clarithromycin] Rash         Past Medical History:   Diagnosis Date   • Bilateral carotid artery stenosis    • CHF (congestive heart failure)    • Chronic obstructive pulmonary disease (COPD)    • Coronary arteriosclerosis    • Degenerative joint disease involving multiple joints    • Dementia    • Diabetes mellitus    • Hyperlipidemia    • Hypertension    • Myocardial infarction          Past Surgical History:   Procedure Laterality Date   • CARDIAC CATHETERIZATION N/A 6/6/2017    Procedure: Right Heart Cath;  Surgeon: Jeff Maciel MD PhD;  Location: [facility] CATH INVASIVE LOCATION;  Service:    • CORONARY STENT PLACEMENT     • HERNIA REPAIR     • LUNG BIOPSY     • NECK SURGERY      • THORACOTOMY Left 1977   • VENTRAL HERNIA REPAIR           Family History   Problem Relation Age of Onset   • Hypertension Father          Social History     Social History   • Marital status:      Spouse name: N/A   • Number of children: N/A   • Years of education: N/A     Occupational History   • Not on file.     Social History Main Topics   • Smoking status: Former Smoker   • Smokeless tobacco: Never Used   • Alcohol use No   • Drug use: No   • Sexual activity: Not Currently      Comment:      Other Topics Concern   • Not on file     Social History Narrative         Current Outpatient Prescriptions   Medication Sig Dispense Refill   • albuterol (PROVENTIL) (2.5 MG/3ML) 0.083% nebulizer solution Take 2.5 mg by nebulization Every 4 (Four) Hours As Needed for Wheezing. 125 vial 0   • aspirin 81 MG chewable tablet Chew 81 mg Daily.     • clopidogrel (PLAVIX) 75 MG tablet Take 75 mg by mouth Daily.     • famotidine (PEPCID) 20 MG tablet Take 20 mg by mouth 2 (Two) Times a Day.     • fluticasone (FLONASE) 50 MCG/ACT nasal spray 2 sprays into each nostril Daily.     • Fluticasone Furoate (ARNUITY ELLIPTA) 200 MCG/ACT aerosol powder  Inhale.     • furosemide (LASIX) 20 MG tablet Take 1 tablet by mouth Daily. 90 tablet 3   • glimepiride (AMARYL) 2 MG tablet Take 2 mg by mouth Every Morning Before Breakfast.     • HYDROcodone-acetaminophen (NORCO) 7.5-325 MG per tablet Take 1 tablet by mouth Every 8 (Eight) Hours.     • ipratropium-albuterol (DUO-NEB) 0.5-2.5 mg/mL nebulizer Take 3 mL by nebulization 4 (Four) Times a Day. 120 vial 1   • isosorbide dinitrate (ISORDIL) 10 MG tablet Take 1 tablet by mouth 3 (Three) Times a Day. 90 tablet 3   • lisinopril (PRINIVIL,ZESTRIL) 10 MG tablet Take 0.5 tablets by mouth Daily. 30 tablet 6   • magnesium oxide (MAGOX) 400 (241.3 MG) MG tablet tablet Take 400 mg by mouth Daily.     • nitroglycerin (NITROSTAT) 0.4 MG SL tablet place 1 tablet under the tongue if needed  "every 5 minutes for hair...  (REFER TO PRESCRIPTION NOTES).  0   • O2 (OXYGEN) Inhale 2 L/min Every Night. W/ CPAP     • Red Yeast Rice 600 MG tablet Take 600 mg by mouth 2 (Two) Times a Day.     • Umeclidinium-Vilanterol 62.5-25 MCG/INH aerosol powder  Inhale 1 puff Daily. 1 each 11     No current facility-administered medications for this visit.          OBJECTIVE    /78  Pulse 85  Ht 170.2 cm (67.01\")  Wt 79.4 kg (175 lb)  BMI 27.4 kg/m2        Review of Systems     Constitutional:   change in exercise tolerance     HENT:  Denies any hearing loss, epistaxis, hoarseness, or difficulty speaking.     Eyes: No blurring     Respiratory:  COPD on home O2.     Cardiovascular: The H&P    Gastrointestinal:  Denies change in bowel habits, dyspepsia, ulcer disease, hematochezia, or melena.     Endocrine: Negative for cold intolerance, heat intolerance, polydipsia, polyphagia and polyuria. Denies any history of weight change, heat/cold intolerance, polydipsia, polyuria     Genitourinary: Negative.      Musculoskeletal: Denies any history of arthritic symptoms or back problems     Skin:  Denies any change in hair or nails, rashes, or skin lesions.     Allergic/Immunologic: Negative.  Negative for environmental allergies, food allergies and immunocompromised state.     Neurological:  Denies any history of recurrent headaches, strokes, TIA, or seizure disorder.     Hematological: Denies any food allergies, seasonal allergies, bleeding disorders, or lymphadenopathy.     Psychiatric/Behavioral: Denies any history of depression, substance abuse, or change in cognitive function.         Physical Exam     Constitutional: Cooperative, alert and oriented, well-developed, well-nourished, in no acute distress.     HENT:   Head: Normocephalic, normal hair patterns, no masses or tenderness.  Ears, Nose, and Throat: No gross abnormalities. No pallor or cyanosis. Dentition good.   Eyes: EOMS intact, PERRL, conjunctivae and lids " unremarkable. Fundoscopic exam and visual fields not performed.   Neck: No palpable masses or adenopathy, no thyromegaly, no JVD, carotid pulses are full and equal bilaterally and without  Bruits.     Cardiovascular: Regular rhythm, S1 and S2 normal, no S3 or S4. Apical impulse not displaced. No murmurs, gallops, or rubs detected.     Pulmonary/Chest: Chest: normal symmetry, no tenderness to palpation, normal respiratory excursion, no intercostal retraction, no use of accessory muscles.            Pulmonary: Normal breath sounds. No rales or ronchi.    Abdominal: Abdomen soft, bowel sounds normoactive, no masses, no hepatosplenomegaly, non-tender, no bruits.     Musculoskeletal: No deformities, clubbing, cyanosis, erythema, or edema observed. There are no spinal abnormalities noted. Normal muscle strength and tone. Pulses full and equal in all extremities, no bruits auscultated.     Neurological: No gross motor or sensory deficits noted, affect appropriate, oriented to time, person, place.     Skin: Warm and dry to the touch, no apparent skin lesions or masses noted.     Psychiatric: He has a normal mood and affect. His behavior is normal. Judgment and thought content normal.         Procedures      Lab Results   Component Value Date    WBC 15.36 (H) 12/23/2017    HGB 12.1 (L) 12/23/2017    HCT 37.8 (L) 12/23/2017    MCV 92.4 12/23/2017     12/23/2017     Lab Results   Component Value Date    GLUCOSE 105 (H) 12/23/2017    BUN 31 (H) 12/23/2017    CREATININE 1.41 (H) 12/23/2017    EGFRIFNONA 48 (L) 12/23/2017    BCR 22.0 12/23/2017    CO2 24.0 12/23/2017    CALCIUM 8.9 12/23/2017    ALBUMIN 3.50 12/23/2017    LABIL2 1.3 12/23/2017    AST 17 12/23/2017    ALT 30 12/23/2017     Lab Results   Component Value Date    CHOL 170 10/10/2017    CHOL 174 03/03/2017     Lab Results   Component Value Date    TRIG 119 10/10/2017    TRIG 119 03/03/2017     Lab Results   Component Value Date    HDL 38 (L) 10/10/2017    HDL  47 (L) 03/03/2017     No results found for: LDLCALC  No results found for: LDL  No results found for: HDLLDLRATIO  No components found for: CHOLHDL  Lab Results   Component Value Date    HGBA1C 6.6 (H) 09/21/2017     Lab Results   Component Value Date    TSH 0.85 12/14/2016           ASSESSMENT AND PLAN  #1 sinus bradycardia #2 atrial flutter #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   sinus bradycardia resolved.  He is on home O2 and doing much better.  He had history of allergy to penicillin.   He is not a candidate for aggressive cardiac management.  Antiplatelet with history of CAD .  Aricept with history of dementia and frusemide with history of diastolic dysfunction.  Lisinopril for hypertension with a decent blood pressure control.  We added Ranexa and Imdur for history of chest discomfort no further recurrence.SANDRA Noe was seen today for follow-up.    Diagnoses and all orders for this visit:    Atrial fibrillation and flutter    Chronic coronary artery disease    Chronic bronchitis, unspecified chronic bronchitis type    Essential hypertension    Dilated aortic root    Non-rheumatic tricuspid valve insufficiency    Shortness of breath        Mana Curtis MD  1/31/2018  11:01 AM

## 2018-02-12 ENCOUNTER — HOSPITAL ENCOUNTER (OUTPATIENT)
Facility: HOSPITAL | Age: 83
Setting detail: OBSERVATION
Discharge: HOME HEALTH | End: 2018-02-15
Attending: FAMILY MEDICINE | Admitting: FAMILY MEDICINE

## 2018-02-12 ENCOUNTER — APPOINTMENT (OUTPATIENT)
Dept: GENERAL RADIOLOGY | Facility: HOSPITAL | Age: 83
End: 2018-02-12

## 2018-02-12 DIAGNOSIS — R07.2 PRECORDIAL PAIN: Primary | ICD-10-CM

## 2018-02-12 DIAGNOSIS — I25.10 CHRONIC CORONARY ARTERY DISEASE: Chronic | ICD-10-CM

## 2018-02-12 DIAGNOSIS — I20.9 ANGINA, CLASS III (HCC): ICD-10-CM

## 2018-02-12 LAB
ALBUMIN SERPL-MCNC: 4.1 G/DL (ref 3.4–4.8)
ALBUMIN/GLOB SERPL: 1.6 G/DL (ref 1.1–1.8)
ALP SERPL-CCNC: 67 U/L (ref 38–126)
ALT SERPL W P-5'-P-CCNC: 25 U/L (ref 21–72)
ANION GAP SERPL CALCULATED.3IONS-SCNC: 14 MMOL/L (ref 5–15)
AST SERPL-CCNC: 24 U/L (ref 17–59)
BASOPHILS # BLD AUTO: 0.02 10*3/MM3 (ref 0–0.2)
BASOPHILS NFR BLD AUTO: 0.2 % (ref 0–2)
BILIRUB SERPL-MCNC: 0.3 MG/DL (ref 0.2–1.3)
BUN BLD-MCNC: 22 MG/DL (ref 7–21)
BUN/CREAT SERPL: 20.8 (ref 7–25)
CALCIUM SPEC-SCNC: 9.2 MG/DL (ref 8.4–10.2)
CHLORIDE SERPL-SCNC: 101 MMOL/L (ref 95–110)
CK MB SERPL-CCNC: 2.14 NG/ML (ref 0–5)
CK MB SERPL-CCNC: 2.41 NG/ML (ref 0–5)
CK SERPL-CCNC: 44 U/L (ref 55–170)
CK SERPL-CCNC: 46 U/L (ref 55–170)
CO2 SERPL-SCNC: 26 MMOL/L (ref 22–31)
CREAT BLD-MCNC: 1.06 MG/DL (ref 0.7–1.3)
DEPRECATED RDW RBC AUTO: 54.3 FL (ref 35.1–43.9)
EOSINOPHIL # BLD AUTO: 0.06 10*3/MM3 (ref 0–0.7)
EOSINOPHIL NFR BLD AUTO: 0.6 % (ref 0–7)
ERYTHROCYTE [DISTWIDTH] IN BLOOD BY AUTOMATED COUNT: 16.5 % (ref 11.5–14.5)
GFR SERPL CREATININE-BSD FRML MDRD: 67 ML/MIN/1.73 (ref 60–98)
GLOBULIN UR ELPH-MCNC: 2.5 GM/DL (ref 2.3–3.5)
GLUCOSE BLD-MCNC: 105 MG/DL (ref 60–100)
GLUCOSE BLDC GLUCOMTR-MCNC: 141 MG/DL (ref 70–130)
GLUCOSE BLDC GLUCOMTR-MCNC: 98 MG/DL (ref 70–130)
HBA1C MFR BLD: 6 % (ref 4–5.6)
HCT VFR BLD AUTO: 39.6 % (ref 39–49)
HGB BLD-MCNC: 13 G/DL (ref 13.7–17.3)
HOLD SPECIMEN: NORMAL
HOLD SPECIMEN: NORMAL
IMM GRANULOCYTES # BLD: 0.07 10*3/MM3 (ref 0–0.02)
IMM GRANULOCYTES NFR BLD: 0.7 % (ref 0–0.5)
LIPASE SERPL-CCNC: 49 U/L (ref 23–300)
LYMPHOCYTES # BLD AUTO: 1.38 10*3/MM3 (ref 0.6–4.2)
LYMPHOCYTES NFR BLD AUTO: 12.9 % (ref 10–50)
MAGNESIUM SERPL-MCNC: 1.9 MG/DL (ref 1.6–2.3)
MCH RBC QN AUTO: 29.7 PG (ref 26.5–34)
MCHC RBC AUTO-ENTMCNC: 32.8 G/DL (ref 31.5–36.3)
MCV RBC AUTO: 90.4 FL (ref 80–98)
MONOCYTES # BLD AUTO: 0.8 10*3/MM3 (ref 0–0.9)
MONOCYTES NFR BLD AUTO: 7.5 % (ref 0–12)
NEUTROPHILS # BLD AUTO: 8.36 10*3/MM3 (ref 2–8.6)
NEUTROPHILS NFR BLD AUTO: 78.1 % (ref 37–80)
NT-PROBNP SERPL-MCNC: 378 PG/ML (ref 0–1800)
PLATELET # BLD AUTO: 264 10*3/MM3 (ref 150–450)
PMV BLD AUTO: 11 FL (ref 8–12)
POTASSIUM BLD-SCNC: 4 MMOL/L (ref 3.5–5.1)
PROT SERPL-MCNC: 6.6 G/DL (ref 6.3–8.6)
RBC # BLD AUTO: 4.38 10*6/MM3 (ref 4.37–5.74)
SODIUM BLD-SCNC: 141 MMOL/L (ref 137–145)
TROPONIN I SERPL-MCNC: 0.02 NG/ML
TROPONIN I SERPL-MCNC: 0.03 NG/ML
TROPONIN I SERPL-MCNC: 0.03 NG/ML
WBC NRBC COR # BLD: 10.69 10*3/MM3 (ref 3.2–9.8)
WHOLE BLOOD HOLD SPECIMEN: NORMAL
WHOLE BLOOD HOLD SPECIMEN: NORMAL

## 2018-02-12 PROCEDURE — 84484 ASSAY OF TROPONIN QUANT: CPT | Performed by: FAMILY MEDICINE

## 2018-02-12 PROCEDURE — 83036 HEMOGLOBIN GLYCOSYLATED A1C: CPT | Performed by: FAMILY MEDICINE

## 2018-02-12 PROCEDURE — 94799 UNLISTED PULMONARY SVC/PX: CPT

## 2018-02-12 PROCEDURE — 80053 COMPREHEN METABOLIC PANEL: CPT | Performed by: FAMILY MEDICINE

## 2018-02-12 PROCEDURE — 94760 N-INVAS EAR/PLS OXIMETRY 1: CPT

## 2018-02-12 PROCEDURE — G0378 HOSPITAL OBSERVATION PER HR: HCPCS

## 2018-02-12 PROCEDURE — 82550 ASSAY OF CK (CPK): CPT | Performed by: FAMILY MEDICINE

## 2018-02-12 PROCEDURE — 83690 ASSAY OF LIPASE: CPT | Performed by: FAMILY MEDICINE

## 2018-02-12 PROCEDURE — 99284 EMERGENCY DEPT VISIT MOD MDM: CPT

## 2018-02-12 PROCEDURE — 82962 GLUCOSE BLOOD TEST: CPT

## 2018-02-12 PROCEDURE — 93010 ELECTROCARDIOGRAM REPORT: CPT | Performed by: INTERNAL MEDICINE

## 2018-02-12 PROCEDURE — 82553 CREATINE MB FRACTION: CPT | Performed by: FAMILY MEDICINE

## 2018-02-12 PROCEDURE — 94640 AIRWAY INHALATION TREATMENT: CPT

## 2018-02-12 PROCEDURE — 85025 COMPLETE CBC W/AUTO DIFF WBC: CPT | Performed by: FAMILY MEDICINE

## 2018-02-12 PROCEDURE — 71045 X-RAY EXAM CHEST 1 VIEW: CPT

## 2018-02-12 PROCEDURE — 83880 ASSAY OF NATRIURETIC PEPTIDE: CPT | Performed by: FAMILY MEDICINE

## 2018-02-12 PROCEDURE — 93005 ELECTROCARDIOGRAM TRACING: CPT

## 2018-02-12 PROCEDURE — 83735 ASSAY OF MAGNESIUM: CPT | Performed by: FAMILY MEDICINE

## 2018-02-12 RX ORDER — ACETAMINOPHEN 325 MG/1
650 TABLET ORAL EVERY 4 HOURS PRN
Status: DISCONTINUED | OUTPATIENT
Start: 2018-02-12 | End: 2018-02-15 | Stop reason: HOSPADM

## 2018-02-12 RX ORDER — FLUTICASONE PROPIONATE 50 MCG
2 SPRAY, SUSPENSION (ML) NASAL DAILY
Status: DISCONTINUED | OUTPATIENT
Start: 2018-02-12 | End: 2018-02-15 | Stop reason: HOSPADM

## 2018-02-12 RX ORDER — ISOSORBIDE DINITRATE 5 MG/1
10 TABLET ORAL 3 TIMES DAILY
Status: DISCONTINUED | OUTPATIENT
Start: 2018-02-12 | End: 2018-02-13

## 2018-02-12 RX ORDER — ONDANSETRON 2 MG/ML
4 INJECTION INTRAMUSCULAR; INTRAVENOUS EVERY 6 HOURS PRN
Status: DISCONTINUED | OUTPATIENT
Start: 2018-02-12 | End: 2018-02-15 | Stop reason: HOSPADM

## 2018-02-12 RX ORDER — MORPHINE SULFATE 2 MG/ML
2 INJECTION, SOLUTION INTRAMUSCULAR; INTRAVENOUS EVERY 4 HOURS PRN
Status: DISCONTINUED | OUTPATIENT
Start: 2018-02-12 | End: 2018-02-15 | Stop reason: HOSPADM

## 2018-02-12 RX ORDER — ASPIRIN 81 MG/1
81 TABLET, CHEWABLE ORAL DAILY
Status: DISCONTINUED | OUTPATIENT
Start: 2018-02-12 | End: 2018-02-14

## 2018-02-12 RX ORDER — DOCUSATE SODIUM 100 MG/1
100 CAPSULE, LIQUID FILLED ORAL 2 TIMES DAILY PRN
Status: DISCONTINUED | OUTPATIENT
Start: 2018-02-12 | End: 2018-02-15 | Stop reason: HOSPADM

## 2018-02-12 RX ORDER — HYDROCODONE BITARTRATE AND ACETAMINOPHEN 7.5; 325 MG/1; MG/1
1 TABLET ORAL EVERY 8 HOURS
Status: DISCONTINUED | OUTPATIENT
Start: 2018-02-12 | End: 2018-02-15 | Stop reason: HOSPADM

## 2018-02-12 RX ORDER — LISINOPRIL 5 MG/1
5 TABLET ORAL
Status: DISCONTINUED | OUTPATIENT
Start: 2018-02-12 | End: 2018-02-14

## 2018-02-12 RX ORDER — IPRATROPIUM BROMIDE AND ALBUTEROL SULFATE 2.5; .5 MG/3ML; MG/3ML
3 SOLUTION RESPIRATORY (INHALATION) 4 TIMES DAILY
Status: DISCONTINUED | OUTPATIENT
Start: 2018-02-12 | End: 2018-02-15 | Stop reason: HOSPADM

## 2018-02-12 RX ORDER — FAMOTIDINE 20 MG/1
20 TABLET, FILM COATED ORAL 2 TIMES DAILY
Status: DISCONTINUED | OUTPATIENT
Start: 2018-02-12 | End: 2018-02-15 | Stop reason: HOSPADM

## 2018-02-12 RX ORDER — CLOPIDOGREL BISULFATE 75 MG/1
75 TABLET ORAL DAILY
Status: DISCONTINUED | OUTPATIENT
Start: 2018-02-12 | End: 2018-02-15 | Stop reason: HOSPADM

## 2018-02-12 RX ORDER — SODIUM CHLORIDE 0.9 % (FLUSH) 0.9 %
1-10 SYRINGE (ML) INJECTION AS NEEDED
Status: DISCONTINUED | OUTPATIENT
Start: 2018-02-12 | End: 2018-02-15 | Stop reason: HOSPADM

## 2018-02-12 RX ORDER — RANOLAZINE 500 MG/1
500 TABLET, EXTENDED RELEASE ORAL 2 TIMES DAILY
COMMUNITY
End: 2018-08-23 | Stop reason: SDUPTHER

## 2018-02-12 RX ORDER — HYDRALAZINE HYDROCHLORIDE 20 MG/ML
20 INJECTION INTRAMUSCULAR; INTRAVENOUS EVERY 6 HOURS PRN
Status: DISCONTINUED | OUTPATIENT
Start: 2018-02-12 | End: 2018-02-15 | Stop reason: HOSPADM

## 2018-02-12 RX ORDER — PREDNISONE 10 MG/1
10 TABLET ORAL
COMMUNITY
Start: 2018-02-06 | End: 2018-02-15 | Stop reason: HOSPADM

## 2018-02-12 RX ORDER — SODIUM CHLORIDE 0.9 % (FLUSH) 0.9 %
10 SYRINGE (ML) INJECTION AS NEEDED
Status: DISCONTINUED | OUTPATIENT
Start: 2018-02-12 | End: 2018-02-15 | Stop reason: HOSPADM

## 2018-02-12 RX ORDER — GLIPIZIDE 5 MG/1
5 TABLET ORAL
Status: DISCONTINUED | OUTPATIENT
Start: 2018-02-13 | End: 2018-02-15 | Stop reason: HOSPADM

## 2018-02-12 RX ORDER — ALBUTEROL SULFATE 2.5 MG/3ML
2.5 SOLUTION RESPIRATORY (INHALATION) EVERY 4 HOURS PRN
Status: DISCONTINUED | OUTPATIENT
Start: 2018-02-12 | End: 2018-02-15 | Stop reason: HOSPADM

## 2018-02-12 RX ORDER — FUROSEMIDE 20 MG/1
20 TABLET ORAL DAILY
Status: DISCONTINUED | OUTPATIENT
Start: 2018-02-12 | End: 2018-02-15 | Stop reason: HOSPADM

## 2018-02-12 RX ADMIN — FAMOTIDINE 20 MG: 20 TABLET, FILM COATED ORAL at 20:40

## 2018-02-12 RX ADMIN — DOCUSATE SODIUM 100 MG: 100 CAPSULE, LIQUID FILLED ORAL at 20:40

## 2018-02-12 RX ADMIN — ISOSORBIDE DINITRATE 10 MG: 5 TABLET ORAL at 20:40

## 2018-02-12 RX ADMIN — NITROGLYCERIN 1 INCH: 20 OINTMENT TOPICAL at 21:18

## 2018-02-12 RX ADMIN — IPRATROPIUM BROMIDE AND ALBUTEROL SULFATE 3 ML: 2.5; .5 SOLUTION RESPIRATORY (INHALATION) at 21:40

## 2018-02-12 RX ADMIN — HYDROCODONE BITARTRATE AND ACETAMINOPHEN 1 TABLET: 7.5; 325 TABLET ORAL at 16:17

## 2018-02-12 RX ADMIN — IPRATROPIUM BROMIDE AND ALBUTEROL SULFATE 3 ML: 2.5; .5 SOLUTION RESPIRATORY (INHALATION) at 17:01

## 2018-02-12 RX ADMIN — ISOSORBIDE DINITRATE 10 MG: 5 TABLET ORAL at 16:17

## 2018-02-12 NOTE — ED PROVIDER NOTES
Subjective   HPI Comments: Pt on home O2. Cardiac stents x 5, last stent placed in 2005. Dr Curtis is patient's cardiologist. Nitroglycerine helped with chest pain at home. Chest pain has been present in the am x 5 days, but worsened today.    Patient is a 84 y.o. male presenting with chest pain.   Chest Pain   Pain location:  Substernal area  Pain quality: aching and dull    Pain radiates to:  Upper back  Pain severity:  Moderate  Onset quality:  Sudden  Duration:  5 hours  Timing:  Constant  Progression:  Waxing and waning  Chronicity:  Recurrent  Context: at rest    Relieved by:  Nitroglycerin  Worsened by:  Exertion and movement  Ineffective treatments:  None tried  Associated symptoms: back pain, cough (chronic), fatigue and shortness of breath    Associated symptoms: no abdominal pain, no AICD problem, no anxiety, no claudication, no diaphoresis, no dizziness, no dysphagia, no fever, no headache, no lower extremity edema, no nausea, no near-syncope, no palpitations, no syncope, no vomiting and no weakness    Risk factors: coronary artery disease, diabetes mellitus and male sex  Smoker: quit 40 years ago.        Review of Systems   Constitutional: Positive for fatigue. Negative for appetite change, chills, diaphoresis and fever.   HENT: Negative for congestion, ear discharge, ear pain, nosebleeds, rhinorrhea, sinus pressure, sore throat and trouble swallowing.    Eyes: Negative for discharge and redness.   Respiratory: Positive for cough (chronic) and shortness of breath. Negative for apnea, chest tightness and wheezing.    Cardiovascular: Positive for chest pain. Negative for palpitations, claudication, syncope and near-syncope.   Gastrointestinal: Negative for abdominal pain, diarrhea, nausea and vomiting.   Endocrine: Negative for polyuria.   Genitourinary: Negative for dysuria, frequency and urgency.   Musculoskeletal: Positive for back pain. Negative for myalgias and neck pain.   Skin: Negative for color  change and rash.   Allergic/Immunologic: Negative for immunocompromised state.   Neurological: Negative for dizziness, seizures, syncope, weakness, light-headedness and headaches.   Hematological: Negative for adenopathy. Does not bruise/bleed easily.   Psychiatric/Behavioral: Negative for behavioral problems and confusion.   All other systems reviewed and are negative.      Past Medical History:   Diagnosis Date   • Bilateral carotid artery stenosis    • CHF (congestive heart failure)    • Chronic obstructive pulmonary disease (COPD)    • Coronary arteriosclerosis    • Degenerative joint disease involving multiple joints    • Dementia    • Diabetes mellitus    • Hyperlipidemia    • Hypertension    • Myocardial infarction        Allergies   Allergen Reactions   • Atorvastatin Anaphylaxis   • Penicillins Rash   • Pravastatin      Myalgia   • Azithromycin Rash   • Biaxin [Clarithromycin] Rash       Past Surgical History:   Procedure Laterality Date   • CARDIAC CATHETERIZATION N/A 6/6/2017    Procedure: Right Heart Cath;  Surgeon: Jeff Maciel MD PhD;  Location: [facility] INVASIVE LOCATION;  Service:    • CORONARY STENT PLACEMENT     • HERNIA REPAIR     • LUNG BIOPSY     • NECK SURGERY     • THORACOTOMY Left 1977   • VENTRAL HERNIA REPAIR         Family History   Problem Relation Age of Onset   • Hypertension Father        Social History     Social History   • Marital status:      Spouse name: N/A   • Number of children: N/A   • Years of education: N/A     Social History Main Topics   • Smoking status: Former Smoker   • Smokeless tobacco: Never Used   • Alcohol use No   • Drug use: No   • Sexual activity: Not Currently      Comment:      Other Topics Concern   • None     Social History Narrative           Objective   Physical Exam   Constitutional: He is oriented to person, place, and time. He appears well-developed and well-nourished.   HENT:   Head: Normocephalic and atraumatic.   Nose: Nose  normal.   Mouth/Throat: Oropharynx is clear and moist.   Eyes: Conjunctivae and EOM are normal. Pupils are equal, round, and reactive to light. Right eye exhibits no discharge. Left eye exhibits no discharge. No scleral icterus.   Neck: Normal range of motion. Neck supple. No tracheal deviation present.   Cardiovascular: Normal rate, regular rhythm and normal heart sounds.    No murmur heard.  Pulmonary/Chest: Effort normal and breath sounds normal. No stridor. No respiratory distress. He has no wheezes. He has no rales.   Abdominal: Soft. Bowel sounds are normal. He exhibits no distension and no mass. There is no tenderness. There is no rebound and no guarding.   Musculoskeletal: He exhibits no edema.   Neurological: He is alert and oriented to person, place, and time. Coordination normal.   Skin: Skin is warm and dry. No rash noted. No erythema.   Psychiatric: He has a normal mood and affect. His behavior is normal. Thought content normal.   Nursing note and vitals reviewed.      ECG 12 Lead    Date/Time: 2/12/2018 1:42 PM  Performed by: JOSE LEACH  Authorized by: JOSE LEACH   Interpreted by physician  Rhythm: sinus rhythm  BPM: 91  Conduction: complete RBBB  ST Segments: ST segments normal                 ED Course  ED Course            Labs Reviewed   COMPREHENSIVE METABOLIC PANEL - Abnormal; Notable for the following:        Result Value    Glucose 105 (*)     BUN 22 (*)     All other components within normal limits    Narrative:     The MDRD GFR formula is only valid for adults with stable renal function between ages 18 and 70.   CBC WITH AUTO DIFFERENTIAL - Abnormal; Notable for the following:     WBC 10.69 (*)     Hemoglobin 13.0 (*)     RDW 16.5 (*)     RDW-SD 54.3 (*)     Immature Grans % 0.7 (*)     Immature Grans, Absolute 0.07 (*)     All other components within normal limits   CK - Abnormal; Notable for the following:     Creatine Kinase 44 (*)     All other components within normal  limits   TROPONIN (IN-HOUSE) - Normal   BNP (IN-HOUSE) - Normal   CK MB - Normal   LIPASE - Normal   MAGNESIUM - Normal   RAINBOW DRAW    Narrative:     The following orders were created for panel order [place] Draw.  Procedure                               Abnormality         Status                     ---------                               -----------         ------                     Light Blue Top[[id number]]                                   Final result               Green Top (Gel)[[id number]]                                  Final result               Lavender Top[[id number]]                                     Final result               Gold Top - SST[[id number]]                                   Final result                 Please view results for these tests on the individual orders.   TROPONIN (IN-HOUSE)   CBC AND DIFFERENTIAL    Narrative:     The following orders were created for panel order CBC & Differential.  Procedure                               Abnormality         Status                     ---------                               -----------         ------                     CBC Auto Differential[[id number]]        Abnormal            Final result                 Please view results for these tests on the individual orders.   LIGHT BLUE TOP   GREEN TOP   LAVENDER TOP   GOLD TOP - SST       XR Chest 1 View   Final Result   No acute cardiopulmonary abnormality.      Electronically signed by:  Nelson Christianson MD  2/12/2018 11:49 AM [facility]   Workstation: [id number]                  [facility]    Final diagnoses:   Precordial pain            Zack Castro MD  02/12/18 [id number]

## 2018-02-12 NOTE — PLAN OF CARE
Problem: Patient Care Overview (Adult)  Goal: Plan of Care Review  Outcome: Ongoing (interventions implemented as appropriate)   02/12/18 9476   Coping/Psychosocial Response Interventions   Plan Of Care Reviewed With patient   Patient Care Overview   Progress no change   Outcome Evaluation   Outcome Summary/Follow up Plan new to 3W; constant CP 7/10 vas     Goal: Adult Individualization and Mutuality  Outcome: Ongoing (interventions implemented as appropriate)    Goal: Discharge Needs Assessment  Outcome: Ongoing (interventions implemented as appropriate)      Problem: Acute Coronary Syndrome (ACS) (Adult)  Goal: Signs and Symptoms of Listed Potential Problems Will be Absent or Manageable (Acute Coronary Syndrome)  Outcome: Ongoing (interventions implemented as appropriate)

## 2018-02-12 NOTE — H&P
[facility] Medicine Admission      Date of Admission: 2/12/2018      Primary Care Physician: Paolo Rey MD      Chief Complaint:  Chest pain, dyspnea upon exertion    HPI:  This 84 year old  male reported to the emergency department with complaints of chest pain.  Describes chest pain as substernal, radiating to his back.  No nausea or vomiting.  Denies diaphoresis and leg swelling, but endorses dyspnea upon exertion and worsening pain with exertion.  Reports pain has been intermittent for the past week.  Relieved with nitroglycerin.  Denies overt orthopnea.  No light headedness, dizziness, syncope, or pre-syncope.  Does have cough with yellow sputum production, but no fever or chills.  Patient has a history of coronary artery disease and HFpEF.  Also has history of atrial fibrillation/flutter.  Patient is not on anticoagulation secondary to history of GI bleed.  Patient denies any current hematuria, hematemesis, hematochezia, or melena.  Bradycardia is known and not a new finding.  Patient uses home O2 at 2L.    Concurrent Medical History:  has a past medical history of Bilateral carotid artery stenosis; CHF (congestive heart failure); Chronic obstructive pulmonary disease (COPD); Coronary arteriosclerosis; Degenerative joint disease involving multiple joints; Dementia; Diabetes mellitus; Hyperlipidemia; Hypertension; and Myocardial infarction.    Past Surgical History:  has a past surgical history that includes Hernia repair; Lung biopsy; Ventral hernia repair; Thoracotomy (Left, 1977); Cardiac catheterization (N/A, 6/6/2017); Coronary stent placement; and Neck surgery.    Family History: family history includes Hypertension in his father.     Social History:  reports that he has quit smoking. He has never used smokeless tobacco. He reports that he does not drink alcohol or use illicit drugs.    Allergies:   Allergies   Allergen Reactions   • Atorvastatin  Anaphylaxis   • Penicillins Rash   • Pravastatin      Myalgia   • Azithromycin Rash   • Biaxin [Clarithromycin] Rash       Medications:     Prior to Admission medications    Medication Sig Start Date End Date Taking? Authorizing Provider   albuterol (PROVENTIL) (2.5 MG/3ML) 0.083% nebulizer solution Take 2.5 mg by nebulization Every 4 (Four) Hours As Needed for Wheezing. 12/23/17  Yes Adi Puente MD   aspirin 81 MG chewable tablet Chew 81 mg Daily.   Yes Historical Provider, MD   clopidogrel (PLAVIX) 75 MG tablet Take 75 mg by mouth Daily. 2/3/16  Yes Historical Provider, MD   famotidine (PEPCID) 20 MG tablet Take 20 mg by mouth 2 (Two) Times a Day.   Yes Historical Provider, MD   fluticasone (FLONASE) 50 MCG/ACT nasal spray 2 sprays into each nostril Daily.   Yes Historical Provider, MD   furosemide (LASIX) 20 MG tablet Take 1 tablet by mouth Daily. 3/3/17  Yes Jeff Maciel MD PhD   glimepiride (AMARYL) 2 MG tablet Take 2 mg by mouth Every Morning Before Breakfast.   Yes Historical Provider, MD   HYDROcodone-acetaminophen (NORCO) 7.5-325 MG per tablet Take 1 tablet by mouth Every 8 (Eight) Hours. 12/27/16  Yes Historical Provider, MD   ipratropium-albuterol (DUO-NEB) 0.5-2.5 mg/mL nebulizer Take 3 mL by nebulization 4 (Four) Times a Day. 9/28/17  Yes Jc Alba MD   isosorbide dinitrate (ISORDIL) 10 MG tablet Take 1 tablet by mouth 3 (Three) Times a Day. 10/27/17  Yes EVE Sewell   lisinopril (PRINIVIL,ZESTRIL) 10 MG tablet Take 0.5 tablets by mouth Daily. 11/10/17  Yes Caitlyn Garcia MD   magnesium oxide (MAGOX) 400 (241.3 MG) MG tablet tablet Take 400 mg by mouth Daily.   Yes Historical Provider, MD   nitroglycerin (NITROSTAT) 0.4 MG SL tablet place 1 tablet under the tongue if needed every 5 minutes for hair...  (REFER TO PRESCRIPTION NOTES). 1/11/17  Yes Historical Provider, MD   predniSONE (DELTASONE) 10 MG tablet Take 10 mg by mouth. 2/6/18 3/9/18 Yes Historical  Provider, MD   Red Yeast Rice 600 MG tablet Take 600 mg by mouth 2 (Two) Times a Day.   Yes Historical Provider, MD   Umeclidinium-Vilanterol 62.5-25 MCG/INH aerosol powder  Inhale 1 puff Daily. 3/3/17  Yes Brea Higginbotham MD   Fluticasone Furoate (ARNUITY ELLIPTA) 200 MCG/ACT aerosol powder  Inhale.    Historical Provider, MD   O2 (OXYGEN) Inhale 2 L/min Every Night. W/ CPAP    Historical Provider, MD     Review of Systems:  Review of Systems   Constitutional: Negative for chills, diaphoresis and fever.   Respiratory: Positive for cough, chest tightness and shortness of breath. Negative for apnea, wheezing and stridor.    Cardiovascular: Positive for chest pain. Negative for palpitations and leg swelling.   Gastrointestinal: Negative for abdominal distention, abdominal pain, blood in stool, constipation, diarrhea, nausea and vomiting.   Genitourinary: Negative for difficulty urinating and dysuria.   Neurological: Negative for dizziness, syncope and light-headedness.      Otherwise complete ROS is negative except as mentioned above.    Physical Exam:   Temp:  [98.3 °F (36.8 °C)] 98.3 °F (36.8 °C)  Heart Rate:  [74-94] 74  Resp:  [20] 20  BP: ()/(58-78) 117/64  Physical Exam   Constitutional: He is oriented to person, place, and time. He appears well-developed and well-nourished. No distress.   HENT:   Head: Normocephalic and atraumatic.   Eyes: Conjunctivae and EOM are normal. Pupils are equal, round, and reactive to light.   Cardiovascular: Normal rate and regular rhythm.    Pulmonary/Chest: Effort normal and breath sounds normal. No respiratory distress. He has no wheezes.   Abdominal: Soft. Bowel sounds are normal. He exhibits no distension. There is no tenderness.   Musculoskeletal: He exhibits no edema or tenderness.   Neurological: He is alert and oriented to person, place, and time.   Skin: Skin is warm and dry. He is not diaphoretic.   Psychiatric: He has a normal mood and affect. His behavior is  normal.   Vitals reviewed.    Results Reviewed:  I have personally reviewed current lab, radiology, and data and agree with results.  Lab Results (last 24 hours)     Procedure Component Value Units Date/Time    CBC & Differential [[id number]] Collected:  02/12/18 1122    Specimen:  Blood Updated:  02/12/18 1130    Narrative:       The following orders were created for panel order CBC & Differential.  Procedure                               Abnormality         Status                     ---------                               -----------         ------                     CBC Auto Differential[[id number]]        Abnormal            Final result                 Please view results for these tests on the individual orders.    CBC Auto Differential [[id number]]  (Abnormal) Collected:  02/12/18 1122    Specimen:  Blood Updated:  02/12/18 1130     WBC 10.69 (H) 10*3/mm3      RBC 4.38 10*6/mm3      Hemoglobin 13.0 (L) g/dL      Hematocrit 39.6 %      MCV 90.4 fL      MCH 29.7 pg      MCHC 32.8 g/dL      RDW 16.5 (H) %      RDW-SD 54.3 (H) fl      MPV 11.0 fL      Platelets 264 10*3/mm3      Neutrophil % 78.1 %      Lymphocyte % 12.9 %      Monocyte % 7.5 %      Eosinophil % 0.6 %      Basophil % 0.2 %      Immature Grans % 0.7 (H) %      Neutrophils, Absolute 8.36 10*3/mm3      Lymphocytes, Absolute 1.38 10*3/mm3      Monocytes, Absolute 0.80 10*3/mm3      Eosinophils, Absolute 0.06 10*3/mm3      Basophils, Absolute 0.02 10*3/mm3      Immature Grans, Absolute 0.07 (H) 10*3/mm3     Comprehensive Metabolic Panel [[id number]]  (Abnormal) Collected:  02/12/18 1122    Specimen:  Blood Updated:  02/12/18 1143     Glucose 105 (H) mg/dL      BUN 22 (H) mg/dL      Creatinine 1.06 mg/dL      Sodium 141 mmol/L      Potassium 4.0 mmol/L      Chloride 101 mmol/L      CO2 26.0 mmol/L      Calcium 9.2 mg/dL      Total Protein 6.6 g/dL      Albumin 4.10 g/dL      ALT (SGPT) 25 U/L      AST (SGOT) 24 U/L      Alkaline Phosphatase 67 U/L       Total Bilirubin 0.3 mg/dL      eGFR Non African Amer 67 mL/min/1.73      Globulin 2.5 gm/dL      A/G Ratio 1.6 g/dL      BUN/Creatinine Ratio 20.8     Anion Gap 14.0 mmol/L     Narrative:       The MDRD GFR formula is only valid for adults with stable renal function between ages 18 and 70.    CK [[id number]]  (Abnormal) Collected:  02/12/18 1122    Specimen:  Blood Updated:  02/12/18 1151     Creatine Kinase 44 (L) U/L     Lipase [[id number]]  (Normal) Collected:  02/12/18 1122    Specimen:  Blood Updated:  02/12/18 1152     Lipase 49 U/L     Magnesium [[id number]]  (Normal) Collected:  02/12/18 1122    Specimen:  Blood Updated:  02/12/18 1152     Magnesium 1.9 mg/dL     Troponin [[id number]]  (Normal) Collected:  02/12/18 1122    Specimen:  Blood Updated:  02/12/18 1154     Troponin I 0.029 ng/mL     BNP [[id number]]  (Normal) Collected:  02/12/18 1122    Specimen:  Blood Updated:  02/12/18 1154     proBNP 378.0 pg/mL     CK-MB [[id number]]  (Normal) Collected:  02/12/18 1122    Specimen:  Blood Updated:  02/12/18 1203     CKMB 2.41 ng/mL     [place] Draw [[id number]] Collected:  02/12/18 1122    Specimen:  Blood Updated:  02/12/18 1232    Narrative:       The following orders were created for panel order [place] Draw.  Procedure                               Abnormality         Status                     ---------                               -----------         ------                     Light Blue Top[[id number]]                                   Final result               Green Top (Gel)[[id number]]                                  Final result               Lavender Top[[id number]]                                     Final result               Gold Top - SST[[id number]]                                   Final result                 Please view results for these tests on the individual orders.    Light Blue Top [[id number]] Collected:  02/12/18 1122    Specimen:  Blood Updated:  02/12/18 1232     Extra Tube hold for  add-on      Auto resulted       Green Top (Gel) [[id number]] Collected:  02/12/18 1122    Specimen:  Blood Updated:  02/12/18 1232     Extra Tube Hold for add-ons.      Auto resulted.       Lavender Top [[id number]] Collected:  02/12/18 1122    Specimen:  Blood Updated:  02/12/18 1232     Extra Tube hold for add-on      Auto resulted       Gold Top - SST [[id number]] Collected:  02/12/18 1122    Specimen:  Blood Updated:  02/12/18 1232     Extra Tube Hold for add-ons.      Auto resulted.           Imaging Results (last 24 hours)     Procedure Component Value Units Date/Time    XR Chest 1 View [[id number]] Collected:  02/12/18 1125     Updated:  02/12/18 1150    Narrative:         PROCEDURE: Single chest view AP    REASON FOR EXAM:Chest Pain triage protocol  chest pain    FINDINGS: Comparison exam dated December 23, 2017. Cardiac and  pulmonary vasculature are normal. Lungs are clear. Pleural spaces  are normal. No acute osseous abnormality.      Impression:       No acute cardiopulmonary abnormality.    Electronically signed by:  Nelson Christianson MD  2/12/2018 11:49 AM CST  Workstation: [id number]            Assessment:    1.)  Chest pain  2.)  Dyspnea upon exertion  3.)  Coronary artery disease  4.)  COPD  5.)  Diabetes Mellitus II  6.)  HTN  7.)  Sinus bradycardia, chronic, asymptomatic  8.)  Oxygen dependence        Plan:  Cardiac enzymes, lipid panel, symptomatic treatment, cardiology consult.  Dr. Maciel has agreed to see.  Consult appreciated.  No beta blocker secondary to known bradycardia.  Patient is already on ASA and ACE.  No statin on home medication regimen.  Will defer to cardiology to choose statin therapy as they deem appropriate.            This document has been electronically signed by ADELA Vinson on February 12, 2018 2:01 PM

## 2018-02-12 NOTE — ED NOTES
Pt states he has chest pain that started this morning when he woke up.  The pain is in the center of his chest and radiates to his back between his shoulder blades.  Pt took three baby aspirin at home along with two nitroglycerin.  Pt states the pain eased up after taking the nitroglycerin.     Laurence Garza RN  02/12/18 1126

## 2018-02-13 PROBLEM — I20.9 ANGINA, CLASS III (HCC): Status: ACTIVE | Noted: 2018-02-12

## 2018-02-13 LAB
ALBUMIN SERPL-MCNC: 3.6 G/DL (ref 3.4–4.8)
ALBUMIN/GLOB SERPL: 1.6 G/DL (ref 1.1–1.8)
ALP SERPL-CCNC: 65 U/L (ref 38–126)
ALT SERPL W P-5'-P-CCNC: 32 U/L (ref 21–72)
ANION GAP SERPL CALCULATED.3IONS-SCNC: 13 MMOL/L (ref 5–15)
ARTICHOKE IGE QN: 125 MG/DL (ref 1–129)
AST SERPL-CCNC: 16 U/L (ref 17–59)
BASOPHILS # BLD AUTO: 0.03 10*3/MM3 (ref 0–0.2)
BASOPHILS NFR BLD AUTO: 0.3 % (ref 0–2)
BILIRUB SERPL-MCNC: 0.1 MG/DL (ref 0.2–1.3)
BUN BLD-MCNC: 24 MG/DL (ref 7–21)
BUN/CREAT SERPL: 22.4 (ref 7–25)
CALCIUM SPEC-SCNC: 9.1 MG/DL (ref 8.4–10.2)
CHLORIDE SERPL-SCNC: 103 MMOL/L (ref 95–110)
CHOLEST SERPL-MCNC: 185 MG/DL (ref 0–199)
CO2 SERPL-SCNC: 23 MMOL/L (ref 22–31)
CREAT BLD-MCNC: 1.07 MG/DL (ref 0.7–1.3)
DEPRECATED RDW RBC AUTO: 53.5 FL (ref 35.1–43.9)
EOSINOPHIL # BLD AUTO: 0.12 10*3/MM3 (ref 0–0.7)
EOSINOPHIL NFR BLD AUTO: 1.1 % (ref 0–7)
ERYTHROCYTE [DISTWIDTH] IN BLOOD BY AUTOMATED COUNT: 16.4 % (ref 11.5–14.5)
GFR SERPL CREATININE-BSD FRML MDRD: 66 ML/MIN/1.73 (ref 42–98)
GLOBULIN UR ELPH-MCNC: 2.3 GM/DL (ref 2.3–3.5)
GLUCOSE BLD-MCNC: 97 MG/DL (ref 60–100)
GLUCOSE BLDC GLUCOMTR-MCNC: 109 MG/DL (ref 70–130)
GLUCOSE BLDC GLUCOMTR-MCNC: 111 MG/DL (ref 70–130)
GLUCOSE BLDC GLUCOMTR-MCNC: 223 MG/DL (ref 70–130)
GLUCOSE BLDC GLUCOMTR-MCNC: 97 MG/DL (ref 70–130)
HCT VFR BLD AUTO: 37.6 % (ref 39–49)
HDLC SERPL-MCNC: 30 MG/DL (ref 60–200)
HGB BLD-MCNC: 12.1 G/DL (ref 13.7–17.3)
IMM GRANULOCYTES # BLD: 0.1 10*3/MM3 (ref 0–0.02)
IMM GRANULOCYTES NFR BLD: 1 % (ref 0–0.5)
LDLC/HDLC SERPL: 3.83 {RATIO} (ref 0–3.55)
LYMPHOCYTES # BLD AUTO: 2.11 10*3/MM3 (ref 0.6–4.2)
LYMPHOCYTES NFR BLD AUTO: 20.1 % (ref 10–50)
MCH RBC QN AUTO: 28.9 PG (ref 26.5–34)
MCHC RBC AUTO-ENTMCNC: 32.2 G/DL (ref 31.5–36.3)
MCV RBC AUTO: 90 FL (ref 80–98)
MONOCYTES # BLD AUTO: 0.92 10*3/MM3 (ref 0–0.9)
MONOCYTES NFR BLD AUTO: 8.8 % (ref 0–12)
NEUTROPHILS # BLD AUTO: 7.2 10*3/MM3 (ref 2–8.6)
NEUTROPHILS NFR BLD AUTO: 68.7 % (ref 37–80)
PLATELET # BLD AUTO: 244 10*3/MM3 (ref 150–450)
PMV BLD AUTO: 11.2 FL (ref 8–12)
POTASSIUM BLD-SCNC: 4.1 MMOL/L (ref 3.5–5.1)
PROT SERPL-MCNC: 5.9 G/DL (ref 6.3–8.6)
RBC # BLD AUTO: 4.18 10*6/MM3 (ref 4.37–5.74)
SODIUM BLD-SCNC: 139 MMOL/L (ref 137–145)
TRIGL SERPL-MCNC: 200 MG/DL (ref 20–199)
TROPONIN I SERPL-MCNC: 0.03 NG/ML
TROPONIN I SERPL-MCNC: 0.03 NG/ML
TSH SERPL DL<=0.05 MIU/L-ACNC: 2.28 MIU/ML (ref 0.46–4.68)
WBC NRBC COR # BLD: 10.48 10*3/MM3 (ref 3.2–9.8)

## 2018-02-13 PROCEDURE — 84443 ASSAY THYROID STIM HORMONE: CPT | Performed by: FAMILY MEDICINE

## 2018-02-13 PROCEDURE — 93005 ELECTROCARDIOGRAM TRACING: CPT | Performed by: FAMILY MEDICINE

## 2018-02-13 PROCEDURE — 85025 COMPLETE CBC W/AUTO DIFF WBC: CPT | Performed by: FAMILY MEDICINE

## 2018-02-13 PROCEDURE — 94799 UNLISTED PULMONARY SVC/PX: CPT

## 2018-02-13 PROCEDURE — 99219 PR INITIAL OBSERVATION CARE/DAY 50 MINUTES: CPT | Performed by: INTERNAL MEDICINE

## 2018-02-13 PROCEDURE — 96374 THER/PROPH/DIAG INJ IV PUSH: CPT

## 2018-02-13 PROCEDURE — 84484 ASSAY OF TROPONIN QUANT: CPT | Performed by: FAMILY MEDICINE

## 2018-02-13 PROCEDURE — 80053 COMPREHEN METABOLIC PANEL: CPT | Performed by: FAMILY MEDICINE

## 2018-02-13 PROCEDURE — 94760 N-INVAS EAR/PLS OXIMETRY 1: CPT

## 2018-02-13 PROCEDURE — 82962 GLUCOSE BLOOD TEST: CPT

## 2018-02-13 PROCEDURE — G0378 HOSPITAL OBSERVATION PER HR: HCPCS

## 2018-02-13 PROCEDURE — 80061 LIPID PANEL: CPT | Performed by: FAMILY MEDICINE

## 2018-02-13 PROCEDURE — 25010000002 MORPHINE PER 10 MG: Performed by: FAMILY MEDICINE

## 2018-02-13 PROCEDURE — 93010 ELECTROCARDIOGRAM REPORT: CPT | Performed by: INTERNAL MEDICINE

## 2018-02-13 PROCEDURE — 84484 ASSAY OF TROPONIN QUANT: CPT | Performed by: PHYSICIAN ASSISTANT

## 2018-02-13 RX ORDER — RANOLAZINE 500 MG/1
500 TABLET, EXTENDED RELEASE ORAL EVERY 12 HOURS SCHEDULED
Status: DISCONTINUED | OUTPATIENT
Start: 2018-02-13 | End: 2018-02-15 | Stop reason: HOSPADM

## 2018-02-13 RX ORDER — ISOSORBIDE MONONITRATE 30 MG/1
30 TABLET, EXTENDED RELEASE ORAL
Status: DISCONTINUED | OUTPATIENT
Start: 2018-02-13 | End: 2018-02-15 | Stop reason: HOSPADM

## 2018-02-13 RX ADMIN — IPRATROPIUM BROMIDE AND ALBUTEROL SULFATE 3 ML: 2.5; .5 SOLUTION RESPIRATORY (INHALATION) at 19:14

## 2018-02-13 RX ADMIN — LISINOPRIL 5 MG: 5 TABLET ORAL at 08:13

## 2018-02-13 RX ADMIN — FUROSEMIDE 20 MG: 20 TABLET ORAL at 08:12

## 2018-02-13 RX ADMIN — FLUTICASONE PROPIONATE 2 SPRAY: 50 SPRAY, METERED NASAL at 08:14

## 2018-02-13 RX ADMIN — GLIPIZIDE 5 MG: 5 TABLET ORAL at 08:13

## 2018-02-13 RX ADMIN — MORPHINE SULFATE 2 MG: 2 INJECTION, SOLUTION INTRAMUSCULAR; INTRAVENOUS at 19:32

## 2018-02-13 RX ADMIN — CLOPIDOGREL BISULFATE 75 MG: 75 TABLET ORAL at 08:12

## 2018-02-13 RX ADMIN — FAMOTIDINE 20 MG: 20 TABLET, FILM COATED ORAL at 08:12

## 2018-02-13 RX ADMIN — RANOLAZINE 500 MG: 500 TABLET, FILM COATED, EXTENDED RELEASE ORAL at 20:26

## 2018-02-13 RX ADMIN — IPRATROPIUM BROMIDE AND ALBUTEROL SULFATE 3 ML: 2.5; .5 SOLUTION RESPIRATORY (INHALATION) at 06:50

## 2018-02-13 RX ADMIN — NITROGLYCERIN 1 INCH: 20 OINTMENT TOPICAL at 19:28

## 2018-02-13 RX ADMIN — HYDROCODONE BITARTRATE AND ACETAMINOPHEN 1 TABLET: 7.5; 325 TABLET ORAL at 16:04

## 2018-02-13 RX ADMIN — ACETAMINOPHEN 650 MG: 325 TABLET ORAL at 12:08

## 2018-02-13 RX ADMIN — FAMOTIDINE 20 MG: 20 TABLET, FILM COATED ORAL at 20:26

## 2018-02-13 RX ADMIN — ISOSORBIDE DINITRATE 10 MG: 5 TABLET ORAL at 08:13

## 2018-02-13 RX ADMIN — MAGNESIUM OXIDE TAB 400 MG (241.3 MG ELEMENTAL MG) 400 MG: 400 (241.3 MG) TAB at 08:13

## 2018-02-13 RX ADMIN — ASPIRIN 81 MG 81 MG: 81 TABLET ORAL at 08:13

## 2018-02-13 RX ADMIN — IPRATROPIUM BROMIDE AND ALBUTEROL SULFATE 3 ML: 2.5; .5 SOLUTION RESPIRATORY (INHALATION) at 13:48

## 2018-02-13 RX ADMIN — RANOLAZINE 500 MG: 500 TABLET, FILM COATED, EXTENDED RELEASE ORAL at 11:57

## 2018-02-13 RX ADMIN — HYDROCODONE BITARTRATE AND ACETAMINOPHEN 1 TABLET: 7.5; 325 TABLET ORAL at 08:13

## 2018-02-13 RX ADMIN — IPRATROPIUM BROMIDE AND ALBUTEROL SULFATE 3 ML: 2.5; .5 SOLUTION RESPIRATORY (INHALATION) at 10:09

## 2018-02-13 NOTE — PLAN OF CARE
Problem: Patient Care Overview (Adult)  Goal: Plan of Care Review  Outcome: Ongoing (interventions implemented as appropriate)   02/13/18 0421   Coping/Psychosocial Response Interventions   Plan Of Care Reviewed With patient   Patient Care Overview   Progress no change   Outcome Evaluation   Outcome Summary/Follow up Plan chest and back pain persist, no significant increase in troponins       Problem: Acute Coronary Syndrome (ACS) (Adult)  Goal: Signs and Symptoms of Listed Potential Problems Will be Absent or Manageable (Acute Coronary Syndrome)  Outcome: Ongoing (interventions implemented as appropriate)

## 2018-02-13 NOTE — PROGRESS NOTES
[facility] Medicine Services  INPATIENT PROGRESS NOTE    Length of Stay: 0  Date of Admission: 2/12/2018  Primary Care Physician: Paolo Rey MD    Subjective   Chief Complaint/HPI:  This 84-year-old  male reported to the emergency department with complaints of chest pain and dyspnea upon exertion.  Patient was evaluated by Dr. Davila today.  As patient has undergone maximal medical therapy management, cardiac catheterization was presented as an option.  Patient is agreeable to cardiac catheterization and will undergo cardiac catheter tomorrow for definitive diagnosis.  Patient states he feels much better today, no current chest pain, shortness of air back to baseline.    Review of Systems   Respiratory: Positive for shortness of breath.         Reports shortness of air is at baseline   Cardiovascular: Negative for chest pain and palpitations.   Gastrointestinal: Negative for abdominal pain, blood in stool, nausea and vomiting.   Genitourinary: Negative for difficulty urinating and hematuria.   Neurological: Negative for dizziness, syncope and headaches.      All pertinent negatives and positives are as above. All other systems have been reviewed and are negative unless otherwise stated.     Objective    Temp:  [97.8 °F (36.6 °C)-98.9 °F (37.2 °C)] 98.4 °F (36.9 °C)  Heart Rate:  [52-92] 92  Resp:  [16-22] 16  BP: ()/(51-78) 128/64    Physical Exam   Constitutional: He is oriented to person, place, and time. He appears well-developed. No distress.   HENT:   Head: Normocephalic and atraumatic.   Eyes: Conjunctivae are normal.   Cardiovascular: Normal rate and regular rhythm.    Pulmonary/Chest: Effort normal and breath sounds normal. No respiratory distress. He has no wheezes.   Abdominal: Soft. Bowel sounds are normal. He exhibits no distension. There is no tenderness.   Musculoskeletal: He exhibits no edema.   Neurological: He is alert and oriented  to person, place, and time.   Skin: Skin is warm and dry. He is not diaphoretic.   Psychiatric: He has a normal mood and affect. His behavior is normal.     Results Review:  I have reviewed the labs, radiology results, and diagnostic studies.    Laboratory Data:     Results from last 7 days  Lab Units 02/13/18  0522 02/12/18  1122   SODIUM mmol/L 139 141   POTASSIUM mmol/L 4.1 4.0   CHLORIDE mmol/L 103 101   CO2 mmol/L 23.0 26.0   BUN mg/dL 24* 22*   CREATININE mg/dL 1.07 1.06   GLUCOSE mg/dL 97 105*   CALCIUM mg/dL 9.1 9.2   BILIRUBIN mg/dL 0.1* 0.3   ALK PHOS U/L 65 67   ALT (SGPT) U/L 32 25   AST (SGOT) U/L 16* 24   ANION GAP mmol/L 13.0 14.0     Estimated Creatinine Clearance: 54.8 mL/min (by C-G formula based on Cr of 1.07).    Results from last 7 days  Lab Units 02/12/18  1122   MAGNESIUM mg/dL 1.9           Results from last 7 days  Lab Units 02/13/18  0522 02/12/18  1122   WBC 10*3/mm3 10.48* 10.69*   HEMOGLOBIN g/dL 12.1* 13.0*   HEMATOCRIT % 37.6* 39.6   PLATELETS 10*3/mm3 244 264           Culture Data:   No results found for: BLOODCX  No results found for: URINECX  No results found for: RESPCX  No results found for: WOUNDCX  No results found for: STOOLCX  No components found for: BODYFLD    Radiology Data:   Imaging Results (last 24 hours)     Procedure Component Value Units Date/Time    XR Chest 1 View [[id number]] Collected:  02/12/18 1125     Updated:  02/12/18 1150    Narrative:         PROCEDURE: Single chest view AP    REASON FOR EXAM:Chest Pain triage protocol  chest pain    FINDINGS: Comparison exam dated December 23, 2017. Cardiac and  pulmonary vasculature are normal. Lungs are clear. Pleural spaces  are normal. No acute osseous abnormality.      Impression:       No acute cardiopulmonary abnormality.    Electronically signed by:  Nelson Christianson MD  2/12/2018 11:49 AM CST  Workstation: [id number]          I have reviewed the patient current medications.     Assessment/Plan     Hospital Problem List      * (Principal)Angina, class III    Overview Signed 2/13/2018  9:28 AM by Moisés Davila MD     Added automatically from request for surgery [id number]         Bradycardia    Chronic coronary artery disease (Chronic)    Chronic obstructive lung disease (Chronic)    Diabetes mellitus (Chronic)    Hypertension (Chronic)    Chest pain          Plan:  Cardiac catheterization tomorrow with Dr. Aggarwal on call.  Cardiology consult appreciated.                This document has been electronically signed by ADELA Vinson on February 13, 2018 10:47 AM

## 2018-02-13 NOTE — PLAN OF CARE
Problem: Patient Care Overview (Adult)  Goal: Plan of Care Review  Outcome: Ongoing (interventions implemented as appropriate)   02/13/18 5975   Coping/Psychosocial Response Interventions   Plan Of Care Reviewed With spouse;patient   Patient Care Overview   Progress improving   Outcome Evaluation   Outcome Summary/Follow up Plan no chest pain reported so far today. 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     Goal: Adult Individualization and Mutuality  Outcome: Ongoing (interventions implemented as appropriate)    Goal: Discharge Needs Assessment  Outcome: Ongoing (interventions implemented as appropriate)      Problem: Acute Coronary Syndrome (ACS) (Adult)  Goal: Signs and Symptoms of Listed Potential Problems Will be Absent or Manageable (Acute Coronary Syndrome)  Outcome: Ongoing (interventions implemented as appropriate)

## 2018-02-13 NOTE — CONSULTS
Adult Nutrition  Assessment    Patient Name:  Hasmukh Ham  YOB: 1933  MRN: [id number]  Admit Date:  2/12/2018    Assessment Date:  2/13/2018    Comments:  Pt with dx Heart Failure.  Mild Sodium Restricted Diet info used to provide ed regarding Wt Loss diet and diet copy given.          Reason for Assessment       02/13/18 1553    Reason for Assessment    Reason For Assessment/Visit education;per organizational policy   Low Sodium diet    Identified At Risk By Screening Criteria need for education;diagnosis   dx Heart Failure                              Electronically signed by:  Patricia Sanchez RD  02/13/18 3:54 PM

## 2018-02-13 NOTE — PLAN OF CARE
Problem: Patient Care Overview (Adult)  Goal: Plan of Care Review  Outcome: Ongoing (interventions implemented as appropriate)  Mild Sodium Restricted Diet info used to provide ed regarding Low Sodium Diet and diet copy given.   02/13/18 8812   Coping/Psychosocial Response Interventions   Plan Of Care Reviewed With friend   Patient Care Overview   Progress no change   Outcome Evaluation   Outcome Summary/Follow up Plan initial assessment

## 2018-02-14 LAB
ALBUMIN SERPL-MCNC: 3.6 G/DL (ref 3.4–4.8)
ALBUMIN/GLOB SERPL: 1.6 G/DL (ref 1.1–1.8)
ALP SERPL-CCNC: 81 U/L (ref 38–126)
ALT SERPL W P-5'-P-CCNC: 32 U/L (ref 21–72)
ANION GAP SERPL CALCULATED.3IONS-SCNC: 14 MMOL/L (ref 5–15)
AST SERPL-CCNC: 16 U/L (ref 17–59)
BASOPHILS # BLD AUTO: 0.03 10*3/MM3 (ref 0–0.2)
BASOPHILS NFR BLD AUTO: 0.3 % (ref 0–2)
BILIRUB SERPL-MCNC: 0.2 MG/DL (ref 0.2–1.3)
BUN BLD-MCNC: 21 MG/DL (ref 7–21)
BUN/CREAT SERPL: 19.6 (ref 7–25)
CALCIUM SPEC-SCNC: 9.2 MG/DL (ref 8.4–10.2)
CHLORIDE SERPL-SCNC: 103 MMOL/L (ref 95–110)
CO2 SERPL-SCNC: 24 MMOL/L (ref 22–31)
CREAT BLD-MCNC: 1.07 MG/DL (ref 0.7–1.3)
DEPRECATED RDW RBC AUTO: 54.5 FL (ref 35.1–43.9)
EOSINOPHIL # BLD AUTO: 0.23 10*3/MM3 (ref 0–0.7)
EOSINOPHIL NFR BLD AUTO: 2.4 % (ref 0–7)
ERYTHROCYTE [DISTWIDTH] IN BLOOD BY AUTOMATED COUNT: 16.5 % (ref 11.5–14.5)
GFR SERPL CREATININE-BSD FRML MDRD: 66 ML/MIN/1.73 (ref 42–98)
GLOBULIN UR ELPH-MCNC: 2.3 GM/DL (ref 2.3–3.5)
GLUCOSE BLD-MCNC: 101 MG/DL (ref 60–100)
GLUCOSE BLDC GLUCOMTR-MCNC: 124 MG/DL (ref 70–130)
HCT VFR BLD AUTO: 37.7 % (ref 39–49)
HGB BLD-MCNC: 12.4 G/DL (ref 13.7–17.3)
IMM GRANULOCYTES # BLD: 0.12 10*3/MM3 (ref 0–0.02)
IMM GRANULOCYTES NFR BLD: 1.2 % (ref 0–0.5)
LYMPHOCYTES # BLD AUTO: 1.89 10*3/MM3 (ref 0.6–4.2)
LYMPHOCYTES NFR BLD AUTO: 19.4 % (ref 10–50)
MCH RBC QN AUTO: 29.7 PG (ref 26.5–34)
MCHC RBC AUTO-ENTMCNC: 32.9 G/DL (ref 31.5–36.3)
MCV RBC AUTO: 90.2 FL (ref 80–98)
MONOCYTES # BLD AUTO: 0.98 10*3/MM3 (ref 0–0.9)
MONOCYTES NFR BLD AUTO: 10 % (ref 0–12)
NEUTROPHILS # BLD AUTO: 6.51 10*3/MM3 (ref 2–8.6)
NEUTROPHILS NFR BLD AUTO: 66.7 % (ref 37–80)
PLATELET # BLD AUTO: 240 10*3/MM3 (ref 150–450)
PMV BLD AUTO: 10.9 FL (ref 8–12)
POTASSIUM BLD-SCNC: 4.7 MMOL/L (ref 3.5–5.1)
PROT SERPL-MCNC: 5.9 G/DL (ref 6.3–8.6)
RBC # BLD AUTO: 4.18 10*6/MM3 (ref 4.37–5.74)
SODIUM BLD-SCNC: 141 MMOL/L (ref 137–145)
TROPONIN I SERPL-MCNC: 0.04 NG/ML
TROPONIN I SERPL-MCNC: 0.06 NG/ML
WBC NRBC COR # BLD: 9.76 10*3/MM3 (ref 3.2–9.8)

## 2018-02-14 PROCEDURE — 93454 CORONARY ARTERY ANGIO S&I: CPT | Performed by: INTERNAL MEDICINE

## 2018-02-14 PROCEDURE — 25010000002 HEPARIN (PORCINE) PER 1000 UNITS: Performed by: INTERNAL MEDICINE

## 2018-02-14 PROCEDURE — G0378 HOSPITAL OBSERVATION PER HR: HCPCS

## 2018-02-14 PROCEDURE — 80053 COMPREHEN METABOLIC PANEL: CPT | Performed by: PHYSICIAN ASSISTANT

## 2018-02-14 PROCEDURE — C1725 CATH, TRANSLUMIN NON-LASER: HCPCS | Performed by: INTERNAL MEDICINE

## 2018-02-14 PROCEDURE — 82962 GLUCOSE BLOOD TEST: CPT

## 2018-02-14 PROCEDURE — 94799 UNLISTED PULMONARY SVC/PX: CPT

## 2018-02-14 PROCEDURE — 94760 N-INVAS EAR/PLS OXIMETRY 1: CPT

## 2018-02-14 PROCEDURE — 0 IOPAMIDOL PER 1 ML: Performed by: INTERNAL MEDICINE

## 2018-02-14 PROCEDURE — C1894 INTRO/SHEATH, NON-LASER: HCPCS | Performed by: INTERNAL MEDICINE

## 2018-02-14 PROCEDURE — C1769 GUIDE WIRE: HCPCS | Performed by: INTERNAL MEDICINE

## 2018-02-14 PROCEDURE — 85025 COMPLETE CBC W/AUTO DIFF WBC: CPT | Performed by: PHYSICIAN ASSISTANT

## 2018-02-14 PROCEDURE — C9600 PERC DRUG-EL COR STENT SING: HCPCS | Performed by: INTERNAL MEDICINE

## 2018-02-14 PROCEDURE — 92928 PRQ TCAT PLMT NTRAC ST 1 LES: CPT | Performed by: INTERNAL MEDICINE

## 2018-02-14 PROCEDURE — C1887 CATHETER, GUIDING: HCPCS | Performed by: INTERNAL MEDICINE

## 2018-02-14 PROCEDURE — 25010000002 MIDAZOLAM PER 1 MG: Performed by: INTERNAL MEDICINE

## 2018-02-14 PROCEDURE — 84484 ASSAY OF TROPONIN QUANT: CPT | Performed by: PHYSICIAN ASSISTANT

## 2018-02-14 PROCEDURE — C1874 STENT, COATED/COV W/DEL SYS: HCPCS | Performed by: INTERNAL MEDICINE

## 2018-02-14 DEVICE — XIENCE ALPINE EVEROLIMUS ELUTING CORONARY STENT SYSTEM 2.25 MM X 23 MM / RAPID-EXCHANGE
Type: IMPLANTABLE DEVICE | Status: FUNCTIONAL
Brand: XIENCE ALPINE

## 2018-02-14 RX ORDER — SODIUM CHLORIDE 9 MG/ML
75 INJECTION, SOLUTION INTRAVENOUS CONTINUOUS
Status: DISCONTINUED | OUTPATIENT
Start: 2018-02-14 | End: 2018-02-14

## 2018-02-14 RX ORDER — HEPARIN SODIUM 1000 [USP'U]/ML
INJECTION, SOLUTION INTRAVENOUS; SUBCUTANEOUS AS NEEDED
Status: DISCONTINUED | OUTPATIENT
Start: 2018-02-14 | End: 2018-02-14 | Stop reason: HOSPADM

## 2018-02-14 RX ORDER — CLOPIDOGREL BISULFATE 75 MG/1
TABLET ORAL AS NEEDED
Status: DISCONTINUED | OUTPATIENT
Start: 2018-02-14 | End: 2018-02-14 | Stop reason: HOSPADM

## 2018-02-14 RX ORDER — LISINOPRIL 10 MG/1
10 TABLET ORAL
Status: DISCONTINUED | OUTPATIENT
Start: 2018-02-15 | End: 2018-02-15 | Stop reason: HOSPADM

## 2018-02-14 RX ORDER — SODIUM CHLORIDE 9 MG/ML
100 INJECTION, SOLUTION INTRAVENOUS CONTINUOUS
Status: DISCONTINUED | OUTPATIENT
Start: 2018-02-14 | End: 2018-02-15

## 2018-02-14 RX ORDER — MIDAZOLAM HYDROCHLORIDE 1 MG/ML
INJECTION INTRAMUSCULAR; INTRAVENOUS AS NEEDED
Status: DISCONTINUED | OUTPATIENT
Start: 2018-02-14 | End: 2018-02-14 | Stop reason: HOSPADM

## 2018-02-14 RX ORDER — LIDOCAINE HYDROCHLORIDE 20 MG/ML
INJECTION, SOLUTION INFILTRATION; PERINEURAL AS NEEDED
Status: DISCONTINUED | OUTPATIENT
Start: 2018-02-14 | End: 2018-02-14 | Stop reason: HOSPADM

## 2018-02-14 RX ORDER — ASPIRIN 81 MG/1
324 TABLET, CHEWABLE ORAL DAILY
Status: DISCONTINUED | OUTPATIENT
Start: 2018-02-15 | End: 2018-02-15 | Stop reason: HOSPADM

## 2018-02-14 RX ADMIN — HYDROCODONE BITARTRATE AND ACETAMINOPHEN 1 TABLET: 7.5; 325 TABLET ORAL at 23:22

## 2018-02-14 RX ADMIN — CLOPIDOGREL BISULFATE 75 MG: 75 TABLET ORAL at 08:12

## 2018-02-14 RX ADMIN — FAMOTIDINE 20 MG: 20 TABLET, FILM COATED ORAL at 20:24

## 2018-02-14 RX ADMIN — SODIUM CHLORIDE 100 ML/HR: 900 INJECTION, SOLUTION INTRAVENOUS at 19:00

## 2018-02-14 RX ADMIN — LISINOPRIL 5 MG: 5 TABLET ORAL at 08:12

## 2018-02-14 RX ADMIN — HYDROCODONE BITARTRATE AND ACETAMINOPHEN 1 TABLET: 7.5; 325 TABLET ORAL at 08:12

## 2018-02-14 RX ADMIN — IPRATROPIUM BROMIDE AND ALBUTEROL SULFATE 3 ML: 2.5; .5 SOLUTION RESPIRATORY (INHALATION) at 15:49

## 2018-02-14 RX ADMIN — ISOSORBIDE MONONITRATE 30 MG: 30 TABLET, EXTENDED RELEASE ORAL at 08:13

## 2018-02-14 RX ADMIN — HYDROCODONE BITARTRATE AND ACETAMINOPHEN 1 TABLET: 7.5; 325 TABLET ORAL at 00:07

## 2018-02-14 RX ADMIN — RANOLAZINE 500 MG: 500 TABLET, FILM COATED, EXTENDED RELEASE ORAL at 08:13

## 2018-02-14 RX ADMIN — IPRATROPIUM BROMIDE AND ALBUTEROL SULFATE 3 ML: 2.5; .5 SOLUTION RESPIRATORY (INHALATION) at 19:35

## 2018-02-14 RX ADMIN — ASPIRIN 81 MG 81 MG: 81 TABLET ORAL at 08:13

## 2018-02-14 RX ADMIN — FUROSEMIDE 20 MG: 20 TABLET ORAL at 08:12

## 2018-02-14 RX ADMIN — GLIPIZIDE 5 MG: 5 TABLET ORAL at 08:12

## 2018-02-14 RX ADMIN — FAMOTIDINE 20 MG: 20 TABLET, FILM COATED ORAL at 08:12

## 2018-02-14 RX ADMIN — HYDROCODONE BITARTRATE AND ACETAMINOPHEN 1 TABLET: 7.5; 325 TABLET ORAL at 16:10

## 2018-02-14 RX ADMIN — RANOLAZINE 500 MG: 500 TABLET, FILM COATED, EXTENDED RELEASE ORAL at 20:24

## 2018-02-14 RX ADMIN — IPRATROPIUM BROMIDE AND ALBUTEROL SULFATE 3 ML: 2.5; .5 SOLUTION RESPIRATORY (INHALATION) at 06:57

## 2018-02-14 RX ADMIN — MAGNESIUM OXIDE TAB 400 MG (241.3 MG ELEMENTAL MG) 400 MG: 400 (241.3 MG) TAB at 08:12

## 2018-02-14 NOTE — PROGRESS NOTES
[facility] Medicine Services  INPATIENT PROGRESS NOTE    Length of Stay: 0  Date of Admission: 2/12/2018  Primary Care Physician: Paolo Rey MD    Subjective   Please note that all previous progress notes, lab findings, radiographic findings, medication changes, and physical exam findings have been noted and updated as appropriate.    2/14/2018: Patient had episode of severe chest pain last night.  Troponin did demonstrate a mild elevation of 0.056 before beginning to decline with the last troponin being 0.044.  Patient has no chest pain today.  Patient went to cath lab with Dr. Davila today, resulting in stent to the LAD.  Patient now transferred to the stepdown unit.  No complications.    Chief Complaint/HPI:  This 84-year-old  male reported to the emergency department with complaints of chest pain and dyspnea upon exertion.  Patient was evaluated by Dr. Davila today.  As patient has undergone maximal medical therapy management, cardiac catheterization was presented as an option.  Patient is agreeable to cardiac catheterization and will undergo cardiac catheter tomorrow for definitive diagnosis.  Patient states he feels much better today, no current chest pain, shortness of air back to baseline.    Review of Systems   Respiratory: Positive for shortness of breath.         Reports shortness of air is at baseline   Cardiovascular: Positive for chest pain. Negative for palpitations.   Gastrointestinal: Negative for abdominal pain, blood in stool, nausea and vomiting.   Genitourinary: Negative for difficulty urinating and hematuria.   Neurological: Negative for dizziness, syncope and headaches.      All pertinent negatives and positives are as above. All other systems have been reviewed and are negative unless otherwise stated.     Objective    Temp:  [97.3 °F (36.3 °C)-98.3 °F (36.8 °C)] 97.3 °F (36.3 °C)  Heart Rate:  [68-90] 68  Resp:  [16-24] 18  BP:  ()/(48-90) 110/70  Arterial Line BP: ()/(50-87) 116/72    Physical Exam   Constitutional: He is oriented to person, place, and time. He appears well-developed. No distress.   HENT:   Head: Normocephalic and atraumatic.   Eyes: Conjunctivae are normal.   Cardiovascular: Normal rate and regular rhythm.    Pulmonary/Chest: Effort normal and breath sounds normal. No respiratory distress. He has no wheezes.   Abdominal: Soft. Bowel sounds are normal. He exhibits no distension. There is no tenderness.   Musculoskeletal: He exhibits no edema.   Neurological: He is alert and oriented to person, place, and time.   Skin: Skin is warm and dry. He is not diaphoretic.   Psychiatric: He has a normal mood and affect. His behavior is normal.     Results Review:  I have reviewed the labs, radiology results, and diagnostic studies.    Laboratory Data:     Results from last 7 days  Lab Units 02/14/18  0807 02/13/18  0522 02/12/18  1122   SODIUM mmol/L 141 139 141   POTASSIUM mmol/L 4.7 4.1 4.0   CHLORIDE mmol/L 103 103 101   CO2 mmol/L 24.0 23.0 26.0   BUN mg/dL 21 24* 22*   CREATININE mg/dL 1.07 1.07 1.06   GLUCOSE mg/dL 101* 97 105*   CALCIUM mg/dL 9.2 9.1 9.2   BILIRUBIN mg/dL 0.2 0.1* 0.3   ALK PHOS U/L 81 65 67   ALT (SGPT) U/L 32 32 25   AST (SGOT) U/L 16* 16* 24   ANION GAP mmol/L 14.0 13.0 14.0     Estimated Creatinine Clearance: 54.7 mL/min (by C-G formula based on Cr of 1.07).    Results from last 7 days  Lab Units 02/12/18  1122   MAGNESIUM mg/dL 1.9           Results from last 7 days  Lab Units 02/14/18  0129 02/13/18  0522 02/12/18  1122   WBC 10*3/mm3 9.76 10.48* 10.69*   HEMOGLOBIN g/dL 12.4* 12.1* 13.0*   HEMATOCRIT % 37.7* 37.6* 39.6   PLATELETS 10*3/mm3 240 244 264           Culture Data:   No results found for: BLOODCX  No results found for: URINECX  No results found for: RESPCX  No results found for: WOUNDCX  No results found for: STOOLCX  No components found for: BODYFLD    Radiology Data:   Imaging  Results (last 24 hours)     ** No results found for the last 24 hours. **          I have reviewed the patient current medications.     Assessment/Plan     Hospital Problem List     * (Principal)Angina, class III    Overview Signed 2/13/2018  9:28 AM by Moisés Davila MD     Added automatically from request for surgery [id number]         Bradycardia    Chronic coronary artery disease (Chronic)    Chronic obstructive lung disease (Chronic)    Diabetes mellitus (Chronic)    Hypertension (Chronic)    Chest pain          Plan:  Continue per cardiology recommendations, suspect discharge within 24 hours if no complications.                This document has been electronically signed by ADELA Vinson on February 14, 2018 12:17 PM

## 2018-02-15 VITALS
OXYGEN SATURATION: 97 % | TEMPERATURE: 98.3 F | SYSTOLIC BLOOD PRESSURE: 134 MMHG | HEART RATE: 87 BPM | BODY MASS INDEX: 26.06 KG/M2 | HEIGHT: 67 IN | WEIGHT: 166 LBS | DIASTOLIC BLOOD PRESSURE: 68 MMHG | RESPIRATION RATE: 18 BRPM

## 2018-02-15 PROBLEM — I20.9 ANGINA PECTORIS (HCC): Status: ACTIVE | Noted: 2018-02-15

## 2018-02-15 LAB
ANION GAP SERPL CALCULATED.3IONS-SCNC: 11 MMOL/L (ref 5–15)
BUN BLD-MCNC: 21 MG/DL (ref 7–21)
BUN/CREAT SERPL: 20.4 (ref 7–25)
CALCIUM SPEC-SCNC: 9.3 MG/DL (ref 8.4–10.2)
CHLORIDE SERPL-SCNC: 102 MMOL/L (ref 95–110)
CO2 SERPL-SCNC: 26 MMOL/L (ref 22–31)
CREAT BLD-MCNC: 1.03 MG/DL (ref 0.7–1.3)
DEPRECATED RDW RBC AUTO: 55.8 FL (ref 35.1–43.9)
ERYTHROCYTE [DISTWIDTH] IN BLOOD BY AUTOMATED COUNT: 16.6 % (ref 11.5–14.5)
GFR SERPL CREATININE-BSD FRML MDRD: 69 ML/MIN/1.73 (ref 42–98)
GLUCOSE BLD-MCNC: 100 MG/DL (ref 60–100)
HCT VFR BLD AUTO: 37.5 % (ref 39–49)
HGB BLD-MCNC: 12.1 G/DL (ref 13.7–17.3)
MCH RBC QN AUTO: 29.4 PG (ref 26.5–34)
MCHC RBC AUTO-ENTMCNC: 32.3 G/DL (ref 31.5–36.3)
MCV RBC AUTO: 91 FL (ref 80–98)
PLATELET # BLD AUTO: 236 10*3/MM3 (ref 150–450)
PMV BLD AUTO: 11.1 FL (ref 8–12)
POTASSIUM BLD-SCNC: 4.8 MMOL/L (ref 3.5–5.1)
RBC # BLD AUTO: 4.12 10*6/MM3 (ref 4.37–5.74)
SODIUM BLD-SCNC: 139 MMOL/L (ref 137–145)
WBC NRBC COR # BLD: 11.56 10*3/MM3 (ref 3.2–9.8)

## 2018-02-15 PROCEDURE — 93005 ELECTROCARDIOGRAM TRACING: CPT | Performed by: INTERNAL MEDICINE

## 2018-02-15 PROCEDURE — G0378 HOSPITAL OBSERVATION PER HR: HCPCS

## 2018-02-15 PROCEDURE — 93010 ELECTROCARDIOGRAM REPORT: CPT | Performed by: INTERNAL MEDICINE

## 2018-02-15 PROCEDURE — 85027 COMPLETE CBC AUTOMATED: CPT | Performed by: INTERNAL MEDICINE

## 2018-02-15 PROCEDURE — 94799 UNLISTED PULMONARY SVC/PX: CPT

## 2018-02-15 PROCEDURE — 80048 BASIC METABOLIC PNL TOTAL CA: CPT | Performed by: INTERNAL MEDICINE

## 2018-02-15 RX ORDER — ASPIRIN 325 MG
325 TABLET, DELAYED RELEASE (ENTERIC COATED) ORAL DAILY
Start: 2018-02-15 | End: 2018-10-02 | Stop reason: SDDI

## 2018-02-15 RX ORDER — ISOSORBIDE MONONITRATE 30 MG/1
30 TABLET, EXTENDED RELEASE ORAL
Qty: 30 TABLET | Refills: 1 | Status: SHIPPED | OUTPATIENT
Start: 2018-02-16 | End: 2020-06-25

## 2018-02-15 RX ORDER — ACETAMINOPHEN 325 MG/1
650 TABLET ORAL EVERY 4 HOURS PRN
Start: 2018-02-15

## 2018-02-15 RX ADMIN — HYDROCODONE BITARTRATE AND ACETAMINOPHEN 1 TABLET: 7.5; 325 TABLET ORAL at 08:15

## 2018-02-15 RX ADMIN — FUROSEMIDE 20 MG: 20 TABLET ORAL at 08:16

## 2018-02-15 RX ADMIN — ISOSORBIDE MONONITRATE 30 MG: 30 TABLET, EXTENDED RELEASE ORAL at 08:16

## 2018-02-15 RX ADMIN — FAMOTIDINE 20 MG: 20 TABLET, FILM COATED ORAL at 08:15

## 2018-02-15 RX ADMIN — LISINOPRIL 10 MG: 10 TABLET ORAL at 08:16

## 2018-02-15 RX ADMIN — MAGNESIUM OXIDE TAB 400 MG (241.3 MG ELEMENTAL MG) 400 MG: 400 (241.3 MG) TAB at 08:16

## 2018-02-15 RX ADMIN — IPRATROPIUM BROMIDE AND ALBUTEROL SULFATE 3 ML: 2.5; .5 SOLUTION RESPIRATORY (INHALATION) at 09:05

## 2018-02-15 RX ADMIN — RANOLAZINE 500 MG: 500 TABLET, FILM COATED, EXTENDED RELEASE ORAL at 08:16

## 2018-02-15 RX ADMIN — ASPIRIN 81 MG 324 MG: 81 TABLET ORAL at 08:16

## 2018-02-15 RX ADMIN — CLOPIDOGREL BISULFATE 75 MG: 75 TABLET ORAL at 08:15

## 2018-02-15 RX ADMIN — GLIPIZIDE 5 MG: 5 TABLET ORAL at 08:16

## 2018-02-15 RX ADMIN — Medication 10 ML: at 08:17

## 2018-02-15 NOTE — DISCHARGE INSTR - ACTIVITY
No tub bath or submerging in water x 1 week, may shower  Keep covered with bandiad x 3 days, change daily  No lifting anything heavier than a gallon of milk x 5 days

## 2018-02-15 NOTE — PLAN OF CARE
Problem: Patient Care Overview (Adult)  Goal: Plan of Care Review  Outcome: Ongoing (interventions implemented as appropriate)   02/14/18 1810   Coping/Psychosocial Response Interventions   Plan Of Care Reviewed With patient   Patient Care Overview   Progress improving   Outcome Evaluation   Outcome Summary/Follow up Plan stent placed, a line has been d/c'd and pt ambulated, no complications     Goal: Adult Individualization and Mutuality  Outcome: Ongoing (interventions implemented as appropriate)    Goal: Discharge Needs Assessment  Outcome: Ongoing (interventions implemented as appropriate)      Problem: Acute Coronary Syndrome (ACS) (Adult)  Goal: Signs and Symptoms of Listed Potential Problems Will be Absent or Manageable (Acute Coronary Syndrome)  Outcome: Ongoing (interventions implemented as appropriate)      Problem: Cardiac Catheterization with/without PCI (Adult)  Goal: Signs and Symptoms of Listed Potential Problems Will be Absent or Manageable (Cardiac Catheterization with/without PCI)  Outcome: Ongoing (interventions implemented as appropriate)

## 2018-02-15 NOTE — PLAN OF CARE
Problem: Patient Care Overview (Adult)  Goal: Plan of Care Review  Outcome: Ongoing (interventions implemented as appropriate)   02/15/18 0433   Coping/Psychosocial Response Interventions   Plan Of Care Reviewed With patient;spouse   Patient Care Overview   Progress improving     Goal: Adult Individualization and Mutuality  Outcome: Ongoing (interventions implemented as appropriate)   02/15/18 0433   Individualization   Patient Specific Preferences likes door open, urinal at bedside   Patient Specific Goals to go home     Goal: Discharge Needs Assessment  Outcome: Ongoing (interventions implemented as appropriate)   02/15/18 0433   Discharge Needs Assessment   Concerns To Be Addressed denies needs/concerns at this time   Readmission Within The Last 30 Days no previous admission in last 30 days   Equipment Needed After Discharge none   Current Discharge Risk chronically ill   Discharge Disposition still a patient   Current Health   Anticipated Changes Related to Illness none   Living Environment   Transportation Available car;family or friend will provide   Self-Care   Equipment Currently Used at Home oxygen;nebulizer;walker, rolling       Problem: Acute Coronary Syndrome (ACS) (Adult)  Goal: Signs and Symptoms of Listed Potential Problems Will be Absent or Manageable (Acute Coronary Syndrome)  Outcome: Ongoing (interventions implemented as appropriate)   02/15/18 0433   Acute Coronary Syndrome (ACS)   Problems Assessed (Acute Coronary Syndrome (ACS)) all   Problems Present (Acute Coronary Syndrome (ACS)) none       Problem: Cardiac Catheterization with/without PCI (Adult)  Goal: Signs and Symptoms of Listed Potential Problems Will be Absent or Manageable (Cardiac Catheterization with/without PCI)  Outcome: Ongoing (interventions implemented as appropriate)   02/15/18 0433   Cardiac Catheterization with/without PCI   Problems Assessed (Cardiac Catheterization) all   Problems Present (Cardiac Catheterization) none

## 2018-02-15 NOTE — DISCHARGE SUMMARY
Discharge Summary  Jc Alba MD  Hospitalist     Date of Discharge:  2/15/2018    Discharge Diagnosis:    Principal Problem:    Angina pectoris  Active Problems:    Chronic coronary artery disease    Hypertension    Chronic obstructive lung disease    Diabetes mellitus      Presenting Problem/History of Present Illness  Chest pain    Hospital Course  Patient is a 84 y.o. male admitted for chest pain / discomfort. Given his symptoms and history he underwent a coronary angiogram with stenting of his LAD with improvement in his symptoms. He is able to ambulate without experiencing any further discomfort and is willing to go home.     Procedures Performed  Procedure(s):  Coronary angiography    Consults:   Consults     Date and Time Order Name Status Description    2/12/2018 1513 Inpatient Consult to Cardiology Completed     2/12/2018 1341 Hospitalist (on-call MD unless specified)            Pertinent Test Results: angioplasty and stenting of the LAD    Condition on Discharge:  stable    Vital Signs  Temp:  [97.5 °F (36.4 °C)-98.3 °F (36.8 °C)] 98.3 °F (36.8 °C)  Heart Rate:  [] 87  Resp:  [16-20] 18  BP: ()/(52-78) 134/68    Physical Exam:  Physical Exam   Constitutional: He is oriented to person, place, and time. He appears well-developed and well-nourished. No distress.   HENT:   Head: Normocephalic and atraumatic.   Eyes: EOM are normal. Pupils are equal, round, and reactive to light. No scleral icterus.   Neck: Normal range of motion. Neck supple.   Cardiovascular: Normal rate and regular rhythm.    Pulmonary/Chest: Effort normal and breath sounds normal. No respiratory distress. He has no wheezes.   Abdominal: Soft. Bowel sounds are normal. He exhibits no distension. There is no tenderness. There is no guarding.   Musculoskeletal: Normal range of motion. He exhibits no edema or tenderness.   Neurological: He is alert and oriented to person, place, and time. He has normal reflexes. No cranial nerve  deficit.   Skin: Skin is warm and dry. No pallor.   Psychiatric: He has a normal mood and affect. His behavior is normal.   Vitals reviewed.      Discharge Disposition  Home-Health Care [facility] [place]    Discharge Medications   Hasmukh Ham   Home Medication Instructions SEBASTIAN:[id number]    Printed on:02/15/18 8783   Medication Information                      acetaminophen (TYLENOL) 325 MG tablet  Take 2 tablets by mouth Every 4 (Four) Hours As Needed for Mild Pain .             albuterol (PROVENTIL) (2.5 MG/3ML) 0.083% nebulizer solution  Take 2.5 mg by nebulization Every 4 (Four) Hours As Needed for Wheezing.             aspirin  MG tablet  Take 1 tablet by mouth Daily.             clopidogrel (PLAVIX) 75 MG tablet  Take 75 mg by mouth Daily.             famotidine (PEPCID) 20 MG tablet  Take 20 mg by mouth 2 (Two) Times a Day.             fluticasone (FLONASE) 50 MCG/ACT nasal spray  2 sprays into each nostril Daily.             Fluticasone Furoate (ARNUITY ELLIPTA) 200 MCG/ACT aerosol powder   Inhale.             furosemide (LASIX) 20 MG tablet  Take 1 tablet by mouth Daily.             glimepiride (AMARYL) 2 MG tablet  Take 2 mg by mouth Every Morning Before Breakfast.             HYDROcodone-acetaminophen (NORCO) 7.5-325 MG per tablet  Take 1 tablet by mouth Every 8 (Eight) Hours.             ipratropium-albuterol (DUO-NEB) 0.5-2.5 mg/mL nebulizer  Take 3 mL by nebulization 4 (Four) Times a Day.             isosorbide mononitrate (IMDUR) 30 MG 24 hr tablet  Take 1 tablet by mouth Daily.             lisinopril (PRINIVIL,ZESTRIL) 10 MG tablet  Take 0.5 tablets by mouth Daily.             magnesium oxide (MAGOX) 400 (241.3 MG) MG tablet tablet  Take 400 mg by mouth Daily.             nitroglycerin (NITROSTAT) 0.4 MG SL tablet  place 1 tablet under the tongue if needed every 5 minutes for hair...  (REFER TO PRESCRIPTION NOTES).             O2 (OXYGEN)  Inhale 2 L/min Every Night. W/ CPAP             ranolazine  (RANEXA) 500 MG 12 hr tablet  Take 500 mg by mouth 2 (Two) Times a Day.             Red Yeast Rice 600 MG tablet  Take 600 mg by mouth 2 (Two) Times a Day.             Umeclidinium-Vilanterol 62.5-25 MCG/INH aerosol powder   Inhale 1 puff Daily.                 Discharge Diet:   Diet Instructions     Diet: Cardiac       Discharge Diet:  Cardiac                 Activity at Discharge:   Activity Instructions     Activity as Tolerated               Additional Activity Instructions:      No tub bath or submerging in water x 1 week, may shower  Keep covered with bandiad x 3 days, change daily  No lifting anything heavier than a gallon of milk x 5 days                  Follow-up Appointments  Future Appointments  Date Time Provider Department Center   3/30/2018 10:15 AM Mana Curtis MD [facility] None   7/12/2018 1:40 PM MD KATIE Edwards [facility] None   7/31/2018 1:30 PM Mana Curtis MD [facility] None     Additional Instructions for the Follow-ups that You Need to Schedule     Referral to Home Health    As directed    Face to Face Visit Date:  2/15/2018    Follow-up Provider for Plan of Care?:  I treated the patient in an acute care facility and will not continue treatment after discharge.    Follow-up Provider:  SULMA NUNES [[id number]]    Reason/Clinical Findings:  CAD    Describe mobility limitations that make leaving home difficult:  CAD    Nursing/Therapeutic Services Requested:  Skilled Nursing    Skilled nursing orders:  Medication education    Frequency:  1 Week 1                      Jc Alba MD  02/15/18  12:42 PM

## 2018-02-19 ENCOUNTER — DOCUMENTATION (OUTPATIENT)
Dept: CARDIAC REHAB | Facility: HOSPITAL | Age: 83
End: 2018-02-19

## 2018-02-19 NOTE — PROGRESS NOTES
Received a referral for Cardiac Rehab. Pt declined. Offered to send info to [place] and pt declined that as well.

## 2018-03-13 ENCOUNTER — OFFICE VISIT (OUTPATIENT)
Dept: PODIATRY | Facility: CLINIC | Age: 83
End: 2018-03-13

## 2018-03-13 VITALS
WEIGHT: 166 LBS | SYSTOLIC BLOOD PRESSURE: 126 MMHG | OXYGEN SATURATION: 95 % | BODY MASS INDEX: 26.06 KG/M2 | DIASTOLIC BLOOD PRESSURE: 70 MMHG | HEART RATE: 87 BPM | HEIGHT: 67 IN

## 2018-03-13 DIAGNOSIS — E11.42 TYPE 2 DIABETES MELLITUS WITH PERIPHERAL NEUROPATHY (HCC): Primary | ICD-10-CM

## 2018-03-13 DIAGNOSIS — B35.1 ONYCHOMYCOSIS: ICD-10-CM

## 2018-03-13 DIAGNOSIS — G89.29 CHRONIC TOE PAIN, BILATERAL: ICD-10-CM

## 2018-03-13 DIAGNOSIS — Z79.01 CURRENT USE OF LONG TERM ANTICOAGULATION: ICD-10-CM

## 2018-03-13 DIAGNOSIS — M79.675 CHRONIC TOE PAIN, BILATERAL: ICD-10-CM

## 2018-03-13 DIAGNOSIS — L84 CORNS: ICD-10-CM

## 2018-03-13 DIAGNOSIS — M79.674 CHRONIC TOE PAIN, BILATERAL: ICD-10-CM

## 2018-03-13 DIAGNOSIS — M20.42 HAMMER TOES OF BOTH FEET: ICD-10-CM

## 2018-03-13 DIAGNOSIS — M20.41 HAMMER TOES OF BOTH FEET: ICD-10-CM

## 2018-03-13 PROCEDURE — 11721 DEBRIDE NAIL 6 OR MORE: CPT | Performed by: PODIATRIST

## 2018-03-13 PROCEDURE — 99203 OFFICE O/P NEW LOW 30 MIN: CPT | Performed by: PODIATRIST

## 2018-03-13 PROCEDURE — 11055 PARING/CUTG B9 HYPRKER LES 1: CPT | Performed by: PODIATRIST

## 2018-03-13 NOTE — PROGRESS NOTES
Hasmukh Ham  6/1/1933  84 y.o. male  BS-110 on 3/12/2018  PCP: Dr. Rey last seen 3/7/2018.  Patient presents today for 2nd toe problem.    03/13/2018    Chief Complaint   Patient presents with   • Left Foot - Nail Problem           History of Present Illness    Hasmukh Ham is a 84 y.o.male who presents to clinic today with chief complaint of left foot pain.  Pain is located to the second toe.  States that there is a sore spot on the end of the.  He rates the pain as an 8 out of 10.  Pain is aggravated with weightbearing in shoe gear.  He also complains of painful, thickened and elongated toenails.  Pain in the toenails are relieved with debridement.  Admits to being a type II diabetic and states that his most recent blood sugar was 110 mg/dL.  He admits to occasional numbness, burning and tingling in his feet.  He has no other pedal complaints.          Past Medical History:   Diagnosis Date   • Bilateral carotid artery stenosis    • Bleeding disorder    • CHF (congestive heart failure)    • Chronic obstructive pulmonary disease (COPD)    • Coronary arteriosclerosis    • Degenerative joint disease involving multiple joints    • Dementia    • Diabetes mellitus    • Heart problem    • Hyperlipidemia    • Hypertension    • Ingrown toenail    • Myocardial infarction          Past Surgical History:   Procedure Laterality Date   • BACK SURGERY     • CARDIAC CATHETERIZATION N/A 6/6/2017    Procedure: Right Heart Cath;  Surgeon: Jeff Maciel MD PhD;  Location: [facility] INVASIVE LOCATION;  Service:    • CARDIAC CATHETERIZATION N/A 2/14/2018    Procedure: Coronary angiography;  Surgeon: Moisés Davila MD;  Location: [facility] INVASIVE LOCATION;  Service:    • CORONARY ANGIOPLASTY WITH STENT PLACEMENT     • CORONARY STENT PLACEMENT     • HERNIA REPAIR     • LUNG BIOPSY     • LUNG SURGERY     • NECK SURGERY     • THORACOTOMY Left 1977   • VENTRAL HERNIA REPAIR           Family History    Problem Relation Age of Onset   • Hypertension Father    • Heart disease Father    • Diabetes Father    • Diabetes Mother        Allergies   Allergen Reactions   • Atorvastatin Anaphylaxis   • Penicillins Rash   • Pravastatin      Myalgia   • Azithromycin Rash   • Biaxin [Clarithromycin] Rash       Social History     Social History   • Marital status:      Spouse name: N/A   • Number of children: N/A   • Years of education: N/A     Occupational History   • Not on file.     Social History Main Topics   • Smoking status: Former Smoker   • Smokeless tobacco: Never Used   • Alcohol use No   • Drug use: No   • Sexual activity: Not Currently      Comment:      Other Topics Concern   • Not on file     Social History Narrative   • No narrative on file         Current Outpatient Prescriptions   Medication Sig Dispense Refill   • albuterol (PROVENTIL) (2.5 MG/3ML) 0.083% nebulizer solution Take 2.5 mg by nebulization Every 4 (Four) Hours As Needed for Wheezing. 125 vial 0   • aspirin  MG tablet Take 1 tablet by mouth Daily.     • clopidogrel (PLAVIX) 75 MG tablet Take 75 mg by mouth Daily.     • famotidine (PEPCID) 20 MG tablet Take 20 mg by mouth 2 (Two) Times a Day.     • fluticasone (FLONASE) 50 MCG/ACT nasal spray 2 sprays into each nostril Daily.     • Fluticasone Furoate (ARNUITY ELLIPTA) 200 MCG/ACT aerosol powder  Inhale.     • furosemide (LASIX) 20 MG tablet Take 1 tablet by mouth Daily. 90 tablet 3   • glimepiride (AMARYL) 2 MG tablet Take 2 mg by mouth Every Morning Before Breakfast.     • HYDROcodone-acetaminophen (NORCO) 7.5-325 MG per tablet Take 1 tablet by mouth Every 8 (Eight) Hours.     • ipratropium-albuterol (DUO-NEB) 0.5-2.5 mg/mL nebulizer Take 3 mL by nebulization 4 (Four) Times a Day. 120 vial 1   • lisinopril (PRINIVIL,ZESTRIL) 10 MG tablet Take 0.5 tablets by mouth Daily. 30 tablet 6   • magnesium oxide (MAGOX) 400 (241.3 MG) MG tablet tablet Take 400 mg by mouth Daily.     •  "nitroglycerin (NITROSTAT) 0.4 MG SL tablet place 1 tablet under the tongue if needed every 5 minutes for hair...  (REFER TO PRESCRIPTION NOTES).  0   • O2 (OXYGEN) Inhale 2 L/min Every Night. W/ CPAP     • ranolazine (RANEXA) 500 MG 12 hr tablet Take 500 mg by mouth 2 (Two) Times a Day.     • Red Yeast Rice 600 MG tablet Take 600 mg by mouth 2 (Two) Times a Day.     • Umeclidinium-Vilanterol 62.5-25 MCG/INH aerosol powder  Inhale 1 puff Daily. 1 each 11   • acetaminophen (TYLENOL) 325 MG tablet Take 2 tablets by mouth Every 4 (Four) Hours As Needed for Mild Pain .     • isosorbide mononitrate (IMDUR) 30 MG 24 hr tablet Take 1 tablet by mouth Daily. 30 tablet 1     No current facility-administered medications for this visit.          OBJECTIVE    /70   Pulse 87   Ht 170.2 cm (67\")   Wt 75.3 kg (166 lb)   SpO2 95%   BMI 26.00 kg/m²       Review of Systems   Constitutional: Negative.    HENT: Positive for hearing loss.    Eyes: Negative.    Respiratory: Positive for shortness of breath.    Cardiovascular: Negative.    Gastrointestinal: Negative.    Endocrine: Negative.    Genitourinary: Negative.    Musculoskeletal: Positive for arthralgias, back pain and joint swelling.        Foot pain  Joint pain  Toe pain    Skin:        Callus  Toenail pain     Psychiatric/Behavioral: Negative.            Physical Exam   Constitutional: He is oriented to person, place, and time. He appears well-developed and well-nourished. No distress.   Pulmonary/Chest: Effort normal. No respiratory distress.    Hasmukh had a diabetic foot exam performed today.   During the foot exam he had a monofilament test performed.  Neurological: He is alert and oriented to person, place, and time.   Psychiatric: He has a normal mood and affect. His behavior is normal.   Vitals reviewed.      Lower Extremity    Cardiovascular:    DP/PT pulses palpable    CFT brisk  to all digits  Skin temp is warm to warm from proximal tibia to distal digits  Pedal " hair growth present.   No erythema or edema noted     Musculoskeletal:  Muscle strength is 5/5 for all muscle groups tested   ROM of the 1st MTP is decreased without pain or crepitus   ROM of the ankle joint is decreased without pain or crepitus    Contracted lesser digits bilateral    Dermatological:   Nails 1-5 are thickened, discolored, elongated with subungual debris  Skin is warm, dry and intact    Webspaces 1-4 bilateral are clean, dry and intact.   No subcutaneous nodules or masses noted    No open wounds noted   Hyperkeratotic lesion noted to the distal tip of the left second digit    Neurological:   Protective sensation intact    Sensation intact to light touch          Procedures        ASSESSMENT AND PLAN    Hasmukh was seen today for nail problem.    Diagnoses and all orders for this visit:    Type 2 diabetes mellitus with peripheral neuropathy    Onychomycosis    Chronic toe pain, bilateral    Corns    Hammer toes of both feet    Current use of long term anticoagulation      - A diabetic foot screening exam was performed and the patient was educated on the foot complications related to diabetes,  preventative foot care and what signs and symptoms to watch for.  Instructed to contact our office if any foot problems develop before next visit.  - Nails 1-5 bilateral were debrided in length and thickness with nail nipper and electric  to decrease fungal load and risk of infection.   - Trimmed hyperkeratotic lesions noted in objective with a #15 blade without incident.  - all his questions were answered  - RTC in 3 months as needed.           This document has been electronically signed by Davon Mccurdy DPM on March 14, 2018 8:45 AM     3/14/2018  8:45 AM

## 2018-03-18 ENCOUNTER — HOSPITAL ENCOUNTER (EMERGENCY)
Facility: HOSPITAL | Age: 83
Discharge: HOME | End: 2018-03-19
Attending: STUDENT IN AN ORGANIZED HEALTH CARE EDUCATION/TRAINING PROGRAM | Admitting: STUDENT IN AN ORGANIZED HEALTH CARE EDUCATION/TRAINING PROGRAM

## 2018-03-18 ENCOUNTER — APPOINTMENT (OUTPATIENT)
Dept: CT IMAGING | Facility: HOSPITAL | Age: 83
End: 2018-03-18

## 2018-03-18 ENCOUNTER — APPOINTMENT (OUTPATIENT)
Dept: GENERAL RADIOLOGY | Facility: HOSPITAL | Age: 83
End: 2018-03-18

## 2018-03-18 DIAGNOSIS — R51.9 ACUTE NONINTRACTABLE HEADACHE, UNSPECIFIED HEADACHE TYPE: ICD-10-CM

## 2018-03-18 DIAGNOSIS — E86.0 DEHYDRATION: ICD-10-CM

## 2018-03-18 DIAGNOSIS — R11.2 NON-INTRACTABLE VOMITING WITH NAUSEA, UNSPECIFIED VOMITING TYPE: Primary | ICD-10-CM

## 2018-03-18 LAB
ALBUMIN SERPL-MCNC: 4.1 G/DL (ref 3.4–4.8)
ALBUMIN/GLOB SERPL: 1.6 G/DL (ref 1.1–1.8)
ALP SERPL-CCNC: 87 U/L (ref 38–126)
ALT SERPL W P-5'-P-CCNC: 35 U/L (ref 21–72)
ANION GAP SERPL CALCULATED.3IONS-SCNC: 16 MMOL/L (ref 5–15)
AST SERPL-CCNC: 21 U/L (ref 17–59)
BASOPHILS # BLD AUTO: 0.01 10*3/MM3 (ref 0–0.2)
BASOPHILS NFR BLD AUTO: 0 % (ref 0–2)
BILIRUB SERPL-MCNC: 0.2 MG/DL (ref 0.2–1.3)
BUN BLD-MCNC: 34 MG/DL (ref 7–21)
BUN/CREAT SERPL: 29.1 (ref 7–25)
CALCIUM SPEC-SCNC: 9.1 MG/DL (ref 8.4–10.2)
CHLORIDE SERPL-SCNC: 102 MMOL/L (ref 95–110)
CO2 SERPL-SCNC: 23 MMOL/L (ref 22–31)
CREAT BLD-MCNC: 1.17 MG/DL (ref 0.7–1.3)
DEPRECATED RDW RBC AUTO: 52.9 FL (ref 35.1–43.9)
EOSINOPHIL # BLD AUTO: 0.01 10*3/MM3 (ref 0–0.7)
EOSINOPHIL NFR BLD AUTO: 0 % (ref 0–7)
ERYTHROCYTE [DISTWIDTH] IN BLOOD BY AUTOMATED COUNT: 15.9 % (ref 11.5–14.5)
GFR SERPL CREATININE-BSD FRML MDRD: 59 ML/MIN/1.73 (ref 42–98)
GLOBULIN UR ELPH-MCNC: 2.6 GM/DL (ref 2.3–3.5)
GLUCOSE BLD-MCNC: 128 MG/DL (ref 60–100)
HCT VFR BLD AUTO: 41.6 % (ref 39–49)
HGB BLD-MCNC: 13.8 G/DL (ref 13.7–17.3)
IMM GRANULOCYTES # BLD: 0.13 10*3/MM3 (ref 0–0.02)
IMM GRANULOCYTES NFR BLD: 0.6 % (ref 0–0.5)
LIPASE SERPL-CCNC: 72 U/L (ref 23–300)
LYMPHOCYTES # BLD AUTO: 1.28 10*3/MM3 (ref 0.6–4.2)
LYMPHOCYTES NFR BLD AUTO: 6.2 % (ref 10–50)
MCH RBC QN AUTO: 30.3 PG (ref 26.5–34)
MCHC RBC AUTO-ENTMCNC: 33.2 G/DL (ref 31.5–36.3)
MCV RBC AUTO: 91.4 FL (ref 80–98)
MONOCYTES # BLD AUTO: 1.41 10*3/MM3 (ref 0–0.9)
MONOCYTES NFR BLD AUTO: 6.9 % (ref 0–12)
NEUTROPHILS # BLD AUTO: 17.71 10*3/MM3 (ref 2–8.6)
NEUTROPHILS NFR BLD AUTO: 86.3 % (ref 37–80)
PLATELET # BLD AUTO: 206 10*3/MM3 (ref 150–450)
PMV BLD AUTO: 11.2 FL (ref 8–12)
POTASSIUM BLD-SCNC: 4.5 MMOL/L (ref 3.5–5.1)
PROT SERPL-MCNC: 6.7 G/DL (ref 6.3–8.6)
RBC # BLD AUTO: 4.55 10*6/MM3 (ref 4.37–5.74)
SODIUM BLD-SCNC: 141 MMOL/L (ref 137–145)
TROPONIN I SERPL-MCNC: 0.03 NG/ML
WBC NRBC COR # BLD: 20.55 10*3/MM3 (ref 3.2–9.8)

## 2018-03-18 PROCEDURE — 83690 ASSAY OF LIPASE: CPT | Performed by: STUDENT IN AN ORGANIZED HEALTH CARE EDUCATION/TRAINING PROGRAM

## 2018-03-18 PROCEDURE — 93010 ELECTROCARDIOGRAM REPORT: CPT | Performed by: INTERNAL MEDICINE

## 2018-03-18 PROCEDURE — 93005 ELECTROCARDIOGRAM TRACING: CPT | Performed by: STUDENT IN AN ORGANIZED HEALTH CARE EDUCATION/TRAINING PROGRAM

## 2018-03-18 PROCEDURE — 84484 ASSAY OF TROPONIN QUANT: CPT | Performed by: STUDENT IN AN ORGANIZED HEALTH CARE EDUCATION/TRAINING PROGRAM

## 2018-03-18 PROCEDURE — 80053 COMPREHEN METABOLIC PANEL: CPT | Performed by: STUDENT IN AN ORGANIZED HEALTH CARE EDUCATION/TRAINING PROGRAM

## 2018-03-18 PROCEDURE — 25010000002 ONDANSETRON PER 1 MG: Performed by: STUDENT IN AN ORGANIZED HEALTH CARE EDUCATION/TRAINING PROGRAM

## 2018-03-18 PROCEDURE — 85025 COMPLETE CBC W/AUTO DIFF WBC: CPT | Performed by: STUDENT IN AN ORGANIZED HEALTH CARE EDUCATION/TRAINING PROGRAM

## 2018-03-18 PROCEDURE — 71045 X-RAY EXAM CHEST 1 VIEW: CPT

## 2018-03-18 PROCEDURE — 99285 EMERGENCY DEPT VISIT HI MDM: CPT

## 2018-03-18 PROCEDURE — 96374 THER/PROPH/DIAG INJ IV PUSH: CPT

## 2018-03-18 PROCEDURE — 70450 CT HEAD/BRAIN W/O DYE: CPT

## 2018-03-18 RX ORDER — SODIUM CHLORIDE 0.9 % (FLUSH) 0.9 %
10 SYRINGE (ML) INJECTION AS NEEDED
Status: DISCONTINUED | OUTPATIENT
Start: 2018-03-18 | End: 2018-03-19 | Stop reason: HOSPADM

## 2018-03-18 RX ORDER — ONDANSETRON 2 MG/ML
8 INJECTION INTRAMUSCULAR; INTRAVENOUS ONCE
Status: COMPLETED | OUTPATIENT
Start: 2018-03-18 | End: 2018-03-18

## 2018-03-18 RX ADMIN — Medication 10 ML: at 22:05

## 2018-03-18 RX ADMIN — SODIUM CHLORIDE 1000 ML: 900 INJECTION, SOLUTION INTRAVENOUS at 22:04

## 2018-03-18 RX ADMIN — ONDANSETRON HYDROCHLORIDE 8 MG: 2 INJECTION, SOLUTION INTRAMUSCULAR; INTRAVENOUS at 22:04

## 2018-03-19 VITALS
TEMPERATURE: 98.6 F | HEIGHT: 68 IN | OXYGEN SATURATION: 95 % | HEART RATE: 74 BPM | RESPIRATION RATE: 18 BRPM | BODY MASS INDEX: 24.55 KG/M2 | WEIGHT: 162 LBS | SYSTOLIC BLOOD PRESSURE: 118 MMHG | DIASTOLIC BLOOD PRESSURE: 63 MMHG

## 2018-03-19 PROCEDURE — 96376 TX/PRO/DX INJ SAME DRUG ADON: CPT

## 2018-03-19 PROCEDURE — 25010000002 ONDANSETRON PER 1 MG: Performed by: STUDENT IN AN ORGANIZED HEALTH CARE EDUCATION/TRAINING PROGRAM

## 2018-03-19 RX ORDER — ONDANSETRON 4 MG/1
4 TABLET, ORALLY DISINTEGRATING ORAL EVERY 8 HOURS PRN
Qty: 12 TABLET | Refills: 0 | Status: SHIPPED | OUTPATIENT
Start: 2018-03-19 | End: 2019-04-02

## 2018-03-19 RX ORDER — ONDANSETRON 2 MG/ML
4 INJECTION INTRAMUSCULAR; INTRAVENOUS ONCE
Status: COMPLETED | OUTPATIENT
Start: 2018-03-19 | End: 2018-03-19

## 2018-03-19 RX ADMIN — ONDANSETRON HYDROCHLORIDE 4 MG: 2 INJECTION INTRAMUSCULAR; INTRAVENOUS at 00:19

## 2018-03-19 RX ADMIN — SODIUM CHLORIDE 500 ML: 9 INJECTION, SOLUTION INTRAVENOUS at 00:41

## 2018-03-19 NOTE — ED PROVIDER NOTES
Subjective     History provided by:  Patient  Vomiting   The primary symptoms include nausea and vomiting. Primary symptoms do not include fever, abdominal pain or diarrhea. The illness began today. The onset was sudden. The problem has not changed since onset.  Onset: few hours prior to arrival. Frequency: 5 episodes.   The illness does not include anorexia, dysphagia, constipation or back pain.   Patient ate [employer] for dinner and stated symptoms started shortly after.  Denies any other sick contacts or sick relatives.  Patient notably had cardiac stent placed approximately 3 weeks ago.  He admits compliance to Plavix.  Patient's only other symptom is headache which she has had for weeks.  Patient denies any chest pain, shortness of breath, abdominal pain, diarrhea, fever or back pain.    Review of Systems   Constitutional: Negative for fever.   HENT: Negative for sore throat.    Eyes: Negative for visual disturbance.   Respiratory: Negative for shortness of breath.    Cardiovascular: Negative for chest pain.   Gastrointestinal: Positive for nausea and vomiting. Negative for abdominal pain, anorexia, constipation, diarrhea and dysphagia.   Genitourinary: Negative for flank pain.   Musculoskeletal: Negative for back pain.   Neurological: Positive for headaches. Negative for syncope.       Past Medical History:   Diagnosis Date   • Bilateral carotid artery stenosis    • Bleeding disorder    • CHF (congestive heart failure)    • Chronic obstructive pulmonary disease (COPD)    • Coronary arteriosclerosis    • Degenerative joint disease involving multiple joints    • Dementia    • Diabetes mellitus    • Heart problem    • Hyperlipidemia    • Hypertension    • Ingrown toenail    • Myocardial infarction        Allergies   Allergen Reactions   • Atorvastatin Anaphylaxis   • Penicillins Rash   • Pravastatin      Myalgia   • Azithromycin Rash   • Biaxin [Clarithromycin] Rash       Past Surgical History:    Procedure Laterality Date   • BACK SURGERY     • CARDIAC CATHETERIZATION N/A 6/6/2017    Procedure: Right Heart Cath;  Surgeon: Jeff Maciel MD PhD;  Location: [facility] INVASIVE LOCATION;  Service:    • CARDIAC CATHETERIZATION N/A 2/14/2018    Procedure: Coronary angiography;  Surgeon: Moisés Davila MD;  Location: [facility] INVASIVE LOCATION;  Service:    • CORONARY ANGIOPLASTY WITH STENT PLACEMENT     • CORONARY STENT PLACEMENT     • HERNIA REPAIR     • LUNG BIOPSY     • LUNG SURGERY     • NECK SURGERY     • THORACOTOMY Left 1977   • VENTRAL HERNIA REPAIR         Family History   Problem Relation Age of Onset   • Hypertension Father    • Heart disease Father    • Diabetes Father    • Diabetes Mother        Social History     Social History   • Marital status:      Social History Main Topics   • Smoking status: Former Smoker   • Smokeless tobacco: Never Used   • Alcohol use No   • Drug use: No   • Sexual activity: Not Currently      Comment:      Other Topics Concern   • Not on file           Objective   Physical Exam   Constitutional: He is oriented to person, place, and time. He appears well-developed and well-nourished.   HENT:   Head: Normocephalic and atraumatic.   Mouth/Throat: Oropharynx is clear and moist.   Eyes: EOM are normal.   Neck: Neck supple.   Cardiovascular: Normal rate, regular rhythm and normal heart sounds.    Pulmonary/Chest: Effort normal and breath sounds normal. No respiratory distress.   Abdominal: Soft. There is no tenderness.   Musculoskeletal: Normal range of motion.   Neurological: He is alert and oriented to person, place, and time.   Skin: Skin is warm.   Psychiatric: He has a normal mood and affect.   Nursing note and vitals reviewed.    Medical Decision Making:  Patients symptoms are likely secondary to a viral GI process vs less likely biliary process. I will check electrolytes, LFTs, and glucose. I will offer supportive care. I do not feel  symptoms are consistent with serious abdominal process such as appendicitis, bowel obstruction or mesenteric ischemia.  Considering patient's recent cardiac stent we will check troponin, EKG and chest x-ray.  Patient has no pain and I feel acute coronary syndrome as far less likely.  Considering patient's headache and history of blood thinner use we'll check CT head to look for any intracerebral process.  Patient's mildly clinically dehydrated we'll give IV fluids and supportive measures, we will by mouth challenge and reassess.      ECG 12 Lead    Date/Time: 3/18/2018 10:19 PM  Performed by: PEDRO LE  Authorized by: PEDRO LE   Interpreted by physician  Rhythm: sinus rhythm  Rate: normal  BPM: 93  QRS axis: normal  Conduction: right bundle branch block  ST Segments: ST segments normal  T Waves: T waves normal  Clinical impression: non-specific ECG               ED Course  ED Course      Ct Head Without Contrast    Result Date: 3/18/2018  Narrative: CT head without contrast on 3/18/2018 CLINICAL INDICATION: Headache, vomiting, patient on blood thinner TECHNIQUE:  Multiple axial images are obtained throughout the head without the administration of contrast.  This exam was performed according to our departmental dose-optimization program, which includes automated exposure control, adjustment of the mA and/or kV according to patient size and/or use of iterative reconstruction technique. Total DLP is 994.3 mGy*cm. COMPARISON: 10/6/2013 FINDINGS:  There is generalized cerebral atrophy. There is low density in the periventricular white matter consistent with chronic small vessel ischemic changes. There is no hydrocephalus. There is no hemorrhage. There are no abnormal extra-axial fluid collections. There is no mass, mass effect or midline shift. There is no CT evidence of acute infarct. No bony abnormality is noted.     Impression: Atrophy and chronic small vessel ischemic changes with no acute intracranial  abnormality. Electronically signed by:  Thong Lennon  3/18/2018 10:21 PM CDT Workstation: [id number]    Xr Chest 1 View    Result Date: 3/18/2018  Narrative: Chest single view on  3/18/2018 CLINICAL INDICATION: Vomiting, recent stent placement COMPARISON: 2/12/2018 FINDINGS: Vascular calcification is noted in the aorta. There are not trace pleural effusions versus pleural thickening. There is left greater than right lower lung atelectasis. Lungs are otherwise clear. Cardiac, hilar and mediastinal contours are within normal limits.     Impression: No significant change in the appearance of the chest. Electronically signed by:  Thong Lennon  3/18/2018 10:25 PM CDT Workstation: [id number]    Labs Reviewed   COMPREHENSIVE METABOLIC PANEL - Abnormal; Notable for the following:        Result Value    Glucose 128 (*)     BUN 34 (*)     BUN/Creatinine Ratio 29.1 (*)     Anion Gap 16.0 (*)     All other components within normal limits    Narrative:     The MDRD GFR formula is only valid for adults with stable renal function between ages 18 and 70.   CBC WITH AUTO DIFFERENTIAL - Abnormal; Notable for the following:     WBC 20.55 (*)     RDW 15.9 (*)     RDW-SD 52.9 (*)     Neutrophil % 86.3 (*)     Lymphocyte % 6.2 (*)     Immature Grans % 0.6 (*)     Neutrophils, Absolute 17.71 (*)     Monocytes, Absolute 1.41 (*)     Immature Grans, Absolute 0.13 (*)     All other components within normal limits   TROPONIN (IN-HOUSE) - Normal   LIPASE - Normal   CBC AND DIFFERENTIAL    Narrative:     The following orders were created for panel order CBC & Differential.  Procedure                               Abnormality         Status                     ---------                               -----------         ------                     CBC Auto Differential[[id number]]        Abnormal            Final result                 Please view results for these tests on the individual orders.         2334: Patient doing well,  vital signs stable.  Patient's had no additional episodes of vomiting.  States his nausea has resolved and headache is nearly gone.  CBC shows leukocytosis likely reactive.  BMP shows mildly elevated BUN, likely secondary to dehydration.  We'll give additional 500 mL normal saline bolus.  Patient also states he is on Lasix, dehydration also likely due to overdiuresis.  Clinically patient is very dehydrated.  LFTs, lipase and troponin unremarkable.  EKG shows no signs of dysrhythmia or ischemia.  Chest x-ray is unremarkable.  CT head shows no acute process.  Will reassess.    0021: Patient received additional fluids and requesting to go home.  Considering patients comorbidities and history I offered patient admission to hospitalist service for further observation.  Patient does not feel this is his heart and politely declines and wishes to go home.  Wife at bedside is okay with plan.  Gave strict instructions to follow up with primary care and cardiologist tomorrow.  Patient states he understands.  We'll give prescription for nausea medicine.  Encouraged patient to return to the ED with new or worsening symptoms.  Patient is okay with plan to discharge home.          MDM    Final diagnoses:   Non-intractable vomiting with nausea, unspecified vomiting type   Dehydration   Acute nonintractable headache, unspecified headache type            Danielito Meraz MD  03/19/18 0028

## 2018-03-30 ENCOUNTER — OFFICE VISIT (OUTPATIENT)
Dept: CARDIOLOGY | Facility: CLINIC | Age: 83
End: 2018-03-30

## 2018-03-30 VITALS
HEIGHT: 68 IN | DIASTOLIC BLOOD PRESSURE: 70 MMHG | SYSTOLIC BLOOD PRESSURE: 120 MMHG | BODY MASS INDEX: 24.55 KG/M2 | WEIGHT: 162 LBS | HEART RATE: 84 BPM

## 2018-03-30 DIAGNOSIS — I25.10 CHRONIC CORONARY ARTERY DISEASE: Chronic | ICD-10-CM

## 2018-03-30 DIAGNOSIS — E11.9 TYPE 2 DIABETES MELLITUS WITHOUT COMPLICATION, WITHOUT LONG-TERM CURRENT USE OF INSULIN (HCC): Chronic | ICD-10-CM

## 2018-03-30 DIAGNOSIS — I77.810 DILATED AORTIC ROOT (HCC): ICD-10-CM

## 2018-03-30 DIAGNOSIS — I50.30 (HFPEF) HEART FAILURE WITH PRESERVED EJECTION FRACTION (HCC): ICD-10-CM

## 2018-03-30 DIAGNOSIS — I48.91 ATRIAL FIBRILLATION AND FLUTTER (HCC): Primary | Chronic | ICD-10-CM

## 2018-03-30 DIAGNOSIS — I48.92 ATRIAL FIBRILLATION AND FLUTTER (HCC): Primary | Chronic | ICD-10-CM

## 2018-03-30 DIAGNOSIS — I10 ESSENTIAL HYPERTENSION: Chronic | ICD-10-CM

## 2018-03-30 PROCEDURE — 99213 OFFICE O/P EST LOW 20 MIN: CPT | Performed by: INTERNAL MEDICINE

## 2018-03-30 RX ORDER — PREDNISONE 10 MG/1
10 TABLET ORAL DAILY
COMMUNITY
End: 2019-01-09 | Stop reason: HOSPADM

## 2018-03-30 NOTE — PROGRESS NOTES
Hasmukh Ham  84 y.o. male    03/30/2018  1. Atrial fibrillation and flutter    2. Chronic coronary artery disease    3. Essential hypertension    4. Type 2 diabetes mellitus without complication, without long-term current use of insulin    5. (HFpEF) heart failure with preserved ejection fraction    6. Dilated aortic root        History of Present Illness    84 years old patient with history of hypertension, hypertensive heart disease, diastolic dysfunction, congestive heart failure on the basis of diastolic dysfunction,CAD status post PTA stent of LAD and RCA medically managed, COPD on home O2 with recurrent bronchitis.  Patient also had history of for atrial flutter status post electrical cardioversion.  Unable to maintain sinus rhythm.  Had history of GI bleed and AV malformation.  Not a good candidate for long-term oral intake ablation.  Patient noted to have sinus bradycardia with a recovered very well.  Not a candidate for invasive cardiac evaluations of her pacemaker implantations.  He is a pleased with the clinical outcome.  He denies orthopnea PND.  Has baseline shortness of breath no change in that    Conclusion :Cath 2/2018     Successful PCI to the mid LAD for in-stent restenosis with a 2.25 x 23 Xience Alpine stent reducing 90% stenosis to 0%  Right coronary artery:  Proximally is okay, mid to distal his 100% occluded, distally getting collaterals from the AV circumflex    ECHO 12/2017  Left Ventricle Normal left ventricular cavity size noted. All left ventricular wall segments contract normally. Left ventricular wall thickness is consistent with mild concentric hypertrophy.      Right Ventricle Normal right ventricular cavity size and systolic function noted.          SUBJECTIVE    Allergies   Allergen Reactions   • Atorvastatin Anaphylaxis   • Penicillins Rash   • Pravastatin      Myalgia   • Azithromycin Rash   • Biaxin [Clarithromycin] Rash         Past Medical History:   Diagnosis Date   •  Bilateral carotid artery stenosis    • Bleeding disorder    • CHF (congestive heart failure)    • Chronic obstructive pulmonary disease (COPD)    • Coronary arteriosclerosis    • Degenerative joint disease involving multiple joints    • Dementia    • Diabetes mellitus    • Heart problem    • Hyperlipidemia    • Hypertension    • Ingrown toenail    • Myocardial infarction          Past Surgical History:   Procedure Laterality Date   • BACK SURGERY     • CARDIAC CATHETERIZATION N/A 6/6/2017    Procedure: Right Heart Cath;  Surgeon: Jeff Maciel MD PhD;  Location: [facility] CATH INVASIVE LOCATION;  Service:    • CARDIAC CATHETERIZATION N/A 2/14/2018    Procedure: Coronary angiography;  Surgeon: Moisés Davila MD;  Location: [facility] CATH INVASIVE LOCATION;  Service:    • CORONARY ANGIOPLASTY WITH STENT PLACEMENT     • CORONARY STENT PLACEMENT     • HERNIA REPAIR     • LUNG BIOPSY     • LUNG SURGERY     • NECK SURGERY     • THORACOTOMY Left 1977   • VENTRAL HERNIA REPAIR           Family History   Problem Relation Age of Onset   • Hypertension Father    • Heart disease Father    • Diabetes Father    • Diabetes Mother          Social History     Social History   • Marital status:      Spouse name: N/A   • Number of children: N/A   • Years of education: N/A     Occupational History   • Not on file.     Social History Main Topics   • Smoking status: Former Smoker   • Smokeless tobacco: Never Used   • Alcohol use No   • Drug use: No   • Sexual activity: Not Currently      Comment:      Other Topics Concern   • Not on file     Social History Narrative   • No narrative on file         Current Outpatient Prescriptions   Medication Sig Dispense Refill   • albuterol (PROVENTIL) (2.5 MG/3ML) 0.083% nebulizer solution Take 2.5 mg by nebulization Every 4 (Four) Hours As Needed for Wheezing. 125 vial 0   • aspirin  MG tablet Take 1 tablet by mouth Daily.     • clopidogrel (PLAVIX) 75 MG tablet Take 75  "mg by mouth Daily.     • famotidine (PEPCID) 20 MG tablet Take 20 mg by mouth 2 (Two) Times a Day.     • fluticasone (FLONASE) 50 MCG/ACT nasal spray 2 sprays into each nostril Daily.     • Fluticasone Furoate (ARNUITY ELLIPTA) 200 MCG/ACT aerosol powder  Inhale.     • furosemide (LASIX) 20 MG tablet Take 1 tablet by mouth Daily. 90 tablet 3   • glimepiride (AMARYL) 2 MG tablet Take 2 mg by mouth Every Morning Before Breakfast.     • HYDROcodone-acetaminophen (NORCO) 7.5-325 MG per tablet Take 1 tablet by mouth Every 8 (Eight) Hours.     • ipratropium-albuterol (DUO-NEB) 0.5-2.5 mg/mL nebulizer Take 3 mL by nebulization 4 (Four) Times a Day. 120 vial 1   • isosorbide mononitrate (IMDUR) 30 MG 24 hr tablet Take 1 tablet by mouth Daily. 30 tablet 1   • lisinopril (PRINIVIL,ZESTRIL) 10 MG tablet Take 0.5 tablets by mouth Daily. 30 tablet 6   • magnesium oxide (MAGOX) 400 (241.3 MG) MG tablet tablet Take 400 mg by mouth Daily.     • nitroglycerin (NITROSTAT) 0.4 MG SL tablet place 1 tablet under the tongue if needed every 5 minutes for hair...  (REFER TO PRESCRIPTION NOTES).  0   • O2 (OXYGEN) Inhale 2 L/min Every Night. W/ CPAP     • ondansetron ODT (ZOFRAN-ODT) 4 MG disintegrating tablet Take 1 tablet by mouth Every 8 (Eight) Hours As Needed for Nausea or Vomiting. 12 tablet 0   • predniSONE (DELTASONE) 10 MG tablet Take 10 mg by mouth Daily.     • ranolazine (RANEXA) 500 MG 12 hr tablet Take 500 mg by mouth 2 (Two) Times a Day.     • Red Yeast Rice 600 MG tablet Take 600 mg by mouth 2 (Two) Times a Day.     • Umeclidinium-Vilanterol 62.5-25 MCG/INH aerosol powder  Inhale 1 puff Daily. 1 each 11   • acetaminophen (TYLENOL) 325 MG tablet Take 2 tablets by mouth Every 4 (Four) Hours As Needed for Mild Pain .       No current facility-administered medications for this visit.          OBJECTIVE    /70   Pulse 84   Ht 172.7 cm (67.99\")   Wt 73.5 kg (162 lb)   BMI 24.64 kg/m²         Review of Systems "     Constitutional:  Denies recent weight loss, weight gain, fever or chills, no change in exercise tolerance     HENT:  Denies any hearing loss, epistaxis, hoarseness, or difficulty speaking.     Eyes: Wears eyeglasses or contact lenses     Respiratory:  COPD    Cardiovascular: See H&P    Gastrointestinal:  Denies change in bowel habits, dyspepsia, ulcer disease, hematochezia, or melena.     Endocrine: Negative for cold intolerance, heat intolerance, polydipsia, polyphagia and polyuria. Denies any history of weight change, heat/cold intolerance, polydipsia, polyuria     Genitourinary: Negative.      Musculoskeletal: Arthritis    Skin:  Denies any change in hair or nails, rashes, or skin lesions.     Allergic/Immunologic: Negative.  Negative for environmental allergies, food allergies and immunocompromised state.     Neurological:  Denies any history of recurrent headaches, strokes, TIA, or seizure disorder.     Hematological: Denies any food allergies, seasonal allergies, bleeding disorders, or lymphadenopathy.     Psychiatric/Behavioral: Denies any history of depression, substance abuse, or change in cognitive function.         Physical Exam     Constitutional: Cooperative, alert and oriented, well-developed, well-nourished, in no acute distress.     HENT:   Head: Normocephalic, normal hair patterns, no masses or tenderness.  Ears, Nose, and Throat: No gross abnormalities. No pallor or cyanosis. Dentition good.   Eyes: EOMS intact, PERRL, conjunctivae and lids unremarkable. Fundoscopic exam and visual fields not performed.   Neck: No palpable masses or adenopathy, no thyromegaly, no JVD, carotid pulses are full and equal bilaterally and without  Bruits.     Cardiovascular: Regular rhythm, S1 and S2 normal, no S3 or S4. Apical impulse not displaced. No murmurs, gallops, or rubs detected.     Pulmonary/Chest: Chest: normal symmetry, no tenderness to palpation, normal respiratory excursion, no intercostal retraction,  no use of accessory muscles.            Pulmonary: Normal breath sounds. No rales or ronchi.    Abdominal: Abdomen soft, bowel sounds normoactive, no masses, no hepatosplenomegaly, non-tender, no bruits.     Musculoskeletal: No deformities, clubbing, cyanosis, erythema, or edema observed. There are no spinal abnormalities noted. Normal muscle strength and tone. Pulses full and equal in all extremities, no bruits auscultated.     Neurological: No gross motor or sensory deficits noted, affect appropriate, oriented to time, person, place.     Skin: Warm and dry to the touch, no apparent skin lesions or masses noted.     Psychiatric: He has a normal mood and affect. His behavior is normal. Judgment and thought content normal.         Procedures      Lab Results   Component Value Date    WBC 20.55 (H) 03/18/2018    HGB 13.8 03/18/2018    HCT 41.6 03/18/2018    MCV 91.4 03/18/2018     03/18/2018     Lab Results   Component Value Date    GLUCOSE 128 (H) 03/18/2018    BUN 34 (H) 03/18/2018    CREATININE 1.17 03/18/2018    EGFRIFNONA 59 03/18/2018    BCR 29.1 (H) 03/18/2018    CO2 23.0 03/18/2018    CALCIUM 9.1 03/18/2018    ALBUMIN 4.10 03/18/2018    LABIL2 1.6 03/18/2018    AST 21 03/18/2018    ALT 35 03/18/2018     Lab Results   Component Value Date    CHOL 185 02/13/2018    CHOL 170 10/10/2017    CHOL 174 03/03/2017     Lab Results   Component Value Date    TRIG 200 (H) 02/13/2018    TRIG 119 10/10/2017    TRIG 119 03/03/2017     Lab Results   Component Value Date    HDL 30 (L) 02/13/2018    HDL 38 (L) 10/10/2017    HDL 47 (L) 03/03/2017     No components found for: LDLCALC  Lab Results   Component Value Date     02/13/2018     10/10/2017    LDL 99 03/03/2017     No results found for: HDLLDLRATIO  No components found for: CHOLHDL  Lab Results   Component Value Date    HGBA1C 6.0 (H) 02/12/2018     Lab Results   Component Value Date    TSH 2.280 02/13/2018           ASSESSMENT AND PLAN  #1 sinus  bradycardia #2 atrial flutter #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   sinus bradycardia resolved.  He is on home O2 and doing much better.  She was significant improvement in functional capacity and energy level of the PDA stent of LAD.  He is a pleased with the clinical outcome.  Risk factor lifestyle modification discussed with the patient's.  The patient will continue resume given the asymptomatic clinical conditions.  Antiplatelet with history of CAD with a recent PTA stent of LAD and RCA medically managed .  Aricept with history of dementia and frusemide with history of diastolic dysfunction.  Lisinopril for hypertension with a decent blood pressure control.  Continue Ranexa and Imdur for history of chest discomfort no further recurrence    Hasmukh was seen today for follow-up.    Diagnoses and all orders for this visit:    Atrial fibrillation and flutter    Chronic coronary artery disease    Essential hypertension    Type 2 diabetes mellitus without complication, without long-term current use of insulin    (HFpEF) heart failure with preserved ejection fraction    Dilated aortic root        Mana Curtis MD  3/30/2018  10:35 AM

## 2018-04-05 ENCOUNTER — TELEPHONE (OUTPATIENT)
Dept: PODIATRY | Facility: CLINIC | Age: 83
End: 2018-04-05

## 2018-04-05 ENCOUNTER — OFFICE VISIT (OUTPATIENT)
Dept: PODIATRY | Facility: CLINIC | Age: 83
End: 2018-04-05

## 2018-04-05 VITALS — WEIGHT: 162 LBS | HEIGHT: 68 IN | BODY MASS INDEX: 24.55 KG/M2

## 2018-04-05 DIAGNOSIS — E11.42 TYPE 2 DIABETES MELLITUS WITH PERIPHERAL NEUROPATHY (HCC): ICD-10-CM

## 2018-04-05 DIAGNOSIS — M20.42 HAMMER TOES OF BOTH FEET: Primary | ICD-10-CM

## 2018-04-05 DIAGNOSIS — M20.41 HAMMER TOES OF BOTH FEET: Primary | ICD-10-CM

## 2018-04-05 DIAGNOSIS — L97.522 SKIN ULCER OF TOE OF LEFT FOOT WITH FAT LAYER EXPOSED (HCC): ICD-10-CM

## 2018-04-05 PROCEDURE — 28010 INCISION OF TOE TENDON: CPT | Performed by: PODIATRIST

## 2018-04-05 PROCEDURE — 11042 DBRDMT SUBQ TIS 1ST 20SQCM/<: CPT | Performed by: PODIATRIST

## 2018-04-05 NOTE — PROGRESS NOTES
Hasmukh Ham  6/1/1933  84 y.o. male  BS-118 on 4/4/2018  PCP: Dr. Rey last seen 3/7/2018.  Patient presents today for 2nd toe callus.  Patient states it hurts all the time now and worse when wearing his shoes.  Patient rates his pain 9/10.  04/05/2018      Chief Complaint   Patient presents with   • Left Foot - Callouses, Pain           History of Present Illness    Hasmukh Ham is a 84 y.o.male who presents to clinic today with chief complaint of left foot pain.  Pain is located to the second toe.  He was seen a few weeks prior by Dr. Mccurdy for debridement of a callus to the area.  He states over the last week the pain has become much more significant.  He denies any redness or drainage.    Past Medical History:   Diagnosis Date   • Bilateral carotid artery stenosis    • Bleeding disorder    • CHF (congestive heart failure)    • Chronic obstructive pulmonary disease (COPD)    • Coronary arteriosclerosis    • Degenerative joint disease involving multiple joints    • Dementia    • Diabetes mellitus    • Heart problem    • Hyperlipidemia    • Hypertension    • Ingrown toenail    • Myocardial infarction          Past Surgical History:   Procedure Laterality Date   • BACK SURGERY     • CARDIAC CATHETERIZATION N/A 6/6/2017    Procedure: Right Heart Cath;  Surgeon: Jeff Maciel MD PhD;  Location: [facility] INVASIVE LOCATION;  Service:    • CARDIAC CATHETERIZATION N/A 2/14/2018    Procedure: Coronary angiography;  Surgeon: Moisés Davila MD;  Location: [facility] INVASIVE LOCATION;  Service:    • CORONARY ANGIOPLASTY WITH STENT PLACEMENT     • CORONARY STENT PLACEMENT     • HERNIA REPAIR     • LUNG BIOPSY     • LUNG SURGERY     • NECK SURGERY     • THORACOTOMY Left 1977   • VENTRAL HERNIA REPAIR           Family History   Problem Relation Age of Onset   • Hypertension Father    • Heart disease Father    • Diabetes Father    • Diabetes Mother        Allergies   Allergen Reactions   •  Atorvastatin Anaphylaxis   • Penicillins Rash   • Pravastatin      Myalgia   • Azithromycin Rash   • Biaxin [Clarithromycin] Rash       Social History     Social History   • Marital status:      Spouse name: N/A   • Number of children: N/A   • Years of education: N/A     Occupational History   • Not on file.     Social History Main Topics   • Smoking status: Former Smoker   • Smokeless tobacco: Never Used   • Alcohol use No   • Drug use: No   • Sexual activity: Not Currently      Comment:      Other Topics Concern   • Not on file     Social History Narrative   • No narrative on file         Current Outpatient Prescriptions   Medication Sig Dispense Refill   • acetaminophen (TYLENOL) 325 MG tablet Take 2 tablets by mouth Every 4 (Four) Hours As Needed for Mild Pain .     • albuterol (PROVENTIL) (2.5 MG/3ML) 0.083% nebulizer solution Take 2.5 mg by nebulization Every 4 (Four) Hours As Needed for Wheezing. 125 vial 0   • aspirin  MG tablet Take 1 tablet by mouth Daily.     • clopidogrel (PLAVIX) 75 MG tablet Take 75 mg by mouth Daily.     • famotidine (PEPCID) 20 MG tablet Take 20 mg by mouth 2 (Two) Times a Day.     • fluticasone (FLONASE) 50 MCG/ACT nasal spray 2 sprays into each nostril Daily.     • Fluticasone Furoate (ARNUITY ELLIPTA) 200 MCG/ACT aerosol powder  Inhale.     • furosemide (LASIX) 20 MG tablet Take 1 tablet by mouth Daily. 90 tablet 3   • glimepiride (AMARYL) 2 MG tablet Take 2 mg by mouth Every Morning Before Breakfast.     • HYDROcodone-acetaminophen (NORCO) 7.5-325 MG per tablet Take 1 tablet by mouth Every 8 (Eight) Hours.     • ipratropium-albuterol (DUO-NEB) 0.5-2.5 mg/mL nebulizer Take 3 mL by nebulization 4 (Four) Times a Day. 120 vial 1   • isosorbide mononitrate (IMDUR) 30 MG 24 hr tablet Take 1 tablet by mouth Daily. 30 tablet 1   • lisinopril (PRINIVIL,ZESTRIL) 10 MG tablet Take 0.5 tablets by mouth Daily. 30 tablet 6   • magnesium oxide (MAGOX) 400 (241.3 MG) MG  "tablet tablet Take 400 mg by mouth Daily.     • nitroglycerin (NITROSTAT) 0.4 MG SL tablet place 1 tablet under the tongue if needed every 5 minutes for hair...  (REFER TO PRESCRIPTION NOTES).  0   • O2 (OXYGEN) Inhale 2 L/min Every Night. W/ CPAP     • ondansetron ODT (ZOFRAN-ODT) 4 MG disintegrating tablet Take 1 tablet by mouth Every 8 (Eight) Hours As Needed for Nausea or Vomiting. 12 tablet 0   • predniSONE (DELTASONE) 10 MG tablet Take 10 mg by mouth Daily.     • ranolazine (RANEXA) 500 MG 12 hr tablet Take 500 mg by mouth 2 (Two) Times a Day.     • Red Yeast Rice 600 MG tablet Take 600 mg by mouth 2 (Two) Times a Day.     • Umeclidinium-Vilanterol 62.5-25 MCG/INH aerosol powder  Inhale 1 puff Daily. 1 each 11     No current facility-administered medications for this visit.          OBJECTIVE    Ht 172.7 cm (67.99\")   Wt 73.5 kg (162 lb)   BMI 24.64 kg/m²       Review of Systems   Constitutional: Negative.    HENT: Positive for hearing loss.    Eyes: Negative.    Respiratory: Positive for shortness of breath.    Cardiovascular: Negative.    Gastrointestinal: Negative.    Endocrine: Negative.    Genitourinary: Negative.    Musculoskeletal: Positive for arthralgias, back pain and joint swelling.        Foot pain  Joint pain  Toe pain    Skin:        Callus  Toenail pain     Psychiatric/Behavioral: Negative.            Physical Exam   Constitutional: He is oriented to person, place, and time. He appears well-developed and well-nourished. No distress.   Pulmonary/Chest: Effort normal. No respiratory distress.    Hasmukh had a diabetic foot exam performed today.   During the foot exam he had a monofilament test performed.  Neurological: He is alert and oriented to person, place, and time.   Psychiatric: He has a normal mood and affect. His behavior is normal.   Vitals reviewed.      Lower Extremity    Cardiovascular:    DP/PT pulses palpable    CFT brisk  to all digits  Skin temp is warm to warm from proximal tibia " to distal digits  Pedal hair growth present.   No erythema or edema noted     Musculoskeletal:  Muscle strength is 5/5 for all muscle groups tested   ROM of the 1st MTP is decreased without pain or crepitus   ROM of the ankle joint is decreased without pain or crepitus    Contracted lesser digits bilateral    Dermatological:   Nails 1-5 are thickened, discolored, elongated with subungual debris  Skin is warm, dry and intact    Webspaces 1-4 bilateral are clean, dry and intact.   No subcutaneous nodules or masses noted    No open wounds noted   0.4 x 0.4 x 0.3 cm full-thickness ulceration to distal tuft of left second digit.  Positive tenderness palpation.  No probe to bone or active drainage.  Fiber granular base with surrounding hyperkeratosis.    Neurological:   Protective sensation intact    Sensation intact to light touch          Procedures    Left 2nd flexor tenotomy:  Risks and benefits discussed. Written consent obtained.  Skin prepped with betadine  Digital block performed with 3 cc 2% lidocaine plain  Percutaneous flexor tenotomy and PIPJ capsulotomy performed.  Incision re aproximated with 4-0 nylon  Steristrip splint applied  Pt tolerated well.    Left foot wound debridement:  Risks and benefits discussed.  Left 2nd digit ulcer debrided of surrounding hyperkeratosis and devitalized tissue with 15 blade to level of subq  Pressure hemostasis obtained  Abx ointment and dsd applied.      ASSESSMENT AND PLAN    Hasmukh was seen today for callouses and pain.    Diagnoses and all orders for this visit:    Hammer toes of both feet    Type 2 diabetes mellitus with peripheral neuropathy    Skin ulcer of toe of left foot with fat layer exposed      -Comprehensive DM foot exam performed. Patient educated on importance of tight glucose control and daily foot checks.   -Patient educated on padding techniques for hammertoes.  Given flexibility of deformity I did recommend a percutaneous flexor tenotomy.  Patient was in  agreement.  All risks and potential benefits were discussed.  The procedure was performed as above.  -Debridement of distal tuft ulceration as above.  -Recheck 2 weeks.            This document has been electronically signed by Jose Angel Meraz DPM on April 11, 2018 3:22 PM     3:20 PM

## 2018-04-05 NOTE — TELEPHONE ENCOUNTER
PATIENT SAW DR MORRISON IN MARCH.  HAD CALUS REMOVED FROM TOE.    PATIENT SAYS THAT THE TOE IS NOW RED AND HE IS IN EXCRUCIATING PAIN.    WANTS TO KNOW IF DR. LE CAN SEE HIM TODAY.    THANKS.

## 2018-04-26 ENCOUNTER — OFFICE VISIT (OUTPATIENT)
Dept: PODIATRY | Facility: CLINIC | Age: 83
End: 2018-04-26

## 2018-04-26 VITALS — WEIGHT: 162 LBS | BODY MASS INDEX: 24.55 KG/M2 | HEIGHT: 68 IN

## 2018-04-26 DIAGNOSIS — E11.42 TYPE 2 DIABETES MELLITUS WITH PERIPHERAL NEUROPATHY (HCC): ICD-10-CM

## 2018-04-26 DIAGNOSIS — L84 CORNS: ICD-10-CM

## 2018-04-26 DIAGNOSIS — M20.42 HAMMER TOES OF BOTH FEET: Primary | ICD-10-CM

## 2018-04-26 DIAGNOSIS — M20.41 HAMMER TOES OF BOTH FEET: Primary | ICD-10-CM

## 2018-04-26 PROCEDURE — 99024 POSTOP FOLLOW-UP VISIT: CPT | Performed by: PODIATRIST

## 2018-04-26 PROCEDURE — 11055 PARING/CUTG B9 HYPRKER LES 1: CPT | Performed by: PODIATRIST

## 2018-04-26 NOTE — PROGRESS NOTES
Hasmukh Ham  6/1/1933  84 y.o. male  BS-113 this morning  PCP: Dr. Rey last seen 3/7/2018.  04/26/2018        Chief Complaint   Patient presents with   • Left Foot - Follow-up           History of Present Illness    Hasmukh Ham is a 84 y.o.male who presents For follow-up of left second toe flexor tenotomy and digital corn formation.    Past Medical History:   Diagnosis Date   • Bilateral carotid artery stenosis    • Bleeding disorder    • CHF (congestive heart failure)    • Chronic obstructive pulmonary disease (COPD)    • Coronary arteriosclerosis    • Degenerative joint disease involving multiple joints    • Dementia    • Diabetes mellitus    • Heart problem    • Hyperlipidemia    • Hypertension    • Ingrown toenail    • Myocardial infarction          Past Surgical History:   Procedure Laterality Date   • BACK SURGERY     • CARDIAC CATHETERIZATION N/A 6/6/2017    Procedure: Right Heart Cath;  Surgeon: Jeff Maciel MD PhD;  Location: [facility] CATH INVASIVE LOCATION;  Service:    • CARDIAC CATHETERIZATION N/A 2/14/2018    Procedure: Coronary angiography;  Surgeon: Moisés Davila MD;  Location: [facility] INVASIVE LOCATION;  Service:    • CORONARY ANGIOPLASTY WITH STENT PLACEMENT     • CORONARY STENT PLACEMENT     • HERNIA REPAIR     • LUNG BIOPSY     • LUNG SURGERY     • NECK SURGERY     • THORACOTOMY Left 1977   • VENTRAL HERNIA REPAIR           Family History   Problem Relation Age of Onset   • Hypertension Father    • Heart disease Father    • Diabetes Father    • Diabetes Mother        Allergies   Allergen Reactions   • Atorvastatin Anaphylaxis   • Penicillins Rash   • Pravastatin      Myalgia   • Azithromycin Rash   • Biaxin [Clarithromycin] Rash       Social History     Social History   • Marital status:      Spouse name: N/A   • Number of children: N/A   • Years of education: N/A     Occupational History   • Not on file.     Social History Main Topics   • Smoking status:  Former Smoker   • Smokeless tobacco: Never Used   • Alcohol use No   • Drug use: No   • Sexual activity: Not Currently      Comment:      Other Topics Concern   • Not on file     Social History Narrative   • No narrative on file         Current Outpatient Prescriptions   Medication Sig Dispense Refill   • acetaminophen (TYLENOL) 325 MG tablet Take 2 tablets by mouth Every 4 (Four) Hours As Needed for Mild Pain .     • albuterol (PROVENTIL) (2.5 MG/3ML) 0.083% nebulizer solution Take 2.5 mg by nebulization Every 4 (Four) Hours As Needed for Wheezing. 125 vial 0   • aspirin  MG tablet Take 1 tablet by mouth Daily.     • clopidogrel (PLAVIX) 75 MG tablet Take 75 mg by mouth Daily.     • famotidine (PEPCID) 20 MG tablet Take 20 mg by mouth 2 (Two) Times a Day.     • fluticasone (FLONASE) 50 MCG/ACT nasal spray 2 sprays into each nostril Daily.     • Fluticasone Furoate (ARNUITY ELLIPTA) 200 MCG/ACT aerosol powder  Inhale.     • furosemide (LASIX) 20 MG tablet Take 1 tablet by mouth Daily. 90 tablet 3   • glimepiride (AMARYL) 2 MG tablet Take 2 mg by mouth Every Morning Before Breakfast.     • HYDROcodone-acetaminophen (NORCO) 7.5-325 MG per tablet Take 1 tablet by mouth Every 8 (Eight) Hours.     • ipratropium-albuterol (DUO-NEB) 0.5-2.5 mg/mL nebulizer Take 3 mL by nebulization 4 (Four) Times a Day. 120 vial 1   • isosorbide mononitrate (IMDUR) 30 MG 24 hr tablet Take 1 tablet by mouth Daily. 30 tablet 1   • lisinopril (PRINIVIL,ZESTRIL) 10 MG tablet Take 0.5 tablets by mouth Daily. 30 tablet 6   • magnesium oxide (MAGOX) 400 (241.3 MG) MG tablet tablet Take 400 mg by mouth Daily.     • nitroglycerin (NITROSTAT) 0.4 MG SL tablet place 1 tablet under the tongue if needed every 5 minutes for hair...  (REFER TO PRESCRIPTION NOTES).  0   • O2 (OXYGEN) Inhale 2 L/min Every Night. W/ CPAP     • ondansetron ODT (ZOFRAN-ODT) 4 MG disintegrating tablet Take 1 tablet by mouth Every 8 (Eight) Hours As Needed for  "Nausea or Vomiting. 12 tablet 0   • predniSONE (DELTASONE) 10 MG tablet Take 10 mg by mouth Daily.     • ranolazine (RANEXA) 500 MG 12 hr tablet Take 500 mg by mouth 2 (Two) Times a Day.     • Red Yeast Rice 600 MG tablet Take 600 mg by mouth 2 (Two) Times a Day.     • Umeclidinium-Vilanterol 62.5-25 MCG/INH aerosol powder  Inhale 1 puff Daily. 1 each 11     No current facility-administered medications for this visit.          OBJECTIVE    Ht 172.7 cm (67.99\")   Wt 73.5 kg (162 lb)   BMI 24.64 kg/m²       Review of Systems   Constitutional: Negative.    HENT: Positive for hearing loss.    Eyes: Negative.    Respiratory: Positive for shortness of breath.    Cardiovascular: Negative.    Gastrointestinal: Negative.    Endocrine: Negative.    Genitourinary: Negative.    Musculoskeletal: Positive for arthralgias, back pain and joint swelling.        Foot pain  Joint pain  Toe pain    Skin:        Callus  Toenail pain     Psychiatric/Behavioral: Negative.            Physical Exam   Constitutional: He is oriented to person, place, and time. He appears well-developed and well-nourished. No distress.   Pulmonary/Chest: Effort normal. No respiratory distress.    Hasmukh had a diabetic foot exam performed today.   During the foot exam he had a monofilament test performed.  Neurological: He is alert and oriented to person, place, and time.   Psychiatric: He has a normal mood and affect. His behavior is normal.   Vitals reviewed.      Lower Extremity    Cardiovascular:    DP/PT pulses palpable    CFT brisk  to all digits  Skin temp is warm to warm from proximal tibia to distal digits  Pedal hair growth present.   No erythema or edema noted     Musculoskeletal:  Muscle strength is 5/5 for all muscle groups tested   ROM of the 1st MTP is decreased without pain or crepitus   ROM of the ankle joint is decreased without pain or crepitus    Rectus left second digit    Dermatological:   Nails 1-5 are thickened, discolored, elongated " with subungual debris  Skin is warm, dry and intact    Webspaces 1-4 bilateral are clean, dry and intact.   No subcutaneous nodules or masses noted    No open wounds noted   Small distal clavi noted to left second toe tuft.  Positive tenderness to palpation.  Sutures in place to plantar incision left second digit.  No signs of infection.    Neurological:   Protective sensation intact    Sensation intact to light touch          Procedures      ASSESSMENT AND PLAN    Hasmukh was seen today for follow-up.    Diagnoses and all orders for this visit:    Hammer toes of both feet    Type 2 diabetes mellitus with peripheral neuropathy    Corns      -Comprehensive DM foot exam performed. Patient educated on importance of tight glucose control and daily foot checks.   -Sutures removed from flexor tenotomy site.  Incision well healed.  -Debridement of above noted hyperkeratosis with 15 blade to epidermis without incident.  Underlying ulceration has also healed.  -Recheck as needed            This document has been electronically signed by Jose Angel Meraz DPM on April 27, 2018 8:36 AM     8:36 AM

## 2018-08-23 RX ORDER — RANOLAZINE 500 MG/1
500 TABLET, EXTENDED RELEASE ORAL EVERY 12 HOURS SCHEDULED
Qty: 30 TABLET | Refills: 1 | Status: SHIPPED | OUTPATIENT
Start: 2018-08-23 | End: 2021-01-06

## 2018-10-02 ENCOUNTER — OFFICE VISIT (OUTPATIENT)
Dept: CARDIOLOGY | Facility: CLINIC | Age: 83
End: 2018-10-02

## 2018-10-02 VITALS
BODY MASS INDEX: 24.55 KG/M2 | HEART RATE: 94 BPM | DIASTOLIC BLOOD PRESSURE: 62 MMHG | OXYGEN SATURATION: 99 % | WEIGHT: 162 LBS | HEIGHT: 68 IN | SYSTOLIC BLOOD PRESSURE: 114 MMHG

## 2018-10-02 DIAGNOSIS — J42 CHRONIC BRONCHITIS, UNSPECIFIED CHRONIC BRONCHITIS TYPE (HCC): Chronic | ICD-10-CM

## 2018-10-02 DIAGNOSIS — I48.92 ATRIAL FIBRILLATION AND FLUTTER (HCC): Primary | Chronic | ICD-10-CM

## 2018-10-02 DIAGNOSIS — I10 ESSENTIAL HYPERTENSION: Chronic | ICD-10-CM

## 2018-10-02 DIAGNOSIS — E11.9 TYPE 2 DIABETES MELLITUS WITHOUT COMPLICATION, WITHOUT LONG-TERM CURRENT USE OF INSULIN (HCC): Chronic | ICD-10-CM

## 2018-10-02 DIAGNOSIS — I77.810 DILATED AORTIC ROOT (HCC): ICD-10-CM

## 2018-10-02 DIAGNOSIS — I25.10 CHRONIC CORONARY ARTERY DISEASE: Chronic | ICD-10-CM

## 2018-10-02 DIAGNOSIS — I25.10 CORONARY ARTERIOSCLEROSIS: Chronic | ICD-10-CM

## 2018-10-02 DIAGNOSIS — I48.91 ATRIAL FIBRILLATION AND FLUTTER (HCC): Primary | Chronic | ICD-10-CM

## 2018-10-02 DIAGNOSIS — I36.1 NON-RHEUMATIC TRICUSPID VALVE INSUFFICIENCY: ICD-10-CM

## 2018-10-02 PROCEDURE — 99214 OFFICE O/P EST MOD 30 MIN: CPT | Performed by: INTERNAL MEDICINE

## 2018-10-02 NOTE — PROGRESS NOTES
Hasmukh Ham  85 y.o. male    10/02/2018  1. Atrial fibrillation and flutter (CMS/[facility])    2. Chronic coronary artery disease    3. Coronary arteriosclerosis    4. Type 2 diabetes mellitus without complication, without long-term current use of insulin (CMS/[facility])    5. Essential hypertension    6. Non-rheumatic tricuspid valve insufficiency        History of Present Illness:  85 years old patient with history of hypertension, hypertensive heart disease, diastolic dysfunction, congestive heart failure on the basis of diastolic dysfunction,CAD status post PTA stent of LAD and RCA medically managed, COPD on home O2 with recurrent bronchitis.  Patient also had history of for atrial flutter status post electrical cardioversion.  Unable to maintain sinus rhythm.  Had history of GI bleed and AV malformation.  Not a good candidate for long-term oral anticoagulation.  Patient has fall resulted and ankle fractures in wheel chair Patient noted to have sinus bradycardia with a recovered very well.  Not a good candidate for pacemaker implantation and fortunately he recovered his heart rate very well.  He is a pleased with the clinical outcome.  He denies orthopnea PND.  Has baseline shortness of breath no change in that     Conclusion :Cath 2/2018      Successful PCI to the mid LAD for in-stent restenosis with a 2.25 x 23 Xience Alpine stent reducing 90% stenosis to 0%  Right coronary artery:  Proximally is okay, mid to distal his 100% occluded, distally getting collaterals from the AV circumflex     ECHO 12/2017  Left Ventricle Normal left ventricular cavity size noted. All left ventricular wall segments contract normally. Left ventricular wall thickness is consistent with mild concentric hypertrophy.      Right Ventricle Normal right ventricular cavity size and systolic function noted.                  SUBJECTIVE:    Allergies   Allergen Reactions   • Atorvastatin Anaphylaxis   • Penicillins Rash   • Pravastatin      Myalgia    • Azithromycin Rash   • Biaxin [Clarithromycin] Rash         Past Medical History:   Diagnosis Date   • Bilateral carotid artery stenosis    • Bleeding disorder (CMS/[facility])    • CHF (congestive heart failure) (CMS/[facility])    • Chronic obstructive pulmonary disease (COPD) (CMS/[facility])    • Coronary arteriosclerosis    • Degenerative joint disease involving multiple joints    • Dementia    • Diabetes mellitus (CMS/[facility])    • Heart problem    • Hyperlipidemia    • Hypertension    • Ingrown toenail    • Myocardial infarction          Past Surgical History:   Procedure Laterality Date   • BACK SURGERY     • CARDIAC CATHETERIZATION N/A 6/6/2017    Procedure: Right Heart Cath;  Surgeon: Jeff Maciel MD PhD;  Location: [facility] CATH INVASIVE LOCATION;  Service:    • CARDIAC CATHETERIZATION N/A 2/14/2018    Procedure: Coronary angiography;  Surgeon: Moisés Davila MD;  Location: [facility] CATH INVASIVE LOCATION;  Service:    • CORONARY ANGIOPLASTY WITH STENT PLACEMENT     • CORONARY STENT PLACEMENT     • HERNIA REPAIR     • LUNG BIOPSY     • LUNG SURGERY     • NECK SURGERY     • THORACOTOMY Left 1977   • VENTRAL HERNIA REPAIR           Family History   Problem Relation Age of Onset   • Hypertension Father    • Heart disease Father    • Diabetes Father    • Diabetes Mother          Social History     Social History   • Marital status:      Spouse name: N/A   • Number of children: N/A   • Years of education: N/A     Occupational History   • Not on file.     Social History Main Topics   • Smoking status: Former Smoker   • Smokeless tobacco: Never Used   • Alcohol use No   • Drug use: No   • Sexual activity: Not Currently      Comment:      Other Topics Concern   • Not on file     Social History Narrative   • No narrative on file         Current Outpatient Prescriptions   Medication Sig Dispense Refill   • acetaminophen (TYLENOL) 325 MG tablet Take 2 tablets by mouth Every 4 (Four) Hours As Needed for Mild Pain  .     • albuterol (PROVENTIL) (2.5 MG/3ML) 0.083% nebulizer solution Take 2.5 mg by nebulization Every 4 (Four) Hours As Needed for Wheezing. 125 vial 0   • aspirin  MG tablet Take 1 tablet by mouth Daily.     • clopidogrel (PLAVIX) 75 MG tablet Take 75 mg by mouth Daily.     • famotidine (PEPCID) 20 MG tablet Take 20 mg by mouth 2 (Two) Times a Day.     • fluticasone (FLONASE) 50 MCG/ACT nasal spray 2 sprays into each nostril Daily.     • Fluticasone Furoate (ARNUITY ELLIPTA) 200 MCG/ACT aerosol powder  Inhale.     • furosemide (LASIX) 20 MG tablet Take 1 tablet by mouth Daily. 90 tablet 3   • glimepiride (AMARYL) 2 MG tablet Take 2 mg by mouth Every Morning Before Breakfast.     • HYDROcodone-acetaminophen (NORCO) 7.5-325 MG per tablet Take 1 tablet by mouth Every 8 (Eight) Hours.     • ipratropium-albuterol (DUO-NEB) 0.5-2.5 mg/mL nebulizer Take 3 mL by nebulization 4 (Four) Times a Day. 120 vial 1   • isosorbide mononitrate (IMDUR) 30 MG 24 hr tablet Take 1 tablet by mouth Daily. 30 tablet 1   • lisinopril (PRINIVIL,ZESTRIL) 10 MG tablet Take 0.5 tablets by mouth Daily. 30 tablet 6   • magnesium oxide (MAGOX) 400 (241.3 MG) MG tablet tablet Take 400 mg by mouth Daily.     • nitroglycerin (NITROSTAT) 0.4 MG SL tablet place 1 tablet under the tongue if needed every 5 minutes for hair...  (REFER TO PRESCRIPTION NOTES).  0   • O2 (OXYGEN) Inhale 2 L/min Every Night. W/ CPAP     • ondansetron ODT (ZOFRAN-ODT) 4 MG disintegrating tablet Take 1 tablet by mouth Every 8 (Eight) Hours As Needed for Nausea or Vomiting. 12 tablet 0   • predniSONE (DELTASONE) 10 MG tablet Take 10 mg by mouth Daily.     • ranolazine (RANEXA) 500 MG 12 hr tablet Take 1 tablet by mouth Every 12 (Twelve) Hours. 30 tablet 1   • Red Yeast Rice 600 MG tablet Take 600 mg by mouth 2 (Two) Times a Day.     • Umeclidinium-Vilanterol 62.5-25 MCG/INH aerosol powder  Inhale 1 puff Daily. 1 each 11     No current facility-administered medications for  this visit.            Review of Systems:     Constitutional:  Denies recent weight loss, weight gain, fever or chills, no change in exercise tolerance.     HENT:  Denies any hearing loss, epistaxis, hoarseness, or difficulty speaking.     Eyes: Wears eyeglasses or contact lenses.    Respiratory:  COPD    Cardiovascular: See H&P    Gastrointestinal:  Denies change in bowel habits, dyspepsia, ulcer disease, hematochezia, or melena.     Endocrine: Negative for cold intolerance, heat intolerance, polydipsia, polyphagia and polyuria. Denies any history of weight change, polydipsia, polyuria.     Genitourinary: Negative.      Musculoskeletal: Arthritis    Skin:  Denies any change in hair or nails, rashes, or skin lesions.     Allergic/Immunologic: Negative.  Negative for environmental allergies, food allergies and immunocompromised state.     Neurological:  Denies any history of recurrent headaches, strokes, TIA, or seizure disorder.     Hematological: Denies any food allergies, seasonal allergies, bleeding disorders, or lymphadenopathy.     Psychiatric/Behavioral: Denies any history of depression, substance abuse, or change in cognitive function.       OBJECTIVE:    There were no vitals taken for this visit.      Physical Exam:     Constitutional: Cooperative, alert and oriented, well-developed, well-nourished, in no acute distress.     HENT:   Head: Normocephalic, normal hair patterns, no masses or tenderness.  Ears, Nose, and Throat: No gross abnormalities. No pallor or cyanosis. Dentition good.   Eyes: EOMS intact, PERRL, conjunctivae and lids unremarkable. Fundoscopic exam and visual fields not performed.   Neck: No palpable masses or adenopathy, no thyromegaly, no JVD, carotid pulses are full and equal bilaterally and without  Bruits.     Cardiovascular: Regular rhythm, S1 and S2 normal, no S3 or S4. Apical impulse not displaced. No murmurs, gallops, or rubs detected.     Pulmonary/Chest: Chest: normal symmetry, no  tenderness to palpation, normal respiratory excursion, no intercostal retraction, no use of accessory muscles. Pulmonary: Normal breath sounds. No rales or rhonchi.    Abdominal: Abdomen soft, bowel sounds normoactive, no masses, no hepatosplenomegaly, non-tender, no bruits.     Musculoskeletal: No deformities, clubbing, cyanosis, erythema, or edema observed. There are no spinal abnormalities noted. Normal muscle strength and tone. Pulses full and equal in all extremities, no bruits auscultated.     Neurological: No gross motor or sensory deficits noted, affect appropriate, oriented to time, person, place.     Skin: Warm and dry to the touch, no apparent skin lesions or masses noted.     Psychiatric: He has a normal mood and affect. His behavior is normal. Judgment and thought content normal.         Procedures      Lab Results   Component Value Date    WBC 20.55 (H) 03/18/2018    HGB 13.8 03/18/2018    HCT 41.6 03/18/2018    MCV 91.4 03/18/2018     03/18/2018     Lab Results   Component Value Date    GLUCOSE 128 (H) 03/18/2018    BUN 34 (H) 03/18/2018    CREATININE 1.17 03/18/2018    EGFRIFNONA 59 03/18/2018    BCR 29.1 (H) 03/18/2018    CO2 23.0 03/18/2018    CALCIUM 9.1 03/18/2018    ALBUMIN 4.10 03/18/2018    AST 21 03/18/2018    ALT 35 03/18/2018     Lab Results   Component Value Date    CHOL 185 02/13/2018    CHOL 170 10/10/2017    CHOL 174 03/03/2017     Lab Results   Component Value Date    TRIG 200 (H) 02/13/2018    TRIG 119 10/10/2017    TRIG 119 03/03/2017     Lab Results   Component Value Date    HDL 30 (L) 02/13/2018    HDL 38 (L) 10/10/2017    HDL 47 (L) 03/03/2017     No components found for: LDLCALC  Lab Results   Component Value Date     02/13/2018     10/10/2017    LDL 99 03/03/2017     No results found for: HDLLDLRATIO  No components found for: CHOLHDL  Lab Results   Component Value Date    HGBA1C 6.0 (H) 02/12/2018     Lab Results   Component Value Date    TSH 2.280  02/13/2018           ASSESSMENT AND PLAN:  #1 dyspnea #2 atrial flutter #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   sinus bradycardia resolved.  He is on home O2 and doing much better.  She was significant improvement in functional capacity and energy level of the PDA stent of LAD.  He is a pleased with the clinical outcome.  Risk factor lifestyle modification discussed with the patient's.  The patient will continue resume given the asymptomatic clinical conditions.  Antiplatelet with history of CAD with a recent PTA stent of LAD and RCA medically managed .  Aricept with history of dementia and frusemide with history of diastolic dysfunction.  Lisinopril for hypertension with a decent blood pressure control.  Continue Ranexa and Imdur for history of chest discomfort no further recurrence    Diagnoses and all orders for this visit:    Atrial fibrillation and flutter (CMS/HCC)    Chronic coronary artery disease    Coronary arteriosclerosis    Type 2 diabetes mellitus without complication, without long-term current use of insulin (CMS/HCC)    Essential hypertension    Non-rheumatic tricuspid valve insufficiency        Mana Curtis MD  10/2/2018  2:51 PM

## 2018-12-23 ENCOUNTER — APPOINTMENT (OUTPATIENT)
Dept: GENERAL RADIOLOGY | Facility: HOSPITAL | Age: 83
End: 2018-12-23

## 2018-12-23 ENCOUNTER — HOSPITAL ENCOUNTER (EMERGENCY)
Facility: HOSPITAL | Age: 83
Discharge: HOME | End: 2018-12-23
Attending: FAMILY MEDICINE | Admitting: FAMILY MEDICINE

## 2018-12-23 VITALS
HEART RATE: 55 BPM | SYSTOLIC BLOOD PRESSURE: 138 MMHG | BODY MASS INDEX: 27.69 KG/M2 | WEIGHT: 176.4 LBS | DIASTOLIC BLOOD PRESSURE: 88 MMHG | OXYGEN SATURATION: 98 % | HEIGHT: 67 IN | TEMPERATURE: 97.9 F | RESPIRATION RATE: 18 BRPM

## 2018-12-23 DIAGNOSIS — M25.512 ACUTE PAIN OF LEFT SHOULDER: Primary | ICD-10-CM

## 2018-12-23 PROCEDURE — 96372 THER/PROPH/DIAG INJ SC/IM: CPT

## 2018-12-23 PROCEDURE — 73030 X-RAY EXAM OF SHOULDER: CPT

## 2018-12-23 PROCEDURE — 99283 EMERGENCY DEPT VISIT LOW MDM: CPT

## 2018-12-23 PROCEDURE — 25010000002 KETOROLAC TROMETHAMINE PER 15 MG: Performed by: FAMILY MEDICINE

## 2018-12-23 RX ORDER — KETOROLAC TROMETHAMINE 30 MG/ML
30 INJECTION, SOLUTION INTRAMUSCULAR; INTRAVENOUS ONCE
Status: COMPLETED | OUTPATIENT
Start: 2018-12-23 | End: 2018-12-23

## 2018-12-23 RX ADMIN — KETOROLAC TROMETHAMINE 30 MG: 30 INJECTION, SOLUTION INTRAMUSCULAR at 04:59

## 2018-12-23 NOTE — DISCHARGE INSTRUCTIONS
You are welcome to follow up with Dr Garcia. Another option for non-surgical interventions is a procedure called Prolotherapy. Dr Paolo Mearz is excellent and he can be reached at [phone].

## 2018-12-23 NOTE — ED PROVIDER NOTES
Subjective   85-year-old white male presents to the emergency department with a complaint of left shoulder pain which radiates into his neck.  He states that this pain started 3-4 days ago and has gotten progressively worse.  He states that he first started having left shoulder pain couple of years ago after picking up a heavy rock and felt like something popped.  He isn't sure what caused the pain to recur on this episode.  The shoulder hurts all the time.  He states that his Norco, ibuprofen, and Tylenol were not helping for the pain.  However, the remainder of his arm feels normal.  His left hand strength is normal.            Review of Systems   Constitutional: Negative for activity change, appetite change, chills, fatigue, fever and unexpected weight change.   HENT: Negative for nosebleeds, rhinorrhea, sore throat, trouble swallowing and voice change.    Eyes: Negative for photophobia, pain and visual disturbance.   Respiratory: Negative for apnea, cough, chest tightness, shortness of breath, wheezing and stridor.    Cardiovascular: Negative for chest pain, palpitations and leg swelling.   Gastrointestinal: Negative for abdominal distention, abdominal pain, blood in stool, constipation, diarrhea, nausea and vomiting.   Endocrine: Negative for cold intolerance, heat intolerance, polydipsia and polyuria.   Genitourinary: Negative for decreased urine volume, difficulty urinating, dysuria, flank pain, hematuria and urgency.   Musculoskeletal: Positive for arthralgias. Negative for myalgias, neck pain and neck stiffness.   Skin: Negative for color change, pallor and rash.   Allergic/Immunologic: Negative for immunocompromised state.   Neurological: Negative for dizziness, seizures, syncope, weakness, light-headedness and numbness.   Hematological: Negative for adenopathy.   Psychiatric/Behavioral: Negative for agitation, confusion, dysphoric mood and suicidal ideas. The patient is not nervous/anxious.        Past  Medical History:   Diagnosis Date   • Bilateral carotid artery stenosis    • Bleeding disorder (CMS/HCC)    • CHF (congestive heart failure) (CMS/HCC)    • Chronic obstructive pulmonary disease (COPD) (CMS/[facility])    • Coronary arteriosclerosis    • Degenerative joint disease involving multiple joints    • Dementia    • Diabetes mellitus (CMS/HCC)    • Heart problem    • Hyperlipidemia    • Hypertension    • Ingrown toenail    • Myocardial infarction (CMS/[facility])        Allergies   Allergen Reactions   • Atorvastatin Anaphylaxis   • Penicillins Rash   • Pravastatin      Myalgia   • Azithromycin Rash   • Biaxin [Clarithromycin] Rash       Past Surgical History:   Procedure Laterality Date   • BACK SURGERY     • CARDIAC CATHETERIZATION N/A 6/6/2017    Procedure: Right Heart Cath;  Surgeon: Jeff Maciel MD PhD;  Location: [facility] CATH INVASIVE LOCATION;  Service:    • CARDIAC CATHETERIZATION N/A 2/14/2018    Procedure: Coronary angiography;  Surgeon: Moisés Davila MD;  Location: [facility] CATH INVASIVE LOCATION;  Service:    • CORONARY ANGIOPLASTY WITH STENT PLACEMENT     • CORONARY STENT PLACEMENT     • HERNIA REPAIR     • LUNG BIOPSY     • LUNG SURGERY     • NECK SURGERY     • THORACOTOMY Left 1977   • VENTRAL HERNIA REPAIR         Family History   Problem Relation Age of Onset   • Hypertension Father    • Heart disease Father    • Diabetes Father    • Diabetes Mother        Social History     Socioeconomic History   • Marital status:      Spouse name: Not on file   • Number of children: Not on file   • Years of education: Not on file   • Highest education level: Not on file   Tobacco Use   • Smoking status: Former Smoker   • Smokeless tobacco: Never Used   Substance and Sexual Activity   • Alcohol use: No   • Drug use: No   • Sexual activity: Not Currently     Comment:            Objective   Physical Exam   Constitutional: He is oriented to person, place, and time. He appears well-developed and  well-nourished.   Cardiovascular: Normal rate.   Pulmonary/Chest: Effort normal.   Musculoskeletal:   The left shoulder is normal in appearance.  He has decreased abduction at about 45°.  There is pain and limitation in range of motion with external rotation.  There is tenderness to palpation on the coracoid process, anterior humeral head, and posterior humeral head.    Radial pulses +2.  Sensation is intact to light touch.  Strength at the elbow, wrist, and hand is 5 over 5.   Neurological: He is alert and oriented to person, place, and time. No cranial nerve deficit.   Nursing note and vitals reviewed.      Procedures           ED Course  ED Course as of Dec 23 0509   Sun Dec 23, 2018   0502 Patient was placed in room 5 and evaluated by me.  X-rays were obtained to rule out bony pathology and were normal.  He was given Toradol for pain control in the emergency department.  I discussed with him that he was already on the medications I would be able to prescribe for him in the emergency department.  At this point I believe he is stable for discharge and will follow up with his primary care provider as an outpatient.  [CE]      ED Course User Index  [CE] Piero Ware DO      Labs Reviewed - No data to display  Xr Shoulder 2+ View Left    Result Date: 12/23/2018  Narrative: Left shoulder three view on 12/23/2018 CLINICAL INDICATION: Left shoulder pain COMPARISON: None FINDINGS: Mild degenerative changes are noted in the AC joint. The AC joint is well aligned. The glenohumeral joint is well located. There are no fractures.     Impression: No acute abnormality. Electronically signed by:  Thong Lennon  12/23/2018 4:58 AM [facility] Workstation: [id number]              [facility]      Final diagnoses:   Acute pain of left shoulder            Piero Ware DO  12/23/18 0509

## 2018-12-26 ENCOUNTER — OFFICE VISIT (OUTPATIENT)
Dept: ORTHOPEDIC SURGERY | Facility: CLINIC | Age: 83
End: 2018-12-26

## 2018-12-26 VITALS — HEIGHT: 67 IN | WEIGHT: 176.9 LBS | BODY MASS INDEX: 27.76 KG/M2

## 2018-12-26 DIAGNOSIS — M25.512 LEFT SHOULDER PAIN, UNSPECIFIED CHRONICITY: Primary | ICD-10-CM

## 2018-12-26 PROCEDURE — 20610 DRAIN/INJ JOINT/BURSA W/O US: CPT | Performed by: ORTHOPAEDIC SURGERY

## 2018-12-26 PROCEDURE — 99214 OFFICE O/P EST MOD 30 MIN: CPT | Performed by: ORTHOPAEDIC SURGERY

## 2018-12-26 RX ORDER — TRIAMCINOLONE ACETONIDE 40 MG/ML
40 INJECTION, SUSPENSION INTRA-ARTICULAR; INTRAMUSCULAR
Status: COMPLETED | OUTPATIENT
Start: 2018-12-26 | End: 2018-12-26

## 2018-12-26 RX ORDER — LIDOCAINE HYDROCHLORIDE 20 MG/ML
2 INJECTION, SOLUTION INFILTRATION; PERINEURAL
Status: COMPLETED | OUTPATIENT
Start: 2018-12-26 | End: 2018-12-26

## 2018-12-26 RX ADMIN — LIDOCAINE HYDROCHLORIDE 2 ML: 20 INJECTION, SOLUTION INFILTRATION; PERINEURAL at 10:11

## 2018-12-26 RX ADMIN — TRIAMCINOLONE ACETONIDE 40 MG: 40 INJECTION, SUSPENSION INTRA-ARTICULAR; INTRAMUSCULAR at 10:11

## 2018-12-26 NOTE — PROGRESS NOTES
Hasmukh Ham is a 85 y.o. male   Primary provider:  Paolo Rey MD       Chief Complaint   Patient presents with   • Left Shoulder - Pain   • Establish Care       HISTORY OF PRESENT ILLNESS: Patient being seen for left shoulder pain. X-rays done at . Reports no known injury.    Pain   This is a new problem. The current episode started 1 to 4 weeks ago. The problem occurs constantly. Associated symptoms include headaches, joint swelling, numbness and a rash. Associated symptoms comments: Aching, clicking. The symptoms are aggravated by standing. Treatments tried: injections.     85 y pain left shoulder, pain present  Beginning 1 week ago.  The pain felt like a tearing of the left shoulder.  Sharp pain. Pain travels 'up the neck' and shoulder.  Nothing alleviates the pain.  Pulling with the arm makes the pain worse.  Pain is rated 10/10  + numbness on the side of the face, present for 1-2 months.  + chills  + nighttime awakening pain  Non smoker  No alcohol         CONCURRENT MEDICAL HISTORY:    Past Medical History:   Diagnosis Date   • Basal cell carcinoma    • Bilateral carotid artery stenosis    • Bilateral carotid artery stenosis    • Bleeding disorder (CMS/HCC)    • CHF (congestive heart failure) (CMS/HCC)    • Chronic obstructive pulmonary disease (COPD) (CMS/HCC)    • Coronary arteriosclerosis    • Degenerative joint disease involving multiple joints    • Dementia    • Diabetes mellitus (CMS/HCC)    • Heart problem    • History of stomach ulcers    • Hyperlipidemia    • Hypertension    • Ingrown toenail    • Myocardial infarction (CMS/HCC)        Allergies   Allergen Reactions   • Atorvastatin Anaphylaxis   • Penicillins Rash   • Pravastatin      Myalgia   • Azithromycin Rash   • Biaxin [Clarithromycin] Rash         Current Outpatient Medications:   •  acetaminophen (TYLENOL) 325 MG tablet, Take 2 tablets by mouth Every 4 (Four) Hours As Needed for Mild Pain ., Disp: , Rfl:   •  albuterol  (PROVENTIL) (2.5 MG/3ML) 0.083% nebulizer solution, Take 2.5 mg by nebulization Every 4 (Four) Hours As Needed for Wheezing., Disp: 125 vial, Rfl: 0  •  clopidogrel (PLAVIX) 75 MG tablet, Take 75 mg by mouth Daily., Disp: , Rfl:   •  famotidine (PEPCID) 20 MG tablet, Take 20 mg by mouth 2 (Two) Times a Day., Disp: , Rfl:   •  fluticasone (FLONASE) 50 MCG/ACT nasal spray, 2 sprays into each nostril Daily., Disp: , Rfl:   •  Fluticasone Furoate (ARNUITY ELLIPTA) 200 MCG/ACT aerosol powder , Inhale., Disp: , Rfl:   •  furosemide (LASIX) 20 MG tablet, Take 1 tablet by mouth Daily., Disp: 90 tablet, Rfl: 3  •  glimepiride (AMARYL) 2 MG tablet, Take 2 mg by mouth Every Morning Before Breakfast., Disp: , Rfl:   •  HYDROcodone-acetaminophen (NORCO) 7.5-325 MG per tablet, Take 1 tablet by mouth Every 8 (Eight) Hours., Disp: , Rfl:   •  ipratropium-albuterol (DUO-NEB) 0.5-2.5 mg/mL nebulizer, Take 3 mL by nebulization 4 (Four) Times a Day., Disp: 120 vial, Rfl: 1  •  isosorbide mononitrate (IMDUR) 30 MG 24 hr tablet, Take 1 tablet by mouth Daily., Disp: 30 tablet, Rfl: 1  •  lisinopril (PRINIVIL,ZESTRIL) 10 MG tablet, Take 0.5 tablets by mouth Daily., Disp: 30 tablet, Rfl: 6  •  magnesium oxide (MAGOX) 400 (241.3 MG) MG tablet tablet, Take 400 mg by mouth Daily., Disp: , Rfl:   •  nitroglycerin (NITROSTAT) 0.4 MG SL tablet, place 1 tablet under the tongue if needed every 5 minutes for hair...  (REFER TO PRESCRIPTION NOTES)., Disp: , Rfl: 0  •  O2 (OXYGEN), Inhale 2 L/min Every Night. W/ CPAP, Disp: , Rfl:   •  ondansetron ODT (ZOFRAN-ODT) 4 MG disintegrating tablet, Take 1 tablet by mouth Every 8 (Eight) Hours As Needed for Nausea or Vomiting., Disp: 12 tablet, Rfl: 0  •  predniSONE (DELTASONE) 10 MG tablet, Take 10 mg by mouth Daily., Disp: , Rfl:   •  ranolazine (RANEXA) 500 MG 12 hr tablet, Take 1 tablet by mouth Every 12 (Twelve) Hours., Disp: 30 tablet, Rfl: 1  •  Red Yeast Rice 600 MG tablet, Take 600 mg by mouth 2 (Two)  Times a Day., Disp: , Rfl:   •  Umeclidinium-Vilanterol 62.5-25 MCG/INH aerosol powder , Inhale 1 puff Daily., Disp: 1 each, Rfl: 11    Past Surgical History:   Procedure Laterality Date   • BACK SURGERY     • CARDIAC CATHETERIZATION N/A 6/6/2017    Procedure: Right Heart Cath;  Surgeon: Jeff Maciel MD PhD;  Location: [facility] INVASIVE LOCATION;  Service:    • CARDIAC CATHETERIZATION N/A 2/14/2018    Procedure: Coronary angiography;  Surgeon: Moisés Davila MD;  Location: [facility] INVASIVE LOCATION;  Service:    • CORONARY ANGIOPLASTY WITH STENT PLACEMENT     • CORONARY STENT PLACEMENT     • HERNIA REPAIR     • LUNG BIOPSY     • LUNG SURGERY     • NECK SURGERY     • THORACOTOMY Left 1977   • VENTRAL HERNIA REPAIR         Family History   Problem Relation Age of Onset   • Hypertension Father    • Heart disease Father    • Diabetes Father    • Diabetes Mother    • Lung disease Other    • Cancer Other         Social History     Socioeconomic History   • Marital status:      Spouse name: Not on file   • Number of children: Not on file   • Years of education: Not on file   • Highest education level: Not on file   Social Needs   • Financial resource strain: Not on file   • Food insecurity - worry: Not on file   • Food insecurity - inability: Not on file   • Transportation needs - medical: Not on file   • Transportation needs - non-medical: Not on file   Occupational History   • Not on file   Tobacco Use   • Smoking status: Former Smoker   • Smokeless tobacco: Never Used   Substance and Sexual Activity   • Alcohol use: No   • Drug use: No   • Sexual activity: Not Currently     Comment:    Other Topics Concern   • Not on file   Social History Narrative   • Not on file        Review of Systems   Constitutional: Negative.    Eyes: Negative.         Dry eyes   Respiratory: Positive for shortness of breath and wheezing.    Cardiovascular: Negative.    Gastrointestinal: Negative.   "  Endocrine: Negative.    Genitourinary: Negative.    Musculoskeletal: Positive for joint swelling.   Skin: Positive for rash.   Allergic/Immunologic: Negative.    Neurological: Positive for numbness and headaches.   Hematological: Bruises/bleeds easily.   Psychiatric/Behavioral: Negative.        PHYSICAL EXAMINATION:       Ht 170.2 cm (67\")   Wt 80.2 kg (176 lb 14.4 oz)   BMI 27.71 kg/m²     Physical Exam   Constitutional: He is oriented to person, place, and time. He appears well-developed and well-nourished.   elderly   HENT:   Head: Normocephalic.   Mouth/Throat: No oropharyngeal exudate.   Eyes: Pupils are equal, round, and reactive to light. No scleral icterus.   Neck: No JVD present. No tracheal deviation present. No thyromegaly present.   Cardiovascular: Normal rate and intact distal pulses.   Pulmonary/Chest: Effort normal. No stridor. No respiratory distress. He has no wheezes.   Abdominal: Soft. He exhibits no distension. There is no tenderness. There is no guarding.   Neurological: He is alert and oriented to person, place, and time. He displays normal reflexes. No cranial nerve deficit. Coordination normal.   Skin: Skin is warm and dry. Capillary refill takes less than 2 seconds. No erythema. No pallor.   Psychiatric: He has a normal mood and affect. His behavior is normal.       GAIT:     []  Normal  []  Antalgic    Assistive device: [x]  None  []  Walker     []  Crutches  []  Cane     []  Wheelchair  []  Stretcher    Right Shoulder Exam     Tenderness   The patient is experiencing no tenderness.      Left Shoulder Exam     Tenderness   The patient is experiencing tenderness in the acromion and clavicle.    Range of Motion   Active abduction: 130   Passive abduction: 150   Extension: 10   External rotation: 20   Forward flexion: 140     Muscle Strength   Abduction: 4/5   Internal rotation: 4/5   External rotation: 4/5   Supraspinatus: 4/5   Subscapularis: 4/5   Biceps: 4/5     Tests   Apprehension: " negative  Carl test: negative  Impingement: positive  Sulcus: present    Other   Erythema: absent  Sensation: normal  Pulse: present               Xr Shoulder 2+ View Left    Result Date: 12/23/2018  Narrative: Left shoulder three view on 12/23/2018 CLINICAL INDICATION: Left shoulder pain COMPARISON: None FINDINGS: Mild degenerative changes are noted in the AC joint. The AC joint is well aligned. The glenohumeral joint is well located. There are no fractures.     Impression: No acute abnormality. Electronically signed by:  Thong Lennon  12/23/2018 4:58 AM [facility] Workstation: [id number]      Large Joint Arthrocentesis: L subacromial bursa  Date/Time: 12/26/2018 10:11 AM  Consent given by: patient  Site marked: site marked  Timeout: Immediately prior to procedure a time out was called to verify the correct patient, procedure, equipment, support staff and site/side marked as required   Supporting Documentation  Indications: pain   Procedure Details  Location: shoulder - L subacromial bursa  Preparation: Patient was prepped and draped in the usual sterile fashion  Needle size: 22 G  Approach: posterior  Medications administered: 2 mL lidocaine 2%; 40 mg triamcinolone acetonide 40 MG/ML  Patient tolerance: patient tolerated the procedure well with no immediate complications          ASSESSMENT:    Diagnoses and all orders for this visit:    Left shoulder pain, unspecified chronicity  -     Large Joint Arthrocentesis: L subacromial bursa          PLAN    Shoulder injection left shoulder  Tylenol for pain.    Xray cervical spine.        Rc Flynn MD

## 2018-12-27 ENCOUNTER — HOSPITAL ENCOUNTER (OUTPATIENT)
Facility: HOSPITAL | Age: 83
Setting detail: OBSERVATION
Discharge: HOME | End: 2018-12-29
Attending: EMERGENCY MEDICINE | Admitting: INTERNAL MEDICINE

## 2018-12-27 ENCOUNTER — APPOINTMENT (OUTPATIENT)
Dept: GENERAL RADIOLOGY | Facility: HOSPITAL | Age: 83
End: 2018-12-27

## 2018-12-27 DIAGNOSIS — R06.02 SHORTNESS OF BREATH: ICD-10-CM

## 2018-12-27 DIAGNOSIS — M54.2 NECK PAIN: Primary | ICD-10-CM

## 2018-12-27 DIAGNOSIS — R77.8 ELEVATED TROPONIN: ICD-10-CM

## 2018-12-27 DIAGNOSIS — I20.8 ANGINAL EQUIVALENT (HCC): ICD-10-CM

## 2018-12-27 DIAGNOSIS — J43.8 OTHER EMPHYSEMA (HCC): ICD-10-CM

## 2018-12-27 LAB
ALBUMIN SERPL-MCNC: 4.4 G/DL (ref 3.4–4.8)
ALBUMIN/GLOB SERPL: 1.8 G/DL (ref 1.1–1.8)
ALP SERPL-CCNC: 76 U/L (ref 38–126)
ALT SERPL W P-5'-P-CCNC: 17 U/L (ref 21–72)
ANION GAP SERPL CALCULATED.3IONS-SCNC: 10 MMOL/L (ref 5–15)
ANION GAP SERPL CALCULATED.3IONS-SCNC: 7 MMOL/L (ref 5–15)
AST SERPL-CCNC: 23 U/L (ref 17–59)
BASOPHILS # BLD AUTO: 0.01 10*3/MM3 (ref 0–0.2)
BASOPHILS # BLD AUTO: 0.02 10*3/MM3 (ref 0–0.2)
BASOPHILS NFR BLD AUTO: 0.1 % (ref 0–2)
BASOPHILS NFR BLD AUTO: 0.2 % (ref 0–2)
BILIRUB SERPL-MCNC: 0.3 MG/DL (ref 0.2–1.3)
BUN BLD-MCNC: 25 MG/DL (ref 7–21)
BUN BLD-MCNC: 27 MG/DL (ref 7–21)
BUN/CREAT SERPL: 22.7 (ref 7–25)
BUN/CREAT SERPL: 23.7 (ref 7–25)
CALCIUM SPEC-SCNC: 9.3 MG/DL (ref 8.4–10.2)
CALCIUM SPEC-SCNC: 9.7 MG/DL (ref 8.4–10.2)
CHLORIDE SERPL-SCNC: 101 MMOL/L (ref 95–110)
CHLORIDE SERPL-SCNC: 103 MMOL/L (ref 95–110)
CO2 SERPL-SCNC: 24 MMOL/L (ref 22–31)
CO2 SERPL-SCNC: 27 MMOL/L (ref 22–31)
CREAT BLD-MCNC: 1.1 MG/DL (ref 0.7–1.3)
CREAT BLD-MCNC: 1.14 MG/DL (ref 0.7–1.3)
DEPRECATED RDW RBC AUTO: 50.4 FL (ref 35.1–43.9)
DEPRECATED RDW RBC AUTO: 50.9 FL (ref 35.1–43.9)
EOSINOPHIL # BLD AUTO: 0.04 10*3/MM3 (ref 0–0.7)
EOSINOPHIL # BLD AUTO: 0.06 10*3/MM3 (ref 0–0.7)
EOSINOPHIL NFR BLD AUTO: 0.3 % (ref 0–7)
EOSINOPHIL NFR BLD AUTO: 0.4 % (ref 0–7)
ERYTHROCYTE [DISTWIDTH] IN BLOOD BY AUTOMATED COUNT: 14.6 % (ref 11.5–14.5)
ERYTHROCYTE [DISTWIDTH] IN BLOOD BY AUTOMATED COUNT: 14.7 % (ref 11.5–14.5)
GFR SERPL CREATININE-BSD FRML MDRD: 61 ML/MIN/1.73 (ref 42–98)
GFR SERPL CREATININE-BSD FRML MDRD: 64 ML/MIN/1.73 (ref 42–98)
GLOBULIN UR ELPH-MCNC: 2.5 GM/DL (ref 2.3–3.5)
GLUCOSE BLD-MCNC: 101 MG/DL (ref 60–100)
GLUCOSE BLD-MCNC: 107 MG/DL (ref 60–100)
HCT VFR BLD AUTO: 41.2 % (ref 39–49)
HCT VFR BLD AUTO: 44.5 % (ref 39–49)
HGB BLD-MCNC: 13.5 G/DL (ref 13.7–17.3)
HGB BLD-MCNC: 14.7 G/DL (ref 13.7–17.3)
HOLD SPECIMEN: NORMAL
HOLD SPECIMEN: NORMAL
IMM GRANULOCYTES # BLD AUTO: 0.18 10*3/MM3 (ref 0–0.02)
IMM GRANULOCYTES # BLD AUTO: 0.19 10*3/MM3 (ref 0–0.02)
IMM GRANULOCYTES NFR BLD AUTO: 1.4 % (ref 0–0.5)
IMM GRANULOCYTES NFR BLD AUTO: 1.6 % (ref 0–0.5)
LYMPHOCYTES # BLD AUTO: 2.09 10*3/MM3 (ref 0.6–4.2)
LYMPHOCYTES # BLD AUTO: 2.17 10*3/MM3 (ref 0.6–4.2)
LYMPHOCYTES NFR BLD AUTO: 16.2 % (ref 10–50)
LYMPHOCYTES NFR BLD AUTO: 18.1 % (ref 10–50)
MCH RBC QN AUTO: 31.2 PG (ref 26.5–34)
MCH RBC QN AUTO: 31.2 PG (ref 26.5–34)
MCHC RBC AUTO-ENTMCNC: 32.8 G/DL (ref 31.5–36.3)
MCHC RBC AUTO-ENTMCNC: 33 G/DL (ref 31.5–36.3)
MCV RBC AUTO: 94.5 FL (ref 80–98)
MCV RBC AUTO: 95.2 FL (ref 80–98)
MONOCYTES # BLD AUTO: 0.75 10*3/MM3 (ref 0–0.9)
MONOCYTES # BLD AUTO: 1.04 10*3/MM3 (ref 0–0.9)
MONOCYTES NFR BLD AUTO: 6.5 % (ref 0–12)
MONOCYTES NFR BLD AUTO: 7.8 % (ref 0–12)
NEUTROPHILS # BLD AUTO: 8.44 10*3/MM3 (ref 2–8.6)
NEUTROPHILS # BLD AUTO: 9.89 10*3/MM3 (ref 2–8.6)
NEUTROPHILS NFR BLD AUTO: 73.3 % (ref 37–80)
NEUTROPHILS NFR BLD AUTO: 74.1 % (ref 37–80)
NT-PROBNP SERPL-MCNC: 237 PG/ML (ref 0–1800)
PLATELET # BLD AUTO: 184 10*3/MM3 (ref 150–450)
PLATELET # BLD AUTO: 198 10*3/MM3 (ref 150–450)
PMV BLD AUTO: 10.9 FL (ref 8–12)
PMV BLD AUTO: 11.2 FL (ref 8–12)
POTASSIUM BLD-SCNC: 5.4 MMOL/L (ref 3.5–5.1)
POTASSIUM BLD-SCNC: 5.5 MMOL/L (ref 3.5–5.1)
POTASSIUM BLD-SCNC: 5.8 MMOL/L (ref 3.5–5.1)
PROT SERPL-MCNC: 6.9 G/DL (ref 6.3–8.6)
RBC # BLD AUTO: 4.33 10*6/MM3 (ref 4.37–5.74)
RBC # BLD AUTO: 4.71 10*6/MM3 (ref 4.37–5.74)
SODIUM BLD-SCNC: 135 MMOL/L (ref 137–145)
SODIUM BLD-SCNC: 137 MMOL/L (ref 137–145)
TROPONIN I SERPL-MCNC: 0.03 NG/ML
TROPONIN I SERPL-MCNC: 0.04 NG/ML
TROPONIN I SERPL-MCNC: 0.05 NG/ML
TROPONIN I SERPL-MCNC: 0.05 NG/ML
WBC NRBC COR # BLD: 11.52 10*3/MM3 (ref 3.2–9.8)
WBC NRBC COR # BLD: 13.36 10*3/MM3 (ref 3.2–9.8)
WHOLE BLOOD HOLD SPECIMEN: NORMAL
WHOLE BLOOD HOLD SPECIMEN: NORMAL

## 2018-12-27 PROCEDURE — 36415 COLL VENOUS BLD VENIPUNCTURE: CPT | Performed by: EMERGENCY MEDICINE

## 2018-12-27 PROCEDURE — G0378 HOSPITAL OBSERVATION PER HR: HCPCS

## 2018-12-27 PROCEDURE — 85025 COMPLETE CBC W/AUTO DIFF WBC: CPT | Performed by: EMERGENCY MEDICINE

## 2018-12-27 PROCEDURE — 71045 X-RAY EXAM CHEST 1 VIEW: CPT

## 2018-12-27 PROCEDURE — 25010000002 MORPHINE PER 10 MG: Performed by: EMERGENCY MEDICINE

## 2018-12-27 PROCEDURE — 83880 ASSAY OF NATRIURETIC PEPTIDE: CPT | Performed by: EMERGENCY MEDICINE

## 2018-12-27 PROCEDURE — 96375 TX/PRO/DX INJ NEW DRUG ADDON: CPT

## 2018-12-27 PROCEDURE — 80053 COMPREHEN METABOLIC PANEL: CPT | Performed by: EMERGENCY MEDICINE

## 2018-12-27 PROCEDURE — 93010 ELECTROCARDIOGRAM REPORT: CPT | Performed by: INTERNAL MEDICINE

## 2018-12-27 PROCEDURE — 84484 ASSAY OF TROPONIN QUANT: CPT | Performed by: EMERGENCY MEDICINE

## 2018-12-27 PROCEDURE — 85025 COMPLETE CBC W/AUTO DIFF WBC: CPT | Performed by: FAMILY MEDICINE

## 2018-12-27 PROCEDURE — 94640 AIRWAY INHALATION TREATMENT: CPT

## 2018-12-27 PROCEDURE — 96374 THER/PROPH/DIAG INJ IV PUSH: CPT

## 2018-12-27 PROCEDURE — 94770: CPT

## 2018-12-27 PROCEDURE — 93005 ELECTROCARDIOGRAM TRACING: CPT

## 2018-12-27 PROCEDURE — 25010000002 METHYLPREDNISOLONE PER 40 MG: Performed by: FAMILY MEDICINE

## 2018-12-27 PROCEDURE — 99285 EMERGENCY DEPT VISIT HI MDM: CPT

## 2018-12-27 PROCEDURE — 84484 ASSAY OF TROPONIN QUANT: CPT | Performed by: FAMILY MEDICINE

## 2018-12-27 PROCEDURE — 93005 ELECTROCARDIOGRAM TRACING: CPT | Performed by: EMERGENCY MEDICINE

## 2018-12-27 PROCEDURE — 84132 ASSAY OF SERUM POTASSIUM: CPT | Performed by: EMERGENCY MEDICINE

## 2018-12-27 RX ORDER — SODIUM CHLORIDE 0.9 % (FLUSH) 0.9 %
3 SYRINGE (ML) INJECTION EVERY 12 HOURS SCHEDULED
Status: DISCONTINUED | OUTPATIENT
Start: 2018-12-27 | End: 2018-12-29 | Stop reason: HOSPADM

## 2018-12-27 RX ORDER — LISINOPRIL 5 MG/1
5 TABLET ORAL
Status: DISCONTINUED | OUTPATIENT
Start: 2018-12-27 | End: 2018-12-29 | Stop reason: HOSPADM

## 2018-12-27 RX ORDER — ALBUTEROL SULFATE 2.5 MG/3ML
2.5 SOLUTION RESPIRATORY (INHALATION) EVERY 4 HOURS PRN
Status: DISCONTINUED | OUTPATIENT
Start: 2018-12-27 | End: 2018-12-29 | Stop reason: HOSPADM

## 2018-12-27 RX ORDER — HYDROCODONE BITARTRATE AND ACETAMINOPHEN 7.5; 325 MG/1; MG/1
1 TABLET ORAL EVERY 8 HOURS
Status: DISCONTINUED | OUTPATIENT
Start: 2018-12-27 | End: 2018-12-29 | Stop reason: HOSPADM

## 2018-12-27 RX ORDER — IPRATROPIUM BROMIDE AND ALBUTEROL SULFATE 2.5; .5 MG/3ML; MG/3ML
3 SOLUTION RESPIRATORY (INHALATION) 4 TIMES DAILY
Status: DISCONTINUED | OUTPATIENT
Start: 2018-12-27 | End: 2018-12-29 | Stop reason: HOSPADM

## 2018-12-27 RX ORDER — NALOXONE HCL 0.4 MG/ML
0.4 VIAL (ML) INJECTION
Status: DISCONTINUED | OUTPATIENT
Start: 2018-12-27 | End: 2018-12-29 | Stop reason: HOSPADM

## 2018-12-27 RX ORDER — ACETAMINOPHEN 325 MG/1
650 TABLET ORAL EVERY 4 HOURS PRN
Status: DISCONTINUED | OUTPATIENT
Start: 2018-12-27 | End: 2018-12-29 | Stop reason: HOSPADM

## 2018-12-27 RX ORDER — IPRATROPIUM BROMIDE AND ALBUTEROL SULFATE 2.5; .5 MG/3ML; MG/3ML
3 SOLUTION RESPIRATORY (INHALATION)
Status: DISCONTINUED | OUTPATIENT
Start: 2018-12-27 | End: 2018-12-27 | Stop reason: SDUPTHER

## 2018-12-27 RX ORDER — FLUTICASONE PROPIONATE 50 MCG
2 SPRAY, SUSPENSION (ML) NASAL DAILY
Status: DISCONTINUED | OUTPATIENT
Start: 2018-12-27 | End: 2018-12-29 | Stop reason: HOSPADM

## 2018-12-27 RX ORDER — RANOLAZINE 500 MG/1
500 TABLET, EXTENDED RELEASE ORAL EVERY 12 HOURS SCHEDULED
Status: DISCONTINUED | OUTPATIENT
Start: 2018-12-27 | End: 2018-12-29 | Stop reason: HOSPADM

## 2018-12-27 RX ORDER — ONDANSETRON 4 MG/1
4 TABLET, ORALLY DISINTEGRATING ORAL EVERY 8 HOURS PRN
Status: DISCONTINUED | OUTPATIENT
Start: 2018-12-27 | End: 2018-12-29 | Stop reason: HOSPADM

## 2018-12-27 RX ORDER — FAMOTIDINE 20 MG/1
20 TABLET, FILM COATED ORAL DAILY
Status: DISCONTINUED | OUTPATIENT
Start: 2018-12-27 | End: 2018-12-29 | Stop reason: HOSPADM

## 2018-12-27 RX ORDER — SODIUM CHLORIDE 0.9 % (FLUSH) 0.9 %
10 SYRINGE (ML) INJECTION AS NEEDED
Status: DISCONTINUED | OUTPATIENT
Start: 2018-12-27 | End: 2018-12-29 | Stop reason: HOSPADM

## 2018-12-27 RX ORDER — ASPIRIN 325 MG
325 TABLET ORAL ONCE
Status: COMPLETED | OUTPATIENT
Start: 2018-12-27 | End: 2018-12-27

## 2018-12-27 RX ORDER — CLOPIDOGREL BISULFATE 75 MG/1
75 TABLET ORAL DAILY
Status: DISCONTINUED | OUTPATIENT
Start: 2018-12-27 | End: 2018-12-29 | Stop reason: HOSPADM

## 2018-12-27 RX ORDER — ISOSORBIDE MONONITRATE 30 MG/1
30 TABLET, EXTENDED RELEASE ORAL
Status: DISCONTINUED | OUTPATIENT
Start: 2018-12-27 | End: 2018-12-29 | Stop reason: HOSPADM

## 2018-12-27 RX ORDER — MORPHINE SULFATE 2 MG/ML
1 INJECTION, SOLUTION INTRAMUSCULAR; INTRAVENOUS EVERY 4 HOURS PRN
Status: DISCONTINUED | OUTPATIENT
Start: 2018-12-27 | End: 2018-12-29 | Stop reason: HOSPADM

## 2018-12-27 RX ORDER — FUROSEMIDE 20 MG/1
20 TABLET ORAL DAILY
Status: DISCONTINUED | OUTPATIENT
Start: 2018-12-27 | End: 2018-12-29 | Stop reason: HOSPADM

## 2018-12-27 RX ORDER — SODIUM POLYSTYRENE SULFONATE 15 G/60ML
15 SUSPENSION ORAL; RECTAL ONCE
Status: COMPLETED | OUTPATIENT
Start: 2018-12-27 | End: 2018-12-27

## 2018-12-27 RX ORDER — ONDANSETRON 2 MG/ML
4 INJECTION INTRAMUSCULAR; INTRAVENOUS EVERY 6 HOURS PRN
Status: DISCONTINUED | OUTPATIENT
Start: 2018-12-27 | End: 2018-12-29 | Stop reason: HOSPADM

## 2018-12-27 RX ORDER — SODIUM CHLORIDE 0.9 % (FLUSH) 0.9 %
3-10 SYRINGE (ML) INJECTION AS NEEDED
Status: DISCONTINUED | OUTPATIENT
Start: 2018-12-27 | End: 2018-12-29 | Stop reason: HOSPADM

## 2018-12-27 RX ORDER — METHYLPREDNISOLONE SODIUM SUCCINATE 40 MG/ML
40 INJECTION, POWDER, LYOPHILIZED, FOR SOLUTION INTRAMUSCULAR; INTRAVENOUS EVERY 8 HOURS
Status: DISCONTINUED | OUTPATIENT
Start: 2018-12-27 | End: 2018-12-29 | Stop reason: HOSPADM

## 2018-12-27 RX ADMIN — ASPIRIN 325 MG: 325 TABLET, COATED ORAL at 13:07

## 2018-12-27 RX ADMIN — RANOLAZINE 500 MG: 500 TABLET, FILM COATED, EXTENDED RELEASE ORAL at 20:00

## 2018-12-27 RX ADMIN — MORPHINE SULFATE 4 MG: 4 INJECTION INTRAVENOUS at 14:00

## 2018-12-27 RX ADMIN — HYDROCODONE BITARTRATE AND ACETAMINOPHEN 1 TABLET: 7.5; 325 TABLET ORAL at 18:14

## 2018-12-27 RX ADMIN — SODIUM POLYSTYRENE SULFONATE 15 G: 15 SUSPENSION ORAL; RECTAL at 18:14

## 2018-12-27 RX ADMIN — IPRATROPIUM BROMIDE AND ALBUTEROL SULFATE 3 ML: 2.5; .5 SOLUTION RESPIRATORY (INHALATION) at 19:49

## 2018-12-27 RX ADMIN — SODIUM CHLORIDE, PRESERVATIVE FREE 3 ML: 5 INJECTION INTRAVENOUS at 20:00

## 2018-12-27 RX ADMIN — METHYLPREDNISOLONE SODIUM SUCCINATE 40 MG: 40 INJECTION, POWDER, FOR SOLUTION INTRAMUSCULAR; INTRAVENOUS at 18:14

## 2018-12-28 ENCOUNTER — APPOINTMENT (OUTPATIENT)
Dept: CARDIOLOGY | Facility: HOSPITAL | Age: 83
End: 2018-12-28
Attending: FAMILY MEDICINE

## 2018-12-28 LAB
ANION GAP SERPL CALCULATED.3IONS-SCNC: 12 MMOL/L (ref 5–15)
BASOPHILS # BLD AUTO: 0.01 10*3/MM3 (ref 0–0.2)
BASOPHILS NFR BLD AUTO: 0.1 % (ref 0–2)
BUN BLD-MCNC: 29 MG/DL (ref 7–21)
BUN/CREAT SERPL: 25.2 (ref 7–25)
CALCIUM SPEC-SCNC: 9.5 MG/DL (ref 8.4–10.2)
CHLORIDE SERPL-SCNC: 102 MMOL/L (ref 95–110)
CO2 SERPL-SCNC: 25 MMOL/L (ref 22–31)
CREAT BLD-MCNC: 1.15 MG/DL (ref 0.7–1.3)
DEPRECATED RDW RBC AUTO: 52.3 FL (ref 35.1–43.9)
EOSINOPHIL # BLD AUTO: 0 10*3/MM3 (ref 0–0.7)
EOSINOPHIL NFR BLD AUTO: 0 % (ref 0–7)
ERYTHROCYTE [DISTWIDTH] IN BLOOD BY AUTOMATED COUNT: 14.8 % (ref 11.5–14.5)
GFR SERPL CREATININE-BSD FRML MDRD: 60 ML/MIN/1.73 (ref 42–98)
GLUCOSE BLD-MCNC: 145 MG/DL (ref 60–100)
HCT VFR BLD AUTO: 44.9 % (ref 39–49)
HGB BLD-MCNC: 14.5 G/DL (ref 13.7–17.3)
IMM GRANULOCYTES # BLD AUTO: 0.2 10*3/MM3 (ref 0–0.02)
IMM GRANULOCYTES NFR BLD AUTO: 1.4 % (ref 0–0.5)
LYMPHOCYTES # BLD AUTO: 1.19 10*3/MM3 (ref 0.6–4.2)
LYMPHOCYTES NFR BLD AUTO: 8.5 % (ref 10–50)
MCH RBC QN AUTO: 31.2 PG (ref 26.5–34)
MCHC RBC AUTO-ENTMCNC: 32.3 G/DL (ref 31.5–36.3)
MCV RBC AUTO: 96.6 FL (ref 80–98)
MONOCYTES # BLD AUTO: 0.19 10*3/MM3 (ref 0–0.9)
MONOCYTES NFR BLD AUTO: 1.4 % (ref 0–12)
NEUTROPHILS # BLD AUTO: 12.4 10*3/MM3 (ref 2–8.6)
NEUTROPHILS NFR BLD AUTO: 88.6 % (ref 37–80)
PLATELET # BLD AUTO: 220 10*3/MM3 (ref 150–450)
PMV BLD AUTO: 10.8 FL (ref 8–12)
POTASSIUM BLD-SCNC: 5.4 MMOL/L (ref 3.5–5.1)
RBC # BLD AUTO: 4.65 10*6/MM3 (ref 4.37–5.74)
SODIUM BLD-SCNC: 139 MMOL/L (ref 137–145)
WBC NRBC COR # BLD: 13.99 10*3/MM3 (ref 3.2–9.8)

## 2018-12-28 PROCEDURE — G0378 HOSPITAL OBSERVATION PER HR: HCPCS

## 2018-12-28 PROCEDURE — 94799 UNLISTED PULMONARY SVC/PX: CPT

## 2018-12-28 PROCEDURE — 96376 TX/PRO/DX INJ SAME DRUG ADON: CPT

## 2018-12-28 PROCEDURE — 80048 BASIC METABOLIC PNL TOTAL CA: CPT | Performed by: FAMILY MEDICINE

## 2018-12-28 PROCEDURE — 94760 N-INVAS EAR/PLS OXIMETRY 1: CPT

## 2018-12-28 PROCEDURE — 93306 TTE W/DOPPLER COMPLETE: CPT | Performed by: INTERNAL MEDICINE

## 2018-12-28 PROCEDURE — 85025 COMPLETE CBC W/AUTO DIFF WBC: CPT | Performed by: FAMILY MEDICINE

## 2018-12-28 PROCEDURE — 25010000002 METHYLPREDNISOLONE PER 40 MG: Performed by: FAMILY MEDICINE

## 2018-12-28 PROCEDURE — 93306 TTE W/DOPPLER COMPLETE: CPT

## 2018-12-28 PROCEDURE — 99214 OFFICE O/P EST MOD 30 MIN: CPT | Performed by: NURSE PRACTITIONER

## 2018-12-28 RX ORDER — SODIUM POLYSTYRENE SULFONATE 15 G/60ML
15 SUSPENSION ORAL; RECTAL ONCE
Status: COMPLETED | OUTPATIENT
Start: 2018-12-28 | End: 2018-12-28

## 2018-12-28 RX ADMIN — FUROSEMIDE 20 MG: 20 TABLET ORAL at 08:39

## 2018-12-28 RX ADMIN — HYDROCODONE BITARTRATE AND ACETAMINOPHEN 1 TABLET: 7.5; 325 TABLET ORAL at 17:22

## 2018-12-28 RX ADMIN — METHYLPREDNISOLONE SODIUM SUCCINATE 40 MG: 40 INJECTION, POWDER, FOR SOLUTION INTRAMUSCULAR; INTRAVENOUS at 00:39

## 2018-12-28 RX ADMIN — RANOLAZINE 500 MG: 500 TABLET, FILM COATED, EXTENDED RELEASE ORAL at 08:39

## 2018-12-28 RX ADMIN — LISINOPRIL 5 MG: 5 TABLET ORAL at 08:39

## 2018-12-28 RX ADMIN — METHYLPREDNISOLONE SODIUM SUCCINATE 40 MG: 40 INJECTION, POWDER, FOR SOLUTION INTRAMUSCULAR; INTRAVENOUS at 08:38

## 2018-12-28 RX ADMIN — METHYLPREDNISOLONE SODIUM SUCCINATE 40 MG: 40 INJECTION, POWDER, FOR SOLUTION INTRAMUSCULAR; INTRAVENOUS at 17:22

## 2018-12-28 RX ADMIN — CLOPIDOGREL BISULFATE 75 MG: 75 TABLET ORAL at 08:38

## 2018-12-28 RX ADMIN — IPRATROPIUM BROMIDE AND ALBUTEROL SULFATE 3 ML: 2.5; .5 SOLUTION RESPIRATORY (INHALATION) at 21:31

## 2018-12-28 RX ADMIN — HYDROCODONE BITARTRATE AND ACETAMINOPHEN 1 TABLET: 7.5; 325 TABLET ORAL at 00:40

## 2018-12-28 RX ADMIN — HYDROCODONE BITARTRATE AND ACETAMINOPHEN 1 TABLET: 7.5; 325 TABLET ORAL at 08:38

## 2018-12-28 RX ADMIN — IPRATROPIUM BROMIDE AND ALBUTEROL SULFATE 3 ML: 2.5; .5 SOLUTION RESPIRATORY (INHALATION) at 07:22

## 2018-12-28 RX ADMIN — SODIUM POLYSTYRENE SULFONATE 15 G: 15 SUSPENSION ORAL; RECTAL at 10:13

## 2018-12-28 RX ADMIN — IPRATROPIUM BROMIDE AND ALBUTEROL SULFATE 3 ML: 2.5; .5 SOLUTION RESPIRATORY (INHALATION) at 11:31

## 2018-12-28 RX ADMIN — FAMOTIDINE 20 MG: 20 TABLET ORAL at 08:38

## 2018-12-28 RX ADMIN — RANOLAZINE 500 MG: 500 TABLET, FILM COATED, EXTENDED RELEASE ORAL at 20:07

## 2018-12-28 RX ADMIN — IPRATROPIUM BROMIDE AND ALBUTEROL SULFATE 3 ML: 2.5; .5 SOLUTION RESPIRATORY (INHALATION) at 16:39

## 2018-12-28 RX ADMIN — ISOSORBIDE MONONITRATE 30 MG: 30 TABLET, EXTENDED RELEASE ORAL at 08:39

## 2018-12-28 RX ADMIN — SODIUM CHLORIDE, PRESERVATIVE FREE 3 ML: 5 INJECTION INTRAVENOUS at 20:07

## 2018-12-28 RX ADMIN — Medication 400 MG: at 08:39

## 2018-12-29 VITALS
HEIGHT: 68 IN | OXYGEN SATURATION: 95 % | SYSTOLIC BLOOD PRESSURE: 114 MMHG | DIASTOLIC BLOOD PRESSURE: 62 MMHG | WEIGHT: 171.6 LBS | BODY MASS INDEX: 26.01 KG/M2 | HEART RATE: 108 BPM | TEMPERATURE: 98.3 F | RESPIRATION RATE: 18 BRPM

## 2018-12-29 LAB
ANION GAP SERPL CALCULATED.3IONS-SCNC: 13 MMOL/L (ref 5–15)
BASOPHILS # BLD AUTO: 0.01 10*3/MM3 (ref 0–0.2)
BASOPHILS NFR BLD AUTO: 0 % (ref 0–2)
BH CV ECHO MEAS - ACS: 2.1 CM
BH CV ECHO MEAS - AO MAX PG (FULL): 0.65 MMHG
BH CV ECHO MEAS - AO MAX PG: 5.7 MMHG
BH CV ECHO MEAS - AO MEAN PG (FULL): 0.62 MMHG
BH CV ECHO MEAS - AO MEAN PG: 3.3 MMHG
BH CV ECHO MEAS - AO ROOT AREA (BSA CORRECTED): 2.1
BH CV ECHO MEAS - AO ROOT AREA: 12.4 CM^2
BH CV ECHO MEAS - AO ROOT DIAM: 4 CM
BH CV ECHO MEAS - AO V2 MAX: 119 CM/SEC
BH CV ECHO MEAS - AO V2 MEAN: 88.4 CM/SEC
BH CV ECHO MEAS - AO V2 VTI: 18.7 CM
BH CV ECHO MEAS - ASC AORTA: 3.2 CM
BH CV ECHO MEAS - AVA(I,A): 3.9 CM^2
BH CV ECHO MEAS - AVA(I,D): 3.9 CM^2
BH CV ECHO MEAS - AVA(V,A): 3.8 CM^2
BH CV ECHO MEAS - AVA(V,D): 3.8 CM^2
BH CV ECHO MEAS - BSA(HAYCOCK): 1.9 M^2
BH CV ECHO MEAS - BSA: 1.9 M^2
BH CV ECHO MEAS - BZI_BMI: 27 KILOGRAMS/M^2
BH CV ECHO MEAS - BZI_METRIC_HEIGHT: 170 CM
BH CV ECHO MEAS - BZI_METRIC_WEIGHT: 78 KG
BH CV ECHO MEAS - EDV(CUBED): 85.3 ML
BH CV ECHO MEAS - EDV(TEICH): 87.8 ML
BH CV ECHO MEAS - EF(CUBED): 71.3 %
BH CV ECHO MEAS - EF(TEICH): 63.2 %
BH CV ECHO MEAS - ESV(CUBED): 24.5 ML
BH CV ECHO MEAS - ESV(TEICH): 32.3 ML
BH CV ECHO MEAS - FS: 34 %
BH CV ECHO MEAS - IVS/LVPW: 1
BH CV ECHO MEAS - IVSD: 1.2 CM
BH CV ECHO MEAS - LA DIMENSION: 4 CM
BH CV ECHO MEAS - LA/AO: 1
BH CV ECHO MEAS - LV MASS(C)D: 196.3 GRAMS
BH CV ECHO MEAS - LV MASS(C)DI: 103.6 GRAMS/M^2
BH CV ECHO MEAS - LV MAX PG: 5 MMHG
BH CV ECHO MEAS - LV MEAN PG: 2.7 MMHG
BH CV ECHO MEAS - LV V1 MAX: 112 CM/SEC
BH CV ECHO MEAS - LV V1 MEAN: 74.8 CM/SEC
BH CV ECHO MEAS - LV V1 VTI: 18.2 CM
BH CV ECHO MEAS - LVIDD: 4.4 CM
BH CV ECHO MEAS - LVIDS: 2.9 CM
BH CV ECHO MEAS - LVOT AREA: 4 CM^2
BH CV ECHO MEAS - LVOT DIAM: 2.3 CM
BH CV ECHO MEAS - LVPWD: 1.2 CM
BH CV ECHO MEAS - MV E MAX VEL: 100.3 CM/SEC
BH CV ECHO MEAS - MV P1/2T MAX VEL: 110 CM/SEC
BH CV ECHO MEAS - MVA P1/2T LCG: 2 CM^2
BH CV ECHO MEAS - PA MAX PG: 5 MMHG
BH CV ECHO MEAS - PA V2 MAX: 112.2 CM/SEC
BH CV ECHO MEAS - RAP SYSTOLE: 5 MMHG
BH CV ECHO MEAS - RVDD: 3.1 CM
BH CV ECHO MEAS - RVSP: 25.5 MMHG
BH CV ECHO MEAS - SI(AO): 122.5 ML/M^2
BH CV ECHO MEAS - SI(CUBED): 32.1 ML/M^2
BH CV ECHO MEAS - SI(LVOT): 38.5 ML/M^2
BH CV ECHO MEAS - SI(TEICH): 29.3 ML/M^2
BH CV ECHO MEAS - SV(AO): 232.2 ML
BH CV ECHO MEAS - SV(CUBED): 60.8 ML
BH CV ECHO MEAS - SV(LVOT): 72.9 ML
BH CV ECHO MEAS - SV(TEICH): 55.4 ML
BH CV ECHO MEAS - TR MAX VEL: 226.1 CM/SEC
BUN BLD-MCNC: 35 MG/DL (ref 7–21)
BUN/CREAT SERPL: 25.9 (ref 7–25)
CALCIUM SPEC-SCNC: 9.2 MG/DL (ref 8.4–10.2)
CHLORIDE SERPL-SCNC: 100 MMOL/L (ref 95–110)
CO2 SERPL-SCNC: 24 MMOL/L (ref 22–31)
CREAT BLD-MCNC: 1.35 MG/DL (ref 0.7–1.3)
DEPRECATED RDW RBC AUTO: 49.3 FL (ref 35.1–43.9)
EOSINOPHIL # BLD AUTO: 0 10*3/MM3 (ref 0–0.7)
EOSINOPHIL NFR BLD AUTO: 0 % (ref 0–7)
ERYTHROCYTE [DISTWIDTH] IN BLOOD BY AUTOMATED COUNT: 14.5 % (ref 11.5–14.5)
GFR SERPL CREATININE-BSD FRML MDRD: 50 ML/MIN/1.73 (ref 42–98)
GLUCOSE BLD-MCNC: 171 MG/DL (ref 60–100)
HCT VFR BLD AUTO: 40.2 % (ref 39–49)
HGB BLD-MCNC: 13.4 G/DL (ref 13.7–17.3)
IMM GRANULOCYTES # BLD AUTO: 0.16 10*3/MM3 (ref 0–0.02)
IMM GRANULOCYTES NFR BLD AUTO: 0.8 % (ref 0–0.5)
LV EF 2D ECHO EST: 68 %
LYMPHOCYTES # BLD AUTO: 0.72 10*3/MM3 (ref 0.6–4.2)
LYMPHOCYTES NFR BLD AUTO: 3.4 % (ref 10–50)
MAXIMAL PREDICTED HEART RATE: 135 BPM
MCH RBC QN AUTO: 30.9 PG (ref 26.5–34)
MCHC RBC AUTO-ENTMCNC: 33.3 G/DL (ref 31.5–36.3)
MCV RBC AUTO: 92.6 FL (ref 80–98)
MONOCYTES # BLD AUTO: 0.69 10*3/MM3 (ref 0–0.9)
MONOCYTES NFR BLD AUTO: 3.2 % (ref 0–12)
NEUTROPHILS # BLD AUTO: 19.69 10*3/MM3 (ref 2–8.6)
NEUTROPHILS NFR BLD AUTO: 92.6 % (ref 37–80)
PLATELET # BLD AUTO: 200 10*3/MM3 (ref 150–450)
PMV BLD AUTO: 11 FL (ref 8–12)
POTASSIUM BLD-SCNC: 4.2 MMOL/L (ref 3.5–5.1)
RBC # BLD AUTO: 4.34 10*6/MM3 (ref 4.37–5.74)
SODIUM BLD-SCNC: 137 MMOL/L (ref 137–145)
STRESS TARGET HR: 115 BPM
WBC NRBC COR # BLD: 21.27 10*3/MM3 (ref 3.2–9.8)

## 2018-12-29 PROCEDURE — 80048 BASIC METABOLIC PNL TOTAL CA: CPT | Performed by: FAMILY MEDICINE

## 2018-12-29 PROCEDURE — G0378 HOSPITAL OBSERVATION PER HR: HCPCS

## 2018-12-29 PROCEDURE — 94799 UNLISTED PULMONARY SVC/PX: CPT

## 2018-12-29 PROCEDURE — 85025 COMPLETE CBC W/AUTO DIFF WBC: CPT | Performed by: FAMILY MEDICINE

## 2018-12-29 PROCEDURE — 96376 TX/PRO/DX INJ SAME DRUG ADON: CPT

## 2018-12-29 PROCEDURE — 25010000002 METHYLPREDNISOLONE PER 40 MG: Performed by: FAMILY MEDICINE

## 2018-12-29 RX ADMIN — ISOSORBIDE MONONITRATE 30 MG: 30 TABLET, EXTENDED RELEASE ORAL at 08:44

## 2018-12-29 RX ADMIN — FUROSEMIDE 20 MG: 20 TABLET ORAL at 08:44

## 2018-12-29 RX ADMIN — RANOLAZINE 500 MG: 500 TABLET, FILM COATED, EXTENDED RELEASE ORAL at 08:44

## 2018-12-29 RX ADMIN — LISINOPRIL 5 MG: 5 TABLET ORAL at 08:44

## 2018-12-29 RX ADMIN — METHYLPREDNISOLONE SODIUM SUCCINATE 40 MG: 40 INJECTION, POWDER, FOR SOLUTION INTRAMUSCULAR; INTRAVENOUS at 08:44

## 2018-12-29 RX ADMIN — Medication 400 MG: at 08:44

## 2018-12-29 RX ADMIN — CLOPIDOGREL BISULFATE 75 MG: 75 TABLET ORAL at 08:44

## 2018-12-29 RX ADMIN — HYDROCODONE BITARTRATE AND ACETAMINOPHEN 1 TABLET: 7.5; 325 TABLET ORAL at 08:44

## 2018-12-29 RX ADMIN — IPRATROPIUM BROMIDE AND ALBUTEROL SULFATE 3 ML: 2.5; .5 SOLUTION RESPIRATORY (INHALATION) at 07:16

## 2018-12-29 RX ADMIN — FAMOTIDINE 20 MG: 20 TABLET ORAL at 08:44

## 2018-12-29 RX ADMIN — METHYLPREDNISOLONE SODIUM SUCCINATE 40 MG: 40 INJECTION, POWDER, FOR SOLUTION INTRAMUSCULAR; INTRAVENOUS at 01:33

## 2018-12-29 RX ADMIN — HYDROCODONE BITARTRATE AND ACETAMINOPHEN 1 TABLET: 7.5; 325 TABLET ORAL at 01:33

## 2018-12-30 ENCOUNTER — READMISSION MANAGEMENT (OUTPATIENT)
Dept: CALL CENTER | Facility: HOSPITAL | Age: 83
End: 2018-12-30

## 2018-12-30 NOTE — OUTREACH NOTE
Prep Survey      Responses   Facility patient discharged from?  [facility]   Is patient eligible?  Yes   Discharge diagnosis  copd   Does the patient have one of the following disease processes/diagnoses(primary or secondary)?  COPD/Pneumonia   Does the patient have Home health ordered?  Yes   What is the Home health agency?   [facility]   Is there a DME ordered?  No   Comments regarding appointments  office to call   Prep survey completed?  Yes          Vy White RN

## 2019-01-01 ENCOUNTER — READMISSION MANAGEMENT (OUTPATIENT)
Dept: CALL CENTER | Facility: HOSPITAL | Age: 84
End: 2019-01-01

## 2019-01-01 NOTE — OUTREACH NOTE
COPD/PN Week 1 Survey      Responses   Facility patient discharged from?  [place]   Does the patient have one of the following disease processes/diagnoses(primary or secondary)?  COPD/Pneumonia   Is there a successful TCM telephone encounter documented?  No   Was the primary reason for admission:  COPD exacerbation   Week 1 attempt successful?  Yes   Call start time  1219   Call end time  1228   Discharge diagnosis  copd   Is patient permission given to speak with other caregiver?  Yes   List who call center can speak with  wife   Meds reviewed with patient/caregiver?  N/A   Does the patient have all medications ordered at discharge?  N/A   Is the patient taking all medications as directed (includes completed medication regime)?  Yes   Medication comments  148/68, 47   Does the patient have a primary care provider?   Yes   Does the patient have an appointment with their PCP or pulmonologist within 7 days of discharge?  Yes   Comments regarding PCP  Dr. Rey   Has the patient kept scheduled appointments due by today?  N/A   What is the Home health agency?   [facility]   Has home health visited the patient within 72 hours of discharge?  Yes   Psychosocial issues?  No   Did the patient receive a copy of their discharge instructions?  Yes   Nursing interventions  Reviewed instructions with patient   What is the patient's perception of their health status since discharge?  Worsening   Nursing Interventions  Nurse provided patient education   Are the patient's immunizations up to date?   Yes   Nursing interventions  Educated on importance of maintaining up to date immunizations as advised by provider   Is the patient/caregiver able to teach back the hierarchy of who to call/visit for symptoms/problems? PCP, Specialist, Home health nurse, Urgent Care, ED, 911  Yes   Additional teach back comments  Suggested he be seen in ER. He states he is going to wait and see his MD tomorrow.   Is the patient able to teach back COPD  zones?  Yes   Nursing interventions  Education provided on various zones   Patient reports what zone on this call?  Yellow Zone   Yellow Zone  Increased shortness of air, Unable to complete daily activities   Yellow interventions  Call provider immediatly if symptoms do not improve   Week 1 call completed?  Yes          Kayli Lantigua RN

## 2019-01-08 ENCOUNTER — READMISSION MANAGEMENT (OUTPATIENT)
Dept: CALL CENTER | Facility: HOSPITAL | Age: 84
End: 2019-01-08

## 2019-01-08 ENCOUNTER — APPOINTMENT (OUTPATIENT)
Dept: CT IMAGING | Facility: HOSPITAL | Age: 84
End: 2019-01-08

## 2019-01-08 ENCOUNTER — HOSPITAL ENCOUNTER (OUTPATIENT)
Facility: HOSPITAL | Age: 84
Setting detail: OBSERVATION
Discharge: HOME | End: 2019-01-09
Attending: EMERGENCY MEDICINE | Admitting: EMERGENCY MEDICINE

## 2019-01-08 ENCOUNTER — APPOINTMENT (OUTPATIENT)
Dept: GENERAL RADIOLOGY | Facility: HOSPITAL | Age: 84
End: 2019-01-08

## 2019-01-08 DIAGNOSIS — J44.1 COPD EXACERBATION (HCC): Primary | ICD-10-CM

## 2019-01-08 LAB
ALBUMIN SERPL-MCNC: 4.2 G/DL (ref 3.4–4.8)
ALBUMIN/GLOB SERPL: 1.8 G/DL (ref 1.1–1.8)
ALP SERPL-CCNC: 70 U/L (ref 38–126)
ALT SERPL W P-5'-P-CCNC: 23 U/L (ref 21–72)
ANION GAP SERPL CALCULATED.3IONS-SCNC: 10 MMOL/L (ref 5–15)
ARTERIAL PATENCY WRIST A: NORMAL
AST SERPL-CCNC: 25 U/L (ref 17–59)
ATMOSPHERIC PRESS: 752 MMHG
BASE EXCESS BLDA CALC-SCNC: 0.2 MMOL/L (ref 0–2)
BASOPHILS # BLD AUTO: 0.01 10*3/MM3 (ref 0–0.2)
BASOPHILS NFR BLD AUTO: 0.1 % (ref 0–2)
BDY SITE: NORMAL
BILIRUB SERPL-MCNC: 0.2 MG/DL (ref 0.2–1.3)
BUN BLD-MCNC: 25 MG/DL (ref 7–21)
BUN/CREAT SERPL: 21 (ref 7–25)
CALCIUM SPEC-SCNC: 9.4 MG/DL (ref 8.4–10.2)
CHLORIDE SERPL-SCNC: 102 MMOL/L (ref 95–110)
CO2 SERPL-SCNC: 24 MMOL/L (ref 22–31)
CREAT BLD-MCNC: 1.19 MG/DL (ref 0.7–1.3)
D-LACTATE SERPL-SCNC: 2 MMOL/L (ref 0.5–2)
DEPRECATED RDW RBC AUTO: 49.5 FL (ref 35.1–43.9)
EOSINOPHIL # BLD AUTO: 0.01 10*3/MM3 (ref 0–0.7)
EOSINOPHIL NFR BLD AUTO: 0.1 % (ref 0–7)
ERYTHROCYTE [DISTWIDTH] IN BLOOD BY AUTOMATED COUNT: 14.6 % (ref 11.5–14.5)
GAS FLOW AIRWAY: 2 LPM
GFR SERPL CREATININE-BSD FRML MDRD: 58 ML/MIN/1.73 (ref 42–98)
GLOBULIN UR ELPH-MCNC: 2.4 GM/DL (ref 2.3–3.5)
GLUCOSE BLD-MCNC: 114 MG/DL (ref 60–100)
GLUCOSE BLDC GLUCOMTR-MCNC: 229 MG/DL (ref 70–130)
HCO3 BLDA-SCNC: 24.6 MMOL/L (ref 20–26)
HCT VFR BLD AUTO: 41 % (ref 39–49)
HGB BLD-MCNC: 13.7 G/DL (ref 13.7–17.3)
HOLD SPECIMEN: NORMAL
HOLD SPECIMEN: NORMAL
IMM GRANULOCYTES # BLD AUTO: 0.12 10*3/MM3 (ref 0–0.02)
IMM GRANULOCYTES NFR BLD AUTO: 0.9 % (ref 0–0.5)
LYMPHOCYTES # BLD AUTO: 1.15 10*3/MM3 (ref 0.6–4.2)
LYMPHOCYTES NFR BLD AUTO: 8.4 % (ref 10–50)
Lab: NORMAL
MCH RBC QN AUTO: 31.1 PG (ref 26.5–34)
MCHC RBC AUTO-ENTMCNC: 33.4 G/DL (ref 31.5–36.3)
MCV RBC AUTO: 93.2 FL (ref 80–98)
MODALITY: NORMAL
MONOCYTES # BLD AUTO: 0.88 10*3/MM3 (ref 0–0.9)
MONOCYTES NFR BLD AUTO: 6.5 % (ref 0–12)
NEUTROPHILS # BLD AUTO: 11.46 10*3/MM3 (ref 2–8.6)
NEUTROPHILS NFR BLD AUTO: 84 % (ref 37–80)
NT-PROBNP SERPL-MCNC: 264 PG/ML (ref 0–1800)
PCO2 BLDA: 38.1 MM HG (ref 35–45)
PH BLDA: 7.42 PH UNITS (ref 7.35–7.45)
PLATELET # BLD AUTO: 181 10*3/MM3 (ref 150–450)
PMV BLD AUTO: 11.3 FL (ref 8–12)
PO2 BLDA: 92.1 MM HG (ref 83–108)
POTASSIUM BLD-SCNC: 4.3 MMOL/L (ref 3.5–5.1)
PROT SERPL-MCNC: 6.6 G/DL (ref 6.3–8.6)
RBC # BLD AUTO: 4.4 10*6/MM3 (ref 4.37–5.74)
SAO2 % BLDCOA: 95.4 % (ref 94–99)
SODIUM BLD-SCNC: 136 MMOL/L (ref 137–145)
TROPONIN I SERPL-MCNC: 0.05 NG/ML
VENTILATOR MODE: NORMAL
WBC NRBC COR # BLD: 13.63 10*3/MM3 (ref 3.2–9.8)
WHOLE BLOOD HOLD SPECIMEN: NORMAL

## 2019-01-08 PROCEDURE — 25010000002 METHYLPREDNISOLONE PER 125 MG: Performed by: EMERGENCY MEDICINE

## 2019-01-08 PROCEDURE — 84484 ASSAY OF TROPONIN QUANT: CPT | Performed by: EMERGENCY MEDICINE

## 2019-01-08 PROCEDURE — 71260 CT THORAX DX C+: CPT

## 2019-01-08 PROCEDURE — 99284 EMERGENCY DEPT VISIT MOD MDM: CPT

## 2019-01-08 PROCEDURE — 63710000001 INSULIN ASPART PER 5 UNITS: Performed by: NURSE PRACTITIONER

## 2019-01-08 PROCEDURE — 80053 COMPREHEN METABOLIC PANEL: CPT | Performed by: EMERGENCY MEDICINE

## 2019-01-08 PROCEDURE — 25010000002 IOPAMIDOL 61 % SOLUTION: Performed by: EMERGENCY MEDICINE

## 2019-01-08 PROCEDURE — 85025 COMPLETE CBC W/AUTO DIFF WBC: CPT | Performed by: EMERGENCY MEDICINE

## 2019-01-08 PROCEDURE — 36600 WITHDRAWAL OF ARTERIAL BLOOD: CPT

## 2019-01-08 PROCEDURE — G0378 HOSPITAL OBSERVATION PER HR: HCPCS

## 2019-01-08 PROCEDURE — 94640 AIRWAY INHALATION TREATMENT: CPT

## 2019-01-08 PROCEDURE — 36415 COLL VENOUS BLD VENIPUNCTURE: CPT | Performed by: EMERGENCY MEDICINE

## 2019-01-08 PROCEDURE — 82803 BLOOD GASES ANY COMBINATION: CPT

## 2019-01-08 PROCEDURE — 94799 UNLISTED PULMONARY SVC/PX: CPT

## 2019-01-08 PROCEDURE — 82962 GLUCOSE BLOOD TEST: CPT

## 2019-01-08 PROCEDURE — 93005 ELECTROCARDIOGRAM TRACING: CPT | Performed by: EMERGENCY MEDICINE

## 2019-01-08 PROCEDURE — 94760 N-INVAS EAR/PLS OXIMETRY 1: CPT

## 2019-01-08 PROCEDURE — 83605 ASSAY OF LACTIC ACID: CPT | Performed by: EMERGENCY MEDICINE

## 2019-01-08 PROCEDURE — 25010000002 METHYLPREDNISOLONE PER 125 MG: Performed by: NURSE PRACTITIONER

## 2019-01-08 PROCEDURE — 83880 ASSAY OF NATRIURETIC PEPTIDE: CPT | Performed by: EMERGENCY MEDICINE

## 2019-01-08 PROCEDURE — 87040 BLOOD CULTURE FOR BACTERIA: CPT | Performed by: EMERGENCY MEDICINE

## 2019-01-08 PROCEDURE — 96376 TX/PRO/DX INJ SAME DRUG ADON: CPT

## 2019-01-08 PROCEDURE — 93010 ELECTROCARDIOGRAM REPORT: CPT | Performed by: INTERNAL MEDICINE

## 2019-01-08 PROCEDURE — 96374 THER/PROPH/DIAG INJ IV PUSH: CPT

## 2019-01-08 RX ORDER — METHYLPREDNISOLONE SODIUM SUCCINATE 125 MG/2ML
60 INJECTION, POWDER, LYOPHILIZED, FOR SOLUTION INTRAMUSCULAR; INTRAVENOUS EVERY 8 HOURS
Status: DISCONTINUED | OUTPATIENT
Start: 2019-01-08 | End: 2019-01-09 | Stop reason: HOSPADM

## 2019-01-08 RX ORDER — FLUTICASONE PROPIONATE 50 MCG
2 SPRAY, SUSPENSION (ML) NASAL DAILY
Status: DISCONTINUED | OUTPATIENT
Start: 2019-01-09 | End: 2019-01-09 | Stop reason: HOSPADM

## 2019-01-08 RX ORDER — BENZONATATE 100 MG/1
200 CAPSULE ORAL 3 TIMES DAILY PRN
Status: DISCONTINUED | OUTPATIENT
Start: 2019-01-08 | End: 2019-01-09 | Stop reason: HOSPADM

## 2019-01-08 RX ORDER — ACETAMINOPHEN 325 MG/1
650 TABLET ORAL EVERY 4 HOURS PRN
Status: DISCONTINUED | OUTPATIENT
Start: 2019-01-08 | End: 2019-01-08

## 2019-01-08 RX ORDER — ALBUTEROL SULFATE 2.5 MG/3ML
2.5 SOLUTION RESPIRATORY (INHALATION) EVERY 4 HOURS PRN
Status: DISCONTINUED | OUTPATIENT
Start: 2019-01-08 | End: 2019-01-09 | Stop reason: HOSPADM

## 2019-01-08 RX ORDER — METHYLPREDNISOLONE SODIUM SUCCINATE 125 MG/2ML
125 INJECTION, POWDER, LYOPHILIZED, FOR SOLUTION INTRAMUSCULAR; INTRAVENOUS ONCE
Status: COMPLETED | OUTPATIENT
Start: 2019-01-08 | End: 2019-01-08

## 2019-01-08 RX ORDER — NICOTINE POLACRILEX 4 MG
15 LOZENGE BUCCAL
Status: DISCONTINUED | OUTPATIENT
Start: 2019-01-08 | End: 2019-01-09 | Stop reason: HOSPADM

## 2019-01-08 RX ORDER — SODIUM CHLORIDE 0.9 % (FLUSH) 0.9 %
3-10 SYRINGE (ML) INJECTION AS NEEDED
Status: DISCONTINUED | OUTPATIENT
Start: 2019-01-08 | End: 2019-01-09 | Stop reason: HOSPADM

## 2019-01-08 RX ORDER — IPRATROPIUM BROMIDE AND ALBUTEROL SULFATE 2.5; .5 MG/3ML; MG/3ML
3 SOLUTION RESPIRATORY (INHALATION)
Status: DISCONTINUED | OUTPATIENT
Start: 2019-01-08 | End: 2019-01-09 | Stop reason: HOSPADM

## 2019-01-08 RX ORDER — HYDROCODONE BITARTRATE AND ACETAMINOPHEN 7.5; 325 MG/1; MG/1
1 TABLET ORAL EVERY 8 HOURS
Status: DISCONTINUED | OUTPATIENT
Start: 2019-01-08 | End: 2019-01-09 | Stop reason: HOSPADM

## 2019-01-08 RX ORDER — ISOSORBIDE MONONITRATE 30 MG/1
30 TABLET, EXTENDED RELEASE ORAL
Status: DISCONTINUED | OUTPATIENT
Start: 2019-01-09 | End: 2019-01-09 | Stop reason: HOSPADM

## 2019-01-08 RX ORDER — ONDANSETRON 2 MG/ML
4 INJECTION INTRAMUSCULAR; INTRAVENOUS EVERY 6 HOURS PRN
Status: DISCONTINUED | OUTPATIENT
Start: 2019-01-08 | End: 2019-01-09 | Stop reason: HOSPADM

## 2019-01-08 RX ORDER — DEXTROSE MONOHYDRATE 25 G/50ML
25 INJECTION, SOLUTION INTRAVENOUS
Status: DISCONTINUED | OUTPATIENT
Start: 2019-01-08 | End: 2019-01-09 | Stop reason: HOSPADM

## 2019-01-08 RX ORDER — RANOLAZINE 500 MG/1
500 TABLET, EXTENDED RELEASE ORAL EVERY 12 HOURS SCHEDULED
Status: DISCONTINUED | OUTPATIENT
Start: 2019-01-08 | End: 2019-01-09 | Stop reason: HOSPADM

## 2019-01-08 RX ORDER — FUROSEMIDE 20 MG/1
20 TABLET ORAL DAILY
Status: DISCONTINUED | OUTPATIENT
Start: 2019-01-09 | End: 2019-01-09 | Stop reason: HOSPADM

## 2019-01-08 RX ORDER — ACETAMINOPHEN 325 MG/1
650 TABLET ORAL EVERY 4 HOURS PRN
Status: DISCONTINUED | OUTPATIENT
Start: 2019-01-08 | End: 2019-01-09 | Stop reason: HOSPADM

## 2019-01-08 RX ORDER — LISINOPRIL 5 MG/1
5 TABLET ORAL
Status: DISCONTINUED | OUTPATIENT
Start: 2019-01-09 | End: 2019-01-09 | Stop reason: HOSPADM

## 2019-01-08 RX ORDER — CLOPIDOGREL BISULFATE 75 MG/1
75 TABLET ORAL DAILY
Status: DISCONTINUED | OUTPATIENT
Start: 2019-01-09 | End: 2019-01-09 | Stop reason: HOSPADM

## 2019-01-08 RX ORDER — SODIUM CHLORIDE 0.9 % (FLUSH) 0.9 %
3 SYRINGE (ML) INJECTION EVERY 12 HOURS SCHEDULED
Status: DISCONTINUED | OUTPATIENT
Start: 2019-01-08 | End: 2019-01-09 | Stop reason: HOSPADM

## 2019-01-08 RX ORDER — FAMOTIDINE 20 MG/1
20 TABLET, FILM COATED ORAL 2 TIMES DAILY
Status: DISCONTINUED | OUTPATIENT
Start: 2019-01-08 | End: 2019-01-09 | Stop reason: HOSPADM

## 2019-01-08 RX ORDER — SODIUM CHLORIDE 0.9 % (FLUSH) 0.9 %
10 SYRINGE (ML) INJECTION AS NEEDED
Status: DISCONTINUED | OUTPATIENT
Start: 2019-01-08 | End: 2019-01-09 | Stop reason: HOSPADM

## 2019-01-08 RX ORDER — GLIPIZIDE 5 MG/1
5 TABLET ORAL
Status: DISCONTINUED | OUTPATIENT
Start: 2019-01-09 | End: 2019-01-09 | Stop reason: HOSPADM

## 2019-01-08 RX ORDER — IPRATROPIUM BROMIDE AND ALBUTEROL SULFATE 2.5; .5 MG/3ML; MG/3ML
3 SOLUTION RESPIRATORY (INHALATION) ONCE
Status: COMPLETED | OUTPATIENT
Start: 2019-01-08 | End: 2019-01-08

## 2019-01-08 RX ADMIN — INSULIN ASPART 4 UNITS: 100 INJECTION, SOLUTION INTRAVENOUS; SUBCUTANEOUS at 22:42

## 2019-01-08 RX ADMIN — HYDROCODONE BITARTRATE AND ACETAMINOPHEN 1 TABLET: 7.5; 325 TABLET ORAL at 22:34

## 2019-01-08 RX ADMIN — FAMOTIDINE 20 MG: 20 TABLET ORAL at 22:34

## 2019-01-08 RX ADMIN — RANOLAZINE 500 MG: 500 TABLET, FILM COATED, EXTENDED RELEASE ORAL at 22:34

## 2019-01-08 RX ADMIN — METHYLPREDNISOLONE SODIUM SUCCINATE 125 MG: 125 INJECTION, POWDER, FOR SOLUTION INTRAMUSCULAR; INTRAVENOUS at 15:35

## 2019-01-08 RX ADMIN — IOPAMIDOL 79 ML: 612 INJECTION, SOLUTION INTRAVENOUS at 16:15

## 2019-01-08 RX ADMIN — IPRATROPIUM BROMIDE AND ALBUTEROL SULFATE 3 ML: 2.5; .5 SOLUTION RESPIRATORY (INHALATION) at 15:43

## 2019-01-08 RX ADMIN — SODIUM CHLORIDE, PRESERVATIVE FREE 10 ML: 5 INJECTION INTRAVENOUS at 15:35

## 2019-01-08 RX ADMIN — SODIUM CHLORIDE, PRESERVATIVE FREE 3 ML: 5 INJECTION INTRAVENOUS at 22:44

## 2019-01-08 RX ADMIN — BENZONATATE 200 MG: 100 CAPSULE ORAL at 15:35

## 2019-01-08 RX ADMIN — METHYLPREDNISOLONE SODIUM SUCCINATE 60 MG: 125 INJECTION, POWDER, FOR SOLUTION INTRAMUSCULAR; INTRAVENOUS at 22:35

## 2019-01-08 NOTE — OUTREACH NOTE
COPD/PN Week 2 Survey      Responses   Facility patient discharged from?  [place]   Does the patient have one of the following disease processes/diagnoses(primary or secondary)?  COPD/Pneumonia   Was the primary reason for admission:  COPD exacerbation   Week 2 attempt successful?  Yes   Call start time  1134   Rescheduled  Rescheduled-pt requested [He states I am SOB today and am going to see my PCP.]   Call end time  1135          Nani Camp RN

## 2019-01-09 ENCOUNTER — READMISSION MANAGEMENT (OUTPATIENT)
Dept: CALL CENTER | Facility: HOSPITAL | Age: 84
End: 2019-01-09

## 2019-01-09 VITALS
BODY MASS INDEX: 25.43 KG/M2 | HEIGHT: 68 IN | RESPIRATION RATE: 22 BRPM | TEMPERATURE: 98 F | WEIGHT: 167.8 LBS | DIASTOLIC BLOOD PRESSURE: 79 MMHG | SYSTOLIC BLOOD PRESSURE: 157 MMHG | HEART RATE: 98 BPM | OXYGEN SATURATION: 96 %

## 2019-01-09 LAB
ANION GAP SERPL CALCULATED.3IONS-SCNC: 13 MMOL/L (ref 5–15)
BASOPHILS # BLD AUTO: 0.02 10*3/MM3 (ref 0–0.2)
BASOPHILS NFR BLD AUTO: 0.2 % (ref 0–2)
BUN BLD-MCNC: 30 MG/DL (ref 7–21)
BUN/CREAT SERPL: 28.3 (ref 7–25)
CALCIUM SPEC-SCNC: 9.2 MG/DL (ref 8.4–10.2)
CHLORIDE SERPL-SCNC: 104 MMOL/L (ref 95–110)
CO2 SERPL-SCNC: 21 MMOL/L (ref 22–31)
CREAT BLD-MCNC: 1.06 MG/DL (ref 0.7–1.3)
DEPRECATED RDW RBC AUTO: 49.8 FL (ref 35.1–43.9)
EOSINOPHIL # BLD AUTO: 0 10*3/MM3 (ref 0–0.7)
EOSINOPHIL NFR BLD AUTO: 0 % (ref 0–7)
ERYTHROCYTE [DISTWIDTH] IN BLOOD BY AUTOMATED COUNT: 14.5 % (ref 11.5–14.5)
GFR SERPL CREATININE-BSD FRML MDRD: 66 ML/MIN/1.73 (ref 42–98)
GLUCOSE BLD-MCNC: 175 MG/DL (ref 60–100)
GLUCOSE BLDC GLUCOMTR-MCNC: 143 MG/DL (ref 70–130)
GLUCOSE BLDC GLUCOMTR-MCNC: 188 MG/DL (ref 70–130)
HCT VFR BLD AUTO: 39.9 % (ref 39–49)
HGB BLD-MCNC: 13.4 G/DL (ref 13.7–17.3)
IMM GRANULOCYTES # BLD AUTO: 0.09 10*3/MM3 (ref 0–0.02)
IMM GRANULOCYTES NFR BLD AUTO: 0.8 % (ref 0–0.5)
LYMPHOCYTES # BLD AUTO: 0.84 10*3/MM3 (ref 0.6–4.2)
LYMPHOCYTES NFR BLD AUTO: 7.3 % (ref 10–50)
MCH RBC QN AUTO: 31 PG (ref 26.5–34)
MCHC RBC AUTO-ENTMCNC: 33.6 G/DL (ref 31.5–36.3)
MCV RBC AUTO: 92.4 FL (ref 80–98)
MONOCYTES # BLD AUTO: 0.3 10*3/MM3 (ref 0–0.9)
MONOCYTES NFR BLD AUTO: 2.6 % (ref 0–12)
NEUTROPHILS # BLD AUTO: 10.21 10*3/MM3 (ref 2–8.6)
NEUTROPHILS NFR BLD AUTO: 89.1 % (ref 37–80)
PLATELET # BLD AUTO: 195 10*3/MM3 (ref 150–450)
PMV BLD AUTO: 11 FL (ref 8–12)
POTASSIUM BLD-SCNC: 4.5 MMOL/L (ref 3.5–5.1)
RBC # BLD AUTO: 4.32 10*6/MM3 (ref 4.37–5.74)
SODIUM BLD-SCNC: 138 MMOL/L (ref 137–145)
WBC NRBC COR # BLD: 11.46 10*3/MM3 (ref 3.2–9.8)

## 2019-01-09 PROCEDURE — 94799 UNLISTED PULMONARY SVC/PX: CPT

## 2019-01-09 PROCEDURE — 94760 N-INVAS EAR/PLS OXIMETRY 1: CPT

## 2019-01-09 PROCEDURE — 63710000001 INSULIN ASPART PER 5 UNITS: Performed by: NURSE PRACTITIONER

## 2019-01-09 PROCEDURE — 80048 BASIC METABOLIC PNL TOTAL CA: CPT | Performed by: NURSE PRACTITIONER

## 2019-01-09 PROCEDURE — 96376 TX/PRO/DX INJ SAME DRUG ADON: CPT

## 2019-01-09 PROCEDURE — G0378 HOSPITAL OBSERVATION PER HR: HCPCS

## 2019-01-09 PROCEDURE — 85025 COMPLETE CBC W/AUTO DIFF WBC: CPT | Performed by: NURSE PRACTITIONER

## 2019-01-09 PROCEDURE — 82962 GLUCOSE BLOOD TEST: CPT

## 2019-01-09 PROCEDURE — 94640 AIRWAY INHALATION TREATMENT: CPT

## 2019-01-09 PROCEDURE — 25010000002 METHYLPREDNISOLONE PER 125 MG: Performed by: NURSE PRACTITIONER

## 2019-01-09 RX ORDER — PREDNISONE 10 MG/1
TABLET ORAL
Qty: 30 TABLET | Refills: 0 | Status: SHIPPED | OUTPATIENT
Start: 2019-01-09 | End: 2019-04-02

## 2019-01-09 RX ADMIN — ISOSORBIDE MONONITRATE 30 MG: 30 TABLET, EXTENDED RELEASE ORAL at 08:35

## 2019-01-09 RX ADMIN — LISINOPRIL 5 MG: 5 TABLET ORAL at 08:35

## 2019-01-09 RX ADMIN — INSULIN ASPART 2 UNITS: 100 INJECTION, SOLUTION INTRAVENOUS; SUBCUTANEOUS at 08:21

## 2019-01-09 RX ADMIN — HYDROCODONE BITARTRATE AND ACETAMINOPHEN 1 TABLET: 7.5; 325 TABLET ORAL at 06:53

## 2019-01-09 RX ADMIN — SODIUM CHLORIDE, PRESERVATIVE FREE 3 ML: 5 INJECTION INTRAVENOUS at 08:38

## 2019-01-09 RX ADMIN — IPRATROPIUM BROMIDE AND ALBUTEROL SULFATE 3 ML: 2.5; .5 SOLUTION RESPIRATORY (INHALATION) at 01:37

## 2019-01-09 RX ADMIN — GLIPIZIDE 5 MG: 5 TABLET ORAL at 08:35

## 2019-01-09 RX ADMIN — METHYLPREDNISOLONE SODIUM SUCCINATE 60 MG: 125 INJECTION, POWDER, FOR SOLUTION INTRAMUSCULAR; INTRAVENOUS at 06:53

## 2019-01-09 RX ADMIN — RANOLAZINE 500 MG: 500 TABLET, FILM COATED, EXTENDED RELEASE ORAL at 08:35

## 2019-01-09 RX ADMIN — FUROSEMIDE 20 MG: 20 TABLET ORAL at 08:35

## 2019-01-09 RX ADMIN — IPRATROPIUM BROMIDE AND ALBUTEROL SULFATE 3 ML: 2.5; .5 SOLUTION RESPIRATORY (INHALATION) at 07:46

## 2019-01-09 RX ADMIN — IPRATROPIUM BROMIDE AND ALBUTEROL SULFATE 3 ML: 2.5; .5 SOLUTION RESPIRATORY (INHALATION) at 10:53

## 2019-01-09 RX ADMIN — Medication 400 MG: at 08:35

## 2019-01-09 RX ADMIN — FAMOTIDINE 20 MG: 20 TABLET ORAL at 08:35

## 2019-01-09 RX ADMIN — CLOPIDOGREL BISULFATE 75 MG: 75 TABLET ORAL at 08:35

## 2019-01-09 NOTE — OUTREACH NOTE
COPD/PN Week 2 Survey      Responses   Facility patient discharged from?  [place]   Does the patient have one of the following disease processes/diagnoses(primary or secondary)?  COPD/Pneumonia   Was the primary reason for admission:  COPD exacerbation   Week 2 attempt successful?  No   Revoke  Readmitted          Shelli Archuleta RN

## 2019-01-10 ENCOUNTER — READMISSION MANAGEMENT (OUTPATIENT)
Dept: CALL CENTER | Facility: HOSPITAL | Age: 84
End: 2019-01-10

## 2019-01-10 NOTE — OUTREACH NOTE
Prep Survey      Responses   Facility patient discharged from?  [place]   Is patient eligible?  Yes   Discharge diagnosis  COPD exacerbation    Does the patient have one of the following disease processes/diagnoses(primary or secondary)?  COPD/Pneumonia   Does the patient have Home health ordered?  Yes   What is the Home health agency?   [facility]   Is there a DME ordered?  No   General alerts for this patient   home O2 dependence, home nebulizer in place   Prep survey completed?  Yes          Jesika Wheeler RN

## 2019-01-11 ENCOUNTER — APPOINTMENT (OUTPATIENT)
Dept: CT IMAGING | Facility: HOSPITAL | Age: 84
End: 2019-01-11

## 2019-01-11 ENCOUNTER — READMISSION MANAGEMENT (OUTPATIENT)
Dept: CALL CENTER | Facility: HOSPITAL | Age: 84
End: 2019-01-11

## 2019-01-11 ENCOUNTER — HOSPITAL ENCOUNTER (INPATIENT)
Facility: HOSPITAL | Age: 84
LOS: 4 days | Discharge: HOME | End: 2019-01-15
Attending: EMERGENCY MEDICINE | Admitting: INTERNAL MEDICINE

## 2019-01-11 ENCOUNTER — APPOINTMENT (OUTPATIENT)
Dept: GENERAL RADIOLOGY | Facility: HOSPITAL | Age: 84
End: 2019-01-11

## 2019-01-11 DIAGNOSIS — E11.49 OTHER DIABETIC NEUROLOGICAL COMPLICATION ASSOCIATED WITH TYPE 2 DIABETES MELLITUS (HCC): Chronic | ICD-10-CM

## 2019-01-11 DIAGNOSIS — J42 CHRONIC BRONCHITIS, UNSPECIFIED CHRONIC BRONCHITIS TYPE (HCC): ICD-10-CM

## 2019-01-11 DIAGNOSIS — Z74.09 IMPAIRED PHYSICAL MOBILITY: ICD-10-CM

## 2019-01-11 DIAGNOSIS — Z78.9 FAILURE OF OUTPATIENT TREATMENT: Primary | ICD-10-CM

## 2019-01-11 DIAGNOSIS — I69.959 HEMIPLEGIA AS LATE EFFECT OF CEREBROVASCULAR DISEASE, UNSPECIFIED CEREBROVASCULAR DISEASE TYPE, UNSPECIFIED HEMIPLEGIA TYPE, UNSPECIFIED LATERALITY (HCC): ICD-10-CM

## 2019-01-11 DIAGNOSIS — Z74.09 IMPAIRED MOBILITY AND ACTIVITIES OF DAILY LIVING: ICD-10-CM

## 2019-01-11 DIAGNOSIS — Z78.9 IMPAIRED MOBILITY AND ACTIVITIES OF DAILY LIVING: ICD-10-CM

## 2019-01-11 LAB
ALBUMIN SERPL-MCNC: 3.8 G/DL (ref 3.4–4.8)
ALBUMIN/GLOB SERPL: 1.7 G/DL (ref 1.1–1.8)
ALP SERPL-CCNC: 67 U/L (ref 38–126)
ALT SERPL W P-5'-P-CCNC: 22 U/L (ref 21–72)
ANION GAP SERPL CALCULATED.3IONS-SCNC: 9 MMOL/L (ref 5–15)
AST SERPL-CCNC: 25 U/L (ref 17–59)
B PERT DNA SPEC QL NAA+PROBE: NOT DETECTED
BASOPHILS # BLD AUTO: 0 10*3/MM3 (ref 0–0.2)
BASOPHILS NFR BLD AUTO: 0 % (ref 0–2)
BILIRUB SERPL-MCNC: 0.2 MG/DL (ref 0.2–1.3)
BILIRUB UR QL STRIP: NEGATIVE
BUN BLD-MCNC: 25 MG/DL (ref 7–21)
BUN/CREAT SERPL: 22.7 (ref 7–25)
C PNEUM DNA NPH QL NAA+NON-PROBE: NOT DETECTED
CALCIUM SPEC-SCNC: 9.3 MG/DL (ref 8.4–10.2)
CHLORIDE SERPL-SCNC: 103 MMOL/L (ref 95–110)
CLARITY UR: CLEAR
CO2 SERPL-SCNC: 26 MMOL/L (ref 22–31)
COLOR UR: YELLOW
CREAT BLD-MCNC: 1.1 MG/DL (ref 0.7–1.3)
D-LACTATE SERPL-SCNC: 3.1 MMOL/L (ref 0.5–2)
D-LACTATE SERPL-SCNC: 5 MMOL/L (ref 0.5–2)
DEPRECATED RDW RBC AUTO: 48.3 FL (ref 35.1–43.9)
EOSINOPHIL # BLD AUTO: 0 10*3/MM3 (ref 0–0.7)
EOSINOPHIL NFR BLD AUTO: 0 % (ref 0–7)
ERYTHROCYTE [DISTWIDTH] IN BLOOD BY AUTOMATED COUNT: 14.4 % (ref 11.5–14.5)
FLUAV H1 2009 PAND RNA NPH QL NAA+PROBE: NOT DETECTED
FLUAV H1 HA GENE NPH QL NAA+PROBE: NOT DETECTED
FLUAV H3 RNA NPH QL NAA+PROBE: NOT DETECTED
FLUAV SUBTYP SPEC NAA+PROBE: NOT DETECTED
FLUBV RNA ISLT QL NAA+PROBE: NOT DETECTED
GFR SERPL CREATININE-BSD FRML MDRD: 64 ML/MIN/1.73 (ref 42–98)
GLOBULIN UR ELPH-MCNC: 2.3 GM/DL (ref 2.3–3.5)
GLUCOSE BLD-MCNC: 101 MG/DL (ref 60–100)
GLUCOSE BLDC GLUCOMTR-MCNC: 199 MG/DL (ref 70–130)
GLUCOSE BLDC GLUCOMTR-MCNC: 207 MG/DL (ref 70–130)
GLUCOSE UR STRIP-MCNC: NEGATIVE MG/DL
HADV DNA SPEC NAA+PROBE: NOT DETECTED
HCOV 229E RNA SPEC QL NAA+PROBE: NOT DETECTED
HCOV HKU1 RNA SPEC QL NAA+PROBE: NOT DETECTED
HCOV NL63 RNA SPEC QL NAA+PROBE: NOT DETECTED
HCOV OC43 RNA SPEC QL NAA+PROBE: NOT DETECTED
HCT VFR BLD AUTO: 38.8 % (ref 39–49)
HGB BLD-MCNC: 13.1 G/DL (ref 13.7–17.3)
HGB UR QL STRIP.AUTO: NEGATIVE
HMPV RNA NPH QL NAA+NON-PROBE: DETECTED
HOLD SPECIMEN: NORMAL
HPIV1 RNA SPEC QL NAA+PROBE: NOT DETECTED
HPIV2 RNA SPEC QL NAA+PROBE: NOT DETECTED
HPIV3 RNA NPH QL NAA+PROBE: NOT DETECTED
HPIV4 P GENE NPH QL NAA+PROBE: NOT DETECTED
IMM GRANULOCYTES # BLD AUTO: 0.14 10*3/MM3 (ref 0–0.02)
IMM GRANULOCYTES NFR BLD AUTO: 0.9 % (ref 0–0.5)
KETONES UR QL STRIP: NEGATIVE
LEUKOCYTE ESTERASE UR QL STRIP.AUTO: NEGATIVE
LYMPHOCYTES # BLD AUTO: 1.26 10*3/MM3 (ref 0.6–4.2)
LYMPHOCYTES NFR BLD AUTO: 8.1 % (ref 10–50)
M PNEUMO IGG SER IA-ACNC: NOT DETECTED
MCH RBC QN AUTO: 31.1 PG (ref 26.5–34)
MCHC RBC AUTO-ENTMCNC: 33.8 G/DL (ref 31.5–36.3)
MCV RBC AUTO: 92.2 FL (ref 80–98)
MONOCYTES # BLD AUTO: 0.75 10*3/MM3 (ref 0–0.9)
MONOCYTES NFR BLD AUTO: 4.8 % (ref 0–12)
NEUTROPHILS # BLD AUTO: 13.38 10*3/MM3 (ref 2–8.6)
NEUTROPHILS NFR BLD AUTO: 86.2 % (ref 37–80)
NITRITE UR QL STRIP: NEGATIVE
NT-PROBNP SERPL-MCNC: 512 PG/ML (ref 0–1800)
PH UR STRIP.AUTO: 5.5 [PH] (ref 5–9)
PLATELET # BLD AUTO: 190 10*3/MM3 (ref 150–450)
PMV BLD AUTO: 11 FL (ref 8–12)
POTASSIUM BLD-SCNC: 4.2 MMOL/L (ref 3.5–5.1)
PROT SERPL-MCNC: 6.1 G/DL (ref 6.3–8.6)
PROT UR QL STRIP: NEGATIVE
RBC # BLD AUTO: 4.21 10*6/MM3 (ref 4.37–5.74)
RHINOVIRUS RNA SPEC NAA+PROBE: NOT DETECTED
RSV RNA NPH QL NAA+NON-PROBE: NOT DETECTED
SODIUM BLD-SCNC: 138 MMOL/L (ref 137–145)
SP GR UR STRIP: 1.02 (ref 1–1.03)
TROPONIN I SERPL-MCNC: 0.05 NG/ML
TROPONIN I SERPL-MCNC: 0.06 NG/ML
UROBILINOGEN UR QL STRIP: NORMAL
WBC NRBC COR # BLD: 15.53 10*3/MM3 (ref 3.2–9.8)
WHOLE BLOOD HOLD SPECIMEN: NORMAL

## 2019-01-11 PROCEDURE — 25010000002 HEPARIN (PORCINE) PER 1000 UNITS: Performed by: HOSPITALIST

## 2019-01-11 PROCEDURE — 93005 ELECTROCARDIOGRAM TRACING: CPT | Performed by: EMERGENCY MEDICINE

## 2019-01-11 PROCEDURE — 87633 RESP VIRUS 12-25 TARGETS: CPT | Performed by: HOSPITALIST

## 2019-01-11 PROCEDURE — 84484 ASSAY OF TROPONIN QUANT: CPT | Performed by: HOSPITALIST

## 2019-01-11 PROCEDURE — 25010000002 VANCOMYCIN 1 G RECONSTITUTED SOLUTION: Performed by: HOSPITALIST

## 2019-01-11 PROCEDURE — 94760 N-INVAS EAR/PLS OXIMETRY 1: CPT

## 2019-01-11 PROCEDURE — 87581 M.PNEUMON DNA AMP PROBE: CPT | Performed by: HOSPITALIST

## 2019-01-11 PROCEDURE — G0378 HOSPITAL OBSERVATION PER HR: HCPCS

## 2019-01-11 PROCEDURE — 93010 ELECTROCARDIOGRAM REPORT: CPT | Performed by: INTERNAL MEDICINE

## 2019-01-11 PROCEDURE — 94799 UNLISTED PULMONARY SVC/PX: CPT

## 2019-01-11 PROCEDURE — 87486 CHLMYD PNEUM DNA AMP PROBE: CPT | Performed by: HOSPITALIST

## 2019-01-11 PROCEDURE — 25010000002 METHYLPREDNISOLONE PER 125 MG: Performed by: HOSPITALIST

## 2019-01-11 PROCEDURE — 25010000002 LEVOFLOXACIN PER 250 MG: Performed by: PHYSICIAN ASSISTANT

## 2019-01-11 PROCEDURE — 94640 AIRWAY INHALATION TREATMENT: CPT

## 2019-01-11 PROCEDURE — 99284 EMERGENCY DEPT VISIT MOD MDM: CPT

## 2019-01-11 PROCEDURE — 80053 COMPREHEN METABOLIC PANEL: CPT | Performed by: EMERGENCY MEDICINE

## 2019-01-11 PROCEDURE — 87205 SMEAR GRAM STAIN: CPT | Performed by: HOSPITALIST

## 2019-01-11 PROCEDURE — 84484 ASSAY OF TROPONIN QUANT: CPT | Performed by: PHYSICIAN ASSISTANT

## 2019-01-11 PROCEDURE — 36415 COLL VENOUS BLD VENIPUNCTURE: CPT | Performed by: EMERGENCY MEDICINE

## 2019-01-11 PROCEDURE — 83880 ASSAY OF NATRIURETIC PEPTIDE: CPT | Performed by: PHYSICIAN ASSISTANT

## 2019-01-11 PROCEDURE — 83605 ASSAY OF LACTIC ACID: CPT | Performed by: EMERGENCY MEDICINE

## 2019-01-11 PROCEDURE — 87040 BLOOD CULTURE FOR BACTERIA: CPT | Performed by: EMERGENCY MEDICINE

## 2019-01-11 PROCEDURE — 71045 X-RAY EXAM CHEST 1 VIEW: CPT

## 2019-01-11 PROCEDURE — 87798 DETECT AGENT NOS DNA AMP: CPT | Performed by: HOSPITALIST

## 2019-01-11 PROCEDURE — 82962 GLUCOSE BLOOD TEST: CPT

## 2019-01-11 PROCEDURE — 74176 CT ABD & PELVIS W/O CONTRAST: CPT

## 2019-01-11 PROCEDURE — 81003 URINALYSIS AUTO W/O SCOPE: CPT | Performed by: PHYSICIAN ASSISTANT

## 2019-01-11 PROCEDURE — 71046 X-RAY EXAM CHEST 2 VIEWS: CPT

## 2019-01-11 PROCEDURE — 63710000001 INSULIN ASPART PER 5 UNITS: Performed by: HOSPITALIST

## 2019-01-11 PROCEDURE — 87070 CULTURE OTHR SPECIMN AEROBIC: CPT | Performed by: HOSPITALIST

## 2019-01-11 PROCEDURE — 85025 COMPLETE CBC W/AUTO DIFF WBC: CPT | Performed by: EMERGENCY MEDICINE

## 2019-01-11 RX ORDER — SODIUM CHLORIDE 0.9 % (FLUSH) 0.9 %
3 SYRINGE (ML) INJECTION EVERY 12 HOURS SCHEDULED
Status: DISCONTINUED | OUTPATIENT
Start: 2019-01-11 | End: 2019-01-15 | Stop reason: HOSPADM

## 2019-01-11 RX ORDER — ACETYLCYSTEINE 100 MG/ML
4 SOLUTION ORAL; RESPIRATORY (INHALATION)
Status: DISCONTINUED | OUTPATIENT
Start: 2019-01-11 | End: 2019-01-14

## 2019-01-11 RX ORDER — HEPARIN SODIUM 5000 [USP'U]/ML
5000 INJECTION, SOLUTION INTRAVENOUS; SUBCUTANEOUS EVERY 8 HOURS SCHEDULED
Status: DISCONTINUED | OUTPATIENT
Start: 2019-01-11 | End: 2019-01-15 | Stop reason: HOSPADM

## 2019-01-11 RX ORDER — DEXTROSE MONOHYDRATE 25 G/50ML
25 INJECTION, SOLUTION INTRAVENOUS
Status: DISCONTINUED | OUTPATIENT
Start: 2019-01-11 | End: 2019-01-15 | Stop reason: HOSPADM

## 2019-01-11 RX ORDER — HYDROCODONE BITARTRATE AND ACETAMINOPHEN 7.5; 325 MG/1; MG/1
1 TABLET ORAL EVERY 8 HOURS
Status: DISCONTINUED | OUTPATIENT
Start: 2019-01-11 | End: 2019-01-15 | Stop reason: HOSPADM

## 2019-01-11 RX ORDER — DOXYCYCLINE 100 MG/1
1 TABLET ORAL
COMMUNITY
Start: 2019-01-04 | End: 2019-01-15 | Stop reason: HOSPADM

## 2019-01-11 RX ORDER — LEVOFLOXACIN 5 MG/ML
750 INJECTION, SOLUTION INTRAVENOUS ONCE
Status: COMPLETED | OUTPATIENT
Start: 2019-01-11 | End: 2019-01-11

## 2019-01-11 RX ORDER — SODIUM CHLORIDE 0.9 % (FLUSH) 0.9 %
3-10 SYRINGE (ML) INJECTION AS NEEDED
Status: DISCONTINUED | OUTPATIENT
Start: 2019-01-11 | End: 2019-01-15 | Stop reason: HOSPADM

## 2019-01-11 RX ORDER — RANOLAZINE 500 MG/1
500 TABLET, EXTENDED RELEASE ORAL EVERY 12 HOURS SCHEDULED
Status: DISCONTINUED | OUTPATIENT
Start: 2019-01-11 | End: 2019-01-15 | Stop reason: HOSPADM

## 2019-01-11 RX ORDER — FUROSEMIDE 10 MG/ML
40 INJECTION INTRAMUSCULAR; INTRAVENOUS ONCE
Status: DISCONTINUED | OUTPATIENT
Start: 2019-01-11 | End: 2019-01-11

## 2019-01-11 RX ORDER — METHYLPREDNISOLONE SODIUM SUCCINATE 125 MG/2ML
125 INJECTION, POWDER, LYOPHILIZED, FOR SOLUTION INTRAMUSCULAR; INTRAVENOUS ONCE
Status: DISCONTINUED | OUTPATIENT
Start: 2019-01-11 | End: 2019-01-11

## 2019-01-11 RX ORDER — LEVOFLOXACIN 5 MG/ML
750 INJECTION, SOLUTION INTRAVENOUS EVERY 24 HOURS
Status: DISCONTINUED | OUTPATIENT
Start: 2019-01-12 | End: 2019-01-15 | Stop reason: HOSPADM

## 2019-01-11 RX ORDER — SODIUM CHLORIDE 9 MG/ML
75 INJECTION, SOLUTION INTRAVENOUS CONTINUOUS
Status: DISCONTINUED | OUTPATIENT
Start: 2019-01-11 | End: 2019-01-11

## 2019-01-11 RX ORDER — LISINOPRIL 5 MG/1
5 TABLET ORAL
Status: DISCONTINUED | OUTPATIENT
Start: 2019-01-11 | End: 2019-01-15

## 2019-01-11 RX ORDER — SODIUM CHLORIDE 9 MG/ML
50 INJECTION, SOLUTION INTRAVENOUS CONTINUOUS
Status: DISCONTINUED | OUTPATIENT
Start: 2019-01-11 | End: 2019-01-13

## 2019-01-11 RX ORDER — METHYLPREDNISOLONE SODIUM SUCCINATE 125 MG/2ML
60 INJECTION, POWDER, LYOPHILIZED, FOR SOLUTION INTRAMUSCULAR; INTRAVENOUS EVERY 12 HOURS
Status: DISCONTINUED | OUTPATIENT
Start: 2019-01-11 | End: 2019-01-13

## 2019-01-11 RX ORDER — GUAIFENESIN 600 MG/1
1200 TABLET, EXTENDED RELEASE ORAL EVERY 12 HOURS SCHEDULED
Status: DISCONTINUED | OUTPATIENT
Start: 2019-01-11 | End: 2019-01-15 | Stop reason: HOSPADM

## 2019-01-11 RX ORDER — IPRATROPIUM BROMIDE AND ALBUTEROL SULFATE 2.5; .5 MG/3ML; MG/3ML
3 SOLUTION RESPIRATORY (INHALATION) 4 TIMES DAILY
Status: DISCONTINUED | OUTPATIENT
Start: 2019-01-11 | End: 2019-01-11 | Stop reason: SDUPTHER

## 2019-01-11 RX ORDER — CLOPIDOGREL BISULFATE 75 MG/1
75 TABLET ORAL DAILY
Status: DISCONTINUED | OUTPATIENT
Start: 2019-01-11 | End: 2019-01-15 | Stop reason: HOSPADM

## 2019-01-11 RX ORDER — SODIUM CHLORIDE 0.9 % (FLUSH) 0.9 %
10 SYRINGE (ML) INJECTION AS NEEDED
Status: DISCONTINUED | OUTPATIENT
Start: 2019-01-11 | End: 2019-01-15 | Stop reason: HOSPADM

## 2019-01-11 RX ORDER — NICOTINE POLACRILEX 4 MG
15 LOZENGE BUCCAL
Status: DISCONTINUED | OUTPATIENT
Start: 2019-01-11 | End: 2019-01-15 | Stop reason: HOSPADM

## 2019-01-11 RX ORDER — ISOSORBIDE MONONITRATE 30 MG/1
30 TABLET, EXTENDED RELEASE ORAL
Status: DISCONTINUED | OUTPATIENT
Start: 2019-01-11 | End: 2019-01-15 | Stop reason: HOSPADM

## 2019-01-11 RX ORDER — IPRATROPIUM BROMIDE AND ALBUTEROL SULFATE 2.5; .5 MG/3ML; MG/3ML
3 SOLUTION RESPIRATORY (INHALATION)
Status: DISCONTINUED | OUTPATIENT
Start: 2019-01-11 | End: 2019-01-15 | Stop reason: HOSPADM

## 2019-01-11 RX ORDER — ONDANSETRON 2 MG/ML
4 INJECTION INTRAMUSCULAR; INTRAVENOUS EVERY 6 HOURS PRN
Status: DISCONTINUED | OUTPATIENT
Start: 2019-01-11 | End: 2019-01-15 | Stop reason: HOSPADM

## 2019-01-11 RX ADMIN — SODIUM CHLORIDE 75 ML/HR: 9 INJECTION, SOLUTION INTRAVENOUS at 16:58

## 2019-01-11 RX ADMIN — RANOLAZINE 500 MG: 500 TABLET, FILM COATED, EXTENDED RELEASE ORAL at 20:59

## 2019-01-11 RX ADMIN — SODIUM CHLORIDE 75 ML/HR: 9 INJECTION, SOLUTION INTRAVENOUS at 21:00

## 2019-01-11 RX ADMIN — INSULIN ASPART 3 UNITS: 100 INJECTION, SOLUTION INTRAVENOUS; SUBCUTANEOUS at 16:58

## 2019-01-11 RX ADMIN — SODIUM CHLORIDE, PRESERVATIVE FREE 3 ML: 5 INJECTION INTRAVENOUS at 21:00

## 2019-01-11 RX ADMIN — IPRATROPIUM BROMIDE AND ALBUTEROL SULFATE 3 ML: 2.5; .5 SOLUTION RESPIRATORY (INHALATION) at 12:38

## 2019-01-11 RX ADMIN — IPRATROPIUM BROMIDE AND ALBUTEROL SULFATE 3 ML: 2.5; .5 SOLUTION RESPIRATORY (INHALATION) at 20:10

## 2019-01-11 RX ADMIN — LEVOFLOXACIN 750 MG: 5 INJECTION, SOLUTION INTRAVENOUS at 13:34

## 2019-01-11 RX ADMIN — HYDROCODONE BITARTRATE AND ACETAMINOPHEN 1 TABLET: 7.5; 325 TABLET ORAL at 16:06

## 2019-01-11 RX ADMIN — METHYLPREDNISOLONE SODIUM SUCCINATE 60 MG: 125 INJECTION, POWDER, FOR SOLUTION INTRAMUSCULAR; INTRAVENOUS at 16:58

## 2019-01-11 RX ADMIN — ACETYLCYSTEINE 4 ML: 100 SOLUTION ORAL; RESPIRATORY (INHALATION) at 20:11

## 2019-01-11 RX ADMIN — SODIUM CHLORIDE 500 ML: 9 INJECTION, SOLUTION INTRAVENOUS at 13:33

## 2019-01-11 RX ADMIN — Medication 10 ML: at 12:19

## 2019-01-11 RX ADMIN — HEPARIN SODIUM 5000 UNITS: 5000 INJECTION INTRAVENOUS; SUBCUTANEOUS at 16:06

## 2019-01-11 RX ADMIN — VANCOMYCIN HYDROCHLORIDE 1000 MG: 1 INJECTION, POWDER, LYOPHILIZED, FOR SOLUTION INTRAVENOUS at 21:10

## 2019-01-11 RX ADMIN — INSULIN ASPART 2 UNITS: 100 INJECTION, SOLUTION INTRAVENOUS; SUBCUTANEOUS at 22:55

## 2019-01-11 RX ADMIN — HEPARIN SODIUM 5000 UNITS: 5000 INJECTION INTRAVENOUS; SUBCUTANEOUS at 22:56

## 2019-01-11 RX ADMIN — GUAIFENESIN 1200 MG: 600 TABLET, EXTENDED RELEASE ORAL at 20:59

## 2019-01-11 NOTE — OUTREACH NOTE
COPD/PN Week 1 Survey      Responses   Facility patient discharged from?  [place]   Does the patient have one of the following disease processes/diagnoses(primary or secondary)?  COPD/Pneumonia   Is there a successful TCM telephone encounter documented?  No   Was the primary reason for admission:  COPD exacerbation   Week 1 attempt successful?  Yes   Call start time  0809   Call end time  0825   Is patient permission given to speak with other caregiver?  Yes   List who call center can speak with  Mary Ann Pacheco reviewed with patient/caregiver?  N/A   Is the patient having any side effects they believe may be caused by any medication additions or changes?  No   Does the patient have all medications ordered at discharge?  N/A   Is the patient taking all medications as directed (includes completed medication regime)?  Yes   Does the patient have a primary care provider?   Yes   Does the patient have an appointment with their PCP or pulmonologist within 7 days of discharge?  Yes   Comments regarding PCP  Dr Rey    Has the patient kept scheduled appointments due by today?  N/A   What is the Home health agency?   [facility]   Has home health visited the patient within 72 hours of discharge?  Yes   Home health comments  Home health nurse visited yesterday.   Psychosocial issues?  No   Psychosocial comments  [place] at home with wife-lives next door to daughter.   Did the patient receive a copy of their discharge instructions?  Yes   Nursing interventions  Reviewed instructions with patient   What is the patient's perception of their health status since discharge?  Worsening   Nursing Interventions  Nurse provided patient education   Are the patient's immunizations up to date?   Yes   Nursing interventions  Educated on importance of maintaining up to date immunizations as advised by provider   Is the patient/caregiver able to teach back the hierarchy of who to call/visit for symptoms/problems?  PCP, Specialist, Home health nurse, Urgent Care, ED, 911  Yes   Is the patient able to teach back COPD zones?  Yes   Nursing interventions  Education provided on various zones   Patient reports what zone on this call?  Yellow Zone   Yellow Zone  Increased shortness of air, Unable to complete daily activities, Increased or thicker phlegm/mucus, Using quick relief inhaler/nebulizer more often, Medication is not helping relieve symptoms, Poor Appetite   Yellow interventions  Continue to use daily medications, Use quick relief inhaler as ordered, Use oxygen as ordered, Use other meds such as steroids or antibiotics as ordered, Get plenty of rest, Call provider immediatly if symptoms do not improve   Is the patient/caregiver able to teach back signs and symptoms of worsening condition:  Fever/chills, Shortness of breath, Chest pain   Is the patient/caregiver able to teach back importance of completing antibiotic course of treatment?  Yes   Week 1 call completed?  Yes   Wrap up additional comments  States is feeling worse today-states tired with more cough. Continues with home oxygen at 2L/min. States will call home health nurse for evaluation-will call Dr Rey if unable to be evaluated by home health nurse.          Arianna Carlton, RN

## 2019-01-11 NOTE — OUTREACH NOTE
COPD/PN Week 2 Survey      Responses   Facility patient discharged from?  [place]   Does the patient have one of the following disease processes/diagnoses(primary or secondary)?  COPD/Pneumonia   Was the primary reason for admission:  COPD exacerbation   Week 2 attempt successful?  No   Revoke  Readmitted          Shelli Archuleta RN

## 2019-01-12 LAB
ANION GAP SERPL CALCULATED.3IONS-SCNC: 8 MMOL/L (ref 5–15)
BASOPHILS # BLD AUTO: 0.02 10*3/MM3 (ref 0–0.2)
BASOPHILS NFR BLD AUTO: 0.2 % (ref 0–2)
BUN BLD-MCNC: 23 MG/DL (ref 7–21)
BUN/CREAT SERPL: 25 (ref 7–25)
CALCIUM SPEC-SCNC: 8.6 MG/DL (ref 8.4–10.2)
CHLORIDE SERPL-SCNC: 101 MMOL/L (ref 95–110)
CO2 SERPL-SCNC: 25 MMOL/L (ref 22–31)
CREAT BLD-MCNC: 0.92 MG/DL (ref 0.7–1.3)
D-LACTATE SERPL-SCNC: 2.6 MMOL/L (ref 0.5–2)
DEPRECATED RDW RBC AUTO: 47.3 FL (ref 35.1–43.9)
EOSINOPHIL # BLD AUTO: 0 10*3/MM3 (ref 0–0.7)
EOSINOPHIL NFR BLD AUTO: 0 % (ref 0–7)
ERYTHROCYTE [DISTWIDTH] IN BLOOD BY AUTOMATED COUNT: 14.2 % (ref 11.5–14.5)
GFR SERPL CREATININE-BSD FRML MDRD: 78 ML/MIN/1.73 (ref 42–98)
GLUCOSE BLD-MCNC: 117 MG/DL (ref 60–100)
GLUCOSE BLDC GLUCOMTR-MCNC: 125 MG/DL (ref 70–130)
GLUCOSE BLDC GLUCOMTR-MCNC: 129 MG/DL (ref 70–130)
GLUCOSE BLDC GLUCOMTR-MCNC: 164 MG/DL (ref 70–130)
GLUCOSE BLDC GLUCOMTR-MCNC: 254 MG/DL (ref 70–130)
HCT VFR BLD AUTO: 35.8 % (ref 39–49)
HGB BLD-MCNC: 12.1 G/DL (ref 13.7–17.3)
IMM GRANULOCYTES # BLD AUTO: 0.23 10*3/MM3 (ref 0–0.02)
IMM GRANULOCYTES NFR BLD AUTO: 1.8 % (ref 0–0.5)
LYMPHOCYTES # BLD AUTO: 1.02 10*3/MM3 (ref 0.6–4.2)
LYMPHOCYTES NFR BLD AUTO: 7.9 % (ref 10–50)
MCH RBC QN AUTO: 31 PG (ref 26.5–34)
MCHC RBC AUTO-ENTMCNC: 33.8 G/DL (ref 31.5–36.3)
MCV RBC AUTO: 91.8 FL (ref 80–98)
MONOCYTES # BLD AUTO: 0.53 10*3/MM3 (ref 0–0.9)
MONOCYTES NFR BLD AUTO: 4.1 % (ref 0–12)
MRSA DNA SPEC QL NAA+PROBE: NEGATIVE
NEUTROPHILS # BLD AUTO: 11.05 10*3/MM3 (ref 2–8.6)
NEUTROPHILS NFR BLD AUTO: 86 % (ref 37–80)
PLATELET # BLD AUTO: 175 10*3/MM3 (ref 150–450)
PMV BLD AUTO: 11.2 FL (ref 8–12)
POTASSIUM BLD-SCNC: 4.5 MMOL/L (ref 3.5–5.1)
RBC # BLD AUTO: 3.9 10*6/MM3 (ref 4.37–5.74)
SODIUM BLD-SCNC: 134 MMOL/L (ref 137–145)
TROPONIN I SERPL-MCNC: 0.04 NG/ML
WBC NRBC COR # BLD: 12.85 10*3/MM3 (ref 3.2–9.8)

## 2019-01-12 PROCEDURE — 25010000002 METHYLPREDNISOLONE PER 125 MG: Performed by: HOSPITALIST

## 2019-01-12 PROCEDURE — 25010000002 LEVOFLOXACIN PER 250 MG: Performed by: HOSPITALIST

## 2019-01-12 PROCEDURE — 94760 N-INVAS EAR/PLS OXIMETRY 1: CPT

## 2019-01-12 PROCEDURE — 87641 MR-STAPH DNA AMP PROBE: CPT | Performed by: HOSPITALIST

## 2019-01-12 PROCEDURE — 83605 ASSAY OF LACTIC ACID: CPT | Performed by: HOSPITALIST

## 2019-01-12 PROCEDURE — 80048 BASIC METABOLIC PNL TOTAL CA: CPT | Performed by: HOSPITALIST

## 2019-01-12 PROCEDURE — 25010000002 HEPARIN (PORCINE) PER 1000 UNITS: Performed by: HOSPITALIST

## 2019-01-12 PROCEDURE — G0378 HOSPITAL OBSERVATION PER HR: HCPCS

## 2019-01-12 PROCEDURE — 94799 UNLISTED PULMONARY SVC/PX: CPT

## 2019-01-12 PROCEDURE — 85025 COMPLETE CBC W/AUTO DIFF WBC: CPT | Performed by: HOSPITALIST

## 2019-01-12 PROCEDURE — 25010000002 VANCOMYCIN 1 G RECONSTITUTED SOLUTION: Performed by: HOSPITALIST

## 2019-01-12 PROCEDURE — 63710000001 INSULIN ASPART PER 5 UNITS: Performed by: HOSPITALIST

## 2019-01-12 PROCEDURE — 82962 GLUCOSE BLOOD TEST: CPT

## 2019-01-12 PROCEDURE — 84484 ASSAY OF TROPONIN QUANT: CPT | Performed by: HOSPITALIST

## 2019-01-12 RX ADMIN — ISOSORBIDE MONONITRATE 30 MG: 30 TABLET, EXTENDED RELEASE ORAL at 09:32

## 2019-01-12 RX ADMIN — VANCOMYCIN HYDROCHLORIDE 1000 MG: 1 INJECTION, POWDER, LYOPHILIZED, FOR SOLUTION INTRAVENOUS at 21:07

## 2019-01-12 RX ADMIN — GUAIFENESIN 1200 MG: 600 TABLET, EXTENDED RELEASE ORAL at 21:12

## 2019-01-12 RX ADMIN — INSULIN ASPART 2 UNITS: 100 INJECTION, SOLUTION INTRAVENOUS; SUBCUTANEOUS at 21:19

## 2019-01-12 RX ADMIN — GUAIFENESIN 1200 MG: 600 TABLET, EXTENDED RELEASE ORAL at 09:32

## 2019-01-12 RX ADMIN — LISINOPRIL 5 MG: 5 TABLET ORAL at 09:32

## 2019-01-12 RX ADMIN — RANOLAZINE 500 MG: 500 TABLET, FILM COATED, EXTENDED RELEASE ORAL at 21:12

## 2019-01-12 RX ADMIN — HEPARIN SODIUM 5000 UNITS: 5000 INJECTION INTRAVENOUS; SUBCUTANEOUS at 14:43

## 2019-01-12 RX ADMIN — ACETYLCYSTEINE 4 ML: 100 SOLUTION ORAL; RESPIRATORY (INHALATION) at 20:27

## 2019-01-12 RX ADMIN — HYDROCODONE BITARTRATE AND ACETAMINOPHEN 1 TABLET: 7.5; 325 TABLET ORAL at 16:50

## 2019-01-12 RX ADMIN — HYDROCODONE BITARTRATE AND ACETAMINOPHEN 1 TABLET: 7.5; 325 TABLET ORAL at 09:32

## 2019-01-12 RX ADMIN — CLOPIDOGREL BISULFATE 75 MG: 75 TABLET ORAL at 09:32

## 2019-01-12 RX ADMIN — IPRATROPIUM BROMIDE AND ALBUTEROL SULFATE 3 ML: 2.5; .5 SOLUTION RESPIRATORY (INHALATION) at 07:35

## 2019-01-12 RX ADMIN — HYDROCODONE BITARTRATE AND ACETAMINOPHEN 1 TABLET: 7.5; 325 TABLET ORAL at 00:24

## 2019-01-12 RX ADMIN — INSULIN ASPART 4 UNITS: 100 INJECTION, SOLUTION INTRAVENOUS; SUBCUTANEOUS at 11:53

## 2019-01-12 RX ADMIN — HEPARIN SODIUM 5000 UNITS: 5000 INJECTION INTRAVENOUS; SUBCUTANEOUS at 06:25

## 2019-01-12 RX ADMIN — IPRATROPIUM BROMIDE AND ALBUTEROL SULFATE 3 ML: 2.5; .5 SOLUTION RESPIRATORY (INHALATION) at 14:30

## 2019-01-12 RX ADMIN — IPRATROPIUM BROMIDE AND ALBUTEROL SULFATE 3 ML: 2.5; .5 SOLUTION RESPIRATORY (INHALATION) at 11:01

## 2019-01-12 RX ADMIN — Medication 400 MG: at 09:32

## 2019-01-12 RX ADMIN — METHYLPREDNISOLONE SODIUM SUCCINATE 60 MG: 125 INJECTION, POWDER, FOR SOLUTION INTRAMUSCULAR; INTRAVENOUS at 05:00

## 2019-01-12 RX ADMIN — LEVOFLOXACIN 750 MG: 5 INJECTION, SOLUTION INTRAVENOUS at 14:43

## 2019-01-12 RX ADMIN — SODIUM CHLORIDE 75 ML/HR: 9 INJECTION, SOLUTION INTRAVENOUS at 09:32

## 2019-01-12 RX ADMIN — ACETYLCYSTEINE 4 ML: 100 SOLUTION ORAL; RESPIRATORY (INHALATION) at 07:36

## 2019-01-12 RX ADMIN — METHYLPREDNISOLONE SODIUM SUCCINATE 60 MG: 125 INJECTION, POWDER, FOR SOLUTION INTRAMUSCULAR; INTRAVENOUS at 16:50

## 2019-01-12 RX ADMIN — IPRATROPIUM BROMIDE AND ALBUTEROL SULFATE 3 ML: 2.5; .5 SOLUTION RESPIRATORY (INHALATION) at 20:27

## 2019-01-12 RX ADMIN — RANOLAZINE 500 MG: 500 TABLET, FILM COATED, EXTENDED RELEASE ORAL at 09:32

## 2019-01-12 RX ADMIN — HEPARIN SODIUM 5000 UNITS: 5000 INJECTION INTRAVENOUS; SUBCUTANEOUS at 21:19

## 2019-01-13 PROBLEM — A41.9 SEPSIS (HCC): Status: ACTIVE | Noted: 2019-01-13

## 2019-01-13 LAB
ANION GAP SERPL CALCULATED.3IONS-SCNC: 9 MMOL/L (ref 5–15)
BACTERIA SPEC AEROBE CULT: NORMAL
BACTERIA SPEC AEROBE CULT: NORMAL
BASOPHILS # BLD AUTO: 0.03 10*3/MM3 (ref 0–0.2)
BASOPHILS NFR BLD AUTO: 0.2 % (ref 0–2)
BUN BLD-MCNC: 24 MG/DL (ref 7–21)
BUN/CREAT SERPL: 25 (ref 7–25)
CALCIUM SPEC-SCNC: 8.3 MG/DL (ref 8.4–10.2)
CHLORIDE SERPL-SCNC: 102 MMOL/L (ref 95–110)
CO2 SERPL-SCNC: 24 MMOL/L (ref 22–31)
CREAT BLD-MCNC: 0.96 MG/DL (ref 0.7–1.3)
DEPRECATED RDW RBC AUTO: 47.1 FL (ref 35.1–43.9)
EOSINOPHIL # BLD AUTO: 0 10*3/MM3 (ref 0–0.7)
EOSINOPHIL NFR BLD AUTO: 0 % (ref 0–7)
ERYTHROCYTE [DISTWIDTH] IN BLOOD BY AUTOMATED COUNT: 14.2 % (ref 11.5–14.5)
GFR SERPL CREATININE-BSD FRML MDRD: 74 ML/MIN/1.73 (ref 42–98)
GLUCOSE BLD-MCNC: 119 MG/DL (ref 60–100)
GLUCOSE BLDC GLUCOMTR-MCNC: 113 MG/DL (ref 70–130)
GLUCOSE BLDC GLUCOMTR-MCNC: 121 MG/DL (ref 70–130)
GLUCOSE BLDC GLUCOMTR-MCNC: 186 MG/DL (ref 70–130)
GLUCOSE BLDC GLUCOMTR-MCNC: 304 MG/DL (ref 70–130)
HCT VFR BLD AUTO: 35.6 % (ref 39–49)
HGB BLD-MCNC: 12 G/DL (ref 13.7–17.3)
IMM GRANULOCYTES # BLD AUTO: 0.35 10*3/MM3 (ref 0–0.02)
IMM GRANULOCYTES NFR BLD AUTO: 2.5 % (ref 0–0.5)
LYMPHOCYTES # BLD AUTO: 1.2 10*3/MM3 (ref 0.6–4.2)
LYMPHOCYTES NFR BLD AUTO: 8.6 % (ref 10–50)
MCH RBC QN AUTO: 30.8 PG (ref 26.5–34)
MCHC RBC AUTO-ENTMCNC: 33.7 G/DL (ref 31.5–36.3)
MCV RBC AUTO: 91.5 FL (ref 80–98)
MONOCYTES # BLD AUTO: 0.94 10*3/MM3 (ref 0–0.9)
MONOCYTES NFR BLD AUTO: 6.7 % (ref 0–12)
NEUTROPHILS # BLD AUTO: 11.46 10*3/MM3 (ref 2–8.6)
NEUTROPHILS NFR BLD AUTO: 82 % (ref 37–80)
PLATELET # BLD AUTO: 181 10*3/MM3 (ref 150–450)
PMV BLD AUTO: 10.7 FL (ref 8–12)
POTASSIUM BLD-SCNC: 4.2 MMOL/L (ref 3.5–5.1)
RBC # BLD AUTO: 3.89 10*6/MM3 (ref 4.37–5.74)
SODIUM BLD-SCNC: 135 MMOL/L (ref 137–145)
WBC NRBC COR # BLD: 13.98 10*3/MM3 (ref 3.2–9.8)

## 2019-01-13 PROCEDURE — 94799 UNLISTED PULMONARY SVC/PX: CPT

## 2019-01-13 PROCEDURE — 25010000002 LEVOFLOXACIN PER 250 MG: Performed by: HOSPITALIST

## 2019-01-13 PROCEDURE — 80048 BASIC METABOLIC PNL TOTAL CA: CPT | Performed by: HOSPITALIST

## 2019-01-13 PROCEDURE — 25010000002 HEPARIN (PORCINE) PER 1000 UNITS: Performed by: HOSPITALIST

## 2019-01-13 PROCEDURE — 85025 COMPLETE CBC W/AUTO DIFF WBC: CPT | Performed by: HOSPITALIST

## 2019-01-13 PROCEDURE — 63710000001 INSULIN ASPART PER 5 UNITS: Performed by: HOSPITALIST

## 2019-01-13 PROCEDURE — 25010000002 METHYLPREDNISOLONE PER 40 MG: Performed by: INTERNAL MEDICINE

## 2019-01-13 PROCEDURE — 97162 PT EVAL MOD COMPLEX 30 MIN: CPT

## 2019-01-13 PROCEDURE — 36415 COLL VENOUS BLD VENIPUNCTURE: CPT | Performed by: HOSPITALIST

## 2019-01-13 PROCEDURE — 82962 GLUCOSE BLOOD TEST: CPT

## 2019-01-13 PROCEDURE — 94760 N-INVAS EAR/PLS OXIMETRY 1: CPT

## 2019-01-13 PROCEDURE — 25010000002 METHYLPREDNISOLONE PER 125 MG: Performed by: HOSPITALIST

## 2019-01-13 RX ORDER — METHYLPREDNISOLONE SODIUM SUCCINATE 40 MG/ML
40 INJECTION, POWDER, LYOPHILIZED, FOR SOLUTION INTRAMUSCULAR; INTRAVENOUS EVERY 8 HOURS
Status: DISCONTINUED | OUTPATIENT
Start: 2019-01-13 | End: 2019-01-15 | Stop reason: HOSPADM

## 2019-01-13 RX ORDER — ACETAMINOPHEN 325 MG/1
650 TABLET ORAL EVERY 6 HOURS PRN
Status: DISCONTINUED | OUTPATIENT
Start: 2019-01-13 | End: 2019-01-15 | Stop reason: HOSPADM

## 2019-01-13 RX ORDER — ASPIRIN 325 MG
325 TABLET ORAL DAILY
COMMUNITY
End: 2019-04-02

## 2019-01-13 RX ADMIN — METHYLPREDNISOLONE SODIUM SUCCINATE 40 MG: 40 INJECTION, POWDER, FOR SOLUTION INTRAMUSCULAR; INTRAVENOUS at 20:55

## 2019-01-13 RX ADMIN — INSULIN ASPART 2 UNITS: 100 INJECTION, SOLUTION INTRAVENOUS; SUBCUTANEOUS at 11:31

## 2019-01-13 RX ADMIN — HYDROCODONE BITARTRATE AND ACETAMINOPHEN 1 TABLET: 7.5; 325 TABLET ORAL at 15:43

## 2019-01-13 RX ADMIN — HYDROCODONE BITARTRATE AND ACETAMINOPHEN 1 TABLET: 7.5; 325 TABLET ORAL at 08:23

## 2019-01-13 RX ADMIN — HEPARIN SODIUM 5000 UNITS: 5000 INJECTION INTRAVENOUS; SUBCUTANEOUS at 15:43

## 2019-01-13 RX ADMIN — GUAIFENESIN 1200 MG: 600 TABLET, EXTENDED RELEASE ORAL at 20:55

## 2019-01-13 RX ADMIN — LEVOFLOXACIN 750 MG: 5 INJECTION, SOLUTION INTRAVENOUS at 15:43

## 2019-01-13 RX ADMIN — IPRATROPIUM BROMIDE AND ALBUTEROL SULFATE 3 ML: 2.5; .5 SOLUTION RESPIRATORY (INHALATION) at 10:40

## 2019-01-13 RX ADMIN — LISINOPRIL 5 MG: 5 TABLET ORAL at 08:22

## 2019-01-13 RX ADMIN — ACETAMINOPHEN 650 MG: 325 TABLET ORAL at 20:54

## 2019-01-13 RX ADMIN — HYDROCODONE BITARTRATE AND ACETAMINOPHEN 1 TABLET: 7.5; 325 TABLET ORAL at 00:14

## 2019-01-13 RX ADMIN — METHYLPREDNISOLONE SODIUM SUCCINATE 40 MG: 40 INJECTION, POWDER, FOR SOLUTION INTRAMUSCULAR; INTRAVENOUS at 13:00

## 2019-01-13 RX ADMIN — IPRATROPIUM BROMIDE AND ALBUTEROL SULFATE 3 ML: 2.5; .5 SOLUTION RESPIRATORY (INHALATION) at 20:30

## 2019-01-13 RX ADMIN — IPRATROPIUM BROMIDE AND ALBUTEROL SULFATE 3 ML: 2.5; .5 SOLUTION RESPIRATORY (INHALATION) at 15:21

## 2019-01-13 RX ADMIN — SODIUM CHLORIDE, PRESERVATIVE FREE 3 ML: 5 INJECTION INTRAVENOUS at 20:54

## 2019-01-13 RX ADMIN — CLOPIDOGREL BISULFATE 75 MG: 75 TABLET ORAL at 08:23

## 2019-01-13 RX ADMIN — HEPARIN SODIUM 5000 UNITS: 5000 INJECTION INTRAVENOUS; SUBCUTANEOUS at 05:14

## 2019-01-13 RX ADMIN — RANOLAZINE 500 MG: 500 TABLET, FILM COATED, EXTENDED RELEASE ORAL at 08:22

## 2019-01-13 RX ADMIN — GUAIFENESIN 1200 MG: 600 TABLET, EXTENDED RELEASE ORAL at 08:22

## 2019-01-13 RX ADMIN — SODIUM CHLORIDE 50 ML/HR: 9 INJECTION, SOLUTION INTRAVENOUS at 08:24

## 2019-01-13 RX ADMIN — HEPARIN SODIUM 5000 UNITS: 5000 INJECTION INTRAVENOUS; SUBCUTANEOUS at 21:00

## 2019-01-13 RX ADMIN — METHYLPREDNISOLONE SODIUM SUCCINATE 60 MG: 125 INJECTION, POWDER, FOR SOLUTION INTRAMUSCULAR; INTRAVENOUS at 05:13

## 2019-01-13 RX ADMIN — ISOSORBIDE MONONITRATE 30 MG: 30 TABLET, EXTENDED RELEASE ORAL at 08:23

## 2019-01-13 RX ADMIN — INSULIN ASPART 5 UNITS: 100 INJECTION, SOLUTION INTRAVENOUS; SUBCUTANEOUS at 17:26

## 2019-01-13 RX ADMIN — IPRATROPIUM BROMIDE AND ALBUTEROL SULFATE 3 ML: 2.5; .5 SOLUTION RESPIRATORY (INHALATION) at 07:30

## 2019-01-13 RX ADMIN — Medication 400 MG: at 08:23

## 2019-01-13 RX ADMIN — RANOLAZINE 500 MG: 500 TABLET, FILM COATED, EXTENDED RELEASE ORAL at 20:55

## 2019-01-14 LAB
ANION GAP SERPL CALCULATED.3IONS-SCNC: 8 MMOL/L (ref 5–15)
ANISOCYTOSIS BLD QL: NORMAL
BACTERIA SPEC RESP CULT: NORMAL
BASOPHILS # BLD AUTO: 0.02 10*3/MM3 (ref 0–0.2)
BASOPHILS NFR BLD AUTO: 0.1 % (ref 0–2)
BUN BLD-MCNC: 25 MG/DL (ref 7–21)
BUN/CREAT SERPL: 23.6 (ref 7–25)
CALCIUM SPEC-SCNC: 9.3 MG/DL (ref 8.4–10.2)
CHLORIDE SERPL-SCNC: 103 MMOL/L (ref 95–110)
CO2 SERPL-SCNC: 23 MMOL/L (ref 22–31)
CREAT BLD-MCNC: 1.06 MG/DL (ref 0.7–1.3)
DEPRECATED RDW RBC AUTO: 47 FL (ref 35.1–43.9)
EOSINOPHIL # BLD AUTO: 0 10*3/MM3 (ref 0–0.7)
EOSINOPHIL NFR BLD AUTO: 0 % (ref 0–7)
ERYTHROCYTE [DISTWIDTH] IN BLOOD BY AUTOMATED COUNT: 14.2 % (ref 11.5–14.5)
GFR SERPL CREATININE-BSD FRML MDRD: 66 ML/MIN/1.73 (ref 42–98)
GLUCOSE BLD-MCNC: 157 MG/DL (ref 60–100)
GLUCOSE BLDC GLUCOMTR-MCNC: 130 MG/DL (ref 70–130)
GLUCOSE BLDC GLUCOMTR-MCNC: 159 MG/DL (ref 70–130)
GLUCOSE BLDC GLUCOMTR-MCNC: 181 MG/DL (ref 70–130)
GLUCOSE BLDC GLUCOMTR-MCNC: 187 MG/DL (ref 70–130)
GRAM STN SPEC: NORMAL
HCT VFR BLD AUTO: 37.6 % (ref 39–49)
HGB BLD-MCNC: 12.8 G/DL (ref 13.7–17.3)
HYPOCHROMIA BLD QL: NORMAL
IMM GRANULOCYTES # BLD AUTO: 0.82 10*3/MM3 (ref 0–0.02)
IMM GRANULOCYTES NFR BLD AUTO: 5.7 % (ref 0–0.5)
LYMPHOCYTES # BLD AUTO: 0.95 10*3/MM3 (ref 0.6–4.2)
LYMPHOCYTES NFR BLD AUTO: 6.6 % (ref 10–50)
MCH RBC QN AUTO: 30.9 PG (ref 26.5–34)
MCHC RBC AUTO-ENTMCNC: 34 G/DL (ref 31.5–36.3)
MCV RBC AUTO: 90.8 FL (ref 80–98)
MONOCYTES # BLD AUTO: 0.56 10*3/MM3 (ref 0–0.9)
MONOCYTES NFR BLD AUTO: 3.9 % (ref 0–12)
NEUTROPHILS # BLD AUTO: 12.15 10*3/MM3 (ref 2–8.6)
NEUTROPHILS NFR BLD AUTO: 83.7 % (ref 37–80)
PLATELET # BLD AUTO: 200 10*3/MM3 (ref 150–450)
PMV BLD AUTO: 10.8 FL (ref 8–12)
POTASSIUM BLD-SCNC: 4.3 MMOL/L (ref 3.5–5.1)
RBC # BLD AUTO: 4.14 10*6/MM3 (ref 4.37–5.74)
SMALL PLATELETS BLD QL SMEAR: ADEQUATE
SODIUM BLD-SCNC: 134 MMOL/L (ref 137–145)
WBC MORPH BLD: NORMAL
WBC NRBC COR # BLD: 14.5 10*3/MM3 (ref 3.2–9.8)

## 2019-01-14 PROCEDURE — 94799 UNLISTED PULMONARY SVC/PX: CPT

## 2019-01-14 PROCEDURE — 80048 BASIC METABOLIC PNL TOTAL CA: CPT | Performed by: HOSPITALIST

## 2019-01-14 PROCEDURE — 85007 BL SMEAR W/DIFF WBC COUNT: CPT | Performed by: HOSPITALIST

## 2019-01-14 PROCEDURE — 97166 OT EVAL MOD COMPLEX 45 MIN: CPT

## 2019-01-14 PROCEDURE — 25010000002 METHYLPREDNISOLONE PER 40 MG: Performed by: INTERNAL MEDICINE

## 2019-01-14 PROCEDURE — 25010000002 HEPARIN (PORCINE) PER 1000 UNITS: Performed by: HOSPITALIST

## 2019-01-14 PROCEDURE — 25010000002 LEVOFLOXACIN PER 250 MG: Performed by: HOSPITALIST

## 2019-01-14 PROCEDURE — 94760 N-INVAS EAR/PLS OXIMETRY 1: CPT

## 2019-01-14 PROCEDURE — 85025 COMPLETE CBC W/AUTO DIFF WBC: CPT | Performed by: HOSPITALIST

## 2019-01-14 PROCEDURE — 82962 GLUCOSE BLOOD TEST: CPT

## 2019-01-14 PROCEDURE — 63710000001 INSULIN ASPART PER 5 UNITS: Performed by: HOSPITALIST

## 2019-01-14 RX ADMIN — LISINOPRIL 5 MG: 5 TABLET ORAL at 08:38

## 2019-01-14 RX ADMIN — HEPARIN SODIUM 5000 UNITS: 5000 INJECTION INTRAVENOUS; SUBCUTANEOUS at 14:37

## 2019-01-14 RX ADMIN — SODIUM CHLORIDE, PRESERVATIVE FREE 3 ML: 5 INJECTION INTRAVENOUS at 08:39

## 2019-01-14 RX ADMIN — SODIUM CHLORIDE, PRESERVATIVE FREE 3 ML: 5 INJECTION INTRAVENOUS at 21:14

## 2019-01-14 RX ADMIN — HEPARIN SODIUM 5000 UNITS: 5000 INJECTION INTRAVENOUS; SUBCUTANEOUS at 21:13

## 2019-01-14 RX ADMIN — IPRATROPIUM BROMIDE AND ALBUTEROL SULFATE 3 ML: 2.5; .5 SOLUTION RESPIRATORY (INHALATION) at 07:11

## 2019-01-14 RX ADMIN — ISOSORBIDE MONONITRATE 30 MG: 30 TABLET, EXTENDED RELEASE ORAL at 08:38

## 2019-01-14 RX ADMIN — NYSTATIN 500000 UNITS: 500000 SUSPENSION ORAL at 21:13

## 2019-01-14 RX ADMIN — METHYLPREDNISOLONE SODIUM SUCCINATE 40 MG: 40 INJECTION, POWDER, FOR SOLUTION INTRAMUSCULAR; INTRAVENOUS at 21:13

## 2019-01-14 RX ADMIN — METHYLPREDNISOLONE SODIUM SUCCINATE 40 MG: 40 INJECTION, POWDER, FOR SOLUTION INTRAMUSCULAR; INTRAVENOUS at 05:29

## 2019-01-14 RX ADMIN — GUAIFENESIN 1200 MG: 600 TABLET, EXTENDED RELEASE ORAL at 08:38

## 2019-01-14 RX ADMIN — RANOLAZINE 500 MG: 500 TABLET, FILM COATED, EXTENDED RELEASE ORAL at 08:38

## 2019-01-14 RX ADMIN — METHYLPREDNISOLONE SODIUM SUCCINATE 40 MG: 40 INJECTION, POWDER, FOR SOLUTION INTRAMUSCULAR; INTRAVENOUS at 14:37

## 2019-01-14 RX ADMIN — LEVOFLOXACIN 750 MG: 5 INJECTION, SOLUTION INTRAVENOUS at 14:37

## 2019-01-14 RX ADMIN — HYDROCODONE BITARTRATE AND ACETAMINOPHEN 1 TABLET: 7.5; 325 TABLET ORAL at 08:38

## 2019-01-14 RX ADMIN — INSULIN ASPART 2 UNITS: 100 INJECTION, SOLUTION INTRAVENOUS; SUBCUTANEOUS at 17:38

## 2019-01-14 RX ADMIN — RANOLAZINE 500 MG: 500 TABLET, FILM COATED, EXTENDED RELEASE ORAL at 21:14

## 2019-01-14 RX ADMIN — SODIUM CHLORIDE, PRESERVATIVE FREE 10 ML: 5 INJECTION INTRAVENOUS at 05:29

## 2019-01-14 RX ADMIN — HEPARIN SODIUM 5000 UNITS: 5000 INJECTION INTRAVENOUS; SUBCUTANEOUS at 05:29

## 2019-01-14 RX ADMIN — NYSTATIN 500000 UNITS: 500000 SUSPENSION ORAL at 11:36

## 2019-01-14 RX ADMIN — HYDROCODONE BITARTRATE AND ACETAMINOPHEN 1 TABLET: 7.5; 325 TABLET ORAL at 17:40

## 2019-01-14 RX ADMIN — IPRATROPIUM BROMIDE AND ALBUTEROL SULFATE 3 ML: 2.5; .5 SOLUTION RESPIRATORY (INHALATION) at 11:46

## 2019-01-14 RX ADMIN — HYDROCODONE BITARTRATE AND ACETAMINOPHEN 1 TABLET: 7.5; 325 TABLET ORAL at 00:24

## 2019-01-14 RX ADMIN — ACETAMINOPHEN 650 MG: 325 TABLET ORAL at 06:01

## 2019-01-14 RX ADMIN — IPRATROPIUM BROMIDE AND ALBUTEROL SULFATE 3 ML: 2.5; .5 SOLUTION RESPIRATORY (INHALATION) at 19:00

## 2019-01-14 RX ADMIN — NYSTATIN 500000 UNITS: 500000 SUSPENSION ORAL at 17:40

## 2019-01-14 RX ADMIN — INSULIN ASPART 2 UNITS: 100 INJECTION, SOLUTION INTRAVENOUS; SUBCUTANEOUS at 11:37

## 2019-01-14 RX ADMIN — INSULIN ASPART 2 UNITS: 100 INJECTION, SOLUTION INTRAVENOUS; SUBCUTANEOUS at 21:13

## 2019-01-14 RX ADMIN — CLOPIDOGREL BISULFATE 75 MG: 75 TABLET ORAL at 08:38

## 2019-01-14 RX ADMIN — GUAIFENESIN 1200 MG: 600 TABLET, EXTENDED RELEASE ORAL at 21:14

## 2019-01-14 RX ADMIN — IPRATROPIUM BROMIDE AND ALBUTEROL SULFATE 3 ML: 2.5; .5 SOLUTION RESPIRATORY (INHALATION) at 16:18

## 2019-01-14 RX ADMIN — Medication 400 MG: at 08:38

## 2019-01-15 VITALS
DIASTOLIC BLOOD PRESSURE: 78 MMHG | HEART RATE: 105 BPM | TEMPERATURE: 97.8 F | RESPIRATION RATE: 18 BRPM | WEIGHT: 172.4 LBS | HEIGHT: 67 IN | BODY MASS INDEX: 27.06 KG/M2 | OXYGEN SATURATION: 97 % | SYSTOLIC BLOOD PRESSURE: 148 MMHG

## 2019-01-15 LAB
ANION GAP SERPL CALCULATED.3IONS-SCNC: 9 MMOL/L (ref 5–15)
ANISOCYTOSIS BLD QL: ABNORMAL
BUN BLD-MCNC: 25 MG/DL (ref 7–21)
BUN/CREAT SERPL: 24.3 (ref 7–25)
CALCIUM SPEC-SCNC: 9 MG/DL (ref 8.4–10.2)
CHLORIDE SERPL-SCNC: 99 MMOL/L (ref 95–110)
CO2 SERPL-SCNC: 26 MMOL/L (ref 22–31)
CREAT BLD-MCNC: 1.03 MG/DL (ref 0.7–1.3)
DEPRECATED RDW RBC AUTO: 47.1 FL (ref 35.1–43.9)
ERYTHROCYTE [DISTWIDTH] IN BLOOD BY AUTOMATED COUNT: 14.3 % (ref 11.5–14.5)
GFR SERPL CREATININE-BSD FRML MDRD: 69 ML/MIN/1.73 (ref 42–98)
GLUCOSE BLD-MCNC: 154 MG/DL (ref 60–100)
HCT VFR BLD AUTO: 37.4 % (ref 39–49)
HGB BLD-MCNC: 12.8 G/DL (ref 13.7–17.3)
LYMPHOCYTES # BLD MANUAL: 0.32 10*3/MM3 (ref 0.6–4.2)
LYMPHOCYTES NFR BLD MANUAL: 2 % (ref 10–50)
LYMPHOCYTES NFR BLD MANUAL: 5 % (ref 0–12)
MACROCYTES BLD QL SMEAR: ABNORMAL
MCH RBC QN AUTO: 31.1 PG (ref 26.5–34)
MCHC RBC AUTO-ENTMCNC: 34.2 G/DL (ref 31.5–36.3)
MCV RBC AUTO: 90.8 FL (ref 80–98)
METAMYELOCYTES NFR BLD MANUAL: 1 % (ref 0–0)
MONOCYTES # BLD AUTO: 0.8 10*3/MM3 (ref 0–0.9)
MYELOCYTES NFR BLD MANUAL: 2 % (ref 0–0)
NEUTROPHILS # BLD AUTO: 14.3 10*3/MM3 (ref 2–8.6)
NEUTROPHILS NFR BLD MANUAL: 88 % (ref 37–80)
NEUTS BAND NFR BLD MANUAL: 1 % (ref 0–5)
NRBC SPEC MANUAL: 1 /100 WBC (ref 0–0)
PLATELET # BLD AUTO: 191 10*3/MM3 (ref 150–450)
PMV BLD AUTO: 10.5 FL (ref 8–12)
POTASSIUM BLD-SCNC: 4.2 MMOL/L (ref 3.5–5.1)
RBC # BLD AUTO: 4.12 10*6/MM3 (ref 4.37–5.74)
SMALL PLATELETS BLD QL SMEAR: ADEQUATE
SODIUM BLD-SCNC: 134 MMOL/L (ref 137–145)
VARIANT LYMPHS NFR BLD MANUAL: 1 % (ref 0–5)
WBC MORPH BLD: NORMAL
WBC NRBC COR # BLD: 16.07 10*3/MM3 (ref 3.2–9.8)

## 2019-01-15 PROCEDURE — 97530 THERAPEUTIC ACTIVITIES: CPT

## 2019-01-15 PROCEDURE — 97110 THERAPEUTIC EXERCISES: CPT

## 2019-01-15 PROCEDURE — 82962 GLUCOSE BLOOD TEST: CPT

## 2019-01-15 PROCEDURE — 25010000002 METHYLPREDNISOLONE PER 40 MG: Performed by: INTERNAL MEDICINE

## 2019-01-15 PROCEDURE — 85025 COMPLETE CBC W/AUTO DIFF WBC: CPT | Performed by: HOSPITALIST

## 2019-01-15 PROCEDURE — 85007 BL SMEAR W/DIFF WBC COUNT: CPT | Performed by: HOSPITALIST

## 2019-01-15 PROCEDURE — 25010000002 HEPARIN (PORCINE) PER 1000 UNITS: Performed by: HOSPITALIST

## 2019-01-15 PROCEDURE — 94799 UNLISTED PULMONARY SVC/PX: CPT

## 2019-01-15 PROCEDURE — 80048 BASIC METABOLIC PNL TOTAL CA: CPT | Performed by: HOSPITALIST

## 2019-01-15 PROCEDURE — 63710000001 INSULIN ASPART PER 5 UNITS: Performed by: HOSPITALIST

## 2019-01-15 PROCEDURE — 94760 N-INVAS EAR/PLS OXIMETRY 1: CPT

## 2019-01-15 PROCEDURE — 36415 COLL VENOUS BLD VENIPUNCTURE: CPT | Performed by: HOSPITALIST

## 2019-01-15 PROCEDURE — 97535 SELF CARE MNGMENT TRAINING: CPT

## 2019-01-15 RX ORDER — LISINOPRIL 10 MG/1
10 TABLET ORAL
Status: DISCONTINUED | OUTPATIENT
Start: 2019-01-16 | End: 2019-01-15 | Stop reason: HOSPADM

## 2019-01-15 RX ADMIN — HYDROCODONE BITARTRATE AND ACETAMINOPHEN 1 TABLET: 7.5; 325 TABLET ORAL at 00:00

## 2019-01-15 RX ADMIN — IPRATROPIUM BROMIDE AND ALBUTEROL SULFATE 3 ML: 2.5; .5 SOLUTION RESPIRATORY (INHALATION) at 10:55

## 2019-01-15 RX ADMIN — Medication 400 MG: at 08:33

## 2019-01-15 RX ADMIN — RANOLAZINE 500 MG: 500 TABLET, FILM COATED, EXTENDED RELEASE ORAL at 08:31

## 2019-01-15 RX ADMIN — METHYLPREDNISOLONE SODIUM SUCCINATE 40 MG: 40 INJECTION, POWDER, FOR SOLUTION INTRAMUSCULAR; INTRAVENOUS at 05:46

## 2019-01-15 RX ADMIN — HYDROCODONE BITARTRATE AND ACETAMINOPHEN 1 TABLET: 7.5; 325 TABLET ORAL at 08:31

## 2019-01-15 RX ADMIN — NYSTATIN 500000 UNITS: 500000 SUSPENSION ORAL at 08:33

## 2019-01-15 RX ADMIN — GUAIFENESIN 1200 MG: 600 TABLET, EXTENDED RELEASE ORAL at 08:31

## 2019-01-15 RX ADMIN — INSULIN ASPART 2 UNITS: 100 INJECTION, SOLUTION INTRAVENOUS; SUBCUTANEOUS at 11:31

## 2019-01-15 RX ADMIN — CLOPIDOGREL BISULFATE 75 MG: 75 TABLET ORAL at 08:32

## 2019-01-15 RX ADMIN — NYSTATIN 500000 UNITS: 500000 SUSPENSION ORAL at 11:32

## 2019-01-15 RX ADMIN — SODIUM CHLORIDE, PRESERVATIVE FREE 3 ML: 5 INJECTION INTRAVENOUS at 08:33

## 2019-01-15 RX ADMIN — HEPARIN SODIUM 5000 UNITS: 5000 INJECTION INTRAVENOUS; SUBCUTANEOUS at 05:46

## 2019-01-15 RX ADMIN — IPRATROPIUM BROMIDE AND ALBUTEROL SULFATE 3 ML: 2.5; .5 SOLUTION RESPIRATORY (INHALATION) at 06:45

## 2019-01-15 RX ADMIN — ISOSORBIDE MONONITRATE 30 MG: 30 TABLET, EXTENDED RELEASE ORAL at 08:32

## 2019-01-15 RX ADMIN — LISINOPRIL 5 MG: 5 TABLET ORAL at 08:32

## 2019-01-16 ENCOUNTER — READMISSION MANAGEMENT (OUTPATIENT)
Dept: CALL CENTER | Facility: HOSPITAL | Age: 84
End: 2019-01-16

## 2019-01-16 LAB
BACTERIA SPEC AEROBE CULT: NORMAL
BACTERIA SPEC AEROBE CULT: NORMAL
GLUCOSE BLDC GLUCOMTR-MCNC: 139 MG/DL (ref 70–130)
GLUCOSE BLDC GLUCOMTR-MCNC: 157 MG/DL (ref 70–130)

## 2019-01-16 NOTE — OUTREACH NOTE
Prep Survey      Responses   Facility patient discharged from?  [place]   Is patient eligible?  Yes   Discharge diagnosis  Human metapneumovirus bronchitis, Sepsis, failure of outpatient treatment   Does the patient have one of the following disease processes/diagnoses(primary or secondary)?  Sepsis   Does the patient have Home health ordered?  Yes   What is the Home health agency?   [facility]   Is there a DME ordered?  No   What DME was ordered?  Pt. has home O2, walker and cane.    Comments regarding appointments  See AVS   Prep survey completed?  Yes          Heather Sr RN

## 2019-01-17 ENCOUNTER — READMISSION MANAGEMENT (OUTPATIENT)
Dept: CALL CENTER | Facility: HOSPITAL | Age: 84
End: 2019-01-17

## 2019-01-17 NOTE — OUTREACH NOTE
Sepsis Week 1 Survey      Responses   Facility patient discharged from?  [place]   Does the patient have one of the following disease processes/diagnoses(primary or secondary)?  Sepsis   Is there a successful TCM telephone encounter documented?  No   Week 1 attempt successful?  Yes   Call start time  1012   Call end time  1017   General alerts for this patient   home O2 dependence, home nebulizer in place   Discharge diagnosis  Human metapneumovirus bronchitis, Sepsis, failure of outpatient treatment   Is patient permission given to speak with other caregiver?  Yes   List who call center can speak with  wife-Rimma Woodwards reviewed with patient/caregiver?  Yes   Is the patient having any side effects they believe may be caused by any medication additions or changes?  No   Does the patient have all medications related to this admission filled (includes all antibiotics, inhalers, nebulizers,steroids,etc.)  Yes   What is the Home health agency?   [facility]   Home health comments  home health is coming today   What DME was ordered?  Pt. has home O2, walker and cane.    What is the patient's perception of their health status since discharge?  Same   Week 1 call completed?  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

## 2019-01-24 ENCOUNTER — READMISSION MANAGEMENT (OUTPATIENT)
Dept: CALL CENTER | Facility: HOSPITAL | Age: 84
End: 2019-01-24

## 2019-01-24 NOTE — OUTREACH NOTE
Sepsis Week 2 Survey      Responses   Facility patient discharged from?  [place]   Does the patient have one of the following disease processes/diagnoses(primary or secondary)?  Sepsis   Week 2 attempt successful?  No   Unsuccessful attempts  Attempt 1          Pillo Beltre RN

## 2019-01-25 ENCOUNTER — READMISSION MANAGEMENT (OUTPATIENT)
Dept: CALL CENTER | Facility: HOSPITAL | Age: 84
End: 2019-01-25

## 2019-01-25 NOTE — OUTREACH NOTE
Sepsis Week 2 Survey      Responses   Facility patient discharged from?  [place] [facility]   Does the patient have one of the following disease processes/diagnoses(primary or secondary)?  Sepsis   Week 2 attempt successful?  Yes   Call start time  1642   Call end time  1645   General alerts for this patient   home O2 dependence, home nebulizer in place   Discharge diagnosis  Human metapneumovirus bronchitis, Sepsis, failure of outpatient treatment   Is patient permission given to speak with other caregiver?  Yes   List who call center can speak with  wife-Rimma   Meds reviewed with patient/caregiver?  Yes   Is the patient having any side effects they believe may be caused by any medication additions or changes?  No   Does the patient have all medications related to this admission filled (includes all antibiotics, inhalers, nebulizers,steroids,etc.)  Yes   Is the patient taking all medications as directed (includes completed medication regime)?  Yes   Does the patient have a primary care provider?   Yes   Comments regarding PCP  Dr Rey    Does the patient have an appointment with their PCP within 7 days of discharge?  Yes   Has the patient kept scheduled appointments due by today?  Yes   What is the Home health agency?   [facility]   Has home health visited the patient within 72 hours of discharge?  Yes   Home health comments  home health is coming today   Psychosocial issues?  No   Comments  Patient is doing much better- wife reports stopped meds restarted by PCP. Breathing is much improved. No fever or chills.    Did the patient receive a copy of their discharge instructions?  Yes   Nursing interventions  Reviewed instructions with patient   What is the patient's perception of their health status since discharge?  Same   Nursing interventions  Nurse provided patient education   Is the patient/caregiver able to teach back Sepsis?  S - Shivering,fever or very cold, E - Extreme pain or generalized discomfort (worst  ever,especially abdomen), P - Pale or discolored skin, S - Sleepy, difficult to arouse,confused, I -   I feel like I might die-a feeling of hopelessness, S - Short of breath   Nursing interventions  Nurse provided reassurance to patient, Nurse provided patient education   Is patient/caregiver able to teach back steps to recovery at home?  Set small, achievable goals for return to baseline health, Rest and regain strength, Make a list of questions for PCP appoinment   Is the patient/caregiver able to teach back signs and symptoms of worsening condition:  Fever, Hyperthermia, Rapid heart rate (>90), Shortness of breath/rapid respiratory rate, Altered mental status(confusion/coma)   Is the patient/caregiver able to teach back the hierarchy of who to call/visit for symptoms/problems? PCP, Specialist, Home health nurse, Urgent Care, ED, 911  Yes   Week 2 call completed?  Yes   Wrap up additional comments  Wife reports he is doing much better.           Pillo Beltre RN

## 2019-02-01 ENCOUNTER — READMISSION MANAGEMENT (OUTPATIENT)
Dept: CALL CENTER | Facility: HOSPITAL | Age: 84
End: 2019-02-01

## 2019-02-01 NOTE — OUTREACH NOTE
Sepsis Week 3 Survey      Responses   Facility patient discharged from?  [place]   Does the patient have one of the following disease processes/diagnoses(primary or secondary)?  Sepsis   Week 3 attempt successful?  Yes   Call start time  1635   Call end time  1637   Discharge diagnosis  Human metapneumovirus bronchitis, Sepsis, failure of outpatient treatment   Meds reviewed with patient/caregiver?  Yes   Is the patient taking all medications as directed (includes completed medication regime)?  Yes   Has the patient kept scheduled appointments due by today?  Yes   What is the Home health agency?   [facility]   What is the patient's perception of their health status since discharge?  Improving   Additional teach back comments  C/O feeling tired.   Week 3 call completed?  Yes          Leann Foy RN

## 2019-02-09 ENCOUNTER — READMISSION MANAGEMENT (OUTPATIENT)
Dept: CALL CENTER | Facility: HOSPITAL | Age: 84
End: 2019-02-09

## 2019-02-09 NOTE — OUTREACH NOTE
Sepsis Week 4 Survey      Responses   Facility patient discharged from?  [facility]   Does the patient have one of the following disease processes/diagnoses(primary or secondary)?  Sepsis   Week 4 attempt successful?  Yes   Call start time  1221   Call end time  1222   General alerts for this patient   home O2 dependence, home nebulizer in place   Discharge diagnosis  Human metapneumovirus bronchitis, Sepsis, failure of outpatient treatment   Is patient permission given to speak with other caregiver?  Yes   List who call center can speak with  Tinye   Junior reviewed with patient/caregiver?  Yes   Is the patient having any side effects they believe may be caused by any medication additions or changes?  No   Is the patient taking all medications as directed (includes completed medication regime)?  Yes   Has the patient kept scheduled appointments due by today?  Yes   Is the patient still receiving Home Health Services?  Yes   What is the patient's perception of their health status since discharge?  Improving   Week 4 call completed?  Yes   Would the patient like one additional call?  No   Graduated  Yes   Did the patient feel the follow up calls were helpful during their recovery period?  Yes   Was the number of calls appropriate?  Yes          Meghann Lantigua RN

## 2019-04-02 ENCOUNTER — APPOINTMENT (OUTPATIENT)
Dept: GENERAL RADIOLOGY | Facility: HOSPITAL | Age: 84
End: 2019-04-02

## 2019-04-02 ENCOUNTER — HOSPITAL ENCOUNTER (OUTPATIENT)
Facility: HOSPITAL | Age: 84
Setting detail: OBSERVATION
Discharge: HOME HEALTH | End: 2019-04-05
Attending: INTERNAL MEDICINE | Admitting: INTERNAL MEDICINE

## 2019-04-02 ENCOUNTER — OFFICE VISIT (OUTPATIENT)
Dept: CARDIOLOGY | Facility: CLINIC | Age: 84
End: 2019-04-02

## 2019-04-02 VITALS
DIASTOLIC BLOOD PRESSURE: 78 MMHG | HEART RATE: 64 BPM | SYSTOLIC BLOOD PRESSURE: 130 MMHG | WEIGHT: 176 LBS | BODY MASS INDEX: 27.62 KG/M2 | OXYGEN SATURATION: 99 % | HEIGHT: 67 IN

## 2019-04-02 DIAGNOSIS — Z78.9 IMPAIRED MOBILITY AND ADLS: ICD-10-CM

## 2019-04-02 DIAGNOSIS — E11.9 TYPE 2 DIABETES MELLITUS WITHOUT COMPLICATION, WITHOUT LONG-TERM CURRENT USE OF INSULIN (HCC): Chronic | ICD-10-CM

## 2019-04-02 DIAGNOSIS — Z74.09 IMPAIRED MOBILITY AND ADLS: ICD-10-CM

## 2019-04-02 DIAGNOSIS — J42 CHRONIC BRONCHITIS, UNSPECIFIED CHRONIC BRONCHITIS TYPE (HCC): Chronic | ICD-10-CM

## 2019-04-02 DIAGNOSIS — I50.30 (HFPEF) HEART FAILURE WITH PRESERVED EJECTION FRACTION (HCC): ICD-10-CM

## 2019-04-02 DIAGNOSIS — I48.92 ATRIAL FIBRILLATION AND FLUTTER (HCC): Primary | Chronic | ICD-10-CM

## 2019-04-02 DIAGNOSIS — I10 ESSENTIAL HYPERTENSION: Chronic | ICD-10-CM

## 2019-04-02 DIAGNOSIS — I25.10 CHRONIC CORONARY ARTERY DISEASE: Chronic | ICD-10-CM

## 2019-04-02 DIAGNOSIS — I48.91 ATRIAL FIBRILLATION AND FLUTTER (HCC): Primary | Chronic | ICD-10-CM

## 2019-04-02 DIAGNOSIS — J44.1 CHRONIC OBSTRUCTIVE PULMONARY DISEASE WITH ACUTE EXACERBATION (HCC): Primary | ICD-10-CM

## 2019-04-02 LAB
ANION GAP SERPL CALCULATED.3IONS-SCNC: 13 MMOL/L
B PERT DNA SPEC QL NAA+PROBE: NOT DETECTED
BASOPHILS # BLD AUTO: 0.03 10*3/MM3 (ref 0–0.2)
BASOPHILS NFR BLD AUTO: 0.2 % (ref 0–1.5)
BUN BLD-MCNC: 26 MG/DL (ref 8–23)
BUN/CREAT SERPL: 20.2 (ref 7–25)
C PNEUM DNA NPH QL NAA+NON-PROBE: NOT DETECTED
CALCIUM SPEC-SCNC: 9.6 MG/DL (ref 8.6–10.5)
CHLORIDE SERPL-SCNC: 103 MMOL/L (ref 98–107)
CO2 SERPL-SCNC: 24 MMOL/L (ref 22–29)
CREAT BLD-MCNC: 1.29 MG/DL (ref 0.76–1.27)
D-DIMER, QUANTITATIVE (MAD,POW, STR): 543 NG/ML (FEU) (ref 0–470)
D-LACTATE SERPL-SCNC: 2 MMOL/L (ref 0.5–2)
DEPRECATED RDW RBC AUTO: 52.5 FL (ref 37–54)
EOSINOPHIL # BLD AUTO: 0 10*3/MM3 (ref 0–0.4)
EOSINOPHIL NFR BLD AUTO: 0 % (ref 0.3–6.2)
ERYTHROCYTE [DISTWIDTH] IN BLOOD BY AUTOMATED COUNT: 15.3 % (ref 12.3–15.4)
FLUAV H1 2009 PAND RNA NPH QL NAA+PROBE: NOT DETECTED
FLUAV H1 HA GENE NPH QL NAA+PROBE: NOT DETECTED
FLUAV H3 RNA NPH QL NAA+PROBE: NOT DETECTED
FLUAV SUBTYP SPEC NAA+PROBE: NOT DETECTED
FLUBV RNA ISLT QL NAA+PROBE: NOT DETECTED
GFR SERPL CREATININE-BSD FRML MDRD: 53 ML/MIN/1.73
GLUCOSE BLD-MCNC: 121 MG/DL (ref 65–99)
GLUCOSE BLDC GLUCOMTR-MCNC: 169 MG/DL (ref 70–130)
HADV DNA SPEC NAA+PROBE: NOT DETECTED
HCOV 229E RNA SPEC QL NAA+PROBE: NOT DETECTED
HCOV HKU1 RNA SPEC QL NAA+PROBE: NOT DETECTED
HCOV NL63 RNA SPEC QL NAA+PROBE: NOT DETECTED
HCOV OC43 RNA SPEC QL NAA+PROBE: NOT DETECTED
HCT VFR BLD AUTO: 40.4 % (ref 37.5–51)
HGB BLD-MCNC: 13 G/DL (ref 13–17.7)
HMPV RNA NPH QL NAA+NON-PROBE: NOT DETECTED
HPIV1 RNA SPEC QL NAA+PROBE: NOT DETECTED
HPIV2 RNA SPEC QL NAA+PROBE: NOT DETECTED
HPIV3 RNA NPH QL NAA+PROBE: NOT DETECTED
HPIV4 P GENE NPH QL NAA+PROBE: NOT DETECTED
IMM GRANULOCYTES # BLD AUTO: 0.22 10*3/MM3 (ref 0–0.05)
IMM GRANULOCYTES NFR BLD AUTO: 1.5 % (ref 0–0.5)
LYMPHOCYTES # BLD AUTO: 1.58 10*3/MM3 (ref 0.7–3.1)
LYMPHOCYTES NFR BLD AUTO: 10.9 % (ref 19.6–45.3)
M PNEUMO IGG SER IA-ACNC: NOT DETECTED
MCH RBC QN AUTO: 30.2 PG (ref 26.6–33)
MCHC RBC AUTO-ENTMCNC: 32.2 G/DL (ref 31.5–35.7)
MCV RBC AUTO: 93.7 FL (ref 79–97)
MONOCYTES # BLD AUTO: 0.43 10*3/MM3 (ref 0.1–0.9)
MONOCYTES NFR BLD AUTO: 3 % (ref 5–12)
NEUTROPHILS # BLD AUTO: 12.28 10*3/MM3 (ref 1.4–7)
NEUTROPHILS NFR BLD AUTO: 84.4 % (ref 42.7–76)
NRBC BLD AUTO-RTO: 0 /100 WBC (ref 0–0)
NT-PROBNP SERPL-MCNC: 176.7 PG/ML (ref 5–1800)
PLATELET # BLD AUTO: 201 10*3/MM3 (ref 140–450)
PMV BLD AUTO: 11.2 FL (ref 6–12)
POTASSIUM BLD-SCNC: 5.2 MMOL/L (ref 3.5–5.2)
RBC # BLD AUTO: 4.31 10*6/MM3 (ref 4.14–5.8)
RHINOVIRUS RNA SPEC NAA+PROBE: NOT DETECTED
RSV RNA NPH QL NAA+NON-PROBE: NOT DETECTED
SODIUM BLD-SCNC: 140 MMOL/L (ref 136–145)
WBC NRBC COR # BLD: 14.54 10*3/MM3 (ref 3.4–10.8)

## 2019-04-02 PROCEDURE — 87040 BLOOD CULTURE FOR BACTERIA: CPT | Performed by: NURSE PRACTITIONER

## 2019-04-02 PROCEDURE — 80048 BASIC METABOLIC PNL TOTAL CA: CPT | Performed by: NURSE PRACTITIONER

## 2019-04-02 PROCEDURE — 83880 ASSAY OF NATRIURETIC PEPTIDE: CPT | Performed by: NURSE PRACTITIONER

## 2019-04-02 PROCEDURE — 94799 UNLISTED PULMONARY SVC/PX: CPT

## 2019-04-02 PROCEDURE — 99214 OFFICE O/P EST MOD 30 MIN: CPT | Performed by: INTERNAL MEDICINE

## 2019-04-02 PROCEDURE — 94760 N-INVAS EAR/PLS OXIMETRY 1: CPT

## 2019-04-02 PROCEDURE — 25010000002 METHYLPREDNISOLONE PER 125 MG: Performed by: NURSE PRACTITIONER

## 2019-04-02 PROCEDURE — 96374 THER/PROPH/DIAG INJ IV PUSH: CPT

## 2019-04-02 PROCEDURE — 94640 AIRWAY INHALATION TREATMENT: CPT

## 2019-04-02 PROCEDURE — G0378 HOSPITAL OBSERVATION PER HR: HCPCS

## 2019-04-02 PROCEDURE — 87581 M.PNEUMON DNA AMP PROBE: CPT | Performed by: NURSE PRACTITIONER

## 2019-04-02 PROCEDURE — 82962 GLUCOSE BLOOD TEST: CPT

## 2019-04-02 PROCEDURE — 85379 FIBRIN DEGRADATION QUANT: CPT | Performed by: NURSE PRACTITIONER

## 2019-04-02 PROCEDURE — 83605 ASSAY OF LACTIC ACID: CPT | Performed by: NURSE PRACTITIONER

## 2019-04-02 PROCEDURE — 93000 ELECTROCARDIOGRAM COMPLETE: CPT | Performed by: INTERNAL MEDICINE

## 2019-04-02 PROCEDURE — 85025 COMPLETE CBC W/AUTO DIFF WBC: CPT | Performed by: NURSE PRACTITIONER

## 2019-04-02 PROCEDURE — 87798 DETECT AGENT NOS DNA AMP: CPT | Performed by: NURSE PRACTITIONER

## 2019-04-02 PROCEDURE — 71046 X-RAY EXAM CHEST 2 VIEWS: CPT

## 2019-04-02 PROCEDURE — 87631 RESP VIRUS 3-5 TARGETS: CPT | Performed by: NURSE PRACTITIONER

## 2019-04-02 PROCEDURE — 63710000001 INSULIN ASPART PER 5 UNITS: Performed by: INTERNAL MEDICINE

## 2019-04-02 PROCEDURE — 87486 CHLMYD PNEUM DNA AMP PROBE: CPT | Performed by: NURSE PRACTITIONER

## 2019-04-02 RX ORDER — ACETAMINOPHEN 325 MG/1
650 TABLET ORAL EVERY 4 HOURS PRN
Status: DISCONTINUED | OUTPATIENT
Start: 2019-04-02 | End: 2019-04-02 | Stop reason: SDUPTHER

## 2019-04-02 RX ORDER — BUDESONIDE 0.5 MG/2ML
0.5 INHALANT ORAL
Status: DISCONTINUED | OUTPATIENT
Start: 2019-04-02 | End: 2019-04-05 | Stop reason: HOSPADM

## 2019-04-02 RX ORDER — RANOLAZINE 500 MG/1
500 TABLET, EXTENDED RELEASE ORAL EVERY 12 HOURS SCHEDULED
Status: DISCONTINUED | OUTPATIENT
Start: 2019-04-02 | End: 2019-04-05 | Stop reason: HOSPADM

## 2019-04-02 RX ORDER — FAMOTIDINE 20 MG/1
20 TABLET, FILM COATED ORAL 2 TIMES DAILY
Status: DISCONTINUED | OUTPATIENT
Start: 2019-04-02 | End: 2019-04-05 | Stop reason: HOSPADM

## 2019-04-02 RX ORDER — IPRATROPIUM BROMIDE AND ALBUTEROL SULFATE 2.5; .5 MG/3ML; MG/3ML
3 SOLUTION RESPIRATORY (INHALATION) 4 TIMES DAILY
Status: DISCONTINUED | OUTPATIENT
Start: 2019-04-02 | End: 2019-04-05 | Stop reason: HOSPADM

## 2019-04-02 RX ORDER — ACETAMINOPHEN 325 MG/1
650 TABLET ORAL EVERY 4 HOURS PRN
Status: DISCONTINUED | OUTPATIENT
Start: 2019-04-02 | End: 2019-04-05 | Stop reason: HOSPADM

## 2019-04-02 RX ORDER — NICOTINE POLACRILEX 4 MG
15 LOZENGE BUCCAL
Status: DISCONTINUED | OUTPATIENT
Start: 2019-04-02 | End: 2019-04-05 | Stop reason: HOSPADM

## 2019-04-02 RX ORDER — SODIUM CHLORIDE 0.9 % (FLUSH) 0.9 %
3-10 SYRINGE (ML) INJECTION AS NEEDED
Status: DISCONTINUED | OUTPATIENT
Start: 2019-04-02 | End: 2019-04-05 | Stop reason: HOSPADM

## 2019-04-02 RX ORDER — NITROGLYCERIN 0.4 MG/1
0.4 TABLET SUBLINGUAL
Status: DISCONTINUED | OUTPATIENT
Start: 2019-04-02 | End: 2019-04-05 | Stop reason: HOSPADM

## 2019-04-02 RX ORDER — GLIPIZIDE 5 MG/1
5 TABLET ORAL
Status: DISCONTINUED | OUTPATIENT
Start: 2019-04-03 | End: 2019-04-05 | Stop reason: HOSPADM

## 2019-04-02 RX ORDER — ONDANSETRON 2 MG/ML
4 INJECTION INTRAMUSCULAR; INTRAVENOUS EVERY 6 HOURS PRN
Status: DISCONTINUED | OUTPATIENT
Start: 2019-04-02 | End: 2019-04-05 | Stop reason: HOSPADM

## 2019-04-02 RX ORDER — CLOPIDOGREL BISULFATE 75 MG/1
75 TABLET ORAL DAILY
Status: DISCONTINUED | OUTPATIENT
Start: 2019-04-03 | End: 2019-04-05 | Stop reason: HOSPADM

## 2019-04-02 RX ORDER — SODIUM CHLORIDE 0.9 % (FLUSH) 0.9 %
3 SYRINGE (ML) INJECTION EVERY 12 HOURS SCHEDULED
Status: DISCONTINUED | OUTPATIENT
Start: 2019-04-02 | End: 2019-04-05 | Stop reason: HOSPADM

## 2019-04-02 RX ORDER — METHYLPREDNISOLONE SODIUM SUCCINATE 125 MG/2ML
60 INJECTION, POWDER, LYOPHILIZED, FOR SOLUTION INTRAMUSCULAR; INTRAVENOUS EVERY 8 HOURS
Status: DISCONTINUED | OUTPATIENT
Start: 2019-04-02 | End: 2019-04-04

## 2019-04-02 RX ORDER — LISINOPRIL 5 MG/1
5 TABLET ORAL
Status: DISCONTINUED | OUTPATIENT
Start: 2019-04-03 | End: 2019-04-05 | Stop reason: HOSPADM

## 2019-04-02 RX ORDER — HYDROCODONE BITARTRATE AND ACETAMINOPHEN 7.5; 325 MG/1; MG/1
1 TABLET ORAL EVERY 8 HOURS
Status: DISCONTINUED | OUTPATIENT
Start: 2019-04-02 | End: 2019-04-05 | Stop reason: HOSPADM

## 2019-04-02 RX ORDER — DEXTROSE MONOHYDRATE 25 G/50ML
25 INJECTION, SOLUTION INTRAVENOUS
Status: DISCONTINUED | OUTPATIENT
Start: 2019-04-02 | End: 2019-04-05 | Stop reason: HOSPADM

## 2019-04-02 RX ORDER — FUROSEMIDE 20 MG/1
20 TABLET ORAL DAILY
Status: DISCONTINUED | OUTPATIENT
Start: 2019-04-03 | End: 2019-04-05 | Stop reason: HOSPADM

## 2019-04-02 RX ORDER — ISOSORBIDE MONONITRATE 30 MG/1
30 TABLET, EXTENDED RELEASE ORAL
Status: DISCONTINUED | OUTPATIENT
Start: 2019-04-03 | End: 2019-04-05 | Stop reason: HOSPADM

## 2019-04-02 RX ORDER — FLUTICASONE PROPIONATE 50 MCG
2 SPRAY, SUSPENSION (ML) NASAL DAILY
Status: DISCONTINUED | OUTPATIENT
Start: 2019-04-03 | End: 2019-04-05 | Stop reason: HOSPADM

## 2019-04-02 RX ADMIN — HYDROCODONE BITARTRATE AND ACETAMINOPHEN 1 TABLET: 7.5; 325 TABLET ORAL at 16:59

## 2019-04-02 RX ADMIN — INSULIN ASPART 2 UNITS: 100 INJECTION, SOLUTION INTRAVENOUS; SUBCUTANEOUS at 21:10

## 2019-04-02 RX ADMIN — METHYLPREDNISOLONE SODIUM SUCCINATE 60 MG: 125 INJECTION, POWDER, FOR SOLUTION INTRAMUSCULAR; INTRAVENOUS at 17:53

## 2019-04-02 RX ADMIN — BUDESONIDE 0.5 MG: 0.5 INHALANT RESPIRATORY (INHALATION) at 20:09

## 2019-04-02 RX ADMIN — FAMOTIDINE 20 MG: 20 TABLET ORAL at 20:47

## 2019-04-02 RX ADMIN — IPRATROPIUM BROMIDE AND ALBUTEROL SULFATE 3 ML: 2.5; .5 SOLUTION RESPIRATORY (INHALATION) at 20:09

## 2019-04-02 RX ADMIN — SODIUM CHLORIDE, PRESERVATIVE FREE 10 ML: 5 INJECTION INTRAVENOUS at 20:48

## 2019-04-02 RX ADMIN — RANOLAZINE 500 MG: 500 TABLET, FILM COATED, EXTENDED RELEASE ORAL at 20:47

## 2019-04-02 NOTE — H&P
[facility] Medicine Admission      Date of Admission: 4/2/2019      Primary Care Physician: Paolo Rey MD      Chief Complaint: dyspnea     HPI: 85-year-old  male with past medical history of hypertension, COPD, coronary artery disease, atrial fibrillation, type 2 diabetes, chronic hypoxic respiratory failure with oxygen dependence at 2 L, heart failure with preserved ejection fraction who presents on 4/2/2019 as a direct admission from Dr. Curtis's office with complaints of dyspnea.  The patient reports that for 2-3 days prior to admission he has had worsening dyspnea.  He denies any fever, chills, chest pain, cough.  He reports having orthopnea that requires 2 pillows for resolution.  He is oxygen dependent at 2 L at home but is currently on 3 L at time of examination.    Concurrent Medical History:  has a past medical history of Basal cell carcinoma, Bilateral carotid artery stenosis, Bilateral carotid artery stenosis, Bleeding disorder (CMS/HCC), CHF (congestive heart failure) (CMS/[facility]), Chronic obstructive pulmonary disease (COPD) (CMS/[facility]), Coronary arteriosclerosis, Degenerative joint disease involving multiple joints, Dementia, Diabetes mellitus (CMS/HCC), Heart problem, History of stomach ulcers, Hyperlipidemia, Hypertension, Ingrown toenail, and Myocardial infarction (CMS/[facility]).    Past Surgical History:  has a past surgical history that includes Hernia repair; Lung biopsy; Ventral hernia repair; Thoracotomy (Left, 1977); Cardiac catheterization (N/A, 6/6/2017); Coronary stent placement; Neck surgery; Cardiac catheterization (N/A, 2/14/2018); Lung surgery; Back surgery; and Coronary angioplasty with stent.    Family History: family history includes Cancer in his other; Diabetes in his father and mother; Heart disease in his father; Hypertension in his father; Lung disease in his other.    Social History:  reports that he has quit smoking. He has  never used smokeless tobacco. He reports that he does not drink alcohol or use drugs.    Allergies:   Allergies   Allergen Reactions   • Atorvastatin Anaphylaxis   • Penicillins Rash   • Pravastatin      Myalgia   • Azithromycin Rash   • Biaxin [Clarithromycin] Rash       Medications:   Prior to Admission medications    Medication Sig Start Date End Date Taking? Authorizing Provider   albuterol (PROVENTIL) (2.5 MG/3ML) 0.083% nebulizer solution Take 2.5 mg by nebulization Every 4 (Four) Hours As Needed for Wheezing. 12/23/17  Yes Adi Puente MD   clopidogrel (PLAVIX) 75 MG tablet Take 75 mg by mouth Daily. 2/3/16  Yes Bill Rader MD   famotidine (PEPCID) 20 MG tablet Take 20 mg by mouth 2 (Two) Times a Day.   Yes Bill Rader MD   fluticasone (FLONASE) 50 MCG/ACT nasal spray 2 sprays into each nostril Daily.   Yes Bill Rader MD   Fluticasone Furoate (ARNUITY ELLIPTA) 200 MCG/ACT aerosol powder  Inhale.   Yes Bill Rader MD   furosemide (LASIX) 20 MG tablet Take 1 tablet by mouth Daily. 3/3/17  Yes Jeff Maciel MD PhD   glimepiride (AMARYL) 2 MG tablet Take 2 mg by mouth Every Morning Before Breakfast.   Yes Bill Rader MD   HYDROcodone-acetaminophen (NORCO) 7.5-325 MG per tablet Take 1 tablet by mouth Every 8 (Eight) Hours. 12/27/16  Yes Bill Rader MD   ipratropium-albuterol (DUO-NEB) 0.5-2.5 mg/mL nebulizer Take 3 mL by nebulization 4 (Four) Times a Day. 9/28/17  Yes Jc Alba MD   isosorbide mononitrate (IMDUR) 30 MG 24 hr tablet Take 1 tablet by mouth Daily. 2/16/18  Yes Jc Alba MD   lisinopril (PRINIVIL,ZESTRIL) 10 MG tablet Take 0.5 tablets by mouth Daily. 11/10/17  Yes Caitlyn Garcia MD   magic mouthwash oral suspension Swish and swallow 5 mL Every 4 (Four) Hours As Needed (sore throat). 1/15/19  Yes Ramiro Seals MD   magnesium oxide (MAGOX) 400 (241.3 MG) MG tablet tablet Take 400 mg by  mouth Daily.   Yes Bill Rader MD   nitroglycerin (NITROSTAT) 0.4 MG SL tablet place 1 tablet under the tongue if needed every 5 minutes for hair...  (REFER TO PRESCRIPTION NOTES). 1/11/17  Yes Bill Rader MD   nystatin (MYCOSTATIN) 830956 UNIT/ML suspension Swish and swallow 5 mL 4 (Four) Times a Day. 1/15/19  Yes Ramiro Seals MD   O2 (OXYGEN) Inhale 2 L/min Every Night. W/ CPAP   Yes Bill Rader MD   ranolazine (RANEXA) 500 MG 12 hr tablet Take 1 tablet by mouth Every 12 (Twelve) Hours. 8/23/18  Yes Mana Curtis MD   Red Yeast Rice 600 MG tablet Take 600 mg by mouth 2 (Two) Times a Day.   Yes Bill Rader MD   Umeclidinium-Vilanterol 62.5-25 MCG/INH aerosol powder  Inhale 1 puff Daily. 3/3/17  Yes Brea Higginbotham MD   acetaminophen (TYLENOL) 325 MG tablet Take 2 tablets by mouth Every 4 (Four) Hours As Needed for Mild Pain . 2/15/18   Jc Alba MD   aspirin 325 MG tablet Take 325 mg by mouth Daily.  4/2/19  Bill Rader MD   ondansetron ODT (ZOFRAN-ODT) 4 MG disintegrating tablet Take 1 tablet by mouth Every 8 (Eight) Hours As Needed for Nausea or Vomiting. 3/19/18 4/2/19  Danielito Meraz MD   predniSONE (DELTASONE) 10 MG tablet Take 4 tablets by mouth daily for 3 days, then 3 tabs daily for 3 days, then 2 tabs daily for 3 days, then 1 tablet daily for 3 days. 1/9/19 4/2/19  Ethan Cihn DO       Review of Systems:  Review of Systems   Constitutional: Negative for chills and fever.   Respiratory: Positive for shortness of breath. Negative for cough, chest tightness and wheezing.    Cardiovascular: Negative for chest pain, palpitations and leg swelling.   Gastrointestinal: Negative for abdominal pain, diarrhea, nausea and vomiting.   Musculoskeletal: Negative for back pain and neck pain.   Skin: Negative for pallor.   Neurological: Negative for dizziness and weakness.   Psychiatric/Behavioral: Negative for confusion. The patient is not  nervous/anxious.             Physical Exam:   Temp:  [97.4 °F (36.3 °C)] 97.4 °F (36.3 °C)  Heart Rate:  [64-72] 72  Resp:  [18] 18  BP: (121-130)/(78-84) 121/84  Physical Exam   Constitutional: He is oriented to person, place, and time. He appears well-developed and well-nourished.   HENT:   Head: Normocephalic and atraumatic.   Eyes: Conjunctivae and lids are normal.   Neck: Normal range of motion. Neck supple.   Cardiovascular: Normal rate, normal heart sounds and intact distal pulses.   Pulmonary/Chest: Effort normal. He has decreased breath sounds in the right lower field and the left lower field. He has wheezes in the right lower field and the left lower field.   Abdominal: Soft. Bowel sounds are normal. He exhibits no distension. There is no tenderness.   Musculoskeletal: Normal range of motion. He exhibits no edema.   Neurological: He is alert and oriented to person, place, and time.   Skin: Skin is warm and dry.   Psychiatric: He has a normal mood and affect. His behavior is normal.   Nursing note and vitals reviewed.      Results Reviewed:  I have personally reviewed current lab, radiology, and data and agree with results.  Lab Results (last 24 hours)     ** No results found for the last 24 hours. **        Imaging Results (last 24 hours)     ** No results found for the last 24 hours. **            Assessment:    Active Hospital Problems    Diagnosis   • Shortness of breath   • Hypertension   • Chronic obstructive lung disease (CMS/HCC)   • Diabetes mellitus (CMS/HCC)   • (HFpEF) heart failure with preserved ejection fraction (CMS/HCC)   • Pulmonary HTN (CMS/HCC)   • Chronic coronary artery disease   • Gastroesophageal reflux disease without esophagitis   • Diabetic neuropathy (CMS/HCC)   • Abdominal hernia             Plan:  1. Dyspnea: likely multifactorial in nature due to history of CHF, COPD, pulmonary HTN.  CXR, respiratory panel, BNP, D dimer screening.   2. Chronic hypoxic respiratory failure with  oxygen dependence at 2 liters: continue oxygen support, home nebulizers.   3. Hx COPD: Continue home nebulizer treatments.  Duo nebs 4 times per day.  Patient may use Anoro Ellipta from his home supply.  4. HTN: Continue lisinopril.  5. HFpEF: Continue Lasix, lisinopril.  Daily weights, strict I/O, BNP ordered.   6. Type 2 DM: FSBS AC and HS with SSI, Glipizide daily.   7. CAD: Continue Plavix, Imdur, Ranexa.  8. Pulmonary HTN:    Further orders will depend upon hospital course.  Plan of care discussed with patient and wife at bedside.  Code status confirmed and patient wishes for full code status.          This document has been electronically signed by EVE Ortega on April 2, 2019 3:17 PM

## 2019-04-03 PROBLEM — I50.32 CHRONIC HEART FAILURE WITH NORMAL EJECTION FRACTION (HCC): Status: ACTIVE | Noted: 2019-04-03

## 2019-04-03 PROBLEM — J44.9 OBSTRUCTIVE CHRONIC BRONCHITIS WITHOUT EXACERBATION (HCC): Status: ACTIVE | Noted: 2019-04-03

## 2019-04-03 LAB
ANION GAP SERPL CALCULATED.3IONS-SCNC: 17 MMOL/L
BASOPHILS # BLD AUTO: 0.02 10*3/MM3 (ref 0–0.2)
BASOPHILS NFR BLD AUTO: 0.1 % (ref 0–1.5)
BUN BLD-MCNC: 31 MG/DL (ref 8–23)
BUN/CREAT SERPL: 23.3 (ref 7–25)
CALCIUM SPEC-SCNC: 10.2 MG/DL (ref 8.6–10.5)
CHLORIDE SERPL-SCNC: 100 MMOL/L (ref 98–107)
CO2 SERPL-SCNC: 20 MMOL/L (ref 22–29)
CREAT BLD-MCNC: 1.33 MG/DL (ref 0.76–1.27)
DEPRECATED RDW RBC AUTO: 52.2 FL (ref 37–54)
EOSINOPHIL # BLD AUTO: 0 10*3/MM3 (ref 0–0.4)
EOSINOPHIL NFR BLD AUTO: 0 % (ref 0.3–6.2)
ERYTHROCYTE [DISTWIDTH] IN BLOOD BY AUTOMATED COUNT: 15 % (ref 12.3–15.4)
GFR SERPL CREATININE-BSD FRML MDRD: 51 ML/MIN/1.73
GLUCOSE BLD-MCNC: 175 MG/DL (ref 65–99)
GLUCOSE BLDC GLUCOMTR-MCNC: 112 MG/DL (ref 70–130)
GLUCOSE BLDC GLUCOMTR-MCNC: 178 MG/DL (ref 70–130)
GLUCOSE BLDC GLUCOMTR-MCNC: 194 MG/DL (ref 70–130)
GLUCOSE BLDC GLUCOMTR-MCNC: 212 MG/DL (ref 70–130)
HCT VFR BLD AUTO: 42.6 % (ref 37.5–51)
HGB BLD-MCNC: 13.5 G/DL (ref 13–17.7)
IMM GRANULOCYTES # BLD AUTO: 0.23 10*3/MM3 (ref 0–0.05)
IMM GRANULOCYTES NFR BLD AUTO: 1.4 % (ref 0–0.5)
LYMPHOCYTES # BLD AUTO: 1.28 10*3/MM3 (ref 0.7–3.1)
LYMPHOCYTES NFR BLD AUTO: 8 % (ref 19.6–45.3)
MCH RBC QN AUTO: 29.8 PG (ref 26.6–33)
MCHC RBC AUTO-ENTMCNC: 31.7 G/DL (ref 31.5–35.7)
MCV RBC AUTO: 94 FL (ref 79–97)
MONOCYTES # BLD AUTO: 0.15 10*3/MM3 (ref 0.1–0.9)
MONOCYTES NFR BLD AUTO: 0.9 % (ref 5–12)
NEUTROPHILS # BLD AUTO: 14.26 10*3/MM3 (ref 1.4–7)
NEUTROPHILS NFR BLD AUTO: 89.6 % (ref 42.7–76)
NRBC BLD AUTO-RTO: 0 /100 WBC (ref 0–0)
PLATELET # BLD AUTO: 237 10*3/MM3 (ref 140–450)
PMV BLD AUTO: 11.5 FL (ref 6–12)
POTASSIUM BLD-SCNC: 5.3 MMOL/L (ref 3.5–5.2)
RBC # BLD AUTO: 4.53 10*6/MM3 (ref 4.14–5.8)
SODIUM BLD-SCNC: 137 MMOL/L (ref 136–145)
WBC NRBC COR # BLD: 15.94 10*3/MM3 (ref 3.4–10.8)

## 2019-04-03 PROCEDURE — 25010000002 METHYLPREDNISOLONE PER 125 MG: Performed by: NURSE PRACTITIONER

## 2019-04-03 PROCEDURE — G0378 HOSPITAL OBSERVATION PER HR: HCPCS

## 2019-04-03 PROCEDURE — 94760 N-INVAS EAR/PLS OXIMETRY 1: CPT

## 2019-04-03 PROCEDURE — 97161 PT EVAL LOW COMPLEX 20 MIN: CPT

## 2019-04-03 PROCEDURE — 96376 TX/PRO/DX INJ SAME DRUG ADON: CPT

## 2019-04-03 PROCEDURE — 85025 COMPLETE CBC W/AUTO DIFF WBC: CPT | Performed by: NURSE PRACTITIONER

## 2019-04-03 PROCEDURE — 94799 UNLISTED PULMONARY SVC/PX: CPT

## 2019-04-03 PROCEDURE — 82962 GLUCOSE BLOOD TEST: CPT

## 2019-04-03 PROCEDURE — 63710000001 INSULIN ASPART PER 5 UNITS: Performed by: INTERNAL MEDICINE

## 2019-04-03 PROCEDURE — 80048 BASIC METABOLIC PNL TOTAL CA: CPT | Performed by: NURSE PRACTITIONER

## 2019-04-03 RX ADMIN — SODIUM CHLORIDE, PRESERVATIVE FREE 3 ML: 5 INJECTION INTRAVENOUS at 08:37

## 2019-04-03 RX ADMIN — IPRATROPIUM BROMIDE AND ALBUTEROL SULFATE 3 ML: 2.5; .5 SOLUTION RESPIRATORY (INHALATION) at 19:19

## 2019-04-03 RX ADMIN — FAMOTIDINE 20 MG: 20 TABLET ORAL at 08:35

## 2019-04-03 RX ADMIN — SODIUM CHLORIDE, PRESERVATIVE FREE 10 ML: 5 INJECTION INTRAVENOUS at 00:44

## 2019-04-03 RX ADMIN — FUROSEMIDE 20 MG: 20 TABLET ORAL at 08:35

## 2019-04-03 RX ADMIN — RANOLAZINE 500 MG: 500 TABLET, FILM COATED, EXTENDED RELEASE ORAL at 08:35

## 2019-04-03 RX ADMIN — INSULIN ASPART 2 UNITS: 100 INJECTION, SOLUTION INTRAVENOUS; SUBCUTANEOUS at 20:50

## 2019-04-03 RX ADMIN — FAMOTIDINE 20 MG: 20 TABLET ORAL at 20:50

## 2019-04-03 RX ADMIN — IPRATROPIUM BROMIDE AND ALBUTEROL SULFATE 3 ML: 2.5; .5 SOLUTION RESPIRATORY (INHALATION) at 08:31

## 2019-04-03 RX ADMIN — SODIUM CHLORIDE, PRESERVATIVE FREE 3 ML: 5 INJECTION INTRAVENOUS at 20:50

## 2019-04-03 RX ADMIN — INSULIN ASPART 2 UNITS: 100 INJECTION, SOLUTION INTRAVENOUS; SUBCUTANEOUS at 17:13

## 2019-04-03 RX ADMIN — ISOSORBIDE MONONITRATE 30 MG: 30 TABLET, EXTENDED RELEASE ORAL at 08:35

## 2019-04-03 RX ADMIN — CLOPIDOGREL BISULFATE 75 MG: 75 TABLET ORAL at 08:35

## 2019-04-03 RX ADMIN — IPRATROPIUM BROMIDE AND ALBUTEROL SULFATE 3 ML: 2.5; .5 SOLUTION RESPIRATORY (INHALATION) at 15:30

## 2019-04-03 RX ADMIN — METHYLPREDNISOLONE SODIUM SUCCINATE 60 MG: 125 INJECTION, POWDER, FOR SOLUTION INTRAMUSCULAR; INTRAVENOUS at 08:36

## 2019-04-03 RX ADMIN — GLIPIZIDE 5 MG: 5 TABLET ORAL at 08:35

## 2019-04-03 RX ADMIN — FLUTICASONE PROPIONATE 2 SPRAY: 50 SPRAY, METERED NASAL at 08:36

## 2019-04-03 RX ADMIN — MAGNESIUM OXIDE TAB 400 MG (241.3 MG ELEMENTAL MG) 400 MG: 400 (241.3 MG) TAB at 08:35

## 2019-04-03 RX ADMIN — HYDROCODONE BITARTRATE AND ACETAMINOPHEN 1 TABLET: 7.5; 325 TABLET ORAL at 23:42

## 2019-04-03 RX ADMIN — BUDESONIDE 0.5 MG: 0.5 INHALANT RESPIRATORY (INHALATION) at 08:31

## 2019-04-03 RX ADMIN — RANOLAZINE 500 MG: 500 TABLET, FILM COATED, EXTENDED RELEASE ORAL at 20:49

## 2019-04-03 RX ADMIN — HYDROCODONE BITARTRATE AND ACETAMINOPHEN 1 TABLET: 7.5; 325 TABLET ORAL at 08:35

## 2019-04-03 RX ADMIN — HYDROCODONE BITARTRATE AND ACETAMINOPHEN 1 TABLET: 7.5; 325 TABLET ORAL at 00:43

## 2019-04-03 RX ADMIN — INSULIN ASPART 2 UNITS: 100 INJECTION, SOLUTION INTRAVENOUS; SUBCUTANEOUS at 08:37

## 2019-04-03 RX ADMIN — HYDROCODONE BITARTRATE AND ACETAMINOPHEN 1 TABLET: 7.5; 325 TABLET ORAL at 17:11

## 2019-04-03 RX ADMIN — METHYLPREDNISOLONE SODIUM SUCCINATE 60 MG: 125 INJECTION, POWDER, FOR SOLUTION INTRAMUSCULAR; INTRAVENOUS at 17:11

## 2019-04-03 RX ADMIN — METHYLPREDNISOLONE SODIUM SUCCINATE 60 MG: 125 INJECTION, POWDER, FOR SOLUTION INTRAMUSCULAR; INTRAVENOUS at 00:44

## 2019-04-03 RX ADMIN — BUDESONIDE 0.5 MG: 0.5 INHALANT RESPIRATORY (INHALATION) at 19:20

## 2019-04-03 RX ADMIN — LISINOPRIL 5 MG: 5 TABLET ORAL at 08:35

## 2019-04-03 NOTE — THERAPY DISCHARGE NOTE
Acute Care - Physical Therapy Initial Eval/Discharge  [facility]     Patient Name: Hasmukh Ham  : 1933  MRN: [id number]  Today's Date: 4/3/2019   Onset of Illness/Injury or Date of Surgery: 19  Date of Referral to PT: 19  Referring Physician: EVE Boyce      Admit Date: 2019    Visit Dx:    ICD-10-CM ICD-9-CM   1. 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)   • Nocturia   • Osteoarthritis of multiple joints   • Hyperlipidemia   • Pain in joint involving ankle and foot   • Arthropathy of hand   • Paroxysmal tachycardia (CMS/HCC)   • Osteoarthrosis involving more than one site but not generalized   • Right shoulder pain   • Rotator cuff syndrome   • Partial tear of subscapularis tendon   • Infraspinatus tendon tear   • Supraspinatus tendon tear   • Bilateral carotid artery stenosis   • (HFpEF) heart failure with preserved ejection fraction (CMS/HCC)   • Pulmonary HTN (CMS/HCC)   • Acute interstitial pneumonia (CMS/HCC)   • Chronic obstructive lung disease (CMS/HCC)   • Diabetes mellitus (CMS/HCC)   • Hypertension   • Chest pain   • Shortness of breath   • Angina pectoris (CMS/HCC)   • Myocardial infarction (CMS/HCC)   • Ingrown toenail   • Heart problem   • Degenerative joint disease involving multiple joints   • Chronic obstructive pulmonary disease (COPD) (CMS/HCC)   • CHF (congestive heart failure) (CMS/HCC)   • Bleeding disorder (CMS/HCC)    • Chronic obstructive pulmonary disease with acute exacerbation (CMS/HCC)   • Failure of outpatient treatment   • Sepsis (CMS/HCC)   • 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 (CMS/HCC)   • Chronic heart failure with normal ejection fraction (CMS/HCC)     Past Medical History:   Diagnosis Date   • Basal cell carcinoma    • Bilateral carotid artery stenosis    • Bilateral carotid artery stenosis    • Bleeding disorder (CMS/HCC)    • CHF (congestive heart failure) (CMS/HCC)    • Chronic obstructive pulmonary disease (COPD) (CMS/HCC)    • Coronary arteriosclerosis    • Degenerative joint disease involving multiple joints    • Dementia    • Diabetes mellitus (CMS/HCC)    • Heart problem    • History of stomach ulcers    • Hyperlipidemia    • Hypertension    • Ingrown toenail    • Myocardial infarction (CMS/HCC)      Past Surgical History:   Procedure Laterality Date   • BACK SURGERY     • CARDIAC CATHETERIZATION N/A 6/6/2017    Procedure: Right Heart Cath;  Surgeon: Jeff Maciel MD PhD;  Location: [facility] CATH INVASIVE LOCATION;  Service:    • CARDIAC CATHETERIZATION N/A 2/14/2018    Procedure: Coronary angiography;  Surgeon: Moisés Davila MD;  Location: [facility] CATH INVASIVE LOCATION;  Service:    • CORONARY ANGIOPLASTY WITH STENT PLACEMENT     • CORONARY STENT PLACEMENT     • HERNIA REPAIR     • LUNG BIOPSY     • LUNG SURGERY     • NECK SURGERY     • THORACOTOMY Left 1977   • VENTRAL HERNIA REPAIR            PT ASSESSMENT (last 12 hours)      Physical Therapy Evaluation     Row Name 04/03/19 1441          PT Evaluation Time/Intention    Subjective Information  complains of;pain  -LF     Document Type  evaluation  -LF     Mode of Treatment  individual therapy;physical therapy  -LF     Patient Effort  good  -LF     Symptoms Noted During/After Treatment  none  -LF     Row Name 04/03/19 4390          General Information    Patient Profile Reviewed?  yes  -LF     Onset of Illness/Injury or Date  of Surgery  04/02/19  -     Referring Physician  EVE Boyce  -     Patient Observations  alert;cooperative;agree to therapy  -     Patient/Family Observations  spouse in room  -     General Observations of Patient  pt in bed, O2 at 2.5L, IV, tele  -LF     Prior Level of Function  independent:;all household mobility;community mobility;ADL's;shopping shares IADLs with wife; uses electric cart at grocery  -     Equipment Currently Used at Home  grab bar;shower chair;oxygen;raised toilet has SPC, 3-point cane, RW  -     Pertinent History of Current Functional Problem  pt hospitalized due to COPD exacerbation  -     Existing Precautions/Restrictions  fall;oxygen therapy device and L/min  -LF     Equipment Issued to Patient  gait belt  -     Risks Reviewed  patient and family:;LOB;dizziness;increased discomfort;change in vital signs  -     Benefits Reviewed  patient and family:;improve function;increase independence;increase strength;increase balance;decrease risk of DVT;increase knowledge  -     Row Name 04/03/19 1447          Relationship/Environment    Lives With  spouse  -     Family Caregiver if Needed  child(estephania), adult daughter lives next door  -     Row Name 04/03/19 1447          Resource/Environmental Concerns    Current Living Arrangements  home/apartment/condo mobile trailer home  -     Row Name 04/03/19 1447          Cognitive Assessment/Intervention- PT/OT    Orientation Status (Cognition)  oriented x 4  -LF     Follows Commands (Cognition)  WNL  -LF     Safety Deficit (Cognitive)  other (see comments) no safety issues identified at this time  -     Row Name 04/03/19 1447          Safety Issues, Functional Mobility    Impairments Affecting Function (Mobility)  balance;endurance/activity tolerance;strength;shortness of breath  -     Row Name 04/03/19 1447          Bed Mobility Assessment/Treatment    Bed Mobility Assessment/Treatment  supine-sit  -LF     Supine-Sit  [place] (Bed Mobility)  supervision  -     Assistive Device (Bed Mobility)  bed rails;head of bed elevated  -[facility] Name 04/03/19 1447          Transfer Assessment/Treatment    Transfer Assessment/Treatment  sit-stand transfer;stand-sit transfer  -     Sit-Stand [place] (Transfers)  supervision  -     Stand-Sit [place] (Transfers)  supervision  -[facility] Name 04/03/19 1447          Sit-Stand Transfer    Assistive Device (Sit-Stand Transfers)  -- no AD  -LF     Row Name 04/03/19 1447          Stand-Sit Transfer    Assistive Device (Stand-Sit Transfers)  -- no AD  -LF     [facility] Name 04/03/19 1447          Gait/Stairs Assessment/Training    [place] Level (Gait)  stand by assist  -     Assistive Device (Gait)  -- no AD  -LF     Distance in Feet (Gait)  75 feet (standing rest break) + 200 feet  -LF     Pattern (Gait)  step-through  -LF     Deviations/Abnormal Patterns (Gait)  base of support, wide  -LF     Right Sided Gait Deviations  Trendelenburg sign  -[facility] Name 04/03/19 1447          General ROM    GENERAL ROM COMMENTS  BLE AROM WFL  -[facility] Name 04/03/19 1447          MMT (Manual Muscle Testing)    General MMT Comments  BLE strength 5/5 with exception of hip flexion of 4/5  -[facility] Name 04/03/19 1447          Pain Assessment    Additional Documentation  Pain Scale: Numbers Pre/Post-Treatment (Group)  -[facility] Name 04/03/19 1447          Pain Scale: Numbers Pre/Post-Treatment    Pain Scale: Numbers, Pretreatment  8/10  -LF     Pain Scale: Numbers, Post-Treatment  9/10  -LF     Pain Location - Orientation  lower  -LF     Pain Location  back  -LF     Pain Intervention(s)  Repositioned;Ambulation/increased activity;Emotional support  -[facility] Name 04/03/19 1447          Plan of Care Review    Plan of Care Reviewed With  patient;spouse;daughter  -[facility] Name 04/03/19 1447          Physical Therapy Clinical Impression    Date of Referral to PT  04/03/19  -     PT  Diagnosis (PT Clinical Impression)  impaired tolerance for functional mobility  -LF     Prognosis (PT Clinical Impression)  good  -LF     Functional Level at Time of Evaluation (PT Clinical Impression)  SBA-SPV needed with functional mobility  -LF     Patient/Family Goals Statement (PT Clinical Impression)  return home  -LF     Criteria for Skilled Interventions Met (PT Clinical Impression)  no significant expected improvement in functional status within acute care timeframe  -LF     Pathology/Pathophysiology Noted (Describe Specifically for Each System)  pulmonary;cardiovascular;musculoskeletal  -LF     Impairments Found (describe specific impairments)  aerobic capacity/endurance;ventilation and respiration/gas exchange;muscle performance;gait, locomotion, and balance  -LF     Care Plan Review (PT)  evaluation/treatment results reviewed;care plan/treatment goals reviewed;risks/benefits reviewed;current/potential barriers reviewed;patient/other agree to care plan  -LF     Row Name 04/03/19 1447          Vital Signs    Pre Systolic BP Rehab  114  -LF     Pre Treatment Diastolic BP  65  -LF     Post Systolic BP Rehab  116  -LF     Post Treatment Diastolic BP  68  -LF     Pretreatment Heart Rate (beats/min)  98  -LF     Intratreatment Heart Rate (beats/min)  106  -LF     Posttreatment Heart Rate (beats/min)  102  -LF     Pre SpO2 (%)  96  -LF     O2 Delivery Pre Treatment  supplemental O2  -LF     Intra SpO2 (%)  99  -LF     O2 Delivery Intra Treatment  supplemental O2  -LF     Post SpO2 (%)  94  -LF     O2 Delivery Post Treatment  supplemental O2  -LF     Pre Patient Position  Supine  -LF     Intra Patient Position  Standing with ambulation  -LF     Row Name 04/03/19 1447          Positioning and Restraints    Pre-Treatment Position  in bed  -LF     Post Treatment Position  bed  -LF     In Bed  sitting EOB;call light within reach;encouraged to call for assist;with family/caregiver;side rails up x2  -LF     Row Name  04/03/19 1447          Living Environment    Home Accessibility  tub/shower is not walk in ramp to enter  -       User Key  (r) = Recorded By, (t) = Taken By, (c) = Cosigned By    Initials Name Provider Type     Geraldine Salgado PT Physical Therapist          Physical Therapy Education     Title: PT OT SLP Therapies (Resolved)     Topic: Physical Therapy (Resolved)     Point: Mobility training (Resolved)     Learning Progress Summary           Patient Acceptance, E, VU by  at 4/3/2019  3:40 PM    Comment:  Role of PT, PT plan for eval only at hospital, benefits of mobility while in hospital, d/c recommendation for HH PT   Family Acceptance, E, VU by  at 4/3/2019  3:40 PM    Comment:  Role of PT, PT plan for eval only at hospital, benefits of mobility while in hospital, d/c recommendation for HH PT                   Point: Precautions (Resolved)     Learning Progress Summary           Patient Acceptance, E, VU by  at 4/3/2019  3:41 PM    Comment:  gait belt, call light, assistance/supervision with mobility   Family Acceptance, E, VU by  at 4/3/2019  3:41 PM    Comment:  gait belt, call light, assistance/supervision with mobility                               User Key     Initials Effective Dates Name Provider Type Discipline     07/23/18 -  Geraldine Salgado PT Physical Therapist PT                PT Recommendation and Plan  Anticipated Discharge Disposition (PT): home with home health  Therapy Frequency (PT Clinical Impression): evaluation only  Outcome Summary/Treatment Plan (PT)  Anticipated Discharge Disposition (PT): home with home health  Plan of Care Reviewed With: patient, spouse, daughter  Outcome Summary: PT eval completed. Patient was able to ambulate 75 feet + 200 feet (standing rest break between) without AD with SBA. Patient was on 2.5L O2 and SpO2 was 99% after ambulation. Patient appears to be functioning close to his baseline level, so no follow-up PT is required in acute care. A home health PT  eval would be beneficial to assess pt's function and safety in his home environment to decrease likelihood of hospital re-admission.    Outcome Measures     Row Name 04/03/19 1447             How much help from another person do you currently need...    Turning from your back to your side while in flat bed without using bedrails?  4  -LF      Moving from lying on back to sitting on the side of a flat bed without bedrails?  4  -LF      Moving to and from a bed to a chair (including a wheelchair)?  4  -LF      Standing up from a chair using your arms (e.g., wheelchair, bedside chair)?  4  -LF      Climbing 3-5 steps with a railing?  3  -LF      To walk in hospital room?  3  -LF      AM-PAC 6 Clicks Score  22  -LF         Functional Assessment    Outcome Measure Options  AM-PAC 6 Clicks Basic Mobility (PT)  -LF        User Key  (r) = Recorded By, (t) = Taken By, (c) = Cosigned By    Initials Name Provider Type     Geraldine Salgado PT Physical Therapist           Time Calculation:   PT Charges     Row Name 04/03/19 1447             Time Calculation    Start Time  1447  -LF      Stop Time  1520  -LF      Time Calculation (min)  33 min  -LF      PT Received On  04/03/19  -LF        User Key  (r) = Recorded By, (t) = Taken By, (c) = Cosigned By    Initials Name Provider Type     Geraldine Salgado PT Physical Therapist        Therapy Charges for Today     Code Description Service Date Service Provider Modifiers Qty    [id number] HC PT EVAL LOW COMPLEXITY 2 4/3/2019 Geraldine Salgado PT GP 1          PT G-Codes  Outcome Measure Options: AM-PAC 6 Clicks Basic Mobility (PT)  AM-PAC 6 Clicks Score: 22    PT Discharge Summary  Anticipated Discharge Disposition (PT): home with home health  Reason for Discharge: At baseline function  Outcomes Achieved: (PT eval only)    Geraldine Salgado PT  4/3/2019

## 2019-04-03 NOTE — PLAN OF CARE
Problem: Patient Care Overview  Goal: Plan of Care Review  Outcome: Ongoing (interventions implemented as appropriate)   04/03/19 9625   Coping/Psychosocial   Plan of Care Reviewed With patient;family   Plan of Care Review   Progress improving   OTHER   Outcome Summary Patient admitted to hospital d/t bronchitis and worsening CHF. Patient reported that he follows a low sodium/low fat diet when at home. Educated patient on heart healthy, low sodium diet with fluid restrictions. Will follow up to see if patient has questions regarding education.

## 2019-04-03 NOTE — PROGRESS NOTES
Discharge Planning Assessment  [facility]     Patient Name: Hasmukh Ham  MRN: [id number]  Today's Date: 4/3/2019    Admit Date: 4/2/2019    Discharge Needs Assessment     Row Name 04/03/19 1043       Living Environment    Lives With  spouse    Current Living Arrangements  home/apartment/condo Information on face sheet confirmed with patient.     Primary Care Provided by  self    Provides Primary Care For  no one    Family Caregiver if Needed  spouse;other (see comments) Patient's daughter resides next door and assists patient as needed.     Quality of Family Relationships  helpful;involved;supportive    Able to Return to Prior Arrangements  yes       Resource/Environmental Concerns    Resource/Environmental Concerns  none    Transportation Concerns  car, none       Transition Planning    Patient/Family Anticipates Transition to  home with family    Patient/Family Anticipated Services at Transition  home health care transition visit    Transportation Anticipated  family or friend will provide Patient's spouse and/or daughter assists with transportation.        Discharge Needs Assessment    Readmission Within the Last 30 Days  no previous admission in last 30 days    Concerns to be Addressed  denies needs/concerns at this time    Equipment Currently Used at Home  oxygen;nebulizer;walker, rolling;glucometer Patient has home/portable oxygen available. Patient also has a blood pressure monitor and a scale. Patient uses [employer] for DME needs.     Anticipated Changes Related to Illness  none    Equipment Needed After Discharge  none    Discharge Facility/Level of Care Needs  home with home health    Offered/Gave Vendor List  other (see comments) transition visit offered by [facility]     Current Discharge Risk  chronically ill Patient reports compliance with DM and HF management. He voiced compliance with performing daily weights and with dietary restrictions.         Discharge Plan     uRthann  Name 04/03/19 1047       Plan    Plan  home with a home health: transition visit    Plan Comments  LACE complete. Patient is agreeable to a home health transition visit. DELILAH left a note for MD with request for HH services. No additional needs presented and/or voiced at this time. Continue with medical management.....[facility] [place]        Destination      No service coordination in this encounter.      Durable Medical Equipment      No service coordination in this encounter.      Dialysis/Infusion      No service coordination in this encounter.      Home Medical Care      No service coordination in this encounter.      Therapy      No service coordination in this encounter.      Community Resources      No service coordination in this encounter.        Expected Discharge Date and Time     Expected Discharge Date Expected Discharge Time    Apr 5, 2019         Demographic Summary     Row Name 04/03/19 1035       General Information    Admission Type  inpatient    Arrived From  physician office Direct admit from Dr Ochoa's office.     Referral Source  high risk screening    Reason for Consult  discharge planning    Preferred Language  English     Used During This Interaction  no       Contact Information    Contact Information Obtained for          Functional Status     Row Name 04/03/19 1036       Functional Status    Usual Activity Tolerance  good    Functional Status Comments  Patient is independent with ADL's. He has access to DME to assist with ambulation but voiced that he does not currently require use. Patient requires continuous oxygen @ 2liters.        Functional Status, IADL    Medications  independent Pharmacy. [facility], and coverage confirmed with patient.     Meal Preparation  assistive person    Housekeeping  assistive person    Laundry  assistive person    Shopping  assistive person    IADL Comments  Patient and his spouse perform IADL's.        Mental Status Summary    Recent  Changes in Mental Status/Cognitive Functioning  no changes       Employment/    Employment Status  retired        Psychosocial    No documentation.       Abuse/Neglect    No documentation.       Legal    No documentation.       Substance Abuse    No documentation.       Patient Forms    No documentation.           JACOB Goldstein

## 2019-04-03 NOTE — PAYOR COMM NOTE
"Hasmukh Ham (85 y.o. Male)     Date of Birth Social Security Number Address Home Phone MRN    06/01/1933  [address] [facility] [id number] [phone] [id number]    Gnosticist Marital Status          None        Admission Date Admission Type Admitting Provider Attending Provider Department, Room/Bed    4/2/19 Urgent Ananda Curiel MD Ebenibo, Sotonte E, MD [facility] STEPDOWN UNIT, 321/1    Discharge Date Discharge Disposition Discharge Destination                       Attending Provider:  Ananda Curiel MD    Allergies:  Atorvastatin, Penicillins, Pravastatin, Azithromycin, Biaxin [Clarithromycin]    Isolation:  None   Infection:  None   Code Status:  CPR    Ht:  170.2 cm (67.01\")   Wt:  76 kg (167 lb 9.6 oz)    Admission Cmt:  None   Principal Problem:  None                Active Insurance as of 4/2/2019     Primary Coverage     Payor Plan Insurance Group Employer/Plan Group    AETNA MEDICARE REPLACEMENT AETNA [id number]     Payor Plan Address Payor Plan Phone Number Payor Plan Fax Number Effective Dates    [address] [phone]  4/1/2018 - None Entered    [facility] [id number]       Subscriber Name Subscriber Birth Date Member ID       HASMUKH HAM 6/1/1933 [id number]                 Emergency Contacts      (Rel.) Home Phone Work Phone Mobile Phone    Geovanna Tatum (Daughter) [phone] -- [phone]    Rimma Ham (Spouse) [phone] -- [phone]        [facility]  P: [phone]  F: [phone]    Ref#[id number]       History & Physical      Gauri Sweeney APRN at 4/2/2019  2:59 PM     Attestation signed by Ananda Curiel MD at 4/2/2019 10:34 PM    Have examined the patient, reviewed the documentation and agree with the plan as outlined.    Patient presents with shortness of breath while in cardiologist office.  His symptoms have been ongoing for 2-3 days.    Physical " exam  General: Alert, oriented x3, not in distress  Chest: Reduced air entry globally with basal fine crepitations bilaterally.  Abdomen: Obese, ventral hernia present, normal bowel sounds  Plan  Patient's dyspnea is multifactorial.  We will continue steroids, nebulizer treatments.  Continue Lasix.                          [facility] Admission      Date of Admission: 4/2/2019      Primary Care Physician: Paolo Rey MD      Chief Complaint: dyspnea     HPI: 85-year-old  male with past medical history of hypertension, COPD, coronary artery disease, atrial fibrillation, type 2 diabetes, chronic hypoxic respiratory failure with oxygen dependence at 2 L, heart failure with preserved ejection fraction who presents on 4/2/2019 as a direct admission from Dr. Curtis's office with complaints of dyspnea.  The patient reports that for 2-3 days prior to admission he has had worsening dyspnea.  He denies any fever, chills, chest pain, cough.  He reports having orthopnea that requires 2 pillows for resolution.  He is oxygen dependent at 2 L at home but is currently on 3 L at time of examination.    Concurrent Medical History:  has a past medical history of Basal cell carcinoma, Bilateral carotid artery stenosis, Bilateral carotid artery stenosis, Bleeding disorder (CMS/HCC), CHF (congestive heart failure) (CMS/HCC), Chronic obstructive pulmonary disease (COPD) (CMS/HCC), Coronary arteriosclerosis, Degenerative joint disease involving multiple joints, Dementia, Diabetes mellitus (CMS/HCC), Heart problem, History of stomach ulcers, Hyperlipidemia, Hypertension, Ingrown toenail, and Myocardial infarction (CMS/HCC).    Past Surgical History:  has a past surgical history that includes Hernia repair; Lung biopsy; Ventral hernia repair; Thoracotomy (Left, 1977); Cardiac catheterization (N/A, 6/6/2017); Coronary stent placement; Neck surgery; Cardiac catheterization (N/A,  2/14/2018); Lung surgery; Back surgery; and Coronary angioplasty with stent.    Family History: family history includes Cancer in his other; Diabetes in his father and mother; Heart disease in his father; Hypertension in his father; Lung disease in his other.    Social History:  reports that he has quit smoking. He has never used smokeless tobacco. He reports that he does not drink alcohol or use drugs.    Allergies:   Allergies   Allergen Reactions   • Atorvastatin Anaphylaxis   • Penicillins Rash   • Pravastatin      Myalgia   • Azithromycin Rash   • Biaxin [Clarithromycin] Rash       Medications:   Prior to Admission medications    Medication Sig Start Date End Date Taking? Authorizing Provider   albuterol (PROVENTIL) (2.5 MG/3ML) 0.083% nebulizer solution Take 2.5 mg by nebulization Every 4 (Four) Hours As Needed for Wheezing. 12/23/17  Yes Adi Puente MD   clopidogrel (PLAVIX) 75 MG tablet Take 75 mg by mouth Daily. 2/3/16  Yes Bill Rader MD   famotidine (PEPCID) 20 MG tablet Take 20 mg by mouth 2 (Two) Times a Day.   Yes ProviderBill MD   fluticasone (FLONASE) 50 MCG/ACT nasal spray 2 sprays into each nostril Daily.   Yes ProviderBill MD   Fluticasone Furoate (ARNUITY ELLIPTA) 200 MCG/ACT aerosol powder  Inhale.   Yes ProviderBill MD   furosemide (LASIX) 20 MG tablet Take 1 tablet by mouth Daily. 3/3/17  Yes Jeff Maciel MD PhD   glimepiride (AMARYL) 2 MG tablet Take 2 mg by mouth Every Morning Before Breakfast.   Yes Bill Rader MD   HYDROcodone-acetaminophen (NORCO) 7.5-325 MG per tablet Take 1 tablet by mouth Every 8 (Eight) Hours. 12/27/16  Yes iBll Rader MD   ipratropium-albuterol (DUO-NEB) 0.5-2.5 mg/mL nebulizer Take 3 mL by nebulization 4 (Four) Times a Day. 9/28/17  Yes Jc Alba MD   isosorbide mononitrate (IMDUR) 30 MG 24 hr tablet Take 1 tablet by mouth Daily. 2/16/18  Yes Jc Alba MD   lisinopril  (PRINIVIL,ZESTRIL) 10 MG tablet Take 0.5 tablets by mouth Daily. 11/10/17  Yes Caitlyn Garcia MD   magic mouthwash oral suspension Swish and swallow 5 mL Every 4 (Four) Hours As Needed (sore throat). 1/15/19  Yes Ramiro Seals MD   magnesium oxide (MAGOX) 400 (241.3 MG) MG tablet tablet Take 400 mg by mouth Daily.   Yes ProviderBill MD   nitroglycerin (NITROSTAT) 0.4 MG SL tablet place 1 tablet under the tongue if needed every 5 minutes for hair...  (REFER TO PRESCRIPTION NOTES). 1/11/17  Yes Bill Rader MD   nystatin (MYCOSTATIN) 499672 UNIT/ML suspension Swish and swallow 5 mL 4 (Four) Times a Day. 1/15/19  Yes Ramiro Seals MD   O2 (OXYGEN) Inhale 2 L/min Every Night. W/ CPAP   Yes Bill Rader MD   ranolazine (RANEXA) 500 MG 12 hr tablet Take 1 tablet by mouth Every 12 (Twelve) Hours. 8/23/18  Yes Mana Curtis MD   Red Yeast Rice 600 MG tablet Take 600 mg by mouth 2 (Two) Times a Day.   Yes Bill Rader MD   Umeclidinium-Vilanterol 62.5-25 MCG/INH aerosol powder  Inhale 1 puff Daily. 3/3/17  Yes Brea Higginbotham MD   acetaminophen (TYLENOL) 325 MG tablet Take 2 tablets by mouth Every 4 (Four) Hours As Needed for Mild Pain . 2/15/18   Jc Alba MD   aspirin 325 MG tablet Take 325 mg by mouth Daily.  4/2/19  Bill Rader MD   ondansetron ODT (ZOFRAN-ODT) 4 MG disintegrating tablet Take 1 tablet by mouth Every 8 (Eight) Hours As Needed for Nausea or Vomiting. 3/19/18 4/2/19  Danielito Meraz MD   predniSONE (DELTASONE) 10 MG tablet Take 4 tablets by mouth daily for 3 days, then 3 tabs daily for 3 days, then 2 tabs daily for 3 days, then 1 tablet daily for 3 days. 1/9/19 4/2/19  Ethan Chin DO       Review of Systems:  Review of Systems   Constitutional: Negative for chills and fever.   Respiratory: Positive for shortness of breath. Negative for cough, chest tightness and wheezing.    Cardiovascular: Negative for chest  pain, palpitations and leg swelling.   Gastrointestinal: Negative for abdominal pain, diarrhea, nausea and vomiting.   Musculoskeletal: Negative for back pain and neck pain.   Skin: Negative for pallor.   Neurological: Negative for dizziness and weakness.   Psychiatric/Behavioral: Negative for confusion. The patient is not nervous/anxious.             Physical Exam:   Temp:  [97.4 °F (36.3 °C)] 97.4 °F (36.3 °C)  Heart Rate:  [64-72] 72  Resp:  [18] 18  BP: (121-130)/(78-84) 121/84  Physical Exam   Constitutional: He is oriented to person, place, and time. He appears well-developed and well-nourished.   HENT:   Head: Normocephalic and atraumatic.   Eyes: Conjunctivae and lids are normal.   Neck: Normal range of motion. Neck supple.   Cardiovascular: Normal rate, normal heart sounds and intact distal pulses.   Pulmonary/Chest: Effort normal. He has decreased breath sounds in the right lower field and the left lower field. He has wheezes in the right lower field and the left lower field.   Abdominal: Soft. Bowel sounds are normal. He exhibits no distension. There is no tenderness.   Musculoskeletal: Normal range of motion. He exhibits no edema.   Neurological: He is alert and oriented to person, place, and time.   Skin: Skin is warm and dry.   Psychiatric: He has a normal mood and affect. His behavior is normal.   Nursing note and vitals reviewed.      Results Reviewed:  I have personally reviewed current lab, radiology, and data and agree with results.  Lab Results (last 24 hours)     ** No results found for the last 24 hours. **        Imaging Results (last 24 hours)     ** No results found for the last 24 hours. **            Assessment:    Active Hospital Problems    Diagnosis   • Shortness of breath   • Hypertension   • Chronic obstructive lung disease (CMS/HCC)   • Diabetes mellitus (CMS/HCC)   • (HFpEF) heart failure with preserved ejection fraction (CMS/HCC)   • Pulmonary HTN (CMS/HCC)   • Chronic coronary  artery disease   • Gastroesophageal reflux disease without esophagitis   • Diabetic neuropathy (CMS/HCC)   • Abdominal hernia             Plan:  1. Dyspnea: likely multifactorial in nature due to history of CHF, COPD, pulmonary HTN.  CXR, respiratory panel, BNP, D dimer screening.   2. Chronic hypoxic respiratory failure with oxygen dependence at 2 liters: continue oxygen support, home nebulizers.   3. Hx COPD: Continue home nebulizer treatments.  Duo nebs 4 times per day.  Patient may use Anoro Ellipta from his home supply.  4. HTN: Continue lisinopril.  5. HFpEF: Continue Lasix, lisinopril.  Daily weights, strict I/O, BNP ordered.   6. Type 2 DM: FSBS AC and HS with SSI, Glipizide daily.   7. CAD: Continue Plavix, Imdur, Ranexa.  8. Pulmonary HTN:    Further orders will depend upon hospital course.  Plan of care discussed with patient and wife at bedside.  Code status confirmed and patient wishes for full code status.          This document has been electronically signed by EVE Ortega on April 2, 2019 3:17 PM                    Electronically signed by Ananda Curiel MD at 4/2/2019 10:34 PM       Emergency Department Notes     No notes of this type exist for this encounter.        Hospital Medications (all)       Dose Frequency Start End    acetaminophen (TYLENOL) tablet 650 mg 650 mg Every 4 Hours PRN 4/2/2019     Sig - Route: Take 2 tablets by mouth Every 4 (Four) Hours As Needed for Mild Pain . - Oral    budesonide (PULMICORT) nebulizer solution 0.5 mg 0.5 mg 2 Times Daily - RT 4/2/2019     Sig - Route: Take 2 mL by nebulization 2 (Two) Times a Day. - Nebulization    clopidogrel (PLAVIX) tablet 75 mg 75 mg Daily 4/3/2019     Sig - Route: Take 1 tablet by mouth Daily. - Oral    dextrose (D50W) 25 g/ 50mL Intravenous Solution 25 g 25 g Every 15 Minutes PRN 4/2/2019     Sig - Route: Infuse 50 mL into a venous catheter Every 15 (Fifteen) Minutes As Needed for Low Blood Sugar (Blood Sugar Less Than  70). - Intravenous    dextrose (GLUTOSE) oral gel 15 g 15 g Every 15 Minutes PRN 4/2/2019     Sig - Route: Take 15 g by mouth Every 15 (Fifteen) Minutes As Needed for Low Blood Sugar (Blood sugar less than 70). - Oral    famotidine (PEPCID) tablet 20 mg 20 mg 2 Times Daily 4/2/2019     Sig - Route: Take 1 tablet by mouth 2 (Two) Times a Day. - Oral    fluticasone (FLONASE) 50 MCG/ACT nasal spray 2 spray 2 spray Daily 4/3/2019     Sig - Route: 2 sprays into the nostril(s) as directed by provider Daily. - Nasal    furosemide (LASIX) tablet 20 mg 20 mg Daily 4/3/2019     Sig - Route: Take 1 tablet by mouth Daily. - Oral    glipiZIDE (GLUCOTROL) tablet 5 mg 5 mg Every Morning Before Breakfast 4/3/2019     Sig - Route: Take 1 tablet by mouth Every Morning Before Breakfast. - Oral    glucagon (human recombinant) (GLUCAGEN DIAGNOSTIC) injection 1 mg 1 mg As Needed 4/2/2019     Sig - Route: Inject 1 mg under the skin into the appropriate area as directed As Needed (Blood Glucose Less Than 70). - Subcutaneous    HYDROcodone-acetaminophen (NORCO) 7.5-325 MG per tablet 1 tablet 1 tablet Every 8 Hours 4/2/2019     Sig - Route: Take 1 tablet by mouth Every 8 (Eight) Hours. - Oral    insulin aspart (novoLOG) injection 0-7 Units 0-7 Units 4 Times Daily Before Meals & Nightly 4/2/2019     Sig - Route: Inject 0-7 Units under the skin into the appropriate area as directed 4 (Four) Times a Day Before Meals & at Bedtime. - Subcutaneous    ipratropium-albuterol (DUO-NEB) nebulizer solution 3 mL 3 mL 4 Times Daily 4/2/2019     Sig - Route: Take 3 mL by nebulization 4 (Four) Times a Day. - Nebulization    isosorbide mononitrate (IMDUR) 24 hr tablet 30 mg 30 mg Every 24 Hours Scheduled 4/3/2019     Sig - Route: Take 1 tablet by mouth Daily. - Oral    lisinopril (PRINIVIL,ZESTRIL) tablet 5 mg 5 mg Every 24 Hours Scheduled 4/3/2019     Sig - Route: Take 1 tablet by mouth Daily. - Oral    magnesium oxide (MAGOX) tablet 400 mg 400 mg Daily  4/3/2019     Sig - Route: Take 1 tablet by mouth Daily. - Oral    methylPREDNISolone sodium succinate (SOLU-Medrol) injection 60 mg 60 mg Every 8 Hours 4/2/2019     Sig - Route: Infuse 0.96 mL into a venous catheter Every 8 (Eight) Hours. - Intravenous    nitroglycerin (NITROSTAT) SL tablet 0.4 mg 0.4 mg Every 5 Minutes PRN 4/2/2019     Sig - Route: Place 1 tablet under the tongue Every 5 (Five) Minutes As Needed for Chest Pain. - Sublingual    ondansetron (ZOFRAN) injection 4 mg 4 mg Every 6 Hours PRN 4/2/2019     Sig - Route: Infuse 2 mL into a venous catheter Every 6 (Six) Hours As Needed for Nausea or Vomiting. - Intravenous    ranolazine (RANEXA) 12 hr tablet 500 mg 500 mg Every 12 Hours Scheduled 4/2/2019     Sig - Route: Take 1 tablet by mouth Every 12 (Twelve) Hours. - Oral    sodium chloride 0.9 % flush 3 mL 3 mL Every 12 Hours Scheduled 4/2/2019     Sig - Route: Infuse 3 mL into a venous catheter Every 12 (Twelve) Hours. - Intravenous    sodium chloride 0.9 % flush 3-10 mL 3-10 mL As Needed 4/2/2019     Sig - Route: Infuse 3-10 mL into a venous catheter As Needed for Line Care. - Intravenous    acetaminophen (TYLENOL) tablet 650 mg (Discontinued) 650 mg Every 4 Hours PRN 4/2/2019 4/2/2019    Sig - Route: Take 2 tablets by mouth Every 4 (Four) Hours As Needed for Mild Pain . - Oral    Reason for Discontinue: Duplicate order            Lab Results (last 24 hours)     Procedure Component Value Units Date/Time    Basic Metabolic Panel [[id number]]  (Abnormal) Collected:  04/03/19 0618    Specimen:  Blood Updated:  04/03/19 0706     Glucose 175 mg/dL      BUN 31 mg/dL      Creatinine 1.33 mg/dL      Sodium 137 mmol/L      Potassium 5.3 mmol/L      Chloride 100 mmol/L      CO2 20.0 mmol/L      Calcium 10.2 mg/dL      eGFR Non African Amer 51 mL/min/1.73      BUN/Creatinine Ratio 23.3     Anion Gap 17.0 mmol/L     Narrative:       GFR Normal >60  Chronic Kidney Disease <60  Kidney Failure <15    CBC & Differential  [[id number]] Collected:  04/03/19 0618    Specimen:  Blood Updated:  04/03/19 0640    Narrative:       The following orders were created for panel order CBC & Differential.  Procedure                               Abnormality         Status                     ---------                               -----------         ------                     CBC Auto Differential[[id number]]        Abnormal            Final result                 Please view results for these tests on the individual orders.    CBC Auto Differential [[id number]]  (Abnormal) Collected:  04/03/19 0618    Specimen:  Blood Updated:  04/03/19 0640     WBC 15.94 10*3/mm3      RBC 4.53 10*6/mm3      Hemoglobin 13.5 g/dL      Hematocrit 42.6 %      MCV 94.0 fL      MCH 29.8 pg      MCHC 31.7 g/dL      RDW 15.0 %      RDW-SD 52.2 fl      MPV 11.5 fL      Platelets 237 10*3/mm3      Neutrophil % 89.6 %      Lymphocyte % 8.0 %      Monocyte % 0.9 %      Eosinophil % 0.0 %      Basophil % 0.1 %      Immature Grans % 1.4 %      Neutrophils, Absolute 14.26 10*3/mm3      Lymphocytes, Absolute 1.28 10*3/mm3      Monocytes, Absolute 0.15 10*3/mm3      Eosinophils, Absolute 0.00 10*3/mm3      Basophils, Absolute 0.02 10*3/mm3      Immature Grans, Absolute 0.23 10*3/mm3      nRBC 0.0 /100 WBC     POC Glucose Once [[id number]]  (Abnormal) Collected:  04/03/19 0528    Specimen:  Blood Updated:  04/03/19 0541     Glucose 212 mg/dL      Comment: Sliding Scale AdminOperator: [id number] [facility] ID: [id number]       Blood Culture - Blood, Arm, Right [[id number]] Collected:  04/02/19 1518    Specimen:  Blood from Arm, Right Updated:  04/03/19 0400     Blood Culture No growth at less than 24 hours    Blood Culture - Blood, Arm, Left [[id number]] Collected:  04/02/19 1528    Specimen:  Blood from Arm, Left Updated:  04/03/19 0400     Blood Culture No growth at less than 24 hours    Respiratory Panel, PCR - Swab, Nasopharynx [[id number]]  (Normal) Collected:   04/02/19 1946    Specimen:  Swab from Nasopharynx Updated:  04/02/19 2214     ADENOVIRUS, PCR Not Detected     Coronavirus 229E Not Detected     Coronavirus HKU1 Not Detected     Coronavirus NL63 Not Detected     Coronavirus OC43 Not Detected     Human Metapneumovirus Not Detected     Human Rhinovirus/Enterovirus Not Detected     Influenza B PCR Not Detected     Parainfluenza Virus 1 Not Detected     Parainfluenza Virus 2 Not Detected     Parainfluenza Virus 3 Not Detected     Parainfluenza Virus 4 Not Detected     Bordetella pertussis pcr Not Detected     Influenza A H1 2009 PCR Not Detected     Chlamydophila pneumoniae PCR Not Detected     Mycoplasma pneumo by PCR Not Detected     Influenza A PCR Not Detected     Influenza A H3 Not Detected     Influenza A H1 Not Detected     RSV, PCR Not Detected    POC Glucose Once [[id number]]  (Abnormal) Collected:  04/02/19 2052    Specimen:  Blood Updated:  04/02/19 2112     Glucose 169 mg/dL      Comment: Notify DoctorOperator: [id number] [facility] ID: [id number]       Basic Metabolic Panel [[id number]]  (Abnormal) Collected:  04/02/19 1528    Specimen:  Blood Updated:  04/02/19 1623     Glucose 121 mg/dL      BUN 26 mg/dL      Creatinine 1.29 mg/dL      Sodium 140 mmol/L      Potassium 5.2 mmol/L      Chloride 103 mmol/L      CO2 24.0 mmol/L      Calcium 9.6 mg/dL      eGFR Non African Amer 53 mL/min/1.73      BUN/Creatinine Ratio 20.2     Anion Gap 13.0 mmol/L     Narrative:       GFR Normal >60  Chronic Kidney Disease <60  Kidney Failure <15    BNP [[id number]]  (Normal) Collected:  04/02/19 1528    Specimen:  Blood Updated:  04/02/19 1616     proBNP 176.7 pg/mL     Narrative:       Among patients with dyspnea, NT-proBNP is highly sensitive for the detection of acute congestive heart failure. In addition NT-proBNP of <300 pg/ml effectively rules out acute congestive heart failure with 99% negative predictive value.    Lactic Acid, Plasma [[id number]]  (Normal)  Collected:  04/02/19 1528    Specimen:  Blood Updated:  04/02/19 1615     Lactate 2.0 mmol/L     D-dimer, Quantitative [[id number]]  (Abnormal) Collected:  04/02/19 1528    Specimen:  Blood Updated:  04/02/19 1614     D-Dimer, Quantitative 543 ng/mL (FEU)     Narrative:       Dimer values <500 ng/ml FEU are FDA approved as aid in diagnosis of deep venous thrombosis and pulmonary embolism.  This test should not be used in an exclusion strategy with pretest probability alone.    A recent guideline regarding diagnosis for pulmonary thromboembolism recommends an adjusted exclusion criterion of age x 10 ng/ml FEU for patients >50 years of age ([employer]enny Intern Med 2015; 163: 701-711).    CBC & Differential [[id number]] Collected:  04/02/19 1528    Specimen:  Blood Updated:  04/02/19 1557    Narrative:       The following orders were created for panel order CBC & Differential.  Procedure                               Abnormality         Status                     ---------                               -----------         ------                     CBC Auto Differential[[id number]]        Abnormal            Final result                 Please view results for these tests on the individual orders.    CBC Auto Differential [[id number]]  (Abnormal) Collected:  04/02/19 1528    Specimen:  Blood Updated:  04/02/19 1557     WBC 14.54 10*3/mm3      RBC 4.31 10*6/mm3      Hemoglobin 13.0 g/dL      Hematocrit 40.4 %      MCV 93.7 fL      MCH 30.2 pg      MCHC 32.2 g/dL      RDW 15.3 %      RDW-SD 52.5 fl      MPV 11.2 fL      Platelets 201 10*3/mm3      Neutrophil % 84.4 %      Lymphocyte % 10.9 %      Monocyte % 3.0 %      Eosinophil % 0.0 %      Basophil % 0.2 %      Immature Grans % 1.5 %      Neutrophils, Absolute 12.28 10*3/mm3      Lymphocytes, Absolute 1.58 10*3/mm3      Monocytes, Absolute 0.43 10*3/mm3      Eosinophils, Absolute 0.00 10*3/mm3      Basophils, Absolute 0.03 10*3/mm3      Immature Grans, Absolute 0.22 10*3/mm3       nRBC 0.0 /100 WBC         Imaging Results (last 24 hours)     Procedure Component Value Units Date/Time    XR Chest PA & Lateral [[id number]] Collected:  04/02/19 1612     Updated:  04/02/19 1635    Narrative:           PROCEDURE: Chest PA and lateral    REASON FOR EXAM: dyspnea    FINDINGS: Comparison study dated January 11, 2019. . Cardiac and  pulmonary vasculature are normal . Stable right upper lobe small  calcified lung parenchymal granuloma consistent with old  granulomatous disease. Lungs are otherwise clear. Pleural spaces  are normal . No acute osseous abnormality. Stable thoracic spine  intrathecal lead in place.      Impression:       1.  Evidence of old granulomatous disease.  2.  No acute cardiopulmonary abnormality.    Electronically signed by:  Nelson Christianson MD  4/2/2019 4:34 PM CDT  Workstation: [id number]        ECG/EMG Results (last 24 hours)     ** No results found for the last 24 hours. **        Physician Progress Notes (last 24 hours) (Notes from 4/2/2019 10:55 AM through 4/3/2019 10:55 AM)     No notes of this type exist for this encounter.        Consult Notes (last 24 hours) (Notes from 4/2/2019 10:55 AM through 4/3/2019 10:55 AM)     No notes of this type exist for this encounter.

## 2019-04-03 NOTE — PLAN OF CARE
Problem: Patient Care Overview  Goal: Plan of Care Review  Outcome: Ongoing (interventions implemented as appropriate)   04/03/19 5421   Coping/Psychosocial   Plan of Care Reviewed With patient;spouse   Plan of Care Review   Progress improving   OTHER   Outcome Summary remains on 2l per nc. steroids continue as ordered. no c/o sob per pt. continue to monitor       Problem: Breathing Pattern Ineffective (Adult)  Goal: Identify Related Risk Factors and Signs and Symptoms  Outcome: Ongoing (interventions implemented as appropriate)      Problem: Diabetes, Type 2 (Adult)  Goal: Signs and Symptoms of Listed Potential Problems Will be Absent, Minimized or Managed (Diabetes, Type 2)  Outcome: Ongoing (interventions implemented as appropriate)      Problem: Arrhythmia/Dysrhythmia (Symptomatic) (Adult)  Goal: Signs and Symptoms of Listed Potential Problems Will be Absent, Minimized or Managed (Arrhythmia/Dysrhythmia)  Outcome: Ongoing (interventions implemented as appropriate)      Problem: Hypertensive Disease/Crisis (Arterial) (Adult)  Goal: Signs and Symptoms of Listed Potential Problems Will be Absent, Minimized or Managed (Hypertensive Disease/Crisis)  Outcome: Ongoing (interventions implemented as appropriate)      Problem: Pain, Chronic (Adult)  Goal: Identify Related Risk Factors and Signs and Symptoms  Outcome: Ongoing (interventions implemented as appropriate)      Problem: Skin Injury Risk (Adult)  Goal: Identify Related Risk Factors and Signs and Symptoms  Outcome: Ongoing (interventions implemented as appropriate)      Problem: Fall Risk (Adult)  Goal: Identify Related Risk Factors and Signs and Symptoms  Outcome: Ongoing (interventions implemented as appropriate)

## 2019-04-03 NOTE — CONSULTS
"Adult Nutrition  Assessment    Patient Name:  Hasmukh Ham  YOB: 1933  MRN: [id number]  Admit Date:  4/2/2019    Assessment Date:  4/3/2019    Comments:  The patient was admitted to the hospital d/t bronchitis and worsening CHF. Patient has past medical hx of hypertension, COPD, coronary artery disease, atrial fibrillation, type 2 diabetes, and chronic hypoxic respiratory failure with oxygen dependence at 2 L. The patient reported that he follows a low sodium, low fat diet when at home. The patient was educated on heart healthy/low sodium diet with fluid restrictions. The patient verbalized understanding. The patient stated that he would work on decreasing intake of fluids when he goes home. Will follow up to see if patient has questions regarding education.              Reason for Assessment     Row Name 04/03/19 1620          Reason for Assessment    Reason For Assessment  per organizational policy  (Pended)      Diagnosis  cardiac disease  (Pended)      Identified At Risk by Screening Criteria  need for education  (Pended)          Nutrition/Diet History     Row Name 04/03/19 1620          Nutrition/Diet History    Typical Food/Fluid Intake  Patient reported that he usually follows a low sodium, low fat diet at home.   (Pended)          Anthropometrics     Row Name 04/03/19 1246          Anthropometrics    Height  170.2 cm (67\")     Weight  78 kg (171 lb 14.4 oz)        Ideal Body Weight (IBW)    Ideal Body Weight (IBW) (kg)  68.1     % Ideal Body Weight  114.5        Body Mass Index (BMI)    BMI (kg/m2)  26.98        IBW Adjustment, Para/Tetraplegia    5% Adjustment, Para (IBW)  64.7     10% Adjustment, Para (IBW)  61.29     10% Adjustment, Tetra (IBW)  61.29     15% Adjustment, Tetra (IBW)  57.89         Labs/Tests/Procedures/Meds     Row Name 04/03/19 1621          Labs/Procedures/Meds    Lab Results Reviewed  reviewed, pertinent  (Pended)      Lab Results Comments  Glucose-112, K+-5.3, " BUN-31, Cr-1.33  (Pended)         Diagnostic Tests/Procedures    Diagnostic Test/Procedure Reviewed  reviewed  (Pended)         Medications    Pertinent Medications Reviewed  reviewed, pertinent  (Pended)      Pertinent Medications Comments  pepcid, lasix, glucotrol, norco, novolog, magnesium oxide  (Pended)          Physical Findings     Row Name 04/03/19 1621          Physical Findings    Overall Physical Appearance  on oxygen therapy  (Pended)      Gastrointestinal  other (see comments)  (Pended)  fluid overload         Estimated/Assessed Needs     Row Name 04/03/19 1622 04/03/19 1621       Calculation Measurements    Weight Used For Calculations  78 kg (171 lb 15.3 oz)  (Pended)   78 kg (171 lb 15.3 oz)  (Pended)        Estimated/Assessed Needs    Additional Documentation  --  Protein Requirements (Group);KCAL/KG (Group);Calorie Requirements (Group);[place]-St. Jeor Equation (Group);Fluid Requirements (Group)  (Pended)        Calorie Requirements    Weight Used For Calorie Calculations  --  78 kg (171 lb 15.3 oz)  (Pended)     Estimated Calorie Requirement (kcal/day)  --  1950  (Pended)     Estimated Calorie Need Method  --  kcal/kg  (Pended)        KCAL/KG    14 Kcal/Kg (kcal)  1092  (Pended)   1092  (Pended)     15 Kcal/Kg (kcal)  1170  (Pended)   1170  (Pended)     18 Kcal/Kg (kcal)  1404  (Pended)   1404  (Pended)     20 Kcal/Kg (kcal)  1560  (Pended)   1560  (Pended)     25 Kcal/Kg (kcal)  1950  (Pended)   1950  (Pended)     30 Kcal/Kg (kcal)  2340  (Pended)   2340  (Pended)     35 Kcal/Kg (kcal)  2730  (Pended)   2730  (Pended)     40 Kcal/Kg (kcal)  3120  (Pended)   3120  (Pended)     45 Kcal/Kg (kcal)  3510  (Pended)   3510  (Pended)     50 Kcal/Kg (kcal)  3900  (Pended)   3900  (Pended)        [place]-St. Jeor Equation    RMR ([place]-St. Jeor Equation)  1423.63  (Pended)   1423.63  (Pended)        Protein Requirements    Weight Used For Protein Calculations  --  78 kg (171 lb 15.3 oz)  (Pended)     Est  "Protein Requirement Amount (gms/kg)  --  1.2 gm protein  (Pended)     Estimated Protein Requirements (gms/day)  --  93.6  (Pended)        Fluid Requirements    Estimated Fluid Requirements (mL/day)  --  1950  (Pended)     RDA Method (mL)  --  1950  (Pended)     [place] Method (over 20 kg)  3060  (Pended)   3060  (Pended)     Row Name 04/03/19 1246          Calculation Measurements    Height  170.2 cm (67\")         Nutrition Prescription Ordered     Row Name 04/03/19 1622          Nutrition Prescription PO    Current PO Diet  Regular  (Pended)      Fluid Consistency  Thin  (Pended)      Common Modifiers  Cardiac  (Pended)          Evaluation of Received Nutrient/Fluid Intake     Row Name 04/03/19 1622 04/03/19 1621       Calculation Measurements    Weight Used For Calculations  78 kg (171 lb 15.3 oz)  (Pended)   78 kg (171 lb 15.3 oz)  (Pended)        PO Evaluation    Number of Days PO Intake Evaluated  1 day  (Pended)   --    Number of Meals  3  (Pended)   --    % PO Intake  100%  (Pended)   --    Row Name 04/03/19 1246          Calculation Measurements    Height  170.2 cm (67\")         Evaluation of Prescribed Nutrient/Fluid Intake     Row Name 04/03/19 1622 04/03/19 1621       Calculation Measurements    Weight Used For Calculations  78 kg (171 lb 15.3 oz)  (Pended)   78 kg (171 lb 15.3 oz)  (Pended)     Row Name 04/03/19 1246          Calculation Measurements    Height  170.2 cm (67\")             Electronically signed by:  Madina Beltrán  04/03/19 4:23 PM  "

## 2019-04-03 NOTE — PROGRESS NOTES
[facility]  INPATIENT PROGRESS NOTE    Length of Stay: 1  Date of Admission: 4/2/2019  Primary Care Physician: Paolo Rey MD    Subjective   Chief Complaint: dyspnea  HPI:  85 year old  male with past medical history of HTN, COPD, chronic hypoxic respiratory failure with oxygen dependence of 2 liters, CAD, atrial fibrillation, type 2 DM, HFpEF who presented on 4/2/19 as a direct admission from Dr. Curtis's office related to dyspnea.  He is currently being treated for COPD exacerbation.  During today's visit, he reports slight improvement in his dyspnea on exertion and wheezing.     Review of Systems   Constitutional: Negative for chills and fever.   Respiratory: Positive for cough and shortness of breath. Negative for wheezing.    Cardiovascular: Negative for chest pain and palpitations.   Gastrointestinal: Negative for abdominal pain, diarrhea, nausea and vomiting.   Musculoskeletal: Negative for back pain and neck pain.   Neurological: Negative for dizziness and weakness.   Psychiatric/Behavioral: Negative for confusion. The patient is not nervous/anxious.         All pertinent negatives and positives are as above. All other systems have been reviewed and are negative unless otherwise stated.     Objective    Temp:  [97.4 °F (36.3 °C)-98 °F (36.7 °C)] 97.9 °F (36.6 °C)  Heart Rate:  [] 96  Resp:  [18-22] 20  BP: (118-132)/(62-84) 118/69    Physical Exam   Constitutional: He is oriented to person, place, and time. He appears well-developed and well-nourished.   HENT:   Head: Normocephalic and atraumatic.   Eyes: Conjunctivae and lids are normal.   Neck: Normal range of motion. Neck supple.   Cardiovascular: Normal rate, normal heart sounds and intact distal pulses.   Pulmonary/Chest: Effort normal. He has decreased breath sounds in the right lower field and the left lower field.   Abdominal: Soft. Bowel sounds are normal.    Musculoskeletal: Normal range of motion. He exhibits no edema.   Neurological: He is alert and oriented to person, place, and time.   Skin: Skin is warm and dry.   Psychiatric: He has a normal mood and affect. His behavior is normal.   Nursing note and vitals reviewed.          Results Review:  I have reviewed the labs, radiology results, and diagnostic studies.    Laboratory Data:   Results from last 7 days   Lab Units 04/03/19  0618 04/02/19  1528   SODIUM mmol/L 137 140   POTASSIUM mmol/L 5.3* 5.2   CHLORIDE mmol/L 100 103   CO2 mmol/L 20.0* 24.0   BUN mg/dL 31* 26*   CREATININE mg/dL 1.33* 1.29*   GLUCOSE mg/dL 175* 121*   CALCIUM mg/dL 10.2 9.6   ANION GAP mmol/L 17.0 13.0     Estimated Creatinine Clearance: 43.7 mL/min (A) (by C-G formula based on SCr of 1.33 mg/dL (H)).          Results from last 7 days   Lab Units 04/03/19  0618 04/02/19  1528   WBC 10*3/mm3 15.94* 14.54*   HEMOGLOBIN g/dL 13.5 13.0   HEMATOCRIT % 42.6 40.4   PLATELETS 10*3/mm3 237 201           Culture Data:   Blood Culture   Date Value Ref Range Status   04/02/2019 No growth at less than 24 hours  Preliminary   04/02/2019 No growth at less than 24 hours  Preliminary     No results found for: URINECX  No results found for: RESPCX  No results found for: WOUNDCX  No results found for: STOOLCX  No components found for: BODYFLD    Radiology Data:   Imaging Results (last 24 hours)     Procedure Component Value Units Date/Time    XR Chest PA & Lateral [[id number]] Collected:  04/02/19 1612     Updated:  04/02/19 1635    Narrative:           PROCEDURE: Chest PA and lateral    REASON FOR EXAM: dyspnea    FINDINGS: Comparison study dated January 11, 2019. . Cardiac and  pulmonary vasculature are normal . Stable right upper lobe small  calcified lung parenchymal granuloma consistent with old  granulomatous disease. Lungs are otherwise clear. Pleural spaces  are normal . No acute osseous abnormality. Stable thoracic spine  intrathecal lead in  place.      Impression:       1.  Evidence of old granulomatous disease.  2.  No acute cardiopulmonary abnormality.    Electronically signed by:  Nelson Christianson MD  4/2/2019 4:34 PM CDT  Workstation: [id number]          I have reviewed the patient's current medications.     Assessment/Plan     Active Hospital Problems    Diagnosis   • **Chronic obstructive pulmonary disease with acute exacerbation (CMS/HCC)   • 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 (CMS/HCC)   • Chronic heart failure with normal ejection fraction (CMS/HCC)   • Shortness of breath   • Hypertension   • Chronic obstructive lung disease (CMS/HCC)   • Diabetes mellitus (CMS/HCC)   • (HFpEF) heart failure with preserved ejection fraction (CMS/HCC)   • Pulmonary HTN (CMS/[facility])   • Chronic coronary artery disease   • Gastroesophageal reflux disease without esophagitis   • Diabetic neuropathy (CMS/[facility])   • Abdominal hernia       Plan:    1. COPD exacerbation: continue nebulizers, oxygen support, Solumedrol.  Anorro Ellipta from home supply.  Mobilize with Pt/Ot.  2. Chronic hypoxic respiratory failure with oxygen dependence at 2 liters: continue oxygen support, home nebulizers.   3. HTN: Continue lisinopril.  4. HFpEF: compensated and no acute exacerbation.  Continue Lasix, lisinopril.  Daily weights, strict I/O, BNP ordered.   5. Type 2 DM: FSBS AC and HS with SSI, Glipizide daily.   6. CAD: Continue Plavix, Imdur, Ranexa.  7. Pulmonary HTN:      Discharge Planning: I expect patient to be discharged to home in 1-2 days.          This document has been electronically signed by EVE Ortega on April 3, 2019 12:43 PM

## 2019-04-03 NOTE — PLAN OF CARE
Problem: Patient Care Overview  Goal: Plan of Care Review  Outcome: Ongoing (interventions implemented as appropriate)   04/03/19 0008   Coping/Psychosocial   Plan of Care Reviewed With patient;spouse;daughter   OTHER   Outcome Summary PT eval completed. Patient was able to ambulate 75 feet + 200 feet (standing rest break between) without AD with SBA. Patient was on 2.5L O2 and SpO2 was 99% after ambulation. Patient appears to be functioning close to his baseline level, so no follow-up PT is required in acute care. A home health PT eval would be beneficial to assess pt's function and safety in his home environment to decrease likelihood of hospital re-admission.

## 2019-04-04 LAB
ANION GAP SERPL CALCULATED.3IONS-SCNC: 16 MMOL/L
BASOPHILS # BLD AUTO: 0.04 10*3/MM3 (ref 0–0.2)
BASOPHILS NFR BLD AUTO: 0.2 % (ref 0–1.5)
BUN BLD-MCNC: 34 MG/DL (ref 8–23)
BUN/CREAT SERPL: 30.4 (ref 7–25)
CALCIUM SPEC-SCNC: 9.9 MG/DL (ref 8.6–10.5)
CHLORIDE SERPL-SCNC: 101 MMOL/L (ref 98–107)
CO2 SERPL-SCNC: 21 MMOL/L (ref 22–29)
CREAT BLD-MCNC: 1.12 MG/DL (ref 0.76–1.27)
DEPRECATED RDW RBC AUTO: 51.7 FL (ref 37–54)
EOSINOPHIL # BLD AUTO: 0 10*3/MM3 (ref 0–0.4)
EOSINOPHIL NFR BLD AUTO: 0 % (ref 0.3–6.2)
ERYTHROCYTE [DISTWIDTH] IN BLOOD BY AUTOMATED COUNT: 15.2 % (ref 12.3–15.4)
GFR SERPL CREATININE-BSD FRML MDRD: 62 ML/MIN/1.73
GLUCOSE BLD-MCNC: 160 MG/DL (ref 65–99)
GLUCOSE BLDC GLUCOMTR-MCNC: 111 MG/DL (ref 70–130)
GLUCOSE BLDC GLUCOMTR-MCNC: 163 MG/DL (ref 70–130)
GLUCOSE BLDC GLUCOMTR-MCNC: 189 MG/DL (ref 70–130)
GLUCOSE BLDC GLUCOMTR-MCNC: 216 MG/DL (ref 70–130)
HCT VFR BLD AUTO: 40.5 % (ref 37.5–51)
HGB BLD-MCNC: 13.1 G/DL (ref 13–17.7)
IMM GRANULOCYTES # BLD AUTO: 0.23 10*3/MM3 (ref 0–0.05)
IMM GRANULOCYTES NFR BLD AUTO: 0.9 % (ref 0–0.5)
LYMPHOCYTES # BLD AUTO: 0.99 10*3/MM3 (ref 0.7–3.1)
LYMPHOCYTES NFR BLD AUTO: 4.1 % (ref 19.6–45.3)
MCH RBC QN AUTO: 29.9 PG (ref 26.6–33)
MCHC RBC AUTO-ENTMCNC: 32.3 G/DL (ref 31.5–35.7)
MCV RBC AUTO: 92.5 FL (ref 79–97)
MONOCYTES # BLD AUTO: 0.72 10*3/MM3 (ref 0.1–0.9)
MONOCYTES NFR BLD AUTO: 2.9 % (ref 5–12)
NEUTROPHILS # BLD AUTO: 22.44 10*3/MM3 (ref 1.4–7)
NEUTROPHILS NFR BLD AUTO: 91.9 % (ref 42.7–76)
NRBC BLD AUTO-RTO: 0 /100 WBC (ref 0–0)
PLATELET # BLD AUTO: 232 10*3/MM3 (ref 140–450)
PMV BLD AUTO: 11.3 FL (ref 6–12)
POTASSIUM BLD-SCNC: 5 MMOL/L (ref 3.5–5.2)
RBC # BLD AUTO: 4.38 10*6/MM3 (ref 4.14–5.8)
SODIUM BLD-SCNC: 138 MMOL/L (ref 136–145)
WBC NRBC COR # BLD: 24.42 10*3/MM3 (ref 3.4–10.8)

## 2019-04-04 PROCEDURE — 85025 COMPLETE CBC W/AUTO DIFF WBC: CPT | Performed by: NURSE PRACTITIONER

## 2019-04-04 PROCEDURE — G0378 HOSPITAL OBSERVATION PER HR: HCPCS

## 2019-04-04 PROCEDURE — 94760 N-INVAS EAR/PLS OXIMETRY 1: CPT

## 2019-04-04 PROCEDURE — 63710000001 PREDNISONE PER 1 MG: Performed by: NURSE PRACTITIONER

## 2019-04-04 PROCEDURE — 94799 UNLISTED PULMONARY SVC/PX: CPT

## 2019-04-04 PROCEDURE — 96376 TX/PRO/DX INJ SAME DRUG ADON: CPT

## 2019-04-04 PROCEDURE — 63710000001 INSULIN ASPART PER 5 UNITS: Performed by: INTERNAL MEDICINE

## 2019-04-04 PROCEDURE — 82962 GLUCOSE BLOOD TEST: CPT

## 2019-04-04 PROCEDURE — 80048 BASIC METABOLIC PNL TOTAL CA: CPT | Performed by: NURSE PRACTITIONER

## 2019-04-04 PROCEDURE — 97165 OT EVAL LOW COMPLEX 30 MIN: CPT

## 2019-04-04 PROCEDURE — 25010000002 METHYLPREDNISOLONE PER 125 MG: Performed by: NURSE PRACTITIONER

## 2019-04-04 RX ORDER — PREDNISONE 20 MG/1
40 TABLET ORAL
Status: DISCONTINUED | OUTPATIENT
Start: 2019-04-04 | End: 2019-04-05 | Stop reason: HOSPADM

## 2019-04-04 RX ADMIN — ISOSORBIDE MONONITRATE 30 MG: 30 TABLET, EXTENDED RELEASE ORAL at 08:17

## 2019-04-04 RX ADMIN — HYDROCODONE BITARTRATE AND ACETAMINOPHEN 1 TABLET: 7.5; 325 TABLET ORAL at 23:47

## 2019-04-04 RX ADMIN — GLIPIZIDE 5 MG: 5 TABLET ORAL at 08:18

## 2019-04-04 RX ADMIN — INSULIN ASPART 2 UNITS: 100 INJECTION, SOLUTION INTRAVENOUS; SUBCUTANEOUS at 18:13

## 2019-04-04 RX ADMIN — RANOLAZINE 500 MG: 500 TABLET, FILM COATED, EXTENDED RELEASE ORAL at 08:17

## 2019-04-04 RX ADMIN — IPRATROPIUM BROMIDE AND ALBUTEROL SULFATE 3 ML: 2.5; .5 SOLUTION RESPIRATORY (INHALATION) at 13:14

## 2019-04-04 RX ADMIN — IPRATROPIUM BROMIDE AND ALBUTEROL SULFATE 3 ML: 2.5; .5 SOLUTION RESPIRATORY (INHALATION) at 16:45

## 2019-04-04 RX ADMIN — PREDNISONE 40 MG: 20 TABLET ORAL at 12:25

## 2019-04-04 RX ADMIN — IPRATROPIUM BROMIDE AND ALBUTEROL SULFATE 3 ML: 2.5; .5 SOLUTION RESPIRATORY (INHALATION) at 21:33

## 2019-04-04 RX ADMIN — HYDROCODONE BITARTRATE AND ACETAMINOPHEN 1 TABLET: 7.5; 325 TABLET ORAL at 15:42

## 2019-04-04 RX ADMIN — INSULIN ASPART 3 UNITS: 100 INJECTION, SOLUTION INTRAVENOUS; SUBCUTANEOUS at 21:24

## 2019-04-04 RX ADMIN — METHYLPREDNISOLONE SODIUM SUCCINATE 60 MG: 125 INJECTION, POWDER, FOR SOLUTION INTRAMUSCULAR; INTRAVENOUS at 02:25

## 2019-04-04 RX ADMIN — INSULIN ASPART 2 UNITS: 100 INJECTION, SOLUTION INTRAVENOUS; SUBCUTANEOUS at 08:13

## 2019-04-04 RX ADMIN — CLOPIDOGREL BISULFATE 75 MG: 75 TABLET ORAL at 08:20

## 2019-04-04 RX ADMIN — FAMOTIDINE 20 MG: 20 TABLET ORAL at 08:18

## 2019-04-04 RX ADMIN — SODIUM CHLORIDE, PRESERVATIVE FREE 3 ML: 5 INJECTION INTRAVENOUS at 21:23

## 2019-04-04 RX ADMIN — SODIUM CHLORIDE, PRESERVATIVE FREE 3 ML: 5 INJECTION INTRAVENOUS at 08:18

## 2019-04-04 RX ADMIN — FUROSEMIDE 20 MG: 20 TABLET ORAL at 08:18

## 2019-04-04 RX ADMIN — RANOLAZINE 500 MG: 500 TABLET, FILM COATED, EXTENDED RELEASE ORAL at 21:24

## 2019-04-04 RX ADMIN — FAMOTIDINE 20 MG: 20 TABLET ORAL at 21:46

## 2019-04-04 RX ADMIN — BUDESONIDE 0.5 MG: 0.5 INHALANT RESPIRATORY (INHALATION) at 21:33

## 2019-04-04 RX ADMIN — IPRATROPIUM BROMIDE AND ALBUTEROL SULFATE 3 ML: 2.5; .5 SOLUTION RESPIRATORY (INHALATION) at 09:00

## 2019-04-04 RX ADMIN — MAGNESIUM OXIDE TAB 400 MG (241.3 MG ELEMENTAL MG) 400 MG: 400 (241.3 MG) TAB at 08:17

## 2019-04-04 RX ADMIN — BUDESONIDE 0.5 MG: 0.5 INHALANT RESPIRATORY (INHALATION) at 09:06

## 2019-04-04 RX ADMIN — HYDROCODONE BITARTRATE AND ACETAMINOPHEN 1 TABLET: 7.5; 325 TABLET ORAL at 08:19

## 2019-04-04 RX ADMIN — LISINOPRIL 5 MG: 5 TABLET ORAL at 08:17

## 2019-04-04 RX ADMIN — METHYLPREDNISOLONE SODIUM SUCCINATE 60 MG: 125 INJECTION, POWDER, FOR SOLUTION INTRAMUSCULAR; INTRAVENOUS at 08:16

## 2019-04-04 NOTE — PLAN OF CARE
Problem: Patient Care Overview  Goal: Plan of Care Review  Outcome: Ongoing (interventions implemented as appropriate)   04/04/19 0017   Coping/Psychosocial   Plan of Care Reviewed With patient   Plan of Care Review   Progress improving   OTHER   Outcome Summary Patient currently resting. Complains of back and neck pain. Scheduled pain meds given and effective. VSS. Will continue to monitor.      Goal: Individualization and Mutuality  Outcome: Ongoing (interventions implemented as appropriate)    Goal: Discharge Needs Assessment  Outcome: Ongoing (interventions implemented as appropriate)    Goal: Interprofessional Rounds/Family Conf  Outcome: Ongoing (interventions implemented as appropriate)      Problem: Breathing Pattern Ineffective (Adult)  Goal: Identify Related Risk Factors and Signs and Symptoms  Outcome: Ongoing (interventions implemented as appropriate)    Goal: Effective Oxygenation/Ventilation  Outcome: Ongoing (interventions implemented as appropriate)    Goal: Anxiety/Fear Reduction  Outcome: Ongoing (interventions implemented as appropriate)      Problem: Diabetes, Type 2 (Adult)  Goal: Signs and Symptoms of Listed Potential Problems Will be Absent, Minimized or Managed (Diabetes, Type 2)  Outcome: Ongoing (interventions implemented as appropriate)      Problem: Arrhythmia/Dysrhythmia (Symptomatic) (Adult)  Goal: Signs and Symptoms of Listed Potential Problems Will be Absent, Minimized or Managed (Arrhythmia/Dysrhythmia)  Outcome: Ongoing (interventions implemented as appropriate)      Problem: Hypertensive Disease/Crisis (Arterial) (Adult)  Goal: Signs and Symptoms of Listed Potential Problems Will be Absent, Minimized or Managed (Hypertensive Disease/Crisis)  Outcome: Ongoing (interventions implemented as appropriate)      Problem: Pain, Chronic (Adult)  Goal: Identify Related Risk Factors and Signs and Symptoms  Outcome: Ongoing (interventions implemented as appropriate)    Goal: Acceptable  Pain/Comfort Level and Functional Ability  Outcome: Ongoing (interventions implemented as appropriate)      Problem: Skin Injury Risk (Adult)  Goal: Identify Related Risk Factors and Signs and Symptoms  Outcome: Ongoing (interventions implemented as appropriate)    Goal: Skin Health and Integrity  Outcome: Ongoing (interventions implemented as appropriate)      Problem: Fall Risk (Adult)  Goal: Identify Related Risk Factors and Signs and Symptoms  Outcome: Ongoing (interventions implemented as appropriate)    Goal: Absence of Fall  Outcome: Outcome(s) achieved Date Met: 04/04/19

## 2019-04-04 NOTE — SIGNIFICANT NOTE
04/04/19 1445   Rehab Time/Intention   Evaluation Not Performed other (see comments)  (PT spoke with ordering provider, Paolo PRADO. Pt was seen for PT eval on 4/3/19. New order for PT put in on 4/4/19. No need for new PT eval. New order will be discontinued.)   Rehab Treatment   Discipline physical therapist

## 2019-04-04 NOTE — THERAPY DISCHARGE NOTE
Acute Care - Occupational Therapy Initial Eval/Discharge  [facility]     Patient Name: Hasmukh Ham  : 1933  MRN: [id number]  Today's Date: 2019  Onset of Illness/Injury or Date of Surgery: 19  Date of Referral to OT: 19  Referring Physician: EVE Ortega      Admit Date: 2019       ICD-10-CM ICD-9-CM   1. (HFpEF) heart failure with preserved ejection fraction (CMS/HCC) I50.30 428.9   2. Impaired mobility and ADLs Z74.09 799.89     Patient Active Problem List   Diagnosis   • Dilated aortic root (CMS/HCC)   • Non-rheumatic tricuspid valve insufficiency   • Pulmonary emphysema (CMS/HCC)   • Atrial fibrillation and flutter (CMS/HCC)   • Bradycardia   • Chronic coronary artery disease   • Neuropathy   • Abdominal hernia   • Neck pain   • Dementia   • Diabetic neuropathy (CMS/HCC)   • Gastroesophageal reflux disease without esophagitis   • Generalized osteoarthritis   • 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 (CMS/[facility])   • Nocturia   • Osteoarthritis of multiple joints   • Hyperlipidemia   • Pain in joint involving ankle and foot   • Arthropathy of hand   • Paroxysmal tachycardia (CMS/HCC)   • Osteoarthrosis involving more than one site but not generalized   • Right shoulder pain   • Rotator cuff syndrome   • Partial tear of subscapularis tendon   • Infraspinatus tendon tear   • Supraspinatus tendon tear   • Bilateral carotid artery stenosis   • (HFpEF) heart failure with preserved ejection fraction (CMS/HCC)   • Pulmonary HTN (CMS/HCC)   • Acute interstitial pneumonia (CMS/HCC)   • Chronic obstructive lung disease (CMS/HCC)   • Diabetes mellitus (CMS/HCC)   • Hypertension   • Chest pain   • Shortness of breath   • Angina pectoris (CMS/HCC)   • Myocardial infarction (CMS/HCC)   • Ingrown toenail   • Heart problem   • Degenerative joint disease involving multiple joints   • Chronic obstructive pulmonary disease (COPD) (CMS/HCC)   • CHF (congestive heart failure)  (CMS/[facility])   • Bleeding disorder (CMS/[facility])   • Chronic obstructive pulmonary disease with acute exacerbation (CMS/[facility])   • Failure of outpatient treatment   • Sepsis (CMS/[facility])   • 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 (CMS/[facility])   • Chronic heart failure with normal ejection fraction (CMS/[facility])     Past Medical History:   Diagnosis Date   • Basal cell carcinoma    • Bilateral carotid artery stenosis    • Bilateral carotid artery stenosis    • Bleeding disorder (CMS/[facility])    • CHF (congestive heart failure) (CMS/[facility])    • Chronic obstructive pulmonary disease (COPD) (CMS/[facility])    • Coronary arteriosclerosis    • Degenerative joint disease involving multiple joints    • Dementia    • Diabetes mellitus (CMS/[facility])    • Heart problem    • History of stomach ulcers    • Hyperlipidemia    • Hypertension    • Ingrown toenail    • Myocardial infarction (CMS/[facility])      Past Surgical History:   Procedure Laterality Date   • BACK SURGERY     • CARDIAC CATHETERIZATION N/A 6/6/2017    Procedure: Right Heart Cath;  Surgeon: Jeff Maciel MD PhD;  Location: [facility] CATH INVASIVE LOCATION;  Service:    • CARDIAC CATHETERIZATION N/A 2/14/2018    Procedure: Coronary angiography;  Surgeon: Moisés Davila MD;  Location: [facility] CATH INVASIVE LOCATION;  Service:    • CORONARY ANGIOPLASTY WITH STENT PLACEMENT     • CORONARY STENT PLACEMENT     • HERNIA REPAIR     • LUNG BIOPSY     • LUNG SURGERY     • NECK SURGERY     • THORACOTOMY Left 1977   • VENTRAL HERNIA REPAIR            OT ASSESSMENT FLOWSHEET (last 12 hours)      Occupational Therapy Evaluation     Row Name 04/04/19 1000                   OT Evaluation Time/Intention    Subjective Information  complains of;pain  -AS        Document Type  evaluation  -AS        Mode of Treatment  individual therapy;occupational therapy  -AS        Total Evaluation Minutes, Occupational Therapy  38  -AS        Patient Effort  good  -AS           General Information    Patient  Profile Reviewed?  yes  -AS        Onset of Illness/Injury or Date of Surgery  04/02/19  -AS        Referring Physician  EVE Ortega  -AS        Patient Observations  alert;cooperative;agree to therapy  -AS        Patient/Family Observations  wife present  -AS        General Observations of Patient  supine in bed, O2 at 2.5 lpm, tele, IV  -AS        Prior Level of Function  independent:;all household mobility;gait;transfer;ADL's;shopping shares IADLs with wife; uses electric cart at grocery  -AS        Equipment Currently Used at Home  grab bar;shower chair;oxygen;raised toilet has SPC, 3 point cane, RW  -AS        Pertinent History of Current Functional Problem  Pt is an 86 yo admitted for COPD excerbation.  -AS        Existing Precautions/Restrictions  fall;oxygen therapy device and L/min  -AS        Equipment Issued to Patient  gait belt  -AS        Risks Reviewed  patient and family:;LOB;nausea/vomiting;dizziness;increased discomfort;change in vital signs;increased drainage;lines disloged  -AS        Benefits Reviewed  patient and family:;improve function;increase independence;increase strength;increase balance;decrease risk of DVT;decrease pain;improve skin integrity;increase knowledge  -AS           Relationship/Environment    Primary Source of Support/Comfort  other (see comments)  -AS        Lives With  spouse  -AS        Family Caregiver if Needed  child(estephania), adult dght lives next door  -AS           Resource/Environmental Concerns    Current Living Arrangements  other (see comments) mobile trailer home  -AS           Cognitive Assessment/Intervention- PT/OT    Orientation Status (Cognition)  oriented x 4  -AS        Follows Commands (Cognition)  WNL  -AS           Safety Issues, Functional Mobility    Impairments Affecting Function (Mobility)  balance;endurance/activity tolerance;strength;shortness of breath  -AS           Bed Mobility Assessment/Treatment    Bed Mobility Assessment/Treatment   supine-sit;sit-supine  -AS        Supine-Sit Tw[place] (Bed Mobility)  independent  -AS        Assistive Device (Bed Mobility)  bed rails;head of bed elevated  -AS           Functional Mobility    Functional Mobility- Ind. Level  supervision required  -AS        Functional Mobility-Distance (Feet)  20  -AS           Transfer Assessment/Treatment    Transfer Assessment/Treatment  sit-stand transfer;stand-sit transfer;toilet transfer  -AS           Sit-Stand Transfer    Sit-Stand Twiggs (Transfers)  supervision  -AS           Stand-Sit Transfer    Stand-Sit [place] (Transfers)  supervision  -AS           Toilet Transfer    Type (Toilet Transfer)  sit-stand;stand-sit  -AS        Twiggs Level (Toilet Transfer)  supervision  -AS           ADL Assessment/Intervention    BADL Assessment/Intervention  lower body dressing  -AS           Lower Body Dressing Assessment/Training    Lower Body Dressing Twiggs Level  don;doff;socks;independent  -AS        Lower Body Dressing Position  edge of bed sitting  -AS           BADL Safety/Performance    Impairments, BADL Safety/Performance  endurance/activity tolerance  -AS           General ROM    GENERAL ROM COMMENTS  RUE: SF approx 80 deg; shld limited 2/2 old injuries; all else WFL; LUEL WFL  -AS           MMT (Manual Muscle Testing)    General MMT Comments  R Shld 3-/5; all else grossly 3/5,  fair; LUE grossly 4-/5 throughout  -AS           Sensory Assessment/Intervention    Sensory General Assessment  light touch sensation deficits identified  -AS           Light Touch Sensation Assessment    Right Upper Extremity: Light Touch Sensation Assessment  moderate impairment, 50 to 74% correct responses  -AS        Left Lower Extremity: Light Touch Sensation Assessment  moderate impairment, 50 to 74% correct responses  -AS        Additional Details: Light Touch Sensation Assessment  has neuropathy; severe in hands  -AS           Positioning and Restraints     Pre-Treatment Position  in bed  -AS        Post Treatment Position  bed  -AS        In Bed  sitting EOB;call light within reach;encouraged to call for assist;with family/caregiver  -AS           Pain Scale: Numbers Pre/Post-Treatment    Pain Scale: Numbers, Pretreatment  8/10 chronic in nature  -AS        Pain Scale: Numbers, Post-Treatment  8/10  -AS        Pain Location - Orientation  lower  -AS        Pain Location  back  -AS        Pain Intervention(s)  Repositioned;Medication (See MAR)  -AS           Plan of Care Review    Plan of Care Reviewed With  patient  -AS           Clinical Impression (OT)    Date of Referral to OT  04/03/19  -AS        OT Diagnosis  impaired mobility and ADLs  -AS        Criteria for Skilled Therapeutic Interventions Met (OT Eval)  no problems identified which require skilled intervention  -AS        Therapy Frequency (OT Eval)  evaluation only  -AS        Care Plan Review (OT)  evaluation/treatment results reviewed  -AS        Anticipated Discharge Disposition (OT)  home with assist  -AS           Vital Signs    Pre Systolic BP Rehab  140  -AS        Pre Treatment Diastolic BP  68  -AS        Pretreatment Heart Rate (beats/min)  101  -AS        Posttreatment Heart Rate (beats/min)  106  -AS        Pre SpO2 (%)  96  -AS        O2 Delivery Pre Treatment  supplemental O2  -AS        Post SpO2 (%)  97  -AS        O2 Delivery Post Treatment  supplemental O2  -AS        Pre Patient Position  Supine  -AS        Post Patient Position  Sitting  -AS           Discharge Summary (Occupational Therapy)    Additional Documentation  Discharge Summary, OT Eval (Group)  -AS           Discharge Summary, OT Eval    Reason for Discharge (OT Discharge Summary)  no further needs identified  -AS           Living Environment    Home Accessibility  tub/shower is not walk in;other (see comments) ramp entry w bilat rails  -AS          User Key  (r) = Recorded By, (t) = Taken By, (c) = Cosigned By    Initials  Name Effective Dates    AS JessicaSuma, OT 03/25/19 -               OT Recommendation and Plan  Outcome Summary/Treatment Plan (OT)  Anticipated Discharge Disposition (OT): home with assist  Reason for Discharge (OT Discharge Summary): no further needs identified  Therapy Frequency (OT Eval): evaluation only  Plan of Care Review  Plan of Care Reviewed With: patient  Plan of Care Reviewed With: patient  Outcome Summary: OT eval completed. Pt pleasant and cooperative. Pt performed bed mobility with independence, donned socks with independence, performed sit<>stand with supervision, and performed in room ambulation with supervision. Pt appears to be rapidly returning to baseline. Pt has supportive wife at home that can assist as needed. Will d/c OT order at this time as pt has no further skilled OT needs.          Outcome Measures     Row Name 04/04/19 1000 04/03/19 1447          How much help from another person do you currently need...    Turning from your back to your side while in flat bed without using bedrails?  --  4  -LF     Moving from lying on back to sitting on the side of a flat bed without bedrails?  --  4  -LF     Moving to and from a bed to a chair (including a wheelchair)?  --  4  -LF     Standing up from a chair using your arms (e.g., wheelchair, bedside chair)?  --  4  -LF     Climbing 3-5 steps with a railing?  --  3  -LF     To walk in hospital room?  --  3  -LF     AM-PAC 6 Clicks Score  --  22  -LF        How much help from another is currently needed...    Putting on and taking off regular lower body clothing?  4  -AS  --     Bathing (including washing, rinsing, and drying)  3  -AS  --     Toileting (which includes using toilet bed pan or urinal)  4  -AS  --     Putting on and taking off regular upper body clothing  4  -AS  --     Taking care of personal grooming (such as brushing teeth)  4  -AS  --     Eating meals  4  -AS  --     Score  23  -AS  --        Functional Assessment    Outcome  Measure Options  AM-PAC 6 Clicks Daily Activity (OT)  -AS  AM-PAC 6 Clicks Basic Mobility (PT)  -LF       User Key  (r) = Recorded By, (t) = Taken By, (c) = Cosigned By    Initials Name Provider Type    AS Suma Lopez OT Occupational Therapist    LF Geraldine Salgado, PT Physical Therapist          Time Calculation:   Time Calculation- OT     Row Name 04/04/19 1338             Time Calculation- OT    OT Start Time  1000  -AS      OT Stop Time  1038  -AS      OT Time Calculation (min)  38 min  -AS      OT Received On  04/04/19  -AS        User Key  (r) = Recorded By, (t) = Taken By, (c) = Cosigned By    Initials Name Provider Type    AS Suma Lopez OT Occupational Therapist        Therapy Suggested Charges     Code   Minutes Charges    None           Therapy Charges for Today     Code Description Service Date Service Provider Modifiers Qty    [id number]  OT EVAL LOW COMPLEXITY 3 4/4/2019 Suma Lopez OT GO 1               OT Discharge Summary  Anticipated Discharge Disposition (OT): home with assist    Suma Lopez OT  4/4/2019

## 2019-04-04 NOTE — PLAN OF CARE
Problem: Patient Care Overview  Goal: Plan of Care Review  Outcome: Ongoing (interventions implemented as appropriate)   04/04/19 1788   Coping/Psychosocial   Plan of Care Reviewed With patient   OTHER   Outcome Summary OT eval completed. Pt pleasant and cooperative. Pt performed bed mobility with independence, donned socks with independence, performed sit<>stand with supervision, and performed in room ambulation with supervision. Pt appears to be rapidly returning to baseline. Pt has supportive wife at home that can assist as needed. Will d/c OT order at this time as pt has no further skilled OT needs.

## 2019-04-05 VITALS
DIASTOLIC BLOOD PRESSURE: 66 MMHG | BODY MASS INDEX: 27.06 KG/M2 | OXYGEN SATURATION: 97 % | RESPIRATION RATE: 20 BRPM | SYSTOLIC BLOOD PRESSURE: 112 MMHG | HEIGHT: 67 IN | WEIGHT: 172.4 LBS | TEMPERATURE: 97.5 F | HEART RATE: 83 BPM

## 2019-04-05 LAB
ANION GAP SERPL CALCULATED.3IONS-SCNC: 14 MMOL/L
BASOPHILS # BLD AUTO: 0.01 10*3/MM3 (ref 0–0.2)
BASOPHILS NFR BLD AUTO: 0.1 % (ref 0–1.5)
BUN BLD-MCNC: 31 MG/DL (ref 8–23)
BUN/CREAT SERPL: 28.4 (ref 7–25)
CALCIUM SPEC-SCNC: 9.2 MG/DL (ref 8.6–10.5)
CHLORIDE SERPL-SCNC: 103 MMOL/L (ref 98–107)
CO2 SERPL-SCNC: 21 MMOL/L (ref 22–29)
CREAT BLD-MCNC: 1.09 MG/DL (ref 0.76–1.27)
DEPRECATED RDW RBC AUTO: 52.2 FL (ref 37–54)
EOSINOPHIL # BLD AUTO: 0 10*3/MM3 (ref 0–0.4)
EOSINOPHIL NFR BLD AUTO: 0 % (ref 0.3–6.2)
ERYTHROCYTE [DISTWIDTH] IN BLOOD BY AUTOMATED COUNT: 15.1 % (ref 12.3–15.4)
GFR SERPL CREATININE-BSD FRML MDRD: 64 ML/MIN/1.73
GLUCOSE BLD-MCNC: 150 MG/DL (ref 65–99)
GLUCOSE BLDC GLUCOMTR-MCNC: 133 MG/DL (ref 70–130)
HCT VFR BLD AUTO: 37 % (ref 37.5–51)
HGB BLD-MCNC: 12.1 G/DL (ref 13–17.7)
HOLD SPECIMEN: NORMAL
IMM GRANULOCYTES # BLD AUTO: 0.22 10*3/MM3 (ref 0–0.05)
IMM GRANULOCYTES NFR BLD AUTO: 1.3 % (ref 0–0.5)
LYMPHOCYTES # BLD AUTO: 0.92 10*3/MM3 (ref 0.7–3.1)
LYMPHOCYTES NFR BLD AUTO: 5.4 % (ref 19.6–45.3)
MCH RBC QN AUTO: 30.7 PG (ref 26.6–33)
MCHC RBC AUTO-ENTMCNC: 32.7 G/DL (ref 31.5–35.7)
MCV RBC AUTO: 93.9 FL (ref 79–97)
MONOCYTES # BLD AUTO: 1.02 10*3/MM3 (ref 0.1–0.9)
MONOCYTES NFR BLD AUTO: 5.9 % (ref 5–12)
NEUTROPHILS # BLD AUTO: 15.02 10*3/MM3 (ref 1.4–7)
NEUTROPHILS NFR BLD AUTO: 87.3 % (ref 42.7–76)
NRBC BLD AUTO-RTO: 0 /100 WBC (ref 0–0)
PLATELET # BLD AUTO: 159 10*3/MM3 (ref 140–450)
PMV BLD AUTO: 11.4 FL (ref 6–12)
POTASSIUM BLD-SCNC: 4.4 MMOL/L (ref 3.5–5.2)
RBC # BLD AUTO: 3.94 10*6/MM3 (ref 4.14–5.8)
SODIUM BLD-SCNC: 138 MMOL/L (ref 136–145)
WBC NRBC COR # BLD: 17.19 10*3/MM3 (ref 3.4–10.8)

## 2019-04-05 PROCEDURE — 63710000001 PREDNISONE PER 1 MG: Performed by: NURSE PRACTITIONER

## 2019-04-05 PROCEDURE — 85025 COMPLETE CBC W/AUTO DIFF WBC: CPT | Performed by: NURSE PRACTITIONER

## 2019-04-05 PROCEDURE — 94799 UNLISTED PULMONARY SVC/PX: CPT

## 2019-04-05 PROCEDURE — 82962 GLUCOSE BLOOD TEST: CPT

## 2019-04-05 PROCEDURE — 80048 BASIC METABOLIC PNL TOTAL CA: CPT | Performed by: NURSE PRACTITIONER

## 2019-04-05 PROCEDURE — 94760 N-INVAS EAR/PLS OXIMETRY 1: CPT

## 2019-04-05 PROCEDURE — G0378 HOSPITAL OBSERVATION PER HR: HCPCS

## 2019-04-05 RX ORDER — PREDNISONE 20 MG/1
40 TABLET ORAL
Qty: 8 TABLET | Refills: 0 | Status: SHIPPED | OUTPATIENT
Start: 2019-04-05 | End: 2019-04-09

## 2019-04-05 RX ADMIN — PREDNISONE 40 MG: 20 TABLET ORAL at 08:56

## 2019-04-05 RX ADMIN — FUROSEMIDE 20 MG: 20 TABLET ORAL at 08:56

## 2019-04-05 RX ADMIN — FLUTICASONE PROPIONATE 2 SPRAY: 50 SPRAY, METERED NASAL at 08:56

## 2019-04-05 RX ADMIN — IPRATROPIUM BROMIDE AND ALBUTEROL SULFATE 3 ML: 2.5; .5 SOLUTION RESPIRATORY (INHALATION) at 06:45

## 2019-04-05 RX ADMIN — RANOLAZINE 500 MG: 500 TABLET, FILM COATED, EXTENDED RELEASE ORAL at 08:56

## 2019-04-05 RX ADMIN — CLOPIDOGREL BISULFATE 75 MG: 75 TABLET ORAL at 08:56

## 2019-04-05 RX ADMIN — FAMOTIDINE 20 MG: 20 TABLET ORAL at 08:56

## 2019-04-05 RX ADMIN — ISOSORBIDE MONONITRATE 30 MG: 30 TABLET, EXTENDED RELEASE ORAL at 08:56

## 2019-04-05 RX ADMIN — HYDROCODONE BITARTRATE AND ACETAMINOPHEN 1 TABLET: 7.5; 325 TABLET ORAL at 08:56

## 2019-04-05 RX ADMIN — MAGNESIUM OXIDE TAB 400 MG (241.3 MG ELEMENTAL MG) 400 MG: 400 (241.3 MG) TAB at 08:56

## 2019-04-05 RX ADMIN — BUDESONIDE 0.5 MG: 0.5 INHALANT RESPIRATORY (INHALATION) at 06:46

## 2019-04-05 RX ADMIN — GLIPIZIDE 5 MG: 5 TABLET ORAL at 08:56

## 2019-04-05 RX ADMIN — LISINOPRIL 5 MG: 5 TABLET ORAL at 08:56

## 2019-04-05 NOTE — DISCHARGE SUMMARY
[facility] Medicine Services  DISCHARGE SUMMARY       Date of Admission: 4/2/2019  Date of Discharge:  4/5/2019  Primary Care Physician: Paolo Rey MD    Presenting Problem/History of Present Illness:  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 (CMS/[facility]) [J44.9]  (HFpEF) heart failure with preserved ejection fraction (CMS/HCC) [I50.30]     Final Discharge Diagnoses:    Chronic obstructive pulmonary disease with acute exacerbation (CMS/HCC)    Chronic coronary artery disease    Abdominal hernia    Diabetic neuropathy (CMS/HCC)    Gastroesophageal reflux disease without esophagitis    (HFpEF) heart failure with preserved ejection fraction (CMS/HCC)    Pulmonary HTN (CMS/HCC)    Chronic obstructive lung disease (CMS/HCC)    Diabetes mellitus (CMS/HCC)    Hypertension    Shortness of breath    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 (CMS/HCC)    Chronic heart failure with normal ejection fraction (CMS/HCC)      Consults:   Consults     No orders found from 3/4/2019 to 4/3/2019.          Pertinent Test Results:   Xr Chest Pa & Lateral    Result Date: 4/2/2019  PROCEDURE: Chest PA and lateral REASON FOR EXAM: dyspnea FINDINGS: Comparison study dated January 11, 2019. . Cardiac and pulmonary vasculature are normal . Stable right upper lobe small calcified lung parenchymal granuloma consistent with old granulomatous disease. Lungs are otherwise clear. Pleural spaces are normal . No acute osseous abnormality. Stable thoracic spine intrathecal lead in place.     1.  Evidence of old granulomatous disease. 2.  No acute cardiopulmonary abnormality. Electronically signed by:  Nelson Christianson MD  4/2/2019 4:34 PM CDT Workstation: [id number]      HPI/Hospital Course:  The patient is a 85 y.o. male with a history of HTN, COPD, chronic respiratory failure, CAD, and atrial fibrillation not on anticoagulation who presented to [facility] with dyspnea.  Chest  "x-ray reveaeld no cardiopulmonary abnormality.  He was initiated on supplemental oxygen, bronchodilators, and steroids. His respiratory status improved and he was weaned to PO prednisone and his baseline home O2 of 2 LPM by nasal cannula.  He was evaluated at PT/OT and is at his baseline.  He agrees to a home health transition visit.  He is stable and will be discharged to home.      Condition on Discharge:  Stable    Physical Exam on Discharge:  /66 (BP Location: Left arm, Patient Position: Sitting)   Pulse 83   Temp 97.5 °F (36.4 °C) (Oral)   Resp 20   Ht 170.2 cm (67\")   Wt 78.2 kg (172 lb 6.4 oz)   SpO2 97%   BMI 27.00 kg/m²   Physical Exam   Constitutional: He is oriented to person, place, and time. He appears well-developed and well-nourished. No distress.   HENT:   Head: Normocephalic.   Eyes: Conjunctivae are normal.   Cardiovascular: Normal rate, regular rhythm, normal heart sounds and intact distal pulses.   Pulmonary/Chest: Effort normal and breath sounds normal. No respiratory distress. He has no wheezes.   Abdominal: Soft. Bowel sounds are normal. He exhibits no distension. There is no tenderness.   Musculoskeletal: Normal range of motion. He exhibits no edema.   Neurological: He is alert and oriented to person, place, and time.   Skin: Skin is warm and dry. He is not diaphoretic. No erythema.   Vitals reviewed.        Discharge Disposition:  Home-Health Care [facility]    Discharge Medications:     Discharge Medications      New Medications      Instructions Start Date   predniSONE 20 MG tablet  Commonly known as:  DELTASONE   40 mg, Oral, Daily With Breakfast         Continue These Medications      Instructions Start Date   acetaminophen 325 MG tablet  Commonly known as:  TYLENOL   650 mg, Oral, Every 4 Hours PRN      albuterol (2.5 MG/3ML) 0.083% nebulizer solution  Commonly known as:  PROVENTIL   2.5 mg, Nebulization, Every 4 Hours PRN      ARNUITY ELLIPTA 200 MCG/ACT aerosol powder   Generic " drug:  Fluticasone Furoate   Inhalation      clopidogrel 75 MG tablet  Commonly known as:  PLAVIX   75 mg, Oral, Daily      famotidine 20 MG tablet  Commonly known as:  PEPCID   20 mg, Oral, 2 Times Daily      fluticasone 50 MCG/ACT nasal spray  Commonly known as:  FLONASE   2 sprays, Nasal, Daily      furosemide 20 MG tablet  Commonly known as:  LASIX   20 mg, Oral, Daily      glimepiride 2 MG tablet  Commonly known as:  AMARYL   2 mg, Oral, Every Morning Before Breakfast      HYDROcodone-acetaminophen 7.5-325 MG per tablet  Commonly known as:  NORCO   1 tablet, Oral, Every 8 Hours      ipratropium-albuterol 0.5-2.5 mg/3 ml nebulizer  Commonly known as:  DUO-NEB   3 mL, Nebulization, 4 Times Daily      isosorbide mononitrate 30 MG 24 hr tablet  Commonly known as:  IMDUR   30 mg, Oral, Every 24 Hours Scheduled      lisinopril 10 MG tablet  Commonly known as:  PRINIVIL,ZESTRIL   5 mg, Oral, Every 24 Hours Scheduled      magic mouthwash oral suspension   5 mL, Swish & Swallow, Every 4 Hours PRN      magnesium oxide 400 (241.3 Mg) MG tablet tablet  Commonly known as:  MAGOX   400 mg, Oral, Daily      nitroglycerin 0.4 MG SL tablet  Commonly known as:  NITROSTAT   place 1 tablet under the tongue if needed every 5 minutes for hair...  (REFER TO PRESCRIPTION NOTES).      nystatin 723782 UNIT/ML suspension  Commonly known as:  MYCOSTATIN   5 mL, Swish & Swallow, 4 Times Daily      O2  Commonly known as:  OXYGEN  Notes to patient:  As directed   2 L/min, Inhalation, Nightly, W/ CPAP       ranolazine 500 MG 12 hr tablet  Commonly known as:  RANEXA   500 mg, Oral, Every 12 Hours Scheduled      Red Yeast Rice 600 MG tablet   600 mg, Oral, 2 Times Daily      umeclidinium-vilanterol 62.5-25 MCG/INH aerosol powder  inhaler  Commonly known as:  ANORO ELLIPTA   1 puff, Inhalation, Daily             Discharge Diet:   Diet Instructions     Diet: Regular, Cardiac; Thin      Discharge Diet:   Regular  Cardiac       Fluid Consistency:   Thin          Activity at Discharge:   Activity Instructions     Activity as Tolerated              Follow-up Appointments:   1. PCP 1 week  Future Appointments   Date Time Provider Department Center   5/22/2019 11:30 AM Mana Curtis MD [facility] None       Test Results Pending at Discharge:    Order Current Status    Blood Culture - Blood, Arm, Left Preliminary result    Blood Culture - Blood, Arm, Right Preliminary result          Paolo Ward, EVE  04/05/19  10:15 AM

## 2019-04-05 NOTE — PLAN OF CARE
Problem: Patient Care Overview  Goal: Plan of Care Review  Outcome: Ongoing (interventions implemented as appropriate)   04/04/19 8592   Coping/Psychosocial   Plan of Care Reviewed With patient   Plan of Care Review   Progress no change   OTHER   Outcome Summary Patient resting in bed. VS stable. Family to remain at bedside. Patient has chronic pain with scheduled pain medication. No s/s of distress at this time. Will continue to monitor this patient.      Goal: Discharge Needs Assessment  Outcome: Ongoing (interventions implemented as appropriate)      Problem: Breathing Pattern Ineffective (Adult)  Goal: Identify Related Risk Factors and Signs and Symptoms  Outcome: Ongoing (interventions implemented as appropriate)    Goal: Effective Oxygenation/Ventilation  Outcome: Ongoing (interventions implemented as appropriate)    Goal: Anxiety/Fear Reduction  Outcome: Ongoing (interventions implemented as appropriate)      Problem: Diabetes, Type 2 (Adult)  Goal: Signs and Symptoms of Listed Potential Problems Will be Absent, Minimized or Managed (Diabetes, Type 2)  Outcome: Ongoing (interventions implemented as appropriate)      Problem: Arrhythmia/Dysrhythmia (Symptomatic) (Adult)  Goal: Signs and Symptoms of Listed Potential Problems Will be Absent, Minimized or Managed (Arrhythmia/Dysrhythmia)  Outcome: Ongoing (interventions implemented as appropriate)      Problem: Hypertensive Disease/Crisis (Arterial) (Adult)  Goal: Signs and Symptoms of Listed Potential Problems Will be Absent, Minimized or Managed (Hypertensive Disease/Crisis)  Outcome: Ongoing (interventions implemented as appropriate)      Problem: Pain, Chronic (Adult)  Goal: Identify Related Risk Factors and Signs and Symptoms  Outcome: Ongoing (interventions implemented as appropriate)    Goal: Acceptable Pain/Comfort Level and Functional Ability  Outcome: Ongoing (interventions implemented as appropriate)      Problem: Skin Injury Risk (Adult)  Goal:  Identify Related Risk Factors and Signs and Symptoms  Outcome: Ongoing (interventions implemented as appropriate)    Goal: Skin Health and Integrity  Outcome: Ongoing (interventions implemented as appropriate)      Problem: Fall Risk (Adult)  Goal: Identify Related Risk Factors and Signs and Symptoms  Outcome: Outcome(s) achieved Date Met: 04/04/19      Problem: Chronic Obstructive Pulmonary Disease (Adult)  Goal: Signs and Symptoms of Listed Potential Problems Will be Absent, Minimized or Managed (Chronic Obstructive Pulmonary Disease)  Outcome: Ongoing (interventions implemented as appropriate)      Problem: Cardiac: Heart Failure (Adult)  Goal: Signs and Symptoms of Listed Potential Problems Will be Absent, Minimized or Managed (Cardiac: Heart Failure)  Outcome: Ongoing (interventions implemented as appropriate)

## 2019-04-06 ENCOUNTER — READMISSION MANAGEMENT (OUTPATIENT)
Dept: CALL CENTER | Facility: HOSPITAL | Age: 84
End: 2019-04-06

## 2019-04-06 NOTE — OUTREACH NOTE
Prep Survey      Responses   Facility patient discharged from?  [place]   Is patient eligible?  Yes   Discharge diagnosis  COPD with AE, chronic CAD, abdominla hernia, diabetic neuropathy, GERD w/o esophagitis, HFpEF pulmonary HTN, DM, HTN, Shortness of breath   Does the patient have one of the following disease processes/diagnoses(primary or secondary)?  COPD/Pneumonia   Does the patient have Home health ordered?  Yes   What is the Home health agency?   [facility]   Is there a DME ordered?  No   Comments regarding appointments  See AVS   Prep survey completed?  Yes          Heather Sr RN

## 2019-04-07 LAB
BACTERIA SPEC AEROBE CULT: NORMAL
BACTERIA SPEC AEROBE CULT: NORMAL

## 2019-04-08 ENCOUNTER — READMISSION MANAGEMENT (OUTPATIENT)
Dept: CALL CENTER | Facility: HOSPITAL | Age: 84
End: 2019-04-08

## 2019-04-08 NOTE — OUTREACH NOTE
COPD/PN Week 1 Survey      Responses   Facility patient discharged from?  [facility]   Does the patient have one of the following disease processes/diagnoses(primary or secondary)?  COPD/Pneumonia   Is there a successful TCM telephone encounter documented?  No   Was the primary reason for admission:  COPD exacerbation   Week 1 attempt successful?  Yes   Call start time  0934   Call end time  0949   Discharge diagnosis  COPD with AE, chronic CAD, abdominla hernia, diabetic neuropathy, GERD w/o esophagitis, HFpEF pulmonary HTN, DM, HTN, Shortness of breath   Is patient permission given to speak with other caregiver?  Yes   Person spoke with today (if not patient) and relationship  Rimma wife   Meds reviewed with patient/caregiver?  Yes   Is the patient having any side effects they believe may be caused by any medication additions or changes?  No   Does the patient have all medications ordered at discharge?  Yes   Is the patient taking all medications as directed (includes completed medication regime)?  Yes   Comments regarding appointments  Cardiology On May 22 2019   Does the patient have a primary care provider?   Yes   Does the patient have an appointment with their PCP or pulmonologist within 7 days of discharge?  Yes   Comments regarding PCP  Dr Rey / PCP- has an appt on April 11 2019 with Aprn   Has the patient kept scheduled appointments due by today?  N/A   What is the Home health agency?   [facility]   Has home health visited the patient within 72 hours of discharge?  Yes   What DME was ordered?  Patient wears O2 at home.    Psychosocial issues?  No   Comments  Wife reports he is still coughing and congested. No fever or chills.    Did the patient receive a copy of their discharge instructions?  Yes   Nursing interventions  Reviewed instructions with patient   What is the patient's perception of their health status since discharge?  Improving   Nursing Interventions  Nurse provided patient education   Are the  patient's immunizations up to date?   Yes   Nursing interventions  Educated on importance of maintaining up to date immunizations as advised by provider   Is the patient/caregiver able to teach back the hierarchy of who to call/visit for symptoms/problems? PCP, Specialist, Home health nurse, Urgent Care, ED, 911  Yes   Is the patient able to teach back COPD zones?  Yes   Nursing interventions  Education provided on various zones   Patient reports what zone on this call?  Yellow Zone   Yellow Zone  Increased shortness of air, Unable to complete daily activities, Increased or thicker phlegm/mucus, Using quick relief inhaler/nebulizer more often, Medication is not helping relieve symptoms, Increased swelling of ankles, Fever or feeling like have a chest cold, Poor sleep and symptoms work patient up, Poor Appetite   Yellow interventions  Continue to use daily medications, Use quick relief inhaler as ordered, Use oxygen as ordered, Use other meds such as steroids or antibiotics as ordered, Get plenty of rest, Call provider immediatly if symptoms do not improve   Week 1 call completed?  Yes          Pillo Beltre RN

## 2019-04-16 ENCOUNTER — READMISSION MANAGEMENT (OUTPATIENT)
Dept: CALL CENTER | Facility: HOSPITAL | Age: 84
End: 2019-04-16

## 2019-04-16 NOTE — OUTREACH NOTE
COPD/PN Week 2 Survey      Responses   Facility patient discharged from?  [facility]   Does the patient have one of the following disease processes/diagnoses(primary or secondary)?  COPD/Pneumonia   Was the primary reason for admission:  COPD exacerbation   Week 2 attempt successful?  No   Unsuccessful attempts  Attempt 1          Pillo Beltre RN

## 2019-04-18 ENCOUNTER — READMISSION MANAGEMENT (OUTPATIENT)
Dept: CALL CENTER | Facility: HOSPITAL | Age: 84
End: 2019-04-18

## 2019-04-22 ENCOUNTER — READMISSION MANAGEMENT (OUTPATIENT)
Dept: CALL CENTER | Facility: HOSPITAL | Age: 84
End: 2019-04-22

## 2019-04-22 NOTE — OUTREACH NOTE
COPD/PN Week 2 Survey      Responses   Facility patient discharged from?  [facility]   Does the patient have one of the following disease processes/diagnoses(primary or secondary)?  COPD/Pneumonia   Was the primary reason for admission:  COPD exacerbation   Week 2 attempt successful?  Yes   Call start time  1600   Call end time  1606   Discharge diagnosis  COPD with AE, chronic CAD, abdominla hernia, diabetic neuropathy, GERD w/o esophagitis, HFpEF pulmonary HTN, DM, HTN, Shortness of breath   Is patient permission given to speak with other caregiver?  Yes   Person spoke with today (if not patient) and relationship  Spouse, then pt, then Daughter.   Meds reviewed with patient/caregiver?  Yes   Is the patient having any side effects they believe may be caused by any medication additions or changes?  No   Does the patient have all medications ordered at discharge?  Yes   Is the patient taking all medications as directed (includes completed medication regime)?  Yes   Does the patient have a primary care provider?   Yes   Does the patient have an appointment with their PCP or pulmonologist within 7 days of discharge?  Yes   Has the patient kept scheduled appointments due by today?  Yes   What is the Home health agency?   [facility]   Has home health visited the patient within 72 hours of discharge?  Yes   What DME was ordered?  Patient wears O2 at home.    Psychosocial issues?  No   Did the patient receive a copy of their discharge instructions?  Yes   Nursing interventions  Reviewed instructions with patient   What is the patient's perception of their health status since discharge?  Improving   Nursing Interventions  Nurse provided patient education   Are the patient's immunizations up to date?   Yes   Nursing interventions  Educated on importance of maintaining up to date immunizations as advised by provider   Is the patient/caregiver able to teach back the hierarchy of who to call/visit for symptoms/problems? PCP,  Specialist, Home health nurse, Urgent Care, ED, 911  Yes   Is the patient able to teach back COPD zones?  Yes   Nursing interventions  Education provided on various zones   Patient reports what zone on this call?  Green Zone   Green Zone  Usual amount of phlegm/mucus without difficulty coughing up, Reports doing well, Breathing without shortness of breath, Sleeping well, Appetite is good, Usual activity and exercise level   Green Zone interventions:  Take daily medications   Week 2 call completed?  Yes   Revoked  No further contact(revokes)-requires comment   Graduated/Revoked comments  Daughter was upset that he couldnt an appt sooner with the MD per [facility]. She declined to participate in further calls.          Roberto Craig RN

## 2020-06-24 DIAGNOSIS — M25.511 ACUTE PAIN OF RIGHT SHOULDER: Primary | ICD-10-CM

## 2020-06-25 ENCOUNTER — OFFICE VISIT (OUTPATIENT)
Dept: ORTHOPEDIC SURGERY | Facility: CLINIC | Age: 85
End: 2020-06-25

## 2020-06-25 VITALS — BODY MASS INDEX: 27 KG/M2 | HEIGHT: 67 IN | WEIGHT: 172 LBS

## 2020-06-25 DIAGNOSIS — G89.29 CHRONIC RIGHT SHOULDER PAIN: Primary | ICD-10-CM

## 2020-06-25 DIAGNOSIS — M15.9 PRIMARY OSTEOARTHRITIS INVOLVING MULTIPLE JOINTS: ICD-10-CM

## 2020-06-25 DIAGNOSIS — S46.811S PARTIAL TEAR OF RIGHT SUBSCAPULARIS TENDON, SEQUELA: ICD-10-CM

## 2020-06-25 DIAGNOSIS — M75.101 ROTATOR CUFF SYNDROME OF RIGHT SHOULDER: ICD-10-CM

## 2020-06-25 DIAGNOSIS — M25.511 CHRONIC RIGHT SHOULDER PAIN: Primary | ICD-10-CM

## 2020-06-25 DIAGNOSIS — M75.101 TEAR OF RIGHT SUPRASPINATUS TENDON: ICD-10-CM

## 2020-06-25 PROCEDURE — 99213 OFFICE O/P EST LOW 20 MIN: CPT | Performed by: NURSE PRACTITIONER

## 2020-06-25 PROCEDURE — 20610 DRAIN/INJ JOINT/BURSA W/O US: CPT | Performed by: NURSE PRACTITIONER

## 2020-06-25 RX ORDER — PREDNISONE 10 MG/1
10 TABLET ORAL DAILY
COMMUNITY
End: 2020-11-03 | Stop reason: HOSPADM

## 2020-06-25 RX ORDER — DIPHENHYDRAMINE HCL 25 MG
25 CAPSULE ORAL EVERY 6 HOURS PRN
COMMUNITY
End: 2021-01-06

## 2020-06-25 RX ORDER — ONDANSETRON 4 MG/1
4 TABLET, FILM COATED ORAL EVERY 8 HOURS PRN
COMMUNITY
End: 2021-01-01

## 2020-06-25 RX ADMIN — TRIAMCINOLONE ACETONIDE 80 MG: 40 INJECTION, SUSPENSION INTRA-ARTICULAR; INTRAMUSCULAR at 13:56

## 2020-06-25 RX ADMIN — LIDOCAINE HYDROCHLORIDE 2 ML: 10 INJECTION, SOLUTION EPIDURAL; INFILTRATION; INTRACAUDAL; PERINEURAL at 13:56

## 2020-06-25 NOTE — PROGRESS NOTES
"Hasmukh Ham is a 87 y.o. male returns for     Chief Complaint   Patient presents with   • Right Shoulder - Follow-up       HISTORY OF PRESENT ILLNESS: Patient presents to office for follow-up of chronic right shoulder pain.  This has been an ongoing issue for several years.  Patient was last seen in office on 6/30/2017.  Patient has known chronic rotator cuff tears from prior CT arthrogram in 2017.  He was given a previous right shoulder injection on 6/30/2017, which offered him significant pain improvement until recently.  Patient denies any recent injuries or falls.  No new complaints or concerns noted.  Patient has history of osteoarthritis in multiple joints and takes Royal per his PCP for pain control.  Patient requests an evaluation for repeat right shoulder injection today to help with his chronic right shoulder pain.  X-rays are repeated today.     CONCURRENT MEDICAL HISTORY:    The following portions of the patient's history were reviewed and updated as appropriate: allergies, current medications, past family history, past medical history, past social history, past surgical history and problem list.     ROS  No fevers or chills.  No chest pain or shortness of air.  No GI or  disturbances. Right shoulder pain.     PHYSICAL EXAMINATION:       Ht 170.2 cm (67\")   Wt 78 kg (172 lb)   BMI 26.94 kg/m²     Physical Exam   Constitutional: He is oriented to person, place, and time. Vital signs are normal. He appears well-developed and well-nourished. He is active and cooperative. He does not appear ill. No distress.   HENT:   Head: Normocephalic.   Pulmonary/Chest: Effort normal. No respiratory distress.   Abdominal: Soft. He exhibits no distension.   Musculoskeletal: He exhibits tenderness (Right shoulder). He exhibits no edema or deformity.   Neurological: He is alert and oriented to person, place, and time. GCS eye subscore is 4. GCS verbal subscore is 5. GCS motor subscore is 6.   Skin: Skin is warm, " dry and intact. Capillary refill takes less than 2 seconds. No erythema.   Psychiatric: He has a normal mood and affect. His speech is normal and behavior is normal. Judgment and thought content normal. Cognition and memory are normal.   Vitals reviewed.      GAIT:     []  Normal  []  Antalgic    Assistive device: []  None  []  Walker     []  Crutches  []  Cane     [x]  Wheelchair  []  Stretcher    Right Shoulder Exam     Tenderness   Right shoulder tenderness location: Mild, diffuse.    Range of Motion   Active abduction: 90   Passive abduction: 130   Forward flexion: 110     Muscle Strength   Abduction: 3/5   Supraspinatus: 3/5     Tests   Carl test: positive  Cross arm: positive  Impingement: positive  Drop arm: positive    Other   Erythema: absent  Sensation: normal  Pulse: present    Comments:  Pain and limitations with range of motion.  Weakness is present.  No deformity.  No swelling appreciated.  No erythema.  No warmth.  No signs of infection noted.  Neurovascularly intact.      Left Shoulder Exam     Tenderness   The patient is experiencing no tenderness.     Range of Motion   Active abduction: 130   Passive abduction: 160   Forward flexion: 150     Muscle Strength   Abduction: 4/5   Supraspinatus: 4/5     Tests   Carl test: negative  Cross arm: negative  Impingement: negative  Drop arm: negative    Other   Erythema: absent  Sensation: normal  Pulse: present               Xr Shoulder 2+ View Right    Result Date: 6/25/2020  Narrative: 3 views of the right shoulder reveal no evidence of acute fracture or dislocation.  There are age-related degenerative changes noted.  There are degenerative changes noted at the acromioclavicular joint with diminished joint spacing and an osteophyte formation at the distal clavicle projecting inferiorly.  The humeral head is in an elevated position in relation to the glenoid, suggestive of longstanding rotator cuff tear.  Soft tissues appear unremarkable.  No acute  bony radiologic abnormalities are noted at this time.  No significant changes are noted when compared with prior images from 6/19/2017.06/25/20 at 4:55 PM by EVE Abdullahi     X-ray arthrogram shoulder right per contrast protocol 6/28/2017  [facility]      Result Narrative   Indication:  Right shoulder pain.    Right Shoulder Arthrogram:    Procedure:  Following standard sterile precautions, fluoroscopy was used to guide a 25-gauge needle into the shoulder joint.  2 cc of Lidocaine and 8 cc of Omnipaque 240 were injected uneventfully.  There was 1 minute 45 seconds of fluoroscopy.  There   were 6 spot films.    Findings:  There is an extensive rotator cuff tear and the humeral head lies under the acromion.  The patient was sent to CT.   Status Results Details     Encounter Summary   CT shoulder right with contrast per contrast protocol 6/28/2017  [facility]  Result Impression       1.  Supraspinatus, infraspinatus, and long head of the biceps are chronically torn and the muscle bellies are atrophic.    2.  The humeral head has migrated superiorly and articulates under the acromion and the AC joint.    3.  The glenohumeral joint is in satisfactory condition.    4.  Most of the subscapularis tendon is attached, but a small portion of the upper tendon is torn.   Result Narrative   Indication:  Shoulder pain.    CT, Right Shoulder after arthrogram:  No destructive lesion or fracture.  The humeral head has migrated superiorly and is articulating under the acromion and the hypertrophied AC joint.  The supraspinatus and infraspinous tendons are chronically torn and  retracted with a atrophic muscle bellies.  The long head of the biceps is not seen and is also probably chronically torn.  The glenohumeral joint is in satisfactory condition.  The upper third of the subscapularis tendon is chronically torn with most of  the tendon remaining attached.         ASSESSMENT:    Diagnoses and all orders for this  visit:    Chronic right shoulder pain  -     Large Joint Arthrocentesis: R subacromial bursa    Rotator cuff syndrome of right shoulder    Primary osteoarthritis involving multiple joints    Partial tear of right subscapularis tendon, sequela    Tear of right supraspinatus tendon    Large Joint Arthrocentesis: R subacromial bursa  Date/Time: 6/25/2020 1:56 PM  Consent given by: patient  Timeout: Immediately prior to procedure a time out was called to verify the correct patient, procedure, equipment, support staff and site/side marked as required   Supporting Documentation  Indications: pain and diagnostic evaluation   Procedure Details  Location: shoulder - R subacromial bursa  Preparation: Patient was prepped and draped in the usual sterile fashion  Needle size: 22 G  Approach: posterior  Medications administered: 2 mL lidocaine PF 1% 1 %; 80 mg triamcinolone acetonide 40 MG/ML  Patient tolerance: patient tolerated the procedure well with no immediate complications      PLAN    X-rays of the right shoulder repeated today in office and reviewed with no acute findings noted.  Patient is 87 years old.  He has known degenerative changes in the right shoulder with chronic rotator cuff tears.  He reports significant improvement following the last injection given into his right shoulder on 6/30/2017.  Patient wants to repeat the injection today to help with his chronic pain.  Recommend a repeat subacromial injection of steroid to the right shoulder today for management of chronic pain/inflammation.  Recommend gentle, progressive range of motion exercises as tolerated and based on his pain.  Recommend use of ice and/or heat therapy to the right shoulder as needed to minimize pain/inflammation/muscle tension.  Patient takes Norco for chronic pain per his PCP.  Patient is unable to take oral NSAIDs due to his advanced age and chronic use of Plavix for anticoagulation.  Follow-up in 3 months for recheck as needed for any new,  worsening or persistent symptoms.  Follow-up sooner as needed for any new or worsening symptoms or any concerns.    Return in about 3 months (around 9/25/2020), or if symptoms worsen or fail to improve, for Recheck.        This document has been electronically signed by EVE Abdullahi on June 28, 2020 09:17      EVE Abdullahi

## 2020-06-28 RX ORDER — TRIAMCINOLONE ACETONIDE 40 MG/ML
80 INJECTION, SUSPENSION INTRA-ARTICULAR; INTRAMUSCULAR
Status: COMPLETED | OUTPATIENT
Start: 2020-06-25 | End: 2020-06-25

## 2020-06-28 RX ORDER — LIDOCAINE HYDROCHLORIDE 10 MG/ML
2 INJECTION, SOLUTION EPIDURAL; INFILTRATION; INTRACAUDAL; PERINEURAL
Status: COMPLETED | OUTPATIENT
Start: 2020-06-25 | End: 2020-06-25

## 2020-10-20 ENCOUNTER — OFFICE VISIT (OUTPATIENT)
Dept: ORTHOPEDIC SURGERY | Facility: CLINIC | Age: 85
End: 2020-10-20

## 2020-10-20 VITALS — WEIGHT: 172 LBS | HEIGHT: 67 IN | BODY MASS INDEX: 27 KG/M2

## 2020-10-20 DIAGNOSIS — S46.811S PARTIAL TEAR OF RIGHT SUBSCAPULARIS TENDON, SEQUELA: ICD-10-CM

## 2020-10-20 DIAGNOSIS — M75.101 TEAR OF RIGHT SUPRASPINATUS TENDON: ICD-10-CM

## 2020-10-20 DIAGNOSIS — G89.29 CHRONIC RIGHT SHOULDER PAIN: Primary | ICD-10-CM

## 2020-10-20 DIAGNOSIS — M75.101 ROTATOR CUFF SYNDROME OF RIGHT SHOULDER: ICD-10-CM

## 2020-10-20 DIAGNOSIS — M15.9 PRIMARY OSTEOARTHRITIS INVOLVING MULTIPLE JOINTS: ICD-10-CM

## 2020-10-20 DIAGNOSIS — M25.511 CHRONIC RIGHT SHOULDER PAIN: Primary | ICD-10-CM

## 2020-10-20 PROCEDURE — 20610 DRAIN/INJ JOINT/BURSA W/O US: CPT | Performed by: NURSE PRACTITIONER

## 2020-10-20 PROCEDURE — 99213 OFFICE O/P EST LOW 20 MIN: CPT | Performed by: NURSE PRACTITIONER

## 2020-10-20 RX ORDER — ARFORMOTEROL TARTRATE 15 UG/2ML
SOLUTION RESPIRATORY (INHALATION) 3 TIMES DAILY
COMMUNITY
Start: 2020-10-16

## 2020-10-20 RX ADMIN — TRIAMCINOLONE ACETONIDE 80 MG: 40 INJECTION, SUSPENSION INTRA-ARTICULAR; INTRAMUSCULAR at 13:22

## 2020-10-20 RX ADMIN — LIDOCAINE HYDROCHLORIDE 2 ML: 10 INJECTION, SOLUTION INFILTRATION; PERINEURAL at 13:22

## 2020-10-20 NOTE — PROGRESS NOTES
"Hasmukh Ham is a 87 y.o. male returns for     Chief Complaint   Patient presents with   • Right Shoulder - Follow-up       HISTORY OF PRESENT ILLNESS: Patient presents to office for follow-up of chronic right shoulder pain due to osteoarthritis and chronic rotator cuff tears.  This has been an ongoing issue for several years.  Patient has gotten good improvement in the past with injections in his shoulder and he wants to repeat this today.  Patient reports his right shoulder pain has been gradually increasing again recently.  He has increased pain at nighttime and difficulty sleeping due to shoulder pain.  Last injection was given on 6/25/2020, which offered him good pain improvement for a few months.  Patient denies any injuries or falls since last office visit.  No new complaints or concerns noted.     CONCURRENT MEDICAL HISTORY:    The following portions of the patient's history were reviewed and updated as appropriate: allergies, current medications, past family history, past medical history, past social history, past surgical history and problem list.     ROS  No fevers or chills.  No chest pain or shortness of air.  No GI or  disturbances. Right shoulder pain.     PHYSICAL EXAMINATION:       Ht 170.2 cm (67\")   Wt 78 kg (172 lb)   BMI 26.94 kg/m²     Physical Exam  Vitals signs reviewed.   Constitutional:       General: He is not in acute distress.     Appearance: He is well-developed. He is not ill-appearing.   HENT:      Head: Normocephalic.   Pulmonary:      Effort: Pulmonary effort is normal. No respiratory distress.   Abdominal:      General: There is no distension.      Palpations: Abdomen is soft.   Musculoskeletal:         General: Tenderness (Right shoulder) present. No swelling, deformity or signs of injury.   Skin:     General: Skin is warm and dry.      Capillary Refill: Capillary refill takes less than 2 seconds.      Findings: No erythema.   Neurological:      Mental Status: He is alert " and oriented to person, place, and time.      GCS: GCS eye subscore is 4. GCS verbal subscore is 5. GCS motor subscore is 6.   Psychiatric:         Speech: Speech normal.         Behavior: Behavior normal.         Thought Content: Thought content normal.         Judgment: Judgment normal.         GAIT:     []  Normal  []  Antalgic    Assistive device: []  None  []  Walker     []  Crutches  []  Cane     [x]  Wheelchair  []  Stretcher    Right Shoulder Exam     Tenderness   Right shoulder tenderness location: Mild, diffuse.    Range of Motion   Active abduction: 90   Passive abduction: 130   Forward flexion: 110     Muscle Strength   Abduction: 3/5   Supraspinatus: 3/5     Tests   Carl test: positive  Cross arm: positive  Impingement: positive  Drop arm: positive    Other   Erythema: absent  Sensation: normal  Pulse: present    Comments:  Pain and limitations with range of motion.  Weakness is present.  No deformity.  No swelling appreciated.  No erythema.  No warmth.  No signs of infection noted.  Neurovascularly intact.      Left Shoulder Exam     Tenderness   The patient is experiencing no tenderness.     Range of Motion   Active abduction: 130   Passive abduction: 160   Forward flexion: 150     Muscle Strength   Abduction: 4/5   Supraspinatus: 4/5     Tests   Carl test: negative  Cross arm: negative  Impingement: negative  Drop arm: negative    Other   Erythema: absent  Sensation: normal  Pulse: present                 Xr Shoulder 2+ View Right     Result Date: 6/25/2020  Narrative: 3 views of the right shoulder reveal no evidence of acute fracture or dislocation.  There are age-related degenerative changes noted.  There are degenerative changes noted at the acromioclavicular joint with diminished joint spacing and an osteophyte formation at the distal clavicle projecting inferiorly.  The humeral head is in an elevated position in relation to the glenoid, suggestive of longstanding rotator cuff tear. Soft  tissues appear unremarkable.  No acute bony radiologic abnormalities are noted at this time.  No significant changes are noted when compared with prior images from 6/19/2017.06/25/20 at 4:55 PM by EVE Abdullahi      X-ray arthrogram shoulder right per contrast protocol 6/28/2017  [facility]        Result Narrative   Indication:  Right shoulder pain.    Right Shoulder Arthrogram:    Procedure:  Following standard sterile precautions, fluoroscopy was used to guide a 25-gauge needle into the shoulder joint.  2 cc of Lidocaine and 8 cc of Omnipaque 240 were injected uneventfully.  There was 1 minute 45 seconds of fluoroscopy.  There   were 6 spot films.    Findings:  There is an extensive rotator cuff tear and the humeral head lies under the acromion.  The patient was sent to CT.   Status Results Details     Encounter Summary   CT shoulder right with contrast per contrast protocol 6/28/2017  [facility]  Result Impression       1.  Supraspinatus, infraspinatus, and long head of the biceps are chronically torn and the muscle bellies are atrophic.    2.  The humeral head has migrated superiorly and articulates under the acromion and the AC joint.    3.  The glenohumeral joint is in satisfactory condition.    4.  Most of the subscapularis tendon is attached, but a small portion of the upper tendon is torn.   Result Narrative   Indication:  Shoulder pain.    CT, Right Shoulder after arthrogram:  No destructive lesion or fracture.  The humeral head has migrated superiorly and is articulating under the acromion and the hypertrophied AC joint.  The supraspinatus and infraspinous tendons are chronically torn and  retracted with a atrophic muscle bellies.  The long head of the biceps is not seen and is also probably chronically torn.  The glenohumeral joint is in satisfactory condition.  The upper third of the subscapularis tendon is chronically torn with most of  the tendon remaining attached.          ASSESSMENT:    Diagnoses and all orders for this visit:    Chronic right shoulder pain  -     Large Joint Arthrocentesis: R subacromial bursa    Rotator cuff syndrome of right shoulder  -     Large Joint Arthrocentesis: R subacromial bursa    Primary osteoarthritis involving multiple joints  -     Large Joint Arthrocentesis: R subacromial bursa    Partial tear of right subscapularis tendon, sequela  -     Large Joint Arthrocentesis: R subacromial bursa    Tear of right supraspinatus tendon  -     Large Joint Arthrocentesis: R subacromial bursa    Other orders  -     Brovana 15 MCG/2ML nebulizer solution; TK 2 MLS BY NEBULIZATION BID      Large Joint Arthrocentesis: R subacromial bursa  Date/Time: 10/20/2020 1:22 PM  Consent given by: patient  Site marked: site marked  Supporting Documentation  Indications: pain and diagnostic evaluation   Procedure Details  Location: shoulder - R subacromial bursa  Preparation: Patient was prepped and draped in the usual sterile fashion  Needle size: 22 G  Approach: posterior  Medications administered: 2 mL lidocaine 1 %; 80 mg triamcinolone acetonide 40 MG/ML  Patient tolerance: patient tolerated the procedure well with no immediate complications      PLAN    Patient complains of chronic and persistent right shoulder pain due to known degenerative changes and chronic rotator cuff tears.  Patient had significant improvement in his pain following the last injection and wants to repeat this today.  He is having trouble sleeping at night due to shoulder pain.  Recommend a repeat subacromial injection of steroid to the right shoulder today for management of chronic pain/inflammation. Recommend gentle, progressive range of motion exercises as tolerated and based on his pain.  Recommend use of ice and/or heat therapy to the right shoulder as needed to minimize pain/inflammation/muscle tension. Patient takes Norco for chronic pain per his PCP.  Patient is unable to take oral NSAIDs  due to his advanced age and chronic use of Plavix for anticoagulation.  Follow-up in 3 months for recheck as needed for any new, worsening or persistent symptoms.  Follow-up sooner as needed for any new or worsening symptoms or any concerns.    Return in about 3 months (around 1/20/2021), or if symptoms worsen or fail to improve, for Recheck.      This document has been electronically signed by EVE Abdullahi on October 25, 2020 13:00 CDT      EVE Abdullahi

## 2020-10-25 RX ORDER — LIDOCAINE HYDROCHLORIDE 10 MG/ML
2 INJECTION, SOLUTION INFILTRATION; PERINEURAL
Status: COMPLETED | OUTPATIENT
Start: 2020-10-20 | End: 2020-10-20

## 2020-10-25 RX ORDER — TRIAMCINOLONE ACETONIDE 40 MG/ML
80 INJECTION, SUSPENSION INTRA-ARTICULAR; INTRAMUSCULAR
Status: COMPLETED | OUTPATIENT
Start: 2020-10-20 | End: 2020-10-20

## 2020-10-29 NOTE — PAYOR COMM NOTE
"Hasmukh Ham (84 y.o. Male)     Date of Birth Social Security Number Address Home Phone MRN    06/01/1933  [address] [facility] [id number] [phone] [id number]    Mu-ism Marital Status          Jain        Admission Date Admission Type Admitting Provider Attending Provider Department, Room/Bed    9/15/17 Urgent Pamela Griffin MD Amsler, Mariola HALE MD [address], [address]    Discharge Date Discharge Disposition Discharge Destination                      Attending Provider: Mariola Arrieta MD     Allergies:  Atorvastatin, Pravastatin, Azithromycin, Biaxin [Clarithromycin]    Isolation:  None   Infection:  None   Code Status:  FULL    Ht:  67\" (170.2 cm)   Wt:  174 lb 12.8 oz (79.3 kg)    Admission Cmt:  None   Principal Problem:  Acute exacerbation of chronic obstructive airways disease [J44.1]                 Active Insurance as of 9/15/2017     Primary Coverage     Payor Plan Insurance Group Employer/Plan Group    AETNA MEDICARE REPLACEMENT AETNA [id number]     Payor Plan Address Payor Plan Phone Number Effective From Effective To    [address] [phone] 4/1/2017     [place] [address]       Subscriber Name Subscriber Birth Date Member ID       HASMUKH HAM 6/1/1933 [id number]           Secondary Coverage     Payor Plan Insurance Group Employer/Plan Group    [employer] BENEFITS HE[employer] BENEFITS [id number]     Payor Plan Address Payor Plan Phone Number Effective From Effective To    [address] [address] [phone] 9/15/2017     [place] [address]       Subscriber Name Subscriber Birth Date Member ID       HASMUKH HAM 6/1/1933 [id number]                 Emergency Contacts      (Rel.) Home Phone Work Phone Mobile Phone    Geovanna Tatum (Daughter) [phone] -- [phone]    FatoumataRimma marinelli (Spouse) [phone] -- --               History & Physical      Pamela Griffin MD at 9/15/2017  1:23 PM      "           [facility] Medicine Services  INPATIENT HISTORY AND PHYSICAL       Patient Care Team:  Paolo Rey MD as PCP - General    Chief complaint No chief complaint on file.      Subjective     Patient is a 84 y.o. male presents with complaint of having shortness of breath.  Patient states she's been having shortness of breath ongoing for past few weeks.  Patient states his shortness of breath has been worsening.  He has been having coughing spells with thick yellowish to greenish colored sputum.  No nausea vomiting have been reported.  Patient does complain of chest tightness with coughing spells.  Patient states he has been having worsening orthopnea of 3-4 pillows.  Patient also admits of having reduced excess tolerance.  Patient does complain of worsening swelling.  No urinary complaints have been reported.  Patient states he has been compliant with his medications.  Patient states he was recently treated with IV steroids and nebulizer treatment without any benefit.    Review of Systems   Review of Systems   Constitutional: Positive for activity change and fever. Negative for appetite change, chills and diaphoresis.   HENT: Negative for congestion, rhinorrhea, sore throat and trouble swallowing.    Eyes: Negative for visual disturbance.   Respiratory: Positive for cough, chest tightness, shortness of breath and wheezing.    Cardiovascular: Positive for leg swelling. Negative for chest pain and palpitations.   Gastrointestinal: Negative for abdominal pain, blood in stool, diarrhea, nausea and vomiting.   Endocrine: Negative for cold intolerance and heat intolerance.   Genitourinary: Negative for decreased urine volume and difficulty urinating.   Musculoskeletal: Negative for back pain, gait problem and neck pain.   Skin: Negative for rash.   Neurological: Negative for dizziness, syncope, weakness, light-headedness, numbness and headaches.   Psychiatric/Behavioral: The  patient is not nervous/anxious.        History  Past Medical History:   Diagnosis Date   • Acute exacerbation of chronic obstructive airways disease    • Acute interstitial pneumonia     Hamman-Rich Syndrome   • Arthritis    • Backache    • Bradycardia    • Chronic obstructive lung disease    • Chronic obstructive pulmonary disease (COPD)    • Coronary arteriosclerosis    • Coronary atherosclerosis of native coronary artery    • Degenerative joint disease involving multiple joints    • Diabetes mellitus    • Duodenal ulcer disease    • Hernia of abdominal wall    • History of echocardiogram 04/13/2015    Echocardiogram W/ color flow 28973 (1) - Normal LV systolic function with Ef of 70% with LVH and diastolic relaxation abnormality of the left ventricle. Mildly dilated aortic root. No sig.valvular abnormalities.   • Hyperlipidemia    • Hypertension    • Hypertensive disorder    • Left lower lobe pneumonia     Left lower zone pneumonia - clinically improved   • Neuropathy    • Pneumonia     Recent improving   • Shortness of breath    • Type 2 diabetes mellitus      Past Surgical History:   Procedure Laterality Date   • APPENDECTOMY     • CARDIAC CATHETERIZATION N/A 6/6/2017    Procedure: Right Heart Cath;  Surgeon: Jeff Maciel MD PhD;  Location: [facility] INVASIVE LOC[facility];  Service:    • HERNIA REPAIR     • LUNG BIOPSY     • NECK SURGERY     • THORACOTOMY Left 1977    left with lung biopsy   • VENTRAL HERNIA REPAIR       Family History   Problem Relation Age of Onset   • Diabetes Other    • Heart disease Other    • Hypertension Other    • Other Other      Respiratory disorder   • Stroke Other      Social History   Substance Use Topics   • Smoking status: Former Smoker   • Smokeless tobacco: Never Used   • Alcohol use No     Facility-Administered Medications Prior to Admission   Medication Dose Route Frequency Provider Last Rate Last Dose   • [COMPLETED] furosemide (LASIX) injection 20 mg  20 mg  Subcutaneous Once Gauri Vargas APRN   20 mg at 09/14/17 1551   • [COMPLETED] furosemide (LASIX) injection 40 mg  40 mg Subcutaneous Once EVE Sewell   40 mg at 09/14/17 1552     Prescriptions Prior to Admission   Medication Sig Dispense Refill Last Dose   • aspirin 81 MG chewable tablet Chew 81 mg Daily.   9/15/2017 at Unknown time   • clopidogrel (PLAVIX) 75 MG tablet Take 75 mg by mouth Daily.   9/15/2017 at Unknown time   • donepezil (ARICEPT) 10 MG tablet Take 10 mg by mouth Daily.  0 9/15/2017 at Unknown time   • famotidine (PEPCID) 20 MG tablet Take 20 mg by mouth 2 (Two) Times a Day.  0 9/15/2017 at Unknown time   • fluticasone (FLONASE) 50 MCG/ACT nasal spray 2 sprays into each nostril Daily.   9/15/2017 at Unknown time   • furosemide (LASIX) 20 MG tablet Take 1 tablet by mouth Daily. 90 tablet 3 9/15/2017 at Unknown time   • glimepiride (AMARYL) 2 MG tablet Take 2 mg by mouth Daily.  0 9/15/2017 at Unknown time   • HYDROcodone-acetaminophen (NORCO) 7.5-325 MG per tablet Take 1 tablet by mouth Every 8 (Eight) Hours.   9/15/2017 at Unknown time   • isosorbide dinitrate (ISORDIL) 30 MG tablet Take 30 mg by mouth Daily.  0 9/15/2017 at Unknown time   • lisinopril (PRINIVIL,ZESTRIL) 10 MG tablet Take 1 tablet by mouth Daily. 30 tablet 6 9/15/2017 at Unknown time   • magnesium oxide (MAGOX) 400 (241.3 MG) MG tablet tablet Take 400 mg by mouth Daily.   9/15/2017 at Unknown time   • O2 (OXYGEN) Inhale 2 L/min Every Night. W/ CPAP   9/14/2017 at Unknown time   • predniSONE (DELTASONE) 10 MG tablet Take 10 mg by mouth Daily.   9/15/2017 at Unknown time   • Red Yeast Rice 600 MG tablet Take 600 mg by mouth 2 (Two) Times a Day.   9/15/2017 at Unknown time   • terbutaline (BRETHINE) 5 MG tablet Take 5 mg by mouth 2 (Two) Times a Day.  0 9/15/2017 at Unknown time   • Umeclidinium-Vilanterol 62.5-25 MCG/INH aerosol powder  Inhale 1 puff Daily. 1 each 11 9/15/2017 at Unknown time   • albuterol (PROVENTIL)  (2.5 MG/3ML) 0.083% nebulizer solution Take 2.5 mg by nebulization 4 (Four) Times a Day As Needed for wheezing. 125 vial 11 Taking   • ipratropium-albuterol (DUO-NEB) 0.5-2.5 mg/mL nebulizer Inhale 3 mL.   Taking   • nitroglycerin (NITROSTAT) 0.4 MG SL tablet place 1 tablet under the tongue if needed every 5 minutes for hair...  (REFER TO PRESCRIPTION NOTES).  0 Taking     Allergies:  Atorvastatin; Pravastatin; Azithromycin; and Biaxin [clarithromycin]  Prior to Admission medications    Medication Sig Start Date End Date Taking? Authorizing Provider   aspirin 81 MG chewable tablet Chew 81 mg Daily.   Yes Historical Provider, MD   clopidogrel (PLAVIX) 75 MG tablet Take 75 mg by mouth Daily. 2/3/16  Yes Historical Provider, MD   donepezil (ARICEPT) 10 MG tablet Take 10 mg by mouth Daily. 11/12/16  Yes Historical Provider, MD   famotidine (PEPCID) 20 MG tablet Take 20 mg by mouth 2 (Two) Times a Day. 10/20/16  Yes Historical Provider, MD   fluticasone (FLONASE) 50 MCG/ACT nasal spray 2 sprays into each nostril Daily.   Yes Historical Provider, MD   furosemide (LASIX) 20 MG tablet Take 1 tablet by mouth Daily. 3/3/17  Yes Jeff Maciel MD PhD   glimepiride (AMARYL) 2 MG tablet Take 2 mg by mouth Daily. 10/12/16  Yes Historical Provider, MD   HYDROcodone-acetaminophen (NORCO) 7.5-325 MG per tablet Take 1 tablet by mouth Every 8 (Eight) Hours. 12/27/16  Yes Historical Provider, MD   isosorbide dinitrate (ISORDIL) 30 MG tablet Take 30 mg by mouth Daily. 9/27/16  Yes Historical Provider, MD   lisinopril (PRINIVIL,ZESTRIL) 10 MG tablet Take 1 tablet by mouth Daily. 9/14/17  Yes EVE Sewell   magnesium oxide (MAGOX) 400 (241.3 MG) MG tablet tablet Take 400 mg by mouth Daily.   Yes Historical Provider, MD   O2 (OXYGEN) Inhale 2 L/min Every Night. W/ CPAP   Yes Historical Provider, MD   predniSONE (DELTASONE) 10 MG tablet Take 10 mg by mouth Daily.   Yes Historical Provider, MD   Red Yeast Rice 600 MG tablet  Take 600 mg by mouth 2 (Two) Times a Day.   Yes Historical Provider, MD   terbutaline (BRETHINE) 5 MG tablet Take 5 mg by mouth 2 (Two) Times a Day. 10/14/16  Yes Historical Provider, MD   Umeclidinium-Vilanterol 62.5-25 MCG/INH aerosol powder  Inhale 1 puff Daily. 3/3/17  Yes Brea Higginbotham MD   albuterol (PROVENTIL) (2.5 MG/3ML) 0.083% nebulizer solution Take 2.5 mg by nebulization 4 (Four) Times a Day As Needed for wheezing. 3/3/17   Brea Higginbotham MD   ipratropium-albuterol (DUO-NEB) 0.5-2.5 mg/mL nebulizer Inhale 3 mL. 8/3/17   Historical Provider, MD   nitroglycerin (NITROSTAT) 0.4 MG SL tablet place 1 tablet under the tongue if needed every 5 minutes for hair...  (REFER TO PRESCRIPTION NOTES). 1/11/17   Historical Provider, MD       Objective     Vital Signs    Heart Rate:  [50-83] 50  Resp:  [20] 20  BP: (122-134)/(58-60) 122/58    Physical Exam:      Physical Exam   Constitutional: He is oriented to person, place, and time. He appears well-developed and well-nourished.   HENT:   Head: Normocephalic and atraumatic.   Nose: Nose normal.   Eyes: Conjunctivae and EOM are normal. Pupils are equal, round, and reactive to light.   Neck: Normal range of motion. Neck supple. No JVD present. No tracheal deviation present. No thyromegaly present.   Cardiovascular: Normal rate, regular rhythm, normal heart sounds and intact distal pulses.    Pulmonary/Chest: Effort normal. No respiratory distress. He has wheezes. He has rales. He exhibits no tenderness.   Abdominal: Soft. Bowel sounds are normal. He exhibits no distension. There is no tenderness. There is no rebound and no guarding.   Musculoskeletal: Normal range of motion. He exhibits edema.   Lymphadenopathy:     He has no cervical adenopathy.   Neurological: He is alert and oriented to person, place, and time. He has normal reflexes. No cranial nerve deficit.   Skin: Skin is warm and dry.   Intact   Psychiatric: He has a normal mood and affect.   Nursing note  and vitals reviewed.      Results Review:           Invalid input(s): LABALBU, PROT                    Imaging Results (last 7 days)     ** No results found for the last 168 hours. **          Assessment/Plan     Principal Problem:    Acute exacerbation of chronic obstructive airways disease  Active Problems:    Benign essential hypertension    Type 2 diabetes mellitus    Non-rheumatic tricuspid valve insufficiency    Coronary artery disease involving native coronary artery of native heart    Pulmonary emphysema    Atrial fibrillation    Shortness of breath    COPD with exacerbation    (HFpEF) heart failure with preserved ejection fraction    Pulmonary HTN    Acute interstitial pneumonia    Chronic obstructive lung disease    Coronary arteriosclerosis    Diabetes mellitus      -We'll continue with IV antibiotics.  -We'll also give IV diuretics.    -we'll continue with nebulizer treatments.  -We'll resume patient's home medication as prior to coming hospital.  -DVT and GI prophylaxis in place.  -We'll continue monitoring patient hospital setting and treat patient as course dictates.  -We'll recommend aggressive PT OT.    I discussed the patients findings and my recommendations with patient.         This document has been electronically signed by Pamela Griffin MD on September 15, 2017 1:23 PM        Total Time Spent: 45 mins    EMR Dragon/Transcription disclaimer:   Dictated utilizing Dragon dictation.            Electronically signed by Pamela Griffin MD at 9/15/2017  7:25 PM         [Initial Evaluation] : an initial evaluation

## 2020-10-31 ENCOUNTER — APPOINTMENT (OUTPATIENT)
Dept: GENERAL RADIOLOGY | Facility: HOSPITAL | Age: 85
End: 2020-10-31

## 2020-10-31 ENCOUNTER — HOSPITAL ENCOUNTER (OUTPATIENT)
Facility: HOSPITAL | Age: 85
Setting detail: OBSERVATION
Discharge: HOME HEALTH | End: 2020-11-03
Attending: STUDENT IN AN ORGANIZED HEALTH CARE EDUCATION/TRAINING PROGRAM | Admitting: INTERNAL MEDICINE

## 2020-10-31 DIAGNOSIS — R06.02 SOB (SHORTNESS OF BREATH): Primary | ICD-10-CM

## 2020-10-31 DIAGNOSIS — J44.9 CHRONIC OBSTRUCTIVE PULMONARY DISEASE, UNSPECIFIED COPD TYPE (HCC): Chronic | ICD-10-CM

## 2020-10-31 DIAGNOSIS — J18.9 PNEUMONIA OF LEFT LOWER LOBE DUE TO INFECTIOUS ORGANISM: ICD-10-CM

## 2020-10-31 LAB
ALBUMIN SERPL-MCNC: 3.8 G/DL (ref 3.5–5.2)
ALBUMIN/GLOB SERPL: 1.7 G/DL
ALP SERPL-CCNC: 66 U/L (ref 39–117)
ALT SERPL W P-5'-P-CCNC: 26 U/L (ref 1–41)
ANION GAP SERPL CALCULATED.3IONS-SCNC: 8 MMOL/L (ref 5–15)
AST SERPL-CCNC: 18 U/L (ref 1–40)
BASOPHILS # BLD AUTO: 0.05 10*3/MM3 (ref 0–0.2)
BASOPHILS NFR BLD AUTO: 0.3 % (ref 0–1.5)
BILIRUB SERPL-MCNC: 0.2 MG/DL (ref 0–1.2)
BILIRUB UR QL STRIP: NEGATIVE
BUN SERPL-MCNC: 35 MG/DL (ref 8–23)
BUN/CREAT SERPL: 24.8 (ref 7–25)
CALCIUM SPEC-SCNC: 9.3 MG/DL (ref 8.6–10.5)
CHLORIDE SERPL-SCNC: 104 MMOL/L (ref 98–107)
CLARITY UR: CLEAR
CO2 SERPL-SCNC: 24 MMOL/L (ref 22–29)
COLOR UR: YELLOW
CREAT SERPL-MCNC: 1.41 MG/DL (ref 0.76–1.27)
D-DIMER, QUANTITATIVE (MAD,POW, STR): 591 NG/ML (FEU) (ref 0–470)
D-LACTATE SERPL-SCNC: 1.8 MMOL/L (ref 0.5–2)
DEPRECATED RDW RBC AUTO: 52.4 FL (ref 37–54)
EOSINOPHIL # BLD AUTO: 0 10*3/MM3 (ref 0–0.4)
EOSINOPHIL NFR BLD AUTO: 0 % (ref 0.3–6.2)
ERYTHROCYTE [DISTWIDTH] IN BLOOD BY AUTOMATED COUNT: 14.6 % (ref 12.3–15.4)
GFR SERPL CREATININE-BSD FRML MDRD: 48 ML/MIN/1.73
GLOBULIN UR ELPH-MCNC: 2.2 GM/DL
GLUCOSE SERPL-MCNC: 145 MG/DL (ref 65–99)
GLUCOSE UR STRIP-MCNC: NEGATIVE MG/DL
HCT VFR BLD AUTO: 42.1 % (ref 37.5–51)
HGB BLD-MCNC: 13.7 G/DL (ref 13–17.7)
HGB UR QL STRIP.AUTO: NEGATIVE
HOLD SPECIMEN: NORMAL
HOLD SPECIMEN: NORMAL
IMM GRANULOCYTES # BLD AUTO: 0.63 10*3/MM3 (ref 0–0.05)
IMM GRANULOCYTES NFR BLD AUTO: 4.3 % (ref 0–0.5)
KETONES UR QL STRIP: NEGATIVE
LEUKOCYTE ESTERASE UR QL STRIP.AUTO: NEGATIVE
LIPASE SERPL-CCNC: 25 U/L (ref 13–60)
LYMPHOCYTES # BLD AUTO: 1.21 10*3/MM3 (ref 0.7–3.1)
LYMPHOCYTES # BLD MANUAL: 0.29 10*3/MM3 (ref 0.7–3.1)
LYMPHOCYTES NFR BLD AUTO: 8.3 % (ref 19.6–45.3)
LYMPHOCYTES NFR BLD MANUAL: 2 % (ref 19.6–45.3)
LYMPHOCYTES NFR BLD MANUAL: 3 % (ref 5–12)
MCH RBC QN AUTO: 31.7 PG (ref 26.6–33)
MCHC RBC AUTO-ENTMCNC: 32.5 G/DL (ref 31.5–35.7)
MCV RBC AUTO: 97.5 FL (ref 79–97)
MONOCYTES # BLD AUTO: 0.44 10*3/MM3 (ref 0.1–0.9)
MONOCYTES # BLD AUTO: 0.52 10*3/MM3 (ref 0.1–0.9)
MONOCYTES NFR BLD AUTO: 3.6 % (ref 5–12)
NEUTROPHILS # BLD AUTO: 12.87 10*3/MM3 (ref 1.7–7)
NEUTROPHILS NFR BLD AUTO: 12.21 10*3/MM3 (ref 1.7–7)
NEUTROPHILS NFR BLD AUTO: 83.5 % (ref 42.7–76)
NEUTROPHILS NFR BLD MANUAL: 88 % (ref 42.7–76)
NITRITE UR QL STRIP: NEGATIVE
NRBC BLD AUTO-RTO: 0 /100 WBC (ref 0–0.2)
PH UR STRIP.AUTO: 5.5 [PH] (ref 5–9)
PLAT MORPH BLD: NORMAL
PLATELET # BLD AUTO: 175 10*3/MM3 (ref 140–450)
PMV BLD AUTO: 11.1 FL (ref 6–12)
POTASSIUM SERPL-SCNC: 5.5 MMOL/L (ref 3.5–5.2)
PROT SERPL-MCNC: 6 G/DL (ref 6–8.5)
PROT UR QL STRIP: NEGATIVE
RBC # BLD AUTO: 4.32 10*6/MM3 (ref 4.14–5.8)
RBC MORPH BLD: NORMAL
SODIUM SERPL-SCNC: 136 MMOL/L (ref 136–145)
SP GR UR STRIP: 1.02 (ref 1–1.03)
UROBILINOGEN UR QL STRIP: NORMAL
VARIANT LYMPHS NFR BLD MANUAL: 7 % (ref 0–5)
WBC # BLD AUTO: 14.62 10*3/MM3 (ref 3.4–10.8)
WBC MORPH BLD: NORMAL
WHOLE BLOOD HOLD SPECIMEN: NORMAL
WHOLE BLOOD HOLD SPECIMEN: NORMAL

## 2020-10-31 PROCEDURE — G0378 HOSPITAL OBSERVATION PER HR: HCPCS

## 2020-10-31 PROCEDURE — C9803 HOPD COVID-19 SPEC COLLECT: HCPCS

## 2020-10-31 PROCEDURE — 99284 EMERGENCY DEPT VISIT MOD MDM: CPT

## 2020-10-31 PROCEDURE — 83690 ASSAY OF LIPASE: CPT | Performed by: STUDENT IN AN ORGANIZED HEALTH CARE EDUCATION/TRAINING PROGRAM

## 2020-10-31 PROCEDURE — 83605 ASSAY OF LACTIC ACID: CPT | Performed by: STUDENT IN AN ORGANIZED HEALTH CARE EDUCATION/TRAINING PROGRAM

## 2020-10-31 PROCEDURE — 96372 THER/PROPH/DIAG INJ SC/IM: CPT

## 2020-10-31 PROCEDURE — 85007 BL SMEAR W/DIFF WBC COUNT: CPT | Performed by: STUDENT IN AN ORGANIZED HEALTH CARE EDUCATION/TRAINING PROGRAM

## 2020-10-31 PROCEDURE — 85025 COMPLETE CBC W/AUTO DIFF WBC: CPT | Performed by: STUDENT IN AN ORGANIZED HEALTH CARE EDUCATION/TRAINING PROGRAM

## 2020-10-31 PROCEDURE — 87635 SARS-COV-2 COVID-19 AMP PRB: CPT | Performed by: STUDENT IN AN ORGANIZED HEALTH CARE EDUCATION/TRAINING PROGRAM

## 2020-10-31 PROCEDURE — 25010000002 ENOXAPARIN PER 10 MG: Performed by: HOSPITALIST

## 2020-10-31 PROCEDURE — 25010000002 CEFTRIAXONE: Performed by: STUDENT IN AN ORGANIZED HEALTH CARE EDUCATION/TRAINING PROGRAM

## 2020-10-31 PROCEDURE — 93010 ELECTROCARDIOGRAM REPORT: CPT | Performed by: INTERNAL MEDICINE

## 2020-10-31 PROCEDURE — 87040 BLOOD CULTURE FOR BACTERIA: CPT | Performed by: STUDENT IN AN ORGANIZED HEALTH CARE EDUCATION/TRAINING PROGRAM

## 2020-10-31 PROCEDURE — 85379 FIBRIN DEGRADATION QUANT: CPT | Performed by: STUDENT IN AN ORGANIZED HEALTH CARE EDUCATION/TRAINING PROGRAM

## 2020-10-31 PROCEDURE — 80053 COMPREHEN METABOLIC PANEL: CPT | Performed by: STUDENT IN AN ORGANIZED HEALTH CARE EDUCATION/TRAINING PROGRAM

## 2020-10-31 PROCEDURE — 96361 HYDRATE IV INFUSION ADD-ON: CPT

## 2020-10-31 PROCEDURE — 71046 X-RAY EXAM CHEST 2 VIEWS: CPT

## 2020-10-31 PROCEDURE — 93005 ELECTROCARDIOGRAM TRACING: CPT | Performed by: STUDENT IN AN ORGANIZED HEALTH CARE EDUCATION/TRAINING PROGRAM

## 2020-10-31 PROCEDURE — 81003 URINALYSIS AUTO W/O SCOPE: CPT | Performed by: STUDENT IN AN ORGANIZED HEALTH CARE EDUCATION/TRAINING PROGRAM

## 2020-10-31 RX ORDER — ACETAMINOPHEN 650 MG/1
650 SUPPOSITORY RECTAL EVERY 4 HOURS PRN
Status: DISCONTINUED | OUTPATIENT
Start: 2020-10-31 | End: 2020-11-03 | Stop reason: HOSPADM

## 2020-10-31 RX ORDER — RANOLAZINE 500 MG/1
500 TABLET, EXTENDED RELEASE ORAL EVERY 12 HOURS SCHEDULED
Status: DISCONTINUED | OUTPATIENT
Start: 2020-10-31 | End: 2020-11-03 | Stop reason: HOSPADM

## 2020-10-31 RX ORDER — CLINDAMYCIN HYDROCHLORIDE 300 MG/1
300 CAPSULE ORAL 3 TIMES DAILY
COMMUNITY
End: 2020-11-03 | Stop reason: HOSPADM

## 2020-10-31 RX ORDER — SODIUM CHLORIDE 0.9 % (FLUSH) 0.9 %
10 SYRINGE (ML) INJECTION AS NEEDED
Status: DISCONTINUED | OUTPATIENT
Start: 2020-10-31 | End: 2020-11-03 | Stop reason: HOSPADM

## 2020-10-31 RX ORDER — HYDROCODONE BITARTRATE AND ACETAMINOPHEN 7.5; 325 MG/1; MG/1
1 TABLET ORAL EVERY 8 HOURS
Status: DISCONTINUED | OUTPATIENT
Start: 2020-10-31 | End: 2020-11-03 | Stop reason: HOSPADM

## 2020-10-31 RX ORDER — ISOSORBIDE DINITRATE 30 MG/1
30 TABLET ORAL DAILY
Status: DISCONTINUED | OUTPATIENT
Start: 2020-11-01 | End: 2020-11-03 | Stop reason: HOSPADM

## 2020-10-31 RX ORDER — DEXTROSE MONOHYDRATE 25 G/50ML
25 INJECTION, SOLUTION INTRAVENOUS
Status: DISCONTINUED | OUTPATIENT
Start: 2020-10-31 | End: 2020-11-03 | Stop reason: HOSPADM

## 2020-10-31 RX ORDER — NICOTINE POLACRILEX 4 MG
15 LOZENGE BUCCAL
Status: DISCONTINUED | OUTPATIENT
Start: 2020-10-31 | End: 2020-11-03 | Stop reason: HOSPADM

## 2020-10-31 RX ORDER — ACETAMINOPHEN 160 MG/5ML
650 SOLUTION ORAL EVERY 4 HOURS PRN
Status: DISCONTINUED | OUTPATIENT
Start: 2020-10-31 | End: 2020-11-03 | Stop reason: HOSPADM

## 2020-10-31 RX ORDER — SODIUM CHLORIDE 0.9 % (FLUSH) 0.9 %
10 SYRINGE (ML) INJECTION EVERY 12 HOURS SCHEDULED
Status: DISCONTINUED | OUTPATIENT
Start: 2020-10-31 | End: 2020-11-03 | Stop reason: HOSPADM

## 2020-10-31 RX ORDER — FAMOTIDINE 20 MG/1
20 TABLET, FILM COATED ORAL NIGHTLY
Status: DISCONTINUED | OUTPATIENT
Start: 2020-10-31 | End: 2020-11-03 | Stop reason: HOSPADM

## 2020-10-31 RX ORDER — BISACODYL 5 MG/1
5 TABLET, DELAYED RELEASE ORAL DAILY PRN
Status: DISCONTINUED | OUTPATIENT
Start: 2020-10-31 | End: 2020-11-03 | Stop reason: HOSPADM

## 2020-10-31 RX ORDER — SODIUM CHLORIDE 9 MG/ML
50 INJECTION, SOLUTION INTRAVENOUS CONTINUOUS PRN
Status: DISCONTINUED | OUTPATIENT
Start: 2020-10-31 | End: 2020-11-03 | Stop reason: HOSPADM

## 2020-10-31 RX ORDER — ACETAMINOPHEN 325 MG/1
650 TABLET ORAL EVERY 4 HOURS PRN
Status: DISCONTINUED | OUTPATIENT
Start: 2020-10-31 | End: 2020-11-03 | Stop reason: HOSPADM

## 2020-10-31 RX ORDER — ONDANSETRON 2 MG/ML
4 INJECTION INTRAMUSCULAR; INTRAVENOUS EVERY 6 HOURS PRN
Status: DISCONTINUED | OUTPATIENT
Start: 2020-10-31 | End: 2020-11-03 | Stop reason: HOSPADM

## 2020-10-31 RX ORDER — ISOSORBIDE DINITRATE 30 MG/1
30 TABLET ORAL DAILY
Status: ON HOLD | COMMUNITY
End: 2020-10-31

## 2020-10-31 RX ORDER — CLOPIDOGREL BISULFATE 75 MG/1
75 TABLET ORAL DAILY
Status: DISCONTINUED | OUTPATIENT
Start: 2020-11-01 | End: 2020-11-03 | Stop reason: HOSPADM

## 2020-10-31 RX ORDER — ONDANSETRON 4 MG/1
4 TABLET, FILM COATED ORAL EVERY 6 HOURS PRN
Status: DISCONTINUED | OUTPATIENT
Start: 2020-10-31 | End: 2020-11-03 | Stop reason: HOSPADM

## 2020-10-31 RX ADMIN — DOXYCYCLINE 100 MG: 100 INJECTION, POWDER, LYOPHILIZED, FOR SOLUTION INTRAVENOUS at 21:17

## 2020-10-31 RX ADMIN — HYDROCODONE BITARTRATE AND ACETAMINOPHEN 1 TABLET: 7.5; 325 TABLET ORAL at 21:23

## 2020-10-31 RX ADMIN — SODIUM CHLORIDE, PRESERVATIVE FREE 10 ML: 5 INJECTION INTRAVENOUS at 21:23

## 2020-10-31 RX ADMIN — ENOXAPARIN SODIUM 40 MG: 40 INJECTION SUBCUTANEOUS at 20:44

## 2020-10-31 RX ADMIN — FAMOTIDINE 20 MG: 20 TABLET, FILM COATED ORAL at 21:23

## 2020-10-31 RX ADMIN — CEFTRIAXONE 1 G: 1 INJECTION, POWDER, FOR SOLUTION INTRAMUSCULAR; INTRAVENOUS at 20:13

## 2020-10-31 RX ADMIN — RANOLAZINE 500 MG: 500 TABLET, FILM COATED, EXTENDED RELEASE ORAL at 21:23

## 2020-10-31 RX ADMIN — SODIUM CHLORIDE 50 ML/HR: 9 INJECTION, SOLUTION INTRAVENOUS at 21:17

## 2020-11-01 LAB
ANION GAP SERPL CALCULATED.3IONS-SCNC: 8 MMOL/L (ref 5–15)
BASOPHILS # BLD AUTO: 0.06 10*3/MM3 (ref 0–0.2)
BASOPHILS NFR BLD AUTO: 0.6 % (ref 0–1.5)
BUN SERPL-MCNC: 31 MG/DL (ref 8–23)
BUN/CREAT SERPL: 26.3 (ref 7–25)
CALCIUM SPEC-SCNC: 9.2 MG/DL (ref 8.6–10.5)
CHLORIDE SERPL-SCNC: 102 MMOL/L (ref 98–107)
CO2 SERPL-SCNC: 27 MMOL/L (ref 22–29)
CREAT SERPL-MCNC: 1.18 MG/DL (ref 0.76–1.27)
DEPRECATED RDW RBC AUTO: 52.6 FL (ref 37–54)
EOSINOPHIL # BLD AUTO: 0.04 10*3/MM3 (ref 0–0.4)
EOSINOPHIL NFR BLD AUTO: 0.4 % (ref 0.3–6.2)
ERYTHROCYTE [DISTWIDTH] IN BLOOD BY AUTOMATED COUNT: 14.5 % (ref 12.3–15.4)
GFR SERPL CREATININE-BSD FRML MDRD: 58 ML/MIN/1.73
GLUCOSE BLDC GLUCOMTR-MCNC: 112 MG/DL (ref 70–130)
GLUCOSE BLDC GLUCOMTR-MCNC: 114 MG/DL (ref 70–130)
GLUCOSE BLDC GLUCOMTR-MCNC: 77 MG/DL (ref 70–130)
GLUCOSE SERPL-MCNC: 80 MG/DL (ref 65–99)
HCT VFR BLD AUTO: 40.9 % (ref 37.5–51)
HGB BLD-MCNC: 12.9 G/DL (ref 13–17.7)
IMM GRANULOCYTES # BLD AUTO: 0.61 10*3/MM3 (ref 0–0.05)
IMM GRANULOCYTES NFR BLD AUTO: 5.6 % (ref 0–0.5)
LYMPHOCYTES # BLD AUTO: 1.54 10*3/MM3 (ref 0.7–3.1)
LYMPHOCYTES NFR BLD AUTO: 14.2 % (ref 19.6–45.3)
MCH RBC QN AUTO: 31.3 PG (ref 26.6–33)
MCHC RBC AUTO-ENTMCNC: 31.5 G/DL (ref 31.5–35.7)
MCV RBC AUTO: 99.3 FL (ref 79–97)
MONOCYTES # BLD AUTO: 0.61 10*3/MM3 (ref 0.1–0.9)
MONOCYTES NFR BLD AUTO: 5.6 % (ref 5–12)
NEUTROPHILS NFR BLD AUTO: 73.6 % (ref 42.7–76)
NEUTROPHILS NFR BLD AUTO: 8.01 10*3/MM3 (ref 1.7–7)
NRBC BLD AUTO-RTO: 0 /100 WBC (ref 0–0.2)
PLATELET # BLD AUTO: 153 10*3/MM3 (ref 140–450)
PMV BLD AUTO: 11.1 FL (ref 6–12)
POTASSIUM SERPL-SCNC: 4.8 MMOL/L (ref 3.5–5.2)
QT INTERVAL: 374 MS
QTC INTERVAL: 455 MS
RBC # BLD AUTO: 4.12 10*6/MM3 (ref 4.14–5.8)
SARS-COV-2 N GENE RESP QL NAA+PROBE: NOT DETECTED
SODIUM SERPL-SCNC: 137 MMOL/L (ref 136–145)
WBC # BLD AUTO: 10.87 10*3/MM3 (ref 3.4–10.8)

## 2020-11-01 PROCEDURE — G0378 HOSPITAL OBSERVATION PER HR: HCPCS

## 2020-11-01 PROCEDURE — 96361 HYDRATE IV INFUSION ADD-ON: CPT

## 2020-11-01 PROCEDURE — 96367 TX/PROPH/DG ADDL SEQ IV INF: CPT

## 2020-11-01 PROCEDURE — 80048 BASIC METABOLIC PNL TOTAL CA: CPT | Performed by: HOSPITALIST

## 2020-11-01 PROCEDURE — 96365 THER/PROPH/DIAG IV INF INIT: CPT

## 2020-11-01 PROCEDURE — 82962 GLUCOSE BLOOD TEST: CPT

## 2020-11-01 PROCEDURE — 96366 THER/PROPH/DIAG IV INF ADDON: CPT

## 2020-11-01 PROCEDURE — 25010000002 CEFTRIAXONE PER 250 MG: Performed by: HOSPITALIST

## 2020-11-01 PROCEDURE — 25010000002 ENOXAPARIN PER 10 MG: Performed by: HOSPITALIST

## 2020-11-01 PROCEDURE — 96372 THER/PROPH/DIAG INJ SC/IM: CPT

## 2020-11-01 PROCEDURE — 85025 COMPLETE CBC W/AUTO DIFF WBC: CPT | Performed by: HOSPITALIST

## 2020-11-01 RX ADMIN — CEFTRIAXONE SODIUM 1 G: 1 INJECTION, POWDER, FOR SOLUTION INTRAMUSCULAR; INTRAVENOUS at 20:25

## 2020-11-01 RX ADMIN — DOXYCYCLINE 100 MG: 100 INJECTION, POWDER, LYOPHILIZED, FOR SOLUTION INTRAVENOUS at 09:24

## 2020-11-01 RX ADMIN — HYDROCODONE BITARTRATE AND ACETAMINOPHEN 1 TABLET: 7.5; 325 TABLET ORAL at 20:24

## 2020-11-01 RX ADMIN — ISOSORBIDE DINITRATE 30 MG: 30 TABLET ORAL at 08:01

## 2020-11-01 RX ADMIN — RANOLAZINE 500 MG: 500 TABLET, FILM COATED, EXTENDED RELEASE ORAL at 20:24

## 2020-11-01 RX ADMIN — ENOXAPARIN SODIUM 40 MG: 40 INJECTION SUBCUTANEOUS at 20:25

## 2020-11-01 RX ADMIN — CLOPIDOGREL BISULFATE 75 MG: 75 TABLET ORAL at 08:01

## 2020-11-01 RX ADMIN — HYDROCODONE BITARTRATE AND ACETAMINOPHEN 1 TABLET: 7.5; 325 TABLET ORAL at 05:14

## 2020-11-01 RX ADMIN — HYDROCODONE BITARTRATE AND ACETAMINOPHEN 1 TABLET: 7.5; 325 TABLET ORAL at 12:55

## 2020-11-01 RX ADMIN — SODIUM CHLORIDE, PRESERVATIVE FREE 10 ML: 5 INJECTION INTRAVENOUS at 08:02

## 2020-11-01 RX ADMIN — ACETAMINOPHEN 650 MG: 325 TABLET, FILM COATED ORAL at 08:01

## 2020-11-01 RX ADMIN — DOXYCYCLINE 100 MG: 100 INJECTION, POWDER, LYOPHILIZED, FOR SOLUTION INTRAVENOUS at 21:47

## 2020-11-01 RX ADMIN — RANOLAZINE 500 MG: 500 TABLET, FILM COATED, EXTENDED RELEASE ORAL at 08:01

## 2020-11-01 RX ADMIN — FAMOTIDINE 20 MG: 20 TABLET, FILM COATED ORAL at 20:24

## 2020-11-01 NOTE — H&P
.        [facility] Medicine Admission      Date of Admission: 10/31/2020  Patient seen and evaluated on 10/31/2020    Primary Care Physician: Paolo Rey MD      Chief Complaint: *shortness of breath    HPI:*  Past medical history of   CHF   COPD on oxygen at night only  Diabetes  Hypertension  Coronary artery disease with MI in the past    Patient is an 87-year-old male who presents to the [facility] ER with complaints of shortness of breath.  His wife is at the bedside who helps with the history.  He reports being short of air for past several weeks.  He has been seen by his primary care physician.  He was told that he did not have to pneumonia but was given antibiotics.  He has not gotten better.  He is heart on his right side when he breathes.  He has had decreased appetite and shortness of air.  He denies fever chills sick contacts nausea vomiting diarrhea.  The emergency room shows a left mid and lower lobe pneumonia with a creatinine of 1.4 potassium of 5.5 D-dimer 591 and a white count of 14,000    Concurrent Medical History:  has a past medical history of Basal cell carcinoma, Bilateral carotid artery stenosis, Bilateral carotid artery stenosis, Bleeding disorder (CMS/HCC), CHF (congestive heart failure) (CMS/HCC), Chronic obstructive pulmonary disease (COPD) (CMS/HCC), Coronary arteriosclerosis, Degenerative joint disease involving multiple joints, Dementia (CMS/HCC), Diabetes mellitus (CMS/HCC), Heart problem, History of stomach ulcers, Hyperlipidemia, Hypertension, Ingrown toenail, and Myocardial infarction (CMS/HCC).    Past Surgical History:  has a past surgical history that includes Hernia repair; Lung biopsy; Ventral hernia repair; Thoracotomy (Left, 1977); Cardiac catheterization (N/A, 6/6/2017); Coronary stent placement; Neck surgery; Cardiac catheterization (N/A, 2/14/2018); Lung surgery; Back surgery; and Coronary angioplasty with  stent.    Family History: family history includes Cancer in an other family member; Diabetes in his father and mother; Heart disease in his father; Hypertension in his father; Lung disease in an other family member. *    Social History:  reports that he has quit smoking. He has never used smokeless tobacco. He reports that he does not drink alcohol or use drugs.    Allergies:   Allergies   Allergen Reactions   • Atorvastatin Anaphylaxis   • Penicillins Rash   • Pravastatin      Myalgia   • Azithromycin Rash   • Biaxin [Clarithromycin] Rash       Medications:   Prior to Admission medications    Medication Sig Start Date End Date Taking? Authorizing Provider   albuterol (PROVENTIL) (2.5 MG/3ML) 0.083% nebulizer solution Take 2.5 mg by nebulization Every 4 (Four) Hours As Needed for Wheezing. 12/23/17  Yes Adi Puente MD   clindamycin (CLEOCIN) 300 MG capsule Take 300 mg by mouth 3 (Three) Times a Day.   Yes Bill Rader MD   clopidogrel (PLAVIX) 75 MG tablet Take 75 mg by mouth Daily. 2/3/16  Yes Bill Rader MD   diphenhydrAMINE (BENADRYL) 25 mg capsule Take 25 mg by mouth Every 6 (Six) Hours As Needed for Itching.   Yes Bill Rader MD   famotidine (PEPCID) 20 MG tablet Take 20 mg by mouth 2 (Two) Times a Day.   Yes Bill Rader MD   fluticasone (FLONASE) 50 MCG/ACT nasal spray 2 sprays into each nostril Daily.   Yes Bill Rader MD   Fluticasone Furoate (ARNUITY ELLIPTA) 200 MCG/ACT aerosol powder  Inhale.   Yes Bill Rader MD   furosemide (LASIX) 20 MG tablet Take 1 tablet by mouth Daily. 3/3/17  Yes Jeff Maciel MD PhD   glimepiride (AMARYL) 2 MG tablet Take 2 mg by mouth Every Morning Before Breakfast.   Yes Bill Rader MD   HYDROcodone-acetaminophen (NORCO) 7.5-325 MG per tablet Take 1 tablet by mouth Every 8 (Eight) Hours. 12/27/16  Yes Bill Rader MD   ipratropium-albuterol (DUO-NEB) 0.5-2.5 mg/mL nebulizer Take 3  mL by nebulization 4 (Four) Times a Day. 9/28/17  Yes Jc Alba MD   isosorbide dinitrate (ISORDIL) 30 MG tablet Take 30 mg by mouth Daily.   Yes Bill Rader MD   lisinopril (PRINIVIL,ZESTRIL) 10 MG tablet Take 0.5 tablets by mouth Daily. 11/10/17  Yes Caitlyn Garcia MD   magic mouthwash oral suspension Swish and swallow 5 mL Every 4 (Four) Hours As Needed (sore throat). 1/15/19  Yes Ramiro Seals MD   magnesium oxide (MAGOX) 400 (241.3 MG) MG tablet tablet Take 400 mg by mouth Daily.   Yes Bill Rader MD   nitroglycerin (NITROSTAT) 0.4 MG SL tablet place 1 tablet under the tongue if needed every 5 minutes for hair...  (REFER TO PRESCRIPTION NOTES). 1/11/17  Yes Bill Rader MD   nystatin (MYCOSTATIN) 163226 UNIT/ML suspension Swish and swallow 5 mL 4 (Four) Times a Day. 1/15/19  Yes Ramiro Seals MD   O2 (OXYGEN) Inhale 2 L/min Every Night. W/ CPAP   Yes Bill Rader MD   ondansetron (ZOFRAN) 4 MG tablet Take 4 mg by mouth Every 8 (Eight) Hours As Needed for Nausea or Vomiting.   Yes Bill Rader MD   acetaminophen (TYLENOL) 325 MG tablet Take 2 tablets by mouth Every 4 (Four) Hours As Needed for Mild Pain . 2/15/18   Jc Alba MD   Brovana 15 MCG/2ML nebulizer solution TK 2 MLS BY NEBULIZATION BID 10/16/20   ProviderBill MD   predniSONE (DELTASONE) 10 MG tablet Take 10 mg by mouth Daily.    Bill Rader MD   ranolazine (RANEXA) 500 MG 12 hr tablet Take 1 tablet by mouth Every 12 (Twelve) Hours. 8/23/18   Mana Curtis MD   Red Yeast Rice 600 MG tablet Take 600 mg by mouth 2 (Two) Times a Day.    Bill Rader MD   Umeclidinium-Vilanterol 62.5-25 MCG/INH aerosol powder  Inhale 1 puff Daily. 3/3/17   Brea Higginbotham MD       Review of Systems:  Review of Systems   12 point review of systems obtained positives and negatives noted above in the HPI Otherwise complete ROS is negative except as  mentioned above.    Physical Exam:   Temp:  [96.8 °F (36 °C)] 96.8 °F (36 °C)  Heart Rate:  [80-95] 80  Resp:  [16-17] 17  BP: (135-144)/(76-84) 144/84  Physical Exam  Vitals signs and nursing note reviewed.   Constitutional:       General: He is in acute distress.      Appearance: He is normal weight. He is not ill-appearing or diaphoretic.   HENT:      Head: Normocephalic and atraumatic.      Nose: Nose normal.      Mouth/Throat:      Mouth: Mucous membranes are dry.      Pharynx: Oropharynx is clear.   Eyes:      General: No scleral icterus.        Right eye: No discharge.         Left eye: No discharge.      Extraocular Movements: Extraocular movements intact.      Conjunctiva/sclera: Conjunctivae normal.   Neck:      Musculoskeletal: Neck supple.   Cardiovascular:      Rate and Rhythm: Normal rate and regular rhythm.      Pulses: Normal pulses.      Heart sounds: Normal heart sounds.   Pulmonary:      Comments: Breath sounds bilaterally, mild rhonchi noted on the left, no wheezing or rales  Abdominal:      General: Bowel sounds are normal. There is no distension.      Palpations: Abdomen is soft.      Tenderness: There is no abdominal tenderness. There is no guarding or rebound.   Musculoskeletal:         General: No swelling or deformity.   Skin:     General: Skin is warm and dry.      Capillary Refill: Capillary refill takes less than 2 seconds.   Neurological:      General: No focal deficit present.      Mental Status: He is alert and oriented to person, place, and time.      Cranial Nerves: No cranial nerve deficit.   Psychiatric:         Mood and Affect: Mood normal.         Behavior: Behavior normal.         Thought Content: Thought content normal.         Judgment: Judgment normal.           Results Reviewed:  I have personally reviewed current lab, radiology, and data and agree with results.  Lab Results (last 24 hours)     Procedure Component Value Units Date/Time    Lactic Acid, Plasma [[id number]]   (Normal) Collected: 10/31/20 2006    Specimen: Blood from Arm, Right Updated: 10/31/20 2024     Lactate 1.8 mmol/L     COVID-19, [facility] IN-HOUSE, NP SWAB IN TRANSPORT MEDIA 8-10 HR TAT - Swab, Nasopharynx [[id number]] Collected: 10/31/20 2015    Specimen: Swab from Nasopharynx Updated: 10/31/20 2015    Blood Culture - Blood, Arm, Right [[id number]] Collected: 10/31/20 2006    Specimen: Blood from Arm, Right Updated: 10/31/20 2010    [place] Draw [[id number]] Collected: 10/31/20 1804    Specimen: Blood Updated: 10/31/20 1916    Narrative:      The following orders were created for panel order [place] Draw.  Procedure                               Abnormality         Status                     ---------                               -----------         ------                     Light Blue Top[[id number]]                                   Final result               Green Top (Gel)[[id number]]                                  Final result               Lavender Top[[id number]]                                     Final result               Gold Top - SST[[id number]]                                   Final result                 Please view results for these tests on the individual orders.    Light Blue Top [[id number]] Collected: 10/31/20 1804    Specimen: Blood Updated: 10/31/20 1916     Extra Tube hold for add-on     Comment: Auto resulted       Green Top (Gel) [[id number]] Collected: 10/31/20 1805    Specimen: Blood Updated: 10/31/20 1916     Extra Tube Hold for add-ons.     Comment: Auto resulted.       Lavender Top [[id number]] Collected: 10/31/20 1805    Specimen: Blood Updated: 10/31/20 1916     Extra Tube hold for add-on     Comment: Auto resulted       Gold Top - SST [[id number]] Collected: 10/31/20 1804    Specimen: Blood Updated: 10/31/20 1916     Extra Tube Hold for add-ons.     Comment: Auto resulted.       Manual Differential [[id number]]  (Abnormal) Collected: 10/31/20 1805    Specimen: Blood Updated: 10/31/20 1840      Neutrophil % 88.0 %      Lymphocyte % 2.0 %      Monocyte % 3.0 %      Atypical Lymphocyte % 7.0 %      Neutrophils Absolute 12.87 10*3/mm3      Lymphocytes Absolute 0.29 10*3/mm3      Monocytes Absolute 0.44 10*3/mm3      RBC Morphology Normal     WBC Morphology Normal     Platelet Morphology Normal    Comprehensive Metabolic Panel [[id number]]  (Abnormal) Collected: 10/31/20 1805    Specimen: Blood Updated: 10/31/20 1829     Glucose 145 mg/dL      BUN 35 mg/dL      Creatinine 1.41 mg/dL      Sodium 136 mmol/L      Potassium 5.5 mmol/L      Comment: Slight hemolysis detected by analyzer. Results may be affected.        Chloride 104 mmol/L      CO2 24.0 mmol/L      Calcium 9.3 mg/dL      Total Protein 6.0 g/dL      Albumin 3.80 g/dL      ALT (SGPT) 26 U/L      AST (SGOT) 18 U/L      Alkaline Phosphatase 66 U/L      Total Bilirubin 0.2 mg/dL      eGFR Non African Amer 48 mL/min/1.73      Globulin 2.2 gm/dL      A/G Ratio 1.7 g/dL      BUN/Creatinine Ratio 24.8     Anion Gap 8.0 mmol/L     Narrative:      GFR Normal >60  Chronic Kidney Disease <60  Kidney Failure <15      Lipase [[id number]]  (Normal) Collected: 10/31/20 1805    Specimen: Blood Updated: 10/31/20 1829     Lipase 25 U/L     D-dimer, Quantitative [[id number]]  (Abnormal) Collected: 10/31/20 1804    Specimen: Blood Updated: 10/31/20 1827     D-Dimer, Quantitative 591 ng/mL (FEU)     Narrative:      Dimer values <500 ng/ml FEU are FDA approved as aid in diagnosis of deep venous thrombosis and pulmonary embolism.  This test should not be used in an exclusion strategy with pretest probability alone.    A recent guideline regarding diagnosis for pulmonary thromboembolism recommends an adjusted exclusion criterion of age x 10 ng/ml FEU for patients >50 years of age (Jenny Intern Med 2015; 163: 701-711).      Urinalysis With Microscopic If Indicated (No Culture) - Urine, Clean Catch [[id number]]  (Normal) Collected: 10/31/20 1820    Specimen: Urine, Clean Catch  Updated: 10/31/20 1826     Color, UA Yellow     Appearance, UA Clear     pH, UA 5.5     Specific Gravity, UA 1.016     Glucose, UA Negative     Ketones, UA Negative     Bilirubin, UA Negative     Blood, UA Negative     Protein, UA Negative     Leuk Esterase, UA Negative     Nitrite, UA Negative     Urobilinogen, UA 0.2 E.U./dL    Narrative:      Urine microscopic not indicated.    CBC & Differential [[id number]]  (Abnormal) Collected: 10/31/20 1805    Specimen: Blood Updated: 10/31/20 1813    Narrative:      The following orders were created for panel order CBC & Differential.  Procedure                               Abnormality         Status                     ---------                               -----------         ------                     CBC Auto Differential[[id number]]        Abnormal            Final result                 Please view results for these tests on the individual orders.    CBC Auto Differential [[id number]]  (Abnormal) Collected: 10/31/20 1805    Specimen: Blood Updated: 10/31/20 1813     WBC 14.62 10*3/mm3      RBC 4.32 10*6/mm3      Hemoglobin 13.7 g/dL      Hematocrit 42.1 %      MCV 97.5 fL      MCH 31.7 pg      MCHC 32.5 g/dL      RDW 14.6 %      RDW-SD 52.4 fl      MPV 11.1 fL      Platelets 175 10*3/mm3      Neutrophil % 83.5 %      Lymphocyte % 8.3 %      Monocyte % 3.6 %      Eosinophil % 0.0 %      Basophil % 0.3 %      Immature Grans % 4.3 %      Neutrophils, Absolute 12.21 10*3/mm3      Lymphocytes, Absolute 1.21 10*3/mm3      Monocytes, Absolute 0.52 10*3/mm3      Eosinophils, Absolute 0.00 10*3/mm3      Basophils, Absolute 0.05 10*3/mm3      Immature Grans, Absolute 0.63 10*3/mm3      nRBC 0.0 /100 WBC         Imaging Results (Last 24 Hours)     Procedure Component Value Units Date/Time    XR Chest PA & Lateral [[id number]] Collected: 10/31/20 1847     Updated: 10/31/20 1858    Narrative:      EXAM: XR CHEST 2 VIEWS    COMPARISONS: Radiograph 4/2/2019, 1/11/2019, 12/13/2017. CT  chest  1/8/2019    INDICATION: SOB    FINDINGS:   There are low lung volumes. Appearance of hazy airspace opacity  in the left mid to lower lung, without correlate on lateral view.  No effusion or pneumothorax. Heart size is normal.  Atherosclerotic aorta is unchanged. There is a appearance of a  nodular rounded opacity in the infrahilar region on lateral view  that measures 1.6 cm and is unchanged from 2017 radiograph  consistent with benignity such as vessel on end. No acute osseous  abnormality.      Impression:      Low lung volumes with appearance of hazy opacity in the left mid  to lower lung hazy opacity, favoring superimposed soft tissue and  technique. Cannot entirely exclude early airspace disease in the  appropriate clinical context.    Electronically signed by:  Linda Aranda MD  10/31/2020  6:57 PM CDT Workstation: [id number]            Assessment:    Active Hospital Problems    Diagnosis   • SOB (shortness of breath)             Plan:**  Pneumonia left mid and lower lung- started on rocephin and doxycycline in the er due to allergies. Will continue with supplemental O2 as needed. Will also get COVID testing and nebs/inhalers as needed.     Acute renal failure- will give gentle fluids    Hyperkalemia- will monitor and hold meds as indicated    Leukocytosis secondary to pneumonia    Diabetes sliding scale insulin Accu-Cheks    Hypertension continue home medications as indicated    History of coronary artery disease continue home medications as indicated    History of congestive heart failure not in acute exacerbation at this time-last 2D echo December 2018 shows a EF of 68% with normal systolic function    DVT prophylaxis Lovenox    Full code    I discussed the patient's findings and my recommendations with: **patient and wife at the bedside    Mark Hansen MD

## 2020-11-01 NOTE — PROGRESS NOTES
[facility] Medicine Services  INPATIENT PROGRESS NOTE    Length of Stay: 0  Date of Admission: 10/31/2020  Primary Care Physician: Paolo Rey MD    Subjective   Chief Complaint: Shortness of breath  HPI: Patient states he is feeling better today.  Less short of breath today.    Review of Systems   Constitutional: Negative for appetite change, chills, fatigue and fever.   Respiratory: Positive for cough and shortness of breath. Negative for chest tightness.    Cardiovascular: Negative for chest pain, palpitations and leg swelling.   Gastrointestinal: Negative for abdominal pain, constipation, diarrhea, nausea and vomiting.   Skin: Negative for wound.   Neurological: Negative for dizziness, weakness, light-headedness, numbness and headaches.        All pertinent negatives and positives are as above. All other systems have been reviewed and are negative unless otherwise stated.     Objective    Temp:  [96.5 °F (35.8 °C)-96.8 °F (36 °C)] 96.8 °F (36 °C)  Heart Rate:  [] 94  Resp:  [16-18] 18  BP: (118-156)/(75-94) 118/76    Physical Exam  Vitals signs reviewed.   Constitutional:       Appearance: He is well-developed.   HENT:      Head: Normocephalic and atraumatic.   Eyes:      Pupils: Pupils are equal, round, and reactive to light.   Neck:      Musculoskeletal: Normal range of motion and neck supple.   Cardiovascular:      Rate and Rhythm: Normal rate and regular rhythm.      Heart sounds: Normal heart sounds. No murmur. No friction rub. No gallop.    Pulmonary:      Effort: Pulmonary effort is normal. No respiratory distress.      Breath sounds: Decreased breath sounds present. No wheezing or rales.   Chest:      Chest wall: No tenderness.   Abdominal:      General: Bowel sounds are normal. There is no distension.      Palpations: Abdomen is soft.      Tenderness: There is no abdominal tenderness.   Psychiatric:         Behavior: Behavior normal.       Results  Review:  I have reviewed the labs, radiology results, and diagnostic studies.    Laboratory Data:   Results from last 7 days   Lab Units 11/01/20  0552 10/31/20  1805   SODIUM mmol/L 137 136   POTASSIUM mmol/L 4.8 5.5*   CHLORIDE mmol/L 102 104   CO2 mmol/L 27.0 24.0   BUN mg/dL 31* 35*   CREATININE mg/dL 1.18 1.41*   GLUCOSE mg/dL 80 145*   CALCIUM mg/dL 9.2 9.3   BILIRUBIN mg/dL  --  0.2   ALK PHOS U/L  --  66   ALT (SGPT) U/L  --  26   AST (SGOT) U/L  --  18   ANION GAP mmol/L 8.0 8.0     Estimated Creatinine Clearance: 46.7 mL/min (by C-G formula based on SCr of 1.18 mg/dL).          Results from last 7 days   Lab Units 11/01/20  0552 10/31/20  1805   WBC 10*3/mm3 10.87* 14.62*   HEMOGLOBIN g/dL 12.9* 13.7   HEMATOCRIT % 40.9 42.1   PLATELETS 10*3/mm3 153 175           Culture Data:   No results found for: BLOODCX  No results found for: URINECX  No results found for: RESPCX  No results found for: WOUNDCX  No results found for: STOOLCX  No components found for: BODYFLD    Radiology Data:   Imaging Results (Last 24 Hours)     Procedure Component Value Units Date/Time    XR Chest PA & Lateral [[id number]] Collected: 10/31/20 1847     Updated: 10/31/20 1858    Narrative:      EXAM: XR CHEST 2 VIEWS    COMPARISONS: Radiograph 4/2/2019, 1/11/2019, 12/13/2017. CT chest  1/8/2019    INDICATION: SOB    FINDINGS:   There are low lung volumes. Appearance of hazy airspace opacity  in the left mid to lower lung, without correlate on lateral view.  No effusion or pneumothorax. Heart size is normal.  Atherosclerotic aorta is unchanged. There is a appearance of a  nodular rounded opacity in the infrahilar region on lateral view  that measures 1.6 cm and is unchanged from 2017 radiograph  consistent with benignity such as vessel on end. No acute osseous  abnormality.      Impression:      Low lung volumes with appearance of hazy opacity in the left mid  to lower lung hazy opacity, favoring superimposed soft tissue and  technique.  Cannot entirely exclude early airspace disease in the  appropriate clinical context.    Electronically signed by:  Linda Aranda MD  10/31/2020  6:57 PM CDT Workstation: [id number]          I have reviewed the patient's current medications.     Assessment/Plan     Active Hospital Problems    Diagnosis   • SOB (shortness of breath)       Plan:    1.  Left lung pneumonia: Appears bacterial.  Covid test negative.  Continue empiric antibiotics.  Cultures pending.  2.  Acute kidney injury: Improved with hydration.  Continue gentle hydration.  3.  Diabetes: Continue sliding scale  4.  Hypertension: Continue home medication.  5.  Coronary artery disease: Continue home medications.  6.  DVT prophylaxis: Lovenox.    The patient was evaluated during the global COVID-19 pandemic, and the diagnosis was suspected/considered upon their initial presentation.  Evaluation, treatment, and testing were consistent with current guidelines for patients who present with complaints or symptoms that may be related to COVID-19.    Discharge Planning: I expect patient to be discharged to home in 1-2 days.        This document has been electronically signed by Tay Puente MD on November 1, 2020 14:14 CST

## 2020-11-02 LAB
ANION GAP SERPL CALCULATED.3IONS-SCNC: 10 MMOL/L (ref 5–15)
BASOPHILS # BLD AUTO: 0.05 10*3/MM3 (ref 0–0.2)
BASOPHILS NFR BLD AUTO: 0.5 % (ref 0–1.5)
BUN SERPL-MCNC: 24 MG/DL (ref 8–23)
BUN/CREAT SERPL: 21.1 (ref 7–25)
CALCIUM SPEC-SCNC: 8.7 MG/DL (ref 8.6–10.5)
CHLORIDE SERPL-SCNC: 100 MMOL/L (ref 98–107)
CO2 SERPL-SCNC: 22 MMOL/L (ref 22–29)
CREAT SERPL-MCNC: 1.14 MG/DL (ref 0.76–1.27)
DEPRECATED RDW RBC AUTO: 49.4 FL (ref 37–54)
EOSINOPHIL # BLD AUTO: 0.08 10*3/MM3 (ref 0–0.4)
EOSINOPHIL NFR BLD AUTO: 0.8 % (ref 0.3–6.2)
ERYTHROCYTE [DISTWIDTH] IN BLOOD BY AUTOMATED COUNT: 13.9 % (ref 12.3–15.4)
GFR SERPL CREATININE-BSD FRML MDRD: 61 ML/MIN/1.73
GLUCOSE BLDC GLUCOMTR-MCNC: 115 MG/DL (ref 70–130)
GLUCOSE BLDC GLUCOMTR-MCNC: 145 MG/DL (ref 70–130)
GLUCOSE BLDC GLUCOMTR-MCNC: 227 MG/DL (ref 70–130)
GLUCOSE BLDC GLUCOMTR-MCNC: 87 MG/DL (ref 70–130)
GLUCOSE SERPL-MCNC: 108 MG/DL (ref 65–99)
HCT VFR BLD AUTO: 36.2 % (ref 37.5–51)
HGB BLD-MCNC: 11.9 G/DL (ref 13–17.7)
IMM GRANULOCYTES # BLD AUTO: 0.34 10*3/MM3 (ref 0–0.05)
IMM GRANULOCYTES NFR BLD AUTO: 3.4 % (ref 0–0.5)
LYMPHOCYTES # BLD AUTO: 1.11 10*3/MM3 (ref 0.7–3.1)
LYMPHOCYTES NFR BLD AUTO: 11 % (ref 19.6–45.3)
MCH RBC QN AUTO: 31.7 PG (ref 26.6–33)
MCHC RBC AUTO-ENTMCNC: 32.9 G/DL (ref 31.5–35.7)
MCV RBC AUTO: 96.5 FL (ref 79–97)
MONOCYTES # BLD AUTO: 0.54 10*3/MM3 (ref 0.1–0.9)
MONOCYTES NFR BLD AUTO: 5.4 % (ref 5–12)
NEUTROPHILS NFR BLD AUTO: 7.93 10*3/MM3 (ref 1.7–7)
NEUTROPHILS NFR BLD AUTO: 78.9 % (ref 42.7–76)
NRBC BLD AUTO-RTO: 0.2 /100 WBC (ref 0–0.2)
NT-PROBNP SERPL-MCNC: 523.1 PG/ML (ref 0–1800)
PLATELET # BLD AUTO: 151 10*3/MM3 (ref 140–450)
PMV BLD AUTO: 10.6 FL (ref 6–12)
POTASSIUM SERPL-SCNC: 4.5 MMOL/L (ref 3.5–5.2)
PROCALCITONIN SERPL-MCNC: 0.13 NG/ML (ref 0–0.25)
RBC # BLD AUTO: 3.75 10*6/MM3 (ref 4.14–5.8)
SODIUM SERPL-SCNC: 132 MMOL/L (ref 136–145)
WBC # BLD AUTO: 10.05 10*3/MM3 (ref 3.4–10.8)

## 2020-11-02 PROCEDURE — 84145 PROCALCITONIN (PCT): CPT | Performed by: INTERNAL MEDICINE

## 2020-11-02 PROCEDURE — 82962 GLUCOSE BLOOD TEST: CPT

## 2020-11-02 PROCEDURE — 25010000002 ENOXAPARIN PER 10 MG: Performed by: HOSPITALIST

## 2020-11-02 PROCEDURE — 83880 ASSAY OF NATRIURETIC PEPTIDE: CPT | Performed by: INTERNAL MEDICINE

## 2020-11-02 PROCEDURE — G0378 HOSPITAL OBSERVATION PER HR: HCPCS

## 2020-11-02 PROCEDURE — 63710000001 PREDNISONE PER 1 MG: Performed by: INTERNAL MEDICINE

## 2020-11-02 PROCEDURE — 85025 COMPLETE CBC W/AUTO DIFF WBC: CPT | Performed by: INTERNAL MEDICINE

## 2020-11-02 PROCEDURE — 94640 AIRWAY INHALATION TREATMENT: CPT

## 2020-11-02 PROCEDURE — 80048 BASIC METABOLIC PNL TOTAL CA: CPT | Performed by: INTERNAL MEDICINE

## 2020-11-02 PROCEDURE — 63710000001 INSULIN ASPART PER 5 UNITS: Performed by: HOSPITALIST

## 2020-11-02 PROCEDURE — 96372 THER/PROPH/DIAG INJ SC/IM: CPT

## 2020-11-02 PROCEDURE — 63710000001 ONDANSETRON PER 8 MG: Performed by: HOSPITALIST

## 2020-11-02 PROCEDURE — 94799 UNLISTED PULMONARY SVC/PX: CPT

## 2020-11-02 RX ORDER — BUDESONIDE AND FORMOTEROL FUMARATE DIHYDRATE 160; 4.5 UG/1; UG/1
2 AEROSOL RESPIRATORY (INHALATION)
Status: DISCONTINUED | OUTPATIENT
Start: 2020-11-02 | End: 2020-11-03 | Stop reason: HOSPADM

## 2020-11-02 RX ORDER — PREDNISONE 20 MG/1
60 TABLET ORAL
Status: DISCONTINUED | OUTPATIENT
Start: 2020-11-02 | End: 2020-11-03 | Stop reason: HOSPADM

## 2020-11-02 RX ORDER — DOXYCYCLINE 100 MG/1
100 CAPSULE ORAL EVERY 12 HOURS SCHEDULED
Status: DISCONTINUED | OUTPATIENT
Start: 2020-11-02 | End: 2020-11-03 | Stop reason: HOSPADM

## 2020-11-02 RX ORDER — IPRATROPIUM BROMIDE AND ALBUTEROL SULFATE 2.5; .5 MG/3ML; MG/3ML
3 SOLUTION RESPIRATORY (INHALATION)
Status: DISCONTINUED | OUTPATIENT
Start: 2020-11-02 | End: 2020-11-03 | Stop reason: HOSPADM

## 2020-11-02 RX ORDER — CEFUROXIME AXETIL 500 MG/1
500 TABLET ORAL EVERY 12 HOURS SCHEDULED
Status: DISCONTINUED | OUTPATIENT
Start: 2020-11-02 | End: 2020-11-03 | Stop reason: HOSPADM

## 2020-11-02 RX ORDER — FUROSEMIDE 20 MG/1
20 TABLET ORAL DAILY
Status: DISCONTINUED | OUTPATIENT
Start: 2020-11-02 | End: 2020-11-03 | Stop reason: HOSPADM

## 2020-11-02 RX ADMIN — HYDROCODONE BITARTRATE AND ACETAMINOPHEN 1 TABLET: 7.5; 325 TABLET ORAL at 06:05

## 2020-11-02 RX ADMIN — SODIUM CHLORIDE, PRESERVATIVE FREE 10 ML: 5 INJECTION INTRAVENOUS at 20:49

## 2020-11-02 RX ADMIN — RANOLAZINE 500 MG: 500 TABLET, FILM COATED, EXTENDED RELEASE ORAL at 20:49

## 2020-11-02 RX ADMIN — ONDANSETRON HYDROCHLORIDE 4 MG: 4 TABLET, FILM COATED ORAL at 14:41

## 2020-11-02 RX ADMIN — SODIUM CHLORIDE 50 ML/HR: 9 INJECTION, SOLUTION INTRAVENOUS at 02:22

## 2020-11-02 RX ADMIN — BUDESONIDE AND FORMOTEROL FUMARATE DIHYDRATE 2 PUFF: 160; 4.5 AEROSOL RESPIRATORY (INHALATION) at 19:14

## 2020-11-02 RX ADMIN — FAMOTIDINE 20 MG: 20 TABLET, FILM COATED ORAL at 20:49

## 2020-11-02 RX ADMIN — CEFUROXIME AXETIL 500 MG: 500 TABLET ORAL at 12:09

## 2020-11-02 RX ADMIN — HYDROCODONE BITARTRATE AND ACETAMINOPHEN 1 TABLET: 7.5; 325 TABLET ORAL at 20:49

## 2020-11-02 RX ADMIN — INSULIN ASPART 4 UNITS: 100 INJECTION, SOLUTION INTRAVENOUS; SUBCUTANEOUS at 17:43

## 2020-11-02 RX ADMIN — CLOPIDOGREL BISULFATE 75 MG: 75 TABLET ORAL at 08:07

## 2020-11-02 RX ADMIN — ENOXAPARIN SODIUM 40 MG: 40 INJECTION SUBCUTANEOUS at 20:49

## 2020-11-02 RX ADMIN — CEFUROXIME AXETIL 500 MG: 500 TABLET ORAL at 20:49

## 2020-11-02 RX ADMIN — IPRATROPIUM BROMIDE AND ALBUTEROL SULFATE 3 ML: 2.5; .5 SOLUTION RESPIRATORY (INHALATION) at 19:14

## 2020-11-02 RX ADMIN — HYDROCODONE BITARTRATE AND ACETAMINOPHEN 1 TABLET: 7.5; 325 TABLET ORAL at 13:21

## 2020-11-02 RX ADMIN — IPRATROPIUM BROMIDE AND ALBUTEROL SULFATE 3 ML: 2.5; .5 SOLUTION RESPIRATORY (INHALATION) at 13:12

## 2020-11-02 RX ADMIN — PREDNISONE 60 MG: 20 TABLET ORAL at 13:22

## 2020-11-02 RX ADMIN — DOXYCYCLINE 100 MG: 100 CAPSULE ORAL at 13:21

## 2020-11-02 RX ADMIN — RANOLAZINE 500 MG: 500 TABLET, FILM COATED, EXTENDED RELEASE ORAL at 08:06

## 2020-11-02 RX ADMIN — DOXYCYCLINE 100 MG: 100 CAPSULE ORAL at 20:51

## 2020-11-02 RX ADMIN — ISOSORBIDE DINITRATE 30 MG: 30 TABLET ORAL at 08:06

## 2020-11-02 RX ADMIN — FUROSEMIDE 20 MG: 20 TABLET ORAL at 13:22

## 2020-11-02 NOTE — PLAN OF CARE
Goal Outcome Evaluation:  Plan of Care Reviewed With: spouse  Progress: no change  Outcome Summary: VSS; pain controlled; resting well; will continue to monitor

## 2020-11-02 NOTE — PROGRESS NOTES
Adult Nutrition  Assessment    Patient Name:  Hasmukh Ham  YOB: 1933  MRN: [id number]  Admit Date:  10/31/2020    Assessment Date:  11/2/2020    Comments:  88yo male admit w/ pnuemonia and JILL. PMH includes COPD, CHF, CAD, DM. Labs noted- Alb wnl. Meds noted. ADA diet, intakes >75% meals. Wt 172# w/ BMI 26.9. Pt unavailble at RD visit. Attached diet information regarding low sodium diet d/t h/o CHF to print w/ d/c papers. RD to follow hospital course.     Reason for Assessment     Row Name 11/02/20 140          Reason for Assessment    Reason For Assessment  identified at risk by screening criteria     Diagnosis  pulmonary disease;renal disease     Identified At Risk by Screening Criteria  need for education         Nutrition/Diet History     Row Name 11/02/20 1409          Nutrition/Diet History    Typical Food/Fluid Intake  Pt with nursing staff at time of RD visit           Labs/Tests/Procedures/Meds     Row Name 11/02/20 1409          Labs/Procedures/Meds    Lab Results Reviewed  reviewed     Lab Results Comments  Glu 108, BUN 24H/Cr 1.1, low Na, 10/31 alb 3.8        Diagnostic Tests/Procedures    Diagnostic Test/Procedure Reviewed  reviewed        Medications    Pertinent Medications Reviewed  reviewed     Pertinent Medications Comments  lasix 20 Q day, deltasone Qd, SSi           Estimated/Assessed Needs     Row Name 11/02/20 1410          Calculation Measurements    Weight Used For Calculations  78 kg (172 lb)        Estimated/Assessed Needs    Additional Documentation  Calorie Requirements (Group);KCAL/KG (Group);Protein Requirements (Group);Fluid Requirements (Group)        Calorie Requirements    Estimated Calorie Requirement (kcal/day)  1900        KCAL/KG    KCAL/KG  25 Kcal/Kg (kcal)     25 Kcal/Kg (kcal)  1950.475        Protein Requirements    Weight Used For Protein Calculations  78 kg (172 lb)     Est Protein Requirement Amount (gms/kg)  0.8 gm protein     Estimated Protein  Requirements (gms/day)  62.42        Fluid Requirements    Fluid Requirements (mL/day)  1800     RDA Method (mL)  1800         Nutrition Prescription Ordered     Row Name 11/02/20 1411          Nutrition Prescription PO    Current PO Diet  Regular     Common Modifiers  Consistent Carbohydrate         Evaluation of Received Nutrient/Fluid Intake     Row Name 11/02/20 1411          PO Evaluation    Number of Days PO Intake Evaluated  2 days     Number of Meals  5     % PO Intake  75x3, 100x2               Electronically signed by:  Brittney Martin RD  11/02/20 14:12 CST

## 2020-11-02 NOTE — PROGRESS NOTES
[facility] Medicine Services  INPATIENT PROGRESS NOTE    Length of Stay: 0  Date of Admission: 10/31/2020  Primary Care Physician: Paolo Rey MD    Subjective   Chief Complaint: Shortness of breath    HPI: Patient shortness of breath is better.  He continues to have shortness of breath on exertion.    Review of Systems   Constitutional: Negative for activity change, appetite change, chills, fatigue and fever.   HENT: Negative for congestion, rhinorrhea, sore throat and trouble swallowing.    Respiratory: Positive for cough and shortness of breath. Negative for chest tightness and wheezing.    Cardiovascular: Negative for chest pain, palpitations and leg swelling.   Gastrointestinal: Negative for abdominal distention, abdominal pain, diarrhea, nausea and vomiting.   Genitourinary: Negative for difficulty urinating, dysuria and hematuria.   Musculoskeletal: Negative for arthralgias, back pain and myalgias.   Skin: Negative for pallor and rash.   Neurological: Negative for dizziness, syncope, weakness, light-headedness and headaches.   Hematological: Negative for adenopathy. Does not bruise/bleed easily.   Psychiatric/Behavioral: Negative for agitation and confusion. The patient is not nervous/anxious.        Objective    Temp:  [96.3 °F (35.7 °C)-97.4 °F (36.3 °C)] 96.4 °F (35.8 °C)  Heart Rate:  [75-97] 75  Resp:  [18] 18  BP: (122-161)/(79-87) (P) 120/78    Physical Exam  Constitutional:       General: He is not in acute distress.     Appearance: He is well-developed. He is not diaphoretic.   HENT:      Head: Normocephalic and atraumatic.   Eyes:      Conjunctiva/sclera: Conjunctivae normal.      Pupils: Pupils are equal, round, and reactive to light.   Neck:      Musculoskeletal: Normal range of motion and neck supple.      Thyroid: No thyromegaly.      Vascular: No JVD.      Trachea: No tracheal deviation.   Cardiovascular:      Rate and Rhythm: Normal rate and  Dose Adjustment - Renal Dose regular rhythm.      Heart sounds: Normal heart sounds. No murmur. No friction rub. No gallop.    Pulmonary:      Effort: Pulmonary effort is normal. No respiratory distress.      Breath sounds: No stridor. Rales present. No wheezing.      Comments: Crepitations in right lung base noted.  Reduced breath sounds in left lung base noted.  Chest:      Chest wall: No tenderness.   Abdominal:      General: Bowel sounds are normal. There is no distension.      Palpations: Abdomen is soft. There is no mass.      Tenderness: There is no abdominal tenderness. There is no guarding or rebound.      Hernia: No hernia is present.   Musculoskeletal: Normal range of motion.         General: No tenderness or deformity.   Lymphadenopathy:      Cervical: No cervical adenopathy.   Skin:     General: Skin is warm and dry.      Coloration: Skin is not pale.      Findings: No erythema or rash.   Neurological:      Mental Status: He is alert and oriented to person, place, and time.      Cranial Nerves: No cranial nerve deficit.      Sensory: No sensory deficit.      Motor: No abnormal muscle tone.      Coordination: Coordination normal.   Psychiatric:         Behavior: Behavior normal.         Thought Content: Thought content normal.         Judgment: Judgment normal.       Medication Review:    Current Facility-Administered Medications:   •  acetaminophen (TYLENOL) tablet 650 mg, 650 mg, Oral, Q4H PRN, 650 mg at 11/01/20 0801 **OR** acetaminophen (TYLENOL) 160 MG/5ML solution 650 mg, 650 mg, Oral, Q4H PRN **OR** acetaminophen (TYLENOL) suppository 650 mg, 650 mg, Rectal, Q4H PRN, Mark Hansen MD  •  bisacodyl (DULCOLAX) EC tablet 5 mg, 5 mg, Oral, Daily PRN, Mark Hansen MD  •  budesonide-formoterol (SYMBICORT) 160-4.5 MCG/ACT inhaler 2 puff, 2 puff, Inhalation, BID - RT, Ananda Curiel MD  •  cefuroxime (CEFTIN) tablet 500 mg, 500 mg, Oral, Q12H, Ananda Curiel MD, 500 mg at  11/02/20 1209  •  clopidogrel (PLAVIX) tablet 75 mg, 75 mg, Oral, Daily, Mark Hansen MD, 75 mg at 11/02/20 0807  •  dextrose (D50W) 25 g/ 50mL Intravenous Solution 25 g, 25 g, Intravenous, Q15 Min PRN, Mark Hansen MD  •  dextrose (GLUTOSE) oral gel 15 g, 15 g, Oral, Q15 Min PRN, Mark Hansen MD  •  doxycycline (MONODOX) capsule 100 mg, 100 mg, Oral, Q12H, Ananda Curiel MD, 100 mg at 11/02/20 1321  •  enoxaparin (LOVENOX) syringe 40 mg, 40 mg, Subcutaneous, Q24H, Mark Hansen MD, 40 mg at 11/01/20 2025  •  famotidine (PEPCID) tablet 20 mg, 20 mg, Oral, Nightly, Mark Hansen MD, 20 mg at 11/01/20 2024  •  furosemide (LASIX) tablet 20 mg, 20 mg, Oral, Daily, Ananda Curiel MD, 20 mg at 11/02/20 1322  •  glucagon (human recombinant) (GLUCAGEN DIAGNOSTIC) injection 1 mg, 1 mg, Subcutaneous, Q15 Min PRN, Mark Hansen MD  •  HYDROcodone-acetaminophen (NORCO) 7.5-325 MG per tablet 1 tablet, 1 tablet, Oral, Q8H, Mark Hansen MD, 1 tablet at 11/02/20 1321  •  insulin aspart (novoLOG) injection 0-9 Units, 0-9 Units, Subcutaneous, TID AC, Mark Hansen MD  •  ipratropium-albuterol (DUO-NEB) nebulizer solution 3 mL, 3 mL, Nebulization, Q6H - RT, Ananda Curiel MD, 3 mL at 11/02/20 1312  •  isosorbide dinitrate (ISORDIL) tablet 30 mg, 30 mg, Oral, Daily, Mark Hansen MD, 30 mg at 11/02/20 0806  •  magnesium hydroxide (MILK OF MAGNESIA) suspension 2400 mg/10mL 10 mL, 10 mL, Oral, Daily PRN, Mark Hansen MD  •  ondansetron (ZOFRAN) tablet 4 mg, 4 mg, Oral, Q6H PRN, 4 mg at 11/02/20 1441 **OR** ondansetron (ZOFRAN) injection 4 mg, 4 mg, Intravenous, Q6H PRN, Mark Hansen MD  •  predniSONE (DELTASONE) tablet 60 mg, 60 mg, Oral, Daily  With Breakfast, Ananda Curiel MD, 60 mg at 11/02/20 1322  •  ranolazine (RANEXA) 12 hr tablet 500 mg, 500 mg, Oral, Q12H, Mark Hansen MD, 500 mg at 11/02/20 0806  •  sodium chloride 0.9 % flush 10 mL, 10 mL, Intravenous, Q12H, Mark Hansen MD, 10 mL at 11/01/20 0802  •  sodium chloride 0.9 % flush 10 mL, 10 mL, Intravenous, PRN, Mark Hansen MD  •  sodium chloride 0.9 % infusion, 50 mL/hr, Intravenous, Continuous PRN, Mark Hansen MD, Last Rate: 50 mL/hr at 11/02/20 1556, 50 mL/hr at 11/02/20 1556    I have reviewed the patient's current medications.     Results Review:  I have reviewed the labs, radiology results, and diagnostic studies.    Laboratory Data:   Results from last 7 days   Lab Units 11/02/20  1254 11/01/20  0552 10/31/20  1805   SODIUM mmol/L 132* 137 136   POTASSIUM mmol/L 4.5 4.8 5.5*   CHLORIDE mmol/L 100 102 104   CO2 mmol/L 22.0 27.0 24.0   BUN mg/dL 24* 31* 35*   CREATININE mg/dL 1.14 1.18 1.41*   GLUCOSE mg/dL 108* 80 145*   CALCIUM mg/dL 8.7 9.2 9.3   BILIRUBIN mg/dL  --   --  0.2   ALK PHOS U/L  --   --  66   ALT (SGPT) U/L  --   --  26   AST (SGOT) U/L  --   --  18   ANION GAP mmol/L 10.0 8.0 8.0     Estimated Creatinine Clearance: 50.4 mL/min (by C-G formula based on SCr of 1.14 mg/dL).          Results from last 7 days   Lab Units 11/02/20  1312 11/01/20  0552 10/31/20  1805   WBC 10*3/mm3 10.05 10.87* 14.62*   HEMOGLOBIN g/dL 11.9* 12.9* 13.7   HEMATOCRIT % 36.2* 40.9 42.1   PLATELETS 10*3/mm3 151 153 175           Culture Data:   Blood Culture   Date Value Ref Range Status   10/31/2020 No growth at 24 hours  Preliminary   10/31/2020 No growth at 24 hours  Preliminary     No results found for: URINECX  No results found for: RESPCX  No results found for: WOUNDCX  No results found for: STOOLCX  No components found for: BODYFLD    Radiology Data:   Imaging Results (Last 24 Hours)      ** No results found for the last 24 hours. **          Assessment/Plan     Hospital Problem List:  Active Problems:    SOB (shortness of breath)    Plan:    1.  Left lung pneumonia: Appears bacterial.  Covid test negative.  Continue empiric antibiotics.  Blood cultures negative at 24 hours.  Send pro-Calcitonin, BMP.  2.  Acute kidney injury: Improved with hydration.  Discontinue IV fluids.  3.  Diabetes: Continue sliding scale  4.  Hypertension: Continue home medication.  5.  COPD: Start p.o. prednisone and duo nebs.  Start Symbicort.  Start Mucinex.  6.  Coronary artery disease: Continue home medications.  7.  DVT prophylaxis: Lovenox.    Discharge Planning: I expect patient to be discharged in the next 1 to 2 days    Ananda Curiel MD   11/02/20   16:02 CST

## 2020-11-02 NOTE — PLAN OF CARE
Problem: Adult Inpatient Plan of Care  Goal: Plan of Care Review  Outcome: Ongoing, Progressing    Alb wnl. ADA diet, intakes >75% meals. Wt 172# w/ BMI 26.9. RD following.

## 2020-11-03 VITALS
HEART RATE: 103 BPM | WEIGHT: 172.4 LBS | TEMPERATURE: 97.4 F | RESPIRATION RATE: 20 BRPM | OXYGEN SATURATION: 98 % | BODY MASS INDEX: 27.06 KG/M2 | SYSTOLIC BLOOD PRESSURE: 124 MMHG | DIASTOLIC BLOOD PRESSURE: 70 MMHG | HEIGHT: 67 IN

## 2020-11-03 LAB
ANION GAP SERPL CALCULATED.3IONS-SCNC: 10 MMOL/L (ref 5–15)
BASOPHILS # BLD AUTO: 0.03 10*3/MM3 (ref 0–0.2)
BASOPHILS NFR BLD AUTO: 0.3 % (ref 0–1.5)
BUN SERPL-MCNC: 22 MG/DL (ref 8–23)
BUN/CREAT SERPL: 21.6 (ref 7–25)
CALCIUM SPEC-SCNC: 8.8 MG/DL (ref 8.6–10.5)
CHLORIDE SERPL-SCNC: 103 MMOL/L (ref 98–107)
CO2 SERPL-SCNC: 24 MMOL/L (ref 22–29)
CREAT SERPL-MCNC: 1.02 MG/DL (ref 0.76–1.27)
DEPRECATED RDW RBC AUTO: 48.3 FL (ref 37–54)
EOSINOPHIL # BLD AUTO: 0.01 10*3/MM3 (ref 0–0.4)
EOSINOPHIL NFR BLD AUTO: 0.1 % (ref 0.3–6.2)
ERYTHROCYTE [DISTWIDTH] IN BLOOD BY AUTOMATED COUNT: 13.9 % (ref 12.3–15.4)
GFR SERPL CREATININE-BSD FRML MDRD: 69 ML/MIN/1.73
GLUCOSE BLDC GLUCOMTR-MCNC: 125 MG/DL (ref 70–130)
GLUCOSE BLDC GLUCOMTR-MCNC: 183 MG/DL (ref 70–130)
GLUCOSE BLDC GLUCOMTR-MCNC: 236 MG/DL (ref 70–130)
GLUCOSE SERPL-MCNC: 120 MG/DL (ref 65–99)
HCT VFR BLD AUTO: 36.2 % (ref 37.5–51)
HGB BLD-MCNC: 11.9 G/DL (ref 13–17.7)
IMM GRANULOCYTES # BLD AUTO: 0.44 10*3/MM3 (ref 0–0.05)
IMM GRANULOCYTES NFR BLD AUTO: 3.8 % (ref 0–0.5)
LYMPHOCYTES # BLD AUTO: 1.02 10*3/MM3 (ref 0.7–3.1)
LYMPHOCYTES NFR BLD AUTO: 8.7 % (ref 19.6–45.3)
MCH RBC QN AUTO: 31.2 PG (ref 26.6–33)
MCHC RBC AUTO-ENTMCNC: 32.9 G/DL (ref 31.5–35.7)
MCV RBC AUTO: 94.8 FL (ref 79–97)
MONOCYTES # BLD AUTO: 0.59 10*3/MM3 (ref 0.1–0.9)
MONOCYTES NFR BLD AUTO: 5 % (ref 5–12)
NEUTROPHILS NFR BLD AUTO: 82.1 % (ref 42.7–76)
NEUTROPHILS NFR BLD AUTO: 9.61 10*3/MM3 (ref 1.7–7)
NRBC BLD AUTO-RTO: 0.2 /100 WBC (ref 0–0.2)
PLATELET # BLD AUTO: 154 10*3/MM3 (ref 140–450)
PMV BLD AUTO: 10.6 FL (ref 6–12)
POTASSIUM SERPL-SCNC: 4.6 MMOL/L (ref 3.5–5.2)
RBC # BLD AUTO: 3.82 10*6/MM3 (ref 4.14–5.8)
SODIUM SERPL-SCNC: 137 MMOL/L (ref 136–145)
WBC # BLD AUTO: 11.7 10*3/MM3 (ref 3.4–10.8)

## 2020-11-03 PROCEDURE — 80048 BASIC METABOLIC PNL TOTAL CA: CPT | Performed by: INTERNAL MEDICINE

## 2020-11-03 PROCEDURE — 63710000001 PREDNISONE PER 1 MG: Performed by: INTERNAL MEDICINE

## 2020-11-03 PROCEDURE — 85025 COMPLETE CBC W/AUTO DIFF WBC: CPT | Performed by: INTERNAL MEDICINE

## 2020-11-03 PROCEDURE — 94799 UNLISTED PULMONARY SVC/PX: CPT

## 2020-11-03 PROCEDURE — 96375 TX/PRO/DX INJ NEW DRUG ADDON: CPT

## 2020-11-03 PROCEDURE — 25010000002 FUROSEMIDE PER 20 MG: Performed by: INTERNAL MEDICINE

## 2020-11-03 PROCEDURE — 82962 GLUCOSE BLOOD TEST: CPT

## 2020-11-03 PROCEDURE — G0378 HOSPITAL OBSERVATION PER HR: HCPCS

## 2020-11-03 PROCEDURE — 94760 N-INVAS EAR/PLS OXIMETRY 1: CPT

## 2020-11-03 PROCEDURE — 63710000001 INSULIN ASPART PER 5 UNITS: Performed by: HOSPITALIST

## 2020-11-03 RX ORDER — FUROSEMIDE 10 MG/ML
20 INJECTION INTRAMUSCULAR; INTRAVENOUS ONCE
Status: COMPLETED | OUTPATIENT
Start: 2020-11-03 | End: 2020-11-03

## 2020-11-03 RX ORDER — PREDNISONE 10 MG/1
TABLET ORAL
Qty: 21 TABLET | Refills: 0 | Status: SHIPPED | OUTPATIENT
Start: 2020-11-03 | End: 2020-11-18

## 2020-11-03 RX ORDER — CEFUROXIME AXETIL 500 MG/1
500 TABLET ORAL EVERY 12 HOURS SCHEDULED
Qty: 7 TABLET | Refills: 0 | Status: SHIPPED | OUTPATIENT
Start: 2020-11-03 | End: 2020-11-07

## 2020-11-03 RX ADMIN — IPRATROPIUM BROMIDE AND ALBUTEROL SULFATE 3 ML: 2.5; .5 SOLUTION RESPIRATORY (INHALATION) at 07:13

## 2020-11-03 RX ADMIN — CEFUROXIME AXETIL 500 MG: 500 TABLET ORAL at 08:46

## 2020-11-03 RX ADMIN — IPRATROPIUM BROMIDE AND ALBUTEROL SULFATE 3 ML: 2.5; .5 SOLUTION RESPIRATORY (INHALATION) at 00:27

## 2020-11-03 RX ADMIN — ISOSORBIDE DINITRATE 30 MG: 30 TABLET ORAL at 08:46

## 2020-11-03 RX ADMIN — HYDROCODONE BITARTRATE AND ACETAMINOPHEN 1 TABLET: 7.5; 325 TABLET ORAL at 05:39

## 2020-11-03 RX ADMIN — SODIUM CHLORIDE, PRESERVATIVE FREE 10 ML: 5 INJECTION INTRAVENOUS at 08:48

## 2020-11-03 RX ADMIN — RANOLAZINE 500 MG: 500 TABLET, FILM COATED, EXTENDED RELEASE ORAL at 08:46

## 2020-11-03 RX ADMIN — BUDESONIDE AND FORMOTEROL FUMARATE DIHYDRATE 2 PUFF: 160; 4.5 AEROSOL RESPIRATORY (INHALATION) at 07:13

## 2020-11-03 RX ADMIN — FUROSEMIDE 20 MG: 10 INJECTION, SOLUTION INTRAMUSCULAR; INTRAVENOUS at 12:04

## 2020-11-03 RX ADMIN — DOXYCYCLINE 100 MG: 100 CAPSULE ORAL at 08:46

## 2020-11-03 RX ADMIN — CLOPIDOGREL BISULFATE 75 MG: 75 TABLET ORAL at 08:46

## 2020-11-03 RX ADMIN — PREDNISONE 60 MG: 20 TABLET ORAL at 08:46

## 2020-11-03 RX ADMIN — FUROSEMIDE 20 MG: 20 TABLET ORAL at 08:46

## 2020-11-03 RX ADMIN — INSULIN ASPART 2 UNITS: 100 INJECTION, SOLUTION INTRAVENOUS; SUBCUTANEOUS at 12:04

## 2020-11-03 NOTE — DISCHARGE PLACEMENT REQUEST
"Hasmukh Mckay (87 y.o. Male)     Date of Birth Social Security Number Address Home Phone MRN    1933  [address] [place] [id number] [phone] [id number]    Christianity Marital Status          None        Admission Date Admission Type Admitting Provider Attending Provider Department, Room/Bed    10/31/20 Emergency Mark Hansen MD Stewart-Hubbard, Takasha Latoya Renee, MD [address], [facility]/1    Discharge Date Discharge Disposition Discharge Destination         [place] [facility] [place]              Attending Provider: Mark Hansen MD    Allergies: Atorvastatin, Penicillins, Pravastatin, Azithromycin, Biaxin [Clarithromycin]    Isolation: None   Infection: None   Code Status: CPR    Ht: 170.2 cm (67\")   Wt: 78.2 kg (172 lb 6.4 oz)    Admission Cmt: None   Principal Problem: Pneumonia of left lower lobe due to infectious organism [J18.9]                 Active Insurance as of 10/31/2020     Primary Coverage     Payor Plan Insurance Group Employer/Plan Group    AETNA MEDICARE REPLACEMENT AETNA MEDICARE REPLACEMENT [id number]     Payor Plan Address Payor Plan Phone Number Payor Plan Fax Number Effective Dates    [address] [phone]  2019 - None Entered    [facility] [id number]       Subscriber Name Subscriber Birth Date Member ID       HASMUKH MCKAY 1933 [id number]                 Emergency Contacts      (Rel.) Home Phone Work Phone Mobile Phone    Rimma Mckay (Spouse) [phone] -- [phone]        [address] [address]  Phone:  [phone]  Fax:   Date: Nov 3, 2020      Referral to Home Health     Patient:  Hasmukh Mckay MRN:  [id number]   Jael GARCÍA [facility] [address] :  1933  SSN:    Phone: [phone] Sex:  M      INSURANCE PAYOR PLAN GROUP # SUBSCRIBER ID   Primary:    " AETNA MEDICARE REPLACEMENT [id number]      Referring Provider Information:  JASMINE CURIEL Phone: [phone] Fax:       Referral Information:   # Visits:  1 Referral Type: Home Health [42]   Urgency:  Routine Referral Reason: Specialty Services Required   Start Date: Nov 3, 2020 End Date:  To be determined by Insurer   Diagnosis: SOB (shortness of breath) (R06.02 [ICD-10-CM] 786.05 [ICD-9-CM])  Pneumonia of left lower lobe due to infectious organism (J18.9 [ICD-10-CM] 486 [ICD-9-CM])  Chronic obstructive pulmonary disease, unspecified COPD type (CMS/HCC) (J44.9 [ICD-10-CM] 496 [ICD-9-CM])      Refer to Dept: [facility] HOME CARE  Refer to Provider:   Refer to Facility:       Face to Face Visit Date: 11/3/2020  Follow-up provider for Plan of Care? I treated the patient in an acute care facility and will not continue treatment after discharge.  Follow-up provider: SULMA NUNES [[id number]]  Reason/Clinical Findings: Shortness of breath and generalized weakness  Describe mobility limitations that make leaving home difficult: Shortness of breath and generalized weakness  Nursing/Therapeutic Services Requested: Skilled Nursing  Skilled nursing orders: Cardiopulmonary assessments (Medication managment)  Skilled nursing orders: Medication education  Skilled nursing orders: Other  Frequency: 1 Week 1     This document serves as a request of services and does not constitute Insurance authorization or approval of services.  To determine eligibility, please contact the members Insurance carrier to verify and review coverage.     If you have medical questions regarding this request for services. Please contact [address] at [phone] during normal business hours.       Authorizing Provider:Jasmine Curiel MD  Authorizing Provider's NPI: [id number]  Order Entered By: Jasmine Curiel MD 11/3/2020 11:28 AM     Electronically signed by: Jasmine Curiel MD 11/3/2020  11:28 AM            Insurance Information                AETNA MEDICARE REPLACEMENT/AETNA MEDICARE REPLACEMENT Phone: [phone]    Subscriber: Hasmukh Ham Subscriber#: [id number]    Group#: [id number] Precert#:              History & Physical      Mark Hansen MD at 10/31/20 2032          .        [facility] Medicine Admission      Date of Admission: 10/31/2020  Patient seen and evaluated on 10/31/2020    Primary Care Physician: Paolo Rey MD      Chief Complaint: *shortness of breath    HPI:*  Past medical history of   CHF   COPD on oxygen at night only  Diabetes  Hypertension  Coronary artery disease with MI in the past    Patient is an 87-year-old male who presents to the [facility] ER with complaints of shortness of breath.  His wife is at the bedside who helps with the history.  He reports being short of air for past several weeks.  He has been seen by his primary care physician.  He was told that he did not have to pneumonia but was given antibiotics.  He has not gotten better.  He is heart on his right side when he breathes.  He has had decreased appetite and shortness of air.  He denies fever chills sick contacts nausea vomiting diarrhea.  The emergency room shows a left mid and lower lobe pneumonia with a creatinine of 1.4 potassium of 5.5 D-dimer 591 and a white count of 14,000    Concurrent Medical History:  has a past medical history of Basal cell carcinoma, Bilateral carotid artery stenosis, Bilateral carotid artery stenosis, Bleeding disorder (CMS/[facility]), CHF (congestive heart failure) (CMS/HCC), Chronic obstructive pulmonary disease (COPD) (CMS/HCC), Coronary arteriosclerosis, Degenerative joint disease involving multiple joints, Dementia (CMS/HCC), Diabetes mellitus (CMS/HCC), Heart problem, History of stomach ulcers, Hyperlipidemia, Hypertension, Ingrown toenail, and Myocardial infarction  (CMS/[facility]).    Past Surgical History:  has a past surgical history that includes Hernia repair; Lung biopsy; Ventral hernia repair; Thoracotomy (Left, 1977); Cardiac catheterization (N/A, 6/6/2017); Coronary stent placement; Neck surgery; Cardiac catheterization (N/A, 2/14/2018); Lung surgery; Back surgery; and Coronary angioplasty with stent.    Family History: family history includes Cancer in an other family member; Diabetes in his father and mother; Heart disease in his father; Hypertension in his father; Lung disease in an other family member. *    Social History:  reports that he has quit smoking. He has never used smokeless tobacco. He reports that he does not drink alcohol or use drugs.    Allergies:   Allergies   Allergen Reactions   • Atorvastatin Anaphylaxis   • Penicillins Rash   • Pravastatin      Myalgia   • Azithromycin Rash   • Biaxin [Clarithromycin] Rash       Medications:   Prior to Admission medications    Medication Sig Start Date End Date Taking? Authorizing Provider   albuterol (PROVENTIL) (2.5 MG/3ML) 0.083% nebulizer solution Take 2.5 mg by nebulization Every 4 (Four) Hours As Needed for Wheezing. 12/23/17  Yes Adi Puente MD   clindamycin (CLEOCIN) 300 MG capsule Take 300 mg by mouth 3 (Three) Times a Day.   Yes ProviderBill MD   clopidogrel (PLAVIX) 75 MG tablet Take 75 mg by mouth Daily. 2/3/16  Yes Bill Rader MD   diphenhydrAMINE (BENADRYL) 25 mg capsule Take 25 mg by mouth Every 6 (Six) Hours As Needed for Itching.   Yes ProviderBill MD   famotidine (PEPCID) 20 MG tablet Take 20 mg by mouth 2 (Two) Times a Day.   Yes ProviderBill MD   fluticasone (FLONASE) 50 MCG/ACT nasal spray 2 sprays into each nostril Daily.   Yes Bill Rader MD   Fluticasone Furoate (ARNUITY ELLIPTA) 200 MCG/ACT aerosol powder  Inhale.   Yes Bill Rader MD   furosemide (LASIX) 20 MG tablet Take 1 tablet by mouth Daily. 3/3/17  Yes Jeff Maciel  MD Arron PhD   glimepiride (AMARYL) 2 MG tablet Take 2 mg by mouth Every Morning Before Breakfast.   Yes Bill Rader MD   HYDROcodone-acetaminophen (NORCO) 7.5-325 MG per tablet Take 1 tablet by mouth Every 8 (Eight) Hours. 12/27/16  Yes Bill Rader MD   ipratropium-albuterol (DUO-NEB) 0.5-2.5 mg/mL nebulizer Take 3 mL by nebulization 4 (Four) Times a Day. 9/28/17  Yes Jc Alba MD   isosorbide dinitrate (ISORDIL) 30 MG tablet Take 30 mg by mouth Daily.   Yes Bill Rader MD   lisinopril (PRINIVIL,ZESTRIL) 10 MG tablet Take 0.5 tablets by mouth Daily. 11/10/17  Yes Caitlyn Garcia MD   magic mouthwash oral suspension Swish and swallow 5 mL Every 4 (Four) Hours As Needed (sore throat). 1/15/19  Yes Ramiro Seals MD   magnesium oxide (MAGOX) 400 (241.3 MG) MG tablet tablet Take 400 mg by mouth Daily.   Yes Bill Rader MD   nitroglycerin (NITROSTAT) 0.4 MG SL tablet place 1 tablet under the tongue if needed every 5 minutes for hair...  (REFER TO PRESCRIPTION NOTES). 1/11/17  Yes Bill Rader MD   nystatin (MYCOSTATIN) 581604 UNIT/ML suspension Swish and swallow 5 mL 4 (Four) Times a Day. 1/15/19  Yes Ramiro Seals MD   O2 (OXYGEN) Inhale 2 L/min Every Night. W/ CPAP   Yes Bill Rader MD   ondansetron (ZOFRAN) 4 MG tablet Take 4 mg by mouth Every 8 (Eight) Hours As Needed for Nausea or Vomiting.   Yes Bill Rader MD   acetaminophen (TYLENOL) 325 MG tablet Take 2 tablets by mouth Every 4 (Four) Hours As Needed for Mild Pain . 2/15/18   Jc Alba MD   Brovana 15 MCG/2ML nebulizer solution TK 2 MLS BY NEBULIZATION BID 10/16/20   Bill Rader MD   predniSONE (DELTASONE) 10 MG tablet Take 10 mg by mouth Daily.    Bill Rader MD   ranolazine (RANEXA) 500 MG 12 hr tablet Take 1 tablet by mouth Every 12 (Twelve) Hours. 8/23/18   Mana Curtis MD   Red Yeast Rice 600 MG tablet Take 600 mg by mouth  2 (Two) Times a Day.    Provider, MD Bill   Umeclidinium-Vilanterol 62.5-25 MCG/INH aerosol powder  Inhale 1 puff Daily. 3/3/17   Brea Higginbotham MD       Review of Systems:  Review of Systems   12 point review of systems obtained positives and negatives noted above in the HPI Otherwise complete ROS is negative except as mentioned above.    Physical Exam:   Temp:  [96.8 °F (36 °C)] 96.8 °F (36 °C)  Heart Rate:  [80-95] 80  Resp:  [16-17] 17  BP: (135-144)/(76-84) 144/84  Physical Exam  Vitals signs and nursing note reviewed.   Constitutional:       General: He is in acute distress.      Appearance: He is normal weight. He is not ill-appearing or diaphoretic.   HENT:      Head: Normocephalic and atraumatic.      Nose: Nose normal.      Mouth/Throat:      Mouth: Mucous membranes are dry.      Pharynx: Oropharynx is clear.   Eyes:      General: No scleral icterus.        Right eye: No discharge.         Left eye: No discharge.      Extraocular Movements: Extraocular movements intact.      Conjunctiva/sclera: Conjunctivae normal.   Neck:      Musculoskeletal: Neck supple.   Cardiovascular:      Rate and Rhythm: Normal rate and regular rhythm.      Pulses: Normal pulses.      Heart sounds: Normal heart sounds.   Pulmonary:      Comments: Breath sounds bilaterally, mild rhonchi noted on the left, no wheezing or rales  Abdominal:      General: Bowel sounds are normal. There is no distension.      Palpations: Abdomen is soft.      Tenderness: There is no abdominal tenderness. There is no guarding or rebound.   Musculoskeletal:         General: No swelling or deformity.   Skin:     General: Skin is warm and dry.      Capillary Refill: Capillary refill takes less than 2 seconds.   Neurological:      General: No focal deficit present.      Mental Status: He is alert and oriented to person, place, and time.      Cranial Nerves: No cranial nerve deficit.   Psychiatric:         Mood and Affect: Mood normal.          Behavior: Behavior normal.         Thought Content: Thought content normal.         Judgment: Judgment normal.           Results Reviewed:  I have personally reviewed current lab, radiology, and data and agree with results.  Lab Results (last 24 hours)     Procedure Component Value Units Date/Time    Lactic Acid, Plasma [[id number]]  (Normal) Collected: 10/31/20 2006    Specimen: Blood from Arm, Right Updated: 10/31/20 2024     Lactate 1.8 mmol/L     COVID-19, [facility] IN-HOUSE, NP SWAB IN TRANSPORT MEDIA 8-10 HR TAT - Swab, Nasopharynx [[id number]] Collected: 10/31/20 2015    Specimen: Swab from Nasopharynx Updated: 10/31/20 2015    Blood Culture - Blood, Arm, Right [[id number]] Collected: 10/31/20 2006    Specimen: Blood from Arm, Right Updated: 10/31/20 2010    Drums Draw [[id number]] Collected: 10/31/20 1804    Specimen: Blood Updated: 10/31/20 1916    Narrative:      The following orders were created for panel order Drums Draw.  Procedure                               Abnormality         Status                     ---------                               -----------         ------                     Light Blue Top[[id number]]                                   Final result               Green Top (Gel)[[id number]]                                  Final result               Lavender Top[[id number]]                                     Final result               Gold Top - SST[[id number]]                                   Final result                 Please view results for these tests on the individual orders.    Light Blue Top [[id number]] Collected: 10/31/20 1804    Specimen: Blood Updated: 10/31/20 1916     Extra Tube hold for add-on     Comment: Auto resulted       Green Top (Gel) [[id number]] Collected: 10/31/20 1805    Specimen: Blood Updated: 10/31/20 1916     Extra Tube Hold for add-ons.     Comment: Auto resulted.       Lavender Top [[id number]] Collected: 10/31/20 1805    Specimen: Blood Updated: 10/31/20 1916      Extra Tube hold for add-on     Comment: Auto resulted       Gold Top - SST [[id number]] Collected: 10/31/20 1804    Specimen: Blood Updated: 10/31/20 1916     Extra Tube Hold for add-ons.     Comment: Auto resulted.       Manual Differential [[id number]]  (Abnormal) Collected: 10/31/20 1805    Specimen: Blood Updated: 10/31/20 1840     Neutrophil % 88.0 %      Lymphocyte % 2.0 %      Monocyte % 3.0 %      Atypical Lymphocyte % 7.0 %      Neutrophils Absolute 12.87 10*3/mm3      Lymphocytes Absolute 0.29 10*3/mm3      Monocytes Absolute 0.44 10*3/mm3      RBC Morphology Normal     WBC Morphology Normal     Platelet Morphology Normal    Comprehensive Metabolic Panel [[id number]]  (Abnormal) Collected: 10/31/20 1805    Specimen: Blood Updated: 10/31/20 1829     Glucose 145 mg/dL      BUN 35 mg/dL      Creatinine 1.41 mg/dL      Sodium 136 mmol/L      Potassium 5.5 mmol/L      Comment: Slight hemolysis detected by analyzer. Results may be affected.        Chloride 104 mmol/L      CO2 24.0 mmol/L      Calcium 9.3 mg/dL      Total Protein 6.0 g/dL      Albumin 3.80 g/dL      ALT (SGPT) 26 U/L      AST (SGOT) 18 U/L      Alkaline Phosphatase 66 U/L      Total Bilirubin 0.2 mg/dL      eGFR Non African Amer 48 mL/min/1.73      Globulin 2.2 gm/dL      A/G Ratio 1.7 g/dL      BUN/Creatinine Ratio 24.8     Anion Gap 8.0 mmol/L     Narrative:      GFR Normal >60  Chronic Kidney Disease <60  Kidney Failure <15      Lipase [[id number]]  (Normal) Collected: 10/31/20 1805    Specimen: Blood Updated: 10/31/20 1829     Lipase 25 U/L     D-dimer, Quantitative [[id number]]  (Abnormal) Collected: 10/31/20 1804    Specimen: Blood Updated: 10/31/20 1827     D-Dimer, Quantitative 591 ng/mL (FEU)     Narrative:      Dimer values <500 ng/ml FEU are FDA approved as aid in diagnosis of deep venous thrombosis and pulmonary embolism.  This test should not be used in an exclusion strategy with pretest probability alone.    A recent guideline regarding  diagnosis for pulmonary thromboembolism recommends an adjusted exclusion criterion of age x 10 ng/ml FEU for patients >50 years of age (Jenny Intern Med 2015; 163: 701-711).      Urinalysis With Microscopic If Indicated (No Culture) - Urine, Clean Catch [[id number]]  (Normal) Collected: 10/31/20 1820    Specimen: Urine, Clean Catch Updated: 10/31/20 1826     Color, UA Yellow     Appearance, UA Clear     pH, UA 5.5     Specific Gravity, UA 1.016     Glucose, UA Negative     Ketones, UA Negative     Bilirubin, UA Negative     Blood, UA Negative     Protein, UA Negative     Leuk Esterase, UA Negative     Nitrite, UA Negative     Urobilinogen, UA 0.2 E.U./dL    Narrative:      Urine microscopic not indicated.    CBC & Differential [[id number]]  (Abnormal) Collected: 10/31/20 1805    Specimen: Blood Updated: 10/31/20 1813    Narrative:      The following orders were created for panel order CBC & Differential.  Procedure                               Abnormality         Status                     ---------                               -----------         ------                     CBC Auto Differential[[id number]]        Abnormal            Final result                 Please view results for these tests on the individual orders.    CBC Auto Differential [[id number]]  (Abnormal) Collected: 10/31/20 1805    Specimen: Blood Updated: 10/31/20 1813     WBC 14.62 10*3/mm3      RBC 4.32 10*6/mm3      Hemoglobin 13.7 g/dL      Hematocrit 42.1 %      MCV 97.5 fL      MCH 31.7 pg      MCHC 32.5 g/dL      RDW 14.6 %      RDW-SD 52.4 fl      MPV 11.1 fL      Platelets 175 10*3/mm3      Neutrophil % 83.5 %      Lymphocyte % 8.3 %      Monocyte % 3.6 %      Eosinophil % 0.0 %      Basophil % 0.3 %      Immature Grans % 4.3 %      Neutrophils, Absolute 12.21 10*3/mm3      Lymphocytes, Absolute 1.21 10*3/mm3      Monocytes, Absolute 0.52 10*3/mm3      Eosinophils, Absolute 0.00 10*3/mm3      Basophils, Absolute 0.05 10*3/mm3      Immature  Grans, Absolute 0.63 10*3/mm3      nRBC 0.0 /100 WBC         Imaging Results (Last 24 Hours)     Procedure Component Value Units Date/Time    XR Chest PA & Lateral [[id number]] Collected: 10/31/20 1847     Updated: 10/31/20 1858    Narrative:      EXAM: XR CHEST 2 VIEWS    COMPARISONS: Radiograph 4/2/2019, 1/11/2019, 12/13/2017. CT chest  1/8/2019    INDICATION: SOB    FINDINGS:   There are low lung volumes. Appearance of hazy airspace opacity  in the left mid to lower lung, without correlate on lateral view.  No effusion or pneumothorax. Heart size is normal.  Atherosclerotic aorta is unchanged. There is a appearance of a  nodular rounded opacity in the infrahilar region on lateral view  that measures 1.6 cm and is unchanged from 2017 radiograph  consistent with benignity such as vessel on end. No acute osseous  abnormality.      Impression:      Low lung volumes with appearance of hazy opacity in the left mid  to lower lung hazy opacity, favoring superimposed soft tissue and  technique. Cannot entirely exclude early airspace disease in the  appropriate clinical context.    Electronically signed by:  Linda Aranda MD  10/31/2020  6:57 PM CDT Workstation: [id number]            Assessment:    Active Hospital Problems    Diagnosis   • SOB (shortness of breath)             Plan:**  Pneumonia left mid and lower lung- started on rocephin and doxycycline in the er due to allergies. Will continue with supplemental O2 as needed. Will also get COVID testing and nebs/inhalers as needed.     Acute renal failure- will give gentle fluids    Hyperkalemia- will monitor and hold meds as indicated    Leukocytosis secondary to pneumonia    Diabetes sliding scale insulin Accu-Cheks    Hypertension continue home medications as indicated    History of coronary artery disease continue home medications as indicated    History of congestive heart failure not in acute exacerbation at this time-last 2D echo December 2018 shows a EF  of 68% with normal systolic function    DVT prophylaxis Lovenox    Full code    I discussed the patient's findings and my recommendations with: **patient and wife at the bedside    Mark Hansen MD                  Electronically signed by Mark Hansen MD at 10/31/20 2043

## 2020-11-03 NOTE — PLAN OF CARE
Problem: Adult Inpatient Plan of Care  Goal: Plan of Care Review  Outcome: Ongoing, Progressing  Flowsheets (Taken 11/3/2020 0133)  Progress: no change  Plan of Care Reviewed With: patient  Outcome Summary: vss, pain controlled, 2 L nasal cannula, no complaints at this time, resting in between care, will cont to monitor

## 2020-11-03 NOTE — DISCHARGE SUMMARY
[facility] Medicine Services  DISCHARGE SUMMARY       Date of Admission: 10/31/2020  Date of Discharge:  11/3/2020  Primary Care Physician: Paolo Rey MD    Presenting Problem/History of Present Illness:  SOB (shortness of breath) [R06.02]  Pneumonia of left lower lobe due to infectious organism [J18.9]       Final Discharge Diagnoses:  Active Hospital Problems    Diagnosis   • **Pneumonia of left lower lobe due to infectious organism   • SOB (shortness of breath)   • Chronic obstructive lung disease (CMS/HCC)   Acute kidney injury  Type 2 diabetes mellitus  Essential hypertension  Coronary artery disease    Consults:   Consults     No orders found from 10/2/2020 to 11/1/2020.          Procedures Performed: None                Pertinent Test Results:   Collected Updated Procedure Result Status    11/03/2020 0632 11/03/2020 0713 Basic Metabolic Panel [[id number]]    (Abnormal)   Blood    Final result Component Value Units   Glucose 120High  mg/dL   BUN 22 mg/dL   Creatinine 1.02 mg/dL   Sodium 137 mmol/L   Potassium 4.6 mmol/L   Chloride 103 mmol/L   CO2 24.0 mmol/L   Calcium 8.8 mg/dL   eGFR Non African Am 69 mL/min/1.73   BUN/Creatinine Ratio 21.6    Anion Gap 10.0 mmol/L           11/03/2020 0632 11/03/2020 0654 CBC Auto Differential [[id number]]   (Abnormal)   Blood    Final result Component Value Units   WBC 11.70High  10*3/mm3   RBC 3.82Low  10*6/mm3   Hemoglobin 11.9Low  g/dL   Hematocrit 36.2Low  %   MCV 94.8 fL   MCH 31.2 pg   MCHC 32.9 g/dL   RDW 13.9 %   RDW-SD 48.3 fl   MPV 10.6 fL   Platelets 154 10*3/mm3   Neutrophil Rel % 82.1High  %   Lymphocyte Rel % 8.7Low  %   Monocyte Rel % 5.0 %   Eosinophil Rel % 0.1Low  %   Basophil Rel % 0.3 %   Immature Granulocyte Rel % 3.8High  %   Neutrophils Absolute 9.61High  10*3/mm3   Lymphocytes Absolute 1.02 10*3/mm3   Monocytes Absolute 0.59 10*3/mm3   Eosinophils Absolute 0.01 10*3/mm3   Basophils Absolute 0.03  10*3/mm3   Immature Grans, Absolute 0.44High  10*3/mm3   nRBC 0.2 /100 WBC        10/31/2020 1805 10/31/2020 1829 Comprehensive Metabolic Panel [[id number]]    (Abnormal)   Blood    Final result Component Value Units   Glucose 145High  mg/dL   BUN 35High  mg/dL   Creatinine 1.41High  mg/dL   Sodium 136 mmol/L   Potassium 5.5High   mmol/L   Chloride 104 mmol/L   CO2 24.0 mmol/L   Calcium 9.3 mg/dL   Total Protein 6.0 g/dL   Albumin 3.80 g/dL   ALT (SGPT) 26 U/L   AST (SGOT) 18 U/L   Alkaline Phosphatase 66 U/L   Total Bilirubin 0.2 mg/dL   eGFR Non  Am 48Low  mL/min/1.73   Globulin 2.2 gm/dL   A/G Ratio 1.7 g/dL   BUN/Creatinine Ratio 24.8    Anion Gap 8.0 mmol/L           10/31/2020 1805 10/31/2020 1829 Lipase [[id number]]   Blood    Final result Component Value Units   Lipase 25 U/L           10/31/2020 1805 10/31/2020 1813 CBC Auto Differential [[id number]]   (Abnormal)   Blood    Final result Component Value Units   WBC 14.62High  10*3/mm3   RBC 4.32 10*6/mm3   Hemoglobin 13.7 g/dL   Hematocrit 42.1 %   MCV 97.5High  fL   MCH 31.7 pg   MCHC 32.5 g/dL   RDW 14.6 %   RDW-SD 52.4 fl   MPV 11.1 fL   Platelets 175 10*3/mm3   Neutrophil Rel % 83.5High  %   Lymphocyte Rel % 8.3Low  %   Monocyte Rel % 3.6Low  %   Eosinophil Rel % 0.0Low  %   Basophil Rel % 0.3 %   Immature Granulocyte Rel % 4.3High  %   Neutrophils Absolute 12.21High  10*3/mm3   Lymphocytes Absolute 1.21 10*3/mm3   Monocytes Absolute 0.52 10*3/mm3   Eosinophils Absolute 0.00 10*3/mm3   Basophils Absolute 0.05 10*3/mm3   Immature Grans, Absolute 0.63High  10*3/mm3   nRBC 0.0 /100 WBC           10/31/2020 1805 10/31/2020 1840 Manual Differential [[id number]]   (Abnormal)   Blood    Final result Component Value Units   Neutrophil Rel % 88.0High  %   Lymphocyte Rel % 2.0Low  %   Monocyte Rel % 3.0Low  %   Atypical Lymphocyte % 7.0High  %   Neutrophils Absolute 12.87High  10*3/mm3   Lymphocytes Absolute 0.29Low  10*3/mm3   Monocytes Absolute 0.44 10*3/mm3    RBC morphology Normal    WBC Morphology Normal    Platelet Morphology Normal            10/31/2020 1804 10/31/2020 1916 [place] Draw [[id number]]    Blood    Final result Component Value   No component results           10/31/2020 1804 10/31/2020 1827 D-dimer, Quantitative [[id number]]    (Abnormal)   Blood    Final result Component Value Units   D-Dimer, Quant 591High  ng/mL (FEU)        XR Chest PA & Lateral [[id number]] Paolo as Reviewed   Order Status: Completed Collected: 10/31/20 1847    Updated: 10/31/20 1858   Narrative:     EXAM: XR CHEST 2 VIEWS     COMPARISONS: Radiograph 4/2/2019, 1/11/2019, 12/13/2017. CT chest   1/8/2019     INDICATION: SOB     FINDINGS:   There are low lung volumes. Appearance of hazy airspace opacity   in the left mid to lower lung, without correlate on lateral view.   No effusion or pneumothorax. Heart size is normal.   Atherosclerotic aorta is unchanged. There is a appearance of a   nodular rounded opacity in the infrahilar region on lateral view   that measures 1.6 cm and is unchanged from 2017 radiograph   consistent with benignity such as vessel on end. No acute osseous   abnormality.    Impression:     Low lung volumes with appearance of hazy opacity in the left mid   to lower lung hazy opacity, favoring superimposed soft tissue and   technique. Cannot entirely exclude early airspace disease in the   appropriate clinical context.     Electronically signed by:  Linda Aranda MD  10/31/2020   6:57 PM CDT Workstation: [id number]       Chief Complaint on Day of Discharge: None    Hospital Course:  The patient is a 87 y.o. male with a past medical history of diastolic congestive heart failure, COPD on oxygen at bedtime, type 2 diabetes mellitus, essential hypertension and coronary artery disease who presented to [facility] with complaints of worsening shortness of breath particularly on exertion of at least 2 weeks duration. He reports being short of air for  "past several weeks.  He has been seen by his primary care physician who gave him antibiotics but patient has no question better. He also had decreased appetite but denied fever, chills, sick contacts, nausea vomiting, diarrhea.  Chest x-ray showed the emergency room chest x-ray revealed a left mid and lower lobe pneumonia with a creatinine of 1.4 potassium of 5.5, D-dimer 591 and a white count of 14,000.  COVID-19 test was negative. Patient was admitted for community-acquired pneumonia and COPD. He received IV antibiotics, bronchodilator nebulizer treatments as well as a short course of oral steroids.  He received IV fluids for acute kidney injury. Patient clinically improved on above therapy and was discharged in stable condition with a prescription for oral cefuroxime for pneumonia and a short prednisone taper.    Condition on Discharge: Stable    Physical Exam on Discharge:  /60 (BP Location: Left arm, Patient Position: Lying)   Pulse 102   Temp 97 °F (36.1 °C) (Oral)   Resp 20   Ht 170.2 cm (67\")   Wt 78.2 kg (172 lb 6.4 oz)   SpO2 95%   BMI 27.00 kg/m²      Physical Exam  Constitutional:       General: He is not in acute distress.     Appearance: He is well-developed. He is not diaphoretic.   HENT:      Head: Normocephalic and atraumatic.   Eyes:      Conjunctiva/sclera: Conjunctivae normal.      Pupils: Pupils are equal, round, and reactive to light.   Neck:      Musculoskeletal: Normal range of motion and neck supple.      Thyroid: No thyromegaly.      Vascular: No JVD.      Trachea: No tracheal deviation.   Cardiovascular:      Rate and Rhythm: Normal rate and regular rhythm.      Heart sounds: Normal heart sounds. No murmur. No friction rub. No gallop.    Pulmonary:      Effort: Pulmonary effort is normal. No respiratory distress.      Breath sounds: No stridor. Rales present. No wheezing.      Comments: Few crepitations in right lung base noted.  Reduced breath sounds in left lung base " noted.  Chest:      Chest wall: No tenderness.   Abdominal:      General: Bowel sounds are normal. There is no distension.      Palpations: Abdomen is soft. There is no mass.      Tenderness: There is no abdominal tenderness. There is no guarding or rebound.      Hernia: No hernia is present.   Musculoskeletal: Normal range of motion.         General: No tenderness or deformity.   Lymphadenopathy:      Cervical: No cervical adenopathy.   Skin:     General: Skin is warm and dry.      Coloration: Skin is not pale.      Findings: No erythema or rash.   Neurological:      Mental Status: He is alert and oriented to person, place, and time.      Cranial Nerves: No cranial nerve deficit.      Sensory: No sensory deficit.      Motor: No abnormal muscle tone.      Coordination: Coordination normal.   Psychiatric:         Behavior: Behavior normal.         Thought Content: Thought content normal.         Judgment: Judgment normal.     [place] [facility] [place] Medications:     Discharge Medications      New Medications      Instructions Start Date   cefuroxime 500 MG tablet  Commonly known as: CEFTIN   500 mg, Oral, Every 12 Hours Scheduled         Changes to Medications      Instructions Start Date   predniSONE 10 MG tablet  Commonly known as: DELTASONE  What changed:   · how much to take  · how to take this  · when to take this  · additional instructions   On 11/4 take 6 tabs; 11/5 take 5 tabs; 11/6 take 4 tabs; 11/7 take 3 tabs, 11/8 take 2 tabs; 11/9 take 1 tab & stop         Continue These Medications      Instructions Start Date   acetaminophen 325 MG tablet  Commonly known as: TYLENOL   650 mg, Oral, Every 4 Hours PRN      albuterol (2.5 MG/3ML) 0.083% nebulizer solution  Commonly known as: PROVENTIL   2.5 mg, Nebulization, Every 4 Hours PRN      Arnuity Ellipta 200 MCG/ACT aerosol powder   Generic drug: Fluticasone Furoate   Inhalation      Brovana 15 MCG/2ML nebulizer solution  Generic  drug: arformoterol   TK 2 MLS BY NEBULIZATION BID      clopidogrel 75 MG tablet  Commonly known as: PLAVIX   75 mg, Oral, Daily      diphenhydrAMINE 25 mg capsule  Commonly known as: BENADRYL   25 mg, Oral, Every 6 Hours PRN      famotidine 20 MG tablet  Commonly known as: PEPCID   20 mg, Oral, 2 Times Daily      fluticasone 50 MCG/ACT nasal spray  Commonly known as: FLONASE   2 sprays, Nasal, Daily      furosemide 20 MG tablet  Commonly known as: LASIX   20 mg, Oral, Daily      glimepiride 2 MG tablet  Commonly known as: AMARYL   2 mg, Oral, Every Morning Before Breakfast      HYDROcodone-acetaminophen 7.5-325 MG per tablet  Commonly known as: NORCO   1 tablet, Oral, Every 8 Hours      ipratropium-albuterol 0.5-2.5 mg/3 ml nebulizer  Commonly known as: DUO-NEB   3 mL, Nebulization, 4 Times Daily      lisinopril 10 MG tablet  Commonly known as: PRINIVIL,ZESTRIL   5 mg, Oral, Every 24 Hours Scheduled      magic mouthwash oral suspension   5 mL, Swish & Swallow, Every 4 Hours PRN      nitroglycerin 0.4 MG SL tablet  Commonly known as: NITROSTAT   place 1 tablet under the tongue if needed every 5 minutes for hair...  (REFER TO PRESCRIPTION NOTES).      nystatin [id number] UNIT/ML suspension  Commonly known as: MYCOSTATIN   500,000 Units, Swish & Swallow, 4 Times Daily      O2  Commonly known as: OXYGEN   2 L/min, Inhalation, Nightly, W/ CPAP       ondansetron 4 MG tablet  Commonly known as: ZOFRAN   4 mg, Oral, Every 8 Hours PRN      ranolazine 500 MG 12 hr tablet  Commonly known as: RANEXA   500 mg, Oral, Every 12 Hours Scheduled      Red Yeast Rice 600 MG tablet   600 mg, Oral, 2 Times Daily      umeclidinium-vilanterol 62.5-25 MCG/INH aerosol powder  inhaler  Commonly known as: ANORO ELLIPTA   1 puff, Inhalation, Daily         Stop These Medications    clindamycin 300 MG capsule  Commonly known as: CLEOCIN            Discharge Diet:   Diet Instructions     Diet: Consistent Carbohydrate, Cardiac      Discharge Diet:   Consistent Carbohydrate  Cardiac             Activity at Discharge:   Activity Instructions     Activity as Tolerated            Discharge Care Plan/Instructions:   : 1.  Follow-up with your primary care provider within 1 week of discharge.   2.  Use medications as prescribed.    Follow-up Appointments:   Future Appointments   Date Time Provider Department Center   1/21/2021  1:40 PM Markia Tuttle APRN [facility]       Test Results Pending at Discharge:   Pending Labs     Order Current Status    Blood Culture - Blood, Arm, Right Preliminary result    Blood Culture - Blood, Hand, Right Preliminary result          Ananda Curiel MD  11/03/20  11:29 CST    Time: 33 minutes of time was spent evaluating patient and planning discharge.      Part of this note may be an electronic transcription/translation of spoken language to printed text using the Dragon Dictation system.

## 2020-11-03 NOTE — PROGRESS NOTES
Discharge Planning Assessment  [facility]     Patient Name: Hasmukh Ham  MRN: [id number]  Today's Date: 11/3/2020    Admit Date: 10/31/2020    Discharge Needs Assessment     Row Name 11/03/20 0959       Living Environment    Able to Return to Prior Arrangements  yes       Transition Planning    Patient/Family Anticipates Transition to  home with help/services    Patient/Family Anticipated Services at Transition  home health care       Discharge Needs Assessment    Equipment Currently Used at Home  rollator;oxygen    Anticipated Changes Related to Illness  none    Patient's Choice of Community Agency(s)  Health system    Discharge Coordination/Progress  pt did not want to answer many questions, because he had been scammed in the past.  he told me that [facility] and that he had all the equipment that he needed at home.  has o2 from comm o2.  asked him to have his spouse bring a portable tank incase  he would need it for the ride  home.  he stated that he only used it prn.        Discharge Plan    No documentation.       Continued Care and Services - Admitted Since 10/31/2020    Coordination has not been started for this encounter.       Expected Discharge Date and Time     Expected Discharge Date Expected Discharge Time    Nov 3, 2020         Demographic Summary     Row Name 11/03/20 0958       General Information    Admission Type  observation    Arrived From  home    Required Notices Provided  Observation Status Notice    Referral Source  high risk screening    Reason for Consult  discharge planning    Preferred Language  English     Used During This Interaction  no        Functional Status    No documentation.       Psychosocial    No documentation.       Abuse/Neglect    No documentation.       Legal    No documentation.       Substance Abuse    No documentation.       Patient Forms    No documentation.           Delores Osullivan RN

## 2020-11-04 ENCOUNTER — READMISSION MANAGEMENT (OUTPATIENT)
Dept: CALL CENTER | Facility: HOSPITAL | Age: 85
End: 2020-11-04

## 2020-11-04 NOTE — OUTREACH NOTE
Prep Survey      Responses   [facility] facility patient discharged from?  [facility]   Is LACE score < 7 ?  No   Eligibility  Readm Mgmt   Discharge diagnosis  **Pneumonia of left lower lobe due to infectious organism   Does the patient have one of the following disease processes/diagnoses(primary or secondary)?  COPD/Pneumonia   Does the patient have Home health ordered?  Yes   What is the Home health agency?   [facility]    Is there a DME ordered?  No   Prep survey completed?  Yes          Rita Virgen RN

## 2020-11-05 LAB
BACTERIA SPEC AEROBE CULT: NORMAL
BACTERIA SPEC AEROBE CULT: NORMAL

## 2020-11-10 ENCOUNTER — READMISSION MANAGEMENT (OUTPATIENT)
Dept: CALL CENTER | Facility: HOSPITAL | Age: 85
End: 2020-11-10

## 2020-11-10 NOTE — OUTREACH NOTE
COPD/PN Week 1 Survey      Responses   [facility] [place] patient discharged from?  [place]   Does the patient have one of the following disease processes/diagnoses(primary or secondary)?  COPD/Pneumonia   Was the primary reason for admission:  Pneumonia   Week 1 attempt successful?  Yes   Call start time  1504   Call end time  1512   Discharge diagnosis  **Pneumonia of left lower lobe due to infectious organism   Is patient permission given to speak with other caregiver?  Yes   List who call center can speak with  wife   Meds reviewed with patient/caregiver?  Yes   Is the patient having any side effects they believe may be caused by any medication additions or changes?  No   Does the patient have all medications ordered at discharge?  Yes   Is the patient taking all medications as directed (includes completed medication regime)?  Yes   Does the patient have a primary care provider?   Yes   Does the patient have an appointment with their PCP or specialist within 7 days of discharge?  Yes   Comments regarding PCP  .   Has the patient kept scheduled appointments due by today?  Yes   What is the Home health agency?   [facility]    Has home health visited the patient within 72 hours of discharge?  Yes   What DME was ordered?  Wearing O2 at home.    Pulse Ox monitoring  Intermittent   Pulse Ox device source  Patient   O2 Sat comments  90% with O2 in place   O2 Sat: education provided  Sat levels   Psychosocial issues?  No   Comments  Pt reports that he is no better. He is unhappy that the MD did not help him at his f/u appt. He's uncertain why he's still having such SOA.    Did the patient receive a copy of their discharge instructions?  Yes   Nursing interventions  Reviewed instructions with patient   What is the patient's perception of their health status since discharge?  Same   Nursing Interventions  -- [attempted to educate pt on various topics but pt has his own strong opinions on each subject. ]   Is the  patient/caregiver able to teach back the hierarchy of who to call/visit for symptoms/problems? PCP, Specialist, Home health nurse, Urgent Care, ED, 911  Yes   Is the patient/caregiver able to teach back signs and symptoms of worsening condition:  Fever/chills, Shortness of breath, Chest pain   Is the patient/caregiver able to teach back importance of completing antibiotic course of treatment?  Yes   Week 1 call completed?  Yes   Revoked  No further contact(revokes)-requires comment          Jesscia Sierra RN

## 2020-11-18 ENCOUNTER — OFFICE VISIT (OUTPATIENT)
Dept: PODIATRY | Facility: CLINIC | Age: 85
End: 2020-11-18

## 2020-11-18 VITALS — HEIGHT: 67 IN | WEIGHT: 172 LBS | HEART RATE: 105 BPM | BODY MASS INDEX: 27 KG/M2 | OXYGEN SATURATION: 95 %

## 2020-11-18 DIAGNOSIS — G89.29 CHRONIC TOE PAIN, BILATERAL: ICD-10-CM

## 2020-11-18 DIAGNOSIS — E11.9 ENCOUNTER FOR DIABETIC FOOT EXAM (HCC): Primary | ICD-10-CM

## 2020-11-18 DIAGNOSIS — B35.1 ONYCHOMYCOSIS: ICD-10-CM

## 2020-11-18 DIAGNOSIS — E11.42 TYPE 2 DIABETES MELLITUS WITH PERIPHERAL NEUROPATHY (HCC): ICD-10-CM

## 2020-11-18 DIAGNOSIS — M79.674 CHRONIC TOE PAIN, BILATERAL: ICD-10-CM

## 2020-11-18 DIAGNOSIS — M79.675 CHRONIC TOE PAIN, BILATERAL: ICD-10-CM

## 2020-11-18 PROCEDURE — 11721 DEBRIDE NAIL 6 OR MORE: CPT | Performed by: PODIATRIST

## 2020-11-18 PROCEDURE — 99213 OFFICE O/P EST LOW 20 MIN: CPT | Performed by: PODIATRIST

## 2020-11-18 RX ORDER — RANOLAZINE 500 MG/1
TABLET, EXTENDED RELEASE ORAL
COMMUNITY
Start: 2020-10-23 | End: 2020-11-18

## 2020-11-18 RX ORDER — LEVOFLOXACIN 500 MG/1
TABLET, FILM COATED ORAL
COMMUNITY
Start: 2020-08-30 | End: 2021-01-01 | Stop reason: HOSPADM

## 2020-11-18 RX ORDER — LISINOPRIL 5 MG/1
5 TABLET ORAL DAILY
COMMUNITY
Start: 2020-03-23 | End: 2020-11-18

## 2020-11-18 RX ORDER — DOXYCYCLINE 100 MG/1
CAPSULE ORAL
COMMUNITY
Start: 2020-10-16 | End: 2021-01-01

## 2020-11-18 RX ORDER — DOXYCYCLINE HYCLATE 100 MG/1
CAPSULE ORAL
COMMUNITY
Start: 2020-09-23 | End: 2020-11-18

## 2020-11-18 RX ORDER — ARFORMOTEROL TARTRATE 15 UG/2ML
15 SOLUTION RESPIRATORY (INHALATION)
COMMUNITY
Start: 2020-10-14 | End: 2021-01-01 | Stop reason: HOSPADM

## 2020-11-18 RX ORDER — HYDROCODONE BITARTRATE AND ACETAMINOPHEN 7.5; 325 MG/1; MG/1
1 TABLET ORAL
COMMUNITY
Start: 2020-10-27 | End: 2021-01-01

## 2020-11-18 RX ORDER — NITROGLYCERIN 0.4 MG/1
0.4 TABLET SUBLINGUAL
COMMUNITY
Start: 2020-07-27 | End: 2020-11-18

## 2020-11-18 RX ORDER — CLOPIDOGREL BISULFATE 75 MG/1
TABLET ORAL
COMMUNITY
Start: 2020-05-26 | End: 2020-11-18

## 2020-11-18 RX ORDER — MECLIZINE HCL 12.5 MG/1
TABLET ORAL
COMMUNITY
Start: 2020-06-01 | End: 2021-01-01 | Stop reason: HOSPADM

## 2020-11-18 RX ORDER — PREDNISONE 20 MG/1
TABLET ORAL
COMMUNITY
Start: 2020-09-23 | End: 2021-01-01 | Stop reason: HOSPADM

## 2020-11-18 RX ORDER — FAMOTIDINE 20 MG/1
20 TABLET, FILM COATED ORAL
COMMUNITY
Start: 2020-10-26 | End: 2020-11-18

## 2020-11-18 RX ORDER — ONDANSETRON 4 MG/1
4 TABLET, ORALLY DISINTEGRATING ORAL
COMMUNITY
Start: 2020-09-16 | End: 2020-11-18

## 2020-11-18 RX ORDER — CEFUROXIME AXETIL 500 MG/1
500 TABLET ORAL
COMMUNITY
End: 2021-01-01 | Stop reason: HOSPADM

## 2020-11-18 NOTE — PROGRESS NOTES
Hasmukh Ham  6/1/1933  87 y.o. male  BS-113 this morning  PCP: Dr. Rey 11/9/2020  BS: 119 PER PATIENT     11/18/2020     Chief Complaint   Patient presents with   • Left Foot - DIABETIC FOOT CARE   • Right Foot - DIABETIC FOOT CARE       History of Present Illness    Hasmukh Ham is a 87 y.o.male who presents to clinic for diabetic foot care.  Today patient complains of long, thickened painful toenails.  Pain is relieved with debridement.  His blood sugars are well controlled.  Admits to occasional numbness and tingling in his feet.  No other complaints.    Past Medical History:   Diagnosis Date   • Basal cell carcinoma    • Bilateral carotid artery stenosis    • Bilateral carotid artery stenosis    • Bleeding disorder (CMS/HCC)    • CHF (congestive heart failure) (CMS/HCC)    • Chronic obstructive pulmonary disease (COPD) (CMS/HCC)    • Coronary arteriosclerosis    • Degenerative joint disease involving multiple joints    • Dementia (CMS/HCC)    • Diabetes mellitus (CMS/HCC)    • Heart problem    • History of stomach ulcers    • Hyperlipidemia    • Hypertension    • Ingrown toenail    • Myocardial infarction (CMS/HCC)          Past Surgical History:   Procedure Laterality Date   • BACK SURGERY     • CARDIAC CATHETERIZATION N/A 6/6/2017    Procedure: Right Heart Cath;  Surgeon: Jeff Maciel MD PhD;  Location: [facility] CATH INVASIVE LOCATION;  Service:    • CARDIAC CATHETERIZATION N/A 2/14/2018    Procedure: Coronary angiography;  Surgeon: Moisés Davila MD;  Location: [facility] CATH INVASIVE LOCATION;  Service:    • CORONARY ANGIOPLASTY WITH STENT PLACEMENT     • CORONARY STENT PLACEMENT     • HERNIA REPAIR     • LUNG BIOPSY     • LUNG SURGERY     • NECK SURGERY     • THORACOTOMY Left 1977   • VENTRAL HERNIA REPAIR           Family History   Problem Relation Age of Onset   • Hypertension Father    • Heart disease Father    • Diabetes Father    • Diabetes Mother    • Lung disease Other    •  Cancer Other        Allergies   Allergen Reactions   • Atorvastatin Anaphylaxis   • Penicillins Rash   • Pravastatin      Myalgia   • Azithromycin Rash   • Biaxin [Clarithromycin] Rash       Social History     Socioeconomic History   • Marital status:      Spouse name: Not on file   • Number of children: Not on file   • Years of education: Not on file   • Highest education level: Not on file   Tobacco Use   • Smoking status: Former Smoker   • Smokeless tobacco: Never Used   Substance and Sexual Activity   • Alcohol use: No   • Drug use: No   • Sexual activity: Not Currently     Comment:          Current Outpatient Medications   Medication Sig Dispense Refill   • acetaminophen (TYLENOL) 325 MG tablet Take 2 tablets by mouth Every 4 (Four) Hours As Needed for Mild Pain .     • albuterol (PROVENTIL) (2.5 MG/3ML) 0.083% nebulizer solution Take 2.5 mg by nebulization Every 4 (Four) Hours As Needed for Wheezing. 125 vial 0   • arformoterol (BROVANA) 15 MCG/2ML nebulizer solution Inhale 15 mcg.     • Brovana 15 MCG/2ML nebulizer solution TK 2 MLS BY NEBULIZATION BID     • cefuroxime (CEFTIN) 500 MG tablet Take 500 mg by mouth.     • clopidogrel (PLAVIX) 75 MG tablet Take 75 mg by mouth Daily.     • diphenhydrAMINE (BENADRYL) 25 mg capsule Take 25 mg by mouth Every 6 (Six) Hours As Needed for Itching.     • doxycycline (MONODOX) 100 MG capsule      • famotidine (PEPCID) 20 MG tablet Take 20 mg by mouth 2 (Two) Times a Day.     • fluticasone (FLONASE) 50 MCG/ACT nasal spray 2 sprays into each nostril Daily.     • Fluticasone Furoate (ARNUITY ELLIPTA) 200 MCG/ACT aerosol powder  Inhale.     • furosemide (LASIX) 20 MG tablet Take 1 tablet by mouth Daily. 90 tablet 3   • glimepiride (AMARYL) 2 MG tablet Take 2 mg by mouth Every Morning Before Breakfast.     • HYDROcodone-acetaminophen (NORCO) 7.5-325 MG per tablet Take 1 tablet by mouth Every 8 (Eight) Hours.     • HYDROcodone-acetaminophen (NORCO) 7.5-325 MG per  "tablet Take 1 tablet by mouth.     • ipratropium-albuterol (DUO-NEB) 0.5-2.5 mg/mL nebulizer Take 3 mL by nebulization 4 (Four) Times a Day. 120 vial 1   • levoFLOXacin (LEVAQUIN) 500 MG tablet      • lisinopril (PRINIVIL,ZESTRIL) 10 MG tablet Take 0.5 tablets by mouth Daily. 30 tablet 6   • magic mouthwash oral suspension Swish and swallow 5 mL Every 4 (Four) Hours As Needed (sore throat). 202.5 mL 0   • meclizine (ANTIVERT) 12.5 MG tablet 1 tablet po q 6 hr prn severe nausea     • mupirocin (BACTROBAN) 2 % ointment USE IN NOSTRILS BID FOR 5 DAYS. PRESS SIDES OF NOSE TOGETGER AND MASSAGE GENTLY     • nitroglycerin (NITROSTAT) 0.4 MG SL tablet place 1 tablet under the tongue if needed every 5 minutes for hair...  (REFER TO PRESCRIPTION NOTES).  0   • nystatin (MYCOSTATIN) 112145 UNIT/ML suspension Swish and swallow 5 mL 4 (Four) Times a Day. 60 mL 0   • O2 (OXYGEN) Inhale 2 L/min Every Night. W/ CPAP     • ondansetron (ZOFRAN) 4 MG tablet Take 4 mg by mouth Every 8 (Eight) Hours As Needed for Nausea or Vomiting.     • predniSONE (DELTASONE) 20 MG tablet      • ranolazine (RANEXA) 500 MG 12 hr tablet Take 1 tablet by mouth Every 12 (Twelve) Hours. 30 tablet 1   • Red Yeast Rice 600 MG tablet Take 600 mg by mouth 2 (Two) Times a Day.     • Trelegy Ellipta 100-62.5-25 MCG/INH aerosol powder       • Umeclidinium-Vilanterol 62.5-25 MCG/INH aerosol powder  Inhale 1 puff Daily. 1 each 11     No current facility-administered medications for this visit.          OBJECTIVE    Pulse 105   Ht 170.2 cm (67\")   Wt 78 kg (172 lb)   SpO2 95%   BMI 26.94 kg/m²       Review of Systems   Constitutional: Negative.    HENT: Positive for hearing loss.    Eyes: Negative.    Respiratory: Positive for shortness of breath.    Cardiovascular: Negative.    Gastrointestinal: Negative.    Endocrine: Negative.    Genitourinary: Negative.    Musculoskeletal: Positive for arthralgias, back pain and joint swelling.        Foot pain  Joint " pain  Toe pain    Skin:        Callus  Toenail pain     Psychiatric/Behavioral: Negative.            Physical Exam   Constitutional: He is oriented to person, place, and time. He appears well-developed. No distress.   Pulmonary/Chest: Effort normal. No respiratory distress.    Hasmukh had a diabetic foot exam performed today.   During the foot exam he had a monofilament test performed.  Neurological: He is alert and oriented to person, place, and time.   Psychiatric: His behavior is normal.   Vitals reviewed.      Lower Extremity    Cardiovascular:    DP/PT pulses palpable    CFT brisk  to all digits  Skin temp is warm to warm from proximal tibia to distal digits  Pedal hair growth present.   No erythema or edema noted   Musculoskeletal:  Muscle strength is 5/5 for all muscle groups tested   ROM of the 1st MTP is decreased without pain or crepitus   ROM of the ankle joint is decreased without pain or crepitus    Rectus left second digit  Dermatological:   Nails 1-5 are thickened, discolored, elongated with subungual debris.  Pain on palpation to the nail plates.  Skin is warm, dry and intact    Webspaces 1-4 bilateral are clean, dry and intact.   No subcutaneous nodules or masses noted    No open wounds noted   Neurological:   Protective sensation decreased   Sensation intact to light touch          Procedures      ASSESSMENT AND PLAN    Diagnoses and all orders for this visit:    1. Encounter for diabetic foot exam (CMS/[facility]) (Primary)    2. Type 2 diabetes mellitus with peripheral neuropathy (CMS/[facility])    3. Onychomycosis    4. Chronic toe pain, bilateral      - A diabetic foot screening exam was performed and the patient was educated on the foot complications related to diabetes,  preventative foot care and what signs and symptoms to watch for.  Instructed to contact our office if any foot problems develop before next visit.  - Nails 1-5 bilateral were debrided in length and thickness with nail nipper and electric  daniela to decrease fungal load and risk of infection.  - All the patients questions were answered.  - RTC 3 months or sooner if needed.          This document has been electronically signed by Davon Mccurdy DPM on November 19, 2020 15:06 CST     15:06 CST

## 2020-12-21 ENCOUNTER — TELEPHONE (OUTPATIENT)
Dept: ORTHOPEDIC SURGERY | Facility: CLINIC | Age: 85
End: 2020-12-21

## 2020-12-21 NOTE — TELEPHONE ENCOUNTER
ADAMA PLATA     Pt WIFE CALLED STATING HER  IS STARTING TO HURT IN HIS SHOULDER AND IS WANTING TO KNOW IF HE CAN COME EARLY FOR AN INJECTION?     PLEASE ADVISE   CALL BACK # [phone]

## 2020-12-29 ENCOUNTER — OFFICE VISIT (OUTPATIENT)
Dept: ORTHOPEDIC SURGERY | Facility: CLINIC | Age: 85
End: 2020-12-29

## 2020-12-29 VITALS — HEIGHT: 67 IN | BODY MASS INDEX: 27.5 KG/M2 | WEIGHT: 175.2 LBS

## 2020-12-29 DIAGNOSIS — M15.9 PRIMARY OSTEOARTHRITIS INVOLVING MULTIPLE JOINTS: ICD-10-CM

## 2020-12-29 DIAGNOSIS — S46.811S PARTIAL TEAR OF RIGHT SUBSCAPULARIS TENDON, SEQUELA: ICD-10-CM

## 2020-12-29 DIAGNOSIS — M25.511 CHRONIC RIGHT SHOULDER PAIN: Primary | ICD-10-CM

## 2020-12-29 DIAGNOSIS — M75.101 ROTATOR CUFF SYNDROME OF RIGHT SHOULDER: ICD-10-CM

## 2020-12-29 DIAGNOSIS — G89.29 CHRONIC RIGHT SHOULDER PAIN: Primary | ICD-10-CM

## 2020-12-29 PROCEDURE — 99213 OFFICE O/P EST LOW 20 MIN: CPT | Performed by: NURSE PRACTITIONER

## 2020-12-29 PROCEDURE — 20610 DRAIN/INJ JOINT/BURSA W/O US: CPT | Performed by: NURSE PRACTITIONER

## 2020-12-29 RX ADMIN — TRIAMCINOLONE ACETONIDE 80 MG: 40 INJECTION, SUSPENSION INTRA-ARTICULAR; INTRAMUSCULAR at 10:36

## 2020-12-29 RX ADMIN — LIDOCAINE HYDROCHLORIDE 2 ML: 10 INJECTION, SOLUTION INFILTRATION; PERINEURAL at 10:36

## 2020-12-29 NOTE — PROGRESS NOTES
"Hasmukh Ham is a 87 y.o. male returns for     Chief Complaint   Patient presents with   • Right Shoulder - Follow-up       HISTORY OF PRESENT ILLNESS: Patient presents to office for follow-up of chronic right shoulder pain due to osteoarthritis and chronic rotator cuff tears.  This has been an ongoing issue for several years.  Patient typically gets good pain improvement with steroid injections in his right shoulder and wants to repeat this today.  The injections only seem to last him 7 to 8 weeks and then the pain begins to return.  The wife reports that the patient has \"really been suffering\" with his right shoulder pain recently.  Patient continues to have increased pain at nighttime and difficulty sleeping due to shoulder pain.  Last injection was given on 10/20/2020, which did offer him some pain improvement for a few weeks.  Patient denies any injuries or falls since last office visit.  He has no new complaints or concerns regarding his right shoulder.  Patient takes Norco 7.5 mg tablets for chronic pain per his primary care physician.     CONCURRENT MEDICAL HISTORY:    The following portions of the patient's history were reviewed and updated as appropriate: allergies, current medications, past family history, past medical history, past social history, past surgical history and problem list.     ROS  No fevers or chills.  No chest pain or shortness of air.  No GI or  disturbances. Right shoulder pain.     PHYSICAL EXAMINATION:       Ht 170.2 cm (67\")   Wt 79.5 kg (175 lb 3.2 oz)   BMI 27.44 kg/m²     Physical Exam  Vitals signs reviewed.   Constitutional:       General: He is not in acute distress.     Appearance: He is well-developed. He is not ill-appearing.   HENT:      Head: Normocephalic.   Pulmonary:      Effort: Pulmonary effort is normal. No respiratory distress.   Abdominal:      General: There is no distension.      Palpations: Abdomen is soft.   Musculoskeletal:         General: Tenderness " (Right shoulder) present. No swelling, deformity or signs of injury.   Skin:     General: Skin is warm and dry.      Capillary Refill: Capillary refill takes less than 2 seconds.      Findings: No erythema.   Neurological:      Mental Status: He is alert and oriented to person, place, and time.      GCS: GCS eye subscore is 4. GCS verbal subscore is 5. GCS motor subscore is 6.   Psychiatric:         Speech: Speech normal.         Behavior: Behavior normal.         Thought Content: Thought content normal.         Judgment: Judgment normal.         GAIT:     []  Normal  [x]  Antalgic    Assistive device: [x]  None  []  Walker     []  Crutches  []  Cane     []  Wheelchair  []  Stretcher    Right Shoulder Exam     Tenderness   Right shoulder tenderness location: Mild, diffuse.    Range of Motion   Active abduction: 90   Passive abduction: 130   Forward flexion: 110     Muscle Strength   Abduction: 3/5   Supraspinatus: 3/5     Tests   Carl test: positive  Cross arm: positive  Impingement: positive  Drop arm: positive    Other   Erythema: absent  Sensation: normal  Pulse: present    Comments:  Pain and limitations with range of motion.  Weakness is present.  No deformity.  No swelling appreciated.  No erythema.  No warmth.  No signs of infection noted.  Neurovascularly intact.      Left Shoulder Exam     Tenderness   The patient is experiencing no tenderness.     Range of Motion   Active abduction: 130   Passive abduction: 160   Forward flexion: 150     Muscle Strength   Abduction: 4/5   Supraspinatus: 4/5     Tests   Carl test: negative  Cross arm: negative  Impingement: negative  Drop arm: negative    Other   Erythema: absent  Sensation: normal  Pulse: present             Xr Shoulder 2+ View Right     Result Date: 6/25/2020  Narrative: 3 views of the right shoulder reveal no evidence of acute fracture or dislocation.  There are age-related degenerative changes noted.  There are degenerative changes noted at the  acromioclavicular joint with diminished joint spacing and an osteophyte formation at the distal clavicle projecting inferiorly.  The humeral head is in an elevated position in relation to the glenoid, suggestive of longstanding rotator cuff tear. Soft tissues appear unremarkable.  No acute bony radiologic abnormalities are noted at this time.  No significant changes are noted when compared with prior images from 6/19/2017.06/25/20 at 4:55 PM by EVE Abdullahi      X-ray arthrogram shoulder right per contrast protocol 6/28/2017  [facility]        Result Narrative   Indication:  Right shoulder pain.    Right Shoulder Arthrogram:    Procedure:  Following standard sterile precautions, fluoroscopy was used to guide a 25-gauge needle into the shoulder joint.  2 cc of Lidocaine and 8 cc of Omnipaque 240 were injected uneventfully.  There was 1 minute 45 seconds of fluoroscopy.  There   were 6 spot films.    Findings:  There is an extensive rotator cuff tear and the humeral head lies under the acromion.  The patient was sent to CT.   Status Results Details     Encounter Summary   CT shoulder right with contrast per contrast protocol 6/28/2017  [facility]  Result Impression       1.  Supraspinatus, infraspinatus, and long head of the biceps are chronically torn and the muscle bellies are atrophic.    2.  The humeral head has migrated superiorly and articulates under the acromion and the AC joint.    3.  The glenohumeral joint is in satisfactory condition.    4.  Most of the subscapularis tendon is attached, but a small portion of the upper tendon is torn.   Result Narrative   Indication:  Shoulder pain.    CT, Right Shoulder after arthrogram:  No destructive lesion or fracture.  The humeral head has migrated superiorly and is articulating under the acromion and the hypertrophied AC joint.  The supraspinatus and infraspinous tendons are chronically torn and  retracted with a atrophic muscle bellies.  The long  head of the biceps is not seen and is also probably chronically torn.  The glenohumeral joint is in satisfactory condition.  The upper third of the subscapularis tendon is chronically torn with most of  the tendon remaining attached.         ASSESSMENT:    Diagnoses and all orders for this visit:    Chronic right shoulder pain  -     Large Joint Arthrocentesis: R subacromial bursa    Rotator cuff syndrome of right shoulder  -     Large Joint Arthrocentesis: R subacromial bursa    Partial tear of right subscapularis tendon, sequela  -     Large Joint Arthrocentesis: R subacromial bursa    Primary osteoarthritis involving multiple joints    PLAN    Large Joint Arthrocentesis: R subacromial bursa  Date/Time: 12/29/2020 10:36 AM  Consent given by: patient  Timeout: Immediately prior to procedure a time out was called to verify the correct patient, procedure, equipment, support staff and site/side marked as required   Supporting Documentation  Indications: pain and diagnostic evaluation   Procedure Details  Location: shoulder - R subacromial bursa  Preparation: Patient was prepped and draped in the usual sterile fashion  Needle size: 22 G  Approach: posterior  Medications administered: 2 mL lidocaine 1 %; 80 mg triamcinolone acetonide 40 MG/ML  Patient tolerance: patient tolerated the procedure well with no immediate complications      Patient continues to complain of persistent and chronic right shoulder pain due to known degenerative changes, osteoarthritis and chronic rotator cuff tears.  This has been an ongoing issue for several years.  He does not have any new complaints today but states the pain has been poorly controlled recently.  His last injection helped his pain but only for about 8 weeks and then the pain began to return and has been severe recently.  Patient complains of sleep disturbance and increased pain at nighttime.  Patient wants to repeat an injection today to help with his pain.  Recommend a repeat  subacromial injection of steroid to the right shoulder today for management of pain/inflammation.  Recommend gentle, progressive range of motion exercises as tolerated and based on his pain.  Recommend use of ice and/or heat therapy to the right shoulder as needed to minimize pain/inflammation/muscle tension.  Patient takes Norco 7.5 mg tablets for chronic pain per his PCP.  Patient is unable to take oral NSAIDs due to his advanced age and chronic use of Plavix for anticoagulation therapy.  Follow-up in 8 weeks for recheck as needed for any new, worsening or persistent symptoms.  Patient may could benefit from a referral to physical therapy.  He is not currently interested in a surgical consult and would likely not be a surgical candidate due to his complex medical history and advanced age of 87 years old.    Return in about 8 weeks (around 2/23/2021), or if symptoms worsen or fail to improve, for Recheck.      This document has been electronically signed by EVE Abdullahi on December 30, 2020 17:09 CST      EVE Abdullahi

## 2020-12-30 RX ORDER — TRIAMCINOLONE ACETONIDE 40 MG/ML
80 INJECTION, SUSPENSION INTRA-ARTICULAR; INTRAMUSCULAR
Status: COMPLETED | OUTPATIENT
Start: 2020-12-29 | End: 2020-12-29

## 2020-12-30 RX ORDER — LIDOCAINE HYDROCHLORIDE 10 MG/ML
2 INJECTION, SOLUTION INFILTRATION; PERINEURAL
Status: COMPLETED | OUTPATIENT
Start: 2020-12-29 | End: 2020-12-29

## 2021-01-01 ENCOUNTER — LAB (OUTPATIENT)
Dept: LAB | Facility: OTHER | Age: 86
End: 2021-01-01

## 2021-01-01 ENCOUNTER — APPOINTMENT (OUTPATIENT)
Dept: GENERAL RADIOLOGY | Facility: HOSPITAL | Age: 86
End: 2021-01-01

## 2021-01-01 ENCOUNTER — READMISSION MANAGEMENT (OUTPATIENT)
Dept: CALL CENTER | Facility: HOSPITAL | Age: 86
End: 2021-01-01

## 2021-01-01 ENCOUNTER — DOCUMENTATION (OUTPATIENT)
Dept: CARDIAC REHAB | Facility: HOSPITAL | Age: 86
End: 2021-01-01

## 2021-01-01 ENCOUNTER — HOSPITAL ENCOUNTER (INPATIENT)
Facility: HOSPITAL | Age: 86
LOS: 1 days | Discharge: HOME HEALTH | End: 2021-10-29
Attending: EMERGENCY MEDICINE | Admitting: INTERNAL MEDICINE

## 2021-01-01 ENCOUNTER — HOSPITAL ENCOUNTER (OUTPATIENT)
Facility: HOSPITAL | Age: 86
Setting detail: HOSPITAL OUTPATIENT SURGERY
End: 2021-01-01
Attending: INTERNAL MEDICINE | Admitting: INTERNAL MEDICINE

## 2021-01-01 ENCOUNTER — APPOINTMENT (OUTPATIENT)
Dept: ULTRASOUND IMAGING | Facility: HOSPITAL | Age: 86
End: 2021-01-01

## 2021-01-01 ENCOUNTER — OFFICE VISIT (OUTPATIENT)
Dept: ORTHOPEDIC SURGERY | Facility: CLINIC | Age: 86
End: 2021-01-01

## 2021-01-01 ENCOUNTER — OFFICE VISIT (OUTPATIENT)
Dept: PODIATRY | Facility: CLINIC | Age: 86
End: 2021-01-01

## 2021-01-01 ENCOUNTER — TRANSCRIBE ORDERS (OUTPATIENT)
Dept: LAB | Facility: HOSPITAL | Age: 86
End: 2021-01-01

## 2021-01-01 ENCOUNTER — OFFICE VISIT (OUTPATIENT)
Dept: CARDIOLOGY | Facility: CLINIC | Age: 86
End: 2021-01-01

## 2021-01-01 ENCOUNTER — HOSPITAL ENCOUNTER (EMERGENCY)
Facility: HOSPITAL | Age: 86
Discharge: HOME | End: 2021-03-11
Attending: FAMILY MEDICINE | Admitting: FAMILY MEDICINE

## 2021-01-01 ENCOUNTER — APPOINTMENT (OUTPATIENT)
Dept: CT IMAGING | Facility: HOSPITAL | Age: 86
End: 2021-01-01

## 2021-01-01 ENCOUNTER — APPOINTMENT (OUTPATIENT)
Dept: PULMONOLOGY | Facility: HOSPITAL | Age: 86
End: 2021-01-01

## 2021-01-01 ENCOUNTER — HOSPITAL ENCOUNTER (INPATIENT)
Facility: HOSPITAL | Age: 86
LOS: 11 days | Discharge: HOME HEALTH | End: 2021-12-13
Attending: FAMILY MEDICINE | Admitting: STUDENT IN AN ORGANIZED HEALTH CARE EDUCATION/TRAINING PROGRAM

## 2021-01-01 ENCOUNTER — APPOINTMENT (OUTPATIENT)
Dept: CARDIOLOGY | Facility: HOSPITAL | Age: 86
End: 2021-01-01

## 2021-01-01 ENCOUNTER — TELEPHONE (OUTPATIENT)
Dept: CARDIOLOGY | Facility: CLINIC | Age: 86
End: 2021-01-01

## 2021-01-01 ENCOUNTER — TELEPHONE (OUTPATIENT)
Dept: ORTHOPEDIC SURGERY | Facility: CLINIC | Age: 86
End: 2021-01-01

## 2021-01-01 ENCOUNTER — HOSPITAL ENCOUNTER (EMERGENCY)
Facility: HOSPITAL | Age: 86
Discharge: HOME | End: 2021-05-28
Attending: STUDENT IN AN ORGANIZED HEALTH CARE EDUCATION/TRAINING PROGRAM | Admitting: STUDENT IN AN ORGANIZED HEALTH CARE EDUCATION/TRAINING PROGRAM

## 2021-01-01 ENCOUNTER — HOSPITAL ENCOUNTER (EMERGENCY)
Facility: HOSPITAL | Age: 86
Discharge: HOME | End: 2021-07-08
Attending: EMERGENCY MEDICINE | Admitting: EMERGENCY MEDICINE

## 2021-01-01 ENCOUNTER — HOSPITAL ENCOUNTER (INPATIENT)
Facility: HOSPITAL | Age: 86
LOS: 9 days | Discharge: HOME HEALTH | End: 2021-06-29
Attending: EMERGENCY MEDICINE | Admitting: INTERNAL MEDICINE

## 2021-01-01 VITALS
RESPIRATION RATE: 18 BRPM | HEIGHT: 68 IN | SYSTOLIC BLOOD PRESSURE: 138 MMHG | WEIGHT: 165 LBS | TEMPERATURE: 98.7 F | BODY MASS INDEX: 25.01 KG/M2 | OXYGEN SATURATION: 97 % | DIASTOLIC BLOOD PRESSURE: 91 MMHG | HEART RATE: 80 BPM

## 2021-01-01 VITALS
TEMPERATURE: 97.2 F | DIASTOLIC BLOOD PRESSURE: 79 MMHG | OXYGEN SATURATION: 96 % | SYSTOLIC BLOOD PRESSURE: 107 MMHG | WEIGHT: 146.9 LBS | HEIGHT: 67 IN | RESPIRATION RATE: 18 BRPM | BODY MASS INDEX: 23.06 KG/M2 | HEART RATE: 75 BPM

## 2021-01-01 VITALS
TEMPERATURE: 97.2 F | HEART RATE: 68 BPM | HEIGHT: 69 IN | BODY MASS INDEX: 22.51 KG/M2 | SYSTOLIC BLOOD PRESSURE: 111 MMHG | WEIGHT: 152 LBS | OXYGEN SATURATION: 97 % | DIASTOLIC BLOOD PRESSURE: 80 MMHG | RESPIRATION RATE: 20 BRPM

## 2021-01-01 VITALS — BODY MASS INDEX: 23.64 KG/M2 | HEART RATE: 71 BPM | WEIGHT: 156 LBS | HEIGHT: 68 IN | OXYGEN SATURATION: 97 %

## 2021-01-01 VITALS — BODY MASS INDEX: 25.91 KG/M2 | HEIGHT: 68 IN | WEIGHT: 171 LBS

## 2021-01-01 VITALS
TEMPERATURE: 97.1 F | DIASTOLIC BLOOD PRESSURE: 60 MMHG | BODY MASS INDEX: 22.51 KG/M2 | WEIGHT: 152 LBS | HEART RATE: 55 BPM | OXYGEN SATURATION: 97 % | SYSTOLIC BLOOD PRESSURE: 100 MMHG | HEIGHT: 69 IN

## 2021-01-01 VITALS — HEART RATE: 52 BPM | HEIGHT: 67 IN | BODY MASS INDEX: 27.31 KG/M2 | OXYGEN SATURATION: 98 % | WEIGHT: 174 LBS

## 2021-01-01 VITALS
TEMPERATURE: 98.2 F | HEART RATE: 73 BPM | BODY MASS INDEX: 23.64 KG/M2 | DIASTOLIC BLOOD PRESSURE: 67 MMHG | WEIGHT: 156 LBS | HEIGHT: 68 IN | RESPIRATION RATE: 18 BRPM | SYSTOLIC BLOOD PRESSURE: 103 MMHG | OXYGEN SATURATION: 97 %

## 2021-01-01 VITALS
OXYGEN SATURATION: 96 % | SYSTOLIC BLOOD PRESSURE: 135 MMHG | WEIGHT: 160.8 LBS | HEART RATE: 83 BPM | TEMPERATURE: 97.4 F | DIASTOLIC BLOOD PRESSURE: 79 MMHG | BODY MASS INDEX: 25.24 KG/M2 | HEIGHT: 67 IN | RESPIRATION RATE: 18 BRPM

## 2021-01-01 VITALS
OXYGEN SATURATION: 100 % | RESPIRATION RATE: 20 BRPM | HEART RATE: 68 BPM | HEIGHT: 68 IN | DIASTOLIC BLOOD PRESSURE: 62 MMHG | WEIGHT: 169 LBS | SYSTOLIC BLOOD PRESSURE: 120 MMHG | TEMPERATURE: 97.9 F | BODY MASS INDEX: 25.61 KG/M2

## 2021-01-01 VITALS — BODY MASS INDEX: 27.31 KG/M2 | WEIGHT: 174 LBS | HEIGHT: 67 IN

## 2021-01-01 VITALS
BODY MASS INDEX: 23.04 KG/M2 | SYSTOLIC BLOOD PRESSURE: 128 MMHG | HEART RATE: 58 BPM | DIASTOLIC BLOOD PRESSURE: 82 MMHG | WEIGHT: 152 LBS | OXYGEN SATURATION: 97 % | HEIGHT: 68 IN

## 2021-01-01 VITALS
BODY MASS INDEX: 23.04 KG/M2 | DIASTOLIC BLOOD PRESSURE: 80 MMHG | SYSTOLIC BLOOD PRESSURE: 130 MMHG | OXYGEN SATURATION: 98 % | HEART RATE: 98 BPM | HEIGHT: 68 IN | WEIGHT: 152 LBS | TEMPERATURE: 97.8 F

## 2021-01-01 VITALS
WEIGHT: 152 LBS | DIASTOLIC BLOOD PRESSURE: 66 MMHG | OXYGEN SATURATION: 96 % | SYSTOLIC BLOOD PRESSURE: 102 MMHG | HEART RATE: 103 BPM | HEIGHT: 69 IN | BODY MASS INDEX: 22.51 KG/M2

## 2021-01-01 VITALS — WEIGHT: 178 LBS | BODY MASS INDEX: 27.94 KG/M2 | HEIGHT: 67 IN

## 2021-01-01 VITALS — WEIGHT: 152 LBS | BODY MASS INDEX: 22.51 KG/M2 | HEIGHT: 69 IN

## 2021-01-01 DIAGNOSIS — I25.10 CORONARY ARTERY DISEASE INVOLVING NATIVE CORONARY ARTERY OF NATIVE HEART WITHOUT ANGINA PECTORIS: ICD-10-CM

## 2021-01-01 DIAGNOSIS — B35.1 ONYCHOMYCOSIS: ICD-10-CM

## 2021-01-01 DIAGNOSIS — D72.829 LEUKOCYTOSIS, UNSPECIFIED TYPE: Primary | ICD-10-CM

## 2021-01-01 DIAGNOSIS — R55 RECURRENT SYNCOPE: Primary | ICD-10-CM

## 2021-01-01 DIAGNOSIS — M25.561 CHRONIC PAIN OF RIGHT KNEE: ICD-10-CM

## 2021-01-01 DIAGNOSIS — N18.30 STAGE 3 CHRONIC KIDNEY DISEASE, UNSPECIFIED WHETHER STAGE 3A OR 3B CKD (HCC): Primary | ICD-10-CM

## 2021-01-01 DIAGNOSIS — M19.012 PRIMARY OSTEOARTHRITIS OF LEFT SHOULDER: ICD-10-CM

## 2021-01-01 DIAGNOSIS — I10 PRIMARY HYPERTENSION: Chronic | ICD-10-CM

## 2021-01-01 DIAGNOSIS — G89.29 CHRONIC LEFT SHOULDER PAIN: ICD-10-CM

## 2021-01-01 DIAGNOSIS — I48.92 ATRIAL FIBRILLATION AND FLUTTER (HCC): Primary | Chronic | ICD-10-CM

## 2021-01-01 DIAGNOSIS — I36.1 NON-RHEUMATIC TRICUSPID VALVE INSUFFICIENCY: ICD-10-CM

## 2021-01-01 DIAGNOSIS — G89.29 CHRONIC TOE PAIN, BILATERAL: ICD-10-CM

## 2021-01-01 DIAGNOSIS — I25.10 CORONARY ARTERY DISEASE INVOLVING NATIVE CORONARY ARTERY OF NATIVE HEART WITHOUT ANGINA PECTORIS: Chronic | ICD-10-CM

## 2021-01-01 DIAGNOSIS — Z78.9 IMPAIRED MOBILITY AND ADLS: ICD-10-CM

## 2021-01-01 DIAGNOSIS — E11.9 ENCOUNTER FOR DIABETIC FOOT EXAM (HCC): Primary | ICD-10-CM

## 2021-01-01 DIAGNOSIS — R06.02 SOB (SHORTNESS OF BREATH): Primary | ICD-10-CM

## 2021-01-01 DIAGNOSIS — M25.619 LIMITED RANGE OF MOTION (ROM) OF SHOULDER: ICD-10-CM

## 2021-01-01 DIAGNOSIS — G89.29 CHRONIC LEFT SHOULDER PAIN: Primary | ICD-10-CM

## 2021-01-01 DIAGNOSIS — R06.09 DYSPNEA ON EXERTION: ICD-10-CM

## 2021-01-01 DIAGNOSIS — M25.551 RIGHT HIP PAIN: Primary | ICD-10-CM

## 2021-01-01 DIAGNOSIS — G89.29 CHRONIC PAIN OF RIGHT KNEE: ICD-10-CM

## 2021-01-01 DIAGNOSIS — Z74.09 IMPAIRED MOBILITY AND ADLS: ICD-10-CM

## 2021-01-01 DIAGNOSIS — Z74.09 IMPAIRED FUNCTIONAL MOBILITY, BALANCE, GAIT, AND ENDURANCE: ICD-10-CM

## 2021-01-01 DIAGNOSIS — R77.8 ELEVATED TROPONIN LEVEL: ICD-10-CM

## 2021-01-01 DIAGNOSIS — I48.92 ATRIAL FIBRILLATION AND FLUTTER (HCC): Primary | ICD-10-CM

## 2021-01-01 DIAGNOSIS — M79.602 LEFT ARM PAIN: Primary | ICD-10-CM

## 2021-01-01 DIAGNOSIS — M79.674 CHRONIC TOE PAIN, BILATERAL: ICD-10-CM

## 2021-01-01 DIAGNOSIS — J96.11 CHRONIC RESPIRATORY FAILURE WITH HYPOXIA (HCC): Primary | Chronic | ICD-10-CM

## 2021-01-01 DIAGNOSIS — R55 VASOVAGAL SYNCOPE: ICD-10-CM

## 2021-01-01 DIAGNOSIS — I21.4 NSTEMI, INITIAL EPISODE OF CARE (HCC): ICD-10-CM

## 2021-01-01 DIAGNOSIS — M25.511 CHRONIC RIGHT SHOULDER PAIN: Primary | ICD-10-CM

## 2021-01-01 DIAGNOSIS — I48.91 ATRIAL FIBRILLATION AND FLUTTER (HCC): Primary | ICD-10-CM

## 2021-01-01 DIAGNOSIS — I48.91 ATRIAL FIBRILLATION AND FLUTTER (HCC): Primary | Chronic | ICD-10-CM

## 2021-01-01 DIAGNOSIS — M11.261 CHONDROCALCINOSIS OF RIGHT KNEE: ICD-10-CM

## 2021-01-01 DIAGNOSIS — G89.29 CHRONIC RIGHT SHOULDER PAIN: Primary | ICD-10-CM

## 2021-01-01 DIAGNOSIS — M75.121 NONTRAUMATIC COMPLETE TEAR OF RIGHT ROTATOR CUFF: ICD-10-CM

## 2021-01-01 DIAGNOSIS — M79.674 CHRONIC TOE PAIN, BILATERAL: Primary | ICD-10-CM

## 2021-01-01 DIAGNOSIS — J18.9 PNEUMONIA OF BOTH LUNGS DUE TO INFECTIOUS ORGANISM, UNSPECIFIED PART OF LUNG: ICD-10-CM

## 2021-01-01 DIAGNOSIS — R07.2 PRECORDIAL PAIN: ICD-10-CM

## 2021-01-01 DIAGNOSIS — M79.675 CHRONIC TOE PAIN, BILATERAL: Primary | ICD-10-CM

## 2021-01-01 DIAGNOSIS — M79.675 CHRONIC TOE PAIN, BILATERAL: ICD-10-CM

## 2021-01-01 DIAGNOSIS — M17.11 PRIMARY OSTEOARTHRITIS OF RIGHT KNEE: ICD-10-CM

## 2021-01-01 DIAGNOSIS — M75.102 ROTATOR CUFF SYNDROME OF LEFT SHOULDER: ICD-10-CM

## 2021-01-01 DIAGNOSIS — M25.512 CHRONIC LEFT SHOULDER PAIN: ICD-10-CM

## 2021-01-01 DIAGNOSIS — M17.12 ARTHRITIS OF KNEE, LEFT: ICD-10-CM

## 2021-01-01 DIAGNOSIS — E11.42 TYPE 2 DIABETES MELLITUS WITH PERIPHERAL NEUROPATHY (HCC): Primary | ICD-10-CM

## 2021-01-01 DIAGNOSIS — M75.101 ROTATOR CUFF SYNDROME OF RIGHT SHOULDER: ICD-10-CM

## 2021-01-01 DIAGNOSIS — I48.92 ATRIAL FIBRILLATION AND FLUTTER (HCC): ICD-10-CM

## 2021-01-01 DIAGNOSIS — M19.019 AC JOINT ARTHROPATHY: ICD-10-CM

## 2021-01-01 DIAGNOSIS — M25.512 CHRONIC LEFT SHOULDER PAIN: Primary | ICD-10-CM

## 2021-01-01 DIAGNOSIS — R51.9 NONINTRACTABLE HEADACHE, UNSPECIFIED CHRONICITY PATTERN, UNSPECIFIED HEADACHE TYPE: ICD-10-CM

## 2021-01-01 DIAGNOSIS — R77.8 ELEVATED TROPONIN: Primary | ICD-10-CM

## 2021-01-01 DIAGNOSIS — I48.91 ATRIAL FIBRILLATION AND FLUTTER (HCC): ICD-10-CM

## 2021-01-01 DIAGNOSIS — H11.32 SUBCONJUNCTIVAL HEMORRHAGE OF LEFT EYE: Primary | ICD-10-CM

## 2021-01-01 DIAGNOSIS — M19.011 PRIMARY OSTEOARTHRITIS OF RIGHT SHOULDER: ICD-10-CM

## 2021-01-01 DIAGNOSIS — I48.91 ATRIAL FIBRILLATION, UNSPECIFIED TYPE (HCC): ICD-10-CM

## 2021-01-01 DIAGNOSIS — M15.9 PRIMARY OSTEOARTHRITIS INVOLVING MULTIPLE JOINTS: ICD-10-CM

## 2021-01-01 DIAGNOSIS — J43.8 OTHER EMPHYSEMA (HCC): ICD-10-CM

## 2021-01-01 DIAGNOSIS — I50.32 CHRONIC DIASTOLIC CHF (CONGESTIVE HEART FAILURE) (HCC): Chronic | ICD-10-CM

## 2021-01-01 DIAGNOSIS — G89.29 CHRONIC TOE PAIN, BILATERAL: Primary | ICD-10-CM

## 2021-01-01 DIAGNOSIS — D72.829 LEUKOCYTOSIS, UNSPECIFIED TYPE: ICD-10-CM

## 2021-01-01 DIAGNOSIS — E11.42 TYPE 2 DIABETES MELLITUS WITH PERIPHERAL NEUROPATHY (HCC): ICD-10-CM

## 2021-01-01 DIAGNOSIS — M17.11 PRIMARY OSTEOARTHRITIS OF RIGHT KNEE: Primary | ICD-10-CM

## 2021-01-01 DIAGNOSIS — I10 ESSENTIAL HYPERTENSION: Chronic | ICD-10-CM

## 2021-01-01 DIAGNOSIS — M25.511 CHRONIC RIGHT SHOULDER PAIN: ICD-10-CM

## 2021-01-01 DIAGNOSIS — N17.9 AKI (ACUTE KIDNEY INJURY) (HCC): ICD-10-CM

## 2021-01-01 DIAGNOSIS — G89.29 CHRONIC RIGHT SHOULDER PAIN: ICD-10-CM

## 2021-01-01 DIAGNOSIS — I25.10 CORONARY ARTERY DISEASE INVOLVING NATIVE CORONARY ARTERY OF NATIVE HEART, UNSPECIFIED WHETHER ANGINA PRESENT: ICD-10-CM

## 2021-01-01 LAB
ALBUMIN SERPL-MCNC: 2.7 G/DL (ref 3.5–5.2)
ALBUMIN SERPL-MCNC: 2.8 G/DL (ref 3.5–5.2)
ALBUMIN SERPL-MCNC: 2.8 G/DL (ref 3.5–5.2)
ALBUMIN SERPL-MCNC: 2.9 G/DL (ref 3.5–5.2)
ALBUMIN SERPL-MCNC: 3 G/DL (ref 3.5–5.2)
ALBUMIN SERPL-MCNC: 3 G/DL (ref 3.5–5.2)
ALBUMIN SERPL-MCNC: 3.2 G/DL (ref 3.5–5.2)
ALBUMIN SERPL-MCNC: 3.3 G/DL (ref 3.5–5.2)
ALBUMIN SERPL-MCNC: 3.4 G/DL (ref 3.5–5.2)
ALBUMIN SERPL-MCNC: 3.6 G/DL (ref 3.5–5.2)
ALBUMIN SERPL-MCNC: 3.6 G/DL (ref 3.5–5.2)
ALBUMIN SERPL-MCNC: 3.8 G/DL (ref 3.5–5.2)
ALBUMIN SERPL-MCNC: 4.1 G/DL (ref 3.5–5.2)
ALBUMIN SERPL-MCNC: 4.2 G/DL (ref 3.5–5.2)
ALBUMIN/GLOB SERPL: 0.9 G/DL
ALBUMIN/GLOB SERPL: 1 G/DL
ALBUMIN/GLOB SERPL: 1.1 G/DL
ALBUMIN/GLOB SERPL: 1.2 G/DL
ALBUMIN/GLOB SERPL: 1.3 G/DL
ALBUMIN/GLOB SERPL: 1.4 G/DL
ALBUMIN/GLOB SERPL: 1.4 G/DL
ALBUMIN/GLOB SERPL: 1.5 G/DL
ALBUMIN/GLOB SERPL: 1.6 G/DL
ALP SERPL-CCNC: 54 U/L (ref 39–117)
ALP SERPL-CCNC: 54 U/L (ref 39–117)
ALP SERPL-CCNC: 56 U/L (ref 39–117)
ALP SERPL-CCNC: 59 U/L (ref 39–117)
ALP SERPL-CCNC: 59 U/L (ref 39–117)
ALP SERPL-CCNC: 61 U/L (ref 39–117)
ALP SERPL-CCNC: 61 U/L (ref 39–117)
ALP SERPL-CCNC: 62 U/L (ref 39–117)
ALP SERPL-CCNC: 64 U/L (ref 39–117)
ALP SERPL-CCNC: 64 U/L (ref 39–117)
ALP SERPL-CCNC: 68 U/L (ref 39–117)
ALP SERPL-CCNC: 68 U/L (ref 39–117)
ALP SERPL-CCNC: 71 U/L (ref 39–117)
ALP SERPL-CCNC: 72 U/L (ref 39–117)
ALP SERPL-CCNC: 77 U/L (ref 39–117)
ALP SERPL-CCNC: 91 U/L (ref 39–117)
ALT SERPL W P-5'-P-CCNC: 10 U/L (ref 1–41)
ALT SERPL W P-5'-P-CCNC: 10 U/L (ref 1–41)
ALT SERPL W P-5'-P-CCNC: 12 U/L (ref 1–41)
ALT SERPL W P-5'-P-CCNC: 14 U/L (ref 1–41)
ALT SERPL W P-5'-P-CCNC: 16 U/L (ref 1–41)
ALT SERPL W P-5'-P-CCNC: 17 U/L (ref 1–41)
ALT SERPL W P-5'-P-CCNC: 17 U/L (ref 1–41)
ALT SERPL W P-5'-P-CCNC: 19 U/L (ref 1–41)
ALT SERPL W P-5'-P-CCNC: 20 U/L (ref 1–41)
ALT SERPL W P-5'-P-CCNC: 8 U/L (ref 1–41)
ALT SERPL W P-5'-P-CCNC: 9 U/L (ref 1–41)
ALT SERPL W P-5'-P-CCNC: 9 U/L (ref 1–41)
ANION GAP SERPL CALCULATED.3IONS-SCNC: 10 MMOL/L (ref 5–15)
ANION GAP SERPL CALCULATED.3IONS-SCNC: 11 MMOL/L (ref 5–15)
ANION GAP SERPL CALCULATED.3IONS-SCNC: 11 MMOL/L (ref 5–15)
ANION GAP SERPL CALCULATED.3IONS-SCNC: 12 MMOL/L (ref 5–15)
ANION GAP SERPL CALCULATED.3IONS-SCNC: 15 MMOL/L (ref 5–15)
ANION GAP SERPL CALCULATED.3IONS-SCNC: 15 MMOL/L (ref 5–15)
ANION GAP SERPL CALCULATED.3IONS-SCNC: 16 MMOL/L (ref 5–15)
ANION GAP SERPL CALCULATED.3IONS-SCNC: 6 MMOL/L (ref 5–15)
ANION GAP SERPL CALCULATED.3IONS-SCNC: 7 MMOL/L (ref 5–15)
ANION GAP SERPL CALCULATED.3IONS-SCNC: 8 MMOL/L (ref 5–15)
ANION GAP SERPL CALCULATED.3IONS-SCNC: 9 MMOL/L (ref 5–15)
APPEARANCE FLD: ABNORMAL
ARTERIAL PATENCY WRIST A: ABNORMAL
ARTERIAL PATENCY WRIST A: ABNORMAL
AST SERPL-CCNC: 12 U/L (ref 1–40)
AST SERPL-CCNC: 13 U/L (ref 1–40)
AST SERPL-CCNC: 14 U/L (ref 1–40)
AST SERPL-CCNC: 15 U/L (ref 1–40)
AST SERPL-CCNC: 17 U/L (ref 1–40)
AST SERPL-CCNC: 18 U/L (ref 1–40)
AST SERPL-CCNC: 20 U/L (ref 1–40)
AST SERPL-CCNC: 9 U/L (ref 1–40)
ATMOSPHERIC PRESS: 746 MMHG
ATMOSPHERIC PRESS: 747 MMHG
B PARAPERT DNA SPEC QL NAA+PROBE: NOT DETECTED
B PERT DNA SPEC QL NAA+PROBE: NOT DETECTED
BACTERIA BLD CULT: ABNORMAL
BACTERIA BLD CULT: ABNORMAL
BACTERIA FLD CULT: NORMAL
BACTERIA SPEC AEROBE CULT: ABNORMAL
BACTERIA SPEC AEROBE CULT: ABNORMAL
BACTERIA SPEC AEROBE CULT: NORMAL
BASE EXCESS BLDA CALC-SCNC: 0 MMOL/L (ref 0–2)
BASE EXCESS BLDA CALC-SCNC: 1.5 MMOL/L (ref 0–2)
BASOPHILS # BLD AUTO: 0.02 10*3/MM3 (ref 0–0.2)
BASOPHILS # BLD AUTO: 0.03 10*3/MM3 (ref 0–0.2)
BASOPHILS # BLD AUTO: 0.03 10*3/MM3 (ref 0–0.2)
BASOPHILS # BLD AUTO: 0.04 10*3/MM3 (ref 0–0.2)
BASOPHILS # BLD AUTO: 0.05 10*3/MM3 (ref 0–0.2)
BASOPHILS # BLD AUTO: 0.05 10*3/MM3 (ref 0–0.2)
BASOPHILS # BLD AUTO: 0.06 10*3/MM3 (ref 0–0.2)
BASOPHILS # BLD AUTO: 0.06 10*3/MM3 (ref 0–0.2)
BASOPHILS # BLD AUTO: 0.07 10*3/MM3 (ref 0–0.2)
BASOPHILS # BLD AUTO: 0.08 10*3/MM3 (ref 0–0.2)
BASOPHILS # BLD AUTO: 0.1 10*3/MM3 (ref 0–0.2)
BASOPHILS # BLD AUTO: 0.11 10*3/MM3 (ref 0–0.2)
BASOPHILS NFR BLD AUTO: 0.1 % (ref 0–1.5)
BASOPHILS NFR BLD AUTO: 0.1 % (ref 0–1.5)
BASOPHILS NFR BLD AUTO: 0.2 % (ref 0–1.5)
BASOPHILS NFR BLD AUTO: 0.2 % (ref 0–1.5)
BASOPHILS NFR BLD AUTO: 0.3 % (ref 0–1.5)
BASOPHILS NFR BLD AUTO: 0.4 % (ref 0–1.5)
BASOPHILS NFR BLD AUTO: 0.4 % (ref 0–1.5)
BASOPHILS NFR BLD AUTO: 0.5 % (ref 0–1.5)
BASOPHILS NFR BLD AUTO: 0.5 % (ref 0–1.5)
BASOPHILS NFR BLD AUTO: 0.6 % (ref 0–1.5)
BASOPHILS NFR BLD AUTO: 0.6 % (ref 0–1.5)
BASOPHILS NFR BLD AUTO: 0.7 % (ref 0–1.5)
BASOPHILS NFR BLD AUTO: 0.7 % (ref 0–1.5)
BASOPHILS NFR BLD AUTO: 0.8 % (ref 0–1.5)
BASOPHILS NFR BLD AUTO: 0.9 % (ref 0–1.5)
BASOPHILS NFR BLD AUTO: 0.9 % (ref 0–1.5)
BDY SITE: ABNORMAL
BDY SITE: ABNORMAL
BH CV ECHO MEAS - ACS: 2.1 CM
BH CV ECHO MEAS - AO ISTHMUS: 3 CM
BH CV ECHO MEAS - AO MAX PG (FULL): 4 MMHG
BH CV ECHO MEAS - AO MAX PG: 6.7 MMHG
BH CV ECHO MEAS - AO MAX PG: 8.5 MMHG
BH CV ECHO MEAS - AO MEAN PG (FULL): 3 MMHG
BH CV ECHO MEAS - AO MEAN PG: 4 MMHG
BH CV ECHO MEAS - AO ROOT AREA (BSA CORRECTED): 2.2
BH CV ECHO MEAS - AO ROOT AREA: 12.6 CM^2
BH CV ECHO MEAS - AO ROOT DIAM: 4 CM
BH CV ECHO MEAS - AO V2 MAX: 129 CM/SEC
BH CV ECHO MEAS - AO V2 MAX: 146 CM/SEC
BH CV ECHO MEAS - AO V2 MEAN: 92 CM/SEC
BH CV ECHO MEAS - AO V2 VTI: 18.8 CM
BH CV ECHO MEAS - ASC AORTA: 3.1 CM
BH CV ECHO MEAS - ASC AORTA: 3.4 CM
BH CV ECHO MEAS - AVA(I,A): 2.4 CM^2
BH CV ECHO MEAS - AVA(I,D): 2.4 CM^2
BH CV ECHO MEAS - AVA(V,A): 2.4 CM^2
BH CV ECHO MEAS - AVA(V,D): 2.4 CM^2
BH CV ECHO MEAS - BSA(HAYCOCK): 1.8 M^2
BH CV ECHO MEAS - BSA(HAYCOCK): 1.8 M^2
BH CV ECHO MEAS - BSA: 1.8 M^2
BH CV ECHO MEAS - BSA: 1.8 M^2
BH CV ECHO MEAS - BZI_BMI: 22.6 KILOGRAMS/M^2
BH CV ECHO MEAS - BZI_BMI: 24.7 KILOGRAMS/M^2
BH CV ECHO MEAS - BZI_METRIC_HEIGHT: 170.2 CM
BH CV ECHO MEAS - BZI_METRIC_HEIGHT: 175.3 CM
BH CV ECHO MEAS - BZI_METRIC_WEIGHT: 69.4 KG
BH CV ECHO MEAS - BZI_METRIC_WEIGHT: 71.7 KG
BH CV ECHO MEAS - EDV(CUBED): 122 ML
BH CV ECHO MEAS - EDV(CUBED): 141.4 ML
BH CV ECHO MEAS - EDV(MOD-SP2): 105 ML
BH CV ECHO MEAS - EDV(MOD-SP2): 93.4 ML
BH CV ECHO MEAS - EDV(MOD-SP4): 61.5 ML
BH CV ECHO MEAS - EDV(MOD-SP4): 88.4 ML
BH CV ECHO MEAS - EDV(TEICH): 116.1 ML
BH CV ECHO MEAS - EDV(TEICH): 130.1 ML
BH CV ECHO MEAS - EF(CUBED): 50.3 %
BH CV ECHO MEAS - EF(CUBED): 59 %
BH CV ECHO MEAS - EF(MOD-SP2): 48.7 %
BH CV ECHO MEAS - EF(MOD-SP2): 49.9 %
BH CV ECHO MEAS - EF(MOD-SP4): 46.5 %
BH CV ECHO MEAS - EF(MOD-SP4): 47.5 %
BH CV ECHO MEAS - EF(TEICH): 42.2 %
BH CV ECHO MEAS - EF(TEICH): 50.3 %
BH CV ECHO MEAS - ESV(CUBED): 58 ML
BH CV ECHO MEAS - ESV(CUBED): 60.7 ML
BH CV ECHO MEAS - ESV(MOD-SP2): 46.8 ML
BH CV ECHO MEAS - ESV(MOD-SP2): 53.9 ML
BH CV ECHO MEAS - ESV(MOD-SP4): 32.9 ML
BH CV ECHO MEAS - ESV(MOD-SP4): 46.4 ML
BH CV ECHO MEAS - ESV(TEICH): 64.7 ML
BH CV ECHO MEAS - ESV(TEICH): 67.1 ML
BH CV ECHO MEAS - FS: 20.8 %
BH CV ECHO MEAS - FS: 25.7 %
BH CV ECHO MEAS - IVS/LVPW: 1
BH CV ECHO MEAS - IVS/LVPW: 1.7
BH CV ECHO MEAS - IVSD: 0.95 CM
BH CV ECHO MEAS - IVSD: 1.4 CM
BH CV ECHO MEAS - LA DIMENSION: 4 CM
BH CV ECHO MEAS - LA DIMENSION: 4.3 CM
BH CV ECHO MEAS - LA/AO: 1.1
BH CV ECHO MEAS - LV DIASTOLIC VOL/BSA (35-75): 33.6 ML/M^2
BH CV ECHO MEAS - LV DIASTOLIC VOL/BSA (35-75): 48 ML/M^2
BH CV ECHO MEAS - LV MASS(C)D: 181.5 GRAMS
BH CV ECHO MEAS - LV MASS(C)D: 210 GRAMS
BH CV ECHO MEAS - LV MASS(C)DI: 114.8 GRAMS/M^2
BH CV ECHO MEAS - LV MASS(C)DI: 98.4 GRAMS/M^2
BH CV ECHO MEAS - LV MAX PG: 2.7 MMHG
BH CV ECHO MEAS - LV MEAN PG: 1 MMHG
BH CV ECHO MEAS - LV SYSTOLIC VOL/BSA (12-30): 18 ML/M^2
BH CV ECHO MEAS - LV SYSTOLIC VOL/BSA (12-30): 25.2 ML/M^2
BH CV ECHO MEAS - LV V1 MAX: 81.6 CM/SEC
BH CV ECHO MEAS - LV V1 MEAN: 49.3 CM/SEC
BH CV ECHO MEAS - LV V1 VTI: 12.1 CM
BH CV ECHO MEAS - LVIDD: 5 CM
BH CV ECHO MEAS - LVIDD: 5.2 CM
BH CV ECHO MEAS - LVIDS: 3.9 CM
BH CV ECHO MEAS - LVIDS: 3.9 CM
BH CV ECHO MEAS - LVLD AP2: 7.4 CM
BH CV ECHO MEAS - LVLD AP2: 8.4 CM
BH CV ECHO MEAS - LVLD AP4: 7.3 CM
BH CV ECHO MEAS - LVLD AP4: 8.5 CM
BH CV ECHO MEAS - LVLS AP2: 6.4 CM
BH CV ECHO MEAS - LVLS AP2: 6.7 CM
BH CV ECHO MEAS - LVLS AP4: 6.3 CM
BH CV ECHO MEAS - LVLS AP4: 8.1 CM
BH CV ECHO MEAS - LVOT AREA (M): 3.8 CM^2
BH CV ECHO MEAS - LVOT AREA: 3.8 CM^2
BH CV ECHO MEAS - LVOT DIAM: 2.2 CM
BH CV ECHO MEAS - LVPWD: 0.83 CM
BH CV ECHO MEAS - LVPWD: 0.95 CM
BH CV ECHO MEAS - MR MAX PG: 65.3 MMHG
BH CV ECHO MEAS - MR MAX PG: 90.6 MMHG
BH CV ECHO MEAS - MR MAX VEL: 404 CM/SEC
BH CV ECHO MEAS - MR MAX VEL: 476 CM/SEC
BH CV ECHO MEAS - MV A MAX VEL: 42.8 CM/SEC
BH CV ECHO MEAS - MV DEC SLOPE: 290 CM/SEC^2
BH CV ECHO MEAS - MV DEC SLOPE: 662 CM/SEC^2
BH CV ECHO MEAS - MV E MAX VEL: 101 CM/SEC
BH CV ECHO MEAS - MV E/A: 2.4
BH CV ECHO MEAS - MV MAX PG: 3.7 MMHG
BH CV ECHO MEAS - MV MAX PG: 4 MMHG
BH CV ECHO MEAS - MV MEAN PG: 1 MMHG
BH CV ECHO MEAS - MV MEAN PG: 2 MMHG
BH CV ECHO MEAS - MV P1/2T MAX VEL: 83.5 CM/SEC
BH CV ECHO MEAS - MV P1/2T MAX VEL: 96.4 CM/SEC
BH CV ECHO MEAS - MV P1/2T: 36.9 MSEC
BH CV ECHO MEAS - MV P1/2T: 97.4 MSEC
BH CV ECHO MEAS - MV V2 MAX: 100 CM/SEC
BH CV ECHO MEAS - MV V2 MAX: 95.7 CM/SEC
BH CV ECHO MEAS - MV V2 MEAN: 50.8 CM/SEC
BH CV ECHO MEAS - MV V2 MEAN: 69.8 CM/SEC
BH CV ECHO MEAS - MV V2 VTI: 11.9 CM
BH CV ECHO MEAS - MV V2 VTI: 20.7 CM
BH CV ECHO MEAS - MVA P1/2T LCG: 2.3 CM^2
BH CV ECHO MEAS - MVA P1/2T LCG: 2.6 CM^2
BH CV ECHO MEAS - MVA(P1/2T): 2.3 CM^2
BH CV ECHO MEAS - MVA(P1/2T): 6 CM^2
BH CV ECHO MEAS - MVA(VTI): 2.2 CM^2
BH CV ECHO MEAS - PA MAX PG: 4.6 MMHG
BH CV ECHO MEAS - PA V2 MAX: 107 CM/SEC
BH CV ECHO MEAS - RAP SYSTOLE: 10 MMHG
BH CV ECHO MEAS - RAP SYSTOLE: 5 MMHG
BH CV ECHO MEAS - RVDD: 2.5 CM
BH CV ECHO MEAS - RVDD: 3.1 CM
BH CV ECHO MEAS - RVSP: 38.2 MMHG
BH CV ECHO MEAS - RVSP: 41.8 MMHG
BH CV ECHO MEAS - SI(AO): 129.1 ML/M^2
BH CV ECHO MEAS - SI(CUBED): 33.5 ML/M^2
BH CV ECHO MEAS - SI(CUBED): 45.3 ML/M^2
BH CV ECHO MEAS - SI(LVOT): 25.1 ML/M^2
BH CV ECHO MEAS - SI(MOD-SP2): 25.3 ML/M^2
BH CV ECHO MEAS - SI(MOD-SP2): 27.9 ML/M^2
BH CV ECHO MEAS - SI(MOD-SP4): 15.6 ML/M^2
BH CV ECHO MEAS - SI(MOD-SP4): 22.8 ML/M^2
BH CV ECHO MEAS - SI(TEICH): 26.7 ML/M^2
BH CV ECHO MEAS - SI(TEICH): 35.5 ML/M^2
BH CV ECHO MEAS - SV(AO): 236.2 ML
BH CV ECHO MEAS - SV(CUBED): 61.3 ML
BH CV ECHO MEAS - SV(CUBED): 83.5 ML
BH CV ECHO MEAS - SV(LVOT): 46 ML
BH CV ECHO MEAS - SV(MOD-SP2): 46.6 ML
BH CV ECHO MEAS - SV(MOD-SP2): 51.1 ML
BH CV ECHO MEAS - SV(MOD-SP4): 28.6 ML
BH CV ECHO MEAS - SV(MOD-SP4): 42 ML
BH CV ECHO MEAS - SV(TEICH): 48.9 ML
BH CV ECHO MEAS - SV(TEICH): 65.4 ML
BH CV ECHO MEAS - TR MAX VEL: 282 CM/SEC
BH CV ECHO MEAS - TR MAX VEL: 288 CM/SEC
BILIRUB SERPL-MCNC: 0.2 MG/DL (ref 0–1.2)
BILIRUB SERPL-MCNC: 0.3 MG/DL (ref 0–1.2)
BILIRUB SERPL-MCNC: 0.4 MG/DL (ref 0–1.2)
BILIRUB SERPL-MCNC: 0.5 MG/DL (ref 0–1.2)
BILIRUB UR QL STRIP: NEGATIVE
BNP SERPL-MCNC: 59.3 PG/ML (ref 0–100)
BUN SERPL-MCNC: 10 MG/DL (ref 8–23)
BUN SERPL-MCNC: 12 MG/DL (ref 8–23)
BUN SERPL-MCNC: 14 MG/DL (ref 8–23)
BUN SERPL-MCNC: 16 MG/DL (ref 8–23)
BUN SERPL-MCNC: 19 MG/DL (ref 8–23)
BUN SERPL-MCNC: 19 MG/DL (ref 8–23)
BUN SERPL-MCNC: 20 MG/DL (ref 8–23)
BUN SERPL-MCNC: 20 MG/DL (ref 8–23)
BUN SERPL-MCNC: 22 MG/DL (ref 8–23)
BUN SERPL-MCNC: 23 MG/DL (ref 8–23)
BUN SERPL-MCNC: 25 MG/DL (ref 8–23)
BUN SERPL-MCNC: 26 MG/DL (ref 8–23)
BUN SERPL-MCNC: 27 MG/DL (ref 8–23)
BUN SERPL-MCNC: 30 MG/DL (ref 8–23)
BUN SERPL-MCNC: 36 MG/DL (ref 8–23)
BUN SERPL-MCNC: 37 MG/DL (ref 8–23)
BUN SERPL-MCNC: 38 MG/DL (ref 8–23)
BUN SERPL-MCNC: 42 MG/DL (ref 7–23)
BUN SERPL-MCNC: 42 MG/DL (ref 8–23)
BUN SERPL-MCNC: 44 MG/DL (ref 8–23)
BUN SERPL-MCNC: 47 MG/DL (ref 8–23)
BUN SERPL-MCNC: 48 MG/DL (ref 8–23)
BUN SERPL-MCNC: 48 MG/DL (ref 8–23)
BUN SERPL-MCNC: 49 MG/DL (ref 8–23)
BUN SERPL-MCNC: 57 MG/DL (ref 8–23)
BUN SERPL-MCNC: 58 MG/DL (ref 8–23)
BUN SERPL-MCNC: 61 MG/DL (ref 8–23)
BUN/CREAT SERPL: 12.5 (ref 7–25)
BUN/CREAT SERPL: 12.7 (ref 7–25)
BUN/CREAT SERPL: 13.7 (ref 7–25)
BUN/CREAT SERPL: 13.9 (ref 7–25)
BUN/CREAT SERPL: 14.1 (ref 7–25)
BUN/CREAT SERPL: 14.7 (ref 7–25)
BUN/CREAT SERPL: 15.1 (ref 7–25)
BUN/CREAT SERPL: 15.5 (ref 7–25)
BUN/CREAT SERPL: 16.4 (ref 7–25)
BUN/CREAT SERPL: 18.3 (ref 7–25)
BUN/CREAT SERPL: 18.6 (ref 7–25)
BUN/CREAT SERPL: 18.7 (ref 7–25)
BUN/CREAT SERPL: 19.4 (ref 7–25)
BUN/CREAT SERPL: 20.8 (ref 7–25)
BUN/CREAT SERPL: 21 (ref 7–25)
BUN/CREAT SERPL: 21.3 (ref 7–25)
BUN/CREAT SERPL: 21.7 (ref 7–25)
BUN/CREAT SERPL: 22.5 (ref 7–25)
BUN/CREAT SERPL: 23.1 (ref 7–25)
BUN/CREAT SERPL: 23.6 (ref 7–25)
BUN/CREAT SERPL: 24.4 (ref 7–25)
BUN/CREAT SERPL: 25.5 (ref 7–25)
BUN/CREAT SERPL: 27.5 (ref 7–25)
BUN/CREAT SERPL: 29.4 (ref 7–25)
BUN/CREAT SERPL: 30.8 (ref 7–25)
BUN/CREAT SERPL: 31.5 (ref 7–25)
BUN/CREAT SERPL: 33 (ref 7–25)
BUN/CREAT SERPL: 33.3 (ref 7–25)
BUN/CREAT SERPL: 35.3 (ref 7–25)
BUN/CREAT SERPL: 35.9 (ref 7–25)
BUN/CREAT SERPL: 37.3 (ref 7–25)
BUN/CREAT SERPL: 39.3 (ref 7–25)
BUN/CREAT SERPL: 41.5 (ref 7–25)
BUN/CREAT SERPL: 9 (ref 7–25)
C PNEUM DNA NPH QL NAA+NON-PROBE: NOT DETECTED
CALCIUM SPEC-SCNC: 8.5 MG/DL (ref 8.6–10.5)
CALCIUM SPEC-SCNC: 8.7 MG/DL (ref 8.6–10.5)
CALCIUM SPEC-SCNC: 8.7 MG/DL (ref 8.6–10.5)
CALCIUM SPEC-SCNC: 8.8 MG/DL (ref 8.6–10.5)
CALCIUM SPEC-SCNC: 8.9 MG/DL (ref 8.6–10.5)
CALCIUM SPEC-SCNC: 9 MG/DL (ref 8.6–10.5)
CALCIUM SPEC-SCNC: 9.1 MG/DL (ref 8.6–10.5)
CALCIUM SPEC-SCNC: 9.2 MG/DL (ref 8.6–10.5)
CALCIUM SPEC-SCNC: 9.3 MG/DL (ref 8.6–10.5)
CALCIUM SPEC-SCNC: 9.4 MG/DL (ref 8.6–10.5)
CALCIUM SPEC-SCNC: 9.5 MG/DL (ref 8.4–10.2)
CALCIUM SPEC-SCNC: 9.5 MG/DL (ref 8.6–10.5)
CALCIUM SPEC-SCNC: 9.5 MG/DL (ref 8.6–10.5)
CALCIUM SPEC-SCNC: 9.6 MG/DL (ref 8.6–10.5)
CALCIUM SPEC-SCNC: 9.7 MG/DL (ref 8.6–10.5)
CALCIUM SPEC-SCNC: 9.7 MG/DL (ref 8.6–10.5)
CALCIUM SPEC-SCNC: 9.9 MG/DL (ref 8.6–10.5)
CHLORIDE SERPL-SCNC: 100 MMOL/L (ref 98–107)
CHLORIDE SERPL-SCNC: 101 MMOL/L (ref 98–107)
CHLORIDE SERPL-SCNC: 102 MMOL/L (ref 98–107)
CHLORIDE SERPL-SCNC: 103 MMOL/L (ref 98–107)
CHLORIDE SERPL-SCNC: 104 MMOL/L (ref 98–107)
CHLORIDE SERPL-SCNC: 105 MMOL/L (ref 98–107)
CHLORIDE SERPL-SCNC: 106 MMOL/L (ref 98–107)
CHLORIDE SERPL-SCNC: 107 MMOL/L (ref 101–112)
CHLORIDE SERPL-SCNC: 107 MMOL/L (ref 98–107)
CHLORIDE SERPL-SCNC: 97 MMOL/L (ref 98–107)
CHLORIDE SERPL-SCNC: 98 MMOL/L (ref 98–107)
CHLORIDE SERPL-SCNC: 99 MMOL/L (ref 98–107)
CHOLEST SERPL-MCNC: 197 MG/DL (ref 0–200)
CK SERPL-CCNC: 27 U/L (ref 20–200)
CLARITY UR: ABNORMAL
CLARITY UR: CLEAR
CO2 SERPL-SCNC: 22 MMOL/L (ref 22–29)
CO2 SERPL-SCNC: 23 MMOL/L (ref 22–29)
CO2 SERPL-SCNC: 23 MMOL/L (ref 22–29)
CO2 SERPL-SCNC: 23 MMOL/L (ref 22–30)
CO2 SERPL-SCNC: 24 MMOL/L (ref 22–29)
CO2 SERPL-SCNC: 25 MMOL/L (ref 22–29)
CO2 SERPL-SCNC: 26 MMOL/L (ref 22–29)
CO2 SERPL-SCNC: 27 MMOL/L (ref 22–29)
CO2 SERPL-SCNC: 28 MMOL/L (ref 22–29)
CO2 SERPL-SCNC: 28 MMOL/L (ref 22–29)
CO2 SERPL-SCNC: 29 MMOL/L (ref 22–29)
CO2 SERPL-SCNC: 30 MMOL/L (ref 22–29)
CO2 SERPL-SCNC: 30 MMOL/L (ref 22–29)
COLOR FLD: YELLOW
COLOR UR: YELLOW
CREAT SERPL-MCNC: 0.96 MG/DL (ref 0.76–1.27)
CREAT SERPL-MCNC: 0.96 MG/DL (ref 0.76–1.27)
CREAT SERPL-MCNC: 0.99 MG/DL (ref 0.76–1.27)
CREAT SERPL-MCNC: 1.02 MG/DL (ref 0.76–1.27)
CREAT SERPL-MCNC: 1.03 MG/DL (ref 0.76–1.27)
CREAT SERPL-MCNC: 1.03 MG/DL (ref 0.76–1.27)
CREAT SERPL-MCNC: 1.06 MG/DL (ref 0.76–1.27)
CREAT SERPL-MCNC: 1.06 MG/DL (ref 0.76–1.27)
CREAT SERPL-MCNC: 1.08 MG/DL (ref 0.76–1.27)
CREAT SERPL-MCNC: 1.09 MG/DL (ref 0.76–1.27)
CREAT SERPL-MCNC: 1.1 MG/DL (ref 0.76–1.27)
CREAT SERPL-MCNC: 1.11 MG/DL (ref 0.76–1.27)
CREAT SERPL-MCNC: 1.15 MG/DL (ref 0.76–1.27)
CREAT SERPL-MCNC: 1.15 MG/DL (ref 0.76–1.27)
CREAT SERPL-MCNC: 1.16 MG/DL (ref 0.76–1.27)
CREAT SERPL-MCNC: 1.17 MG/DL (ref 0.76–1.27)
CREAT SERPL-MCNC: 1.18 MG/DL (ref 0.76–1.27)
CREAT SERPL-MCNC: 1.19 MG/DL (ref 0.76–1.27)
CREAT SERPL-MCNC: 1.2 MG/DL (ref 0.76–1.27)
CREAT SERPL-MCNC: 1.2 MG/DL (ref 0.76–1.27)
CREAT SERPL-MCNC: 1.23 MG/DL (ref 0.76–1.27)
CREAT SERPL-MCNC: 1.23 MG/DL (ref 0.76–1.27)
CREAT SERPL-MCNC: 1.26 MG/DL (ref 0.76–1.27)
CREAT SERPL-MCNC: 1.26 MG/DL (ref 0.76–1.27)
CREAT SERPL-MCNC: 1.26 MG/DL (ref 0.7–1.3)
CREAT SERPL-MCNC: 1.31 MG/DL (ref 0.76–1.27)
CREAT SERPL-MCNC: 1.39 MG/DL (ref 0.76–1.27)
CREAT SERPL-MCNC: 1.45 MG/DL (ref 0.76–1.27)
CREAT SERPL-MCNC: 1.47 MG/DL (ref 0.76–1.27)
CREAT SERPL-MCNC: 1.53 MG/DL (ref 0.76–1.27)
CREAT SERPL-MCNC: 1.56 MG/DL (ref 0.76–1.27)
CREAT SERPL-MCNC: 1.56 MG/DL (ref 0.76–1.27)
CREAT SERPL-MCNC: 1.84 MG/DL (ref 0.76–1.27)
CREAT SERPL-MCNC: 1.88 MG/DL (ref 0.76–1.27)
CREAT UR-MCNC: 34.2 MG/DL
CRP SERPL-MCNC: 10.79 MG/DL (ref 0–0.5)
CRP SERPL-MCNC: 15.18 MG/DL (ref 0–0.5)
CRYSTALS FLD MICRO: NORMAL
D-DIMER, QUANTITATIVE (MAD,POW, STR): 1789 NG/ML (FEU) (ref 0–470)
D-DIMER, QUANTITATIVE (MAD,POW, STR): 606 NG/ML (FEU) (ref 0–470)
D-LACTATE SERPL-SCNC: 1.4 MMOL/L (ref 0.5–2)
D-LACTATE SERPL-SCNC: 1.4 MMOL/L (ref 0.5–2)
D-LACTATE SERPL-SCNC: 1.7 MMOL/L (ref 0.5–2)
D-LACTATE SERPL-SCNC: 1.7 MMOL/L (ref 0.5–2)
D-LACTATE SERPL-SCNC: 1.8 MMOL/L (ref 0.5–2)
D-LACTATE SERPL-SCNC: 2 MMOL/L (ref 0.5–2)
D-LACTATE SERPL-SCNC: 2.4 MMOL/L (ref 0.5–2)
D-LACTATE SERPL-SCNC: 2.4 MMOL/L (ref 0.5–2)
D-LACTATE SERPL-SCNC: 3.2 MMOL/L (ref 0.5–2)
DEPRECATED RDW RBC AUTO: 48.1 FL (ref 37–54)
DEPRECATED RDW RBC AUTO: 49.2 FL (ref 37–54)
DEPRECATED RDW RBC AUTO: 49.4 FL (ref 37–54)
DEPRECATED RDW RBC AUTO: 49.6 FL (ref 37–54)
DEPRECATED RDW RBC AUTO: 49.8 FL (ref 37–54)
DEPRECATED RDW RBC AUTO: 49.9 FL (ref 37–54)
DEPRECATED RDW RBC AUTO: 49.9 FL (ref 37–54)
DEPRECATED RDW RBC AUTO: 50 FL (ref 37–54)
DEPRECATED RDW RBC AUTO: 50.2 FL (ref 37–54)
DEPRECATED RDW RBC AUTO: 50.7 FL (ref 37–54)
DEPRECATED RDW RBC AUTO: 51 FL (ref 37–54)
DEPRECATED RDW RBC AUTO: 51.2 FL (ref 37–54)
DEPRECATED RDW RBC AUTO: 51.5 FL (ref 37–54)
DEPRECATED RDW RBC AUTO: 52.4 FL (ref 37–54)
DEPRECATED RDW RBC AUTO: 52.9 FL (ref 37–54)
DEPRECATED RDW RBC AUTO: 53.7 FL (ref 37–54)
DEPRECATED RDW RBC AUTO: 56.1 FL (ref 37–54)
DEPRECATED RDW RBC AUTO: 56.5 FL (ref 37–54)
DEPRECATED RDW RBC AUTO: 56.9 FL (ref 37–54)
DEPRECATED RDW RBC AUTO: 57.4 FL (ref 37–54)
DEPRECATED RDW RBC AUTO: 57.5 FL (ref 37–54)
DEPRECATED RDW RBC AUTO: 57.6 FL (ref 37–54)
DEPRECATED RDW RBC AUTO: 58.8 FL (ref 37–54)
DEPRECATED RDW RBC AUTO: 59.1 FL (ref 37–54)
DEPRECATED RDW RBC AUTO: 59.6 FL (ref 37–54)
DEPRECATED RDW RBC AUTO: 59.6 FL (ref 37–54)
DEPRECATED RDW RBC AUTO: 59.9 FL (ref 37–54)
EOSINOPHIL # BLD AUTO: 0 10*3/MM3 (ref 0–0.4)
EOSINOPHIL # BLD AUTO: 0.01 10*3/MM3 (ref 0–0.4)
EOSINOPHIL # BLD AUTO: 0.05 10*3/MM3 (ref 0–0.4)
EOSINOPHIL # BLD AUTO: 0.05 10*3/MM3 (ref 0–0.4)
EOSINOPHIL # BLD AUTO: 0.07 10*3/MM3 (ref 0–0.4)
EOSINOPHIL # BLD AUTO: 0.13 10*3/MM3 (ref 0–0.4)
EOSINOPHIL # BLD AUTO: 0.14 10*3/MM3 (ref 0–0.4)
EOSINOPHIL # BLD AUTO: 0.15 10*3/MM3 (ref 0–0.4)
EOSINOPHIL # BLD AUTO: 0.15 10*3/MM3 (ref 0–0.4)
EOSINOPHIL # BLD AUTO: 0.21 10*3/MM3 (ref 0–0.4)
EOSINOPHIL # BLD AUTO: 0.23 10*3/MM3 (ref 0–0.4)
EOSINOPHIL # BLD AUTO: 0.33 10*3/MM3 (ref 0–0.4)
EOSINOPHIL NFR BLD AUTO: 0 % (ref 0.3–6.2)
EOSINOPHIL NFR BLD AUTO: 0.1 % (ref 0.3–6.2)
EOSINOPHIL NFR BLD AUTO: 0.4 % (ref 0.3–6.2)
EOSINOPHIL NFR BLD AUTO: 0.5 % (ref 0.3–6.2)
EOSINOPHIL NFR BLD AUTO: 0.8 % (ref 0.3–6.2)
EOSINOPHIL NFR BLD AUTO: 1.2 % (ref 0.3–6.2)
EOSINOPHIL NFR BLD AUTO: 1.3 % (ref 0.3–6.2)
EOSINOPHIL NFR BLD AUTO: 1.5 % (ref 0.3–6.2)
EOSINOPHIL NFR BLD AUTO: 1.8 % (ref 0.3–6.2)
EOSINOPHIL NFR BLD AUTO: 2.6 % (ref 0.3–6.2)
ERYTHROCYTE [DISTWIDTH] IN BLOOD BY AUTOMATED COUNT: 14.5 % (ref 12.3–15.4)
ERYTHROCYTE [DISTWIDTH] IN BLOOD BY AUTOMATED COUNT: 14.6 % (ref 12.3–15.4)
ERYTHROCYTE [DISTWIDTH] IN BLOOD BY AUTOMATED COUNT: 14.7 % (ref 12.3–15.4)
ERYTHROCYTE [DISTWIDTH] IN BLOOD BY AUTOMATED COUNT: 14.7 % (ref 12.3–15.4)
ERYTHROCYTE [DISTWIDTH] IN BLOOD BY AUTOMATED COUNT: 14.8 % (ref 12.3–15.4)
ERYTHROCYTE [DISTWIDTH] IN BLOOD BY AUTOMATED COUNT: 14.8 % (ref 12.3–15.4)
ERYTHROCYTE [DISTWIDTH] IN BLOOD BY AUTOMATED COUNT: 14.9 % (ref 12.3–15.4)
ERYTHROCYTE [DISTWIDTH] IN BLOOD BY AUTOMATED COUNT: 15 % (ref 12.3–15.4)
ERYTHROCYTE [DISTWIDTH] IN BLOOD BY AUTOMATED COUNT: 15.1 % (ref 12.3–15.4)
ERYTHROCYTE [DISTWIDTH] IN BLOOD BY AUTOMATED COUNT: 15.1 % (ref 12.3–15.4)
ERYTHROCYTE [DISTWIDTH] IN BLOOD BY AUTOMATED COUNT: 15.2 % (ref 12.3–15.4)
ERYTHROCYTE [DISTWIDTH] IN BLOOD BY AUTOMATED COUNT: 15.9 % (ref 12.3–15.4)
ERYTHROCYTE [DISTWIDTH] IN BLOOD BY AUTOMATED COUNT: 17.8 % (ref 12.3–15.4)
ERYTHROCYTE [DISTWIDTH] IN BLOOD BY AUTOMATED COUNT: 17.8 % (ref 12.3–15.4)
ERYTHROCYTE [DISTWIDTH] IN BLOOD BY AUTOMATED COUNT: 17.9 % (ref 12.3–15.4)
ERYTHROCYTE [DISTWIDTH] IN BLOOD BY AUTOMATED COUNT: 18 % (ref 12.3–15.4)
ERYTHROCYTE [DISTWIDTH] IN BLOOD BY AUTOMATED COUNT: 18.1 % (ref 12.3–15.4)
ERYTHROCYTE [DISTWIDTH] IN BLOOD BY AUTOMATED COUNT: 18.2 % (ref 12.3–15.4)
ERYTHROCYTE [DISTWIDTH] IN BLOOD BY AUTOMATED COUNT: 18.2 % (ref 12.3–15.4)
ERYTHROCYTE [DISTWIDTH] IN BLOOD BY AUTOMATED COUNT: 18.6 % (ref 12.3–15.4)
ERYTHROCYTE [DISTWIDTH] IN BLOOD BY AUTOMATED COUNT: 18.9 % (ref 12.3–15.4)
ERYTHROCYTE [SEDIMENTATION RATE] IN BLOOD: 42 MM/HR (ref 0–15)
FLUAV RNA RESP QL NAA+PROBE: NOT DETECTED
FLUAV SUBTYP SPEC NAA+PROBE: NOT DETECTED
FLUBV RNA ISLT QL NAA+PROBE: NOT DETECTED
FLUBV RNA RESP QL NAA+PROBE: NOT DETECTED
GFR SERPL CREATININE-BSD FRML MDRD: 34 ML/MIN/1.73
GFR SERPL CREATININE-BSD FRML MDRD: 35 ML/MIN/1.73
GFR SERPL CREATININE-BSD FRML MDRD: 42 ML/MIN/1.73
GFR SERPL CREATININE-BSD FRML MDRD: 42 ML/MIN/1.73
GFR SERPL CREATININE-BSD FRML MDRD: 43 ML/MIN/1.73
GFR SERPL CREATININE-BSD FRML MDRD: 45 ML/MIN/1.73
GFR SERPL CREATININE-BSD FRML MDRD: 46 ML/MIN/1.73
GFR SERPL CREATININE-BSD FRML MDRD: 48 ML/MIN/1.73
GFR SERPL CREATININE-BSD FRML MDRD: 52 ML/MIN/1.73
GFR SERPL CREATININE-BSD FRML MDRD: 54 ML/MIN/1.73
GFR SERPL CREATININE-BSD FRML MDRD: 54 ML/MIN/1.73
GFR SERPL CREATININE-BSD FRML MDRD: 54 ML/MIN/1.73 (ref 42–98)
GFR SERPL CREATININE-BSD FRML MDRD: 56 ML/MIN/1.73
GFR SERPL CREATININE-BSD FRML MDRD: 56 ML/MIN/1.73
GFR SERPL CREATININE-BSD FRML MDRD: 57 ML/MIN/1.73
GFR SERPL CREATININE-BSD FRML MDRD: 57 ML/MIN/1.73
GFR SERPL CREATININE-BSD FRML MDRD: 58 ML/MIN/1.73
GFR SERPL CREATININE-BSD FRML MDRD: 58 ML/MIN/1.73
GFR SERPL CREATININE-BSD FRML MDRD: 59 ML/MIN/1.73
GFR SERPL CREATININE-BSD FRML MDRD: 59 ML/MIN/1.73
GFR SERPL CREATININE-BSD FRML MDRD: 60 ML/MIN/1.73
GFR SERPL CREATININE-BSD FRML MDRD: 60 ML/MIN/1.73
GFR SERPL CREATININE-BSD FRML MDRD: 63 ML/MIN/1.73
GFR SERPL CREATININE-BSD FRML MDRD: 63 ML/MIN/1.73
GFR SERPL CREATININE-BSD FRML MDRD: 64 ML/MIN/1.73
GFR SERPL CREATININE-BSD FRML MDRD: 65 ML/MIN/1.73
GFR SERPL CREATININE-BSD FRML MDRD: 66 ML/MIN/1.73
GFR SERPL CREATININE-BSD FRML MDRD: 66 ML/MIN/1.73
GFR SERPL CREATININE-BSD FRML MDRD: 68 ML/MIN/1.73
GFR SERPL CREATININE-BSD FRML MDRD: 68 ML/MIN/1.73
GFR SERPL CREATININE-BSD FRML MDRD: 69 ML/MIN/1.73
GFR SERPL CREATININE-BSD FRML MDRD: 71 ML/MIN/1.73
GFR SERPL CREATININE-BSD FRML MDRD: 74 ML/MIN/1.73
GFR SERPL CREATININE-BSD FRML MDRD: 74 ML/MIN/1.73
GLOBULIN UR ELPH-MCNC: 2.3 GM/DL
GLOBULIN UR ELPH-MCNC: 2.3 GM/DL
GLOBULIN UR ELPH-MCNC: 2.4 GM/DL
GLOBULIN UR ELPH-MCNC: 2.5 GM/DL
GLOBULIN UR ELPH-MCNC: 2.7 GM/DL
GLOBULIN UR ELPH-MCNC: 2.9 GM/DL
GLOBULIN UR ELPH-MCNC: 2.9 GM/DL
GLOBULIN UR ELPH-MCNC: 3 GM/DL
GLOBULIN UR ELPH-MCNC: 3.2 GM/DL
GLUCOSE BLDC GLUCOMTR-MCNC: 107 MG/DL (ref 70–130)
GLUCOSE BLDC GLUCOMTR-MCNC: 111 MG/DL (ref 70–130)
GLUCOSE BLDC GLUCOMTR-MCNC: 113 MG/DL (ref 70–130)
GLUCOSE BLDC GLUCOMTR-MCNC: 113 MG/DL (ref 70–130)
GLUCOSE BLDC GLUCOMTR-MCNC: 114 MG/DL (ref 70–130)
GLUCOSE BLDC GLUCOMTR-MCNC: 115 MG/DL (ref 70–130)
GLUCOSE BLDC GLUCOMTR-MCNC: 117 MG/DL (ref 70–130)
GLUCOSE BLDC GLUCOMTR-MCNC: 117 MG/DL (ref 70–130)
GLUCOSE BLDC GLUCOMTR-MCNC: 118 MG/DL (ref 70–130)
GLUCOSE BLDC GLUCOMTR-MCNC: 119 MG/DL (ref 70–130)
GLUCOSE BLDC GLUCOMTR-MCNC: 120 MG/DL (ref 70–130)
GLUCOSE BLDC GLUCOMTR-MCNC: 121 MG/DL (ref 70–130)
GLUCOSE BLDC GLUCOMTR-MCNC: 123 MG/DL (ref 70–130)
GLUCOSE BLDC GLUCOMTR-MCNC: 127 MG/DL (ref 70–130)
GLUCOSE BLDC GLUCOMTR-MCNC: 127 MG/DL (ref 70–130)
GLUCOSE BLDC GLUCOMTR-MCNC: 128 MG/DL (ref 70–130)
GLUCOSE BLDC GLUCOMTR-MCNC: 129 MG/DL (ref 70–130)
GLUCOSE BLDC GLUCOMTR-MCNC: 130 MG/DL (ref 70–130)
GLUCOSE BLDC GLUCOMTR-MCNC: 131 MG/DL (ref 70–130)
GLUCOSE BLDC GLUCOMTR-MCNC: 134 MG/DL (ref 70–130)
GLUCOSE BLDC GLUCOMTR-MCNC: 135 MG/DL (ref 70–130)
GLUCOSE BLDC GLUCOMTR-MCNC: 136 MG/DL (ref 70–130)
GLUCOSE BLDC GLUCOMTR-MCNC: 137 MG/DL (ref 70–130)
GLUCOSE BLDC GLUCOMTR-MCNC: 138 MG/DL (ref 70–130)
GLUCOSE BLDC GLUCOMTR-MCNC: 140 MG/DL (ref 70–130)
GLUCOSE BLDC GLUCOMTR-MCNC: 142 MG/DL (ref 70–130)
GLUCOSE BLDC GLUCOMTR-MCNC: 143 MG/DL (ref 70–130)
GLUCOSE BLDC GLUCOMTR-MCNC: 148 MG/DL (ref 70–130)
GLUCOSE BLDC GLUCOMTR-MCNC: 149 MG/DL (ref 70–130)
GLUCOSE BLDC GLUCOMTR-MCNC: 149 MG/DL (ref 70–130)
GLUCOSE BLDC GLUCOMTR-MCNC: 150 MG/DL (ref 70–130)
GLUCOSE BLDC GLUCOMTR-MCNC: 151 MG/DL (ref 70–130)
GLUCOSE BLDC GLUCOMTR-MCNC: 152 MG/DL (ref 70–130)
GLUCOSE BLDC GLUCOMTR-MCNC: 152 MG/DL (ref 70–130)
GLUCOSE BLDC GLUCOMTR-MCNC: 153 MG/DL (ref 70–130)
GLUCOSE BLDC GLUCOMTR-MCNC: 153 MG/DL (ref 70–130)
GLUCOSE BLDC GLUCOMTR-MCNC: 154 MG/DL (ref 70–130)
GLUCOSE BLDC GLUCOMTR-MCNC: 154 MG/DL (ref 70–130)
GLUCOSE BLDC GLUCOMTR-MCNC: 155 MG/DL (ref 70–130)
GLUCOSE BLDC GLUCOMTR-MCNC: 157 MG/DL (ref 70–130)
GLUCOSE BLDC GLUCOMTR-MCNC: 157 MG/DL (ref 70–130)
GLUCOSE BLDC GLUCOMTR-MCNC: 158 MG/DL (ref 70–130)
GLUCOSE BLDC GLUCOMTR-MCNC: 159 MG/DL (ref 70–130)
GLUCOSE BLDC GLUCOMTR-MCNC: 159 MG/DL (ref 70–130)
GLUCOSE BLDC GLUCOMTR-MCNC: 160 MG/DL (ref 70–130)
GLUCOSE BLDC GLUCOMTR-MCNC: 163 MG/DL (ref 70–130)
GLUCOSE BLDC GLUCOMTR-MCNC: 164 MG/DL (ref 70–130)
GLUCOSE BLDC GLUCOMTR-MCNC: 165 MG/DL (ref 70–130)
GLUCOSE BLDC GLUCOMTR-MCNC: 167 MG/DL (ref 70–130)
GLUCOSE BLDC GLUCOMTR-MCNC: 167 MG/DL (ref 70–130)
GLUCOSE BLDC GLUCOMTR-MCNC: 168 MG/DL (ref 70–130)
GLUCOSE BLDC GLUCOMTR-MCNC: 168 MG/DL (ref 70–130)
GLUCOSE BLDC GLUCOMTR-MCNC: 170 MG/DL (ref 70–130)
GLUCOSE BLDC GLUCOMTR-MCNC: 172 MG/DL (ref 70–130)
GLUCOSE BLDC GLUCOMTR-MCNC: 173 MG/DL (ref 70–130)
GLUCOSE BLDC GLUCOMTR-MCNC: 174 MG/DL (ref 70–130)
GLUCOSE BLDC GLUCOMTR-MCNC: 174 MG/DL (ref 70–130)
GLUCOSE BLDC GLUCOMTR-MCNC: 176 MG/DL (ref 70–130)
GLUCOSE BLDC GLUCOMTR-MCNC: 177 MG/DL (ref 70–130)
GLUCOSE BLDC GLUCOMTR-MCNC: 179 MG/DL (ref 70–130)
GLUCOSE BLDC GLUCOMTR-MCNC: 181 MG/DL (ref 70–130)
GLUCOSE BLDC GLUCOMTR-MCNC: 181 MG/DL (ref 70–130)
GLUCOSE BLDC GLUCOMTR-MCNC: 182 MG/DL (ref 70–130)
GLUCOSE BLDC GLUCOMTR-MCNC: 183 MG/DL (ref 70–130)
GLUCOSE BLDC GLUCOMTR-MCNC: 185 MG/DL (ref 70–130)
GLUCOSE BLDC GLUCOMTR-MCNC: 185 MG/DL (ref 70–130)
GLUCOSE BLDC GLUCOMTR-MCNC: 186 MG/DL (ref 70–130)
GLUCOSE BLDC GLUCOMTR-MCNC: 186 MG/DL (ref 70–130)
GLUCOSE BLDC GLUCOMTR-MCNC: 188 MG/DL (ref 70–130)
GLUCOSE BLDC GLUCOMTR-MCNC: 189 MG/DL (ref 70–130)
GLUCOSE BLDC GLUCOMTR-MCNC: 189 MG/DL (ref 70–130)
GLUCOSE BLDC GLUCOMTR-MCNC: 190 MG/DL (ref 70–130)
GLUCOSE BLDC GLUCOMTR-MCNC: 191 MG/DL (ref 70–130)
GLUCOSE BLDC GLUCOMTR-MCNC: 191 MG/DL (ref 70–130)
GLUCOSE BLDC GLUCOMTR-MCNC: 192 MG/DL (ref 70–130)
GLUCOSE BLDC GLUCOMTR-MCNC: 194 MG/DL (ref 70–130)
GLUCOSE BLDC GLUCOMTR-MCNC: 194 MG/DL (ref 70–130)
GLUCOSE BLDC GLUCOMTR-MCNC: 197 MG/DL (ref 70–130)
GLUCOSE BLDC GLUCOMTR-MCNC: 199 MG/DL (ref 70–130)
GLUCOSE BLDC GLUCOMTR-MCNC: 200 MG/DL (ref 70–130)
GLUCOSE BLDC GLUCOMTR-MCNC: 202 MG/DL (ref 70–130)
GLUCOSE BLDC GLUCOMTR-MCNC: 203 MG/DL (ref 70–130)
GLUCOSE BLDC GLUCOMTR-MCNC: 205 MG/DL (ref 70–130)
GLUCOSE BLDC GLUCOMTR-MCNC: 208 MG/DL (ref 70–130)
GLUCOSE BLDC GLUCOMTR-MCNC: 209 MG/DL (ref 70–130)
GLUCOSE BLDC GLUCOMTR-MCNC: 213 MG/DL (ref 70–130)
GLUCOSE BLDC GLUCOMTR-MCNC: 215 MG/DL (ref 70–130)
GLUCOSE BLDC GLUCOMTR-MCNC: 221 MG/DL (ref 70–130)
GLUCOSE BLDC GLUCOMTR-MCNC: 228 MG/DL (ref 70–130)
GLUCOSE BLDC GLUCOMTR-MCNC: 234 MG/DL (ref 70–130)
GLUCOSE BLDC GLUCOMTR-MCNC: 240 MG/DL (ref 70–130)
GLUCOSE BLDC GLUCOMTR-MCNC: 240 MG/DL (ref 70–130)
GLUCOSE BLDC GLUCOMTR-MCNC: 242 MG/DL (ref 70–130)
GLUCOSE BLDC GLUCOMTR-MCNC: 246 MG/DL (ref 70–130)
GLUCOSE BLDC GLUCOMTR-MCNC: 252 MG/DL (ref 70–130)
GLUCOSE BLDC GLUCOMTR-MCNC: 268 MG/DL (ref 70–130)
GLUCOSE BLDC GLUCOMTR-MCNC: 271 MG/DL (ref 70–130)
GLUCOSE BLDC GLUCOMTR-MCNC: 278 MG/DL (ref 70–130)
GLUCOSE BLDC GLUCOMTR-MCNC: 313 MG/DL (ref 70–130)
GLUCOSE BLDC GLUCOMTR-MCNC: 92 MG/DL (ref 70–130)
GLUCOSE BLDC GLUCOMTR-MCNC: 97 MG/DL (ref 70–130)
GLUCOSE BLDC GLUCOMTR-MCNC: 98 MG/DL (ref 70–130)
GLUCOSE SERPL-MCNC: 100 MG/DL (ref 65–99)
GLUCOSE SERPL-MCNC: 104 MG/DL (ref 65–99)
GLUCOSE SERPL-MCNC: 111 MG/DL (ref 65–99)
GLUCOSE SERPL-MCNC: 113 MG/DL (ref 65–99)
GLUCOSE SERPL-MCNC: 118 MG/DL (ref 65–99)
GLUCOSE SERPL-MCNC: 118 MG/DL (ref 65–99)
GLUCOSE SERPL-MCNC: 119 MG/DL (ref 65–99)
GLUCOSE SERPL-MCNC: 120 MG/DL (ref 65–99)
GLUCOSE SERPL-MCNC: 120 MG/DL (ref 65–99)
GLUCOSE SERPL-MCNC: 122 MG/DL (ref 65–99)
GLUCOSE SERPL-MCNC: 123 MG/DL (ref 65–99)
GLUCOSE SERPL-MCNC: 129 MG/DL (ref 65–99)
GLUCOSE SERPL-MCNC: 129 MG/DL (ref 65–99)
GLUCOSE SERPL-MCNC: 131 MG/DL (ref 65–99)
GLUCOSE SERPL-MCNC: 132 MG/DL (ref 65–99)
GLUCOSE SERPL-MCNC: 134 MG/DL (ref 65–99)
GLUCOSE SERPL-MCNC: 139 MG/DL (ref 65–99)
GLUCOSE SERPL-MCNC: 139 MG/DL (ref 70–99)
GLUCOSE SERPL-MCNC: 148 MG/DL (ref 65–99)
GLUCOSE SERPL-MCNC: 149 MG/DL (ref 65–99)
GLUCOSE SERPL-MCNC: 149 MG/DL (ref 65–99)
GLUCOSE SERPL-MCNC: 155 MG/DL (ref 65–99)
GLUCOSE SERPL-MCNC: 157 MG/DL (ref 65–99)
GLUCOSE SERPL-MCNC: 157 MG/DL (ref 65–99)
GLUCOSE SERPL-MCNC: 159 MG/DL (ref 65–99)
GLUCOSE SERPL-MCNC: 159 MG/DL (ref 65–99)
GLUCOSE SERPL-MCNC: 166 MG/DL (ref 65–99)
GLUCOSE SERPL-MCNC: 175 MG/DL (ref 65–99)
GLUCOSE SERPL-MCNC: 177 MG/DL (ref 65–99)
GLUCOSE SERPL-MCNC: 192 MG/DL (ref 65–99)
GLUCOSE SERPL-MCNC: 200 MG/DL (ref 65–99)
GLUCOSE SERPL-MCNC: 219 MG/DL (ref 65–99)
GLUCOSE UR STRIP-MCNC: NEGATIVE MG/DL
GRAM STN SPEC: ABNORMAL
GRAM STN SPEC: ABNORMAL
GRAM STN SPEC: NORMAL
GRAM STN SPEC: NORMAL
HADV DNA SPEC NAA+PROBE: NOT DETECTED
HANSEL STAIN: NEGATIVE
HBA1C MFR BLD: 6.5 % (ref 4.8–5.6)
HCO3 BLDA-SCNC: 24.7 MMOL/L (ref 20–26)
HCO3 BLDA-SCNC: 25.9 MMOL/L (ref 20–26)
HCOV 229E RNA SPEC QL NAA+PROBE: NOT DETECTED
HCOV HKU1 RNA SPEC QL NAA+PROBE: NOT DETECTED
HCOV NL63 RNA SPEC QL NAA+PROBE: NOT DETECTED
HCOV OC43 RNA SPEC QL NAA+PROBE: NOT DETECTED
HCT VFR BLD AUTO: 31.7 % (ref 37.5–51)
HCT VFR BLD AUTO: 32.3 % (ref 37.5–51)
HCT VFR BLD AUTO: 32.4 % (ref 37.5–51)
HCT VFR BLD AUTO: 32.7 % (ref 37.5–51)
HCT VFR BLD AUTO: 32.9 % (ref 37.5–51)
HCT VFR BLD AUTO: 33 % (ref 37.5–51)
HCT VFR BLD AUTO: 33.1 % (ref 37.5–51)
HCT VFR BLD AUTO: 33.6 % (ref 37.5–51)
HCT VFR BLD AUTO: 33.9 % (ref 37.5–51)
HCT VFR BLD AUTO: 34 % (ref 37.5–51)
HCT VFR BLD AUTO: 34.2 % (ref 37.5–51)
HCT VFR BLD AUTO: 35.1 % (ref 37.5–51)
HCT VFR BLD AUTO: 35.2 % (ref 37.5–51)
HCT VFR BLD AUTO: 36.2 % (ref 37.5–51)
HCT VFR BLD AUTO: 37.3 % (ref 37.5–51)
HCT VFR BLD AUTO: 37.5 % (ref 37.5–51)
HCT VFR BLD AUTO: 39 % (ref 37.5–51)
HCT VFR BLD AUTO: 39.1 % (ref 37.5–51)
HCT VFR BLD AUTO: 39.5 % (ref 37.5–51)
HCT VFR BLD AUTO: 39.6 % (ref 37.5–51)
HCT VFR BLD AUTO: 39.8 % (ref 37.5–51)
HCT VFR BLD AUTO: 40.3 % (ref 37.5–51)
HCT VFR BLD AUTO: 40.3 % (ref 37.5–51)
HCT VFR BLD AUTO: 40.4 % (ref 37.5–51)
HCT VFR BLD AUTO: 40.5 % (ref 37.5–51)
HCT VFR BLD AUTO: 41.3 % (ref 37.5–51)
HCT VFR BLD AUTO: 43.3 % (ref 37.5–51)
HDLC SERPL-MCNC: 32 MG/DL (ref 40–60)
HGB BLD-MCNC: 10.2 G/DL (ref 13–17.7)
HGB BLD-MCNC: 10.2 G/DL (ref 13–17.7)
HGB BLD-MCNC: 10.3 G/DL (ref 13–17.7)
HGB BLD-MCNC: 10.3 G/DL (ref 13–17.7)
HGB BLD-MCNC: 10.4 G/DL (ref 13–17.7)
HGB BLD-MCNC: 10.4 G/DL (ref 13–17.7)
HGB BLD-MCNC: 10.7 G/DL (ref 13–17.7)
HGB BLD-MCNC: 10.8 G/DL (ref 13–17.7)
HGB BLD-MCNC: 11.1 G/DL (ref 13–17.7)
HGB BLD-MCNC: 11.2 G/DL (ref 13–17.7)
HGB BLD-MCNC: 11.2 G/DL (ref 13–17.7)
HGB BLD-MCNC: 11.3 G/DL (ref 13–17.7)
HGB BLD-MCNC: 11.4 G/DL (ref 13–17.7)
HGB BLD-MCNC: 11.4 G/DL (ref 13–17.7)
HGB BLD-MCNC: 11.6 G/DL (ref 13–17.7)
HGB BLD-MCNC: 11.7 G/DL (ref 13–17.7)
HGB BLD-MCNC: 12.5 G/DL (ref 13–17.7)
HGB BLD-MCNC: 12.8 G/DL (ref 13–17.7)
HGB BLD-MCNC: 12.8 G/DL (ref 13–17.7)
HGB BLD-MCNC: 12.9 G/DL (ref 13–17.7)
HGB BLD-MCNC: 13 G/DL (ref 13–17.7)
HGB BLD-MCNC: 13.1 G/DL (ref 13–17.7)
HGB BLD-MCNC: 13.1 G/DL (ref 13–17.7)
HGB BLD-MCNC: 13.3 G/DL (ref 13–17.7)
HGB BLD-MCNC: 14 G/DL (ref 13–17.7)
HGB UR QL STRIP.AUTO: NEGATIVE
HMPV RNA NPH QL NAA+NON-PROBE: NOT DETECTED
HOLD SPECIMEN: NORMAL
HPIV1 RNA ISLT QL NAA+PROBE: NOT DETECTED
HPIV2 RNA SPEC QL NAA+PROBE: NOT DETECTED
HPIV3 RNA NPH QL NAA+PROBE: NOT DETECTED
HPIV4 P GENE NPH QL NAA+PROBE: NOT DETECTED
IMM GRANULOCYTES # BLD AUTO: 0.14 10*3/MM3 (ref 0–0.05)
IMM GRANULOCYTES # BLD AUTO: 0.16 10*3/MM3 (ref 0–0.05)
IMM GRANULOCYTES # BLD AUTO: 0.17 10*3/MM3 (ref 0–0.05)
IMM GRANULOCYTES # BLD AUTO: 0.18 10*3/MM3 (ref 0–0.05)
IMM GRANULOCYTES # BLD AUTO: 0.2 10*3/MM3 (ref 0–0.05)
IMM GRANULOCYTES # BLD AUTO: 0.21 10*3/MM3 (ref 0–0.05)
IMM GRANULOCYTES # BLD AUTO: 0.21 10*3/MM3 (ref 0–0.05)
IMM GRANULOCYTES # BLD AUTO: 0.22 10*3/MM3 (ref 0–0.05)
IMM GRANULOCYTES # BLD AUTO: 0.29 10*3/MM3 (ref 0–0.05)
IMM GRANULOCYTES # BLD AUTO: 0.29 10*3/MM3 (ref 0–0.05)
IMM GRANULOCYTES # BLD AUTO: 0.31 10*3/MM3 (ref 0–0.05)
IMM GRANULOCYTES # BLD AUTO: 0.37 10*3/MM3 (ref 0–0.05)
IMM GRANULOCYTES # BLD AUTO: 0.39 10*3/MM3 (ref 0–0.05)
IMM GRANULOCYTES # BLD AUTO: 0.47 10*3/MM3 (ref 0–0.05)
IMM GRANULOCYTES # BLD AUTO: 0.48 10*3/MM3 (ref 0–0.05)
IMM GRANULOCYTES # BLD AUTO: 0.56 10*3/MM3 (ref 0–0.05)
IMM GRANULOCYTES # BLD AUTO: 0.6 10*3/MM3 (ref 0–0.05)
IMM GRANULOCYTES # BLD AUTO: 0.66 10*3/MM3 (ref 0–0.05)
IMM GRANULOCYTES NFR BLD AUTO: 1.1 % (ref 0–0.5)
IMM GRANULOCYTES NFR BLD AUTO: 1.2 % (ref 0–0.5)
IMM GRANULOCYTES NFR BLD AUTO: 1.4 % (ref 0–0.5)
IMM GRANULOCYTES NFR BLD AUTO: 1.5 % (ref 0–0.5)
IMM GRANULOCYTES NFR BLD AUTO: 1.5 % (ref 0–0.5)
IMM GRANULOCYTES NFR BLD AUTO: 1.8 % (ref 0–0.5)
IMM GRANULOCYTES NFR BLD AUTO: 1.8 % (ref 0–0.5)
IMM GRANULOCYTES NFR BLD AUTO: 1.9 % (ref 0–0.5)
IMM GRANULOCYTES NFR BLD AUTO: 1.9 % (ref 0–0.5)
IMM GRANULOCYTES NFR BLD AUTO: 2 % (ref 0–0.5)
IMM GRANULOCYTES NFR BLD AUTO: 2.2 % (ref 0–0.5)
IMM GRANULOCYTES NFR BLD AUTO: 2.8 % (ref 0–0.5)
IMM GRANULOCYTES NFR BLD AUTO: 3.9 % (ref 0–0.5)
IMM GRANULOCYTES NFR BLD AUTO: 4.3 % (ref 0–0.5)
IMM GRANULOCYTES NFR BLD AUTO: 4.4 % (ref 0–0.5)
IMM GRANULOCYTES NFR BLD AUTO: 4.7 % (ref 0–0.5)
IMM GRANULOCYTES NFR BLD AUTO: 4.7 % (ref 0–0.5)
IMM GRANULOCYTES NFR BLD AUTO: 4.9 % (ref 0–0.5)
INR PPP: 1.01 (ref 0.8–1.2)
INR PPP: 1.02 (ref 0.8–1.2)
ISOLATED FROM: ABNORMAL
ISOLATED FROM: ABNORMAL
KETONES UR QL STRIP: NEGATIVE
LDLC SERPL CALC-MCNC: 123 MG/DL (ref 0–100)
LDLC/HDLC SERPL: 3.68 {RATIO}
LEUKOCYTE ESTERASE UR QL STRIP.AUTO: NEGATIVE
LIPASE SERPL-CCNC: 16 U/L (ref 13–60)
LV EF 2D ECHO EST: 51 %
LYMPHOCYTES # BLD AUTO: 0.34 10*3/MM3 (ref 0.7–3.1)
LYMPHOCYTES # BLD AUTO: 0.6 10*3/MM3 (ref 0.7–3.1)
LYMPHOCYTES # BLD AUTO: 0.7 10*3/MM3 (ref 0.7–3.1)
LYMPHOCYTES # BLD AUTO: 0.87 10*3/MM3 (ref 0.7–3.1)
LYMPHOCYTES # BLD AUTO: 1.17 10*3/MM3 (ref 0.7–3.1)
LYMPHOCYTES # BLD AUTO: 1.4 10*3/MM3 (ref 0.7–3.1)
LYMPHOCYTES # BLD AUTO: 1.44 10*3/MM3 (ref 0.7–3.1)
LYMPHOCYTES # BLD AUTO: 1.47 10*3/MM3 (ref 0.7–3.1)
LYMPHOCYTES # BLD AUTO: 1.5 10*3/MM3 (ref 0.7–3.1)
LYMPHOCYTES # BLD AUTO: 1.54 10*3/MM3 (ref 0.7–3.1)
LYMPHOCYTES # BLD AUTO: 1.69 10*3/MM3 (ref 0.7–3.1)
LYMPHOCYTES # BLD AUTO: 1.71 10*3/MM3 (ref 0.7–3.1)
LYMPHOCYTES # BLD AUTO: 1.78 10*3/MM3 (ref 0.7–3.1)
LYMPHOCYTES # BLD AUTO: 1.82 10*3/MM3 (ref 0.7–3.1)
LYMPHOCYTES # BLD AUTO: 2.09 10*3/MM3 (ref 0.7–3.1)
LYMPHOCYTES # BLD AUTO: 2.1 10*3/MM3 (ref 0.7–3.1)
LYMPHOCYTES # BLD AUTO: 2.18 10*3/MM3 (ref 0.7–3.1)
LYMPHOCYTES # BLD AUTO: 2.31 10*3/MM3 (ref 0.7–3.1)
LYMPHOCYTES NFR BLD AUTO: 10.5 % (ref 19.6–45.3)
LYMPHOCYTES NFR BLD AUTO: 12.6 % (ref 19.6–45.3)
LYMPHOCYTES NFR BLD AUTO: 12.6 % (ref 19.6–45.3)
LYMPHOCYTES NFR BLD AUTO: 15.2 % (ref 19.6–45.3)
LYMPHOCYTES NFR BLD AUTO: 15.3 % (ref 19.6–45.3)
LYMPHOCYTES NFR BLD AUTO: 15.8 % (ref 19.6–45.3)
LYMPHOCYTES NFR BLD AUTO: 15.9 % (ref 19.6–45.3)
LYMPHOCYTES NFR BLD AUTO: 16.3 % (ref 19.6–45.3)
LYMPHOCYTES NFR BLD AUTO: 16.5 % (ref 19.6–45.3)
LYMPHOCYTES NFR BLD AUTO: 16.5 % (ref 19.6–45.3)
LYMPHOCYTES NFR BLD AUTO: 18.2 % (ref 19.6–45.3)
LYMPHOCYTES NFR BLD AUTO: 19.2 % (ref 19.6–45.3)
LYMPHOCYTES NFR BLD AUTO: 2.4 % (ref 19.6–45.3)
LYMPHOCYTES NFR BLD AUTO: 4.6 % (ref 19.6–45.3)
LYMPHOCYTES NFR BLD AUTO: 5.6 % (ref 19.6–45.3)
LYMPHOCYTES NFR BLD AUTO: 5.8 % (ref 19.6–45.3)
LYMPHOCYTES NFR BLD AUTO: 7.4 % (ref 19.6–45.3)
LYMPHOCYTES NFR BLD AUTO: 9.7 % (ref 19.6–45.3)
Lab: ABNORMAL
Lab: ABNORMAL
M PNEUMO IGG SER IA-ACNC: NOT DETECTED
MAGNESIUM SERPL-MCNC: 1.6 MG/DL (ref 1.6–2.4)
MAGNESIUM SERPL-MCNC: 1.6 MG/DL (ref 1.6–2.4)
MAGNESIUM SERPL-MCNC: 1.8 MG/DL (ref 1.6–2.4)
MAXIMAL PREDICTED HEART RATE: 132 BPM
MCH RBC QN AUTO: 27.8 PG (ref 26.6–33)
MCH RBC QN AUTO: 27.9 PG (ref 26.6–33)
MCH RBC QN AUTO: 28.3 PG (ref 26.6–33)
MCH RBC QN AUTO: 28.3 PG (ref 26.6–33)
MCH RBC QN AUTO: 28.4 PG (ref 26.6–33)
MCH RBC QN AUTO: 28.4 PG (ref 26.6–33)
MCH RBC QN AUTO: 28.5 PG (ref 26.6–33)
MCH RBC QN AUTO: 28.5 PG (ref 26.6–33)
MCH RBC QN AUTO: 28.6 PG (ref 26.6–33)
MCH RBC QN AUTO: 28.8 PG (ref 26.6–33)
MCH RBC QN AUTO: 29 PG (ref 26.6–33)
MCH RBC QN AUTO: 29.1 PG (ref 26.6–33)
MCH RBC QN AUTO: 29.1 PG (ref 26.6–33)
MCH RBC QN AUTO: 29.2 PG (ref 26.6–33)
MCH RBC QN AUTO: 29.3 PG (ref 26.6–33)
MCH RBC QN AUTO: 29.4 PG (ref 26.6–33)
MCH RBC QN AUTO: 29.4 PG (ref 26.6–33)
MCH RBC QN AUTO: 29.5 PG (ref 26.6–33)
MCH RBC QN AUTO: 29.6 PG (ref 26.6–33)
MCH RBC QN AUTO: 29.7 PG (ref 26.6–33)
MCH RBC QN AUTO: 29.8 PG (ref 26.6–33)
MCH RBC QN AUTO: 29.9 PG (ref 26.6–33)
MCH RBC QN AUTO: 30 PG (ref 26.6–33)
MCH RBC QN AUTO: 30 PG (ref 26.6–33)
MCH RBC QN AUTO: 30.3 PG (ref 26.6–33)
MCH RBC QN AUTO: 30.3 PG (ref 26.6–33)
MCH RBC QN AUTO: 30.4 PG (ref 26.6–33)
MCHC RBC AUTO-ENTMCNC: 30.9 G/DL (ref 31.5–35.7)
MCHC RBC AUTO-ENTMCNC: 31 G/DL (ref 31.5–35.7)
MCHC RBC AUTO-ENTMCNC: 31.1 G/DL (ref 31.5–35.7)
MCHC RBC AUTO-ENTMCNC: 31.4 G/DL (ref 31.5–35.7)
MCHC RBC AUTO-ENTMCNC: 31.5 G/DL (ref 31.5–35.7)
MCHC RBC AUTO-ENTMCNC: 31.6 G/DL (ref 31.5–35.7)
MCHC RBC AUTO-ENTMCNC: 31.8 G/DL (ref 31.5–35.7)
MCHC RBC AUTO-ENTMCNC: 31.9 G/DL (ref 31.5–35.7)
MCHC RBC AUTO-ENTMCNC: 32 G/DL (ref 31.5–35.7)
MCHC RBC AUTO-ENTMCNC: 32 G/DL (ref 31.5–35.7)
MCHC RBC AUTO-ENTMCNC: 32.1 G/DL (ref 31.5–35.7)
MCHC RBC AUTO-ENTMCNC: 32.1 G/DL (ref 31.5–35.7)
MCHC RBC AUTO-ENTMCNC: 32.2 G/DL (ref 31.5–35.7)
MCHC RBC AUTO-ENTMCNC: 32.2 G/DL (ref 31.5–35.7)
MCHC RBC AUTO-ENTMCNC: 32.3 G/DL (ref 31.5–35.7)
MCHC RBC AUTO-ENTMCNC: 32.3 G/DL (ref 31.5–35.7)
MCHC RBC AUTO-ENTMCNC: 32.4 G/DL (ref 31.5–35.7)
MCHC RBC AUTO-ENTMCNC: 32.4 G/DL (ref 31.5–35.7)
MCHC RBC AUTO-ENTMCNC: 32.5 G/DL (ref 31.5–35.7)
MCHC RBC AUTO-ENTMCNC: 32.5 G/DL (ref 31.5–35.7)
MCHC RBC AUTO-ENTMCNC: 32.7 G/DL (ref 31.5–35.7)
MCHC RBC AUTO-ENTMCNC: 32.7 G/DL (ref 31.5–35.7)
MCHC RBC AUTO-ENTMCNC: 33 G/DL (ref 31.5–35.7)
MCHC RBC AUTO-ENTMCNC: 33.2 G/DL (ref 31.5–35.7)
MCV RBC AUTO: 85.9 FL (ref 79–97)
MCV RBC AUTO: 86.7 FL (ref 79–97)
MCV RBC AUTO: 86.8 FL (ref 79–97)
MCV RBC AUTO: 87.7 FL (ref 79–97)
MCV RBC AUTO: 87.8 FL (ref 79–97)
MCV RBC AUTO: 87.8 FL (ref 79–97)
MCV RBC AUTO: 88 FL (ref 79–97)
MCV RBC AUTO: 88.2 FL (ref 79–97)
MCV RBC AUTO: 88.4 FL (ref 79–97)
MCV RBC AUTO: 90.6 FL (ref 79–97)
MCV RBC AUTO: 90.9 FL (ref 79–97)
MCV RBC AUTO: 91.4 FL (ref 79–97)
MCV RBC AUTO: 91.5 FL (ref 79–97)
MCV RBC AUTO: 91.6 FL (ref 79–97)
MCV RBC AUTO: 91.7 FL (ref 79–97)
MCV RBC AUTO: 91.8 FL (ref 79–97)
MCV RBC AUTO: 91.9 FL (ref 79–97)
MCV RBC AUTO: 92.5 FL (ref 79–97)
MCV RBC AUTO: 92.6 FL (ref 79–97)
MCV RBC AUTO: 92.9 FL (ref 79–97)
MCV RBC AUTO: 92.9 FL (ref 79–97)
MCV RBC AUTO: 93 FL (ref 79–97)
MCV RBC AUTO: 93.1 FL (ref 79–97)
MCV RBC AUTO: 93.1 FL (ref 79–97)
MCV RBC AUTO: 93.8 FL (ref 79–97)
MCV RBC AUTO: 94.2 FL (ref 79–97)
MCV RBC AUTO: 94.4 FL (ref 79–97)
MCV RBC AUTO: 94.7 FL (ref 79–97)
MCV RBC AUTO: 95 FL (ref 79–97)
MODALITY: ABNORMAL
MODALITY: ABNORMAL
MONOCYTES # BLD AUTO: 0.06 10*3/MM3 (ref 0.1–0.9)
MONOCYTES # BLD AUTO: 0.41 10*3/MM3 (ref 0.1–0.9)
MONOCYTES # BLD AUTO: 0.42 10*3/MM3 (ref 0.1–0.9)
MONOCYTES # BLD AUTO: 0.58 10*3/MM3 (ref 0.1–0.9)
MONOCYTES # BLD AUTO: 0.68 10*3/MM3 (ref 0.1–0.9)
MONOCYTES # BLD AUTO: 0.74 10*3/MM3 (ref 0.1–0.9)
MONOCYTES # BLD AUTO: 0.81 10*3/MM3 (ref 0.1–0.9)
MONOCYTES # BLD AUTO: 0.82 10*3/MM3 (ref 0.1–0.9)
MONOCYTES # BLD AUTO: 0.86 10*3/MM3 (ref 0.1–0.9)
MONOCYTES # BLD AUTO: 0.86 10*3/MM3 (ref 0.1–0.9)
MONOCYTES # BLD AUTO: 0.9 10*3/MM3 (ref 0.1–0.9)
MONOCYTES # BLD AUTO: 0.91 10*3/MM3 (ref 0.1–0.9)
MONOCYTES # BLD AUTO: 0.92 10*3/MM3 (ref 0.1–0.9)
MONOCYTES # BLD AUTO: 0.95 10*3/MM3 (ref 0.1–0.9)
MONOCYTES # BLD AUTO: 0.96 10*3/MM3 (ref 0.1–0.9)
MONOCYTES # BLD AUTO: 1.06 10*3/MM3 (ref 0.1–0.9)
MONOCYTES # BLD AUTO: 1.24 10*3/MM3 (ref 0.1–0.9)
MONOCYTES # BLD AUTO: 1.41 10*3/MM3 (ref 0.1–0.9)
MONOCYTES NFR BLD AUTO: 0.6 % (ref 5–12)
MONOCYTES NFR BLD AUTO: 12.4 % (ref 5–12)
MONOCYTES NFR BLD AUTO: 3.1 % (ref 5–12)
MONOCYTES NFR BLD AUTO: 4.1 % (ref 5–12)
MONOCYTES NFR BLD AUTO: 4.4 % (ref 5–12)
MONOCYTES NFR BLD AUTO: 5.2 % (ref 5–12)
MONOCYTES NFR BLD AUTO: 5.4 % (ref 5–12)
MONOCYTES NFR BLD AUTO: 5.6 % (ref 5–12)
MONOCYTES NFR BLD AUTO: 5.9 % (ref 5–12)
MONOCYTES NFR BLD AUTO: 6 % (ref 5–12)
MONOCYTES NFR BLD AUTO: 6.7 % (ref 5–12)
MONOCYTES NFR BLD AUTO: 6.9 % (ref 5–12)
MONOCYTES NFR BLD AUTO: 7.1 % (ref 5–12)
MONOCYTES NFR BLD AUTO: 8.1 % (ref 5–12)
MONOCYTES NFR BLD AUTO: 8.3 % (ref 5–12)
MONOCYTES NFR BLD AUTO: 8.3 % (ref 5–12)
MONOCYTES NFR BLD AUTO: 8.6 % (ref 5–12)
MONOCYTES NFR BLD AUTO: 9.5 % (ref 5–12)
MONOS+MACROS NFR FLD: 10 %
NEUTROPHILS NFR BLD AUTO: 10.77 10*3/MM3 (ref 1.7–7)
NEUTROPHILS NFR BLD AUTO: 11.16 10*3/MM3 (ref 1.7–7)
NEUTROPHILS NFR BLD AUTO: 11.48 10*3/MM3 (ref 1.7–7)
NEUTROPHILS NFR BLD AUTO: 11.99 10*3/MM3 (ref 1.7–7)
NEUTROPHILS NFR BLD AUTO: 12.35 10*3/MM3 (ref 1.7–7)
NEUTROPHILS NFR BLD AUTO: 13.55 10*3/MM3 (ref 1.7–7)
NEUTROPHILS NFR BLD AUTO: 13.58 10*3/MM3 (ref 1.7–7)
NEUTROPHILS NFR BLD AUTO: 17.28 10*3/MM3 (ref 1.7–7)
NEUTROPHILS NFR BLD AUTO: 6.4 10*3/MM3 (ref 1.7–7)
NEUTROPHILS NFR BLD AUTO: 64.6 % (ref 42.7–76)
NEUTROPHILS NFR BLD AUTO: 66.1 % (ref 42.7–76)
NEUTROPHILS NFR BLD AUTO: 68.2 % (ref 42.7–76)
NEUTROPHILS NFR BLD AUTO: 69.5 % (ref 42.7–76)
NEUTROPHILS NFR BLD AUTO: 7.32 10*3/MM3 (ref 1.7–7)
NEUTROPHILS NFR BLD AUTO: 7.35 10*3/MM3 (ref 1.7–7)
NEUTROPHILS NFR BLD AUTO: 7.52 10*3/MM3 (ref 1.7–7)
NEUTROPHILS NFR BLD AUTO: 7.61 10*3/MM3 (ref 1.7–7)
NEUTROPHILS NFR BLD AUTO: 7.61 10*3/MM3 (ref 1.7–7)
NEUTROPHILS NFR BLD AUTO: 7.74 10*3/MM3 (ref 1.7–7)
NEUTROPHILS NFR BLD AUTO: 7.8 10*3/MM3 (ref 1.7–7)
NEUTROPHILS NFR BLD AUTO: 70.3 % (ref 42.7–76)
NEUTROPHILS NFR BLD AUTO: 71 % (ref 42.7–76)
NEUTROPHILS NFR BLD AUTO: 71 % (ref 42.7–76)
NEUTROPHILS NFR BLD AUTO: 72.8 % (ref 42.7–76)
NEUTROPHILS NFR BLD AUTO: 75.3 % (ref 42.7–76)
NEUTROPHILS NFR BLD AUTO: 79.7 % (ref 42.7–76)
NEUTROPHILS NFR BLD AUTO: 8.63 10*3/MM3 (ref 1.7–7)
NEUTROPHILS NFR BLD AUTO: 80.8 % (ref 42.7–76)
NEUTROPHILS NFR BLD AUTO: 81.2 % (ref 42.7–76)
NEUTROPHILS NFR BLD AUTO: 83.4 % (ref 42.7–76)
NEUTROPHILS NFR BLD AUTO: 85.3 % (ref 42.7–76)
NEUTROPHILS NFR BLD AUTO: 87 % (ref 42.7–76)
NEUTROPHILS NFR BLD AUTO: 88.4 % (ref 42.7–76)
NEUTROPHILS NFR BLD AUTO: 88.4 % (ref 42.7–76)
NEUTROPHILS NFR BLD AUTO: 9.38 10*3/MM3 (ref 1.7–7)
NEUTROPHILS NFR BLD AUTO: 91.4 % (ref 42.7–76)
NEUTROPHILS NFR FLD MANUAL: 90 %
NITRITE UR QL STRIP: NEGATIVE
NRBC BLD AUTO-RTO: 0 /100 WBC (ref 0–0.2)
NRBC BLD AUTO-RTO: 0.1 /100 WBC (ref 0–0.2)
NRBC BLD AUTO-RTO: 0.1 /100 WBC (ref 0–0.2)
NRBC BLD AUTO-RTO: 0.2 /100 WBC (ref 0–0.2)
NRBC BLD AUTO-RTO: 0.3 /100 WBC (ref 0–0.2)
NRBC BLD AUTO-RTO: 0.3 /100 WBC (ref 0–0.2)
NRBC BLD AUTO-RTO: 0.4 /100 WBC (ref 0–0.2)
NT-PROBNP SERPL-MCNC: 2019 PG/ML (ref 0–1800)
NT-PROBNP SERPL-MCNC: 255.3 PG/ML (ref 0–1800)
NT-PROBNP SERPL-MCNC: 920 PG/ML (ref 0–1800)
PCO2 BLDA: 39.3 MM HG (ref 35–45)
PCO2 BLDA: 39.4 MM HG (ref 35–45)
PH BLDA: 7.41 PH UNITS (ref 7.35–7.45)
PH BLDA: 7.43 PH UNITS (ref 7.35–7.45)
PH UR STRIP.AUTO: 6 [PH] (ref 5–9)
PH UR STRIP.AUTO: 6 [PH] (ref 5–9)
PH UR STRIP.AUTO: 6.5 [PH] (ref 5–9)
PH UR STRIP.AUTO: <=5 [PH] (ref 5–9)
PLAT MORPH BLD: NORMAL
PLATELET # BLD AUTO: 123 10*3/MM3 (ref 140–450)
PLATELET # BLD AUTO: 168 10*3/MM3 (ref 140–450)
PLATELET # BLD AUTO: 176 10*3/MM3 (ref 140–450)
PLATELET # BLD AUTO: 181 10*3/MM3 (ref 140–450)
PLATELET # BLD AUTO: 184 10*3/MM3 (ref 140–450)
PLATELET # BLD AUTO: 184 10*3/MM3 (ref 140–450)
PLATELET # BLD AUTO: 185 10*3/MM3 (ref 140–450)
PLATELET # BLD AUTO: 188 10*3/MM3 (ref 140–450)
PLATELET # BLD AUTO: 193 10*3/MM3 (ref 140–450)
PLATELET # BLD AUTO: 203 10*3/MM3 (ref 140–450)
PLATELET # BLD AUTO: 214 10*3/MM3 (ref 140–450)
PLATELET # BLD AUTO: 237 10*3/MM3 (ref 140–450)
PLATELET # BLD AUTO: 238 10*3/MM3 (ref 140–450)
PLATELET # BLD AUTO: 239 10*3/MM3 (ref 140–450)
PLATELET # BLD AUTO: 256 10*3/MM3 (ref 140–450)
PLATELET # BLD AUTO: 261 10*3/MM3 (ref 140–450)
PLATELET # BLD AUTO: 270 10*3/MM3 (ref 140–450)
PLATELET # BLD AUTO: 278 10*3/MM3 (ref 140–450)
PLATELET # BLD AUTO: 300 10*3/MM3 (ref 140–450)
PLATELET # BLD AUTO: 303 10*3/MM3 (ref 140–450)
PLATELET # BLD AUTO: 310 10*3/MM3 (ref 140–450)
PLATELET # BLD AUTO: 328 10*3/MM3 (ref 140–450)
PLATELET # BLD AUTO: 330 10*3/MM3 (ref 140–450)
PLATELET # BLD AUTO: 340 10*3/MM3 (ref 140–450)
PLATELET # BLD AUTO: 340 10*3/MM3 (ref 140–450)
PLATELET # BLD AUTO: 374 10*3/MM3 (ref 140–450)
PLATELET # BLD AUTO: 391 10*3/MM3 (ref 140–450)
PLATELET # BLD AUTO: 409 10*3/MM3 (ref 140–450)
PLATELET # BLD AUTO: 429 10*3/MM3 (ref 140–450)
PMV BLD AUTO: 10.5 FL (ref 6–12)
PMV BLD AUTO: 10.7 FL (ref 6–12)
PMV BLD AUTO: 10.8 FL (ref 6–12)
PMV BLD AUTO: 10.8 FL (ref 6–12)
PMV BLD AUTO: 10.9 FL (ref 6–12)
PMV BLD AUTO: 11 FL (ref 6–12)
PMV BLD AUTO: 11 FL (ref 6–12)
PMV BLD AUTO: 11.1 FL (ref 6–12)
PMV BLD AUTO: 11.1 FL (ref 6–12)
PMV BLD AUTO: 11.2 FL (ref 6–12)
PMV BLD AUTO: 11.3 FL (ref 6–12)
PMV BLD AUTO: 11.3 FL (ref 6–12)
PMV BLD AUTO: 11.4 FL (ref 6–12)
PMV BLD AUTO: 11.5 FL (ref 6–12)
PMV BLD AUTO: 11.6 FL (ref 6–12)
PMV BLD AUTO: 11.7 FL (ref 6–12)
PMV BLD AUTO: 12 FL (ref 6–12)
PO2 BLDA: 74.5 MM HG (ref 83–108)
PO2 BLDA: 83.9 MM HG (ref 83–108)
POTASSIUM SERPL-SCNC: 3.8 MMOL/L (ref 3.5–5.2)
POTASSIUM SERPL-SCNC: 3.9 MMOL/L (ref 3.5–5.2)
POTASSIUM SERPL-SCNC: 4 MMOL/L (ref 3.5–5.2)
POTASSIUM SERPL-SCNC: 4.1 MMOL/L (ref 3.5–5.2)
POTASSIUM SERPL-SCNC: 4.2 MMOL/L (ref 3.5–5.2)
POTASSIUM SERPL-SCNC: 4.3 MMOL/L (ref 3.5–5.2)
POTASSIUM SERPL-SCNC: 4.4 MMOL/L (ref 3.5–5.2)
POTASSIUM SERPL-SCNC: 4.5 MMOL/L (ref 3.5–5.2)
POTASSIUM SERPL-SCNC: 4.5 MMOL/L (ref 3.5–5.2)
POTASSIUM SERPL-SCNC: 4.6 MMOL/L (ref 3.5–5.2)
POTASSIUM SERPL-SCNC: 4.7 MMOL/L (ref 3.5–5.2)
POTASSIUM SERPL-SCNC: 4.8 MMOL/L (ref 3.5–5.2)
POTASSIUM SERPL-SCNC: 4.9 MMOL/L (ref 3.4–5)
PROCALCITONIN SERPL-MCNC: 0.07 NG/ML (ref 0–0.25)
PROCALCITONIN SERPL-MCNC: 0.12 NG/ML (ref 0–0.25)
PROCALCITONIN SERPL-MCNC: 0.16 NG/ML (ref 0–0.25)
PROT SERPL-MCNC: 5.5 G/DL (ref 6–8.5)
PROT SERPL-MCNC: 5.6 G/DL (ref 6–8.5)
PROT SERPL-MCNC: 5.6 G/DL (ref 6–8.5)
PROT SERPL-MCNC: 5.7 G/DL (ref 6–8.5)
PROT SERPL-MCNC: 5.8 G/DL (ref 6–8.5)
PROT SERPL-MCNC: 5.9 G/DL (ref 6–8.5)
PROT SERPL-MCNC: 6 G/DL (ref 6–8.5)
PROT SERPL-MCNC: 6 G/DL (ref 6–8.5)
PROT SERPL-MCNC: 6.2 G/DL (ref 6–8.5)
PROT SERPL-MCNC: 6.3 G/DL (ref 6–8.5)
PROT SERPL-MCNC: 6.6 G/DL (ref 6–8.5)
PROT SERPL-MCNC: 6.9 G/DL (ref 6–8.5)
PROT UR QL STRIP: ABNORMAL
PROT UR QL STRIP: ABNORMAL
PROT UR QL STRIP: NEGATIVE
PROTHROMBIN TIME: 13.2 SECONDS (ref 11.1–15.3)
PROTHROMBIN TIME: 13.3 SECONDS (ref 11.1–15.3)
QT INTERVAL: 314 MS
QT INTERVAL: 322 MS
QT INTERVAL: 354 MS
QT INTERVAL: 360 MS
QT INTERVAL: 366 MS
QT INTERVAL: 368 MS
QT INTERVAL: 370 MS
QT INTERVAL: 372 MS
QT INTERVAL: 384 MS
QT INTERVAL: 392 MS
QT INTERVAL: 396 MS
QT INTERVAL: 396 MS
QT INTERVAL: 398 MS
QT INTERVAL: 414 MS
QT INTERVAL: 428 MS
QT INTERVAL: 428 MS
QT INTERVAL: 432 MS
QTC INTERVAL: 407 MS
QTC INTERVAL: 411 MS
QTC INTERVAL: 417 MS
QTC INTERVAL: 421 MS
QTC INTERVAL: 424 MS
QTC INTERVAL: 425 MS
QTC INTERVAL: 426 MS
QTC INTERVAL: 432 MS
QTC INTERVAL: 434 MS
QTC INTERVAL: 459 MS
QTC INTERVAL: 469 MS
QTC INTERVAL: 474 MS
QTC INTERVAL: 478 MS
QTC INTERVAL: 487 MS
QTC INTERVAL: 489 MS
QTC INTERVAL: 506 MS
QTC INTERVAL: 507 MS
QTC INTERVAL: 509 MS
QTC INTERVAL: 517 MS
RBC # BLD AUTO: 3.47 10*6/MM3 (ref 4.14–5.8)
RBC # BLD AUTO: 3.5 10*6/MM3 (ref 4.14–5.8)
RBC # BLD AUTO: 3.54 10*6/MM3 (ref 4.14–5.8)
RBC # BLD AUTO: 3.54 10*6/MM3 (ref 4.14–5.8)
RBC # BLD AUTO: 3.55 10*6/MM3 (ref 4.14–5.8)
RBC # BLD AUTO: 3.57 10*6/MM3 (ref 4.14–5.8)
RBC # BLD AUTO: 3.58 10*6/MM3 (ref 4.14–5.8)
RBC # BLD AUTO: 3.66 10*6/MM3 (ref 4.14–5.8)
RBC # BLD AUTO: 3.72 10*6/MM3 (ref 4.14–5.8)
RBC # BLD AUTO: 3.72 10*6/MM3 (ref 4.14–5.8)
RBC # BLD AUTO: 3.73 10*6/MM3 (ref 4.14–5.8)
RBC # BLD AUTO: 3.78 10*6/MM3 (ref 4.14–5.8)
RBC # BLD AUTO: 3.79 10*6/MM3 (ref 4.14–5.8)
RBC # BLD AUTO: 3.86 10*6/MM3 (ref 4.14–5.8)
RBC # BLD AUTO: 3.86 10*6/MM3 (ref 4.14–5.8)
RBC # BLD AUTO: 4 10*6/MM3 (ref 4.14–5.8)
RBC # BLD AUTO: 4.03 10*6/MM3 (ref 4.14–5.8)
RBC # BLD AUTO: 4.09 10*6/MM3 (ref 4.14–5.8)
RBC # BLD AUTO: 4.13 10*6/MM3 (ref 4.14–5.8)
RBC # BLD AUTO: 4.17 10*6/MM3 (ref 4.14–5.8)
RBC # BLD AUTO: 4.28 10*6/MM3 (ref 4.14–5.8)
RBC # BLD AUTO: 4.29 10*6/MM3 (ref 4.14–5.8)
RBC # BLD AUTO: 4.56 10*6/MM3 (ref 4.14–5.8)
RBC # BLD AUTO: 4.58 10*6/MM3 (ref 4.14–5.8)
RBC # BLD AUTO: 4.59 10*6/MM3 (ref 4.14–5.8)
RBC # BLD AUTO: 4.6 10*6/MM3 (ref 4.14–5.8)
RBC # BLD AUTO: 4.62 10*6/MM3 (ref 4.14–5.8)
RBC # BLD AUTO: 4.76 10*6/MM3 (ref 4.14–5.8)
RBC # BLD AUTO: 4.92 10*6/MM3 (ref 4.14–5.8)
RBC # FLD AUTO: 2000 /MM3 (ref 0–0)
RBC MORPH BLD: NORMAL
RHINOVIRUS RNA SPEC NAA+PROBE: NOT DETECTED
RSV RNA NPH QL NAA+NON-PROBE: NOT DETECTED
SAO2 % BLDCOA: 93.7 % (ref 94–99)
SAO2 % BLDCOA: 95.3 % (ref 94–99)
SARS-COV-2 N GENE RESP QL NAA+PROBE: NOT DETECTED
SARS-COV-2 RNA NPH QL NAA+NON-PROBE: NOT DETECTED
SARS-COV-2 RNA PNL SPEC NAA+PROBE: NOT DETECTED
SARS-COV-2 RNA PNL SPEC NAA+PROBE: NOT DETECTED
SARS-COV-2 RNA RESP QL NAA+PROBE: NOT DETECTED
SODIUM SERPL-SCNC: 132 MMOL/L (ref 136–145)
SODIUM SERPL-SCNC: 134 MMOL/L (ref 136–145)
SODIUM SERPL-SCNC: 135 MMOL/L (ref 136–145)
SODIUM SERPL-SCNC: 136 MMOL/L (ref 136–145)
SODIUM SERPL-SCNC: 137 MMOL/L (ref 136–145)
SODIUM SERPL-SCNC: 138 MMOL/L (ref 136–145)
SODIUM SERPL-SCNC: 138 MMOL/L (ref 137–145)
SODIUM SERPL-SCNC: 139 MMOL/L (ref 136–145)
SODIUM SERPL-SCNC: 140 MMOL/L (ref 136–145)
SODIUM SERPL-SCNC: 140 MMOL/L (ref 136–145)
SODIUM SERPL-SCNC: 142 MMOL/L (ref 136–145)
SODIUM UR-SCNC: 94 MMOL/L
SP GR UR STRIP: 1.01 (ref 1–1.03)
SP GR UR STRIP: 1.01 (ref 1–1.03)
SP GR UR STRIP: 1.02 (ref 1–1.03)
SPECIMEN VOL FLD: 20 ML
STRESS TARGET HR: 112 BPM
TOXIC GRANULATION: NORMAL
TRIGL SERPL-MCNC: 236 MG/DL (ref 0–150)
TROPONIN T SERPL-MCNC: 0.02 NG/ML (ref 0–0.03)
TROPONIN T SERPL-MCNC: 0.03 NG/ML (ref 0–0.03)
TROPONIN T SERPL-MCNC: 0.04 NG/ML (ref 0–0.03)
URATE SERPL-MCNC: 7.7 MG/DL (ref 3.4–7)
UROBILINOGEN UR QL STRIP: ABNORMAL
UROBILINOGEN UR QL STRIP: ABNORMAL
UROBILINOGEN UR QL STRIP: NORMAL
VENTILATOR MODE: ABNORMAL
VENTILATOR MODE: ABNORMAL
VLDLC SERPL-MCNC: 42 MG/DL (ref 5–40)
WBC # BLD AUTO: 10.09 10*3/MM3 (ref 3.4–10.8)
WBC # BLD AUTO: 10.42 10*3/MM3 (ref 3.4–10.8)
WBC # BLD AUTO: 10.72 10*3/MM3 (ref 3.4–10.8)
WBC # BLD AUTO: 11.02 10*3/MM3 (ref 3.4–10.8)
WBC # BLD AUTO: 11.22 10*3/MM3 (ref 3.4–10.8)
WBC # BLD AUTO: 11.34 10*3/MM3 (ref 3.4–10.8)
WBC # BLD AUTO: 11.51 10*3/MM3 (ref 3.4–10.8)
WBC # BLD AUTO: 11.52 10*3/MM3 (ref 3.4–10.8)
WBC # BLD AUTO: 11.55 10*3/MM3 (ref 3.4–10.8)
WBC # BLD AUTO: 12.66 10*3/MM3 (ref 3.4–10.8)
WBC # BLD AUTO: 13.35 10*3/MM3 (ref 3.4–10.8)
WBC # BLD AUTO: 13.4 10*3/MM3 (ref 3.4–10.8)
WBC # BLD AUTO: 13.52 10*3/MM3 (ref 3.4–10.8)
WBC # BLD AUTO: 13.58 10*3/MM3 (ref 3.4–10.8)
WBC # BLD AUTO: 14.5 10*3/MM3 (ref 3.4–10.8)
WBC # BLD AUTO: 14.71 10*3/MM3 (ref 3.4–10.8)
WBC # BLD AUTO: 14.81 10*3/MM3 (ref 3.4–10.8)
WBC # BLD AUTO: 14.86 10*3/MM3 (ref 3.4–10.8)
WBC # BLD AUTO: 15.14 10*3/MM3 (ref 3.4–10.8)
WBC # BLD AUTO: 15.24 10*3/MM3 (ref 3.4–10.8)
WBC # BLD AUTO: 16.23 10*3/MM3 (ref 3.4–10.8)
WBC # BLD AUTO: 20.28 10*3/MM3 (ref 3.4–10.8)
WBC # BLD AUTO: 9.01 10*3/MM3 (ref 3.4–10.8)
WBC # BLD AUTO: 9.27 10*3/MM3 (ref 3.4–10.8)
WBC # FLD: ABNORMAL /MM3 (ref 0–5)
WBC NRBC COR # BLD: 10.27 10*3/MM3 (ref 3.4–10.8)
WBC NRBC COR # BLD: 13.38 10*3/MM3 (ref 3.4–10.8)
WBC NRBC COR # BLD: 13.98 10*3/MM3 (ref 3.4–10.8)
WBC NRBC COR # BLD: 15.32 10*3/MM3 (ref 3.4–10.8)
WBC NRBC COR # BLD: 15.59 10*3/MM3 (ref 3.4–10.8)
WHOLE BLOOD HOLD SPECIMEN: NORMAL

## 2021-01-01 PROCEDURE — 94799 UNLISTED PULMONARY SVC/PX: CPT

## 2021-01-01 PROCEDURE — 82962 GLUCOSE BLOOD TEST: CPT

## 2021-01-01 PROCEDURE — 94760 N-INVAS EAR/PLS OXIMETRY 1: CPT

## 2021-01-01 PROCEDURE — 97110 THERAPEUTIC EXERCISES: CPT

## 2021-01-01 PROCEDURE — 0 IOPAMIDOL PER 1 ML: Performed by: INTERNAL MEDICINE

## 2021-01-01 PROCEDURE — 99285 EMERGENCY DEPT VISIT HI MDM: CPT

## 2021-01-01 PROCEDURE — 81003 URINALYSIS AUTO W/O SCOPE: CPT | Performed by: NURSE PRACTITIONER

## 2021-01-01 PROCEDURE — 89060 EXAM SYNOVIAL FLUID CRYSTALS: CPT | Performed by: NURSE PRACTITIONER

## 2021-01-01 PROCEDURE — 99152 MOD SED SAME PHYS/QHP 5/>YRS: CPT | Performed by: INTERNAL MEDICINE

## 2021-01-01 PROCEDURE — 94640 AIRWAY INHALATION TREATMENT: CPT

## 2021-01-01 PROCEDURE — 63710000001 INSULIN ASPART PER 5 UNITS: Performed by: FAMILY MEDICINE

## 2021-01-01 PROCEDURE — 25010000002 AMIODARONE IN DEXTROSE 5% 150-4.21 MG/100ML-% SOLUTION: Performed by: INTERNAL MEDICINE

## 2021-01-01 PROCEDURE — 93010 ELECTROCARDIOGRAM REPORT: CPT | Performed by: INTERNAL MEDICINE

## 2021-01-01 PROCEDURE — 97116 GAIT TRAINING THERAPY: CPT

## 2021-01-01 PROCEDURE — 85025 COMPLETE CBC W/AUTO DIFF WBC: CPT | Performed by: NURSE PRACTITIONER

## 2021-01-01 PROCEDURE — 36415 COLL VENOUS BLD VENIPUNCTURE: CPT | Performed by: NURSE PRACTITIONER

## 2021-01-01 PROCEDURE — 25010000002 LEVOFLOXACIN PER 250 MG: Performed by: NURSE PRACTITIONER

## 2021-01-01 PROCEDURE — 94664 DEMO&/EVAL PT USE INHALER: CPT

## 2021-01-01 PROCEDURE — 97530 THERAPEUTIC ACTIVITIES: CPT

## 2021-01-01 PROCEDURE — 71046 X-RAY EXAM CHEST 2 VIEWS: CPT

## 2021-01-01 PROCEDURE — G0378 HOSPITAL OBSERVATION PER HR: HCPCS

## 2021-01-01 PROCEDURE — 99214 OFFICE O/P EST MOD 30 MIN: CPT | Performed by: INTERNAL MEDICINE

## 2021-01-01 PROCEDURE — 87147 CULTURE TYPE IMMUNOLOGIC: CPT | Performed by: FAMILY MEDICINE

## 2021-01-01 PROCEDURE — 99213 OFFICE O/P EST LOW 20 MIN: CPT | Performed by: INTERNAL MEDICINE

## 2021-01-01 PROCEDURE — 25010000002 AMIODARONE IN DEXTROSE 5% 360-4.14 MG/200ML-% SOLUTION: Performed by: INTERNAL MEDICINE

## 2021-01-01 PROCEDURE — 25010000002 METHYLPREDNISOLONE PER 125 MG: Performed by: FAMILY MEDICINE

## 2021-01-01 PROCEDURE — 83880 ASSAY OF NATRIURETIC PEPTIDE: CPT

## 2021-01-01 PROCEDURE — 25010000002 LEVOFLOXACIN PER 250 MG: Performed by: FAMILY MEDICINE

## 2021-01-01 PROCEDURE — 99232 SBSQ HOSP IP/OBS MODERATE 35: CPT | Performed by: NURSE PRACTITIONER

## 2021-01-01 PROCEDURE — 93005 ELECTROCARDIOGRAM TRACING: CPT | Performed by: EMERGENCY MEDICINE

## 2021-01-01 PROCEDURE — 87040 BLOOD CULTURE FOR BACTERIA: CPT | Performed by: NURSE PRACTITIONER

## 2021-01-01 PROCEDURE — 84484 ASSAY OF TROPONIN QUANT: CPT | Performed by: NURSE PRACTITIONER

## 2021-01-01 PROCEDURE — 97162 PT EVAL MOD COMPLEX 30 MIN: CPT

## 2021-01-01 PROCEDURE — 97535 SELF CARE MNGMENT TRAINING: CPT

## 2021-01-01 PROCEDURE — 027034Z DILATION OF CORONARY ARTERY, ONE ARTERY WITH DRUG-ELUTING INTRALUMINAL DEVICE, PERCUTANEOUS APPROACH: ICD-10-PCS | Performed by: INTERNAL MEDICINE

## 2021-01-01 PROCEDURE — 85025 COMPLETE CBC W/AUTO DIFF WBC: CPT

## 2021-01-01 PROCEDURE — 71045 X-RAY EXAM CHEST 1 VIEW: CPT

## 2021-01-01 PROCEDURE — C1887 CATHETER, GUIDING: HCPCS | Performed by: INTERNAL MEDICINE

## 2021-01-01 PROCEDURE — 20610 DRAIN/INJ JOINT/BURSA W/O US: CPT | Performed by: NURSE PRACTITIONER

## 2021-01-01 PROCEDURE — 93325 DOPPLER ECHO COLOR FLOW MAPG: CPT

## 2021-01-01 PROCEDURE — 74176 CT ABD & PELVIS W/O CONTRAST: CPT

## 2021-01-01 PROCEDURE — 93005 ELECTROCARDIOGRAM TRACING: CPT | Performed by: INTERNAL MEDICINE

## 2021-01-01 PROCEDURE — 25010000002 HEPARIN (PORCINE) PER 1000 UNITS: Performed by: INTERNAL MEDICINE

## 2021-01-01 PROCEDURE — 11721 DEBRIDE NAIL 6 OR MORE: CPT | Performed by: PODIATRIST

## 2021-01-01 PROCEDURE — 73610 X-RAY EXAM OF ANKLE: CPT

## 2021-01-01 PROCEDURE — 85610 PROTHROMBIN TIME: CPT | Performed by: FAMILY MEDICINE

## 2021-01-01 PROCEDURE — 93306 TTE W/DOPPLER COMPLETE: CPT | Performed by: INTERNAL MEDICINE

## 2021-01-01 PROCEDURE — 63710000001 INSULIN ASPART PER 5 UNITS: Performed by: NURSE PRACTITIONER

## 2021-01-01 PROCEDURE — 25010000002 FENTANYL CITRATE (PF) 50 MCG/ML SOLUTION: Performed by: INTERNAL MEDICINE

## 2021-01-01 PROCEDURE — 63710000001 PREDNISONE PER 1 MG: Performed by: FAMILY MEDICINE

## 2021-01-01 PROCEDURE — 80053 COMPREHEN METABOLIC PANEL: CPT | Performed by: NURSE PRACTITIONER

## 2021-01-01 PROCEDURE — 93458 L HRT ARTERY/VENTRICLE ANGIO: CPT | Performed by: INTERNAL MEDICINE

## 2021-01-01 PROCEDURE — 80053 COMPREHEN METABOLIC PANEL: CPT | Performed by: INTERNAL MEDICINE

## 2021-01-01 PROCEDURE — 25010000002 FUROSEMIDE PER 20 MG: Performed by: INTERNAL MEDICINE

## 2021-01-01 PROCEDURE — 25010000002 ENOXAPARIN PER 10 MG: Performed by: NURSE PRACTITIONER

## 2021-01-01 PROCEDURE — 85027 COMPLETE CBC AUTOMATED: CPT | Performed by: NURSE PRACTITIONER

## 2021-01-01 PROCEDURE — 80048 BASIC METABOLIC PNL TOTAL CA: CPT | Performed by: NURSE PRACTITIONER

## 2021-01-01 PROCEDURE — 36600 WITHDRAWAL OF ARTERIAL BLOOD: CPT

## 2021-01-01 PROCEDURE — 87635 SARS-COV-2 COVID-19 AMP PRB: CPT | Performed by: INTERNAL MEDICINE

## 2021-01-01 PROCEDURE — 80061 LIPID PANEL: CPT | Performed by: INTERNAL MEDICINE

## 2021-01-01 PROCEDURE — 76775 US EXAM ABDO BACK WALL LIM: CPT

## 2021-01-01 PROCEDURE — 99153 MOD SED SAME PHYS/QHP EA: CPT | Performed by: INTERNAL MEDICINE

## 2021-01-01 PROCEDURE — 83735 ASSAY OF MAGNESIUM: CPT | Performed by: EMERGENCY MEDICINE

## 2021-01-01 PROCEDURE — 36415 COLL VENOUS BLD VENIPUNCTURE: CPT

## 2021-01-01 PROCEDURE — 0202U NFCT DS 22 TRGT SARS-COV-2: CPT | Performed by: STUDENT IN AN ORGANIZED HEALTH CARE EDUCATION/TRAINING PROGRAM

## 2021-01-01 PROCEDURE — 93005 ELECTROCARDIOGRAM TRACING: CPT | Performed by: STUDENT IN AN ORGANIZED HEALTH CARE EDUCATION/TRAINING PROGRAM

## 2021-01-01 PROCEDURE — 87636 SARSCOV2 & INF A&B AMP PRB: CPT | Performed by: EMERGENCY MEDICINE

## 2021-01-01 PROCEDURE — 99222 1ST HOSP IP/OBS MODERATE 55: CPT | Performed by: INTERNAL MEDICINE

## 2021-01-01 PROCEDURE — 73030 X-RAY EXAM OF SHOULDER: CPT

## 2021-01-01 PROCEDURE — 93321 DOPPLER ECHO F-UP/LMTD STD: CPT

## 2021-01-01 PROCEDURE — 36415 COLL VENOUS BLD VENIPUNCTURE: CPT | Performed by: INTERNAL MEDICINE

## 2021-01-01 PROCEDURE — 97166 OT EVAL MOD COMPLEX 45 MIN: CPT

## 2021-01-01 PROCEDURE — 80053 COMPREHEN METABOLIC PANEL: CPT | Performed by: STUDENT IN AN ORGANIZED HEALTH CARE EDUCATION/TRAINING PROGRAM

## 2021-01-01 PROCEDURE — 99213 OFFICE O/P EST LOW 20 MIN: CPT | Performed by: NURSE PRACTITIONER

## 2021-01-01 PROCEDURE — 80048 BASIC METABOLIC PNL TOTAL CA: CPT | Performed by: INTERNAL MEDICINE

## 2021-01-01 PROCEDURE — 93005 ELECTROCARDIOGRAM TRACING: CPT | Performed by: NURSE PRACTITIONER

## 2021-01-01 PROCEDURE — 83605 ASSAY OF LACTIC ACID: CPT | Performed by: NURSE PRACTITIONER

## 2021-01-01 PROCEDURE — 85025 COMPLETE CBC W/AUTO DIFF WBC: CPT | Performed by: FAMILY MEDICINE

## 2021-01-01 PROCEDURE — 80053 COMPREHEN METABOLIC PANEL: CPT

## 2021-01-01 PROCEDURE — 99213 OFFICE O/P EST LOW 20 MIN: CPT | Performed by: PODIATRIST

## 2021-01-01 PROCEDURE — 99222 1ST HOSP IP/OBS MODERATE 55: CPT | Performed by: PSYCHIATRY & NEUROLOGY

## 2021-01-01 PROCEDURE — 85379 FIBRIN DEGRADATION QUANT: CPT | Performed by: NURSE PRACTITIONER

## 2021-01-01 PROCEDURE — 83880 ASSAY OF NATRIURETIC PEPTIDE: CPT | Performed by: FAMILY MEDICINE

## 2021-01-01 PROCEDURE — 85007 BL SMEAR W/DIFF WBC COUNT: CPT | Performed by: NURSE PRACTITIONER

## 2021-01-01 PROCEDURE — C1894 INTRO/SHEATH, NON-LASER: HCPCS | Performed by: INTERNAL MEDICINE

## 2021-01-01 PROCEDURE — 85025 COMPLETE CBC W/AUTO DIFF WBC: CPT | Performed by: STUDENT IN AN ORGANIZED HEALTH CARE EDUCATION/TRAINING PROGRAM

## 2021-01-01 PROCEDURE — 80053 COMPREHEN METABOLIC PANEL: CPT | Performed by: FAMILY MEDICINE

## 2021-01-01 PROCEDURE — 25010000002 ONDANSETRON PER 1 MG: Performed by: NURSE PRACTITIONER

## 2021-01-01 PROCEDURE — 84300 ASSAY OF URINE SODIUM: CPT | Performed by: NURSE PRACTITIONER

## 2021-01-01 PROCEDURE — 87040 BLOOD CULTURE FOR BACTERIA: CPT | Performed by: FAMILY MEDICINE

## 2021-01-01 PROCEDURE — 84484 ASSAY OF TROPONIN QUANT: CPT | Performed by: INTERNAL MEDICINE

## 2021-01-01 PROCEDURE — 84550 ASSAY OF BLOOD/URIC ACID: CPT | Performed by: FAMILY MEDICINE

## 2021-01-01 PROCEDURE — 25010000002 BIVALIRUDIN TRIFLUOROACETATE 250 MG RECONSTITUTED SOLUTION 1 EACH VIAL: Performed by: INTERNAL MEDICINE

## 2021-01-01 PROCEDURE — 81003 URINALYSIS AUTO W/O SCOPE: CPT | Performed by: EMERGENCY MEDICINE

## 2021-01-01 PROCEDURE — 87070 CULTURE OTHR SPECIMN AEROBIC: CPT | Performed by: EMERGENCY MEDICINE

## 2021-01-01 PROCEDURE — 25010000002 HEPARIN (PORCINE) PER 1000 UNITS: Performed by: NURSE PRACTITIONER

## 2021-01-01 PROCEDURE — 70450 CT HEAD/BRAIN W/O DYE: CPT

## 2021-01-01 PROCEDURE — 99232 SBSQ HOSP IP/OBS MODERATE 35: CPT | Performed by: INTERNAL MEDICINE

## 2021-01-01 PROCEDURE — C9600 PERC DRUG-EL COR STENT SING: HCPCS | Performed by: INTERNAL MEDICINE

## 2021-01-01 PROCEDURE — 83690 ASSAY OF LIPASE: CPT | Performed by: EMERGENCY MEDICINE

## 2021-01-01 PROCEDURE — 63710000001 METHYLPREDNISOLONE PER 4 MG: Performed by: FAMILY MEDICINE

## 2021-01-01 PROCEDURE — 25010000002 MIDAZOLAM PER 1 MG: Performed by: INTERNAL MEDICINE

## 2021-01-01 PROCEDURE — 84145 PROCALCITONIN (PCT): CPT | Performed by: FAMILY MEDICINE

## 2021-01-01 PROCEDURE — 93000 ELECTROCARDIOGRAM COMPLETE: CPT | Performed by: INTERNAL MEDICINE

## 2021-01-01 PROCEDURE — 25010000002 DEXAMETHASONE PER 1 MG: Performed by: NURSE PRACTITIONER

## 2021-01-01 PROCEDURE — 85651 RBC SED RATE NONAUTOMATED: CPT | Performed by: EMERGENCY MEDICINE

## 2021-01-01 PROCEDURE — 87205 SMEAR GRAM STAIN: CPT | Performed by: NURSE PRACTITIONER

## 2021-01-01 PROCEDURE — 80053 COMPREHEN METABOLIC PANEL: CPT | Performed by: EMERGENCY MEDICINE

## 2021-01-01 PROCEDURE — 84145 PROCALCITONIN (PCT): CPT | Performed by: NURSE PRACTITIONER

## 2021-01-01 PROCEDURE — 85027 COMPLETE CBC AUTOMATED: CPT | Performed by: INTERNAL MEDICINE

## 2021-01-01 PROCEDURE — 93970 EXTREMITY STUDY: CPT

## 2021-01-01 PROCEDURE — 93308 TTE F-UP OR LMTD: CPT

## 2021-01-01 PROCEDURE — 73600 X-RAY EXAM OF ANKLE: CPT

## 2021-01-01 PROCEDURE — 87147 CULTURE TYPE IMMUNOLOGIC: CPT | Performed by: NURSE PRACTITIONER

## 2021-01-01 PROCEDURE — 82803 BLOOD GASES ANY COMBINATION: CPT

## 2021-01-01 PROCEDURE — 25010000002 BIVALIRUDIN TRIFLUOROACETATE 250 MG RECONSTITUTED SOLUTION: Performed by: INTERNAL MEDICINE

## 2021-01-01 PROCEDURE — 83605 ASSAY OF LACTIC ACID: CPT | Performed by: FAMILY MEDICINE

## 2021-01-01 PROCEDURE — 82570 ASSAY OF URINE CREATININE: CPT | Performed by: NURSE PRACTITIONER

## 2021-01-01 PROCEDURE — 99214 OFFICE O/P EST MOD 30 MIN: CPT | Performed by: NURSE PRACTITIONER

## 2021-01-01 PROCEDURE — 84484 ASSAY OF TROPONIN QUANT: CPT | Performed by: STUDENT IN AN ORGANIZED HEALTH CARE EDUCATION/TRAINING PROGRAM

## 2021-01-01 PROCEDURE — 84484 ASSAY OF TROPONIN QUANT: CPT

## 2021-01-01 PROCEDURE — 83735 ASSAY OF MAGNESIUM: CPT | Performed by: NURSE PRACTITIONER

## 2021-01-01 PROCEDURE — 99284 EMERGENCY DEPT VISIT MOD MDM: CPT

## 2021-01-01 PROCEDURE — 87150 DNA/RNA AMPLIFIED PROBE: CPT | Performed by: NURSE PRACTITIONER

## 2021-01-01 PROCEDURE — 84484 ASSAY OF TROPONIN QUANT: CPT | Performed by: EMERGENCY MEDICINE

## 2021-01-01 PROCEDURE — B2151ZZ FLUOROSCOPY OF LEFT HEART USING LOW OSMOLAR CONTRAST: ICD-10-PCS | Performed by: INTERNAL MEDICINE

## 2021-01-01 PROCEDURE — 63710000001 PREDNISONE PER 5 MG: Performed by: INTERNAL MEDICINE

## 2021-01-01 PROCEDURE — 82550 ASSAY OF CK (CPK): CPT | Performed by: FAMILY MEDICINE

## 2021-01-01 PROCEDURE — 86140 C-REACTIVE PROTEIN: CPT | Performed by: NURSE PRACTITIONER

## 2021-01-01 PROCEDURE — C1760 CLOSURE DEV, VASC: HCPCS | Performed by: INTERNAL MEDICINE

## 2021-01-01 PROCEDURE — C1769 GUIDE WIRE: HCPCS | Performed by: INTERNAL MEDICINE

## 2021-01-01 PROCEDURE — 4A023N7 MEASUREMENT OF CARDIAC SAMPLING AND PRESSURE, LEFT HEART, PERCUTANEOUS APPROACH: ICD-10-PCS | Performed by: INTERNAL MEDICINE

## 2021-01-01 PROCEDURE — 83735 ASSAY OF MAGNESIUM: CPT | Performed by: FAMILY MEDICINE

## 2021-01-01 PROCEDURE — 93005 ELECTROCARDIOGRAM TRACING: CPT

## 2021-01-01 PROCEDURE — 89051 BODY FLUID CELL COUNT: CPT | Performed by: EMERGENCY MEDICINE

## 2021-01-01 PROCEDURE — 83605 ASSAY OF LACTIC ACID: CPT | Performed by: EMERGENCY MEDICINE

## 2021-01-01 PROCEDURE — 86140 C-REACTIVE PROTEIN: CPT | Performed by: EMERGENCY MEDICINE

## 2021-01-01 PROCEDURE — C1874 STENT, COATED/COV W/DEL SYS: HCPCS | Performed by: INTERNAL MEDICINE

## 2021-01-01 PROCEDURE — 99283 EMERGENCY DEPT VISIT LOW MDM: CPT

## 2021-01-01 PROCEDURE — 83880 ASSAY OF NATRIURETIC PEPTIDE: CPT | Performed by: STUDENT IN AN ORGANIZED HEALTH CARE EDUCATION/TRAINING PROGRAM

## 2021-01-01 PROCEDURE — 93306 TTE W/DOPPLER COMPLETE: CPT

## 2021-01-01 PROCEDURE — 99233 SBSQ HOSP IP/OBS HIGH 50: CPT | Performed by: INTERNAL MEDICINE

## 2021-01-01 PROCEDURE — 95816 EEG AWAKE AND DROWSY: CPT

## 2021-01-01 PROCEDURE — B2111ZZ FLUOROSCOPY OF MULTIPLE CORONARY ARTERIES USING LOW OSMOLAR CONTRAST: ICD-10-PCS | Performed by: INTERNAL MEDICINE

## 2021-01-01 PROCEDURE — 84484 ASSAY OF TROPONIN QUANT: CPT | Performed by: FAMILY MEDICINE

## 2021-01-01 PROCEDURE — 87636 SARSCOV2 & INF A&B AMP PRB: CPT | Performed by: STUDENT IN AN ORGANIZED HEALTH CARE EDUCATION/TRAINING PROGRAM

## 2021-01-01 PROCEDURE — 99214 OFFICE O/P EST MOD 30 MIN: CPT | Performed by: STUDENT IN AN ORGANIZED HEALTH CARE EDUCATION/TRAINING PROGRAM

## 2021-01-01 PROCEDURE — C1725 CATH, TRANSLUMIN NON-LASER: HCPCS | Performed by: INTERNAL MEDICINE

## 2021-01-01 PROCEDURE — 87015 SPECIMEN INFECT AGNT CONCNTJ: CPT | Performed by: EMERGENCY MEDICINE

## 2021-01-01 PROCEDURE — 87636 SARSCOV2 & INF A&B AMP PRB: CPT | Performed by: FAMILY MEDICINE

## 2021-01-01 PROCEDURE — 93005 ELECTROCARDIOGRAM TRACING: CPT | Performed by: FAMILY MEDICINE

## 2021-01-01 PROCEDURE — 92928 PRQ TCAT PLMT NTRAC ST 1 LES: CPT | Performed by: INTERNAL MEDICINE

## 2021-01-01 PROCEDURE — 83880 ASSAY OF NATRIURETIC PEPTIDE: CPT | Performed by: INTERNAL MEDICINE

## 2021-01-01 PROCEDURE — 87205 SMEAR GRAM STAIN: CPT | Performed by: EMERGENCY MEDICINE

## 2021-01-01 PROCEDURE — 87636 SARSCOV2 & INF A&B AMP PRB: CPT | Performed by: INTERNAL MEDICINE

## 2021-01-01 PROCEDURE — 71275 CT ANGIOGRAPHY CHEST: CPT

## 2021-01-01 PROCEDURE — 83036 HEMOGLOBIN GLYCOSYLATED A1C: CPT | Performed by: INTERNAL MEDICINE

## 2021-01-01 PROCEDURE — 93308 TTE F-UP OR LMTD: CPT | Performed by: INTERNAL MEDICINE

## 2021-01-01 PROCEDURE — 25010000002 ONDANSETRON PER 1 MG

## 2021-01-01 PROCEDURE — 85610 PROTHROMBIN TIME: CPT | Performed by: INTERNAL MEDICINE

## 2021-01-01 PROCEDURE — C9803 HOPD COVID-19 SPEC COLLECT: HCPCS

## 2021-01-01 PROCEDURE — 25010000002 METHYLPREDNISOLONE PER 40 MG: Performed by: FAMILY MEDICINE

## 2021-01-01 PROCEDURE — 87150 DNA/RNA AMPLIFIED PROBE: CPT | Performed by: FAMILY MEDICINE

## 2021-01-01 PROCEDURE — 85025 COMPLETE CBC W/AUTO DIFF WBC: CPT | Performed by: EMERGENCY MEDICINE

## 2021-01-01 PROCEDURE — 25010000002 ONDANSETRON PER 1 MG: Performed by: FAMILY MEDICINE

## 2021-01-01 DEVICE — STNT CORNRY RESOLUTE ONYX RX 2X18MM: Type: IMPLANTABLE DEVICE | Site: CORONARY | Status: FUNCTIONAL

## 2021-01-01 RX ORDER — FENTANYL CITRATE 50 UG/ML
25 INJECTION, SOLUTION INTRAMUSCULAR; INTRAVENOUS
Status: DISCONTINUED | OUTPATIENT
Start: 2021-01-01 | End: 2021-01-01 | Stop reason: HOSPADM

## 2021-01-01 RX ORDER — METHYLPREDNISOLONE 4 MG/1
TABLET ORAL
COMMUNITY
Start: 2021-01-01 | End: 2021-01-01

## 2021-01-01 RX ORDER — AMIODARONE HYDROCHLORIDE 200 MG/1
200 TABLET ORAL
Status: DISCONTINUED | OUTPATIENT
Start: 2021-01-01 | End: 2021-01-01 | Stop reason: HOSPADM

## 2021-01-01 RX ORDER — NITROGLYCERIN 0.4 MG/1
0.4 TABLET SUBLINGUAL
Status: DISCONTINUED | OUTPATIENT
Start: 2021-01-01 | End: 2021-01-01 | Stop reason: HOSPADM

## 2021-01-01 RX ORDER — LANOLIN ALCOHOL/MO/W.PET/CERES
3 CREAM (GRAM) TOPICAL NIGHTLY PRN
Status: DISCONTINUED | OUTPATIENT
Start: 2021-01-01 | End: 2021-01-01 | Stop reason: HOSPADM

## 2021-01-01 RX ORDER — ACETAMINOPHEN 325 MG/1
650 TABLET ORAL EVERY 4 HOURS PRN
Status: DISCONTINUED | OUTPATIENT
Start: 2021-01-01 | End: 2021-01-01

## 2021-01-01 RX ORDER — TRIAMCINOLONE ACETONIDE 40 MG/ML
80 INJECTION, SUSPENSION INTRA-ARTICULAR; INTRAMUSCULAR
Status: COMPLETED | OUTPATIENT
Start: 2021-01-01 | End: 2021-01-01

## 2021-01-01 RX ORDER — AMIODARONE HYDROCHLORIDE 200 MG/1
100 TABLET ORAL
Status: DISCONTINUED | OUTPATIENT
Start: 2021-01-01 | End: 2021-01-01

## 2021-01-01 RX ORDER — DEXAMETHASONE SODIUM PHOSPHATE 4 MG/ML
6 INJECTION, SOLUTION INTRA-ARTICULAR; INTRALESIONAL; INTRAMUSCULAR; INTRAVENOUS; SOFT TISSUE ONCE
Status: COMPLETED | OUTPATIENT
Start: 2021-01-01 | End: 2021-01-01

## 2021-01-01 RX ORDER — SODIUM CHLORIDE 0.9 % (FLUSH) 0.9 %
10 SYRINGE (ML) INJECTION AS NEEDED
Status: DISCONTINUED | OUTPATIENT
Start: 2021-01-01 | End: 2021-01-01 | Stop reason: HOSPADM

## 2021-01-01 RX ORDER — ONDANSETRON 2 MG/ML
4 INJECTION INTRAMUSCULAR; INTRAVENOUS EVERY 6 HOURS PRN
Status: DISCONTINUED | OUTPATIENT
Start: 2021-01-01 | End: 2021-01-01 | Stop reason: HOSPADM

## 2021-01-01 RX ORDER — ALBUTEROL SULFATE 2.5 MG/3ML
2.5 SOLUTION RESPIRATORY (INHALATION) EVERY 4 HOURS PRN
Status: DISCONTINUED | OUTPATIENT
Start: 2021-01-01 | End: 2021-01-01 | Stop reason: HOSPADM

## 2021-01-01 RX ORDER — CLOPIDOGREL BISULFATE 75 MG/1
75 TABLET ORAL DAILY
Status: DISCONTINUED | OUTPATIENT
Start: 2021-01-01 | End: 2021-01-01

## 2021-01-01 RX ORDER — TRIAMCINOLONE ACETONIDE 40 MG/ML
40 INJECTION, SUSPENSION INTRA-ARTICULAR; INTRAMUSCULAR
Status: COMPLETED | OUTPATIENT
Start: 2021-01-01 | End: 2021-01-01

## 2021-01-01 RX ORDER — NICOTINE POLACRILEX 4 MG
15 LOZENGE BUCCAL
Status: DISCONTINUED | OUTPATIENT
Start: 2021-01-01 | End: 2021-01-01 | Stop reason: HOSPADM

## 2021-01-01 RX ORDER — SODIUM CHLORIDE 9 MG/ML
100 INJECTION, SOLUTION INTRAVENOUS CONTINUOUS
Status: DISCONTINUED | OUTPATIENT
Start: 2021-01-01 | End: 2021-01-01 | Stop reason: HOSPADM

## 2021-01-01 RX ORDER — METHYLPREDNISOLONE 4 MG/1
4 TABLET ORAL
Status: DISCONTINUED | OUTPATIENT
Start: 2021-01-01 | End: 2021-01-01 | Stop reason: HOSPADM

## 2021-01-01 RX ORDER — LIDOCAINE HYDROCHLORIDE 10 MG/ML
2 INJECTION, SOLUTION INFILTRATION; PERINEURAL
Status: COMPLETED | OUTPATIENT
Start: 2021-01-01 | End: 2021-01-01

## 2021-01-01 RX ORDER — HYDROCODONE BITARTRATE AND ACETAMINOPHEN 7.5; 325 MG/1; MG/1
1 TABLET ORAL EVERY 6 HOURS PRN
Status: DISCONTINUED | OUTPATIENT
Start: 2021-01-01 | End: 2021-01-01 | Stop reason: HOSPADM

## 2021-01-01 RX ORDER — DOCUSATE SODIUM 100 MG/1
100 CAPSULE, LIQUID FILLED ORAL 2 TIMES DAILY
Status: DISCONTINUED | OUTPATIENT
Start: 2021-01-01 | End: 2021-01-01 | Stop reason: SDUPTHER

## 2021-01-01 RX ORDER — FLUTICASONE PROPIONATE 50 MCG
SPRAY, SUSPENSION (ML) NASAL
COMMUNITY
Start: 2021-01-01 | End: 2021-01-01

## 2021-01-01 RX ORDER — DOXYCYCLINE 100 MG/1
100 CAPSULE ORAL
COMMUNITY
Start: 2021-01-01 | End: 2021-01-01

## 2021-01-01 RX ORDER — ONDANSETRON 4 MG/1
4 TABLET, FILM COATED ORAL EVERY 6 HOURS PRN
Status: DISCONTINUED | OUTPATIENT
Start: 2021-01-01 | End: 2021-01-01 | Stop reason: SDUPTHER

## 2021-01-01 RX ORDER — LACTULOSE 10 G/15ML
10 SOLUTION ORAL DAILY
Status: DISCONTINUED | OUTPATIENT
Start: 2021-01-01 | End: 2021-01-01 | Stop reason: HOSPADM

## 2021-01-01 RX ORDER — HEPARIN SODIUM 5000 [USP'U]/ML
5000 INJECTION, SOLUTION INTRAVENOUS; SUBCUTANEOUS EVERY 12 HOURS SCHEDULED
Status: DISCONTINUED | OUTPATIENT
Start: 2021-01-01 | End: 2021-01-01

## 2021-01-01 RX ORDER — PREDNISONE 20 MG/1
40 TABLET ORAL
Status: DISCONTINUED | OUTPATIENT
Start: 2021-01-01 | End: 2021-01-01

## 2021-01-01 RX ORDER — ACETAMINOPHEN 650 MG/1
650 SUPPOSITORY RECTAL EVERY 4 HOURS PRN
Status: DISCONTINUED | OUTPATIENT
Start: 2021-01-01 | End: 2021-01-01 | Stop reason: HOSPADM

## 2021-01-01 RX ORDER — HYDROCODONE BITARTRATE AND ACETAMINOPHEN 7.5; 325 MG/1; MG/1
1 TABLET ORAL EVERY 6 HOURS PRN
Status: DISCONTINUED | OUTPATIENT
Start: 2021-01-01 | End: 2021-01-01

## 2021-01-01 RX ORDER — HYDROCODONE BITARTRATE AND ACETAMINOPHEN 7.5; 325 MG/1; MG/1
1 TABLET ORAL
COMMUNITY
Start: 2021-01-01 | End: 2021-01-01 | Stop reason: HOSPADM

## 2021-01-01 RX ORDER — IPRATROPIUM BROMIDE AND ALBUTEROL SULFATE 2.5; .5 MG/3ML; MG/3ML
3 SOLUTION RESPIRATORY (INHALATION)
COMMUNITY
Start: 2021-01-01 | End: 2021-01-01

## 2021-01-01 RX ORDER — PSEUDOEPHEDRINE HCL 30 MG
100 TABLET ORAL DAILY
COMMUNITY

## 2021-01-01 RX ORDER — PREDNISONE 10 MG/1
TABLET ORAL DAILY
Status: ON HOLD | COMMUNITY
Start: 2021-01-01 | End: 2022-01-01

## 2021-01-01 RX ORDER — FUROSEMIDE 10 MG/ML
20 INJECTION INTRAMUSCULAR; INTRAVENOUS 2 TIMES DAILY
Status: DISCONTINUED | OUTPATIENT
Start: 2021-01-01 | End: 2021-01-01

## 2021-01-01 RX ORDER — DOCUSATE SODIUM 100 MG/1
100 CAPSULE, LIQUID FILLED ORAL DAILY
Status: DISCONTINUED | OUTPATIENT
Start: 2021-01-01 | End: 2021-01-01 | Stop reason: HOSPADM

## 2021-01-01 RX ORDER — METHYLPREDNISOLONE 16 MG/1
8 TABLET ORAL
Status: COMPLETED | OUTPATIENT
Start: 2021-01-01 | End: 2021-01-01

## 2021-01-01 RX ORDER — DOXYCYCLINE 100 MG/1
100 CAPSULE ORAL 2 TIMES DAILY
Qty: 10 CAPSULE | Refills: 0 | Status: SHIPPED | OUTPATIENT
Start: 2021-01-01 | End: 2021-01-01 | Stop reason: HOSPADM

## 2021-01-01 RX ORDER — ACETAMINOPHEN 325 MG/1
650 TABLET ORAL EVERY 4 HOURS PRN
Status: DISCONTINUED | OUTPATIENT
Start: 2021-01-01 | End: 2021-01-01 | Stop reason: HOSPADM

## 2021-01-01 RX ORDER — LISINOPRIL 10 MG/1
10 TABLET ORAL DAILY
Status: DISCONTINUED | OUTPATIENT
Start: 2021-01-01 | End: 2021-01-01 | Stop reason: HOSPADM

## 2021-01-01 RX ORDER — ASPIRIN 81 MG/1
81 TABLET ORAL DAILY
Status: DISCONTINUED | OUTPATIENT
Start: 2021-01-01 | End: 2021-01-01

## 2021-01-01 RX ORDER — SODIUM CHLORIDE 0.9 % (FLUSH) 0.9 %
10 SYRINGE (ML) INJECTION EVERY 12 HOURS SCHEDULED
Status: DISCONTINUED | OUTPATIENT
Start: 2021-01-01 | End: 2021-01-01 | Stop reason: HOSPADM

## 2021-01-01 RX ORDER — METOPROLOL SUCCINATE 25 MG/1
12.5 TABLET, EXTENDED RELEASE ORAL
Qty: 30 TABLET | Refills: 1 | Status: SHIPPED | OUTPATIENT
Start: 2021-01-01 | End: 2021-01-01

## 2021-01-01 RX ORDER — BLOOD SUGAR DIAGNOSTIC
1 STRIP MISCELLANEOUS DAILY
COMMUNITY
Start: 2021-01-01 | End: 2021-01-01 | Stop reason: HOSPADM

## 2021-01-01 RX ORDER — ASPIRIN 81 MG/1
324 TABLET, CHEWABLE ORAL ONCE
Status: COMPLETED | OUTPATIENT
Start: 2021-01-01 | End: 2021-01-01

## 2021-01-01 RX ORDER — FUROSEMIDE 40 MG/1
40 TABLET ORAL
Status: DISCONTINUED | OUTPATIENT
Start: 2021-01-01 | End: 2021-01-01

## 2021-01-01 RX ORDER — FAMOTIDINE 20 MG/1
20 TABLET, FILM COATED ORAL
Status: DISCONTINUED | OUTPATIENT
Start: 2021-01-01 | End: 2021-01-01 | Stop reason: HOSPADM

## 2021-01-01 RX ORDER — SODIUM CHLORIDE 9 MG/ML
75 INJECTION, SOLUTION INTRAVENOUS CONTINUOUS
Status: DISCONTINUED | OUTPATIENT
Start: 2021-01-01 | End: 2021-01-01

## 2021-01-01 RX ORDER — QUETIAPINE FUMARATE 25 MG/1
25 TABLET, FILM COATED ORAL ONCE
Status: COMPLETED | OUTPATIENT
Start: 2021-01-01 | End: 2021-01-01

## 2021-01-01 RX ORDER — IPRATROPIUM BROMIDE AND ALBUTEROL SULFATE 2.5; .5 MG/3ML; MG/3ML
3 SOLUTION RESPIRATORY (INHALATION)
Status: DISCONTINUED | OUTPATIENT
Start: 2021-01-01 | End: 2021-01-01 | Stop reason: HOSPADM

## 2021-01-01 RX ORDER — IPRATROPIUM BROMIDE AND ALBUTEROL SULFATE 2.5; .5 MG/3ML; MG/3ML
3 SOLUTION RESPIRATORY (INHALATION) 4 TIMES DAILY
Status: DISCONTINUED | OUTPATIENT
Start: 2021-01-01 | End: 2021-01-01

## 2021-01-01 RX ORDER — POLYETHYLENE GLYCOL 3350 17 G/17G
17 POWDER, FOR SOLUTION ORAL DAILY
Status: DISCONTINUED | OUTPATIENT
Start: 2021-01-01 | End: 2021-01-01 | Stop reason: HOSPADM

## 2021-01-01 RX ORDER — LANCETS 33 GAUGE
1 EACH MISCELLANEOUS DAILY
COMMUNITY
Start: 2021-01-01

## 2021-01-01 RX ORDER — HYDROCODONE BITARTRATE AND ACETAMINOPHEN 5; 325 MG/1; MG/1
1 TABLET ORAL EVERY 6 HOURS PRN
Status: DISCONTINUED | OUTPATIENT
Start: 2021-01-01 | End: 2021-01-01 | Stop reason: SDUPTHER

## 2021-01-01 RX ORDER — RANOLAZINE 500 MG/1
500 TABLET, EXTENDED RELEASE ORAL EVERY 12 HOURS SCHEDULED
Status: DISCONTINUED | OUTPATIENT
Start: 2021-01-01 | End: 2021-01-01

## 2021-01-01 RX ORDER — METHYLPREDNISOLONE 4 MG/1
4 TABLET ORAL
Status: COMPLETED | OUTPATIENT
Start: 2021-01-01 | End: 2021-01-01

## 2021-01-01 RX ORDER — FUROSEMIDE 20 MG/1
20 TABLET ORAL DAILY
Status: DISCONTINUED | OUTPATIENT
Start: 2021-01-01 | End: 2021-01-01 | Stop reason: HOSPADM

## 2021-01-01 RX ORDER — POLYETHYLENE GLYCOL 3350 17 G/17G
17 POWDER, FOR SOLUTION ORAL DAILY
Qty: 30 PACKET | Refills: 1 | Status: SHIPPED | OUTPATIENT
Start: 2021-01-01

## 2021-01-01 RX ORDER — HYDROCODONE BITARTRATE AND ACETAMINOPHEN 7.5; 325 MG/1; MG/1
1 TABLET ORAL EVERY 6 HOURS PRN
Qty: 12 TABLET | Refills: 0 | Status: SHIPPED | OUTPATIENT
Start: 2021-01-01

## 2021-01-01 RX ORDER — PREDNISONE 10 MG/1
10 TABLET ORAL
Status: DISCONTINUED | OUTPATIENT
Start: 2021-01-01 | End: 2021-01-01 | Stop reason: HOSPADM

## 2021-01-01 RX ORDER — BIVALIRUDIN 250 MG/5ML
INJECTION, POWDER, LYOPHILIZED, FOR SOLUTION INTRAVENOUS AS NEEDED
Status: DISCONTINUED | OUTPATIENT
Start: 2021-01-01 | End: 2021-01-01 | Stop reason: HOSPADM

## 2021-01-01 RX ORDER — BLOOD SUGAR DIAGNOSTIC
STRIP MISCELLANEOUS
Status: ON HOLD | COMMUNITY
Start: 2021-01-01 | End: 2022-01-01

## 2021-01-01 RX ORDER — RANOLAZINE 500 MG/1
1000 TABLET, EXTENDED RELEASE ORAL EVERY 12 HOURS SCHEDULED
Status: DISCONTINUED | OUTPATIENT
Start: 2021-01-01 | End: 2021-01-01 | Stop reason: HOSPADM

## 2021-01-01 RX ORDER — DEXTROSE MONOHYDRATE 25 G/50ML
25 INJECTION, SOLUTION INTRAVENOUS
Status: DISCONTINUED | OUTPATIENT
Start: 2021-01-01 | End: 2021-01-01 | Stop reason: HOSPADM

## 2021-01-01 RX ORDER — ASPIRIN 81 MG/1
81 TABLET ORAL DAILY
COMMUNITY
End: 2021-01-01

## 2021-01-01 RX ORDER — NALOXONE HCL 0.4 MG/ML
0.4 VIAL (ML) INJECTION
Status: DISCONTINUED | OUTPATIENT
Start: 2021-01-01 | End: 2021-01-01 | Stop reason: HOSPADM

## 2021-01-01 RX ORDER — ONDANSETRON 2 MG/ML
INJECTION INTRAMUSCULAR; INTRAVENOUS
Status: COMPLETED
Start: 2021-01-01 | End: 2021-01-01

## 2021-01-01 RX ORDER — SODIUM CHLORIDE 9 MG/ML
75 INJECTION, SOLUTION INTRAVENOUS CONTINUOUS
Status: ACTIVE | OUTPATIENT
Start: 2021-01-01 | End: 2021-01-01

## 2021-01-01 RX ORDER — CLOPIDOGREL BISULFATE 75 MG/1
75 TABLET ORAL DAILY
Status: DISCONTINUED | OUTPATIENT
Start: 2021-01-01 | End: 2021-01-01 | Stop reason: HOSPADM

## 2021-01-01 RX ORDER — METHYLPREDNISOLONE SODIUM SUCCINATE 125 MG/2ML
60 INJECTION, POWDER, LYOPHILIZED, FOR SOLUTION INTRAMUSCULAR; INTRAVENOUS EVERY 12 HOURS
Status: DISCONTINUED | OUTPATIENT
Start: 2021-01-01 | End: 2021-01-01

## 2021-01-01 RX ORDER — ASPIRIN 81 MG/1
81 TABLET ORAL DAILY
Status: DISCONTINUED | OUTPATIENT
Start: 2021-01-01 | End: 2021-01-01 | Stop reason: HOSPADM

## 2021-01-01 RX ORDER — HYDROXYZINE HYDROCHLORIDE 25 MG/ML
25 INJECTION, SOLUTION INTRAMUSCULAR EVERY 6 HOURS PRN
Status: DISCONTINUED | OUTPATIENT
Start: 2021-01-01 | End: 2021-01-01 | Stop reason: HOSPADM

## 2021-01-01 RX ORDER — LISINOPRIL 5 MG/1
TABLET ORAL
COMMUNITY
Start: 2021-01-01 | End: 2021-01-01

## 2021-01-01 RX ORDER — ONDANSETRON 4 MG/1
4 TABLET, FILM COATED ORAL EVERY 8 HOURS PRN
Status: DISCONTINUED | OUTPATIENT
Start: 2021-01-01 | End: 2021-01-01 | Stop reason: HOSPADM

## 2021-01-01 RX ORDER — LEVOFLOXACIN 750 MG/1
750 TABLET ORAL
Status: COMPLETED | OUTPATIENT
Start: 2021-01-01 | End: 2021-01-01

## 2021-01-01 RX ORDER — FENTANYL CITRATE 50 UG/ML
INJECTION, SOLUTION INTRAMUSCULAR; INTRAVENOUS AS NEEDED
Status: DISCONTINUED | OUTPATIENT
Start: 2021-01-01 | End: 2021-01-01 | Stop reason: HOSPADM

## 2021-01-01 RX ORDER — METOPROLOL SUCCINATE 25 MG
12.5 TABLET, EXTENDED RELEASE 24 HR ORAL
Status: DISCONTINUED | OUTPATIENT
Start: 2021-01-01 | End: 2021-01-01 | Stop reason: HOSPADM

## 2021-01-01 RX ORDER — BUDESONIDE AND FORMOTEROL FUMARATE DIHYDRATE 160; 4.5 UG/1; UG/1
2 AEROSOL RESPIRATORY (INHALATION)
Status: DISCONTINUED | OUTPATIENT
Start: 2021-01-01 | End: 2021-01-01 | Stop reason: HOSPADM

## 2021-01-01 RX ORDER — FUROSEMIDE 20 MG/1
20 TABLET ORAL
Status: DISCONTINUED | OUTPATIENT
Start: 2021-01-01 | End: 2021-01-01 | Stop reason: HOSPADM

## 2021-01-01 RX ORDER — LEVOFLOXACIN 5 MG/ML
750 INJECTION, SOLUTION INTRAVENOUS
Status: DISCONTINUED | OUTPATIENT
Start: 2021-01-01 | End: 2021-01-01 | Stop reason: SDUPTHER

## 2021-01-01 RX ORDER — DOCUSATE SODIUM 100 MG/1
100 CAPSULE, LIQUID FILLED ORAL 2 TIMES DAILY
Status: DISCONTINUED | OUTPATIENT
Start: 2021-01-01 | End: 2021-01-01 | Stop reason: HOSPADM

## 2021-01-01 RX ORDER — ONDANSETRON 4 MG/1
4 TABLET, ORALLY DISINTEGRATING ORAL
COMMUNITY
Start: 2021-01-01 | End: 2021-01-01

## 2021-01-01 RX ORDER — LIDOCAINE HYDROCHLORIDE 10 MG/ML
2 INJECTION, SOLUTION EPIDURAL; INFILTRATION; INTRACAUDAL; PERINEURAL
Status: COMPLETED | OUTPATIENT
Start: 2021-01-01 | End: 2021-01-01

## 2021-01-01 RX ORDER — RANOLAZINE 1000 MG/1
1000 TABLET, EXTENDED RELEASE ORAL EVERY 12 HOURS SCHEDULED
Qty: 60 TABLET | Refills: 11 | Status: SHIPPED | OUTPATIENT
Start: 2021-01-01

## 2021-01-01 RX ORDER — LEVOFLOXACIN 5 MG/ML
500 INJECTION, SOLUTION INTRAVENOUS EVERY 24 HOURS
Status: DISCONTINUED | OUTPATIENT
Start: 2021-01-01 | End: 2021-01-01

## 2021-01-01 RX ORDER — SODIUM CHLORIDE 0.9 % (FLUSH) 0.9 %
10 SYRINGE (ML) INJECTION AS NEEDED
Status: DISCONTINUED | OUTPATIENT
Start: 2021-01-01 | End: 2021-01-01

## 2021-01-01 RX ORDER — FUROSEMIDE 40 MG/1
40 TABLET ORAL DAILY
Qty: 30 TABLET | Refills: 11 | Status: ON HOLD | OUTPATIENT
Start: 2021-01-01 | End: 2022-01-01

## 2021-01-01 RX ORDER — AMIODARONE HYDROCHLORIDE 200 MG/1
200 TABLET ORAL 2 TIMES DAILY WITH MEALS
Status: DISCONTINUED | OUTPATIENT
Start: 2021-01-01 | End: 2021-01-01

## 2021-01-01 RX ORDER — PREDNISONE 20 MG/1
20 TABLET ORAL
Status: DISCONTINUED | OUTPATIENT
Start: 2021-01-01 | End: 2021-01-01 | Stop reason: HOSPADM

## 2021-01-01 RX ORDER — COLCHICINE 0.6 MG/1
0.6 TABLET ORAL ONCE
Status: COMPLETED | OUTPATIENT
Start: 2021-01-01 | End: 2021-01-01

## 2021-01-01 RX ORDER — METHYLPREDNISOLONE SODIUM SUCCINATE 125 MG/2ML
60 INJECTION, POWDER, LYOPHILIZED, FOR SOLUTION INTRAMUSCULAR; INTRAVENOUS EVERY 24 HOURS
Status: DISCONTINUED | OUTPATIENT
Start: 2021-01-01 | End: 2021-01-01

## 2021-01-01 RX ORDER — LIDOCAINE HYDROCHLORIDE AND EPINEPHRINE 10; 10 MG/ML; UG/ML
10 INJECTION, SOLUTION INFILTRATION; PERINEURAL ONCE
Status: COMPLETED | OUTPATIENT
Start: 2021-01-01 | End: 2021-01-01

## 2021-01-01 RX ORDER — ACETAMINOPHEN 325 MG/1
650 TABLET ORAL EVERY 4 HOURS PRN
Status: DISCONTINUED | OUTPATIENT
Start: 2021-01-01 | End: 2021-01-01 | Stop reason: SDUPTHER

## 2021-01-01 RX ORDER — TEMAZEPAM 7.5 MG/1
7.5 CAPSULE ORAL NIGHTLY PRN
Status: DISCONTINUED | OUTPATIENT
Start: 2021-01-01 | End: 2021-01-01 | Stop reason: HOSPADM

## 2021-01-01 RX ORDER — LEVOFLOXACIN 5 MG/ML
750 INJECTION, SOLUTION INTRAVENOUS EVERY 24 HOURS
Status: COMPLETED | OUTPATIENT
Start: 2021-01-01 | End: 2021-01-01

## 2021-01-01 RX ORDER — LISINOPRIL 10 MG/1
10 TABLET ORAL DAILY
COMMUNITY
End: 2021-01-01

## 2021-01-01 RX ORDER — DIPHENHYDRAMINE HYDROCHLORIDE AND LIDOCAINE HYDROCHLORIDE AND ALUMINUM HYDROXIDE AND MAGNESIUM HYDRO
10 KIT 4 TIMES DAILY PRN
Status: DISCONTINUED | OUTPATIENT
Start: 2021-01-01 | End: 2021-01-01 | Stop reason: HOSPADM

## 2021-01-01 RX ORDER — COLCHICINE 0.6 MG/1
1.2 TABLET ORAL ONCE
Status: COMPLETED | OUTPATIENT
Start: 2021-01-01 | End: 2021-01-01

## 2021-01-01 RX ORDER — CLOPIDOGREL BISULFATE 75 MG/1
TABLET ORAL AS NEEDED
Status: DISCONTINUED | OUTPATIENT
Start: 2021-01-01 | End: 2021-01-01 | Stop reason: HOSPADM

## 2021-01-01 RX ORDER — MAGNESIUM OXIDE 400 MG/1
250 TABLET ORAL DAILY
COMMUNITY

## 2021-01-01 RX ORDER — IPRATROPIUM BROMIDE AND ALBUTEROL SULFATE 2.5; .5 MG/3ML; MG/3ML
3 SOLUTION RESPIRATORY (INHALATION)
Status: DISCONTINUED | OUTPATIENT
Start: 2021-01-01 | End: 2021-01-01

## 2021-01-01 RX ORDER — LISINOPRIL 5 MG/1
5 TABLET ORAL DAILY
Qty: 90 TABLET | Refills: 3 | Status: SHIPPED | OUTPATIENT
Start: 2021-01-01

## 2021-01-01 RX ORDER — CARBOXYMETHYLCELLULOSE SODIUM 5 MG/ML
2 SOLUTION/ DROPS OPHTHALMIC 3 TIMES DAILY PRN
Status: DISCONTINUED | OUTPATIENT
Start: 2021-01-01 | End: 2021-01-01 | Stop reason: HOSPADM

## 2021-01-01 RX ORDER — IPRATROPIUM BROMIDE AND ALBUTEROL SULFATE 2.5; .5 MG/3ML; MG/3ML
3 SOLUTION RESPIRATORY (INHALATION) EVERY 4 HOURS PRN
Status: DISCONTINUED | OUTPATIENT
Start: 2021-01-01 | End: 2021-01-01 | Stop reason: HOSPADM

## 2021-01-01 RX ORDER — FUROSEMIDE 20 MG/1
20 TABLET ORAL DAILY
Status: DISCONTINUED | OUTPATIENT
Start: 2021-01-01 | End: 2021-01-01

## 2021-01-01 RX ORDER — LISINOPRIL 5 MG/1
5 TABLET ORAL DAILY
Status: DISCONTINUED | OUTPATIENT
Start: 2021-01-01 | End: 2021-01-01

## 2021-01-01 RX ORDER — CETIRIZINE HYDROCHLORIDE 5 MG/1
5 TABLET ORAL DAILY
Status: DISCONTINUED | OUTPATIENT
Start: 2021-01-01 | End: 2021-01-01 | Stop reason: HOSPADM

## 2021-01-01 RX ORDER — ONDANSETRON 2 MG/ML
4 INJECTION INTRAMUSCULAR; INTRAVENOUS EVERY 4 HOURS PRN
Status: DISCONTINUED | OUTPATIENT
Start: 2021-01-01 | End: 2021-01-01 | Stop reason: HOSPADM

## 2021-01-01 RX ORDER — ALBUTEROL SULFATE 2.5 MG/3ML
2.5 SOLUTION RESPIRATORY (INHALATION) EVERY 4 HOURS PRN
Status: DISCONTINUED | OUTPATIENT
Start: 2021-01-01 | End: 2021-01-01

## 2021-01-01 RX ORDER — ONDANSETRON 2 MG/ML
4 INJECTION INTRAMUSCULAR; INTRAVENOUS EVERY 6 HOURS PRN
Status: DISCONTINUED | OUTPATIENT
Start: 2021-01-01 | End: 2021-01-01

## 2021-01-01 RX ORDER — LIDOCAINE HYDROCHLORIDE 20 MG/ML
INJECTION, SOLUTION INFILTRATION; PERINEURAL AS NEEDED
Status: DISCONTINUED | OUTPATIENT
Start: 2021-01-01 | End: 2021-01-01 | Stop reason: HOSPADM

## 2021-01-01 RX ORDER — CETIRIZINE HYDROCHLORIDE 10 MG/1
10 TABLET ORAL DAILY
COMMUNITY

## 2021-01-01 RX ORDER — METHYLPREDNISOLONE SODIUM SUCCINATE 40 MG/ML
40 INJECTION, POWDER, LYOPHILIZED, FOR SOLUTION INTRAMUSCULAR; INTRAVENOUS EVERY 24 HOURS
Status: DISCONTINUED | OUTPATIENT
Start: 2021-01-01 | End: 2021-01-01

## 2021-01-01 RX ORDER — METHYLPREDNISOLONE 4 MG/1
24 TABLET ORAL ONCE
Status: COMPLETED | OUTPATIENT
Start: 2021-01-01 | End: 2021-01-01

## 2021-01-01 RX ORDER — ALPRAZOLAM 0.25 MG/1
0.25 TABLET ORAL 3 TIMES DAILY PRN
Status: DISCONTINUED | OUTPATIENT
Start: 2021-01-01 | End: 2021-01-01 | Stop reason: HOSPADM

## 2021-01-01 RX ORDER — ACETAMINOPHEN 160 MG/5ML
650 SOLUTION ORAL EVERY 4 HOURS PRN
Status: DISCONTINUED | OUTPATIENT
Start: 2021-01-01 | End: 2021-01-01

## 2021-01-01 RX ORDER — SODIUM CHLORIDE 0.9 % (FLUSH) 0.9 %
3 SYRINGE (ML) INJECTION EVERY 12 HOURS SCHEDULED
Status: DISCONTINUED | OUTPATIENT
Start: 2021-01-01 | End: 2021-01-01

## 2021-01-01 RX ORDER — SENNOSIDES 8.6 MG
650 CAPSULE ORAL EVERY 8 HOURS PRN
COMMUNITY
End: 2021-01-01 | Stop reason: SDUPTHER

## 2021-01-01 RX ORDER — ASPIRIN 325 MG
325 TABLET ORAL ONCE
Status: COMPLETED | OUTPATIENT
Start: 2021-01-01 | End: 2021-01-01

## 2021-01-01 RX ORDER — FLUTICASONE PROPIONATE 50 MCG
2 SPRAY, SUSPENSION (ML) NASAL DAILY
Status: DISCONTINUED | OUTPATIENT
Start: 2021-01-01 | End: 2021-01-01 | Stop reason: HOSPADM

## 2021-01-01 RX ORDER — ALBUTEROL SULFATE 90 UG/1
1 AEROSOL, METERED RESPIRATORY (INHALATION) EVERY 4 HOURS PRN
Status: DISCONTINUED | OUTPATIENT
Start: 2021-01-01 | End: 2021-01-01 | Stop reason: SDUPTHER

## 2021-01-01 RX ORDER — DIPHENHYDRAMINE HCL 25 MG
25 CAPSULE ORAL
COMMUNITY
End: 2021-01-01

## 2021-01-01 RX ORDER — HYDROCODONE BITARTRATE AND ACETAMINOPHEN 5; 325 MG/1; MG/1
1 TABLET ORAL EVERY 4 HOURS PRN
Status: DISCONTINUED | OUTPATIENT
Start: 2021-01-01 | End: 2021-01-01 | Stop reason: HOSPADM

## 2021-01-01 RX ORDER — LEVOFLOXACIN 5 MG/ML
750 INJECTION, SOLUTION INTRAVENOUS ONCE
Status: COMPLETED | OUTPATIENT
Start: 2021-01-01 | End: 2021-01-01

## 2021-01-01 RX ORDER — METHYLPREDNISOLONE 4 MG/1
TABLET ORAL
Qty: 21 TABLET | Refills: 0 | Status: SHIPPED | OUTPATIENT
Start: 2021-01-01 | End: 2021-01-01

## 2021-01-01 RX ORDER — MIDAZOLAM HYDROCHLORIDE 1 MG/ML
INJECTION INTRAMUSCULAR; INTRAVENOUS AS NEEDED
Status: DISCONTINUED | OUTPATIENT
Start: 2021-01-01 | End: 2021-01-01 | Stop reason: HOSPADM

## 2021-01-01 RX ORDER — FUROSEMIDE 20 MG/1
TABLET ORAL
COMMUNITY
Start: 2021-01-01 | End: 2021-01-01

## 2021-01-01 RX ORDER — FAMOTIDINE 20 MG/1
20 TABLET, FILM COATED ORAL
COMMUNITY
Start: 2021-01-01 | End: 2021-01-01

## 2021-01-01 RX ORDER — LISINOPRIL 5 MG/1
5 TABLET ORAL
Status: DISCONTINUED | OUTPATIENT
Start: 2021-01-01 | End: 2021-01-01 | Stop reason: HOSPADM

## 2021-01-01 RX ORDER — BLOOD-GLUCOSE METER
EACH MISCELLANEOUS DAILY
Status: ON HOLD | COMMUNITY
Start: 2021-01-01 | End: 2022-01-01

## 2021-01-01 RX ORDER — AMIODARONE HYDROCHLORIDE 200 MG/1
200 TABLET ORAL
Qty: 30 TABLET | Refills: 1 | Status: SHIPPED | OUTPATIENT
Start: 2021-01-01

## 2021-01-01 RX ADMIN — IPRATROPIUM BROMIDE AND ALBUTEROL SULFATE 3 ML: 2.5; .5 SOLUTION RESPIRATORY (INHALATION) at 06:38

## 2021-01-01 RX ADMIN — SODIUM CHLORIDE 75 ML/HR: 9 INJECTION, SOLUTION INTRAVENOUS at 10:24

## 2021-01-01 RX ADMIN — LEVOFLOXACIN 750 MG: 5 INJECTION, SOLUTION INTRAVENOUS at 08:43

## 2021-01-01 RX ADMIN — IPRATROPIUM BROMIDE AND ALBUTEROL SULFATE 3 ML: 2.5; .5 SOLUTION RESPIRATORY (INHALATION) at 11:08

## 2021-01-01 RX ADMIN — IPRATROPIUM BROMIDE AND ALBUTEROL SULFATE 3 ML: 2.5; .5 SOLUTION RESPIRATORY (INHALATION) at 07:15

## 2021-01-01 RX ADMIN — IPRATROPIUM BROMIDE AND ALBUTEROL SULFATE 3 ML: 2.5; .5 SOLUTION RESPIRATORY (INHALATION) at 11:42

## 2021-01-01 RX ADMIN — LACTULOSE 10 G: 10 SOLUTION ORAL at 09:40

## 2021-01-01 RX ADMIN — FAMOTIDINE 20 MG: 20 TABLET ORAL at 08:55

## 2021-01-01 RX ADMIN — LEVOFLOXACIN 750 MG: 5 INJECTION, SOLUTION INTRAVENOUS at 17:37

## 2021-01-01 RX ADMIN — DOCUSATE SODIUM 100 MG: 100 CAPSULE, LIQUID FILLED ORAL at 08:52

## 2021-01-01 RX ADMIN — IPRATROPIUM BROMIDE AND ALBUTEROL SULFATE 3 ML: 2.5; .5 SOLUTION RESPIRATORY (INHALATION) at 16:19

## 2021-01-01 RX ADMIN — SODIUM CHLORIDE, PRESERVATIVE FREE 10 ML: 5 INJECTION INTRAVENOUS at 20:56

## 2021-01-01 RX ADMIN — CETIRIZINE HYDROCHLORIDE 5 MG: 5 TABLET ORAL at 10:27

## 2021-01-01 RX ADMIN — DOCUSATE SODIUM 100 MG: 100 CAPSULE, LIQUID FILLED ORAL at 08:34

## 2021-01-01 RX ADMIN — METOPROLOL TARTRATE 25 MG: 25 TABLET, FILM COATED ORAL at 20:21

## 2021-01-01 RX ADMIN — AMIODARONE HYDROCHLORIDE 1 MG/MIN: 1.8 INJECTION, SOLUTION INTRAVENOUS at 14:14

## 2021-01-01 RX ADMIN — HYDROCODONE BITARTRATE AND ACETAMINOPHEN 1 TABLET: 7.5; 325 TABLET ORAL at 05:44

## 2021-01-01 RX ADMIN — RANOLAZINE 1000 MG: 500 TABLET, FILM COATED, EXTENDED RELEASE ORAL at 07:43

## 2021-01-01 RX ADMIN — LISINOPRIL 5 MG: 5 TABLET ORAL at 08:26

## 2021-01-01 RX ADMIN — ENOXAPARIN SODIUM 40 MG: 40 INJECTION SUBCUTANEOUS at 20:31

## 2021-01-01 RX ADMIN — HYDROCODONE BITARTRATE AND ACETAMINOPHEN 1 TABLET: 7.5; 325 TABLET ORAL at 16:03

## 2021-01-01 RX ADMIN — RANOLAZINE 1000 MG: 500 TABLET, FILM COATED, EXTENDED RELEASE ORAL at 09:39

## 2021-01-01 RX ADMIN — BUDESONIDE AND FORMOTEROL FUMARATE DIHYDRATE 2 PUFF: 160; 4.5 AEROSOL RESPIRATORY (INHALATION) at 20:13

## 2021-01-01 RX ADMIN — FAMOTIDINE 20 MG: 20 TABLET ORAL at 10:12

## 2021-01-01 RX ADMIN — FAMOTIDINE 20 MG: 20 TABLET ORAL at 08:28

## 2021-01-01 RX ADMIN — METHYLPREDNISOLONE SODIUM SUCCINATE 60 MG: 125 INJECTION, POWDER, FOR SOLUTION INTRAMUSCULAR; INTRAVENOUS at 06:28

## 2021-01-01 RX ADMIN — ASPIRIN 81 MG: 81 TABLET, FILM COATED ORAL at 08:52

## 2021-01-01 RX ADMIN — METOPROLOL TARTRATE 12.5 MG: 25 TABLET, FILM COATED ORAL at 20:24

## 2021-01-01 RX ADMIN — IPRATROPIUM BROMIDE AND ALBUTEROL SULFATE 3 ML: 2.5; .5 SOLUTION RESPIRATORY (INHALATION) at 10:53

## 2021-01-01 RX ADMIN — SODIUM CHLORIDE, PRESERVATIVE FREE 10 ML: 5 INJECTION INTRAVENOUS at 08:54

## 2021-01-01 RX ADMIN — FUROSEMIDE 20 MG: 20 TABLET ORAL at 17:08

## 2021-01-01 RX ADMIN — CETIRIZINE HYDROCHLORIDE 5 MG: 5 TABLET ORAL at 08:53

## 2021-01-01 RX ADMIN — METHYLPREDNISOLONE 4 MG: 4 TABLET ORAL at 14:43

## 2021-01-01 RX ADMIN — FAMOTIDINE 20 MG: 20 TABLET ORAL at 17:08

## 2021-01-01 RX ADMIN — ONDANSETRON 4 MG: 2 INJECTION INTRAMUSCULAR; INTRAVENOUS at 06:19

## 2021-01-01 RX ADMIN — IPRATROPIUM BROMIDE AND ALBUTEROL SULFATE 3 ML: 2.5; .5 SOLUTION RESPIRATORY (INHALATION) at 11:21

## 2021-01-01 RX ADMIN — IPRATROPIUM BROMIDE AND ALBUTEROL SULFATE 3 ML: 2.5; .5 SOLUTION RESPIRATORY (INHALATION) at 14:58

## 2021-01-01 RX ADMIN — FUROSEMIDE 40 MG: 40 TABLET ORAL at 07:43

## 2021-01-01 RX ADMIN — FAMOTIDINE 20 MG: 20 TABLET ORAL at 10:24

## 2021-01-01 RX ADMIN — DOCUSATE SODIUM 100 MG: 100 CAPSULE, LIQUID FILLED ORAL at 20:09

## 2021-01-01 RX ADMIN — SODIUM CHLORIDE, PRESERVATIVE FREE 10 ML: 5 INJECTION INTRAVENOUS at 08:17

## 2021-01-01 RX ADMIN — FAMOTIDINE 20 MG: 20 TABLET ORAL at 17:39

## 2021-01-01 RX ADMIN — SODIUM CHLORIDE, PRESERVATIVE FREE 10 ML: 5 INJECTION INTRAVENOUS at 20:25

## 2021-01-01 RX ADMIN — SODIUM CHLORIDE, PRESERVATIVE FREE 10 ML: 5 INJECTION INTRAVENOUS at 09:33

## 2021-01-01 RX ADMIN — FUROSEMIDE 20 MG: 10 INJECTION, SOLUTION INTRAMUSCULAR; INTRAVENOUS at 10:09

## 2021-01-01 RX ADMIN — IPRATROPIUM BROMIDE AND ALBUTEROL SULFATE 3 ML: 2.5; .5 SOLUTION RESPIRATORY (INHALATION) at 10:55

## 2021-01-01 RX ADMIN — FAMOTIDINE 20 MG: 20 TABLET ORAL at 08:18

## 2021-01-01 RX ADMIN — METHYLPREDNISOLONE SODIUM SUCCINATE 60 MG: 125 INJECTION, POWDER, FOR SOLUTION INTRAMUSCULAR; INTRAVENOUS at 06:04

## 2021-01-01 RX ADMIN — INSULIN ASPART 2 UNITS: 100 INJECTION, SOLUTION INTRAVENOUS; SUBCUTANEOUS at 12:09

## 2021-01-01 RX ADMIN — CLOPIDOGREL BISULFATE 75 MG: 75 TABLET ORAL at 10:12

## 2021-01-01 RX ADMIN — METHYLPREDNISOLONE 4 MG: 4 TABLET ORAL at 08:44

## 2021-01-01 RX ADMIN — AMIODARONE HYDROCHLORIDE 200 MG: 200 TABLET ORAL at 09:27

## 2021-01-01 RX ADMIN — PREDNISONE 40 MG: 20 TABLET ORAL at 13:39

## 2021-01-01 RX ADMIN — SODIUM CHLORIDE 100 ML/HR: 9 INJECTION, SOLUTION INTRAVENOUS at 12:40

## 2021-01-01 RX ADMIN — FAMOTIDINE 20 MG: 20 TABLET ORAL at 08:05

## 2021-01-01 RX ADMIN — DOCUSATE SODIUM 100 MG: 100 CAPSULE, LIQUID FILLED ORAL at 08:44

## 2021-01-01 RX ADMIN — FUROSEMIDE 20 MG: 20 TABLET ORAL at 06:16

## 2021-01-01 RX ADMIN — LIDOCAINE HYDROCHLORIDE 2 ML: 10 INJECTION, SOLUTION INFILTRATION; PERINEURAL at 13:31

## 2021-01-01 RX ADMIN — IPRATROPIUM BROMIDE AND ALBUTEROL SULFATE 3 ML: 2.5; .5 SOLUTION RESPIRATORY (INHALATION) at 11:00

## 2021-01-01 RX ADMIN — CETIRIZINE HYDROCHLORIDE 5 MG: 5 TABLET ORAL at 09:27

## 2021-01-01 RX ADMIN — INSULIN ASPART 2 UNITS: 100 INJECTION, SOLUTION INTRAVENOUS; SUBCUTANEOUS at 18:24

## 2021-01-01 RX ADMIN — HYDROCODONE BITARTRATE AND ACETAMINOPHEN 1 TABLET: 7.5; 325 TABLET ORAL at 14:10

## 2021-01-01 RX ADMIN — FAMOTIDINE 20 MG: 20 TABLET ORAL at 09:40

## 2021-01-01 RX ADMIN — RANOLAZINE 1000 MG: 500 TABLET, FILM COATED, EXTENDED RELEASE ORAL at 08:25

## 2021-01-01 RX ADMIN — HYDROCODONE BITARTRATE AND ACETAMINOPHEN 1 TABLET: 7.5; 325 TABLET ORAL at 08:39

## 2021-01-01 RX ADMIN — PREDNISONE 20 MG: 20 TABLET ORAL at 08:27

## 2021-01-01 RX ADMIN — CLOPIDOGREL BISULFATE 75 MG: 75 TABLET ORAL at 09:27

## 2021-01-01 RX ADMIN — FAMOTIDINE 20 MG: 20 TABLET ORAL at 16:58

## 2021-01-01 RX ADMIN — HYDROCODONE BITARTRATE AND ACETAMINOPHEN 1 TABLET: 7.5; 325 TABLET ORAL at 10:20

## 2021-01-01 RX ADMIN — HYDROCODONE BITARTRATE AND ACETAMINOPHEN 1 TABLET: 7.5; 325 TABLET ORAL at 21:00

## 2021-01-01 RX ADMIN — IPRATROPIUM BROMIDE AND ALBUTEROL SULFATE 3 ML: 2.5; .5 SOLUTION RESPIRATORY (INHALATION) at 11:04

## 2021-01-01 RX ADMIN — METOPROLOL TARTRATE 25 MG: 25 TABLET, FILM COATED ORAL at 20:56

## 2021-01-01 RX ADMIN — CLOPIDOGREL BISULFATE 75 MG: 75 TABLET ORAL at 09:33

## 2021-01-01 RX ADMIN — FAMOTIDINE 20 MG: 20 TABLET ORAL at 18:24

## 2021-01-01 RX ADMIN — CLOPIDOGREL BISULFATE 75 MG: 75 TABLET ORAL at 09:40

## 2021-01-01 RX ADMIN — POLYETHYLENE GLYCOL 3350 17 G: 17 POWDER, FOR SOLUTION ORAL at 08:25

## 2021-01-01 RX ADMIN — AMIODARONE HYDROCHLORIDE 0.5 MG/MIN: 1.8 INJECTION, SOLUTION INTRAVENOUS at 04:02

## 2021-01-01 RX ADMIN — RANOLAZINE 1000 MG: 500 TABLET, FILM COATED, EXTENDED RELEASE ORAL at 09:27

## 2021-01-01 RX ADMIN — METHYLPREDNISOLONE SODIUM SUCCINATE 60 MG: 125 INJECTION, POWDER, FOR SOLUTION INTRAMUSCULAR; INTRAVENOUS at 18:23

## 2021-01-01 RX ADMIN — FAMOTIDINE 20 MG: 20 TABLET ORAL at 18:22

## 2021-01-01 RX ADMIN — INSULIN ASPART 2 UNITS: 100 INJECTION, SOLUTION INTRAVENOUS; SUBCUTANEOUS at 13:04

## 2021-01-01 RX ADMIN — RANOLAZINE 500 MG: 500 TABLET, FILM COATED, EXTENDED RELEASE ORAL at 08:18

## 2021-01-01 RX ADMIN — METOPROLOL TARTRATE 12.5 MG: 25 TABLET, FILM COATED ORAL at 20:25

## 2021-01-01 RX ADMIN — IPRATROPIUM BROMIDE AND ALBUTEROL SULFATE 3 ML: 2.5; .5 SOLUTION RESPIRATORY (INHALATION) at 08:09

## 2021-01-01 RX ADMIN — DOCUSATE SODIUM 100 MG: 100 CAPSULE, LIQUID FILLED ORAL at 08:48

## 2021-01-01 RX ADMIN — INSULIN ASPART 2 UNITS: 100 INJECTION, SOLUTION INTRAVENOUS; SUBCUTANEOUS at 08:26

## 2021-01-01 RX ADMIN — IPRATROPIUM BROMIDE AND ALBUTEROL SULFATE 3 ML: 2.5; .5 SOLUTION RESPIRATORY (INHALATION) at 15:07

## 2021-01-01 RX ADMIN — DOCUSATE SODIUM 100 MG: 100 CAPSULE, LIQUID FILLED ORAL at 20:34

## 2021-01-01 RX ADMIN — FAMOTIDINE 20 MG: 20 TABLET ORAL at 17:35

## 2021-01-01 RX ADMIN — DOCUSATE SODIUM 100 MG: 100 CAPSULE, LIQUID FILLED ORAL at 21:09

## 2021-01-01 RX ADMIN — AMIODARONE HYDROCHLORIDE 0.5 MG/MIN: 1.8 INJECTION, SOLUTION INTRAVENOUS at 15:47

## 2021-01-01 RX ADMIN — IPRATROPIUM BROMIDE AND ALBUTEROL SULFATE 3 ML: 2.5; .5 SOLUTION RESPIRATORY (INHALATION) at 07:39

## 2021-01-01 RX ADMIN — DOCUSATE SODIUM 100 MG: 100 CAPSULE, LIQUID FILLED ORAL at 09:33

## 2021-01-01 RX ADMIN — IPRATROPIUM BROMIDE AND ALBUTEROL SULFATE 3 ML: 2.5; .5 SOLUTION RESPIRATORY (INHALATION) at 19:36

## 2021-01-01 RX ADMIN — FAMOTIDINE 20 MG: 20 TABLET ORAL at 08:48

## 2021-01-01 RX ADMIN — CLOPIDOGREL BISULFATE 75 MG: 75 TABLET ORAL at 08:26

## 2021-01-01 RX ADMIN — IPRATROPIUM BROMIDE AND ALBUTEROL SULFATE 3 ML: 2.5; .5 SOLUTION RESPIRATORY (INHALATION) at 14:50

## 2021-01-01 RX ADMIN — DEXAMETHASONE SODIUM PHOSPHATE 6 MG: 4 INJECTION, SOLUTION INTRAMUSCULAR; INTRAVENOUS at 20:42

## 2021-01-01 RX ADMIN — BUDESONIDE AND FORMOTEROL FUMARATE DIHYDRATE 2 PUFF: 160; 4.5 AEROSOL RESPIRATORY (INHALATION) at 07:07

## 2021-01-01 RX ADMIN — METOPROLOL TARTRATE 12.5 MG: 25 TABLET, FILM COATED ORAL at 08:05

## 2021-01-01 RX ADMIN — SODIUM CHLORIDE, PRESERVATIVE FREE 10 ML: 5 INJECTION INTRAVENOUS at 20:20

## 2021-01-01 RX ADMIN — IPRATROPIUM BROMIDE AND ALBUTEROL SULFATE 3 ML: 2.5; .5 SOLUTION RESPIRATORY (INHALATION) at 07:51

## 2021-01-01 RX ADMIN — RANOLAZINE 1000 MG: 500 TABLET, FILM COATED, EXTENDED RELEASE ORAL at 08:52

## 2021-01-01 RX ADMIN — METOPROLOL TARTRATE 25 MG: 25 TABLET, FILM COATED ORAL at 08:54

## 2021-01-01 RX ADMIN — SODIUM CHLORIDE, PRESERVATIVE FREE 10 ML: 5 INJECTION INTRAVENOUS at 21:15

## 2021-01-01 RX ADMIN — CLOPIDOGREL BISULFATE 75 MG: 75 TABLET ORAL at 08:39

## 2021-01-01 RX ADMIN — INSULIN ASPART 7 UNITS: 100 INJECTION, SOLUTION INTRAVENOUS; SUBCUTANEOUS at 12:47

## 2021-01-01 RX ADMIN — IPRATROPIUM BROMIDE AND ALBUTEROL SULFATE 3 ML: 2.5; .5 SOLUTION RESPIRATORY (INHALATION) at 14:08

## 2021-01-01 RX ADMIN — IPRATROPIUM BROMIDE AND ALBUTEROL SULFATE 3 ML: 2.5; .5 SOLUTION RESPIRATORY (INHALATION) at 23:32

## 2021-01-01 RX ADMIN — RANOLAZINE 1000 MG: 500 TABLET, FILM COATED, EXTENDED RELEASE ORAL at 13:50

## 2021-01-01 RX ADMIN — INSULIN ASPART 4 UNITS: 100 INJECTION, SOLUTION INTRAVENOUS; SUBCUTANEOUS at 18:16

## 2021-01-01 RX ADMIN — FUROSEMIDE 20 MG: 20 TABLET ORAL at 16:03

## 2021-01-01 RX ADMIN — INSULIN ASPART 2 UNITS: 100 INJECTION, SOLUTION INTRAVENOUS; SUBCUTANEOUS at 08:28

## 2021-01-01 RX ADMIN — INSULIN ASPART 4 UNITS: 100 INJECTION, SOLUTION INTRAVENOUS; SUBCUTANEOUS at 13:03

## 2021-01-01 RX ADMIN — ONDANSETRON 4 MG: 2 INJECTION INTRAMUSCULAR; INTRAVENOUS at 12:56

## 2021-01-01 RX ADMIN — METHYLPREDNISOLONE 4 MG: 4 TABLET ORAL at 08:02

## 2021-01-01 RX ADMIN — TRIAMCINOLONE ACETONIDE 40 MG: 40 INJECTION, SUSPENSION INTRA-ARTICULAR; INTRAMUSCULAR at 13:31

## 2021-01-01 RX ADMIN — METHYLPREDNISOLONE SODIUM SUCCINATE 60 MG: 125 INJECTION, POWDER, FOR SOLUTION INTRAMUSCULAR; INTRAVENOUS at 17:35

## 2021-01-01 RX ADMIN — HYDROCODONE BITARTRATE AND ACETAMINOPHEN 1 TABLET: 7.5; 325 TABLET ORAL at 02:53

## 2021-01-01 RX ADMIN — METOPROLOL TARTRATE 25 MG: 25 TABLET, FILM COATED ORAL at 10:26

## 2021-01-01 RX ADMIN — HYDROCODONE BITARTRATE AND ACETAMINOPHEN 1 TABLET: 7.5; 325 TABLET ORAL at 17:41

## 2021-01-01 RX ADMIN — SODIUM CHLORIDE, PRESERVATIVE FREE 10 ML: 5 INJECTION INTRAVENOUS at 20:54

## 2021-01-01 RX ADMIN — SODIUM CHLORIDE 75 ML/HR: 9 INJECTION, SOLUTION INTRAVENOUS at 20:02

## 2021-01-01 RX ADMIN — CLOPIDOGREL BISULFATE 75 MG: 75 TABLET ORAL at 08:48

## 2021-01-01 RX ADMIN — METHYLPREDNISOLONE SODIUM SUCCINATE 60 MG: 125 INJECTION, POWDER, FOR SOLUTION INTRAMUSCULAR; INTRAVENOUS at 06:07

## 2021-01-01 RX ADMIN — DOCUSATE SODIUM 100 MG: 100 CAPSULE, LIQUID FILLED ORAL at 21:16

## 2021-01-01 RX ADMIN — FUROSEMIDE 20 MG: 20 TABLET ORAL at 06:07

## 2021-01-01 RX ADMIN — INSULIN ASPART 2 UNITS: 100 INJECTION, SOLUTION INTRAVENOUS; SUBCUTANEOUS at 17:17

## 2021-01-01 RX ADMIN — CETIRIZINE HYDROCHLORIDE 5 MG: 5 TABLET ORAL at 13:50

## 2021-01-01 RX ADMIN — RANOLAZINE 1000 MG: 500 TABLET, FILM COATED, EXTENDED RELEASE ORAL at 20:21

## 2021-01-01 RX ADMIN — CLOPIDOGREL BISULFATE 75 MG: 75 TABLET ORAL at 08:44

## 2021-01-01 RX ADMIN — INSULIN ASPART 2 UNITS: 100 INJECTION, SOLUTION INTRAVENOUS; SUBCUTANEOUS at 08:43

## 2021-01-01 RX ADMIN — SODIUM CHLORIDE, PRESERVATIVE FREE 10 ML: 5 INJECTION INTRAVENOUS at 08:02

## 2021-01-01 RX ADMIN — CLOPIDOGREL BISULFATE 75 MG: 75 TABLET ORAL at 07:44

## 2021-01-01 RX ADMIN — FAMOTIDINE 20 MG: 20 TABLET ORAL at 17:29

## 2021-01-01 RX ADMIN — METHYLPREDNISOLONE SODIUM SUCCINATE 40 MG: 40 INJECTION, POWDER, FOR SOLUTION INTRAMUSCULAR; INTRAVENOUS at 18:21

## 2021-01-01 RX ADMIN — IPRATROPIUM BROMIDE AND ALBUTEROL SULFATE 3 ML: 2.5; .5 SOLUTION RESPIRATORY (INHALATION) at 20:58

## 2021-01-01 RX ADMIN — LISINOPRIL 5 MG: 5 TABLET ORAL at 08:40

## 2021-01-01 RX ADMIN — SODIUM CHLORIDE, PRESERVATIVE FREE 10 ML: 5 INJECTION INTRAVENOUS at 20:12

## 2021-01-01 RX ADMIN — HYDROCODONE BITARTRATE AND ACETAMINOPHEN 1 TABLET: 7.5; 325 TABLET ORAL at 12:43

## 2021-01-01 RX ADMIN — FAMOTIDINE 20 MG: 20 TABLET ORAL at 08:45

## 2021-01-01 RX ADMIN — CARBOXYMETHYLCELLULOSE SODIUM 2 DROP: 5 SOLUTION/ DROPS OPHTHALMIC at 14:58

## 2021-01-01 RX ADMIN — LACTULOSE 10 G: 10 SOLUTION ORAL at 09:27

## 2021-01-01 RX ADMIN — FUROSEMIDE 20 MG: 20 TABLET ORAL at 17:13

## 2021-01-01 RX ADMIN — IPRATROPIUM BROMIDE AND ALBUTEROL SULFATE 3 ML: 2.5; .5 SOLUTION RESPIRATORY (INHALATION) at 14:42

## 2021-01-01 RX ADMIN — DOCUSATE SODIUM 100 MG: 100 CAPSULE, LIQUID FILLED ORAL at 10:12

## 2021-01-01 RX ADMIN — SODIUM CHLORIDE, PRESERVATIVE FREE 10 ML: 5 INJECTION INTRAVENOUS at 23:02

## 2021-01-01 RX ADMIN — LISINOPRIL 5 MG: 5 TABLET ORAL at 10:27

## 2021-01-01 RX ADMIN — IPRATROPIUM BROMIDE AND ALBUTEROL SULFATE 3 ML: 2.5; .5 SOLUTION RESPIRATORY (INHALATION) at 20:02

## 2021-01-01 RX ADMIN — SODIUM CHLORIDE, PRESERVATIVE FREE 10 ML: 5 INJECTION INTRAVENOUS at 21:01

## 2021-01-01 RX ADMIN — IPRATROPIUM BROMIDE AND ALBUTEROL SULFATE 3 ML: 2.5; .5 SOLUTION RESPIRATORY (INHALATION) at 11:19

## 2021-01-01 RX ADMIN — BUDESONIDE AND FORMOTEROL FUMARATE DIHYDRATE 2 PUFF: 160; 4.5 AEROSOL RESPIRATORY (INHALATION) at 19:47

## 2021-01-01 RX ADMIN — DOCUSATE SODIUM 100 MG: 100 CAPSULE, LIQUID FILLED ORAL at 20:10

## 2021-01-01 RX ADMIN — METOPROLOL TARTRATE 12.5 MG: 25 TABLET, FILM COATED ORAL at 07:45

## 2021-01-01 RX ADMIN — ACETAMINOPHEN 650 MG: 325 TABLET, FILM COATED ORAL at 16:02

## 2021-01-01 RX ADMIN — METHYLPREDNISOLONE 4 MG: 4 TABLET ORAL at 10:24

## 2021-01-01 RX ADMIN — METOPROLOL TARTRATE 12.5 MG: 25 TABLET, FILM COATED ORAL at 14:22

## 2021-01-01 RX ADMIN — IPRATROPIUM BROMIDE AND ALBUTEROL SULFATE 3 ML: 2.5; .5 SOLUTION RESPIRATORY (INHALATION) at 10:52

## 2021-01-01 RX ADMIN — FAMOTIDINE 20 MG: 20 TABLET ORAL at 17:17

## 2021-01-01 RX ADMIN — ASPIRIN 325 MG: 325 TABLET ORAL at 19:34

## 2021-01-01 RX ADMIN — INSULIN ASPART 4 UNITS: 100 INJECTION, SOLUTION INTRAVENOUS; SUBCUTANEOUS at 16:52

## 2021-01-01 RX ADMIN — SODIUM CHLORIDE, PRESERVATIVE FREE 10 ML: 5 INJECTION INTRAVENOUS at 20:01

## 2021-01-01 RX ADMIN — HYDROCODONE BITARTRATE AND ACETAMINOPHEN 1 TABLET: 7.5; 325 TABLET ORAL at 21:21

## 2021-01-01 RX ADMIN — METOPROLOL TARTRATE 12.5 MG: 25 TABLET, FILM COATED ORAL at 08:54

## 2021-01-01 RX ADMIN — HYDROCODONE BITARTRATE AND ACETAMINOPHEN 1 TABLET: 7.5; 325 TABLET ORAL at 05:57

## 2021-01-01 RX ADMIN — ONDANSETRON 4 MG: 2 INJECTION INTRAMUSCULAR; INTRAVENOUS at 11:36

## 2021-01-01 RX ADMIN — IPRATROPIUM BROMIDE AND ALBUTEROL SULFATE 3 ML: 2.5; .5 SOLUTION RESPIRATORY (INHALATION) at 14:54

## 2021-01-01 RX ADMIN — DOCUSATE SODIUM 100 MG: 100 CAPSULE, LIQUID FILLED ORAL at 08:43

## 2021-01-01 RX ADMIN — INSULIN ASPART 4 UNITS: 100 INJECTION, SOLUTION INTRAVENOUS; SUBCUTANEOUS at 17:39

## 2021-01-01 RX ADMIN — IPRATROPIUM BROMIDE AND ALBUTEROL SULFATE 3 ML: 2.5; .5 SOLUTION RESPIRATORY (INHALATION) at 07:06

## 2021-01-01 RX ADMIN — IPRATROPIUM BROMIDE AND ALBUTEROL SULFATE 3 ML: 2.5; .5 SOLUTION RESPIRATORY (INHALATION) at 19:33

## 2021-01-01 RX ADMIN — SODIUM CHLORIDE, PRESERVATIVE FREE 10 ML: 5 INJECTION INTRAVENOUS at 08:42

## 2021-01-01 RX ADMIN — HYDROCODONE BITARTRATE AND ACETAMINOPHEN 1 TABLET: 7.5; 325 TABLET ORAL at 10:26

## 2021-01-01 RX ADMIN — SODIUM CHLORIDE, PRESERVATIVE FREE 10 ML: 5 INJECTION INTRAVENOUS at 21:43

## 2021-01-01 RX ADMIN — HEPARIN SODIUM 5000 UNITS: 5000 INJECTION INTRAVENOUS; SUBCUTANEOUS at 08:28

## 2021-01-01 RX ADMIN — DOCUSATE SODIUM 100 MG: 100 CAPSULE, LIQUID FILLED ORAL at 10:24

## 2021-01-01 RX ADMIN — SODIUM CHLORIDE, PRESERVATIVE FREE 10 ML: 5 INJECTION INTRAVENOUS at 08:27

## 2021-01-01 RX ADMIN — HYDROCODONE BITARTRATE AND ACETAMINOPHEN 1 TABLET: 7.5; 325 TABLET ORAL at 20:15

## 2021-01-01 RX ADMIN — SODIUM CHLORIDE, PRESERVATIVE FREE 10 ML: 5 INJECTION INTRAVENOUS at 10:28

## 2021-01-01 RX ADMIN — FAMOTIDINE 20 MG: 20 TABLET ORAL at 17:58

## 2021-01-01 RX ADMIN — SODIUM CHLORIDE, PRESERVATIVE FREE 10 ML: 5 INJECTION INTRAVENOUS at 08:20

## 2021-01-01 RX ADMIN — METOPROLOL TARTRATE 12.5 MG: 25 TABLET, FILM COATED ORAL at 09:39

## 2021-01-01 RX ADMIN — ASPIRIN 324 MG: 81 TABLET, CHEWABLE ORAL at 15:30

## 2021-01-01 RX ADMIN — CLOPIDOGREL BISULFATE 75 MG: 75 TABLET ORAL at 08:05

## 2021-01-01 RX ADMIN — HYDROCODONE BITARTRATE AND ACETAMINOPHEN 1 TABLET: 7.5; 325 TABLET ORAL at 12:08

## 2021-01-01 RX ADMIN — IPRATROPIUM BROMIDE AND ALBUTEROL SULFATE 3 ML: 2.5; .5 SOLUTION RESPIRATORY (INHALATION) at 08:05

## 2021-01-01 RX ADMIN — ENOXAPARIN SODIUM 40 MG: 40 INJECTION SUBCUTANEOUS at 20:09

## 2021-01-01 RX ADMIN — SODIUM CHLORIDE, PRESERVATIVE FREE 10 ML: 5 INJECTION INTRAVENOUS at 21:09

## 2021-01-01 RX ADMIN — INSULIN ASPART 2 UNITS: 100 INJECTION, SOLUTION INTRAVENOUS; SUBCUTANEOUS at 08:47

## 2021-01-01 RX ADMIN — METHYLPREDNISOLONE SODIUM SUCCINATE 60 MG: 125 INJECTION, POWDER, FOR SOLUTION INTRAMUSCULAR; INTRAVENOUS at 06:31

## 2021-01-01 RX ADMIN — INSULIN ASPART 4 UNITS: 100 INJECTION, SOLUTION INTRAVENOUS; SUBCUTANEOUS at 17:13

## 2021-01-01 RX ADMIN — IPRATROPIUM BROMIDE AND ALBUTEROL SULFATE 3 ML: 2.5; .5 SOLUTION RESPIRATORY (INHALATION) at 15:09

## 2021-01-01 RX ADMIN — FAMOTIDINE 20 MG: 20 TABLET ORAL at 17:36

## 2021-01-01 RX ADMIN — LISINOPRIL 5 MG: 5 TABLET ORAL at 07:44

## 2021-01-01 RX ADMIN — SODIUM CHLORIDE 100 ML/HR: 9 INJECTION, SOLUTION INTRAVENOUS at 18:18

## 2021-01-01 RX ADMIN — IPRATROPIUM BROMIDE AND ALBUTEROL SULFATE 3 ML: 2.5; .5 SOLUTION RESPIRATORY (INHALATION) at 14:57

## 2021-01-01 RX ADMIN — POLYETHYLENE GLYCOL 3350 17 G: 17 POWDER, FOR SOLUTION ORAL at 12:39

## 2021-01-01 RX ADMIN — INSULIN ASPART 2 UNITS: 100 INJECTION, SOLUTION INTRAVENOUS; SUBCUTANEOUS at 17:36

## 2021-01-01 RX ADMIN — IPRATROPIUM BROMIDE AND ALBUTEROL SULFATE 3 ML: 2.5; .5 SOLUTION RESPIRATORY (INHALATION) at 06:50

## 2021-01-01 RX ADMIN — HYDROCODONE BITARTRATE AND ACETAMINOPHEN 1 TABLET: 7.5; 325 TABLET ORAL at 01:41

## 2021-01-01 RX ADMIN — IPRATROPIUM BROMIDE AND ALBUTEROL SULFATE 3 ML: 2.5; .5 SOLUTION RESPIRATORY (INHALATION) at 20:12

## 2021-01-01 RX ADMIN — DIPHENHYDRAMINE HYDROCHLORIDE AND LIDOCAINE HYDROCHLORIDE AND ALUMINUM HYDROXIDE AND MAGNESIUM HYDRO 10 ML: KIT at 01:46

## 2021-01-01 RX ADMIN — IPRATROPIUM BROMIDE AND ALBUTEROL SULFATE 3 ML: 2.5; .5 SOLUTION RESPIRATORY (INHALATION) at 12:03

## 2021-01-01 RX ADMIN — CLOPIDOGREL BISULFATE 75 MG: 75 TABLET ORAL at 08:52

## 2021-01-01 RX ADMIN — SODIUM CHLORIDE 2040 ML: 9 INJECTION, SOLUTION INTRAVENOUS at 17:36

## 2021-01-01 RX ADMIN — ONDANSETRON 4 MG: 2 INJECTION INTRAMUSCULAR; INTRAVENOUS at 15:39

## 2021-01-01 RX ADMIN — IPRATROPIUM BROMIDE AND ALBUTEROL SULFATE 3 ML: 2.5; .5 SOLUTION RESPIRATORY (INHALATION) at 19:11

## 2021-01-01 RX ADMIN — IPRATROPIUM BROMIDE AND ALBUTEROL SULFATE 3 ML: 2.5; .5 SOLUTION RESPIRATORY (INHALATION) at 07:41

## 2021-01-01 RX ADMIN — ENOXAPARIN SODIUM 40 MG: 40 INJECTION SUBCUTANEOUS at 21:14

## 2021-01-01 RX ADMIN — FUROSEMIDE 20 MG: 20 TABLET ORAL at 08:52

## 2021-01-01 RX ADMIN — METOPROLOL TARTRATE 12.5 MG: 25 TABLET, FILM COATED ORAL at 08:31

## 2021-01-01 RX ADMIN — LISINOPRIL 5 MG: 5 TABLET ORAL at 08:53

## 2021-01-01 RX ADMIN — IPRATROPIUM BROMIDE AND ALBUTEROL SULFATE 3 ML: 2.5; .5 SOLUTION RESPIRATORY (INHALATION) at 19:47

## 2021-01-01 RX ADMIN — RANOLAZINE 1000 MG: 500 TABLET, FILM COATED, EXTENDED RELEASE ORAL at 21:13

## 2021-01-01 RX ADMIN — FUROSEMIDE 20 MG: 20 TABLET ORAL at 06:10

## 2021-01-01 RX ADMIN — HYDROCODONE BITARTRATE AND ACETAMINOPHEN 1 TABLET: 7.5; 325 TABLET ORAL at 08:05

## 2021-01-01 RX ADMIN — INSULIN ASPART 2 UNITS: 100 INJECTION, SOLUTION INTRAVENOUS; SUBCUTANEOUS at 11:38

## 2021-01-01 RX ADMIN — LEVOFLOXACIN 750 MG: 750 TABLET, FILM COATED ORAL at 20:54

## 2021-01-01 RX ADMIN — RANOLAZINE 1000 MG: 500 TABLET, FILM COATED, EXTENDED RELEASE ORAL at 08:26

## 2021-01-01 RX ADMIN — FAMOTIDINE 20 MG: 20 TABLET ORAL at 09:27

## 2021-01-01 RX ADMIN — HYDROCODONE BITARTRATE AND ACETAMINOPHEN 1 TABLET: 7.5; 325 TABLET ORAL at 20:25

## 2021-01-01 RX ADMIN — CLOPIDOGREL BISULFATE 75 MG: 75 TABLET ORAL at 10:27

## 2021-01-01 RX ADMIN — CETIRIZINE HYDROCHLORIDE 5 MG: 5 TABLET ORAL at 08:27

## 2021-01-01 RX ADMIN — HYDROCODONE BITARTRATE AND ACETAMINOPHEN 1 TABLET: 7.5; 325 TABLET ORAL at 08:10

## 2021-01-01 RX ADMIN — DOCUSATE SODIUM 100 MG: 100 CAPSULE, LIQUID FILLED ORAL at 20:31

## 2021-01-01 RX ADMIN — ENOXAPARIN SODIUM 40 MG: 40 INJECTION SUBCUTANEOUS at 20:10

## 2021-01-01 RX ADMIN — METOPROLOL TARTRATE 25 MG: 25 TABLET, FILM COATED ORAL at 20:20

## 2021-01-01 RX ADMIN — IOPAMIDOL 61 ML: 755 INJECTION, SOLUTION INTRAVENOUS at 13:01

## 2021-01-01 RX ADMIN — IPRATROPIUM BROMIDE AND ALBUTEROL SULFATE 3 ML: 2.5; .5 SOLUTION RESPIRATORY (INHALATION) at 23:26

## 2021-01-01 RX ADMIN — CLOPIDOGREL BISULFATE 75 MG: 75 TABLET ORAL at 08:43

## 2021-01-01 RX ADMIN — POLYETHYLENE GLYCOL 3350 17 G: 17 POWDER, FOR SOLUTION ORAL at 07:45

## 2021-01-01 RX ADMIN — FAMOTIDINE 20 MG: 20 TABLET ORAL at 07:44

## 2021-01-01 RX ADMIN — IPRATROPIUM BROMIDE AND ALBUTEROL SULFATE 3 ML: 2.5; .5 SOLUTION RESPIRATORY (INHALATION) at 19:57

## 2021-01-01 RX ADMIN — INSULIN ASPART 2 UNITS: 100 INJECTION, SOLUTION INTRAVENOUS; SUBCUTANEOUS at 18:29

## 2021-01-01 RX ADMIN — RANOLAZINE 1000 MG: 500 TABLET, FILM COATED, EXTENDED RELEASE ORAL at 08:53

## 2021-01-01 RX ADMIN — PREDNISONE 20 MG: 20 TABLET ORAL at 08:54

## 2021-01-01 RX ADMIN — DOCUSATE SODIUM 100 MG: 100 CAPSULE, LIQUID FILLED ORAL at 08:28

## 2021-01-01 RX ADMIN — LEVOFLOXACIN 750 MG: 5 INJECTION, SOLUTION INTRAVENOUS at 18:29

## 2021-01-01 RX ADMIN — ONDANSETRON 4 MG: 2 INJECTION INTRAMUSCULAR; INTRAVENOUS at 16:31

## 2021-01-01 RX ADMIN — SODIUM CHLORIDE 100 ML/HR: 9 INJECTION, SOLUTION INTRAVENOUS at 03:54

## 2021-01-01 RX ADMIN — RANOLAZINE 1000 MG: 500 TABLET, FILM COATED, EXTENDED RELEASE ORAL at 20:56

## 2021-01-01 RX ADMIN — IPRATROPIUM BROMIDE AND ALBUTEROL SULFATE 3 ML: 2.5; .5 SOLUTION RESPIRATORY (INHALATION) at 11:22

## 2021-01-01 RX ADMIN — DOCUSATE SODIUM 100 MG: 100 CAPSULE, LIQUID FILLED ORAL at 20:55

## 2021-01-01 RX ADMIN — AMIODARONE HYDROCHLORIDE 200 MG: 200 TABLET ORAL at 08:27

## 2021-01-01 RX ADMIN — HYDROCODONE BITARTRATE AND ACETAMINOPHEN 1 TABLET: 7.5; 325 TABLET ORAL at 17:36

## 2021-01-01 RX ADMIN — FAMOTIDINE 20 MG: 20 TABLET ORAL at 08:34

## 2021-01-01 RX ADMIN — SODIUM CHLORIDE, PRESERVATIVE FREE 10 ML: 5 INJECTION INTRAVENOUS at 08:58

## 2021-01-01 RX ADMIN — METOPROLOL TARTRATE 25 MG: 25 TABLET, FILM COATED ORAL at 21:00

## 2021-01-01 RX ADMIN — FAMOTIDINE 20 MG: 20 TABLET ORAL at 08:26

## 2021-01-01 RX ADMIN — HYDROCODONE BITARTRATE AND ACETAMINOPHEN 1 TABLET: 7.5; 325 TABLET ORAL at 06:15

## 2021-01-01 RX ADMIN — RANOLAZINE 1000 MG: 500 TABLET, FILM COATED, EXTENDED RELEASE ORAL at 10:27

## 2021-01-01 RX ADMIN — IPRATROPIUM BROMIDE AND ALBUTEROL SULFATE 3 ML: 2.5; .5 SOLUTION RESPIRATORY (INHALATION) at 14:38

## 2021-01-01 RX ADMIN — HYDROCODONE BITARTRATE AND ACETAMINOPHEN 1 TABLET: 7.5; 325 TABLET ORAL at 17:13

## 2021-01-01 RX ADMIN — LEVOFLOXACIN 750 MG: 5 INJECTION, SOLUTION INTRAVENOUS at 08:34

## 2021-01-01 RX ADMIN — AMIODARONE HYDROCHLORIDE 1 MG/MIN: 1.8 INJECTION, SOLUTION INTRAVENOUS at 15:32

## 2021-01-01 RX ADMIN — INSULIN ASPART 4 UNITS: 100 INJECTION, SOLUTION INTRAVENOUS; SUBCUTANEOUS at 11:28

## 2021-01-01 RX ADMIN — FAMOTIDINE 20 MG: 20 TABLET ORAL at 18:39

## 2021-01-01 RX ADMIN — IPRATROPIUM BROMIDE AND ALBUTEROL SULFATE 3 ML: 2.5; .5 SOLUTION RESPIRATORY (INHALATION) at 10:37

## 2021-01-01 RX ADMIN — LIDOCAINE HYDROCHLORIDE 2 ML: 10 INJECTION, SOLUTION INFILTRATION; PERINEURAL at 14:12

## 2021-01-01 RX ADMIN — DOCUSATE SODIUM 100 MG: 100 CAPSULE, LIQUID FILLED ORAL at 20:29

## 2021-01-01 RX ADMIN — FAMOTIDINE 20 MG: 20 TABLET ORAL at 17:42

## 2021-01-01 RX ADMIN — FUROSEMIDE 20 MG: 20 TABLET ORAL at 12:39

## 2021-01-01 RX ADMIN — DOCUSATE SODIUM 100 MG: 100 CAPSULE, LIQUID FILLED ORAL at 20:42

## 2021-01-01 RX ADMIN — HEPARIN SODIUM 5000 UNITS: 5000 INJECTION INTRAVENOUS; SUBCUTANEOUS at 08:18

## 2021-01-01 RX ADMIN — METHYLPREDNISOLONE 4 MG: 4 TABLET ORAL at 17:41

## 2021-01-01 RX ADMIN — CLOPIDOGREL BISULFATE 75 MG: 75 TABLET ORAL at 08:18

## 2021-01-01 RX ADMIN — LEVOFLOXACIN 750 MG: 5 INJECTION, SOLUTION INTRAVENOUS at 08:49

## 2021-01-01 RX ADMIN — SODIUM CHLORIDE, PRESERVATIVE FREE 10 ML: 5 INJECTION INTRAVENOUS at 10:12

## 2021-01-01 RX ADMIN — HYDROCODONE BITARTRATE AND ACETAMINOPHEN 1 TABLET: 7.5; 325 TABLET ORAL at 20:35

## 2021-01-01 RX ADMIN — IPRATROPIUM BROMIDE AND ALBUTEROL SULFATE 3 ML: 2.5; .5 SOLUTION RESPIRATORY (INHALATION) at 18:59

## 2021-01-01 RX ADMIN — DOCUSATE SODIUM 100 MG: 100 CAPSULE, LIQUID FILLED ORAL at 21:43

## 2021-01-01 RX ADMIN — IPRATROPIUM BROMIDE AND ALBUTEROL SULFATE 3 ML: 2.5; .5 SOLUTION RESPIRATORY (INHALATION) at 07:21

## 2021-01-01 RX ADMIN — FAMOTIDINE 20 MG: 20 TABLET ORAL at 18:18

## 2021-01-01 RX ADMIN — LACTULOSE 10 G: 10 SOLUTION ORAL at 08:54

## 2021-01-01 RX ADMIN — SODIUM CHLORIDE, PRESERVATIVE FREE 10 ML: 5 INJECTION INTRAVENOUS at 09:28

## 2021-01-01 RX ADMIN — TRIAMCINOLONE ACETONIDE 40 MG: 40 INJECTION, SUSPENSION INTRA-ARTICULAR; INTRAMUSCULAR at 11:24

## 2021-01-01 RX ADMIN — FAMOTIDINE 20 MG: 20 TABLET ORAL at 17:55

## 2021-01-01 RX ADMIN — AMIODARONE HYDROCHLORIDE 1 MG/MIN: 1.8 INJECTION, SOLUTION INTRAVENOUS at 09:46

## 2021-01-01 RX ADMIN — ENOXAPARIN SODIUM 40 MG: 40 INJECTION SUBCUTANEOUS at 21:03

## 2021-01-01 RX ADMIN — SODIUM CHLORIDE, PRESERVATIVE FREE 10 ML: 5 INJECTION INTRAVENOUS at 22:38

## 2021-01-01 RX ADMIN — DOCUSATE SODIUM 100 MG: 100 CAPSULE, LIQUID FILLED ORAL at 20:30

## 2021-01-01 RX ADMIN — FAMOTIDINE 20 MG: 20 TABLET ORAL at 18:16

## 2021-01-01 RX ADMIN — CETIRIZINE HYDROCHLORIDE 5 MG: 5 TABLET ORAL at 08:05

## 2021-01-01 RX ADMIN — IPRATROPIUM BROMIDE AND ALBUTEROL SULFATE 3 ML: 2.5; .5 SOLUTION RESPIRATORY (INHALATION) at 06:49

## 2021-01-01 RX ADMIN — SODIUM CHLORIDE, PRESERVATIVE FREE 10 ML: 5 INJECTION INTRAVENOUS at 20:43

## 2021-01-01 RX ADMIN — BUDESONIDE AND FORMOTEROL FUMARATE DIHYDRATE 2 PUFF: 160; 4.5 AEROSOL RESPIRATORY (INHALATION) at 19:33

## 2021-01-01 RX ADMIN — METOPROLOL TARTRATE 25 MG: 25 TABLET, FILM COATED ORAL at 08:26

## 2021-01-01 RX ADMIN — IPRATROPIUM BROMIDE AND ALBUTEROL SULFATE 3 ML: 2.5; .5 SOLUTION RESPIRATORY (INHALATION) at 07:10

## 2021-01-01 RX ADMIN — FAMOTIDINE 20 MG: 20 TABLET ORAL at 16:52

## 2021-01-01 RX ADMIN — IPRATROPIUM BROMIDE AND ALBUTEROL SULFATE 3 ML: 2.5; .5 SOLUTION RESPIRATORY (INHALATION) at 15:51

## 2021-01-01 RX ADMIN — IPRATROPIUM BROMIDE AND ALBUTEROL SULFATE 3 ML: 2.5; .5 SOLUTION RESPIRATORY (INHALATION) at 15:55

## 2021-01-01 RX ADMIN — RANOLAZINE 1000 MG: 500 TABLET, FILM COATED, EXTENDED RELEASE ORAL at 20:23

## 2021-01-01 RX ADMIN — CLOPIDOGREL BISULFATE 75 MG: 75 TABLET ORAL at 10:23

## 2021-01-01 RX ADMIN — POLYETHYLENE GLYCOL 3350 17 G: 17 POWDER, FOR SOLUTION ORAL at 08:40

## 2021-01-01 RX ADMIN — BUDESONIDE AND FORMOTEROL FUMARATE DIHYDRATE 2 PUFF: 160; 4.5 AEROSOL RESPIRATORY (INHALATION) at 07:05

## 2021-01-01 RX ADMIN — LACTULOSE 10 G: 10 SOLUTION ORAL at 10:59

## 2021-01-01 RX ADMIN — HYDROCODONE BITARTRATE AND ACETAMINOPHEN 1 TABLET: 7.5; 325 TABLET ORAL at 20:09

## 2021-01-01 RX ADMIN — SODIUM CHLORIDE, PRESERVATIVE FREE 10 ML: 5 INJECTION INTRAVENOUS at 08:56

## 2021-01-01 RX ADMIN — FAMOTIDINE 20 MG: 20 TABLET ORAL at 08:53

## 2021-01-01 RX ADMIN — CLOPIDOGREL BISULFATE 75 MG: 75 TABLET ORAL at 08:34

## 2021-01-01 RX ADMIN — DIPHENHYDRAMINE HYDROCHLORIDE AND LIDOCAINE HYDROCHLORIDE AND ALUMINUM HYDROXIDE AND MAGNESIUM HYDRO 10 ML: KIT at 11:29

## 2021-01-01 RX ADMIN — SODIUM CHLORIDE, PRESERVATIVE FREE 10 ML: 5 INJECTION INTRAVENOUS at 21:17

## 2021-01-01 RX ADMIN — LEVOFLOXACIN 750 MG: 5 INJECTION, SOLUTION INTRAVENOUS at 18:23

## 2021-01-01 RX ADMIN — METHYLPREDNISOLONE 24 MG: 4 TABLET ORAL at 14:08

## 2021-01-01 RX ADMIN — METHYLPREDNISOLONE SODIUM SUCCINATE 60 MG: 125 INJECTION, POWDER, FOR SOLUTION INTRAMUSCULAR; INTRAVENOUS at 21:11

## 2021-01-01 RX ADMIN — HEPARIN SODIUM 5000 UNITS: 5000 INJECTION INTRAVENOUS; SUBCUTANEOUS at 23:17

## 2021-01-01 RX ADMIN — ENOXAPARIN SODIUM 40 MG: 40 INJECTION SUBCUTANEOUS at 21:42

## 2021-01-01 RX ADMIN — HYDROCODONE BITARTRATE AND ACETAMINOPHEN 1 TABLET: 7.5; 325 TABLET ORAL at 10:12

## 2021-01-01 RX ADMIN — SODIUM CHLORIDE 500 ML: 9 INJECTION, SOLUTION INTRAVENOUS at 19:34

## 2021-01-01 RX ADMIN — COLCHICINE 0.6 MG: 0.6 TABLET, FILM COATED ORAL at 21:00

## 2021-01-01 RX ADMIN — Medication 100 MG: at 09:44

## 2021-01-01 RX ADMIN — FAMOTIDINE 20 MG: 20 TABLET ORAL at 17:18

## 2021-01-01 RX ADMIN — BUDESONIDE AND FORMOTEROL FUMARATE DIHYDRATE 2 PUFF: 160; 4.5 AEROSOL RESPIRATORY (INHALATION) at 23:26

## 2021-01-01 RX ADMIN — FAMOTIDINE 20 MG: 20 TABLET ORAL at 08:10

## 2021-01-01 RX ADMIN — ASPIRIN 81 MG: 81 TABLET, FILM COATED ORAL at 08:28

## 2021-01-01 RX ADMIN — LACTULOSE 10 G: 10 SOLUTION ORAL at 08:40

## 2021-01-01 RX ADMIN — HYDROCODONE BITARTRATE AND ACETAMINOPHEN 1 TABLET: 7.5; 325 TABLET ORAL at 13:50

## 2021-01-01 RX ADMIN — RANOLAZINE 500 MG: 500 TABLET, FILM COATED, EXTENDED RELEASE ORAL at 23:17

## 2021-01-01 RX ADMIN — POLYETHYLENE GLYCOL 3350 17 G: 17 POWDER, FOR SOLUTION ORAL at 08:52

## 2021-01-01 RX ADMIN — HYDROCODONE BITARTRATE AND ACETAMINOPHEN 1 TABLET: 7.5; 325 TABLET ORAL at 02:23

## 2021-01-01 RX ADMIN — AMIODARONE HYDROCHLORIDE 200 MG: 200 TABLET ORAL at 08:53

## 2021-01-01 RX ADMIN — HYDROCODONE BITARTRATE AND ACETAMINOPHEN 1 TABLET: 7.5; 325 TABLET ORAL at 11:52

## 2021-01-01 RX ADMIN — SODIUM CHLORIDE, PRESERVATIVE FREE 10 ML: 5 INJECTION INTRAVENOUS at 20:31

## 2021-01-01 RX ADMIN — INSULIN ASPART 2 UNITS: 100 INJECTION, SOLUTION INTRAVENOUS; SUBCUTANEOUS at 16:58

## 2021-01-01 RX ADMIN — DOCUSATE SODIUM 100 MG: 100 CAPSULE, LIQUID FILLED ORAL at 08:55

## 2021-01-01 RX ADMIN — ONDANSETRON 4 MG: 2 INJECTION INTRAMUSCULAR; INTRAVENOUS at 08:29

## 2021-01-01 RX ADMIN — POLYETHYLENE GLYCOL 3350 17 G: 17 POWDER, FOR SOLUTION ORAL at 08:53

## 2021-01-01 RX ADMIN — FAMOTIDINE 20 MG: 20 TABLET ORAL at 17:13

## 2021-01-01 RX ADMIN — ENOXAPARIN SODIUM 40 MG: 40 INJECTION SUBCUTANEOUS at 20:55

## 2021-01-01 RX ADMIN — INSULIN ASPART 2 UNITS: 100 INJECTION, SOLUTION INTRAVENOUS; SUBCUTANEOUS at 11:36

## 2021-01-01 RX ADMIN — IPRATROPIUM BROMIDE AND ALBUTEROL SULFATE 3 ML: 2.5; .5 SOLUTION RESPIRATORY (INHALATION) at 07:00

## 2021-01-01 RX ADMIN — POLYETHYLENE GLYCOL 3350 17 G: 17 POWDER, FOR SOLUTION ORAL at 08:54

## 2021-01-01 RX ADMIN — HYDROCODONE BITARTRATE AND ACETAMINOPHEN 1 TABLET: 7.5; 325 TABLET ORAL at 15:19

## 2021-01-01 RX ADMIN — FAMOTIDINE 20 MG: 20 TABLET ORAL at 17:41

## 2021-01-01 RX ADMIN — DOCUSATE SODIUM 100 MG: 100 CAPSULE, LIQUID FILLED ORAL at 21:15

## 2021-01-01 RX ADMIN — FAMOTIDINE 20 MG: 20 TABLET ORAL at 18:00

## 2021-01-01 RX ADMIN — METHYLPREDNISOLONE 4 MG: 4 TABLET ORAL at 21:15

## 2021-01-01 RX ADMIN — ACETAMINOPHEN 650 MG: 325 TABLET, FILM COATED ORAL at 18:32

## 2021-01-01 RX ADMIN — IPRATROPIUM BROMIDE AND ALBUTEROL SULFATE 3 ML: 2.5; .5 SOLUTION RESPIRATORY (INHALATION) at 10:57

## 2021-01-01 RX ADMIN — METOPROLOL TARTRATE 25 MG: 25 TABLET, FILM COATED ORAL at 08:39

## 2021-01-01 RX ADMIN — RANOLAZINE 500 MG: 500 TABLET, FILM COATED, EXTENDED RELEASE ORAL at 20:54

## 2021-01-01 RX ADMIN — CETIRIZINE HYDROCHLORIDE 5 MG: 5 TABLET ORAL at 08:26

## 2021-01-01 RX ADMIN — FUROSEMIDE 20 MG: 20 TABLET ORAL at 06:05

## 2021-01-01 RX ADMIN — INSULIN ASPART 2 UNITS: 100 INJECTION, SOLUTION INTRAVENOUS; SUBCUTANEOUS at 17:35

## 2021-01-01 RX ADMIN — RANOLAZINE 1000 MG: 500 TABLET, FILM COATED, EXTENDED RELEASE ORAL at 21:01

## 2021-01-01 RX ADMIN — FAMOTIDINE 20 MG: 20 TABLET ORAL at 08:52

## 2021-01-01 RX ADMIN — SODIUM CHLORIDE 75 ML/HR: 9 INJECTION, SOLUTION INTRAVENOUS at 06:29

## 2021-01-01 RX ADMIN — Medication 12.5 MG: at 13:54

## 2021-01-01 RX ADMIN — ENOXAPARIN SODIUM 40 MG: 40 INJECTION SUBCUTANEOUS at 20:30

## 2021-01-01 RX ADMIN — IPRATROPIUM BROMIDE AND ALBUTEROL SULFATE 3 ML: 2.5; .5 SOLUTION RESPIRATORY (INHALATION) at 10:29

## 2021-01-01 RX ADMIN — CLOPIDOGREL BISULFATE 75 MG: 75 TABLET ORAL at 08:55

## 2021-01-01 RX ADMIN — LEVOFLOXACIN 500 MG: 5 INJECTION, SOLUTION INTRAVENOUS at 10:22

## 2021-01-01 RX ADMIN — LISINOPRIL 5 MG: 5 TABLET ORAL at 08:27

## 2021-01-01 RX ADMIN — IPRATROPIUM BROMIDE AND ALBUTEROL SULFATE 3 ML: 2.5; .5 SOLUTION RESPIRATORY (INHALATION) at 08:07

## 2021-01-01 RX ADMIN — FAMOTIDINE 20 MG: 20 TABLET ORAL at 16:54

## 2021-01-01 RX ADMIN — DOCUSATE SODIUM 100 MG: 100 CAPSULE, LIQUID FILLED ORAL at 10:26

## 2021-01-01 RX ADMIN — QUETIAPINE FUMARATE 25 MG: 25 TABLET ORAL at 02:46

## 2021-01-01 RX ADMIN — INSULIN ASPART 2 UNITS: 100 INJECTION, SOLUTION INTRAVENOUS; SUBCUTANEOUS at 11:31

## 2021-01-01 RX ADMIN — CLOPIDOGREL BISULFATE 75 MG: 75 TABLET ORAL at 08:53

## 2021-01-01 RX ADMIN — HYDROCODONE BITARTRATE AND ACETAMINOPHEN 1 TABLET: 7.5; 325 TABLET ORAL at 18:22

## 2021-01-01 RX ADMIN — RANOLAZINE 1000 MG: 500 TABLET, FILM COATED, EXTENDED RELEASE ORAL at 07:49

## 2021-01-01 RX ADMIN — FUROSEMIDE 40 MG: 40 TABLET ORAL at 17:41

## 2021-01-01 RX ADMIN — ONDANSETRON 4 MG: 2 INJECTION INTRAMUSCULAR; INTRAVENOUS at 18:27

## 2021-01-01 RX ADMIN — Medication 8 MG: at 20:34

## 2021-01-01 RX ADMIN — HYDROCODONE BITARTRATE AND ACETAMINOPHEN 1 TABLET: 7.5; 325 TABLET ORAL at 11:34

## 2021-01-01 RX ADMIN — IPRATROPIUM BROMIDE AND ALBUTEROL SULFATE 3 ML: 2.5; .5 SOLUTION RESPIRATORY (INHALATION) at 12:06

## 2021-01-01 RX ADMIN — SODIUM CHLORIDE, PRESERVATIVE FREE 10 ML: 5 INJECTION INTRAVENOUS at 19:23

## 2021-01-01 RX ADMIN — AMIODARONE HYDROCHLORIDE 0.5 MG/MIN: 1.8 INJECTION, SOLUTION INTRAVENOUS at 20:01

## 2021-01-01 RX ADMIN — ASPIRIN 81 MG: 81 TABLET, FILM COATED ORAL at 08:19

## 2021-01-01 RX ADMIN — SODIUM CHLORIDE, PRESERVATIVE FREE 10 ML: 5 INJECTION INTRAVENOUS at 08:44

## 2021-01-01 RX ADMIN — HEPARIN SODIUM 5000 UNITS: 5000 INJECTION INTRAVENOUS; SUBCUTANEOUS at 20:54

## 2021-01-01 RX ADMIN — SODIUM CHLORIDE, PRESERVATIVE FREE 10 ML: 5 INJECTION INTRAVENOUS at 08:22

## 2021-01-01 RX ADMIN — FUROSEMIDE 20 MG: 10 INJECTION, SOLUTION INTRAMUSCULAR; INTRAVENOUS at 06:17

## 2021-01-01 RX ADMIN — IPRATROPIUM BROMIDE AND ALBUTEROL SULFATE 3 ML: 2.5; .5 SOLUTION RESPIRATORY (INHALATION) at 10:50

## 2021-01-01 RX ADMIN — FAMOTIDINE 20 MG: 20 TABLET ORAL at 16:43

## 2021-01-01 RX ADMIN — CLOPIDOGREL BISULFATE 75 MG: 75 TABLET ORAL at 08:27

## 2021-01-01 RX ADMIN — SODIUM CHLORIDE, PRESERVATIVE FREE 10 ML: 5 INJECTION INTRAVENOUS at 21:23

## 2021-01-01 RX ADMIN — DOCUSATE SODIUM 100 MG: 100 CAPSULE, LIQUID FILLED ORAL at 21:22

## 2021-01-01 RX ADMIN — SODIUM CHLORIDE, PRESERVATIVE FREE 10 ML: 5 INJECTION INTRAVENOUS at 08:34

## 2021-01-01 RX ADMIN — IPRATROPIUM BROMIDE AND ALBUTEROL SULFATE 3 ML: 2.5; .5 SOLUTION RESPIRATORY (INHALATION) at 20:38

## 2021-01-01 RX ADMIN — RANOLAZINE 1000 MG: 500 TABLET, FILM COATED, EXTENDED RELEASE ORAL at 20:07

## 2021-01-01 RX ADMIN — IPRATROPIUM BROMIDE AND ALBUTEROL SULFATE 3 ML: 2.5; .5 SOLUTION RESPIRATORY (INHALATION) at 19:52

## 2021-01-01 RX ADMIN — ENOXAPARIN SODIUM 40 MG: 40 INJECTION SUBCUTANEOUS at 21:16

## 2021-01-01 RX ADMIN — LEVOFLOXACIN 750 MG: 5 INJECTION, SOLUTION INTRAVENOUS at 10:24

## 2021-01-01 RX ADMIN — IPRATROPIUM BROMIDE AND ALBUTEROL SULFATE 3 ML: 2.5; .5 SOLUTION RESPIRATORY (INHALATION) at 16:15

## 2021-01-01 RX ADMIN — CLOPIDOGREL BISULFATE 75 MG: 75 TABLET ORAL at 08:28

## 2021-01-01 RX ADMIN — CETIRIZINE HYDROCHLORIDE 5 MG: 5 TABLET ORAL at 07:44

## 2021-01-01 RX ADMIN — FUROSEMIDE 20 MG: 20 TABLET ORAL at 06:13

## 2021-01-01 RX ADMIN — PREDNISONE 40 MG: 20 TABLET ORAL at 08:39

## 2021-01-01 RX ADMIN — FLUTICASONE PROPIONATE 2 SPRAY: 50 SPRAY, METERED NASAL at 09:41

## 2021-01-01 RX ADMIN — MELATONIN 3 MG: 3 TAB ORAL at 20:24

## 2021-01-01 RX ADMIN — ENOXAPARIN SODIUM 40 MG: 40 INJECTION SUBCUTANEOUS at 20:11

## 2021-01-01 RX ADMIN — SODIUM CHLORIDE, PRESERVATIVE FREE 10 ML: 5 INJECTION INTRAVENOUS at 08:26

## 2021-01-01 RX ADMIN — RANOLAZINE 500 MG: 500 TABLET, FILM COATED, EXTENDED RELEASE ORAL at 20:42

## 2021-01-01 RX ADMIN — ASPIRIN 81 MG: 81 TABLET, FILM COATED ORAL at 07:49

## 2021-01-01 RX ADMIN — Medication 100 MG: at 07:44

## 2021-01-01 RX ADMIN — IPRATROPIUM BROMIDE AND ALBUTEROL SULFATE 3 ML: 2.5; .5 SOLUTION RESPIRATORY (INHALATION) at 20:08

## 2021-01-01 RX ADMIN — INSULIN ASPART 6 UNITS: 100 INJECTION, SOLUTION INTRAVENOUS; SUBCUTANEOUS at 12:11

## 2021-01-01 RX ADMIN — FUROSEMIDE 20 MG: 20 TABLET ORAL at 17:21

## 2021-01-01 RX ADMIN — Medication 100 MG: at 08:05

## 2021-01-01 RX ADMIN — LIDOCAINE HYDROCHLORIDE 2 ML: 10 INJECTION, SOLUTION INFILTRATION; PERINEURAL at 13:28

## 2021-01-01 RX ADMIN — ENOXAPARIN SODIUM 40 MG: 40 INJECTION SUBCUTANEOUS at 21:22

## 2021-01-01 RX ADMIN — DOCUSATE SODIUM 100 MG: 100 CAPSULE, LIQUID FILLED ORAL at 08:26

## 2021-01-01 RX ADMIN — LIDOCAINE HYDROCHLORIDE AND EPINEPHRINE 10 ML: 10; 10 INJECTION, SOLUTION INFILTRATION; PERINEURAL at 06:47

## 2021-01-01 RX ADMIN — SODIUM CHLORIDE, PRESERVATIVE FREE 10 ML: 5 INJECTION INTRAVENOUS at 10:23

## 2021-01-01 RX ADMIN — METHYLPREDNISOLONE SODIUM SUCCINATE 60 MG: 125 INJECTION, POWDER, FOR SOLUTION INTRAMUSCULAR; INTRAVENOUS at 18:29

## 2021-01-01 RX ADMIN — AMIODARONE HYDROCHLORIDE 150 MG: 1.5 INJECTION, SOLUTION INTRAVENOUS at 09:34

## 2021-01-01 RX ADMIN — SODIUM CHLORIDE 75 ML/HR: 9 INJECTION, SOLUTION INTRAVENOUS at 11:09

## 2021-01-01 RX ADMIN — RANOLAZINE 1000 MG: 500 TABLET, FILM COATED, EXTENDED RELEASE ORAL at 20:25

## 2021-01-01 RX ADMIN — LISINOPRIL 10 MG: 10 TABLET ORAL at 12:39

## 2021-01-01 RX ADMIN — SODIUM CHLORIDE, PRESERVATIVE FREE 10 ML: 5 INJECTION INTRAVENOUS at 10:27

## 2021-01-01 RX ADMIN — IPRATROPIUM BROMIDE AND ALBUTEROL SULFATE 3 ML: 2.5; .5 SOLUTION RESPIRATORY (INHALATION) at 19:44

## 2021-01-01 RX ADMIN — FAMOTIDINE 20 MG: 20 TABLET ORAL at 08:27

## 2021-01-01 RX ADMIN — LEVOFLOXACIN 750 MG: 750 TABLET, FILM COATED ORAL at 21:25

## 2021-01-01 RX ADMIN — IPRATROPIUM BROMIDE AND ALBUTEROL SULFATE 3 ML: 2.5; .5 SOLUTION RESPIRATORY (INHALATION) at 20:55

## 2021-01-01 RX ADMIN — HYDROCODONE BITARTRATE AND ACETAMINOPHEN 1 TABLET: 7.5; 325 TABLET ORAL at 12:09

## 2021-01-01 RX ADMIN — HYDROCODONE BITARTRATE AND ACETAMINOPHEN 1 TABLET: 7.5; 325 TABLET ORAL at 08:34

## 2021-01-01 RX ADMIN — DOCUSATE SODIUM 100 MG: 100 CAPSULE, LIQUID FILLED ORAL at 08:18

## 2021-01-01 RX ADMIN — RANOLAZINE 1000 MG: 500 TABLET, FILM COATED, EXTENDED RELEASE ORAL at 08:05

## 2021-01-01 RX ADMIN — ACETAMINOPHEN 650 MG: 325 TABLET, FILM COATED ORAL at 13:43

## 2021-01-01 RX ADMIN — IPRATROPIUM BROMIDE AND ALBUTEROL SULFATE 3 ML: 2.5; .5 SOLUTION RESPIRATORY (INHALATION) at 20:07

## 2021-01-01 RX ADMIN — IPRATROPIUM BROMIDE AND ALBUTEROL SULFATE 3 ML: 2.5; .5 SOLUTION RESPIRATORY (INHALATION) at 15:18

## 2021-01-01 RX ADMIN — METHYLPREDNISOLONE SODIUM SUCCINATE 60 MG: 125 INJECTION, POWDER, FOR SOLUTION INTRAMUSCULAR; INTRAVENOUS at 18:39

## 2021-01-01 RX ADMIN — FAMOTIDINE 20 MG: 20 TABLET ORAL at 16:03

## 2021-01-01 RX ADMIN — METHYLPREDNISOLONE 4 MG: 4 TABLET ORAL at 13:26

## 2021-01-01 RX ADMIN — IPRATROPIUM BROMIDE AND ALBUTEROL SULFATE 3 ML: 2.5; .5 SOLUTION RESPIRATORY (INHALATION) at 14:40

## 2021-01-01 RX ADMIN — INSULIN ASPART 2 UNITS: 100 INJECTION, SOLUTION INTRAVENOUS; SUBCUTANEOUS at 17:58

## 2021-01-01 RX ADMIN — HYDROCODONE BITARTRATE AND ACETAMINOPHEN 1 TABLET: 7.5; 325 TABLET ORAL at 14:49

## 2021-01-01 RX ADMIN — DOCUSATE SODIUM 100 MG: 100 CAPSULE, LIQUID FILLED ORAL at 20:11

## 2021-01-01 RX ADMIN — HYDROCODONE BITARTRATE AND ACETAMINOPHEN 1 TABLET: 7.5; 325 TABLET ORAL at 08:54

## 2021-01-01 RX ADMIN — IPRATROPIUM BROMIDE AND ALBUTEROL SULFATE 3 ML: 2.5; .5 SOLUTION RESPIRATORY (INHALATION) at 14:06

## 2021-01-01 RX ADMIN — HYDROCODONE BITARTRATE AND ACETAMINOPHEN 1 TABLET: 5; 325 TABLET ORAL at 21:44

## 2021-01-01 RX ADMIN — HYDROCODONE BITARTRATE AND ACETAMINOPHEN 1 TABLET: 7.5; 325 TABLET ORAL at 21:26

## 2021-01-01 RX ADMIN — FAMOTIDINE 20 MG: 20 TABLET ORAL at 20:42

## 2021-01-01 RX ADMIN — FUROSEMIDE 40 MG: 40 TABLET ORAL at 08:53

## 2021-01-01 RX ADMIN — CETIRIZINE HYDROCHLORIDE 5 MG: 5 TABLET ORAL at 08:39

## 2021-01-01 RX ADMIN — INSULIN ASPART 2 UNITS: 100 INJECTION, SOLUTION INTRAVENOUS; SUBCUTANEOUS at 12:25

## 2021-01-01 RX ADMIN — DIPHENHYDRAMINE HYDROCHLORIDE AND LIDOCAINE HYDROCHLORIDE AND ALUMINUM HYDROXIDE AND MAGNESIUM HYDRO 10 ML: KIT at 21:01

## 2021-01-01 RX ADMIN — INSULIN ASPART 2 UNITS: 100 INJECTION, SOLUTION INTRAVENOUS; SUBCUTANEOUS at 17:07

## 2021-01-01 RX ADMIN — IPRATROPIUM BROMIDE AND ALBUTEROL SULFATE 3 ML: 2.5; .5 SOLUTION RESPIRATORY (INHALATION) at 11:51

## 2021-01-01 RX ADMIN — IPRATROPIUM BROMIDE AND ALBUTEROL SULFATE 3 ML: 2.5; .5 SOLUTION RESPIRATORY (INHALATION) at 10:58

## 2021-01-01 RX ADMIN — ACETAMINOPHEN 650 MG: 325 TABLET, FILM COATED ORAL at 03:42

## 2021-01-01 RX ADMIN — BUDESONIDE AND FORMOTEROL FUMARATE DIHYDRATE 2 PUFF: 160; 4.5 AEROSOL RESPIRATORY (INHALATION) at 08:16

## 2021-01-01 RX ADMIN — AMIODARONE HYDROCHLORIDE 200 MG: 200 TABLET ORAL at 08:39

## 2021-01-01 RX ADMIN — IPRATROPIUM BROMIDE AND ALBUTEROL SULFATE 3 ML: 2.5; .5 SOLUTION RESPIRATORY (INHALATION) at 20:14

## 2021-01-01 RX ADMIN — LIDOCAINE HYDROCHLORIDE 2 ML: 10 INJECTION, SOLUTION EPIDURAL; INFILTRATION; INTRACAUDAL; PERINEURAL at 11:24

## 2021-01-01 RX ADMIN — HYDROCODONE BITARTRATE AND ACETAMINOPHEN 1 TABLET: 7.5; 325 TABLET ORAL at 10:09

## 2021-01-01 RX ADMIN — TRIAMCINOLONE ACETONIDE 80 MG: 40 INJECTION, SUSPENSION INTRA-ARTICULAR; INTRAMUSCULAR at 14:12

## 2021-01-01 RX ADMIN — IPRATROPIUM BROMIDE AND ALBUTEROL SULFATE 3 ML: 2.5; .5 SOLUTION RESPIRATORY (INHALATION) at 20:04

## 2021-01-01 RX ADMIN — INSULIN ASPART 2 UNITS: 100 INJECTION, SOLUTION INTRAVENOUS; SUBCUTANEOUS at 11:19

## 2021-01-01 RX ADMIN — SODIUM CHLORIDE, PRESERVATIVE FREE 10 ML: 5 INJECTION INTRAVENOUS at 10:13

## 2021-01-01 RX ADMIN — POLYETHYLENE GLYCOL 3350 17 G: 17 POWDER, FOR SOLUTION ORAL at 17:57

## 2021-01-01 RX ADMIN — SODIUM CHLORIDE, PRESERVATIVE FREE 10 ML: 5 INJECTION INTRAVENOUS at 08:43

## 2021-01-01 RX ADMIN — RANOLAZINE 1000 MG: 500 TABLET, FILM COATED, EXTENDED RELEASE ORAL at 20:00

## 2021-01-01 RX ADMIN — FUROSEMIDE 20 MG: 20 TABLET ORAL at 18:16

## 2021-01-01 RX ADMIN — SODIUM CHLORIDE, PRESERVATIVE FREE 10 ML: 5 INJECTION INTRAVENOUS at 20:10

## 2021-01-01 RX ADMIN — TRIAMCINOLONE ACETONIDE 40 MG: 40 INJECTION, SUSPENSION INTRA-ARTICULAR; INTRAMUSCULAR at 13:42

## 2021-01-01 RX ADMIN — INSULIN ASPART 4 UNITS: 100 INJECTION, SOLUTION INTRAVENOUS; SUBCUTANEOUS at 09:12

## 2021-01-01 RX ADMIN — DOCUSATE SODIUM 100 MG: 100 CAPSULE, LIQUID FILLED ORAL at 21:00

## 2021-01-01 RX ADMIN — IPRATROPIUM BROMIDE AND ALBUTEROL SULFATE 3 ML: 2.5; .5 SOLUTION RESPIRATORY (INHALATION) at 20:53

## 2021-01-01 RX ADMIN — IPRATROPIUM BROMIDE AND ALBUTEROL SULFATE 3 ML: 2.5; .5 SOLUTION RESPIRATORY (INHALATION) at 15:50

## 2021-01-01 RX ADMIN — Medication 100 MG: at 08:53

## 2021-01-01 RX ADMIN — FAMOTIDINE 20 MG: 20 TABLET ORAL at 11:10

## 2021-01-01 RX ADMIN — IPRATROPIUM BROMIDE AND ALBUTEROL SULFATE 3 ML: 2.5; .5 SOLUTION RESPIRATORY (INHALATION) at 11:52

## 2021-01-01 RX ADMIN — AMIODARONE HYDROCHLORIDE 150 MG: 1.5 INJECTION, SOLUTION INTRAVENOUS at 14:00

## 2021-01-01 RX ADMIN — ENOXAPARIN SODIUM 40 MG: 40 INJECTION SUBCUTANEOUS at 20:33

## 2021-01-01 RX ADMIN — Medication 100 MG: at 08:26

## 2021-01-01 RX ADMIN — POLYETHYLENE GLYCOL 3350 17 G: 17 POWDER, FOR SOLUTION ORAL at 08:05

## 2021-01-01 RX ADMIN — CLOPIDOGREL BISULFATE 75 MG: 75 TABLET ORAL at 13:50

## 2021-01-01 RX ADMIN — METOPROLOL TARTRATE 25 MG: 25 TABLET, FILM COATED ORAL at 20:23

## 2021-01-01 RX ADMIN — CLOPIDOGREL BISULFATE 75 MG: 75 TABLET ORAL at 07:49

## 2021-01-01 RX ADMIN — HYDROCODONE BITARTRATE AND ACETAMINOPHEN 1 TABLET: 7.5; 325 TABLET ORAL at 18:18

## 2021-01-01 RX ADMIN — ONDANSETRON 4 MG: 2 INJECTION INTRAMUSCULAR; INTRAVENOUS at 07:51

## 2021-01-01 RX ADMIN — HYDROCODONE BITARTRATE AND ACETAMINOPHEN 1 TABLET: 7.5; 325 TABLET ORAL at 11:23

## 2021-01-01 RX ADMIN — FAMOTIDINE 20 MG: 20 TABLET ORAL at 08:33

## 2021-01-01 RX ADMIN — FAMOTIDINE 20 MG: 20 TABLET ORAL at 10:27

## 2021-01-01 RX ADMIN — FAMOTIDINE 20 MG: 20 TABLET ORAL at 17:21

## 2021-01-01 RX ADMIN — FAMOTIDINE 20 MG: 20 TABLET ORAL at 10:26

## 2021-01-01 RX ADMIN — AMIODARONE HYDROCHLORIDE 200 MG: 200 TABLET ORAL at 10:27

## 2021-01-01 RX ADMIN — LIDOCAINE HYDROCHLORIDE 2 ML: 10 INJECTION, SOLUTION INFILTRATION; PERINEURAL at 13:42

## 2021-01-01 RX ADMIN — IPRATROPIUM BROMIDE AND ALBUTEROL SULFATE 3 ML: 2.5; .5 SOLUTION RESPIRATORY (INHALATION) at 12:04

## 2021-01-01 RX ADMIN — CLOPIDOGREL BISULFATE 75 MG: 75 TABLET ORAL at 08:02

## 2021-01-01 RX ADMIN — HYDROCODONE BITARTRATE AND ACETAMINOPHEN 1 TABLET: 7.5; 325 TABLET ORAL at 06:13

## 2021-01-01 RX ADMIN — METHYLPREDNISOLONE 4 MG: 4 TABLET ORAL at 17:35

## 2021-01-01 RX ADMIN — TRIAMCINOLONE ACETONIDE 80 MG: 40 INJECTION, SUSPENSION INTRA-ARTICULAR; INTRAMUSCULAR at 13:28

## 2021-01-01 RX ADMIN — SODIUM CHLORIDE, PRESERVATIVE FREE 10 ML: 5 INJECTION INTRAVENOUS at 08:40

## 2021-01-01 RX ADMIN — METHYLPREDNISOLONE SODIUM SUCCINATE 60 MG: 125 INJECTION, POWDER, FOR SOLUTION INTRAMUSCULAR; INTRAVENOUS at 17:41

## 2021-01-01 RX ADMIN — PREDNISONE 10 MG: 10 TABLET ORAL at 08:52

## 2021-01-01 RX ADMIN — HYDROCODONE BITARTRATE AND ACETAMINOPHEN 1 TABLET: 7.5; 325 TABLET ORAL at 10:14

## 2021-01-01 RX ADMIN — FAMOTIDINE 20 MG: 20 TABLET ORAL at 08:39

## 2021-01-01 RX ADMIN — RANOLAZINE 500 MG: 500 TABLET, FILM COATED, EXTENDED RELEASE ORAL at 08:28

## 2021-01-01 RX ADMIN — SODIUM CHLORIDE, PRESERVATIVE FREE 10 ML: 5 INJECTION INTRAVENOUS at 20:55

## 2021-01-01 RX ADMIN — IPRATROPIUM BROMIDE AND ALBUTEROL SULFATE 3 ML: 2.5; .5 SOLUTION RESPIRATORY (INHALATION) at 07:35

## 2021-01-01 RX ADMIN — IPRATROPIUM BROMIDE AND ALBUTEROL SULFATE 3 ML: 2.5; .5 SOLUTION RESPIRATORY (INHALATION) at 19:18

## 2021-01-01 RX ADMIN — ENOXAPARIN SODIUM 40 MG: 40 INJECTION SUBCUTANEOUS at 21:09

## 2021-01-01 RX ADMIN — BUDESONIDE AND FORMOTEROL FUMARATE DIHYDRATE 2 PUFF: 160; 4.5 AEROSOL RESPIRATORY (INHALATION) at 06:50

## 2021-01-01 RX ADMIN — PREDNISONE 20 MG: 20 TABLET ORAL at 08:26

## 2021-01-01 RX ADMIN — IPRATROPIUM BROMIDE AND ALBUTEROL SULFATE 3 ML: 2.5; .5 SOLUTION RESPIRATORY (INHALATION) at 07:46

## 2021-01-01 RX ADMIN — RANOLAZINE 1000 MG: 500 TABLET, FILM COATED, EXTENDED RELEASE ORAL at 08:39

## 2021-01-01 RX ADMIN — SODIUM CHLORIDE, PRESERVATIVE FREE 10 ML: 5 INJECTION INTRAVENOUS at 08:55

## 2021-01-01 RX ADMIN — INSULIN ASPART 2 UNITS: 100 INJECTION, SOLUTION INTRAVENOUS; SUBCUTANEOUS at 10:48

## 2021-01-01 RX ADMIN — SODIUM CHLORIDE, PRESERVATIVE FREE 10 ML: 5 INJECTION INTRAVENOUS at 08:49

## 2021-01-01 RX ADMIN — ENOXAPARIN SODIUM 40 MG: 40 INJECTION SUBCUTANEOUS at 20:42

## 2021-01-01 RX ADMIN — RANOLAZINE 1000 MG: 500 TABLET, FILM COATED, EXTENDED RELEASE ORAL at 20:20

## 2021-01-01 RX ADMIN — IPRATROPIUM BROMIDE AND ALBUTEROL SULFATE 3 ML: 2.5; .5 SOLUTION RESPIRATORY (INHALATION) at 07:17

## 2021-01-01 RX ADMIN — INSULIN ASPART 6 UNITS: 100 INJECTION, SOLUTION INTRAVENOUS; SUBCUTANEOUS at 17:42

## 2021-01-01 RX ADMIN — LISINOPRIL 10 MG: 10 TABLET ORAL at 08:52

## 2021-01-01 RX ADMIN — ENOXAPARIN SODIUM 40 MG: 40 INJECTION SUBCUTANEOUS at 20:29

## 2021-01-01 RX ADMIN — SODIUM CHLORIDE, PRESERVATIVE FREE 10 ML: 5 INJECTION INTRAVENOUS at 21:44

## 2021-01-01 RX ADMIN — SODIUM CHLORIDE, PRESERVATIVE FREE 10 ML: 5 INJECTION INTRAVENOUS at 20:02

## 2021-01-01 RX ADMIN — FAMOTIDINE 20 MG: 20 TABLET ORAL at 08:02

## 2021-01-01 RX ADMIN — AMIODARONE HYDROCHLORIDE 200 MG: 200 TABLET ORAL at 08:26

## 2021-01-01 RX ADMIN — COLCHICINE 1.2 MG: 0.6 TABLET, FILM COATED ORAL at 21:00

## 2021-01-01 RX ADMIN — ALBUTEROL SULFATE 2.5 MG: 2.5 SOLUTION RESPIRATORY (INHALATION) at 10:59

## 2021-01-01 RX ADMIN — HYDROCODONE BITARTRATE AND ACETAMINOPHEN 1 TABLET: 7.5; 325 TABLET ORAL at 14:06

## 2021-01-01 RX ADMIN — IPRATROPIUM BROMIDE AND ALBUTEROL SULFATE 3 ML: 2.5; .5 SOLUTION RESPIRATORY (INHALATION) at 07:36

## 2021-01-01 RX ADMIN — Medication 12.5 MG: at 08:52

## 2021-01-01 RX ADMIN — FUROSEMIDE 20 MG: 10 INJECTION, SOLUTION INTRAMUSCULAR; INTRAVENOUS at 18:23

## 2021-01-01 RX ADMIN — METOPROLOL TARTRATE 25 MG: 25 TABLET, FILM COATED ORAL at 08:27

## 2021-01-01 RX ADMIN — LISINOPRIL 5 MG: 5 TABLET ORAL at 13:50

## 2021-01-01 RX ADMIN — IPRATROPIUM BROMIDE AND ALBUTEROL SULFATE 3 ML: 2.5; .5 SOLUTION RESPIRATORY (INHALATION) at 20:33

## 2021-01-01 RX ADMIN — LEVOFLOXACIN 750 MG: 5 INJECTION, SOLUTION INTRAVENOUS at 16:51

## 2021-01-01 RX ADMIN — CETIRIZINE HYDROCHLORIDE 5 MG: 5 TABLET ORAL at 09:41

## 2021-01-01 RX ADMIN — LISINOPRIL 5 MG: 5 TABLET ORAL at 08:54

## 2021-01-01 RX ADMIN — INSULIN ASPART 2 UNITS: 100 INJECTION, SOLUTION INTRAVENOUS; SUBCUTANEOUS at 08:49

## 2021-01-01 RX ADMIN — METOPROLOL TARTRATE 12.5 MG: 25 TABLET, FILM COATED ORAL at 20:00

## 2021-01-01 RX ADMIN — METOPROLOL TARTRATE 25 MG: 25 TABLET, FILM COATED ORAL at 09:27

## 2021-01-01 RX ADMIN — IPRATROPIUM BROMIDE AND ALBUTEROL SULFATE 3 ML: 2.5; .5 SOLUTION RESPIRATORY (INHALATION) at 05:28

## 2021-01-01 RX ADMIN — FAMOTIDINE 20 MG: 20 TABLET ORAL at 08:43

## 2021-01-01 RX ADMIN — INSULIN ASPART 2 UNITS: 100 INJECTION, SOLUTION INTRAVENOUS; SUBCUTANEOUS at 17:29

## 2021-01-01 RX ADMIN — IPRATROPIUM BROMIDE AND ALBUTEROL SULFATE 3 ML: 2.5; .5 SOLUTION RESPIRATORY (INHALATION) at 15:30

## 2021-01-01 RX ADMIN — IPRATROPIUM BROMIDE AND ALBUTEROL SULFATE 3 ML: 2.5; .5 SOLUTION RESPIRATORY (INHALATION) at 07:07

## 2021-01-01 RX ADMIN — RANOLAZINE 1000 MG: 500 TABLET, FILM COATED, EXTENDED RELEASE ORAL at 20:24

## 2021-01-01 RX ADMIN — FUROSEMIDE 40 MG: 40 TABLET ORAL at 17:00

## 2021-01-01 RX ADMIN — SODIUM CHLORIDE, PRESERVATIVE FREE 10 ML: 5 INJECTION INTRAVENOUS at 09:40

## 2021-01-01 RX ADMIN — IPRATROPIUM BROMIDE AND ALBUTEROL SULFATE 3 ML: 2.5; .5 SOLUTION RESPIRATORY (INHALATION) at 08:16

## 2021-01-01 RX ADMIN — HYDROCODONE BITARTRATE AND ACETAMINOPHEN 1 TABLET: 7.5; 325 TABLET ORAL at 05:08

## 2021-01-01 RX ADMIN — HYDROCODONE BITARTRATE AND ACETAMINOPHEN 1 TABLET: 7.5; 325 TABLET ORAL at 22:14

## 2021-01-04 ENCOUNTER — APPOINTMENT (OUTPATIENT)
Dept: GENERAL RADIOLOGY | Facility: HOSPITAL | Age: 86
End: 2021-01-04

## 2021-01-04 ENCOUNTER — HOSPITAL ENCOUNTER (EMERGENCY)
Facility: HOSPITAL | Age: 86
Discharge: HOME | End: 2021-01-04
Attending: EMERGENCY MEDICINE | Admitting: EMERGENCY MEDICINE

## 2021-01-04 VITALS
WEIGHT: 165 LBS | RESPIRATION RATE: 18 BRPM | BODY MASS INDEX: 25.9 KG/M2 | DIASTOLIC BLOOD PRESSURE: 80 MMHG | HEIGHT: 67 IN | SYSTOLIC BLOOD PRESSURE: 146 MMHG | TEMPERATURE: 97.7 F | HEART RATE: 76 BPM | OXYGEN SATURATION: 96 %

## 2021-01-04 DIAGNOSIS — S80.01XA CONTUSION OF RIGHT KNEE, INITIAL ENCOUNTER: ICD-10-CM

## 2021-01-04 DIAGNOSIS — S82.891A CLOSED FRACTURE OF RIGHT ANKLE, INITIAL ENCOUNTER: Primary | ICD-10-CM
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
## 2021-01-04 PROCEDURE — 99283 EMERGENCY DEPT VISIT LOW MDM: CPT

## 2021-01-04 PROCEDURE — 73502 X-RAY EXAM HIP UNI 2-3 VIEWS: CPT

## 2021-01-04 PROCEDURE — 73564 X-RAY EXAM KNEE 4 OR MORE: CPT

## 2021-01-04 PROCEDURE — 73610 X-RAY EXAM OF ANKLE: CPT

## 2021-01-04 RX ORDER — HYDROCODONE BITARTRATE AND ACETAMINOPHEN 7.5; 325 MG/1; MG/1
1 TABLET ORAL ONCE
Status: COMPLETED | OUTPATIENT
Start: 2021-01-04 | End: 2021-01-04

## 2021-01-04 RX ADMIN — HYDROCODONE BITARTRATE AND ACETAMINOPHEN 1 TABLET: 7.5; 325 TABLET ORAL at 11:08

## 2021-01-04 NOTE — DISCHARGE INSTRUCTIONS
No weightbearing.  Take Norco which you have at home as needed.  Keep it elevated.  Apply ice.  Follow-up with podiatry for reevaluation.  Return to ER for any worsening.

## 2021-01-04 NOTE — ED NOTES
Pt declined crutches. Pt states he will use his rolling walker instead.     Dane Mendoza, RN  01/04/21 [id number]

## 2021-01-04 NOTE — ED PROVIDER NOTES
Subjective   37 years old male with history of hypertension, hyperlipidemia, diabetes mellitus, coronary artery disease, congestive heart failure, peripheral neuropathy presented in the ER with chief complaint of fall.  Patient getting slipped on ice leading to bend his right ankle inward and fell on her right lower extremity.  Did not hit his head.  No loss of consciousness.  It happened prior to arrival.  He was not able to get up and ambulate.  He is complaining of pain all over right lower extremity.  No injury anywhere else.  Pain is moderate intensity sharp nature more with movements at the ankle and palpation of thigh and leg.      History provided by:  Patient      Review of Systems   Constitutional: Negative for chills and fever.   HENT: Negative for congestion, postnasal drip, sinus pressure, sinus pain and sore throat.    Respiratory: Negative for chest tightness and shortness of breath.    Cardiovascular: Negative for chest pain and palpitations.   Gastrointestinal: Negative for anal bleeding, nausea and vomiting.   Genitourinary: Negative for flank pain.   Musculoskeletal: Positive for gait problem and joint swelling.   Skin: Negative for color change.   Neurological: Negative for syncope and headaches.   Psychiatric/Behavioral: Negative for agitation.       Past Medical History:   Diagnosis Date   • Basal cell carcinoma    • Bilateral carotid artery stenosis    • Bilateral carotid artery stenosis    • Bleeding disorder (CMS/HCC)    • CHF (congestive heart failure) (CMS/HCC)    • Chronic obstructive pulmonary disease (COPD) (CMS/HCC)    • Coronary arteriosclerosis    • Degenerative joint disease involving multiple joints    • Dementia (CMS/HCC)    • Diabetes mellitus (CMS/HCC)    • Heart problem    • History of stomach ulcers    • Hyperlipidemia    • Hypertension    • Ingrown toenail    • Myocardial infarction (CMS/HCC)        Allergies   Allergen Reactions   • Atorvastatin Anaphylaxis   • Penicillins  "Rash   • Cefdinir Other (See Comments)     \"i burned up\" and break out in a rash   • Pravastatin      Myalgia   • Azithromycin Rash   • Biaxin [Clarithromycin] Rash       Past Surgical History:   Procedure Laterality Date   • BACK SURGERY     • CARDIAC CATHETERIZATION N/A 6/6/2017    Procedure: Right Heart Cath;  Surgeon: Jeff Maciel MD PhD;  Location: [facility] INVASIVE LOCATION;  Service:    • CARDIAC CATHETERIZATION N/A 2/14/2018    Procedure: Coronary angiography;  Surgeon: Moisés Davila MD;  Location: [facility] INVASIVE LOCATION;  Service:    • CORONARY ANGIOPLASTY WITH STENT PLACEMENT     • CORONARY STENT PLACEMENT     • HERNIA REPAIR     • LUNG BIOPSY     • LUNG SURGERY     • NECK SURGERY     • THORACOTOMY Left 1977   • VENTRAL HERNIA REPAIR         Family History   Problem Relation Age of Onset   • Hypertension Father    • Heart disease Father    • Diabetes Father    • Diabetes Mother    • Lung disease Other    • Cancer Other        Social History     Socioeconomic History   • Marital status:      Spouse name: Not on file   • Number of children: Not on file   • Years of education: Not on file   • Highest education level: Not on file   Tobacco Use   • Smoking status: Former Smoker   • Smokeless tobacco: Never Used   Substance and Sexual Activity   • Alcohol use: No   • Drug use: No   • Sexual activity: Not Currently     Comment:            Objective   Physical Exam  Vitals signs and nursing note reviewed.   HENT:      Head: Normocephalic and atraumatic.      Right Ear: External ear normal.      Left Ear: External ear normal.      Nose: Nose normal.      Mouth/Throat:      Mouth: Mucous membranes are moist.   Eyes:      Extraocular Movements: Extraocular movements intact.      Conjunctiva/sclera: Conjunctivae normal.      Pupils: Pupils are equal, round, and reactive to light.   Neck:      Musculoskeletal: Normal range of motion and neck supple.   Cardiovascular:      Rate " and Rhythm: Normal rate and regular rhythm.   Pulmonary:      Effort: Pulmonary effort is normal.      Breath sounds: Normal breath sounds.   Abdominal:      General: Abdomen is flat.      Palpations: Abdomen is soft.   Musculoskeletal:         General: Tenderness and signs of injury present.      Right hip: He exhibits tenderness. He exhibits normal range of motion, no bony tenderness, no swelling and no deformity.      Right knee: He exhibits bony tenderness. He exhibits normal range of motion and no swelling. Tenderness found. Medial joint line and lateral joint line tenderness noted.      Right ankle: He exhibits decreased range of motion and swelling. Tenderness. Lateral malleolus tenderness found. No medial malleolus and no proximal fibula tenderness found.   Skin:     General: Skin is warm and dry.      Capillary Refill: Capillary refill takes less than 2 seconds.   Neurological:      General: No focal deficit present.      Mental Status: He is alert and oriented to person, place, and time.   Psychiatric:         Mood and Affect: Mood normal.         Procedures           ED Course                                           MDM  Number of Diagnoses or Management Options  Diagnosis management comments: Patient is evaluated for mechanical fall.  Did not hit his head, no loss of consciousness.  X-rays have been me are negative.  X-rays ankle showed oblique fracture nondisplaced.  Placed in a long boot, discussed with Dr. Meraz and patient is being discharged with outpatient follow-up recommended walker and no weightbearing right lower extremity.       Amount and/or Complexity of Data Reviewed  Tests in the radiology section of CPT®: ordered and reviewed  Discuss the patient with other providers: yes      Labs Reviewed - No data to display    Xr Knee 4+ View Right    Result Date: 1/4/2021  Narrative: Procedure: XR KNEE 4 OR MORE VIEWS. History: Injury, pain. Comparison: None Findings: Four x-rays, AP and lateral  views of the right knee. There is no radiographic evidence of an acute fracture. There is soft tissue calcification noted in the region of the medial and lateral menisci, projecting over Hoffa's fat pad, and at the insertion of the quadriceps tendon on the patella. There is joint space narrowing of the tibiofemoral joints. There are mild degenerative changes of the patellofemoral joint. There is a tiny joint effusion.     Impression: Impression: Tiny joint effusion. No radiographic evidence of an acute fracture. 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 Electronically signed by:  Pedro Novoa MD  1/4/2021 11:27 AM CST Workstation: [id number]    Xr Ankle 3+ View Right    Result Date: 1/4/2021  Narrative: PROCEDURE: Three views right ankle COMPARISON: No comparison. HISTORY: pain FINDINGS: There is an obliquely oriented nondisplaced fracture of the distal fibular metaphysis just above the syndesmosis. There are degenerative changes of the ankle joint. There is atherosclerotic calcification of the distal calf arteries extending into the foot.     Impression: CONCLUSION:  Obliquely oriented nondisplaced fracture of the distal fibular metaphysis. Electronically signed by:  Pedro Novoa MD  1/4/2021 11:34 AM CST Workstation: [id number]    Xr Hip With Or Without Pelvis 2 - 3 View Right    Result Date: 1/4/2021  Narrative: Procedure: XR HIP 2 OR MORE VIEWS UNILATERAL WITH PELVIS. History: fall. Comparison: None Findings: There are degenerative changes of both hips. No definite fracture visualized. There are degenerative changes of the lower lumbar spine.     Impression: Impression: degenerative changes. No radiographic evidence of an acute fracture. Electronically signed by:  Pedro Novoa MD  1/4/2021 11:30 AM CST Workstation: [id number]        Final diagnoses:   Closed fracture of right ankle, initial encounter    Fall, initial encounter   Contusion of right knee, initial encounter            Khadar Mckinnon MD  01/04/21 [id number]

## 2021-01-04 NOTE — ED NOTES
Pt arrives via EMS from home with c/o slipping on a slick spot on his home ramp. Pt states his right ankle twisted and he thought he broke his ankle. No obvious deformity noted at this time.     Dane Mendoza, ANA  01/04/21 1013       Dane Mendoza RN  01/04/21 1017

## 2021-01-06 ENCOUNTER — OFFICE VISIT (OUTPATIENT)
Dept: ORTHOPEDIC SURGERY | Facility: CLINIC | Age: 86
End: 2021-01-06

## 2021-01-06 ENCOUNTER — HOSPITAL ENCOUNTER (OUTPATIENT)
Dept: CT IMAGING | Facility: HOSPITAL | Age: 86
Discharge: HOME | End: 2021-01-06
Admitting: NURSE PRACTITIONER

## 2021-01-06 ENCOUNTER — OFFICE VISIT (OUTPATIENT)
Dept: PODIATRY | Facility: CLINIC | Age: 86
End: 2021-01-06

## 2021-01-06 ENCOUNTER — TELEPHONE (OUTPATIENT)
Dept: ORTHOPEDIC SURGERY | Facility: CLINIC | Age: 86
End: 2021-01-06

## 2021-01-06 VITALS — HEART RATE: 91 BPM | WEIGHT: 165 LBS | HEIGHT: 67 IN | OXYGEN SATURATION: 98 % | BODY MASS INDEX: 25.9 KG/M2

## 2021-01-06 VITALS — WEIGHT: 165 LBS | HEIGHT: 67 IN | BODY MASS INDEX: 25.9 KG/M2
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
## 2021-01-06 DIAGNOSIS — M25.551 RIGHT HIP PAIN: Primary | ICD-10-CM

## 2021-01-06 DIAGNOSIS — M25.551 RIGHT HIP PAIN: ICD-10-CM

## 2021-01-06 DIAGNOSIS — S82.891A CLOSED FRACTURE OF RIGHT ANKLE, INITIAL ENCOUNTER: Primary | ICD-10-CM

## 2021-01-06 PROCEDURE — 99214 OFFICE O/P EST MOD 30 MIN: CPT | Performed by: NURSE PRACTITIONER

## 2021-01-06 PROCEDURE — 27786 TREATMENT OF ANKLE FRACTURE: CPT | Performed by: PODIATRIST

## 2021-01-06 PROCEDURE — 99203 OFFICE O/P NEW LOW 30 MIN: CPT | Performed by: PODIATRIST

## 2021-01-06 PROCEDURE — 73700 CT LOWER EXTREMITY W/O DYE: CPT

## 2021-01-06 RX ORDER — HYDROCODONE BITARTRATE AND ACETAMINOPHEN 7.5; 325 MG/1; MG/1
1 TABLET ORAL
COMMUNITY
Start: 2020-12-28 | End: 2021-01-01 | Stop reason: HOSPADM

## 2021-01-06 RX ORDER — CLINDAMYCIN HYDROCHLORIDE 300 MG/1
CAPSULE ORAL
COMMUNITY
Start: 2020-12-03 | End: 2021-01-01

## 2021-01-06 RX ORDER — IPRATROPIUM BROMIDE AND ALBUTEROL SULFATE 2.5; .5 MG/3ML; MG/3ML
3 SOLUTION RESPIRATORY (INHALATION)
COMMUNITY
Start: 2020-11-23 | End: 2021-01-01

## 2021-01-06 RX ORDER — FUROSEMIDE 20 MG/1
TABLET ORAL
COMMUNITY
Start: 2020-12-01 | End: 2021-01-06

## 2021-01-06 RX ORDER — RANOLAZINE 500 MG/1
TABLET, EXTENDED RELEASE ORAL
COMMUNITY
Start: 2020-11-30 | End: 2021-01-01 | Stop reason: DRUGHIGH

## 2021-01-06 RX ORDER — GLIMEPIRIDE 2 MG/1
TABLET ORAL
COMMUNITY
Start: 2020-12-29 | End: 2021-01-06

## 2021-01-06 NOTE — PROGRESS NOTES
Please let patient (or his wife) know that the CT scan was negative for any fracture in his hip.  He likely has a strain of his right hip due to the fall.  He will be modifying his weightbearing due to his right ankle fracture at this time anyway, which should help to improve his right hip pain.  I will see him in 2 weeks for a follow-up, which he already has an appointment for.  Thanks.

## 2021-01-06 NOTE — PROGRESS NOTES
Hasmukh Ham  6/1/1933  87 y.o. male  BS-113 this morning  PCP: Dr. Rey 12/07/2020  BS: 119 PER PATIENT     01/06/2021     Chief Complaint   Patient presents with   • Right Ankle - Pain       History of Present Illness    Hasmukh Ham is a 87 y.o.male who presents to clinic as a follow-up from the emergency department.  Patient slipped walking down his ramp at home on January 4.  He injured his right ankle during a fall.  He was evaluated in the emergency department.  Radiographs revealed nondisplaced distal fibular fracture.  He was placed into a cam boot and told to follow-up.  He presents to clinic for follow-up.  Currently rates his pain as a 9 out of 10.  He is unable to bear weight in the boot.  He has no other complaints.    Past Medical History:   Diagnosis Date   • Basal cell carcinoma    • Bilateral carotid artery stenosis    • Bilateral carotid artery stenosis    • Bleeding disorder (CMS/HCC)    • CHF (congestive heart failure) (CMS/HCC)    • Chronic obstructive pulmonary disease (COPD) (CMS/HCC)    • Coronary arteriosclerosis    • Degenerative joint disease involving multiple joints    • Dementia (CMS/HCC)    • Diabetes mellitus (CMS/HCC)    • Heart problem    • History of stomach ulcers    • Hyperlipidemia    • Hypertension    • Ingrown toenail    • Myocardial infarction (CMS/HCC)          Past Surgical History:   Procedure Laterality Date   • BACK SURGERY     • CARDIAC CATHETERIZATION N/A 6/6/2017    Procedure: Right Heart Cath;  Surgeon: Jeff Maciel MD PhD;  Location: [facility] INVASIVE LOCATION;  Service:    • CARDIAC CATHETERIZATION N/A 2/14/2018    Procedure: Coronary angiography;  Surgeon: Moisés Davila MD;  Location: [facility] INVASIVE LOCATION;  Service:    • CORONARY ANGIOPLASTY WITH STENT PLACEMENT     • CORONARY STENT PLACEMENT     • HERNIA REPAIR     • LUNG BIOPSY     • LUNG SURGERY     • NECK SURGERY     • THORACOTOMY Left 1977   • VENTRAL HERNIA REPAIR    "        Family History   Problem Relation Age of Onset   • Hypertension Father    • Heart disease Father    • Diabetes Father    • Diabetes Mother    • Lung disease Other    • Cancer Other        Allergies   Allergen Reactions   • Atorvastatin Anaphylaxis   • Penicillins Rash   • Cefdinir Other (See Comments)     \"i burned up\" and break out in a rash   • Pravastatin      Myalgia   • Azithromycin Rash   • Biaxin [Clarithromycin] Rash       Social History     Socioeconomic History   • Marital status:      Spouse name: Not on file   • Number of children: Not on file   • Years of education: Not on file   • Highest education level: Not on file   Tobacco Use   • Smoking status: Former Smoker   • Smokeless tobacco: Never Used   Substance and Sexual Activity   • Alcohol use: No   • Drug use: No   • Sexual activity: Not Currently     Comment:          Current Outpatient Medications   Medication Sig Dispense Refill   • acetaminophen (TYLENOL) 325 MG tablet Take 2 tablets by mouth Every 4 (Four) Hours As Needed for Mild Pain .     • albuterol (PROVENTIL) (2.5 MG/3ML) 0.083% nebulizer solution Take 2.5 mg by nebulization Every 4 (Four) Hours As Needed for Wheezing. 125 vial 0   • arformoterol (BROVANA) 15 MCG/2ML nebulizer solution Inhale 15 mcg.     • Brovana 15 MCG/2ML nebulizer solution TK 2 MLS BY NEBULIZATION BID     • cefuroxime (CEFTIN) 500 MG tablet Take 500 mg by mouth.     • clindamycin (CLEOCIN) 300 MG capsule      • clopidogrel (PLAVIX) 75 MG tablet Take 75 mg by mouth Daily.     • doxycycline (MONODOX) 100 MG capsule      • famotidine (PEPCID) 20 MG tablet Take 20 mg by mouth 2 (Two) Times a Day.     • fluticasone (FLONASE) 50 MCG/ACT nasal spray 2 sprays into each nostril Daily.     • Fluticasone Furoate (ARNUITY ELLIPTA) 200 MCG/ACT aerosol powder  Inhale.     • Fluticasone-Umeclidin-Vilant (Trelegy Ellipta) 100-62.5-25 MCG/INH aerosol powder  Inhale 1 inhaler.     • furosemide (LASIX) 20 MG tablet " Take 1 tablet by mouth Daily. 90 tablet 3   • glimepiride (AMARYL) 2 MG tablet Take 2 mg by mouth Every Morning Before Breakfast.     • HYDROcodone-acetaminophen (NORCO) 7.5-325 MG per tablet Take 1 tablet by mouth Every 8 (Eight) Hours.     • HYDROcodone-acetaminophen (NORCO) 7.5-325 MG per tablet Take 1 tablet by mouth.     • HYDROcodone-acetaminophen (NORCO) 7.5-325 MG per tablet Take 1 tablet by mouth.     • ipratropium (ATROVENT) 0.02 % nebulizer solution INHALE THE CONTENTS OF 1 VIAL IN NEBULIZER EVERY 4 HOURS AS NEEDED FOR WHEEZING     • ipratropium-albuterol (DUO-NEB) 0.5-2.5 mg/3 ml nebulizer Inhale 3 mL.     • ipratropium-albuterol (DUO-NEB) 0.5-2.5 mg/mL nebulizer Take 3 mL by nebulization 4 (Four) Times a Day. 120 vial 1   • levoFLOXacin (LEVAQUIN) 500 MG tablet      • lisinopril (PRINIVIL,ZESTRIL) 10 MG tablet Take 0.5 tablets by mouth Daily. 30 tablet 6   • magic mouthwash oral suspension Swish and swallow 5 mL Every 4 (Four) Hours As Needed (sore throat). 202.5 mL 0   • meclizine (ANTIVERT) 12.5 MG tablet 1 tablet po q 6 hr prn severe nausea     • mupirocin (BACTROBAN) 2 % ointment USE IN NOSTRILS BID FOR 5 DAYS. PRESS SIDES OF NOSE TOGETGER AND MASSAGE GENTLY     • nitroglycerin (NITROSTAT) 0.4 MG SL tablet place 1 tablet under the tongue if needed every 5 minutes for hair...  (REFER TO PRESCRIPTION NOTES).  0   • nystatin (MYCOSTATIN) 866776 UNIT/ML suspension Swish and swallow 5 mL 4 (Four) Times a Day. 60 mL 0   • O2 (OXYGEN) Inhale 2 L/min Every Night. W/ CPAP     • ondansetron (ZOFRAN) 4 MG tablet Take 4 mg by mouth Every 8 (Eight) Hours As Needed for Nausea or Vomiting.     • predniSONE (DELTASONE) 20 MG tablet      • ranolazine (RANEXA) 500 MG 12 hr tablet TAKE 1 TABLET ONCE DAILY WITH LARGEST MEAL     • Red Yeast Rice 600 MG tablet Take 600 mg by mouth 2 (Two) Times a Day.     • Trelegy Ellipta 100-62.5-25 MCG/INH aerosol powder       • Umeclidinium-Vilanterol 62.5-25 MCG/INH aerosol powder   "Inhale 1 puff Daily. 1 each 11     No current facility-administered medications for this visit.          OBJECTIVE    Pulse 91   Ht 170.2 cm (67\")   Wt 74.8 kg (165 lb)   SpO2 98%   BMI 25.84 kg/m²       Review of Systems   Constitutional: Negative.    HENT: Positive for hearing loss.    Eyes: Negative.    Respiratory: Positive for shortness of breath.    Cardiovascular: Negative.    Gastrointestinal: Negative.    Endocrine: Negative.    Genitourinary: Negative.    Musculoskeletal: Positive for arthralgias, back pain and joint swelling.        Right ankle pain     Skin: Negative.    Psychiatric/Behavioral: Negative.            Physical Exam   Constitutional: He is oriented to person, place, and time. He appears well-developed. No distress.   HENT:   Head: Normocephalic and atraumatic.   Eyes: Pupils are equal, round, and reactive to light.   Pulmonary/Chest: Effort normal. No respiratory distress.   Musculoskeletal: Swelling, tenderness and signs of injury present. No deformity.   Neurological: He is alert and oriented to person, place, and time.   Psychiatric: His behavior is normal.   Vitals reviewed.      Right Lower Extremity    Cardiovascular:    DP/PT pulses palpable    CFT brisk  to all digits  Edema and ecchymosis noted right lateral ankle  Musculoskeletal:  Muscle strength deferred secondary to pain  Right ankle joint range of motion is guarded  Pain on palpation to lateral malleolus  Dermatological:   Skin is warm, dry and intact    Webspaces 1-4  are clean, dry and intact.   No subcutaneous nodules or masses noted    No open wounds noted   Neurological:   Protective sensation decreased   Sensation intact to light touch          Procedures      ASSESSMENT AND PLAN    Diagnoses and all orders for this visit:    1. Closed fracture of right ankle, initial encounter (Primary)      - Comprehensive foot and ankle exam performed.   - Radiographs reviewed.  Nondisplaced distal fibular fracture.  Ankle mortise " is well aligned.  - More appropriate fitting cam boot dispensed.  Okay to weight-bear as tolerated in cam boot  -Ice and home pain meds for pain control.  Stressed importance of elevation  - All questions were answered to the patients satisfaction.  - RTC 2 weeks, repeat radiographs        This document has been electronically signed by Davon Mccurdy DPM on January 6, 2021 12:11 CST     12:11 CST

## 2021-01-06 NOTE — PROGRESS NOTES
"Hasmukh Ham is a 87 y.o. male returns for     Chief Complaint   Patient presents with   • Right Leg - Pain   • Right Ankle - Pain       HISTORY OF PRESENT ILLNESS: Patient presents to office for evaluation of acute right hip pain due to fall.  Patient sustained a fall on 1/4/2020 at home when he slipped on some ice, injuring his right ankle and right hip.  Patient was evaluated in the ED on the date of the injury with multiple x-rays performed.  He was diagnosed with a right distal fibula fracture and is being treated by podiatry for this.  Patient complains of pain in his right hip/groin area since the fall.  Initial x-rays were negative for acute fractures but showed some degenerative changes.  Pain is described as constant and moderate in severity.  Pain is described as aching in nature.  Pain is worse with weightbearing, standing and walking.  Pain improves with rest and position changes.  Patient is wearing an Orthoboot to the right ankle for immobilization.  Patient takes Norco 7.5 mg tablets for chronic pain per his primary care physician.       CONCURRENT MEDICAL HISTORY:    The following portions of the patient's history were reviewed and updated as appropriate: allergies, current medications, past family history, past medical history, past social history, past surgical history and problem list.     ROS  No fevers or chills.  No chest pain or shortness of air.  No GI or  disturbances. Right hip/groin pain. Right ankle pain.     PHYSICAL EXAMINATION:       Ht 170.2 cm (67\")   Wt 74.8 kg (165 lb)   BMI 25.84 kg/m²     Physical Exam  Vitals signs reviewed.   Constitutional:       General: He is not in acute distress.     Appearance: He is well-developed. He is not ill-appearing.   HENT:      Head: Normocephalic.   Pulmonary:      Effort: Pulmonary effort is normal. No respiratory distress.   Abdominal:      General: There is no distension.      Palpations: Abdomen is soft.   Musculoskeletal:         " General: Tenderness (Mild, right hip) present. No swelling or deformity.   Skin:     General: Skin is warm and dry.      Capillary Refill: Capillary refill takes less than 2 seconds.      Findings: No erythema.   Neurological:      Mental Status: He is alert and oriented to person, place, and time.      GCS: GCS eye subscore is 4. GCS verbal subscore is 5. GCS motor subscore is 6.   Psychiatric:         Speech: Speech normal.         Behavior: Behavior normal.         Thought Content: Thought content normal.         Judgment: Judgment normal.         GAIT:     []  Normal  []  Antalgic    Assistive device: []  None  []  Walker     []  Crutches  []  Cane     [x]  Wheelchair  []  Stretcher    Right Hip Exam     Tenderness   Right hip tenderness location: Mild, diffuse.    Muscle Strength   Right hip normal muscle strength: deferred.    Other   Erythema: absent  Sensation: normal  Pulse: present    Comments:  Physical exam is limited today due to the patient having an ankle fracture and being seated in a wheelchair.  Range of motion and strength assessments are deferred due to possible hip fracture and mobility limitations with transferring to the exam table.  Mild, diffuse tenderness present to the hip.  No deformity.  No ecchymosis.      Left Hip Exam     Tenderness   The patient is experiencing no tenderness.     Muscle Strength   Left hip normal muscle strength: deferrred.     Other   Erythema: absent  Sensation: normal  Pulse: present            Xr Knee 4+ View Right    Result Date: 1/4/2021  Narrative: Procedure: XR KNEE 4 OR MORE VIEWS. History: Injury, pain. Comparison: None Findings: Four x-rays, AP and lateral views of the right knee. There is no radiographic evidence of an acute fracture. There is soft tissue calcification noted in the region of the medial and lateral menisci, projecting over Hoffa's fat pad, and at the insertion of the quadriceps tendon on the patella. There is joint space narrowing of the  tibiofemoral joints. There are mild degenerative changes of the patellofemoral joint. There is a tiny joint effusion.     Impression: Impression: Tiny joint effusion. No radiographic evidence of an acute fracture. 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 Electronically signed by:  Pedro Novoa MD  1/4/2021 11:27 AM CST Workstation: [id number]    Xr Ankle 3+ View Right    Result Date: 1/4/2021  Narrative: PROCEDURE: Three views right ankle COMPARISON: No comparison. HISTORY: pain FINDINGS: There is an obliquely oriented nondisplaced fracture of the distal fibular metaphysis just above the syndesmosis. There are degenerative changes of the ankle joint. There is atherosclerotic calcification of the distal calf arteries extending into the foot.     Impression: CONCLUSION:  Obliquely oriented nondisplaced fracture of the distal fibular metaphysis. Electronically signed by:  Pedro Novoa MD  1/4/2021 11:34 AM CST Workstation: [id number]    Ct Hip Right Wo Contrast    Result Date: 1/6/2021  Narrative: EXAM: CT HIP WITHOUT IV CONTRAST, RIGHT COMPARISONS: Hip radiograph 1/4/2021 INDICATION: Hip trauma, fracture suspected, neg xray , M25.551 Pain in right hip W19.XXXA Unspecified fall, initial encounter: rule out fracture TECHNIQUE: Noncontrast CT images were obtained through the right hip. Reformats were provided. All CT scans at this facility uses dose modulation, iterative reconstruction, and/or weight-based dosing when appropriate to achieve a radiation dose as low as reasonably achievable. FINDINGS: No visible fracture or dislocation. There is mild right femoral acetabular joint space narrowing with subchondral sclerosis and cyst formation. There is mild demineralization of the visualized bone. There is mild fatty atrophy of the right hip musculature. No superficial soft tissue abnormality. There is a  fat-containing left inguinal hernia. There are sigmoid diverticula without evidence of diverticulitis. Remaining visualized lower abdominal/pelvic contents are unremarkable.     Impression: No CT evidence of right hip fracture. If persistent clinical concern for occult fracture, nuclear medicine bone scan or MRI may provide further detail. Right femoral acetabular osteoarthritis. Electronically signed by:  Linda Aranda MD  1/6/2021 3:38 PM CST Workstation: [id number]    Xr Hip With Or Without Pelvis 2 - 3 View Right    Result Date: 1/4/2021  Narrative: Procedure: XR HIP 2 OR MORE VIEWS UNILATERAL WITH PELVIS. History: fall. Comparison: None Findings: There are degenerative changes of both hips. No definite fracture visualized. There are degenerative changes of the lower lumbar spine.     Impression: Impression: degenerative changes. No radiographic evidence of an acute fracture. Electronically signed by:  Pedro Novoa MD  1/4/2021 11:30 AM CST Workstation: [id number]        ASSESSMENT:    Diagnoses and all orders for this visit:    Right hip pain  -     CT hip right wo contrast; Future    Injury due to fall, initial encounter  -     CT hip right wo contrast; Future    PLAN    X-rays of the right hip/pelvis performed in the ED on 1/4/2021 are independently reviewed by myself today with no acute findings noted.  Patient complains of right hip/groin pain since he sustained a fall on 1/4/2021.  Patient has increased pain with weightbearing.  Recommend CT scan of the right hip to evaluate for possible nondisplaced fracture in the right hip or pelvis.  Patient has been diagnosed with a right distal fibula fracture and is being treated for this by podiatry.  He is wearing an Orthoboot to the right ankle today and has seen podiatry prior to this visit.  Recommend to continue with modified weightbearing off the right leg with use of a walker.  He will already be utilizing modified weightbearing due to his ankle  fracture, which should help with the hip as well.  I discussed with the patient and his wife that our office will notify them of the CT results and further treatment recommendations.  Recommend use of ice therapy to the right hip as needed to minimize pain/inflammation.  Patient can also utilize some heat therapy as needed for pain and muscle tension.  Recommend to continue his normal dosing of Norco 7.5 mg tablets as needed for pain as prescribed by his primary care physician, Dr. Rey.  Follow-up in 2 weeks for recheck.    Following the patient's visit, CT results were released and reviewed.  The patient and his wife were notified that he has no evidence of fracture in the right hip.  Based on this, I would say that he likely has a strain or sprain of his right hip due to the fall.  As he will be modifying his weightbearing and utilizing rest/activity modification to facilitate with his right ankle fracture, this should help with his right hip as well.  I will see the patient in 2 weeks and see if he is improving.  Patient may need some physical therapy at some point to help with his right hip pain, conditioning/strengthening and gait training but this will need to wait until his right ankle fracture is further healed and he is bearing weight on the right ankle.     Return in about 2 weeks (around 1/20/2021) for Recheck.      This document has been electronically signed by EVE Abdullahi on January 10, 2021 17:48 CST      EVE Abdullahi

## 2021-01-06 NOTE — TELEPHONE ENCOUNTER
----- Message from EVE Abdullahi sent at 1/6/2021  3:47 PM CST -----  Please let patient (or his wife) know that the CT scan was negative for any fracture in his hip.  He likely has a strain of his right hip due to the fall.  He will be modifying his weightbearing due to his right ankle fracture at this time anyway, wh  ich should help to improve his right hip pain.  I will see him in 2 weeks for a follow-up, which he already has an appointment for.  Thanks.

## 2021-02-03 NOTE — PROGRESS NOTES
"Hasmukh Ham is a 87 y.o. male returns for     Chief Complaint   Patient presents with   • Right Hip - Follow-up   • Right Shoulder - Follow-up       HISTORY OF PRESENT ILLNESS: Patient presents to office accompanied by his wife for follow-up of acute right hip pain due to fall.  Patient sustained a fall on 1/4/2021 at home when he slipped on some ice, injuring his right ankle and right hip.  Patient has been treated by Dr. Mccurdy for a right distal fibula fracture.  CT scan of the right hip previously was negative for any fracture and he was diagnosed with a likely strain of his right hip.  Patient has continued with modified weightbearing but has been able to gradually progress his weightbearing and is ambulatory in office today with use of a cane.  Patient also complains today of acutely worsened chronic right knee pain since his fall.  Patient states that when he fell, his right knee flexed and his leg bended behind him.  Patient also complains of chronic and severe right shoulder pain.  Patient was recently given an injection in the right shoulder to help with pain on 12/29/2020.  Patient is no longer in the Orthoboot for his right ankle fracture and has been gradually improving overall.  X-rays of the right knee are performed in office today.  Patient continues to take Norco 7.5 mg tablets for chronic pain per his primary care physician.    CONCURRENT MEDICAL HISTORY:    The following portions of the patient's history were reviewed and updated as appropriate: allergies, current medications, past family history, past medical history, past social history, past surgical history and problem list.     ROS  No fevers or chills.  No chest pain or shortness of air.  No GI or  disturbances. Right knee pain. Right hip pain. Right shoulder pain.     PHYSICAL EXAMINATION:       Ht 170.2 cm (67\")   Wt 80.7 kg (178 lb)   BMI 27.88 kg/m²     Physical Exam  Vitals signs reviewed.   Constitutional:       General: He is " not in acute distress.     Appearance: He is well-developed. He is not ill-appearing.   HENT:      Head: Normocephalic.   Pulmonary:      Effort: Pulmonary effort is normal. No respiratory distress.   Abdominal:      General: There is no distension.      Palpations: Abdomen is soft.   Musculoskeletal:         General: Swelling (Mild, right ankle) and tenderness (Mild, right knee) present. No deformity.      Right knee: He exhibits no effusion.   Skin:     General: Skin is warm and dry.      Capillary Refill: Capillary refill takes less than 2 seconds.      Findings: No erythema.   Neurological:      Mental Status: He is alert and oriented to person, place, and time.      GCS: GCS eye subscore is 4. GCS verbal subscore is 5. GCS motor subscore is 6.   Psychiatric:         Speech: Speech normal.         Behavior: Behavior normal.         Thought Content: Thought content normal.         Judgment: Judgment normal.         GAIT:     []  Normal  [x]  Antalgic    Assistive device: []  None  []  Walker     []  Crutches  [x]  Cane     []  Wheelchair  []  Stretcher    Right Knee Exam     Tenderness   Right knee tenderness location: Mild, diffuse.    Range of Motion   Extension: 5   Flexion: 90     Tests   Yazmin:  Medial - positive Lateral - positive  Varus: negative Valgus: negative    Other   Erythema: absent  Sensation: normal  Pulse: present  Swelling: mild  Effusion: no effusion present    Comments:  Pain and limitations with range of motion.  Diffuse tenderness to palpation.  Mild, generalized swelling noted.  No definitive effusion.  No erythema.  No warmth.  No signs of infection noted.  Skin is intact.  No ecchymosis.  No visible signs of injury noted.      Right Hip Exam     Tenderness   The patient is experiencing no tenderness (Mild, diffuse).     Range of Motion   Abduction: 30   Flexion: 100     Muscle Strength   Right hip normal muscle strength: deferred.    Tests   CORNELIUS: positive  Fadir:  Positive FADIR  test    Other   Erythema: absent  Sensation: normal  Pulse: present    Comments:  Mild pain and limitations with range of motion of the hip joint.  No swelling appreciated.  No tenderness.  No deformity.  No ecchymosis.      Left Hip Exam     Tenderness   The patient is experiencing no tenderness.     Muscle Strength   Left hip normal muscle strength: deferrred.     Other   Erythema: absent  Sensation: normal  Pulse: present            Xr Knee 1 Or 2 View Right    Result Date: 2/3/2021  Narrative: AP and lateral views of the right knee reveal no evidence of acute fracture or dislocation.  There is calcification noted at the medial and lateral menisci consistent with chondrocalcinosis.  There is mild to moderate joint space narrowing at the tibiofemoral joints, more pronounced in the medial joint space.  There are mild degenerative changes noted at the patellofemoral joint.  The knee joint appears well-aligned.  Soft tissues appear unremarkable.  No joint effusion is seen.  No acute bony radiologic abnormalities are noted at this time.  No acute interval changes are noted when compared with prior images from 1/4/2021.02/03/21 at 15:50 CST by EVE Abdullahi     Xr Knee 4+ View Right     Result Date: 1/4/2021  Narrative: Procedure: XR KNEE 4 OR MORE VIEWS. History: Injury, pain. Comparison: None Findings: Four x-rays, AP and lateral views of the right knee. There is no radiographic evidence of an acute fracture. There is soft tissue calcification noted in the region of the medial and lateral menisci, projecting over Hoffa's fat pad, and at the insertion of the quadriceps tendon on the patella. There is joint space narrowing of the tibiofemoral joints. There are mild degenerative changes of the patellofemoral joint. There is a tiny joint effusion.      Impression: Impression: Tiny joint effusion. No radiographic evidence of an acute fracture. Soft tissue calcifications suspicious for chondrocalcinosis/calcium  pyrophosphate deposition disease. 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 Electronically signed by:  Pedro Novoa MD  1/4/2021 11:27 AM CST Workstation: [id number]     Xr Ankle 3+ View Right     Result Date: 1/4/2021  Narrative: PROCEDURE: Three views right ankle COMPARISON: No comparison. HISTORY: pain FINDINGS: There is an obliquely oriented nondisplaced fracture of the distal fibular metaphysis just above the syndesmosis. There are degenerative changes of the ankle joint. There is atherosclerotic calcification of the distal calf arteries extending into the foot.      Impression: CONCLUSION:  Obliquely oriented nondisplaced fracture of the distal fibular metaphysis. Electronically signed by:  Pedro Novoa MD  1/4/2021 11:34 AM CST Workstation: [id number]     Ct Hip Right Wo Contrast     Result Date: 1/6/2021  Narrative: EXAM: CT HIP WITHOUT IV CONTRAST, RIGHT COMPARISONS: Hip radiograph 1/4/2021 INDICATION: Hip trauma, fracture suspected, neg xray , M25.551 Pain in right hip W19.XXXA Unspecified fall, initial encounter: rule out fracture TECHNIQUE: Noncontrast CT images were obtained through the right hip. Reformats were provided. All CT scans at this facility uses dose modulation, iterative reconstruction, and/or weight-based dosing when appropriate to achieve a radiation dose as low as reasonably achievable. FINDINGS: No visible fracture or dislocation. There is mild right femoral acetabular joint space narrowing with subchondral sclerosis and cyst formation. There is mild demineralization of the visualized bone. There is mild fatty atrophy of the right hip musculature. No superficial soft tissue abnormality. There is a fat-containing left inguinal hernia. There are sigmoid diverticula without evidence of diverticulitis. Remaining visualized lower abdominal/pelvic contents are unremarkable.      Impression: No CT evidence of right hip fracture. If persistent clinical concern for occult fracture,  nuclear medicine bone scan or MRI may provide further detail. Right femoral acetabular osteoarthritis. Electronically signed by:  Linda Aranda MD  1/6/2021 3:38 PM CST Workstation: [id number]     Xr Hip With Or Without Pelvis 2 - 3 View Right     Result Date: 1/4/2021  Narrative: Procedure: XR HIP 2 OR MORE VIEWS UNILATERAL WITH PELVIS. History: fall. Comparison: None Findings: There are degenerative changes of both hips. No definite fracture visualized. There are degenerative changes of the lower lumbar spine.      Impression: Impression: degenerative changes. No radiographic evidence of an acute fracture. Electronically signed by:  Pedro Novoa MD  1/4/2021 11:30 AM CST Workstation: [id number]    Large Joint Arthrocentesis: R knee  Date/Time: 2/3/2021 11:24 AM  Consent given by: patient  Timeout: Immediately prior to procedure a time out was called to verify the correct patient, procedure, equipment, support staff and site/side marked as required   Supporting Documentation  Indications: pain, joint swelling and diagnostic evaluation   Procedure Details  Location: knee - R knee  Preparation: Patient was prepped and draped in the usual sterile fashion  Needle size: 22 G  Approach: anterolateral  Medications administered: 2 mL lidocaine PF 1% 1 %; 40 mg triamcinolone acetonide 40 MG/ML  Patient tolerance: patient tolerated the procedure well with no immediate complications      ASSESSMENT:    Diagnoses and all orders for this visit:    Right hip pain    Primary osteoarthritis involving multiple joints    Chronic pain of right knee  -     XR Knee 1 or 2 View Right; Future  -     Large Joint Arthrocentesis: R knee    Primary osteoarthritis of right knee    PLAN    Patient continues to complain of right hip pain since his fall 4 weeks ago but is slowly and gradually improving.  Patient is ambulating in office today with use of his cane.  Patient has been able to transition out of the Ortho boot and progress  weightbearing on his right ankle.  Patient has been treated for his right distal fibula fracture per Dr. Mccurdy.  Previous CT scan of the right hip was negative for fracture or any bony injury and we had discussed the likelihood of a hip strain.  Patient does complain today of increased chronic right knee pain since his fall.  He describes that his right knee flexed deeply and his leg went behind him when he fell.  X-rays of the right knee are repeated in office today reviewed with no acute findings noted.  Patient does have degenerative changes and we discussed that he likely sprained his knee or exacerbated the degenerative changes as his knee flexed very deeply and then he had an impact.  Recommend an intra-articular injection of steroid to the right knee today for management of joint pain/inflammation/swelling.  Recommend to continue with use of his cane for modified weightbearing off the right ankle, right knee and right hip.  Patient can gradually progress his weightbearing and activity as pain and swelling allow.  Recommend elevation and ice therapy to the right knee as needed to minimize pain/swelling/inflammation.  Patient inquires today about a repeat injection for his severe and chronic right shoulder pain but it has only been about 4-1/2 weeks since the previous injection was given and we discussed that it is not time yet for repeat injection and we need to wait a few more weeks.  Patient verbalized understanding of this.  Patient already takes Norco 7.5 mg tablets for chronic pain as prescribed to him by his primary care physician.  Recommend Tylenol as needed for milder pain.  We will avoid oral NSAIDs due to his advanced age.  Follow-up in 4 weeks for recheck.  Consider referral to physical therapy if he continues to have issues with his right hip or difficulty ambulating.  As of today, he seems to be doing much better than his prior visits.  Follow-up sooner as needed for any new or worsening symptoms  or any concerns.    EMR Dragon/Transciption Disclaimer: Some of this note may be an electronic transcription/translation of spoken language to printed text.  The electronic translation of spoken language may permit erroneous, or at times, nonsensical words or phrases to be inadvertently transcribed. Although I have reviewed the note for such errors, some may still exist.     Return in about 4 weeks (around 3/3/2021) for Recheck.      This document has been electronically signed by EVE Abdullahi on February 7, 2021 18:48 CST      EVE Abdullahi

## 2021-02-07 PROBLEM — M17.11 PRIMARY OSTEOARTHRITIS OF RIGHT KNEE: Status: ACTIVE | Noted: 2021-01-01

## 2021-02-07 PROBLEM — M25.561 CHRONIC PAIN OF RIGHT KNEE: Status: ACTIVE | Noted: 2021-01-01

## 2021-02-07 PROBLEM — G89.29 CHRONIC PAIN OF RIGHT KNEE: Status: ACTIVE | Noted: 2021-01-01

## 2021-03-08 PROBLEM — G89.29 CHRONIC RIGHT SHOULDER PAIN: Status: ACTIVE | Noted: 2017-06-19

## 2021-03-08 PROBLEM — M25.512 LEFT SHOULDER PAIN: Status: ACTIVE | Noted: 2021-01-01

## 2021-03-08 NOTE — PROGRESS NOTES
Hasmukh Ham  6/1/1933  87 y.o. male  BS-113 this morning  PCP: Dr. Rey 2/25/21  BS: 115 PER PATIENT    Patient came to clinic for diabetic foot care      03/08/2021     Chief Complaint   Patient presents with   • Left Foot - diabetic foot care   • Right Foot - \, diabetic foot care       History of Present Illness    Hasmukh Ham is a 87 y.o.male who presents to clinic today for diabetic foot exam and care.  Blood sugars are well controlled.  Today complains of long, thickened painful toenails.  Pain is relieved with debridement.  He has no other complaints.    Past Medical History:   Diagnosis Date   • Basal cell carcinoma    • Bilateral carotid artery stenosis    • Bilateral carotid artery stenosis    • Bleeding disorder (CMS/HCC)    • CHF (congestive heart failure) (CMS/HCC)    • Chronic obstructive pulmonary disease (COPD) (CMS/HCC)    • Coronary arteriosclerosis    • Degenerative joint disease involving multiple joints    • Dementia (CMS/HCC)    • Diabetes mellitus (CMS/HCC)    • Heart problem    • History of stomach ulcers    • Hyperlipidemia    • Hypertension    • Ingrown toenail    • Myocardial infarction (CMS/HCC)          Past Surgical History:   Procedure Laterality Date   • BACK SURGERY     • CARDIAC CATHETERIZATION N/A 6/6/2017    Procedure: Right Heart Cath;  Surgeon: Jeff Maciel MD PhD;  Location: [facility] CATH INVASIVE LOCATION;  Service:    • CARDIAC CATHETERIZATION N/A 2/14/2018    Procedure: Coronary angiography;  Surgeon: Moisés Davila MD;  Location: [facility] CATH INVASIVE LOCATION;  Service:    • CORONARY ANGIOPLASTY WITH STENT PLACEMENT     • CORONARY STENT PLACEMENT     • HERNIA REPAIR     • LUNG BIOPSY     • LUNG SURGERY     • NECK SURGERY     • THORACOTOMY Left 1977   • VENTRAL HERNIA REPAIR           Family History   Problem Relation Age of Onset   • Hypertension Father    • Heart disease Father    • Diabetes Father    • Diabetes Mother    • Lung disease Other   "  • Cancer Other        Allergies   Allergen Reactions   • Atorvastatin Anaphylaxis   • Penicillins Rash   • Azithromycin Rash   • Cefdinir Other (See Comments)     \"i burned up\" and break out in a rash   • Clarithromycin Rash and Itching   • Pravastatin Unknown - High Severity     Myalgia  Myalgia       Social History     Socioeconomic History   • Marital status:      Spouse name: Not on file   • Number of children: Not on file   • Years of education: Not on file   • Highest education level: Not on file   Tobacco Use   • Smoking status: Former Smoker   • Smokeless tobacco: Never Used   Vaping Use   • Vaping Use: Never used   Substance and Sexual Activity   • Alcohol use: No   • Drug use: No   • Sexual activity: Not Currently     Comment:          Current Outpatient Medications   Medication Sig Dispense Refill   • acetaminophen (TYLENOL) 325 MG tablet Take 2 tablets by mouth Every 4 (Four) Hours As Needed for Mild Pain .     • albuterol (PROVENTIL) (2.5 MG/3ML) 0.083% nebulizer solution Take 2.5 mg by nebulization Every 4 (Four) Hours As Needed for Wheezing. 125 vial 0   • arformoterol (BROVANA) 15 MCG/2ML nebulizer solution Inhale 15 mcg.     • Blood Glucose Monitoring Suppl (ONE TOUCH ULTRA 2) w/Device kit Daily. as directed     • Brovana 15 MCG/2ML nebulizer solution TK 2 MLS BY NEBULIZATION BID     • cefuroxime (CEFTIN) 500 MG tablet Take 500 mg by mouth.     • cetirizine (zyrTEC) 10 MG tablet Take 10 mg by mouth Daily.     • clopidogrel (PLAVIX) 75 MG tablet Take 75 mg by mouth Daily.     • docusate sodium 100 MG capsule Take 100 mg by mouth.     • doxycycline (MONODOX) 100 MG capsule Take 100 mg by mouth.     • famotidine (PEPCID) 20 MG tablet Take 20 mg by mouth 2 (Two) Times a Day.     • fluticasone (FLONASE) 50 MCG/ACT nasal spray 2 sprays into each nostril Daily.     • Fluticasone Furoate (ARNUITY ELLIPTA) 200 MCG/ACT aerosol powder  Inhale.     • Fluticasone-Umeclidin-Vilant (Trelegy " Ellipta) 100-62.5-25 MCG/INH aerosol powder  Inhale 1 inhaler.     • furosemide (LASIX) 20 MG tablet Take 1 tablet by mouth Daily. 90 tablet 3   • glimepiride (AMARYL) 2 MG tablet Take 2 mg by mouth Every Morning Before Breakfast.     • HYDROcodone-acetaminophen (NORCO) 7.5-325 MG per tablet Take 1 tablet by mouth.     • HYDROcodone-acetaminophen (NORCO) 7.5-325 MG per tablet Take 1 tablet by mouth.     • ipratropium (ATROVENT) 0.02 % nebulizer solution INHALE THE CONTENTS OF 1 VIAL IN NEBULIZER EVERY 4 HOURS AS NEEDED FOR WHEEZING     • ipratropium-albuterol (DUO-NEB) 0.5-2.5 mg/3 ml nebulizer Inhale 3 mL.     • ipratropium-albuterol (DUO-NEB) 0.5-2.5 mg/mL nebulizer Take 3 mL by nebulization 4 (Four) Times a Day. 120 vial 1   • Lancets (OneTouch Delica Plus [id number]) misc 1 each by Other route Daily.     • levoFLOXacin (LEVAQUIN) 500 MG tablet      • lisinopril (PRINIVIL,ZESTRIL) 10 MG tablet Take 0.5 tablets by mouth Daily. 30 tablet 6   • magic mouthwash oral suspension Swish and swallow 5 mL Every 4 (Four) Hours As Needed (sore throat). 202.5 mL 0   • magnesium oxide (MAG-OX) 400 MG tablet Take 250 mg by mouth Daily.     • meclizine (ANTIVERT) 12.5 MG tablet 1 tablet po q 6 hr prn severe nausea     • mupirocin (BACTROBAN) 2 % ointment USE IN NOSTRILS BID FOR 5 DAYS. PRESS SIDES OF NOSE TOGETGER AND MASSAGE GENTLY     • nitroglycerin (NITROSTAT) 0.4 MG SL tablet place 1 tablet under the tongue if needed every 5 minutes for hair...  (REFER TO PRESCRIPTION NOTES).  0   • nystatin (MYCOSTATIN) 792568 UNIT/ML suspension Swish and swallow 5 mL 4 (Four) Times a Day. 60 mL 0   • O2 (OXYGEN) Inhale 2 L/min Every Night. W/ CPAP     • ondansetron (ZOFRAN) 4 MG tablet Take 4 mg by mouth Every 8 (Eight) Hours As Needed for Nausea or Vomiting.     • OneTouch Ultra test strip 1 each by Other route Daily. use to test blood sugar once daily     • predniSONE (DELTASONE) 20 MG tablet      • ranolazine (RANEXA) 500 MG 12 hr tablet  "TAKE 1 TABLET ONCE DAILY WITH LARGEST MEAL     • Red Yeast Rice 600 MG tablet Take 600 mg by mouth 2 (Two) Times a Day.     • Trelegy Ellipta 100-62.5-25 MCG/INH aerosol powder       • Umeclidinium-Vilanterol 62.5-25 MCG/INH aerosol powder  Inhale 1 puff Daily. 1 each 11     No current facility-administered medications for this visit.         OBJECTIVE    Pulse 52   Ht 170.2 cm (67\")   Wt 78.9 kg (174 lb)   SpO2 98%   BMI 27.25 kg/m²       Review of Systems   Constitutional: Negative.    HENT: Positive for hearing loss.    Eyes: Negative.    Respiratory: Positive for shortness of breath.    Cardiovascular: Negative.    Gastrointestinal: Negative.    Endocrine: Negative.    Genitourinary: Negative.    Musculoskeletal: Positive for arthralgias, back pain and joint swelling.        Toe pain    Skin: Negative.    Psychiatric/Behavioral: Negative.            Physical Exam   Constitutional: He is oriented to person, place, and time. He appears well-developed. No distress.   HENT:   Head: Normocephalic and atraumatic.   Eyes: Pupils are equal, round, and reactive to light.   Pulmonary/Chest: Effort normal. No respiratory distress.   Musculoskeletal: Swelling and tenderness present. No deformity.    Hasmukh had a diabetic foot exam performed today.   During the foot exam he had a monofilament test performed.  Neurological: He is alert and oriented to person, place, and time.   Psychiatric: His behavior is normal.   Vitals reviewed.       Lower Extremity    Cardiovascular:    DP/PT pulses palpable    CFT brisk  to all digits  Edema noted to bilateral lower extremities.  Musculoskeletal:  Muscle strength within normal limit  Ankle joint range of motion is within normal limits  Dermatological:   Skin is warm, dry and intact    Webspaces 1-4  are clean, dry and intact.   No subcutaneous nodules or masses noted    Toenails 1 through 5 bilateral are thickened, discolored, elongated with subungual debris.  Pain on palpation to " the nail plates  No open wounds noted   Neurological:   Protective sensation decreased   Sensation intact to light touch          Procedures      ASSESSMENT AND PLAN    Diagnoses and all orders for this visit:    1. Encounter for diabetic foot exam (CMS/[facility]) (Primary)    2. Type 2 diabetes mellitus with peripheral neuropathy (CMS/[facility])    3. Onychomycosis    4. Chronic toe pain, bilateral      - A diabetic foot screening exam was performed and the patient was educated on the foot complications related to diabetes,  preventative foot care and what signs and symptoms to watch for.  Instructed to contact our office if any foot problems develop before next visit.  - Nails 1-5 bilateral were debrided in length and thickness with nail nipper and electric  to decrease fungal load and risk of infection.  - All the patients questions were answered.  - RTC 3 months or sooner if needed.      This document has been electronically signed by Davon Mccurdy DPM on March 8, 2021 14:53 CST     14:53 CST

## 2021-03-08 NOTE — PROGRESS NOTES
"Hasmukh Ham is a 87 y.o. male returns for     Chief Complaint   Patient presents with   • Right Shoulder - Follow-up       HISTORY OF PRESENT ILLNESS: Patient presents to office accompanied by his wife for follow-up of chronic right shoulder pain due to osteoarthritis and chronic rotator cuff tears.  This has been an ongoing issue for several years.  Patient also complains today of some left shoulder pain that is milder in nature.  He denies any known injury.  Patient requests an evaluation for injections in both his shoulders today to help with pain.  Patient typically gets good pain relief with steroid injections but only for 7 to 8 weeks and then the pain begins to return.  Patient continues to experience increased pain at nighttime and difficulty sleeping due to shoulder pain.  Last injection in the right shoulder was given on 12/29/2020, which offered him good pain improvement.  Patient has not had any prior left shoulder injections.  No falls or injuries reported since last office visit.  No new complaints or concerns regarding his right shoulder today.  Patient takes Norco 7.5 mg tablets for chronic pain per his primary care physician.  Pain scale today is 8/10.     CONCURRENT MEDICAL HISTORY:    The following portions of the patient's history were reviewed and updated as appropriate: allergies, current medications, past family history, past medical history, past social history, past surgical history and problem list.     ROS  No fevers or chills.  No chest pain or shortness of air.  No GI or  disturbances. Right shoulder pain. Left shoulder pain.     PHYSICAL EXAMINATION:       Ht 170.2 cm (67\")   Wt 78.9 kg (174 lb)   BMI 27.25 kg/m²     Physical Exam  Vitals reviewed.   Constitutional:       General: He is not in acute distress.     Appearance: He is well-developed. He is not ill-appearing.   HENT:      Head: Normocephalic.   Pulmonary:      Effort: Pulmonary effort is normal. No respiratory " distress.   Abdominal:      General: There is no distension.      Palpations: Abdomen is soft.   Musculoskeletal:         General: Tenderness (Mild, right shoulder, left shoulder) present. No swelling, deformity or signs of injury.   Skin:     General: Skin is warm and dry.      Capillary Refill: Capillary refill takes less than 2 seconds.      Findings: No erythema.   Neurological:      Mental Status: He is alert and oriented to person, place, and time.      GCS: GCS eye subscore is 4. GCS verbal subscore is 5. GCS motor subscore is 6.   Psychiatric:         Speech: Speech normal.         Behavior: Behavior normal.         Thought Content: Thought content normal.         Judgment: Judgment normal.         GAIT:     []  Normal  [x]  Antalgic    Assistive device: [x]  None  []  Walker     []  Crutches  []  Cane     []  Wheelchair  []  Stretcher    Right Shoulder Exam     Tenderness   Right shoulder tenderness location: Mild, diffuse.    Range of Motion   Active abduction: 90   Passive abduction: 130   Forward flexion: 110     Muscle Strength   Abduction: 3/5   Supraspinatus: 3/5     Tests   Carl test: positive  Cross arm: positive  Impingement: positive  Drop arm: positive    Other   Erythema: absent  Sensation: normal  Pulse: present    Comments:  Pain and limitations with range of motion.  Weakness is present.  No deformity.  No swelling appreciated.  No erythema.  No warmth.  No signs of infection noted.  Neurovascularly intact.      Left Shoulder Exam     Tenderness   Left shoulder tenderness location: Mild, diffuse.    Range of Motion   Active abduction: 130   Passive abduction: 160   Forward flexion: 150     Muscle Strength   Abduction: 4/5   Supraspinatus: 4/5     Tests   Carl test: positive  Cross arm: positive  Impingement: positive  Drop arm: negative    Other   Erythema: absent  Sensation: normal  Pulse: present     Comments:  Mild pain and limitations with range of motion. No deformity. No swelling  appreciated. No erythema. No warmth. No signs of infection noted. Neurovascularly intact.             Xr Shoulder 2+ View Right     Result Date: 6/25/2020  Narrative: 3 views of the right shoulder reveal no evidence of acute fracture or dislocation.  There are age-related degenerative changes noted. There are degenerative changes noted at the acromioclavicular joint with diminished joint spacing and an osteophyte formation at the distal clavicle projecting inferiorly.  The humeral head is in an elevated position in relation to the glenoid, suggestive of longstanding rotator cuff tear.  Soft tissues appear unremarkable.  No acute bony radiologic abnormalities are noted at this time.  No significant changes are noted when compared with prior images from 6/19/2017.06/25/20 at 4:55 PM by EVE Abdullahi      X-ray arthrogram shoulder right per contrast protocol 6/28/2017  [facility]        Result Narrative   Indication:  Right shoulder pain.    Right Shoulder Arthrogram:    Procedure:  Following standard sterile precautions, fluoroscopy was used to guide a 25-gauge needle into the shoulder joint.  2 cc of Lidocaine and 8 cc of Omnipaque 240 were injected uneventfully.  There was 1 minute 45 seconds of fluoroscopy.  There   were 6 spot films.    Findings:  There is an extensive rotator cuff tear and the humeral head lies under the acromion.  The patient was sent to CT.   Status Results Details     Encounter Summary   CT shoulder right with contrast per contrast protocol 6/28/2017  [facility]  Result Impression       1.  Supraspinatus, infraspinatus, and long head of the biceps are chronically torn and the muscle bellies are atrophic.    2.  The humeral head has migrated superiorly and articulates under the acromion and the AC joint.    3.  The glenohumeral joint is in satisfactory condition.    4.  Most of the subscapularis tendon is attached, but a small portion of the upper tendon is torn.   Result  Narrative   Indication:  Shoulder pain.    CT, Right Shoulder after arthrogram:  No destructive lesion or fracture.  The humeral head has migrated superiorly and is articulating under the acromion and the hypertrophied AC joint.  The supraspinatus and infraspinous tendons are chronically torn and  retracted with a atrophic muscle bellies.  The long head of the biceps is not seen and is also probably chronically torn.  The glenohumeral joint is in satisfactory condition.  The upper third of the subscapularis tendon is chronically torn with most of  the tendon remaining attached.         ASSESSMENT:    Diagnoses and all orders for this visit:    Chronic right shoulder pain    Rotator cuff syndrome of right shoulder    Chronic left shoulder pain    Other orders  -     Large Joint Arthrocentesis: R subacromial bursa  -     Large Joint Arthrocentesis: L subacromial bursa      Large Joint Arthrocentesis: R subacromial bursa  Date/Time: 3/8/2021 1:28 PM  Consent given by: patient  Timeout: Immediately prior to procedure a time out was called to verify the correct patient, procedure, equipment, support staff and site/side marked as required   Supporting Documentation  Indications: pain and diagnostic evaluation   Procedure Details  Location: shoulder - R subacromial bursa  Preparation: Patient was prepped and draped in the usual sterile fashion  Needle size: 22 G  Approach: posterior  Medications administered: 2 mL lidocaine 1 %; 80 mg triamcinolone acetonide 40 MG/ML  Patient tolerance: patient tolerated the procedure well with no immediate complications    Large Joint Arthrocentesis: L subacromial bursa  Date/Time: 3/8/2021 1:31 PM  Consent given by: patient  Timeout: Immediately prior to procedure a time out was called to verify the correct patient, procedure, equipment, support staff and site/side marked as required   Supporting Documentation  Indications: pain   Procedure Details  Location: shoulder - L subacromial  bursa  Preparation: Patient was prepped and draped in the usual sterile fashion  Needle size: 22 G  Approach: posterior  Medications administered: 40 mg triamcinolone acetonide 40 MG/ML; 2 mL lidocaine 1 %  Patient tolerance: patient tolerated the procedure well with no immediate complications      PLAN    Patient continues to complain of persistent and chronic right shoulder pain due to known degenerative changes, osteoarthritis and chronic rotator cuff tears.  This has been an ongoing issue for several years.    Patient also complains of pain in the left shoulder today, but states it is milder in severity.  Patient denies any known injuries to his left shoulder.  He has not sustained any new falls since last office visit.  Patient continues to complain of sleep disturbance and increased shoulder pain at nighttime.  Patient wants to repeat an injection in the right shoulder today to help with his pain.  Patient also wants an injection in the left shoulder today to help with pain. Recommend subacromial injections of steroid to the bilateral shoulders today for management of pain/inflammation.  Recommend gentle, progressive range of motion exercises as tolerated and based on his pain.  Patient can progress his activity and use of his arm/shoulders as tolerated. Recommend use of ice and/or heat therapy to the shoulders as needed to minimize pain/inflammation/muscle tension.  Patient takes Norco 7.5 mg tablets for chronic pain per his PCP.  Patient is unable to take oral NSAIDs due to his advanced age and chronic use of Plavix for anticoagulation therapy.  Follow-up in 8 weeks for recheck as needed for any new, worsening or persistent symptoms.     EMR Dragon/Transciption Disclaimer: Some of this note may be an electronic transcription/translation of spoken language to printed text.  The electronic translation of spoken language may permit erroneous, or at times, nonsensical words or phrases to be inadvertently  transcribed. Although I have reviewed the note for such errors, some may still exist.     Return in about 8 weeks (around 5/3/2021), or if symptoms worsen or fail to improve, for Recheck.        This document has been electronically signed by EVE Abdullahi on March 9, 2021 18:58 CST      EVE Abdullahi

## 2021-03-11 NOTE — DISCHARGE INSTRUCTIONS
Please cover the eye with cold compresses.  If it you see any of the return to care symptoms found on the AVS documentation, please have EMS take you to [facility] where Dr. Noble and Dr. Chávez are aware of your case.

## 2021-03-11 NOTE — ED PROVIDER NOTES
Subjective   87M history of DMT2, afib, chf presents for blood in his eye for 1 day.    Patient states he woke up this AM and found that his vision was blurry on the left side.  Was told by wife and looked in mirror to find that his eye ball was bloody.  In addition to the blurry vision, patient has also had some slight occular pain that does not increase with movement.  Does not have photophobia, nor does he have nausea or vomiting.    After discovery of the bloody eye, patient called EMS and instructed them to bring him to [facility].  He was told by EMS that there were no ophthalmologic services offered at his choice of ED, but he insisted that they bring him here any ways.    Upon speaking with the patient, he was still under the impression we had ophthalmology here, and was surprised to learn we did not.  I told him Dr. Apodaca sees patient's as an outpatient, but that he was on vacation for 1 week.    Patient and family member state they do not have reliable transportation beyond [place].  They can go to [place], but not [place] for care.  They would not agree to travel to [place] for care as they had no way to get back or get there outside of EMS.    No history of eye surgery.  No history of Glaucoma.  No history of head trauma.          Review of Systems   HENT: Negative for sinus pain and tinnitus.    Eyes: Positive for pain, redness and visual disturbance. Negative for photophobia, discharge and itching.   Gastrointestinal: Negative for nausea and vomiting.   Neurological: Negative for dizziness, seizures, facial asymmetry, weakness and numbness.       Past Medical History:   Diagnosis Date   • Basal cell carcinoma    • Bilateral carotid artery stenosis    • Bilateral carotid artery stenosis    • Bleeding disorder (CMS/[facility])    • CHF (congestive heart failure) (CMS/[facility])    • Chronic obstructive pulmonary disease (COPD) (CMS/[facility])    • Coronary arteriosclerosis    • Degenerative joint  "disease involving multiple joints    • Dementia (CMS/HCC)    • Diabetes mellitus (CMS/HCC)    • Heart problem    • History of stomach ulcers    • Hyperlipidemia    • Hypertension    • Ingrown toenail    • Myocardial infarction (CMS/HCC)        Allergies   Allergen Reactions   • Atorvastatin Anaphylaxis   • Penicillins Rash   • Azithromycin Rash   • Cefdinir Other (See Comments)     \"i burned up\" and break out in a rash   • Clarithromycin Rash and Itching   • Pravastatin Unknown - High Severity     Myalgia  Myalgia       Past Surgical History:   Procedure Laterality Date   • BACK SURGERY     • CARDIAC CATHETERIZATION N/A 6/6/2017    Procedure: Right Heart Cath;  Surgeon: Jeff Maciel MD PhD;  Location: [facility] CATH INVASIVE LOCATION;  Service:    • CARDIAC CATHETERIZATION N/A 2/14/2018    Procedure: Coronary angiography;  Surgeon: Moisés Davila MD;  Location: [facility] CATH INVASIVE LOCATION;  Service:    • CORONARY ANGIOPLASTY WITH STENT PLACEMENT     • CORONARY STENT PLACEMENT     • HERNIA REPAIR     • LUNG BIOPSY     • LUNG SURGERY     • NECK SURGERY     • THORACOTOMY Left 1977   • VENTRAL HERNIA REPAIR         Family History   Problem Relation Age of Onset   • Hypertension Father    • Heart disease Father    • Diabetes Father    • Diabetes Mother    • Lung disease Other    • Cancer Other        Social History     Socioeconomic History   • Marital status:      Spouse name: Not on file   • Number of children: Not on file   • Years of education: Not on file   • Highest education level: Not on file   Tobacco Use   • Smoking status: Former Smoker   • Smokeless tobacco: Never Used   Vaping Use   • Vaping Use: Never used   Substance and Sexual Activity   • Alcohol use: No   • Drug use: No   • Sexual activity: Not Currently     Comment:            Objective   Physical Exam  Constitutional:       Appearance: Normal appearance.   HENT:      Head: Normocephalic and atraumatic.   Eyes:      " General:         Right eye: No discharge.         Left eye: No discharge.      Extraocular Movements: Extraocular movements intact.      Right eye: Normal extraocular motion.      Left eye: Normal extraocular motion.      Conjunctiva/sclera:      Left eye: Hemorrhage present.      Pupils: Pupils are equal, round, and reactive to light.        Comments: Patient has blood staining his sclera covering about 75% of the visible globe.  Has some edema in the lower eye lid as well.  Was unable to visualize fundi.  Right and left eye seem about the same hardness on palpation.  No pain with EOMs   Cardiovascular:      Rate and Rhythm: Normal rate and regular rhythm.      Heart sounds: Normal heart sounds.   Neurological:      Mental Status: He is alert.      Comments: 20/30 Rt, 20/70 Left Visual Acuity.   Psychiatric:         Mood and Affect: Mood normal.         Behavior: Behavior normal.         Thought Content: Thought content normal.         Judgment: Judgment normal.         Procedures           ED Course  ED Course as of Mar 11 1955   u Mar 11, 2021   1435 Evaluated    [SL]   1450 Called [place] ED for possible transfer.    [SL]   1500 3 way phone call with Dr. Noble of the ED and Dr. Chávez of Ophthalmology and discussed physical exam findings and case.  Case did not meet criteria for surgical intervention, but it was agreed the IOP's would be reassuring.  Dr. Noble stated he was willing to take the transfer to measure IOP's and Dr. Chávez stated he was willing to see the patient the next day as an outpatient for further management.     [SL]   1510 Discussed transfer with family and patient and plan was refused. They did not have transportation to [place] and could not find anyone who could help them.  Stated they did not want to go anywhere past [place] or [place] for care.  Explained the reasoning, benefits of transfer, and the risk of not transferring being loss of vision in the left eye.   They also did not want the appointment with Dr. Chávez as he was in [place].    [SL]   1515 Called Dr. Escalante Office and he was out of office and not going to be in town next week.  Called the office of a Dr. Carlin in [place] and set up an appointment on 3/15/21 at 1:20pm.    [SL]   1520 Explained to patient that the appointment with Dr. Carlin was far outside the window I would have liked the patient to be seen.  Explained the risks of waiting that long.  Also stressed that it was important be seen because of visual acuity issues and pain.  Gave very strict return precautions and told the patient to go to [place] if there were concerns as the case was already discussed with their ED attending.  All of this was placed on the AVS.    [SL]      ED Course User Index  [SL] Geoff Jenkins MD           Lab Results (last 48 hours)     ** No results found for the last 48 hours. **           No radiology results for the last day                           MDM  Number of Diagnoses or Management Options  Subconjunctival hemorrhage of left eye: new and requires workup     Amount and/or Complexity of Data Reviewed  Decide to obtain previous medical records or to obtain history from someone other than the patient: yes  Obtain history from someone other than the patient: yes  Review and summarize past medical records: yes  Discuss the patient with other providers: yes    Risk of Complications, Morbidity, and/or Mortality  Presenting problems: moderate  Diagnostic procedures: minimal  Management options: minimal    Critical Care  Total time providing critical care: (0)    Patient Progress  Patient progress: stable    #Subconjunctival Hemorrhage of the Left Eye  Given presentation and no N/V, no history of glaucoma, non-rock-hard globes, likely Subconjunctival Hemorrhage.  However without IOP, cannot be certain that subconjunctival hemorrhage is definitive answer.  Speaking with outside optho, non-emergent case, but need  evaluation soon.  Please see ED course for series of events.  We felt the best choice was to transfer the patient to [place] for IOP measurements, but the patient ultimately agree.  Arranged for follow up to the best of our ability given patient stipulations.  - Cold compresses over affected eye  - F/U with Optho eith Dr. Carlin in [place] as noted on AVS on 3/15/21  - Stressed to patient that this was not optimal  - Stressed to patient to f/u with Dr. Carlin    Final diagnoses:   Subconjunctival hemorrhage of left eye       This document has been electronically signed by Geoff Jenkins MD on March 11, 2021 21:28 CST       Geoff Jenkins MD  Resident  03/11/21 [id number]

## 2021-04-19 PROBLEM — M79.602 LEFT ARM PAIN: Status: ACTIVE | Noted: 2021-01-01

## 2021-04-19 PROBLEM — M19.019 AC JOINT ARTHROPATHY: Status: ACTIVE | Noted: 2021-01-01

## 2021-04-19 NOTE — PROGRESS NOTES
Hasmukh Ham is a 87 y.o. male   Primary provider:  Paolo Rey MD       Chief Complaint   Patient presents with   • Left Arm - Arm Pain     HISTORY OF PRESENT ILLNESS:    87-year-old male patient presents to office accompanied by his spouse for evaluation of chronic left upper arm pain.  Onset of left upper arm pain occurred approximately 6 months ago and the patient associates his increased pain with a fall.  Patient has a history of chronic bilateral shoulder pain with worse pain typically in the right shoulder.  Patient has known osteoarthritic changes and chronic rotator cuff tears in the right shoulder per previous imaging.  Patient was recently evaluated for his bilateral shoulder pain on 3/8/2021 and given subacromial injections of steroid into each shoulder.  Patient states the injection helped with his right shoulder pain but not the left shoulder.  Pain is described as constant and moderate in severity.  Pain is described as aching in nature with associated popping sensations, weakness and limited range of motion.  Pain is worse with movement/use of the left arm/shoulder.  Pain improves mildly with rest, pain medication and localized injections.  X-rays are performed in office today.  Pain scale today is 8/10.  Patient takes Norco 7.5 mg tablets for control of his chronic pain as prescribed by his primary care physician.    Arm Pain   The incident occurred more than 1 week ago (About 6 months ago). The injury mechanism was a fall. The pain is present in the upper left arm and left shoulder. The quality of the pain is described as aching. The pain radiates to the left arm. The pain is at a severity of 6/10. The pain is moderate. The pain has been constant since the incident. Pertinent negatives include no numbness or tingling. Associated symptoms comments: Popping sensations, weakness, limited ROM. The symptoms are aggravated by movement. He has tried rest (injections, Norco) for the symptoms.  "The treatment provided mild relief.     CONCURRENT MEDICAL HISTORY:    Past Medical History:   Diagnosis Date   • Basal cell carcinoma    • Bilateral carotid artery stenosis    • Bilateral carotid artery stenosis    • Bleeding disorder (CMS/HCC)    • CHF (congestive heart failure) (CMS/HCC)    • Chronic obstructive pulmonary disease (COPD) (CMS/HCC)    • Coronary arteriosclerosis    • Degenerative joint disease involving multiple joints    • Dementia (CMS/HCC)    • Diabetes mellitus (CMS/HCC)    • Heart problem    • History of stomach ulcers    • Hyperlipidemia    • Hypertension    • Ingrown toenail    • Myocardial infarction (CMS/HCC)        Allergies   Allergen Reactions   • Atorvastatin Anaphylaxis   • Penicillins Rash   • Azithromycin Rash   • Cefdinir Other (See Comments)     \"i burned up\" and break out in a rash   • Clarithromycin Rash and Itching   • Pravastatin Unknown - High Severity     Myalgia  Myalgia         Current Outpatient Medications:   •  acetaminophen (TYLENOL) 325 MG tablet, Take 2 tablets by mouth Every 4 (Four) Hours As Needed for Mild Pain ., Disp: , Rfl:   •  albuterol (PROVENTIL) (2.5 MG/3ML) 0.083% nebulizer solution, Take 2.5 mg by nebulization Every 4 (Four) Hours As Needed for Wheezing., Disp: 125 vial, Rfl: 0  •  arformoterol (BROVANA) 15 MCG/2ML nebulizer solution, Inhale 15 mcg., Disp: , Rfl:   •  Blood Glucose Monitoring Suppl (ONE TOUCH ULTRA 2) w/Device kit, Daily. as directed, Disp: , Rfl:   •  Brovana 15 MCG/2ML nebulizer solution, TK 2 MLS BY NEBULIZATION BID, Disp: , Rfl:   •  cefuroxime (CEFTIN) 500 MG tablet, Take 500 mg by mouth., Disp: , Rfl:   •  cetirizine (zyrTEC) 10 MG tablet, Take 10 mg by mouth Daily., Disp: , Rfl:   •  clopidogrel (PLAVIX) 75 MG tablet, Take 75 mg by mouth Daily., Disp: , Rfl:   •  docusate sodium 100 MG capsule, Take 100 mg by mouth., Disp: , Rfl:   •  famotidine (PEPCID) 20 MG tablet, Take 20 mg by mouth 2 (Two) Times a Day., Disp: , Rfl:   •  " fluticasone (FLONASE) 50 MCG/ACT nasal spray, 2 sprays into each nostril Daily., Disp: , Rfl:   •  Fluticasone Furoate (ARNUITY ELLIPTA) 200 MCG/ACT aerosol powder , Inhale., Disp: , Rfl:   •  Fluticasone-Umeclidin-Vilant (Trelegy Ellipta) 100-62.5-25 MCG/INH aerosol powder , Inhale 1 inhaler., Disp: , Rfl:   •  furosemide (LASIX) 20 MG tablet, Take 1 tablet by mouth Daily., Disp: 90 tablet, Rfl: 3  •  glimepiride (AMARYL) 2 MG tablet, Take 2 mg by mouth Every Morning Before Breakfast., Disp: , Rfl:   •  HYDROcodone-acetaminophen (NORCO) 7.5-325 MG per tablet, Take 1 tablet by mouth., Disp: , Rfl:   •  HYDROcodone-acetaminophen (NORCO) 7.5-325 MG per tablet, Take 1 tablet by mouth., Disp: , Rfl:   •  ipratropium (ATROVENT) 0.02 % nebulizer solution, INHALE THE CONTENTS OF 1 VIAL IN NEBULIZER EVERY 4 HOURS AS NEEDED FOR WHEEZING, Disp: , Rfl:   •  ipratropium-albuterol (DUO-NEB) 0.5-2.5 mg/3 ml nebulizer, Inhale 3 mL., Disp: , Rfl:   •  ipratropium-albuterol (DUO-NEB) 0.5-2.5 mg/mL nebulizer, Take 3 mL by nebulization 4 (Four) Times a Day., Disp: 120 vial, Rfl: 1  •  Lancets (OneTouch Delica Plus [id number]) misc, 1 each by Other route Daily., Disp: , Rfl:   •  levoFLOXacin (LEVAQUIN) 500 MG tablet, , Disp: , Rfl:   •  lisinopril (PRINIVIL,ZESTRIL) 10 MG tablet, Take 0.5 tablets by mouth Daily., Disp: 30 tablet, Rfl: 6  •  magic mouthwash oral suspension, Swish and swallow 5 mL Every 4 (Four) Hours As Needed (sore throat)., Disp: 202.5 mL, Rfl: 0  •  magnesium oxide (MAG-OX) 400 MG tablet, Take 250 mg by mouth Daily., Disp: , Rfl:   •  meclizine (ANTIVERT) 12.5 MG tablet, 1 tablet po q 6 hr prn severe nausea, Disp: , Rfl:   •  mupirocin (BACTROBAN) 2 % ointment, USE IN NOSTRILS BID FOR 5 DAYS. PRESS SIDES OF NOSE TOGETGER AND MASSAGE GENTLY, Disp: , Rfl:   •  nitroglycerin (NITROSTAT) 0.4 MG SL tablet, place 1 tablet under the tongue if needed every 5 minutes for hair...  (REFER TO PRESCRIPTION NOTES)., Disp: , Rfl:  0  •  nystatin (MYCOSTATIN) 471916 UNIT/ML suspension, Swish and swallow 5 mL 4 (Four) Times a Day., Disp: 60 mL, Rfl: 0  •  O2 (OXYGEN), Inhale 2 L/min Every Night. W/ CPAP, Disp: , Rfl:   •  ondansetron (ZOFRAN) 4 MG tablet, Take 4 mg by mouth Every 8 (Eight) Hours As Needed for Nausea or Vomiting., Disp: , Rfl:   •  OneTouch Ultra test strip, 1 each by Other route Daily. use to test blood sugar once daily, Disp: , Rfl:   •  predniSONE (DELTASONE) 20 MG tablet, , Disp: , Rfl:   •  ranolazine (RANEXA) 500 MG 12 hr tablet, TAKE 1 TABLET ONCE DAILY WITH LARGEST MEAL, Disp: , Rfl:   •  Red Yeast Rice 600 MG tablet, Take 600 mg by mouth 2 (Two) Times a Day., Disp: , Rfl:   •  Trelegy Ellipta 100-62.5-25 MCG/INH aerosol powder , , Disp: , Rfl:   •  Umeclidinium-Vilanterol 62.5-25 MCG/INH aerosol powder , Inhale 1 puff Daily., Disp: 1 each, Rfl: 11    Past Surgical History:   Procedure Laterality Date   • BACK SURGERY     • CARDIAC CATHETERIZATION N/A 6/6/2017    Procedure: Right Heart Cath;  Surgeon: Jeff Maciel MD PhD;  Location: [facility] INVASIVE LOCATION;  Service:    • CARDIAC CATHETERIZATION N/A 2/14/2018    Procedure: Coronary angiography;  Surgeon: Moisés Davila MD;  Location: [facility] INVASIVE LOCATION;  Service:    • CORONARY ANGIOPLASTY WITH STENT PLACEMENT     • CORONARY STENT PLACEMENT     • HERNIA REPAIR     • LUNG BIOPSY     • LUNG SURGERY     • NECK SURGERY     • THORACOTOMY Left 1977   • VENTRAL HERNIA REPAIR         Family History   Problem Relation Age of Onset   • Hypertension Father    • Heart disease Father    • Diabetes Father    • Diabetes Mother    • Lung disease Other    • Cancer Other         Social History     Socioeconomic History   • Marital status:      Spouse name: Not on file   • Number of children: Not on file   • Years of education: Not on file   • Highest education level: Not on file   Tobacco Use   • Smoking status: Former Smoker   • Smokeless tobacco:  "Never Used   Vaping Use   • Vaping Use: Never used   Substance and Sexual Activity   • Alcohol use: No   • Drug use: No   • Sexual activity: Not Currently     Comment:         Review of Systems   Constitutional: Negative.    HENT: Negative.    Eyes: Negative.    Respiratory: Negative.    Cardiovascular: Negative.    Gastrointestinal: Negative.    Endocrine: Negative.    Genitourinary: Negative.    Musculoskeletal: Positive for arthralgias, gait problem and myalgias.        Left shoulder pain.    Skin: Negative.    Allergic/Immunologic: Negative.    Neurological: Negative for tingling and numbness.   Hematological: Negative.    Psychiatric/Behavioral: Positive for sleep disturbance.       PHYSICAL EXAMINATION:       Ht 172.7 cm (68\")   Wt 77.6 kg (171 lb)   BMI 26.00 kg/m²     Physical Exam  Vitals reviewed.   Constitutional:       General: He is not in acute distress.     Appearance: He is well-developed. He is not ill-appearing.   HENT:      Head: Normocephalic.   Pulmonary:      Effort: Pulmonary effort is normal. No respiratory distress.   Abdominal:      General: There is no distension.      Palpations: Abdomen is soft.   Musculoskeletal:         General: Tenderness (Left shoulder) present. No swelling, deformity or signs of injury.   Skin:     General: Skin is warm and dry.      Capillary Refill: Capillary refill takes less than 2 seconds.      Findings: No erythema.   Neurological:      Mental Status: He is alert and oriented to person, place, and time.      GCS: GCS eye subscore is 4. GCS verbal subscore is 5. GCS motor subscore is 6.   Psychiatric:         Speech: Speech normal.         Behavior: Behavior normal.         Thought Content: Thought content normal.         Judgment: Judgment normal.         GAIT:     []  Normal  [x]  Antalgic    Assistive device: [x]  None  []  Walker     []  Crutches  []  Cane     []  Wheelchair  []  Stretcher    Right Shoulder Exam     Tenderness   Right shoulder " tenderness location: Mild, diffuse.    Range of Motion   Active abduction: 90   Passive abduction: 130   Forward flexion: 110     Muscle Strength   Abduction: 3/5   Supraspinatus: 3/5     Tests   Carl test: positive  Cross arm: positive  Impingement: positive  Drop arm: positive    Other   Erythema: absent  Sensation: normal  Pulse: present    Comments:  Pain and limitations with range of motion.  Weakness is present.  No deformity.  No swelling appreciated.  No erythema.  No warmth.  No signs of infection noted.  Neurovascularly intact.      Left Shoulder Exam     Tenderness   Left shoulder tenderness location: Mild, diffuse.    Range of Motion   Active abduction: 100   Passive abduction: 120   Forward flexion: 90     Muscle Strength   Abduction: 4/5   Supraspinatus: 4/5     Tests   Carl test: positive  Cross arm: positive  Impingement: positive  Drop arm: negative    Other   Erythema: absent  Sensation: normal  Pulse: present     Comments:  Pain and limitations with range of motion.  Weakness is present.  No deformity. No swelling appreciated. No erythema. No warmth. No signs of infection noted. Neurovascularly intact.             XR Shoulder 2+ View Left    Result Date: 4/19/2021  Narrative: External rotation, internal rotation and scapular Y views of the left shoulder reveal no evidence of acute fracture or dislocation.  There are moderate to severe degenerative changes noted at the acromioclavicular joint with osteophyte formations and diminished spacing noted.  There are mild degenerative changes noted at the glenohumeral joint.  The humeral head is in an elevated position suggestive of chronic rotator cuff tears.  The bones are diffusely demineralized in appearance.  Soft tissues appear unremarkable.  No acute bony radiologic abnormalities are noted at this time.  No acute interval changes are noted when compared with prior images from 12/23/2018.04/19/21 at 16:47 CDT by EVE Abdullahi     XR  Humerus Left    Result Date: 4/19/2021  Narrative: AP and lateral views of the left humerus reveal no evidence of acute fracture or dislocation.  There are mild to moderate degenerative changes noted at the visualized aspects of the acromioclavicular joint and elbow joints.  Anatomic alignment is acceptable.  Soft tissues appear unremarkable.  No acute bony radiologic abnormalities are noted at this time.  No comparison images are available for review.04/19/21 at 16:43 CDT by EVE Abdullahi     ASSESSMENT:    Diagnoses and all orders for this visit:    Left arm pain  -     Large Joint Arthrocentesis: L subacromial bursa  -     Ambulatory Referral to Home Health    Chronic left shoulder pain  -     Large Joint Arthrocentesis: L subacromial bursa  -     Ambulatory Referral to Home Health    AC joint arthropathy  -     Large Joint Arthrocentesis: L subacromial bursa  -     Ambulatory Referral to Home Health    Rotator cuff syndrome of left shoulder  -     Large Joint Arthrocentesis: L subacromial bursa  -     Ambulatory Referral to Home Health    Primary osteoarthritis of left shoulder  -     Large Joint Arthrocentesis: L subacromial bursa  -     Ambulatory Referral to Home Health    Limited range of motion (ROM) of shoulder  -     Ambulatory Referral to Home Health    PLAN    X-rays of the left shoulder and left humerus performed in office today reviewed no acute findings noted.  Patient has degenerative changes noted, more prominent at the AC joint.  Patient is also noted to have an elevated position of the left humeral head, which we discussed can be indicative of longstanding chronic rotator cuff tears.  Patient has known osteoarthritic changes and chronic rotator cuff tears on the right shoulder and we discussed that he may have similar issues in the left shoulder.  Patient is not interested in any surgical intervention so we discussed that proceeding with MRI imaging or CT arthrogram is not necessary at this  time.  Patient does have limitations in range of motion and weakness with his left arm/shoulder.  Patient was recently given a subacromial injection of steroid at his last office visit on 3/8/2021, which has not improved his pain.  Recommend physical therapy for the left shoulder to improve his range of motion, strength in his left arm and improve his overall function.  Patient is in agreement with proceeding with PT but wants to do home health physical therapy.  Recommend a repeat subacromial injection of steroid today in the left shoulder for management of pain/inflammation.  Recommend gentle, progressive range of motion exercises with the left shoulder as tolerated and based on his pain.  Recommend ice therapy as needed to minimize pain/inflammation.  Patient already takes Norco 7.5 mg tablets for chronic pain per his PCP.  Patient can also take some additional Tylenol as needed for improved pain control but should not exceed more than 4000 mg of acetaminophen in a 24-hour period.  Oral NSAIDs will be avoided due to the patient's current use of Plavix and his advanced age.  Follow-up in 6 weeks for recheck.    Large Joint Arthrocentesis: L subacromial bursa  Date/Time: 4/19/2021 1:42 PM  Consent given by: patient  Timeout: Immediately prior to procedure a time out was called to verify the correct patient, procedure, equipment, support staff and site/side marked as required   Supporting Documentation  Indications: pain and diagnostic evaluation   Procedure Details  Location: shoulder - L subacromial bursa  Preparation: Patient was prepped and draped in the usual sterile fashion  Needle size: 22 G  Approach: posterior  Medications administered: 40 mg triamcinolone acetonide 40 MG/ML; 2 mL lidocaine 1 %  Patient tolerance: patient tolerated the procedure well with no immediate complications        EMR Dragon/Transciption Disclaimer: Some of this note may be an electronic transcription/translation of spoken language  to printed text.  The electronic translation of spoken language may permit erroneous, or at times, nonsensical words or phrases to be inadvertently transcribed. Although I have reviewed the note for such errors, some may still exist.     Return in about 6 weeks (around 5/31/2021) for Recheck.        This document has been electronically signed by EVE Abdullahi on April 20, 2021 09:31 CDT      EVE Abdullahi

## 2021-04-20 NOTE — TELEPHONE ENCOUNTER
He requested [facility] and told me he was released by the other [facility]?  I specifically asked does he want to stay with [facility] for PT or use [facility] and he requested [facility].

## 2021-04-20 NOTE — TELEPHONE ENCOUNTER
ADAMA GRAYSON FROM [facility] CALLED BECAUSE SHE RECEIVED ORDERS FOR THE Pt BUT SHE STATES Pt IS ALREADY ACTIVE WITH [facility] SO THE ORDERS JUST NEED TO BE FAXED TO [facility]

## 2021-06-14 PROBLEM — M19.90 INFLAMMATORY ARTHRITIS: Status: ACTIVE | Noted: 2021-01-01

## 2021-06-14 PROBLEM — R77.8 ELEVATED TROPONIN: Status: ACTIVE | Noted: 2021-01-01

## 2021-06-14 PROBLEM — R55 RECURRENT SYNCOPE: Status: ACTIVE | Noted: 2021-01-01

## 2021-06-22 PROBLEM — R50.9 FEVER: Status: ACTIVE | Noted: 2021-01-01

## 2021-06-28 PROBLEM — I50.32 CHRONIC DIASTOLIC CHF (CONGESTIVE HEART FAILURE) (HCC): Chronic | Status: ACTIVE | Noted: 2019-04-03

## 2021-06-29 PROBLEM — M19.90 INFLAMMATORY ARTHRITIS: Chronic | Status: ACTIVE | Noted: 2021-01-01

## 2021-06-30 NOTE — OUTREACH NOTE
Prep Survey      Responses   [facility] facility patient discharged from?  [place]   Is LACE score < 7 ?  No   Emergency Room discharge w/ pulse ox?  No   Eligibility  Readm Mgmt   Discharge diagnosis  syncope,  AFIB   Does the patient have one of the following disease processes/diagnoses(primary or secondary)?  Other   Does the patient have Home health ordered?  Yes   What is the Home health agency?   [facility]   Medication alerts for this patient  see AVS   Prep survey completed?  Yes          Vy White RN

## 2021-07-02 NOTE — OUTREACH NOTE
Medical Week 1 Survey      Responses   [facility] patient discharged from?  [place]   Does the patient have one of the following disease processes/diagnoses(primary or secondary)?  Other   Week 1 attempt successful?  Yes   Call start time  1546   Call end time  1550   Discharge diagnosis  syncope,  AFIB   Is patient permission given to speak with other caregiver?  Yes   Person spoke with today (if not patient) and relationship  wife Rimma Pacheco reviewed with patient/caregiver?  Yes   Is the patient having any side effects they believe may be caused by any medication additions or changes?  No   Does the patient have all medications ordered at discharge?  Yes   Is the patient taking all medications as directed (includes completed medication regime)?  Yes   Does the patient have a primary care provider?   Yes   Does the patient have an appointment with their PCP within 7 days of discharge?  No   Comments regarding PCP  Dr Rey, patient has not had time yet to make followup- encouraged her to do so in next 1 to 2 weeks from discharge date.    What is preventing the patient from scheduling follow up appointments within 7 days of discharge?  Haven't had time   Nursing Interventions  Educated patient on importance of making appointment, Advised patient to make appointment   Has the patient kept scheduled appointments due by today?  N/A   What is the Home health agency?   [facility]   Has home health visited the patient within 72 hours of discharge?  Yes   Psychosocial issues?  No   Did the patient receive a copy of their discharge instructions?  Yes   Nursing interventions  Reviewed instructions with patient   What is the patient's perception of their health status since discharge?  Improving   Is the patient/caregiver able to teach back signs and symptoms related to disease process for when to call PCP?  Yes   Is the patient/caregiver able to teach back signs and symptoms related to disease process for when to  call 911?  Yes   Is the patient/caregiver able to teach back the hierarchy of who to call/visit for symptoms/problems? PCP, Specialist, Home health nurse, Urgent Care, ED, 911  Yes   If the patient is a current smoker, are they able to teach back resources for cessation?  Not a smoker   Week 1 call completed?  Yes          Pillo Beltre RN

## 2021-07-08 NOTE — ED TRIAGE NOTES
"Patient presents to ED for \"direct admit\" that Dr. Rey told patient. Pt. States he was told his white blood cells is elevated. AAOX4  "

## 2021-07-08 NOTE — ED PROVIDER NOTES
Subjective   Patient presents emergency department with complaint of elevated white blood cell count on routine labs at home today.  Patient was noted to have a blood cell count of 19,000 today.  Of note the patient has been on steroids for a week for his COPD.  Patient notes baseline shortness of breath.  The wife at the bedside who does most the history for the patient secondary to his dementia notes no new shortness of breath or any change in his condition.  There is been no dysuria.  Patient had a normal urine test at the home health visit.  The chemistry appeared to his baseline as well.  Patient denies any chest pain.  There is been no fevers or chills.  Patient has had recent long hospitalizations with urinary tract infection.  Patient is complaining of an occipital headache which she has had for several days.  Wife notes that his only complaint.          Review of Systems   Constitutional: Negative.  Negative for appetite change, chills and fever.   HENT: Negative.  Negative for congestion.    Eyes: Negative.  Negative for photophobia and visual disturbance.   Respiratory: Positive for shortness of breath. Negative for cough and chest tightness.    Cardiovascular: Negative.  Negative for chest pain and palpitations.   Gastrointestinal: Negative.  Negative for abdominal pain, constipation, diarrhea, nausea and vomiting.   Endocrine: Negative.    Genitourinary: Negative.  Negative for decreased urine volume, dysuria, flank pain and hematuria.   Musculoskeletal: Negative.  Negative for arthralgias, back pain, myalgias, neck pain and neck stiffness.   Skin: Negative.  Negative for pallor.   Neurological: Negative.  Negative for dizziness, syncope, weakness, light-headedness, numbness and headaches.   Psychiatric/Behavioral: Positive for confusion. Negative for suicidal ideas. The patient is not nervous/anxious.    All other systems reviewed and are negative.      Past Medical History:   Diagnosis Date   • Basal  "cell carcinoma    • Bilateral carotid artery stenosis    • Bilateral carotid artery stenosis    • Bleeding disorder (CMS/HCC)    • CHF (congestive heart failure) (CMS/Formerly [facility])    • Chronic obstructive pulmonary disease (COPD) (CMS/Formerly [facility])    • Coronary arteriosclerosis    • Degenerative joint disease involving multiple joints    • Dementia (CMS/HCC)    • Diabetes mellitus (CMS/[facility])    • Heart problem    • History of stomach ulcers    • Hyperlipidemia    • Hypertension    • Ingrown toenail    • Myocardial infarction (CMS/Formerly [facility])        Allergies   Allergen Reactions   • Atorvastatin Anaphylaxis   • Penicillins Rash   • Azithromycin Rash   • Cefdinir Other (See Comments)     \"i burned up\" and break out in a rash   • Clarithromycin Rash and Itching   • Pravastatin Unknown - High Severity     Myalgia  Myalgia       Past Surgical History:   Procedure Laterality Date   • BACK SURGERY     • CARDIAC CATHETERIZATION N/A 6/6/2017    Procedure: Right Heart Cath;  Surgeon: Jeff Maciel MD PhD;  Location: [facility] CATH INVASIVE LOCATION;  Service:    • CARDIAC CATHETERIZATION N/A 2/14/2018    Procedure: Coronary angiography;  Surgeon: Moisés Davila MD;  Location: [facility] CATH INVASIVE LOCATION;  Service:    • CORONARY ANGIOPLASTY WITH STENT PLACEMENT     • CORONARY STENT PLACEMENT     • HERNIA REPAIR     • LUNG BIOPSY     • LUNG SURGERY     • NECK SURGERY     • THORACOTOMY Left 1977   • VENTRAL HERNIA REPAIR         Family History   Problem Relation Age of Onset   • Hypertension Father    • Heart disease Father    • Diabetes Father    • Diabetes Mother    • Lung disease Other    • Cancer Other        Social History     Socioeconomic History   • Marital status:      Spouse name: Not on file   • Number of children: Not on file   • Years of education: Not on file   • Highest education level: Not on file   Tobacco Use   • Smoking status: Former Smoker   • Smokeless tobacco: Never Used   Vaping Use   • Vaping Use: Never " used   Substance and Sexual Activity   • Alcohol use: No   • Drug use: No   • Sexual activity: Not Currently     Comment:            Objective   Physical Exam  Vitals and nursing note reviewed.   Constitutional:       General: He is not in acute distress.     Appearance: He is well-developed. He is not ill-appearing or toxic-appearing.   HENT:      Head: Normocephalic and atraumatic.      Nose: Nose normal.      Mouth/Throat:      Mouth: Mucous membranes are moist.   Eyes:      Extraocular Movements: Extraocular movements intact.      Conjunctiva/sclera: Conjunctivae normal.      Pupils: Pupils are equal, round, and reactive to light.   Neck:      Vascular: No JVD.   Cardiovascular:      Rate and Rhythm: Normal rate and regular rhythm.      Heart sounds: Normal heart sounds. No murmur heard.   No friction rub. No gallop.    Pulmonary:      Effort: Pulmonary effort is normal. No respiratory distress.      Breath sounds: No wheezing or rales.   Chest:      Chest wall: No tenderness.   Abdominal:      General: Bowel sounds are normal. There is no distension.      Palpations: Abdomen is soft. There is no mass.      Tenderness: There is no abdominal tenderness. There is no guarding or rebound.   Musculoskeletal:         General: Normal range of motion.      Cervical back: Normal range of motion and neck supple.   Skin:     General: Skin is warm and dry.      Capillary Refill: Capillary refill takes less than 2 seconds.   Neurological:      General: No focal deficit present.      Mental Status: He is alert. He is disoriented.   Psychiatric:         Mood and Affect: Mood normal.         Behavior: Behavior normal.         Thought Content: Thought content normal.         Judgment: Judgment normal.         Procedures           ED Course                                 Labs Reviewed   COMPREHENSIVE METABOLIC PANEL - Abnormal; Notable for the following components:       Result Value    Glucose 120 (*)     Total Protein  5.9 (*)     Albumin 3.40 (*)     eGFR Non  Amer 54 (*)     All other components within normal limits    Narrative:     GFR Normal >60  Chronic Kidney Disease <60  Kidney Failure <15     CBC WITH AUTO DIFFERENTIAL - Abnormal; Notable for the following components:    WBC 15.24 (*)     RBC 4.03 (*)     Hemoglobin 11.7 (*)     Hematocrit 37.3 (*)     MCHC 31.4 (*)     RDW 15.9 (*)     Lymphocyte % 15.2 (*)     Immature Grans % 1.9 (*)     Neutrophils, Absolute 11.48 (*)     Immature Grans, Absolute 0.29 (*)     All other components within normal limits   LIPASE - Normal   URINALYSIS W/ MICROSCOPIC IF INDICATED (NO CULTURE) - Normal    Narrative:     Urine microscopic not indicated.   LACTIC ACID, PLASMA - Normal   RAINBOW DRAW    Narrative:     The following orders were created for panel order [facility] Draw.  Procedure                               Abnormality         Status                     ---------                               -----------         ------                     Green Top (Gel)[[id number]]                                  Final result               Lavender Top[[id number]]                                     Final result               Gold Top - SST[[id number]]                                   Final result                 Please view results for these tests on the individual orders.   CBC AND DIFFERENTIAL    Narrative:     The following orders were created for panel order CBC & Differential.  Procedure                               Abnormality         Status                     ---------                               -----------         ------                     CBC Auto Differential[[id number]]        Abnormal            Final result                 Please view results for these tests on the individual orders.   GREEN TOP   LAVENDER TOP   GOLD TOP - SST       CT Head Without Contrast   Final Result   1.  No acute intracranial disease.   2.  Moderate chronic small vessel ischemic white matter  changes.      Electronically signed by:  Jose Angel Morales MD  7/8/2021 7:30 PM   CDT Workstation: [id number]      XR Chest 1 View   Final Result   CONCLUSION:   Left basilar subsegmental atelectasis or infiltrate.   Small left pleural effusion.   Stable minimal cardiomegaly.   Coronary artery stent..      [id number]      Electronically signed by:  Sam Higginbotham MD  7/8/2021 6:26 PM CDT   Workstation: [id number]        Patient with mild leukocytosis at this time.  There is no shift and no source of infection at this time.  Signs and symptoms most consistent with steroid demargination and discussed the same with the patient and family as there is no current symptoms of infection.  Patient will be discharged to repeat testing.  Patient has completed with his course of steroids so the numbers should be improved in a week on recheck.  Family instructed return with any worsening symptoms.        MDM    Final diagnoses:   Leukocytosis, unspecified type   Nonintractable headache, unspecified chronicity pattern, unspecified headache type       ED Disposition  ED Disposition     ED Disposition Condition Comment    Discharge Stable           Paolo Rey MD  [address]  [facility] [phone]    In 1 week  As needed, For further evaluation and management, repeat CBC         Medication List      New Prescriptions    doxycycline 100 MG capsule  Commonly known as: MONODOX  Take 1 capsule by mouth 2 (Two) Times a Day.           Where to Get Your Medications      These medications were sent to [facility][id number] - [address] AT [facility] [phone] [facility] [phone] [address] [address]    Phone: [phone]   · doxycycline 100 MG capsule          Dane Villa MD  07/08/21 0560

## 2021-07-09 NOTE — TELEPHONE ENCOUNTER
GUESS PATIENT    Pt had to go to the ER last night because his white blood cell count was 20,000. His wife wants to know if he still needs to come get the shot today?

## 2021-07-09 NOTE — TELEPHONE ENCOUNTER
I think Meron already spoke with them but no I would recommend holding any steroid injections for now. Thanks.

## 2021-07-09 NOTE — DISCHARGE INSTRUCTIONS
UPDATED PTS DAUGHTER ON PHONE WITH PTS PERMISSION AT THIS TIME.     Kelle Sanchez, RN  01/16/21 9213     Your white blood cell count appears elevated secondary to your steroid use.  Recheck in a week to assure resolution.  Return with any new or worsening symptoms, or any concerns.

## 2021-07-13 NOTE — OUTREACH NOTE
Medical Week 2 Survey      Responses   [facility] patient discharged from?  [place]   Does the patient have one of the following disease processes/diagnoses(primary or secondary)?  Other   Week 2 attempt successful?  Yes   Call start time  1249   Discharge diagnosis  syncope,  AFIB   Call end time  1252   Meds reviewed with patient/caregiver?  Yes   Is the patient having any side effects they believe may be caused by any medication additions or changes?  No   Does the patient have all medications ordered at discharge?  Yes   Is the patient taking all medications as directed (includes completed medication regime)?  Yes   Does the patient have a primary care provider?   Yes   Does the patient have an appointment with their PCP within 7 days of discharge?  Yes   Has the patient kept scheduled appointments due by today?  Yes   Has home health visited the patient within 72 hours of discharge?  Yes   Psychosocial issues?  No   Did the patient receive a copy of their discharge instructions?  Yes   Nursing interventions  Reviewed instructions with patient   What is the patient's perception of their health status since discharge?  Improving   Is the patient/caregiver able to teach back signs and symptoms related to disease process for when to call PCP?  Yes   Is the patient/caregiver able to teach back signs and symptoms related to disease process for when to call 911?  Yes   Is the patient/caregiver able to teach back the hierarchy of who to call/visit for symptoms/problems? PCP, Specialist, Home health nurse, Urgent Care, ED, 911  Yes   If the patient is a current smoker, are they able to teach back resources for cessation?  Not a smoker   Additional teach back comments  He states he is feeling much better after more abx.  Denies questions at this time.   Week 2 Call Completed?  Yes   Wrap up additional comments  He is improving, happy to have .          Carlota Lane RN

## 2021-07-21 NOTE — OUTREACH NOTE
Medical Week 3 Survey      Responses   [facility] patient discharged from?  [place]   Does the patient have one of the following disease processes/diagnoses(primary or secondary)?  Other   Week 3 attempt successful?  No [UTR]   Unsuccessful attempts  Attempt 1          Heather Sr RN

## 2021-07-23 NOTE — OUTREACH NOTE
Medical Week 3 Survey      Responses   [facility] patient discharged from?  [place]   Does the patient have one of the following disease processes/diagnoses(primary or secondary)?  Other   Week 3 attempt successful?  Yes   Call start time  1511   Revoke  Decline to participate [Phone was picked up. RN could hear people talking in the background. ]   Call end time  1512   Discharge diagnosis  syncope,  AFIB          Susanne Meyer, RN

## 2021-08-16 NOTE — PROGRESS NOTES
Hasmukh Ham  6/1/1933  88 y.o. male  BS-113 this morning  PCP: Dr. Rey 2/25/21  BS: 136 PER PATIENT    Patient came to clinic for diabetic foot care      08/16/2021     Chief Complaint   Patient presents with   • Left Foot - diabetic foot care   • Right Foot - diabetic foot care       History of Present Illness    Hasmukh Ham is a 88 y.o.male who presents to clinic today for diabetic foot care.     Past Medical History:   Diagnosis Date   • Basal cell carcinoma    • Bilateral carotid artery stenosis    • Bilateral carotid artery stenosis    • Bleeding disorder (CMS/HCC)    • CHF (congestive heart failure) (CMS/HCC)    • Chronic obstructive pulmonary disease (COPD) (CMS/HCC)    • Coronary arteriosclerosis    • Degenerative joint disease involving multiple joints    • Dementia (CMS/HCC)    • Diabetes mellitus (CMS/HCC)    • Heart problem    • History of stomach ulcers    • Hyperlipidemia    • Hypertension    • Ingrown toenail    • Myocardial infarction (CMS/HCC)          Past Surgical History:   Procedure Laterality Date   • BACK SURGERY     • CARDIAC CATHETERIZATION N/A 6/6/2017    Procedure: Right Heart Cath;  Surgeon: Jeff Maciel MD PhD;  Location: [facility] CATH INVASIVE LOCATION;  Service:    • CARDIAC CATHETERIZATION N/A 2/14/2018    Procedure: Coronary angiography;  Surgeon: Moisés Davila MD;  Location: [facility] INVASIVE LOCATION;  Service:    • CORONARY ANGIOPLASTY WITH STENT PLACEMENT     • CORONARY STENT PLACEMENT     • HERNIA REPAIR     • LUNG BIOPSY     • LUNG SURGERY     • NECK SURGERY     • THORACOTOMY Left 1977   • VENTRAL HERNIA REPAIR           Family History   Problem Relation Age of Onset   • Hypertension Father    • Heart disease Father    • Diabetes Father    • Diabetes Mother    • Lung disease Other    • Cancer Other        Allergies   Allergen Reactions   • Atorvastatin Anaphylaxis   • Penicillins Rash   • Azithromycin Rash   • Cefdinir Other (See Comments)      "\"i burned up\" and break out in a rash   • Clarithromycin Rash and Itching   • Pravastatin Unknown - High Severity     Myalgia  Myalgia       Social History     Socioeconomic History   • Marital status:      Spouse name: Not on file   • Number of children: Not on file   • Years of education: Not on file   • Highest education level: Not on file   Tobacco Use   • Smoking status: Former Smoker   • Smokeless tobacco: Never Used   Vaping Use   • Vaping Use: Never used   Substance and Sexual Activity   • Alcohol use: No   • Drug use: No   • Sexual activity: Not Currently     Comment:          Current Outpatient Medications   Medication Sig Dispense Refill   • acetaminophen (TYLENOL) 325 MG tablet Take 2 tablets by mouth Every 4 (Four) Hours As Needed for Mild Pain .     • albuterol (PROVENTIL) (2.5 MG/3ML) 0.083% nebulizer solution Take 2.5 mg by nebulization Every 4 (Four) Hours As Needed for Wheezing. 125 vial 0   • Blood Glucose Monitoring Suppl (ONE TOUCH ULTRA 2) w/Device kit Daily. as directed     • Brovana 15 MCG/2ML nebulizer solution TK 2 MLS BY NEBULIZATION BID     • cetirizine (zyrTEC) 10 MG tablet Take 10 mg by mouth Daily.     • clopidogrel (PLAVIX) 75 MG tablet Take 75 mg by mouth Daily.     • docusate sodium 100 MG capsule Take 100 mg by mouth.     • doxycycline (MONODOX) 100 MG capsule Take 1 capsule by mouth 2 (Two) Times a Day. 10 capsule 0   • famotidine (PEPCID) 20 MG tablet Take 20 mg by mouth 2 (Two) Times a Day.     • fluticasone (FLONASE) 50 MCG/ACT nasal spray 2 sprays into each nostril Daily.     • Fluticasone Furoate (ARNUITY ELLIPTA) 200 MCG/ACT aerosol powder  Inhale.     • Fluticasone-Umeclidin-Vilant (Trelegy Ellipta) 100-62.5-25 MCG/INH aerosol powder  Inhale 1 inhaler.     • furosemide (LASIX) 20 MG tablet Take 1 tablet by mouth Daily. 90 tablet 3   • glimepiride (AMARYL) 2 MG tablet Take 2 mg by mouth Every Morning Before Breakfast.     • HYDROcodone-acetaminophen (NORCO) " "7.5-325 MG per tablet Take 1 tablet by mouth Every 6 (Six) Hours As Needed for Moderate Pain . 12 tablet 0   • ipratropium (ATROVENT) 0.02 % nebulizer solution INHALE THE CONTENTS OF 1 VIAL IN NEBULIZER EVERY 4 HOURS AS NEEDED FOR WHEEZING     • ipratropium-albuterol (DUO-NEB) 0.5-2.5 mg/mL nebulizer Take 3 mL by nebulization 4 (Four) Times a Day. 120 vial 1   • Lancets (OneTouch Delica Plus Epmdyn72T) misc 1 each by Other route Daily.     • magnesium oxide (MAG-OX) 400 MG tablet Take 250 mg by mouth Daily.     • meclizine (ANTIVERT) 12.5 MG tablet 1 tablet po q 6 hr prn severe nausea     • methylPREDNISolone (MEDROL) 4 MG dose pack Take as directed on package instructions with food. 21 tablet 0   • nitroglycerin (NITROSTAT) 0.4 MG SL tablet place 1 tablet under the tongue if needed every 5 minutes for hair...  (REFER TO PRESCRIPTION NOTES).  0   • O2 (OXYGEN) Inhale 2 L/min Every Night. W/ CPAP     • ondansetron (ZOFRAN) 4 MG tablet Take 4 mg by mouth Every 8 (Eight) Hours As Needed for Nausea or Vomiting.     • polyethylene glycol (MIRALAX) 17 g packet Dissolve 1 packet (17 g) in 4 to 8 ounces of beverage and drink by mouth Daily. 30 packet 1   • ranolazine (RANEXA) 500 MG 12 hr tablet TAKE 1 TABLET ONCE DAILY WITH LARGEST MEAL     • Red Yeast Rice 600 MG tablet Take 600 mg by mouth 2 (Two) Times a Day.     • Umeclidinium-Vilanterol 62.5-25 MCG/INH aerosol powder  Inhale 1 puff Daily. 1 each 11     No current facility-administered medications for this visit.         OBJECTIVE    Pulse 71   Ht 172.7 cm (68\")   Wt 70.8 kg (156 lb)   SpO2 97%   BMI 23.72 kg/m²       Review of Systems   Constitutional: Negative.    HENT: Positive for hearing loss.    Eyes: Negative.    Respiratory: Positive for shortness of breath.    Cardiovascular: Negative.    Gastrointestinal: Negative.    Endocrine: Negative.    Genitourinary: Negative.    Musculoskeletal: Positive for arthralgias, back pain and joint swelling.        Toe pain "    Skin: Negative.    Psychiatric/Behavioral: Negative.            Physical Exam   Constitutional: He is oriented to person, place, and time. He appears well-developed. No distress.   HENT:   Head: Normocephalic and atraumatic.   Eyes: Pupils are equal, round, and reactive to light.   Pulmonary/Chest: Effort normal. No respiratory distress.   Musculoskeletal: Swelling and tenderness present. No deformity.   Neurological: He is alert and oriented to person, place, and time.   Psychiatric: His behavior is normal.   Vitals reviewed.       Lower Extremity    Cardiovascular:    DP/PT pulses palpable    CFT brisk  to all digits  Edema noted to bilateral lower extremities.  Musculoskeletal:  Muscle strength within normal limit  Ankle joint range of motion is within normal limits  Dermatological:   Skin is warm, dry and intact    Webspaces 1-4  are clean, dry and intact.   No subcutaneous nodules or masses noted    Toenails 1 through 5 bilateral are thickened, discolored, elongated with subungual debris.  Pain on palpation to the nail plates  No open wounds noted   Neurological:   Protective sensation decreased   Sensation intact to light touch          Procedures      ASSESSMENT AND PLAN    Diagnoses and all orders for this visit:    1. Type 2 diabetes mellitus with peripheral neuropathy (CMS/HCC) (Primary)    2. Onychomycosis    3. Chronic toe pain, bilateral      - Nails 1-5 bilateral were debrided in length and thickness with nail nipper and electric  to decrease fungal load and risk of infection.  - All the patients questions were answered.  - RTC 3 months or sooner if needed.      This document has been electronically signed by Davon Mccurdy DPM on August 17, 2021 09:15 CDT     09:15 CDT

## 2021-09-01 NOTE — PROGRESS NOTES
Hasmukh Ham  88 y.o. male    09/01/2021  1. Atrial fibrillation and flutter (CMS/[facility])    2. Coronary artery disease involving native coronary artery of native heart without angina pectoris    3. Chronic diastolic CHF (congestive heart failure) (CMS/[facility])    4. Essential hypertension    5. Vasovagal syncope    6. Non-rheumatic tricuspid valve insufficiency        History of Present Illness:  Body mass index is 23.11 kg/m².  BMI within normal range no further recommendation    Patient recent discharge from [facility] and evaluated when he admitted with a history of requent fall complaint by syncope after taking morning blood pressure medication with a history of knee swelling recurrent knee pain s/p arthrocentesis without identification of infective organism and recently admitted at [facility] and subsequent discharge while the intensive care unit and given the diagnosis of oral medications.  The patient presented to our facility and thoracentesis were performed.  WBC mildly abnormal troponin is normal with a history of paroxysmal atrial fibrillation/atrial flutter, GI bleed with AV malformations and previous electrical cardioverted patient had a history of dementia COPD currently confused and drowsy unable to provide information.  Information was obtained from nursing, patient's wife and current and previous medical record.  Patient with history of diastolic dysfunction and congestive heart failure is euvolemic at the time of evaluation history of hypertension, hypertensive heart disease, diastolic dysfunction, congestive heart failure on the basis of diastolic dysfunction,CAD status post PTA stent of LAD and RCA medically managed, COPD on home O2 with recurrent bronchitis.  Patient also had history of for atrial flutter status post electrical cardioversion.  Unable to maintain sinus rhythm.  Had history of GI bleed and AV malformation.  Not a good candidate for long-term oral  "anticoagulation.  History of previous fall fall resulted ankle fractures   Not a good candidate for pacemaker implantation and fortunately he recovered his heart rate very well.  He is a pleased with the clinical outcome.  He denies orthopnea PND.  Has baseline shortness of breath no change in that     Conclusion :Cath 2/2018      Successful PCI to the mid LAD for in-stent restenosis with a 2.25 x 23 Xience Alpine stent reducing 90% stenosis to 0%  Right coronary artery:  Proximally is okay, mid to distal his 100% occluded, distally getting collaterals from the AV circumflex     ECHO !@/2018  · Left ventricular wall thickness is consistent with mild concentric hypertrophy.  · Estimated EF = 68%. No wall motion abnormalities  · Left ventricular systolic function is normal.  · Left atrial cavity size is mildly dilated.  · Mild dilation of the aortic root is present        SUBJECTIVE:    Allergies   Allergen Reactions   • Atorvastatin Anaphylaxis   • Penicillins Rash   • Azithromycin Rash   • Cefdinir Other (See Comments)     \"i burned up\" and break out in a rash   • Clarithromycin Rash and Itching   • Pravastatin Unknown - High Severity     Myalgia  Myalgia   • Benadryl Allergy [Diphenhydramine Hcl] Other (See Comments)     Pt states \"it knocks me out.\"         Past Medical History:   Diagnosis Date   • Basal cell carcinoma    • Bilateral carotid artery stenosis    • Bilateral carotid artery stenosis    • Bleeding disorder (CMS/HCC)    • CHF (congestive heart failure) (CMS/HCC)    • Chronic obstructive pulmonary disease (COPD) (CMS/HCC)    • Coronary arteriosclerosis    • Degenerative joint disease involving multiple joints    • Dementia (CMS/HCC)    • Diabetes mellitus (CMS/HCC)    • Heart problem    • History of stomach ulcers    • Hyperlipidemia    • Hypertension    • Ingrown toenail    • Myocardial infarction (CMS/HCC)          Past Surgical History:   Procedure Laterality Date   • BACK SURGERY     • CARDIAC " CATHETERIZATION N/A 6/6/2017    Procedure: Right Heart Cath;  Surgeon: Jeff Maciel MD PhD;  Location: [facility] CATH INVASIVE LOCATION;  Service:    • CARDIAC CATHETERIZATION N/A 2/14/2018    Procedure: Coronary angiography;  Surgeon: Moisés Davila MD;  Location: [facility] INVASIVE LOCATION;  Service:    • CORONARY ANGIOPLASTY WITH STENT PLACEMENT     • CORONARY STENT PLACEMENT     • HERNIA REPAIR     • LUNG BIOPSY     • LUNG SURGERY     • NECK SURGERY     • THORACOTOMY Left 1977   • VENTRAL HERNIA REPAIR           Family History   Problem Relation Age of Onset   • Hypertension Father    • Heart disease Father    • Diabetes Father    • Diabetes Mother    • Lung disease Other    • Cancer Other          Social History     Socioeconomic History   • Marital status:      Spouse name: Not on file   • Number of children: Not on file   • Years of education: Not on file   • Highest education level: Not on file   Tobacco Use   • Smoking status: Former Smoker   • Smokeless tobacco: Never Used   Vaping Use   • Vaping Use: Never used   Substance and Sexual Activity   • Alcohol use: No   • Drug use: No   • Sexual activity: Not Currently     Comment:          Current Outpatient Medications   Medication Sig Dispense Refill   • acetaminophen (TYLENOL) 650 MG 8 hr tablet Take 650 mg by mouth Every 8 (Eight) Hours As Needed for Mild Pain .     • aspirin 81 MG EC tablet Take 81 mg by mouth Daily.     • Blood Glucose Monitoring Suppl (ONE TOUCH ULTRA 2) w/Device kit Daily. as directed     • cetirizine (zyrTEC) 10 MG tablet Take 10 mg by mouth Daily.     • clopidogrel (PLAVIX) 75 MG tablet Take 75 mg by mouth Daily.     • famotidine (PEPCID) 20 MG tablet Take 20 mg by mouth 2 (Two) Times a Day.     • fluticasone (FLONASE) 50 MCG/ACT nasal spray 2 sprays into each nostril Daily.     • Fluticasone-Umeclidin-Vilant (Trelegy Ellipta) 100-62.5-25 MCG/INH aerosol powder  Inhale 1 inhaler.     • furosemide (LASIX)  20 MG tablet Take 1 tablet by mouth Daily. 90 tablet 3   • glimepiride (AMARYL) 2 MG tablet Take 2 mg by mouth Every Morning Before Breakfast.     • HYDROcodone-acetaminophen (NORCO) 7.5-325 MG per tablet Take 1 tablet by mouth Every 6 (Six) Hours As Needed for Moderate Pain . 12 tablet 0   • ipratropium (ATROVENT) 0.02 % nebulizer solution INHALE THE CONTENTS OF 1 VIAL IN NEBULIZER EVERY 4 HOURS AS NEEDED FOR WHEEZING     • ipratropium-albuterol (DUO-NEB) 0.5-2.5 mg/mL nebulizer Take 3 mL by nebulization 4 (Four) Times a Day. 120 vial 1   • Lancets (OneTouch Delica Plus [id number]) misc 1 each by Other route Daily.     • magnesium oxide (MAG-OX) 400 MG tablet Take 250 mg by mouth Daily.     • O2 (OXYGEN) Inhale 2 L/min Every Night. W/ CPAP     • polyethylene glycol (MIRALAX) 17 g packet Dissolve 1 packet (17 g) in 4 to 8 ounces of beverage and drink by mouth Daily. 30 packet 1   • ranolazine (RANEXA) 500 MG 12 hr tablet TAKE 1 TABLET ONCE DAILY WITH LARGEST MEAL     • Red Yeast Rice 600 MG tablet Take 600 mg by mouth 2 (Two) Times a Day.     • acetaminophen (TYLENOL) 325 MG tablet Take 2 tablets by mouth Every 4 (Four) Hours As Needed for Mild Pain .     • albuterol (PROVENTIL) (2.5 MG/3ML) 0.083% nebulizer solution Take 2.5 mg by nebulization Every 4 (Four) Hours As Needed for Wheezing. 125 vial 0   • Brovana 15 MCG/2ML nebulizer solution TK 2 MLS BY NEBULIZATION BID     • docusate sodium 100 MG capsule Take 100 mg by mouth.     • doxycycline (MONODOX) 100 MG capsule Take 1 capsule by mouth 2 (Two) Times a Day. 10 capsule 0   • Fluticasone Furoate (ARNUITY ELLIPTA) 200 MCG/ACT aerosol powder  Inhale.     • meclizine (ANTIVERT) 12.5 MG tablet 1 tablet po q 6 hr prn severe nausea     • nitroglycerin (NITROSTAT) 0.4 MG SL tablet place 1 tablet under the tongue if needed every 5 minutes for hair...  (REFER TO PRESCRIPTION NOTES).  0   • ondansetron (ZOFRAN) 4 MG tablet Take 4 mg by mouth Every 8 (Eight) Hours As Needed  "for Nausea or Vomiting.     • predniSONE (DELTASONE) 10 MG tablet      • Umeclidinium-Vilanterol 62.5-25 MCG/INH aerosol powder  Inhale 1 puff Daily. 1 each 11     No current facility-administered medications for this visit.           Review of Systems:     Constitutional:  Denies recent weight loss, weight gain,no change in exercise tolerance.     HENT:  Denies any hearing loss, epistaxi    sEyes: No blurring    Respiratory: Mild baseline shortness    Cardiovascular: See H&P    Gastrointestinal: History AV malformation and GI bleed    Endocrine: Negative for cold intolerance, heat intolerance, polydipsia    Genitourinary: Negative.      Musculoskeletal: History of osteoarthritis    Skin:  Deniesrashes, or skin lesions.     Allergic/Immunologic: Negative.  Negative for environmental allergies    Neurological:  Denies any history of recurrent headaches, strokes,     Hematological: Denies any food allergies, seasonal allergies    Psychiatric/Behavioral: Denies any history of depression        OBJECTIVE:    /80 (BP Location: Left arm, Patient Position: Sitting, Cuff Size: Adult)   Pulse 98   Temp 97.8 °F (36.6 °C)   Ht 172.7 cm (68\")   Wt 68.9 kg (152 lb) Comment: pt weighed at home  SpO2 98%   BMI 23.11 kg/m²     Physical Exam:     Constitutional: Cooperative, alert and oriented, well-developed, well-nourished, in no acute distress.     HENT:   Head: Normocephalic, conjunctive is a pink, thyroid is nonpalpable no carotid bruit and trachea central.     Cardiovascular: Regular rhythm, S1 and S2 normal, no S3 or S4. Apical impulse not displaced. No murmurs    Pulmonary/Chest: Chest: No chest wall tenderness no rales and wheezing    Abdominal: Abdomen soft, bowel sounds normoactive, no masses,    Musculoskeletal: No deformities, clubbing, cyanosis, erythema. positive mild edema  Neurological: No gross motor or sensory deficits noted    Skin: Warm and dry to the touch, no apparent skin lesions . "     Psychiatric: He has a normal mood and affect. His behavior is normal        Procedures      Lab Results   Component Value Date    WBC 15.24 (H) 07/08/2021    HGB 11.7 (L) 07/08/2021    HCT 37.3 (L) 07/08/2021    MCV 92.6 07/08/2021     07/08/2021     Lab Results   Component Value Date    GLUCOSE 120 (H) 07/08/2021    BUN 16 07/08/2021    CREATININE 1.26 07/08/2021    EGFRIFNONA 54 (L) 07/08/2021    BCR 12.7 07/08/2021    CO2 27.0 07/08/2021    CALCIUM 9.1 07/08/2021    ALBUMIN 3.40 (L) 07/08/2021    AST 14 07/08/2021    ALT 17 07/08/2021     Lab Results   Component Value Date    CHOL 207 (H) 03/23/2021    CHOL 150 05/02/2019    CHOL 185 02/13/2018     Lab Results   Component Value Date    TRIG 137 03/23/2021    TRIG 174 (H) 05/02/2019    TRIG 200 (H) 02/13/2018     Lab Results   Component Value Date    HDL 42 03/23/2021    HDL 26 (L) 05/02/2019    HDL 30 (L) 02/13/2018     No components found for: LDLCALC  Lab Results   Component Value Date     (H) 03/23/2021    LDL 87 05/02/2019     02/13/2018     No results found for: HDLLDLRATIO  No components found for: CHOLHDL  Lab Results   Component Value Date    HGBA1C 6.3 (H) 03/19/2021     Lab Results   Component Value Date    TSH 2.280 02/13/2018           ASSESSMENT AND PLAN:  # history of frequent falls with recurrent syncope after taking morning blood pressure medication  88 years old patient previously evaluated [facility] when he admitted with recurrent syncope and falls secondary to orthostasis after in taking of antihypertensive medication he was on multiple different blood pressure medicine pill that was discontinued.  Third there is significant provement in quality of life and subsequent discharge.  He had a history of atrial fibrillation now become persistent with well-controlled rate.  He is not a candidate for long-term oral anticoagulation     #2 history of CAD s/p PCI to RCA and LAD     EKG no acute ST-T wave changes  cardiac exams are negative.  Appears stable at the time of evaluation.  Recommend to continue antiplatelet agent.  And Ranexa     #3 history of hypertension, hypertensive disease and diastolic dysfunction.     Clinically euvolemic no evidence of congestive heart failure will discontinue diuretics        #4 atrial fibrillation/atrial flutter previous electrical cardioverted    Not a candidate for long-term oral anticoagulation given the frequent history of fall and high has bled score  Diagnoses and all orders for this visit:    1. Atrial fibrillation and flutter (CMS/HCC) (Primary)    2. Coronary artery disease involving native coronary artery of native heart without angina pectoris    3. Chronic diastolic CHF (congestive heart failure) (CMS/HCC)    4. Essential hypertension    5. Vasovagal syncope    6. Non-rheumatic tricuspid valve insufficiency          Mana Curtis MD  9/1/2021  14:35 CDT

## 2021-10-04 NOTE — PROGRESS NOTES
"Hasmukh Ham  6/1/1933  88 y.o. male  BS-113 this morning  PCP: Dr. Rey 2/25/21  BS: 120 PER PATIENT    10/04/2021     Chief Complaint   Patient presents with   • Right Foot - Follow-up, Diabetic foot care   • Left Foot - Follow-up, Diabetic foot care       History of Present Illness    Hasmukh Ham is a 88 y.o.male who presents to clinic today for diabetic foot care.     Past Medical History:   Diagnosis Date   • Basal cell carcinoma    • Bilateral carotid artery stenosis    • Bilateral carotid artery stenosis    • Bleeding disorder (HCC)    • CHF (congestive heart failure) (HCC)    • Chronic obstructive pulmonary disease (COPD) (HCC)    • Coronary arteriosclerosis    • Degenerative joint disease involving multiple joints    • Dementia (HCC)    • Diabetes mellitus (HCC)    • Heart problem    • History of stomach ulcers    • Hyperlipidemia    • Hypertension    • Ingrown toenail    • Myocardial infarction (HCC)          Past Surgical History:   Procedure Laterality Date   • BACK SURGERY     • CARDIAC CATHETERIZATION N/A 6/6/2017    Procedure: Right Heart Cath;  Surgeon: Jeff Maciel MD PhD;  Location: [facility] INVASIVE LOCATION;  Service:    • CARDIAC CATHETERIZATION N/A 2/14/2018    Procedure: Coronary angiography;  Surgeon: Moisés Davila MD;  Location: [facility] INVASIVE LOCATION;  Service:    • CORONARY ANGIOPLASTY WITH STENT PLACEMENT     • CORONARY STENT PLACEMENT     • HERNIA REPAIR     • LUNG BIOPSY     • LUNG SURGERY     • NECK SURGERY     • THORACOTOMY Left 1977   • VENTRAL HERNIA REPAIR           Family History   Problem Relation Age of Onset   • Hypertension Father    • Heart disease Father    • Diabetes Father    • Diabetes Mother    • Lung disease Other    • Cancer Other        Allergies   Allergen Reactions   • Atorvastatin Anaphylaxis   • Penicillins Rash   • Azithromycin Rash   • Cefdinir Other (See Comments)     \"i burned up\" and break out in a rash   • " "Clarithromycin Rash and Itching   • Pravastatin Unknown - High Severity     Myalgia  Myalgia   • Benadryl Allergy [Diphenhydramine Hcl] Other (See Comments)     Pt states \"it knocks me out.\"       Social History     Socioeconomic History   • Marital status:      Spouse name: Not on file   • Number of children: Not on file   • Years of education: Not on file   • Highest education level: Not on file   Tobacco Use   • Smoking status: Former Smoker   • Smokeless tobacco: Never Used   Vaping Use   • Vaping Use: Never used   Substance and Sexual Activity   • Alcohol use: No   • Drug use: No   • Sexual activity: Not Currently     Comment:          Current Outpatient Medications   Medication Sig Dispense Refill   • acetaminophen (TYLENOL) 325 MG tablet Take 2 tablets by mouth Every 4 (Four) Hours As Needed for Mild Pain .     • acetaminophen (TYLENOL) 650 MG 8 hr tablet Take 650 mg by mouth Every 8 (Eight) Hours As Needed for Mild Pain .     • albuterol (PROVENTIL) (2.5 MG/3ML) 0.083% nebulizer solution Take 2.5 mg by nebulization Every 4 (Four) Hours As Needed for Wheezing. 125 vial 0   • aspirin 81 MG EC tablet Take 81 mg by mouth Daily.     • Blood Glucose Monitoring Suppl (ONE TOUCH ULTRA 2) w/Device kit Daily. as directed     • Brovana 15 MCG/2ML nebulizer solution TK 2 MLS BY NEBULIZATION BID     • cetirizine (zyrTEC) 10 MG tablet Take 10 mg by mouth Daily.     • clopidogrel (PLAVIX) 75 MG tablet Take 75 mg by mouth Daily.     • docusate sodium 100 MG capsule Take 100 mg by mouth.     • famotidine (PEPCID) 20 MG tablet Take 20 mg by mouth 2 (Two) Times a Day.     • fluticasone (FLONASE) 50 MCG/ACT nasal spray 2 sprays into each nostril Daily.     • Fluticasone Furoate (ARNUITY ELLIPTA) 200 MCG/ACT aerosol powder  Inhale.     • Fluticasone-Umeclidin-Vilant (Trelegy Ellipta) 100-62.5-25 MCG/INH aerosol powder  Inhale 1 inhaler.     • furosemide (LASIX) 20 MG tablet Take 1 tablet by mouth Daily. 90 tablet 3 " "  • glimepiride (AMARYL) 2 MG tablet Take 2 mg by mouth Every Morning Before Breakfast.     • HYDROcodone-acetaminophen (NORCO) 7.5-325 MG per tablet Take 1 tablet by mouth Every 6 (Six) Hours As Needed for Moderate Pain . 12 tablet 0   • ipratropium (ATROVENT) 0.02 % nebulizer solution INHALE THE CONTENTS OF 1 VIAL IN NEBULIZER EVERY 4 HOURS AS NEEDED FOR WHEEZING     • ipratropium-albuterol (DUO-NEB) 0.5-2.5 mg/mL nebulizer Take 3 mL by nebulization 4 (Four) Times a Day. 120 vial 1   • Lancets (OneTouch Delica Plus [id number]) misc 1 each by Other route Daily.     • lisinopril (PRINIVIL,ZESTRIL) 10 MG tablet Take 10 mg by mouth Daily.     • magnesium oxide (MAG-OX) 400 MG tablet Take 250 mg by mouth Daily.     • meclizine (ANTIVERT) 12.5 MG tablet 1 tablet po q 6 hr prn severe nausea     • nitroglycerin (NITROSTAT) 0.4 MG SL tablet place 1 tablet under the tongue if needed every 5 minutes for hair...  (REFER TO PRESCRIPTION NOTES).  0   • O2 (OXYGEN) Inhale 2 L/min Every Night. W/ CPAP     • ondansetron (ZOFRAN) 4 MG tablet Take 4 mg by mouth Every 8 (Eight) Hours As Needed for Nausea or Vomiting.     • polyethylene glycol (MIRALAX) 17 g packet Dissolve 1 packet (17 g) in 4 to 8 ounces of beverage and drink by mouth Daily. 30 packet 1   • predniSONE (DELTASONE) 10 MG tablet      • ranolazine (RANEXA) 500 MG 12 hr tablet TAKE 1 TABLET ONCE DAILY WITH LARGEST MEAL     • Red Yeast Rice 600 MG tablet Take 600 mg by mouth 2 (Two) Times a Day.     • Umeclidinium-Vilanterol 62.5-25 MCG/INH aerosol powder  Inhale 1 puff Daily. 1 each 11   • doxycycline (MONODOX) 100 MG capsule Take 1 capsule by mouth 2 (Two) Times a Day. 10 capsule 0     No current facility-administered medications for this visit.         OBJECTIVE    /82   Pulse 58   Ht 172.7 cm (68\")   Wt 68.9 kg (152 lb)   SpO2 97%   BMI 23.11 kg/m²       Review of Systems   Constitutional: Negative.    HENT: Positive for hearing loss.    Eyes: Negative.  "   Respiratory: Positive for shortness of breath.    Cardiovascular: Negative.    Gastrointestinal: Negative.    Endocrine: Negative.    Genitourinary: Negative.    Musculoskeletal: Positive for arthralgias, back pain and joint swelling.        Toe pain    Skin: Negative.    Psychiatric/Behavioral: Negative.            Physical Exam   Constitutional: He is oriented to person, place, and time. He appears well-developed. No distress.   HENT:   Head: Normocephalic and atraumatic.   Eyes: Pupils are equal, round, and reactive to light.   Pulmonary/Chest: Effort normal. No respiratory distress.   Musculoskeletal: Swelling and tenderness present. No deformity.   Neurological: He is alert and oriented to person, place, and time.   Psychiatric: His behavior is normal.   Vitals reviewed.       Lower Extremity    Cardiovascular:    DP/PT pulses palpable    CFT brisk  to all digits  Edema noted to bilateral lower extremities.  Musculoskeletal:  Muscle strength within normal limit  Ankle joint range of motion is within normal limits  Dermatological:   Skin is warm, dry and intact    Webspaces 1-4  are clean, dry and intact.   No subcutaneous nodules or masses noted    Toenails 1 through 5 bilateral are thickened, discolored, elongated with subungual debris.  Pain on palpation to the nail plates  No open wounds noted   Neurological:   Protective sensation decreased   Sensation intact to light touch          Procedures      ASSESSMENT AND PLAN    Diagnoses and all orders for this visit:    1. Chronic toe pain, bilateral (Primary)    2. Onychomycosis    3. Type 2 diabetes mellitus with peripheral neuropathy (HCC)      - Nails 1-5 bilateral were debrided in length and thickness with nail nipper and electric  to decrease fungal load and risk of infection.  - All the patients questions were answered.  - RTC 3 months or sooner if needed.      This document has been electronically signed by Davon Mccurdy DPM on October 5, 2021  16:02 CDT     16:02 CDT

## 2021-10-25 PROBLEM — I48.0 PAROXYSMAL ATRIAL FIBRILLATION WITH RAPID VENTRICULAR RESPONSE (HCC): Status: ACTIVE | Noted: 2021-01-01

## 2021-10-25 NOTE — TELEPHONE ENCOUNTER
----- Message from Spencer Banerjee sent at 10/25/2021 12:29 PM CDT -----  Regarding: sonia  Contact: [phone]  Pt had a heart attack yesterday and was at [facility], they sent him home because [facility] or [place] didn't have beds available. Pt wife is upset/scared and wants to see Dr. Curtis. Please call ASAP

## 2021-10-25 NOTE — TELEPHONE ENCOUNTER
Returned patient wife call and informed herthat she can bring him to ER to be worked up as he is still not well some better then what he was but still having trouble. She voiced her understanding.

## 2021-10-28 PROBLEM — J84.10 FIBROSIS OF LUNG (HCC): Status: ACTIVE | Noted: 2017-03-02

## 2021-10-30 NOTE — OUTREACH NOTE
Prep Survey      Responses   [facility]ational facility patient discharged from? [place]   Is LACE score < 7 ? No   Emergency Room discharge w/ pulse ox? No   Eligibility Readm Mgmt   Discharge diagnosis Myocardial infarction    Does the patient have one of the following disease processes/diagnoses(primary or secondary)? Acute MI (STEMI,NSTEMI)   Does the patient have Home health ordered? Yes   What is the Home health agency?  [facility]     Is there a DME ordered? No   General alerts for this patient Heart Cath    Prep survey completed? Yes          Rita Virgen RN

## 2021-11-01 NOTE — PAYOR COMM NOTE
"Leigh Phillip   [facility]  phone [phone]  fax [phone]    REF# [id number]    Hasmukh Ham (88 y.o. Male)             Date of Birth Social Security Number Address Home Phone MRN    06/01/1933  [address] [place] [address] [phone] [id number]    Yarsani Marital Status             Alevism        Admission Date Admission Type Admitting Provider Attending Provider Department, Room/Bed    10/25/21 Emergency Roberto Arcos MD  [facility] [address] [place], 332/1    Discharge Date Discharge Disposition Discharge Destination          10/29/2021 Home or Self Care              Attending Provider: (none)   Allergies: Atorvastatin, Penicillins, Azithromycin, Cefdinir, Clarithromycin, Pravastatin, Benadryl Allergy [Diphenhydramine Hcl]    Isolation: None   Infection: None   Code Status: Prior   Advance Care Planning Activity    Ht: 175.3 cm (69.02\")   Wt: 68.9 kg (152 lb)    Admission Cmt: None   Principal Problem: Myocardial infarction (HCC) [I21.9]                 Active Insurance as of 10/25/2021     Primary Coverage     Payor Plan Insurance Group Employer/Plan Group    AETNA MEDICARE REPLACEMENT AETNA MEDICARE REPLACEMENT [id number]     Payor Plan Address Payor Plan Phone Number Payor Plan Fax Number Effective Dates    [address] [phone]  4/1/2019 - None Entered    [facility] [id number]       Subscriber Name Subscriber Birth Date Member ID       HASMUKH HAM 6/1/1933 [id number]                 Emergency Contacts      (Rel.) Home Phone Work Phone Mobile Phone    Rimma Durant (Spouse) [phone] -- [phone]               Discharge Summary      Kelvin Aguilar MD at 10/29/21 1004              [facility] Medicine Services  DISCHARGE SUMMARY       Date of Admission: 10/25/2021  Date of Discharge:  10/29/2021  Primary Care Physician: Paolo Rey, " MD    Presenting Problem/History of Present Illness:  Elevated troponin [R77.8]  JILL (acute kidney injury) (HCC) [N17.9]  Leukocytosis, unspecified type [D72.829]       Final Discharge Diagnoses:  Active Hospital Problems    Diagnosis    • **Myocardial infarction (HCC)    • Paroxysmal atrial fibrillation with rapid ventricular response (HCC)    • Elevated troponin    • Shortness of breath        Consults:   Consults     Date and Time Order Name Status Description    10/27/2021  8:14 AM Inpatient Cardiology Consult      10/25/2021  8:32 PM Inpatient Cardiology Consult Completed           Procedures Performed: Procedure(s):  Left Heart Cath                Pertinent Test Results:   Lab Results (most recent)     Procedure Component Value Units Date/Time    Basic Metabolic Panel [[id number]]  (Abnormal) Collected: 10/29/21 0716    Specimen: Blood Updated: 10/29/21 0804     Glucose 100 mg/dL      BUN 25 mg/dL      Creatinine 1.19 mg/dL      Sodium 137 mmol/L      Potassium 4.4 mmol/L      Chloride 104 mmol/L      CO2 24.0 mmol/L      Calcium 9.0 mg/dL      eGFR Non African Amer 58 mL/min/1.73      BUN/Creatinine Ratio 21.0     Anion Gap 9.0 mmol/L     Narrative:      GFR Normal >60  Chronic Kidney Disease <60  Kidney Failure <15      Lipid Panel [[id number]]  (Abnormal) Collected: 10/29/21 0716    Specimen: Blood Updated: 10/29/21 0804     Total Cholesterol 197 mg/dL      Triglycerides 236 mg/dL      HDL Cholesterol 32 mg/dL      LDL Cholesterol  123 mg/dL      VLDL Cholesterol 42 mg/dL      LDL/HDL Ratio 3.68    Narrative:      Cholesterol Reference Ranges  (U.S. Department of Health and Human Services ATP III Classifications)    Desirable          <200 mg/dL  Borderline High    200-239 mg/dL  High Risk          >240 mg/dL      Triglyceride Reference Ranges  (U.S. Department of Health and Human Services ATP III Classifications)    Normal           <150 mg/dL  Borderline High  150-199 mg/dL  High             200-499  mg/dL  Very High        >500 mg/dL    HDL Reference Ranges  (U.S. Department of Health and Human Services ATP III Classifcations)    Low     <40 mg/dl (major risk factor for CHD)  High    >60 mg/dl ('negative' risk factor for CHD)        LDL Reference Ranges  (U.S. Department of Health and Human Services ATP III Classifcations)    Optimal          <100 mg/dL  Near Optimal     100-129 mg/dL  Borderline High  130-159 mg/dL  High             160-189 mg/dL  Very High        >189 mg/dL    Hemoglobin A1c [[id number]]  (Abnormal) Collected: 10/29/21 0716    Specimen: Blood Updated: 10/29/21 0752     Hemoglobin A1C 6.50 %     Narrative:      Hemoglobin A1C Ranges:    Increased Risk for Diabetes  5.7% to 6.4%  Diabetes                     >= 6.5%  Diabetic Goal                < 7.0%    CBC (No Diff) [[id number]]  (Abnormal) Collected: 10/29/21 0716    Specimen: Blood Updated: 10/29/21 0745     WBC 11.51 10*3/mm3      RBC 4.56 10*6/mm3      Hemoglobin 12.9 g/dL      Hematocrit 40.3 %      MCV 88.4 fL      MCH 28.3 pg      MCHC 32.0 g/dL      RDW 18.2 %      RDW-SD 58.8 fl      MPV 10.9 fL      Platelets 181 10*3/mm3     POC Glucose Once [[id number]]  (Normal) Collected: 10/28/21 2044    Specimen: Blood Updated: 10/28/21 2113     Glucose 120 mg/dL      Comment: RN NotifiedOperator: [id number] HERBERTH SMITHMeter ID: [id number]       POC Glucose Once [[id number]]  (Normal) Collected: 10/28/21 1639    Specimen: Blood Updated: 10/28/21 1653     Glucose 92 mg/dL      Comment: RN NotifiedOperator: [id number] TONY MIKEISMeter ID: [id number]       Basic Metabolic Panel [[id number]]  (Abnormal) Collected: 10/28/21 0645    Specimen: Blood Updated: 10/28/21 0723     Glucose 123 mg/dL      BUN 26 mg/dL      Creatinine 1.10 mg/dL      Sodium 138 mmol/L      Potassium 4.2 mmol/L      Chloride 105 mmol/L      CO2 23.0 mmol/L      Calcium 9.7 mg/dL      eGFR Non African Amer 63 mL/min/1.73      BUN/Creatinine Ratio 23.6     Anion Gap  10.0 mmol/L     Narrative:      GFR Normal >60  Chronic Kidney Disease <60  Kidney Failure <15      CBC & Differential [[id number]]  (Abnormal) Collected: 10/28/21 0645    Specimen: Blood Updated: 10/28/21 0702    Narrative:      The following orders were created for panel order CBC & Differential.  Procedure                               Abnormality         Status                     ---------                               -----------         ------                     CBC Auto Differential[[id number]]        Abnormal            Final result                 Please view results for these tests on the individual orders.    CBC Auto Differential [[id number]]  (Abnormal) Collected: 10/28/21 0645    Specimen: Blood Updated: 10/28/21 0702     WBC 10.72 10*3/mm3      RBC 4.76 10*6/mm3      Hemoglobin 13.3 g/dL      Hematocrit 41.3 %      MCV 86.8 fL      MCH 27.9 pg      MCHC 32.2 g/dL      RDW 17.9 %      RDW-SD 56.5 fl      MPV 10.7 fL      Platelets 193 10*3/mm3      Neutrophil % 71.0 %      Lymphocyte % 15.8 %      Monocyte % 6.9 %      Eosinophil % 1.2 %      Basophil % 0.7 %      Immature Grans % 4.4 %      Neutrophils, Absolute 7.61 10*3/mm3      Lymphocytes, Absolute 1.69 10*3/mm3      Monocytes, Absolute 0.74 10*3/mm3      Eosinophils, Absolute 0.13 10*3/mm3      Basophils, Absolute 0.08 10*3/mm3      Immature Grans, Absolute 0.47 10*3/mm3      nRBC 0.0 /100 WBC     CBC (No Diff) [[id number]]  (Abnormal) Collected: 10/28/21 0255    Specimen: Blood Updated: 10/28/21 0423     WBC 10.42 10*3/mm3      RBC 4.59 10*6/mm3      Hemoglobin 13.0 g/dL      Hematocrit 39.8 %      MCV 86.7 fL      MCH 28.3 pg      MCHC 32.7 g/dL      RDW 17.8 %      RDW-SD 56.1 fl      MPV 11.4 fL      Platelets 185 10*3/mm3     Protime-INR [[id number]]  (Normal) Collected: 10/28/21 0255    Specimen: Blood Updated: 10/28/21 0415     Protime 13.2 Seconds      INR 1.01    Narrative:      Therapeutic range for most indications is 2.0-3.0 INR,  or  2.5-3.5 for mechanical heart valves.    Comprehensive Metabolic Panel [[id number]]  (Abnormal) Collected: 10/28/21 0255    Specimen: Blood Updated: 10/28/21 0401     Glucose 122 mg/dL      BUN 30 mg/dL      Creatinine 1.23 mg/dL      Sodium 136 mmol/L      Potassium 4.2 mmol/L      Chloride 103 mmol/L      CO2 24.0 mmol/L      Calcium 9.5 mg/dL      Total Protein 5.9 g/dL      Albumin 3.60 g/dL      ALT (SGPT) 19 U/L      AST (SGOT) 18 U/L      Alkaline Phosphatase 71 U/L      Total Bilirubin 0.4 mg/dL      eGFR Non African Amer 56 mL/min/1.73      Globulin 2.3 gm/dL      A/G Ratio 1.6 g/dL      BUN/Creatinine Ratio 24.4     Anion Gap 9.0 mmol/L     Narrative:      GFR Normal >60  Chronic Kidney Disease <60  Kidney Failure <15      Lactic Acid, Plasma [[id number]]  (Normal) Collected: 10/27/21 0618    Specimen: Blood Updated: 10/27/21 0644     Lactate 1.7 mmol/L     CBC & Differential [[id number]]  (Abnormal) Collected: 10/27/21 0618    Specimen: Blood Updated: 10/27/21 0631    Narrative:      The following orders were created for panel order CBC & Differential.  Procedure                               Abnormality         Status                     ---------                               -----------         ------                     CBC Auto Differential[[id number]]        Abnormal            Final result               Scan Slide[[id number]]                                                                    Please view results for these tests on the individual orders.    CBC Auto Differential [[id number]]  (Abnormal) Collected: 10/27/21 0618    Specimen: Blood Updated: 10/27/21 0631     WBC 10.09 10*3/mm3      RBC 4.60 10*6/mm3      Hemoglobin 12.8 g/dL      Hematocrit 39.5 %      MCV 85.9 fL      MCH 27.8 pg      MCHC 32.4 g/dL      RDW 18.2 %      RDW-SD 56.9 fl      MPV 10.8 fL      Platelets 123 10*3/mm3      Neutrophil % 72.8 %      Lymphocyte % 15.3 %      Monocyte % 6.7 %      Eosinophil % 0.5 %      Basophil %  0.8 %      Immature Grans % 3.9 %      Neutrophils, Absolute 7.35 10*3/mm3      Lymphocytes, Absolute 1.54 10*3/mm3      Monocytes, Absolute 0.68 10*3/mm3      Eosinophils, Absolute 0.05 10*3/mm3      Basophils, Absolute 0.08 10*3/mm3      Immature Grans, Absolute 0.39 10*3/mm3      nRBC 0.0 /100 WBC     Lactic Acid, Plasma [[id number]]  (Normal) Collected: 10/27/21 0002    Specimen: Blood Updated: 10/27/21 0038     Lactate 2.0 mmol/L     Extra Tubes [[id number]] Collected: 10/26/21 1209    Specimen: Blood, Venous Line Updated: 10/26/21 1315    Narrative:      The following orders were created for panel order Extra Tubes.  Procedure                               Abnormality         Status                     ---------                               -----------         ------                     Lavender Top[[id number]]                                     Final result                 Please view results for these tests on the individual orders.    Lavender Top [[id number]] Collected: 10/26/21 1209    Specimen: Blood Updated: 10/26/21 1315     Extra Tube hold for add-on     Comment: Auto resulted       Troponin [[id number]]  (Abnormal) Collected: 10/26/21 1152    Specimen: Blood Updated: 10/26/21 1239     Troponin T 0.035 ng/mL     Narrative:      Troponin T Reference Range:  <= 0.03 ng/mL-   Negative for AMI  >0.03 ng/mL-     Abnormal for myocardial necrosis.  Clinicians would have to utilize clinical acumen, EKG, Troponin and serial changes to determine if it is an Acute Myocardial Infarction or myocardial injury due to an underlying chronic condition.       Results may be falsely decreased if patient taking Biotin.      Troponin [[id number]]  (Abnormal) Collected: 10/26/21 0647    Specimen: Blood Updated: 10/26/21 0715     Troponin T 0.031 ng/mL     Narrative:      Troponin T Reference Range:  <= 0.03 ng/mL-   Negative for AMI  >0.03 ng/mL-     Abnormal for myocardial necrosis.  Clinicians would have to utilize clinical  acumen, EKG, Troponin and serial changes to determine if it is an Acute Myocardial Infarction or myocardial injury due to an underlying chronic condition.       Results may be falsely decreased if patient taking Biotin.      Extra Tubes [[id number]] Collected: 10/25/21 2034    Specimen: Blood, Venous Line Updated: 10/25/21 2145    Narrative:      The following orders were created for panel order Extra Tubes.  Procedure                               Abnormality         Status                     ---------                               -----------         ------                     Lavender Top[[id number]]                                     Final result                 Please view results for these tests on the individual orders.    Lavender Top [[id number]] Collected: 10/25/21 2034    Specimen: Blood Updated: 10/25/21 2145     Extra Tube hold for add-on     Comment: Auto resulted       Blood Gas, Arterial - [[id number]]  (Abnormal) Collected: 10/25/21 2110    Specimen: Arterial Blood Updated: 10/25/21 2115     Site Right Radial     Marcus's Test N/A     pH, Arterial 7.405 pH units      pCO2, Arterial 39.4 mm Hg      pO2, Arterial 83.9 mm Hg      HCO3, Arterial 24.7 mmol/L      Base Excess, Arterial 0.0 mmol/L      Comment: 84 Value below reference range        O2 Saturation, Arterial 93.7 %      Comment: 84 Value below reference range        Barometric Pressure for Blood Gas 747 mmHg      Modality Room Air     Ventilator Mode NA     Collected by ALBERTO     Comment: Meter: [id number]     :  [id number]       COVID-19 and FLU A/B PCR - Swab, Nasopharynx [[id number]]  (Normal) Collected: 10/25/21 2042    Specimen: Swab from Nasopharynx Updated: 10/25/21 2111     COVID19 Not Detected     Influenza A PCR Not Detected     Influenza B PCR Not Detected    Narrative:      Fact sheet for providers: https://www.fda.gov/media/806924/download    Fact sheet for patients: https://www.fda.gov/media/721148/download    Test  performed by PCR.    STAT Lactic Acid, Reflex [[id number]]  (Abnormal) Collected: 10/25/21 2006    Specimen: Blood Updated: 10/25/21 2100     Lactate 2.4 mmol/L     D-dimer, Quantitative [[id number]]  (Abnormal) Collected: 10/25/21 1619    Specimen: Blood Updated: 10/25/21 2052     D-Dimer, Quantitative 606 ng/mL (FEU)     Narrative:      Dimer values <500 ng/ml FEU are FDA approved as aid in diagnosis of deep venous thrombosis and pulmonary embolism.  This test should not be used in an exclusion strategy with pretest probability alone.    A recent guideline regarding diagnosis for pulmonary thromboembolism recommends an adjusted exclusion criterion of age x 10 ng/ml FEU for patients >50 years of age (Jenny Intern Med 2015; 163: 701-711).      Urinalysis With Microscopic If Indicated (No Culture) - Urine, Clean Catch [[id number]]  (Abnormal) Collected: 10/25/21 1759    Specimen: Urine, Clean Catch Updated: 10/25/21 1812     Color, UA Yellow     Appearance, UA Cloudy     pH, UA <=5.0     Specific Gravity, UA 1.014     Glucose, UA Negative     Ketones, UA Negative     Bilirubin, UA Negative     Blood, UA Negative     Protein, UA Trace     Leuk Esterase, UA Negative     Nitrite, UA Negative     Urobilinogen, UA 0.2 E.U./dL    Narrative:      Urine microscopic not indicated.    Procalcitonin [[id number]]  (Normal) Collected: 10/25/21 1619    Specimen: Blood Updated: 10/25/21 1733     Procalcitonin 0.12 ng/mL     Narrative:      As a Marker for Sepsis (Non-Neonates):     1. <0.5 ng/mL represents a low risk of severe sepsis and/or septic shock.  2. >2 ng/mL represents a high risk of severe sepsis and/or septic shock.    As a Marker for Lower Respiratory Tract Infections that require antibiotic therapy:  PCT on Admission     Antibiotic Therapy             6-12 Hrs later  >0.5                          Strongly Recommended            >0.25 - <0.5             Recommended  0.1 - 0.25                  Discouraged                       " Remeasure/reassess PCT  <0.1                         Strongly Discouraged         Remeasure/reassess PCT      As 28 day mortality risk marker: \"Change in Procalcitonin Result\" (>80% or <=80%) if Day 0 (or Day 1) and Day 4 values are available. Refer to http://www.[facility]-pct-calculator.com/    Change in PCT <=80 %   A decrease of PCT levels below or equal to 80% defines a positive change in PCT test result representing a higher risk for 28-day all-cause mortality of patients diagnosed with severe sepsis or septic shock.    Change in PCT >80 %   A decrease of PCT levels of more than 80% defines a negative change in PCT result representing a lower risk for 28-day all-cause mortality of patients diagnosed with severe sepsis or septic shock.                [place] Draw [[id number]] Collected: 10/25/21 1619    Specimen: Blood Updated: 10/25/21 1730    Narrative:      The following orders were created for panel order [place] Draw.  Procedure                               Abnormality         Status                     ---------                               -----------         ------                     Green Top (Gel)[[id number]]                                  Final result               Lavender Top[[id number]]                                     Final result               Gold Top - SST[[id number]]                                   Final result               Light Blue Top[[id number]]                                   Final result                 Please view results for these tests on the individual orders.    Gold Top - SST [[id number]] Collected: 10/25/21 1619    Specimen: Blood Updated: 10/25/21 1730     Extra Tube Hold for add-ons.     Comment: Auto resulted.       Green Top (Gel) [[id number]] Collected: 10/25/21 1619    Specimen: Blood Updated: 10/25/21 1730     Extra Tube Hold for add-ons.     Comment: Auto resulted.       Light Blue Top [[id number]] Collected: 10/25/21 1619    Specimen: Blood Updated: 10/25/21 1730     " Extra Tube hold for add-on     Comment: Auto resulted       Comprehensive Metabolic Panel [[id number]]  (Abnormal) Collected: 10/25/21 1619    Specimen: Blood Updated: 10/25/21 1648     Glucose 129 mg/dL      BUN 42 mg/dL      Creatinine 1.53 mg/dL      Sodium 140 mmol/L      Potassium 4.7 mmol/L      Chloride 99 mmol/L      CO2 25.0 mmol/L      Calcium 9.9 mg/dL      Total Protein 6.6 g/dL      Albumin 4.10 g/dL      ALT (SGPT) 20 U/L      AST (SGOT) 17 U/L      Alkaline Phosphatase 64 U/L      Total Bilirubin 0.3 mg/dL      eGFR Non African Amer 43 mL/min/1.73      Globulin 2.5 gm/dL      A/G Ratio 1.6 g/dL      BUN/Creatinine Ratio 27.5     Anion Gap 16.0 mmol/L     Narrative:      GFR Normal >60  Chronic Kidney Disease <60  Kidney Failure <15      BNP [[id number]]  (Abnormal) Collected: 10/25/21 1619    Specimen: Blood Updated: 10/25/21 1646     proBNP 2,019.0 pg/mL     Narrative:      Among patients with dyspnea, NT-proBNP is highly sensitive for the detection of acute congestive heart failure. In addition NT-proBNP of <300 pg/ml effectively rules out acute congestive heart failure with 99% negative predictive value.    Results may be falsely decreased if patient taking Biotin.          Imaging Results (Most Recent)     Procedure Component Value Units Date/Time    XR Chest 1 View [[id number]] Collected: 10/25/21 1629     Updated: 10/25/21 1702    Narrative:        PORTABLE CHEST    HISTORY: Chest pain    Portable AP upright film of the chest was obtained at 4:23 PM.  COMPARISON: July 8, 2021    FINDINGS:   Chronic changes lingular segment left upper lobe.  Old granulomatous disease is present.  Coronary artery stent.  The heart is not enlarged.  The pulmonary vasculature is not increased.  No pleural effusion.  No pneumothorax.  No acute osseous abnormality.  Degenerative changes are present in the thoracic spine.  Epidural stimulators mid thoracic spine.      Impression:      CONCLUSION:  Chronic changes lingular  "segment left upper lobe.  Coronary artery stent.    [id number]    Electronically signed by:  Sam Higginbotham MD  10/25/2021 5:01 PM  CDT Workstation: [id number]          Chief Complaint on Day of Discharge: none     Hospital Course:  The patient is a 88 y.o. male who presented to [facility] with shortness of air.  Please refer to dictated H&P by nurse practitioner Daylin Horan for details of presentation.  Upon evaluation, troponins were mildly elevated and patient was noted to be in A. fib with RVR (known history) but his rate returned to normal without treatment.  Patient underwent left heart cath on 10/28/2021 by Dr. Johnson.  Results as follows:  · Mid LAD lesion is 95% stenosed tandem lesions .Successfully treated with new 2 x 18 mm Resolute JENNA.Final lesion was 0%.  · Prox Cx lesion is 30% stenosed.  · Mid RCA lesion is 100% stenosed.  · LV pressures (S/D/E) : 118/0/7 mmHg  · The ejection fraction was 40-50% by visual estimate.  · Mild systolic dysfunction.    Post-cath, patient had no complaints, no complications.  On the day of discharge, cath site in left groin had good hemostasis.  Patient had no further chest pain.  He is recommended to continue dual antiplatelet therapy for 1 year post cath.  Outpatient cardiology follow-up is recommended and arranged.    Condition on Discharge: Stable  Physical Exam on Discharge:  /66 (BP Location: Right arm, Patient Position: Sitting)   Pulse 86   Temp 97.4 °F (36.3 °C) (Temporal)   Resp 16   Ht 175.3 cm (69.02\")   Wt 68.9 kg (152 lb)   SpO2 94%   BMI 22.44 kg/m²   Physical Exam  Constitutional:       Appearance: Normal appearance.   HENT:      Head: Normocephalic and atraumatic.      Right Ear: External ear normal.      Left Ear: External ear normal.      Nose: Nose normal.      Mouth/Throat:      Mouth: Mucous membranes are moist.      Pharynx: Oropharynx is clear.   Eyes:      General: No scleral icterus.     Extraocular Movements: Extraocular " movements intact.      Pupils: Pupils are equal, round, and reactive to light.   Cardiovascular:      Rate and Rhythm: Normal rate and regular rhythm.      Heart sounds: Normal heart sounds. No murmur heard.      Pulmonary:      Effort: Pulmonary effort is normal.      Breath sounds: Normal breath sounds. No wheezing, rhonchi or rales.   Abdominal:      Palpations: Abdomen is soft.      Tenderness: There is no abdominal tenderness. There is no guarding or rebound.   Musculoskeletal:      Cervical back: Neck supple.      Right lower leg: No edema.      Left lower leg: No edema.   Lymphadenopathy:      Cervical: No cervical adenopathy.   Skin:     General: Skin is warm and dry.      Findings: No rash.   Neurological:      General: No focal deficit present.      Mental Status: He is alert and oriented to person, place, and time. Mental status is at baseline.   Psychiatric:         Mood and Affect: Mood normal.         Behavior: Behavior normal.           Discharge Disposition:  Home or Self Care    Discharge Medications:     Discharge Medications      New Medications      Instructions Start Date   metoprolol succinate XL 25 MG 24 hr tablet  Commonly known as: TOPROL-XL   12.5 mg, Oral, Every 24 Hours Scheduled   Start Date: October 30, 2021        Continue These Medications      Instructions Start Date   acetaminophen 650 MG 8 hr tablet  Commonly known as: TYLENOL   650 mg, Oral, Every 8 Hours PRN      acetaminophen 325 MG tablet  Commonly known as: TYLENOL   650 mg, Oral, Every 4 Hours PRN      albuterol (2.5 MG/3ML) 0.083% nebulizer solution  Commonly known as: PROVENTIL   2.5 mg, Nebulization, Every 4 Hours PRN      Arnuity Ellipta 200 MCG/ACT aerosol powder   Generic drug: Fluticasone Furoate   Inhalation      aspirin 81 MG EC tablet   81 mg, Oral, Daily      Brovana 15 MCG/2ML nebulizer solution  Generic drug: arformoterol   3 Times Daily      cetirizine 10 MG tablet  Commonly known as: zyrTEC   10 mg, Oral,  Daily      clopidogrel 75 MG tablet  Commonly known as: PLAVIX   75 mg, Oral, Daily      docusate sodium 100 MG capsule   100 mg, Oral      famotidine 20 MG tablet  Commonly known as: PEPCID   20 mg, Oral, 2 Times Daily      fluticasone 50 MCG/ACT nasal spray  Commonly known as: FLONASE   2 sprays, Nasal, Daily      furosemide 20 MG tablet  Commonly known as: LASIX   20 mg, Oral, Daily      glimepiride 2 MG tablet  Commonly known as: AMARYL   2 mg, Oral, Every Morning Before Breakfast      HYDROcodone-acetaminophen 7.5-325 MG per tablet  Commonly known as: NORCO   1 tablet, Oral, Every 6 Hours PRN      ipratropium 0.02 % nebulizer solution  Commonly known as: ATROVENT   INHALE THE CONTENTS OF 1 VIAL IN NEBULIZER EVERY 4 HOURS AS NEEDED FOR WHEEZING      ipratropium-albuterol 0.5-2.5 mg/3 ml nebulizer  Commonly known as: DUO-NEB   3 mL, Nebulization, 4 Times Daily      lisinopril 10 MG tablet  Commonly known as: PRINIVIL,ZESTRIL   10 mg, Oral, Daily      magnesium oxide 400 MG tablet  Commonly known as: MAG-OX   250 mg, Oral, Daily      meclizine 12.5 MG tablet  Commonly known as: ANTIVERT   1 tablet po q 6 hr prn severe nausea      O2  Commonly known as: OXYGEN   2 L/min, Inhalation, Nightly, W/ CPAP       ondansetron 4 MG tablet  Commonly known as: ZOFRAN   4 mg, Oral, Every 8 Hours PRN      ONE TOUCH ULTRA 2 w/Device kit   Daily, as directed      OneTouch Delica Plus [id number] misc   1 each, Other, Daily      polyethylene glycol 17 g packet  Commonly known as: MIRALAX   Dissolve 1 packet (17 g) in 4 to 8 ounces of beverage and drink by mouth Daily.      predniSONE 10 MG tablet  Commonly known as: DELTASONE   Daily      ranolazine 500 MG 12 hr tablet  Commonly known as: RANEXA   TAKE 1 TABLET ONCE DAILY WITH LARGEST MEAL      Red Yeast Rice 600 MG tablet   600 mg, Oral, 2 Times Daily      Trelegy Ellipta 100-62.5-25 MCG/INH inhaler  Generic drug: Fluticasone-Umeclidin-Vilant   1 inhaler, Inhalation       umeclidinium-vilanterol 62.5-25 MCG/INH aerosol powder  inhaler  Commonly known as: ANORO ELLIPTA   1 puff, Inhalation, Daily         Stop These Medications    doxycycline 100 MG capsule  Commonly known as: MONODOX        ASK your doctor about these medications      Instructions Start Date   nitroglycerin 0.4 MG SL tablet  Commonly known as: NITROSTAT   place 1 tablet under the tongue if needed every 5 minutes for hair...  (REFER TO PRESCRIPTION NOTES).           Discharge Diet:   Diet Instructions     Diet: Consistent Carbohydrate, Cardiac      Discharge Diet:  Consistent Carbohydrate  Cardiac             Activity at Discharge:   Activity Instructions     Activity as Tolerated            Discharge Care Plan/Instructions: return id symptoms worsen     Follow-up Appointments:   Future Appointments   Date Time Provider Department Center   11/19/2021 10:45 AM Mana Curtis MD [facility] None   3/8/2022  3:00 PM Mana Curtis MD [facility] None       Test Results Pending at Discharge:     Kelvin Aguilar MD    Time: 31            Electronically signed by Kelvin Agiular MD at 10/29/21 1002

## 2021-11-03 NOTE — OUTREACH NOTE
AMI Week 1 Survey      Responses   [facility] patient discharged from? [place]   Does the patient have one of the following disease processes/diagnoses(primary or secondary)? Acute MI (STEMI,NSTEMI)   Week 1 attempt successful? Yes   Call start time 1121   Call end time 1127   Is patient permission given to speak with other caregiver? Yes   List who call center can speak with spouse   Meds reviewed with patient/caregiver? Yes   Is the patient having any side effects they believe may be caused by any medication additions or changes? No   Does the patient have all prescriptions related to this admission filled (includes statins,anticoagulants,HTN meds,anti-arrhythmia meds) Yes   Is the patient taking all medications as directed (includes completed medication regime)? Yes   Comments regarding appointments Pt to see pcp on Friday November 6, 2021.   Does the patient have a primary care provider?  Yes   Does the patient have an appointment with their PCP,cardiologist,or clinic within 7 days of discharge? Yes   Has the patient kept scheduled appointments due by today? N/A   What is the Home health agency?  [facility]     Has home health visited the patient within 72 hours of discharge? Yes   Psychosocial issues? No   Did the patient receive a copy of their discharge instructions? Yes   Nursing interventions Reviewed instructions with patient   What is the patient's perception of their health status since discharge? Same   Is the patient/caregiver able to teach back signs and symptoms of when to call for help immediately: Sudden chest discomfort,  Sudden discomfort in arms, back, neck or jaw,  Shortness of breath at any time,  Sudden sweating or clammy skin,  Nausea or vomiting,  Dizziness or lightheadedness,  Irregular or rapid heart rate   Is the patient/caregiver able to teach back ways to prevent a second heart attack: Take medications,  Follow up with MD,  Participate in Cardiac Rehab,  Manage risk  87 factors,  Get support (AHA website:supportnetwork.heart.org)   Is the patient/caregiver able to teach back the hierarchy of who to call/visit for symptoms/problems? PCP, Specialist, Home health nurse, Urgent Care, ED, 911 Yes   Additional teach back comments Pt's spouse reports pt is about the same. Pt sleepy during the day. Spouse will take all medications to pcp appoinment on Friday. HH is visiting.   Week 1 call completed? Yes          Sridevi Rodriguez RN

## 2021-11-09 NOTE — OUTREACH NOTE
AMI Week 2 Survey      Responses   [facility] patient discharged from? [place]   Does the patient have one of the following disease processes/diagnoses(primary or secondary)? Acute MI (STEMI,NSTEMI)   Week 2 attempt successful? No   Unsuccessful attempts Attempt 1          Nani Camp RN

## 2021-11-10 NOTE — OUTREACH NOTE
AMI Week 2 Survey      Responses   [facility] patient discharged from? [place]   Does the patient have one of the following disease processes/diagnoses(primary or secondary)? Acute MI (STEMI,NSTEMI)   Week 2 attempt successful? No   Unsuccessful attempts Attempt 2          Shaylee Sherman LPN

## 2021-11-15 PROBLEM — M75.121 NONTRAUMATIC COMPLETE TEAR OF RIGHT ROTATOR CUFF: Status: ACTIVE | Noted: 2021-01-01

## 2021-11-15 PROBLEM — M11.261 CHONDROCALCINOSIS OF RIGHT KNEE: Status: ACTIVE | Noted: 2021-01-01

## 2021-11-15 NOTE — PROGRESS NOTES
Hasmukh Ham is a 88 y.o. male returns for     Chief Complaint   Patient presents with   • Right Knee - Follow-up       HISTORY OF PRESENT ILLNESS: Patient presents to office for follow-up of chronic right knee pain.  Patient has experienced chronic right knee pain for several years.  Patient was last seen in office for evaluation of his right knee pain on 2/3/2021 and had experienced exacerbation of his chronic right knee pain due to a fall that occurred on 1/4/2021.  Patient was given an intra-articular injection of steroid on that date, which offered good pain improvement.  Patient reports his right knee pain has been gradually worsening again.  Patient denies any new falls or injuries.  Patient complains of aching and grinding pain with intermittent joint stiffness and joint swelling.    Patient also complains of increased chronic right shoulder pain recently.  Patient has a long history of chronic right shoulder pain for several years due to known osteoarthritis and chronic rotator cuff tears.  Patient has a scheduled appointment in 3 days with orthopedics for follow-up of his chronic right shoulder pain so this is addressed today to keep the patient from having to return to office for separate issue.  Patient has been treated in the past with subacromial injections of steroid, which have offered good pain improvement for several months at a time.  Last injection in the right shoulder was given on 3/8/2021, which offered good improvement.    Pain scale today is 9/10.  Patient continues to take Norco 7.5 mg 3 times daily for control of chronic pain as prescribed by his primary care physician.     CONCURRENT MEDICAL HISTORY:    The following portions of the patient's history were reviewed and updated as appropriate: allergies, current medications, past family history, past medical history, past social history, past surgical history and problem list.     ROS  No fevers or chills.  No chest pain or shortness  "of air.  No GI or  disturbances.  Right knee pain.  Right shoulder pain.    PHYSICAL EXAMINATION:       Ht 175.3 cm (69\")   Wt 68.9 kg (152 lb)   BMI 22.45 kg/m²     Physical Exam  Vitals reviewed.   Constitutional:       General: He is not in acute distress.     Appearance: He is well-developed. He is not ill-appearing.   HENT:      Head: Normocephalic.   Pulmonary:      Effort: Pulmonary effort is normal. No respiratory distress.   Abdominal:      General: There is no distension.      Palpations: Abdomen is soft.   Musculoskeletal:         General: Swelling (Mild, right knee) and tenderness (Mild, right shoulder, right knee) present. No deformity or signs of injury.      Right knee: No effusion.   Skin:     General: Skin is warm and dry.      Capillary Refill: Capillary refill takes less than 2 seconds.      Findings: No erythema.   Neurological:      Mental Status: He is alert and oriented to person, place, and time.      GCS: GCS eye subscore is 4. GCS verbal subscore is 5. GCS motor subscore is 6.   Psychiatric:         Speech: Speech normal.         Behavior: Behavior normal.         Thought Content: Thought content normal.         Judgment: Judgment normal.         GAIT:     []  Normal  []  Antalgic    Assistive device: []  None  []  Walker     []  Crutches  []  Cane     [x]  Wheelchair  []  Stretcher    Right Knee Exam     Tenderness   Right knee tenderness location: Mild, diffuse.    Range of Motion   Extension: 5   Flexion: 100     Tests   Varus: negative Valgus: negative    Other   Erythema: absent  Sensation: normal  Pulse: present  Swelling: mild  Effusion: no effusion present      Right Shoulder Exam     Tenderness   Right shoulder tenderness location: Mild, diffuse.    Range of Motion   Active abduction: 90   Passive abduction: 130   Forward flexion: 110     Muscle Strength   Abduction: 3/5   Supraspinatus: 3/5     Tests   Carl test: positive  Cross arm: positive  Impingement: positive  Drop " arm: positive    Other   Erythema: absent  Sensation: normal  Pulse: present    Comments:  Pain and limitations with range of motion.  Weakness is present.  No deformity.  No swelling appreciated.  No erythema.  No warmth.  No signs of infection noted.  Neurovascularly intact.              Xr Knee 1 Or 2 View Right     Result Date: 2/3/2021  Narrative: AP and lateral views of the right knee reveal no evidence of acute fracture or dislocation.  There is calcification noted at the medial and lateral menisci consistent with chondrocalcinosis.  There is mild to moderate joint space narrowing at the tibiofemoral joints, more pronounced in the medial joint space.  There are mild degenerative changes noted at the patellofemoral joint.  The knee joint appears well-aligned.  Soft tissues appear unremarkable.  No joint effusion is seen.  No acute bony radiologic abnormalities are noted at this time.  No acute interval changes are noted when compared with prior images from 1/4/2021.02/03/21 at 15:50 CST by EVE Abdullahi      Xr Knee 4+ View Right     Result Date: 1/4/2021  Narrative: Procedure: XR KNEE 4 OR MORE VIEWS. History: Injury, pain. Comparison: None Findings: Four x-rays, AP and lateral views of the right knee. There is no radiographic evidence of an acute fracture. There is soft tissue calcification noted in the region of the medial and lateral menisci, projecting over Hoffa's fat pad, and at the insertion of the quadriceps tendon on the patella. There is joint space narrowing of the tibiofemoral joints. There are mild degenerative changes of the patellofemoral joint. There is a tiny joint effusion.      Impression: Impression: Tiny joint effusion. No radiographic evidence of an acute fracture. 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 Electronically signed by:  Pedro Novoa MD  1/4/2021 11:27  [facility] Workstation: [id number]    Xr Shoulder 2+ View Right     Result Date: 6/25/2020  Narrative: 3 views of the right shoulder reveal no evidence of acute fracture or dislocation.  There are age-related degenerative changes noted. There are degenerative changes noted at the acromioclavicular joint with diminished joint spacing and an osteophyte formation at the distal clavicle projecting inferiorly.  The humeral head is in an elevated position in relation to the glenoid, suggestive of longstanding rotator cuff tear.  Soft tissues appear unremarkable.  No acute bony radiologic abnormalities are noted at this time.  No significant changes are noted when compared with prior images from 6/19/2017.06/25/20 at 4:55 PM by EVE Abdullahi      X-ray arthrogram shoulder right per contrast protocol 6/28/2017  [facility]        Result Narrative   Indication:  Right shoulder pain.    Right Shoulder Arthrogram:    Procedure:  Following standard sterile precautions, fluoroscopy was used to guide a 25-gauge needle into the shoulder joint.  2 cc of Lidocaine and 8 cc of Omnipaque 240 were injected uneventfully.  There was 1 minute 45 seconds of fluoroscopy.  There   were 6 spot films.    Findings:  There is an extensive rotator cuff tear and the humeral head lies under the acromion.  The patient was sent to CT.   Status Results Details     Encounter Summary   CT shoulder right with contrast per contrast protocol 6/28/2017  [facility]  Result Impression       1.  Supraspinatus, infraspinatus, and long head of the biceps are chronically torn and the muscle bellies are atrophic.    2.  The humeral head has migrated superiorly and articulates under the acromion and the AC joint.    3.  The glenohumeral joint is in satisfactory condition.    4.  Most of the subscapularis tendon is attached, but a small portion of the upper tendon is torn.   Result Narrative   Indication:  Shoulder pain.    CT, Right Shoulder after  arthrogram:  No destructive lesion or fracture.  The humeral head has migrated superiorly and is articulating under the acromion and the hypertrophied AC joint.  The supraspinatus and infraspinous tendons are chronically torn and  retracted with a atrophic muscle bellies.  The long head of the biceps is not seen and is also probably chronically torn.  The glenohumeral joint is in satisfactory condition.  The upper third of the subscapularis tendon is chronically torn with most of  the tendon remaining attached.           ASSESSMENT:    Diagnoses and all orders for this visit:    Primary osteoarthritis of right knee    Chronic pain of right knee    Chondrocalcinosis of right knee    Rotator cuff syndrome of right shoulder    AC joint arthropathy    Chronic right shoulder pain    Nontraumatic complete tear of right rotator cuff    Other orders  -     Large Joint Arthrocentesis: R knee  -     Large Joint Arthrocentesis: R subacromial bursa    PLAN    Large Joint Arthrocentesis: R knee  Date/Time: 11/15/2021 2:12 PM  Consent given by: patient  Timeout: Immediately prior to procedure a time out was called to verify the correct patient, procedure, equipment, support staff and site/side marked as required   Supporting Documentation  Indications: pain, joint swelling and diagnostic evaluation   Procedure Details  Location: knee - R knee  Preparation: Patient was prepped and draped in the usual sterile fashion  Needle size: 22 G  Approach: anterolateral  Medications administered: 2 mL lidocaine 1 %; 80 mg triamcinolone acetonide 40 MG/ML  Patient tolerance: patient tolerated the procedure well with no immediate complications    Large Joint Arthrocentesis: R subacromial bursa  Date/Time: 11/15/2021 2:12 PM  Consent given by: patient  Timeout: Immediately prior to procedure a time out was called to verify the correct patient, procedure, equipment, support staff and site/side marked as required   Supporting  Documentation  Indications: pain and diagnostic evaluation   Procedure Details  Location: shoulder - R subacromial bursa  Preparation: Patient was prepped and draped in the usual sterile fashion  Needle size: 22 G  Approach: posterior  Medications administered: 2 mL lidocaine 1 %; 80 mg triamcinolone acetonide 40 MG/ML  Patient tolerance: patient tolerated the procedure well with no immediate complications        Patient complains of gradually worsening chronic right shoulder pain and chronic right knee pain.  Patient has known degenerative changes in both the right knee joint and right shoulder joint with chronic full-thickness rotator cuff tears of the right shoulder.  Patient has been treated in the past with localized injections of steroid, which did offer him good pain improvement for several months.  The patient has not sustained any falls or injuries.  Recommend proceeding today with a repeat intra-articular injection of steroid to the right knee for management of joint pain/inflammation/swelling.  Recommend proceeding today with a repeat subacromial injection of steroid to the right shoulder for management of joint pain/inflammation.    Recommend the following in regards to knee pain:    -Rest and activity modification as tolerated and based on pain.  -Modified weightbearing of the affected extremity with use of a cane or walker as needed.  -Gradual progression of weightbearing and activity as pain and swelling allow.  -Conditioning and strengthening exercises of the bilateral knees/legs.  -Elevation and ice therapy to the affected knee to minimize pain/swelling/inflammation.     Recommend the following in regards to shoulder pain:     -Rest and activity modification with avoidance of heavy lifting, pulling, tugging and repetitive motions for now.  -Gentle, progressive range of motion exercises with the affected shoulder intermittently several times daily as pain allows.  -Ice therapy to the affected  shoulder intermittently 3-4 times daily for 15 minutes at a time to minimize pain/inflammation.  -Gradual progression of activity and use of the affected arm/shoulder as tolerated and based on pain/symptoms.    Patient already takes Norco 7.5 mg 3 times daily for control of his chronic pain as prescribed by his primary care physician.  Patient can also take Tylenol as needed for additional pain control but should not exceed more than 4000 mg of acetaminophen in a 24-hour period.  We will continue to avoid oral NSAIDs due to his cardiac history and his advanced age.    Follow-up in 3 months for recheck as needed for any new, worsening or persistent symptoms.  Follow-up sooner as needed.    EMR Dragon/Transciption Disclaimer: Some of this note may be an electronic transcription/translation of spoken language to printed text.  The electronic translation of spoken language may permit erroneous, or at times, nonsensical words or phrases to be inadvertently transcribed. Although I have reviewed the note for such errors, some may still exist.     Return in about 3 months (around 2/15/2022), or if symptoms worsen or fail to improve, for Recheck.        This document has been electronically signed by EVE Abdullahi on November 15, 2021 17:10 CST      EVE Abdullahi

## 2021-11-17 NOTE — OUTREACH NOTE
AMI Week 3 Survey      Responses   [facility] patient discharged from? [place]   Does the patient have one of the following disease processes/diagnoses(primary or secondary)? Acute MI (STEMI,NSTEMI)   Week 3 attempt successful? Yes   Call start time 1622   Call end time 1626   Discharge diagnosis Myocardial infarction    Meds reviewed with patient/caregiver? Yes   Is the patient having any side effects they believe may be caused by any medication additions or changes? No   Does the patient have all prescriptions related to this admission filled (includes statins,anticoagulants,HTN meds,anti-arrhythmia meds) Yes   Is the patient taking all medications as directed (includes completed medication regime)? Yes   Medication comments Started antibiotic   Does the patient have a primary care provider?  Yes   Does the patient have an appointment with their PCP,cardiologist,or clinic within 7 days of discharge? Yes   Has the patient kept scheduled appointments due by today? Yes   What is the Home health agency?  [facility]     Has home health visited the patient within 72 hours of discharge? Yes   Did the patient receive a copy of their discharge instructions? Yes   Nursing interventions Reviewed instructions with patient   What is the patient's perception of their health status since discharge? Improving  [but not much]   Nursing interventions Nurse provided patient education   Is the patient/caregiver able to teach back signs and symptoms of when to call for help immediately: Sudden chest discomfort,  Sudden discomfort in arms, back, neck or jaw,  Shortness of breath at any time,  Sudden sweating or clammy skin,  Dizziness or lightheadedness,  Nausea or vomiting,  Irregular or rapid heart rate   Nursing interventions Nurse provided patient education   Is the patient/caregiver able to teach back lifestyle changes to help prevent MIs Heart healthy diet,  Maintaining a healthy weight,  Reducing stress   Is the  patient/caregiver able to teach back ways to prevent a second heart attack: Take medications,  Follow up with MD   If the patient is a current smoker, are they able to teach back resources for cessation? Not a smoker   Is the patient/caregiver able to teach back the hierarchy of who to call/visit for symptoms/problems? PCP, Specialist, Home health nurse, Urgent Care, ED, 911 Yes   Week 3 call completed? Yes   Wrap up additional comments States he is improving slightly, states SOA. Does not have CP.           Heather Sr RN

## 2021-11-22 PROBLEM — I50.30 (HFPEF) HEART FAILURE WITH PRESERVED EJECTION FRACTION (HCC): Status: RESOLVED | Noted: 2017-09-14 | Resolved: 2021-01-01

## 2021-11-22 NOTE — PROGRESS NOTES
Hasmukh Diopery  88 y.o. male    11/22/2021  1. Atrial fibrillation and flutter ([facility])    2. Coronary artery disease involving native coronary artery of native heart without angina pectoris    3. Primary hypertension    4. Chronic diastolic CHF (congestive heart failure) ([facility])        History of Present Illness:  Body mass index is 22.45 kg/m².  BMI within normal range no further recommendation      88 years old patient with history of orthostasis and orthostatic hypotension on multiple different medication during previous evaluation was adjusted with significant improvement in orthostasis of blood pressure. Patient with documented history of CAD s/p PCI and has recent cardiac catheterization performed in October 2021 mid LAD with critical stenosis PCI was performed. Mid RCA lesion was 100% stenosis and his medication was adjusted. The patient has background history of significant GI bleed AV malformation not a candidate for long-term oral anticoagulation, mild LV dysfunction, history of congestive heart failure with preserved systolic and diastolic dysfunction who presented post hospital follow-up. He has some baseline shortness of breath no significant change in that. Clinically is not in volume overload. His blood pressures 100/60 or heart rate 55 bpm. He is on low-dose metoprolol. The patient is on lisinopril 10 mg. No orthopnea PND or chest pain reported. He does have history of arthrocentesis in the past and COPD      Cardiac cath October 2021    · Mid LAD lesion is 95% stenosed tandem lesions .Successfully treated with new 2 x 18 mm Resolute JENNA.Final lesion was 0%.  · Prox Cx lesion is 30% stenosed.  · Mid RCA lesion is 100% stenosed.  · LV pressures (S/D/E) : 118/0/7 mmHg  · The ejection fraction was 40-50% by visual estimate.  · Mild systolic dysfunction.     New JENNA to mid LAD.  DAPT for 1 year.    Echo October 2021      · Significant beat to beat variability but estimated EF appears around  "40-45%.  · Left ventricular systolic function is mildly decreased.  · Estimated right ventricular systolic pressure from tricuspid regurgitation is normal (<35 mmHg).  · Left ventricular diastolic function was not assessed.  · Left atrial volume is moderately increased.      Conclusion :Cath 2/2018      Successful PCI to the mid LAD for in-stent restenosis with a 2.25 x 23 Xience Alpine stent reducing 90% stenosis to 0%  Right coronary artery:  Proximally is okay, mid to distal his 100% occluded, distally getting collaterals from the AV circumflex     ECHO !@/2018  · Left ventricular wall thickness is consistent with mild concentric hypertrophy.  · Estimated EF = 68%. No wall motion abnormalities  · Left ventricular systolic function is normal.  · Left atrial cavity size is mildly dilated.  · Mild dilation of the aortic root is present        SUBJECTIVE:    Allergies   Allergen Reactions   • Atorvastatin Anaphylaxis   • Penicillins Rash   • Azithromycin Rash   • Cefdinir Other (See Comments)     \"i burned up\" and break out in a rash   • Clarithromycin Rash and Itching   • Pravastatin Unknown - High Severity     Myalgia  Myalgia   • Benadryl Allergy [Diphenhydramine Hcl] Other (See Comments)     Pt states \"it knocks me out.\"         Past Medical History:   Diagnosis Date   • Basal cell carcinoma    • Bilateral carotid artery stenosis    • Bilateral carotid artery stenosis    • Bleeding disorder ([facility])    • CHF (congestive heart failure) ([facility])    • Chronic obstructive pulmonary disease (COPD) ([facility])    • Coronary arteriosclerosis    • Degenerative joint disease involving multiple joints    • Dementia ([facility])    • Diabetes mellitus ([facility])    • Heart problem    • History of stomach ulcers    • Hyperlipidemia    • Hypertension    • Ingrown toenail    • Myocardial infarction ([facility])          Past Surgical History:   Procedure Laterality Date   • BACK SURGERY     • CARDIAC CATHETERIZATION N/A 6/6/2017    Procedure: Right Heart " Cath;  Surgeon: Jeff Maciel MD PhD;  Location: [facility] CATH INVASIVE LOCATION;  Service:    • CARDIAC CATHETERIZATION N/A 2/14/2018    Procedure: Coronary angiography;  Surgeon: Moisés Davila MD;  Location: [facility] INVASIVE LOCATION;  Service:    • CARDIAC CATHETERIZATION N/A 10/28/2021    Procedure: Left Heart Cath;  Surgeon: Carine Johnson MD;  Location: [facility] CATH INVASIVE LOCATION;  Service: Cardiology;  Laterality: N/A;   • CORONARY ANGIOPLASTY WITH STENT PLACEMENT     • CORONARY STENT PLACEMENT     • HERNIA REPAIR     • LUNG BIOPSY     • LUNG SURGERY     • NECK SURGERY     • THORACOTOMY Left 1977   • VENTRAL HERNIA REPAIR           Family History   Problem Relation Age of Onset   • Hypertension Father    • Heart disease Father    • Diabetes Father    • Diabetes Mother    • Lung disease Other    • Cancer Other          Social History     Socioeconomic History   • Marital status:    Tobacco Use   • Smoking status: Former Smoker   • Smokeless tobacco: Never Used   Vaping Use   • Vaping Use: Never used   Substance and Sexual Activity   • Alcohol use: No   • Drug use: No   • Sexual activity: Not Currently     Comment:          Current Outpatient Medications   Medication Sig Dispense Refill   • acetaminophen (TYLENOL) 325 MG tablet Take 2 tablets by mouth Every 4 (Four) Hours As Needed for Mild Pain .     • albuterol (PROVENTIL) (2.5 MG/3ML) 0.083% nebulizer solution Take 2.5 mg by nebulization Every 4 (Four) Hours As Needed for Wheezing. 125 vial 0   • Blood Glucose Monitoring Suppl (ONE TOUCH ULTRA 2) w/Device kit Daily. as directed     • Brovana 15 MCG/2ML nebulizer solution 3 (Three) Times a Day.     • clopidogrel (PLAVIX) 75 MG tablet Take 75 mg by mouth Daily.     • docusate sodium 100 MG capsule Take 100 mg by mouth.     • doxycycline (MONODOX) 100 MG capsule Take 100 mg by mouth.     • famotidine (PEPCID) 20 MG tablet Take 20 mg by mouth 2 (Two) Times a Day.     •  fluticasone (FLONASE) 50 MCG/ACT nasal spray 2 sprays into each nostril Daily.     • Fluticasone Furoate (ARNUITY ELLIPTA) 200 MCG/ACT aerosol powder  Inhale.     • Fluticasone-Umeclidin-Vilant (Trelegy Ellipta) 100-62.5-25 MCG/INH aerosol powder  Inhale 1 inhaler.     • furosemide (LASIX) 20 MG tablet Take 1 tablet by mouth Daily. 90 tablet 3   • glimepiride (AMARYL) 2 MG tablet Take 2 mg by mouth Every Morning Before Breakfast.     • HYDROcodone-acetaminophen (NORCO) 7.5-325 MG per tablet Take 1 tablet by mouth Every 6 (Six) Hours As Needed for Moderate Pain . 12 tablet 0   • ipratropium (ATROVENT) 0.02 % nebulizer solution INHALE THE CONTENTS OF 1 VIAL IN NEBULIZER EVERY 4 HOURS AS NEEDED FOR WHEEZING     • ipratropium-albuterol (DUO-NEB) 0.5-2.5 mg/mL nebulizer Take 3 mL by nebulization 4 (Four) Times a Day. 120 vial 1   • Lancets (OneTouch Delica Plus Yeyleg21E) misc 1 each by Other route Daily.     • lisinopril (PRINIVIL,ZESTRIL) 10 MG tablet Take 10 mg by mouth Daily.     • magnesium oxide (MAG-OX) 400 MG tablet Take 250 mg by mouth Daily.     • meclizine (ANTIVERT) 12.5 MG tablet 1 tablet po q 6 hr prn severe nausea     • metoprolol succinate XL (TOPROL-XL) 25 MG 24 hr tablet Take one-half tablet (12.5 mg) by mouth Daily. 30 tablet 1   • nitroglycerin (NITROSTAT) 0.4 MG SL tablet place 1 tablet under the tongue if needed every 5 minutes for hair...  (REFER TO PRESCRIPTION NOTES).  0   • nystatin (MYCOSTATIN) 100,000 unit/mL suspension SWISH AND SWALLOW 2 ML EVERY 4 HOURS AS NEEDED     • O2 (OXYGEN) Inhale 2 L/min Every Night. W/ CPAP     • ondansetron ODT (ZOFRAN-ODT) 4 MG disintegrating tablet Take 4 mg by mouth.     • OneTouch Verio test strip TEST EVERY DAY     • polyethylene glycol (MIRALAX) 17 g packet Dissolve 1 packet (17 g) in 4 to 8 ounces of beverage and drink by mouth Daily. 30 packet 1   • predniSONE (DELTASONE) 10 MG tablet Daily.     • ranolazine (RANEXA) 500 MG 12 hr tablet TAKE 1 TABLET ONCE  "DAILY WITH LARGEST MEAL     • Red Yeast Rice 600 MG tablet Take 600 mg by mouth 2 (Two) Times a Day.     • Umeclidinium-Vilanterol 62.5-25 MCG/INH aerosol powder  Inhale 1 puff Daily. 1 each 11   • cetirizine (zyrTEC) 10 MG tablet Take 10 mg by mouth Daily.     • ondansetron (ZOFRAN) 4 MG tablet Take 4 mg by mouth Every 8 (Eight) Hours As Needed for Nausea or Vomiting.       No current facility-administered medications for this visit.           Review of Systems:     Constitutional:  Denies recent weight loss, weight gain,no change in exercise tolerance with baseline shortness of.     HENT:  Denies any hearing loss, epistaxi    sEyes: No blurring    Respiratory: Mild baseline shortness    Cardiovascular: See H&P    Gastrointestinal: History AV malformation and GI bleed    Endocrine: Negative for cold intolerance, heat intolerance, polydipsia    Genitourinary: Negative.      Musculoskeletal: History of osteoarthritis    Skin:  Deniesrashes, or skin lesions.     Allergic/Immunologic: Negative.  Negative for environmental allergies    Neurological:  Denies any history of recurrent headaches, strokes,     Hematological: Denies any food allergies, seasonal allergies    Psychiatric/Behavioral: Denies any history of depression        OBJECTIVE:    /60   Pulse 55   Temp 97.1 °F (36.2 °C)   Ht 175.3 cm (69\")   Wt 68.9 kg (152 lb)   SpO2 97%   BMI 22.45 kg/m²     Physical Exam:     Constitutional: Cooperative, alert and oriented, well-developed, well-nourished, in no acute distress.     HENT:   Head: Normocephalic, conjunctive is a pink, thyroid is nonpalpable no carotid bruit and trachea central.     Cardiovascular: Irregular rate and rhythm, S1 and S2 normal, no S3 or S4. Apical impulse not displaced. No murmurs    Pulmonary/Chest: Chest: No chest wall tenderness no rales and wheezing    Abdominal: Abdomen soft, bowel sounds normoactive, no masses,    Musculoskeletal: No deformities, clubbing, cyanosis, " erythema. positive mild edema  Neurological: No gross motor or sensory deficits noted    Skin: Warm and dry to the touch, no apparent skin lesions .     Psychiatric: He has a normal mood and affect. His behavior is normal        Procedures      Lab Results   Component Value Date    WBC 11.51 (H) 10/29/2021    HGB 12.9 (L) 10/29/2021    HCT 40.3 10/29/2021    MCV 88.4 10/29/2021     10/29/2021     Lab Results   Component Value Date    GLUCOSE 100 (H) 10/29/2021    BUN 25 (H) 10/29/2021    CREATININE 1.19 10/29/2021    EGFRIFNONA 58 (L) 10/29/2021    EGFRIFAFRI 85 09/13/2021    BCR 21.0 10/29/2021    CO2 24.0 10/29/2021    CALCIUM 9.0 10/29/2021    ALBUMIN 3.60 10/28/2021    AST 18 10/28/2021    ALT 19 10/28/2021     Lab Results   Component Value Date    CHOL 197 10/29/2021    CHOL 207 (H) 03/23/2021    CHOL 150 05/02/2019     Lab Results   Component Value Date    TRIG 236 (H) 10/29/2021    TRIG 137 03/23/2021    TRIG 174 (H) 05/02/2019     Lab Results   Component Value Date    HDL 32 (L) 10/29/2021    HDL 42 03/23/2021    HDL 26 (L) 05/02/2019     No components found for: LDLCALC  Lab Results   Component Value Date     (H) 10/29/2021     (H) 03/23/2021    LDL 87 05/02/2019     No results found for: HDLLDLRATIO  No components found for: CHOLHDL  Lab Results   Component Value Date    HGBA1C 6.50 (H) 10/29/2021     Lab Results   Component Value Date    TSH 2.280 02/13/2018           ASSESSMENT AND PLAN:  # history of frequent falls with recurrent syncope no further recurrence after adjustment of antihypertensive medication. Patient has significant history of orthostasis     #2 history of CAD s/p PCI to RCA and LAD     No evidence of ongoing ischemia at the time of evaluation. Continue dual antiplatelet agents. Continue statin. Continue renal     #3 history of hypertension, hypertensive disease and diastolic dysfunction. His blood pressures 160 we will decrease the dose of lisinopril from 10 to 5 mg  and discontinue beta-blocker given the slow heart rate initially was 45 and palpation by me 55 bpm. He is on Toprol-XL 12.5 mg will discontinue     Clinically euvolemic no evidence of congestive heart failure will discontinue diuretics        #4 atrial fibrillation/atrial flutter previous electrical cardioverted    Not a candidate for long-term oral anticoagulation given the frequent history of fall and high has bled score  Diagnoses and all orders for this visit:    1. Atrial fibrillation and flutter (HCC) (Primary)    2. Coronary artery disease involving native coronary artery of native heart without angina pectoris    3. Primary hypertension    4. Chronic diastolic CHF (congestive heart failure) (HCC)          Mana Curtis MD  11/22/2021  10:17 CST

## 2021-11-23 NOTE — PROGRESS NOTES
"Hasmukh Ham  88 y.o. male    11/23/2021  Chief Complaint   Patient presents with   • Atrial Fibrillation     Chief Complaint   Follow-up post PCI      History of Present Illness  Patient with a history of chronic atrial fibrillation and status post PCI to in-stent restenosis of LAD    -        SUBJECTIVE  From a cardiac standpoint the patient is having no angina.  He is short of air.  He has a history of COPD and has not seen pulmonology for a long time.  This was mainly due to transportation to [place].  He has no angina.  He is only taking his Plavix as he has von Willebrand's disease.    He primarily has increasing shortness of breath.  Says that he has so with any activity.  Allergies   Allergen Reactions   • Atorvastatin Anaphylaxis   • Penicillins Rash   • Azithromycin Rash   • Cefdinir Other (See Comments)     \"i burned up\" and break out in a rash   • Clarithromycin Rash and Itching   • Pravastatin Unknown - High Severity     Myalgia  Myalgia   • Benadryl Allergy [Diphenhydramine Hcl] Other (See Comments)     Pt states \"it knocks me out.\"         Past Medical History:   Diagnosis Date   • Basal cell carcinoma    • Bilateral carotid artery stenosis    • Bilateral carotid artery stenosis    • Bleeding disorder (HCC)    • CHF (congestive heart failure) (HCC)    • Chronic obstructive pulmonary disease (COPD) (HCC)    • Coronary arteriosclerosis    • Degenerative joint disease involving multiple joints    • Dementia (HCC)    • Diabetes mellitus (HCC)    • Heart problem    • History of stomach ulcers    • Hyperlipidemia    • Hypertension    • Ingrown toenail    • Myocardial infarction (HCC)          Past Surgical History:   Procedure Laterality Date   • BACK SURGERY     • CARDIAC CATHETERIZATION N/A 6/6/2017    Procedure: Right Heart Cath;  Surgeon: Jeff Maciel MD PhD;  Location: [facility] INVASIVE LOCATION;  Service:    • CARDIAC CATHETERIZATION N/A 2/14/2018    Procedure: Coronary " angiography;  Surgeon: Moisés Davila MD;  Location: [facility] CATH INVASIVE LOCATION;  Service:    • CARDIAC CATHETERIZATION N/A 10/28/2021    Procedure: Left Heart Cath;  Surgeon: Carine Johnson MD;  Location: [facility] CATH INVASIVE LOCATION;  Service: Cardiology;  Laterality: N/A;   • CORONARY ANGIOPLASTY WITH STENT PLACEMENT     • CORONARY STENT PLACEMENT     • HERNIA REPAIR     • LUNG BIOPSY     • LUNG SURGERY     • NECK SURGERY     • THORACOTOMY Left 1977   • VENTRAL HERNIA REPAIR           Family History   Problem Relation Age of Onset   • Hypertension Father    • Heart disease Father    • Diabetes Father    • Diabetes Mother    • Lung disease Other    • Cancer Other          Social History     Socioeconomic History   • Marital status:    Tobacco Use   • Smoking status: Former Smoker   • Smokeless tobacco: Never Used   Vaping Use   • Vaping Use: Never used   Substance and Sexual Activity   • Alcohol use: No   • Drug use: No   • Sexual activity: Not Currently     Comment:          Prior to Admission medications    Medication Sig Start Date End Date Taking? Authorizing Provider   acetaminophen (TYLENOL) 325 MG tablet Take 2 tablets by mouth Every 4 (Four) Hours As Needed for Mild Pain . 2/15/18  Yes Jc Alba MD   albuterol (PROVENTIL) (2.5 MG/3ML) 0.083% nebulizer solution Take 2.5 mg by nebulization Every 4 (Four) Hours As Needed for Wheezing. 12/23/17  Yes Adi Puente MD   Blood Glucose Monitoring Suppl (ONE TOUCH ULTRA 2) w/Device kit Daily. as directed 2/11/21  Yes Bill Rader MD   Brovana 15 MCG/2ML nebulizer solution 3 (Three) Times a Day. 10/16/20  Yes Bill Rader MD   cetirizine (zyrTEC) 10 MG tablet Take 10 mg by mouth Daily.   Yes Bill Rader MD   clopidogrel (PLAVIX) 75 MG tablet Take 75 mg by mouth Daily. 2/3/16  Yes Bill Rader MD   docusate sodium 100 MG capsule Take 100 mg by mouth.   Yes Bill Rader MD    doxycycline (MONODOX) 100 MG capsule Take 100 mg by mouth. 11/17/21 11/25/21 Yes Bill Rader MD   famotidine (PEPCID) 20 MG tablet Take 20 mg by mouth 2 (Two) Times a Day.   Yes Bill Rader MD   fluticasone (FLONASE) 50 MCG/ACT nasal spray 2 sprays into each nostril Daily.   Yes Bill Rader MD   Fluticasone Furoate (ARNUITY ELLIPTA) 200 MCG/ACT aerosol powder  Inhale.   Yes Bill Rader MD   Fluticasone-Umeclidin-Vilant (Trelegy Ellipta) 100-62.5-25 MCG/INH aerosol powder  Inhale 1 inhaler.   Yes ProviderBill MD   furosemide (LASIX) 20 MG tablet Take 1 tablet by mouth Daily. 3/3/17  Yes Jeff Maciel MD PhD   glimepiride (AMARYL) 2 MG tablet Take 2 mg by mouth Every Morning Before Breakfast.   Yes ProviderBill MD   HYDROcodone-acetaminophen (NORCO) 7.5-325 MG per tablet Take 1 tablet by mouth Every 6 (Six) Hours As Needed for Moderate Pain . 6/21/21  Yes Gauri Sweeney APRN   ipratropium (ATROVENT) 0.02 % nebulizer solution INHALE THE CONTENTS OF 1 VIAL IN NEBULIZER EVERY 4 HOURS AS NEEDED FOR WHEEZING 11/23/20  Yes Bill Rader MD   ipratropium-albuterol (DUO-NEB) 0.5-2.5 mg/mL nebulizer Take 3 mL by nebulization 4 (Four) Times a Day. 9/28/17  Yes Jc Alba MD   Lancets (OneTouch Delica Plus Nluatd05I) misc 1 each by Other route Daily. 2/26/21  Yes Bill Rader MD   lisinopril (PRINIVIL,ZESTRIL) 5 MG tablet Take 1 tablet by mouth Daily. 11/22/21  Yes Mana Curtis MD   magnesium oxide (MAG-OX) 400 MG tablet Take 250 mg by mouth Daily.   Yes ProviderBill MD   meclizine (ANTIVERT) 12.5 MG tablet 1 tablet po q 6 hr prn severe nausea 6/1/20  Yes ProviderBill MD   nitroglycerin (NITROSTAT) 0.4 MG SL tablet place 1 tablet under the tongue if needed every 5 minutes for hair...  (REFER TO PRESCRIPTION NOTES). 1/11/17  Yes Provider, MD Bill   nystatin (MYCOSTATIN) 100,000 unit/mL suspension SWISH AND  "SWALLOW 2 ML EVERY 4 HOURS AS NEEDED 11/2/21  Yes Bill Rader MD   O2 (OXYGEN) Inhale 2 L/min Every Night. W/ CPAP   Yes Bill Rader MD   OneTouch Verio test strip TEST EVERY DAY 11/15/21  Yes Bill Rader MD   polyethylene glycol (MIRALAX) 17 g packet Dissolve 1 packet (17 g) in 4 to 8 ounces of beverage and drink by mouth Daily. 6/30/21  Yes Jc Alba MD   predniSONE (DELTASONE) 10 MG tablet Daily. 8/25/21  Yes Bill Rader MD   ranolazine (RANEXA) 500 MG 12 hr tablet TAKE 1 TABLET ONCE DAILY WITH LARGEST MEAL 11/30/20  Yes Bill Rader MD   Red Yeast Rice 600 MG tablet Take 600 mg by mouth 2 (Two) Times a Day.   Yes Bill Rader MD   Umeclidinium-Vilanterol 62.5-25 MCG/INH aerosol powder  Inhale 1 puff Daily. 3/3/17  Yes Brea Higginbotham MD         OBJECTIVE    /66 (BP Location: Right arm, Patient Position: Sitting, Cuff Size: Adult)   Pulse 103   Ht 175.3 cm (69\")   Wt 68.9 kg (152 lb)   SpO2 96%   BMI 22.45 kg/m²         Review of Systems     Constitutional:  Denies recent weight loss, weight gain, fever or chills, no change in exercise tolerance     HENT:  Denies any hearing loss, epistaxis, hoarseness, or difficulty speaking.     Eyes: Wears eyeglasses or contact lenses     Respiratory:  Denies dyspnea with exertion,no cough, wheezing, or hemoptysis.     Cardiovascular: Negative for palpations, chest pain, orthopnea, PND, peripheral edema, syncope, or claudication.     Gastrointestinal:  Denies change in bowel habits, dyspepsia, ulcer disease, hematochezia, or melena.     Endocrine: Negative for cold intolerance, heat intolerance, polydipsia, polyphagia and polyuria. Denies any history of weight change, heat/cold intolerance, polydipsia, polyuria     Genitourinary: Negative.      Musculoskeletal: Denies any history of arthritic symptoms or back problems     Skin:  Denies any change in hair or nails, rashes, or skin lesions. "     Allergic/Immunologic: Negative.  Negative for environmental allergies, food allergies and immunocompromised state.     Neurological:  Denies any history of recurrent headaches, strokes, TIA, or seizure disorder.     Hematological: Denies any food allergies, seasonal allergies, bleeding disorders, or lymphadenopathy.     Psychiatric/Behavioral: Denies any history of depression, substance abuse, or change in cognitive function.         Physical Exam     Constitutional: Cooperative, alert and oriented, well-developed, well-nourished, in no acute distress.     HENT:   Head: Normocephalic, normal hair patterns, no masses or tenderness.  Ears, Nose, and Throat: No gross abnormalities. No pallor or cyanosis. Dentition good.   Eyes: EOMS intact, PERRL, conjunctivae and lids unremarkable. Fundoscopic exam and visual fields not performed.   Neck: No palpable masses or adenopathy, no thyromegaly, no JVD, carotid pulses are full and equal bilaterally and without  Bruits.     Cardiovascular: Regular rhythm, S1 and S2 normal, no S3 or S4. Apical impulse not displaced. No murmurs, gallops, or rubs detected.     Pulmonary/Chest: Chest: normal symmetry, no tenderness to palpation, normal respiratory excursion, no intercostal retraction, no use of accessory muscles.            Pulmonary: He has wheezing bilaterally    Abdominal: Abdomen soft, bowel sounds normoactive, no masses, no hepatosplenomegaly, non-tender, no bruits.     Musculoskeletal: No deformities, clubbing, cyanosis, erythema, or edema observed. There are no spinal abnormalities noted. Normal muscle strength and tone. Pulses full and equal in all extremities, no bruits auscultated.     Neurological: No gross motor or sensory deficits noted, affect appropriate, oriented to time, person, place.     Skin: Warm and dry to the touch, no apparent skin lesions or masses noted.     Psychiatric: He has a normal mood and affect. His behavior is normal. Judgment and thought  content normal.         Procedures      Lab Results   Component Value Date    WBC 11.51 (H) 10/29/2021    HGB 12.9 (L) 10/29/2021    HCT 40.3 10/29/2021    MCV 88.4 10/29/2021     10/29/2021     Lab Results   Component Value Date    GLUCOSE 100 (H) 10/29/2021    BUN 25 (H) 10/29/2021    CREATININE 1.19 10/29/2021    EGFRIFNONA 58 (L) 10/29/2021    EGFRIFAFRI 85 09/13/2021    BCR 21.0 10/29/2021    CO2 24.0 10/29/2021    CALCIUM 9.0 10/29/2021    ALBUMIN 3.60 10/28/2021    AST 18 10/28/2021    ALT 19 10/28/2021     Lab Results   Component Value Date    CHOL 197 10/29/2021    CHOL 207 (H) 03/23/2021    CHOL 150 05/02/2019     Lab Results   Component Value Date    TRIG 236 (H) 10/29/2021    TRIG 137 03/23/2021    TRIG 174 (H) 05/02/2019     Lab Results   Component Value Date    HDL 32 (L) 10/29/2021    HDL 42 03/23/2021    HDL 26 (L) 05/02/2019     No components found for: LDLCALC  Lab Results   Component Value Date     (H) 10/29/2021     (H) 03/23/2021    LDL 87 05/02/2019     No results found for: HDLLDLRATIO  No components found for: CHOLHDL  Lab Results   Component Value Date    HGBA1C 6.50 (H) 10/29/2021     Lab Results   Component Value Date    TSH 2.280 02/13/2018           ASSESSMENT AND PLAN      Diagnoses and all orders for this visit:    1. Atrial fibrillation and flutter (HCC) (Primary)  -     ECG 12 Lead  -     Basic Metabolic Panel; Future  -     BNP    2. Coronary artery disease involving native coronary artery of native heart without angina pectoris  -     Basic Metabolic Panel; Future  -     BNP    3. Dyspnea on exertion  -     Basic Metabolic Panel; Future  -     BNP    4. Other emphysema (HCC)    Other orders  -     furosemide (LASIX) 40 MG tablet; Take 1 tablet by mouth Daily.  Dispense: 30 tablet; Refill: 11  -     ranolazine (RANEXA) 1000 MG 12 hr tablet; Take 1 tablet by mouth Every 12 (Twelve) Hours.  Dispense: 60 tablet; Refill: 11    His primary concern today is the  shortness of air.  He is taking Plavix and will not take aspirin.  Without this.  His ECG shows atrial fibrillation with moderate response.  There is hypertrophy and nonspecific ST-T wave changes.  He is back in atrial fibrillation.    I'm going to both his Ranexa and his furosemide.  I instructed him on to take his current ones at double dose and then the new prescription will be 1 time the dose.  We will go ahead and get laboratories including a BMP and BNP.    I am also going to refer him to Dr. Ledbetter her pulmonologist as he has wheezing and on inhalers but is not currently seeing pulmonologist.    Patient's Body mass index is 22.45 kg/m². indicating that he is within normal range (BMI 18.5-24.9). No BMI management plan needed..                Carine Johnson MD  11/23/2021  11:43 CST

## 2021-11-24 NOTE — TELEPHONE ENCOUNTER
Contacted PT per Dr. Johnson about results. Results are as follows; Patient labs look okay.  PT voiced understanding.      ----- Message from Carine Johnson MD sent at 11/23/2021 12:28 PM CST -----  Patient labs look okay

## 2021-11-30 NOTE — OUTREACH NOTE
AMI Week 4 Survey      Responses   [facility] patient discharged from? [place]   Does the patient have one of the following disease processes/diagnoses(primary or secondary)? Acute MI (STEMI,NSTEMI)   Week 4 attempt successful? Yes   Call start time 1313   Call end time 1316   Discharge diagnosis Myocardial infarction    Person spoke with today (if not patient) and relationship wife   Meds reviewed with patient/caregiver? Yes   Is the patient taking all medications as directed (includes completed medication regime)? Yes   Has the patient kept scheduled appointments due by today? Yes   Comments Pt continues with SOA but has improved.   What is the patient's perception of their health status since discharge? Improving   Is the patient/caregiver able to teach back signs and symptoms of when to call for help immediately: Sudden chest discomfort,  Sudden discomfort in arms, back, neck or jaw   Is the patient/caregiver able to teach back ways to prevent a second heart attack: Take medications,  Follow up with MD   Is the patient/caregiver able to teach back the hierarchy of who to call/visit for symptoms/problems? PCP, Specialist, Home health nurse, Urgent Care, ED, 911 Yes   Week 4 call completed? Yes   Would the patient like one additional call? No   Graduated Yes   Is the patient interested in additional calls from an ambulatory ?  NOTE:  applies to high risk patients requiring additional follow-up. No   Did the patient feel the follow up calls were helpful during their recovery period? Yes   Was the number of calls appropriate? Yes          Jessica Sierra RN

## 2021-12-01 PROBLEM — J18.9 PNEUMONIA OF BOTH LUNGS DUE TO INFECTIOUS ORGANISM: Status: ACTIVE | Noted: 2021-01-01

## 2021-12-01 NOTE — ED NOTES
Per Shari with home health patient has been having problems with his afib since last week. Is a patient of dr cool and dr scott. Has had medication adjustments for afib over the last week. C/o left arm pain, weakness. States when she first got there pt's hr was 107 and irregular. When EMS just got there, hr is now 46 and irregular.     Jeannine Dixon RN  12/01/21 1400

## 2021-12-01 NOTE — ED NOTES
Wet diaper changed, gown changed, skin cleansed, new diaper and under pad applied, pt repositioned in bed, wife at bedside      Balaji, Gracy HALE RN  12/01/21 [id number]

## 2021-12-01 NOTE — ED NOTES
Attempt to call report to [address] [place], nurse unable to accept call at this time due to being on another line taking report from CCU nurse     Gracy Carpio RN  12/01/21 [id number]

## 2021-12-01 NOTE — ED PROVIDER NOTES
"Subjective   Patient presents to the emergency department with chest pain and shortness of breath.  He was seen by home health today and was told he had \"a flutter\" and was told to come to the emergency department.  He does have a history of coronary artery disease with stent placement x5 and states that his last procedure was performed this past October.  He also c/o hypotension and states that he has been having increased sleep. Pt also admits to \"black lung\" and COPD.          Palpitations  Palpitations quality:  Unable to specify  Timing:  Intermittent  Progression:  Waxing and waning  Chronicity:  Recurrent  Associated symptoms: chest pain and shortness of breath    Associated symptoms: no cough, no diaphoresis, no dizziness, no nausea, no vomiting and no weakness    Risk factors: heart disease and hx of atrial fibrillation    Chest Pain  Pain location:  L lateral chest  Pain quality: aching    Duration:  1 day  Associated symptoms: fatigue, palpitations and shortness of breath    Associated symptoms: no abdominal pain, no cough, no diaphoresis, no dizziness, no dysphagia, no fever, no headache, no nausea, no vomiting and no weakness    Fatigue  Associated symptoms: chest pain, fatigue and shortness of breath    Associated symptoms: no abdominal pain, no congestion, no cough, no diarrhea, no ear pain, no fever, no headaches, no myalgias, no nausea, no rash, no rhinorrhea, no sore throat, no vomiting and no wheezing        Review of Systems   Constitutional: Positive for activity change and fatigue. Negative for appetite change, chills, diaphoresis and fever.   HENT: Negative for congestion, ear discharge, ear pain, nosebleeds, rhinorrhea, sinus pressure, sore throat and trouble swallowing.    Eyes: Negative for discharge and redness.   Respiratory: Positive for shortness of breath. Negative for apnea, cough, chest tightness and wheezing.    Cardiovascular: Positive for chest pain and palpitations. " "  Gastrointestinal: Negative for abdominal pain, diarrhea, nausea and vomiting.   Endocrine: Negative for polyuria.   Genitourinary: Negative for dysuria, frequency and urgency.   Musculoskeletal: Negative for myalgias and neck pain.   Skin: Negative for color change and rash.   Allergic/Immunologic: Negative for immunocompromised state.   Neurological: Negative for dizziness, seizures, syncope, weakness, light-headedness and headaches.   Hematological: Negative for adenopathy. Does not bruise/bleed easily.   Psychiatric/Behavioral: Negative for behavioral problems and confusion.   All other systems reviewed and are negative.      Past Medical History:   Diagnosis Date   • Basal cell carcinoma    • Bilateral carotid artery stenosis    • Bilateral carotid artery stenosis    • Bleeding disorder ([facility])    • CHF (congestive heart failure) ([facility])    • Chronic obstructive pulmonary disease (COPD) ([facility])    • Coronary arteriosclerosis    • Degenerative joint disease involving multiple joints    • Dementia ([facility])    • Diabetes mellitus ([facility])    • Heart problem    • History of stomach ulcers    • Hyperlipidemia    • Hypertension    • Ingrown toenail    • Myocardial infarction ([facility])        Allergies   Allergen Reactions   • Atorvastatin Anaphylaxis   • Penicillins Rash   • Azithromycin Rash   • Cefdinir Other (See Comments)     \"i burned up\" and break out in a rash   • Clarithromycin Rash and Itching   • Pravastatin Unknown - High Severity     Myalgia  Myalgia   • Benadryl Allergy [Diphenhydramine Hcl] Other (See Comments)     Pt states \"it knocks me out.\"       Past Surgical History:   Procedure Laterality Date   • BACK SURGERY     • CARDIAC CATHETERIZATION N/A 6/6/2017    Procedure: Right Heart Cath;  Surgeon: Jeff Maciel MD PhD;  Location: [facility] INVASIVE LOCATION;  Service:    • CARDIAC CATHETERIZATION N/A 2/14/2018    Procedure: Coronary angiography;  Surgeon: Moisés Davila MD;  Location: [facility] " INVASIVE LOCATION;  Service:    • CARDIAC CATHETERIZATION N/A 10/28/2021    Procedure: Left Heart Cath;  Surgeon: Carine Johnson MD;  Location: [facility] CATH INVASIVE LOCATION;  Service: Cardiology;  Laterality: N/A;   • CORONARY ANGIOPLASTY WITH STENT PLACEMENT     • CORONARY STENT PLACEMENT     • HERNIA REPAIR     • LUNG BIOPSY     • LUNG SURGERY     • NECK SURGERY     • THORACOTOMY Left 1977   • VENTRAL HERNIA REPAIR         Family History   Problem Relation Age of Onset   • Hypertension Father    • Heart disease Father    • Diabetes Father    • Diabetes Mother    • Lung disease Other    • Cancer Other        Social History     Socioeconomic History   • Marital status:    Tobacco Use   • Smoking status: Former Smoker   • Smokeless tobacco: Never Used   Vaping Use   • Vaping Use: Never used   Substance and Sexual Activity   • Alcohol use: No   • Drug use: No   • Sexual activity: Not Currently     Comment:            Objective   Physical Exam  Vitals and nursing note reviewed.   Constitutional:       Appearance: He is well-developed.   HENT:      Head: Normocephalic and atraumatic.      Nose: Nose normal.   Eyes:      General: No scleral icterus.        Right eye: No discharge.         Left eye: No discharge.      Conjunctiva/sclera: Conjunctivae normal.      Pupils: Pupils are equal, round, and reactive to light.   Neck:      Trachea: No tracheal deviation.   Cardiovascular:      Rate and Rhythm: Tachycardia present. Rhythm regularly irregular.      Heart sounds: Normal heart sounds. No murmur heard.      Pulmonary:      Effort: Pulmonary effort is normal. No respiratory distress.      Breath sounds: Normal breath sounds. No stridor. No wheezing or rales.   Abdominal:      General: Bowel sounds are normal. There is no distension.      Palpations: Abdomen is soft. There is no mass.      Tenderness: There is no abdominal tenderness. There is no guarding or rebound.   Musculoskeletal:      Cervical  back: Normal range of motion and neck supple.   Skin:     General: Skin is warm and dry.      Findings: No erythema or rash.   Neurological:      Mental Status: He is alert and oriented to person, place, and time.      Coordination: Coordination normal.   Psychiatric:         Behavior: Behavior normal.         Thought Content: Thought content normal.         ECG 12 Lead      Date/Time: 12/1/2021 5:14 PM  Performed by: Zack Castro MD  Authorized by: Zack Castro MD   Interpreted by physician  Rhythm: atrial fibrillation  BPM: 106  ST Segments: ST segments normal  ST Depression: I                   ED Course                   Labs Reviewed   TROPONIN (IN-HOUSE) - Abnormal; Notable for the following components:       Result Value    Troponin T 0.032 (*)     All other components within normal limits    Narrative:     Troponin T Reference Range:  <= 0.03 ng/mL-   Negative for AMI  >0.03 ng/mL-     Abnormal for myocardial necrosis.  Clinicians would have to utilize clinical acumen, EKG, Troponin and serial changes to determine if it is an Acute Myocardial Infarction or myocardial injury due to an underlying chronic condition.       Results may be falsely decreased if patient taking Biotin.     COMPREHENSIVE METABOLIC PANEL - Abnormal; Notable for the following components:    Glucose 120 (*)     All other components within normal limits    Narrative:     GFR Normal >60  Chronic Kidney Disease <60  Kidney Failure <15     CBC WITH AUTO DIFFERENTIAL - Abnormal; Notable for the following components:    WBC 13.38 (*)     Hemoglobin 12.5 (*)     RDW 18.0 (*)     RDW-SD 59.1 (*)     Neutrophil % 83.4 (*)     Lymphocyte % 10.5 (*)     Monocyte % 3.1 (*)     Immature Grans % 2.2 (*)     Neutrophils, Absolute 11.16 (*)     Immature Grans, Absolute 0.29 (*)     All other components within normal limits   COVID-19 AND FLU A/B, NP SWAB IN TRANSPORT MEDIA 8-12 HR TAT - Normal    Narrative:     Fact sheet for  providers: https://www.fda.gov/media/058666/download    Fact sheet for patients: https://www.fda.gov/media/077980/download    Test performed by PCR.   TROPONIN (IN-HOUSE) - Normal    Narrative:     Troponin T Reference Range:  <= 0.03 ng/mL-   Negative for AMI  >0.03 ng/mL-     Abnormal for myocardial necrosis.  Clinicians would have to utilize clinical acumen, EKG, Troponin and serial changes to determine if it is an Acute Myocardial Infarction or myocardial injury due to an underlying chronic condition.       Results may be falsely decreased if patient taking Biotin.     BNP (IN-HOUSE) - Normal    Narrative:     Among patients with dyspnea, NT-proBNP is highly sensitive for the detection of acute congestive heart failure. In addition NT-proBNP of <300 pg/ml effectively rules out acute congestive heart failure with 99% negative predictive value.    Results may be falsely decreased if patient taking Biotin.     PROTIME-INR - Normal    Narrative:     Therapeutic range for most indications is 2.0-3.0 INR,  or 2.5-3.5 for mechanical heart valves.   CK - Normal   MAGNESIUM - Normal   LACTIC ACID, PLASMA - Normal   BLOOD CULTURE   BLOOD CULTURE   RAINBOW DRAW    Narrative:     The following orders were created for panel order [facility] Draw.  Procedure                               Abnormality         Status                     ---------                               -----------         ------                     Green Top (Gel)[[id number]]                                  Final result               Lavender Top[[id number]]                                     Final result               Gold Top - SST[[id number]]                                   Final result               Light Blue Top[[id number]]                                   Final result                 Please view results for these tests on the individual orders.   CBC AND DIFFERENTIAL    Narrative:     The following orders were created for panel order CBC &  Differential.  Procedure                               Abnormality         Status                     ---------                               -----------         ------                     CBC Auto Differential[[id number]]        Abnormal            Final result                 Please view results for these tests on the individual orders.   GREEN TOP   LAVENDER TOP   GOLD TOP - SST   LIGHT BLUE TOP       XR Chest 1 View   Final Result   1.  Left upper lobe perihilar and left lung base infrahilar   interstitial groundglass opacities. Right upper lobe peripheral   small interstitial groundglass opacity. These opacities would be   suspicious for pneumonia including possible atypical viral   etiology and/or pulmonary edema. Recommend clinical correlation.      Electronically signed by:  Nelson Christianson MD  12/1/2021 4:06 PM [facility]   Workstation: [id number]                                             [facility]    Final diagnoses:   Pneumonia of both lungs due to infectious organism, unspecified part of lung   Precordial pain   Atrial fibrillation, unspecified type (HCC)       ED Disposition  ED Disposition     ED Disposition Condition Comment    Decision to Admit  Level of Care: Telemetry [5]   Diagnosis: Pneumonia of both lungs due to infectious organism, unspecified part of lung [[id number]]   Admitting Physician: RAVI MILLER [[id number]]   Attending Physician: RAVI MILLER [[id number]]            No follow-up provider specified.       Medication List      No changes were made to your prescriptions during this visit.          Zack Castro MD  12/01/21 [id number]

## 2021-12-02 NOTE — PROGRESS NOTES
[facility] Medicine Services  INPATIENT PROGRESS NOTE    Length of Stay: 0  Date of Admission: 12/1/2021  Primary Care Physician: Paolo Rey MD    Subjective   Chief Complaint: shortness of breath   HPI:      Had some chest pain this morning, now improved  Having shortness of breath    At baseline, can walk 4 miles without any problems  Was unable to walk 200 feet prior to coming in     Review of Systems   Constitutional: Negative for chills, diaphoresis and fever.   HENT: Negative for sneezing and sore throat.    Eyes: Negative for pain and discharge.   Respiratory: Positive for shortness of breath. Negative for cough.    Cardiovascular: Positive for chest pain.   Gastrointestinal: Negative for constipation, diarrhea, nausea and vomiting.   Endocrine: Negative for cold intolerance and heat intolerance.   Genitourinary: Negative for difficulty urinating, dysuria, frequency and urgency.   Musculoskeletal: Negative for arthralgias and myalgias.   Skin: Negative for color change and pallor.   Allergic/Immunologic: Negative for environmental allergies and food allergies.   Neurological: Positive for weakness. Negative for dizziness and syncope.   Psychiatric/Behavioral: Negative for confusion and sleep disturbance.        All pertinent negatives and positives are as above. All other systems have been reviewed and are negative unless otherwise stated.     Objective    Temp:  [96.6 °F (35.9 °C)-98.3 °F (36.8 °C)] 97.4 °F (36.3 °C)  Heart Rate:  [] 102  Resp:  [18-20] 20  BP: (101-153)/(30-91) 144/30    Physical Exam  Vitals reviewed.   Constitutional:       General: He is not in acute distress.     Appearance: He is well-developed.   HENT:      Head: Normocephalic and atraumatic.      Nose: Nose normal.   Eyes:      Conjunctiva/sclera: Conjunctivae normal.   Cardiovascular:      Rate and Rhythm: Normal rate and regular rhythm.   Pulmonary:      Effort: Pulmonary  effort is normal. No respiratory distress.      Breath sounds: Decreased breath sounds present. No wheezing or rales.   Musculoskeletal:         General: Normal range of motion.      Cervical back: Normal range of motion and neck supple.   Skin:     General: Skin is warm and dry.   Neurological:      Mental Status: He is alert and oriented to person, place, and time.   Psychiatric:         Behavior: Behavior normal.         Results Review:  I have reviewed the labs, radiology results, and diagnostic studies.    Laboratory Data:   Results from last 7 days   Lab Units 12/02/21  0526 12/01/21  1510   SODIUM mmol/L 138 139   POTASSIUM mmol/L 4.6 4.5   CHLORIDE mmol/L 103 105   CO2 mmol/L 26.0 26.0   BUN mg/dL 19 16   CREATININE mg/dL 1.16 1.06   GLUCOSE mg/dL 192* 120*   CALCIUM mg/dL 8.7 9.2   BILIRUBIN mg/dL 0.2 0.3   ALK PHOS U/L 56 61   ALT (SGPT) U/L 12 16   AST (SGOT) U/L 12 15   ANION GAP mmol/L 9.0 8.0     Estimated Creatinine Clearance: 42.7 mL/min (by C-G formula based on SCr of 1.16 mg/dL).  Results from last 7 days   Lab Units 12/01/21  1510   MAGNESIUM mg/dL 1.6         Results from last 7 days   Lab Units 12/02/21  0526 12/01/21  1510   WBC 10*3/mm3 10.27 13.38*   HEMOGLOBIN g/dL 11.6* 12.5*   HEMATOCRIT % 37.5 39.0   PLATELETS 10*3/mm3 168 184     Results from last 7 days   Lab Units 12/01/21  1626   INR  1.02       Culture Data:   No results found for: BLOODCX  No results found for: URINECX  No results found for: RESPCX  No results found for: WOUNDCX  No results found for: STOOLCX  No components found for: BODYFLD    Radiology Data:   Imaging Results (Last 24 Hours)     Procedure Component Value Units Date/Time    XR Chest 1 View [[id number]] Collected: 12/01/21 1515     Updated: 12/01/21 1607    Narrative:        PROCEDURE: Single chest view portable    REASON FOR EXAM:Chest Pain triage protocol  Chest Pain Triage Protocol    FINDINGS: Comparison exam dated October 25, 2021. Cardiac size  appears within  normal limits. Left upper lobe perihilar and left  lung base infrahilar interstitial groundglass opacities. Right  upper lobe peripheral small interstitial groundglass opacity.  Lungs are otherwise clear. Pleural spaces are normal. No acute  osseous abnormality. Stable epidural stimulator lead in the mid  thoracic spine region.      Impression:      1.  Left upper lobe perihilar and left lung base infrahilar  interstitial groundglass opacities. Right upper lobe peripheral  small interstitial groundglass opacity. These opacities would be  suspicious for pneumonia including possible atypical viral  etiology and/or pulmonary edema. Recommend clinical correlation.    Electronically signed by:  Nelson Christianson MD  12/1/2021 4:06 PM CST  Workstation: [id number]          I have reviewed the patient current medications.     Assessment/Plan     Active Hospital Problems    Diagnosis  POA   • Pneumonia of both lungs due to infectious organism [J18.9]  Yes       1.  Pneumonia  2.  Elevated troponin  3.  CAD with history of recent NSTEMI with 2 stents placed on 10/28/2021  4.  Paroxysmal A. fib not on long-term anticoagulation secondary to GI bleed, now with A Fib with RVR  5.  Diabetes mellitus type 2  6.  Benign hypertension  7.  Pulmonary fibrosis/COPD/coal miners pneumoconiosis.     -will restart home meds today  -consult Dr. Curtis for chest pain, atrial fibrillation    -IV Levaquin  -oxygen as needed, wean as tolerated   -RPP negative   -IV steroids  -duo nebs  -Home medications as tolerated  -Sliding scale insulin and adjust insulin dose based on blood sugars as elevations are expected with administration of steroids  -Hold heparin given history of GI bleed   -SCDs for prophylaxis  -Blood and sputum cultures. Blood culture likely a contaminant   -PT, OT     I have utilized all available immediate resources to obtain, update, or review the patient's current medications.       I confirmed that the patient's Advance Care Plan  is present, code status is documented, or surrogate decision maker is listed in the patient's medical record.       I discussed the patient's findings and my recommendations with: The patient and his wife              Discharge Planning: in progress    I confirmed that the patient's Advance Care Plan is present, code status is documented, or surrogate decision maker is listed in the patient's medical record.           This document has been electronically signed by Christal Gonzalez MD on December 2, 2021 09:21 CST

## 2021-12-02 NOTE — PLAN OF CARE
Goal Outcome Evaluation:  Plan of Care Reviewed With: patient        Progress: no change  Outcome Summary: Rate controlled overnight, no complaints at this time.

## 2021-12-02 NOTE — PLAN OF CARE
Goal Outcome Evaluation:  Plan of Care Reviewed With: patient           Outcome Summary: OT eval complete, co-eval with PT. Patient very pleasant.  - 115 upon arrival at rest. With functional mobility and activity HR up to 143. Supine<>sit with CGA. Sit to stand and toilet transfer with CGA and RW. Sitting EOB, patient requires mod A for donning/doffing socks. Supine in bed at end of session, wife present. Recommend home with assistance and home health OT. Cont inpatient OT to maximize independence in ADLs and transfers.

## 2021-12-02 NOTE — PLAN OF CARE
Goal Outcome Evaluation:  Plan of Care Reviewed With: patient           Outcome Summary: Initial PT evaluation complete, co-evaluation with OT.  Patient is alert and very cooperative.  Patient requires CGA with bed mobility, transfers and gait.  He ambulates 5'x2 with FWW, HR increased from 101 to 143 with activity.  Patient returned to bed, nurse and cardiologist updated.  Patient already has a 4WW at home, would benefit from HHPT upon discharge.  Goals established, continue skilled PT.

## 2021-12-02 NOTE — H&P
[facility] Medicine Admission      Date of Admission: 12/1/2021      Primary Care Physician: Paolo Rey MD      Chief Complaint: Shortness of breath    HPI: Mr. Ham is an 88-year-old gentleman with a history of chronic lung disease, CAD with non-STEMI on 10/25/2021 resulting in placement of 2 stents on 10/28/2021, diabetes mellitus type 2, benign hypertension who presents to the emergency room with complaints of increasing shortness of breath and general malaise over the last 3 to 4 days.  He states that he is not felt normal and been back to his baseline since his last hospitalization back at the end of October.  Over the last few days his began to decline rapidly with weakness, malaise, cough with sputum, shortness of breath.  His appetite is also declined.  He denies fevers or chills.  He denies peripheral edema.  He denies orthopnea.  He denies chest pain.      Concurrent Medical History:  has a past medical history of Basal cell carcinoma, Bilateral carotid artery stenosis, Bilateral carotid artery stenosis, Bleeding disorder (HCC), CHF (congestive heart failure) (HCC), Chronic obstructive pulmonary disease (COPD) (HCC), Coronary arteriosclerosis, Degenerative joint disease involving multiple joints, Dementia (HCC), Diabetes mellitus (HCC), Heart problem, History of stomach ulcers, Hyperlipidemia, Hypertension, Ingrown toenail, and Myocardial infarction ([facility]).    Past Surgical History:  has a past surgical history that includes Hernia repair; Lung biopsy; Ventral hernia repair; Thoracotomy (Left, 1977); Cardiac catheterization (N/A, 6/6/2017); Coronary stent placement; Neck surgery; Cardiac catheterization (N/A, 2/14/2018); Lung surgery; Back surgery; Coronary angioplasty with stent; and Cardiac catheterization (N/A, 10/28/2021).    Family History: family history includes Cancer in an other family member; Diabetes in his father and mother; Heart disease in  "his father; Hypertension in his father; Lung disease in an other family member.   Social History:  reports that he has quit smoking. He has never used smokeless tobacco. He reports that he does not drink alcohol and does not use drugs.    Allergies:   Allergies   Allergen Reactions   • Atorvastatin Anaphylaxis   • Penicillins Rash   • Azithromycin Rash   • Cefdinir Other (See Comments)     \"i burned up\" and break out in a rash   • Clarithromycin Rash and Itching   • Pravastatin Unknown - High Severity     Myalgia  Myalgia   • Benadryl Allergy [Diphenhydramine Hcl] Other (See Comments)     Pt states \"it knocks me out.\"       Medications:   Prior to Admission medications    Medication Sig Start Date End Date Taking? Authorizing Provider   acetaminophen (TYLENOL) 325 MG tablet Take 2 tablets by mouth Every 4 (Four) Hours As Needed for Mild Pain . 2/15/18   Jc Alba MD   albuterol (PROVENTIL) (2.5 MG/3ML) 0.083% nebulizer solution Take 2.5 mg by nebulization Every 4 (Four) Hours As Needed for Wheezing. 12/23/17   Adi Puente MD   Blood Glucose Monitoring Suppl (ONE TOUCH ULTRA 2) w/Device kit Daily. as directed 2/11/21   Bill Rader MD   Brovana 15 MCG/2ML nebulizer solution 3 (Three) Times a Day. 10/16/20   Bill Rader MD   cetirizine (zyrTEC) 10 MG tablet Take 10 mg by mouth Daily.    Bill Rader MD   clopidogrel (PLAVIX) 75 MG tablet Take 75 mg by mouth Daily. 2/3/16   Bill Rader MD   docusate sodium 100 MG capsule Take 100 mg by mouth.    Bill Rader MD   famotidine (PEPCID) 20 MG tablet Take 20 mg by mouth 2 (Two) Times a Day.    Bill Rader MD   fluticasone (FLONASE) 50 MCG/ACT nasal spray 2 sprays into each nostril Daily.    Bill Rader MD   Fluticasone Furoate (ARNUITY ELLIPTA) 200 MCG/ACT aerosol powder  Inhale.    Bill Rader MD   Fluticasone-Umeclidin-Vilant (Trelegy Ellipta) 100-62.5-25 MCG/INH aerosol powder  " Inhale 1 inhaler.    Bill Rader MD   furosemide (LASIX) 40 MG tablet Take 1 tablet by mouth Daily. 11/23/21   Carine Johnson MD   glimepiride (AMARYL) 2 MG tablet Take 2 mg by mouth Every Morning Before Breakfast.    Bill Rader MD   HYDROcodone-acetaminophen (NORCO) 7.5-325 MG per tablet Take 1 tablet by mouth Every 6 (Six) Hours As Needed for Moderate Pain . 6/21/21   Gauri Sweeney APRN   ipratropium (ATROVENT) 0.02 % nebulizer solution INHALE THE CONTENTS OF 1 VIAL IN NEBULIZER EVERY 4 HOURS AS NEEDED FOR WHEEZING 11/23/20   Bill Rader MD   ipratropium-albuterol (DUO-NEB) 0.5-2.5 mg/mL nebulizer Take 3 mL by nebulization 4 (Four) Times a Day. 9/28/17   Jc Alba MD   Lancets (OneTouch Delica Plus [id number]) misc 1 each by Other route Daily. 2/26/21   Bill Rader MD   lisinopril (PRINIVIL,ZESTRIL) 5 MG tablet Take 1 tablet by mouth Daily. 11/22/21   Mana Curtis MD   magnesium oxide (MAG-OX) 400 MG tablet Take 250 mg by mouth Daily.    Bill Rader MD   meclizine (ANTIVERT) 12.5 MG tablet 1 tablet po q 6 hr prn severe nausea 6/1/20   Bill Rader MD   nitroglycerin (NITROSTAT) 0.4 MG SL tablet place 1 tablet under the tongue if needed every 5 minutes for hair...  (REFER TO PRESCRIPTION NOTES). 1/11/17   Bill Rader MD   nystatin (MYCOSTATIN) 100,000 unit/mL suspension SWISH AND SWALLOW 2 ML EVERY 4 HOURS AS NEEDED 11/2/21   Bill Rader MD   O2 (OXYGEN) Inhale 2 L/min Every Night. W/ CPAP    Provider, Historical, MD   OneTouch Verio test strip TEST EVERY DAY 11/15/21   Bill Rader MD   polyethylene glycol (MIRALAX) 17 g packet Dissolve 1 packet (17 g) in 4 to 8 ounces of beverage and drink by mouth Daily. 6/30/21   Jc Alba MD   predniSONE (DELTASONE) 10 MG tablet Daily. 8/25/21   Provider, MD Bill   ranolazine (RANEXA) 1000 MG 12 hr tablet Take 1 tablet by mouth Every 12 (Twelve) Hours. 11/23/21    Carine Johnson MD   Red Yeast Rice 600 MG tablet Take 600 mg by mouth 2 (Two) Times a Day.    Provider, MD Bill   Umeclidinium-Vilanterol 62.5-25 MCG/INH aerosol powder  Inhale 1 puff Daily. 3/3/17   Brea Higginbotham MD       Review of Systems:  Review of Systems   Comprehensive review of systems completed.  Pertinent positives and negatives per HPI.  All others reviewed negative.  Physical Exam:   Temp:  [96.7 °F (35.9 °C)-98.3 °F (36.8 °C)] 96.7 °F (35.9 °C)  Heart Rate:  [] 104  Resp:  [18-20] 18  BP: (101-138)/(62-88) 115/77  Physical Exam  Constitutional:       General: He is not in acute distress.     Appearance: Normal appearance. He is not ill-appearing.   HENT:      Head: Normocephalic and atraumatic.      Right Ear: External ear normal.      Left Ear: External ear normal.      Nose: Nose normal.      Mouth/Throat:      Mouth: Mucous membranes are dry.   Eyes:      General: No scleral icterus.     Extraocular Movements: Extraocular movements intact.      Pupils: Pupils are equal, round, and reactive to light.   Cardiovascular:      Rate and Rhythm: Rhythm irregular.   Pulmonary:      Effort: Pulmonary effort is normal.      Breath sounds: Wheezing present.   Abdominal:      Palpations: Abdomen is soft.      Tenderness: There is no abdominal tenderness. There is no guarding.   Musculoskeletal:      Cervical back: Neck supple.      Right lower leg: No edema.      Left lower leg: No edema.   Skin:     General: Skin is warm and dry.      Findings: No rash.   Neurological:      General: No focal deficit present.      Mental Status: He is alert and oriented to person, place, and time. Mental status is at baseline.   Psychiatric:         Mood and Affect: Mood normal.         Behavior: Behavior normal.           Results Reviewed:  I have personally reviewed current lab, radiology, and data and agree with results.  Lab Results (last 24 hours)     Procedure Component Value Units Date/Time     "Procalcitonin [[id number]]  (Normal) Collected: 12/01/21 1720    Specimen: Blood Updated: 12/01/21 1836     Procalcitonin 0.07 ng/mL     Narrative:      As a Marker for Sepsis (Non-Neonates):     1. <0.5 ng/mL represents a low risk of severe sepsis and/or septic shock.  2. >2 ng/mL represents a high risk of severe sepsis and/or septic shock.    As a Marker for Lower Respiratory Tract Infections that require antibiotic therapy:  PCT on Admission     Antibiotic Therapy             6-12 Hrs later  >0.5                          Strongly Recommended            >0.25 - <0.5             Recommended  0.1 - 0.25                  Discouraged                       Remeasure/reassess PCT  <0.1                         Strongly Discouraged         Remeasure/reassess PCT      As 28 day mortality risk marker: \"Change in Procalcitonin Result\" (>80% or <=80%) if Day 0 (or Day 1) and Day 4 values are available. Refer to http://www.[employer]-calculator.com/    Change in PCT <=80 %   A decrease of PCT levels below or equal to 80% defines a positive change in PCT test result representing a higher risk for 28-day all-cause mortality of patients diagnosed with severe sepsis or septic shock.    Change in PCT >80 %   A decrease of PCT levels of more than 80% defines a negative change in PCT result representing a lower risk for 28-day all-cause mortality of patients diagnosed with severe sepsis or septic shock.                Troponin [[id number]]  (Normal) Collected: 12/01/21 1720    Specimen: Blood Updated: 12/01/21 1737     Troponin T 0.024 ng/mL     Narrative:      Troponin T Reference Range:  <= 0.03 ng/mL-   Negative for AMI  >0.03 ng/mL-     Abnormal for myocardial necrosis.  Clinicians would have to utilize clinical acumen, EKG, Troponin and serial changes to determine if it is an Acute Myocardial Infarction or myocardial injury due to an underlying chronic condition.       Results may be falsely decreased if patient taking Biotin.   "    COVID-19 and FLU A/B PCR - Swab, Nasopharynx [[id number]]  (Normal) Collected: 12/01/21 1625    Specimen: Swab from Nasopharynx Updated: 12/01/21 1650     COVID19 Not Detected     Influenza A PCR Not Detected     Influenza B PCR Not Detected    Narrative:      Fact sheet for providers: https://www.fda.gov/media/244712/download    Fact sheet for patients: https://www.fda.gov/media/859839/download    Test performed by PCR.    Protime-INR [[id number]]  (Normal) Collected: 12/01/21 1626    Specimen: Blood Updated: 12/01/21 1647     Protime 13.3 Seconds      INR 1.02    Narrative:      Therapeutic range for most indications is 2.0-3.0 INR,  or 2.5-3.5 for mechanical heart valves.    Lactic Acid, Plasma [[id number]]  (Normal) Collected: 12/01/21 1626    Specimen: Blood Updated: 12/01/21 1641     Lactate 1.4 mmol/L     Blood Culture - Blood, Arm, Left [[id number]] Collected: 12/01/21 1626    Specimen: Blood from Arm, Left Updated: 12/01/21 1626    Blood Culture - Blood, Hand, Right [[id number]] Collected: 12/01/21 1626    Specimen: Blood from Hand, Right Updated: 12/01/21 1626    [place] Draw [[id number]] Collected: 12/01/21 1510    Specimen: Blood Updated: 12/01/21 1616    Narrative:      The following orders were created for panel order [place] Draw.  Procedure                               Abnormality         Status                     ---------                               -----------         ------                     Green Top (Gel)[[id number]]                                  Final result               Lavender Top[[id number]]                                     Final result               Gold Top - SST[[id number]]                                   Final result               Light Blue Top[[id number]]                                   Final result                 Please view results for these tests on the individual orders.    Gold Top - SST [[id number]] Collected: 12/01/21 1510    Specimen: Blood Updated: 12/01/21 1616      Extra Tube Hold for add-ons.     Comment: Auto resulted.       Green Top (Gel) [[id number]] Collected: 12/01/21 1510    Specimen: Blood Updated: 12/01/21 1616     Extra Tube Hold for add-ons.     Comment: Auto resulted.       Lavender Top [[id number]] Collected: 12/01/21 1510    Specimen: Blood Updated: 12/01/21 1616     Extra Tube hold for add-on     Comment: Auto resulted       Light Blue Top [[id number]] Collected: 12/01/21 1510    Specimen: Blood Updated: 12/01/21 1616     Extra Tube hold for add-on     Comment: Auto resulted       CK [[id number]]  (Normal) Collected: 12/01/21 1510    Specimen: Blood Updated: 12/01/21 1613     Creatine Kinase 27 U/L     Magnesium [[id number]]  (Normal) Collected: 12/01/21 1510    Specimen: Blood Updated: 12/01/21 1613     Magnesium 1.6 mg/dL     Comprehensive Metabolic Panel [[id number]]  (Abnormal) Collected: 12/01/21 1510    Specimen: Blood Updated: 12/01/21 1536     Glucose 120 mg/dL      BUN 16 mg/dL      Creatinine 1.06 mg/dL      Sodium 139 mmol/L      Potassium 4.5 mmol/L      Chloride 105 mmol/L      CO2 26.0 mmol/L      Calcium 9.2 mg/dL      Total Protein 6.3 g/dL      Albumin 3.60 g/dL      ALT (SGPT) 16 U/L      AST (SGOT) 15 U/L      Alkaline Phosphatase 61 U/L      Total Bilirubin 0.3 mg/dL      eGFR Non African Amer 66 mL/min/1.73      Globulin 2.7 gm/dL      A/G Ratio 1.3 g/dL      BUN/Creatinine Ratio 15.1     Anion Gap 8.0 mmol/L     Narrative:      GFR Normal >60  Chronic Kidney Disease <60  Kidney Failure <15      Troponin [[id number]]  (Abnormal) Collected: 12/01/21 1510    Specimen: Blood Updated: 12/01/21 1536     Troponin T 0.032 ng/mL     Narrative:      Troponin T Reference Range:  <= 0.03 ng/mL-   Negative for AMI  >0.03 ng/mL-     Abnormal for myocardial necrosis.  Clinicians would have to utilize clinical acumen, EKG, Troponin and serial changes to determine if it is an Acute Myocardial Infarction or myocardial injury due to an underlying chronic  condition.       Results may be falsely decreased if patient taking Biotin.      BNP [[id number]]  (Normal) Collected: 12/01/21 1510    Specimen: Blood Updated: 12/01/21 1534     proBNP 920.0 pg/mL     Narrative:      Among patients with dyspnea, NT-proBNP is highly sensitive for the detection of acute congestive heart failure. In addition NT-proBNP of <300 pg/ml effectively rules out acute congestive heart failure with 99% negative predictive value.    Results may be falsely decreased if patient taking Biotin.      CBC & Differential [[id number]]  (Abnormal) Collected: 12/01/21 1510    Specimen: Blood Updated: 12/01/21 1514    Narrative:      The following orders were created for panel order CBC & Differential.  Procedure                               Abnormality         Status                     ---------                               -----------         ------                     CBC Auto Differential[[id number]]        Abnormal            Final result                 Please view results for these tests on the individual orders.    CBC Auto Differential [[id number]]  (Abnormal) Collected: 12/01/21 1510    Specimen: Blood Updated: 12/01/21 1514     WBC 13.38 10*3/mm3      RBC 4.29 10*6/mm3      Hemoglobin 12.5 g/dL      Hematocrit 39.0 %      MCV 90.9 fL      MCH 29.1 pg      MCHC 32.1 g/dL      RDW 18.0 %      RDW-SD 59.1 fl      MPV 11.4 fL      Platelets 184 10*3/mm3      Neutrophil % 83.4 %      Lymphocyte % 10.5 %      Monocyte % 3.1 %      Eosinophil % 0.4 %      Basophil % 0.4 %      Immature Grans % 2.2 %      Neutrophils, Absolute 11.16 10*3/mm3      Lymphocytes, Absolute 1.40 10*3/mm3      Monocytes, Absolute 0.42 10*3/mm3      Eosinophils, Absolute 0.05 10*3/mm3      Basophils, Absolute 0.06 10*3/mm3      Immature Grans, Absolute 0.29 10*3/mm3      nRBC 0.0 /100 WBC         Imaging Results (Last 24 Hours)     Procedure Component Value Units Date/Time    XR Chest 1 View [[id number]] Collected: 12/01/21 1515      Updated: 12/01/21 1607    Narrative:        PROCEDURE: Single chest view portable    REASON FOR EXAM:Chest Pain triage protocol  Chest Pain Triage Protocol    FINDINGS: Comparison exam dated October 25, 2021. Cardiac size  appears within normal limits. Left upper lobe perihilar and left  lung base infrahilar interstitial groundglass opacities. Right  upper lobe peripheral small interstitial groundglass opacity.  Lungs are otherwise clear. Pleural spaces are normal. No acute  osseous abnormality. Stable epidural stimulator lead in the mid  thoracic spine region.      Impression:      1.  Left upper lobe perihilar and left lung base infrahilar  interstitial groundglass opacities. Right upper lobe peripheral  small interstitial groundglass opacity. These opacities would be  suspicious for pneumonia including possible atypical viral  etiology and/or pulmonary edema. Recommend clinical correlation.    Electronically signed by:  Nelson Christianson MD  12/1/2021 4:06 PM CST  Workstation: [id number]            Assessment:    Active Hospital Problems    Diagnosis    • Pneumonia of both lungs due to infectious organism          Plan:    1.  Pneumonia  2.  Elevated troponin  3.  CAD with history of recent NSTEMI with 2 stents placed on 10/28/2021  4.  Paroxysmal A. fib not on long-term anticoagulation secondary to GI bleed  5.  Diabetes mellitus type 2  6.  Benign hypertension  7.  Pulmonary fibrosis/COPD/coal miners pneumoconiosis.    -IV Levaquin  -IV steroids  -Duo nebs  -O2  -Home medications as tolerated  -Sliding scale insulin and adjust insulin dose based on blood sugars as elevations are expected with administration of steroids  -Hold heparin 7 anticoagulants given history of GI bleed -SCDs for prophylaxis  -Blood and sputum cultures.    I have utilized all available immediate resources to obtain, update, or review the patient's current medications.      I confirmed that the patient's Advance Care Plan is present, code  status is documented, or surrogate decision maker is listed in the patient's medical record.      I discussed the patient's findings and my recommendations with: The patient and his wife    Kelvin Aguilar MD

## 2021-12-02 NOTE — THERAPY EVALUATION
Patient Name: Hasmukh Ham  : 1933    MRN: [id number]                              Today's Date: 2021       Admit Date: 2021    Visit Dx:     ICD-10-CM ICD-9-CM   1. Pneumonia of both lungs due to infectious organism, unspecified part of lung  J18.9 483.8   2. Precordial pain  R07.2 786.51   3. Atrial fibrillation, unspecified type ([facility])  I48.91 427.31   4. Impaired mobility and ADLs  Z74.09 V49.89    Z78.9    5. Impaired functional mobility, balance, gait, and endurance  Z74.09 V49.89     Patient Active Problem List   Diagnosis   • Dilated aortic root (HCC)   • Non-rheumatic tricuspid valve insufficiency   • Pulmonary emphysema ([facility])   • Atrial fibrillation and flutter ([facility])   • Bradycardia   • CAD (coronary artery disease)   • Neuropathy   • Abdominal hernia   • Neck pain   • Dementia (CMS/HCC)   • Diabetic neuropathy ([facility])   • Gastroesophageal reflux disease without esophagitis   • Generalized osteoarthritis   • 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 ([facility])   • Nocturia   • Osteoarthritis of multiple joints   • Hyperlipidemia   • Pain in joint involving ankle and foot   • Pneumonia of left lower lobe due to infectious organism   • Arthropathy of hand   • Paroxysmal tachycardia ([facility])   • Osteoarthrosis involving more than one site but not generalized   • Chronic right shoulder pain   • Rotator cuff syndrome   • Partial tear of subscapularis tendon   • Infraspinatus tendon tear   • Supraspinatus tendon tear   • Bilateral carotid artery stenosis   • Pulmonary HTN (HCC)   • Acute interstitial pneumonia (HCC)   • Chronic obstructive lung disease (HCC)   • Diabetes mellitus (HCC)   • Hypertension   • Chest pain   • Shortness of breath   • Angina pectoris (HCC)   • Myocardial infarction (HCC)   • Ingrown toenail   • Heart problem   • Degenerative joint disease involving multiple joints   • Chronic obstructive pulmonary disease (COPD) (HCC)   • Bleeding disorder ([facility])   • Chronic  obstructive pulmonary disease with acute exacerbation (HCC)   • Failure of outpatient treatment   • Sepsis (HCC)   • 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)   • Chronic diastolic CHF (congestive heart failure) (HCC)   • SOB (shortness of breath)   • Cause of injury, fall   • Right hip pain   • Chronic pain of right knee   • Primary osteoarthritis of right knee   • Left shoulder pain   • Left arm pain   • AC joint arthropathy   • Syncope   • Inflammatory arthritis   • Elevated troponin   • Fever   • Paroxysmal atrial fibrillation with rapid ventricular response ([facility])   • Fibrosis of lung (HCC)   • Type 2 diabetes mellitus (HCC)   • Chondrocalcinosis of right knee   • Nontraumatic complete tear of right rotator cuff   • Pneumonia of both lungs due to infectious organism     Past Medical History:   Diagnosis Date   • Basal cell carcinoma    • Bilateral carotid artery stenosis    • Bilateral carotid artery stenosis    • Bleeding disorder (HCC)    • CHF (congestive heart failure) ([facility])    • Chronic obstructive pulmonary disease (COPD) ([facility])    • Coronary arteriosclerosis    • Degenerative joint disease involving multiple joints    • Dementia ([facility])    • Diabetes mellitus ([facility])    • Heart problem    • History of stomach ulcers    • Hyperlipidemia    • Hypertension    • Ingrown toenail    • Myocardial infarction ([facility])      Past Surgical History:   Procedure Laterality Date   • BACK SURGERY     • CARDIAC CATHETERIZATION N/A 6/6/2017    Procedure: Right Heart Cath;  Surgeon: Jeff Maciel MD PhD;  Location: [facility] CATH INVASIVE LOCATION;  Service:    • CARDIAC CATHETERIZATION N/A 2/14/2018    Procedure: Coronary angiography;  Surgeon: Moisés Davila MD;  Location: [facility] CATH INVASIVE LOCATION;  Service:    • CARDIAC CATHETERIZATION N/A 10/28/2021    Procedure: Left Heart Cath;  Surgeon: Carine Johnson MD;  Location: [facility] CATH INVASIVE LOCATION;  Service: Cardiology;  Laterality: N/A;    • CORONARY ANGIOPLASTY WITH STENT PLACEMENT     • CORONARY STENT PLACEMENT     • HERNIA REPAIR     • LUNG BIOPSY     • LUNG SURGERY     • NECK SURGERY     • THORACOTOMY Left 1977   • VENTRAL HERNIA REPAIR        General Information     Row Name 12/02/21 1246          Physical Therapy Time and Intention    Mode of Treatment physical therapy; occupational therapy; co-treatment  -     Row Name 12/02/21 1246          General Information    Patient Profile Reviewed yes  -CZ     Prior Level of Function independent:; all household mobility  -CZ     Existing Precautions/Restrictions fall; oxygen therapy device and L/min  -CZ     Barriers to Rehab medically complex  -     Row Name 12/02/21 1246          Living Environment    Lives With spouse  -     Row Name 12/02/21 1246          Home Main Entrance    Number of Stairs, Main Entrance other (see comments)  -     Row Name 12/02/21 1246          Stairs Within Home, Primary    Stairs, Within Home, Primary Rampt to enter, ambulation with 4WW. Hospital bed.  -CZ     Number of Stairs, Within Home, Primary none  -     Row Name 12/02/21 1246          Cognition    Orientation Status (Cognition) oriented x 4; verbal cues/prompts needed for orientation  -     Row Name 12/02/21 1246          Safety Issues, Functional Mobility    Impairments Affecting Function (Mobility) strength; endurance/activity tolerance; shortness of breath; balance; pain  -           User Key  (r) = Recorded By, (t) = Taken By, (c) = Cosigned By    Initials Name Provider Type    CZ Bud Lovelace, PT Physical Therapist               Mobility     Row Name 12/02/21 1246          Bed Mobility    Bed Mobility supine-sit; sit-supine; scooting/bridging  -CZ     Scooting/Bridging [place] (Bed Mobility) maximum assist (25% patient effort); 2 person assist  -CZ     Supine-Sit [place] (Bed Mobility) contact guard  -CZ     Sit-Supine [place] (Bed Mobility) contact guard  -CZ     Assistive  Device (Bed Mobility) draw sheet; head of bed elevated; bed rails  -CZ     Row Name 12/02/21 1246          Sit-Stand Transfer    Sit-Stand [place] (Transfers) contact guard  -CZ     Assistive Device (Sit-Stand Transfers) walker, front-wheeled  -CZ     Row Name 12/02/21 1246          Gait/Stairs (Locomotion)    [place] Level (Gait) contact guard  -CZ     Assistive Device (Gait) walker, front-wheeled  -CZ     Distance in Feet (Gait) 5'x2  -CZ     Comment (Gait/Stairs) HR increased to 146 with ambulation to bathroom.  -CZ           User Key  (r) = Recorded By, (t) = Taken By, (c) = Cosigned By    Initials Name Provider Type    CZ Bud Lovelace, PT Physical Therapist               Obj/Interventions     Row Name 12/02/21 1246          Range of Motion Comprehensive    General Range of Motion bilateral lower extremity ROM WFL  -CZ     Row Name 12/02/21 1246          Strength Comprehensive (MMT)    Comment, General Manual Muscle Testing (MMT) Assessment BLEs: 3/5 grossly. Very weak.  -CZ     Row Name 12/02/21 1246          Sensory Assessment (Somatosensory)    Sensory Assessment (Somatosensory) other (see comments)  Decreased sensation B feet.  -CZ           User Key  (r) = Recorded By, (t) = Taken By, (c) = Cosigned By    Initials Name Provider Type    Bud Alfaro, PT Physical Therapist               Goals/Plan     Row Name 12/02/21 1246          Bed Mobility Goal 1 (PT)    Activity/Assistive Device (Bed Mobility Goal 1, PT) sit to supine/supine to sit  -CZ     [place] Level/Cues Needed (Bed Mobility Goal 1, PT) modified independence  -CZ     Time Frame (Bed Mobility Goal 1, PT) by discharge  -CZ     Strategies/Barriers (Bed Mobility Goal 1, PT) Has hospital bed at home.  -CZ     Progress/Outcomes (Bed Mobility Goal 1, PT) goal not met  -CZ     Row Name 12/02/21 1246          Transfer Goal 1 (PT)    Activity/Assistive Device (Transfer Goal 1, PT) sit-to-stand/stand-to-sit;  bed-to-chair/chair-to-bed  -CZ     [place] Level/Cues Needed (Transfer Goal 1, PT) supervision required  -CZ     Time Frame (Transfer Goal 1, PT) by discharge  -CZ     Strategies/Barriers (Transfers Goal 1, PT) A-christine tachmolly.  -CZ     Row Name 12/02/21 1246          Gait Training Goal 1 (PT)    Activity/Assistive Device (Gait Training Goal 1, PT) walker, rolling  -CZ     [place] Level (Gait Training Goal 1, PT) supervision required  -CZ     Distance (Gait Training Goal 1, PT) 25'x2 as tolerated.  -CZ     Time Frame (Gait Training Goal 1, PT) by discharge  -CZ     Strategies/Barriers (Gait Training Goal 1, PT) A-fib, tachy.  -CZ     Progress/Outcome (Gait Training Goal 1, PT) goal not met  -CZ           User Key  (r) = Recorded By, (t) = Taken By, (c) = Cosigned By    Initials Name Provider Type    CZ Bud Lovelace, PT Physical Therapist               Clinical Impression     Row Name 12/02/21 1246          Pain    Additional Documentation Pain Scale: Numbers Pre/Post-Treatment (Group)  -CZ     Row Name 12/02/21 1246          Pain Scale: Numbers Pre/Post-Treatment    Pretreatment Pain Rating 9/10  -CZ     Pain Location - Side Bilateral  -CZ     Pain Location shoulder  -CZ     Pre/Posttreatment Pain Comment B shoulder pain (chronic), reports chest pain is decreased.  -CZ     Pain Intervention(s) Repositioned; Ambulation/increased activity; Distraction  -CZ     Row Name 12/02/21 1240          Plan of Care Review    Plan of Care Reviewed With patient  -CZ     Outcome Summary Initial PT evaluation complete, co-evaluation with OT.  Patient is alert and very cooperative.  Patient requires CGA with bed mobility, transfers and gait.  He ambulates 5'x2 with FWW, HR increased from 101 to 143 with activity.  Patient returned to bed, nurse and cardiologist updated.  Patient already has a 4WW at home, would benefit from HHPT upon discharge.  Goals established, continue skilled PT.  -CZ     Row Name 12/02/21 1249           Therapy Assessment/Plan (PT)    Rehab Potential (PT) good, to achieve stated therapy goals  -CZ     Criteria for Skilled Interventions Met (PT) yes; skilled treatment is necessary  -CZ     Row Name 12/02/21 1246          Vital Signs    Pre Systolic BP Rehab 165  -CZ     Pre Treatment Diastolic BP 71  -CZ     Post Systolic BP Rehab 136  -CZ     Post Treatment Diastolic BP 99  -CZ     Pretreatment Heart Rate (beats/min) 101  -CZ     Intratreatment Heart Rate (beats/min) 143  -CZ     Posttreatment Heart Rate (beats/min) 136  -CZ     Pre SpO2 (%) 98  -CZ     O2 Delivery Pre Treatment nasal cannula  -CZ     Post SpO2 (%) 98  -CZ     O2 Delivery Post Treatment nasal cannula  -CZ     Pre Patient Position Supine  -CZ     Post Patient Position Sitting  -CZ     Row Name 12/02/21 1246          Positioning and Restraints    Pre-Treatment Position in bed  -CZ     Post Treatment Position bed  -CZ     In Bed supine; call light within reach; encouraged to call for assist; exit alarm on  -CZ           User Key  (r) = Recorded By, (t) = Taken By, (c) = Cosigned By    Initials Name Provider Type    CZ Bud Lovelace, PT Physical Therapist               Outcome Measures     Row Name 12/02/21 1246          How much help from another person do you currently need...    Turning from your back to your side while in flat bed without using bedrails? 3  -CZ     Moving from lying on back to sitting on the side of a flat bed without bedrails? 3  -CZ     Moving to and from a bed to a chair (including a wheelchair)? 3  -CZ     Standing up from a chair using your arms (e.g., wheelchair, bedside chair)? 3  -CZ     Climbing 3-5 steps with a railing? 3  -CZ     To walk in hospital room? 3  -CZ     AM-PAC 6 Clicks Score (PT) 18  -CZ     Row Name 12/02/21 1246 12/02/21 1245       Functional Assessment    Outcome Measure Options AM-PAC 6 Clicks Basic Mobility (PT)  -CZ AM-PAC 6 Clicks Daily Activity (OT)  -SJ          User Key  (r) = Recorded By,  (t) = Taken By, (c) = Cosigned By    Initials Name Provider Type    CZ Bud Lovelace, PT Physical Therapist    Israel Torres, OT Occupational Therapist                             Physical Therapy Education                 Title: PT OT SLP Therapies (In Progress)     Topic: Physical Therapy (Not Started)     Point: Mobility training (Not Started)     Learner Progress:  Not documented in this visit.          Point: Home exercise program (Not Started)     Learner Progress:  Not documented in this visit.          Point: Body mechanics (Not Started)     Learner Progress:  Not documented in this visit.          Point: Precautions (Not Started)     Learner Progress:  Not documented in this visit.                          PT Recommendation and Plan  Planned Therapy Interventions (PT): balance training, bed mobility training, gait training, patient/family education, transfer training, strengthening, stretching  Plan of Care Reviewed With: patient  Outcome Summary: Initial PT evaluation complete, co-evaluation with OT.  Patient is alert and very cooperative.  Patient requires CGA with bed mobility, transfers and gait.  He ambulates 5'x2 with FWW, HR increased from 101 to 143 with activity.  Patient returned to bed, nurse and cardiologist updated.  Patient already has a 4WW at home, would benefit from HHPT upon discharge.  Goals established, continue skilled PT.     Time Calculation:    PT Charges     Row Name 12/02/21 1326             Time Calculation    Start Time 1246  -CZ      Stop Time 1315  -CZ      Time Calculation (min) 29 min  -CZ      PT Received On 12/02/21  -CZ      PT Goal Re-Cert Due Date 12/15/21  -CZ              Untimed Charges    PT Eval/Re-eval Minutes 29  -CZ              Total Minutes    Untimed Charges Total Minutes 29  -CZ       Total Minutes 29  -CZ            User Key  (r) = Recorded By, (t) = Taken By, (c) = Cosigned By    Initials Name Provider Type    CZ Bud Lovelace, PT Physical  Therapist              Therapy Charges for Today     Code Description Service Date Service Provider Modifiers Qty    [id number] HC PT EVAL MOD COMPLEXITY 2 12/2/2021 Bud Lovelace, PT GP 1          PT G-Codes  Outcome Measure Options: AM-PAC 6 Clicks Basic Mobility (PT)  AM-PAC 6 Clicks Score (PT): 18  AM-PAC 6 Clicks Score (OT): 19    Bud Lovelace, JASMINA  12/2/2021

## 2021-12-02 NOTE — CONSULTS
Cardiology at [facility]  Cardiovascular Consultation Note      Hasmukh Ham  319/1  [id number]  6/1/1933    DATE OF ADMISSION: 12/1/2021  DATE OF CONSULTATION:  12/2/2021    Paolo Rey MD  Treatment Team:   Attending Provider: Christal Gonzalez MD  Consulting Physician: Christal Gonzalez MD  Consulting Physician: Mana Curtis MD  Admitting Provider: Christal Gonzalez MD    Chief Complaint   Patient presents with   • Palpitations   • Chest Pain   • Fatigue       Chief complaint/ Reason for Consultation: Atrial fibrillation with rapid ventricular rate associated mild chest discomfort admitted with pneumonia and shortness of breath      History of Present Illness  Body mass index is 23.68 kg/m². BMI is above normal parameters. Recommendations include: exercise counseling, nutrition counseling and referral to primary care.    88 years old patient admitted for evaluations of shortness of breath generalized weak and palpitation and mild chest discomfort get better with improvement heart rate however at the time of evaluation his heart rate 140 bpm he is not on any IV medications.  Patient with history of GI bleed and AV malformation and with a history of significant GI bleed not a candidate for long-term oral anticoagulation which is documented previously.  Patient also has a history of frequent fall and orthostasis.  Initial troponin was nondiagnostic repeat was within normal range.  He has mild shortness of breath.  Chest x-ray reported pneumonia.  Requested to evaluate this patient.  His main complaint is generalized weakness fatigue and lack of energy shortness of breath and palpitations.  He does have history of CAD s/p PCI in October 2021 mid LAD and with critical stenosis of RCA recommend medical management previous history of stent to LAD, history of congestive heart failure in the base of preserved left ventricle systolic function.  His wife was present in the room  at the time of evaluation.  No recent syncope was reported.  Recently has marginal hemodynamics          Cardiac cath October 2021     · Mid LAD lesion is 95% stenosed tandem lesions .Successfully treated with new 2 x 18 mm Resolute JENNA.Final lesion was 0%.  · Prox Cx lesion is 30% stenosed.  · Mid RCA lesion is 100% stenosed.  · LV pressures (S/D/E) : 118/0/7 mmHg  · The ejection fraction was 40-50% by visual estimate.  · Mild systolic dysfunction.     New JENNA to mid LAD.  DAPT for 1 year.     Echo October 2021        · Significant beat to beat variability but estimated EF appears around 40-45%.  · Left ventricular systolic function is mildly decreased.  · Estimated right ventricular systolic pressure from tricuspid regurgitation is normal (<35 mmHg).  · Left ventricular diastolic function was not assessed.  · Left atrial volume is moderately increased.        Conclusion :Cath 2/2018      Successful PCI to the mid LAD for in-stent restenosis with a 2.25 x 23 Xience Alpine stent reducing 90% stenosis to 0%  Right coronary artery:  Proximally is okay, mid to distal his 100% occluded, distally getting collaterals from the AV circumflex     ECHO !@/2018  · Left ventricular wall thickness is consistent with mild concentric hypertrophy.  · Estimated EF = 68%. No wall motion abnormalities  · Left ventricular systolic function is normal.  · Left atrial cavity size is mildly dilated.  · Mild dilation of the aortic root is present      Past Medical History:   Diagnosis Date   • Basal cell carcinoma    • Bilateral carotid artery stenosis    • Bilateral carotid artery stenosis    • Bleeding disorder ([facility])    • CHF (congestive heart failure) ([facility])    • Chronic obstructive pulmonary disease (COPD) ([facility])    • Coronary arteriosclerosis    • Degenerative joint disease involving multiple joints    • Dementia ([facility])    • Diabetes mellitus ([facility])    • Heart problem    • History of stomach ulcers    • Hyperlipidemia    • Hypertension   "  • Ingrown toenail    • Myocardial infarction (HCC)        Past Surgical History:   Procedure Laterality Date   • BACK SURGERY     • CARDIAC CATHETERIZATION N/A 6/6/2017    Procedure: Right Heart Cath;  Surgeon: Jeff Maciel MD PhD;  Location: [facility] CATH INVASIVE LOCATION;  Service:    • CARDIAC CATHETERIZATION N/A 2/14/2018    Procedure: Coronary angiography;  Surgeon: Moisés Davila MD;  Location: [facility] CATH INVASIVE LOCATION;  Service:    • CARDIAC CATHETERIZATION N/A 10/28/2021    Procedure: Left Heart Cath;  Surgeon: Carine Johnson MD;  Location: [facility] CATH INVASIVE LOCATION;  Service: Cardiology;  Laterality: N/A;   • CORONARY ANGIOPLASTY WITH STENT PLACEMENT     • CORONARY STENT PLACEMENT     • HERNIA REPAIR     • LUNG BIOPSY     • LUNG SURGERY     • NECK SURGERY     • THORACOTOMY Left 1977   • VENTRAL HERNIA REPAIR         Allergies   Allergen Reactions   • Atorvastatin Anaphylaxis   • Penicillins Rash   • Azithromycin Rash   • Cefdinir Other (See Comments)     \"i burned up\" and break out in a rash   • Clarithromycin Rash and Itching   • Pravastatin Unknown - High Severity     Myalgia  Myalgia   • Benadryl Allergy [Diphenhydramine Hcl] Other (See Comments)     Pt states \"it knocks me out.\"       No current facility-administered medications on file prior to encounter.     Current Outpatient Medications on File Prior to Encounter   Medication Sig Dispense Refill   • albuterol (PROVENTIL) (2.5 MG/3ML) 0.083% nebulizer solution Take 2.5 mg by nebulization Every 4 (Four) Hours As Needed for Wheezing. 125 vial 0   • Brovana 15 MCG/2ML nebulizer solution 3 (Three) Times a Day.     • clopidogrel (PLAVIX) 75 MG tablet Take 75 mg by mouth Daily.     • famotidine (PEPCID) 20 MG tablet Take 20 mg by mouth 2 (Two) Times a Day.     • fluticasone (FLONASE) 50 MCG/ACT nasal spray 2 sprays into each nostril Daily.     • furosemide (LASIX) 40 MG tablet Take 1 tablet by mouth Daily. (Patient taking " differently: Take 40 mg by mouth 2 (Two) Times a Day.) 30 tablet 11   • glimepiride (AMARYL) 2 MG tablet Take 2 mg by mouth Every Morning Before Breakfast.     • lisinopril (PRINIVIL,ZESTRIL) 5 MG tablet Take 1 tablet by mouth Daily. 90 tablet 3   • magnesium oxide (MAG-OX) 400 MG tablet Take 250 mg by mouth Daily.     • Red Yeast Rice 600 MG tablet Take 600 mg by mouth 2 (Two) Times a Day.     • acetaminophen (TYLENOL) 325 MG tablet Take 2 tablets by mouth Every 4 (Four) Hours As Needed for Mild Pain .     • Blood Glucose Monitoring Suppl (ONE TOUCH ULTRA 2) w/Device kit Daily. as directed     • cetirizine (zyrTEC) 10 MG tablet Take 10 mg by mouth Daily.     • docusate sodium 100 MG capsule Take 100 mg by mouth.     • Fluticasone Furoate (ARNUITY ELLIPTA) 200 MCG/ACT aerosol powder  Inhale.     • Fluticasone-Umeclidin-Vilant (Trelegy Ellipta) 100-62.5-25 MCG/INH aerosol powder  Inhale 1 inhaler.     • HYDROcodone-acetaminophen (NORCO) 7.5-325 MG per tablet Take 1 tablet by mouth Every 6 (Six) Hours As Needed for Moderate Pain . 12 tablet 0   • ipratropium (ATROVENT) 0.02 % nebulizer solution INHALE THE CONTENTS OF 1 VIAL IN NEBULIZER EVERY 4 HOURS AS NEEDED FOR WHEEZING     • ipratropium-albuterol (DUO-NEB) 0.5-2.5 mg/mL nebulizer Take 3 mL by nebulization 4 (Four) Times a Day. 120 vial 1   • Lancets (OneTouch Delica Plus [id number]) misc 1 each by Other route Daily.     • meclizine (ANTIVERT) 12.5 MG tablet 1 tablet po q 6 hr prn severe nausea     • nitroglycerin (NITROSTAT) 0.4 MG SL tablet place 1 tablet under the tongue if needed every 5 minutes for hair...  (REFER TO PRESCRIPTION NOTES).  0   • nystatin (MYCOSTATIN) 100,000 unit/mL suspension SWISH AND SWALLOW 2 ML EVERY 4 HOURS AS NEEDED     • O2 (OXYGEN) Inhale 2 L/min Every Night. W/ CPAP     • OneTouch Verio test strip TEST EVERY DAY     • polyethylene glycol (MIRALAX) 17 g packet Dissolve 1 packet (17 g) in 4 to 8 ounces of beverage and drink by mouth  "Daily. 30 packet 1   • predniSONE (DELTASONE) 10 MG tablet Daily.     • ranolazine (RANEXA) 1000 MG 12 hr tablet Take 1 tablet by mouth Every 12 (Twelve) Hours. 60 tablet 11   • Umeclidinium-Vilanterol 62.5-25 MCG/INH aerosol powder  Inhale 1 puff Daily. 1 each 11       Social History     Socioeconomic History   • Marital status:    Tobacco Use   • Smoking status: Former Smoker   • Smokeless tobacco: Never Used   Vaping Use   • Vaping Use: Never used   Substance and Sexual Activity   • Alcohol use: No   • Drug use: No   • Sexual activity: Not Currently     Comment:            Review of Systems:     Constitution: Generalized weakness fatigue lack of energy    HENT:  Denies any headache, hearing impairment.    Eyes:  Denies any blurring of vision, or photophobia.     Cardiovascular:  As per history of present illness.     Respiratory: History of COPD shortness of breath     Endocrine: No polydipsia polyuria       Musculoskeletal: There was history of osteoarthritis s/p arthrocentesis in the past    Gastrointestinal: Extensive history of GI bleed AV malformation documented previously  Genitourinary:  No dysuria or hematuria.    Neurological:  No history of seizure disorder, stroke, or memory problems.    Psychiatric/Behavioral:  No history of depression, bipolar disorder or schizophrenia.     Hematological:  No history of easy bruising.         Objective:     Vitals:    12/02/21 0831 12/02/21 1037 12/02/21 1045 12/02/21 1103   BP:    147/82   BP Location:    Left arm   Patient Position:    Lying   Pulse: 102 92 88 67   Resp:  20 20 20   Temp:    97.2 °F (36.2 °C)   TempSrc:    Oral   SpO2:  99%  96%   Weight:       Height:         Body mass index is 23.68 kg/m².  Flowsheet Rows      First Filed Value   Admission Height 171.5 cm (67.5\") Documented at 12/01/2021 1451   Admission Weight 68 kg (150 lb) Documented at 12/01/2021 1451          Intake/Output Summary (Last 24 hours) at 12/2/2021 1340  Last data " filed at 12/2/2021 1256  Gross per 24 hour   Intake 2790 ml   Output 1700 ml   Net 1090 ml         Physical Exam   Constitutional: Cooperative, alert and oriented, well developed, well nourished,  mild shortness of    HENT:   Head: Normocephalic, conjunctivae is a pink, thyroid is nonpalpable no carotid bruit and trachea central.     Cardiovascular: Tachycardia with regular rate rhythm no S3 no pericardial rub    Pulmonary/Chest: Bilateral decreased air entry coarse crackle no rales    Abdominal: Abdomen soft, bowel sounds normoactive, no masses.    Musculoskeletal: No deformities, clubbing, cyanosis, erythema. Positive mild edema.    Neurological: No gross motor or sensory deficits noted    Skin: Warm and dry to the touch, no apparent skin lesions .     Psychiatric: Normal mood and affect. Behavior is normal.    Radiology Review    XR Chest 1 View   Final Result   1.  Left upper lobe perihilar and left lung base infrahilar   interstitial groundglass opacities. Right upper lobe peripheral   small interstitial groundglass opacity. These opacities would be   suspicious for pneumonia including possible atypical viral   etiology and/or pulmonary edema. Recommend clinical correlation.      Electronically signed by:  Nelson Christianson MD  12/1/2021 4:06 PM CST   Workstation: [id number]          Lab Results:  Lab Results (last 24 hours)     Procedure Component Value Units Date/Time    Blood Culture ID, PCR - Blood, Arm, Left [[id number]] Collected: 12/01/21 1626    Specimen: Blood from Arm, Left Updated: 12/02/21 1210    Respiratory Panel PCR w/COVID-19(SARS-CoV-2) JOSH/KVNG/RADHA/PAD/COR/MAD/WILMAN In-House, NP Swab in UTM/VTM, 3-4 HR TAT - Swab, Nasopharynx [[id number]]  (Normal) Collected: 12/02/21 0930    Specimen: Swab from Nasopharynx Updated: 12/02/21 1142     ADENOVIRUS, PCR Not Detected     Coronavirus 229E Not Detected     Coronavirus HKU1 Not Detected     Coronavirus NL63 Not Detected     Coronavirus OC43 Not Detected      COVID19 Not Detected     Human Metapneumovirus Not Detected     Human Rhinovirus/Enterovirus Not Detected     Influenza A PCR Not Detected     Influenza B PCR Not Detected     Parainfluenza Virus 1 Not Detected     Parainfluenza Virus 2 Not Detected     Parainfluenza Virus 3 Not Detected     Parainfluenza Virus 4 Not Detected     RSV, PCR Not Detected     Bordetella pertussis pcr Not Detected     Bordetella parapertussis PCR Not Detected     Chlamydophila pneumoniae PCR Not Detected     Mycoplasma pneumo by PCR Not Detected    Narrative:      In the setting of a positive respiratory panel with a viral infection PLUS a negative procalcitonin without other underlying concern for bacterial infection, consider observing off antibiotics or discontinuation of antibiotics and continue supportive care. If the respiratory panel is positive for atypical bacterial infection (Bordetella pertussis, Chlamydophila pneumoniae, or Mycoplasma pneumoniae), consider antibiotic de-escalation to target atypical bacterial infection.    POC Glucose Once [[id number]]  (Abnormal) Collected: 12/02/21 1029    Specimen: Blood Updated: 12/02/21 1050     Glucose 252 mg/dL      Comment: RN NotifiedOperator: [id number] KEYONA TIFFANYMeter ID: [id number]       POC Glucose Once [[id number]]  (Abnormal) Collected: 12/02/21 0607    Specimen: Blood Updated: 12/02/21 0640     Glucose 213 mg/dL      Comment: RN NotifiedOperator: [id number] [facility] SYBILMeter ID: [id number]       Comprehensive Metabolic Panel [[id number]]  (Abnormal) Collected: 12/02/21 0526    Specimen: Blood Updated: 12/02/21 0607     Glucose 192 mg/dL      BUN 19 mg/dL      Creatinine 1.16 mg/dL      Sodium 138 mmol/L      Potassium 4.6 mmol/L      Chloride 103 mmol/L      CO2 26.0 mmol/L      Calcium 8.7 mg/dL      Total Protein 5.9 g/dL      Albumin 3.20 g/dL      ALT (SGPT) 12 U/L      AST (SGOT) 12 U/L      Alkaline Phosphatase 56 U/L      Total Bilirubin 0.2 mg/dL      eGFR Non   Amer 59 mL/min/1.73      Globulin 2.7 gm/dL      A/G Ratio 1.2 g/dL      BUN/Creatinine Ratio 16.4     Anion Gap 9.0 mmol/L     Narrative:      GFR Normal >60  Chronic Kidney Disease <60  Kidney Failure <15      CBC Auto Differential [[id number]]  (Abnormal) Collected: 12/02/21 0526    Specimen: Blood Updated: 12/02/21 0546     WBC 10.27 10*3/mm3      RBC 4.09 10*6/mm3      Hemoglobin 11.6 g/dL      Hematocrit 37.5 %      MCV 91.7 fL      MCH 28.4 pg      MCHC 30.9 g/dL      RDW 17.8 %      RDW-SD 59.9 fl      MPV 11.3 fL      Platelets 168 10*3/mm3      Neutrophil % 91.4 %      Lymphocyte % 5.8 %      Monocyte % 0.6 %      Eosinophil % 0.0 %      Basophil % 0.2 %      Immature Grans % 2.0 %      Neutrophils, Absolute 9.38 10*3/mm3      Lymphocytes, Absolute 0.60 10*3/mm3      Monocytes, Absolute 0.06 10*3/mm3      Eosinophils, Absolute 0.00 10*3/mm3      Basophils, Absolute 0.02 10*3/mm3      Immature Grans, Absolute 0.21 10*3/mm3      nRBC 0.0 /100 WBC     POC Glucose Once [[id number]]  (Abnormal) Collected: 12/01/21 1915    Specimen: Blood Updated: 12/01/21 2032     Glucose 153 mg/dL      Comment: RN NotifiedOperator: [id number] GAURAV SYBILMeter ID: [id number]       Procalcitonin [[id number]]  (Normal) Collected: 12/01/21 1720    Specimen: Blood Updated: 12/01/21 1836     Procalcitonin 0.07 ng/mL     Narrative:      As a Marker for Sepsis (Non-Neonates):     1. <0.5 ng/mL represents a low risk of severe sepsis and/or septic shock.  2. >2 ng/mL represents a high risk of severe sepsis and/or septic shock.    As a Marker for Lower Respiratory Tract Infections that require antibiotic therapy:  PCT on Admission     Antibiotic Therapy             6-12 Hrs later  >0.5                          Strongly Recommended            >0.25 - <0.5             Recommended  0.1 - 0.25                  Discouraged                       Remeasure/reassess PCT  <0.1                         Strongly Discouraged          "Remeasure/reassess PCT      As 28 day mortality risk marker: \"Change in Procalcitonin Result\" (>80% or <=80%) if Day 0 (or Day 1) and Day 4 values are available. Refer to http://[email or web address]-pct-calculator.[email or web address]/    Change in PCT <=80 %   A decrease of PCT levels below or equal to 80% defines a positive change in PCT test result representing a higher risk for 28-day all-cause mortality of patients diagnosed with severe sepsis or septic shock.    Change in PCT >80 %   A decrease of PCT levels of more than 80% defines a negative change in PCT result representing a lower risk for 28-day all-cause mortality of patients diagnosed with severe sepsis or septic shock.                Troponin [[id number]]  (Normal) Collected: 12/01/21 1720    Specimen: Blood Updated: 12/01/21 1737     Troponin T 0.024 ng/mL     Narrative:      Troponin T Reference Range:  <= 0.03 ng/mL-   Negative for AMI  >0.03 ng/mL-     Abnormal for myocardial necrosis.  Clinicians would have to utilize clinical acumen, EKG, Troponin and serial changes to determine if it is an Acute Myocardial Infarction or myocardial injury due to an underlying chronic condition.       Results may be falsely decreased if patient taking Biotin.      COVID-19 and FLU A/B PCR - Swab, Nasopharynx [[id number]]  (Normal) Collected: 12/01/21 1625    Specimen: Swab from Nasopharynx Updated: 12/01/21 1650     COVID19 Not Detected     Influenza A PCR Not Detected     Influenza B PCR Not Detected    Narrative:      Fact sheet for providers: https://www.fda.gov/media/596068/download    Fact sheet for patients: https://www.fda.gov/media/907875/download    Test performed by PCR.    Protime-INR [[id number]]  (Normal) Collected: 12/01/21 1626    Specimen: Blood Updated: 12/01/21 1647     Protime 13.3 Seconds      INR 1.02    Narrative:      Therapeutic range for most indications is 2.0-3.0 INR,  or 2.5-3.5 for mechanical heart valves.    Lactic Acid, Plasma [[id number]]  (Normal) Collected: " 12/01/21 1626    Specimen: Blood Updated: 12/01/21 1641     Lactate 1.4 mmol/L     Blood Culture - Blood, Arm, Left [[id number]] Collected: 12/01/21 1626    Specimen: Blood from Arm, Left Updated: 12/01/21 1626    Blood Culture - Blood, Hand, Right [[id number]] Collected: 12/01/21 1626    Specimen: Blood from Hand, Right Updated: 12/01/21 1626    [place] Draw [[id number]] Collected: 12/01/21 1510    Specimen: Blood Updated: 12/01/21 1616    Narrative:      The following orders were created for panel order [place] Draw.  Procedure                               Abnormality         Status                     ---------                               -----------         ------                     Green Top (Gel)[[id number]]                                  Final result               Lavender Top[[id number]]                                     Final result               Gold Top - SST[[id number]]                                   Final result               Light Blue Top[[id number]]                                   Final result                 Please view results for these tests on the individual orders.    Gold Top - SST [[id number]] Collected: 12/01/21 1510    Specimen: Blood Updated: 12/01/21 1616     Extra Tube Hold for add-ons.     Comment: Auto resulted.       Green Top (Gel) [[id number]] Collected: 12/01/21 1510    Specimen: Blood Updated: 12/01/21 1616     Extra Tube Hold for add-ons.     Comment: Auto resulted.       Lavender Top [[id number]] Collected: 12/01/21 1510    Specimen: Blood Updated: 12/01/21 1616     Extra Tube hold for add-on     Comment: Auto resulted       Light Blue Top [[id number]] Collected: 12/01/21 1510    Specimen: Blood Updated: 12/01/21 1616     Extra Tube hold for add-on     Comment: Auto resulted       CK [[id number]]  (Normal) Collected: 12/01/21 1510    Specimen: Blood Updated: 12/01/21 1613     Creatine Kinase 27 U/L     Magnesium [[id number]]  (Normal) Collected: 12/01/21 1510    Specimen: Blood  Updated: 12/01/21 1613     Magnesium 1.6 mg/dL     Comprehensive Metabolic Panel [[id number]]  (Abnormal) Collected: 12/01/21 1510    Specimen: Blood Updated: 12/01/21 1536     Glucose 120 mg/dL      BUN 16 mg/dL      Creatinine 1.06 mg/dL      Sodium 139 mmol/L      Potassium 4.5 mmol/L      Chloride 105 mmol/L      CO2 26.0 mmol/L      Calcium 9.2 mg/dL      Total Protein 6.3 g/dL      Albumin 3.60 g/dL      ALT (SGPT) 16 U/L      AST (SGOT) 15 U/L      Alkaline Phosphatase 61 U/L      Total Bilirubin 0.3 mg/dL      eGFR Non African Amer 66 mL/min/1.73      Globulin 2.7 gm/dL      A/G Ratio 1.3 g/dL      BUN/Creatinine Ratio 15.1     Anion Gap 8.0 mmol/L     Narrative:      GFR Normal >60  Chronic Kidney Disease <60  Kidney Failure <15      Troponin [[id number]]  (Abnormal) Collected: 12/01/21 1510    Specimen: Blood Updated: 12/01/21 1536     Troponin T 0.032 ng/mL     Narrative:      Troponin T Reference Range:  <= 0.03 ng/mL-   Negative for AMI  >0.03 ng/mL-     Abnormal for myocardial necrosis.  Clinicians would have to utilize clinical acumen, EKG, Troponin and serial changes to determine if it is an Acute Myocardial Infarction or myocardial injury due to an underlying chronic condition.       Results may be falsely decreased if patient taking Biotin.      BNP [[id number]]  (Normal) Collected: 12/01/21 1510    Specimen: Blood Updated: 12/01/21 1534     proBNP 920.0 pg/mL     Narrative:      Among patients with dyspnea, NT-proBNP is highly sensitive for the detection of acute congestive heart failure. In addition NT-proBNP of <300 pg/ml effectively rules out acute congestive heart failure with 99% negative predictive value.    Results may be falsely decreased if patient taking Biotin.      CBC & Differential [[id number]]  (Abnormal) Collected: 12/01/21 1510    Specimen: Blood Updated: 12/01/21 1514    Narrative:      The following orders were created for panel order CBC & Differential.  Procedure                                Abnormality         Status                     ---------                               -----------         ------                     CBC Auto Differential[[id number]]        Abnormal            Final result                 Please view results for these tests on the individual orders.    CBC Auto Differential [[id number]]  (Abnormal) Collected: 12/01/21 1510    Specimen: Blood Updated: 12/01/21 1514     WBC 13.38 10*3/mm3      RBC 4.29 10*6/mm3      Hemoglobin 12.5 g/dL      Hematocrit 39.0 %      MCV 90.9 fL      MCH 29.1 pg      MCHC 32.1 g/dL      RDW 18.0 %      RDW-SD 59.1 fl      MPV 11.4 fL      Platelets 184 10*3/mm3      Neutrophil % 83.4 %      Lymphocyte % 10.5 %      Monocyte % 3.1 %      Eosinophil % 0.4 %      Basophil % 0.4 %      Immature Grans % 2.2 %      Neutrophils, Absolute 11.16 10*3/mm3      Lymphocytes, Absolute 1.40 10*3/mm3      Monocytes, Absolute 0.42 10*3/mm3      Eosinophils, Absolute 0.05 10*3/mm3      Basophils, Absolute 0.06 10*3/mm3      Immature Grans, Absolute 0.29 10*3/mm3      nRBC 0.0 /100 WBC           I personally viewed and interpreted the patient's EKG/Telemetry data       Assessment/Plan:    # 1 persistent atrial fibrillation with rapid ventricular rate    88 years old patient with documented history of atrial fibrillation, 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  Patient admitted for evaluation of generalized weakness fatigue lack of energy palpitation shortness of breath.  Patient diagnosed pneumonia and also noted atrial fibrillation with rapid ventricular rate.  Has mildly nondiagnostic troponin may be multifactorial etiology repeat within normal range.  I doubt there is evidence of ongoing ischemia at the time of evaluations.  Discussed with the patient and family and given the previous history margin hemodynamic will start the  patient on IV amiodarone with 150 mg bolus, 1 mg/min for 6 hours then 0.5 mg/min and will transition to oral amiodarone.  Low-dose beta-blocker/diltiazem can be contemplated if there is a problem with controlling the ventricular rate    Patient is not a candidate for long-term oral anticoagulation due to high has bled score    #2 CAD s/p PCI to LAD    Patient nondiagnostic troponin repeat was within normal range may be multifactorial etiology.  My suspicion of ongoing ischemia is low.  Recommend to continue antiplatelet agent, statin and low-dose beta-blocker             #2 history of CAD s/p PCI to RCA and LAD     No evidence of ongoing ischemia at the time of evaluation. Continue dual antiplatelet agents. Continue statin. Continue Ranexa         #3 history of hypertension and documented history of significant orthostasis and frequent fall.  Currently is tolerating low-dose lisinopril.    History of chronic congestive heart failure on the basis of diastolic dysfunction    Appears to be euvolemic at the time of evaluations.  I will change Lasix 40 from 40 mg twice a day to 40 mg a day    #4 pneumonia    Patient started by hospitalist on IV antibiotic  Recommend PT OT               Thank you for allowing me to participate in the care of Hasmukh Ham. Feel free to contact me directly with any further questions or concerns.    Mana Curtis MD  12/02/21  13:40 CST.      Part of this note may be an electronic transcription/translation of spoken language to printed text using the Dragon Dictation system.

## 2021-12-02 NOTE — PLAN OF CARE
HR has been in 130's today. Stat EKG obtained. MD was made aware. HR returns to baseline after short time. Will continue to monitor.   Goal Outcome Evaluation:

## 2021-12-02 NOTE — THERAPY EVALUATION
Patient Name: Hasmukh Ham  : 1933    MRN: [id number]                              Today's Date: 2021       Admit Date: 2021    Visit Dx:     ICD-10-CM ICD-9-CM   1. Pneumonia of both lungs due to infectious organism, unspecified part of lung  J18.9 483.8   2. Precordial pain  R07.2 786.51   3. Atrial fibrillation, unspecified type ([facility])  I48.91 427.31   4. Impaired mobility and ADLs  Z74.09 V49.89    Z78.9      Patient Active Problem List   Diagnosis   • Dilated aortic root ([facility])   • Non-rheumatic tricuspid valve insufficiency   • Pulmonary emphysema ([facility])   • Atrial fibrillation and flutter ([facility])   • Bradycardia   • CAD (coronary artery disease)   • Neuropathy   • Abdominal hernia   • Neck pain   • Dementia (CMS/HCC)   • Diabetic neuropathy ([facility])   • Gastroesophageal reflux disease without esophagitis   • Generalized osteoarthritis   • 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 ([facility])   • Nocturia   • Osteoarthritis of multiple joints   • Hyperlipidemia   • Pain in joint involving ankle and foot   • Pneumonia of left lower lobe due to infectious organism   • Arthropathy of hand   • Paroxysmal tachycardia ([facility])   • Osteoarthrosis involving more than one site but not generalized   • Chronic right shoulder pain   • Rotator cuff syndrome   • Partial tear of subscapularis tendon   • Infraspinatus tendon tear   • Supraspinatus tendon tear   • Bilateral carotid artery stenosis   • Pulmonary HTN (HCC)   • Acute interstitial pneumonia (HCC)   • Chronic obstructive lung disease (HCC)   • Diabetes mellitus ([facility])   • Hypertension   • Chest pain   • Shortness of breath   • Angina pectoris ([facility])   • Myocardial infarction ([facility])   • Ingrown toenail   • Heart problem   • Degenerative joint disease involving multiple joints   • Chronic obstructive pulmonary disease (COPD) ([facility])   • Bleeding disorder ([facility])   • Chronic obstructive pulmonary disease with acute exacerbation ([facility])   • Failure of outpatient  treatment   • Sepsis ([facility])   • 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 ([facility])   • Chronic diastolic CHF (congestive heart failure) ([facility])   • SOB (shortness of breath)   • Cause of injury, fall   • Right hip pain   • Chronic pain of right knee   • Primary osteoarthritis of right knee   • Left shoulder pain   • Left arm pain   • AC joint arthropathy   • Syncope   • Inflammatory arthritis   • Elevated troponin   • Fever   • Paroxysmal atrial fibrillation with rapid ventricular response ([facility])   • Fibrosis of lung ([facility])   • Type 2 diabetes mellitus ([facility])   • Chondrocalcinosis of right knee   • Nontraumatic complete tear of right rotator cuff   • Pneumonia of both lungs due to infectious organism     Past Medical History:   Diagnosis Date   • Basal cell carcinoma    • Bilateral carotid artery stenosis    • Bilateral carotid artery stenosis    • Bleeding disorder ([facility])    • CHF (congestive heart failure) ([facility])    • Chronic obstructive pulmonary disease (COPD) ([facility])    • Coronary arteriosclerosis    • Degenerative joint disease involving multiple joints    • Dementia ([facility])    • Diabetes mellitus ([facility])    • Heart problem    • History of stomach ulcers    • Hyperlipidemia    • Hypertension    • Ingrown toenail    • Myocardial infarction ([facility])      Past Surgical History:   Procedure Laterality Date   • BACK SURGERY     • CARDIAC CATHETERIZATION N/A 6/6/2017    Procedure: Right Heart Cath;  Surgeon: Jeff Maciel MD PhD;  Location: [facility] INVASIVE LOCATION;  Service:    • CARDIAC CATHETERIZATION N/A 2/14/2018    Procedure: Coronary angiography;  Surgeon: Moisés Davila MD;  Location: [facility] CATH INVASIVE LOCATION;  Service:    • CARDIAC CATHETERIZATION N/A 10/28/2021    Procedure: Left Heart Cath;  Surgeon: Carine Johnson MD;  Location: [facility] CATH INVASIVE LOCATION;  Service: Cardiology;  Laterality: N/A;   • CORONARY ANGIOPLASTY WITH STENT PLACEMENT     • CORONARY STENT PLACEMENT     •  HERNIA REPAIR     • LUNG BIOPSY     • LUNG SURGERY     • NECK SURGERY     • THORACOTOMY Left 1977   • VENTRAL HERNIA REPAIR        General Information     [facility] Name 12/02/21 [facility]          OT Time and Intention    Document Type evaluation  -     Mode of Treatment physical therapy; occupational therapy  -[facility] Name 12/02/21 [facility]          General Information    Patient Profile Reviewed yes  -     Prior Level of Function min assist:; transfer; dressing; bathing; independent:; feeding; grooming  -     Existing Precautions/Restrictions fall; oxygen therapy device and L/min  2 lpm at home  -[facility] Name 12/02/21 1245          Living Environment    Lives With spouse  -SJ     Row Name 12/02/21 [facility]          Stairs Within Home, Primary    Stairs, Within Home, Primary 1 story house, with a ramp. Has a rollator. Walk in shower, with a shower chair, and grab bars. Has grab bar near his toilet as well. Has a hospital bed at home. Wife assists with many ADLs and most IADLs.  -[facility] Name 12/02/21 1245          Cognition    Orientation Status (Cognition) oriented x 4; verbal cues/prompts needed for orientation  -SJ     Row Name 12/02/21 1245          Safety Issues, Functional Mobility    Safety Issues Affecting Function (Mobility) insight into deficits/self-awareness; safety precautions follow-through/compliance; safety precaution awareness  -     Impairments Affecting Function (Mobility) strength; endurance/activity tolerance; shortness of breath; balance; pain  -           User Key  (r) = Recorded By, (t) = Taken By, (c) = Cosigned By    Initials Name Provider Type     Israel Black OT Occupational Therapist                 Mobility/ADL's     [facility] Name 12/02/21 1245          Bed Mobility    Bed Mobility supine-sit; sit-supine; scooting/bridging  -     Scooting/Bridging [place] (Bed Mobility) maximum assist (25% patient effort); 2 person assist  -     Supine-Sit [place] (Bed Mobility)  contact guard  -     Sit-Supine [place] (Bed Mobility) contact guard  -     Assistive Device (Bed Mobility) draw sheet; head of bed elevated; bed rails  -     Row Name 12/02/21 1245          Transfers    Transfers sit-stand transfer; toilet transfer  -     Sit-Stand [place] (Transfers) contact guard  -     [place] Level (Toilet Transfer) contact guard  -     Assistive Device (Toilet Transfer) grab bars/safety frame  -     Row Name 12/02/21 1245          Sit-Stand Transfer    Assistive Device (Sit-Stand Transfers) walker, front-wheeled  -     Row Name 12/02/21 1245          Toilet Transfer    Type (Toilet Transfer) sit-stand; stand-sit  -     Row Name 12/02/21 1245          Activities of Daily Living    BADL Assessment/Intervention lower body dressing  -     Row Name 12/02/21 1245          Lower Body Dressing Assessment/Training    [place] Level (Lower Body Dressing) don; doff; socks; moderate assist (50% patient effort)  -     Position (Lower Body Dressing) edge of bed sitting  -           User Key  (r) = Recorded By, (t) = Taken By, (c) = Cosigned By    Initials Name Provider Type    Israel Torres OT Occupational Therapist               Obj/Interventions     Row Name 12/02/21 1245          Sensory Assessment (Somatosensory)    Sensory Assessment (Somatosensory) UE sensation intact  -     Row Name 12/02/21 1245          Range of Motion Comprehensive    Comment, General Range of Motion Dale shoulder to 80 degrees, patient not allowing for PROM of shoulder due to pain. Dale elbow/wrist/hand WFL  -     Row Name 12/02/21 1245          Strength Comprehensive (MMT)    Comment, General Manual Muscle Testing (MMT) Assessment Dale shoulder 3/5, dale elbow/wrist/hand 4-/5  -           User Key  (r) = Recorded By, (t) = Taken By, (c) = Cosigned By    Initials Name Provider Type    Israel Torres OT Occupational Therapist               Goals/Plan     Row Name 12/02/21  1245          Transfer Goal 1 (OT)    Activity/Assistive Device (Transfer Goal 1, OT) toilet  -     [place] Level/Cues Needed (Transfer Goal 1, OT) modified independence  -SJ     Time Frame (Transfer Goal 1, OT) long term goal (LTG); by discharge  -     Progress/Outcome (Transfer Goal 1, OT) goal not met  -[facility] Name 12/02/21 1245          Bathing Goal 1 (OT)    Activity/Device (Bathing Goal 1, OT) bathing skills, all  -SJ     [place] Level/Cues Needed (Bathing Goal 1, OT) supervision required  -SJ     Time Frame (Bathing Goal 1, OT) long term goal (LTG); by discharge  -     Progress/Outcomes (Bathing Goal 1, OT) goal not met  -SJ     Row Name 12/02/21 1245          Dressing Goal 1 (OT)    Activity/Device (Dressing Goal 1, OT) lower body dressing  -     [place]/Cues Needed (Dressing Goal 1, OT) supervision required  -SJ     Time Frame (Dressing Goal 1, OT) long term goal (LTG); by discharge  -     Progress/Outcome (Dressing Goal 1, OT) goal not met  -SJ     Row Name 12/02/21 1245          Therapy Assessment/Plan (OT)    Planned Therapy Interventions (OT) activity tolerance training; adaptive equipment training; BADL retraining; cognitive/visual perception retraining; edema control/reduction; functional balance retraining; IADL retraining; manual therapy/joint mobilization; neuromuscular control/coordination retraining; occupation/activity based interventions; passive ROM/stretching; patient/caregiver education/training; ROM/therapeutic exercise; strengthening exercise; transfer/mobility retraining  -           User Key  (r) = Recorded By, (t) = Taken By, (c) = Cosigned By    Initials Name Provider Type     Israel Black OT Occupational Therapist               Clinical Impression     Row Name 12/02/21 1245          Pain Assessment    Additional Documentation Pain Scale: Numbers Pre/Post-Treatment (Group)  -[facility] Name 12/02/21 1245          Pain Scale: Numbers  Pre/Post-Treatment    Pretreatment Pain Rating 9/10  -SJ     Posttreatment Pain Rating 9/10  -     Pain Location - Side Bilateral  -     Pain Location shoulder  -     Pain Intervention(s) Ambulation/increased activity; Repositioned  -     Row Name 12/02/21 1245          Plan of Care Review    Plan of Care Reviewed With patient  -     Outcome Summary OT eval complete, co-eval with PT. Patient very pleasant.  - 115 upon arrival at rest. With functional mobility and activity HR up to 143. Supine<>sit with CGA. Sit to stand and toilet transfer with CGA and RW. Sitting EOB, patient requires mod A for donning/doffing socks. Supine in bed at end of session, wife present. Recommend home with assistance and home health OT. Cont inpatient OT to maximize independence in ADLs and transfers.  -     Row Name 12/02/21 1245          Therapy Assessment/Plan (OT)    Patient/Family Therapy Goal Statement (OT) return home  -     Rehab Potential (OT) good, to achieve stated therapy goals  -     Criteria for Skilled Therapeutic Interventions Met (OT) yes; skilled treatment is necessary  -     Therapy Frequency (OT) other (see comments)  5-7 days/week  -     Predicted Duration of Therapy Intervention (OT) until discharge or all goals met  -     Row Name 12/02/21 1245          Therapy Plan Review/Discharge Plan (OT)    Anticipated Discharge Disposition (OT) home with home health; home with 24/7 care  -     Row Name 12/02/21 1245          Vital Signs    Pre Systolic BP Rehab 165  -SJ     Pre Treatment Diastolic BP 71  -SJ     Post Systolic BP Rehab 136  -SJ     Post Treatment Diastolic BP 99  -SJ     Pretreatment Heart Rate (beats/min) 101  -SJ     Intratreatment Heart Rate (beats/min) 143  -SJ     Posttreatment Heart Rate (beats/min) 136  -SJ     Pre SpO2 (%) 98  -SJ     O2 Delivery Pre Treatment nasal cannula  -SJ     Post SpO2 (%) 98  -SJ     O2 Delivery Post Treatment nasal cannula  -SJ     Pre Patient  Position Supine  -SJ     Post Patient Position Supine  -SJ     Row Name 12/02/21 1245          Positioning and Restraints    Pre-Treatment Position in bed  -SJ     Post Treatment Position bed  -SJ     In Bed supine; call light within reach; encouraged to call for assist; exit alarm on; with family/caregiver  -           User Key  (r) = Recorded By, (t) = Taken By, (c) = Cosigned By    Initials Name Provider Type    Israel Torres, OT Occupational Therapist               Outcome Measures     Row Name 12/02/21 1245          How much help from another is currently needed...    Putting on and taking off regular lower body clothing? 2  -SJ     Bathing (including washing, rinsing, and drying) 3  -SJ     Toileting (which includes using toilet bed pan or urinal) 3  -SJ     Putting on and taking off regular upper body clothing 3  -SJ     Taking care of personal grooming (such as brushing teeth) 4  -SJ     Eating meals 4  -SJ     AM-PAC 6 Clicks Score (OT) 19  -SJ     Row Name 12/02/21 1246          How much help from another person do you currently need...    Turning from your back to your side while in flat bed without using bedrails? 3  -CZ     Moving from lying on back to sitting on the side of a flat bed without bedrails? 3  -CZ     Moving to and from a bed to a chair (including a wheelchair)? 3  -CZ     Standing up from a chair using your arms (e.g., wheelchair, bedside chair)? 3  -CZ     Climbing 3-5 steps with a railing? 3  -CZ     To walk in hospital room? 3  -CZ     AM-PAC 6 Clicks Score (PT) 18  -CZ     Row Name 12/02/21 1246 12/02/21 1245       Functional Assessment    Outcome Measure Options AM-PAC 6 Clicks Basic Mobility (PT)  -CZ AM-PAC 6 Clicks Daily Activity (OT)  -          User Key  (r) = Recorded By, (t) = Taken By, (c) = Cosigned By    Initials Name Provider Type    Bud Alfaro, PT Physical Therapist    Israel Torres, GOSIA Occupational Therapist                Occupational Therapy  Education                 Title: PT OT SLP Therapies (In Progress)     Topic: Occupational Therapy (In Progress)     Point: ADL training (Not Started)     Description:   Instruct learner(s) on proper safety adaptation and remediation techniques during self care or transfers.   Instruct in proper use of assistive devices.              Learner Progress:  Not documented in this visit.          Point: Home exercise program (Not Started)     Description:   Instruct learner(s) on appropriate technique for monitoring, assisting and/or progressing therapeutic exercises/activities.              Learner Progress:  Not documented in this visit.          Point: Precautions (Done)     Description:   Instruct learner(s) on prescribed precautions during self-care and functional transfers.              Learning Progress Summary           Patient Acceptance, E,TB, VU by  at 12/2/2021 1325    Comment: POC, role of OT, transfer training                   Point: Body mechanics (Done)     Description:   Instruct learner(s) on proper positioning and spine alignment during self-care, functional mobility activities and/or exercises.              Learning Progress Summary           Patient Acceptance, E,TB, VU by  at 12/2/2021 1325    Comment: POC, role of OT, transfer training                               User Key     Initials Effective Dates Name Provider Type Discipline     06/14/21 -  Israel Black OT Occupational Therapist OT              OT Recommendation and Plan  Planned Therapy Interventions (OT): activity tolerance training, adaptive equipment training, BADL retraining, cognitive/visual perception retraining, edema control/reduction, functional balance retraining, IADL retraining, manual therapy/joint mobilization, neuromuscular control/coordination retraining, occupation/activity based interventions, passive ROM/stretching, patient/caregiver education/training, ROM/therapeutic exercise, strengthening exercise,  transfer/mobility retraining  Therapy Frequency (OT): other (see comments) (5-7 days/week)  Plan of Care Review  Plan of Care Reviewed With: patient  Outcome Summary: OT eval complete, co-eval with PT. Patient very pleasant.  - 115 upon arrival at rest. With functional mobility and activity HR up to 143. Supine<>sit with CGA. Sit to stand and toilet transfer with CGA and RW. Sitting EOB, patient requires mod A for donning/doffing socks. Supine in bed at end of session, wife present. Recommend home with assistance and home health OT. Cont inpatient OT to maximize independence in ADLs and transfers.     Time Calculation:    Time Calculation- OT     Row Name 12/02/21 1316             Time Calculation- OT    OT Start Time 1245  -      OT Stop Time 1315  -      OT Time Calculation (min) 30 min  -      OT Received On 12/02/21  -      OT Goal Re-Cert Due Date 12/15/21  -              Untimed Charges    OT Eval/Re-eval Minutes 30  -SJ              Total Minutes    Untimed Charges Total Minutes 30  -SJ       Total Minutes 30  -SJ            User Key  (r) = Recorded By, (t) = Taken By, (c) = Cosigned By    Initials Name Provider Type     Israel Black OT Occupational Therapist              Therapy Charges for Today     Code Description Service Date Service Provider Modifiers Qty    [id number]  OT EVAL MOD COMPLEXITY 2 12/2/2021 Israel Black OT GO 1               Israel Black OT  12/2/2021

## 2021-12-03 NOTE — PROGRESS NOTES
LOS: 1 day   Patient Care Team:  Paolo Rey MD as PCP - General    Chief Complaint: Admitted for evaluations of increasing shortness of breath generalized weakness fatigue and palpitation.  Patient noted to be in atrial fibrillation with rapid ventricular rate.  Patient started on IV amiodarone and converted to sinus rhythm.  At the time of evaluation is in sinus rhythm at a rate of 55 bpm.       Review of Systems:     Constitution: Positive general fatigue weakness lack of energy and activity change    HENT:  Denies any headache, hearing impairment.    Eyes:  Denies any blurring of vision     Cardiovascular:  As per history of present illness.     Respiratory: Positive for shortness of breath       Gastrointestinal:  No nausea, vomiting, or melena.    Extremity: No edema, positive pulses    Objective     Current Facility-Administered Medications   Medication Dose Route Frequency Provider Last Rate Last Admin   • acetaminophen (TYLENOL) tablet 650 mg  650 mg Oral Q4H PRN Christal Gonzalez MD       • albuterol (PROVENTIL) nebulizer solution 0.083% 2.5 mg/3mL  2.5 mg Nebulization Q4H PRN Christal Gonzalez MD       • amiodarone (PACERONE) half tablet 100 mg  100 mg Oral Q24H Mana Curtis MD       • cetirizine (zyrTEC) tablet 5 mg  5 mg Oral Daily Christal Gonzalez MD   5 mg at 12/02/21 1350   • clopidogrel (PLAVIX) tablet 75 mg  75 mg Oral Daily Christal Gonzalez MD   75 mg at 12/02/21 1350   • dextrose (D50W) (25 g/50 mL) IV injection 25 g  25 g Intravenous Q15 Min PRN Kelvin Aguilar MD       • dextrose (GLUTOSE) oral gel 15 g  15 g Oral Q15 Min PRN Kelvin Aguilar MD       • famotidine (PEPCID) tablet 20 mg  20 mg Oral BID AC Christal Gonzalez MD   20 mg at 12/02/21 1741   • furosemide (LASIX) tablet 40 mg  40 mg Oral BID Christal Gonzalez MD   40 mg at 12/02/21 1741   • glucagon (human recombinant) (GLUCAGEN DIAGNOSTIC) injection 1 mg  1 mg Subcutaneous Q15 Min PRN  Kelvin Aguilar MD       • HYDROcodone-acetaminophen (NORCO) 7.5-325 MG per tablet 1 tablet  1 tablet Oral Q6H PRN Christal Gonzalez MD   1 tablet at 12/02/21 1350   • insulin aspart (novoLOG) injection 0-9 Units  0-9 Units Subcutaneous TID AC Kelvin Aguilar MD   6 Units at 12/02/21 1742   • ipratropium-albuterol (DUO-NEB) nebulizer solution 3 mL  3 mL Nebulization 4x Daily - RT Kelvin Aguilar MD   3 mL at 12/03/21 0736   • levoFLOXacin (LEVAQUIN) 750 mg/150 mL D5W (premix) (LEVAQUIN) 750 mg  750 mg Intravenous Q48H Kelvin Aguilar MD       • lisinopril (PRINIVIL,ZESTRIL) tablet 5 mg  5 mg Oral Daily Christal Gonzalez MD   5 mg at 12/02/21 1350   • methylPREDNISolone sodium succinate (SOLU-Medrol) injection 60 mg  60 mg Intravenous Q12H Kelvin Aguilar MD   60 mg at 12/03/21 0604   • metoprolol tartrate (LOPRESSOR) half tablet 12.5 mg  12.5 mg Oral Q12H Mana Curtis MD   12.5 mg at 12/02/21 2000   • nitroglycerin (NITROSTAT) SL tablet 0.4 mg  0.4 mg Sublingual Q5 Min PRN Christal Gonzalez MD       • ondansetron (ZOFRAN) injection 4 mg  4 mg Intravenous Q6H PRN Kelvin Aguilar MD       • polyethylene glycol (MIRALAX) packet 17 g  17 g Oral Daily Christal Gonzalez MD       • ranolazine (RANEXA) 12 hr tablet 1,000 mg  1,000 mg Oral Q12H Christal Gonzalez MD   1,000 mg at 12/02/21 2000   • sodium chloride 0.9 % flush 10 mL  10 mL Intravenous PRN Zack Castro MD       • sodium chloride 0.9 % flush 10 mL  10 mL Intravenous Q12H Kelvin Aguilar MD   10 mL at 12/02/21 2001   • sodium chloride 0.9 % flush 10 mL  10 mL Intravenous PRN Kelvin Aguilar MD           Vital Sign Min/Max for last 24 hours  Temp  Min: 97.1 °F (36.2 °C)  Max: 97.5 °F (36.4 °C)   BP  Min: 105/60  Max: 147/82   Pulse  Min: 62  Max: 132   Resp  Min: 16  Max: 20   SpO2  Min: 96 %  Max: 99 %   Flow (L/min)  Min: 2  Max: 2   Weight  Min: 68.9 kg (151 lb 12.8 oz)  Max: 68.9 kg (151 lb 12.8  "oz)     Flowsheet Rows      First Filed Value   Admission Height 171.5 cm (67.5\") Documented at 12/01/2021 1451   Admission Weight 68 kg (150 lb) Documented at 12/01/2021 1451            Intake/Output Summary (Last 24 hours) at 12/3/2021 0756  Last data filed at 12/3/2021 0545  Gross per 24 hour   Intake 1360 ml   Output 1725 ml   Net -365 ml       Physical Exam:  Neck: Supple.  No JVD, no thyroid enlargement.  Chest: Air entry equal, normal respiration.  No rhonchi or creps.  Cardiovascular system:  Regular rate and rhythm, no murmurs.  Abdomen: Soft, no tenderness, bowel sounds present,   CNS: Alert, oriented to place and time.  No motor or sensory deficit.  Cranial nerves intact.  Musculoskeletal: No deformity of the back or spine.  Extremities:  Pulses equal on both sides.     Results Review:   I reviewed the patient's new clinical results.  Lab Results   Component Value Date    WBC 10.27 12/02/2021    HGB 11.6 (L) 12/02/2021    HCT 37.5 12/02/2021    MCV 91.7 12/02/2021     12/02/2021     Lab Results   Component Value Date    GLUCOSE 192 (H) 12/02/2021    BUN 19 12/02/2021    CREATININE 1.16 12/02/2021    EGFRIFNONA 59 (L) 12/02/2021    EGFRIFAFRI 85 09/13/2021    BCR 16.4 12/02/2021    CO2 26.0 12/02/2021    CALCIUM 8.7 12/02/2021    ALBUMIN 3.20 (L) 12/02/2021    AST 12 12/02/2021    ALT 12 12/02/2021     Lab Results   Component Value Date    TSH 2.280 02/13/2018     Lab Results   Component Value Date    INR 1.02 12/01/2021    INR 1.01 10/28/2021    INR 0.98 (L) 10/24/2021    PROTIME 13.3 12/01/2021    PROTIME 13.2 10/28/2021    PROTIME 9.9 10/24/2021     Lab Results   Component Value Date    PTT 21.4 03/18/2021       EKG:     Telemetry:    Ejection Fraction  No results found for: EF    Echo EF Estimated  Lab Results   Component Value Date    ECHOEFEST 51 06/15/2021         Present on Admission:  • Pneumonia of both lungs due to infectious organism    Assessment/Plan   # 1   Short-term persistent two " paroxysmal atrial fibrillation with rapid ventricular rate     88 years old patient with documented history of atrial fibrillation, 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  diagnosed pneumonia and also noted atrial fibrillation with rapid ventricular rate.  Patient started on IV amiodarone after bolus of 150 mg he converted to sinus rhythm at rate of 52 bpm.  Will discontinue IV amiodarone and start the patient amiodarone 800 mg p.o. daily and continue low-dose beta-blocker.     Patient is not a candidate for long-term oral anticoagulation due to high has bled score     #2 CAD s/p PCI to LAD     Patient nondiagnostic troponin repeat was within normal range may be multifactorial etiology.  My suspicion of ongoing ischemia is low.  Recommend to continue antiplatelet agent, statin and low-dose beta-blocker                 #2 history of CAD s/p PCI to RCA and LAD     No evidence of ongoing ischemia at the time of evaluation. Continue dual antiplatelet agents. Continue statin. Continue Ranexa           #3 history of hypertension and documented history of significant orthostasis and frequent fall.  Currently is tolerating low-dose lisinopril.     History of chronic congestive heart failure on the basis of diastolic dysfunction     Appears to be euvolemic at the time of evaluation.  Oral Lasix dose can be adjusted    Mana Curtis MD  12/03/21  07:56 CST      Part of this note may be an electronic transcription/translation of spoken language to printed text using the Dragon Dictation system.

## 2021-12-03 NOTE — PLAN OF CARE
Goal Outcome Evaluation:  Plan of Care Reviewed With: patient        Progress: improving  Outcome Summary: Pt sup>sit EOB SBA with pt performed fx'al mobility EOB>toilet>EOB with RW and 2 RB CGA and hr 109 at highest. pt doned pants SBA and socks SBA with increased t/e for socks but declining edu on sock aid. Pt SBA sit>sup..

## 2021-12-03 NOTE — THERAPY TREATMENT NOTE
Acute Care - Physical Therapy Treatment Note  [facility]     Patient Name: Hasmukh Ham  : 1933  MRN: [id number]  Today's Date: 12/3/2021      Visit Dx:     ICD-10-CM ICD-9-CM   1. Pneumonia of both lungs due to infectious organism, unspecified part of lung  J18.9 483.8   2. Precordial pain  R07.2 786.51   3. Atrial fibrillation, unspecified type ([facility])  I48.91 427.31   4. Impaired mobility and ADLs  Z74.09 V49.89    Z78.9    5. Impaired functional mobility, balance, gait, and endurance  Z74.09 V49.89     Patient Active Problem List   Diagnosis   • Dilated aortic root ([facility])   • Non-rheumatic tricuspid valve insufficiency   • Pulmonary emphysema ([facility])   • Atrial fibrillation and flutter ([facility])   • Bradycardia   • CAD (coronary artery disease)   • Neuropathy   • Abdominal hernia   • Neck pain   • Dementia (CMS/HCC)   • Diabetic neuropathy ([facility])   • Gastroesophageal reflux disease without esophagitis   • Generalized osteoarthritis   • 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 ([facility])   • Nocturia   • Osteoarthritis of multiple joints   • Hyperlipidemia   • Pain in joint involving ankle and foot   • Pneumonia of left lower lobe due to infectious organism   • Arthropathy of hand   • Paroxysmal tachycardia ([facility])   • Osteoarthrosis involving more than one site but not generalized   • Chronic right shoulder pain   • Rotator cuff syndrome   • Partial tear of subscapularis tendon   • Infraspinatus tendon tear   • Supraspinatus tendon tear   • Bilateral carotid artery stenosis   • Pulmonary HTN (HCC)   • Acute interstitial pneumonia (HCC)   • Chronic obstructive lung disease (HCC)   • Diabetes mellitus (HCC)   • Hypertension   • Chest pain   • Shortness of breath   • Angina pectoris (HCC)   • Myocardial infarction ([facility])   • Ingrown toenail   • Heart problem   • Degenerative joint disease involving multiple joints   • Chronic obstructive pulmonary disease (COPD) (HCC)   • Bleeding disorder ([facility])   •  Chronic obstructive pulmonary disease with acute exacerbation (HCC)   • Failure of outpatient treatment   • Sepsis (HCC)   • 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)   • Chronic diastolic CHF (congestive heart failure) ([facility])   • SOB (shortness of breath)   • Cause of injury, fall   • Right hip pain   • Chronic pain of right knee   • Primary osteoarthritis of right knee   • Left shoulder pain   • Left arm pain   • AC joint arthropathy   • Syncope   • Inflammatory arthritis   • Elevated troponin   • Fever   • Paroxysmal atrial fibrillation with rapid ventricular response ([facility])   • Fibrosis of lung (HCC)   • Type 2 diabetes mellitus ([facility])   • Chondrocalcinosis of right knee   • Nontraumatic complete tear of right rotator cuff   • Pneumonia of both lungs due to infectious organism     Past Medical History:   Diagnosis Date   • Basal cell carcinoma    • Bilateral carotid artery stenosis    • Bilateral carotid artery stenosis    • Bleeding disorder ([facility])    • CHF (congestive heart failure) ([facility])    • Chronic obstructive pulmonary disease (COPD) ([facility])    • Coronary arteriosclerosis    • Degenerative joint disease involving multiple joints    • Dementia ([facility])    • Diabetes mellitus ([facility])    • Heart problem    • History of stomach ulcers    • Hyperlipidemia    • Hypertension    • Ingrown toenail    • Myocardial infarction ([facility])      Past Surgical History:   Procedure Laterality Date   • BACK SURGERY     • CARDIAC CATHETERIZATION N/A 6/6/2017    Procedure: Right Heart Cath;  Surgeon: Jeff Maciel MD PhD;  Location: [facility] CATH INVASIVE LOCATION;  Service:    • CARDIAC CATHETERIZATION N/A 2/14/2018    Procedure: Coronary angiography;  Surgeon: Moisés Davila MD;  Location: [facility] CATH INVASIVE LOCATION;  Service:    • CARDIAC CATHETERIZATION N/A 10/28/2021    Procedure: Left Heart Cath;  Surgeon: Carine Johnson MD;  Location: [facility] CATH INVASIVE LOCATION;  Service: Cardiology;   Laterality: N/A;   • CORONARY ANGIOPLASTY WITH STENT PLACEMENT     • CORONARY STENT PLACEMENT     • HERNIA REPAIR     • LUNG BIOPSY     • LUNG SURGERY     • NECK SURGERY     • THORACOTOMY Left 1977   • VENTRAL HERNIA REPAIR       PT Assessment (last 12 hours)     PT Evaluation and Treatment     [facility] Name 12/03/21 0905          Physical Therapy Time and Intention    Subjective Information complains of; dyspnea  -     Mode of Treatment physical therapy; individual therapy  -     Patient Effort good  -[facility] Name 12/03/21 0905          General Information    Patient Profile Reviewed yes  -     Existing Precautions/Restrictions fall; oxygen therapy device and L/min  -[facility] Name 12/03/21 0905          Cognition    Affect/Mental Status (Cognitive) WFL  -TESS     Orientation Status (Cognition) oriented x 4; verbal cues/prompts needed for orientation  -     Personal Safety Interventions fall prevention program maintained; gait belt; muscle strengthening facilitated; nonskid shoes/slippers when out of bed; supervised activity  -[facility] Name 12/03/21 0905          Pain    Additional Documentation Pain Scale: Numbers Pre/Post-Treatment (Group)  -TESS     Row Name 12/03/21 0905          Pain Scale: Numbers Pre/Post-Treatment    Pretreatment Pain Rating 8/10  -     Posttreatment Pain Rating 8/10  -     Pain Location - Orientation generalized  -[facility] Name 12/03/21 0905          Pain Scale: Word Pre/Post-Treatment    Pain Intervention(s) Repositioned  -[facility] Name 12/03/21 0905          Range of Motion Comprehensive    General Range of Motion bilateral lower extremity ROM WFL  -[facility] Name 12/03/21 0905          Bed Mobility    Bed Mobility supine-sit; sit-supine; scooting/bridging  -     Scooting/Bridging [place] (Bed Mobility) moderate assist (50% patient effort)  -     Supine-Sit [place] (Bed Mobility) contact guard  -     Sit-Supine [place] (Bed Mobility) minimum assist (75%  patient effort)  -     Assistive Device (Bed Mobility) draw sheet; head of bed elevated; bed rails  -[facility] Name 12/03/21 0905          Transfers    Transfers sit-stand transfer; stand-sit transfer  -     Sit-Stand [place] (Transfers) contact guard  -     Stand-Sit [place] (Transfers) contact guard  -TESS     Row Name 12/03/21 0905          Sit-Stand Transfer    Assistive Device (Sit-Stand Transfers) walker, front-wheeled  -[facility] Name 12/03/21 0905          Stand-Sit Transfer    Assistive Device (Stand-Sit Transfers) walker, front-wheeled  -[facility] Name 12/03/21 0905          Gait/Stairs (Locomotion)    Gait/Stairs Locomotion gait/ambulation independence; distance ambulated; gait/ambulation assistive device; gait pattern; gait deviations  -     [place] Level (Gait) contact guard; verbal cues  -     Assistive Device (Gait) walker, front-wheeled  -     Distance in Feet (Gait) 10 ft x2  -     Deviations/Abnormal Patterns (Gait) andie decreased; gait speed decreased; stride length decreased  -[facility] Name 12/03/21 0905          Safety Issues, Functional Mobility    Impairments Affecting Function (Mobility) strength; endurance/activity tolerance; shortness of breath; balance; pain  -[facility] Name 12/03/21 0905          Motor Skills    Therapeutic Exercise --  AP, LAQ, hip flexion- 10-15x1 gerda AROM  -[facility] Name 12/03/21 0905          Vital Signs    Pre Systolic BP Rehab 103  -TESS     Pre Treatment Diastolic BP 83  -TESS     Post Systolic BP Rehab 123  -TESS     Post Treatment Diastolic BP 75  -ETSS     Pretreatment Heart Rate (beats/min) 83  -TESS     Intratreatment Heart Rate (beats/min) 106  -TESS     Posttreatment Heart Rate (beats/min) 105  -TESS     Pre SpO2 (%) 98  -TESS     O2 Delivery Pre Treatment nasal cannula  -TESS     Intra SpO2 (%) 99  -TESS     O2 Delivery Intra Treatment nasal cannula  -     Post SpO2 (%) 99  -TESS     O2 Delivery Post Treatment nasal cannula  -TESS     Pre  Patient Position Supine  -TESS     Post Patient Position Supine  -TESS     Row Name 12/03/21 0905          Bed Mobility Goal 1 (PT)    Activity/Assistive Device (Bed Mobility Goal 1, PT) sit to supine/supine to sit  -TESS     [place] Level/Cues Needed (Bed Mobility Goal 1, PT) modified independence  -TESS     Time Frame (Bed Mobility Goal 1, PT) by discharge  -TESS     Strategies/Barriers (Bed Mobility Goal 1, PT) Has hospital bed at home.  -TESS     Progress/Outcomes (Bed Mobility Goal 1, PT) goal not met  -     Row Name 12/03/21 0905          Transfer Goal 1 (PT)    Activity/Assistive Device (Transfer Goal 1, PT) sit-to-stand/stand-to-sit; bed-to-chair/chair-to-bed  -TESS     [place] Level/Cues Needed (Transfer Goal 1, PT) supervision required  -TESS     Time Frame (Transfer Goal 1, PT) by discharge  -TESS     Strategies/Barriers (Transfers Goal 1, PT) A-mary king.  -     Row Name 12/03/21 0905          Gait Training Goal 1 (PT)    Activity/Assistive Device (Gait Training Goal 1, PT) walker, rolling  -TESS     [place] Level (Gait Training Goal 1, PT) supervision required  -TESS     Distance (Gait Training Goal 1, PT) 25'x2 as tolerated.  -TESS     Time Frame (Gait Training Goal 1, PT) by discharge  -TESS     Strategies/Barriers (Gait Training Goal 1, PT) Amary holt.  -TESS     Progress/Outcome (Gait Training Goal 1, PT) goal not met  -     Row Name 12/03/21 0905          Positioning and Restraints    Pre-Treatment Position in bed  -TESS     Post Treatment Position bed  -TESS     In Bed fowlers; call light within reach; encouraged to call for assist; exit alarm on  all needs met.  -     Row Name 12/03/21 0905          Therapy Assessment/Plan (PT)    Rehab Potential (PT) good, to achieve stated therapy goals  -     Criteria for Skilled Interventions Met (PT) yes; skilled treatment is necessary  -           User Key  (r) = Recorded By, (t) = Taken By, (c) = Cosigned By    Initials Name Provider Type    TESS [place]  Cristobal CALLOWAY PTA Physical Therapy Assistant                Physical Therapy Education                 Title: PT OT SLP Therapies (In Progress)     Topic: Physical Therapy (In Progress)     Point: Mobility training (In Progress)     Learning Progress Summary           Patient Acceptance, E, NR by  at 12/3/2021 1251                   Point: Home exercise program (Not Started)     Learner Progress:  Not documented in this visit.          Point: Body mechanics (Not Started)     Learner Progress:  Not documented in this visit.          Point: Precautions (Not Started)     Learner Progress:  Not documented in this visit.                      User Key     Initials Effective Dates Name Provider Type Discipline     06/16/21 -  Cristobal Mansfield PTA Physical Therapy Assistant PT              PT Recommendation and Plan  Anticipated Discharge Disposition (PT): home with assist, home with home health  Therapy Frequency (PT): other (see comments) (5-7 days/week)  Plan of Care Reviewed With: patient  Progress: improving  Outcome Summary: pt responded well to PT w/ increased gait to 10 ft x2 CGA w/ RW. pt SOA but SpO2 WFL. pt participated in LE ther ex. no new goals met at this time. pt would continue to benefit from PT Services.   Outcome Measures     Row Name 12/03/21 0905             How much help from another person do you currently need...    Turning from your back to your side while in flat bed without using bedrails? 3  -TESS      Moving from lying on back to sitting on the side of a flat bed without bedrails? 3  -TESS      Moving to and from a bed to a chair (including a wheelchair)? 3  -TESS      Standing up from a chair using your arms (e.g., wheelchair, bedside chair)? 3  -TESS      Climbing 3-5 steps with a railing? 3  -TESS      To walk in hospital room? 3  -TESS      AM-PAC 6 Clicks Score (PT) 18  -              Functional Assessment    Outcome Measure Options AM-PAC 6 Clicks Basic Mobility (PT)  -            User Key  (r) =  Recorded By, (t) = Taken By, (c) = Cosigned By    Initials Name Provider Type    Cristobal Sullivan PTA Physical Therapy Assistant                 Time Calculation:    PT Charges     Row Name 12/03/21 1254             Time Calculation    Start Time 0905  -TESS      Stop Time 0931  -      Time Calculation (min) 26 min  -TESS              Time Calculation- PT    Total Timed Code Minutes- PT 26 minute(s)  -TESS              Timed Charges    93609 - PT Therapeutic Exercise Minutes 8  -TESS      52400 - Gait Training Minutes  15  -TESS      30889 - PT Therapeutic Activity Minutes 3  -TESS              Total Minutes    Timed Charges Total Minutes 26  -TESS       Total Minutes 26  -TESS            User Key  (r) = Recorded By, (t) = Taken By, (c) = Cosigned By    Initials Name Provider Type    Cristobal Sullivan PTA Physical Therapy Assistant              Therapy Charges for Today     Code Description Service Date Service Provider Modifiers Qty    13752372147 HC PT THER PROC EA 15 MIN 12/3/2021 Cristobal Mansfield PTA GP 1    51645938503 HC GAIT TRAINING EA 15 MIN 12/3/2021 Cristobal Mansfield PTA GP 1          PT G-Codes  Outcome Measure Options: AM-PAC 6 Clicks Basic Mobility (PT)  AM-PAC 6 Clicks Score (PT): 18  AM-PAC 6 Clicks Score (OT): 19    Cristobal Mansfield PTA  12/3/2021

## 2021-12-03 NOTE — PROGRESS NOTES
[facility] Medicine Services  INPATIENT PROGRESS NOTE    Length of Stay: 1  Date of Admission: 12/1/2021  Primary Care Physician: Paolo Rey MD    Subjective   Chief Complaint: Shortness of air  HPI:    She is admitted for pneumonia and also has atrial fibrillation although his rate is now controlled on and is in sinus rhythm.  Cardiology also seeing the patient.  He is continued on supplemental oxygen.    Review of Systems   Respiratory: Positive for cough and shortness of breath.         All pertinent negatives and positives are as above. All other systems have been reviewed and are negative unless otherwise stated.     Objective    Temp:  [97.1 °F (36.2 °C)-97.5 °F (36.4 °C)] 97.2 °F (36.2 °C)  Heart Rate:  [62-95] 84  Resp:  [16-20] 20  BP: (105-136)/(55-80) 130/63  Physical Exam  Vitals and nursing note reviewed.   Constitutional:       General: He is not in acute distress.     Appearance: He is well-developed. He is not diaphoretic.   HENT:      Head: Normocephalic and atraumatic.   Cardiovascular:      Rate and Rhythm: Normal rate.   Pulmonary:      Effort: Pulmonary effort is normal. No respiratory distress.      Breath sounds: No wheezing.   Abdominal:      General: There is no distension.      Palpations: Abdomen is soft.   Musculoskeletal:         General: Normal range of motion.   Skin:     General: Skin is warm and dry.   Neurological:      Mental Status: He is alert.      Cranial Nerves: No cranial nerve deficit.   Psychiatric:         Behavior: Behavior normal.         Thought Content: Thought content normal.         Judgment: Judgment normal.             Results Review:  I have reviewed the labs, radiology results, and diagnostic studies.    Laboratory Data:   Lab Results (last 24 hours)     Procedure Component Value Units Date/Time    POC Glucose Once [[id number]]  (Abnormal) Collected: 12/03/21 0604    Specimen: Blood Updated: 12/03/21 0649      Glucose 140 mg/dL      Comment: RN NotifiedOperator: [id number] TEDDY IRWINMeter ID: [id number]       Blood Culture - Blood, Arm, Left [[id number]]  (Abnormal) Collected: 12/01/21 1626    Specimen: Blood from Arm, Left Updated: 12/03/21 0545     Blood Culture Staphylococcus, coagulase negative     Isolated from Aerobic Bottle     Gram Stain Aerobic Bottle Gram positive cocci in clusters     Comment: 1 positive of 4 drawn for gram positive cocci        Narrative:      Probable contaminant requires clinical correlation, susceptibility not performed unless requested by physician.      POC Glucose Once [[id number]]  (Abnormal) Collected: 12/02/21 1914    Specimen: Blood Updated: 12/02/21 2025     Glucose 167 mg/dL      Comment: RN NotifiedOperator: [id number] ROMINA HERNANDEZMeter ID: [id number]       POC Glucose Once [[id number]]  (Abnormal) Collected: 12/02/21 1619    Specimen: Blood Updated: 12/02/21 1634     Glucose 278 mg/dL      Comment: RN NotifiedOperator: [id number] KEYONA GREENEYMeter ID: [id number]       Blood Culture - Blood, Hand, Right [[id number]]  (Normal) Collected: 12/01/21 1626    Specimen: Blood from Hand, Right Updated: 12/02/21 1631     Blood Culture No growth at 24 hours    Blood Culture ID, PCR - Blood, Arm, Left [[id number]]  (Abnormal) Collected: 12/01/21 1626    Specimen: Blood from Arm, Left Updated: 12/02/21 1341     BCID, PCR Staph spp, not aureus or lugdunesis. Identification by BCID2 PCR.          Culture Data:   Blood Culture   Date Value Ref Range Status   12/01/2021 No growth at 24 hours  Preliminary   12/01/2021 Staphylococcus, coagulase negative (C)  Final     No results found for: URINECX  No results found for: RESPCX  No results found for: WOUNDCX  No results found for: STOOLCX  No components found for: BODYFLD    Radiology Data:   Imaging Results (Last 24 Hours)     ** No results found for the last 24 hours. **          I have reviewed the patient's current medications.      Assessment/Plan     Active Hospital Problems    Diagnosis    • Pneumonia of both lungs due to infectious organism        Pneumonia-continue with IV antibiotics and we will continue to monitor    Chronic respiratory failure-we will continue with supplemental oxygen, seems to be at baseline    Paroxysmal atrial fibrillation-continue with cardiology management, currently on amiodarone.  Currently in sinus rhythm    Type 2 diabetes-continue monitor glucose levels    Elevated troponin level-cardiology managing    Hypertension-continue monitor blood pressure    COPD-nebulizer treatments as needed    DVT prophylaxis-SCD    Patient is full code    I confirmed that the patient's Advance Care Plan is present, code status is documented, or surrogate decision maker is listed in the patient's medical record.         Karan Segovia MD   12/03/21   11:50 CST

## 2021-12-03 NOTE — PLAN OF CARE
Goal Outcome Evaluation:  Plan of Care Reviewed With: patient, spouse        Progress: improving  Outcome Summary: Pt converted to NSR this morning and switched to PO amiodarone and tolerated well. He is working with PT/OT. Continues on IV antibiotics, he does have some mild shortness of breath with exertion. Spouse at beside assisting with care. VSS.

## 2021-12-03 NOTE — PAYOR COMM NOTE
"Radha Watersmore  Case Management Extender  [phone] phone  [phone] fax    Ref# [id number]    Hasmukh Ham (88 y.o. Male)             Date of Birth Social Security Number Address Home Phone MRN    06/01/1933  [address] [phone] [id number]    Muslim Marital Status             Congregational        Admission Date Admission Type Admitting Provider Attending Provider Department, Room/Bed    12/1/21 Emergency Christal Gonzalez MD Gerlach, Elizabeth T, MD [address], 319/1    Discharge Date Discharge Disposition Discharge Destination                         Attending Provider: Christal Gonzalez MD    Allergies: Atorvastatin, Penicillins, Azithromycin, Cefdinir, Clarithromycin, Pravastatin, Benadryl Allergy [Diphenhydramine Hcl]    Isolation: None   Infection: None   Code Status: CPR   Advance Care Planning Activity    Ht: 170.2 cm (67\")   Wt: 68.9 kg (151 lb 12.8 oz)    Admission Cmt: None   Principal Problem: None                Active Insurance as of 12/1/2021     Primary Coverage     Payor Plan Insurance Group Employer/Plan Group    AETNA MEDICARE REPLACEMENT AETNA MEDICARE REPLACEMENT [id number]     Payor Plan Address Payor Plan Phone Number Payor Plan Fax Number Effective Dates    [address] [phone]  4/1/2019 - None Entered    [facility] [id number]       Subscriber Name Subscriber Birth Date Member ID       HASMUKH HAM 6/1/1933 [id number]                 Emergency Contacts      (Rel.) Home Phone Work Phone Mobile Phone    Rimma Durant (Spouse) [phone] -- [phone]               History & Physical      Kelvin Aguilar MD at 12/01/21 [address] [place] [address] Medicine Admission      Date of Admission: 12/1/2021      Primary Care Physician: Paolo Rey MD      Chief Complaint: Shortness of breath    HPI: Mr. Ham " is an 88-year-old gentleman with a history of chronic lung disease, CAD with non-STEMI on 10/25/2021 resulting in placement of 2 stents on 10/28/2021, diabetes mellitus type 2, benign hypertension who presents to the emergency room with complaints of increasing shortness of breath and general malaise over the last 3 to 4 days.  He states that he is not felt normal and been back to his baseline since his last hospitalization back at the end of October.  Over the last few days his began to decline rapidly with weakness, malaise, cough with sputum, shortness of breath.  His appetite is also declined.  He denies fevers or chills.  He denies peripheral edema.  He denies orthopnea.  He denies chest pain.      Concurrent Medical History:  has a past medical history of Basal cell carcinoma, Bilateral carotid artery stenosis, Bilateral carotid artery stenosis, Bleeding disorder ([facility]), CHF (congestive heart failure) ([facility]), Chronic obstructive pulmonary disease (COPD) ([facility]), Coronary arteriosclerosis, Degenerative joint disease involving multiple joints, Dementia ([facility]), Diabetes mellitus ([facility]), Heart problem, History of stomach ulcers, Hyperlipidemia, Hypertension, Ingrown toenail, and Myocardial infarction ([facility]).    Past Surgical History:  has a past surgical history that includes Hernia repair; Lung biopsy; Ventral hernia repair; Thoracotomy (Left, 1977); Cardiac catheterization (N/A, 6/6/2017); Coronary stent placement; Neck surgery; Cardiac catheterization (N/A, 2/14/2018); Lung surgery; Back surgery; Coronary angioplasty with stent; and Cardiac catheterization (N/A, 10/28/2021).    Family History: family history includes Cancer in an other family member; Diabetes in his father and mother; Heart disease in his father; Hypertension in his father; Lung disease in an other family member.   Social History:  reports that he has quit smoking. He has never used smokeless tobacco. He reports that he does not drink alcohol and does  "not use drugs.    Allergies:   Allergies   Allergen Reactions   • Atorvastatin Anaphylaxis   • Penicillins Rash   • Azithromycin Rash   • Cefdinir Other (See Comments)     \"i burned up\" and break out in a rash   • Clarithromycin Rash and Itching   • Pravastatin Unknown - High Severity     Myalgia  Myalgia   • Benadryl Allergy [Diphenhydramine Hcl] Other (See Comments)     Pt states \"it knocks me out.\"       Medications:   Prior to Admission medications    Medication Sig Start Date End Date Taking? Authorizing Provider   acetaminophen (TYLENOL) 325 MG tablet Take 2 tablets by mouth Every 4 (Four) Hours As Needed for Mild Pain . 2/15/18   Jc Alba MD   albuterol (PROVENTIL) (2.5 MG/3ML) 0.083% nebulizer solution Take 2.5 mg by nebulization Every 4 (Four) Hours As Needed for Wheezing. 12/23/17   Adi Puente MD   Blood Glucose Monitoring Suppl (ONE TOUCH ULTRA 2) w/Device kit Daily. as directed 2/11/21   Bill Rader MD   Brovana 15 MCG/2ML nebulizer solution 3 (Three) Times a Day. 10/16/20   Bill Rader MD   cetirizine (zyrTEC) 10 MG tablet Take 10 mg by mouth Daily.    Bill Rader MD   clopidogrel (PLAVIX) 75 MG tablet Take 75 mg by mouth Daily. 2/3/16   Bill Rader MD   docusate sodium 100 MG capsule Take 100 mg by mouth.    Bill Rader MD   famotidine (PEPCID) 20 MG tablet Take 20 mg by mouth 2 (Two) Times a Day.    Bill Rader MD   fluticasone (FLONASE) 50 MCG/ACT nasal spray 2 sprays into each nostril Daily.    Bill Rader MD   Fluticasone Furoate (ARNUITY ELLIPTA) 200 MCG/ACT aerosol powder  Inhale.    Bill Rader MD   Fluticasone-Umeclidin-Vilant (Trelegy Ellipta) 100-62.5-25 MCG/INH aerosol powder  Inhale 1 inhaler.    Bill Rader MD   furosemide (LASIX) 40 MG tablet Take 1 tablet by mouth Daily. 11/23/21   Carine Johnson MD   glimepiride (AMARYL) 2 MG tablet Take 2 mg by mouth Every Morning Before " Breakfast.    Bill Rader MD   HYDROcodone-acetaminophen (NORCO) 7.5-325 MG per tablet Take 1 tablet by mouth Every 6 (Six) Hours As Needed for Moderate Pain . 6/21/21   Gauri Sweeney APRN   ipratropium (ATROVENT) 0.02 % nebulizer solution INHALE THE CONTENTS OF 1 VIAL IN NEBULIZER EVERY 4 HOURS AS NEEDED FOR WHEEZING 11/23/20   Bill Rader MD   ipratropium-albuterol (DUO-NEB) 0.5-2.5 mg/mL nebulizer Take 3 mL by nebulization 4 (Four) Times a Day. 9/28/17   Jc Alba MD   Lancets (OneTouch Delica Plus [id number]) misc 1 each by Other route Daily. 2/26/21   Bill Rader MD   lisinopril (PRINIVIL,ZESTRIL) 5 MG tablet Take 1 tablet by mouth Daily. 11/22/21   Mana Curtis MD   magnesium oxide (MAG-OX) 400 MG tablet Take 250 mg by mouth Daily.    Bill Rader MD   meclizine (ANTIVERT) 12.5 MG tablet 1 tablet po q 6 hr prn severe nausea 6/1/20   Bill Rader MD   nitroglycerin (NITROSTAT) 0.4 MG SL tablet place 1 tablet under the tongue if needed every 5 minutes for hair...  (REFER TO PRESCRIPTION NOTES). 1/11/17   Bill Rader MD   nystatin (MYCOSTATIN) 100,000 unit/mL suspension SWISH AND SWALLOW 2 ML EVERY 4 HOURS AS NEEDED 11/2/21   Bill Rader MD   O2 (OXYGEN) Inhale 2 L/min Every Night. W/ CPAP    Provider, Historical, MD   OneTouch Verio test strip TEST EVERY DAY 11/15/21   Bill Rader MD   polyethylene glycol (MIRALAX) 17 g packet Dissolve 1 packet (17 g) in 4 to 8 ounces of beverage and drink by mouth Daily. 6/30/21   Jc Alba MD   predniSONE (DELTASONE) 10 MG tablet Daily. 8/25/21   Bill Rader MD   ranolazine (RANEXA) 1000 MG 12 hr tablet Take 1 tablet by mouth Every 12 (Twelve) Hours. 11/23/21   Carine Johnson MD   Red Yeast Rice 600 MG tablet Take 600 mg by mouth 2 (Two) Times a Day.    Provider, MD Bill   Umeclidinium-Vilanterol 62.5-25 MCG/INH aerosol powder  Inhale 1 puff Daily. 3/3/17    Brea Higginbotham MD       Review of Systems:  Review of Systems   Comprehensive review of systems completed.  Pertinent positives and negatives per HPI.  All others reviewed negative.  Physical Exam:   Temp:  [96.7 °F (35.9 °C)-98.3 °F (36.8 °C)] 96.7 °F (35.9 °C)  Heart Rate:  [] 104  Resp:  [18-20] 18  BP: (101-138)/(62-88) 115/77  Physical Exam  Constitutional:       General: He is not in acute distress.     Appearance: Normal appearance. He is not ill-appearing.   HENT:      Head: Normocephalic and atraumatic.      Right Ear: External ear normal.      Left Ear: External ear normal.      Nose: Nose normal.      Mouth/Throat:      Mouth: Mucous membranes are dry.   Eyes:      General: No scleral icterus.     Extraocular Movements: Extraocular movements intact.      Pupils: Pupils are equal, round, and reactive to light.   Cardiovascular:      Rate and Rhythm: Rhythm irregular.   Pulmonary:      Effort: Pulmonary effort is normal.      Breath sounds: Wheezing present.   Abdominal:      Palpations: Abdomen is soft.      Tenderness: There is no abdominal tenderness. There is no guarding.   Musculoskeletal:      Cervical back: Neck supple.      Right lower leg: No edema.      Left lower leg: No edema.   Skin:     General: Skin is warm and dry.      Findings: No rash.   Neurological:      General: No focal deficit present.      Mental Status: He is alert and oriented to person, place, and time. Mental status is at baseline.   Psychiatric:         Mood and Affect: Mood normal.         Behavior: Behavior normal.           Results Reviewed:  I have personally reviewed current lab, radiology, and data and agree with results.  Lab Results (last 24 hours)     Procedure Component Value Units Date/Time    Procalcitonin [[id number]]  (Normal) Collected: 12/01/21 1720    Specimen: Blood Updated: 12/01/21 1836     Procalcitonin 0.07 ng/mL     Narrative:      As a Marker for Sepsis (Non-Neonates):     1. <0.5 ng/mL  "represents a low risk of severe sepsis and/or septic shock.  2. >2 ng/mL represents a high risk of severe sepsis and/or septic shock.    As a Marker for Lower Respiratory Tract Infections that require antibiotic therapy:  PCT on Admission     Antibiotic Therapy             6-12 Hrs later  >0.5                          Strongly Recommended            >0.25 - <0.5             Recommended  0.1 - 0.25                  Discouraged                       Remeasure/reassess PCT  <0.1                         Strongly Discouraged         Remeasure/reassess PCT      As 28 day mortality risk marker: \"Change in Procalcitonin Result\" (>80% or <=80%) if Day 0 (or Day 1) and Day 4 values are available. Refer to [email or web address]/    Change in PCT <=80 %   A decrease of PCT levels below or equal to 80% defines a positive change in PCT test result representing a higher risk for 28-day all-cause mortality of patients diagnosed with severe sepsis or septic shock.    Change in PCT >80 %   A decrease of PCT levels of more than 80% defines a negative change in PCT result representing a lower risk for 28-day all-cause mortality of patients diagnosed with severe sepsis or septic shock.                Troponin [[id number]]  (Normal) Collected: 12/01/21 1720    Specimen: Blood Updated: 12/01/21 1737     Troponin T 0.024 ng/mL     Narrative:      Troponin T Reference Range:  <= 0.03 ng/mL-   Negative for AMI  >0.03 ng/mL-     Abnormal for myocardial necrosis.  Clinicians would have to utilize clinical acumen, EKG, Troponin and serial changes to determine if it is an Acute Myocardial Infarction or myocardial injury due to an underlying chronic condition.       Results may be falsely decreased if patient taking Biotin.      COVID-19 and FLU A/B PCR - Swab, Nasopharynx [[id number]]  (Normal) Collected: 12/01/21 1625    Specimen: Swab from Nasopharynx Updated: 12/01/21 1650     COVID19 Not Detected     Influenza A PCR Not Detected "     Influenza B PCR Not Detected    Narrative:      Fact sheet for providers: https://www.fda.gov/media/263516/download    Fact sheet for patients: https://www.fda.gov/media/179949/download    Test performed by PCR.    Protime-INR [[id number]]  (Normal) Collected: 12/01/21 1626    Specimen: Blood Updated: 12/01/21 1647     Protime 13.3 Seconds      INR 1.02    Narrative:      Therapeutic range for most indications is 2.0-3.0 INR,  or 2.5-3.5 for mechanical heart valves.    Lactic Acid, Plasma [[id number]]  (Normal) Collected: 12/01/21 1626    Specimen: Blood Updated: 12/01/21 1641     Lactate 1.4 mmol/L     Blood Culture - Blood, Arm, Left [[id number]] Collected: 12/01/21 1626    Specimen: Blood from Arm, Left Updated: 12/01/21 1626    Blood Culture - Blood, Hand, Right [[id number]] Collected: 12/01/21 1626    Specimen: Blood from Hand, Right Updated: 12/01/21 1626    [place] Draw [[id number]] Collected: 12/01/21 1510    Specimen: Blood Updated: 12/01/21 1616    Narrative:      The following orders were created for panel order [place] Draw.  Procedure                               Abnormality         Status                     ---------                               -----------         ------                     Green Top (Gel)[[id number]]                                  Final result               Lavender Top[[id number]]                                     Final result               Gold Top - SST[[id number]]                                   Final result               Light Blue Top[[id number]]                                   Final result                 Please view results for these tests on the individual orders.    Gold Top - SST [[id number]] Collected: 12/01/21 1510    Specimen: Blood Updated: 12/01/21 1616     Extra Tube Hold for add-ons.     Comment: Auto resulted.       Green Top (Gel) [[id number]] Collected: 12/01/21 1510    Specimen: Blood Updated: 12/01/21 1616     Extra Tube Hold for add-ons.     Comment: Auto  resulted.       Lavender Top [[id number]] Collected: 12/01/21 1510    Specimen: Blood Updated: 12/01/21 1616     Extra Tube hold for add-on     Comment: Auto resulted       Light Blue Top [[id number]] Collected: 12/01/21 1510    Specimen: Blood Updated: 12/01/21 1616     Extra Tube hold for add-on     Comment: Auto resulted       CK [[id number]]  (Normal) Collected: 12/01/21 1510    Specimen: Blood Updated: 12/01/21 1613     Creatine Kinase 27 U/L     Magnesium [[id number]]  (Normal) Collected: 12/01/21 1510    Specimen: Blood Updated: 12/01/21 1613     Magnesium 1.6 mg/dL     Comprehensive Metabolic Panel [[id number]]  (Abnormal) Collected: 12/01/21 1510    Specimen: Blood Updated: 12/01/21 1536     Glucose 120 mg/dL      BUN 16 mg/dL      Creatinine 1.06 mg/dL      Sodium 139 mmol/L      Potassium 4.5 mmol/L      Chloride 105 mmol/L      CO2 26.0 mmol/L      Calcium 9.2 mg/dL      Total Protein 6.3 g/dL      Albumin 3.60 g/dL      ALT (SGPT) 16 U/L      AST (SGOT) 15 U/L      Alkaline Phosphatase 61 U/L      Total Bilirubin 0.3 mg/dL      eGFR Non African Amer 66 mL/min/1.73      Globulin 2.7 gm/dL      A/G Ratio 1.3 g/dL      BUN/Creatinine Ratio 15.1     Anion Gap 8.0 mmol/L     Narrative:      GFR Normal >60  Chronic Kidney Disease <60  Kidney Failure <15      Troponin [[id number]]  (Abnormal) Collected: 12/01/21 1510    Specimen: Blood Updated: 12/01/21 1536     Troponin T 0.032 ng/mL     Narrative:      Troponin T Reference Range:  <= 0.03 ng/mL-   Negative for AMI  >0.03 ng/mL-     Abnormal for myocardial necrosis.  Clinicians would have to utilize clinical acumen, EKG, Troponin and serial changes to determine if it is an Acute Myocardial Infarction or myocardial injury due to an underlying chronic condition.       Results may be falsely decreased if patient taking Biotin.      BNP [[id number]]  (Normal) Collected: 12/01/21 1510    Specimen: Blood Updated: 12/01/21 1534     proBNP 920.0 pg/mL     Narrative:       Among patients with dyspnea, NT-proBNP is highly sensitive for the detection of acute congestive heart failure. In addition NT-proBNP of <300 pg/ml effectively rules out acute congestive heart failure with 99% negative predictive value.    Results may be falsely decreased if patient taking Biotin.      CBC & Differential [[id number]]  (Abnormal) Collected: 12/01/21 1510    Specimen: Blood Updated: 12/01/21 1514    Narrative:      The following orders were created for panel order CBC & Differential.  Procedure                               Abnormality         Status                     ---------                               -----------         ------                     CBC Auto Differential[[id number]]        Abnormal            Final result                 Please view results for these tests on the individual orders.    CBC Auto Differential [[id number]]  (Abnormal) Collected: 12/01/21 1510    Specimen: Blood Updated: 12/01/21 1514     WBC 13.38 10*3/mm3      RBC 4.29 10*6/mm3      Hemoglobin 12.5 g/dL      Hematocrit 39.0 %      MCV 90.9 fL      MCH 29.1 pg      MCHC 32.1 g/dL      RDW 18.0 %      RDW-SD 59.1 fl      MPV 11.4 fL      Platelets 184 10*3/mm3      Neutrophil % 83.4 %      Lymphocyte % 10.5 %      Monocyte % 3.1 %      Eosinophil % 0.4 %      Basophil % 0.4 %      Immature Grans % 2.2 %      Neutrophils, Absolute 11.16 10*3/mm3      Lymphocytes, Absolute 1.40 10*3/mm3      Monocytes, Absolute 0.42 10*3/mm3      Eosinophils, Absolute 0.05 10*3/mm3      Basophils, Absolute 0.06 10*3/mm3      Immature Grans, Absolute 0.29 10*3/mm3      nRBC 0.0 /100 WBC         Imaging Results (Last 24 Hours)     Procedure Component Value Units Date/Time    XR Chest 1 View [[id number]] Collected: 12/01/21 1515     Updated: 12/01/21 1607    Narrative:        PROCEDURE: Single chest view portable    REASON FOR EXAM:Chest Pain triage protocol  Chest Pain Triage Protocol    FINDINGS: Comparison exam dated October 25, 2021.  Cardiac size  appears within normal limits. Left upper lobe perihilar and left  lung base infrahilar interstitial groundglass opacities. Right  upper lobe peripheral small interstitial groundglass opacity.  Lungs are otherwise clear. Pleural spaces are normal. No acute  osseous abnormality. Stable epidural stimulator lead in the mid  thoracic spine region.      Impression:      1.  Left upper lobe perihilar and left lung base infrahilar  interstitial groundglass opacities. Right upper lobe peripheral  small interstitial groundglass opacity. These opacities would be  suspicious for pneumonia including possible atypical viral  etiology and/or pulmonary edema. Recommend clinical correlation.    Electronically signed by:  Nelson Christianson MD  12/1/2021 4:06 PM CST  Workstation: [id number]            Assessment:    Active Hospital Problems    Diagnosis    • Pneumonia of both lungs due to infectious organism          Plan:    1.  Pneumonia  2.  Elevated troponin  3.  CAD with history of recent NSTEMI with 2 stents placed on 10/28/2021  4.  Paroxysmal A. fib not on long-term anticoagulation secondary to GI bleed  5.  Diabetes mellitus type 2  6.  Benign hypertension  7.  Pulmonary fibrosis/COPD/coal miners pneumoconiosis.    -IV Levaquin  -IV steroids  -Duo nebs  -O2  -Home medications as tolerated  -Sliding scale insulin and adjust insulin dose based on blood sugars as elevations are expected with administration of steroids  -Hold heparin 7 anticoagulants given history of GI bleed -SCDs for prophylaxis  -Blood and sputum cultures.    I have utilized all available immediate resources to obtain, update, or review the patient's current medications.      I confirmed that the patient's Advance Care Plan is present, code status is documented, or surrogate decision maker is listed in the patient's medical record.      I discussed the patient's findings and my recommendations with: The patient and his wife    Kelvin Aguilar,  "MD                Electronically signed by Kelvin Aguilar MD at 12/01/21 1857          Emergency Department Notes      Jeannine Dixon, RN at 12/01/21 1359        Per Shari with home health patient has been having problems with his afib since last week. Is a patient of dr cool and dr scott. Has had medication adjustments for afib over the last week. C/o left arm pain, weakness. States when she first got there pt's hr was 107 and irregular. When EMS just got there, hr is now 46 and irregular.     Jeannine Dixon RN  12/01/21 1400      Electronically signed by Jeannine Dixon RN at 12/01/21 1400     Gracy Carpio RN at 12/01/21 1514        Portable cxr done by rad Gracy Rooney RN  12/01/21 1514      Electronically signed by Gracy Carpio RN at 12/01/21 1514     Zack Castro MD at 12/01/21 1515      Procedure Orders    1. ECG 12 Lead [[id number]] ordered by Zack Castro MD               Subjective   Patient presents to the emergency department with chest pain and shortness of breath.  He was seen by [facility] today and was told he had \"a flutter\" and was told to come to the emergency department.  He does have a history of coronary artery disease with stent placement x5 and states that his last procedure was performed this past October.  He also c/o hypotension and states that he has been having increased sleep. Pt also admits to \"black lung\" and COPD.          Palpitations  Palpitations quality:  Unable to specify  Timing:  Intermittent  Progression:  Waxing and waning  Chronicity:  Recurrent  Associated symptoms: chest pain and shortness of breath    Associated symptoms: no cough, no diaphoresis, no dizziness, no nausea, no vomiting and no weakness    Risk factors: heart disease and hx of atrial fibrillation    Chest Pain  Pain location:  L lateral chest  Pain quality: aching    Duration:  1 day  Associated symptoms: fatigue, palpitations and shortness of breath  "   Associated symptoms: no abdominal pain, no cough, no diaphoresis, no dizziness, no dysphagia, no fever, no headache, no nausea, no vomiting and no weakness    Fatigue  Associated symptoms: chest pain, fatigue and shortness of breath    Associated symptoms: no abdominal pain, no congestion, no cough, no diarrhea, no ear pain, no fever, no headaches, no myalgias, no nausea, no rash, no rhinorrhea, no sore throat, no vomiting and no wheezing        Review of Systems   Constitutional: Positive for activity change and fatigue. Negative for appetite change, chills, diaphoresis and fever.   HENT: Negative for congestion, ear discharge, ear pain, nosebleeds, rhinorrhea, sinus pressure, sore throat and trouble swallowing.    Eyes: Negative for discharge and redness.   Respiratory: Positive for shortness of breath. Negative for apnea, cough, chest tightness and wheezing.    Cardiovascular: Positive for chest pain and palpitations.   Gastrointestinal: Negative for abdominal pain, diarrhea, nausea and vomiting.   Endocrine: Negative for polyuria.   Genitourinary: Negative for dysuria, frequency and urgency.   Musculoskeletal: Negative for myalgias and neck pain.   Skin: Negative for color change and rash.   Allergic/Immunologic: Negative for immunocompromised state.   Neurological: Negative for dizziness, seizures, syncope, weakness, light-headedness and headaches.   Hematological: Negative for adenopathy. Does not bruise/bleed easily.   Psychiatric/Behavioral: Negative for behavioral problems and confusion.   All other systems reviewed and are negative.      Past Medical History:   Diagnosis Date   • Basal cell carcinoma    • Bilateral carotid artery stenosis    • Bilateral carotid artery stenosis    • Bleeding disorder (HCC)    • CHF (congestive heart failure) ([facility]    • Chronic obstructive pulmonary disease (COPD) ([facility])    • Coronary arteriosclerosis    • Degenerative joint disease involving multiple joints    • Dementia  "(HCC)    • Diabetes mellitus (HCC)    • Heart problem    • History of stomach ulcers    • Hyperlipidemia    • Hypertension    • Ingrown toenail    • Myocardial infarction (HCC)        Allergies   Allergen Reactions   • Atorvastatin Anaphylaxis   • Penicillins Rash   • Azithromycin Rash   • Cefdinir Other (See Comments)     \"i burned up\" and break out in a rash   • Clarithromycin Rash and Itching   • Pravastatin Unknown - High Severity     Myalgia  Myalgia   • Benadryl Allergy [Diphenhydramine Hcl] Other (See Comments)     Pt states \"it knocks me out.\"       Past Surgical History:   Procedure Laterality Date   • BACK SURGERY     • CARDIAC CATHETERIZATION N/A 6/6/2017    Procedure: Right Heart Cath;  Surgeon: Jeff Maciel MD PhD;  Location: [facility] CATH INVASIVE LOCATION;  Service:    • CARDIAC CATHETERIZATION N/A 2/14/2018    Procedure: Coronary angiography;  Surgeon: Moisés Davila MD;  Location: [facility] CATH INVASIVE LOCATION;  Service:    • CARDIAC CATHETERIZATION N/A 10/28/2021    Procedure: Left Heart Cath;  Surgeon: Carine Jhonson MD;  Location: [facility] CATH INVASIVE LOCATION;  Service: Cardiology;  Laterality: N/A;   • CORONARY ANGIOPLASTY WITH STENT PLACEMENT     • CORONARY STENT PLACEMENT     • HERNIA REPAIR     • LUNG BIOPSY     • LUNG SURGERY     • NECK SURGERY     • THORACOTOMY Left 1977   • VENTRAL HERNIA REPAIR         Family History   Problem Relation Age of Onset   • Hypertension Father    • Heart disease Father    • Diabetes Father    • Diabetes Mother    • Lung disease Other    • Cancer Other        Social History     Socioeconomic History   • Marital status:    Tobacco Use   • Smoking status: Former Smoker   • Smokeless tobacco: Never Used   Vaping Use   • Vaping Use: Never used   Substance and Sexual Activity   • Alcohol use: No   • Drug use: No   • Sexual activity: Not Currently     Comment:            Objective   Physical Exam  Vitals and nursing note reviewed. "   Constitutional:       Appearance: He is well-developed.   HENT:      Head: Normocephalic and atraumatic.      Nose: Nose normal.   Eyes:      General: No scleral icterus.        Right eye: No discharge.         Left eye: No discharge.      Conjunctiva/sclera: Conjunctivae normal.      Pupils: Pupils are equal, round, and reactive to light.   Neck:      Trachea: No tracheal deviation.   Cardiovascular:      Rate and Rhythm: Tachycardia present. Rhythm regularly irregular.      Heart sounds: Normal heart sounds. No murmur heard.      Pulmonary:      Effort: Pulmonary effort is normal. No respiratory distress.      Breath sounds: Normal breath sounds. No stridor. No wheezing or rales.   Abdominal:      General: Bowel sounds are normal. There is no distension.      Palpations: Abdomen is soft. There is no mass.      Tenderness: There is no abdominal tenderness. There is no guarding or rebound.   Musculoskeletal:      Cervical back: Normal range of motion and neck supple.   Skin:     General: Skin is warm and dry.      Findings: No erythema or rash.   Neurological:      Mental Status: He is alert and oriented to person, place, and time.      Coordination: Coordination normal.   Psychiatric:         Behavior: Behavior normal.         Thought Content: Thought content normal.         ECG 12 Lead      Date/Time: 12/1/2021 5:14 PM  Performed by: Zack Castro MD  Authorized by: Zack Castro MD   Interpreted by physician  Rhythm: atrial fibrillation  BPM: 106  ST Segments: ST segments normal  ST Depression: I                  ED Course                   Labs Reviewed   TROPONIN (IN-HOUSE) - Abnormal; Notable for the following components:       Result Value    Troponin T 0.032 (*)     All other components within normal limits    Narrative:     Troponin T Reference Range:  <= 0.03 ng/mL-   Negative for AMI  >0.03 ng/mL-     Abnormal for myocardial necrosis.  Clinicians would have to utilize clinical acumen,  EKG, Troponin and serial changes to determine if it is an Acute Myocardial Infarction or myocardial injury due to an underlying chronic condition.       Results may be falsely decreased if patient taking Biotin.     COMPREHENSIVE METABOLIC PANEL - Abnormal; Notable for the following components:    Glucose 120 (*)     All other components within normal limits    Narrative:     GFR Normal >60  Chronic Kidney Disease <60  Kidney Failure <15     CBC WITH AUTO DIFFERENTIAL - Abnormal; Notable for the following components:    WBC 13.38 (*)     Hemoglobin 12.5 (*)     RDW 18.0 (*)     RDW-SD 59.1 (*)     Neutrophil % 83.4 (*)     Lymphocyte % 10.5 (*)     Monocyte % 3.1 (*)     Immature Grans % 2.2 (*)     Neutrophils, Absolute 11.16 (*)     Immature Grans, Absolute 0.29 (*)     All other components within normal limits   COVID-19 AND FLU A/B, NP SWAB IN TRANSPORT MEDIA 8-12 HR TAT - Normal    Narrative:     Fact sheet for providers: https://www.fda.gov/media/431588/download    Fact sheet for patients: https://www.fda.gov/media/941376/download    Test performed by PCR.   TROPONIN (IN-HOUSE) - Normal    Narrative:     Troponin T Reference Range:  <= 0.03 ng/mL-   Negative for AMI  >0.03 ng/mL-     Abnormal for myocardial necrosis.  Clinicians would have to utilize clinical acumen, EKG, Troponin and serial changes to determine if it is an Acute Myocardial Infarction or myocardial injury due to an underlying chronic condition.       Results may be falsely decreased if patient taking Biotin.     BNP (IN-HOUSE) - Normal    Narrative:     Among patients with dyspnea, NT-proBNP is highly sensitive for the detection of acute congestive heart failure. In addition NT-proBNP of <300 pg/ml effectively rules out acute congestive heart failure with 99% negative predictive value.    Results may be falsely decreased if patient taking Biotin.     PROTIME-INR - Normal    Narrative:     Therapeutic range for most indications is 2.0-3.0  INR,  or 2.5-3.5 for mechanical heart valves.   CK - Normal   MAGNESIUM - Normal   LACTIC ACID, PLASMA - Normal   BLOOD CULTURE   BLOOD CULTURE   RAINBOW DRAW    Narrative:     The following orders were created for panel order [place] Draw.  Procedure                               Abnormality         Status                     ---------                               -----------         ------                     Green Top (Gel)[[id number]]                                  Final result               Lavender Top[[id number]]                                     Final result               Gold Top - SST[[id number]]                                   Final result               Light Blue Top[[id number]]                                   Final result                 Please view results for these tests on the individual orders.   CBC AND DIFFERENTIAL    Narrative:     The following orders were created for panel order CBC & Differential.  Procedure                               Abnormality         Status                     ---------                               -----------         ------                     CBC Auto Differential[[id number]]        Abnormal            Final result                 Please view results for these tests on the individual orders.   GREEN TOP   LAVENDER TOP   GOLD TOP - SST   LIGHT BLUE TOP       XR Chest 1 View   Final Result   1.  Left upper lobe perihilar and left lung base infrahilar   interstitial groundglass opacities. Right upper lobe peripheral   small interstitial groundglass opacity. These opacities would be   suspicious for pneumonia including possible atypical viral   etiology and/or pulmonary edema. Recommend clinical correlation.      Electronically signed by:  Nelson Christianson MD  12/1/2021 4:06 PM [facility]   Workstation: [id number]                                             [facility]    Final diagnoses:   Pneumonia of both lungs due to infectious organism, unspecified part of lung   Precordial pain    Atrial fibrillation, unspecified type (HCC)       ED Disposition  ED Disposition     ED Disposition Condition Comment    Decision to Admit  Level of Care: Telemetry [5]   Diagnosis: Pneumonia of both lungs due to infectious organism, unspecified part of lung [[id number]]   Admitting Physician: RAVI MILLER [[id number]]   Attending Physician: RAVI MILLER [[id number]]            No follow-up provider specified.       Medication List      No changes were made to your prescriptions during this visit.          Zack Castro MD  12/01/21 1739      Electronically signed by Zack Castro MD at 12/01/21 1739     Gracy Carpio RN at 12/01/21 1554        Wet diaper changed, gown changed, skin cleansed, new diaper and under pad applied, pt repositioned in bed, wife at bedside      Gracy Carpio RN  12/01/21 1555      Electronically signed by Gracy Carpio RN at 12/01/21 1555     Gracy Carpio RN at 12/01/21 1738        Attempt to call report to [facility], nurse unable to accept call at this time due to being on another line taking report from CCU nurse     Gracy Carpio RN  12/01/21 1739      Electronically signed by Gracy Carpio RN at 12/01/21 1739         Lab Results (last 24 hours)     Procedure Component Value Units Date/Time    POC Glucose Once [[id number]]  (Abnormal) Collected: 12/03/21 0604    Specimen: Blood Updated: 12/03/21 0649     Glucose 140 mg/dL      Comment: RN NotifiedOperator: [id number] TEDDY Melchor ID: [id number]       Blood Culture - Blood, Arm, Left [[id number]]  (Abnormal) Collected: 12/01/21 1626    Specimen: Blood from Arm, Left Updated: 12/03/21 0545     Blood Culture Staphylococcus, coagulase negative     Isolated from Aerobic Bottle     Gram Stain Aerobic Bottle Gram positive cocci in clusters     Comment: 1 positive of 4 drawn for gram positive cocci        Narrative:      Probable contaminant requires clinical correlation, susceptibility not  performed unless requested by physician.      POC Glucose Once [[id number]]  (Abnormal) Collected: 12/02/21 1914    Specimen: Blood Updated: 12/02/21 2025     Glucose 167 mg/dL      Comment: RN NotifiedOperator: [id number] ROMINA FAITHMeter ID: [id number]       POC Glucose Once [[id number]]  (Abnormal) Collected: 12/02/21 1619    Specimen: Blood Updated: 12/02/21 1634     Glucose 278 mg/dL      Comment: RN NotifiedOperator: [id number] KEYONA GREENEYMeter ID: [id number]       Blood Culture - Blood, Hand, Right [[id number]]  (Normal) Collected: 12/01/21 1626    Specimen: Blood from Hand, Right Updated: 12/02/21 1631     Blood Culture No growth at 24 hours    Blood Culture ID, PCR - Blood, Arm, Left [[id number]]  (Abnormal) Collected: 12/01/21 1626    Specimen: Blood from Arm, Left Updated: 12/02/21 1341     BCID, PCR Staph spp, not aureus or lugdunesis. Identification by BCID2 PCR.    Respiratory Panel PCR w/COVID-19(SARS-CoV-2) JOSH/KVNG/RADHA/PAD/COR/MAD/WILMAN In-House, NP Swab in UTM/VTM, 3-4 HR TAT - Swab, Nasopharynx [[id number]]  (Normal) Collected: 12/02/21 0930    Specimen: Swab from Nasopharynx Updated: 12/02/21 1142     ADENOVIRUS, PCR Not Detected     Coronavirus 229E Not Detected     Coronavirus HKU1 Not Detected     Coronavirus NL63 Not Detected     Coronavirus OC43 Not Detected     COVID19 Not Detected     Human Metapneumovirus Not Detected     Human Rhinovirus/Enterovirus Not Detected     Influenza A PCR Not Detected     Influenza B PCR Not Detected     Parainfluenza Virus 1 Not Detected     Parainfluenza Virus 2 Not Detected     Parainfluenza Virus 3 Not Detected     Parainfluenza Virus 4 Not Detected     RSV, PCR Not Detected     Bordetella pertussis pcr Not Detected     Bordetella parapertussis PCR Not Detected     Chlamydophila pneumoniae PCR Not Detected     Mycoplasma pneumo by PCR Not Detected    Narrative:      In the setting of a positive respiratory panel with a viral infection PLUS a negative  procalcitonin without other underlying concern for bacterial infection, consider observing off antibiotics or discontinuation of antibiotics and continue supportive care. If the respiratory panel is positive for atypical bacterial infection (Bordetella pertussis, Chlamydophila pneumoniae, or Mycoplasma pneumoniae), consider antibiotic de-escalation to target atypical bacterial infection.    POC Glucose Once [[id number]]  (Abnormal) Collected: 12/02/21 1029    Specimen: Blood Updated: 12/02/21 1050     Glucose 252 mg/dL      Comment: RN NotifiedOperator: [id number] KEYONA Telles ID: [id number]           Imaging Results (Last 24 Hours)     ** No results found for the last 24 hours. **        ECG/EMG Results (last 24 hours)     Procedure Component Value Units Date/Time    ECG 12 Lead [[id number]] Collected: 12/02/21 0836     Updated: 12/03/21 0707     QT Interval 368 ms      QTC Interval 469 ms     Narrative:      Test Reason : hr 132  Blood Pressure :   */*   mmHG  Vent. Rate :  98 BPM     Atrial Rate : 241 BPM     P-R Int :   * ms          QRS Dur : 120 ms      QT Int : 368 ms       P-R-T Axes :   * -52 148 degrees     QTc Int : 469 ms    Atrial fibrillation  Left anterior fascicular block  Left ventricular hypertrophy with QRS widening  ST & T wave abnormality, consider anterolateral ischemia  Abnormal ECG  When compared with ECG of 01-DEC-2021 14:43,  Atrial fibrillation has replaced Atrial flutter Anterior leads    Referred By:            Confirmed By: STEPHANIE WANG    ECG 12 Lead [[id number]] Collected: 12/02/21 1322     Updated: 12/03/21 0714     QT Interval 314 ms      QTC Interval 417 ms     Narrative:      Test Reason : hr 146  Blood Pressure :   */*   mmHG  Vent. Rate : 106 BPM     Atrial Rate :  91 BPM     P-R Int :   * ms          QRS Dur : 118 ms      QT Int : 314 ms       P-R-T Axes :   * -54 134 degrees     QTc Int : 417 ms    Atrial fibrillation with rapid ventricular response  Left anterior  fascicular block  rvcd  Left ventricular hypertrophy with QRS widening  ST & T wave inversion in  Abnormal ECG  When compared with ECG of 02-DEC-2021 08:36,  No significant change was found    Referred By:            Confirmed By: STEPHANIE WANG    ECG 12 Lead [[id number]] Collected: 12/03/21 0624     Updated: 12/03/21 0727     QT Interval 384 ms      QTC Interval 411 ms     Narrative:      Test Reason : Amiodarone Protocol  Blood Pressure :   */*   mmHG  Vent. Rate :  69 BPM     Atrial Rate :  86 BPM     P-R Int :   * ms          QRS Dur : 140 ms      QT Int : 384 ms       P-R-T Axes :   * -47 180 degrees     QTc Int : 411 ms    Atrial fibrillation  Left axis deviation  Left ventricular hypertrophy with QRS widening  Nonspecific T wave abnormality  Abnormal ECG  When compared with ECG of 02-DEC-2021 13:22,  Vent. rate has decreased BY  37 BPM  QRS duration has increased  Nonspecific T wave abnormality now evident in Inferior leads  T wave inversion no longer evident in Anterior leads    Referred By:            Confirmed By: STEPHANIE WANG             Physician Progress Notes (last 24 hours)      Mana Curtis MD at 12/03/21 0756           LOS: 1 day   Patient Care Team:  Paolo Rey MD as PCP - General    Chief Complaint: Admitted for evaluations of increasing shortness of breath generalized weakness fatigue and palpitation.  Patient noted to be in atrial fibrillation with rapid ventricular rate.  Patient started on IV amiodarone and converted to sinus rhythm.  At the time of evaluation is in sinus rhythm at a rate of 55 bpm.       Review of Systems:     Constitution: Positive general fatigue weakness lack of energy and activity change    HENT:  Denies any headache, hearing impairment.    Eyes:  Denies any blurring of vision     Cardiovascular:  As per history of present illness.     Respiratory: Positive for shortness of breath       Gastrointestinal:  No nausea, vomiting, or melena.    Extremity: No edema,  positive pulses    Objective     Current Facility-Administered Medications   Medication Dose Route Frequency Provider Last Rate Last Admin   • acetaminophen (TYLENOL) tablet 650 mg  650 mg Oral Q4H PRN Christal Gonzalez MD       • albuterol (PROVENTIL) nebulizer solution 0.083% 2.5 mg/3mL  2.5 mg Nebulization Q4H PRN Christal Gonzalez MD       • amiodarone (PACERONE) half tablet 100 mg  100 mg Oral Q24H Mana Curtis MD       • cetirizine (zyrTEC) tablet 5 mg  5 mg Oral Daily Christal Gonzalez MD   5 mg at 12/02/21 1350   • clopidogrel (PLAVIX) tablet 75 mg  75 mg Oral Daily Christal Gonzalez MD   75 mg at 12/02/21 1350   • dextrose (D50W) (25 g/50 mL) IV injection 25 g  25 g Intravenous Q15 Min PRN Kelvin Aguilar MD       • dextrose (GLUTOSE) oral gel 15 g  15 g Oral Q15 Min PRN Kelvin Aguilar MD       • famotidine (PEPCID) tablet 20 mg  20 mg Oral BID AC Christal Gonzalez MD   20 mg at 12/02/21 1741   • furosemide (LASIX) tablet 40 mg  40 mg Oral BID Christal Gonzalez MD   40 mg at 12/02/21 1741   • glucagon (human recombinant) (GLUCAGEN DIAGNOSTIC) injection 1 mg  1 mg Subcutaneous Q15 Min PRN Kelvin Aguilar MD       • HYDROcodone-acetaminophen (NORCO) 7.5-325 MG per tablet 1 tablet  1 tablet Oral Q6H PRN Christal Gonzalez MD   1 tablet at 12/02/21 1350   • insulin aspart (novoLOG) injection 0-9 Units  0-9 Units Subcutaneous TID AC Kelvin Aguilar MD   6 Units at 12/02/21 1742   • ipratropium-albuterol (DUO-NEB) nebulizer solution 3 mL  3 mL Nebulization 4x Daily - RT Kelvin Aguilar MD   3 mL at 12/03/21 0736   • levoFLOXacin (LEVAQUIN) 750 mg/150 mL D5W (premix) (LEVAQUIN) 750 mg  750 mg Intravenous Q48H Kelvin Aguilar MD       • lisinopril (PRINIVIL,ZESTRIL) tablet 5 mg  5 mg Oral Daily Christal Gonzalez MD   5 mg at 12/02/21 1350   • methylPREDNISolone sodium succinate (SOLU-Medrol) injection 60 mg  60 mg Intravenous Q12H Kelvin Aguilar MD    "60 mg at 12/03/21 0604   • metoprolol tartrate (LOPRESSOR) half tablet 12.5 mg  12.5 mg Oral Q12H Mana Curtis MD   12.5 mg at 12/02/21 2000   • nitroglycerin (NITROSTAT) SL tablet 0.4 mg  0.4 mg Sublingual Q5 Min PRN Christal Gonzalez MD       • ondansetron (ZOFRAN) injection 4 mg  4 mg Intravenous Q6H PRN Kelvin Aguilar MD       • polyethylene glycol (MIRALAX) packet 17 g  17 g Oral Daily Christal Gonzalez MD       • ranolazine (RANEXA) 12 hr tablet 1,000 mg  1,000 mg Oral Q12H Christal Gonzalez MD   1,000 mg at 12/02/21 2000   • sodium chloride 0.9 % flush 10 mL  10 mL Intravenous PRN Zack Castro MD       • sodium chloride 0.9 % flush 10 mL  10 mL Intravenous Q12H Kelvin Aguilar MD   10 mL at 12/02/21 2001   • sodium chloride 0.9 % flush 10 mL  10 mL Intravenous PRN Kelvin Aguilar MD           Vital Sign Min/Max for last 24 hours  Temp  Min: 97.1 °F (36.2 °C)  Max: 97.5 °F (36.4 °C)   BP  Min: 105/60  Max: 147/82   Pulse  Min: 62  Max: 132   Resp  Min: 16  Max: 20   SpO2  Min: 96 %  Max: 99 %   Flow (L/min)  Min: 2  Max: 2   Weight  Min: 68.9 kg (151 lb 12.8 oz)  Max: 68.9 kg (151 lb 12.8 oz)     Flowsheet Rows      First Filed Value   Admission Height 171.5 cm (67.5\") Documented at 12/01/2021 1451   Admission Weight 68 kg (150 lb) Documented at 12/01/2021 1451            Intake/Output Summary (Last 24 hours) at 12/3/2021 0756  Last data filed at 12/3/2021 0545  Gross per 24 hour   Intake 1360 ml   Output 1725 ml   Net -365 ml       Physical Exam:  Neck: Supple.  No JVD, no thyroid enlargement.  Chest: Air entry equal, normal respiration.  No rhonchi or creps.  Cardiovascular system:  Regular rate and rhythm, no murmurs.  Abdomen: Soft, no tenderness, bowel sounds present,   CNS: Alert, oriented to place and time.  No motor or sensory deficit.  Cranial nerves intact.  Musculoskeletal: No deformity of the back or spine.  Extremities:  Pulses equal on both sides.     Results " Review:   I reviewed the patient's new clinical results.  Lab Results   Component Value Date    WBC 10.27 12/02/2021    HGB 11.6 (L) 12/02/2021    HCT 37.5 12/02/2021    MCV 91.7 12/02/2021     12/02/2021     Lab Results   Component Value Date    GLUCOSE 192 (H) 12/02/2021    BUN 19 12/02/2021    CREATININE 1.16 12/02/2021    EGFRIFNONA 59 (L) 12/02/2021    EGFRIFAFRI 85 09/13/2021    BCR 16.4 12/02/2021    CO2 26.0 12/02/2021    CALCIUM 8.7 12/02/2021    ALBUMIN 3.20 (L) 12/02/2021    AST 12 12/02/2021    ALT 12 12/02/2021     Lab Results   Component Value Date    TSH 2.280 02/13/2018     Lab Results   Component Value Date    INR 1.02 12/01/2021    INR 1.01 10/28/2021    INR 0.98 (L) 10/24/2021    PROTIME 13.3 12/01/2021    PROTIME 13.2 10/28/2021    PROTIME 9.9 10/24/2021     Lab Results   Component Value Date    PTT 21.4 03/18/2021       EKG:     Telemetry:    Ejection Fraction  No results found for: EF    Echo EF Estimated  Lab Results   Component Value Date    ECHOEFEST 51 06/15/2021         Present on Admission:  • Pneumonia of both lungs due to infectious organism    Assessment/Plan   # 1   Short-term persistent two paroxysmal atrial fibrillation with rapid ventricular rate     88 years old patient with documented history of atrial fibrillation, 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  diagnosed pneumonia and also noted atrial fibrillation with rapid ventricular rate.  Patient started on IV amiodarone after bolus of 150 mg he converted to sinus rhythm at rate of 52 bpm.  Will discontinue IV amiodarone and start the patient amiodarone 800 mg p.o. daily and continue low-dose beta-blocker.     Patient is not a candidate for long-term oral anticoagulation due to high has bled score     #2 CAD s/p PCI to LAD     Patient nondiagnostic troponin repeat was within normal range may be  multifactorial etiology.  My suspicion of ongoing ischemia is low.  Recommend to continue antiplatelet agent, statin and low-dose beta-blocker                 #2 history of CAD s/p PCI to RCA and LAD     No evidence of ongoing ischemia at the time of evaluation. Continue dual antiplatelet agents. Continue statin. Continue Ranexa           #3 history of hypertension and documented history of significant orthostasis and frequent fall.  Currently is tolerating low-dose lisinopril.     History of chronic congestive heart failure on the basis of diastolic dysfunction     Appears to be euvolemic at the time of evaluation.  Oral Lasix dose can be adjusted    Mana Curtis MD  12/03/21  07:56 CST      Part of this note may be an electronic transcription/translation of spoken language to printed text using the Dragon Dictation system.        Electronically signed by Mana Curtis MD at 12/03/21 0802     Christal Gonzalez MD at 12/02/21 0920              [facility] Medicine Services  INPATIENT PROGRESS NOTE    Length of Stay: 0  Date of Admission: 12/1/2021  Primary Care Physician: Paolo Rey MD    Subjective   Chief Complaint: shortness of breath   HPI:      Had some chest pain this morning, now improved  Having shortness of breath    At baseline, can walk 4 miles without any problems  Was unable to walk 200 feet prior to coming in     Review of Systems   Constitutional: Negative for chills, diaphoresis and fever.   HENT: Negative for sneezing and sore throat.    Eyes: Negative for pain and discharge.   Respiratory: Positive for shortness of breath. Negative for cough.    Cardiovascular: Positive for chest pain.   Gastrointestinal: Negative for constipation, diarrhea, nausea and vomiting.   Endocrine: Negative for cold intolerance and heat intolerance.   Genitourinary: Negative for difficulty urinating, dysuria, frequency and urgency.   Musculoskeletal: Negative for  arthralgias and myalgias.   Skin: Negative for color change and pallor.   Allergic/Immunologic: Negative for environmental allergies and food allergies.   Neurological: Positive for weakness. Negative for dizziness and syncope.   Psychiatric/Behavioral: Negative for confusion and sleep disturbance.        All pertinent negatives and positives are as above. All other systems have been reviewed and are negative unless otherwise stated.     Objective    Temp:  [96.6 °F (35.9 °C)-98.3 °F (36.8 °C)] 97.4 °F (36.3 °C)  Heart Rate:  [] 102  Resp:  [18-20] 20  BP: (101-153)/(30-91) 144/30    Physical Exam  Vitals reviewed.   Constitutional:       General: He is not in acute distress.     Appearance: He is well-developed.   HENT:      Head: Normocephalic and atraumatic.      Nose: Nose normal.   Eyes:      Conjunctiva/sclera: Conjunctivae normal.   Cardiovascular:      Rate and Rhythm: Normal rate and regular rhythm.   Pulmonary:      Effort: Pulmonary effort is normal. No respiratory distress.      Breath sounds: Decreased breath sounds present. No wheezing or rales.   Musculoskeletal:         General: Normal range of motion.      Cervical back: Normal range of motion and neck supple.   Skin:     General: Skin is warm and dry.   Neurological:      Mental Status: He is alert and oriented to person, place, and time.   Psychiatric:         Behavior: Behavior normal.         Results Review:  I have reviewed the labs, radiology results, and diagnostic studies.    Laboratory Data:   Results from last 7 days   Lab Units 12/02/21  0526 12/01/21  1510   SODIUM mmol/L 138 139   POTASSIUM mmol/L 4.6 4.5   CHLORIDE mmol/L 103 105   CO2 mmol/L 26.0 26.0   BUN mg/dL 19 16   CREATININE mg/dL 1.16 1.06   GLUCOSE mg/dL 192* 120*   CALCIUM mg/dL 8.7 9.2   BILIRUBIN mg/dL 0.2 0.3   ALK PHOS U/L 56 61   ALT (SGPT) U/L 12 16   AST (SGOT) U/L 12 15   ANION GAP mmol/L 9.0 8.0     Estimated Creatinine Clearance: 42.7 mL/min (by C-G formula  based on SCr of 1.16 mg/dL).  Results from last 7 days   Lab Units 12/01/21  1510   MAGNESIUM mg/dL 1.6         Results from last 7 days   Lab Units 12/02/21  0526 12/01/21  1510   WBC 10*3/mm3 10.27 13.38*   HEMOGLOBIN g/dL 11.6* 12.5*   HEMATOCRIT % 37.5 39.0   PLATELETS 10*3/mm3 168 184     Results from last 7 days   Lab Units 12/01/21  1626   INR  1.02       Culture Data:   No results found for: BLOODCX  No results found for: URINECX  No results found for: RESPCX  No results found for: WOUNDCX  No results found for: STOOLCX  No components found for: BODYFLD    Radiology Data:   Imaging Results (Last 24 Hours)     Procedure Component Value Units Date/Time    XR Chest 1 View [[id number]] Collected: 12/01/21 1515     Updated: 12/01/21 1607    Narrative:        PROCEDURE: Single chest view portable    REASON FOR EXAM:Chest Pain triage protocol  Chest Pain Triage Protocol    FINDINGS: Comparison exam dated October 25, 2021. Cardiac size  appears within normal limits. Left upper lobe perihilar and left  lung base infrahilar interstitial groundglass opacities. Right  upper lobe peripheral small interstitial groundglass opacity.  Lungs are otherwise clear. Pleural spaces are normal. No acute  osseous abnormality. Stable epidural stimulator lead in the mid  thoracic spine region.      Impression:      1.  Left upper lobe perihilar and left lung base infrahilar  interstitial groundglass opacities. Right upper lobe peripheral  small interstitial groundglass opacity. These opacities would be  suspicious for pneumonia including possible atypical viral  etiology and/or pulmonary edema. Recommend clinical correlation.    Electronically signed by:  Nelson Christianson MD  12/1/2021 4:06 PM CST  Workstation: [id number]          I have reviewed the patient current medications.     Assessment/Plan     Active Hospital Problems    Diagnosis  POA   • Pneumonia of both lungs due to infectious organism [J18.9]  Yes       1.  Pneumonia  2.   Elevated troponin  3.  CAD with history of recent NSTEMI with 2 stents placed on 10/28/2021  4.  Paroxysmal A. fib not on long-term anticoagulation secondary to GI bleed, now with A Fib with RVR  5.  Diabetes mellitus type 2  6.  Benign hypertension  7.  Pulmonary fibrosis/COPD/coal miners pneumoconiosis.     -will restart home meds today  -consult Dr. Curtis for chest pain, atrial fibrillation    -IV Levaquin  -oxygen as needed, wean as tolerated   -RPP negative   -IV steroids  -duo nebs  -Home medications as tolerated  -Sliding scale insulin and adjust insulin dose based on blood sugars as elevations are expected with administration of steroids  -Hold heparin given history of GI bleed   -SCDs for prophylaxis  -Blood and sputum cultures. Blood culture likely a contaminant   -PT, OT     I have utilized all available immediate resources to obtain, update, or review the patient's current medications.       I confirmed that the patient's Advance Care Plan is present, code status is documented, or surrogate decision maker is listed in the patient's medical record.       I discussed the patient's findings and my recommendations with: The patient and his wife              Discharge Planning: in progress    I confirmed that the patient's Advance Care Plan is present, code status is documented, or surrogate decision maker is listed in the patient's medical record.           This document has been electronically signed by Christal Gonzalez MD on December 2, 2021 09:21 CST          Electronically signed by Christal Gonzalez MD at 12/02/21 1440       Medical Student Notes (last 24 hours)  Notes from 12/02/21 0846 through 12/03/21 0846   No notes of this type exist for this encounter.            Consult Notes (last 24 hours)      Mana Curtis MD at 12/02/21 1340      Consult Orders    1. Inpatient Cardiology Consult [[id number]] ordered by Christal Gonzalez MD at 12/02/21 1239                Cardiology at [facility]  [facility]  Cardiovascular Consultation Note      Hasmukh Ham  319/1  [id number]  6/1/1933    DATE OF ADMISSION: 12/1/2021  DATE OF CONSULTATION:  12/2/2021    Paolo Rey MD  Treatment Team:   Attending Provider: Christal Gonzalez MD  Consulting Physician: Christal Gonzalez MD  Consulting Physician: Mana Curtis MD  Admitting Provider: Christal Gonzalez MD    Chief Complaint   Patient presents with   • Palpitations   • Chest Pain   • Fatigue       Chief complaint/ Reason for Consultation: Atrial fibrillation with rapid ventricular rate associated mild chest discomfort admitted with pneumonia and shortness of breath      History of Present Illness  Body mass index is 23.68 kg/m². BMI is above normal parameters. Recommendations include: exercise counseling, nutrition counseling and referral to primary care.    88 years old patient admitted for evaluations of shortness of breath generalized weak and palpitation and mild chest discomfort get better with improvement heart rate however at the time of evaluation his heart rate 140 bpm he is not on any IV medications.  Patient with history of GI bleed and AV malformation and with a history of significant GI bleed not a candidate for long-term oral anticoagulation which is documented previously.  Patient also has a history of frequent fall and orthostasis.  Initial troponin was nondiagnostic repeat was within normal range.  He has mild shortness of breath.  Chest x-ray reported pneumonia.  Requested to evaluate this patient.  His main complaint is generalized weakness fatigue and lack of energy shortness of breath and palpitations.  He does have history of CAD s/p PCI in October 2021 mid LAD and with critical stenosis of RCA recommend medical management previous history of stent to LAD, history of congestive heart failure in the base of preserved left ventricle systolic function.  His wife was present in the room at the time of  evaluation.  No recent syncope was reported.  Recently has marginal hemodynamics          Cardiac cath October 2021     · Mid LAD lesion is 95% stenosed tandem lesions .Successfully treated with new 2 x 18 mm Resolute JENNA.Final lesion was 0%.  · Prox Cx lesion is 30% stenosed.  · Mid RCA lesion is 100% stenosed.  · LV pressures (S/D/E) : 118/0/7 mmHg  · The ejection fraction was 40-50% by visual estimate.  · Mild systolic dysfunction.     New JENNA to mid LAD.  DAPT for 1 year.     Echo October 2021        · Significant beat to beat variability but estimated EF appears around 40-45%.  · Left ventricular systolic function is mildly decreased.  · Estimated right ventricular systolic pressure from tricuspid regurgitation is normal (<35 mmHg).  · Left ventricular diastolic function was not assessed.  · Left atrial volume is moderately increased.        Conclusion :Cath 2/2018      Successful PCI to the mid LAD for in-stent restenosis with a 2.25 x 23 Xience Alpine stent reducing 90% stenosis to 0%  Right coronary artery:  Proximally is okay, mid to distal his 100% occluded, distally getting collaterals from the AV circumflex     ECHO !@/2018  · Left ventricular wall thickness is consistent with mild concentric hypertrophy.  · Estimated EF = 68%. No wall motion abnormalities  · Left ventricular systolic function is normal.  · Left atrial cavity size is mildly dilated.  · Mild dilation of the aortic root is present      Past Medical History:   Diagnosis Date   • Basal cell carcinoma    • Bilateral carotid artery stenosis    • Bilateral carotid artery stenosis    • Bleeding disorder ([facility])    • CHF (congestive heart failure) ([facility])    • Chronic obstructive pulmonary disease (COPD) ([facility])    • Coronary arteriosclerosis    • Degenerative joint disease involving multiple joints    • Dementia ([facility])    • Diabetes mellitus ([facility])    • Heart problem    • History of stomach ulcers    • Hyperlipidemia    • Hypertension    • Ingrown  "toenail    • Myocardial infarction ([facility])        Past Surgical History:   Procedure Laterality Date   • BACK SURGERY     • CARDIAC CATHETERIZATION N/A 6/6/2017    Procedure: Right Heart Cath;  Surgeon: Jeff Maciel MD PhD;  Location: [facility] CATH INVASIVE LOCATION;  Service:    • CARDIAC CATHETERIZATION N/A 2/14/2018    Procedure: Coronary angiography;  Surgeon: Moisés Davila MD;  Location: [facility] CATH INVASIVE LOCATION;  Service:    • CARDIAC CATHETERIZATION N/A 10/28/2021    Procedure: Left Heart Cath;  Surgeon: Carine Johnson MD;  Location: [facility] CATH INVASIVE LOCATION;  Service: Cardiology;  Laterality: N/A;   • CORONARY ANGIOPLASTY WITH STENT PLACEMENT     • CORONARY STENT PLACEMENT     • HERNIA REPAIR     • LUNG BIOPSY     • LUNG SURGERY     • NECK SURGERY     • THORACOTOMY Left 1977   • VENTRAL HERNIA REPAIR         Allergies   Allergen Reactions   • Atorvastatin Anaphylaxis   • Penicillins Rash   • Azithromycin Rash   • Cefdinir Other (See Comments)     \"i burned up\" and break out in a rash   • Clarithromycin Rash and Itching   • Pravastatin Unknown - High Severity     Myalgia  Myalgia   • Benadryl Allergy [Diphenhydramine Hcl] Other (See Comments)     Pt states \"it knocks me out.\"       No current facility-administered medications on file prior to encounter.     Current Outpatient Medications on File Prior to Encounter   Medication Sig Dispense Refill   • albuterol (PROVENTIL) (2.5 MG/3ML) 0.083% nebulizer solution Take 2.5 mg by nebulization Every 4 (Four) Hours As Needed for Wheezing. 125 vial 0   • Brovana 15 MCG/2ML nebulizer solution 3 (Three) Times a Day.     • clopidogrel (PLAVIX) 75 MG tablet Take 75 mg by mouth Daily.     • famotidine (PEPCID) 20 MG tablet Take 20 mg by mouth 2 (Two) Times a Day.     • fluticasone (FLONASE) 50 MCG/ACT nasal spray 2 sprays into each nostril Daily.     • furosemide (LASIX) 40 MG tablet Take 1 tablet by mouth Daily. (Patient taking differently: " Take 40 mg by mouth 2 (Two) Times a Day.) 30 tablet 11   • glimepiride (AMARYL) 2 MG tablet Take 2 mg by mouth Every Morning Before Breakfast.     • lisinopril (PRINIVIL,ZESTRIL) 5 MG tablet Take 1 tablet by mouth Daily. 90 tablet 3   • magnesium oxide (MAG-OX) 400 MG tablet Take 250 mg by mouth Daily.     • Red Yeast Rice 600 MG tablet Take 600 mg by mouth 2 (Two) Times a Day.     • acetaminophen (TYLENOL) 325 MG tablet Take 2 tablets by mouth Every 4 (Four) Hours As Needed for Mild Pain .     • Blood Glucose Monitoring Suppl (ONE TOUCH ULTRA 2) w/Device kit Daily. as directed     • cetirizine (zyrTEC) 10 MG tablet Take 10 mg by mouth Daily.     • docusate sodium 100 MG capsule Take 100 mg by mouth.     • Fluticasone Furoate (ARNUITY ELLIPTA) 200 MCG/ACT aerosol powder  Inhale.     • Fluticasone-Umeclidin-Vilant (Trelegy Ellipta) 100-62.5-25 MCG/INH aerosol powder  Inhale 1 inhaler.     • HYDROcodone-acetaminophen (NORCO) 7.5-325 MG per tablet Take 1 tablet by mouth Every 6 (Six) Hours As Needed for Moderate Pain . 12 tablet 0   • ipratropium (ATROVENT) 0.02 % nebulizer solution INHALE THE CONTENTS OF 1 VIAL IN NEBULIZER EVERY 4 HOURS AS NEEDED FOR WHEEZING     • ipratropium-albuterol (DUO-NEB) 0.5-2.5 mg/mL nebulizer Take 3 mL by nebulization 4 (Four) Times a Day. 120 vial 1   • Lancets (OneTouch Delica Plus [id number]) misc 1 each by Other route Daily.     • meclizine (ANTIVERT) 12.5 MG tablet 1 tablet po q 6 hr prn severe nausea     • nitroglycerin (NITROSTAT) 0.4 MG SL tablet place 1 tablet under the tongue if needed every 5 minutes for hair...  (REFER TO PRESCRIPTION NOTES).  0   • nystatin (MYCOSTATIN) 100,000 unit/mL suspension SWISH AND SWALLOW 2 ML EVERY 4 HOURS AS NEEDED     • O2 (OXYGEN) Inhale 2 L/min Every Night. W/ CPAP     • OneTouch Verio test strip TEST EVERY DAY     • polyethylene glycol (MIRALAX) 17 g packet Dissolve 1 packet (17 g) in 4 to 8 ounces of beverage and drink by mouth Daily. 30 packet 1  "  • predniSONE (DELTASONE) 10 MG tablet Daily.     • ranolazine (RANEXA) 1000 MG 12 hr tablet Take 1 tablet by mouth Every 12 (Twelve) Hours. 60 tablet 11   • Umeclidinium-Vilanterol 62.5-25 MCG/INH aerosol powder  Inhale 1 puff Daily. 1 each 11       Social History     Socioeconomic History   • Marital status:    Tobacco Use   • Smoking status: Former Smoker   • Smokeless tobacco: Never Used   Vaping Use   • Vaping Use: Never used   Substance and Sexual Activity   • Alcohol use: No   • Drug use: No   • Sexual activity: Not Currently     Comment:            Review of Systems:     Constitution: Generalized weakness fatigue lack of energy    HENT:  Denies any headache, hearing impairment.    Eyes:  Denies any blurring of vision, or photophobia.     Cardiovascular:  As per history of present illness.     Respiratory: History of COPD shortness of breath     Endocrine: No polydipsia polyuria       Musculoskeletal: There was history of osteoarthritis s/p arthrocentesis in the past    Gastrointestinal: Extensive history of GI bleed AV malformation documented previously  Genitourinary:  No dysuria or hematuria.    Neurological:  No history of seizure disorder, stroke, or memory problems.    Psychiatric/Behavioral:  No history of depression, bipolar disorder or schizophrenia.     Hematological:  No history of easy bruising.         Objective:     Vitals:    12/02/21 0831 12/02/21 1037 12/02/21 1045 12/02/21 1103   BP:    147/82   BP Location:    Left arm   Patient Position:    Lying   Pulse: 102 92 88 67   Resp:  20 20 20   Temp:    97.2 °F (36.2 °C)   TempSrc:    Oral   SpO2:  99%  96%   Weight:       Height:         Body mass index is 23.68 kg/m².  Flowsheet Rows      First Filed Value   Admission Height 171.5 cm (67.5\") Documented at 12/01/2021 1451   Admission Weight 68 kg (150 lb) Documented at 12/01/2021 1451          Intake/Output Summary (Last 24 hours) at 12/2/2021 1340  Last data filed at 12/2/2021 " 1256  Gross per 24 hour   Intake 2790 ml   Output 1700 ml   Net 1090 ml         Physical Exam   Constitutional: Cooperative, alert and oriented, well developed, well nourished,  mild shortness of    HENT:   Head: Normocephalic, conjunctivae is a pink, thyroid is nonpalpable no carotid bruit and trachea central.     Cardiovascular: Tachycardia with regular rate rhythm no S3 no pericardial rub    Pulmonary/Chest: Bilateral decreased air entry coarse crackle no rales    Abdominal: Abdomen soft, bowel sounds normoactive, no masses.    Musculoskeletal: No deformities, clubbing, cyanosis, erythema. Positive mild edema.    Neurological: No gross motor or sensory deficits noted    Skin: Warm and dry to the touch, no apparent skin lesions .     Psychiatric: Normal mood and affect. Behavior is normal.    Radiology Review    XR Chest 1 View   Final Result   1.  Left upper lobe perihilar and left lung base infrahilar   interstitial groundglass opacities. Right upper lobe peripheral   small interstitial groundglass opacity. These opacities would be   suspicious for pneumonia including possible atypical viral   etiology and/or pulmonary edema. Recommend clinical correlation.      Electronically signed by:  Nelson Christianson MD  12/1/2021 4:06 PM CST   Workstation: [id number]          Lab Results:  Lab Results (last 24 hours)     Procedure Component Value Units Date/Time    Blood Culture ID, PCR - Blood, Arm, Left [[id number]] Collected: 12/01/21 1626    Specimen: Blood from Arm, Left Updated: 12/02/21 1210    Respiratory Panel PCR w/COVID-19(SARS-CoV-2) JOSH/KVNG/RADHA/PAD/COR/MAD/WILMAN In-House, NP Swab in UTM/VTM, 3-4 HR TAT - Swab, Nasopharynx [[id number]]  (Normal) Collected: 12/02/21 0930    Specimen: Swab from Nasopharynx Updated: 12/02/21 1142     ADENOVIRUS, PCR Not Detected     Coronavirus 229E Not Detected     Coronavirus HKU1 Not Detected     Coronavirus NL63 Not Detected     Coronavirus OC43 Not Detected     COVID19 Not Detected      Human Metapneumovirus Not Detected     Human Rhinovirus/Enterovirus Not Detected     Influenza A PCR Not Detected     Influenza B PCR Not Detected     Parainfluenza Virus 1 Not Detected     Parainfluenza Virus 2 Not Detected     Parainfluenza Virus 3 Not Detected     Parainfluenza Virus 4 Not Detected     RSV, PCR Not Detected     Bordetella pertussis pcr Not Detected     Bordetella parapertussis PCR Not Detected     Chlamydophila pneumoniae PCR Not Detected     Mycoplasma pneumo by PCR Not Detected    Narrative:      In the setting of a positive respiratory panel with a viral infection PLUS a negative procalcitonin without other underlying concern for bacterial infection, consider observing off antibiotics or discontinuation of antibiotics and continue supportive care. If the respiratory panel is positive for atypical bacterial infection (Bordetella pertussis, Chlamydophila pneumoniae, or Mycoplasma pneumoniae), consider antibiotic de-escalation to target atypical bacterial infection.    POC Glucose Once [[id number]]  (Abnormal) Collected: 12/02/21 1029    Specimen: Blood Updated: 12/02/21 1050     Glucose 252 mg/dL      Comment: RN NotifiedOperator: [id number] KEYONA TIFFANYMeter ID: [id number]       POC Glucose Once [[id number]]  (Abnormal) Collected: 12/02/21 0607    Specimen: Blood Updated: 12/02/21 0640     Glucose 213 mg/dL      Comment: RN NotifiedOperator: [id number] [facility] SYBILMeter ID: [id number]       Comprehensive Metabolic Panel [[id number]]  (Abnormal) Collected: 12/02/21 0526    Specimen: Blood Updated: 12/02/21 0607     Glucose 192 mg/dL      BUN 19 mg/dL      Creatinine 1.16 mg/dL      Sodium 138 mmol/L      Potassium 4.6 mmol/L      Chloride 103 mmol/L      CO2 26.0 mmol/L      Calcium 8.7 mg/dL      Total Protein 5.9 g/dL      Albumin 3.20 g/dL      ALT (SGPT) 12 U/L      AST (SGOT) 12 U/L      Alkaline Phosphatase 56 U/L      Total Bilirubin 0.2 mg/dL      eGFR Non  Amer 59  mL/min/1.73      Globulin 2.7 gm/dL      A/G Ratio 1.2 g/dL      BUN/Creatinine Ratio 16.4     Anion Gap 9.0 mmol/L     Narrative:      GFR Normal >60  Chronic Kidney Disease <60  Kidney Failure <15      CBC Auto Differential [[id number]]  (Abnormal) Collected: 12/02/21 0526    Specimen: Blood Updated: 12/02/21 0546     WBC 10.27 10*3/mm3      RBC 4.09 10*6/mm3      Hemoglobin 11.6 g/dL      Hematocrit 37.5 %      MCV 91.7 fL      MCH 28.4 pg      MCHC 30.9 g/dL      RDW 17.8 %      RDW-SD 59.9 fl      MPV 11.3 fL      Platelets 168 10*3/mm3      Neutrophil % 91.4 %      Lymphocyte % 5.8 %      Monocyte % 0.6 %      Eosinophil % 0.0 %      Basophil % 0.2 %      Immature Grans % 2.0 %      Neutrophils, Absolute 9.38 10*3/mm3      Lymphocytes, Absolute 0.60 10*3/mm3      Monocytes, Absolute 0.06 10*3/mm3      Eosinophils, Absolute 0.00 10*3/mm3      Basophils, Absolute 0.02 10*3/mm3      Immature Grans, Absolute 0.21 10*3/mm3      nRBC 0.0 /100 WBC     POC Glucose Once [[id number]]  (Abnormal) Collected: 12/01/21 1915    Specimen: Blood Updated: 12/01/21 2032     Glucose 153 mg/dL      Comment: RN NotifiedOperator: [id number] GAURAV SYBILMeter ID: [id number]       Procalcitonin [[id number]]  (Normal) Collected: 12/01/21 1720    Specimen: Blood Updated: 12/01/21 1836     Procalcitonin 0.07 ng/mL     Narrative:      As a Marker for Sepsis (Non-Neonates):     1. <0.5 ng/mL represents a low risk of severe sepsis and/or septic shock.  2. >2 ng/mL represents a high risk of severe sepsis and/or septic shock.    As a Marker for Lower Respiratory Tract Infections that require antibiotic therapy:  PCT on Admission     Antibiotic Therapy             6-12 Hrs later  >0.5                          Strongly Recommended            >0.25 - <0.5             Recommended  0.1 - 0.25                  Discouraged                       Remeasure/reassess PCT  <0.1                         Strongly Discouraged         Remeasure/reassess PCT   "    As 28 day mortality risk marker: \"Change in Procalcitonin Result\" (>80% or <=80%) if Day 0 (or Day 1) and Day 4 values are available. Refer to http://[email or web address]-pct-[email or web address]/    Change in PCT <=80 %   A decrease of PCT levels below or equal to 80% defines a positive change in PCT test result representing a higher risk for 28-day all-cause mortality of patients diagnosed with severe sepsis or septic shock.    Change in PCT >80 %   A decrease of PCT levels of more than 80% defines a negative change in PCT result representing a lower risk for 28-day all-cause mortality of patients diagnosed with severe sepsis or septic shock.                Troponin [[id number]]  (Normal) Collected: 12/01/21 1720    Specimen: Blood Updated: 12/01/21 1737     Troponin T 0.024 ng/mL     Narrative:      Troponin T Reference Range:  <= 0.03 ng/mL-   Negative for AMI  >0.03 ng/mL-     Abnormal for myocardial necrosis.  Clinicians would have to utilize clinical acumen, EKG, Troponin and serial changes to determine if it is an Acute Myocardial Infarction or myocardial injury due to an underlying chronic condition.       Results may be falsely decreased if patient taking Biotin.      COVID-19 and FLU A/B PCR - Swab, Nasopharynx [[id number]]  (Normal) Collected: 12/01/21 1625    Specimen: Swab from Nasopharynx Updated: 12/01/21 1650     COVID19 Not Detected     Influenza A PCR Not Detected     Influenza B PCR Not Detected    Narrative:      Fact sheet for providers: https://www.fda.gov/media/453155/download    Fact sheet for patients: https://www.fda.gov/media/720686/download    Test performed by PCR.    Protime-INR [[id number]]  (Normal) Collected: 12/01/21 1626    Specimen: Blood Updated: 12/01/21 1647     Protime 13.3 Seconds      INR 1.02    Narrative:      Therapeutic range for most indications is 2.0-3.0 INR,  or 2.5-3.5 for mechanical heart valves.    Lactic Acid, Plasma [[id number]]  (Normal) Collected: 12/01/21 1626    Specimen: " Blood Updated: 12/01/21 1641     Lactate 1.4 mmol/L     Blood Culture - Blood, Arm, Left [[id number]] Collected: 12/01/21 1626    Specimen: Blood from Arm, Left Updated: 12/01/21 1626    Blood Culture - Blood, Hand, Right [[id number]] Collected: 12/01/21 1626    Specimen: Blood from Hand, Right Updated: 12/01/21 1626    [place] Draw [[id number]] Collected: 12/01/21 1510    Specimen: Blood Updated: 12/01/21 1616    Narrative:      The following orders were created for panel order [place] Draw.  Procedure                               Abnormality         Status                     ---------                               -----------         ------                     Green Top (Gel)[[id number]]                                  Final result               Lavender Top[[id number]]                                     Final result               Gold Top - SST[[id number]]                                   Final result               Light Blue Top[[id number]]                                   Final result                 Please view results for these tests on the individual orders.    Gold Top - SST [[id number]] Collected: 12/01/21 1510    Specimen: Blood Updated: 12/01/21 1616     Extra Tube Hold for add-ons.     Comment: Auto resulted.       Green Top (Gel) [[id number]] Collected: 12/01/21 1510    Specimen: Blood Updated: 12/01/21 1616     Extra Tube Hold for add-ons.     Comment: Auto resulted.       Lavender Top [[id number]] Collected: 12/01/21 1510    Specimen: Blood Updated: 12/01/21 1616     Extra Tube hold for add-on     Comment: Auto resulted       Light Blue Top [[id number]] Collected: 12/01/21 1510    Specimen: Blood Updated: 12/01/21 1616     Extra Tube hold for add-on     Comment: Auto resulted       CK [[id number]]  (Normal) Collected: 12/01/21 1510    Specimen: Blood Updated: 12/01/21 1613     Creatine Kinase 27 U/L     Magnesium [[id number]]  (Normal) Collected: 12/01/21 1510    Specimen: Blood Updated: 12/01/21 1613      Magnesium 1.6 mg/dL     Comprehensive Metabolic Panel [[id number]]  (Abnormal) Collected: 12/01/21 1510    Specimen: Blood Updated: 12/01/21 1536     Glucose 120 mg/dL      BUN 16 mg/dL      Creatinine 1.06 mg/dL      Sodium 139 mmol/L      Potassium 4.5 mmol/L      Chloride 105 mmol/L      CO2 26.0 mmol/L      Calcium 9.2 mg/dL      Total Protein 6.3 g/dL      Albumin 3.60 g/dL      ALT (SGPT) 16 U/L      AST (SGOT) 15 U/L      Alkaline Phosphatase 61 U/L      Total Bilirubin 0.3 mg/dL      eGFR Non African Amer 66 mL/min/1.73      Globulin 2.7 gm/dL      A/G Ratio 1.3 g/dL      BUN/Creatinine Ratio 15.1     Anion Gap 8.0 mmol/L     Narrative:      GFR Normal >60  Chronic Kidney Disease <60  Kidney Failure <15      Troponin [[id number]]  (Abnormal) Collected: 12/01/21 1510    Specimen: Blood Updated: 12/01/21 1536     Troponin T 0.032 ng/mL     Narrative:      Troponin T Reference Range:  <= 0.03 ng/mL-   Negative for AMI  >0.03 ng/mL-     Abnormal for myocardial necrosis.  Clinicians would have to utilize clinical acumen, EKG, Troponin and serial changes to determine if it is an Acute Myocardial Infarction or myocardial injury due to an underlying chronic condition.       Results may be falsely decreased if patient taking Biotin.      BNP [[id number]]  (Normal) Collected: 12/01/21 1510    Specimen: Blood Updated: 12/01/21 1534     proBNP 920.0 pg/mL     Narrative:      Among patients with dyspnea, NT-proBNP is highly sensitive for the detection of acute congestive heart failure. In addition NT-proBNP of <300 pg/ml effectively rules out acute congestive heart failure with 99% negative predictive value.    Results may be falsely decreased if patient taking Biotin.      CBC & Differential [[id number]]  (Abnormal) Collected: 12/01/21 1510    Specimen: Blood Updated: 12/01/21 1514    Narrative:      The following orders were created for panel order CBC & Differential.  Procedure                               Abnormality          Status                     ---------                               -----------         ------                     CBC Auto Differential[[id number]]        Abnormal            Final result                 Please view results for these tests on the individual orders.    CBC Auto Differential [[id number]]  (Abnormal) Collected: 12/01/21 1510    Specimen: Blood Updated: 12/01/21 1514     WBC 13.38 10*3/mm3      RBC 4.29 10*6/mm3      Hemoglobin 12.5 g/dL      Hematocrit 39.0 %      MCV 90.9 fL      MCH 29.1 pg      MCHC 32.1 g/dL      RDW 18.0 %      RDW-SD 59.1 fl      MPV 11.4 fL      Platelets 184 10*3/mm3      Neutrophil % 83.4 %      Lymphocyte % 10.5 %      Monocyte % 3.1 %      Eosinophil % 0.4 %      Basophil % 0.4 %      Immature Grans % 2.2 %      Neutrophils, Absolute 11.16 10*3/mm3      Lymphocytes, Absolute 1.40 10*3/mm3      Monocytes, Absolute 0.42 10*3/mm3      Eosinophils, Absolute 0.05 10*3/mm3      Basophils, Absolute 0.06 10*3/mm3      Immature Grans, Absolute 0.29 10*3/mm3      nRBC 0.0 /100 WBC           I personally viewed and interpreted the patient's EKG/Telemetry data       Assessment/Plan:    # 1 persistent atrial fibrillation with rapid ventricular rate    88 years old patient with documented history of atrial fibrillation, 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  Patient admitted for evaluation of generalized weakness fatigue lack of energy palpitation shortness of breath.  Patient diagnosed pneumonia and also noted atrial fibrillation with rapid ventricular rate.  Has mildly nondiagnostic troponin may be multifactorial etiology repeat within normal range.  I doubt there is evidence of ongoing ischemia at the time of evaluations.  Discussed with the patient and family and given the previous history margin hemodynamic will start the patient on IV amiodarone with  150 mg bolus, 1 mg/min for 6 hours then 0.5 mg/min and will transition to oral amiodarone.  Low-dose beta-blocker/diltiazem can be contemplated if there is a problem with controlling the ventricular rate    Patient is not a candidate for long-term oral anticoagulation due to high has bled score    #2 CAD s/p PCI to LAD    Patient nondiagnostic troponin repeat was within normal range may be multifactorial etiology.  My suspicion of ongoing ischemia is low.  Recommend to continue antiplatelet agent, statin and low-dose beta-blocker             #2 history of CAD s/p PCI to RCA and LAD     No evidence of ongoing ischemia at the time of evaluation. Continue dual antiplatelet agents. Continue statin. Continue Ranexa         #3 history of hypertension and documented history of significant orthostasis and frequent fall.  Currently is tolerating low-dose lisinopril.    History of chronic congestive heart failure on the basis of diastolic dysfunction    Appears to be euvolemic at the time of evaluations.  I will change Lasix 40 from 40 mg twice a day to 40 mg a day    #4 pneumonia    Patient started by hospitalist on IV antibiotic  Recommend PT OT               Thank you for allowing me to participate in the care of Hasmukh Ham. Feel free to contact me directly with any further questions or concerns.    Mana Curtis MD  12/02/21  13:40 CST.      Part of this note may be an electronic transcription/translation of spoken language to printed text using the Dragon Dictation system.        Electronically signed by Mana Curtis MD at 12/02/21 3300

## 2021-12-03 NOTE — THERAPY TREATMENT NOTE
Patient Name: Hasmukh Ham  : 1933    MRN: [id number]                              Today's Date: 12/3/2021       Admit Date: 2021    Visit Dx:     ICD-10-CM ICD-9-CM   1. Pneumonia of both lungs due to infectious organism, unspecified part of lung  J18.9 483.8   2. Precordial pain  R07.2 786.51   3. Atrial fibrillation, unspecified type ([facility])  I48.91 427.31   4. Impaired mobility and ADLs  Z74.09 V49.89    Z78.9    5. 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[facility])   • Nocturia   • Osteoarthritis of multiple joints   • Hyperlipidemia   • Pain in joint involving ankle and foot   • Pneumonia of left lower lobe due to infectious organism   • Arthropathy of hand   • Paroxysmal tachycardia ([facility])   • Osteoarthrosis involving more than one site but not generalized   • Chronic right shoulder pain   • Rotator cuff syndrome   • Partial tear of subscapularis tendon   • Infraspinatus tendon tear   • Supraspinatus tendon tear   • Bilateral carotid artery stenosis   • Pulmonary HTN (HCC)   • Acute interstitial pneumonia (HCC)   • Chronic obstructive lung disease (HCC)   • Diabetes mellitus (HCC)   • Hypertension   • Chest pain   • Shortness of breath   • Angina pectoris (HCC)   • Myocardial infarction (HCC)   • Ingrown toenail   • Heart problem   • Degenerative joint disease involving multiple joints   • Chronic obstructive pulmonary disease (COPD) (HCC)   • Bleeding disorder ([facility])   • Chronic  obstructive pulmonary disease with acute exacerbation (HCC)   • Failure of outpatient treatment   • Sepsis (HCC)   • 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)   • Chronic diastolic CHF (congestive heart failure) (HCC)   • SOB (shortness of breath)   • Cause of injury, fall   • Right hip pain   • Chronic pain of right knee   • Primary osteoarthritis of right knee   • Left shoulder pain   • Left arm pain   • AC joint arthropathy   • Syncope   • Inflammatory arthritis   • Elevated troponin   • Fever   • Paroxysmal atrial fibrillation with rapid ventricular response ([facility])   • Fibrosis of lung (HCC)   • Type 2 diabetes mellitus (HCC)   • Chondrocalcinosis of right knee   • Nontraumatic complete tear of right rotator cuff   • Pneumonia of both lungs due to infectious organism     Past Medical History:   Diagnosis Date   • Basal cell carcinoma    • Bilateral carotid artery stenosis    • Bilateral carotid artery stenosis    • Bleeding disorder (HCC)    • CHF (congestive heart failure) ([facility])    • Chronic obstructive pulmonary disease (COPD) ([facility])    • Coronary arteriosclerosis    • Degenerative joint disease involving multiple joints    • Dementia ([facility])    • Diabetes mellitus ([facility])    • Heart problem    • History of stomach ulcers    • Hyperlipidemia    • Hypertension    • Ingrown toenail    • Myocardial infarction ([facility])      Past Surgical History:   Procedure Laterality Date   • BACK SURGERY     • CARDIAC CATHETERIZATION N/A 6/6/2017    Procedure: Right Heart Cath;  Surgeon: Jeff Maciel MD PhD;  Location: [facility] CATH INVASIVE LOCATION;  Service:    • CARDIAC CATHETERIZATION N/A 2/14/2018    Procedure: Coronary angiography;  Surgeon: Moisés Davila MD;  Location: [facility] CATH INVASIVE LOCATION;  Service:    • CARDIAC CATHETERIZATION N/A 10/28/2021    Procedure: Left Heart Cath;  Surgeon: Carine Johnson MD;  Location: [facility] CATH INVASIVE LOCATION;  Service: Cardiology;  Laterality: N/A;    • CORONARY ANGIOPLASTY WITH STENT PLACEMENT     • CORONARY STENT PLACEMENT     • HERNIA REPAIR     • LUNG BIOPSY     • LUNG SURGERY     • NECK SURGERY     • THORACOTOMY Left 1977   • VENTRAL HERNIA REPAIR        General Information     Row Name 12/03/21 1310          OT Time and Intention    Document Type therapy note (daily note)  -TO     Mode of Treatment individual therapy; occupational therapy  -TO     Row Name 12/03/21 1310          General Information    Patient Profile Reviewed yes  -TO     Existing Precautions/Restrictions fall; oxygen therapy device and L/min  -TO     Row Name 12/03/21 1310          Cognition    Orientation Status (Cognition) oriented x 4; verbal cues/prompts needed for orientation  -TO     Row Name 12/03/21 1310          Safety Issues, Functional Mobility    Impairments Affecting Function (Mobility) strength; endurance/activity tolerance; shortness of breath; balance; pain  -TO           User Key  (r) = Recorded By, (t) = Taken By, (c) = Cosigned By    Initials Name Provider Type    TO Dylan Bang COTA Occupational Therapy Assistant                 Mobility/ADL's     [facility] 12/03/21 1345          Bed Mobility    Bed Mobility supine-sit; sit-supine  -TO     Supine-Sit Mower (Bed Mobility) standby assist  -TO     Sit-Supine Mower (Bed Mobility) standby assist  -TO     Assistive Device (Bed Mobility) head of bed elevated; bed rails  -TO     Row Name 12/03/21 1345          Transfers    Sit-Stand Mower (Transfers) standby assist  -TO     [place] Level (Toilet Transfer) contact guard  -TO     Assistive Device (Toilet Transfer) grab bars/safety frame; raised toilet seat  -TO     Row Name 12/03/21 1345          Sit-Stand Transfer    Assistive Device (Sit-Stand Transfers) walker, front-wheeled  -TO     Row Name 12/03/21 1345          Toilet Transfer    Type (Toilet Transfer) sit-stand; stand-sit  -TO     Row Name 12/03/21 1345          Functional Mobility     Functional Mobility- Ind. Level contact guard assist  far side of be >toilet>far side of bed with 2 RBs and HR<110  -TO     Functional Mobility- Device rolling walker  -TO     Functional Mobility-Distance (Feet) 30  -TO     Row Name 12/03/21 1345          Lower Body Dressing Assessment/Training    [place] Level (Lower Body Dressing) don; doff; socks; lower body dressing skills; pants/bottoms; standby assist  increased t/e for socks with pt decline edu on sock aid  -TO     Position (Lower Body Dressing) edge of bed sitting  -TO           User Key  (r) = Recorded By, (t) = Taken By, (c) = Cosigned By    Initials Name Provider Type    TO Dylan Bang COTA Occupational Therapy Assistant               Obj/Interventions     Row Name 12/03/21 1310          Range of Motion Comprehensive    General Range of Motion bilateral lower extremity ROM WFL  -TO     Row Name 12/03/21 1310          Balance    Balance Assessment sitting static balance  -TO     Static Sitting Balance WFL; sitting, edge of bed  -TO           User Key  (r) = Recorded By, (t) = Taken By, (c) = Cosigned By    Initials Name Provider Type    TO Dylan Bang COTA Occupational Therapy Assistant               Goals/Plan     Row Name 12/03/21 1310          Transfer Goal 1 (OT)    Activity/Assistive Device (Transfer Goal 1, OT) toilet  -TO     [place] Level/Cues Needed (Transfer Goal 1, OT) modified independence  -TO     Time Frame (Transfer Goal 1, OT) long term goal (LTG); by discharge  -TO     Progress/Outcome (Transfer Goal 1, OT) goal not met  -TO     Row Name 12/03/21 1310          Bathing Goal 1 (OT)    Activity/Device (Bathing Goal 1, OT) bathing skills, all  -TO     [place] Level/Cues Needed (Bathing Goal 1, OT) supervision required  -TO     Time Frame (Bathing Goal 1, OT) long term goal (LTG); by discharge  -TO     Progress/Outcomes (Bathing Goal 1, OT) goal not met  -TO     Row Name 12/03/21 1310          Dressing Goal 1 (OT)     Activity/Device (Dressing Goal 1, OT) lower body dressing  -TO     [place]/Cues Needed (Dressing Goal 1, OT) supervision required  -TO     Time Frame (Dressing Goal 1, OT) long term goal (LTG); by discharge  -TO     Progress/Outcome (Dressing Goal 1, OT) goal not met  -TO           User Key  (r) = Recorded By, (t) = Taken By, (c) = Cosigned By    Initials Name Provider Type    TO Dylan Bang COTA Occupational Therapy Assistant               Clinical Impression     Row Name 12/03/21 1310          Pain Scale: Numbers Pre/Post-Treatment    Pretreatment Pain Rating 0/10 - no pain  -TO     Posttreatment Pain Rating 0/10 - no pain  -TO     Row Name 12/03/21 1310          Plan of Care Review    Plan of Care Reviewed With patient  -TO     Progress improving  -TO     Outcome Summary Pt sup>sit EOB SBA with pt performed fx'al mobility EOB>toilet>EOB with RW and 2 RB CGA and hr 109 at highest. pt doned pants SBA and socks SBA with increased t/e for socks but declining edu on sock aid. Pt SBA sit>sup..  -TO     Row Name 12/03/21 1310          Therapy Assessment/Plan (OT)    Rehab Potential (OT) good, to achieve stated therapy goals  -TO     Criteria for Skilled Therapeutic Interventions Met (OT) yes; skilled treatment is necessary  -TO     Therapy Frequency (OT) other (see comments)  5-7 days/week  -TO     Row Name 12/03/21 1310          Therapy Plan Review/Discharge Plan (OT)    Anticipated Discharge Disposition (OT) home with home health; home with 24/7 care  -TO     [facility] Name 12/03/21 1310          Vital Signs    Pre Systolic BP Rehab 121  -TO     Pre Treatment Diastolic BP 72  -TO     Pretreatment Heart Rate (beats/min) 98  -TO     Posttreatment Heart Rate (beats/min) 72  -TO     Pre SpO2 (%) 93  -TO     O2 Delivery Pre Treatment supplemental O2  -TO     Post SpO2 (%) 97  -TO     O2 Delivery Post Treatment supplemental O2  -TO     Pre Patient Position Supine  -TO     Intra Patient Position Standing  -TO     Post  Patient Position Supine  -TO     Row Name 12/03/21 1310          Positioning and Restraints    In Bed call light within reach; encouraged to call for assist; exit alarm on; supine  -TO           User Key  (r) = Recorded By, (t) = Taken By, (c) = Cosigned By    Initials Name Provider Type    TO Dylan Bang COTA Occupational Therapy Assistant               Outcome Measures     Row Name 12/03/21 1310          How much help from another is currently needed...    Putting on and taking off regular lower body clothing? 3  -TO     Bathing (including washing, rinsing, and drying) 3  -TO     Toileting (which includes using toilet bed pan or urinal) 3  -TO     Putting on and taking off regular upper body clothing 3  -TO     Taking care of personal grooming (such as brushing teeth) 4  -TO     Eating meals 4  -TO     AM-PAC 6 Clicks Score (OT) 20  -TO     Row Name 12/03/21 0905          How much help from another person do you currently need...    Turning from your back to your side while in flat bed without using bedrails? 3  -TESS     Moving from lying on back to sitting on the side of a flat bed without bedrails? 3  -TESS     Moving to and from a bed to a chair (including a wheelchair)? 3  -TESS     Standing up from a chair using your arms (e.g., wheelchair, bedside chair)? 3  -TESS     Climbing 3-5 steps with a railing? 3  -TESS     To walk in hospital room? 3  -TESS     AM-PAC 6 Clicks Score (PT) 18  -TESS     Row Name 12/03/21 0905          Functional Assessment    Outcome Measure Options AM-PAC 6 Clicks Basic Mobility (PT)  -TESS           User Key  (r) = Recorded By, (t) = Taken By, (c) = Cosigned By    Initials Name Provider Type    Cristobal Sullivan PTA Physical Therapy Assistant    TO Dylan Bang COTA Occupational Therapy Assistant                Occupational Therapy Education                 Title: PT OT SLP Therapies (In Progress)     Topic: Occupational Therapy (In Progress)     Point: ADL training (Not Started)      Description:   Instruct learner(s) on proper safety adaptation and remediation techniques during self care or transfers.   Instruct in proper use of assistive devices.              Learner Progress:  Not documented in this visit.          Point: Home exercise program (Not Started)     Description:   Instruct learner(s) on appropriate technique for monitoring, assisting and/or progressing therapeutic exercises/activities.              Learner Progress:  Not documented in this visit.          Point: Precautions (Done)     Description:   Instruct learner(s) on prescribed precautions during self-care and functional transfers.              Learning Progress Summary           Patient Acceptance, E,TB, VU by  at 12/2/2021 1325    Comment: POC, role of OT, transfer training                   Point: Body mechanics (Done)     Description:   Instruct learner(s) on proper positioning and spine alignment during self-care, functional mobility activities and/or exercises.              Learning Progress Summary           Patient Acceptance, E,TB, VU by  at 12/2/2021 1325    Comment: POC, role of OT, transfer training                               User Key     Initials Effective Dates Name Provider Type Discipline     06/14/21 -  Israel Black, OT Occupational Therapist OT              OT Recommendation and Plan  Therapy Frequency (OT): other (see comments) (5-7 days/week)  Plan of Care Review  Plan of Care Reviewed With: patient  Progress: improving  Outcome Summary: Pt sup>sit EOB SBA with pt performed fx'al mobility EOB>toilet>EOB with RW and 2 RB CGA and hr 109 at highest. pt doned pants SBA and socks SBA with increased t/e for socks but declining edu on sock aid. Pt SBA sit>sup..     Time Calculation:    Time Calculation- OT     Row Name 12/03/21 1447 12/03/21 1254          Time Calculation- OT    OT Start Time 1310  -TO --     OT Stop Time 1340  -TO --     OT Time Calculation (min) 30 min  -TO --     Total Timed Code  Minutes- OT 30 minute(s)  -TO --            Timed Charges    69299 - Gait Training Minutes  -- 15  -TESS     65050 - OT Self Care/Mgmt Minutes 30  -TO --            Total Minutes    Timed Charges Total Minutes 30  -TO 15  -TESS      Total Minutes 30  -TO 15  -TESS           User Key  (r) = Recorded By, (t) = Taken By, (c) = Cosigned By    Initials Name Provider Type    TESS Cristobal Mansfield, NEFTALI Physical Therapy Assistant    TO Dylan Bang COTA Occupational Therapy Assistant              Therapy Charges for Today     Code Description Service Date Service Provider Modifiers Qty    [id number]  OT SELF CARE/MGMT/TRAIN EA 15 MIN 12/3/2021 Dylan Bang COTA GO 2               KRYSTIN Navarro  12/3/2021

## 2021-12-03 NOTE — PAYOR COMM NOTE
"Radha Watersmore  Case Management Extender  [phone] phone  [phone] fax    Ref# [id number]    Hasmukh Ham (88 y.o. Male)             Date of Birth Social Security Number Address Home Phone MRN    06/01/1933  [address] [phone] [id number]    Muslim Marital Status             Mandaen        Admission Date Admission Type Admitting Provider Attending Provider Department, Room/Bed    12/1/21 Emergency Christal Gonzalez MD Gerlach, Elizabeth T, MD [address], 319/1    Discharge Date Discharge Disposition Discharge Destination                         Attending Provider: Christal Gonzalez MD    Allergies: Atorvastatin, Penicillins, Azithromycin, Cefdinir, Clarithromycin, Pravastatin, Benadryl Allergy [Diphenhydramine Hcl]    Isolation: None   Infection: None   Code Status: CPR   Advance Care Planning Activity    Ht: 170.2 cm (67\")   Wt: 68.9 kg (151 lb 12.8 oz)    Admission Cmt: None   Principal Problem: None                Active Insurance as of 12/1/2021     Primary Coverage     Payor Plan Insurance Group Employer/Plan Group    AETNA MEDICARE REPLACEMENT AETNA MEDICARE REPLACEMENT [id number]     Payor Plan Address Payor Plan Phone Number Payor Plan Fax Number Effective Dates    [address] [phone]  4/1/2019 - None Entered    [facility] [id number]       Subscriber Name Subscriber Birth Date Member ID       HASMUKH HAM 6/1/1933 [id number]                 Emergency Contacts      (Rel.) Home Phone Work Phone Mobile Phone    Rimma Durant (Spouse) [phone] -- [phone]               History & Physical      Kelvin Aguilar MD at 12/01/21 [address] [place] [address] Medicine Admission      Date of Admission: 12/1/2021      Primary Care Physician: Paolo Rey MD      Chief Complaint: Shortness of breath    HPI: Mr. Ham " is an 88-year-old gentleman with a history of chronic lung disease, CAD with non-STEMI on 10/25/2021 resulting in placement of 2 stents on 10/28/2021, diabetes mellitus type 2, benign hypertension who presents to the emergency room with complaints of increasing shortness of breath and general malaise over the last 3 to 4 days.  He states that he is not felt normal and been back to his baseline since his last hospitalization back at the end of October.  Over the last few days his began to decline rapidly with weakness, malaise, cough with sputum, shortness of breath.  His appetite is also declined.  He denies fevers or chills.  He denies peripheral edema.  He denies orthopnea.  He denies chest pain.      Concurrent Medical History:  has a past medical history of Basal cell carcinoma, Bilateral carotid artery stenosis, Bilateral carotid artery stenosis, Bleeding disorder ([facility]), CHF (congestive heart failure) ([facility]), Chronic obstructive pulmonary disease (COPD) ([facility]), Coronary arteriosclerosis, Degenerative joint disease involving multiple joints, Dementia ([facility]), Diabetes mellitus ([facility]), Heart problem, History of stomach ulcers, Hyperlipidemia, Hypertension, Ingrown toenail, and Myocardial infarction ([facility]).    Past Surgical History:  has a past surgical history that includes Hernia repair; Lung biopsy; Ventral hernia repair; Thoracotomy (Left, 1977); Cardiac catheterization (N/A, 6/6/2017); Coronary stent placement; Neck surgery; Cardiac catheterization (N/A, 2/14/2018); Lung surgery; Back surgery; Coronary angioplasty with stent; and Cardiac catheterization (N/A, 10/28/2021).    Family History: family history includes Cancer in an other family member; Diabetes in his father and mother; Heart disease in his father; Hypertension in his father; Lung disease in an other family member.   Social History:  reports that he has quit smoking. He has never used smokeless tobacco. He reports that he does not drink alcohol and does  "not use drugs.    Allergies:   Allergies   Allergen Reactions   • Atorvastatin Anaphylaxis   • Penicillins Rash   • Azithromycin Rash   • Cefdinir Other (See Comments)     \"i burned up\" and break out in a rash   • Clarithromycin Rash and Itching   • Pravastatin Unknown - High Severity     Myalgia  Myalgia   • Benadryl Allergy [Diphenhydramine Hcl] Other (See Comments)     Pt states \"it knocks me out.\"       Medications:   Prior to Admission medications    Medication Sig Start Date End Date Taking? Authorizing Provider   acetaminophen (TYLENOL) 325 MG tablet Take 2 tablets by mouth Every 4 (Four) Hours As Needed for Mild Pain . 2/15/18   Jc Alba MD   albuterol (PROVENTIL) (2.5 MG/3ML) 0.083% nebulizer solution Take 2.5 mg by nebulization Every 4 (Four) Hours As Needed for Wheezing. 12/23/17   Adi Puente MD   Blood Glucose Monitoring Suppl (ONE TOUCH ULTRA 2) w/Device kit Daily. as directed 2/11/21   Bill Rader MD   Brovana 15 MCG/2ML nebulizer solution 3 (Three) Times a Day. 10/16/20   Bill Rader MD   cetirizine (zyrTEC) 10 MG tablet Take 10 mg by mouth Daily.    Bill Rader MD   clopidogrel (PLAVIX) 75 MG tablet Take 75 mg by mouth Daily. 2/3/16   Bill Rader MD   docusate sodium 100 MG capsule Take 100 mg by mouth.    Bill Rader MD   famotidine (PEPCID) 20 MG tablet Take 20 mg by mouth 2 (Two) Times a Day.    Bill Rader MD   fluticasone (FLONASE) 50 MCG/ACT nasal spray 2 sprays into each nostril Daily.    Bill Rader MD   Fluticasone Furoate (ARNUITY ELLIPTA) 200 MCG/ACT aerosol powder  Inhale.    Bill Rader MD   Fluticasone-Umeclidin-Vilant (Trelegy Ellipta) 100-62.5-25 MCG/INH aerosol powder  Inhale 1 inhaler.    Bill Rader MD   furosemide (LASIX) 40 MG tablet Take 1 tablet by mouth Daily. 11/23/21   Carine Johnson MD   glimepiride (AMARYL) 2 MG tablet Take 2 mg by mouth Every Morning Before " Breakfast.    Bill Rader MD   HYDROcodone-acetaminophen (NORCO) 7.5-325 MG per tablet Take 1 tablet by mouth Every 6 (Six) Hours As Needed for Moderate Pain . 6/21/21   Gauri Sweeney APRN   ipratropium (ATROVENT) 0.02 % nebulizer solution INHALE THE CONTENTS OF 1 VIAL IN NEBULIZER EVERY 4 HOURS AS NEEDED FOR WHEEZING 11/23/20   Bill Rader MD   ipratropium-albuterol (DUO-NEB) 0.5-2.5 mg/mL nebulizer Take 3 mL by nebulization 4 (Four) Times a Day. 9/28/17   Jc Alba MD   Lancets (OneTouch Delica Plus [id number]) misc 1 each by Other route Daily. 2/26/21   Bill Rader MD   lisinopril (PRINIVIL,ZESTRIL) 5 MG tablet Take 1 tablet by mouth Daily. 11/22/21   Mana Curtis MD   magnesium oxide (MAG-OX) 400 MG tablet Take 250 mg by mouth Daily.    Bill Rader MD   meclizine (ANTIVERT) 12.5 MG tablet 1 tablet po q 6 hr prn severe nausea 6/1/20   Bill Rader MD   nitroglycerin (NITROSTAT) 0.4 MG SL tablet place 1 tablet under the tongue if needed every 5 minutes for hari...  (REFER TO PRESCRIPTION NOTES). 1/11/17   Bill Rader MD   nystatin (MYCOSTATIN) 100,000 unit/mL suspension SWISH AND SWALLOW 2 ML EVERY 4 HOURS AS NEEDED 11/2/21   Bill Rader MD   O2 (OXYGEN) Inhale 2 L/min Every Night. W/ CPAP    Provider, Historical, MD   OneTouch Verio test strip TEST EVERY DAY 11/15/21   Bill Rader MD   polyethylene glycol (MIRALAX) 17 g packet Dissolve 1 packet (17 g) in 4 to 8 ounces of beverage and drink by mouth Daily. 6/30/21   Jc Alba MD   predniSONE (DELTASONE) 10 MG tablet Daily. 8/25/21   Bill Rader MD   ranolazine (RANEXA) 1000 MG 12 hr tablet Take 1 tablet by mouth Every 12 (Twelve) Hours. 11/23/21   Carine Johnson MD   Red Yeast Rice 600 MG tablet Take 600 mg by mouth 2 (Two) Times a Day.    Provider, MD Bill   Umeclidinium-Vilanterol 62.5-25 MCG/INH aerosol powder  Inhale 1 puff Daily. 3/3/17    Brea Higginbotham MD       Review of Systems:  Review of Systems   Comprehensive review of systems completed.  Pertinent positives and negatives per HPI.  All others reviewed negative.  Physical Exam:   Temp:  [96.7 °F (35.9 °C)-98.3 °F (36.8 °C)] 96.7 °F (35.9 °C)  Heart Rate:  [] 104  Resp:  [18-20] 18  BP: (101-138)/(62-88) 115/77  Physical Exam  Constitutional:       General: He is not in acute distress.     Appearance: Normal appearance. He is not ill-appearing.   HENT:      Head: Normocephalic and atraumatic.      Right Ear: External ear normal.      Left Ear: External ear normal.      Nose: Nose normal.      Mouth/Throat:      Mouth: Mucous membranes are dry.   Eyes:      General: No scleral icterus.     Extraocular Movements: Extraocular movements intact.      Pupils: Pupils are equal, round, and reactive to light.   Cardiovascular:      Rate and Rhythm: Rhythm irregular.   Pulmonary:      Effort: Pulmonary effort is normal.      Breath sounds: Wheezing present.   Abdominal:      Palpations: Abdomen is soft.      Tenderness: There is no abdominal tenderness. There is no guarding.   Musculoskeletal:      Cervical back: Neck supple.      Right lower leg: No edema.      Left lower leg: No edema.   Skin:     General: Skin is warm and dry.      Findings: No rash.   Neurological:      General: No focal deficit present.      Mental Status: He is alert and oriented to person, place, and time. Mental status is at baseline.   Psychiatric:         Mood and Affect: Mood normal.         Behavior: Behavior normal.           Results Reviewed:  I have personally reviewed current lab, radiology, and data and agree with results.  Lab Results (last 24 hours)     Procedure Component Value Units Date/Time    Procalcitonin [[id number]]  (Normal) Collected: 12/01/21 1720    Specimen: Blood Updated: 12/01/21 1836     Procalcitonin 0.07 ng/mL     Narrative:      As a Marker for Sepsis (Non-Neonates):     1. <0.5 ng/mL  "represents a low risk of severe sepsis and/or septic shock.  2. >2 ng/mL represents a high risk of severe sepsis and/or septic shock.    As a Marker for Lower Respiratory Tract Infections that require antibiotic therapy:  PCT on Admission     Antibiotic Therapy             6-12 Hrs later  >0.5                          Strongly Recommended            >0.25 - <0.5             Recommended  0.1 - 0.25                  Discouraged                       Remeasure/reassess PCT  <0.1                         Strongly Discouraged         Remeasure/reassess PCT      As 28 day mortality risk marker: \"Change in Procalcitonin Result\" (>80% or <=80%) if Day 0 (or Day 1) and Day 4 values are available. Refer to http://www.[facility] CityNowsupplier Internationalpct-calculator.com/    Change in PCT <=80 %   A decrease of PCT levels below or equal to 80% defines a positive change in PCT test result representing a higher risk for 28-day all-cause mortality of patients diagnosed with severe sepsis or septic shock.    Change in PCT >80 %   A decrease of PCT levels of more than 80% defines a negative change in PCT result representing a lower risk for 28-day all-cause mortality of patients diagnosed with severe sepsis or septic shock.                Troponin [[id number]]  (Normal) Collected: 12/01/21 1720    Specimen: Blood Updated: 12/01/21 1737     Troponin T 0.024 ng/mL     Narrative:      Troponin T Reference Range:  <= 0.03 ng/mL-   Negative for AMI  >0.03 ng/mL-     Abnormal for myocardial necrosis.  Clinicians would have to utilize clinical acumen, EKG, Troponin and serial changes to determine if it is an Acute Myocardial Infarction or myocardial injury due to an underlying chronic condition.       Results may be falsely decreased if patient taking Biotin.      COVID-19 and FLU A/B PCR - Swab, Nasopharynx [[id number]]  (Normal) Collected: 12/01/21 1625    Specimen: Swab from Nasopharynx Updated: 12/01/21 1650     COVID19 Not Detected     Influenza A PCR Not Detected "     Influenza B PCR Not Detected    Narrative:      Fact sheet for providers: https://www.fda.gov/media/446351/download    Fact sheet for patients: https://www.fda.gov/media/274884/download    Test performed by PCR.    Protime-INR [[id number]]  (Normal) Collected: 12/01/21 1626    Specimen: Blood Updated: 12/01/21 1647     Protime 13.3 Seconds      INR 1.02    Narrative:      Therapeutic range for most indications is 2.0-3.0 INR,  or 2.5-3.5 for mechanical heart valves.    Lactic Acid, Plasma [[id number]]  (Normal) Collected: 12/01/21 1626    Specimen: Blood Updated: 12/01/21 1641     Lactate 1.4 mmol/L     Blood Culture - Blood, Arm, Left [[id number]] Collected: 12/01/21 1626    Specimen: Blood from Arm, Left Updated: 12/01/21 1626    Blood Culture - Blood, Hand, Right [[id number]] Collected: 12/01/21 1626    Specimen: Blood from Hand, Right Updated: 12/01/21 1626    Gray Draw [[id number]] Collected: 12/01/21 1510    Specimen: Blood Updated: 12/01/21 1616    Narrative:      The following orders were created for panel order Gray Draw.  Procedure                               Abnormality         Status                     ---------                               -----------         ------                     Green Top (Gel)[[id number]]                                  Final result               Lavender Top[[id number]]                                     Final result               Gold Top - SST[[id number]]                                   Final result               Light Blue Top[[id number]]                                   Final result                 Please view results for these tests on the individual orders.    Gold Top - SST [[id number]] Collected: 12/01/21 1510    Specimen: Blood Updated: 12/01/21 1616     Extra Tube Hold for add-ons.     Comment: Auto resulted.       Green Top (Gel) [[id number]] Collected: 12/01/21 1510    Specimen: Blood Updated: 12/01/21 1616     Extra Tube Hold for add-ons.     Comment: Auto  resulted.       Lavender Top [[id number]] Collected: 12/01/21 1510    Specimen: Blood Updated: 12/01/21 1616     Extra Tube hold for add-on     Comment: Auto resulted       Light Blue Top [[id number]] Collected: 12/01/21 1510    Specimen: Blood Updated: 12/01/21 1616     Extra Tube hold for add-on     Comment: Auto resulted       CK [[id number]]  (Normal) Collected: 12/01/21 1510    Specimen: Blood Updated: 12/01/21 1613     Creatine Kinase 27 U/L     Magnesium [[id number]]  (Normal) Collected: 12/01/21 1510    Specimen: Blood Updated: 12/01/21 1613     Magnesium 1.6 mg/dL     Comprehensive Metabolic Panel [[id number]]  (Abnormal) Collected: 12/01/21 1510    Specimen: Blood Updated: 12/01/21 1536     Glucose 120 mg/dL      BUN 16 mg/dL      Creatinine 1.06 mg/dL      Sodium 139 mmol/L      Potassium 4.5 mmol/L      Chloride 105 mmol/L      CO2 26.0 mmol/L      Calcium 9.2 mg/dL      Total Protein 6.3 g/dL      Albumin 3.60 g/dL      ALT (SGPT) 16 U/L      AST (SGOT) 15 U/L      Alkaline Phosphatase 61 U/L      Total Bilirubin 0.3 mg/dL      eGFR Non African Amer 66 mL/min/1.73      Globulin 2.7 gm/dL      A/G Ratio 1.3 g/dL      BUN/Creatinine Ratio 15.1     Anion Gap 8.0 mmol/L     Narrative:      GFR Normal >60  Chronic Kidney Disease <60  Kidney Failure <15      Troponin [[id number]]  (Abnormal) Collected: 12/01/21 1510    Specimen: Blood Updated: 12/01/21 1536     Troponin T 0.032 ng/mL     Narrative:      Troponin T Reference Range:  <= 0.03 ng/mL-   Negative for AMI  >0.03 ng/mL-     Abnormal for myocardial necrosis.  Clinicians would have to utilize clinical acumen, EKG, Troponin and serial changes to determine if it is an Acute Myocardial Infarction or myocardial injury due to an underlying chronic condition.       Results may be falsely decreased if patient taking Biotin.      BNP [[id number]]  (Normal) Collected: 12/01/21 1510    Specimen: Blood Updated: 12/01/21 1534     proBNP 920.0 pg/mL     Narrative:       Among patients with dyspnea, NT-proBNP is highly sensitive for the detection of acute congestive heart failure. In addition NT-proBNP of <300 pg/ml effectively rules out acute congestive heart failure with 99% negative predictive value.    Results may be falsely decreased if patient taking Biotin.      CBC & Differential [[id number]]  (Abnormal) Collected: 12/01/21 1510    Specimen: Blood Updated: 12/01/21 1514    Narrative:      The following orders were created for panel order CBC & Differential.  Procedure                               Abnormality         Status                     ---------                               -----------         ------                     CBC Auto Differential[[id number]]        Abnormal            Final result                 Please view results for these tests on the individual orders.    CBC Auto Differential [[id number]]  (Abnormal) Collected: 12/01/21 1510    Specimen: Blood Updated: 12/01/21 1514     WBC 13.38 10*3/mm3      RBC 4.29 10*6/mm3      Hemoglobin 12.5 g/dL      Hematocrit 39.0 %      MCV 90.9 fL      MCH 29.1 pg      MCHC 32.1 g/dL      RDW 18.0 %      RDW-SD 59.1 fl      MPV 11.4 fL      Platelets 184 10*3/mm3      Neutrophil % 83.4 %      Lymphocyte % 10.5 %      Monocyte % 3.1 %      Eosinophil % 0.4 %      Basophil % 0.4 %      Immature Grans % 2.2 %      Neutrophils, Absolute 11.16 10*3/mm3      Lymphocytes, Absolute 1.40 10*3/mm3      Monocytes, Absolute 0.42 10*3/mm3      Eosinophils, Absolute 0.05 10*3/mm3      Basophils, Absolute 0.06 10*3/mm3      Immature Grans, Absolute 0.29 10*3/mm3      nRBC 0.0 /100 WBC         Imaging Results (Last 24 Hours)     Procedure Component Value Units Date/Time    XR Chest 1 View [[id number]] Collected: 12/01/21 1515     Updated: 12/01/21 1607    Narrative:        PROCEDURE: Single chest view portable    REASON FOR EXAM:Chest Pain triage protocol  Chest Pain Triage Protocol    FINDINGS: Comparison exam dated October 25, 2021.  Cardiac size  appears within normal limits. Left upper lobe perihilar and left  lung base infrahilar interstitial groundglass opacities. Right  upper lobe peripheral small interstitial groundglass opacity.  Lungs are otherwise clear. Pleural spaces are normal. No acute  osseous abnormality. Stable epidural stimulator lead in the mid  thoracic spine region.      Impression:      1.  Left upper lobe perihilar and left lung base infrahilar  interstitial groundglass opacities. Right upper lobe peripheral  small interstitial groundglass opacity. These opacities would be  suspicious for pneumonia including possible atypical viral  etiology and/or pulmonary edema. Recommend clinical correlation.    Electronically signed by:  Nelson Christianson MD  12/1/2021 4:06 PM CST  Workstation: [id number]            Assessment:    Active Hospital Problems    Diagnosis    • Pneumonia of both lungs due to infectious organism          Plan:    1.  Pneumonia  2.  Elevated troponin  3.  CAD with history of recent NSTEMI with 2 stents placed on 10/28/2021  4.  Paroxysmal A. fib not on long-term anticoagulation secondary to GI bleed  5.  Diabetes mellitus type 2  6.  Benign hypertension  7.  Pulmonary fibrosis/COPD/coal miners pneumoconiosis.    -IV Levaquin  -IV steroids  -Duo nebs  -O2  -Home medications as tolerated  -Sliding scale insulin and adjust insulin dose based on blood sugars as elevations are expected with administration of steroids  -Hold heparin 7 anticoagulants given history of GI bleed -SCDs for prophylaxis  -Blood and sputum cultures.    I have utilized all available immediate resources to obtain, update, or review the patient's current medications.      I confirmed that the patient's Advance Care Plan is present, code status is documented, or surrogate decision maker is listed in the patient's medical record.      I discussed the patient's findings and my recommendations with: The patient and his wife    Kelvin Aguilar,  "MD                Electronically signed by Kelvin Aguilar MD at 12/01/21 1857          Emergency Department Notes      Jeannine Dixon, RN at 12/01/21 1359        Per Shari with home health patient has been having problems with his afib since last week. Is a patient of dr cool and dr scott. Has had medication adjustments for afib over the last week. C/o left arm pain, weakness. States when she first got there pt's hr was 107 and irregular. When EMS just got there, hr is now 46 and irregular.     Jeannine Dixon RN  12/01/21 1400      Electronically signed by Jeannine Dixon RN at 12/01/21 1400     Gracy Carpio RN at 12/01/21 1514        Portable cxr done by rad Gracy Rooney RN  12/01/21 1514      Electronically signed by Gracy Carpio RN at 12/01/21 1514     Zack Castro MD at 12/01/21 1515      Procedure Orders    1. ECG 12 Lead [[id number]] ordered by Zack Castro MD               Subjective   Patient presents to the emergency department with chest pain and shortness of breath.  He was seen by [facility] today and was told he had \"a flutter\" and was told to come to the emergency department.  He does have a history of coronary artery disease with stent placement x5 and states that his last procedure was performed this past October.  He also c/o hypotension and states that he has been having increased sleep. Pt also admits to \"black lung\" and COPD.          Palpitations  Palpitations quality:  Unable to specify  Timing:  Intermittent  Progression:  Waxing and waning  Chronicity:  Recurrent  Associated symptoms: chest pain and shortness of breath    Associated symptoms: no cough, no diaphoresis, no dizziness, no nausea, no vomiting and no weakness    Risk factors: heart disease and hx of atrial fibrillation    Chest Pain  Pain location:  L lateral chest  Pain quality: aching    Duration:  1 day  Associated symptoms: fatigue, palpitations and shortness of breath  "   Associated symptoms: no abdominal pain, no cough, no diaphoresis, no dizziness, no dysphagia, no fever, no headache, no nausea, no vomiting and no weakness    Fatigue  Associated symptoms: chest pain, fatigue and shortness of breath    Associated symptoms: no abdominal pain, no congestion, no cough, no diarrhea, no ear pain, no fever, no headaches, no myalgias, no nausea, no rash, no rhinorrhea, no sore throat, no vomiting and no wheezing        Review of Systems   Constitutional: Positive for activity change and fatigue. Negative for appetite change, chills, diaphoresis and fever.   HENT: Negative for congestion, ear discharge, ear pain, nosebleeds, rhinorrhea, sinus pressure, sore throat and trouble swallowing.    Eyes: Negative for discharge and redness.   Respiratory: Positive for shortness of breath. Negative for apnea, cough, chest tightness and wheezing.    Cardiovascular: Positive for chest pain and palpitations.   Gastrointestinal: Negative for abdominal pain, diarrhea, nausea and vomiting.   Endocrine: Negative for polyuria.   Genitourinary: Negative for dysuria, frequency and urgency.   Musculoskeletal: Negative for myalgias and neck pain.   Skin: Negative for color change and rash.   Allergic/Immunologic: Negative for immunocompromised state.   Neurological: Negative for dizziness, seizures, syncope, weakness, light-headedness and headaches.   Hematological: Negative for adenopathy. Does not bruise/bleed easily.   Psychiatric/Behavioral: Negative for behavioral problems and confusion.   All other systems reviewed and are negative.      Past Medical History:   Diagnosis Date   • Basal cell carcinoma    • Bilateral carotid artery stenosis    • Bilateral carotid artery stenosis    • Bleeding disorder (HCC)    • CHF (congestive heart failure) ([facility])    • Chronic obstructive pulmonary disease (COPD) ([facility])    • Coronary arteriosclerosis    • Degenerative joint disease involving multiple joints    • Dementia  "(HCC)    • Diabetes mellitus (HCC)    • Heart problem    • History of stomach ulcers    • Hyperlipidemia    • Hypertension    • Ingrown toenail    • Myocardial infarction (HCC)        Allergies   Allergen Reactions   • Atorvastatin Anaphylaxis   • Penicillins Rash   • Azithromycin Rash   • Cefdinir Other (See Comments)     \"i burned up\" and break out in a rash   • Clarithromycin Rash and Itching   • Pravastatin Unknown - High Severity     Myalgia  Myalgia   • Benadryl Allergy [Diphenhydramine Hcl] Other (See Comments)     Pt states \"it knocks me out.\"       Past Surgical History:   Procedure Laterality Date   • BACK SURGERY     • CARDIAC CATHETERIZATION N/A 6/6/2017    Procedure: Right Heart Cath;  Surgeon: Jeff Maciel MD PhD;  Location: [facility] CATH INVASIVE LOCATION;  Service:    • CARDIAC CATHETERIZATION N/A 2/14/2018    Procedure: Coronary angiography;  Surgeon: Moisés Davial MD;  Location: [facility] CATH INVASIVE LOCATION;  Service:    • CARDIAC CATHETERIZATION N/A 10/28/2021    Procedure: Left Heart Cath;  Surgeon: Carine Johnson MD;  Location: [facility] CATH INVASIVE LOCATION;  Service: Cardiology;  Laterality: N/A;   • CORONARY ANGIOPLASTY WITH STENT PLACEMENT     • CORONARY STENT PLACEMENT     • HERNIA REPAIR     • LUNG BIOPSY     • LUNG SURGERY     • NECK SURGERY     • THORACOTOMY Left 1977   • VENTRAL HERNIA REPAIR         Family History   Problem Relation Age of Onset   • Hypertension Father    • Heart disease Father    • Diabetes Father    • Diabetes Mother    • Lung disease Other    • Cancer Other        Social History     Socioeconomic History   • Marital status:    Tobacco Use   • Smoking status: Former Smoker   • Smokeless tobacco: Never Used   Vaping Use   • Vaping Use: Never used   Substance and Sexual Activity   • Alcohol use: No   • Drug use: No   • Sexual activity: Not Currently     Comment:            Objective   Physical Exam  Vitals and nursing note reviewed. "   Constitutional:       Appearance: He is well-developed.   HENT:      Head: Normocephalic and atraumatic.      Nose: Nose normal.   Eyes:      General: No scleral icterus.        Right eye: No discharge.         Left eye: No discharge.      Conjunctiva/sclera: Conjunctivae normal.      Pupils: Pupils are equal, round, and reactive to light.   Neck:      Trachea: No tracheal deviation.   Cardiovascular:      Rate and Rhythm: Tachycardia present. Rhythm regularly irregular.      Heart sounds: Normal heart sounds. No murmur heard.      Pulmonary:      Effort: Pulmonary effort is normal. No respiratory distress.      Breath sounds: Normal breath sounds. No stridor. No wheezing or rales.   Abdominal:      General: Bowel sounds are normal. There is no distension.      Palpations: Abdomen is soft. There is no mass.      Tenderness: There is no abdominal tenderness. There is no guarding or rebound.   Musculoskeletal:      Cervical back: Normal range of motion and neck supple.   Skin:     General: Skin is warm and dry.      Findings: No erythema or rash.   Neurological:      Mental Status: He is alert and oriented to person, place, and time.      Coordination: Coordination normal.   Psychiatric:         Behavior: Behavior normal.         Thought Content: Thought content normal.         ECG 12 Lead      Date/Time: 12/1/2021 5:14 PM  Performed by: Zack Castro MD  Authorized by: Zack Castro MD   Interpreted by physician  Rhythm: atrial fibrillation  BPM: 106  ST Segments: ST segments normal  ST Depression: I                  ED Course                   Labs Reviewed   TROPONIN (IN-HOUSE) - Abnormal; Notable for the following components:       Result Value    Troponin T 0.032 (*)     All other components within normal limits    Narrative:     Troponin T Reference Range:  <= 0.03 ng/mL-   Negative for AMI  >0.03 ng/mL-     Abnormal for myocardial necrosis.  Clinicians would have to utilize clinical acumen,  EKG, Troponin and serial changes to determine if it is an Acute Myocardial Infarction or myocardial injury due to an underlying chronic condition.       Results may be falsely decreased if patient taking Biotin.     COMPREHENSIVE METABOLIC PANEL - Abnormal; Notable for the following components:    Glucose 120 (*)     All other components within normal limits    Narrative:     GFR Normal >60  Chronic Kidney Disease <60  Kidney Failure <15     CBC WITH AUTO DIFFERENTIAL - Abnormal; Notable for the following components:    WBC 13.38 (*)     Hemoglobin 12.5 (*)     RDW 18.0 (*)     RDW-SD 59.1 (*)     Neutrophil % 83.4 (*)     Lymphocyte % 10.5 (*)     Monocyte % 3.1 (*)     Immature Grans % 2.2 (*)     Neutrophils, Absolute 11.16 (*)     Immature Grans, Absolute 0.29 (*)     All other components within normal limits   COVID-19 AND FLU A/B, NP SWAB IN TRANSPORT MEDIA 8-12 HR TAT - Normal    Narrative:     Fact sheet for providers: https://www.fda.gov/media/528241/download    Fact sheet for patients: https://www.fda.gov/media/064171/download    Test performed by PCR.   TROPONIN (IN-HOUSE) - Normal    Narrative:     Troponin T Reference Range:  <= 0.03 ng/mL-   Negative for AMI  >0.03 ng/mL-     Abnormal for myocardial necrosis.  Clinicians would have to utilize clinical acumen, EKG, Troponin and serial changes to determine if it is an Acute Myocardial Infarction or myocardial injury due to an underlying chronic condition.       Results may be falsely decreased if patient taking Biotin.     BNP (IN-HOUSE) - Normal    Narrative:     Among patients with dyspnea, NT-proBNP is highly sensitive for the detection of acute congestive heart failure. In addition NT-proBNP of <300 pg/ml effectively rules out acute congestive heart failure with 99% negative predictive value.    Results may be falsely decreased if patient taking Biotin.     PROTIME-INR - Normal    Narrative:     Therapeutic range for most indications is 2.0-3.0  INR,  or 2.5-3.5 for mechanical heart valves.   CK - Normal   MAGNESIUM - Normal   LACTIC ACID, PLASMA - Normal   BLOOD CULTURE   BLOOD CULTURE   RAINBOW DRAW    Narrative:     The following orders were created for panel order [place] Draw.  Procedure                               Abnormality         Status                     ---------                               -----------         ------                     Green Top (Gel)[[id number]]                                  Final result               Lavender Top[[id number]]                                     Final result               Gold Top - SST[[id number]]                                   Final result               Light Blue Top[[id number]]                                   Final result                 Please view results for these tests on the individual orders.   CBC AND DIFFERENTIAL    Narrative:     The following orders were created for panel order CBC & Differential.  Procedure                               Abnormality         Status                     ---------                               -----------         ------                     CBC Auto Differential[[id number]]        Abnormal            Final result                 Please view results for these tests on the individual orders.   GREEN TOP   LAVENDER TOP   GOLD TOP - SST   LIGHT BLUE TOP       XR Chest 1 View   Final Result   1.  Left upper lobe perihilar and left lung base infrahilar   interstitial groundglass opacities. Right upper lobe peripheral   small interstitial groundglass opacity. These opacities would be   suspicious for pneumonia including possible atypical viral   etiology and/or pulmonary edema. Recommend clinical correlation.      Electronically signed by:  Nelson Christianson MD  12/1/2021 4:06 PM [facility]   Workstation: [id number]                                             [facility]    Final diagnoses:   Pneumonia of both lungs due to infectious organism, unspecified part of lung   Precordial pain    Atrial fibrillation, unspecified type (HCC)       ED Disposition  ED Disposition     ED Disposition Condition Comment    Decision to Admit  Level of Care: Telemetry [5]   Diagnosis: Pneumonia of both lungs due to infectious organism, unspecified part of lung [[id number]]   Admitting Physician: RAVI MILLER [[id number]]   Attending Physician: RAVI MILLER [[id number]]            No follow-up provider specified.       Medication List      No changes were made to your prescriptions during this visit.          Zack Castro MD  12/01/21 1739      Electronically signed by Zack Castro MD at 12/01/21 1739     Gracy Carpio RN at 12/01/21 1554        Wet diaper changed, gown changed, skin cleansed, new diaper and under pad applied, pt repositioned in bed, wife at bedside      Gracy Carpio RN  12/01/21 1555      Electronically signed by Gracy Carpio RN at 12/01/21 1555     Gracy Carpio RN at 12/01/21 1738        Attempt to call report to [facility], nurse unable to accept call at this time due to being on another line taking report from CCU nurse     Gracy Carpio RN  12/01/21 1739      Electronically signed by Gracy Carpio RN at 12/01/21 1739         Lab Results (last 24 hours)     Procedure Component Value Units Date/Time    POC Glucose Once [[id number]]  (Abnormal) Collected: 12/03/21 0604    Specimen: Blood Updated: 12/03/21 0649     Glucose 140 mg/dL      Comment: RN NotifiedOperator: [id number] TEDDY Melchor ID: [id number]       Blood Culture - Blood, Arm, Left [[id number]]  (Abnormal) Collected: 12/01/21 1626    Specimen: Blood from Arm, Left Updated: 12/03/21 0545     Blood Culture Staphylococcus, coagulase negative     Isolated from Aerobic Bottle     Gram Stain Aerobic Bottle Gram positive cocci in clusters     Comment: 1 positive of 4 drawn for gram positive cocci        Narrative:      Probable contaminant requires clinical correlation, susceptibility not  performed unless requested by physician.      POC Glucose Once [[id number]]  (Abnormal) Collected: 12/02/21 1914    Specimen: Blood Updated: 12/02/21 2025     Glucose 167 mg/dL      Comment: RN NotifiedOperator: [id number] ROMINA FAITHMeter ID: [id number]       POC Glucose Once [[id number]]  (Abnormal) Collected: 12/02/21 1619    Specimen: Blood Updated: 12/02/21 1634     Glucose 278 mg/dL      Comment: RN NotifiedOperator: [id number] KEYONA GREENEYMeter ID: [id number]       Blood Culture - Blood, Hand, Right [[id number]]  (Normal) Collected: 12/01/21 1626    Specimen: Blood from Hand, Right Updated: 12/02/21 1631     Blood Culture No growth at 24 hours    Blood Culture ID, PCR - Blood, Arm, Left [[id number]]  (Abnormal) Collected: 12/01/21 1626    Specimen: Blood from Arm, Left Updated: 12/02/21 1341     BCID, PCR Staph spp, not aureus or lugdunesis. Identification by BCID2 PCR.    Respiratory Panel PCR w/COVID-19(SARS-CoV-2) JOSH/KVNG/RADHA/PAD/COR/MAD/WILMAN In-House, NP Swab in UTM/VTM, 3-4 HR TAT - Swab, Nasopharynx [[id number]]  (Normal) Collected: 12/02/21 0930    Specimen: Swab from Nasopharynx Updated: 12/02/21 1142     ADENOVIRUS, PCR Not Detected     Coronavirus 229E Not Detected     Coronavirus HKU1 Not Detected     Coronavirus NL63 Not Detected     Coronavirus OC43 Not Detected     COVID19 Not Detected     Human Metapneumovirus Not Detected     Human Rhinovirus/Enterovirus Not Detected     Influenza A PCR Not Detected     Influenza B PCR Not Detected     Parainfluenza Virus 1 Not Detected     Parainfluenza Virus 2 Not Detected     Parainfluenza Virus 3 Not Detected     Parainfluenza Virus 4 Not Detected     RSV, PCR Not Detected     Bordetella pertussis pcr Not Detected     Bordetella parapertussis PCR Not Detected     Chlamydophila pneumoniae PCR Not Detected     Mycoplasma pneumo by PCR Not Detected    Narrative:      In the setting of a positive respiratory panel with a viral infection PLUS a negative  procalcitonin without other underlying concern for bacterial infection, consider observing off antibiotics or discontinuation of antibiotics and continue supportive care. If the respiratory panel is positive for atypical bacterial infection (Bordetella pertussis, Chlamydophila pneumoniae, or Mycoplasma pneumoniae), consider antibiotic de-escalation to target atypical bacterial infection.    POC Glucose Once [[id number]]  (Abnormal) Collected: 12/02/21 1029    Specimen: Blood Updated: 12/02/21 1050     Glucose 252 mg/dL      Comment: RN NotifiedOperator: [id number] KEYONA Telles ID: [id number]           Imaging Results (Last 24 Hours)     ** No results found for the last 24 hours. **        ECG/EMG Results (last 24 hours)     Procedure Component Value Units Date/Time    ECG 12 Lead [[id number]] Collected: 12/02/21 0836     Updated: 12/03/21 0707     QT Interval 368 ms      QTC Interval 469 ms     Narrative:      Test Reason : hr 132  Blood Pressure :   */*   mmHG  Vent. Rate :  98 BPM     Atrial Rate : 241 BPM     P-R Int :   * ms          QRS Dur : 120 ms      QT Int : 368 ms       P-R-T Axes :   * -52 148 degrees     QTc Int : 469 ms    Atrial fibrillation  Left anterior fascicular block  Left ventricular hypertrophy with QRS widening  ST & T wave abnormality, consider anterolateral ischemia  Abnormal ECG  When compared with ECG of 01-DEC-2021 14:43,  Atrial fibrillation has replaced Atrial flutter Anterior leads    Referred By:            Confirmed By: STEPHANIE WANG    ECG 12 Lead [[id number]] Collected: 12/02/21 1322     Updated: 12/03/21 0714     QT Interval 314 ms      QTC Interval 417 ms     Narrative:      Test Reason : hr 146  Blood Pressure :   */*   mmHG  Vent. Rate : 106 BPM     Atrial Rate :  91 BPM     P-R Int :   * ms          QRS Dur : 118 ms      QT Int : 314 ms       P-R-T Axes :   * -54 134 degrees     QTc Int : 417 ms    Atrial fibrillation with rapid ventricular response  Left anterior  fascicular block  rvcd  Left ventricular hypertrophy with QRS widening  ST & T wave inversion in  Abnormal ECG  When compared with ECG of 02-DEC-2021 08:36,  No significant change was found    Referred By:            Confirmed By: STEPHANIE WANG    ECG 12 Lead [[id number]] Collected: 12/03/21 0624     Updated: 12/03/21 0727     QT Interval 384 ms      QTC Interval 411 ms     Narrative:      Test Reason : Amiodarone Protocol  Blood Pressure :   */*   mmHG  Vent. Rate :  69 BPM     Atrial Rate :  86 BPM     P-R Int :   * ms          QRS Dur : 140 ms      QT Int : 384 ms       P-R-T Axes :   * -47 180 degrees     QTc Int : 411 ms    Atrial fibrillation  Left axis deviation  Left ventricular hypertrophy with QRS widening  Nonspecific T wave abnormality  Abnormal ECG  When compared with ECG of 02-DEC-2021 13:22,  Vent. rate has decreased BY  37 BPM  QRS duration has increased  Nonspecific T wave abnormality now evident in Inferior leads  T wave inversion no longer evident in Anterior leads    Referred By:            Confirmed By: STEPHANIE WANG             Physician Progress Notes (last 24 hours)      Mana Curtis MD at 12/03/21 0756           LOS: 1 day   Patient Care Team:  Paolo Rey MD as PCP - General    Chief Complaint: Admitted for evaluations of increasing shortness of breath generalized weakness fatigue and palpitation.  Patient noted to be in atrial fibrillation with rapid ventricular rate.  Patient started on IV amiodarone and converted to sinus rhythm.  At the time of evaluation is in sinus rhythm at a rate of 55 bpm.       Review of Systems:     Constitution: Positive general fatigue weakness lack of energy and activity change    HENT:  Denies any headache, hearing impairment.    Eyes:  Denies any blurring of vision     Cardiovascular:  As per history of present illness.     Respiratory: Positive for shortness of breath       Gastrointestinal:  No nausea, vomiting, or melena.    Extremity: No edema,  positive pulses    Objective     Current Facility-Administered Medications   Medication Dose Route Frequency Provider Last Rate Last Admin   • acetaminophen (TYLENOL) tablet 650 mg  650 mg Oral Q4H PRN Christal Gonzalez MD       • albuterol (PROVENTIL) nebulizer solution 0.083% 2.5 mg/3mL  2.5 mg Nebulization Q4H PRN Christal Gonzalez MD       • amiodarone (PACERONE) half tablet 100 mg  100 mg Oral Q24H Mana Curtis MD       • cetirizine (zyrTEC) tablet 5 mg  5 mg Oral Daily Christal Gonzalez MD   5 mg at 12/02/21 1350   • clopidogrel (PLAVIX) tablet 75 mg  75 mg Oral Daily Christal Gonzalez MD   75 mg at 12/02/21 1350   • dextrose (D50W) (25 g/50 mL) IV injection 25 g  25 g Intravenous Q15 Min PRN Kelvin Aguilar MD       • dextrose (GLUTOSE) oral gel 15 g  15 g Oral Q15 Min PRN Kelvin Aguilar MD       • famotidine (PEPCID) tablet 20 mg  20 mg Oral BID AC Christal Gonzalez MD   20 mg at 12/02/21 1741   • furosemide (LASIX) tablet 40 mg  40 mg Oral BID Christal Gonzalez MD   40 mg at 12/02/21 1741   • glucagon (human recombinant) (GLUCAGEN DIAGNOSTIC) injection 1 mg  1 mg Subcutaneous Q15 Min PRN Kelvin Aguilar MD       • HYDROcodone-acetaminophen (NORCO) 7.5-325 MG per tablet 1 tablet  1 tablet Oral Q6H PRN Christal Gonzalez MD   1 tablet at 12/02/21 1350   • insulin aspart (novoLOG) injection 0-9 Units  0-9 Units Subcutaneous TID AC Kelvin Aguilar MD   6 Units at 12/02/21 1742   • ipratropium-albuterol (DUO-NEB) nebulizer solution 3 mL  3 mL Nebulization 4x Daily - RT Kelvin Aguilar MD   3 mL at 12/03/21 0736   • levoFLOXacin (LEVAQUIN) 750 mg/150 mL D5W (premix) (LEVAQUIN) 750 mg  750 mg Intravenous Q48H Kelvin Aguilar MD       • lisinopril (PRINIVIL,ZESTRIL) tablet 5 mg  5 mg Oral Daily Christal Gonzalez MD   5 mg at 12/02/21 1350   • methylPREDNISolone sodium succinate (SOLU-Medrol) injection 60 mg  60 mg Intravenous Q12H Kelvin Aguilar MD    "60 mg at 12/03/21 0604   • metoprolol tartrate (LOPRESSOR) half tablet 12.5 mg  12.5 mg Oral Q12H Mana Curtis MD   12.5 mg at 12/02/21 2000   • nitroglycerin (NITROSTAT) SL tablet 0.4 mg  0.4 mg Sublingual Q5 Min PRN Christal Gonzalez MD       • ondansetron (ZOFRAN) injection 4 mg  4 mg Intravenous Q6H PRN Kelvin Aguilar MD       • polyethylene glycol (MIRALAX) packet 17 g  17 g Oral Daily Christal Gonzalez MD       • ranolazine (RANEXA) 12 hr tablet 1,000 mg  1,000 mg Oral Q12H Christal Gonzalez MD   1,000 mg at 12/02/21 2000   • sodium chloride 0.9 % flush 10 mL  10 mL Intravenous PRN Zack Castro MD       • sodium chloride 0.9 % flush 10 mL  10 mL Intravenous Q12H Kelvin Aguilar MD   10 mL at 12/02/21 2001   • sodium chloride 0.9 % flush 10 mL  10 mL Intravenous PRN Kelvin Aguilar MD           Vital Sign Min/Max for last 24 hours  Temp  Min: 97.1 °F (36.2 °C)  Max: 97.5 °F (36.4 °C)   BP  Min: 105/60  Max: 147/82   Pulse  Min: 62  Max: 132   Resp  Min: 16  Max: 20   SpO2  Min: 96 %  Max: 99 %   Flow (L/min)  Min: 2  Max: 2   Weight  Min: 68.9 kg (151 lb 12.8 oz)  Max: 68.9 kg (151 lb 12.8 oz)     Flowsheet Rows      First Filed Value   Admission Height 171.5 cm (67.5\") Documented at 12/01/2021 1451   Admission Weight 68 kg (150 lb) Documented at 12/01/2021 1451            Intake/Output Summary (Last 24 hours) at 12/3/2021 0756  Last data filed at 12/3/2021 0545  Gross per 24 hour   Intake 1360 ml   Output 1725 ml   Net -365 ml       Physical Exam:  Neck: Supple.  No JVD, no thyroid enlargement.  Chest: Air entry equal, normal respiration.  No rhonchi or creps.  Cardiovascular system:  Regular rate and rhythm, no murmurs.  Abdomen: Soft, no tenderness, bowel sounds present,   CNS: Alert, oriented to place and time.  No motor or sensory deficit.  Cranial nerves intact.  Musculoskeletal: No deformity of the back or spine.  Extremities:  Pulses equal on both sides.     Results " Review:   I reviewed the patient's new clinical results.  Lab Results   Component Value Date    WBC 10.27 12/02/2021    HGB 11.6 (L) 12/02/2021    HCT 37.5 12/02/2021    MCV 91.7 12/02/2021     12/02/2021     Lab Results   Component Value Date    GLUCOSE 192 (H) 12/02/2021    BUN 19 12/02/2021    CREATININE 1.16 12/02/2021    EGFRIFNONA 59 (L) 12/02/2021    EGFRIFAFRI 85 09/13/2021    BCR 16.4 12/02/2021    CO2 26.0 12/02/2021    CALCIUM 8.7 12/02/2021    ALBUMIN 3.20 (L) 12/02/2021    AST 12 12/02/2021    ALT 12 12/02/2021     Lab Results   Component Value Date    TSH 2.280 02/13/2018     Lab Results   Component Value Date    INR 1.02 12/01/2021    INR 1.01 10/28/2021    INR 0.98 (L) 10/24/2021    PROTIME 13.3 12/01/2021    PROTIME 13.2 10/28/2021    PROTIME 9.9 10/24/2021     Lab Results   Component Value Date    PTT 21.4 03/18/2021       EKG:     Telemetry:    Ejection Fraction  No results found for: EF    Echo EF Estimated  Lab Results   Component Value Date    ECHOEFEST 51 06/15/2021         Present on Admission:  • Pneumonia of both lungs due to infectious organism    Assessment/Plan   # 1   Short-term persistent two paroxysmal atrial fibrillation with rapid ventricular rate     88 years old patient with documented history of atrial fibrillation, 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  diagnosed pneumonia and also noted atrial fibrillation with rapid ventricular rate.  Patient started on IV amiodarone after bolus of 150 mg he converted to sinus rhythm at rate of 52 bpm.  Will discontinue IV amiodarone and start the patient amiodarone 800 mg p.o. daily and continue low-dose beta-blocker.     Patient is not a candidate for long-term oral anticoagulation due to high has bled score     #2 CAD s/p PCI to LAD     Patient nondiagnostic troponin repeat was within normal range may be  multifactorial etiology.  My suspicion of ongoing ischemia is low.  Recommend to continue antiplatelet agent, statin and low-dose beta-blocker                 #2 history of CAD s/p PCI to RCA and LAD     No evidence of ongoing ischemia at the time of evaluation. Continue dual antiplatelet agents. Continue statin. Continue Ranexa           #3 history of hypertension and documented history of significant orthostasis and frequent fall.  Currently is tolerating low-dose lisinopril.     History of chronic congestive heart failure on the basis of diastolic dysfunction     Appears to be euvolemic at the time of evaluation.  Oral Lasix dose can be adjusted    Mana Curtis MD  12/03/21  07:56 CST      Part of this note may be an electronic transcription/translation of spoken language to printed text using the Dragon Dictation system.        Electronically signed by Mana Curtis MD at 12/03/21 0802     Christal Gonzalez MD at 12/02/21 0920              [facility] Medicine Services  INPATIENT PROGRESS NOTE    Length of Stay: 0  Date of Admission: 12/1/2021  Primary Care Physician: Paolo Rey MD    Subjective   Chief Complaint: shortness of breath   HPI:      Had some chest pain this morning, now improved  Having shortness of breath    At baseline, can walk 4 miles without any problems  Was unable to walk 200 feet prior to coming in     Review of Systems   Constitutional: Negative for chills, diaphoresis and fever.   HENT: Negative for sneezing and sore throat.    Eyes: Negative for pain and discharge.   Respiratory: Positive for shortness of breath. Negative for cough.    Cardiovascular: Positive for chest pain.   Gastrointestinal: Negative for constipation, diarrhea, nausea and vomiting.   Endocrine: Negative for cold intolerance and heat intolerance.   Genitourinary: Negative for difficulty urinating, dysuria, frequency and urgency.   Musculoskeletal: Negative for  arthralgias and myalgias.   Skin: Negative for color change and pallor.   Allergic/Immunologic: Negative for environmental allergies and food allergies.   Neurological: Positive for weakness. Negative for dizziness and syncope.   Psychiatric/Behavioral: Negative for confusion and sleep disturbance.        All pertinent negatives and positives are as above. All other systems have been reviewed and are negative unless otherwise stated.     Objective    Temp:  [96.6 °F (35.9 °C)-98.3 °F (36.8 °C)] 97.4 °F (36.3 °C)  Heart Rate:  [] 102  Resp:  [18-20] 20  BP: (101-153)/(30-91) 144/30    Physical Exam  Vitals reviewed.   Constitutional:       General: He is not in acute distress.     Appearance: He is well-developed.   HENT:      Head: Normocephalic and atraumatic.      Nose: Nose normal.   Eyes:      Conjunctiva/sclera: Conjunctivae normal.   Cardiovascular:      Rate and Rhythm: Normal rate and regular rhythm.   Pulmonary:      Effort: Pulmonary effort is normal. No respiratory distress.      Breath sounds: Decreased breath sounds present. No wheezing or rales.   Musculoskeletal:         General: Normal range of motion.      Cervical back: Normal range of motion and neck supple.   Skin:     General: Skin is warm and dry.   Neurological:      Mental Status: He is alert and oriented to person, place, and time.   Psychiatric:         Behavior: Behavior normal.         Results Review:  I have reviewed the labs, radiology results, and diagnostic studies.    Laboratory Data:   Results from last 7 days   Lab Units 12/02/21  0526 12/01/21  1510   SODIUM mmol/L 138 139   POTASSIUM mmol/L 4.6 4.5   CHLORIDE mmol/L 103 105   CO2 mmol/L 26.0 26.0   BUN mg/dL 19 16   CREATININE mg/dL 1.16 1.06   GLUCOSE mg/dL 192* 120*   CALCIUM mg/dL 8.7 9.2   BILIRUBIN mg/dL 0.2 0.3   ALK PHOS U/L 56 61   ALT (SGPT) U/L 12 16   AST (SGOT) U/L 12 15   ANION GAP mmol/L 9.0 8.0     Estimated Creatinine Clearance: 42.7 mL/min (by C-G formula  based on SCr of 1.16 mg/dL).  Results from last 7 days   Lab Units 12/01/21  1510   MAGNESIUM mg/dL 1.6         Results from last 7 days   Lab Units 12/02/21  0526 12/01/21  1510   WBC 10*3/mm3 10.27 13.38*   HEMOGLOBIN g/dL 11.6* 12.5*   HEMATOCRIT % 37.5 39.0   PLATELETS 10*3/mm3 168 184     Results from last 7 days   Lab Units 12/01/21  1626   INR  1.02       Culture Data:   No results found for: BLOODCX  No results found for: URINECX  No results found for: RESPCX  No results found for: WOUNDCX  No results found for: STOOLCX  No components found for: BODYFLD    Radiology Data:   Imaging Results (Last 24 Hours)     Procedure Component Value Units Date/Time    XR Chest 1 View [[id number]] Collected: 12/01/21 1515     Updated: 12/01/21 1607    Narrative:        PROCEDURE: Single chest view portable    REASON FOR EXAM:Chest Pain triage protocol  Chest Pain Triage Protocol    FINDINGS: Comparison exam dated October 25, 2021. Cardiac size  appears within normal limits. Left upper lobe perihilar and left  lung base infrahilar interstitial groundglass opacities. Right  upper lobe peripheral small interstitial groundglass opacity.  Lungs are otherwise clear. Pleural spaces are normal. No acute  osseous abnormality. Stable epidural stimulator lead in the mid  thoracic spine region.      Impression:      1.  Left upper lobe perihilar and left lung base infrahilar  interstitial groundglass opacities. Right upper lobe peripheral  small interstitial groundglass opacity. These opacities would be  suspicious for pneumonia including possible atypical viral  etiology and/or pulmonary edema. Recommend clinical correlation.    Electronically signed by:  Nelson Christianson MD  12/1/2021 4:06 PM CST  Workstation: [id number]          I have reviewed the patient current medications.     Assessment/Plan     Active Hospital Problems    Diagnosis  POA   • Pneumonia of both lungs due to infectious organism [J18.9]  Yes       1.  Pneumonia  2.   Elevated troponin  3.  CAD with history of recent NSTEMI with 2 stents placed on 10/28/2021  4.  Paroxysmal A. fib not on long-term anticoagulation secondary to GI bleed, now with A Fib with RVR  5.  Diabetes mellitus type 2  6.  Benign hypertension  7.  Pulmonary fibrosis/COPD/coal miners pneumoconiosis.     -will restart home meds today  -consult Dr. Curtis for chest pain, atrial fibrillation    -IV Levaquin  -oxygen as needed, wean as tolerated   -RPP negative   -IV steroids  -duo nebs  -Home medications as tolerated  -Sliding scale insulin and adjust insulin dose based on blood sugars as elevations are expected with administration of steroids  -Hold heparin given history of GI bleed   -SCDs for prophylaxis  -Blood and sputum cultures. Blood culture likely a contaminant   -PT, OT     I have utilized all available immediate resources to obtain, update, or review the patient's current medications.       I confirmed that the patient's Advance Care Plan is present, code status is documented, or surrogate decision maker is listed in the patient's medical record.       I discussed the patient's findings and my recommendations with: The patient and his wife              Discharge Planning: in progress    I confirmed that the patient's Advance Care Plan is present, code status is documented, or surrogate decision maker is listed in the patient's medical record.           This document has been electronically signed by Christal Gonzalez MD on December 2, 2021 09:21 CST          Electronically signed by Christal Gonzalez MD at 12/02/21 1440       Medical Student Notes (last 24 hours)  Notes from 12/02/21 0842 through 12/03/21 0842   No notes of this type exist for this encounter.            Consult Notes (last 24 hours)      Mana Curtis MD at 12/02/21 1340      Consult Orders    1. Inpatient Cardiology Consult [[id number]] ordered by Christal Gonzalez MD at 12/02/21 1239                Cardiology at [facility]  [facility]  Cardiovascular Consultation Note      Hasmukh Ham  319/1  [id number]  6/1/1933    DATE OF ADMISSION: 12/1/2021  DATE OF CONSULTATION:  12/2/2021    Paolo Rey MD  Treatment Team:   Attending Provider: Christal Gonzalez MD  Consulting Physician: Christal Gonzalez MD  Consulting Physician: Mana Curtis MD  Admitting Provider: Christal Gonzalez MD    Chief Complaint   Patient presents with   • Palpitations   • Chest Pain   • Fatigue       Chief complaint/ Reason for Consultation: Atrial fibrillation with rapid ventricular rate associated mild chest discomfort admitted with pneumonia and shortness of breath      History of Present Illness  Body mass index is 23.68 kg/m². BMI is above normal parameters. Recommendations include: exercise counseling, nutrition counseling and referral to primary care.    88 years old patient admitted for evaluations of shortness of breath generalized weak and palpitation and mild chest discomfort get better with improvement heart rate however at the time of evaluation his heart rate 140 bpm he is not on any IV medications.  Patient with history of GI bleed and AV malformation and with a history of significant GI bleed not a candidate for long-term oral anticoagulation which is documented previously.  Patient also has a history of frequent fall and orthostasis.  Initial troponin was nondiagnostic repeat was within normal range.  He has mild shortness of breath.  Chest x-ray reported pneumonia.  Requested to evaluate this patient.  His main complaint is generalized weakness fatigue and lack of energy shortness of breath and palpitations.  He does have history of CAD s/p PCI in October 2021 mid LAD and with critical stenosis of RCA recommend medical management previous history of stent to LAD, history of congestive heart failure in the base of preserved left ventricle systolic function.  His wife was present in the room at the time of  evaluation.  No recent syncope was reported.  Recently has marginal hemodynamics          Cardiac cath October 2021     · Mid LAD lesion is 95% stenosed tandem lesions .Successfully treated with new 2 x 18 mm Resolute JENNA.Final lesion was 0%.  · Prox Cx lesion is 30% stenosed.  · Mid RCA lesion is 100% stenosed.  · LV pressures (S/D/E) : 118/0/7 mmHg  · The ejection fraction was 40-50% by visual estimate.  · Mild systolic dysfunction.     New JENNA to mid LAD.  DAPT for 1 year.     Echo October 2021        · Significant beat to beat variability but estimated EF appears around 40-45%.  · Left ventricular systolic function is mildly decreased.  · Estimated right ventricular systolic pressure from tricuspid regurgitation is normal (<35 mmHg).  · Left ventricular diastolic function was not assessed.  · Left atrial volume is moderately increased.        Conclusion :Cath 2/2018      Successful PCI to the mid LAD for in-stent restenosis with a 2.25 x 23 Xience Alpine stent reducing 90% stenosis to 0%  Right coronary artery:  Proximally is okay, mid to distal his 100% occluded, distally getting collaterals from the AV circumflex     ECHO !@/2018  · Left ventricular wall thickness is consistent with mild concentric hypertrophy.  · Estimated EF = 68%. No wall motion abnormalities  · Left ventricular systolic function is normal.  · Left atrial cavity size is mildly dilated.  · Mild dilation of the aortic root is present      Past Medical History:   Diagnosis Date   • Basal cell carcinoma    • Bilateral carotid artery stenosis    • Bilateral carotid artery stenosis    • Bleeding disorder ([facility])    • CHF (congestive heart failure) (Formerly [facility])    • Chronic obstructive pulmonary disease (COPD) (Formerly [facility])    • Coronary arteriosclerosis    • Degenerative joint disease involving multiple joints    • Dementia (Formerly [facility])    • Diabetes mellitus ([facility]ly [facility])    • Heart problem    • History of stomach ulcers    • Hyperlipidemia    • Hypertension    • Ingrown  "toenail    • Myocardial infarction (HCC)        Past Surgical History:   Procedure Laterality Date   • BACK SURGERY     • CARDIAC CATHETERIZATION N/A 6/6/2017    Procedure: Right Heart Cath;  Surgeon: Jeff Maciel MD PhD;  Location: [facility] CATH INVASIVE LOCATION;  Service:    • CARDIAC CATHETERIZATION N/A 2/14/2018    Procedure: Coronary angiography;  Surgeon: Moisés Davila MD;  Location: [facility] CATH INVASIVE LOCATION;  Service:    • CARDIAC CATHETERIZATION N/A 10/28/2021    Procedure: Left Heart Cath;  Surgeon: Carine Johnson MD;  Location: [facility] CATH INVASIVE LOCATION;  Service: Cardiology;  Laterality: N/A;   • CORONARY ANGIOPLASTY WITH STENT PLACEMENT     • CORONARY STENT PLACEMENT     • HERNIA REPAIR     • LUNG BIOPSY     • LUNG SURGERY     • NECK SURGERY     • THORACOTOMY Left 1977   • VENTRAL HERNIA REPAIR         Allergies   Allergen Reactions   • Atorvastatin Anaphylaxis   • Penicillins Rash   • Azithromycin Rash   • Cefdinir Other (See Comments)     \"i burned up\" and break out in a rash   • Clarithromycin Rash and Itching   • Pravastatin Unknown - High Severity     Myalgia  Myalgia   • Benadryl Allergy [Diphenhydramine Hcl] Other (See Comments)     Pt states \"it knocks me out.\"       No current facility-administered medications on file prior to encounter.     Current Outpatient Medications on File Prior to Encounter   Medication Sig Dispense Refill   • albuterol (PROVENTIL) (2.5 MG/3ML) 0.083% nebulizer solution Take 2.5 mg by nebulization Every 4 (Four) Hours As Needed for Wheezing. 125 vial 0   • Brovana 15 MCG/2ML nebulizer solution 3 (Three) Times a Day.     • clopidogrel (PLAVIX) 75 MG tablet Take 75 mg by mouth Daily.     • famotidine (PEPCID) 20 MG tablet Take 20 mg by mouth 2 (Two) Times a Day.     • fluticasone (FLONASE) 50 MCG/ACT nasal spray 2 sprays into each nostril Daily.     • furosemide (LASIX) 40 MG tablet Take 1 tablet by mouth Daily. (Patient taking differently: " Take 40 mg by mouth 2 (Two) Times a Day.) 30 tablet 11   • glimepiride (AMARYL) 2 MG tablet Take 2 mg by mouth Every Morning Before Breakfast.     • lisinopril (PRINIVIL,ZESTRIL) 5 MG tablet Take 1 tablet by mouth Daily. 90 tablet 3   • magnesium oxide (MAG-OX) 400 MG tablet Take 250 mg by mouth Daily.     • Red Yeast Rice 600 MG tablet Take 600 mg by mouth 2 (Two) Times a Day.     • acetaminophen (TYLENOL) 325 MG tablet Take 2 tablets by mouth Every 4 (Four) Hours As Needed for Mild Pain .     • Blood Glucose Monitoring Suppl (ONE TOUCH ULTRA 2) w/Device kit Daily. as directed     • cetirizine (zyrTEC) 10 MG tablet Take 10 mg by mouth Daily.     • docusate sodium 100 MG capsule Take 100 mg by mouth.     • Fluticasone Furoate (ARNUITY ELLIPTA) 200 MCG/ACT aerosol powder  Inhale.     • Fluticasone-Umeclidin-Vilant (Trelegy Ellipta) 100-62.5-25 MCG/INH aerosol powder  Inhale 1 inhaler.     • HYDROcodone-acetaminophen (NORCO) 7.5-325 MG per tablet Take 1 tablet by mouth Every 6 (Six) Hours As Needed for Moderate Pain . 12 tablet 0   • ipratropium (ATROVENT) 0.02 % nebulizer solution INHALE THE CONTENTS OF 1 VIAL IN NEBULIZER EVERY 4 HOURS AS NEEDED FOR WHEEZING     • ipratropium-albuterol (DUO-NEB) 0.5-2.5 mg/mL nebulizer Take 3 mL by nebulization 4 (Four) Times a Day. 120 vial 1   • Lancets (OneTouch Delica Plus [id number]) misc 1 each by Other route Daily.     • meclizine (ANTIVERT) 12.5 MG tablet 1 tablet po q 6 hr prn severe nausea     • nitroglycerin (NITROSTAT) 0.4 MG SL tablet place 1 tablet under the tongue if needed every 5 minutes for hair...  (REFER TO PRESCRIPTION NOTES).  0   • nystatin (MYCOSTATIN) 100,000 unit/mL suspension SWISH AND SWALLOW 2 ML EVERY 4 HOURS AS NEEDED     • O2 (OXYGEN) Inhale 2 L/min Every Night. W/ CPAP     • OneTouch Verio test strip TEST EVERY DAY     • polyethylene glycol (MIRALAX) 17 g packet Dissolve 1 packet (17 g) in 4 to 8 ounces of beverage and drink by mouth Daily. 30 packet 1  "  • predniSONE (DELTASONE) 10 MG tablet Daily.     • ranolazine (RANEXA) 1000 MG 12 hr tablet Take 1 tablet by mouth Every 12 (Twelve) Hours. 60 tablet 11   • Umeclidinium-Vilanterol 62.5-25 MCG/INH aerosol powder  Inhale 1 puff Daily. 1 each 11       Social History     Socioeconomic History   • Marital status:    Tobacco Use   • Smoking status: Former Smoker   • Smokeless tobacco: Never Used   Vaping Use   • Vaping Use: Never used   Substance and Sexual Activity   • Alcohol use: No   • Drug use: No   • Sexual activity: Not Currently     Comment:            Review of Systems:     Constitution: Generalized weakness fatigue lack of energy    HENT:  Denies any headache, hearing impairment.    Eyes:  Denies any blurring of vision, or photophobia.     Cardiovascular:  As per history of present illness.     Respiratory: History of COPD shortness of breath     Endocrine: No polydipsia polyuria       Musculoskeletal: There was history of osteoarthritis s/p arthrocentesis in the past    Gastrointestinal: Extensive history of GI bleed AV malformation documented previously  Genitourinary:  No dysuria or hematuria.    Neurological:  No history of seizure disorder, stroke, or memory problems.    Psychiatric/Behavioral:  No history of depression, bipolar disorder or schizophrenia.     Hematological:  No history of easy bruising.         Objective:     Vitals:    12/02/21 0831 12/02/21 1037 12/02/21 1045 12/02/21 1103   BP:    147/82   BP Location:    Left arm   Patient Position:    Lying   Pulse: 102 92 88 67   Resp:  20 20 20   Temp:    97.2 °F (36.2 °C)   TempSrc:    Oral   SpO2:  99%  96%   Weight:       Height:         Body mass index is 23.68 kg/m².  Flowsheet Rows      First Filed Value   Admission Height 171.5 cm (67.5\") Documented at 12/01/2021 1451   Admission Weight 68 kg (150 lb) Documented at 12/01/2021 1451          Intake/Output Summary (Last 24 hours) at 12/2/2021 1340  Last data filed at 12/2/2021 " 1256  Gross per 24 hour   Intake 2790 ml   Output 1700 ml   Net 1090 ml         Physical Exam   Constitutional: Cooperative, alert and oriented, well developed, well nourished,  mild shortness of    HENT:   Head: Normocephalic, conjunctivae is a pink, thyroid is nonpalpable no carotid bruit and trachea central.     Cardiovascular: Tachycardia with regular rate rhythm no S3 no pericardial rub    Pulmonary/Chest: Bilateral decreased air entry coarse crackle no rales    Abdominal: Abdomen soft, bowel sounds normoactive, no masses.    Musculoskeletal: No deformities, clubbing, cyanosis, erythema. Positive mild edema.    Neurological: No gross motor or sensory deficits noted    Skin: Warm and dry to the touch, no apparent skin lesions .     Psychiatric: Normal mood and affect. Behavior is normal.    Radiology Review    XR Chest 1 View   Final Result   1.  Left upper lobe perihilar and left lung base infrahilar   interstitial groundglass opacities. Right upper lobe peripheral   small interstitial groundglass opacity. These opacities would be   suspicious for pneumonia including possible atypical viral   etiology and/or pulmonary edema. Recommend clinical correlation.      Electronically signed by:  Nelson Christianson MD  12/1/2021 4:06 PM CST   Workstation: [id number]          Lab Results:  Lab Results (last 24 hours)     Procedure Component Value Units Date/Time    Blood Culture ID, PCR - Blood, Arm, Left [[id number]] Collected: 12/01/21 1626    Specimen: Blood from Arm, Left Updated: 12/02/21 1210    Respiratory Panel PCR w/COVID-19(SARS-CoV-2) JOSH/KVNG/RADHA/PAD/COR/MAD/WILMAN In-House, NP Swab in UTM/VTM, 3-4 HR TAT - Swab, Nasopharynx [[id number]]  (Normal) Collected: 12/02/21 0930    Specimen: Swab from Nasopharynx Updated: 12/02/21 1142     ADENOVIRUS, PCR Not Detected     Coronavirus 229E Not Detected     Coronavirus HKU1 Not Detected     Coronavirus NL63 Not Detected     Coronavirus OC43 Not Detected     COVID19 Not Detected      Human Metapneumovirus Not Detected     Human Rhinovirus/Enterovirus Not Detected     Influenza A PCR Not Detected     Influenza B PCR Not Detected     Parainfluenza Virus 1 Not Detected     Parainfluenza Virus 2 Not Detected     Parainfluenza Virus 3 Not Detected     Parainfluenza Virus 4 Not Detected     RSV, PCR Not Detected     Bordetella pertussis pcr Not Detected     Bordetella parapertussis PCR Not Detected     Chlamydophila pneumoniae PCR Not Detected     Mycoplasma pneumo by PCR Not Detected    Narrative:      In the setting of a positive respiratory panel with a viral infection PLUS a negative procalcitonin without other underlying concern for bacterial infection, consider observing off antibiotics or discontinuation of antibiotics and continue supportive care. If the respiratory panel is positive for atypical bacterial infection (Bordetella pertussis, Chlamydophila pneumoniae, or Mycoplasma pneumoniae), consider antibiotic de-escalation to target atypical bacterial infection.    POC Glucose Once [[id number]]  (Abnormal) Collected: 12/02/21 1029    Specimen: Blood Updated: 12/02/21 1050     Glucose 252 mg/dL      Comment: RN NotifiedOperator: [id number] KEYONA TIFFANYMeter ID: [id number]       POC Glucose Once [[id number]]  (Abnormal) Collected: 12/02/21 0607    Specimen: Blood Updated: 12/02/21 0640     Glucose 213 mg/dL      Comment: RN NotifiedOperator: [id number] [facility] SYBILMeter ID: [id number]       Comprehensive Metabolic Panel [[id number]]  (Abnormal) Collected: 12/02/21 0526    Specimen: Blood Updated: 12/02/21 0607     Glucose 192 mg/dL      BUN 19 mg/dL      Creatinine 1.16 mg/dL      Sodium 138 mmol/L      Potassium 4.6 mmol/L      Chloride 103 mmol/L      CO2 26.0 mmol/L      Calcium 8.7 mg/dL      Total Protein 5.9 g/dL      Albumin 3.20 g/dL      ALT (SGPT) 12 U/L      AST (SGOT) 12 U/L      Alkaline Phosphatase 56 U/L      Total Bilirubin 0.2 mg/dL      eGFR Non  Amer 59  mL/min/1.73      Globulin 2.7 gm/dL      A/G Ratio 1.2 g/dL      BUN/Creatinine Ratio 16.4     Anion Gap 9.0 mmol/L     Narrative:      GFR Normal >60  Chronic Kidney Disease <60  Kidney Failure <15      CBC Auto Differential [[id number]]  (Abnormal) Collected: 12/02/21 0526    Specimen: Blood Updated: 12/02/21 0546     WBC 10.27 10*3/mm3      RBC 4.09 10*6/mm3      Hemoglobin 11.6 g/dL      Hematocrit 37.5 %      MCV 91.7 fL      MCH 28.4 pg      MCHC 30.9 g/dL      RDW 17.8 %      RDW-SD 59.9 fl      MPV 11.3 fL      Platelets 168 10*3/mm3      Neutrophil % 91.4 %      Lymphocyte % 5.8 %      Monocyte % 0.6 %      Eosinophil % 0.0 %      Basophil % 0.2 %      Immature Grans % 2.0 %      Neutrophils, Absolute 9.38 10*3/mm3      Lymphocytes, Absolute 0.60 10*3/mm3      Monocytes, Absolute 0.06 10*3/mm3      Eosinophils, Absolute 0.00 10*3/mm3      Basophils, Absolute 0.02 10*3/mm3      Immature Grans, Absolute 0.21 10*3/mm3      nRBC 0.0 /100 WBC     POC Glucose Once [[id number]]  (Abnormal) Collected: 12/01/21 1915    Specimen: Blood Updated: 12/01/21 2032     Glucose 153 mg/dL      Comment: RN NotifiedOperator: [id number] GAURAV SYBILMeter ID: [id number]       Procalcitonin [[id number]]  (Normal) Collected: 12/01/21 1720    Specimen: Blood Updated: 12/01/21 1836     Procalcitonin 0.07 ng/mL     Narrative:      As a Marker for Sepsis (Non-Neonates):     1. <0.5 ng/mL represents a low risk of severe sepsis and/or septic shock.  2. >2 ng/mL represents a high risk of severe sepsis and/or septic shock.    As a Marker for Lower Respiratory Tract Infections that require antibiotic therapy:  PCT on Admission     Antibiotic Therapy             6-12 Hrs later  >0.5                          Strongly Recommended            >0.25 - <0.5             Recommended  0.1 - 0.25                  Discouraged                       Remeasure/reassess PCT  <0.1                         Strongly Discouraged         Remeasure/reassess PCT   "    As 28 day mortality risk marker: \"Change in Procalcitonin Result\" (>80% or <=80%) if Day 0 (or Day 1) and Day 4 values are available. Refer to http://www.[email or web address]-pct-calculator.com/    Change in PCT <=80 %   A decrease of PCT levels below or equal to 80% defines a positive change in PCT test result representing a higher risk for 28-day all-cause mortality of patients diagnosed with severe sepsis or septic shock.    Change in PCT >80 %   A decrease of PCT levels of more than 80% defines a negative change in PCT result representing a lower risk for 28-day all-cause mortality of patients diagnosed with severe sepsis or septic shock.                Troponin [[id number]]  (Normal) Collected: 12/01/21 1720    Specimen: Blood Updated: 12/01/21 1737     Troponin T 0.024 ng/mL     Narrative:      Troponin T Reference Range:  <= 0.03 ng/mL-   Negative for AMI  >0.03 ng/mL-     Abnormal for myocardial necrosis.  Clinicians would have to utilize clinical acumen, EKG, Troponin and serial changes to determine if it is an Acute Myocardial Infarction or myocardial injury due to an underlying chronic condition.       Results may be falsely decreased if patient taking Biotin.      COVID-19 and FLU A/B PCR - Swab, Nasopharynx [[id number]]  (Normal) Collected: 12/01/21 1625    Specimen: Swab from Nasopharynx Updated: 12/01/21 1650     COVID19 Not Detected     Influenza A PCR Not Detected     Influenza B PCR Not Detected    Narrative:      Fact sheet for providers: https://www.fda.gov/media/238220/download    Fact sheet for patients: https://www.fda.gov/media/467503/download    Test performed by PCR.    Protime-INR [[id number]]  (Normal) Collected: 12/01/21 1626    Specimen: Blood Updated: 12/01/21 1647     Protime 13.3 Seconds      INR 1.02    Narrative:      Therapeutic range for most indications is 2.0-3.0 INR,  or 2.5-3.5 for mechanical heart valves.    Lactic Acid, Plasma [[id number]]  (Normal) Collected: 12/01/21 1626    Specimen: " Blood Updated: 12/01/21 1641     Lactate 1.4 mmol/L     Blood Culture - Blood, Arm, Left [[id number]] Collected: 12/01/21 1626    Specimen: Blood from Arm, Left Updated: 12/01/21 1626    Blood Culture - Blood, Hand, Right [[id number]] Collected: 12/01/21 1626    Specimen: Blood from Hand, Right Updated: 12/01/21 1626    [place] Draw [[id number]] Collected: 12/01/21 1510    Specimen: Blood Updated: 12/01/21 1616    Narrative:      The following orders were created for panel order [place] Draw.  Procedure                               Abnormality         Status                     ---------                               -----------         ------                     Green Top (Gel)[[id number]]                                  Final result               Lavender Top[[id number]]                                     Final result               Gold Top - SST[[id number]]                                   Final result               Light Blue Top[[id number]]                                   Final result                 Please view results for these tests on the individual orders.    Gold Top - SST [[id number]] Collected: 12/01/21 1510    Specimen: Blood Updated: 12/01/21 1616     Extra Tube Hold for add-ons.     Comment: Auto resulted.       Green Top (Gel) [[id number]] Collected: 12/01/21 1510    Specimen: Blood Updated: 12/01/21 1616     Extra Tube Hold for add-ons.     Comment: Auto resulted.       Lavender Top [[id number]] Collected: 12/01/21 1510    Specimen: Blood Updated: 12/01/21 1616     Extra Tube hold for add-on     Comment: Auto resulted       Light Blue Top [[id number]] Collected: 12/01/21 1510    Specimen: Blood Updated: 12/01/21 1616     Extra Tube hold for add-on     Comment: Auto resulted       CK [[id number]]  (Normal) Collected: 12/01/21 1510    Specimen: Blood Updated: 12/01/21 1613     Creatine Kinase 27 U/L     Magnesium [[id number]]  (Normal) Collected: 12/01/21 1510    Specimen: Blood Updated: 12/01/21 1613      Magnesium 1.6 mg/dL     Comprehensive Metabolic Panel [[id number]]  (Abnormal) Collected: 12/01/21 1510    Specimen: Blood Updated: 12/01/21 1536     Glucose 120 mg/dL      BUN 16 mg/dL      Creatinine 1.06 mg/dL      Sodium 139 mmol/L      Potassium 4.5 mmol/L      Chloride 105 mmol/L      CO2 26.0 mmol/L      Calcium 9.2 mg/dL      Total Protein 6.3 g/dL      Albumin 3.60 g/dL      ALT (SGPT) 16 U/L      AST (SGOT) 15 U/L      Alkaline Phosphatase 61 U/L      Total Bilirubin 0.3 mg/dL      eGFR Non African Amer 66 mL/min/1.73      Globulin 2.7 gm/dL      A/G Ratio 1.3 g/dL      BUN/Creatinine Ratio 15.1     Anion Gap 8.0 mmol/L     Narrative:      GFR Normal >60  Chronic Kidney Disease <60  Kidney Failure <15      Troponin [[id number]]  (Abnormal) Collected: 12/01/21 1510    Specimen: Blood Updated: 12/01/21 1536     Troponin T 0.032 ng/mL     Narrative:      Troponin T Reference Range:  <= 0.03 ng/mL-   Negative for AMI  >0.03 ng/mL-     Abnormal for myocardial necrosis.  Clinicians would have to utilize clinical acumen, EKG, Troponin and serial changes to determine if it is an Acute Myocardial Infarction or myocardial injury due to an underlying chronic condition.       Results may be falsely decreased if patient taking Biotin.      BNP [[id number]]  (Normal) Collected: 12/01/21 1510    Specimen: Blood Updated: 12/01/21 1534     proBNP 920.0 pg/mL     Narrative:      Among patients with dyspnea, NT-proBNP is highly sensitive for the detection of acute congestive heart failure. In addition NT-proBNP of <300 pg/ml effectively rules out acute congestive heart failure with 99% negative predictive value.    Results may be falsely decreased if patient taking Biotin.      CBC & Differential [[id number]]  (Abnormal) Collected: 12/01/21 1510    Specimen: Blood Updated: 12/01/21 1514    Narrative:      The following orders were created for panel order CBC & Differential.  Procedure                               Abnormality          Status                     ---------                               -----------         ------                     CBC Auto Differential[[id number]]        Abnormal            Final result                 Please view results for these tests on the individual orders.    CBC Auto Differential [[id number]]  (Abnormal) Collected: 12/01/21 1510    Specimen: Blood Updated: 12/01/21 1514     WBC 13.38 10*3/mm3      RBC 4.29 10*6/mm3      Hemoglobin 12.5 g/dL      Hematocrit 39.0 %      MCV 90.9 fL      MCH 29.1 pg      MCHC 32.1 g/dL      RDW 18.0 %      RDW-SD 59.1 fl      MPV 11.4 fL      Platelets 184 10*3/mm3      Neutrophil % 83.4 %      Lymphocyte % 10.5 %      Monocyte % 3.1 %      Eosinophil % 0.4 %      Basophil % 0.4 %      Immature Grans % 2.2 %      Neutrophils, Absolute 11.16 10*3/mm3      Lymphocytes, Absolute 1.40 10*3/mm3      Monocytes, Absolute 0.42 10*3/mm3      Eosinophils, Absolute 0.05 10*3/mm3      Basophils, Absolute 0.06 10*3/mm3      Immature Grans, Absolute 0.29 10*3/mm3      nRBC 0.0 /100 WBC           I personally viewed and interpreted the patient's EKG/Telemetry data       Assessment/Plan:    # 1 persistent atrial fibrillation with rapid ventricular rate    88 years old patient with documented history of atrial fibrillation, 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  Patient admitted for evaluation of generalized weakness fatigue lack of energy palpitation shortness of breath.  Patient diagnosed pneumonia and also noted atrial fibrillation with rapid ventricular rate.  Has mildly nondiagnostic troponin may be multifactorial etiology repeat within normal range.  I doubt there is evidence of ongoing ischemia at the time of evaluations.  Discussed with the patient and family and given the previous history margin hemodynamic will start the patient on IV amiodarone with  150 mg bolus, 1 mg/min for 6 hours then 0.5 mg/min and will transition to oral amiodarone.  Low-dose beta-blocker/diltiazem can be contemplated if there is a problem with controlling the ventricular rate    Patient is not a candidate for long-term oral anticoagulation due to high has bled score    #2 CAD s/p PCI to LAD    Patient nondiagnostic troponin repeat was within normal range may be multifactorial etiology.  My suspicion of ongoing ischemia is low.  Recommend to continue antiplatelet agent, statin and low-dose beta-blocker             #2 history of CAD s/p PCI to RCA and LAD     No evidence of ongoing ischemia at the time of evaluation. Continue dual antiplatelet agents. Continue statin. Continue Ranexa         #3 history of hypertension and documented history of significant orthostasis and frequent fall.  Currently is tolerating low-dose lisinopril.    History of chronic congestive heart failure on the basis of diastolic dysfunction    Appears to be euvolemic at the time of evaluations.  I will change Lasix 40 from 40 mg twice a day to 40 mg a day    #4 pneumonia    Patient started by hospitalist on IV antibiotic  Recommend PT OT               Thank you for allowing me to participate in the care of Hasmukh Ham. Feel free to contact me directly with any further questions or concerns.    Mana Curtis MD  12/02/21  13:40 CST.      Part of this note may be an electronic transcription/translation of spoken language to printed text using the Dragon Dictation system.        Electronically signed by Mana Curtis MD at 12/02/21 6168

## 2021-12-03 NOTE — PLAN OF CARE
Goal Outcome Evaluation:  Plan of Care Reviewed With: patient        Progress: improving  Outcome Summary: pt responded well to PT w/ increased gait to 10 ft x2 CGA w/ RW. pt SOA but SpO2 WFL. pt participated in LE ther ex. no new goals met at this time. pt would continue to benefit from PT Services.

## 2021-12-04 NOTE — CONSULTS
[facility]  [address] [place] [address]. [address]  T - [phone]   - [phone]     Consultation         PATIENT  DEMOGRAPHICS   PATIENT NAME: Hasmukh Ham                      PHYSICIAN: EVE Fuller  : 1933  MRN: [id number]    Subjective   SUBJECTIVE   Referring Provider: Dr. Segovia  Reason for Consultation: JILL  History of present illness: This is an 88-year-old male with a past medical history significant for COPD, CAD, non-STEMI on 10/25/2021 status post stenting, DM 2, hypertension who presented to the hospital on  with complaints of increasing shortness of breath and general malaise over the last 3 to 4 days.  He was admitted and treated for pneumonia, atrial fibrillation with RVR, and elevated troponin.  Unfortunately he has developed increased creatinine today and nephrology is consulted to help manage JILL.    Past Medical History:   Diagnosis Date   • Basal cell carcinoma    • Bilateral carotid artery stenosis    • Bilateral carotid artery stenosis    • Bleeding disorder (HCC)    • CHF (congestive heart failure) (HCC)    • Chronic obstructive pulmonary disease (COPD) (HCC)    • Coronary arteriosclerosis    • Degenerative joint disease involving multiple joints    • Dementia (HCC)    • Diabetes mellitus (HCC)    • Heart problem    • History of stomach ulcers    • Hyperlipidemia    • Hypertension    • Ingrown toenail    • Myocardial infarction (HCC)      Past Surgical History:   Procedure Laterality Date   • BACK SURGERY     • CARDIAC CATHETERIZATION N/A 2017    Procedure: Right Heart Cath;  Surgeon: Jeff Maciel MD PhD;  Location: [facility] INVASIVE LOCATION;  Service:    • CARDIAC CATHETERIZATION N/A 2018    Procedure: Coronary angiography;  Surgeon: Moisés Davila MD;  Location: [facility] INVASIVE LOCATION;  Service:    • CARDIAC CATHETERIZATION N/A 10/28/2021    Procedure: Left Heart Cath;  Surgeon: Carine Johnson  "MD Paolo;  Location: [facility] INVASIVE LOCATION;  Service: Cardiology;  Laterality: N/A;   • CORONARY ANGIOPLASTY WITH STENT PLACEMENT     • CORONARY STENT PLACEMENT     • HERNIA REPAIR     • LUNG BIOPSY     • LUNG SURGERY     • NECK SURGERY     • THORACOTOMY Left 1977   • VENTRAL HERNIA REPAIR       Family History   Problem Relation Age of Onset   • Hypertension Father    • Heart disease Father    • Diabetes Father    • Diabetes Mother    • Lung disease Other    • Cancer Other      Social History     Tobacco Use   • Smoking status: Former Smoker   • Smokeless tobacco: Never Used   Vaping Use   • Vaping Use: Never used   Substance Use Topics   • Alcohol use: No   • Drug use: No     Allergies:  Atorvastatin, Penicillins, Azithromycin, Cefdinir, Clarithromycin, Pravastatin, and Benadryl allergy [diphenhydramine hcl]     REVIEW OF SYSTEMS    Review of Systems   Gastrointestinal: Positive for nausea.   All other systems reviewed and are negative.      Objective   OBJECTIVE   Vital Signs  Temp:  [97.2 °F (36.2 °C)-97.7 °F (36.5 °C)] 97.5 °F (36.4 °C)  Heart Rate:  [] 92  Resp:  [16-20] 20  BP: (127-175)/() 127/81    Flowsheet Rows      First Filed Value   Admission Height 171.5 cm (67.5\") Documented at 12/01/2021 1451   Admission Weight 68 kg (150 lb) Documented at 12/01/2021 1451           I/O last 3 completed shifts:  In: 1410 [P.O.:1240; I.V.:20; IV Piggyback:150]  Out: 1475 [Urine:1475]    PHYSICAL EXAM    Physical Exam  Constitutional:       Appearance: He is well-developed.   HENT:      Head: Normocephalic and atraumatic.   Eyes:      Conjunctiva/sclera: Conjunctivae normal.      Pupils: Pupils are equal, round, and reactive to light.   Cardiovascular:      Rate and Rhythm: Normal rate and regular rhythm.   Pulmonary:      Effort: Pulmonary effort is normal.      Breath sounds: Normal breath sounds.   Abdominal:      Palpations: Abdomen is soft.   Musculoskeletal:      Cervical back: Neck supple. "      Right lower leg: No edema.      Left lower leg: No edema.   Skin:     General: Skin is warm and dry.   Neurological:      Mental Status: He is alert and oriented to person, place, and time.   Psychiatric:         Mood and Affect: Mood normal.         Behavior: Behavior normal.         RESULTS   Results Review:    Results from last 7 days   Lab Units 12/04/21  0703 12/02/21  0526 12/01/21  1510   SODIUM mmol/L 139 138 139   POTASSIUM mmol/L 4.4 4.6 4.5   CHLORIDE mmol/L 100 103 105   CO2 mmol/L 24.0 26.0 26.0   BUN mg/dL 48* 19 16   CREATININE mg/dL 1.88* 1.16 1.06   CALCIUM mg/dL 8.8 8.7 9.2   BILIRUBIN mg/dL 0.3 0.2 0.3   ALK PHOS U/L 54 56 61   ALT (SGPT) U/L 14 12 16   AST (SGOT) U/L 15 12 15   GLUCOSE mg/dL 166* 192* 120*       Estimated Creatinine Clearance: 26.5 mL/min (A) (by C-G formula based on SCr of 1.88 mg/dL (H)).    Results from last 7 days   Lab Units 12/01/21  1510   MAGNESIUM mg/dL 1.6             Results from last 7 days   Lab Units 12/04/21  0703 12/02/21  0526 12/01/21  1510   WBC 10*3/mm3 15.32* 10.27 13.38*   HEMOGLOBIN g/dL 11.4* 11.6* 12.5*   PLATELETS 10*3/mm3 184 168 184       Results from last 7 days   Lab Units 12/01/21  1626   INR  1.02        MEDICATIONS    amiodarone, 100 mg, Oral, Q24H  cetirizine, 5 mg, Oral, Daily  clopidogrel, 75 mg, Oral, Daily  famotidine, 20 mg, Oral, BID AC  furosemide, 40 mg, Oral, BID  insulin aspart, 0-9 Units, Subcutaneous, TID AC  ipratropium-albuterol, 3 mL, Nebulization, 4x Daily - RT  levoFLOXacin, 750 mg, Intravenous, Q48H  lisinopril, 5 mg, Oral, Daily  methylPREDNISolone sodium succinate, 60 mg, Intravenous, Q12H  metoprolol tartrate, 12.5 mg, Oral, Q12H  polyethylene glycol, 17 g, Oral, Daily  ranolazine, 1,000 mg, Oral, Q12H  sodium chloride, 10 mL, Intravenous, Q12H         Medications Prior to Admission   Medication Sig Dispense Refill Last Dose   • albuterol (PROVENTIL) (2.5 MG/3ML) 0.083% nebulizer solution Take 2.5 mg by nebulization  Every 4 (Four) Hours As Needed for Wheezing. 125 vial 0 12/1/2021 at Unknown time   • Brovana 15 MCG/2ML nebulizer solution 3 (Three) Times a Day.   12/1/2021 at Unknown time   • clopidogrel (PLAVIX) 75 MG tablet Take 75 mg by mouth Daily.   12/1/2021 at Unknown time   • famotidine (PEPCID) 20 MG tablet Take 20 mg by mouth 2 (Two) Times a Day.   12/1/2021 at Unknown time   • fluticasone (FLONASE) 50 MCG/ACT nasal spray 2 sprays into each nostril Daily.   12/1/2021 at Unknown time   • furosemide (LASIX) 40 MG tablet Take 1 tablet by mouth Daily. (Patient taking differently: Take 40 mg by mouth 2 (Two) Times a Day.) 30 tablet 11 12/1/2021 at Unknown time   • glimepiride (AMARYL) 2 MG tablet Take 2 mg by mouth Every Morning Before Breakfast.   12/1/2021 at Unknown time   • lisinopril (PRINIVIL,ZESTRIL) 5 MG tablet Take 1 tablet by mouth Daily. 90 tablet 3 12/1/2021 at Unknown time   • magnesium oxide (MAG-OX) 400 MG tablet Take 250 mg by mouth Daily.   12/1/2021 at Unknown time   • Red Yeast Rice 600 MG tablet Take 600 mg by mouth 2 (Two) Times a Day.   12/1/2021 at Unknown time   • acetaminophen (TYLENOL) 325 MG tablet Take 2 tablets by mouth Every 4 (Four) Hours As Needed for Mild Pain .      • Blood Glucose Monitoring Suppl (ONE TOUCH ULTRA 2) w/Device kit Daily. as directed      • cetirizine (zyrTEC) 10 MG tablet Take 10 mg by mouth Daily.      • docusate sodium 100 MG capsule Take 100 mg by mouth.      • Fluticasone Furoate (ARNUITY ELLIPTA) 200 MCG/ACT aerosol powder  Inhale.      • Fluticasone-Umeclidin-Vilant (Trelegy Ellipta) 100-62.5-25 MCG/INH aerosol powder  Inhale 1 inhaler.      • HYDROcodone-acetaminophen (NORCO) 7.5-325 MG per tablet Take 1 tablet by mouth Every 6 (Six) Hours As Needed for Moderate Pain . 12 tablet 0    • ipratropium (ATROVENT) 0.02 % nebulizer solution INHALE THE CONTENTS OF 1 VIAL IN NEBULIZER EVERY 4 HOURS AS NEEDED FOR WHEEZING      • ipratropium-albuterol (DUO-NEB) 0.5-2.5 mg/mL  nebulizer Take 3 mL by nebulization 4 (Four) Times a Day. 120 vial 1    • Lancets (OneTouch Delica Plus [id number]) misc 1 each by Other route Daily.      • meclizine (ANTIVERT) 12.5 MG tablet 1 tablet po q 6 hr prn severe nausea      • nitroglycerin (NITROSTAT) 0.4 MG SL tablet place 1 tablet under the tongue if needed every 5 minutes for hair...  (REFER TO PRESCRIPTION NOTES).  0    • nystatin (MYCOSTATIN) 100,000 unit/mL suspension SWISH AND SWALLOW 2 ML EVERY 4 HOURS AS NEEDED      • O2 (OXYGEN) Inhale 2 L/min Every Night. W/ CPAP      • OneTouch Verio test strip TEST EVERY DAY      • polyethylene glycol (MIRALAX) 17 g packet Dissolve 1 packet (17 g) in 4 to 8 ounces of beverage and drink by mouth Daily. 30 packet 1    • predniSONE (DELTASONE) 10 MG tablet Daily.      • ranolazine (RANEXA) 1000 MG 12 hr tablet Take 1 tablet by mouth Every 12 (Twelve) Hours. 60 tablet 11    • Umeclidinium-Vilanterol 62.5-25 MCG/INH aerosol powder  Inhale 1 puff Daily. 1 each 11      Assessment/Plan   ASSESSMENT / PLAN      Pneumonia of both lungs due to infectious organism    1.  JILL on CKD- baseline creatinine 1.2, up to 1.8 today.  Etiology of JILL is not immediately apparent.  He does complain of nausea but has had no vomiting.  No diarrhea.  No hypotension noted in the record.  No new nephrotoxic medications.  At present we will hold lisinopril.  Check ultrasound, urinalysis, urine studies, Tray stain.    2.  Pneumonia- continue antibiotics and management primary team    3.  Atrial fibrillation- now on amiodarone    4.  DM2    5.  Hypertension- well-controlled    6.  COPD    7.  History of CAD      Thank you for the consult, we will continue to follow the patient.         I discussed the patients findings and my recommendations with patient and nursing staff      This document has been electronically signed by EVE Fuller on December 4, 2021 12:22 CST

## 2021-12-04 NOTE — PROGRESS NOTES
[facility]  INPATIENT PROGRESS NOTE    Length of Stay: 2  Date of Admission: 12/1/2021  Primary Care Physician: Paolo Rey MD    Subjective   Chief Complaint: Shortness of air  HPI:    Patient has been admitted for pneumonia and has had atrial fibrillation.  He has had some confusion today but wife reports this is nothing new for him and he does get confused at times.    Review of Systems   Respiratory: Positive for shortness of breath.         All pertinent negatives and positives are as above. All other systems have been reviewed and are negative unless otherwise stated.     Objective    Temp:  [97.2 °F (36.2 °C)-97.8 °F (36.6 °C)] 97.8 °F (36.6 °C)  Heart Rate:  [] 115  Resp:  [16-20] 20  BP: (127-175)/() 163/98  Physical Exam  Vitals and nursing note reviewed.   Constitutional:       General: He is not in acute distress.     Appearance: He is well-developed. He is not diaphoretic.   HENT:      Head: Normocephalic and atraumatic.   Cardiovascular:      Rate and Rhythm: Normal rate.   Pulmonary:      Effort: Pulmonary effort is normal. No respiratory distress.      Breath sounds: No wheezing.   Abdominal:      General: There is no distension.      Palpations: Abdomen is soft.   Musculoskeletal:         General: Normal range of motion.   Skin:     General: Skin is warm and dry.   Neurological:      Mental Status: He is alert.      Cranial Nerves: No cranial nerve deficit.   Psychiatric:      Comments: Confusion present             Results Review:  I have reviewed the labs, radiology results, and diagnostic studies.    Laboratory Data:   Lab Results (last 24 hours)     Procedure Component Value Units Date/Time    Urinalysis With Microscopic If Indicated (No Culture) - [[id number]]  (Normal) Collected: 12/04/21 1652    Specimen: Urine Updated: 12/04/21 1702     Color, UA Yellow     Appearance, UA Clear     pH, UA <=5.0     Specific Gravity,  UA 1.020     Glucose, UA Negative     Ketones, UA Negative     Bilirubin, UA Negative     Blood, UA Negative     Protein, UA Negative     Leuk Esterase, UA Negative     Nitrite, UA Negative     Urobilinogen, UA 0.2 E.U./dL    Narrative:      Urine microscopic not indicated.    Sodium, Urine, Random - [[id number]] Collected: 12/04/21 1652    Specimen: Urine Updated: 12/04/21 1701     Sodium, Urine 94 mmol/L     Narrative:      Reference intervals for random urine have not been established.  Clinical usage is dependent upon physician's interpretation in combination with other laboratory tests.       Creatinine, Urine, Random - [[id number]] Collected: 12/04/21 1652    Specimen: Urine Updated: 12/04/21 1658    Urine Eosinophils, Tray's Stain - [[id number]] Collected: 12/04/21 1652    Specimen: Urine Updated: 12/04/21 1658    Blood Culture - Blood, Hand, Right [[id number]]  (Normal) Collected: 12/01/21 1626    Specimen: Blood from Hand, Right Updated: 12/04/21 1631     Blood Culture No growth at 3 days    Comprehensive Metabolic Panel [[id number]]  (Abnormal) Collected: 12/04/21 0703    Specimen: Blood Updated: 12/04/21 0816     Glucose 166 mg/dL      BUN 48 mg/dL      Creatinine 1.88 mg/dL      Sodium 139 mmol/L      Potassium 4.4 mmol/L      Chloride 100 mmol/L      CO2 24.0 mmol/L      Calcium 8.8 mg/dL      Total Protein 6.2 g/dL      Albumin 3.80 g/dL      ALT (SGPT) 14 U/L      AST (SGOT) 15 U/L      Alkaline Phosphatase 54 U/L      Total Bilirubin 0.3 mg/dL      eGFR Non African Amer 34 mL/min/1.73      Globulin 2.4 gm/dL      A/G Ratio 1.6 g/dL      BUN/Creatinine Ratio 25.5     Anion Gap 15.0 mmol/L     Narrative:      GFR Normal >60  Chronic Kidney Disease <60  Kidney Failure <15      CBC & Differential [[id number]]  (Abnormal) Collected: 12/04/21 0703    Specimen: Blood Updated: 12/04/21 0731    Narrative:      The following orders were created for panel order CBC & Differential.  Procedure                                Abnormality         Status                     ---------                               -----------         ------                     CBC Auto Differential[[id number]]        Abnormal            Final result                 Please view results for these tests on the individual orders.    CBC Auto Differential [[id number]]  (Abnormal) Collected: 12/04/21 0703    Specimen: Blood Updated: 12/04/21 0731     WBC 15.32 10*3/mm3      RBC 4.00 10*6/mm3      Hemoglobin 11.4 g/dL      Hematocrit 35.1 %      MCV 87.8 fL      MCH 28.5 pg      MCHC 32.5 g/dL      RDW 18.0 %      RDW-SD 57.6 fl      MPV 11.2 fL      Platelets 184 10*3/mm3      Neutrophil % 88.4 %      Lymphocyte % 4.6 %      Monocyte % 5.4 %      Eosinophil % 0.0 %      Basophil % 0.2 %      Immature Grans % 1.4 %      Neutrophils, Absolute 13.55 10*3/mm3      Lymphocytes, Absolute 0.70 10*3/mm3      Monocytes, Absolute 0.82 10*3/mm3      Eosinophils, Absolute 0.00 10*3/mm3      Basophils, Absolute 0.03 10*3/mm3      Immature Grans, Absolute 0.22 10*3/mm3      nRBC 0.1 /100 WBC     POC Glucose Once [[id number]]  (Abnormal) Collected: 12/04/21 0551    Specimen: Blood Updated: 12/04/21 0618     Glucose 205 mg/dL      Comment: RN NotifiedOperator: [id number] CRISTINA HANNAHEISHAMeter ID: [id number]       POC Glucose Once [[id number]]  (Abnormal) Collected: 12/03/21 2040    Specimen: Blood Updated: 12/03/21 2103     Glucose 199 mg/dL      Comment: RN NotifiedOperator: [id number] CRISTINA HANNAHEISHAMeter ID: [id number]       POC Glucose Once [[id number]]  (Abnormal) Collected: 12/03/21 1027    Specimen: Blood Updated: 12/03/21 1755     Glucose 137 mg/dL      Comment: RN NotifiedOperator: [id number] JIMENEZ ALEXISMeter ID: [id number]             Culture Data:   No results found for: BLOODCX  No results found for: URINECX  No results found for: RESPCX  No results found for: WOUNDCX  No results found for: STOOLCX  No components found for: BODYFLD    Radiology Data:   Imaging  Results (Last 24 Hours)     ** No results found for the last 24 hours. **          I have reviewed the patient's current medications.     Assessment/Plan     Active Hospital Problems    Diagnosis    • Pneumonia of both lungs due to infectious organism          Pneumonia-continue with IV antibiotics and we will continue to monitor    Acute renal failure-nephrology has been consulted and we will continue to monitor    Kqjvfnjre-mnclpuuzncvaul-yiinlc due to baseline dementia and/or delirium.  We will continue to monitor.     Chronic respiratory failure-we will continue with supplemental oxygen, seems to be at baseline     Paroxysmal atrial fibrillation-continue with cardiology management, currently on amiodarone.  Currently in sinus rhythm     Type 2 diabetes-continue monitor glucose levels     Elevated troponin level-cardiology managing     Hypertension-continue monitor blood pressure     COPD-nebulizer treatments as needed     DVT prophylaxis-SCD     Patient is full code     I confirmed that the patient's Advance Care Plan is present, code status is documented, or surrogate decision maker is listed in the patient's medical record.         Karan Segovia MD   12/04/21   17:54 CST

## 2021-12-04 NOTE — PLAN OF CARE
Problem: Adult Inpatient Plan of Care  Goal: Plan of Care Review  Outcome: Ongoing, Progressing  Flowsheets  Taken 12/4/2021 0220 by Aida Freedman RN  Progress: improving  Taken 12/3/2021 1532 by Mariaelena Mejia RN  Plan of Care Reviewed With:   patient   spouse  Goal: Patient-Specific Goal (Individualized)  Outcome: Ongoing, Progressing  Goal: Absence of Hospital-Acquired Illness or Injury  Outcome: Ongoing, Progressing  Intervention: Identify and Manage Fall Risk  Recent Flowsheet Documentation  Taken 12/4/2021 0200 by Aida Freedman RN  Safety Promotion/Fall Prevention: safety round/check completed  Taken 12/4/2021 0000 by Aida Freedman RN  Safety Promotion/Fall Prevention: safety round/check completed  Taken 12/3/2021 2221 by Aida Freedman RN  Safety Promotion/Fall Prevention: safety round/check completed  Taken 12/3/2021 2150 by Aida Freedman RN  Safety Promotion/Fall Prevention: safety round/check completed  Taken 12/3/2021 2050 by Aida Freedman RN  Safety Promotion/Fall Prevention: safety round/check completed  Taken 12/3/2021 1950 by Aida Freedman RN  Safety Promotion/Fall Prevention: safety round/check completed  Intervention: Prevent Skin Injury  Recent Flowsheet Documentation  Taken 12/4/2021 0200 by Aida Freedman RN  Body Position: supine  Taken 12/4/2021 0000 by Aida Freedman RN  Body Position: tilted, left  Taken 12/3/2021 2221 by Aida Freedman RN  Body Position: supine  Taken 12/3/2021 2150 by Aida Freedman RN  Body Position: tilted, right  Taken 12/3/2021 2050 by Aida Freedman RN  Body Position: tilted, left  Taken 12/3/2021 1950 by Aida Freedman RN  Body Position: supine  Intervention: Prevent and Manage VTE (venous thromboembolism) Risk  Recent Flowsheet Documentation  Taken 12/3/2021 1950 by Aida Freedman RN  VTE Prevention/Management: dorsiflexion/plantar flexion performed  Intervention: Prevent Infection  Recent Flowsheet  Documentation  Taken 12/3/2021 1950 by Aida Freedman, RN  Infection Prevention: single patient room provided  Goal: Optimal Comfort and Wellbeing  Outcome: Ongoing, Progressing  Intervention: Provide Person-Centered Care  Recent Flowsheet Documentation  Taken 12/3/2021 1950 by Aida Freedman, RN  Trust Relationship/Rapport: care explained  Goal: Readiness for Transition of Care  Outcome: Ongoing, Progressing   Goal Outcome Evaluation:           Progress: improving

## 2021-12-05 NOTE — PLAN OF CARE
Goal Outcome Evaluation:      Patient confused for the day, MD aware. Patient evaluated by nephrology due to worsening renal function. PO lasix discontinued. Will continue to monitor

## 2021-12-05 NOTE — PROGRESS NOTES
[facility] Medicine Services  INPATIENT PROGRESS NOTE    Length of Stay: 3  Date of Admission: 12/1/2021  Primary Care Physician: Paolo Rey MD    Subjective   Chief Complaint: Shortness of air  HPI:    Admitted for pneumonia.  Has been intermittently confused.  Stable on supplemental oxygen.    Review of Systems   Respiratory: Positive for shortness of breath.           All pertinent negatives and positives are as above. All other systems have been reviewed and are negative unless otherwise stated.     Objective    Temp:  [97 °F (36.1 °C)-98.3 °F (36.8 °C)] 97 °F (36.1 °C)  Heart Rate:  [] 87  Resp:  [18-20] 18  BP: (110-163)/(65-98) 110/65  Physical Exam  Vitals and nursing note reviewed.   Constitutional:       General: He is not in acute distress.     Appearance: He is well-developed. He is not diaphoretic.   HENT:      Head: Normocephalic and atraumatic.   Cardiovascular:      Rate and Rhythm: Normal rate.   Pulmonary:      Effort: Pulmonary effort is normal. No respiratory distress.      Breath sounds: No wheezing.   Abdominal:      General: There is no distension.      Palpations: Abdomen is soft.   Musculoskeletal:         General: Normal range of motion.   Skin:     General: Skin is warm and dry.   Neurological:      Mental Status: He is alert.      Cranial Nerves: No cranial nerve deficit.   Psychiatric:         Behavior: Behavior normal.         Thought Content: Thought content normal.         Judgment: Judgment normal.             Results Review:  I have reviewed the labs, radiology results, and diagnostic studies.    Laboratory Data:   Lab Results (last 24 hours)     Procedure Component Value Units Date/Time    Comprehensive Metabolic Panel [[id number]]  (Abnormal) Collected: 12/05/21 0713    Specimen: Blood Updated: 12/05/21 0743     Glucose 175 mg/dL      BUN 58 mg/dL      Creatinine 1.84 mg/dL      Sodium 139 mmol/L      Potassium 4.2 mmol/L       Chloride 98 mmol/L      CO2 26.0 mmol/L      Calcium 9.3 mg/dL      Total Protein 6.9 g/dL      Albumin 4.20 g/dL      ALT (SGPT) 19 U/L      AST (SGOT) 18 U/L      Alkaline Phosphatase 54 U/L      Total Bilirubin 0.5 mg/dL      eGFR Non African Amer 35 mL/min/1.73      Globulin 2.7 gm/dL      A/G Ratio 1.6 g/dL      BUN/Creatinine Ratio 31.5     Anion Gap 15.0 mmol/L     Narrative:      GFR Normal >60  Chronic Kidney Disease <60  Kidney Failure <15      CBC & Differential [[id number]]  (Abnormal) Collected: 12/05/21 0713    Specimen: Blood Updated: 12/05/21 0723    Narrative:      The following orders were created for panel order CBC & Differential.  Procedure                               Abnormality         Status                     ---------                               -----------         ------                     CBC Auto Differential[[id number]]        Abnormal            Final result                 Please view results for these tests on the individual orders.    CBC Auto Differential [[id number]]  (Abnormal) Collected: 12/05/21 0713    Specimen: Blood Updated: 12/05/21 0723     WBC 15.59 10*3/mm3      RBC 4.58 10*6/mm3      Hemoglobin 13.1 g/dL      Hematocrit 40.4 %      MCV 88.2 fL      MCH 28.6 pg      MCHC 32.4 g/dL      RDW 18.1 %      RDW-SD 57.5 fl      MPV 11.2 fL      Platelets 238 10*3/mm3      Neutrophil % 87.0 %      Lymphocyte % 5.6 %      Monocyte % 5.9 %      Eosinophil % 0.0 %      Basophil % 0.3 %      Immature Grans % 1.2 %      Neutrophils, Absolute 13.58 10*3/mm3      Lymphocytes, Absolute 0.87 10*3/mm3      Monocytes, Absolute 0.92 10*3/mm3      Eosinophils, Absolute 0.00 10*3/mm3      Basophils, Absolute 0.04 10*3/mm3      Immature Grans, Absolute 0.18 10*3/mm3      nRBC 0.1 /100 WBC     POC Glucose Once [[id number]]  (Abnormal) Collected: 12/05/21 0611    Specimen: Blood Updated: 12/05/21 0628     Glucose 167 mg/dL      Comment: RN NotifiedOperator: [id number] LAUREN Vega  ID: [id number]       Urine Eosinophils, Tray's Stain - [[id number]]  (Normal) Collected: 12/04/21 1652    Specimen: Urine Updated: 12/04/21 2141     Tray Stain Negative    Creatinine, Urine, Random - [[id number]] Collected: 12/04/21 1652    Specimen: Urine Updated: 12/04/21 2135     Creatinine, Urine 34.2 mg/dL     Narrative:      Reference intervals for random urine have not been established.  Clinical usage is dependent upon physician's interpretation in combination with other laboratory tests.       POC Glucose Once [[id number]]  (Abnormal) Collected: 12/04/21 2024    Specimen: Blood Updated: 12/04/21 2042     Glucose 160 mg/dL      Comment: RN NotifiedOperator: [id number] CONCEPCIÓN TARAMeter ID: [id number]       POC Glucose Once [[id number]]  (Abnormal) Collected: 12/04/21 1646    Specimen: Blood Updated: 12/04/21 2015     Glucose 148 mg/dL      Comment: RN NotifiedOperator: [id number] ID: [id number]       POC Glucose Once [[id number]]  (Abnormal) Collected: 12/04/21 1017    Specimen: Blood Updated: 12/04/21 1858     Glucose 136 mg/dL      Comment: RN NotifiedOperator: [id number] ID: [id number]       Urinalysis With Microscopic If Indicated (No Culture) - [[id number]]  (Normal) Collected: 12/04/21 1652    Specimen: Urine Updated: 12/04/21 1702     Color, UA Yellow     Appearance, UA Clear     pH, UA <=5.0     Specific Gravity, UA 1.020     Glucose, UA Negative     Ketones, UA Negative     Bilirubin, UA Negative     Blood, UA Negative     Protein, UA Negative     Leuk Esterase, UA Negative     Nitrite, UA Negative     Urobilinogen, UA 0.2 E.U./dL    Narrative:      Urine microscopic not indicated.    Sodium, Urine, Random - [[id number]] Collected: 12/04/21 1652    Specimen: Urine Updated: 12/04/21 1701     Sodium, Urine 94 mmol/L     Narrative:      Reference intervals for random urine have not been established.  Clinical usage is dependent upon physician's  interpretation in combination with other laboratory tests.       Blood Culture - Blood, Hand, Right [[id number]]  (Normal) Collected: 12/01/21 1626    Specimen: Blood from Hand, Right Updated: 12/04/21 1631     Blood Culture No growth at 3 days          Culture Data:   No results found for: BLOODCX  No results found for: URINECX  No results found for: RESPCX  No results found for: WOUNDCX  No results found for: STOOLCX  No components found for: BODYFLD    Radiology Data:   Imaging Results (Last 24 Hours)     Procedure Component Value Units Date/Time    US Renal Bilateral [[id number]] Collected: 12/04/21 2047     Updated: 12/05/21 0035    Narrative:      EXAM:    US Retroperitoneal Limited, Renal    CLINICAL HISTORY:    The patient is 88 years old and is Male; JILL, J18.9 Pneumonia,  unspecified organism R07.2 Precordial pain I48.91 Unspecified  atrial fibrillation Z74.09 Other reduced mobility Z78.9 Other  specified health status Z74.09 Other reduced mobility    TECHNIQUE:    Real-time limited ultrasound of the retroperitoneum with image  documentation.    COMPARISON:    CT of the abdomen and pelvis June 22, 2021    FINDINGS:    RIGHT KIDNEY:  The right kidney measures 10.4 cm in length.  No  stones.  No hydronephrosis.    LEFT KIDNEY:  Bilobed left renal cyst measuring 7.8 x 4.7 x 5.6  cm is present. The left kidney measures 11.0 cm in length.  No  stones.  No hydronephrosis.    BLADDER:  The bladder is well distended.      Impression:        Large left renal cyst. Otherwise, unremarkable renal  ultrasound.    Electronically signed by:  Masha Baires MD  12/5/2021 12:33 AM  CST Workstation: [id number]          I have reviewed the patient's current medications.     Assessment/Plan     Active Hospital Problems    Diagnosis    • Pneumonia of both lungs due to infectious organism        Pneumonia-continue with IV antibiotics and we will continue to monitor     Acute renal failure-nephrology has been consulted and we will  continue to monitor  Continue monitoring creatinine level has been put on IV fluids by nephrology.     Aecqdtiqf-rjlwjmwgzixstn-stscmw due to baseline dementia and/or delirium.  We will continue to monitor.     Chronic respiratory failure-we will continue with supplemental oxygen, seems to be at baseline     Paroxysmal atrial fibrillation-continue with cardiology management, currently on amiodarone.  Currently in sinus rhythm     Type 2 diabetes-continue monitor glucose levels     Elevated troponin level-cardiology managing     Hypertension-continue monitor blood pressure     COPD-nebulizer treatments as needed     DVT prophylaxis-SCD     Patient is full code     I confirmed that the patient's Advance Care Plan is present, code status is documented, or surrogate decision maker is listed in the patient's medical record.             Karan Segovia MD   12/05/21   12:12 CST

## 2021-12-05 NOTE — PLAN OF CARE
Goal Outcome Evaluation:  Plan of Care Reviewed With: patient           Outcome Summary: pt alert and oriented this am but refuses eob/oob due to soa;performed gerda le ex in supine with limited range due to pain

## 2021-12-05 NOTE — THERAPY TREATMENT NOTE
Acute Care - Physical Therapy Treatment Note  [facility]     Patient Name: Hasmukh Ham  : 1933  MRN: [id number]  Today's Date: 2021      Visit Dx:     ICD-10-CM ICD-9-CM   1. Pneumonia of both lungs due to infectious organism, unspecified part of lung  J18.9 483.8   2. Precordial pain  R07.2 786.51   3. Atrial fibrillation, unspecified type ([facility])  I48.91 427.31   4. Impaired mobility and ADLs  Z74.09 V49.89    Z78.9    5. Impaired functional mobility, balance, gait, and endurance  Z74.09 V49.89     Patient Active Problem List   Diagnosis   • Dilated aortic root ([facility])   • Non-rheumatic tricuspid valve insufficiency   • Pulmonary emphysema ([facility])   • Atrial fibrillation and flutter ([facility])   • Bradycardia   • CAD (coronary artery disease)   • Neuropathy   • Abdominal hernia   • Neck pain   • Dementia (CMS/HCC)   • Diabetic neuropathy ([facility])   • Gastroesophageal reflux disease without esophagitis   • Generalized osteoarthritis   • 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 ([facility])   • Nocturia   • Osteoarthritis of multiple joints   • Hyperlipidemia   • Pain in joint involving ankle and foot   • Pneumonia of left lower lobe due to infectious organism   • Arthropathy of hand   • Paroxysmal tachycardia ([facility])   • Osteoarthrosis involving more than one site but not generalized   • Chronic right shoulder pain   • Rotator cuff syndrome   • Partial tear of subscapularis tendon   • Infraspinatus tendon tear   • Supraspinatus tendon tear   • Bilateral carotid artery stenosis   • Pulmonary HTN (HCC)   • Acute interstitial pneumonia (HCC)   • Chronic obstructive lung disease (HCC)   • Diabetes mellitus (HCC)   • Hypertension   • Chest pain   • Shortness of breath   • Angina pectoris (HCC)   • Myocardial infarction ([facility])   • Ingrown toenail   • Heart problem   • Degenerative joint disease involving multiple joints   • Chronic obstructive pulmonary disease (COPD) (HCC)   • Bleeding disorder ([facility])   •  Chronic obstructive pulmonary disease with acute exacerbation (HCC)   • Failure of outpatient treatment   • Sepsis (HCC)   • 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)   • Chronic diastolic CHF (congestive heart failure) ([facility])   • SOB (shortness of breath)   • Cause of injury, fall   • Right hip pain   • Chronic pain of right knee   • Primary osteoarthritis of right knee   • Left shoulder pain   • Left arm pain   • AC joint arthropathy   • Syncope   • Inflammatory arthritis   • Elevated troponin   • Fever   • Paroxysmal atrial fibrillation with rapid ventricular response ([facility])   • Fibrosis of lung (HCC)   • Type 2 diabetes mellitus ([facility])   • Chondrocalcinosis of right knee   • Nontraumatic complete tear of right rotator cuff   • Pneumonia of both lungs due to infectious organism     Past Medical History:   Diagnosis Date   • Basal cell carcinoma    • Bilateral carotid artery stenosis    • Bilateral carotid artery stenosis    • Bleeding disorder ([facility])    • CHF (congestive heart failure) ([facility])    • Chronic obstructive pulmonary disease (COPD) ([facility])    • Coronary arteriosclerosis    • Degenerative joint disease involving multiple joints    • Dementia ([facility])    • Diabetes mellitus ([facility])    • Heart problem    • History of stomach ulcers    • Hyperlipidemia    • Hypertension    • Ingrown toenail    • Myocardial infarction ([facility])      Past Surgical History:   Procedure Laterality Date   • BACK SURGERY     • CARDIAC CATHETERIZATION N/A 6/6/2017    Procedure: Right Heart Cath;  Surgeon: Jeff Maciel MD PhD;  Location: [facility] CATH INVASIVE LOCATION;  Service:    • CARDIAC CATHETERIZATION N/A 2/14/2018    Procedure: Coronary angiography;  Surgeon: Moisés Davila MD;  Location: [facility] CATH INVASIVE LOCATION;  Service:    • CARDIAC CATHETERIZATION N/A 10/28/2021    Procedure: Left Heart Cath;  Surgeon: Carine Johnson MD;  Location: [facility] CATH INVASIVE LOCATION;  Service: Cardiology;   Laterality: N/A;   • CORONARY ANGIOPLASTY WITH STENT PLACEMENT     • CORONARY STENT PLACEMENT     • HERNIA REPAIR     • LUNG BIOPSY     • LUNG SURGERY     • NECK SURGERY     • THORACOTOMY Left 1977   • VENTRAL HERNIA REPAIR       PT Assessment (last 12 hours)     PT Evaluation and Treatment     [facility] Name 12/05/21 1115          Physical Therapy Time and Intention    Subjective Information complains of; fatigue; dyspnea  -LN     Mode of Treatment physical therapy; individual therapy  -LN     Patient Effort good  -[facility] Name 12/05/21 1115          General Information    Patient Profile Reviewed yes  -LN     Existing Precautions/Restrictions fall; oxygen therapy device and L/min  -[facility] Name 12/05/21 1115          Cognition    Affect/Mental Status (Cognitive) WFL  -[facility] Name 12/05/21 1115          Pain Scale: Numbers Pre/Post-Treatment    Pretreatment Pain Rating 8/10  -LN     Posttreatment Pain Rating 8/10  -LN     Pain Location - Orientation generalized  -LN     Pre/Posttreatment Pain Comment meds not due  per pt  -LN     [facility] Name 12/05/21 1115          Range of Motion Comprehensive    General Range of Motion bilateral lower extremity ROM WFL  -[facility] Name 12/05/21 1115          Bed Mobility    Bed Mobility supine-sit; sit-supine; scooting/bridging  -LN     Scooting/Bridging [place] (Bed Mobility) not tested  -LN     Supine-Sit [place] (Bed Mobility) not tested  -LN     Sit-Supine [place] (Bed Mobility) not tested  -LN     Assistive Device (Bed Mobility) draw sheet; head of bed elevated; bed rails  -[facility] Name 12/05/21 1115          Transfers    Transfers sit-stand transfer; stand-sit transfer  -LN     Sit-Stand [place] (Transfers) not tested  -LN     Stand-Sit [place] (Transfers) not tested  -[facility] Name 12/05/21 1115          Sit-Stand Transfer    Assistive Device (Sit-Stand Transfers) walker, front-wheeled  -[facility] Name 12/05/21 1115          Stand-Sit  Transfer    Assistive Device (Stand-Sit Transfers) walker, front-wheeled  -     Row Name 12/05/21 1115          Gait/Stairs (Locomotion)    Gait/Stairs Locomotion gait/ambulation independence; distance ambulated; gait/ambulation assistive device; gait pattern; gait deviations  -     [place] Level (Gait) not tested  -     Assistive Device (Gait) walker, front-wheeled  -LN     Deviations/Abnormal Patterns (Gait) andie decreased; gait speed decreased; stride length decreased  -     Row Name 12/05/21 1115          Safety Issues, Functional Mobility    Impairments Affecting Function (Mobility) strength; endurance/activity tolerance; shortness of breath; balance; pain  -     Row Name 12/05/21 1115          Motor Skills    Therapeutic Exercise hip; knee; ankle  -     Row Name 12/05/21 1115          Hip (Therapeutic Exercise)    Hip (Therapeutic Exercise) AROM (active range of motion); isometric exercises  -LN     Hip AROM (Therapeutic Exercise) bilateral; flexion; aBduction; aDduction  glut sets  -     Row Name 12/05/21 1115          Knee (Therapeutic Exercise)    Knee (Therapeutic Exercise) AROM (active range of motion); isometric exercises  -     Knee AROM (Therapeutic Exercise) bilateral; heel slides  -     Row Name 12/05/21 1115          Ankle (Therapeutic Exercise)    Ankle (Therapeutic Exercise) AROM (active range of motion)  -     Ankle AROM (Therapeutic Exercise) bilateral; dorsiflexion; plantarflexion  -     Row Name 12/05/21 1115          Plan of Care Review    Plan of Care Reviewed With patient  -LN     Outcome Summary pt alert and oriented this am but refuses eob/oob due to soa;performed gerda le ex in supine with limited range due to pain  -     Row Name 12/05/21 1115          Vital Signs    Pre Systolic BP Rehab 105  -LN     Pre Treatment Diastolic BP 71  -LN     Post Systolic BP Rehab 114  -LN     Post Treatment Diastolic BP 73  -LN     Pretreatment Heart Rate (beats/min) 101   -LN     Intratreatment Heart Rate (beats/min) 101  -LN     Posttreatment Heart Rate (beats/min) 102  -LN     Pre SpO2 (%) 98  -LN     O2 Delivery Pre Treatment supplemental O2  -LN     Intra SpO2 (%) 97  -LN     Post SpO2 (%) 97  -LN     Pre Patient Position Supine  -LN     Intra Patient Position Supine  -LN     Post Patient Position Supine  -LN     Row Name 12/05/21 1115          Bed Mobility Goal 1 (PT)    Activity/Assistive Device (Bed Mobility Goal 1, PT) sit to supine/supine to sit  -LN     [place] Level/Cues Needed (Bed Mobility Goal 1, PT) modified independence  -LN     Time Frame (Bed Mobility Goal 1, PT) by discharge  -LN     Strategies/Barriers (Bed Mobility Goal 1, PT) Has hospital bed at home.  -LN     Progress/Outcomes (Bed Mobility Goal 1, PT) goal not met  -LN     Row Name 12/05/21 1115          Transfer Goal 1 (PT)    Activity/Assistive Device (Transfer Goal 1, PT) sit-to-stand/stand-to-sit; bed-to-chair/chair-to-bed  -LN     [place] Level/Cues Needed (Transfer Goal 1, PT) supervision required  -LN     Time Frame (Transfer Goal 1, PT) by discharge  -LN     Strategies/Barriers (Transfers Goal 1, PT) A-fib, tachy.  -LN     Row Name 12/05/21 1115          Gait Training Goal 1 (PT)    Activity/Assistive Device (Gait Training Goal 1, PT) walker, rolling  -LN     [place] Level (Gait Training Goal 1, PT) supervision required  -LN     Distance (Gait Training Goal 1, PT) 25'x2 as tolerated.  -LN     Time Frame (Gait Training Goal 1, PT) by discharge  -LN     Strategies/Barriers (Gait Training Goal 1, PT) A-fib, tachy.  -LN     Progress/Outcome (Gait Training Goal 1, PT) goal not met  -LN     Row Name 12/05/21 1115          Positioning and Restraints    In Bed supine; sitting EOB; encouraged to call for assist; exit alarm on  -LN     Row Name 12/05/21 1115          Therapy Assessment/Plan (PT)    Rehab Potential (PT) good, to achieve stated therapy goals  -LN     Criteria for Skilled  Interventions Met (PT) yes; skilled treatment is necessary  -LN           User Key  (r) = Recorded By, (t) = Taken By, (c) = Cosigned By    Initials Name Provider Type     Maria Esther Smith PTA Physical Therapy Assistant                Physical Therapy Education                 Title: PT OT SLP Therapies (In Progress)     Topic: Physical Therapy (In Progress)     Point: Mobility training (In Progress)     Learning Progress Summary           Patient Acceptance, E, NR by  at 12/3/2021 1251                   Point: Home exercise program (Not Started)     Learner Progress:  Not documented in this visit.          Point: Body mechanics (Not Started)     Learner Progress:  Not documented in this visit.          Point: Precautions (Not Started)     Learner Progress:  Not documented in this visit.                      User Key     Initials Effective Dates Name Provider Type Discipline     06/16/21 -  Cristobal Mansfield PTA Physical Therapy Assistant PT              PT Recommendation and Plan  Anticipated Discharge Disposition (PT): home with assist, home with home health  Therapy Frequency (PT): other (see comments) (5-7 days/week)  Plan of Care Reviewed With: patient  Outcome Summary: pt alert and oriented this am but refuses eob/oob due to soa;performed gerda le ex in supine with limited range due to pain   Outcome Measures     Row Name 12/03/21 0905             How much help from another person do you currently need...    Turning from your back to your side while in flat bed without using bedrails? 3  -TESS      Moving from lying on back to sitting on the side of a flat bed without bedrails? 3  -TESS      Moving to and from a bed to a chair (including a wheelchair)? 3  -TESS      Standing up from a chair using your arms (e.g., wheelchair, bedside chair)? 3  -TESS      Climbing 3-5 steps with a railing? 3  -TESS      To walk in hospital room? 3  -TESS      AM-PAC 6 Clicks Score (PT) 18  -TESS              Functional Assessment     Outcome Measure Options AM-PAC 6 Clicks Basic Mobility (PT)  -TESS            User Key  (r) = Recorded By, (t) = Taken By, (c) = Cosigned By    Initials Name Provider Type    Cristobal Sullivan PTA Physical Therapy Assistant                 Time Calculation:    PT Charges     Row Name 12/05/21 1132             Time Calculation    Start Time 1115  -LN      Stop Time 1132  -LN      Time Calculation (min) 17 min  -LN      PT Received On 12/05/21  -LN              Time Calculation- PT    Total Timed Code Minutes- PT 17 minute(s)  -LN            User Key  (r) = Recorded By, (t) = Taken By, (c) = Cosigned By    Initials Name Provider Type    Maria Esther Yi PTA Physical Therapy Assistant              Therapy Charges for Today     Code Description Service Date Service Provider Modifiers Qty    [id number] HC PT THER PROC EA 15 MIN 12/5/2021 Maria Esther Smith PTA GP 1          PT G-Codes  Outcome Measure Options: AM-PAC 6 Clicks Basic Mobility (PT)  AM-PAC 6 Clicks Score (PT): 18  AM-PAC 6 Clicks Score (OT): 20    Maria Esther Smith PTA  12/5/2021

## 2021-12-05 NOTE — PLAN OF CARE
Goal Outcome Evaluation:  Plan of Care Reviewed With: patient        Progress: improving  Outcome Summary: Pt tolerated tx fair this date. Pt needed some encouragement to participate. Pt was SBA with sup-sit-sup. Pt was max A x 2 with scooting/bridging. Pt gave fair effort with UE ther ex. No shoulder ROM due to pain. Continue OT POC.

## 2021-12-05 NOTE — THERAPY TREATMENT NOTE
Patient Name: Hasmukh Ham  : 1933    MRN: [id number]                              Today's Date: 2021       Admit Date: 2021    Visit Dx:     ICD-10-CM ICD-9-CM   1. Pneumonia of both lungs due to infectious organism, unspecified part of lung  J18.9 483.8   2. Precordial pain  R07.2 786.51   3. Atrial fibrillation, unspecified type ([facility])  I48.91 427.31   4. Impaired mobility and ADLs  Z74.09 V49.89    Z78.9    5. Impaired functional mobility, balance, gait, and endurance  Z74.09 V49.89     Patient Active Problem List   Diagnosis   • Dilated aortic root (HCC)   • Non-rheumatic tricuspid valve insufficiency   • Pulmonary emphysema ([facility])   • Atrial fibrillation and flutter ([facility])   • Bradycardia   • CAD (coronary artery disease)   • Neuropathy   • Abdominal hernia   • Neck pain   • Dementia (CMS/HCC)   • Diabetic neuropathy ([facility])   • Gastroesophageal reflux disease without esophagitis   • Generalized osteoarthritis   • 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 ([facility])   • Nocturia   • Osteoarthritis of multiple joints   • Hyperlipidemia   • Pain in joint involving ankle and foot   • Pneumonia of left lower lobe due to infectious organism   • Arthropathy of hand   • Paroxysmal tachycardia ([facility])   • Osteoarthrosis involving more than one site but not generalized   • Chronic right shoulder pain   • Rotator cuff syndrome   • Partial tear of subscapularis tendon   • Infraspinatus tendon tear   • Supraspinatus tendon tear   • Bilateral carotid artery stenosis   • Pulmonary HTN (HCC)   • Acute interstitial pneumonia (HCC)   • Chronic obstructive lung disease (HCC)   • Diabetes mellitus (HCC)   • Hypertension   • Chest pain   • Shortness of breath   • Angina pectoris (HCC)   • Myocardial infarction (HCC)   • Ingrown toenail   • Heart problem   • Degenerative joint disease involving multiple joints   • Chronic obstructive pulmonary disease (COPD) (HCC)   • Bleeding disorder ([facility])   • Chronic  obstructive pulmonary disease with acute exacerbation (HCC)   • Failure of outpatient treatment   • Sepsis (HCC)   • 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)   • Chronic diastolic CHF (congestive heart failure) (HCC)   • SOB (shortness of breath)   • Cause of injury, fall   • Right hip pain   • Chronic pain of right knee   • Primary osteoarthritis of right knee   • Left shoulder pain   • Left arm pain   • AC joint arthropathy   • Syncope   • Inflammatory arthritis   • Elevated troponin   • Fever   • Paroxysmal atrial fibrillation with rapid ventricular response ([facility])   • Fibrosis of lung (HCC)   • Type 2 diabetes mellitus (HCC)   • Chondrocalcinosis of right knee   • Nontraumatic complete tear of right rotator cuff   • Pneumonia of both lungs due to infectious organism     Past Medical History:   Diagnosis Date   • Basal cell carcinoma    • Bilateral carotid artery stenosis    • Bilateral carotid artery stenosis    • Bleeding disorder (HCC)    • CHF (congestive heart failure) ([facility])    • Chronic obstructive pulmonary disease (COPD) ([facility])    • Coronary arteriosclerosis    • Degenerative joint disease involving multiple joints    • Dementia ([facility])    • Diabetes mellitus ([facility])    • Heart problem    • History of stomach ulcers    • Hyperlipidemia    • Hypertension    • Ingrown toenail    • Myocardial infarction ([facility])      Past Surgical History:   Procedure Laterality Date   • BACK SURGERY     • CARDIAC CATHETERIZATION N/A 6/6/2017    Procedure: Right Heart Cath;  Surgeon: Jeff Maciel MD PhD;  Location: [facility] CATH INVASIVE LOCATION;  Service:    • CARDIAC CATHETERIZATION N/A 2/14/2018    Procedure: Coronary angiography;  Surgeon: Moisés Davila MD;  Location: [facility] CATH INVASIVE LOCATION;  Service:    • CARDIAC CATHETERIZATION N/A 10/28/2021    Procedure: Left Heart Cath;  Surgeon: Carine Johnson MD;  Location: [facility] CATH INVASIVE LOCATION;  Service: Cardiology;  Laterality: N/A;    • CORONARY ANGIOPLASTY WITH STENT PLACEMENT     • CORONARY STENT PLACEMENT     • HERNIA REPAIR     • LUNG BIOPSY     • LUNG SURGERY     • NECK SURGERY     • THORACOTOMY Left 1977   • VENTRAL HERNIA REPAIR        General Information     Row Name 12/05/21 1455          OT Time and Intention    Document Type therapy note (daily note)  -CS     Mode of Treatment occupational therapy  -CS     Row Name 12/05/21 1452          General Information    Patient Profile Reviewed yes  -CS     Existing Precautions/Restrictions fall; oxygen therapy device and L/min  -CS     Row Name 12/05/21 1453          Cognition    Orientation Status (Cognition) oriented x 4; verbal cues/prompts needed for orientation  -CS     Row Name 12/05/21 1451          Safety Issues, Functional Mobility    Impairments Affecting Function (Mobility) strength; endurance/activity tolerance; shortness of breath; balance; pain  -CS           User Key  (r) = Recorded By, (t) = Taken By, (c) = Cosigned By    Initials Name Provider Type    CS Aviva Lam COTA Occupational Therapy Assistant                 Mobility/ADL's     Row Name 12/05/21 1453          Bed Mobility    Bed Mobility scooting/bridging; supine-sit; sit-supine  -CS     Scooting/Bridging [place] (Bed Mobility) maximum assist (25% patient effort); 2 person assist  -CS     Supine-Sit [place] (Bed Mobility) standby assist  -CS     Sit-Supine [place] (Bed Mobility) standby assist  -CS     Assistive Device (Bed Mobility) draw sheet; head of bed elevated; bed rails  -CS           User Key  (r) = Recorded By, (t) = Taken By, (c) = Cosigned By    Initials Name Provider Type     Aviva Lam COTA Occupational Therapy Assistant               Obj/Interventions     Row Name 12/05/21 145          Elbow/Forearm (Therapeutic Exercise)    Elbow/Forearm (Therapeutic Exercise) AROM (active range of motion)  -CS     Elbow/Forearm AROM (Therapeutic Exercise) bilateral; flexion; extension;  supination; pronation  -     Row Name 12/05/21 1455          Wrist (Therapeutic Exercise)    Wrist (Therapeutic Exercise) AROM (active range of motion)  -CS     Wrist AROM (Therapeutic Exercise) bilateral; flexion; extension  -     Row Name 12/05/21 1455          Hand (Therapeutic Exercise)    Hand (Therapeutic Exercise) AROM (active range of motion)  -CS     Hand AROM/AAROM (Therapeutic Exercise) bilateral; finger flexion; finger extension  -     Row Name 12/05/21 [facility]5          Therapeutic Exercise    Therapeutic Exercise elbow/forearm; wrist; hand  No shoulder ROM due to pain.  -CS           User Key  (r) = Recorded By, (t) = Taken By, (c) = Cosigned By    Initials Name Provider Type    CS Aviva Lam COTA Occupational Therapy Assistant               Goals/Plan     Row Name 12/05/21 1455          Transfer Goal 1 (OT)    Activity/Assistive Device (Transfer Goal 1, OT) toilet  -CS     [place] Level/Cues Needed (Transfer Goal 1, OT) modified independence  -CS     Time Frame (Transfer Goal 1, OT) long term goal (LTG); by discharge  -CS     Progress/Outcome (Transfer Goal 1, OT) goal not met  -     Row Name 12/05/21 1455          Bathing Goal 1 (OT)    Activity/Device (Bathing Goal 1, OT) bathing skills, all  -CS     [place] Level/Cues Needed (Bathing Goal 1, OT) supervision required  -CS     Time Frame (Bathing Goal 1, OT) long term goal (LTG); by discharge  -CS     Progress/Outcomes (Bathing Goal 1, OT) goal not met  -     Row Name 12/05/21 [facility]5          Dressing Goal 1 (OT)    Activity/Device (Dressing Goal 1, OT) lower body dressing  -CS     [place]/Cues Needed (Dressing Goal 1, OT) supervision required  -CS     Time Frame (Dressing Goal 1, OT) long term goal (LTG); by discharge  -CS     Progress/Outcome (Dressing Goal 1, OT) goal not met  -CS           User Key  (r) = Recorded By, (t) = Taken By, (c) = Cosigned By    Initials Name Provider Type    Aviva Mcknight COTA  Occupational Therapy Assistant               Clinical Impression     Row Name 12/05/21 1455          Pain Scale: Numbers Pre/Post-Treatment    Pain Location - Side Bilateral  -CS     Pain Location - Orientation generalized  -CS     Pain Location shoulder  -CS     Pain Intervention(s) Repositioned; Rest  -CS     Row Name 12/05/21 1455          Plan of Care Review    Plan of Care Reviewed With patient  -CS     Progress improving  -CS     Outcome Summary Pt tolerated tx fair this date. Pt needed some encouragement to participate. Pt was SBA with sup-sit-sup. Pt was max A x 2 with scooting/bridging. Pt gave fair effort with UE ther ex. No shoulder ROM due to pain. Continue OT POC.  -CS     Row Name 12/05/21 1459          Therapy Assessment/Plan (OT)    Rehab Potential (OT) good, to achieve stated therapy goals  -CS     Criteria for Skilled Therapeutic Interventions Met (OT) yes; skilled treatment is necessary  -CS     Row Name 12/05/21 1453          Therapy Plan Review/Discharge Plan (OT)    Anticipated Discharge Disposition (OT) home with home health; home with 24/7 care  -CS     Row Name 12/05/21 1457          Vital Signs    Pre Patient Position Supine  -CS     Intra Patient Position Sitting  -CS     Post Patient Position Supine  -CS     Row Name 12/05/21 1455          Positioning and Restraints    Pre-Treatment Position in bed  -CS     Post Treatment Position bed  -CS     In Bed supine; call light within reach; encouraged to call for assist; exit alarm on; with family/caregiver  -CS           User Key  (r) = Recorded By, (t) = Taken By, (c) = Cosigned By    Initials Name Provider Type    CS Aviva Lam COTA Occupational Therapy Assistant               Outcome Measures    No documentation.                 Occupational Therapy Education                 Title: PT OT SLP Therapies (In Progress)     Topic: Occupational Therapy (In Progress)     Point: ADL training (Not Started)     Description:   Instruct  learner(s) on proper safety adaptation and remediation techniques during self care or transfers.   Instruct in proper use of assistive devices.              Learner Progress:  Not documented in this visit.          Point: Home exercise program (Not Started)     Description:   Instruct learner(s) on appropriate technique for monitoring, assisting and/or progressing therapeutic exercises/activities.              Learner Progress:  Not documented in this visit.          Point: Precautions (Done)     Description:   Instruct learner(s) on prescribed precautions during self-care and functional transfers.              Learning Progress Summary           Patient Acceptance, E,TB, VU by  at 12/2/2021 1325    Comment: POC, role of OT, transfer training                   Point: Body mechanics (Done)     Description:   Instruct learner(s) on proper positioning and spine alignment during self-care, functional mobility activities and/or exercises.              Learning Progress Summary           Patient Acceptance, E,TB, VU by  at 12/2/2021 1325    Comment: POC, role of OT, transfer training                               User Key     Initials Effective Dates Name Provider Type Discipline     06/14/21 -  Israel Black, OT Occupational Therapist OT              OT Recommendation and Plan     Plan of Care Review  Plan of Care Reviewed With: patient  Progress: improving  Outcome Summary: Pt tolerated tx fair this date. Pt needed some encouragement to participate. Pt was SBA with sup-sit-sup. Pt was max A x 2 with scooting/bridging. Pt gave fair effort with UE ther ex. No shoulder ROM due to pain. Continue OT POC.     Time Calculation:    Time Calculation- OT     Row Name 12/05/21 5425             Time Calculation- OT    OT Start Time 1455  -CS      OT Stop Time 1520  -CS      OT Time Calculation (min) 25 min  -CS      Total Timed Code Minutes- OT 25 minute(s)  -CS      OT Received On 12/05/21  -              Timed Charges     57033 - OT Therapeutic Exercise Minutes 25  -CS              Total Minutes    Timed Charges Total Minutes 25  -CS       Total Minutes 25  -CS            User Key  (r) = Recorded By, (t) = Taken By, (c) = Cosigned By    Initials Name Provider Type    CS Aviva Lam COTA Occupational Therapy Assistant              Therapy Charges for Today     Code Description Service Date Service Provider Modifiers Qty    36320767460 HC OT THER PROC EA 15 MIN 12/5/2021 Aviva Lam COTA GO 2    29254921754 HC OT THER PROC EA 15 MIN 12/5/2021 Aviva Lam COTA GO 2 Carrie L Stewart, COTA  12/5/2021

## 2021-12-05 NOTE — PLAN OF CARE
Goal Outcome Evaluation:      IVF started today. Patient remains confused at times. Will continue to monitor

## 2021-12-05 NOTE — PROGRESS NOTES
"[facility]  [address] [place] [address]. [address]   - [phone]  F - [phone]     Progress Note          PATIENT  DEMOGRAPHICS   PATIENT NAME: Hasmukh Ham                      PHYSICIAN: Milan WeiEVE  : 1933  MRN: [id number]   LOS: 3 days    Patient Care Team:  Paolo Rey MD as PCP - General  Subjective   SUBJECTIVE   Intermittently confused         Objective   OBJECTIVE   Vital Signs  Temp:  [97.3 °F (36.3 °C)-98.3 °F (36.8 °C)] 97.3 °F (36.3 °C)  Heart Rate:  [] 113  Resp:  [16-20] 20  BP: (127-163)/(71-98) 142/98    Flowsheet Rows      First Filed Value   Admission Height 171.5 cm (67.5\") Documented at 2021 1451   Admission Weight 68 kg (150 lb) Documented at 2021 1451           I/O last 3 completed shifts:  In: 450 [P.O.:450]  Out: 2150 [Urine:2150]    PHYSICAL EXAM    Physical Exam  Constitutional:       Appearance: He is well-developed.   HENT:      Head: Normocephalic and atraumatic.   Eyes:      Conjunctiva/sclera: Conjunctivae normal.      Pupils: Pupils are equal, round, and reactive to light.   Cardiovascular:      Rate and Rhythm: Normal rate and regular rhythm.   Pulmonary:      Effort: Pulmonary effort is normal.      Breath sounds: Normal breath sounds.   Abdominal:      Palpations: Abdomen is soft.   Musculoskeletal:      Cervical back: Neck supple.      Right lower leg: No edema.      Left lower leg: No edema.   Skin:     General: Skin is warm and dry.   Neurological:      Mental Status: He is alert and oriented to person, place, and time.   Psychiatric:         Mood and Affect: Mood normal.         Behavior: Behavior normal.         RESULTS   Results Review:    Results from last 7 days   Lab Units 21  0713 21  0703 21  0526   SODIUM mmol/L 139 139 138   POTASSIUM mmol/L 4.2 4.4 4.6   CHLORIDE mmol/L 98 100 103   CO2 mmol/L 26.0 24.0 26.0   BUN mg/dL 58* 48* 19   CREATININE mg/dL 1.84* 1.88* 1.16 "   CALCIUM mg/dL 9.3 8.8 8.7   BILIRUBIN mg/dL 0.5 0.3 0.2   ALK PHOS U/L 54 54 56   ALT (SGPT) U/L 19 14 12   AST (SGOT) U/L 18 15 12   GLUCOSE mg/dL 175* 166* 192*       Estimated Creatinine Clearance: 26.3 mL/min (A) (by C-G formula based on SCr of 1.84 mg/dL (H)).    Results from last 7 days   Lab Units 12/01/21  1510   MAGNESIUM mg/dL 1.6             Results from last 7 days   Lab Units 12/05/21  0713 12/04/21  0703 12/02/21  0526 12/01/21  1510   WBC 10*3/mm3 15.59* 15.32* 10.27 13.38*   HEMOGLOBIN g/dL 13.1 11.4* 11.6* 12.5*   PLATELETS 10*3/mm3 238 184 168 184       Results from last 7 days   Lab Units 12/01/21  1626   INR  1.02         Imaging Results (Last 24 Hours)     Procedure Component Value Units Date/Time    US Renal Bilateral [[id number]] Collected: 12/04/21 2047     Updated: 12/05/21 0035    Narrative:      EXAM:    US Retroperitoneal Limited, Renal    CLINICAL HISTORY:    The patient is 88 years old and is Male; JILL, J18.9 Pneumonia,  unspecified organism R07.2 Precordial pain I48.91 Unspecified  atrial fibrillation Z74.09 Other reduced mobility Z78.9 Other  specified health status Z74.09 Other reduced mobility    TECHNIQUE:    Real-time limited ultrasound of the retroperitoneum with image  documentation.    COMPARISON:    CT of the abdomen and pelvis June 22, 2021    FINDINGS:    RIGHT KIDNEY:  The right kidney measures 10.4 cm in length.  No  stones.  No hydronephrosis.    LEFT KIDNEY:  Bilobed left renal cyst measuring 7.8 x 4.7 x 5.6  cm is present. The left kidney measures 11.0 cm in length.  No  stones.  No hydronephrosis.    BLADDER:  The bladder is well distended.      Impression:        Large left renal cyst. Otherwise, unremarkable renal  ultrasound.    Electronically signed by:  Masha Baires MD  12/5/2021 12:33 AM  CST Workstation: [id number]           MEDICATIONS    amiodarone, 100 mg, Oral, Q24H  cetirizine, 5 mg, Oral, Daily  clopidogrel, 75 mg, Oral, Daily  famotidine, 20 mg, Oral,  BID AC  insulin aspart, 0-9 Units, Subcutaneous, TID AC  ipratropium-albuterol, 3 mL, Nebulization, 4x Daily - RT  levoFLOXacin, 750 mg, Intravenous, Q48H  methylPREDNISolone sodium succinate, 60 mg, Intravenous, Q12H  metoprolol tartrate, 12.5 mg, Oral, Q12H  polyethylene glycol, 17 g, Oral, Daily  ranolazine, 1,000 mg, Oral, Q12H  sodium chloride, 10 mL, Intravenous, Q12H           Assessment/Plan   ASSESSMENT / PLAN      Pneumonia of both lungs due to infectious organism    1.  JILL on CKD- baseline creatinine 1.2, up to 1.8 today.  Etiology of JILL is not immediately apparent.  He does complain of nausea but has had no vomiting.  No diarrhea.  No hypotension noted in the record.  No new nephrotoxic medications.  At present we will hold lisinopril and lasix.  US negative for hydro, Tray negative, UA bland, FENa 3.7% consistent with intrinsic injury.    -Wt is down from previous, will add some gentle IVF and assess response.     2.  Pneumonia- continue antibiotics and management primary team     3.  Atrial fibrillation- now on amiodarone     4.  DM2     5.  Hypertension- well-controlled     6.  COPD     7.  History of CAD           This document has been electronically signed by EVE Fuller on December 5, 2021 10:36 CST

## 2021-12-06 NOTE — PLAN OF CARE
Problem: Adult Inpatient Plan of Care  Goal: Plan of Care Review  Recent Flowsheet Documentation  Taken 12/6/2021 0725 by Susannah Villalta COTA  Progress: no change  Plan of Care Reviewed With: patient  Outcome Summary: Pt performed B UE exercises (no shoulder ROM due to pain) with fair tolerance. Pt Mod I for grooming tasks.No new goals met. Continue OT POC   Goal Outcome Evaluation:  Plan of Care Reviewed With: patient        Progress: no change  Outcome Summary: Pt performed B UE exercises (no shoulder ROM due to pain) with fair tolerance. Pt Mod I for grooming tasks.No new goals met. Continue OT POC
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
## 2021-12-06 NOTE — THERAPY TREATMENT NOTE
Patient Name: Hasmukh Ham  : 1933    MRN: [id number]                              Today's Date: 2021       Admit Date: 2021    Visit Dx:     ICD-10-CM ICD-9-CM   1. Pneumonia of both lungs due to infectious organism, unspecified part of lung  J18.9 483.8   2. Precordial pain  R07.2 786.51   3. Atrial fibrillation, unspecified type ([facility])  I48.91 427.31   4. Impaired mobility and ADLs  Z74.09 V49.89    Z78.9    5. Impaired functional mobility, balance, gait, and endurance  Z74.09 V49.89     Patient Active Problem List   Diagnosis   • Dilated aortic root (HCC)   • Non-rheumatic tricuspid valve insufficiency   • Pulmonary emphysema ([facility])   • Atrial fibrillation and flutter ([facility])   • Bradycardia   • CAD (coronary artery disease)   • Neuropathy   • Abdominal hernia   • Neck pain   • Dementia (CMS/HCC)   • Diabetic neuropathy ([facility])   • Gastroesophageal reflux disease without esophagitis   • Generalized osteoarthritis   • 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 ([facility])   • Nocturia   • Osteoarthritis of multiple joints   • Hyperlipidemia   • Pain in joint involving ankle and foot   • Pneumonia of left lower lobe due to infectious organism   • Arthropathy of hand   • Paroxysmal tachycardia ([facility])   • Osteoarthrosis involving more than one site but not generalized   • Chronic right shoulder pain   • Rotator cuff syndrome   • Partial tear of subscapularis tendon   • Infraspinatus tendon tear   • Supraspinatus tendon tear   • Bilateral carotid artery stenosis   • Pulmonary HTN (HCC)   • Acute interstitial pneumonia (HCC)   • Chronic obstructive lung disease (HCC)   • Diabetes mellitus (HCC)   • Hypertension   • Chest pain   • Shortness of breath   • Angina pectoris (HCC)   • Myocardial infarction (HCC)   • Ingrown toenail   • Heart problem   • Degenerative joint disease involving multiple joints   • Chronic obstructive pulmonary disease (COPD) (HCC)   • Bleeding disorder ([facility])   • Chronic  obstructive pulmonary disease with acute exacerbation (HCC)   • Failure of outpatient treatment   • Sepsis (HCC)   • 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)   • Chronic diastolic CHF (congestive heart failure) (HCC)   • SOB (shortness of breath)   • Cause of injury, fall   • Right hip pain   • Chronic pain of right knee   • Primary osteoarthritis of right knee   • Left shoulder pain   • Left arm pain   • AC joint arthropathy   • Syncope   • Inflammatory arthritis   • Elevated troponin   • Fever   • Paroxysmal atrial fibrillation with rapid ventricular response ([facility])   • Fibrosis of lung (HCC)   • Type 2 diabetes mellitus (HCC)   • Chondrocalcinosis of right knee   • Nontraumatic complete tear of right rotator cuff   • Pneumonia of both lungs due to infectious organism     Past Medical History:   Diagnosis Date   • Basal cell carcinoma    • Bilateral carotid artery stenosis    • Bilateral carotid artery stenosis    • Bleeding disorder (HCC)    • CHF (congestive heart failure) ([facility])    • Chronic obstructive pulmonary disease (COPD) ([facility])    • Coronary arteriosclerosis    • Degenerative joint disease involving multiple joints    • Dementia ([facility])    • Diabetes mellitus ([facility])    • Heart problem    • History of stomach ulcers    • Hyperlipidemia    • Hypertension    • Ingrown toenail    • Myocardial infarction ([facility])      Past Surgical History:   Procedure Laterality Date   • BACK SURGERY     • CARDIAC CATHETERIZATION N/A 6/6/2017    Procedure: Right Heart Cath;  Surgeon: Jeff Maciel MD PhD;  Location: [facility] CATH INVASIVE LOCATION;  Service:    • CARDIAC CATHETERIZATION N/A 2/14/2018    Procedure: Coronary angiography;  Surgeon: Moisés Davila MD;  Location: [facility] CATH INVASIVE LOCATION;  Service:    • CARDIAC CATHETERIZATION N/A 10/28/2021    Procedure: Left Heart Cath;  Surgeon: Carine Johnson MD;  Location: [facility] CATH INVASIVE LOCATION;  Service: Cardiology;  Laterality: N/A;    • CORONARY ANGIOPLASTY WITH STENT PLACEMENT     • CORONARY STENT PLACEMENT     • HERNIA REPAIR     • LUNG BIOPSY     • LUNG SURGERY     • NECK SURGERY     • THORACOTOMY Left 1977   • VENTRAL HERNIA REPAIR        General Information     Row Name 12/06/21 0725          OT Time and Intention    Document Type therapy note (daily note)  -BB     Mode of Treatment occupational therapy; individual therapy  -     Row Name 12/06/21 0725          General Information    Patient Profile Reviewed yes  -BB     Existing Precautions/Restrictions fall; oxygen therapy device and L/min  -[facility] Name 12/06/21 0725          Cognition    Orientation Status (Cognition) oriented x 4; verbal cues/prompts needed for orientation  -     Row Name 12/06/21 0725          Safety Issues, Functional Mobility    Impairments Affecting Function (Mobility) strength; endurance/activity tolerance; shortness of breath; balance; pain  -           User Key  (r) = Recorded By, (t) = Taken By, (c) = Cosigned By    Initials Name Provider Type    BB Susannah Villalta COTA Occupational Therapy Assistant                 Mobility/ADL's     Row Name 12/06/21 0725          Bed Mobility    Bed Mobility scooting/bridging; supine-sit; sit-supine  -BB     Supine-Sit [place] (Bed Mobility) standby assist  -BB     Sit-Supine [place] (Bed Mobility) standby assist  -BB     Assistive Device (Bed Mobility) draw sheet; head of bed elevated; bed rails  -[facility] Name 12/06/21 0725          Transfers    Sit-Stand [place] (Transfers) not tested  -     [place] Level (Toilet Transfer) contact guard  -BB     Assistive Device (Toilet Transfer) commode; grab bars/safety frame  -     Row Name 12/06/21 0725          Toilet Transfer    Type (Toilet Transfer) sit-stand  -[facility] Name 12/06/21 0725          Functional Mobility    Functional Mobility- Ind. Level --  far side of be >toilet>far side of bed with 2 RBs and HR<110  -BB     Functional  Mobility- Device rolling walker  -BB     Row Name 12/06/21 0725          Activities of Daily Living    BADL Assessment/Intervention grooming  -BB     Row Name 12/06/21 0725          Lower Body Dressing Assessment/Training    [place] Level (Lower Body Dressing) --  increased t/e for socks with pt decline edu on sock aid  -BB     Row Name 12/06/21 0725          Grooming Assessment/Training    [place] Level (Grooming) wash face, hands; modified independence  -BB     Position (Grooming) edge of bed sitting  -BB           User Key  (r) = Recorded By, (t) = Taken By, (c) = Cosigned By    Initials Name Provider Type    BB Susannah Villalta COTA Occupational Therapy Assistant               Obj/Interventions     Row Name 12/06/21 0725          Range of Motion Comprehensive    General Range of Motion bilateral lower extremity ROM WFL  -BB     Row Name 12/06/21 0725          Elbow/Forearm (Therapeutic Exercise)    Elbow/Forearm AROM (Therapeutic Exercise) bilateral; flexion; extension; supination; pronation; sitting; 10 repetitions; 2 sets  -BB     Row Name 12/06/21 0725          Wrist (Therapeutic Exercise)    Wrist AROM (Therapeutic Exercise) bilateral; flexion; extension; 10 repetitions; 2 sets  -BB     Row Name 12/06/21 0725          Hand (Therapeutic Exercise)    Hand AROM/AAROM (Therapeutic Exercise) bilateral; finger flexion; finger extension; 10 repetitions; 2 sets  -BB           User Key  (r) = Recorded By, (t) = Taken By, (c) = Cosigned By    Initials Name Provider Type    Susannah Cuevas COTA Occupational Therapy Assistant               Goals/Plan     Row Name 12/06/21 0725          Transfer Goal 1 (OT)    Activity/Assistive Device (Transfer Goal 1, OT) toilet  -BB     [place] Level/Cues Needed (Transfer Goal 1, OT) modified independence  -BB     Time Frame (Transfer Goal 1, OT) long term goal (LTG); by discharge  -BB     Progress/Outcome (Transfer Goal 1, OT) goal not met  -BB     Row Name  12/06/21 0725          Bathing Goal 1 (OT)    Activity/Device (Bathing Goal 1, OT) bathing skills, all  -BB     [place] Level/Cues Needed (Bathing Goal 1, OT) supervision required  -BB     Time Frame (Bathing Goal 1, OT) long term goal (LTG); by discharge  -BB     Progress/Outcomes (Bathing Goal 1, OT) goal not met  -BB     Row Name 12/06/21 0725          Dressing Goal 1 (OT)    Activity/Device (Dressing Goal 1, OT) lower body dressing  -BB     [place]/Cues Needed (Dressing Goal 1, OT) supervision required  -BB     Time Frame (Dressing Goal 1, OT) long term goal (LTG); by discharge  -BB     Progress/Outcome (Dressing Goal 1, OT) goal not met  -BB           User Key  (r) = Recorded By, (t) = Taken By, (c) = Cosigned By    Initials Name Provider Type    BB Susannah Villalta COTA Occupational Therapy Assistant               Clinical Impression     Row Name 12/06/21 0725          Pain Scale: Numbers Pre/Post-Treatment    Pretreatment Pain Rating 6/10  -BB     Posttreatment Pain Rating 6/10  -BB     Pain Location - Side Bilateral  -BB     Pain Location shoulder  -BB     Pain Intervention(s) Repositioned  -BB     Row Name 12/06/21 0725          Plan of Care Review    Plan of Care Reviewed With patient  -BB     Progress no change  -BB     Outcome Summary Pt performed B UE exercises (no shoulder ROM due to pain) with fair tolerance. Pt Mod I for grooming tasks.No new goals met. Continue OT POC  -BB     Row Name 12/06/21 0725          Therapy Assessment/Plan (OT)    Rehab Potential (OT) good, to achieve stated therapy goals  -BB     Criteria for Skilled Therapeutic Interventions Met (OT) yes; skilled treatment is necessary  -BB     Therapy Frequency (OT) other (see comments)  5-7 days/week  -BB     Row Name 12/06/21 0725          Therapy Plan Review/Discharge Plan (OT)    Anticipated Discharge Disposition (OT) home with home health; home with 24/7 care  -BB     Row Name 12/06/21 0725          Vital Signs     Pretreatment Heart Rate (beats/min) 99  -BB     Intratreatment Heart Rate (beats/min) 100  -BB     Posttreatment Heart Rate (beats/min) 100  -BB     Pre SpO2 (%) 97  -BB     O2 Delivery Pre Treatment supplemental O2  -BB     Post SpO2 (%) 98  -BB     O2 Delivery Post Treatment supplemental O2  -BB     Pre Patient Position Supine  -BB     Post Patient Position Supine  -BB     Row Name 12/06/21 0725          Positioning and Restraints    Pre-Treatment Position in bed  -BB     Post Treatment Position bed  -BB     In Bed fowlers; call light within reach; encouraged to call for assist; exit alarm on  -BB           User Key  (r) = Recorded By, (t) = Taken By, (c) = Cosigned By    Initials Name Provider Type    Susannah Cuevas COTA Occupational Therapy Assistant               Outcome Measures     Row Name 12/06/21 0725          How much help from another is currently needed...    Bathing (including washing, rinsing, and drying) 3  -BB     Toileting (which includes using toilet bed pan or urinal) 3  -BB     Putting on and taking off regular upper body clothing 3  -BB     Taking care of personal grooming (such as brushing teeth) 4  -BB     Eating meals 4  -BB           User Key  (r) = Recorded By, (t) = Taken By, (c) = Cosigned By    Initials Name Provider Type    Susannah Cuevas COTA Occupational Therapy Assistant                Occupational Therapy Education                 Title: PT OT SLP Therapies (In Progress)     Topic: Occupational Therapy (In Progress)     Point: ADL training (In Progress)     Description:   Instruct learner(s) on proper safety adaptation and remediation techniques during self care or transfers.   Instruct in proper use of assistive devices.              Learning Progress Summary           Patient Acceptance, E, NR by JAGDISH at 12/6/2021 1200                   Point: Home exercise program (Not Started)     Description:   Instruct learner(s) on appropriate technique for monitoring,  assisting and/or progressing therapeutic exercises/activities.              Learner Progress:  Not documented in this visit.          Point: Precautions (Done)     Description:   Instruct learner(s) on prescribed precautions during self-care and functional transfers.              Learning Progress Summary           Patient Acceptance, E,TB, VU by  at 12/2/2021 1325    Comment: POC, role of OT, transfer training                   Point: Body mechanics (In Progress)     Description:   Instruct learner(s) on proper positioning and spine alignment during self-care, functional mobility activities and/or exercises.              Learning Progress Summary           Patient Acceptance, E, NR by BB at 12/6/2021 1200    Acceptance, E,TB, VU by  at 12/2/2021 1325    Comment: POC, role of OT, transfer training                               User Key     Initials Effective Dates Name Provider Type Discipline     06/16/21 -  Susannah Villalta COTA Occupational Therapy Assistant OT     06/14/21 -  Israel Black, OT Occupational Therapist OT              OT Recommendation and Plan  Therapy Frequency (OT): other (see comments) (5-7 days/week)  Plan of Care Review  Plan of Care Reviewed With: patient  Progress: no change  Outcome Summary: Pt performed B UE exercises (no shoulder ROM due to pain) with fair tolerance. Pt Mod I for grooming tasks.No new goals met. Continue OT POC     Time Calculation:    Time Calculation- OT     Row Name 12/06/21 1201             Time Calculation- OT    OT Start Time 0725  -BB      OT Stop Time 0805  -BB      OT Time Calculation (min) 40 min  -BB      Total Timed Code Minutes- OT 40 minute(s)  -BB      OT Received On 12/06/21  -BB              Timed Charges    06867 - OT Therapeutic Exercise Minutes 25  -BB      30043 - OT Self Care/Mgmt Minutes 15  -BB              Total Minutes    Timed Charges Total Minutes 40  -BB       Total Minutes 40  -BB            User Key  (r) = Recorded By, (t) =  Taken By, (c) = Cosigned By    Initials Name Provider Type    BB Susannah Villalta COTA Occupational Therapy Assistant              Therapy Charges for Today     Code Description Service Date Service Provider Modifiers Qty    89026460776 HC OT THER PROC EA 15 MIN 12/6/2021 Susannah Villalta COTA GO 2    54665338555 HC OT SELF CARE/MGMT/TRAIN EA 15 MIN 12/6/2021 Susannah Villalta COTA GO 1               KRYSTIN Morrison  12/6/2021

## 2021-12-06 NOTE — THERAPY TREATMENT NOTE
Acute Care - Physical Therapy Treatment Note  [facility]     Patient Name: Hasmukh Ham  : 1933  MRN: [id number]  Today's Date: 2021      Visit Dx:     ICD-10-CM ICD-9-CM   1. Pneumonia of both lungs due to infectious organism, unspecified part of lung  J18.9 483.8   2. Precordial pain  R07.2 786.51   3. Atrial fibrillation, unspecified type ([facility])  I48.91 427.31   4. Impaired mobility and ADLs  Z74.09 V49.89    Z78.9    5. Impaired functional mobility, balance, gait, and endurance  Z74.09 V49.89     Patient Active Problem List   Diagnosis   • Dilated aortic root ([facility])   • Non-rheumatic tricuspid valve insufficiency   • Pulmonary emphysema ([facility])   • Atrial fibrillation and flutter ([facility])   • Bradycardia   • CAD (coronary artery disease)   • Neuropathy   • Abdominal hernia   • Neck pain   • Dementia (CMS/HCC)   • Diabetic neuropathy ([facility])   • Gastroesophageal reflux disease without esophagitis   • Generalized osteoarthritis   • 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 ([facility])   • Nocturia   • Osteoarthritis of multiple joints   • Hyperlipidemia   • Pain in joint involving ankle and foot   • Pneumonia of left lower lobe due to infectious organism   • Arthropathy of hand   • Paroxysmal tachycardia ([facility])   • Osteoarthrosis involving more than one site but not generalized   • Chronic right shoulder pain   • Rotator cuff syndrome   • Partial tear of subscapularis tendon   • Infraspinatus tendon tear   • Supraspinatus tendon tear   • Bilateral carotid artery stenosis   • Pulmonary HTN (HCC)   • Acute interstitial pneumonia (HCC)   • Chronic obstructive lung disease (HCC)   • Diabetes mellitus (HCC)   • Hypertension   • Chest pain   • Shortness of breath   • Angina pectoris (HCC)   • Myocardial infarction ([facility])   • Ingrown toenail   • Heart problem   • Degenerative joint disease involving multiple joints   • Chronic obstructive pulmonary disease (COPD) (HCC)   • Bleeding disorder ([facility])   •  Chronic obstructive pulmonary disease with acute exacerbation (HCC)   • Failure of outpatient treatment   • Sepsis (HCC)   • 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)   • Chronic diastolic CHF (congestive heart failure) ([facility])   • SOB (shortness of breath)   • Cause of injury, fall   • Right hip pain   • Chronic pain of right knee   • Primary osteoarthritis of right knee   • Left shoulder pain   • Left arm pain   • AC joint arthropathy   • Syncope   • Inflammatory arthritis   • Elevated troponin   • Fever   • Paroxysmal atrial fibrillation with rapid ventricular response ([facility])   • Fibrosis of lung (HCC)   • Type 2 diabetes mellitus ([facility])   • Chondrocalcinosis of right knee   • Nontraumatic complete tear of right rotator cuff   • Pneumonia of both lungs due to infectious organism     Past Medical History:   Diagnosis Date   • Basal cell carcinoma    • Bilateral carotid artery stenosis    • Bilateral carotid artery stenosis    • Bleeding disorder ([facility])    • CHF (congestive heart failure) ([facility])    • Chronic obstructive pulmonary disease (COPD) ([facility])    • Coronary arteriosclerosis    • Degenerative joint disease involving multiple joints    • Dementia ([facility])    • Diabetes mellitus ([facility])    • Heart problem    • History of stomach ulcers    • Hyperlipidemia    • Hypertension    • Ingrown toenail    • Myocardial infarction ([facility])      Past Surgical History:   Procedure Laterality Date   • BACK SURGERY     • CARDIAC CATHETERIZATION N/A 6/6/2017    Procedure: Right Heart Cath;  Surgeon: Jeff Maciel MD PhD;  Location: [facility] CATH INVASIVE LOCATION;  Service:    • CARDIAC CATHETERIZATION N/A 2/14/2018    Procedure: Coronary angiography;  Surgeon: Moisés Davila MD;  Location: [facility] CATH INVASIVE LOCATION;  Service:    • CARDIAC CATHETERIZATION N/A 10/28/2021    Procedure: Left Heart Cath;  Surgeon: Carine Johnson MD;  Location: [facility] CATH INVASIVE LOCATION;  Service: Cardiology;   Laterality: N/A;   • CORONARY ANGIOPLASTY WITH STENT PLACEMENT     • CORONARY STENT PLACEMENT     • HERNIA REPAIR     • LUNG BIOPSY     • LUNG SURGERY     • NECK SURGERY     • THORACOTOMY Left 1977   • VENTRAL HERNIA REPAIR       PT Assessment (last 12 hours)     PT Evaluation and Treatment     Row Name 12/06/21 1101          Physical Therapy Time and Intention    Subjective Information complains of; fatigue; weakness  -TW     Document Type therapy note (daily note)  -TW     Mode of Treatment physical therapy; individual therapy  -TW     Patient Effort adequate  -TW     Row Name 12/06/21 1101          General Information    Patient Profile Reviewed yes  -TW     Referring Physician none  -TW     Patient Observations alert; agree to therapy; poorly cooperative  -TW     Patient/Family/Caregiver Comments/Observations Pt supine in bed.  -TW     Existing Precautions/Restrictions fall  Pt not wearing O2 and nsg is aware.  -TW     Row Name 12/06/21 1101          Cognition    Affect/Mental Status (Cognitive) WFL  -TW     Orientation Status (Cognition) oriented x 4; verbal cues/prompts needed for orientation  -TW     Follows Commands (Cognition) WFL  -TW     Cognitive Function (Cognitive) WFL  -TW     Personal Safety Interventions fall prevention program maintained; gait belt; muscle strengthening facilitated; nonskid shoes/slippers when out of bed  -TW     Row Name 12/06/21 1101          Pain Scale: Numbers Pre/Post-Treatment    Pretreatment Pain Rating 0/10 - no pain  -TW     Posttreatment Pain Rating 0/10 - no pain  -TW     Row Name 12/06/21 1101          Range of Motion Comprehensive    General Range of Motion bilateral lower extremity ROM WFL  -TW     Row Name 12/06/21 1101          Bed Mobility    Bed Mobility supine-sit; sit-supine; scooting/bridging  -TW     Scooting/Bridging [place] (Bed Mobility) moderate assist (50% patient effort)  -TW     Supine-Sit [place] (Bed Mobility) contact guard  -TW      Sit-Supine [place] (Bed Mobility) minimum assist (75% patient effort)  -TW     Assistive Device (Bed Mobility) draw sheet; head of bed elevated; bed rails  -TW     Row Name 12/06/21 1101          Transfers    Transfers sit-stand transfer; stand-sit transfer  -TW     Sit-Stand [place] (Transfers) minimum assist (75% patient effort)  -TW     Stand-Sit [place] (Transfers) minimum assist (75% patient effort)  -TW     Row Name 12/06/21 1101          Sit-Stand Transfer    Assistive Device (Sit-Stand Transfers) walker, front-wheeled  -TW     Row Name 12/06/21 1101          Stand-Sit Transfer    Assistive Device (Stand-Sit Transfers) walker, front-wheeled  -TW     Row Name 12/06/21 1101          Gait/Stairs (Locomotion)    Gait/Stairs Locomotion gait/ambulation independence; distance ambulated; gait/ambulation assistive device; gait pattern; gait deviations  -TW     [place] Level (Gait) contact guard; verbal cues  -TW     Assistive Device (Gait) walker, front-wheeled  -     Distance in Feet (Gait) Pt t/f to bedside chair and returned to bed.  -TW     Comment (Gait/Stairs) Pt with decreased step length and extreme fwd trunk flexion throughout t/f to chait and back to bed.  -TW     Row Name 12/06/21 1101          Safety Issues, Functional Mobility    Impairments Affecting Function (Mobility) strength; endurance/activity tolerance; shortness of breath; balance; pain  -TW     Row Name 12/06/21 1101          Plan of Care Review    Plan of Care Reviewed With patient  -TW     Progress no change  -TW     Outcome Summary Pt initially alert and cooperative. As tx cont pt became more confused to less cooperative. Pt t/f sup to sit with min of 1 and sat EOB with SBA for 6 minutes. Pt then agreeable to t/f to bedside chair and did so with mod of 1. Pt began immediately sliding in chair towards the floor with both eyes shut and wouldnt respond to instructions. Pt asked if he wanted to return to bed and pt nodded his  "head \"Yes\". Pt returned to bed with mod of 1 and t/f to sup with CGA. Nsg aware of pt's actions and activity this tx. Pt would cont to benefit from therapy upon DC.  -TW     Row Name 12/06/21 1101          Vital Signs    Pretreatment Heart Rate (beats/min) 109  -TW     Intratreatment Heart Rate (beats/min) 114  -TW     Posttreatment Heart Rate (beats/min) 112  -TW     Pre SpO2 (%) 97  -TW     O2 Delivery Pre Treatment room air  -TW     Intra SpO2 (%) 94  -TW     O2 Delivery Intra Treatment room air  -TW     Post SpO2 (%) 97  -TW     O2 Delivery Post Treatment room air  -TW     Pre Patient Position Supine  -TW     Intra Patient Position Standing  -TW     Post Patient Position Supine  -TW     Row Name 12/06/21 1101          Bed Mobility Goal 1 (PT)    Activity/Assistive Device (Bed Mobility Goal 1, PT) sit to supine/supine to sit  -TW     [place] Level/Cues Needed (Bed Mobility Goal 1, PT) modified independence  -TW     Time Frame (Bed Mobility Goal 1, PT) by discharge  -TW     Strategies/Barriers (Bed Mobility Goal 1, PT) Has hospital bed at home.  -TW     Progress/Outcomes (Bed Mobility Goal 1, PT) goal not met  -TW     Row Name 12/06/21 1101          Transfer Goal 1 (PT)    Activity/Assistive Device (Transfer Goal 1, PT) sit-to-stand/stand-to-sit; bed-to-chair/chair-to-bed  -TW     [place] Level/Cues Needed (Transfer Goal 1, PT) supervision required  -TW     Time Frame (Transfer Goal 1, PT) by discharge  -TW     Strategies/Barriers (Transfers Goal 1, PT) A-fib, tachy.  -TW     Row Name 12/06/21 1101          Gait Training Goal 1 (PT)    Activity/Assistive Device (Gait Training Goal 1, PT) walker, rolling  -TW     [place] Level (Gait Training Goal 1, PT) supervision required  -TW     Distance (Gait Training Goal 1, PT) 25'x2 as tolerated.  -TW     Time Frame (Gait Training Goal 1, PT) by discharge  -TW     Strategies/Barriers (Gait Training Goal 1, PT) A-fib, tachy.  -TW     Progress/Outcome (Gait " Training Goal 1, PT) goal not met  -TW     Row Name 12/06/21 1101          Positioning and Restraints    Pre-Treatment Position in bed  -TW     Post Treatment Position bed  -TW     In Bed supine; call light within reach; encouraged to call for assist; exit alarm on  -TW     Row Name 12/06/21 1101          Therapy Assessment/Plan (PT)    Rehab Potential (PT) good, to achieve stated therapy goals  -TW     Criteria for Skilled Interventions Met (PT) yes; skilled treatment is necessary  -           User Key  (r) = Recorded By, (t) = Taken By, (c) = Cosigned By    Initials Name Provider Type    TW Kale Puente PTA Physical Therapy Assistant                Physical Therapy Education                 Title: PT OT SLP Therapies (In Progress)     Topic: Physical Therapy (In Progress)     Point: Mobility training (In Progress)     Learning Progress Summary           Patient Acceptance, E, NR by TESS at 12/3/2021 1251                   Point: Home exercise program (Not Started)     Learner Progress:  Not documented in this visit.          Point: Body mechanics (Not Started)     Learner Progress:  Not documented in this visit.          Point: Precautions (Not Started)     Learner Progress:  Not documented in this visit.                      User Key     Initials Effective Dates Name Provider Type Discipline     06/16/21 -  Cristobal Mansfield PTA Physical Therapy Assistant PT              PT Recommendation and Plan  Anticipated Discharge Disposition (PT): home with assist, home with home health  Therapy Frequency (PT): other (see comments) (5-7 days/week)  Plan of Care Reviewed With: patient  Progress: no change  Outcome Summary: Pt initially alert and cooperative. As tx cont pt became more confused to less cooperative. Pt t/f sup to sit with min of 1 and sat EOB with SBA for 6 minutes. Pt then agreeable to t/f to bedside chair and did so with mod of 1. Pt began immediately sliding in chair towards the floor with both  "eyes shut and wouldnt respond to instructions. Pt asked if he wanted to return to bed and pt nodded his head \"Yes\". Pt returned to bed with mod of 1 and t/f to sup with CGA. Nsg aware of pt's actions and activity this tx. Pt would cont to benefit from therapy upon DC.       Time Calculation:    PT Charges     Row Name 12/06/21 1305             Time Calculation    Start Time 1101  -TW      Stop Time 1126  -TW      Time Calculation (min) 25 min  -TW              Time Calculation- PT    Total Timed Code Minutes- PT 25 minute(s)  -TW            User Key  (r) = Recorded By, (t) = Taken By, (c) = Cosigned By    Initials Name Provider Type    TW Kale Puente PTA Physical Therapy Assistant              Therapy Charges for Today     Code Description Service Date Service Provider Modifiers Qty    74123780934 HC PT THERAPEUTIC ACT EA 15 MIN 12/6/2021 Kale Puente PTA GP 2          PT G-Codes  Outcome Measure Options: AM-PAC 6 Clicks Basic Mobility (PT)  AM-PAC 6 Clicks Score (PT): 18  AM-PAC 6 Clicks Score (OT): 20    Kale Puente PTA  12/6/2021    "

## 2021-12-06 NOTE — PLAN OF CARE
Problem: Adult Inpatient Plan of Care  Goal: Plan of Care Review  Outcome: Ongoing, Progressing  Flowsheets  Taken 12/6/2021 0433 by Aida Freedman RN  Progress: declining  Outcome Summary: Increased confusion at night. Becoming agitated at times and hitting staff. VSS.  Taken 12/5/2021 1455 by Aviva Lam COTA  Plan of Care Reviewed With: patient  Goal: Patient-Specific Goal (Individualized)  Outcome: Ongoing, Progressing  Goal: Absence of Hospital-Acquired Illness or Injury  Outcome: Ongoing, Progressing  Intervention: Identify and Manage Fall Risk  Recent Flowsheet Documentation  Taken 12/6/2021 0400 by Aida Freedman RN  Safety Promotion/Fall Prevention: safety round/check completed  Taken 12/6/2021 0200 by Aida Freedman RN  Safety Promotion/Fall Prevention: safety round/check completed  Taken 12/6/2021 0000 by Aida Freedman RN  Safety Promotion/Fall Prevention: safety round/check completed  Taken 12/5/2021 2222 by Aiad Freedman RN  Safety Promotion/Fall Prevention: safety round/check completed  Taken 12/5/2021 2130 by Aida Freedman RN  Safety Promotion/Fall Prevention: safety round/check completed  Taken 12/5/2021 2030 by Aida Freedman RN  Safety Promotion/Fall Prevention: safety round/check completed  Taken 12/5/2021 1935 by Aida Freedman RN  Safety Promotion/Fall Prevention: safety round/check completed  Intervention: Prevent Skin Injury  Recent Flowsheet Documentation  Taken 12/6/2021 0400 by Aida Freedman RN  Body Position: tilted, right  Taken 12/6/2021 0200 by Aida Freedman RN  Body Position: tilted, left  Taken 12/6/2021 0000 by Aida Freedman RN  Body Position: supine  Taken 12/5/2021 2222 by Aida Freedman RN  Body Position: tilted, right  Taken 12/5/2021 2130 by Aida Freedman RN  Body Position: supine  Taken 12/5/2021 2030 by Aida Freedman RN  Body Position: tilted, left  Taken 12/5/2021 1935 by Aida Freedman RN  Body  Position: supine  Intervention: Prevent Infection  Recent Flowsheet Documentation  Taken 12/5/2021 1935 by Aida Freedman, RN  Infection Prevention: single patient room provided  Goal: Optimal Comfort and Wellbeing  Outcome: Ongoing, Progressing  Intervention: Provide Person-Centered Care  Recent Flowsheet Documentation  Taken 12/5/2021 1935 by Aida Freedman, RN  Trust Relationship/Rapport: care explained  Goal: Readiness for Transition of Care  Outcome: Ongoing, Progressing   Goal Outcome Evaluation:           Progress: declining  Outcome Summary: Increased confusion at night. Becoming agitated at times and hitting staff. VSS.

## 2021-12-06 NOTE — PROGRESS NOTES
"[facility]  [address] [place] [address]. [id number]  T - [phone]  F - [phone]     Progress Note          PATIENT  DEMOGRAPHICS   PATIENT NAME: Hasmukh Ham                      PHYSICIAN: Jennifer George MD  : 1933  MRN: [id number]   LOS: 4 days    Patient Care Team:  Paolo Rey MD as PCP - General  Subjective   SUBJECTIVE   Not able to sleep last night  No soa         Objective   OBJECTIVE   Vital Signs  Temp:  [97 °F (36.1 °C)-98.5 °F (36.9 °C)] 97.1 °F (36.2 °C)  Heart Rate:  [] 85  Resp:  [17-20] 18  BP: (110-157)/(65-79) 128/71    Flowsheet Rows      First Filed Value   Admission Height 171.5 cm (67.5\") Documented at 2021 1451   Admission Weight 68 kg (150 lb) Documented at 2021 1451           I/O last 3 completed shifts:  In: 2155 [P.O.:630; I.V.:1525]  Out: 1000 [Urine:1000]    PHYSICAL EXAM    Physical Exam  Constitutional:       Appearance: He is well-developed.   HENT:      Head: Normocephalic and atraumatic.   Eyes:      Conjunctiva/sclera: Conjunctivae normal.      Pupils: Pupils are equal, round, and reactive to light.   Cardiovascular:      Rate and Rhythm: Normal rate and regular rhythm.   Pulmonary:      Effort: Pulmonary effort is normal.      Breath sounds: Normal breath sounds.   Abdominal:      Palpations: Abdomen is soft.   Musculoskeletal:      Cervical back: Neck supple.      Right lower leg: No edema.      Left lower leg: No edema.   Skin:     General: Skin is warm and dry.   Neurological:      Mental Status: He is alert and oriented to person, place, and time.   Psychiatric:         Mood and Affect: Mood normal.         Behavior: Behavior normal.         RESULTS   Results Review:    Results from last 7 days   Lab Units 21  0713 21  0703 21  0526   SODIUM mmol/L 139 139 138   POTASSIUM mmol/L 4.2 4.4 4.6   CHLORIDE mmol/L 98 100 103   CO2 mmol/L 26.0 24.0 26.0   BUN mg/dL 58* 48* 19   CREATININE mg/dL " 1.84* 1.88* 1.16   CALCIUM mg/dL 9.3 8.8 8.7   BILIRUBIN mg/dL 0.5 0.3 0.2   ALK PHOS U/L 54 54 56   ALT (SGPT) U/L 19 14 12   AST (SGOT) U/L 18 15 12   GLUCOSE mg/dL 175* 166* 192*       Estimated Creatinine Clearance: 26.8 mL/min (A) (by C-G formula based on SCr of 1.84 mg/dL (H)).    Results from last 7 days   Lab Units 12/01/21  1510   MAGNESIUM mg/dL 1.6             Results from last 7 days   Lab Units 12/05/21  0713 12/04/21  0703 12/02/21  0526 12/01/21  1510   WBC 10*3/mm3 15.59* 15.32* 10.27 13.38*   HEMOGLOBIN g/dL 13.1 11.4* 11.6* 12.5*   PLATELETS 10*3/mm3 238 184 168 184       Results from last 7 days   Lab Units 12/01/21  1626   INR  1.02         Imaging Results (Last 24 Hours)     Procedure Component Value Units Date/Time    XR Chest 1 View [[id number]] Collected: 12/05/21 1229     Updated: 12/05/21 1820    Narrative:          EXAM:  XR CHEST 1 VIEW    TECHNIQUE:   Single frontal radiograph of the chest.    VIEWS:  1 view    CLINICAL HISTORY:   88 years  Male  Shortness of breath, J18.9 Pneumonia, unspecified  organism R07.2 Precordial pain I48.91 Unspecified atrial  fibrillation Z74.09 Other reduced mobility Z78.9 Other specified  health status Z74.09 Other reduced mobility    COMPARISON:   December 1, 2021 chest x-ray    FINDINGS:   Lungs:  Patchy bilateral interstitial opacities are not  significantly changed  Pleura: Small left pleural effusion. No pneumothorax.  Heart/mediastinum: The cardiac silhouette is not enlarged.  Bones: No acute osseous findings.  Soft tissues: Unremarkable.      Impression:      1.  Patchy bilateral interstitial opacities are not significantly  changed.  2.  Small left pleural effusion.    Electronically signed by:  Rc Johnson MD  12/5/2021 6:19  PM CST Workstation: [id number]           MEDICATIONS    amiodarone, 100 mg, Oral, Q24H  cetirizine, 5 mg, Oral, Daily  clopidogrel, 75 mg, Oral, Daily  famotidine, 20 mg, Oral, BID AC  insulin aspart, 0-9 Units,  Subcutaneous, TID AC  ipratropium-albuterol, 3 mL, Nebulization, 4x Daily - RT  levoFLOXacin, 750 mg, Intravenous, Q48H  methylPREDNISolone sodium succinate, 60 mg, Intravenous, Q12H  metoprolol tartrate, 12.5 mg, Oral, Q12H  polyethylene glycol, 17 g, Oral, Daily  ranolazine, 1,000 mg, Oral, Q12H  sodium chloride, 10 mL, Intravenous, Q12H      sodium chloride, 75 mL/hr, Last Rate: 75 mL/hr (12/06/21 0629)        Assessment/Plan   ASSESSMENT / PLAN      Pneumonia of both lungs due to infectious organism    1.  JILL on CKD- baseline creatinine 1.2, up to 1.8  Etiology of JILL is not immediately apparent.  He does complain of nausea but has had no vomiting.  No diarrhea.  No hypotension noted in the record.  No new nephrotoxic medications.  At present we will hold lisinopril and lasix.  US negative for hydro, Tray negative, UA bland, FENa 3.7% consistent with intrinsic injury.    -Wt is down from previous, check lab today. Keep gentle IVF and assess response.     2.  Pneumonia- continue antibiotics and management primary team     3.  Atrial fibrillation- now on amiodarone     4.  DM2     5.  Hypertension- well-controlled     6.  COPD     7.  History of CAD    8. Constipation add lactulose           This document has been electronically signed by Jennifer George MD on December 6, 2021 10:43 CST

## 2021-12-06 NOTE — PAYOR COMM NOTE
"    Mita Caballero RN [facility]  [phone]     Phone  [phone]      Fax  Cont. Stay Review      Hasmukh Ham (88 y.o. Male)             Date of Birth Social Security Number Address Home Phone MRN    06/01/1933  [address] [place] [address] [phone] [id number]    Druze Marital Status             Taoist        Admission Date Admission Type Admitting Provider Attending Provider Department, Room/Bed    12/1/21 Emergency Christal Gonzalez MD Gerlach, Elizabeth T, MD [facility] [address] [place], 319/1    Discharge Date Discharge Disposition Discharge Destination                         Attending Provider: Christal Gonzalez MD    Allergies: Atorvastatin, Penicillins, Azithromycin, Cefdinir, Clarithromycin, Pravastatin, Benadryl Allergy [Diphenhydramine Hcl]    Isolation: None   Infection: None   Code Status: CPR   Advance Care Planning Activity    Ht: 170.2 cm (67\")   Wt: 68.2 kg (150 lb 4.8 oz)    Admission Cmt: None   Principal Problem: None                Active Insurance as of 12/1/2021     Primary Coverage     Payor Plan Insurance Group Employer/Plan Group    AETNA MEDICARE REPLACEMENT AETNA MEDICARE REPLACEMENT [id number]     Payor Plan Address Payor Plan Phone Number Payor Plan Fax Number Effective Dates    [address] [phone]  4/1/2019 - None Entered    [facility] [id number]       Subscriber Name Subscriber Birth Date Member ID       HASMUKH HAM 6/1/1933 [id number]                 Emergency Contacts      (Rel.) Home Phone Work Phone Mobile Phone    Rimma Durant (Spouse) [phone] -- [phone]    Ilan Ham (Brother) -- -- [phone]            Vital Signs (last day)     Date/Time Temp Temp src Pulse Resp BP Patient Position SpO2    12/06/21 0831 -- -- 85 -- -- -- --    12/06/21 0751 -- -- 50 18 -- -- 93    12/06/21 0729 97.1 (36.2) Temporal 59 18 128/71 " Lying 93    12/06/21 0305 97.1 (36.2) Infrared 113 20 157/79 Lying 95    12/05/21 2329 -- -- 117 -- -- -- --    12/05/21 2058 -- -- 108 20 -- -- 96    12/05/21 1916 98.5 (36.9) Infrared 93 20 118/69 Lying 92    12/05/21 1604 -- -- 91 20 -- -- --    12/05/21 1556 -- -- 104 -- -- -- --    12/05/21 1555 -- -- 89 20 -- -- 96    12/05/21 1553 97.3 (36.3) Temporal 90 18 119/77 Lying 96    12/05/21 1213 -- -- 88 17 -- -- 99    12/05/21 1206 -- -- 87 18 -- -- 96    12/05/21 1156 97 (36.1) Temporal 86 18 110/65 Lying 98    12/05/21 0758 -- -- 113 20 -- -- --    12/05/21 0741 -- -- 119 20 -- -- 99    12/05/21 0724 -- -- 117 -- -- -- --    12/05/21 0721 97.3 (36.3) Temporal 118 20 142/98 Lying 99    12/05/21 0351 -- -- 57 -- -- -- --    12/05/21 0343 97.4 (36.3) Axillary 58 18 130/71 Lying 99          Oxygen Therapy (last day)     Date/Time SpO2 Device (Oxygen Therapy) Flow (L/min) Oxygen Concentration (%) ETCO2 (mmHg)    12/06/21 0758 -- room air -- -- --    12/06/21 0751 93 room air -- -- --    12/06/21 0729 93 room air -- -- --    12/06/21 0305 95 -- -- -- --    12/05/21 2058 96 nasal cannula 2 -- --    12/05/21 1916 92 -- -- -- --    12/05/21 1604 -- nasal cannula 2 -- --    12/05/21 1555 96 nasal cannula 2 -- --    12/05/21 1553 96 nasal cannula 2 -- --    12/05/21 1213 99 nasal cannula 2 -- --    12/05/21 1206 96 nasal cannula 2 -- --    12/05/21 1156 98 nasal cannula 2 -- --    12/05/21 0758 -- nasal cannula 2 -- --    12/05/21 0741 99 nasal cannula 2 -- --    12/05/21 0721 99 nasal cannula 2 -- --    12/05/21 0343 99 nasal cannula 2 -- --          Current Facility-Administered Medications   Medication Dose Route Frequency Provider Last Rate Last Admin   • acetaminophen (TYLENOL) tablet 650 mg  650 mg Oral Q4H PRN Christal Gonzalez MD       • albuterol (PROVENTIL) nebulizer solution 0.083% 2.5 mg/3mL  2.5 mg Nebulization Q4H PRN Christal Gonzalez MD       • amiodarone (PACERONE) half tablet 100 mg  100 mg Oral  Q24H Mana Curtis MD   100 mg at 12/06/21 0826   • cetirizine (zyrTEC) tablet 5 mg  5 mg Oral Daily Christal Gonzalez MD   5 mg at 12/06/21 0826   • clopidogrel (PLAVIX) tablet 75 mg  75 mg Oral Daily Christal Gonzalez MD   75 mg at 12/06/21 0826   • dextrose (D50W) (25 g/50 mL) IV injection 25 g  25 g Intravenous Q15 Min PRN Kelvin Aguilar MD       • dextrose (GLUTOSE) oral gel 15 g  15 g Oral Q15 Min PRN Kelvin Aguilar MD       • famotidine (PEPCID) tablet 20 mg  20 mg Oral BID AC Christal Gonzalez MD   20 mg at 12/06/21 0826   • First Mouthwash (Magic Mouthwash) 10 mL  10 mL Swish & Spit 4x Daily PRN Behroozi, Saeid, MD   10 mL at 12/04/21 0146   • glucagon (human recombinant) (GLUCAGEN DIAGNOSTIC) injection 1 mg  1 mg Subcutaneous Q15 Min PRN Kelvin Aguilar MD       • HYDROcodone-acetaminophen (NORCO) 7.5-325 MG per tablet 1 tablet  1 tablet Oral Q6H PRN Christal Gonzalez MD   1 tablet at 12/05/21 1713   • insulin aspart (novoLOG) injection 0-9 Units  0-9 Units Subcutaneous TID AC Kelvin Aguilar MD   2 Units at 12/06/21 0826   • ipratropium-albuterol (DUO-NEB) nebulizer solution 3 mL  3 mL Nebulization 4x Daily - RT Kelvin Aguilar MD   3 mL at 12/06/21 0751   • levoFLOXacin (LEVAQUIN) 750 mg/150 mL D5W (premix) (LEVAQUIN) 750 mg  750 mg Intravenous Q48H Kelvin Aguilar MD   750 mg at 12/05/21 1829   • melatonin tablet 3 mg  3 mg Oral Nightly PRN Behroozi, Saeid, MD   3 mg at 12/05/21 2024   • methylPREDNISolone sodium succinate (SOLU-Medrol) injection 60 mg  60 mg Intravenous Q12H Kelvin Aguilar MD   60 mg at 12/06/21 0628   • metoprolol tartrate (LOPRESSOR) half tablet 12.5 mg  12.5 mg Oral Q12H Mana Curtis MD   12.5 mg at 12/06/21 0831   • nitroglycerin (NITROSTAT) SL tablet 0.4 mg  0.4 mg Sublingual Q5 Min PRN Christal Gonzalez MD       • ondansetron (ZOFRAN) injection 4 mg  4 mg Intravenous Q4H PRN Karan Segovia MD   4 mg at 12/04/21 0921    • polyethylene glycol (MIRALAX) packet 17 g  17 g Oral Daily Christal Gonzalez MD   17 g at 12/06/21 0825   • ranolazine (RANEXA) 12 hr tablet 1,000 mg  1,000 mg Oral Q12H Christal Gonzalez MD   1,000 mg at 12/06/21 0825   • sodium chloride 0.9 % flush 10 mL  10 mL Intravenous PRN Zack Castro MD       • sodium chloride 0.9 % flush 10 mL  10 mL Intravenous Q12H Kelvin Aguilar MD   10 mL at 12/06/21 0842   • sodium chloride 0.9 % flush 10 mL  10 mL Intravenous PRN Kelvin Aguilar MD       • sodium chloride 0.9 % infusion  75 mL/hr Intravenous Continuous Milan Wei APRN 75 mL/hr at 12/06/21 0629 75 mL/hr at 12/06/21 0629        Physician Progress Notes (last 48 hours)      Karan Segovia MD at 12/05/21 1212              [facility] Medicine Services  INPATIENT PROGRESS NOTE    Length of Stay: 3  Date of Admission: 12/1/2021  Primary Care Physician: Paolo Rey MD    Subjective   Chief Complaint: Shortness of air  HPI:    Admitted for pneumonia.  Has been intermittently confused.  Stable on supplemental oxygen.    Review of Systems   Respiratory: Positive for shortness of breath.           All pertinent negatives and positives are as above. All other systems have been reviewed and are negative unless otherwise stated.     Objective    Temp:  [97 °F (36.1 °C)-98.3 °F (36.8 °C)] 97 °F (36.1 °C)  Heart Rate:  [] 87  Resp:  [18-20] 18  BP: (110-163)/(65-98) 110/65  Physical Exam  Vitals and nursing note reviewed.   Constitutional:       General: He is not in acute distress.     Appearance: He is well-developed. He is not diaphoretic.   HENT:      Head: Normocephalic and atraumatic.   Cardiovascular:      Rate and Rhythm: Normal rate.   Pulmonary:      Effort: Pulmonary effort is normal. No respiratory distress.      Breath sounds: No wheezing.   Abdominal:      General: There is no distension.      Palpations: Abdomen is soft.    Musculoskeletal:         General: Normal range of motion.   Skin:     General: Skin is warm and dry.   Neurological:      Mental Status: He is alert.      Cranial Nerves: No cranial nerve deficit.   Psychiatric:         Behavior: Behavior normal.         Thought Content: Thought content normal.         Judgment: Judgment normal.             Results Review:  I have reviewed the labs, radiology results, and diagnostic studies.    Laboratory Data:   Lab Results (last 24 hours)     Procedure Component Value Units Date/Time    Comprehensive Metabolic Panel [[id number]]  (Abnormal) Collected: 12/05/21 0713    Specimen: Blood Updated: 12/05/21 0743     Glucose 175 mg/dL      BUN 58 mg/dL      Creatinine 1.84 mg/dL      Sodium 139 mmol/L      Potassium 4.2 mmol/L      Chloride 98 mmol/L      CO2 26.0 mmol/L      Calcium 9.3 mg/dL      Total Protein 6.9 g/dL      Albumin 4.20 g/dL      ALT (SGPT) 19 U/L      AST (SGOT) 18 U/L      Alkaline Phosphatase 54 U/L      Total Bilirubin 0.5 mg/dL      eGFR Non African Amer 35 mL/min/1.73      Globulin 2.7 gm/dL      A/G Ratio 1.6 g/dL      BUN/Creatinine Ratio 31.5     Anion Gap 15.0 mmol/L     Narrative:      GFR Normal >60  Chronic Kidney Disease <60  Kidney Failure <15      CBC & Differential [[id number]]  (Abnormal) Collected: 12/05/21 0713    Specimen: Blood Updated: 12/05/21 0723    Narrative:      The following orders were created for panel order CBC & Differential.  Procedure                               Abnormality         Status                     ---------                               -----------         ------                     CBC Auto Differential[[id number]]        Abnormal            Final result                 Please view results for these tests on the individual orders.    CBC Auto Differential [[id number]]  (Abnormal) Collected: 12/05/21 0713    Specimen: Blood Updated: 12/05/21 0723     WBC 15.59 10*3/mm3      RBC 4.58 10*6/mm3      Hemoglobin 13.1 g/dL       Hematocrit 40.4 %      MCV 88.2 fL      MCH 28.6 pg      MCHC 32.4 g/dL      RDW 18.1 %      RDW-SD 57.5 fl      MPV 11.2 fL      Platelets 238 10*3/mm3      Neutrophil % 87.0 %      Lymphocyte % 5.6 %      Monocyte % 5.9 %      Eosinophil % 0.0 %      Basophil % 0.3 %      Immature Grans % 1.2 %      Neutrophils, Absolute 13.58 10*3/mm3      Lymphocytes, Absolute 0.87 10*3/mm3      Monocytes, Absolute 0.92 10*3/mm3      Eosinophils, Absolute 0.00 10*3/mm3      Basophils, Absolute 0.04 10*3/mm3      Immature Grans, Absolute 0.18 10*3/mm3      nRBC 0.1 /100 WBC     POC Glucose Once [[id number]]  (Abnormal) Collected: 12/05/21 0611    Specimen: Blood Updated: 12/05/21 0628     Glucose 167 mg/dL      Comment: RN NotifiedOperator: [id number] LAUREN MIKAYLAMeter ID: [id number]       Urine Eosinophils, Tray's Stain - [[id number]]  (Normal) Collected: 12/04/21 1652    Specimen: Urine Updated: 12/04/21 2141     Tray Stain Negative    Creatinine, Urine, Random - [[id number]] Collected: 12/04/21 1652    Specimen: Urine Updated: 12/04/21 2135     Creatinine, Urine 34.2 mg/dL     Narrative:      Reference intervals for random urine have not been established.  Clinical usage is dependent upon physician's interpretation in combination with other laboratory tests.       POC Glucose Once [[id number]]  (Abnormal) Collected: 12/04/21 2024    Specimen: Blood Updated: 12/04/21 2042     Glucose 160 mg/dL      Comment: RN NotifiedOperator: [id number] CONCEPCIÓN TARAMeter ID: [id number]       POC Glucose Once [[id number]]  (Abnormal) Collected: 12/04/21 1646    Specimen: Blood Updated: 12/04/21 2015     Glucose 148 mg/dL      Comment: RN NotifiedOperator: [id number] JIMENEZ ALEXISMeter ID: [id number]       POC Glucose Once [[id number]]  (Abnormal) Collected: 12/04/21 1017    Specimen: Blood Updated: 12/04/21 1858     Glucose 136 mg/dL      Comment: RN NotifiedOperator: [id number] TONY eMza ID: [id number]       Urinalysis  With Microscopic If Indicated (No Culture) - [[id number]]  (Normal) Collected: 12/04/21 1652    Specimen: Urine Updated: 12/04/21 1702     Color, UA Yellow     Appearance, UA Clear     pH, UA <=5.0     Specific Gravity, UA 1.020     Glucose, UA Negative     Ketones, UA Negative     Bilirubin, UA Negative     Blood, UA Negative     Protein, UA Negative     Leuk Esterase, UA Negative     Nitrite, UA Negative     Urobilinogen, UA 0.2 E.U./dL    Narrative:      Urine microscopic not indicated.    Sodium, Urine, Random - [[id number]] Collected: 12/04/21 1652    Specimen: Urine Updated: 12/04/21 1701     Sodium, Urine 94 mmol/L     Narrative:      Reference intervals for random urine have not been established.  Clinical usage is dependent upon physician's interpretation in combination with other laboratory tests.       Blood Culture - Blood, Hand, Right [[id number]]  (Normal) Collected: 12/01/21 1626    Specimen: Blood from Hand, Right Updated: 12/04/21 1631     Blood Culture No growth at 3 days          Culture Data:   No results found for: BLOODCX  No results found for: URINECX  No results found for: RESPCX  No results found for: WOUNDCX  No results found for: STOOLCX  No components found for: BODYFLD    Radiology Data:   Imaging Results (Last 24 Hours)     Procedure Component Value Units Date/Time    US Renal Bilateral [[id number]] Collected: 12/04/21 2047     Updated: 12/05/21 0035    Narrative:      EXAM:    US Retroperitoneal Limited, Renal    CLINICAL HISTORY:    The patient is 88 years old and is Male; JILL, J18.9 Pneumonia,  unspecified organism R07.2 Precordial pain I48.91 Unspecified  atrial fibrillation Z74.09 Other reduced mobility Z78.9 Other  specified health status Z74.09 Other reduced mobility    TECHNIQUE:    Real-time limited ultrasound of the retroperitoneum with image  documentation.    COMPARISON:    CT of the abdomen and pelvis June 22, 2021    FINDINGS:    RIGHT KIDNEY:  The right kidney measures 10.4  cm in length.  No  stones.  No hydronephrosis.    LEFT KIDNEY:  Bilobed left renal cyst measuring 7.8 x 4.7 x 5.6  cm is present. The left kidney measures 11.0 cm in length.  No  stones.  No hydronephrosis.    BLADDER:  The bladder is well distended.      Impression:        Large left renal cyst. Otherwise, unremarkable renal  ultrasound.    Electronically signed by:  Masha Baires MD  12/5/2021 12:33 AM  CST Workstation: [id number]          I have reviewed the patient's current medications.     Assessment/Plan     Active Hospital Problems    Diagnosis    • Pneumonia of both lungs due to infectious organism        Pneumonia-continue with IV antibiotics and we will continue to monitor     Acute renal failure-nephrology has been consulted and we will continue to monitor  Continue monitoring creatinine level has been put on IV fluids by nephrology.     Bkjcddjzb-ooxvevxxkzjdjw-iozsff due to baseline dementia and/or delirium.  We will continue to monitor.     Chronic respiratory failure-we will continue with supplemental oxygen, seems to be at baseline     Paroxysmal atrial fibrillation-continue with cardiology management, currently on amiodarone.  Currently in sinus rhythm     Type 2 diabetes-continue monitor glucose levels     Elevated troponin level-cardiology managing     Hypertension-continue monitor blood pressure     COPD-nebulizer treatments as needed     DVT prophylaxis-SCD     Patient is full code     I confirmed that the patient's Advance Care Plan is present, code status is documented, or surrogate decision maker is listed in the patient's medical record.             Karan Segovia MD   12/05/21   12:12 CST      Electronically signed by Karan Segovia MD at 12/05/21 1214     Milan Wei APRN at 12/05/21 1035     Attestation signed by Lenny Diaz MD at 12/05/21 1246    I personally evaluated and examined the patient in conjunction with EVE Danielle and agree with the assessment,  "treatment plan, and disposition of the patient as recorded.     Complains of shortness of breath.  Vitals reviewed.  On exam, no lower extremity edema bilaterally.  Lungs clear to auscultation bilaterally.  Labs reviewed.  Creatinine is stable at 1.84 mg/dL.  Start gentle IV hydration. Hold lisinopril and Lasix.    Workup: Urinalysis negative for protein and blood.  Urine sodium 94.  Urine eosinophils negative.  Renal ultrasound negative for hydronephrosis.       Lenny Diaz MD  [facility]                     [facility]  [address] [place] [address]. [address]  T - [phone]  F - [phone]     Progress Note          PATIENT  DEMOGRAPHICS   PATIENT NAME: Hasmukh Ham                      PHYSICIAN: EVE Fuller  : 1933  MRN: [id number]   LOS: 3 days    Patient Care Team:  Paolo Rey MD as PCP - General  Subjective   SUBJECTIVE   Intermittently confused         Objective   OBJECTIVE   Vital Signs  Temp:  [97.3 °F (36.3 °C)-98.3 °F (36.8 °C)] 97.3 °F (36.3 °C)  Heart Rate:  [] 113  Resp:  [16-20] 20  BP: (127-163)/(71-98) 142/98    Flowsheet Rows      First Filed Value   Admission Height 171.5 cm (67.5\") Documented at 2021 1451   Admission Weight 68 kg (150 lb) Documented at 2021 1451           I/O last 3 completed shifts:  In: 450 [P.O.:450]  Out: 2150 [Urine:2150]    PHYSICAL EXAM    Physical Exam  Constitutional:       Appearance: He is well-developed.   HENT:      Head: Normocephalic and atraumatic.   Eyes:      Conjunctiva/sclera: Conjunctivae normal.      Pupils: Pupils are equal, round, and reactive to light.   Cardiovascular:      Rate and Rhythm: Normal rate and regular rhythm.   Pulmonary:      Effort: Pulmonary effort is normal.      Breath sounds: Normal breath sounds.   Abdominal:      Palpations: Abdomen is soft.   Musculoskeletal:      Cervical back: Neck supple.      Right lower leg: No edema.     "  Left lower leg: No edema.   Skin:     General: Skin is warm and dry.   Neurological:      Mental Status: He is alert and oriented to person, place, and time.   Psychiatric:         Mood and Affect: Mood normal.         Behavior: Behavior normal.         RESULTS   Results Review:    Results from last 7 days   Lab Units 12/05/21  0713 12/04/21  0703 12/02/21  0526   SODIUM mmol/L 139 139 138   POTASSIUM mmol/L 4.2 4.4 4.6   CHLORIDE mmol/L 98 100 103   CO2 mmol/L 26.0 24.0 26.0   BUN mg/dL 58* 48* 19   CREATININE mg/dL 1.84* 1.88* 1.16   CALCIUM mg/dL 9.3 8.8 8.7   BILIRUBIN mg/dL 0.5 0.3 0.2   ALK PHOS U/L 54 54 56   ALT (SGPT) U/L 19 14 12   AST (SGOT) U/L 18 15 12   GLUCOSE mg/dL 175* 166* 192*       Estimated Creatinine Clearance: 26.3 mL/min (A) (by C-G formula based on SCr of 1.84 mg/dL (H)).    Results from last 7 days   Lab Units 12/01/21  1510   MAGNESIUM mg/dL 1.6             Results from last 7 days   Lab Units 12/05/21  0713 12/04/21  0703 12/02/21  0526 12/01/21  1510   WBC 10*3/mm3 15.59* 15.32* 10.27 13.38*   HEMOGLOBIN g/dL 13.1 11.4* 11.6* 12.5*   PLATELETS 10*3/mm3 238 184 168 184       Results from last 7 days   Lab Units 12/01/21  1626   INR  1.02         Imaging Results (Last 24 Hours)     Procedure Component Value Units Date/Time    US Renal Bilateral [[id number]] Collected: 12/04/21 2047     Updated: 12/05/21 0035    Narrative:      EXAM:    US Retroperitoneal Limited, Renal    CLINICAL HISTORY:    The patient is 88 years old and is Male; JILL, J18.9 Pneumonia,  unspecified organism R07.2 Precordial pain I48.91 Unspecified  atrial fibrillation Z74.09 Other reduced mobility Z78.9 Other  specified health status Z74.09 Other reduced mobility    TECHNIQUE:    Real-time limited ultrasound of the retroperitoneum with image  documentation.    COMPARISON:    CT of the abdomen and pelvis June 22, 2021    FINDINGS:    RIGHT KIDNEY:  The right kidney measures 10.4 cm in length.  No  stones.  No  hydronephrosis.    LEFT KIDNEY:  Bilobed left renal cyst measuring 7.8 x 4.7 x 5.6  cm is present. The left kidney measures 11.0 cm in length.  No  stones.  No hydronephrosis.    BLADDER:  The bladder is well distended.      Impression:        Large left renal cyst. Otherwise, unremarkable renal  ultrasound.    Electronically signed by:  Masha Baires MD  12/5/2021 12:33 AM  CST Workstation: [id number]           MEDICATIONS    amiodarone, 100 mg, Oral, Q24H  cetirizine, 5 mg, Oral, Daily  clopidogrel, 75 mg, Oral, Daily  famotidine, 20 mg, Oral, BID AC  insulin aspart, 0-9 Units, Subcutaneous, TID AC  ipratropium-albuterol, 3 mL, Nebulization, 4x Daily - RT  levoFLOXacin, 750 mg, Intravenous, Q48H  methylPREDNISolone sodium succinate, 60 mg, Intravenous, Q12H  metoprolol tartrate, 12.5 mg, Oral, Q12H  polyethylene glycol, 17 g, Oral, Daily  ranolazine, 1,000 mg, Oral, Q12H  sodium chloride, 10 mL, Intravenous, Q12H           Assessment/Plan   ASSESSMENT / PLAN      Pneumonia of both lungs due to infectious organism    1.  JILL on CKD- baseline creatinine 1.2, up to 1.8 today.  Etiology of JILL is not immediately apparent.  He does complain of nausea but has had no vomiting.  No diarrhea.  No hypotension noted in the record.  No new nephrotoxic medications.  At present we will hold lisinopril and lasix.  US negative for hydro, Tray negative, UA bland, FENa 3.7% consistent with intrinsic injury.    -Wt is down from previous, will add some gentle IVF and assess response.     2.  Pneumonia- continue antibiotics and management primary team     3.  Atrial fibrillation- now on amiodarone     4.  DM2     5.  Hypertension- well-controlled     6.  COPD     7.  History of CAD           This document has been electronically signed by EVE Fuller on December 5, 2021 10:36 CST           Electronically signed by Lenny Diaz MD at 12/05/21 1246     Karan Segovia MD at 12/04/21 1705              [facility]  [facility] Medicine Services  INPATIENT PROGRESS NOTE    Length of Stay: 2  Date of Admission: 12/1/2021  Primary Care Physician: Paolo Rey MD    Subjective   Chief Complaint: Shortness of air  HPI:    Patient has been admitted for pneumonia and has had atrial fibrillation.  He has had some confusion today but wife reports this is nothing new for him and he does get confused at times.    Review of Systems   Respiratory: Positive for shortness of breath.         All pertinent negatives and positives are as above. All other systems have been reviewed and are negative unless otherwise stated.     Objective    Temp:  [97.2 °F (36.2 °C)-97.8 °F (36.6 °C)] 97.8 °F (36.6 °C)  Heart Rate:  [] 115  Resp:  [16-20] 20  BP: (127-175)/() 163/98  Physical Exam  Vitals and nursing note reviewed.   Constitutional:       General: He is not in acute distress.     Appearance: He is well-developed. He is not diaphoretic.   HENT:      Head: Normocephalic and atraumatic.   Cardiovascular:      Rate and Rhythm: Normal rate.   Pulmonary:      Effort: Pulmonary effort is normal. No respiratory distress.      Breath sounds: No wheezing.   Abdominal:      General: There is no distension.      Palpations: Abdomen is soft.   Musculoskeletal:         General: Normal range of motion.   Skin:     General: Skin is warm and dry.   Neurological:      Mental Status: He is alert.      Cranial Nerves: No cranial nerve deficit.   Psychiatric:      Comments: Confusion present             Results Review:  I have reviewed the labs, radiology results, and diagnostic studies.    Laboratory Data:   Lab Results (last 24 hours)     Procedure Component Value Units Date/Time    Urinalysis With Microscopic If Indicated (No Culture) - [[id number]]  (Normal) Collected: 12/04/21 1652    Specimen: Urine Updated: 12/04/21 1702     Color, UA Yellow     Appearance, UA Clear     pH, UA <=5.0     Specific Gravity, UA 1.020      Glucose, UA Negative     Ketones, UA Negative     Bilirubin, UA Negative     Blood, UA Negative     Protein, UA Negative     Leuk Esterase, UA Negative     Nitrite, UA Negative     Urobilinogen, UA 0.2 E.U./dL    Narrative:      Urine microscopic not indicated.    Sodium, Urine, Random - [[id number]] Collected: 12/04/21 1652    Specimen: Urine Updated: 12/04/21 1701     Sodium, Urine 94 mmol/L     Narrative:      Reference intervals for random urine have not been established.  Clinical usage is dependent upon physician's interpretation in combination with other laboratory tests.       Creatinine, Urine, Random - [[id number]] Collected: 12/04/21 1652    Specimen: Urine Updated: 12/04/21 1658    Urine Eosinophils, Tray's Stain - [[id number]] Collected: 12/04/21 1652    Specimen: Urine Updated: 12/04/21 1658    Blood Culture - Blood, Hand, Right [[id number]]  (Normal) Collected: 12/01/21 1626    Specimen: Blood from Hand, Right Updated: 12/04/21 1631     Blood Culture No growth at 3 days    Comprehensive Metabolic Panel [[id number]]  (Abnormal) Collected: 12/04/21 0703    Specimen: Blood Updated: 12/04/21 0816     Glucose 166 mg/dL      BUN 48 mg/dL      Creatinine 1.88 mg/dL      Sodium 139 mmol/L      Potassium 4.4 mmol/L      Chloride 100 mmol/L      CO2 24.0 mmol/L      Calcium 8.8 mg/dL      Total Protein 6.2 g/dL      Albumin 3.80 g/dL      ALT (SGPT) 14 U/L      AST (SGOT) 15 U/L      Alkaline Phosphatase 54 U/L      Total Bilirubin 0.3 mg/dL      eGFR Non African Amer 34 mL/min/1.73      Globulin 2.4 gm/dL      A/G Ratio 1.6 g/dL      BUN/Creatinine Ratio 25.5     Anion Gap 15.0 mmol/L     Narrative:      GFR Normal >60  Chronic Kidney Disease <60  Kidney Failure <15      CBC & Differential [[id number]]  (Abnormal) Collected: 12/04/21 0703    Specimen: Blood Updated: 12/04/21 0731    Narrative:      The following orders were created for panel order CBC & Differential.  Procedure                                Abnormality         Status                     ---------                               -----------         ------                     CBC Auto Differential[[id number]]        Abnormal            Final result                 Please view results for these tests on the individual orders.    CBC Auto Differential [[id number]]  (Abnormal) Collected: 12/04/21 0703    Specimen: Blood Updated: 12/04/21 0731     WBC 15.32 10*3/mm3      RBC 4.00 10*6/mm3      Hemoglobin 11.4 g/dL      Hematocrit 35.1 %      MCV 87.8 fL      MCH 28.5 pg      MCHC 32.5 g/dL      RDW 18.0 %      RDW-SD 57.6 fl      MPV 11.2 fL      Platelets 184 10*3/mm3      Neutrophil % 88.4 %      Lymphocyte % 4.6 %      Monocyte % 5.4 %      Eosinophil % 0.0 %      Basophil % 0.2 %      Immature Grans % 1.4 %      Neutrophils, Absolute 13.55 10*3/mm3      Lymphocytes, Absolute 0.70 10*3/mm3      Monocytes, Absolute 0.82 10*3/mm3      Eosinophils, Absolute 0.00 10*3/mm3      Basophils, Absolute 0.03 10*3/mm3      Immature Grans, Absolute 0.22 10*3/mm3      nRBC 0.1 /100 WBC     POC Glucose Once [[id number]]  (Abnormal) Collected: 12/04/21 0551    Specimen: Blood Updated: 12/04/21 0618     Glucose 205 mg/dL      Comment: RN NotifiedOperator: [id number] CRISTINA HANNAHEISHAMeter ID: [id number]       POC Glucose Once [[id number]]  (Abnormal) Collected: 12/03/21 2040    Specimen: Blood Updated: 12/03/21 2103     Glucose 199 mg/dL      Comment: RN NotifiedOperator: [id number] RCISTINA HANNAHEISHAMeter ID: [id number]       POC Glucose Once [[id number]]  (Abnormal) Collected: 12/03/21 1027    Specimen: Blood Updated: 12/03/21 1755     Glucose 137 mg/dL      Comment: RN NotifiedOperator: [id number] JIMENEZ ALEXISMeter ID: [id number]             Culture Data:   No results found for: BLOODCX  No results found for: URINECX  No results found for: RESPCX  No results found for: WOUNDCX  No results found for: STOOLCX  No components found for: BODYFLD    Radiology Data:   Imaging  Results (Last 24 Hours)     ** No results found for the last 24 hours. **          I have reviewed the patient's current medications.     Assessment/Plan     Active Hospital Problems    Diagnosis    • Pneumonia of both lungs due to infectious organism          Pneumonia-continue with IV antibiotics and we will continue to monitor    Acute renal failure-nephrology has been consulted and we will continue to monitor    Hghkbxjbx-xzudnixkdsopax-nklvkm due to baseline dementia and/or delirium.  We will continue to monitor.     Chronic respiratory failure-we will continue with supplemental oxygen, seems to be at baseline     Paroxysmal atrial fibrillation-continue with cardiology management, currently on amiodarone.  Currently in sinus rhythm     Type 2 diabetes-continue monitor glucose levels     Elevated troponin level-cardiology managing     Hypertension-continue monitor blood pressure     COPD-nebulizer treatments as needed     DVT prophylaxis-SCD     Patient is full code     I confirmed that the patient's Advance Care Plan is present, code status is documented, or surrogate decision maker is listed in the patient's medical record.         Karan Segovia MD   12/04/21   17:54 CST      Electronically signed by Karan Segovia MD at 12/04/21 1802       Medical Student Notes (last 24 hours)  Notes from 12/05/21 0853 through 12/06/21 0853   No notes of this type exist for this encounter.         Consult Notes (last 24 hours)  Notes from 12/05/21 0853 through 12/06/21 0853   No notes of this type exist for this encounter.

## 2021-12-06 NOTE — PLAN OF CARE
"Goal Outcome Evaluation:  Plan of Care Reviewed With: patient        Progress: no change  Outcome Summary: Pt initially alert and cooperative. As tx cont pt became more confused to less cooperative. Pt t/f sup to sit with min of 1 and sat EOB with SBA for 6 minutes. Pt then agreeable to t/f to bedside chair and did so with mod of 1. Pt began immediately sliding in chair towards the floor with both eyes shut and wouldnt respond to instructions. Pt asked if he wanted to return to bed and pt nodded his head \"Yes\". Pt returned to bed with mod of 1 and t/f to sup with CGA. Nsg aware of pt's actions and activity this tx. Pt would cont to benefit from therapy upon DC.  "

## 2021-12-07 NOTE — PLAN OF CARE
"  Problem: Adult Inpatient Plan of Care  Goal: Plan of Care Review  Recent Flowsheet Documentation  Taken 12/7/2021 0816 by Erlinda Aguilar COTA  Progress: no change  Plan of Care Reviewed With: patient  Outcome Summary: Pt requesting to use urinal upon entry. Sup-sit-SBA. Pt sat EOB SBA. Pt became agitated once EOB and stated, \"I dont trust y'all and know what you all are doing. I'm going to call a  on all of you\" Pt given vc's to use urinal in which pt did so w/ SBA. Pt required set up and vc's to wash hands. Sit-sup-SBA. TX ended @ this time due to pt becoming more upset once supine. No goals met this date. Cont OT POC.     "

## 2021-12-07 NOTE — PROGRESS NOTES
"[facility]  [address] [place] [address]. [address]  T - [phone]  F - [phone]     Progress Note          PATIENT  DEMOGRAPHICS   PATIENT NAME: Hasmukh Ham                      PHYSICIAN: Jennifer George MD  : 1933  MRN: [id number]   LOS: 5 days    Patient Care Team:  Paolo Rey MD as PCP - General  Subjective   SUBJECTIVE   Has afib with rvr and plan to start amiodarone drip          Objective   OBJECTIVE   Vital Signs  Temp:  [96.3 °F (35.7 °C)-97.3 °F (36.3 °C)] 97.3 °F (36.3 °C)  Heart Rate:  [] 96  Resp:  [18-20] 18  BP: (123-145)/(67-90) 145/67    Flowsheet Rows      First Filed Value   Admission Height 171.5 cm (67.5\") Documented at 2021 1451   Admission Weight 68 kg (150 lb) Documented at 2021 1451           I/O last 3 completed shifts:  In: 1240 [P.O.:240; I.V.:1000]  Out: 1600 [Urine:1600]    PHYSICAL EXAM    Physical Exam  Constitutional:       Appearance: He is well-developed.   HENT:      Head: Normocephalic and atraumatic.   Eyes:      Conjunctiva/sclera: Conjunctivae normal.      Pupils: Pupils are equal, round, and reactive to light.   Cardiovascular:      Rate and Rhythm: Normal rate and regular rhythm.   Pulmonary:      Effort: Pulmonary effort is normal.      Breath sounds: Normal breath sounds.   Abdominal:      Palpations: Abdomen is soft.   Musculoskeletal:      Cervical back: Neck supple.      Right lower leg: No edema.      Left lower leg: No edema.   Skin:     General: Skin is warm and dry.   Neurological:      Mental Status: He is alert and oriented to person, place, and time.   Psychiatric:         Mood and Affect: Mood normal.         Behavior: Behavior normal.         RESULTS   Results Review:    Results from last 7 days   Lab Units 21  0643 21  1059 21  0713 21  0703 21  0703 21  0526 21  0526   SODIUM mmol/L 139 137 139   < > 139   < > 138   POTASSIUM mmol/L 4.5 4.4 4.2   < " Lab Results   Component Value Date    EGFR 32 07/30/2021    EGFR 32 07/29/2021    EGFR 29 07/28/2021    CREATININE 1 94 (H) 07/30/2021    CREATININE 1 96 (H) 07/29/2021    CREATININE 2 14 (H) 07/28/2021     · CKD stage 3 with creatinine baseline 1 4-1 7  He is only in taking 20-40% he is on nectar thick liquid secondary to dysphagia  Improved today Ultrasound of the kidney and bladder done on 07/20 2-for acute finding continues to imrove- no further blood work the patient continues to have poor p o   Intake and is being transitioned at discharge on comfort care/hospice awaiting insurance authorization > 4.4   < > 4.6   CHLORIDE mmol/L 106 102 98   < > 100   < > 103   CO2 mmol/L 23.0 24.0 26.0   < > 24.0   < > 26.0   BUN mg/dL 47* 61* 58*   < > 48*   < > 19   CREATININE mg/dL 1.31* 1.47* 1.84*   < > 1.88*   < > 1.16   CALCIUM mg/dL 8.8 8.7 9.3   < > 8.8   < > 8.7   BILIRUBIN mg/dL  --   --  0.5  --  0.3  --  0.2   ALK PHOS U/L  --   --  54  --  54  --  56   ALT (SGPT) U/L  --   --  19  --  14  --  12   AST (SGOT) U/L  --   --  18  --  15  --  12   GLUCOSE mg/dL 177* 219* 175*   < > 166*   < > 192*    < > = values in this interval not displayed.       Estimated Creatinine Clearance: 37.6 mL/min (A) (by C-G formula based on SCr of 1.31 mg/dL (H)).    Results from last 7 days   Lab Units 12/01/21  1510   MAGNESIUM mg/dL 1.6             Results from last 7 days   Lab Units 12/05/21  0713 12/04/21  0703 12/02/21  0526 12/01/21  1510   WBC 10*3/mm3 15.59* 15.32* 10.27 13.38*   HEMOGLOBIN g/dL 13.1 11.4* 11.6* 12.5*   PLATELETS 10*3/mm3 238 184 168 184       Results from last 7 days   Lab Units 12/01/21  1626   INR  1.02         Imaging Results (Last 24 Hours)     ** No results found for the last 24 hours. **           MEDICATIONS    amiodarone, 100 mg, Oral, Q24H  amiodarone, 150 mg, Intravenous, Once  cetirizine, 5 mg, Oral, Daily  clopidogrel, 75 mg, Oral, Daily  famotidine, 20 mg, Oral, BID AC  insulin aspart, 0-9 Units, Subcutaneous, TID AC  ipratropium-albuterol, 3 mL, Nebulization, 4x Daily - RT  lactulose, 10 g, Oral, Daily  levoFLOXacin, 750 mg, Intravenous, Q48H  methylPREDNISolone sodium succinate, 60 mg, Intravenous, Q24H  metoprolol tartrate, 12.5 mg, Oral, Q12H  polyethylene glycol, 17 g, Oral, Daily  ranolazine, 1,000 mg, Oral, Q12H  sodium chloride, 10 mL, Intravenous, Q12H      amiodarone, 1 mg/min   Followed by  amiodarone, 0.5 mg/min  sodium chloride, 75 mL/hr, Last Rate: 75 mL/hr (12/06/21 2002)        Assessment/Plan   ASSESSMENT / PLAN      Pneumonia of both lungs due to infectious organism    1.   JILL on CKD- baseline creatinine 1.2, up to 1.8 peak and now better.      Etiology of JILL is not immediately apparent.  He does complain of nausea but has had no vomiting.  No diarrhea.  No hypotension noted in the record.  No new nephrotoxic medications.  At present we will hold lisinopril and lasix.  US negative for hydro, Tray negative, UA bland, FENa 3.7% consistent with intrinsic injury.    Dc ivf. Add iv lasix due to wheezing     2.  Pneumonia- continue antibiotics and management primary team     3.  Atrial fibrillation- now on amiodarone     4.  DM2     5.  Hypertension- well-controlled     6.  COPD     7.  History of CAD    8. Constipation add lactulose           This document has been electronically signed by Jennifer George MD on December 7, 2021 09:29 CST

## 2021-12-07 NOTE — PLAN OF CARE
Goal Outcome Evaluation:         Patient more oriented to situation today, No further changes, Will continue to monitor

## 2021-12-07 NOTE — SIGNIFICANT NOTE
12/07/21 1420   OTHER   Discipline physical therapy assistant   Rehab Time/Intention   Session Not Performed patient/family declined treatment

## 2021-12-07 NOTE — THERAPY TREATMENT NOTE
Patient Name: Hasmukh Ham  : 1933    MRN: [id number]                              Today's Date: 2021       Admit Date: 2021    Visit Dx:     ICD-10-CM ICD-9-CM   1. Pneumonia of both lungs due to infectious organism, unspecified part of lung  J18.9 483.8   2. Precordial pain  R07.2 786.51   3. Atrial fibrillation, unspecified type ([facility])  I48.91 427.31   4. Impaired mobility and ADLs  Z74.09 V49.89    Z78.9    5. 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[facility])   • Nocturia   • Osteoarthritis of multiple joints   • Hyperlipidemia   • Pain in joint involving ankle and foot   • Pneumonia of left lower lobe due to infectious organism   • Arthropathy of hand   • Paroxysmal tachycardia ([facility])   • Osteoarthrosis involving more than one site but not generalized   • Chronic right shoulder pain   • Rotator cuff syndrome   • Partial tear of subscapularis tendon   • Infraspinatus tendon tear   • Supraspinatus tendon tear   • Bilateral carotid artery stenosis   • Pulmonary HTN (HCC)   • Acute interstitial pneumonia (HCC)   • Chronic obstructive lung disease (HCC)   • Diabetes mellitus (HCC)   • Hypertension   • Chest pain   • Shortness of breath   • Angina pectoris (HCC)   • Myocardial infarction (HCC)   • Ingrown toenail   • Heart problem   • Degenerative joint disease involving multiple joints   • Chronic obstructive pulmonary disease (COPD) (HCC)   • Bleeding disorder ([facility])   • Chronic  obstructive pulmonary disease with acute exacerbation (HCC)   • Failure of outpatient treatment   • Sepsis (HCC)   • 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)   • Chronic diastolic CHF (congestive heart failure) (HCC)   • SOB (shortness of breath)   • Cause of injury, fall   • Right hip pain   • Chronic pain of right knee   • Primary osteoarthritis of right knee   • Left shoulder pain   • Left arm pain   • AC joint arthropathy   • Syncope   • Inflammatory arthritis   • Elevated troponin   • Fever   • Paroxysmal atrial fibrillation with rapid ventricular response ([facility])   • Fibrosis of lung (HCC)   • Type 2 diabetes mellitus (HCC)   • Chondrocalcinosis of right knee   • Nontraumatic complete tear of right rotator cuff   • Pneumonia of both lungs due to infectious organism     Past Medical History:   Diagnosis Date   • Basal cell carcinoma    • Bilateral carotid artery stenosis    • Bilateral carotid artery stenosis    • Bleeding disorder (HCC)    • CHF (congestive heart failure) ([facility])    • Chronic obstructive pulmonary disease (COPD) ([facility])    • Coronary arteriosclerosis    • Degenerative joint disease involving multiple joints    • Dementia ([facility])    • Diabetes mellitus ([facility])    • Heart problem    • History of stomach ulcers    • Hyperlipidemia    • Hypertension    • Ingrown toenail    • Myocardial infarction ([facility])      Past Surgical History:   Procedure Laterality Date   • BACK SURGERY     • CARDIAC CATHETERIZATION N/A 6/6/2017    Procedure: Right Heart Cath;  Surgeon: Jeff Maciel MD PhD;  Location: [facility] CATH INVASIVE LOCATION;  Service:    • CARDIAC CATHETERIZATION N/A 2/14/2018    Procedure: Coronary angiography;  Surgeon: Moisés Davila MD;  Location: [facility] CATH INVASIVE LOCATION;  Service:    • CARDIAC CATHETERIZATION N/A 10/28/2021    Procedure: Left Heart Cath;  Surgeon: Carine Johnson MD;  Location: [facility] CATH INVASIVE LOCATION;  Service: Cardiology;  Laterality: N/A;    • CORONARY ANGIOPLASTY WITH STENT PLACEMENT     • CORONARY STENT PLACEMENT     • HERNIA REPAIR     • LUNG BIOPSY     • LUNG SURGERY     • NECK SURGERY     • THORACOTOMY Left 1977   • VENTRAL HERNIA REPAIR        General Information     Row Name 12/07/21 0816          OT Time and Intention    Document Type therapy note (daily note)  -KD     Mode of Treatment individual therapy; occupational therapy  -KD     Row Name 12/07/21 0816          General Information    Patient Profile Reviewed yes  -KD     Existing Precautions/Restrictions fall  -KD     Row Name 12/07/21 0816          Cognition    Orientation Status (Cognition) oriented to; person  -KD     Row Name 12/07/21 0816          Safety Issues, Functional Mobility    Impairments Affecting Function (Mobility) strength; endurance/activity tolerance; shortness of breath; balance; pain; cognition  -KD           User Key  (r) = Recorded By, (t) = Taken By, (c) = Cosigned By    Initials Name Provider Type    Erlinda Martínez COTA Occupational Therapy Assistant                 Mobility/ADL's     Row Name 12/07/21 0816          Activities of Daily Living    BADL Assessment/Intervention toileting  -KD     Row Name 12/07/21 0816          Grooming Assessment/Training    [place] Level (Grooming) wash face, hands; verbal cues  -KD     Position (Grooming) edge of bed sitting  -KD     Row Name 12/07/21 0816          Toileting Assessment/Training    [place] Level (Toileting) standby assist  -KD     Assistive Devices (Toileting) urinal  -KD     Position (Toileting) edge of bed sitting  -KD           User Key  (r) = Recorded By, (t) = Taken By, (c) = Cosigned By    Initials Name Provider Type    Erlinda Martínez COTA Occupational Therapy Assistant               Obj/Interventions    No documentation.                Goals/Plan     Row Name 12/07/21 0816          Transfer Goal 1 (OT)    Activity/Assistive Device (Transfer Goal 1, OT) toilet  -KD     [place]  "Level/Cues Needed (Transfer Goal 1, OT) modified independence  -KD     Time Frame (Transfer Goal 1, OT) long term goal (LTG); by discharge  -KD     Progress/Outcome (Transfer Goal 1, OT) goal not met  -KD     Row Name 12/07/21 0816          Bathing Goal 1 (OT)    Activity/Device (Bathing Goal 1, OT) bathing skills, all  -KD     [place] Level/Cues Needed (Bathing Goal 1, OT) supervision required  -KD     Time Frame (Bathing Goal 1, OT) long term goal (LTG); by discharge  -KD     Progress/Outcomes (Bathing Goal 1, OT) goal not met  -KD     Row Name 12/07/21 0816          Dressing Goal 1 (OT)    Activity/Device (Dressing Goal 1, OT) lower body dressing  -KD     [place]/Cues Needed (Dressing Goal 1, OT) supervision required  -KD     Time Frame (Dressing Goal 1, OT) long term goal (LTG); by discharge  -KD     Progress/Outcome (Dressing Goal 1, OT) goal not met  -KD           User Key  (r) = Recorded By, (t) = Taken By, (c) = Cosigned By    Initials Name Provider Type     Erlinda Aguilar COTA Occupational Therapy Assistant               Clinical Impression     Row Name 12/07/21 0816          Pain Scale: Numbers Pre/Post-Treatment    Pre/Posttreatment Pain Comment Pt showed no s/s of pain  -KD     Row Name 12/07/21 0816          Plan of Care Review    Plan of Care Reviewed With patient  -KD     Progress no change  -KD     Outcome Summary Pt requesting to use urinal upon entry. Sup-sit-SBA. Pt sat EOB SBA. Pt became agitated once EOB and stated, \"I dont trust y'all and know what you all are doing. I'm going to call a  on all of you\" Pt given vc's to use urinal in which pt did so w/ SBA. Pt required set up and vc's to wash hands. Sit-sup-SBA. TX ended @ this time josue to pt becoming more upset once supine. No goals met this date. Cont OT PODC.  -KD     Row Name 12/07/21 0816          Therapy Plan Review/Discharge Plan (OT)    Anticipated Discharge Disposition (OT) home with home health; home with 24/7 care  " -KD     Row Name 12/07/21 0816          Vital Signs    Pre Systolic BP Rehab 126  -KD     Pre Treatment Diastolic BP 69  -KD     Pretreatment Heart Rate (beats/min) 63  -KD     Pre SpO2 (%) 100  -KD     O2 Delivery Pre Treatment room air  -KD     Pre Patient Position Supine  -KD     Intra Patient Position Sitting  -KD     Post Patient Position Supine  -KD     Row Name 12/07/21 0816          Positioning and Restraints    Pre-Treatment Position in bed  -KD     Post Treatment Position bed  -KD     In Bed fowlers; call light within reach; encouraged to call for assist; exit alarm on  -KD           User Key  (r) = Recorded By, (t) = Taken By, (c) = Cosigned By    Initials Name Provider Type    Erlinda Martínez COTA Occupational Therapy Assistant               Outcome Measures     Row Name 12/07/21 0816          How much help from another is currently needed...    Putting on and taking off regular lower body clothing? 3  -KD     Bathing (including washing, rinsing, and drying) 3  -KD     Toileting (which includes using toilet bed pan or urinal) 3  -KD     Putting on and taking off regular upper body clothing 3  -KD     Taking care of personal grooming (such as brushing teeth) 4  -KD     Eating meals 4  -KD     AM-PAC 6 Clicks Score (OT) 20  -KD           User Key  (r) = Recorded By, (t) = Taken By, (c) = Cosigned By    Initials Name Provider Type    Erlinda Martínez COTA Occupational Therapy Assistant                Occupational Therapy Education                 Title: PT OT SLP Therapies (In Progress)     Topic: Occupational Therapy (In Progress)     Point: ADL training (In Progress)     Description:   Instruct learner(s) on proper safety adaptation and remediation techniques during self care or transfers.   Instruct in proper use of assistive devices.              Learning Progress Summary           Patient Acceptance, E, NR by BB at 12/6/2021 1200                   Point: Home exercise program (Not Started)      "Description:   Instruct learner(s) on appropriate technique for monitoring, assisting and/or progressing therapeutic exercises/activities.              Learner Progress:  Not documented in this visit.          Point: Precautions (Done)     Description:   Instruct learner(s) on prescribed precautions during self-care and functional transfers.              Learning Progress Summary           Patient Acceptance, E,TB, VU by  at 12/2/2021 1325    Comment: POC, role of OT, transfer training                   Point: Body mechanics (In Progress)     Description:   Instruct learner(s) on proper positioning and spine alignment during self-care, functional mobility activities and/or exercises.              Learning Progress Summary           Patient Acceptance, E, NR by BB at 12/6/2021 1200    Acceptance, E,TB, VU by  at 12/2/2021 1325    Comment: POC, role of OT, transfer training                               User Key     Initials Effective Dates Name Provider Type Discipline     06/16/21 -  Susannah Villalta COTA Occupational Therapy Assistant OT     06/14/21 -  Israel Black, OT Occupational Therapist OT              OT Recommendation and Plan     Plan of Care Review  Plan of Care Reviewed With: patient  Progress: no change  Outcome Summary: Pt requesting to use urinal upon entry. Sup-sit-SBA. Pt sat EOB SBA. Pt became agitated once EOB and stated, \"I dont trust y'all and know what you all are doing. I'm going to call a  on all of you\" Pt given vc's to use urinal in which pt did so w/ SBA. Pt required set up and vc's to wash hands. Sit-sup-SBA. TX ended @ this time josue to pt becoming more upset once supine. No goals met this date. Cont OT PODC.     Time Calculation:    Time Calculation- OT     Row Name 12/07/21 0816             Time Calculation- OT    OT Start Time 0816  -KD      OT Stop Time 0831  -KD      OT Time Calculation (min) 15 min  -KD      Total Timed Code Minutes- OT 15 minute(s)  -KD      " OT Received On 12/07/21  -KD              Timed Charges    68067 - OT Self Care/Mgmt Minutes 15  -KD              Total Minutes    Timed Charges Total Minutes 15  -KD       Total Minutes 15  -KD            User Key  (r) = Recorded By, (t) = Taken By, (c) = Cosigned By    Initials Name Provider Type    Erlinda Martínez COTA Occupational Therapy Assistant              Therapy Charges for Today     Code Description Service Date Service Provider Modifiers Qty    47483353006 HC OT SELF CARE/MGMT/TRAIN EA 15 MIN 12/7/2021 Erlinda Aguilar COTA GO 1               KRYSTIN Gilmore  12/7/2021

## 2021-12-07 NOTE — PROGRESS NOTES
[facility] Medicine Services  INPATIENT PROGRESS NOTE    Length of Stay: 4  Date of Admission: 12/1/2021  Primary Care Physician: Paolo Rey MD    Subjective   Chief Complaint: confused    HPI:  Pt confused.  He denies any complaints.  Family has inquired about long term care.      Review of Systems   Unable to perform ROS: Dementia          Objective    Temp:  [97.1 °F (36.2 °C)-98.5 °F (36.9 °C)] 97.1 °F (36.2 °C)  Heart Rate:  [] 114  Resp:  [18-20] 18  BP: (118-157)/(69-90) 124/83    Physical Exam  Constitutional:       Appearance: Normal appearance.   HENT:      Head: Normocephalic and atraumatic.      Right Ear: External ear normal.      Left Ear: External ear normal.      Nose: Nose normal.      Mouth/Throat:      Mouth: Mucous membranes are moist.      Pharynx: Oropharynx is clear.   Eyes:      General: No scleral icterus.     Extraocular Movements: Extraocular movements intact.      Pupils: Pupils are equal, round, and reactive to light.   Cardiovascular:      Rate and Rhythm: Tachycardia present.   Pulmonary:      Breath sounds: No wheezing, rhonchi or rales.   Abdominal:      Palpations: Abdomen is soft.      Tenderness: There is no abdominal tenderness. There is no guarding or rebound.   Musculoskeletal:      Cervical back: Neck supple.      Right lower leg: No edema.      Left lower leg: No edema.   Lymphadenopathy:      Cervical: No cervical adenopathy.   Skin:     General: Skin is warm and dry.      Findings: No rash.   Neurological:      Mental Status: He is alert. He is disoriented.             Results Review:  I have reviewed the labs, radiology results, and diagnostic studies.    Laboratory Data:   Results from last 7 days   Lab Units 12/06/21  1059 12/05/21  0713 12/04/21  0703 12/02/21  0526 12/02/21  0526   SODIUM mmol/L 137 139 139   < > 138   POTASSIUM mmol/L 4.4 4.2 4.4   < > 4.6   CHLORIDE mmol/L 102 98 100   < > 103   CO2 mmol/L 24.0  26.0 24.0   < > 26.0   BUN mg/dL 61* 58* 48*   < > 19   CREATININE mg/dL 1.47* 1.84* 1.88*   < > 1.16   GLUCOSE mg/dL 219* 175* 166*   < > 192*   CALCIUM mg/dL 8.7 9.3 8.8   < > 8.7   BILIRUBIN mg/dL  --  0.5 0.3  --  0.2   ALK PHOS U/L  --  54 54  --  56   ALT (SGPT) U/L  --  19 14  --  12   AST (SGOT) U/L  --  18 15  --  12   ANION GAP mmol/L 11.0 15.0 15.0   < > 9.0    < > = values in this interval not displayed.     Estimated Creatinine Clearance: 33.5 mL/min (A) (by C-G formula based on SCr of 1.47 mg/dL (H)).  Results from last 7 days   Lab Units 12/01/21  1510   MAGNESIUM mg/dL 1.6         Results from last 7 days   Lab Units 12/05/21  0713 12/04/21  0703 12/02/21  0526 12/01/21  1510   WBC 10*3/mm3 15.59* 15.32* 10.27 13.38*   HEMOGLOBIN g/dL 13.1 11.4* 11.6* 12.5*   HEMATOCRIT % 40.4 35.1* 37.5 39.0   PLATELETS 10*3/mm3 238 184 168 184     Results from last 7 days   Lab Units 12/01/21  1626   INR  1.02       Culture Data:   No results found for: BLOODCX  No results found for: URINECX  No results found for: RESPCX  No results found for: WOUNDCX  No results found for: STOOLCX  No components found for: BODYFLD    Radiology Data:   Imaging Results (Last 24 Hours)     ** No results found for the last 24 hours. **          I have reviewed the patient's current medications.     Assessment/Plan     Active Hospital Problems    Diagnosis    • Pneumonia of both lungs due to infectious organism        Plan:      1.  Pneumonia   2.  Acute renal failure   3.  delerium with baseline dementia   4.  PAF - not on LTAC due to history of GIB   5.  Chronic hypoxic resp failure on home O2 - baseline  6.  Elevated trop  7.  HTN, benign  8.  COPD    - cont levaquin   - decrease steroids  - cont nebs   - cont cardiac meds per cardiology   - family has considering placement - SW/CM informed               Discharge Planning: I expect patient to be discharged to SNF/Long term care when bed available    Kelvin Aguilar MD

## 2021-12-07 NOTE — PROGRESS NOTES
[facility] Medicine Services  INPATIENT PROGRESS NOTE    Length of Stay: 5  Date of Admission: 12/1/2021  Primary Care Physician: Paolo Rey MD    Subjective   Chief Complaint: confused    HPI:  Pt confused overall but recognizes me as his doctor when I enter the room.  He denies any complaints.  Family now wanting home with home health at [place].  Pt placed on amio gtt for increased rate this am.     Per nursing report, patient had increased confusion with paranoia today.      Review of Systems   Unable to perform ROS: Dementia          Objective    Temp:  [96.3 °F (35.7 °C)-97.3 °F (36.3 °C)] 96.9 °F (36.1 °C)  Heart Rate:  [] 64  Resp:  [18-22] 22  BP: (123-176)/(67-96) 176/96    Physical Exam  Constitutional:       Appearance: Normal appearance.   HENT:      Head: Normocephalic and atraumatic.      Right Ear: External ear normal.      Left Ear: External ear normal.      Nose: Nose normal.      Mouth/Throat:      Mouth: Mucous membranes are moist.      Pharynx: Oropharynx is clear.   Eyes:      General: No scleral icterus.     Extraocular Movements: Extraocular movements intact.      Pupils: Pupils are equal, round, and reactive to light.   Cardiovascular:      Rate and Rhythm: Tachycardia present. Rhythm regularly irregular.   Pulmonary:      Breath sounds: No wheezing, rhonchi or rales.   Abdominal:      Palpations: Abdomen is soft.      Tenderness: There is no abdominal tenderness. There is no guarding or rebound.   Musculoskeletal:      Cervical back: Neck supple.      Right lower leg: No edema.      Left lower leg: No edema.   Lymphadenopathy:      Cervical: No cervical adenopathy.   Skin:     General: Skin is warm and dry.      Findings: No rash.   Neurological:      Mental Status: He is alert. He is disoriented.             Results Review:  I have reviewed the labs, radiology results, and diagnostic studies.    Laboratory Data:   Results from last 7 days    Lab Units 12/07/21  0643 12/06/21  1059 12/05/21  0713 12/04/21  0703 12/04/21  0703 12/02/21  0526 12/02/21  0526   SODIUM mmol/L 139 137 139   < > 139   < > 138   POTASSIUM mmol/L 4.5 4.4 4.2   < > 4.4   < > 4.6   CHLORIDE mmol/L 106 102 98   < > 100   < > 103   CO2 mmol/L 23.0 24.0 26.0   < > 24.0   < > 26.0   BUN mg/dL 47* 61* 58*   < > 48*   < > 19   CREATININE mg/dL 1.31* 1.47* 1.84*   < > 1.88*   < > 1.16   GLUCOSE mg/dL 177* 219* 175*   < > 166*   < > 192*   CALCIUM mg/dL 8.8 8.7 9.3   < > 8.8   < > 8.7   BILIRUBIN mg/dL  --   --  0.5  --  0.3  --  0.2   ALK PHOS U/L  --   --  54  --  54  --  56   ALT (SGPT) U/L  --   --  19  --  14  --  12   AST (SGOT) U/L  --   --  18  --  15  --  12   ANION GAP mmol/L 10.0 11.0 15.0   < > 15.0   < > 9.0    < > = values in this interval not displayed.     Estimated Creatinine Clearance: 37.6 mL/min (A) (by C-G formula based on SCr of 1.31 mg/dL (H)).  Results from last 7 days   Lab Units 12/01/21  1510   MAGNESIUM mg/dL 1.6         Results from last 7 days   Lab Units 12/05/21  0713 12/04/21  0703 12/02/21  0526 12/01/21  1510   WBC 10*3/mm3 15.59* 15.32* 10.27 13.38*   HEMOGLOBIN g/dL 13.1 11.4* 11.6* 12.5*   HEMATOCRIT % 40.4 35.1* 37.5 39.0   PLATELETS 10*3/mm3 238 184 168 184     Results from last 7 days   Lab Units 12/01/21  1626   INR  1.02       Culture Data:   No results found for: BLOODCX  No results found for: URINECX  No results found for: RESPCX  No results found for: WOUNDCX  No results found for: STOOLCX  No components found for: BODYFLD    Radiology Data:   Imaging Results (Last 24 Hours)     ** No results found for the last 24 hours. **          I have reviewed the patient's current medications.     Assessment/Plan     Active Hospital Problems    Diagnosis    • Pneumonia of both lungs due to infectious organism        Plan:      1.  Pneumonia   2.  Acute/Chronic renal failure   3.  delerium with baseline dementia   4.  PAF - not on LTAC due to history of  GIB   5.  Chronic hypoxic resp failure on home O2   6.  Elevated trop  7.  HTN, benign  8.  COPD    - cont levaquin - day #5/7  - cont to decrease steroids - may be contributing to delerium and paranoia  - cont nebs   - cont cardiac meds per cardiology - currently on amio gtt for rate control  - IVF stopped and lasix ordered per renal  - family has requesting home with home health at [place].              Discharge Planning: I expect patient to be discharged to home with home health.     Kelvin Aguilar MD

## 2021-12-07 NOTE — PROGRESS NOTES
"[facility] [place] [facility] Progress Note   LOS: 5 days   Patient Care Team:  Paolo Rey MD as PCP - General    Chief Complaint:    Chief Complaint   Patient presents with   • Palpitations   • Chest Pain   • Fatigue        Subjective     Interval History:      Patient intermittently confused today with some paranoia.  Denies any symptoms.  Patient developed atrial fibrillation with RVR and was started on amiodarone drip with some improvement in his rate to now at 105 bpm.  Wife is at bedside patient currently resting comfortably.    Objective     Vital Sign Min/Max for last 24 hours  Temp  Min: 96.3 °F (35.7 °C)  Max: 97.3 °F (36.3 °C)   BP  Min: 123/80  Max: 145/67   Pulse  Min: 50  Max: 115   Resp  Min: 18  Max: 20   SpO2  Min: 95 %  Max: 100 %   Flow (L/min)  Min: 2  Max: 2   No data recorded     Flowsheet Rows      First Filed Value   Admission Height 171.5 cm (67.5\") Documented at 12/01/2021 1451   Admission Weight 68 kg (150 lb) Documented at 12/01/2021 1451            12/03/21  0545 12/05/21  0343 12/06/21  0630   Weight: 68.9 kg (151 lb 12.8 oz) 66.9 kg (147 lb 8 oz) 68.2 kg (150 lb 4.8 oz)       Physical Exam:  Physical Exam  Vitals and nursing note reviewed.   Constitutional:       General: He is not in acute distress.     Appearance: Normal appearance. He is well-developed. He is not ill-appearing, toxic-appearing or diaphoretic.   HENT:      Head: Normocephalic.      Right Ear: External ear normal.      Left Ear: External ear normal.   Eyes:      General: Lids are normal.      Pupils: Pupils are equal, round, and reactive to light.   Neck:      Thyroid: No thyromegaly.      Vascular: No carotid bruit or JVD.      Trachea: No tracheal deviation.   Cardiovascular:      Rate and Rhythm: Tachycardia present. Rhythm irregularly irregular.      Chest Wall: PMI is not displaced. No thrill.      Heart sounds: Normal heart sounds, S1 normal and S2 normal. No murmur heard.  No friction rub. No gallop. No S3 or S4 " sounds.    Pulmonary:      Effort: Pulmonary effort is normal. No respiratory distress.      Breath sounds: Normal breath sounds. No stridor. No wheezing or rales.   Chest:      Chest wall: No tenderness.   Abdominal:      General: Bowel sounds are normal. There is no distension.      Palpations: Abdomen is soft.      Tenderness: There is no abdominal tenderness.   Musculoskeletal:      Right lower leg: No edema.      Left lower leg: No edema.   Skin:     General: Skin is warm and dry.      Findings: No erythema or rash.   Neurological:      Mental Status: He is alert. He is confused.   Psychiatric:         Thought Content: Thought content is paranoid.          Results Review:     Results from last 7 days   Lab Units 12/07/21  0643 12/06/21  1059 12/05/21  0713 12/04/21  0703 12/04/21  0703 12/02/21  0526 12/02/21  0526   SODIUM mmol/L 139 137 139   < > 139   < > 138   POTASSIUM mmol/L 4.5 4.4 4.2   < > 4.4   < > 4.6   CHLORIDE mmol/L 106 102 98   < > 100   < > 103   CO2 mmol/L 23.0 24.0 26.0   < > 24.0   < > 26.0   BUN mg/dL 47* 61* 58*   < > 48*   < > 19   CREATININE mg/dL 1.31* 1.47* 1.84*   < > 1.88*   < > 1.16   CALCIUM mg/dL 8.8 8.7 9.3   < > 8.8   < > 8.7   BILIRUBIN mg/dL  --   --  0.5  --  0.3  --  0.2   ALK PHOS U/L  --   --  54  --  54  --  56   ALT (SGPT) U/L  --   --  19  --  14  --  12   AST (SGOT) U/L  --   --  18  --  15  --  12   GLUCOSE mg/dL 177* 219* 175*   < > 166*   < > 192*    < > = values in this interval not displayed.       Estimated Creatinine Clearance: 37.6 mL/min (A) (by C-G formula based on SCr of 1.31 mg/dL (H)).    Results from last 7 days   Lab Units 12/01/21  1510   MAGNESIUM mg/dL 1.6             Results from last 7 days   Lab Units 12/05/21  0713 12/04/21  0703 12/02/21  0526 12/01/21  1510   WBC 10*3/mm3 15.59* 15.32* 10.27 13.38*   HEMOGLOBIN g/dL 13.1 11.4* 11.6* 12.5*   PLATELETS 10*3/mm3 238 184 168 184       Results from last 7 days   Lab Units 12/01/21  1626   INR  1.02      Lab Results   Component Value Date    PROBNP 920.0 12/01/2021       I/O last 3 completed shifts:  In: 1240 [P.O.:240; I.V.:1000]  Out: 1600 [Urine:1600]    Cardiographics:  ECG/EMG Results (last 24 hours)     ** No results found for the last 24 hours. **        Results for orders placed during the hospital encounter of 10/25/21    Adult Transthoracic Echo Limited W/ Cont if Necessary Per Protocol    Interpretation Summary  · Significant beat to beat variability but estimated EF appears around 40-45%.  · Left ventricular systolic function is mildly decreased.  · Estimated right ventricular systolic pressure from tricuspid regurgitation is normal (<35 mmHg).  · Left ventricular diastolic function was not assessed.  · Left atrial volume is moderately increased.      Imaging Results (Most Recent)     Procedure Component Value Units Date/Time    XR Chest 1 View [[id number]] Collected: 12/05/21 1229     Updated: 12/05/21 1820    Narrative:          EXAM:  XR CHEST 1 VIEW    TECHNIQUE:   Single frontal radiograph of the chest.    VIEWS:  1 view    CLINICAL HISTORY:   88 years  Male  Shortness of breath, J18.9 Pneumonia, unspecified  organism R07.2 Precordial pain I48.91 Unspecified atrial  fibrillation Z74.09 Other reduced mobility Z78.9 Other specified  health status Z74.09 Other reduced mobility    COMPARISON:   December 1, 2021 chest x-ray    FINDINGS:   Lungs:  Patchy bilateral interstitial opacities are not  significantly changed  Pleura: Small left pleural effusion. No pneumothorax.  Heart/mediastinum: The cardiac silhouette is not enlarged.  Bones: No acute osseous findings.  Soft tissues: Unremarkable.      Impression:      1.  Patchy bilateral interstitial opacities are not significantly  changed.  2.  Small left pleural effusion.    Electronically signed by:  Rc Johnson MD  12/5/2021 6:19  PM CST Workstation: [id number]     Renal Bilateral [[id number]] Collected: 12/04/21 2047     Updated: 12/05/21  0035    Narrative:      EXAM:    US Retroperitoneal Limited, Renal    CLINICAL HISTORY:    The patient is 88 years old and is Male; JILL, J18.9 Pneumonia,  unspecified organism R07.2 Precordial pain I48.91 Unspecified  atrial fibrillation Z74.09 Other reduced mobility Z78.9 Other  specified health status Z74.09 Other reduced mobility    TECHNIQUE:    Real-time limited ultrasound of the retroperitoneum with image  documentation.    COMPARISON:    CT of the abdomen and pelvis June 22, 2021    FINDINGS:    RIGHT KIDNEY:  The right kidney measures 10.4 cm in length.  No  stones.  No hydronephrosis.    LEFT KIDNEY:  Bilobed left renal cyst measuring 7.8 x 4.7 x 5.6  cm is present. The left kidney measures 11.0 cm in length.  No  stones.  No hydronephrosis.    BLADDER:  The bladder is well distended.      Impression:        Large left renal cyst. Otherwise, unremarkable renal  ultrasound.    Electronically signed by:  Masha Baires MD  12/5/2021 12:33 AM  CST Workstation: [id number]    XR Chest 1 View [[id number]] Collected: 12/01/21 1515     Updated: 12/01/21 1607    Narrative:        PROCEDURE: Single chest view portable    REASON FOR EXAM:Chest Pain triage protocol  Chest Pain Triage Protocol    FINDINGS: Comparison exam dated October 25, 2021. Cardiac size  appears within normal limits. Left upper lobe perihilar and left  lung base infrahilar interstitial groundglass opacities. Right  upper lobe peripheral small interstitial groundglass opacity.  Lungs are otherwise clear. Pleural spaces are normal. No acute  osseous abnormality. Stable epidural stimulator lead in the mid  thoracic spine region.      Impression:      1.  Left upper lobe perihilar and left lung base infrahilar  interstitial groundglass opacities. Right upper lobe peripheral  small interstitial groundglass opacity. These opacities would be  suspicious for pneumonia including possible atypical viral  etiology and/or pulmonary edema. Recommend clinical  correlation.    Electronically signed by:  Nelson Christianson MD  12/1/2021 4:06 PM CST  Workstation: [id number]          XR Chest 1 View    Result Date: 12/5/2021  1.  Patchy bilateral interstitial opacities are not significantly changed. 2.  Small left pleural effusion. Electronically signed by:  Rc Johnson MD  12/5/2021 6:19 PM CST Workstation: [id number]    XR Chest 1 View    Result Date: 12/1/2021  1.  Left upper lobe perihilar and left lung base infrahilar interstitial groundglass opacities. Right upper lobe peripheral small interstitial groundglass opacity. These opacities would be suspicious for pneumonia including possible atypical viral etiology and/or pulmonary edema. Recommend clinical correlation. Electronically signed by:  Nelson Christianson MD  12/1/2021 4:06 PM CST Workstation: [id number]    US Renal Bilateral    Result Date: 12/5/2021    Large left renal cyst. Otherwise, unremarkable renal ultrasound. Electronically signed by:  Masha Baires MD  12/5/2021 12:33 AM CST Workstation: [id number]      Medication Review:     Current Facility-Administered Medications:   •  acetaminophen (TYLENOL) tablet 650 mg, 650 mg, Oral, Q4H PRN, Christal Gonzalez MD  •  albuterol (PROVENTIL) nebulizer solution 0.083% 2.5 mg/3mL, 2.5 mg, Nebulization, Q4H PRN, Christal Gonzalez MD  •  amiodarone (PACERONE) half tablet 100 mg, 100 mg, Oral, Q24H, Mana Curtis MD, 100 mg at 12/07/21 0944  •  [COMPLETED] amiodarone in dextrose 5% (NEXTERONE) loading dose 150mg/100mL, 150 mg, Intravenous, Once, Stopped at 12/07/21 0948 **FOLLOWED BY** amiodarone 360 mg in 200 mL D5W infusion, 1 mg/min, Intravenous, Continuous, Last Rate: 33.3 mL/hr at 12/07/21 0946, 1 mg/min at 12/07/21 0946 **FOLLOWED BY** amiodarone 360 mg in 200 mL D5W infusion, 0.5 mg/min, Intravenous, Continuous, Mana Curtis MD  •  cetirizine (zyrTEC) tablet 5 mg, 5 mg, Oral, Daily, Christal Gonzalez MD, 5 mg at 12/07/21 0941  •  clopidogrel  (PLAVIX) tablet 75 mg, 75 mg, Oral, Daily, Christal Gonzalez MD, 75 mg at 12/07/21 0940  •  dextrose (D50W) (25 g/50 mL) IV injection 25 g, 25 g, Intravenous, Q15 Min PRN, Kelvin Aguilar MD  •  dextrose (GLUTOSE) oral gel 15 g, 15 g, Oral, Q15 Min PRN, Kelvin Aguilar MD  •  famotidine (PEPCID) tablet 20 mg, 20 mg, Oral, BID AC, Christal Gonzalez MD, 20 mg at 12/07/21 0940  •  First Mouthwash (Magic Mouthwash) 10 mL, 10 mL, Swish & Spit, 4x Daily PRN, Behroozi, Saeid, MD, 10 mL at 12/04/21 0146  •  furosemide (LASIX) injection 20 mg, 20 mg, Intravenous, BID, Jennifer George MD, 20 mg at 12/07/21 1009  •  glucagon (human recombinant) (GLUCAGEN DIAGNOSTIC) injection 1 mg, 1 mg, Subcutaneous, Q15 Min PRN, Kelvin Aguilar MD  •  HYDROcodone-acetaminophen (NORCO) 7.5-325 MG per tablet 1 tablet, 1 tablet, Oral, Q6H PRN, Christal Gonzalez MD, 1 tablet at 12/07/21 1009  •  insulin aspart (novoLOG) injection 0-9 Units, 0-9 Units, Subcutaneous, TID AC, Kelvin Aguilar MD, 2 Units at 12/06/21 1735  •  ipratropium-albuterol (DUO-NEB) nebulizer solution 3 mL, 3 mL, Nebulization, 4x Daily - RT, Kelvin Aguilar MD, 3 mL at 12/06/21 1555  •  lactulose (CHRONULAC) 10 GM/15ML solution 10 g, 10 g, Oral, Daily, Jennifer George MD, 10 g at 12/07/21 0940  •  levoFLOXacin (LEVAQUIN) 750 mg/150 mL D5W (premix) (LEVAQUIN) 750 mg, 750 mg, Intravenous, Q48H, Kelvin Aguilar MD, 750 mg at 12/05/21 1829  •  melatonin tablet 3 mg, 3 mg, Oral, Nightly PRN, Behroozi, Saeid, MD, 3 mg at 12/05/21 2024  •  methylPREDNISolone sodium succinate (SOLU-Medrol) injection 60 mg, 60 mg, Intravenous, Q24H, Kelvin Aguilar MD  •  metoprolol tartrate (LOPRESSOR) tablet 25 mg, 25 mg, Oral, Q12H, Shari Jones APRN  •  nitroglycerin (NITROSTAT) SL tablet 0.4 mg, 0.4 mg, Sublingual, Q5 Min PRN, Christal Gonzalez MD  •  ondansetron (ZOFRAN) injection 4 mg, 4 mg, Intravenous, Q4H PRN, Karan Segovia MD, 4 mg at  12/04/21 1631  •  polyethylene glycol (MIRALAX) packet 17 g, 17 g, Oral, Daily, Christal Gonzalez MD, 17 g at 12/06/21 0825  •  ranolazine (RANEXA) 12 hr tablet 1,000 mg, 1,000 mg, Oral, Q12H, Christal Gonzalez MD, 1,000 mg at 12/07/21 0939  •  sodium chloride 0.9 % flush 10 mL, 10 mL, Intravenous, PRN, Zack Castro MD  •  sodium chloride 0.9 % flush 10 mL, 10 mL, Intravenous, Q12H, Kelvin Aguilar MD, 10 mL at 12/07/21 0940  •  sodium chloride 0.9 % flush 10 mL, 10 mL, Intravenous, PRN, Kelvin Aguilar MD    Assessment/Plan       Pneumonia of both lungs due to infectious organism    AF with RVR:  Mr. Hasmukh Ham is an 88-year-old  male with past medical history of GI bleed with AV malformation, frequent falls/orthostasis, CAD status post PCI in October 2021 to the mid LAD with critical stenosis of the RCA recommended medical management, history of CHF, CKD, hypertension, type 2 diabetes mellitus who presented to the hospital last week with shortness of breath and malaise.  Patient developed JILL with a creatinine of 1.8.  Patient found to have pneumonia.  During hospitalization patient was noted to be in atrial fibrillation with RVR.  Mildly diagnostic troponin likely multifactorial and demand ischemia, ongoing suspicion of ischemia low. Troponin trended down.  Patient was given IV amiodarone 150 mg Lasix and started on amiodarone drip.  -Oral anticoagulation contraindicated due to history of bleeding and HAS-BLED score of 5.   -150 mg amiodarone bolus given this morning, followed by amiodarone drip per protocol  -Increase metoprolol tartrate to 25 mg twice daily      CAD s/p PCI: He will continue with antiplatelet therapy with Plavix, Nexa 1000 mg twice daily, Lopressor    History of chronic CHF due to diastolic dysfunction: diuretic management per nephrology.  Patient appears euvolemic upon exam today and not in acute exacerbation    Pneumonia: Management per primary team    JILL  on CKD: Nephrology following and managing diuretics.     Acute delirium with baseline dementia: Worsened confusion today, wife at bedside      Plan for disposition: Continue 3 west            This document has been electronically signed by EVE Aguilar on December 7, 2021 10:37 CST     Electronically signed by EVE Aguilar, 12/07/21, 10:37 AM CST.

## 2021-12-08 NOTE — THERAPY TREATMENT NOTE
Patient Name: Hasmukh Ham  : 1933    MRN: [id number]                              Today's Date: 2021       Admit Date: 2021    Visit Dx:     ICD-10-CM ICD-9-CM   1. Pneumonia of both lungs due to infectious organism, unspecified part of lung  J18.9 483.8   2. Precordial pain  R07.2 786.51   3. Atrial fibrillation, unspecified type ([facility])  I48.91 427.31   4. Impaired mobility and ADLs  Z74.09 V49.89    Z78.9    5. Impaired functional mobility, balance, gait, and endurance  Z74.09 V49.89     Patient Active Problem List   Diagnosis   • Dilated aortic root (HCC)   • Non-rheumatic tricuspid valve insufficiency   • Pulmonary emphysema ([facility])   • Atrial fibrillation and flutter ([facility])   • Bradycardia   • CAD (coronary artery disease)   • Neuropathy   • Abdominal hernia   • Neck pain   • Dementia (CMS/HCC)   • Diabetic neuropathy ([facility])   • Gastroesophageal reflux disease without esophagitis   • Generalized osteoarthritis   • 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 ([facility])   • Nocturia   • Osteoarthritis of multiple joints   • Hyperlipidemia   • Pain in joint involving ankle and foot   • Pneumonia of left lower lobe due to infectious organism   • Arthropathy of hand   • Paroxysmal tachycardia ([facility])   • Osteoarthrosis involving more than one site but not generalized   • Chronic right shoulder pain   • Rotator cuff syndrome   • Partial tear of subscapularis tendon   • Infraspinatus tendon tear   • Supraspinatus tendon tear   • Bilateral carotid artery stenosis   • Pulmonary HTN (HCC)   • Acute interstitial pneumonia (HCC)   • Chronic obstructive lung disease (HCC)   • Diabetes mellitus (HCC)   • Hypertension   • Chest pain   • Shortness of breath   • Angina pectoris (HCC)   • Myocardial infarction (HCC)   • Ingrown toenail   • Heart problem   • Degenerative joint disease involving multiple joints   • Chronic obstructive pulmonary disease (COPD) (HCC)   • Bleeding disorder ([facility])   • Chronic  obstructive pulmonary disease with acute exacerbation (HCC)   • Failure of outpatient treatment   • Sepsis (HCC)   • 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)   • Chronic diastolic CHF (congestive heart failure) (HCC)   • SOB (shortness of breath)   • Cause of injury, fall   • Right hip pain   • Chronic pain of right knee   • Primary osteoarthritis of right knee   • Left shoulder pain   • Left arm pain   • AC joint arthropathy   • Syncope   • Inflammatory arthritis   • Elevated troponin   • Fever   • Paroxysmal atrial fibrillation with rapid ventricular response ([facility])   • Fibrosis of lung (HCC)   • Type 2 diabetes mellitus (HCC)   • Chondrocalcinosis of right knee   • Nontraumatic complete tear of right rotator cuff   • Pneumonia of both lungs due to infectious organism     Past Medical History:   Diagnosis Date   • Basal cell carcinoma    • Bilateral carotid artery stenosis    • Bilateral carotid artery stenosis    • Bleeding disorder (HCC)    • CHF (congestive heart failure) ([facility])    • Chronic obstructive pulmonary disease (COPD) ([facility])    • Coronary arteriosclerosis    • Degenerative joint disease involving multiple joints    • Dementia ([facility])    • Diabetes mellitus ([facility])    • Heart problem    • History of stomach ulcers    • Hyperlipidemia    • Hypertension    • Ingrown toenail    • Myocardial infarction ([facility])      Past Surgical History:   Procedure Laterality Date   • BACK SURGERY     • CARDIAC CATHETERIZATION N/A 6/6/2017    Procedure: Right Heart Cath;  Surgeon: Jeff Maciel MD PhD;  Location: [facility] CATH INVASIVE LOCATION;  Service:    • CARDIAC CATHETERIZATION N/A 2/14/2018    Procedure: Coronary angiography;  Surgeon: Moisés Davila MD;  Location: [facility] CATH INVASIVE LOCATION;  Service:    • CARDIAC CATHETERIZATION N/A 10/28/2021    Procedure: Left Heart Cath;  Surgeon: Carine Johnson MD;  Location: [facility] CATH INVASIVE LOCATION;  Service: Cardiology;  Laterality: N/A;    • CORONARY ANGIOPLASTY WITH STENT PLACEMENT     • CORONARY STENT PLACEMENT     • HERNIA REPAIR     • LUNG BIOPSY     • LUNG SURGERY     • NECK SURGERY     • THORACOTOMY Left 1977   • VENTRAL HERNIA REPAIR        General Information     Row Name 12/08/21 0901          OT Time and Intention    Document Type therapy note (daily note)  -     Mode of Treatment individual therapy; occupational therapy  -[facility] Name 12/08/21 0901          General Information    Patient Profile Reviewed yes  -     Existing Precautions/Restrictions fall  -[facility] Name 12/08/21 0901          Cognition    Orientation Status (Cognition) oriented to; person  -[facility] Name 12/08/21 0901          Safety Issues, Functional Mobility    Impairments Affecting Function (Mobility) strength; endurance/activity tolerance; shortness of breath; balance; pain; cognition  -           User Key  (r) = Recorded By, (t) = Taken By, (c) = Cosigned By    Initials Name Provider Type     Erlinda Aguilar COTA Occupational Therapy Assistant                 Mobility/ADL's    No documentation.                Obj/Interventions     [facility] Name 12/08/21 0901          Shoulder (Therapeutic Exercise)    Shoulder (Therapeutic Exercise) AROM (active range of motion)  -     Shoulder AROM (Therapeutic Exercise) external rotation; bilateral; internal rotation; horizontal aBduction/aDduction  -[facility] Name 12/08/21 0901          Elbow/Forearm (Therapeutic Exercise)    Elbow/Forearm (Therapeutic Exercise) AROM (active range of motion)  -     Elbow/Forearm AROM (Therapeutic Exercise) bilateral; flexion; extension; supination; pronation  -[facility] Name 12/08/21 0901          Wrist (Therapeutic Exercise)    Wrist (Therapeutic Exercise) AROM (active range of motion)  -     Wrist AROM (Therapeutic Exercise) bilateral; flexion; extension; radial deviation  -[facility] Name 12/08/21 0901          Therapeutic Exercise    Therapeutic Exercise shoulder;  elbow/forearm; wrist; hand  -KD           User Key  (r) = Recorded By, (t) = Taken By, (c) = Cosigned By    Initials Name Provider Type    Erlinda Martínez COTA Occupational Therapy Assistant               Goals/Plan    No documentation.                Clinical Impression     Row Name 12/08/21 0901          Pain Scale: Numbers Pre/Post-Treatment    Pretreatment Pain Rating 8/10  -KD     Posttreatment Pain Rating 8/10  -KD     Pain Location neck  -KD     Pain Intervention(s) Nursing Notified  -KD     Row Name 12/08/21 0901          Plan of Care Review    Plan of Care Reviewed With patient  -KD     Progress improving  -KD     Outcome Summary Pt less confused ths date. Pt c/o 8/10 neck pain and deferes OOB. Pt jalil Ther ex to BUE's in most planes w/ 2lb HW. Pt limited on Shld flex to pt's jalil this date. Spouse present throughout tx.  -KD     Row Name 12/08/21 0901          Therapy Assessment/Plan (OT)    Therapy Frequency (OT) other (see comments)  5-7 days a week  -KD     Row Name 12/08/21 0901          Therapy Plan Review/Discharge Plan (OT)    Anticipated Discharge Disposition (OT) home with home health; home with 24/7 care  -KD     Row Name 12/08/21 0901          Vital Signs    Pre Systolic BP Rehab 112  -KD     Pre Treatment Diastolic BP 57  -KD     Pretreatment Heart Rate (beats/min) 79  -KD     Pre SpO2 (%) 98  -KD     O2 Delivery Pre Treatment room air  -KD     Pre Patient Position Supine  -KD     Intra Patient Position Sitting  -KD     Post Patient Position Supine  -KD           User Key  (r) = Recorded By, (t) = Taken By, (c) = Cosigned By    Initials Name Provider Type    Erlinda Martínez COTA Occupational Therapy Assistant               Outcome Measures     Row Name 12/08/21 0901          How much help from another is currently needed...    Putting on and taking off regular lower body clothing? 3  -KD     Bathing (including washing, rinsing, and drying) 3  -KD     Toileting (which includes using toilet  bed pan or urinal) 3  -KD     Putting on and taking off regular upper body clothing 3  -KD     Taking care of personal grooming (such as brushing teeth) 4  -KD     Eating meals 4  -KD     AM-PAC 6 Clicks Score (OT) 20  -KD           User Key  (r) = Recorded By, (t) = Taken By, (c) = Cosigned By    Initials Name Provider Type    Erlinda Martínez COTA Occupational Therapy Assistant                Occupational Therapy Education                 Title: PT OT SLP Therapies (In Progress)     Topic: Occupational Therapy (In Progress)     Point: ADL training (In Progress)     Description:   Instruct learner(s) on proper safety adaptation and remediation techniques during self care or transfers.   Instruct in proper use of assistive devices.              Learning Progress Summary           Patient Acceptance, E, NR by  at 12/6/2021 1200                   Point: Home exercise program (Not Started)     Description:   Instruct learner(s) on appropriate technique for monitoring, assisting and/or progressing therapeutic exercises/activities.              Learner Progress:  Not documented in this visit.          Point: Precautions (Done)     Description:   Instruct learner(s) on prescribed precautions during self-care and functional transfers.              Learning Progress Summary           Patient Acceptance, E,TB, VU by  at 12/2/2021 1325    Comment: POC, role of OT, transfer training                   Point: Body mechanics (In Progress)     Description:   Instruct learner(s) on proper positioning and spine alignment during self-care, functional mobility activities and/or exercises.              Learning Progress Summary           Patient Acceptance, E, NR by  at 12/6/2021 1200    Acceptance, E,TB, VU by  at 12/2/2021 1325    Comment: POC, role of OT, transfer training                               User Key     Initials Effective Dates Name Provider Type [facility] 06/16/21 -  Susannah Villalta COTA  Occupational Therapy Assistant OT     06/14/21 -  Israel Black OT Occupational Therapist OT              OT Recommendation and Plan  Therapy Frequency (OT): other (see comments) (5-7 days a week)  Plan of Care Review  Plan of Care Reviewed With: patient  Progress: improving  Outcome Summary: Pt less confused ths date. Pt c/o 8/10 neck pain and deferes OOB. Pt jalil Ther ex to BUE's in most planes w/ 2lb HW. Pt limited on Shld flex to pt's jalil this date. Spouse present throughout tx.     Time Calculation:    Time Calculation- OT     Row Name 12/08/21 0901             Time Calculation- OT    OT Start Time 0901  -KD      OT Stop Time 0925  -KD      OT Time Calculation (min) 24 min  -KD      Total Timed Code Minutes- OT 24 minute(s)  -KD      OT Received On 12/08/21  -KD              Timed Charges    78353 - OT Therapeutic Exercise Minutes 24  -KD      62027 - OT Self Care/Mgmt Minutes --  -KD              Total Minutes    Timed Charges Total Minutes 24  -KD       Total Minutes 24  -KD            User Key  (r) = Recorded By, (t) = Taken By, (c) = Cosigned By    Initials Name Provider Type    KD Erlinda Aguilar COTA Occupational Therapy Assistant              Therapy Charges for Today     Code Description Service Date Service Provider Modifiers Qty    15559169963 HC OT SELF CARE/MGMT/TRAIN EA 15 MIN 12/7/2021 Erlinda Aguilar COTA GO 1    48161692842 HC OT THER PROC EA 15 MIN 12/8/2021 Erlinda Aguilar COTA GO 2               KRYSTIN Gilmore  12/8/2021

## 2021-12-08 NOTE — PROGRESS NOTES
"[facility] Progress Note   LOS: 6 days   Patient Care Team:  Paolo Rey MD as PCP - General    Chief Complaint:    Chief Complaint   Patient presents with   • Palpitations   • Chest Pain   • Fatigue        Subjective     Interval History:      No acute events overnight. Pt less confused today and pleasant. Remains in AF with improved rate. Will transition to po Amiodarone today.    Objective     Vital Sign Min/Max for last 24 hours  Temp  Min: 96.9 °F (36.1 °C)  Max: 98.6 °F (37 °C)   BP  Min: 112/57  Max: 176/96   Pulse  Min: 57  Max: 89   Resp  Min: 16  Max: 22   SpO2  Min: 97 %  Max: 99 %   Flow (L/min)  Min: 2  Max: 2   Weight  Min: 68 kg (149 lb 14.4 oz)  Max: 68 kg (149 lb 14.4 oz)     Flowsheet Rows      First Filed Value   Admission Height 171.5 cm (67.5\") Documented at 12/01/2021 1451   Admission Weight 68 kg (150 lb) Documented at 12/01/2021 1451            12/05/21  0343 12/06/21  0630 12/08/21  0300   Weight: 66.9 kg (147 lb 8 oz) 68.2 kg (150 lb 4.8 oz) 68 kg (149 lb 14.4 oz)       Physical Exam:  Physical Exam  Vitals and nursing note reviewed.   Constitutional:       General: He is not in acute distress.     Appearance: Normal appearance. He is well-developed. He is not ill-appearing, toxic-appearing or diaphoretic.   HENT:      Head: Normocephalic.      Right Ear: External ear normal.      Left Ear: External ear normal.   Eyes:      General: Lids are normal.      Pupils: Pupils are equal, round, and reactive to light.   Neck:      Thyroid: No thyromegaly.      Vascular: No carotid bruit or JVD.      Trachea: No tracheal deviation.   Cardiovascular:      Rate and Rhythm: Normal rate. Rhythm irregularly irregular.      Chest Wall: PMI is not displaced. No thrill.      Heart sounds: Normal heart sounds, S1 normal and S2 normal. No murmur heard.  No friction rub. No gallop. No S3 or S4 sounds.    Pulmonary:      Effort: Pulmonary effort is normal. No respiratory distress.      Breath sounds: " Normal breath sounds. No stridor. No wheezing or rales.   Chest:      Chest wall: No tenderness.   Abdominal:      General: Bowel sounds are normal. There is no distension.      Palpations: Abdomen is soft.      Tenderness: There is no abdominal tenderness.   Musculoskeletal:      Right lower leg: No edema.      Left lower leg: No edema.   Skin:     General: Skin is warm and dry.      Findings: No erythema or rash.   Neurological:      Mental Status: He is alert. Mental status is at baseline.          Results Review:     Results from last 7 days   Lab Units 12/08/21  0604 12/07/21  0643 12/06/21  1059 12/05/21  0713 12/05/21  0713 12/04/21  0703 12/04/21  0703 12/02/21  0526 12/02/21  0526   SODIUM mmol/L 140 139 137   < > 139   < > 139   < > 138   POTASSIUM mmol/L 4.6 4.5 4.4   < > 4.2   < > 4.4   < > 4.6   CHLORIDE mmol/L 104 106 102   < > 98   < > 100   < > 103   CO2 mmol/L 28.0 23.0 24.0   < > 26.0   < > 24.0   < > 26.0   BUN mg/dL 44* 47* 61*   < > 58*   < > 48*   < > 19   CREATININE mg/dL 1.18 1.31* 1.47*   < > 1.84*   < > 1.88*   < > 1.16   CALCIUM mg/dL 8.5* 8.8 8.7   < > 9.3   < > 8.8   < > 8.7   BILIRUBIN mg/dL  --   --   --   --  0.5  --  0.3  --  0.2   ALK PHOS U/L  --   --   --   --  54  --  54  --  56   ALT (SGPT) U/L  --   --   --   --  19  --  14  --  12   AST (SGOT) U/L  --   --   --   --  18  --  15  --  12   GLUCOSE mg/dL 159* 177* 219*   < > 175*   < > 166*   < > 192*    < > = values in this interval not displayed.       Estimated Creatinine Clearance: 41.6 mL/min (by C-G formula based on SCr of 1.18 mg/dL).    Results from last 7 days   Lab Units 12/01/21  1510   MAGNESIUM mg/dL 1.6             Results from last 7 days   Lab Units 12/08/21  0604 12/05/21  0713 12/04/21  0703 12/02/21  0526 12/01/21  1510   WBC 10*3/mm3 13.98* 15.59* 15.32* 10.27 13.38*   HEMOGLOBIN g/dL 11.1* 13.1 11.4* 11.6* 12.5*   PLATELETS 10*3/mm3 188 238 184 168 184       Results from last 7 days   Lab Units 12/01/21  1626    INR  1.02     Lab Results   Component Value Date    PROBNP 920.0 12/01/2021       I/O last 3 completed shifts:  In: 720 [P.O.:720]  Out: 1600 [Urine:1600]    Cardiographics:  ECG/EMG Results (last 24 hours)     ** No results found for the last 24 hours. **        Results for orders placed during the hospital encounter of 10/25/21    Adult Transthoracic Echo Limited W/ Cont if Necessary Per Protocol    Interpretation Summary  · Significant beat to beat variability but estimated EF appears around 40-45%.  · Left ventricular systolic function is mildly decreased.  · Estimated right ventricular systolic pressure from tricuspid regurgitation is normal (<35 mmHg).  · Left ventricular diastolic function was not assessed.  · Left atrial volume is moderately increased.      Imaging Results (Most Recent)     Procedure Component Value Units Date/Time    XR Chest 1 View [[id number]] Collected: 12/05/21 1229     Updated: 12/05/21 1820    Narrative:          EXAM:  XR CHEST 1 VIEW    TECHNIQUE:   Single frontal radiograph of the chest.    VIEWS:  1 view    CLINICAL HISTORY:   88 years  Male  Shortness of breath, J18.9 Pneumonia, unspecified  organism R07.2 Precordial pain I48.91 Unspecified atrial  fibrillation Z74.09 Other reduced mobility Z78.9 Other specified  health status Z74.09 Other reduced mobility    COMPARISON:   December 1, 2021 chest x-ray    FINDINGS:   Lungs:  Patchy bilateral interstitial opacities are not  significantly changed  Pleura: Small left pleural effusion. No pneumothorax.  Heart/mediastinum: The cardiac silhouette is not enlarged.  Bones: No acute osseous findings.  Soft tissues: Unremarkable.      Impression:      1.  Patchy bilateral interstitial opacities are not significantly  changed.  2.  Small left pleural effusion.    Electronically signed by:  Rc Johnson MD  12/5/2021 6:19  PM CST Workstation: [id number]     Renal Bilateral [[id number]] Collected: 12/04/21 2047     Updated: 12/05/21  0035    Narrative:      EXAM:    US Retroperitoneal Limited, Renal    CLINICAL HISTORY:    The patient is 88 years old and is Male; JILL, J18.9 Pneumonia,  unspecified organism R07.2 Precordial pain I48.91 Unspecified  atrial fibrillation Z74.09 Other reduced mobility Z78.9 Other  specified health status Z74.09 Other reduced mobility    TECHNIQUE:    Real-time limited ultrasound of the retroperitoneum with image  documentation.    COMPARISON:    CT of the abdomen and pelvis June 22, 2021    FINDINGS:    RIGHT KIDNEY:  The right kidney measures 10.4 cm in length.  No  stones.  No hydronephrosis.    LEFT KIDNEY:  Bilobed left renal cyst measuring 7.8 x 4.7 x 5.6  cm is present. The left kidney measures 11.0 cm in length.  No  stones.  No hydronephrosis.    BLADDER:  The bladder is well distended.      Impression:        Large left renal cyst. Otherwise, unremarkable renal  ultrasound.    Electronically signed by:  Masha Baires MD  12/5/2021 12:33 AM  CST Workstation: [id number]    XR Chest 1 View [[id number]] Collected: 12/01/21 1515     Updated: 12/01/21 1607    Narrative:        PROCEDURE: Single chest view portable    REASON FOR EXAM:Chest Pain triage protocol  Chest Pain Triage Protocol    FINDINGS: Comparison exam dated October 25, 2021. Cardiac size  appears within normal limits. Left upper lobe perihilar and left  lung base infrahilar interstitial groundglass opacities. Right  upper lobe peripheral small interstitial groundglass opacity.  Lungs are otherwise clear. Pleural spaces are normal. No acute  osseous abnormality. Stable epidural stimulator lead in the mid  thoracic spine region.      Impression:      1.  Left upper lobe perihilar and left lung base infrahilar  interstitial groundglass opacities. Right upper lobe peripheral  small interstitial groundglass opacity. These opacities would be  suspicious for pneumonia including possible atypical viral  etiology and/or pulmonary edema. Recommend clinical  correlation.    Electronically signed by:  Nelson Christianson MD  12/1/2021 4:06 PM CST  Workstation: [id number]          XR Chest 1 View    Result Date: 12/5/2021  1.  Patchy bilateral interstitial opacities are not significantly changed. 2.  Small left pleural effusion. Electronically signed by:  Rc Johnson MD  12/5/2021 6:19 PM CST Workstation: [id number]    XR Chest 1 View    Result Date: 12/1/2021  1.  Left upper lobe perihilar and left lung base infrahilar interstitial groundglass opacities. Right upper lobe peripheral small interstitial groundglass opacity. These opacities would be suspicious for pneumonia including possible atypical viral etiology and/or pulmonary edema. Recommend clinical correlation. Electronically signed by:  Nelson Christianson MD  12/1/2021 4:06 PM CST Workstation: [id number]    US Renal Bilateral    Result Date: 12/5/2021    Large left renal cyst. Otherwise, unremarkable renal ultrasound. Electronically signed by:  Masha Baires MD  12/5/2021 12:33 AM CST Workstation: [id number]      Medication Review:     Current Facility-Administered Medications:   •  acetaminophen (TYLENOL) tablet 650 mg, 650 mg, Oral, Q4H PRN, Christal Gonzalez MD  •  albuterol (PROVENTIL) nebulizer solution 0.083% 2.5 mg/3mL, 2.5 mg, Nebulization, Q4H PRN, Christal Gonzalez MD  •  amiodarone (PACERONE) tablet 200 mg, 200 mg, Oral, Q24H, Shari Jones APRN  •  cetirizine (zyrTEC) tablet 5 mg, 5 mg, Oral, Daily, Christal Gonzalez MD, 5 mg at 12/07/21 0941  •  clopidogrel (PLAVIX) tablet 75 mg, 75 mg, Oral, Daily, Christal Gonzalez MD, 75 mg at 12/07/21 0940  •  dextrose (D50W) (25 g/50 mL) IV injection 25 g, 25 g, Intravenous, Q15 Min PRN, Kelvin Aguilar MD  •  dextrose (GLUTOSE) oral gel 15 g, 15 g, Oral, Q15 Min PRN, Kelvin Aguilar MD  •  famotidine (PEPCID) tablet 20 mg, 20 mg, Oral, BID AC, Christal Gonzalez MD, 20 mg at 12/07/21 1822  •  First Mouthwash (Magic Mouthwash) 10  mL, 10 mL, Swish & Spit, 4x Daily PRN, Behroozi, Saeid, MD, 10 mL at 12/04/21 0146  •  furosemide (LASIX) injection 20 mg, 20 mg, Intravenous, BID, Jennifer George MD, 20 mg at 12/08/21 0617  •  glucagon (human recombinant) (GLUCAGEN DIAGNOSTIC) injection 1 mg, 1 mg, Subcutaneous, Q15 Min PRN, Kelvin Aguilar MD  •  HYDROcodone-acetaminophen (NORCO) 7.5-325 MG per tablet 1 tablet, 1 tablet, Oral, Q6H PRN, Christal Gonzalez MD, 1 tablet at 12/08/21 0141  •  insulin aspart (novoLOG) injection 0-9 Units, 0-9 Units, Subcutaneous, TID AC, Kelvin Aguilar MD, 2 Units at 12/07/21 1824  •  ipratropium-albuterol (DUO-NEB) nebulizer solution 3 mL, 3 mL, Nebulization, 4x Daily - RT, Kelvin Aguilar MD, 3 mL at 12/08/21 0715  •  lactulose (CHRONULAC) 10 GM/15ML solution 10 g, 10 g, Oral, Daily, Jennifer George MD, 10 g at 12/07/21 0940  •  levoFLOXacin (LEVAQUIN) tablet 750 mg, 750 mg, Oral, Q48H, Kelvin Aguilar MD, 750 mg at 12/07/21 2125  •  melatonin tablet 3 mg, 3 mg, Oral, Nightly PRN, Behroozi, Saeid, MD, 3 mg at 12/05/21 2024  •  methylPREDNISolone sodium succinate (SOLU-Medrol) injection 40 mg, 40 mg, Intravenous, Q24H, Kelvin Aguilar MD, 40 mg at 12/07/21 1821  •  metoprolol tartrate (LOPRESSOR) tablet 25 mg, 25 mg, Oral, Q12H, Shari Jones APRN, 25 mg at 12/07/21 2100  •  nitroglycerin (NITROSTAT) SL tablet 0.4 mg, 0.4 mg, Sublingual, Q5 Min PRN, Christal Gonzalez MD  •  ondansetron (ZOFRAN) injection 4 mg, 4 mg, Intravenous, Q4H PRN, Karan Segovia MD, 4 mg at 12/04/21 1631  •  polyethylene glycol (MIRALAX) packet 17 g, 17 g, Oral, Daily, Christal Gonzalez MD, 17 g at 12/06/21 0825  •  ranolazine (RANEXA) 12 hr tablet 1,000 mg, 1,000 mg, Oral, Q12H, Christal Gonzalez MD, 1,000 mg at 12/07/21 2101  •  sodium chloride 0.9 % flush 10 mL, 10 mL, Intravenous, PRN, Zack Castro MD  •  sodium chloride 0.9 % flush 10 mL, 10 mL, Intravenous, Q12H, Kelvin Aguilar MD,  10 mL at 12/07/21 2101  •  sodium chloride 0.9 % flush 10 mL, 10 mL, Intravenous, PRN, Kelvin Aguilar MD    Assessment/Plan       Pneumonia of both lungs due to infectious organism    AF with RVR:  Mr. Hasmukh Ham is an 88-year-old  male with past medical history of GI bleed with AV malformation, frequent falls/orthostasis, CAD status post PCI in October 2021 to the mid LAD with critical stenosis of the RCA recommended medical management, history of CHF, CKD, hypertension, type 2 diabetes mellitus who presented to the hospital last week with shortness of breath and malaise.  Patient developed JILL with a creatinine of 1.8.  Patient found to have pneumonia.  During hospitalization patient was noted to be in atrial fibrillation with RVR.  Mildly diagnostic troponin likely multifactorial and demand ischemia, ongoing suspicion of ischemia low. Troponin trended down.  Patient was given IV amiodarone 150 mg Lasix and started on amiodarone drip.  -Oral anticoagulation contraindicated due to history of bleeding and HAS-BLED score of 5.   -Stop Amiodarone gtt and transition to Amiodarone 200mg daily  -Metoprolol tartrate to 25 mg twice daily      CAD s/p PCI: He will continue with antiplatelet therapy with Plavix, Nexa 1000 mg twice daily, Lopressor    History of chronic CHF due to diastolic dysfunction: diuretic management per nephrology.  Patient appears euvolemic upon exam today and not in acute exacerbation    Pneumonia: Management per primary team    JILL on CKD: Nephrology following and managing diuretics.     Acute delirium with baseline dementia: Worsened confusion today, wife at bedside      Plan for disposition: Continue [facility]            This document has been electronically signed by EVE Aguilar on December 8, 2021 09:25 CST     Electronically signed by EVE Aguilar, 12/08/21, 9:25 AM CST.  .

## 2021-12-08 NOTE — PLAN OF CARE
Problem: Adult Inpatient Plan of Care  Goal: Plan of Care Review  Recent Flowsheet Documentation  Taken 12/8/2021 0901 by Erlinda Aguilar COTA  Progress: improving  Plan of Care Reviewed With: patient  Outcome Summary: Pt less confused this date. Pt c/o 8/10 neck pain and defers OOB. Pt jalil Ther ex to BUE's in most planes w/ 2lb HW. Pt limited on Shld flex to pt's jalil this date. Spouse present throughout tx.

## 2021-12-08 NOTE — PAYOR COMM NOTE
"[facility]#[id number]  Henrietta Rose RN  [facility]  [phone]  Fax [phone]    Hasmukh Ham (88 y.o. Male)             Date of Birth Social Security Number Address Home Phone MRN    06/01/1933  [facility] [id number] [phone] [id number]    [employer]ational Marital Status             [employer]        Admission Date Admission Type Admitting Provider Attending Provider Department, Room/Bed    12/1/21 Emergency Christal Gonzalez MD Gerlach, Elizabeth T, MD [address], 319/1    Discharge Date Discharge Disposition Discharge Destination                         Attending Provider: Christal Gonzalez MD    Allergies: Atorvastatin, Penicillins, Azithromycin, Cefdinir, Clarithromycin, Pravastatin, Benadryl Allergy [Diphenhydramine Hcl]    Isolation: None   Infection: None   Code Status: CPR   Advance Care Planning Activity    Ht: 170.2 cm (67\")   Wt: 68 kg (149 lb 14.4 oz)    Admission Cmt: None   Principal Problem: None                Active Insurance as of 12/1/2021     Primary Coverage     Payor Plan Insurance Group Employer/Plan Group    AETNA MEDICARE REPLACEMENT AETNA MEDICARE REPLACEMENT [id number]     Payor Plan Address Payor Plan Phone Number Payor Plan Fax Number Effective Dates    [address] [phone]  4/1/2019 - None Entered    [facility] [id number]       Subscriber Name Subscriber Birth Date Member ID       HASMUKH HAM 6/1/1933 [id number]                 Emergency Contacts      (Rel.) Home Phone Work Phone Mobile Phone    Rimma Durant (Spouse) [phone] -- [phone]    Ilan Ham (Brother) -- -- [phone]            Vital Signs (last day)     Date/Time Temp Temp src Pulse Resp BP Patient Position SpO2    12/08/21 1151 -- -- 76 16 -- -- 95    12/08/21 1112 96.8 (36) Temporal 68 18 143/77 Lying 99    12/08/21 0736 97.1 (36.2) Temporal 70 16 161/82 Lying 98    12/08/21 0729 -- -- 79 -- -- -- -- "    12/08/21 0715 -- -- 60 16 -- -- 98    12/08/21 0426 -- -- 60 -- -- -- --    12/08/21 0416 -- -- 78 -- -- -- --    12/08/21 0300 97.4 (36.3) Oral 59 18 112/57 Lying 99    12/08/21 0249 -- -- 64 -- -- -- --    12/08/21 0240 -- -- 60 -- -- -- --    12/08/21 0134 -- -- 58 -- -- -- --    12/08/21 0105 -- -- 77 -- -- -- --    12/08/21 0101 -- -- 58 -- -- -- --    12/08/21 0059 -- -- 77 -- -- -- --    12/08/21 0030 -- -- 58 -- -- -- --    12/08/21 0014 -- -- 77 -- -- -- --    12/08/21 0007 -- -- 58 -- -- -- --    12/08/21 0002 -- -- 77 -- -- -- --    12/07/21 2359 -- -- 58 -- -- -- --    12/07/21 2357 -- -- 77 -- -- -- --    12/07/21 2344 -- -- 60 -- -- -- --    12/07/21 2314 98.6 (37) Oral 78 20 129/68 Lying 99    12/07/21 2031 97.7 (36.5) Oral 72 20 153/92 Lying 97    12/07/21 1918 -- -- 57 20 -- -- 98    12/07/21 1521 96.9 (36.1) Temporal 62 20 147/80 Lying 99    12/07/21 1515 -- -- 63 -- -- -- --    12/07/21 1514 -- -- 77 20 -- -- --    12/07/21 1507 -- -- 70 20 -- -- 98    12/07/21 1128 -- -- 64 22 -- -- --    12/07/21 1122 -- -- 64 22 -- -- 98    12/07/21 1100 96.9 (36.1) Temporal 89 18 176/96 Lying --    12/07/21 0737 97.3 (36.3) Temporal 96 18 145/67 Lying 95    12/07/21 0719 -- -- 103 -- -- -- --    12/07/21 0249 97 (36.1) Infrared 63 18 126/69 Lying 100    12/07/21 0028 -- -- 62 -- -- -- --          Oxygen Therapy (last day)     Date/Time SpO2 Device (Oxygen Therapy) Flow (L/min) Oxygen Concentration (%) ETCO2 (mmHg)    12/08/21 1151 95 humidified; nasal cannula 2 -- --    12/08/21 1112 99 humidified; nasal cannula 2 -- --    12/08/21 0736 98 nasal cannula; humidified 2 -- --    12/08/21 0715 98 nasal cannula; humidified 2 -- --    12/08/21 0300 99 nasal cannula 2 -- --    12/07/21 2314 99 nasal cannula 2 -- --    12/07/21 2031 97 nasal cannula -- -- --    12/07/21 1918 98 nasal cannula 2 -- --    12/07/21 1521 99 nasal cannula 2 -- --    12/07/21 1514 -- nasal cannula 2 -- --    12/07/21 1507 98 nasal cannula  2 -- --    12/07/21 1128 -- nasal cannula 2 -- --    12/07/21 1122 98 nasal cannula 2 -- --    12/07/21 1100 -- nasal cannula 2 -- --    12/07/21 0737 95 nasal cannula 2 -- --    12/07/21 0725 -- nasal cannula 2 -- --    12/07/21 0249 100 -- -- -- --          Intake & Output (last day)       12/07 0701 12/08 0700 12/08 0701 12/09 0700    P.O. 720     Total Intake(mL/kg) 720 (10.6)     Urine (mL/kg/hr) 700 (0.4) 700 (1.7)    Stool 0     Total Output 700 700    Net +20 -700          Urine Unmeasured Occurrence 2 x     Stool Unmeasured Occurrence 2 x         Lines, Drains & Airways     Active LDAs     Name Placement date Placement time Site Days    Peripheral IV 12/06/21 0055 Anterior; Left Forearm 12/06/21  0055  Forearm  2                Current Facility-Administered Medications   Medication Dose Route Frequency Provider Last Rate Last Admin   • acetaminophen (TYLENOL) tablet 650 mg  650 mg Oral Q4H PRN Christal Gonzalez MD       • albuterol (PROVENTIL) nebulizer solution 0.083% 2.5 mg/3mL  2.5 mg Nebulization Q4H PRN Christal Gonzalez MD       • amiodarone (PACERONE) tablet 200 mg  200 mg Oral Q24H Shari Jones APRN   200 mg at 12/08/21 0927   • cetirizine (zyrTEC) tablet 5 mg  5 mg Oral Daily Christal Gonzalez MD   5 mg at 12/08/21 0927   • clopidogrel (PLAVIX) tablet 75 mg  75 mg Oral Daily Christal Gonzalez MD   75 mg at 12/08/21 0927   • dextrose (D50W) (25 g/50 mL) IV injection 25 g  25 g Intravenous Q15 Min PRN Kelvin Aguilar MD       • dextrose (GLUTOSE) oral gel 15 g  15 g Oral Q15 Min PRN Kelvin Aguilar MD       • famotidine (PEPCID) tablet 20 mg  20 mg Oral BID AC Christal Gonzalez MD   20 mg at 12/08/21 0927   • First Mouthwash (Magic Mouthwash) 10 mL  10 mL Swish & Spit 4x Daily PRN Behroozi, Saeid, MD   10 mL at 12/04/21 0146   • furosemide (LASIX) tablet 20 mg  20 mg Oral BID Jennifer George MD       • glucagon (human recombinant) (GLUCAGEN DIAGNOSTIC) injection 1  mg  1 mg Subcutaneous Q15 Min PRN Kelvin Aguilar MD       • HYDROcodone-acetaminophen (NORCO) 7.5-325 MG per tablet 1 tablet  1 tablet Oral Q6H PRN Christal Gonzalez MD   1 tablet at 12/08/21 0141   • insulin aspart (novoLOG) injection 0-9 Units  0-9 Units Subcutaneous TID AC Kelvin Aguilar MD   2 Units at 12/07/21 1824   • ipratropium-albuterol (DUO-NEB) nebulizer solution 3 mL  3 mL Nebulization 4x Daily - RT Kelvin Aguilar MD   3 mL at 12/08/21 1151   • lactulose (CHRONULAC) 10 GM/15ML solution 10 g  10 g Oral Daily Jennifer George MD   10 g at 12/08/21 0927   • levoFLOXacin (LEVAQUIN) tablet 750 mg  750 mg Oral Q48H Kelvin Aguilar MD   750 mg at 12/07/21 2125   • [START ON 12/9/2021] lisinopril (PRINIVIL,ZESTRIL) tablet 5 mg  5 mg Oral Q24H Jennifer George MD       • melatonin tablet 3 mg  3 mg Oral Nightly PRN Behroozi, Saeid, MD   3 mg at 12/05/21 2024   • methylPREDNISolone sodium succinate (SOLU-Medrol) injection 40 mg  40 mg Intravenous Q24H Kelvin Aguilar MD   40 mg at 12/07/21 1821   • metoprolol tartrate (LOPRESSOR) tablet 25 mg  25 mg Oral Q12H Shari Jones APRN   25 mg at 12/08/21 0927   • nitroglycerin (NITROSTAT) SL tablet 0.4 mg  0.4 mg Sublingual Q5 Min PRN Christal Gonzalez MD       • ondansetron (ZOFRAN) injection 4 mg  4 mg Intravenous Q4H PRN Karan Segovia MD   4 mg at 12/04/21 1631   • polyethylene glycol (MIRALAX) packet 17 g  17 g Oral Daily Christal Gonzalez MD   17 g at 12/06/21 0825   • ranolazine (RANEXA) 12 hr tablet 1,000 mg  1,000 mg Oral Q12H Christal Gonzalez MD   1,000 mg at 12/08/21 0927   • sodium chloride 0.9 % flush 10 mL  10 mL Intravenous PRN Zack Castro MD       • sodium chloride 0.9 % flush 10 mL  10 mL Intravenous Q12H Kelvin Aguilar MD   10 mL at 12/08/21 0928   • sodium chloride 0.9 % flush 10 mL  10 mL Intravenous PRN Kelvin Aguilar MD            Physician Progress Notes (last 24 hours)      Doris  "MD Jennifer at 21 1039          [facility]  [address] [place] [address]. [address]  T - [phone]  F - [phone]     Progress Note          PATIENT  DEMOGRAPHICS   PATIENT NAME: Hasmukh Ham                      PHYSICIAN: Jennifer George MD  : 1933  MRN: [id number]   LOS: 6 days    Patient Care Team:  Paolo Rey MD as PCP - General  Subjective   SUBJECTIVE   Doing well off amiodarone drip no soa         Objective   OBJECTIVE   Vital Signs  Temp:  [96.9 °F (36.1 °C)-98.6 °F (37 °C)] 97.1 °F (36.2 °C)  Heart Rate:  [57-89] 70  Resp:  [16-22] 16  BP: (112-176)/(57-96) 161/82    Flowsheet Rows      First Filed Value   Admission Height 171.5 cm (67.5\") Documented at 2021 1451   Admission Weight 68 kg (150 lb) Documented at 2021 1451           I/O last 3 completed shifts:  In: 720 [P.O.:720]  Out: 1600 [Urine:1600]    PHYSICAL EXAM    Physical Exam  Constitutional:       Appearance: He is well-developed.   HENT:      Head: Normocephalic and atraumatic.   Eyes:      Conjunctiva/sclera: Conjunctivae normal.      Pupils: Pupils are equal, round, and reactive to light.   Cardiovascular:      Rate and Rhythm: Normal rate and regular rhythm.   Pulmonary:      Effort: Pulmonary effort is normal.      Breath sounds: Normal breath sounds.   Abdominal:      Palpations: Abdomen is soft.   Musculoskeletal:      Cervical back: Neck supple.      Right lower leg: No edema.      Left lower leg: No edema.   Skin:     General: Skin is warm and dry.   Neurological:      Mental Status: He is alert and oriented to person, place, and time.   Psychiatric:         Mood and Affect: Mood normal.         Behavior: Behavior normal.         RESULTS   Results Review:    Results from last 7 days   Lab Units 21  0604 21  0643 21  1059 21  0713 21  0713 21  0703 21  0703 21  0526 21  0526   SODIUM mmol/L 140 139 137   < > 139   < > " 139   < > 138   POTASSIUM mmol/L 4.6 4.5 4.4   < > 4.2   < > 4.4   < > 4.6   CHLORIDE mmol/L 104 106 102   < > 98   < > 100   < > 103   CO2 mmol/L 28.0 23.0 24.0   < > 26.0   < > 24.0   < > 26.0   BUN mg/dL 44* 47* 61*   < > 58*   < > 48*   < > 19   CREATININE mg/dL 1.18 1.31* 1.47*   < > 1.84*   < > 1.88*   < > 1.16   CALCIUM mg/dL 8.5* 8.8 8.7   < > 9.3   < > 8.8   < > 8.7   BILIRUBIN mg/dL  --   --   --   --  0.5  --  0.3  --  0.2   ALK PHOS U/L  --   --   --   --  54  --  54  --  56   ALT (SGPT) U/L  --   --   --   --  19  --  14  --  12   AST (SGOT) U/L  --   --   --   --  18  --  15  --  12   GLUCOSE mg/dL 159* 177* 219*   < > 175*   < > 166*   < > 192*    < > = values in this interval not displayed.       Estimated Creatinine Clearance: 41.6 mL/min (by C-G formula based on SCr of 1.18 mg/dL).    Results from last 7 days   Lab Units 12/01/21  1510   MAGNESIUM mg/dL 1.6             Results from last 7 days   Lab Units 12/08/21  0604 12/05/21  0713 12/04/21  0703 12/02/21  0526 12/01/21  1510   WBC 10*3/mm3 13.98* 15.59* 15.32* 10.27 13.38*   HEMOGLOBIN g/dL 11.1* 13.1 11.4* 11.6* 12.5*   PLATELETS 10*3/mm3 188 238 184 168 184       Results from last 7 days   Lab Units 12/01/21  1626   INR  1.02         Imaging Results (Last 24 Hours)     ** No results found for the last 24 hours. **           MEDICATIONS    amiodarone, 200 mg, Oral, Q24H  cetirizine, 5 mg, Oral, Daily  clopidogrel, 75 mg, Oral, Daily  famotidine, 20 mg, Oral, BID AC  furosemide, 20 mg, Intravenous, BID  insulin aspart, 0-9 Units, Subcutaneous, TID AC  ipratropium-albuterol, 3 mL, Nebulization, 4x Daily - RT  lactulose, 10 g, Oral, Daily  levoFLOXacin, 750 mg, Oral, Q48H  methylPREDNISolone sodium succinate, 40 mg, Intravenous, Q24H  metoprolol tartrate, 25 mg, Oral, Q12H  polyethylene glycol, 17 g, Oral, Daily  ranolazine, 1,000 mg, Oral, Q12H  sodium chloride, 10 mL, Intravenous, Q12H           Assessment/Plan   ASSESSMENT / PLAN       Pneumonia of both lungs due to infectious organism    1.  JILL on CKD- baseline creatinine 1.2, up to 1.8 peak and now better and at baseline.      Etiology of JILL is not immediately apparent.  He does complain of nausea but has had no vomiting.  No diarrhea.  No hypotension noted in the record.  No new nephrotoxic medications.   US negative for hydro, Tray negative, UA bland, FENa 3.7% consistent with intrinsic injury.    Keep lasix and switch to po      2.  Pneumonia- continue antibiotics and management primary team     3.  Atrial fibrillation with rvr  now on po amiodarone. Not a candidate for anticoagulation. On metoprolol     4.  DM2     5.  Hypertension- well-controlled. Resume lisinopril from tomorrow      6.  COPD     7.  History of CAD diastolic dysfunction    8. Constipation now has BM with  Lactulose    F/u in office in 2-3 week s           This document has been electronically signed by Jennifer George MD on December 8, 2021 10:39 CST           Electronically signed by Jennifer George MD at 12/08/21 1042     Shari Jones APRN at 12/08/21 0954     Attestation signed by Mana Curtis MD at 12/08/21 1255    I have reviewed this documentation and face-to-face examination was performed his wife are present in the room at the time of evaluation.  Significant improvement in mental status clinical condition and less confused today.  Will discontinue IV amiodarone continue oral amiodarone 200 mg and low-dose beta-blocker.  I agree with Shari Laureano regarding assessment evaluation and recommendation                    [facility] [place] [facility]   LOS: 6 days   Patient Care Team:  Paolo Rey MD as PCP - General    Chief Complaint:    Chief Complaint   Patient presents with   • Palpitations   • Chest Pain   • Fatigue        Subjective     Interval History:      No acute events overnight. Pt less confused today and pleasant. Remains in AF with improved rate. Will transition to po Amiodarone  "today.    Objective     Vital Sign Min/Max for last 24 hours  Temp  Min: 96.9 °F (36.1 °C)  Max: 98.6 °F (37 °C)   BP  Min: 112/57  Max: 176/96   Pulse  Min: 57  Max: 89   Resp  Min: 16  Max: 22   SpO2  Min: 97 %  Max: 99 %   Flow (L/min)  Min: 2  Max: 2   Weight  Min: 68 kg (149 lb 14.4 oz)  Max: 68 kg (149 lb 14.4 oz)     Flowsheet Rows      First Filed Value   Admission Height 171.5 cm (67.5\") Documented at 12/01/2021 1451   Admission Weight 68 kg (150 lb) Documented at 12/01/2021 1451            12/05/21  0343 12/06/21  0630 12/08/21  0300   Weight: 66.9 kg (147 lb 8 oz) 68.2 kg (150 lb 4.8 oz) 68 kg (149 lb 14.4 oz)       Physical Exam:  Physical Exam  Vitals and nursing note reviewed.   Constitutional:       General: He is not in acute distress.     Appearance: Normal appearance. He is well-developed. He is not ill-appearing, toxic-appearing or diaphoretic.   HENT:      Head: Normocephalic.      Right Ear: External ear normal.      Left Ear: External ear normal.   Eyes:      General: Lids are normal.      Pupils: Pupils are equal, round, and reactive to light.   Neck:      Thyroid: No thyromegaly.      Vascular: No carotid bruit or JVD.      Trachea: No tracheal deviation.   Cardiovascular:      Rate and Rhythm: Normal rate. Rhythm irregularly irregular.      Chest Wall: PMI is not displaced. No thrill.      Heart sounds: Normal heart sounds, S1 normal and S2 normal. No murmur heard.  No friction rub. No gallop. No S3 or S4 sounds.    Pulmonary:      Effort: Pulmonary effort is normal. No respiratory distress.      Breath sounds: Normal breath sounds. No stridor. No wheezing or rales.   Chest:      Chest wall: No tenderness.   Abdominal:      General: Bowel sounds are normal. There is no distension.      Palpations: Abdomen is soft.      Tenderness: There is no abdominal tenderness.   Musculoskeletal:      Right lower leg: No edema.      Left lower leg: No edema.   Skin:     General: Skin is warm and dry.      " Findings: No erythema or rash.   Neurological:      Mental Status: He is alert. Mental status is at baseline.          Results Review:     Results from last 7 days   Lab Units 12/08/21  0604 12/07/21  0643 12/06/21  1059 12/05/21  0713 12/05/21  0713 12/04/21  0703 12/04/21  0703 12/02/21  0526 12/02/21  0526   SODIUM mmol/L 140 139 137   < > 139   < > 139   < > 138   POTASSIUM mmol/L 4.6 4.5 4.4   < > 4.2   < > 4.4   < > 4.6   CHLORIDE mmol/L 104 106 102   < > 98   < > 100   < > 103   CO2 mmol/L 28.0 23.0 24.0   < > 26.0   < > 24.0   < > 26.0   BUN mg/dL 44* 47* 61*   < > 58*   < > 48*   < > 19   CREATININE mg/dL 1.18 1.31* 1.47*   < > 1.84*   < > 1.88*   < > 1.16   CALCIUM mg/dL 8.5* 8.8 8.7   < > 9.3   < > 8.8   < > 8.7   BILIRUBIN mg/dL  --   --   --   --  0.5  --  0.3  --  0.2   ALK PHOS U/L  --   --   --   --  54  --  54  --  56   ALT (SGPT) U/L  --   --   --   --  19  --  14  --  12   AST (SGOT) U/L  --   --   --   --  18  --  15  --  12   GLUCOSE mg/dL 159* 177* 219*   < > 175*   < > 166*   < > 192*    < > = values in this interval not displayed.       Estimated Creatinine Clearance: 41.6 mL/min (by C-G formula based on SCr of 1.18 mg/dL).    Results from last 7 days   Lab Units 12/01/21  1510   MAGNESIUM mg/dL 1.6             Results from last 7 days   Lab Units 12/08/21  0604 12/05/21  0713 12/04/21  0703 12/02/21  0526 12/01/21  1510   WBC 10*3/mm3 13.98* 15.59* 15.32* 10.27 13.38*   HEMOGLOBIN g/dL 11.1* 13.1 11.4* 11.6* 12.5*   PLATELETS 10*3/mm3 188 238 184 168 184       Results from last 7 days   Lab Units 12/01/21  1626   INR  1.02     Lab Results   Component Value Date    PROBNP 920.0 12/01/2021       I/O last 3 completed shifts:  In: 720 [P.O.:720]  Out: 1600 [Urine:1600]    Cardiographics:  ECG/EMG Results (last 24 hours)     ** No results found for the last 24 hours. **        Results for orders placed during the hospital encounter of 10/25/21    Adult Transthoracic Echo Limited W/ Cont if  Necessary Per Protocol    Interpretation Summary  · Significant beat to beat variability but estimated EF appears around 40-45%.  · Left ventricular systolic function is mildly decreased.  · Estimated right ventricular systolic pressure from tricuspid regurgitation is normal (<35 mmHg).  · Left ventricular diastolic function was not assessed.  · Left atrial volume is moderately increased.      Imaging Results (Most Recent)     Procedure Component Value Units Date/Time    XR Chest 1 View [[id number]] Collected: 12/05/21 1229     Updated: 12/05/21 1820    Narrative:          EXAM:  XR CHEST 1 VIEW    TECHNIQUE:   Single frontal radiograph of the chest.    VIEWS:  1 view    CLINICAL HISTORY:   88 years  Male  Shortness of breath, J18.9 Pneumonia, unspecified  organism R07.2 Precordial pain I48.91 Unspecified atrial  fibrillation Z74.09 Other reduced mobility Z78.9 Other specified  health status Z74.09 Other reduced mobility    COMPARISON:   December 1, 2021 chest x-ray    FINDINGS:   Lungs:  Patchy bilateral interstitial opacities are not  significantly changed  Pleura: Small left pleural effusion. No pneumothorax.  Heart/mediastinum: The cardiac silhouette is not enlarged.  Bones: No acute osseous findings.  Soft tissues: Unremarkable.      Impression:      1.  Patchy bilateral interstitial opacities are not significantly  changed.  2.  Small left pleural effusion.    Electronically signed by:  Rc Johnson MD  12/5/2021 6:19  PM CST Workstation: [id number]    US Renal Bilateral [[id number]] Collected: 12/04/21 2047     Updated: 12/05/21 0035    Narrative:      EXAM:    US Retroperitoneal Limited, Renal    CLINICAL HISTORY:    The patient is 88 years old and is Male; JILL, J18.9 Pneumonia,  unspecified organism R07.2 Precordial pain I48.91 Unspecified  atrial fibrillation Z74.09 Other reduced mobility Z78.9 Other  specified health status Z74.09 Other reduced mobility    TECHNIQUE:    Real-time limited ultrasound  of the retroperitoneum with image  documentation.    COMPARISON:    CT of the abdomen and pelvis June 22, 2021    FINDINGS:    RIGHT KIDNEY:  The right kidney measures 10.4 cm in length.  No  stones.  No hydronephrosis.    LEFT KIDNEY:  Bilobed left renal cyst measuring 7.8 x 4.7 x 5.6  cm is present. The left kidney measures 11.0 cm in length.  No  stones.  No hydronephrosis.    BLADDER:  The bladder is well distended.      Impression:        Large left renal cyst. Otherwise, unremarkable renal  ultrasound.    Electronically signed by:  Masha Baires MD  12/5/2021 12:33 AM  CST Workstation: [id number]    XR Chest 1 View [[id number]] Collected: 12/01/21 1515     Updated: 12/01/21 1607    Narrative:        PROCEDURE: Single chest view portable    REASON FOR EXAM:Chest Pain triage protocol  Chest Pain Triage Protocol    FINDINGS: Comparison exam dated October 25, 2021. Cardiac size  appears within normal limits. Left upper lobe perihilar and left  lung base infrahilar interstitial groundglass opacities. Right  upper lobe peripheral small interstitial groundglass opacity.  Lungs are otherwise clear. Pleural spaces are normal. No acute  osseous abnormality. Stable epidural stimulator lead in the mid  thoracic spine region.      Impression:      1.  Left upper lobe perihilar and left lung base infrahilar  interstitial groundglass opacities. Right upper lobe peripheral  small interstitial groundglass opacity. These opacities would be  suspicious for pneumonia including possible atypical viral  etiology and/or pulmonary edema. Recommend clinical correlation.    Electronically signed by:  Nelson Christianson MD  12/1/2021 4:06 PM CST  Workstation: [id number]          XR Chest 1 View    Result Date: 12/5/2021  1.  Patchy bilateral interstitial opacities are not significantly changed. 2.  Small left pleural effusion. Electronically signed by:  Rc Johnson MD  12/5/2021 6:19 PM CST Workstation: [id number]    XR Chest 1  View    Result Date: 12/1/2021  1.  Left upper lobe perihilar and left lung base infrahilar interstitial groundglass opacities. Right upper lobe peripheral small interstitial groundglass opacity. These opacities would be suspicious for pneumonia including possible atypical viral etiology and/or pulmonary edema. Recommend clinical correlation. Electronically signed by:  Nelson Christianson MD  12/1/2021 4:06 PM CST Workstation: [id number]    US Renal Bilateral    Result Date: 12/5/2021    Large left renal cyst. Otherwise, unremarkable renal ultrasound. Electronically signed by:  Masha Baires MD  12/5/2021 12:33 AM CST Workstation: [id number]      Medication Review:     Current Facility-Administered Medications:   •  acetaminophen (TYLENOL) tablet 650 mg, 650 mg, Oral, Q4H PRN, Christal Gonzalez MD  •  albuterol (PROVENTIL) nebulizer solution 0.083% 2.5 mg/3mL, 2.5 mg, Nebulization, Q4H PRN, Christal Gonzalez MD  •  amiodarone (PACERONE) tablet 200 mg, 200 mg, Oral, Q24H, Shari Jones APRN  •  cetirizine (zyrTEC) tablet 5 mg, 5 mg, Oral, Daily, Christal Gonzalez MD, 5 mg at 12/07/21 0941  •  clopidogrel (PLAVIX) tablet 75 mg, 75 mg, Oral, Daily, Christal Gonzalez MD, 75 mg at 12/07/21 0940  •  dextrose (D50W) (25 g/50 mL) IV injection 25 g, 25 g, Intravenous, Q15 Min PRN, Kelvin Aguilar MD  •  dextrose (GLUTOSE) oral gel 15 g, 15 g, Oral, Q15 Min PRN, Kelvin Aguilar MD  •  famotidine (PEPCID) tablet 20 mg, 20 mg, Oral, BID AC, Christal Gonzalez MD, 20 mg at 12/07/21 1822  •  First Mouthwash (Magic Mouthwash) 10 mL, 10 mL, Swish & Spit, 4x Daily PRN, Behroozi, Saeid, MD, 10 mL at 12/04/21 0146  •  furosemide (LASIX) injection 20 mg, 20 mg, Intravenous, BID, Jennifer George MD, 20 mg at 12/08/21 0617  •  glucagon (human recombinant) (GLUCAGEN DIAGNOSTIC) injection 1 mg, 1 mg, Subcutaneous, Q15 Min PRN, Kelvin Aguilar MD  •  HYDROcodone-acetaminophen (NORCO) 7.5-325 MG per tablet 1  tablet, 1 tablet, Oral, Q6H PRN, Christal Gonzalez MD, 1 tablet at 12/08/21 0141  •  insulin aspart (novoLOG) injection 0-9 Units, 0-9 Units, Subcutaneous, TID AC, Kelvin Aguilar MD, 2 Units at 12/07/21 1824  •  ipratropium-albuterol (DUO-NEB) nebulizer solution 3 mL, 3 mL, Nebulization, 4x Daily - RT, Kelvin Aguilar MD, 3 mL at 12/08/21 0715  •  lactulose (CHRONULAC) 10 GM/15ML solution 10 g, 10 g, Oral, Daily, Jennifer George MD, 10 g at 12/07/21 0940  •  levoFLOXacin (LEVAQUIN) tablet 750 mg, 750 mg, Oral, Q48H, Kelvin Aguilar MD, 750 mg at 12/07/21 2125  •  melatonin tablet 3 mg, 3 mg, Oral, Nightly PRN, Behroozi, Saeid, MD, 3 mg at 12/05/21 2024  •  methylPREDNISolone sodium succinate (SOLU-Medrol) injection 40 mg, 40 mg, Intravenous, Q24H, Kelvin Aguilar MD, 40 mg at 12/07/21 1821  •  metoprolol tartrate (LOPRESSOR) tablet 25 mg, 25 mg, Oral, Q12H, Shari Jones APRN, 25 mg at 12/07/21 2100  •  nitroglycerin (NITROSTAT) SL tablet 0.4 mg, 0.4 mg, Sublingual, Q5 Min PRN, Christal Gonzalez MD  •  ondansetron (ZOFRAN) injection 4 mg, 4 mg, Intravenous, Q4H PRN, Karan Segovia MD, 4 mg at 12/04/21 1631  •  polyethylene glycol (MIRALAX) packet 17 g, 17 g, Oral, Daily, Christla Gonzalez MD, 17 g at 12/06/21 0825  •  ranolazine (RANEXA) 12 hr tablet 1,000 mg, 1,000 mg, Oral, Q12H, Christal Gonzalez MD, 1,000 mg at 12/07/21 2101  •  sodium chloride 0.9 % flush 10 mL, 10 mL, Intravenous, PRN, Zack Castro MD  •  sodium chloride 0.9 % flush 10 mL, 10 mL, Intravenous, Q12H, Kelvin Aguilar MD, 10 mL at 12/07/21 2101  •  sodium chloride 0.9 % flush 10 mL, 10 mL, Intravenous, PRN, Kelvin Aguilar MD    Assessment/Plan       Pneumonia of both lungs due to infectious organism    AF with RVR:  Mr. Hasmukh Ham is an 88-year-old  male with past medical history of GI bleed with AV malformation, frequent falls/orthostasis, CAD status post PCI in October 2021  to the mid LAD with critical stenosis of the RCA recommended medical management, history of CHF, CKD, hypertension, type 2 diabetes mellitus who presented to the hospital last week with shortness of breath and malaise.  Patient developed JILL with a creatinine of 1.8.  Patient found to have pneumonia.  During hospitalization patient was noted to be in atrial fibrillation with RVR.  Mildly diagnostic troponin likely multifactorial and demand ischemia, ongoing suspicion of ischemia low. Troponin trended down.  Patient was given IV amiodarone 150 mg Lasix and started on amiodarone drip.  -Oral anticoagulation contraindicated due to history of bleeding and HAS-BLED score of 5.   -Stop Amiodarone gtt and transition to Amiodarone 200mg daily  -Metoprolol tartrate to 25 mg twice daily      CAD s/p PCI: He will continue with antiplatelet therapy with Plavix, Nexa 1000 mg twice daily, Lopressor    History of chronic CHF due to diastolic dysfunction: diuretic management per nephrology.  Patient appears euvolemic upon exam today and not in acute exacerbation    Pneumonia: Management per primary team    JILL on CKD: Nephrology following and managing diuretics.     Acute delirium with baseline dementia: Worsened confusion today, wife at bedside      Plan for disposition: Continue [facility]            This document has been electronically signed by EVE Aguilar on December 8, 2021 09:25 CST     Electronically signed by EVE Aguilar, 12/08/21, 9:25 AM CST.  .      Electronically signed by Mana Curtis MD at 12/08/21 1255     Kelvin Aguilar MD at 12/07/21 1309              [facility]  INPATIENT PROGRESS NOTE    Length of Stay: 5  Date of Admission: 12/1/2021  Primary Care Physician: Paolo Rey MD    Subjective   Chief Complaint: confused    HPI:  Pt confused overall but recognizes me as his doctor when I enter the room.  He denies any  complaints.  Family now wanting home with home health at [place].  Pt placed on amio gtt for increased rate this am.     Per nursing report, patient had increased confusion with paranoia today.      Review of Systems   Unable to perform ROS: Dementia          Objective    Temp:  [96.3 °F (35.7 °C)-97.3 °F (36.3 °C)] 96.9 °F (36.1 °C)  Heart Rate:  [] 64  Resp:  [18-22] 22  BP: (123-176)/(67-96) 176/96    Physical Exam  Constitutional:       Appearance: Normal appearance.   HENT:      Head: Normocephalic and atraumatic.      Right Ear: External ear normal.      Left Ear: External ear normal.      Nose: Nose normal.      Mouth/Throat:      Mouth: Mucous membranes are moist.      Pharynx: Oropharynx is clear.   Eyes:      General: No scleral icterus.     Extraocular Movements: Extraocular movements intact.      Pupils: Pupils are equal, round, and reactive to light.   Cardiovascular:      Rate and Rhythm: Tachycardia present. Rhythm regularly irregular.   Pulmonary:      Breath sounds: No wheezing, rhonchi or rales.   Abdominal:      Palpations: Abdomen is soft.      Tenderness: There is no abdominal tenderness. There is no guarding or rebound.   Musculoskeletal:      Cervical back: Neck supple.      Right lower leg: No edema.      Left lower leg: No edema.   Lymphadenopathy:      Cervical: No cervical adenopathy.   Skin:     General: Skin is warm and dry.      Findings: No rash.   Neurological:      Mental Status: He is alert. He is disoriented.             Results Review:  I have reviewed the labs, radiology results, and diagnostic studies.    Laboratory Data:   Results from last 7 days   Lab Units 12/07/21  0643 12/06/21  1059 12/05/21  0713 12/04/21  0703 12/04/21  0703 12/02/21  0526 12/02/21  0526   SODIUM mmol/L 139 137 139   < > 139   < > 138   POTASSIUM mmol/L 4.5 4.4 4.2   < > 4.4   < > 4.6   CHLORIDE mmol/L 106 102 98   < > 100   < > 103   CO2 mmol/L 23.0 24.0 26.0   < > 24.0   < > 26.0   BUN mg/dL 47* 61*  58*   < > 48*   < > 19   CREATININE mg/dL 1.31* 1.47* 1.84*   < > 1.88*   < > 1.16   GLUCOSE mg/dL 177* 219* 175*   < > 166*   < > 192*   CALCIUM mg/dL 8.8 8.7 9.3   < > 8.8   < > 8.7   BILIRUBIN mg/dL  --   --  0.5  --  0.3  --  0.2   ALK PHOS U/L  --   --  54  --  54  --  56   ALT (SGPT) U/L  --   --  19  --  14  --  12   AST (SGOT) U/L  --   --  18  --  15  --  12   ANION GAP mmol/L 10.0 11.0 15.0   < > 15.0   < > 9.0    < > = values in this interval not displayed.     Estimated Creatinine Clearance: 37.6 mL/min (A) (by C-G formula based on SCr of 1.31 mg/dL (H)).  Results from last 7 days   Lab Units 12/01/21  1510   MAGNESIUM mg/dL 1.6         Results from last 7 days   Lab Units 12/05/21  0713 12/04/21  0703 12/02/21  0526 12/01/21  1510   WBC 10*3/mm3 15.59* 15.32* 10.27 13.38*   HEMOGLOBIN g/dL 13.1 11.4* 11.6* 12.5*   HEMATOCRIT % 40.4 35.1* 37.5 39.0   PLATELETS 10*3/mm3 238 184 168 184     Results from last 7 days   Lab Units 12/01/21  1626   INR  1.02       Culture Data:   No results found for: BLOODCX  No results found for: URINECX  No results found for: RESPCX  No results found for: WOUNDCX  No results found for: STOOLCX  No components found for: BODYFLD    Radiology Data:   Imaging Results (Last 24 Hours)     ** No results found for the last 24 hours. **          I have reviewed the patient's current medications.     Assessment/Plan     Active Hospital Problems    Diagnosis    • Pneumonia of both lungs due to infectious organism        Plan:      1.  Pneumonia   2.  Acute/Chronic renal failure   3.  delerium with baseline dementia   4.  PAF - not on LTAC due to history of GIB   5.  Chronic hypoxic resp failure on home O2   6.  Elevated trop  7.  HTN, benign  8.  COPD    - cont levaquin - day #5/7  - cont to decrease steroids - may be contributing to delerium and paranoia  - cont nebs   - cont cardiac meds per cardiology - currently on amio gtt for rate control  - IVF stopped and lasix ordered per  renal  - family has requesting home with home health at [place].              Discharge Planning: I expect patient to be discharged to home with home health.     Kelvin Aguilar MD    Electronically signed by Kelvin Aguilar MD at 12/07/21 6656

## 2021-12-08 NOTE — PLAN OF CARE
Goal Outcome Evaluation:  Plan of Care Reviewed With: patient, spouse        Progress: improving  Outcome Summary: Pt much more alert than prior date. Pt agreeable to therapy. Pt t/f sup to sit with min of 1 and stood x 3 trials with min of 1. Pt amb 6ft, then 12ft, then 8ft with HHA and forward trunk lean and decreased stride length. Pt returned to sup in bed and left with all needs met. Pt would cont to benefit from therapy upon DC.

## 2021-12-08 NOTE — DISCHARGE PLACEMENT REQUEST
"    Please keep me updated.    Thank you,  ANA Levine CM at [phone]      Hasmukh Ham (88 y.o. Male)             Date of Birth Social Security Number Address Home Phone MRN    06/01/1933  [address] [place] [address] [phone] [id number]    [employer] Mar[employer] Status             [employer]        Admission Date Admission Type Admitting Provider Attending Provider Department, Room/Bed    12/1/21 Emergency Christal Gonzalez MD Gerlach, Elizabeth T, MD [address], 319/1    Discharge Date Discharge Disposition Discharge Destination                         Attending Provider: Christal Gonzalez MD    Allergies: Atorvastatin, Penicillins, Azithromycin, Cefdinir, Clarithromycin, Pravastatin, Benadryl Allergy [Diphenhydramine Hcl]    Isolation: None   Infection: None   Code Status: CPR   Advance Care Planning Activity    Ht: 170.2 cm (67\")   Wt: 68 kg (149 lb 14.4 oz)    Admission Cmt: None   Principal Problem: None                Active Insurance as of 12/1/2021     Primary Coverage     Payor Plan Insurance Group Employer/Plan Group    AETNA MEDICARE REPLACEMENT AETNA MEDICARE REPLACEMENT [id number]     Payor Plan Address Payor Plan Phone Number Payor Plan Fax Number Effective Dates    [address] [phone]  4/1/2019 - None Entered    [facility] [address]       Subscriber Name Subscriber Birth Date Member ID       HASMUKH HAM 6/1/1933 [id number]                 Emergency Contacts      (Rel.) Home Phone Work Phone Mobile Phone    Rimma Durant (Spouse) [phone] -- [phone]    Ilan Ham (Brother) -- -- [phone]            Insurance Information                AETNA MEDICARE REPLACEMENT/AETNA MEDICARE REPLACEMENT Phone: [phone]    Subscriber: Hasmukh Ham Subscriber#: [id number]    Group#: [id number] Precert#: --             History & Physical      Kelvin Aguilar MD at " 12/01/21 1851                [facility] Medicine Admission      Date of Admission: 12/1/2021      Primary Care Physician: Paolo Rey MD      Chief Complaint: Shortness of breath    HPI: Mr. Ham is an 88-year-old gentleman with a history of chronic lung disease, CAD with non-STEMI on 10/25/2021 resulting in placement of 2 stents on 10/28/2021, diabetes mellitus type 2, benign hypertension who presents to the emergency room with complaints of increasing shortness of breath and general malaise over the last 3 to 4 days.  He states that he is not felt normal and been back to his baseline since his last hospitalization back at the end of October.  Over the last few days his began to decline rapidly with weakness, malaise, cough with sputum, shortness of breath.  His appetite is also declined.  He denies fevers or chills.  He denies peripheral edema.  He denies orthopnea.  He denies chest pain.      Concurrent Medical History:  has a past medical history of Basal cell carcinoma, Bilateral carotid artery stenosis, Bilateral carotid artery stenosis, Bleeding disorder (HCC), CHF (congestive heart failure) (HCC), Chronic obstructive pulmonary disease (COPD) (HCC), Coronary arteriosclerosis, Degenerative joint disease involving multiple joints, Dementia (HCC), Diabetes mellitus (HCC), Heart problem, History of stomach ulcers, Hyperlipidemia, Hypertension, Ingrown toenail, and Myocardial infarction (HCC).    Past Surgical History:  has a past surgical history that includes Hernia repair; Lung biopsy; Ventral hernia repair; Thoracotomy (Left, 1977); Cardiac catheterization (N/A, 6/6/2017); Coronary stent placement; Neck surgery; Cardiac catheterization (N/A, 2/14/2018); Lung surgery; Back surgery; Coronary angioplasty with stent; and Cardiac catheterization (N/A, 10/28/2021).    Family History: family history includes Cancer in an other family member; Diabetes in his father and  "mother; Heart disease in his father; Hypertension in his father; Lung disease in an other family member.   Social History:  reports that he has quit smoking. He has never used smokeless tobacco. He reports that he does not drink alcohol and does not use drugs.    Allergies:   Allergies   Allergen Reactions   • Atorvastatin Anaphylaxis   • Penicillins Rash   • Azithromycin Rash   • Cefdinir Other (See Comments)     \"i burned up\" and break out in a rash   • Clarithromycin Rash and Itching   • Pravastatin Unknown - High Severity     Myalgia  Myalgia   • Benadryl Allergy [Diphenhydramine Hcl] Other (See Comments)     Pt states \"it knocks me out.\"       Medications:   Prior to Admission medications    Medication Sig Start Date End Date Taking? Authorizing Provider   acetaminophen (TYLENOL) 325 MG tablet Take 2 tablets by mouth Every 4 (Four) Hours As Needed for Mild Pain . 2/15/18   Jc Alba MD   albuterol (PROVENTIL) (2.5 MG/3ML) 0.083% nebulizer solution Take 2.5 mg by nebulization Every 4 (Four) Hours As Needed for Wheezing. 12/23/17   Adi Puente MD   Blood Glucose Monitoring Suppl (ONE TOUCH ULTRA 2) w/Device kit Daily. as directed 2/11/21   Bill Rader MD   Brovana 15 MCG/2ML nebulizer solution 3 (Three) Times a Day. 10/16/20   Bill Rader MD   cetirizine (zyrTEC) 10 MG tablet Take 10 mg by mouth Daily.    Bill Rader MD   clopidogrel (PLAVIX) 75 MG tablet Take 75 mg by mouth Daily. 2/3/16   Bill Rader MD   docusate sodium 100 MG capsule Take 100 mg by mouth.    Bill Rader MD   famotidine (PEPCID) 20 MG tablet Take 20 mg by mouth 2 (Two) Times a Day.    Bill Rader MD   fluticasone (FLONASE) 50 MCG/ACT nasal spray 2 sprays into each nostril Daily.    Bill Rader MD   Fluticasone Furoate (ARNUITY ELLIPTA) 200 MCG/ACT aerosol powder  Inhale.    Bill Rader MD   Fluticasone-Umeclidin-Vilant (Trelegy Ellipta) 100-62.5-25 " MCG/INH aerosol powder  Inhale 1 inhaler.    Bill Rader MD   furosemide (LASIX) 40 MG tablet Take 1 tablet by mouth Daily. 11/23/21   Carine Johnson MD   glimepiride (AMARYL) 2 MG tablet Take 2 mg by mouth Every Morning Before Breakfast.    Bill Rader MD   HYDROcodone-acetaminophen (NORCO) 7.5-325 MG per tablet Take 1 tablet by mouth Every 6 (Six) Hours As Needed for Moderate Pain . 6/21/21   Gauri Sweeney APRN   ipratropium (ATROVENT) 0.02 % nebulizer solution INHALE THE CONTENTS OF 1 VIAL IN NEBULIZER EVERY 4 HOURS AS NEEDED FOR WHEEZING 11/23/20   Bill Rader MD   ipratropium-albuterol (DUO-NEB) 0.5-2.5 mg/mL nebulizer Take 3 mL by nebulization 4 (Four) Times a Day. 9/28/17   Jc Alba MD   Lancets (OneTouch Delica Plus [id number]) misc 1 each by Other route Daily. 2/26/21   Bill Rader MD   lisinopril (PRINIVIL,ZESTRIL) 5 MG tablet Take 1 tablet by mouth Daily. 11/22/21   Mana Curtis MD   magnesium oxide (MAG-OX) 400 MG tablet Take 250 mg by mouth Daily.    Bill Rader MD   meclizine (ANTIVERT) 12.5 MG tablet 1 tablet po q 6 hr prn severe nausea 6/1/20   Bill Rader MD   nitroglycerin (NITROSTAT) 0.4 MG SL tablet place 1 tablet under the tongue if needed every 5 minutes for hair...  (REFER TO PRESCRIPTION NOTES). 1/11/17   Bill Rader MD   nystatin (MYCOSTATIN) 100,000 unit/mL suspension SWISH AND SWALLOW 2 ML EVERY 4 HOURS AS NEEDED 11/2/21   Bill Rader MD   O2 (OXYGEN) Inhale 2 L/min Every Night. W/ CPAP    Provider, Historical, MD   OneTouch Verio test strip TEST EVERY DAY 11/15/21   Bill Rader MD   polyethylene glycol (MIRALAX) 17 g packet Dissolve 1 packet (17 g) in 4 to 8 ounces of beverage and drink by mouth Daily. 6/30/21   Jc Alba MD   predniSONE (DELTASONE) 10 MG tablet Daily. 8/25/21   Provider, MD Bill   ranolazine (RANEXA) 1000 MG 12 hr tablet Take 1 tablet by mouth Every 12  (Twelve) Hours. 11/23/21   Carine Johnson MD   Red Yeast Rice 600 MG tablet Take 600 mg by mouth 2 (Two) Times a Day.    Provider, MD Bill   Umeclidinium-Vilanterol 62.5-25 MCG/INH aerosol powder  Inhale 1 puff Daily. 3/3/17   Brea Higginbotham MD       Review of Systems:  Review of Systems   Comprehensive review of systems completed.  Pertinent positives and negatives per HPI.  All others reviewed negative.  Physical Exam:   Temp:  [96.7 °F (35.9 °C)-98.3 °F (36.8 °C)] 96.7 °F (35.9 °C)  Heart Rate:  [] 104  Resp:  [18-20] 18  BP: (101-138)/(62-88) 115/77  Physical Exam  Constitutional:       General: He is not in acute distress.     Appearance: Normal appearance. He is not ill-appearing.   HENT:      Head: Normocephalic and atraumatic.      Right Ear: External ear normal.      Left Ear: External ear normal.      Nose: Nose normal.      Mouth/Throat:      Mouth: Mucous membranes are dry.   Eyes:      General: No scleral icterus.     Extraocular Movements: Extraocular movements intact.      Pupils: Pupils are equal, round, and reactive to light.   Cardiovascular:      Rate and Rhythm: Rhythm irregular.   Pulmonary:      Effort: Pulmonary effort is normal.      Breath sounds: Wheezing present.   Abdominal:      Palpations: Abdomen is soft.      Tenderness: There is no abdominal tenderness. There is no guarding.   Musculoskeletal:      Cervical back: Neck supple.      Right lower leg: No edema.      Left lower leg: No edema.   Skin:     General: Skin is warm and dry.      Findings: No rash.   Neurological:      General: No focal deficit present.      Mental Status: He is alert and oriented to person, place, and time. Mental status is at baseline.   Psychiatric:         Mood and Affect: Mood normal.         Behavior: Behavior normal.           Results Reviewed:  I have personally reviewed current lab, radiology, and data and agree with results.  Lab Results (last 24 hours)     Procedure Component  "Value Units Date/Time    Procalcitonin [[id number]]  (Normal) Collected: 12/01/21 1720    Specimen: Blood Updated: 12/01/21 1836     Procalcitonin 0.07 ng/mL     Narrative:      As a Marker for Sepsis (Non-Neonates):     1. <0.5 ng/mL represents a low risk of severe sepsis and/or septic shock.  2. >2 ng/mL represents a high risk of severe sepsis and/or septic shock.    As a Marker for Lower Respiratory Tract Infections that require antibiotic therapy:  PCT on Admission     Antibiotic Therapy             6-12 Hrs later  >0.5                          Strongly Recommended            >0.25 - <0.5             Recommended  0.1 - 0.25                  Discouraged                       Remeasure/reassess PCT  <0.1                         Strongly Discouraged         Remeasure/reassess PCT      As 28 day mortality risk marker: \"Change in Procalcitonin Result\" (>80% or <=80%) if Day 0 (or Day 1) and Day 4 values are available. Refer to http://www.[facility]at[facility]-pct-calculator.com/    Change in PCT <=80 %   A decrease of PCT levels below or equal to 80% defines a positive change in PCT test result representing a higher risk for 28-day all-cause mortality of patients diagnosed with severe sepsis or septic shock.    Change in PCT >80 %   A decrease of PCT levels of more than 80% defines a negative change in PCT result representing a lower risk for 28-day all-cause mortality of patients diagnosed with severe sepsis or septic shock.                Troponin [[id number]]  (Normal) Collected: 12/01/21 1720    Specimen: Blood Updated: 12/01/21 1737     Troponin T 0.024 ng/mL     Narrative:      Troponin T Reference Range:  <= 0.03 ng/mL-   Negative for AMI  >0.03 ng/mL-     Abnormal for myocardial necrosis.  Clinicians would have to utilize clinical acumen, EKG, Troponin and serial changes to determine if it is an Acute Myocardial Infarction or myocardial injury due to an underlying chronic condition.       Results may be falsely decreased if " patient taking Biotin.      COVID-19 and FLU A/B PCR - Swab, Nasopharynx [[id number]]  (Normal) Collected: 12/01/21 1625    Specimen: Swab from Nasopharynx Updated: 12/01/21 1650     COVID19 Not Detected     Influenza A PCR Not Detected     Influenza B PCR Not Detected    Narrative:      Fact sheet for providers: https://www.fda.gov/media/161207/download    Fact sheet for patients: https://www.fda.gov/media/138071/download    Test performed by PCR.    Protime-INR [[id number]]  (Normal) Collected: 12/01/21 1626    Specimen: Blood Updated: 12/01/21 1647     Protime 13.3 Seconds      INR 1.02    Narrative:      Therapeutic range for most indications is 2.0-3.0 INR,  or 2.5-3.5 for mechanical heart valves.    Lactic Acid, Plasma [[id number]]  (Normal) Collected: 12/01/21 1626    Specimen: Blood Updated: 12/01/21 1641     Lactate 1.4 mmol/L     Blood Culture - Blood, Arm, Left [[id number]] Collected: 12/01/21 1626    Specimen: Blood from Arm, Left Updated: 12/01/21 1626    Blood Culture - Blood, Hand, Right [[id number]] Collected: 12/01/21 1626    Specimen: Blood from Hand, Right Updated: 12/01/21 1626    [place] Draw [[id number]] Collected: 12/01/21 1510    Specimen: Blood Updated: 12/01/21 1616    Narrative:      The following orders were created for panel order [place] Draw.  Procedure                               Abnormality         Status                     ---------                               -----------         ------                     Green Top (Gel)[[id number]]                                  Final result               Lavender Top[[id number]]                                     Final result               Gold Top - SST[[id number]]                                   Final result               Light Blue Top[[id number]]                                   Final result                 Please view results for these tests on the individual orders.    Gold Top - SST [[id number]] Collected: 12/01/21 1510    Specimen: Blood  Updated: 12/01/21 1616     Extra Tube Hold for add-ons.     Comment: Auto resulted.       Green Top (Gel) [[id number]] Collected: 12/01/21 1510    Specimen: Blood Updated: 12/01/21 1616     Extra Tube Hold for add-ons.     Comment: Auto resulted.       Lavender Top [[id number]] Collected: 12/01/21 1510    Specimen: Blood Updated: 12/01/21 1616     Extra Tube hold for add-on     Comment: Auto resulted       Light Blue Top [[id number]] Collected: 12/01/21 1510    Specimen: Blood Updated: 12/01/21 1616     Extra Tube hold for add-on     Comment: Auto resulted       CK [[id number]]  (Normal) Collected: 12/01/21 1510    Specimen: Blood Updated: 12/01/21 1613     Creatine Kinase 27 U/L     Magnesium [[id number]]  (Normal) Collected: 12/01/21 1510    Specimen: Blood Updated: 12/01/21 1613     Magnesium 1.6 mg/dL     Comprehensive Metabolic Panel [[id number]]  (Abnormal) Collected: 12/01/21 1510    Specimen: Blood Updated: 12/01/21 1536     Glucose 120 mg/dL      BUN 16 mg/dL      Creatinine 1.06 mg/dL      Sodium 139 mmol/L      Potassium 4.5 mmol/L      Chloride 105 mmol/L      CO2 26.0 mmol/L      Calcium 9.2 mg/dL      Total Protein 6.3 g/dL      Albumin 3.60 g/dL      ALT (SGPT) 16 U/L      AST (SGOT) 15 U/L      Alkaline Phosphatase 61 U/L      Total Bilirubin 0.3 mg/dL      eGFR Non African Amer 66 mL/min/1.73      Globulin 2.7 gm/dL      A/G Ratio 1.3 g/dL      BUN/Creatinine Ratio 15.1     Anion Gap 8.0 mmol/L     Narrative:      GFR Normal >60  Chronic Kidney Disease <60  Kidney Failure <15      Troponin [[id number]]  (Abnormal) Collected: 12/01/21 1510    Specimen: Blood Updated: 12/01/21 1536     Troponin T 0.032 ng/mL     Narrative:      Troponin T Reference Range:  <= 0.03 ng/mL-   Negative for AMI  >0.03 ng/mL-     Abnormal for myocardial necrosis.  Clinicians would have to utilize clinical acumen, EKG, Troponin and serial changes to determine if it is an Acute Myocardial Infarction or myocardial injury due to an  underlying chronic condition.       Results may be falsely decreased if patient taking Biotin.      BNP [[id number]]  (Normal) Collected: 12/01/21 1510    Specimen: Blood Updated: 12/01/21 1534     proBNP 920.0 pg/mL     Narrative:      Among patients with dyspnea, NT-proBNP is highly sensitive for the detection of acute congestive heart failure. In addition NT-proBNP of <300 pg/ml effectively rules out acute congestive heart failure with 99% negative predictive value.    Results may be falsely decreased if patient taking Biotin.      CBC & Differential [[id number]]  (Abnormal) Collected: 12/01/21 1510    Specimen: Blood Updated: 12/01/21 1514    Narrative:      The following orders were created for panel order CBC & Differential.  Procedure                               Abnormality         Status                     ---------                               -----------         ------                     CBC Auto Differential[[id number]]        Abnormal            Final result                 Please view results for these tests on the individual orders.    CBC Auto Differential [[id number]]  (Abnormal) Collected: 12/01/21 1510    Specimen: Blood Updated: 12/01/21 1514     WBC 13.38 10*3/mm3      RBC 4.29 10*6/mm3      Hemoglobin 12.5 g/dL      Hematocrit 39.0 %      MCV 90.9 fL      MCH 29.1 pg      MCHC 32.1 g/dL      RDW 18.0 %      RDW-SD 59.1 fl      MPV 11.4 fL      Platelets 184 10*3/mm3      Neutrophil % 83.4 %      Lymphocyte % 10.5 %      Monocyte % 3.1 %      Eosinophil % 0.4 %      Basophil % 0.4 %      Immature Grans % 2.2 %      Neutrophils, Absolute 11.16 10*3/mm3      Lymphocytes, Absolute 1.40 10*3/mm3      Monocytes, Absolute 0.42 10*3/mm3      Eosinophils, Absolute 0.05 10*3/mm3      Basophils, Absolute 0.06 10*3/mm3      Immature Grans, Absolute 0.29 10*3/mm3      nRBC 0.0 /100 WBC         Imaging Results (Last 24 Hours)     Procedure Component Value Units Date/Time    XR Chest 1 View [[id number]]  Collected: 12/01/21 1515     Updated: 12/01/21 1607    Narrative:        PROCEDURE: Single chest view portable    REASON FOR EXAM:Chest Pain triage protocol  Chest Pain Triage Protocol    FINDINGS: Comparison exam dated October 25, 2021. Cardiac size  appears within normal limits. Left upper lobe perihilar and left  lung base infrahilar interstitial groundglass opacities. Right  upper lobe peripheral small interstitial groundglass opacity.  Lungs are otherwise clear. Pleural spaces are normal. No acute  osseous abnormality. Stable epidural stimulator lead in the mid  thoracic spine region.      Impression:      1.  Left upper lobe perihilar and left lung base infrahilar  interstitial groundglass opacities. Right upper lobe peripheral  small interstitial groundglass opacity. These opacities would be  suspicious for pneumonia including possible atypical viral  etiology and/or pulmonary edema. Recommend clinical correlation.    Electronically signed by:  Nelson Christianson MD  12/1/2021 4:06 PM CST  Workstation: [id number]            Assessment:    Active Hospital Problems    Diagnosis    • Pneumonia of both lungs due to infectious organism          Plan:    1.  Pneumonia  2.  Elevated troponin  3.  CAD with history of recent NSTEMI with 2 stents placed on 10/28/2021  4.  Paroxysmal A. fib not on long-term anticoagulation secondary to GI bleed  5.  Diabetes mellitus type 2  6.  Benign hypertension  7.  Pulmonary fibrosis/COPD/coal miners pneumoconiosis.    -IV Levaquin  -IV steroids  -Duo nebs  -O2  -Home medications as tolerated  -Sliding scale insulin and adjust insulin dose based on blood sugars as elevations are expected with administration of steroids  -Hold heparin 7 anticoagulants given history of GI bleed -SCDs for prophylaxis  -Blood and sputum cultures.    I have utilized all available immediate resources to obtain, update, or review the patient's current medications.      I confirmed that the patient's Advance  Care Plan is present, code status is documented, or surrogate decision maker is listed in the patient's medical record.      I discussed the patient's findings and my recommendations with: The patient and his wife    Kelvin Aguilar MD                Electronically signed by Kelvin Aguilar MD at 12/01/21 5654       Vital Signs (last day)     Date/Time Temp Temp src Pulse Resp BP Patient Position SpO2    12/08/21 1530 -- -- 60 16 135/71 Lying 94    12/08/21 1450 -- -- 61 16 -- -- 99    12/08/21 1151 -- -- 76 16 -- -- 95    12/08/21 1112 96.8 (36) Temporal 68 18 143/77 Lying 99    12/08/21 0736 97.1 (36.2) Temporal 70 16 161/82 Lying 98    12/08/21 0729 -- -- 79 -- -- -- --    12/08/21 0715 -- -- 60 16 -- -- 98    12/08/21 0426 -- -- 60 -- -- -- --    12/08/21 0416 -- -- 78 -- -- -- --    12/08/21 0300 97.4 (36.3) Oral 59 18 112/57 Lying 99    12/08/21 0249 -- -- 64 -- -- -- --    12/08/21 0240 -- -- 60 -- -- -- --    12/08/21 0134 -- -- 58 -- -- -- --    12/08/21 0105 -- -- 77 -- -- -- --    12/08/21 0101 -- -- 58 -- -- -- --    12/08/21 0059 -- -- 77 -- -- -- --    12/08/21 0030 -- -- 58 -- -- -- --    12/08/21 0014 -- -- 77 -- -- -- --    12/08/21 0007 -- -- 58 -- -- -- --    12/08/21 0002 -- -- 77 -- -- -- --    12/07/21 2359 -- -- 58 -- -- -- --    12/07/21 2357 -- -- 77 -- -- -- --    12/07/21 2344 -- -- 60 -- -- -- --    12/07/21 2314 98.6 (37) Oral 78 20 129/68 Lying 99    12/07/21 2031 97.7 (36.5) Oral 72 20 153/92 Lying 97    12/07/21 1918 -- -- 57 20 -- -- 98    12/07/21 1521 96.9 (36.1) Temporal 62 20 147/80 Lying 99    12/07/21 1515 -- -- 63 -- -- -- --    12/07/21 1514 -- -- 77 20 -- -- --    12/07/21 1507 -- -- 70 20 -- -- 98    12/07/21 1128 -- -- 64 22 -- -- --    12/07/21 1122 -- -- 64 22 -- -- 98    12/07/21 1100 96.9 (36.1) Temporal 89 18 176/96 Lying --    12/07/21 0737 97.3 (36.3) Temporal 96 18 145/67 Lying 95    12/07/21 0719 -- -- 103 -- -- -- --    12/07/21 0249 97 (36.1) Infrared 63  18 126/69 Lying 100    12/07/21 0028 -- -- 62 -- -- -- --            Current Facility-Administered Medications   Medication Dose Route Frequency Provider Last Rate Last Admin   • acetaminophen (TYLENOL) tablet 650 mg  650 mg Oral Q4H PRN Christal Gonzalez MD       • albuterol (PROVENTIL) nebulizer solution 0.083% 2.5 mg/3mL  2.5 mg Nebulization Q4H PRN Christal Gonzalez MD       • amiodarone (PACERONE) tablet 200 mg  200 mg Oral Q24H Shari Jones APRN   200 mg at 12/08/21 0927   • cetirizine (zyrTEC) tablet 5 mg  5 mg Oral Daily Christal Gonzalez MD   5 mg at 12/08/21 0927   • clopidogrel (PLAVIX) tablet 75 mg  75 mg Oral Daily Christal Gonzalez MD   75 mg at 12/08/21 0927   • dextrose (D50W) (25 g/50 mL) IV injection 25 g  25 g Intravenous Q15 Min PRN Kelvin Aguilar MD       • dextrose (GLUTOSE) oral gel 15 g  15 g Oral Q15 Min PRN Kelvin Aguilar MD       • famotidine (PEPCID) tablet 20 mg  20 mg Oral BID AC Christal Gonzalez MD   20 mg at 12/08/21 0927   • First Mouthwash (Magic Mouthwash) 10 mL  10 mL Swish & Spit 4x Daily PRN Behroozi, Saeid, MD   10 mL at 12/04/21 0146   • furosemide (LASIX) tablet 20 mg  20 mg Oral BID Jennifer George MD       • glucagon (human recombinant) (GLUCAGEN DIAGNOSTIC) injection 1 mg  1 mg Subcutaneous Q15 Min PRN Kelvin Aguilar MD       • HYDROcodone-acetaminophen (NORCO) 7.5-325 MG per tablet 1 tablet  1 tablet Oral Q6H PRN Christal Gonzalez MD   1 tablet at 12/08/21 0141   • insulin aspart (novoLOG) injection 0-9 Units  0-9 Units Subcutaneous TID AC Kelvin Aguilar MD   4 Units at 12/08/21 1303   • ipratropium-albuterol (DUO-NEB) nebulizer solution 3 mL  3 mL Nebulization 4x Daily - RT Kelvin Aguilar MD   3 mL at 12/08/21 1450   • lactulose (CHRONULAC) 10 GM/15ML solution 10 g  10 g Oral Daily Jennifer George MD   10 g at 12/08/21 0927   • levoFLOXacin (LEVAQUIN) tablet 750 mg  750 mg Oral Q48H Kelvin Aguilar MD   750 mg at  12/07/21 2125   • [START ON 12/9/2021] lisinopril (PRINIVIL,ZESTRIL) tablet 5 mg  5 mg Oral Q24H Jennifer George MD       • melatonin tablet 3 mg  3 mg Oral Nightly PRN Behroozi, Saeid, MD   3 mg at 12/05/21 2024   • metoprolol tartrate (LOPRESSOR) tablet 25 mg  25 mg Oral Q12H Shari Jones APRN   25 mg at 12/08/21 0927   • nitroglycerin (NITROSTAT) SL tablet 0.4 mg  0.4 mg Sublingual Q5 Min PRN Christal Gonzalez MD       • ondansetron (ZOFRAN) injection 4 mg  4 mg Intravenous Q4H PRN Karan Segovia MD   4 mg at 12/04/21 1631   • polyethylene glycol (MIRALAX) packet 17 g  17 g Oral Daily Christal Gonzalez MD   17 g at 12/06/21 0825   • [START ON 12/9/2021] predniSONE (DELTASONE) tablet 40 mg  40 mg Oral Daily With Breakfast Kelvin Aguilar MD       • ranolazine (RANEXA) 12 hr tablet 1,000 mg  1,000 mg Oral Q12H Christal Gonzalez MD   1,000 mg at 12/08/21 0927   • sodium chloride 0.9 % flush 10 mL  10 mL Intravenous PRN Zack Castro MD       • sodium chloride 0.9 % flush 10 mL  10 mL Intravenous Q12H Kelvin Aguilar MD   10 mL at 12/08/21 0928   • sodium chloride 0.9 % flush 10 mL  10 mL Intravenous PRN Kelvin Aguilar MD            Physician Progress Notes (most recent note)      Kelvin Aguilar MD at 12/08/21 1515              [facility] Medicine Services  INPATIENT PROGRESS NOTE    Length of Stay: 6  Date of Admission: 12/1/2021  Primary Care Physician: Paolo Rey MD    Subjective   Chief Complaint: confused    HPI:  Pt less confused this am.  He is sitting in bed and is pleasant.  He denies SOA, CP, palpitations.      Per nursing report, patient had increased confusion with paranoia today.      Review of Systems   Unable to perform ROS: Dementia          Objective    Temp:  [96.8 °F (36 °C)-98.6 °F (37 °C)] 96.8 °F (36 °C)  Heart Rate:  [57-79] 61  Resp:  [16-20] 16  BP: (112-161)/(57-92) 143/77    Physical  Exam  Constitutional:       Appearance: Normal appearance.   HENT:      Head: Normocephalic and atraumatic.      Right Ear: External ear normal.      Left Ear: External ear normal.      Nose: Nose normal.      Mouth/Throat:      Mouth: Mucous membranes are moist.      Pharynx: Oropharynx is clear.   Eyes:      General: No scleral icterus.     Extraocular Movements: Extraocular movements intact.      Pupils: Pupils are equal, round, and reactive to light.   Cardiovascular:      Rate and Rhythm: Tachycardia present. Rhythm regularly irregular.   Pulmonary:      Breath sounds: No wheezing, rhonchi or rales.   Abdominal:      Palpations: Abdomen is soft.      Tenderness: There is no abdominal tenderness. There is no guarding or rebound.   Musculoskeletal:      Cervical back: Neck supple.      Right lower leg: No edema.      Left lower leg: No edema.   Lymphadenopathy:      Cervical: No cervical adenopathy.   Skin:     General: Skin is warm and dry.      Findings: No rash.   Neurological:      Mental Status: He is alert. He is disoriented.             Results Review:  I have reviewed the labs, radiology results, and diagnostic studies.    Laboratory Data:   Results from last 7 days   Lab Units 12/08/21  0604 12/07/21  0643 12/06/21  1059 12/05/21  0713 12/05/21  0713 12/04/21  0703 12/04/21  0703 12/02/21  0526 12/02/21  0526   SODIUM mmol/L 140 139 137   < > 139   < > 139   < > 138   POTASSIUM mmol/L 4.6 4.5 4.4   < > 4.2   < > 4.4   < > 4.6   CHLORIDE mmol/L 104 106 102   < > 98   < > 100   < > 103   CO2 mmol/L 28.0 23.0 24.0   < > 26.0   < > 24.0   < > 26.0   BUN mg/dL 44* 47* 61*   < > 58*   < > 48*   < > 19   CREATININE mg/dL 1.18 1.31* 1.47*   < > 1.84*   < > 1.88*   < > 1.16   GLUCOSE mg/dL 159* 177* 219*   < > 175*   < > 166*   < > 192*   CALCIUM mg/dL 8.5* 8.8 8.7   < > 9.3   < > 8.8   < > 8.7   BILIRUBIN mg/dL  --   --   --   --  0.5  --  0.3  --  0.2   ALK PHOS U/L  --   --   --   --  54  --  54  --  56   ALT  (SGPT) U/L  --   --   --   --  19  --  14  --  12   AST (SGOT) U/L  --   --   --   --  18  --  15  --  12   ANION GAP mmol/L 8.0 10.0 11.0   < > 15.0   < > 15.0   < > 9.0    < > = values in this interval not displayed.     Estimated Creatinine Clearance: 41.6 mL/min (by C-G formula based on SCr of 1.18 mg/dL).          Results from last 7 days   Lab Units 12/08/21  0604 12/05/21  0713 12/04/21  0703 12/02/21  0526   WBC 10*3/mm3 13.98* 15.59* 15.32* 10.27   HEMOGLOBIN g/dL 11.1* 13.1 11.4* 11.6*   HEMATOCRIT % 33.9* 40.4 35.1* 37.5   PLATELETS 10*3/mm3 188 238 184 168     Results from last 7 days   Lab Units 12/01/21  1626   INR  1.02       Culture Data:   No results found for: BLOODCX  No results found for: URINECX  No results found for: RESPCX  No results found for: WOUNDCX  No results found for: STOOLCX  No components found for: BODYFLD    Radiology Data:   Imaging Results (Last 24 Hours)     ** No results found for the last 24 hours. **          I have reviewed the patient's current medications.     Assessment/Plan     Active Hospital Problems    Diagnosis    • Pneumonia of both lungs due to infectious organism        Plan:      1.  Pneumonia   2.  Acute/Chronic renal failure   3.  delerium with baseline dementia   4.  PAF - not on LTAC due to history of GIB   5.  Chronic hypoxic resp failure on home O2   6.  Elevated trop  7.  HTN, benign  8.  COPD    - cont levaquin - day #6/7  - cont to decrease steroids - may be contributing to delerium and paranoia  - cont nebs   - cont cardiac meds per cardiology - cont amio and metoprlol  - cont po lasix   -resume lisinopril in am  - family has requesting home with home health at [place].              Discharge Planning: I expect patient to be discharged to home with home health.     Kelvin Aguilar MD    Electronically signed by Kelvin Aguilar MD at 12/08/21 1524          Physical Therapy Notes (most recent note)      Kale Puente, PTA at 12/08/21 1538  Version  1 of 1         Acute Care - Physical Therapy Treatment Note  [facility]     Patient Name: Hasmukh Ham  : 1933  MRN: [id number]  Today's Date: 2021      Visit Dx:     ICD-10-CM ICD-9-CM   1. Pneumonia of both lungs due to infectious organism, unspecified part of lung  J18.9 483.8   2. Precordial pain  R07.2 786.51   3. Atrial fibrillation, unspecified type ([facility])  I48.91 427.31   4. Impaired mobility and ADLs  Z74.09 V49.89    Z78.9    5. Impaired functional mobility, balance, gait, and endurance  Z74.09 V49.89     Patient Active Problem List   Diagnosis   • Dilated aortic root (HCC)   • Non-rheumatic tricuspid valve insufficiency   • Pulmonary emphysema ([facility])   • Atrial fibrillation and flutter ([facility])   • Bradycardia   • CAD (coronary artery disease)   • Neuropathy   • Abdominal hernia   • Neck pain   • Dementia (CMS/HCC)   • Diabetic neuropathy (HCC)   • Gastroesophageal reflux disease without esophagitis   • Generalized osteoarthritis   • 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 ([facility])   • Nocturia   • Osteoarthritis of multiple joints   • Hyperlipidemia   • Pain in joint involving ankle and foot   • Pneumonia of left lower lobe due to infectious organism   • Arthropathy of hand   • Paroxysmal tachycardia ([facility])   • Osteoarthrosis involving more than one site but not generalized   • Chronic right shoulder pain   • Rotator cuff syndrome   • Partial tear of subscapularis tendon   • Infraspinatus tendon tear   • Supraspinatus tendon tear   • Bilateral carotid artery stenosis   • Pulmonary HTN (HCC)   • Acute interstitial pneumonia (HCC)   • Chronic obstructive lung disease (HCC)   • Diabetes mellitus (HCC)   • Hypertension   • Chest pain   • Shortness of breath   • Angina pectoris (HCC)   • Myocardial infarction ([facility])   • Ingrown toenail   • Heart problem   • Degenerative joint disease involving multiple joints   • Chronic obstructive pulmonary disease (COPD) ([facility])   • Bleeding disorder  (HCC)   • Chronic obstructive pulmonary disease with acute exacerbation (HCC)   • Failure of outpatient treatment   • Sepsis (HCC)   • 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)   • Chronic diastolic CHF (congestive heart failure) (HCC)   • SOB (shortness of breath)   • Cause of injury, fall   • Right hip pain   • Chronic pain of right knee   • Primary osteoarthritis of right knee   • Left shoulder pain   • Left arm pain   • AC joint arthropathy   • Syncope   • Inflammatory arthritis   • Elevated troponin   • Fever   • Paroxysmal atrial fibrillation with rapid ventricular response (HCC)   • Fibrosis of lung (HCC)   • Type 2 diabetes mellitus (HCC)   • Chondrocalcinosis of right knee   • Nontraumatic complete tear of right rotator cuff   • Pneumonia of both lungs due to infectious organism     Past Medical History:   Diagnosis Date   • Basal cell carcinoma    • Bilateral carotid artery stenosis    • Bilateral carotid artery stenosis    • Bleeding disorder (HCC)    • CHF (congestive heart failure) ([facility])    • Chronic obstructive pulmonary disease (COPD) ([facility])    • Coronary arteriosclerosis    • Degenerative joint disease involving multiple joints    • Dementia ([facility])    • Diabetes mellitus ([facility])    • Heart problem    • History of stomach ulcers    • Hyperlipidemia    • Hypertension    • Ingrown toenail    • Myocardial infarction ([facility])      Past Surgical History:   Procedure Laterality Date   • BACK SURGERY     • CARDIAC CATHETERIZATION N/A 6/6/2017    Procedure: Right Heart Cath;  Surgeon: Jeff Maciel MD PhD;  Location: [facility] INVASIVE LOCATION;  Service:    • CARDIAC CATHETERIZATION N/A 2/14/2018    Procedure: Coronary angiography;  Surgeon: Moisés Davila MD;  Location: [facility] CATH INVASIVE LOCATION;  Service:    • CARDIAC CATHETERIZATION N/A 10/28/2021    Procedure: Left Heart Cath;  Surgeon: Carine Johnson MD;  Location: [facility] CATH INVASIVE LOCATION;  Service: Cardiology;   Laterality: N/A;   • CORONARY ANGIOPLASTY WITH STENT PLACEMENT     • CORONARY STENT PLACEMENT     • HERNIA REPAIR     • LUNG BIOPSY     • LUNG SURGERY     • NECK SURGERY     • THORACOTOMY Left 1977   • VENTRAL HERNIA REPAIR       PT Assessment (last 12 hours)     PT Evaluation and Treatment     Row Name 12/08/21 1414          Physical Therapy Time and Intention    Subjective Information complains of; fatigue  -TW     Document Type therapy note (daily note)  -TW     Mode of Treatment physical therapy; individual therapy  -TW     Patient Effort good  -TW     Row Name 12/08/21 1414          General Information    Patient Profile Reviewed yes  -TW     Referring Physician none  -TW     Patient Observations alert; cooperative; agree to therapy  -TW     Patient/Family/Caregiver Comments/Observations Pt's wife present throughout tx.  -TW     General Observations of Patient Pt supine in bed.  -TW     Existing Precautions/Restrictions fall  Pt not wearing O2 and nsg is aware.  -TW     Row Name 12/08/21 1414          Cognition    Affect/Mental Status (Cognitive) WFL  -TW     Orientation Status (Cognition) oriented x 4; verbal cues/prompts needed for orientation  -TW     Follows Commands (Cognition) WFL  -TW     Cognitive Function (Cognitive) WFL  -TW     Personal Safety Interventions fall prevention program maintained; gait belt; nonskid shoes/slippers when out of bed  -TW     Row Name 12/08/21 1414          Pain Scale: Numbers Pre/Post-Treatment    Pretreatment Pain Rating 0/10 - no pain  -TW     Posttreatment Pain Rating 0/10 - no pain  -TW     Row Name 12/08/21 1414          Range of Motion Comprehensive    General Range of Motion bilateral lower extremity ROM WFL  -TW     Row Name 12/08/21 1414          Bed Mobility    Bed Mobility supine-sit; sit-supine; scooting/bridging  -TW     Scooting/Bridging [place] (Bed Mobility) contact guard  -TW     Supine-Sit [place] (Bed Mobility) minimum assist (75% patient effort)   -TW     Sit-Supine [place] (Bed Mobility) contact guard  -TW     Assistive Device (Bed Mobility) draw sheet; head of bed elevated; bed rails  -TW     Row Name 12/08/21 1414          Transfers    Transfers sit-stand transfer; stand-sit transfer  -TW     Sit-Stand [place] (Transfers) minimum assist (75% patient effort)  Pt performed sit to stand t/f 3 times for amb.  -TW     Stand-Sit [place] (Transfers) minimum assist (75% patient effort)  -TW     Row Name 12/08/21 1414          Sit-Stand Transfer    Assistive Device (Sit-Stand Transfers) walker, front-wheeled  -TW     Row Name 12/08/21 1414          Stand-Sit Transfer    Assistive Device (Stand-Sit Transfers) walker, front-wheeled  -TW     Row Name 12/08/21 1414          Gait/Stairs (Locomotion)    Gait/Stairs Locomotion gait/ambulation independence; distance ambulated; gait/ambulation assistive device; gait pattern; gait deviations  -TW     [place] Level (Gait) contact guard; minimum assist (75% patient effort); verbal cues  -TW     Assistive Device (Gait) other (see comments)  HHA  -TW     Distance in Feet (Gait) 6ft, 12ft then 8ft.  -TW     Deviations/Abnormal Patterns (Gait) stride length decreased  -TW     Bilateral Gait Deviations forward flexed posture  -TW     Row Name 12/08/21 1414          Safety Issues, Functional Mobility    Impairments Affecting Function (Mobility) strength; endurance/activity tolerance; shortness of breath; balance; pain  -TW     Row Name 12/08/21 1414          Plan of Care Review    Plan of Care Reviewed With patient; spouse  -TW     Progress improving  -TW     Outcome Summary Pt much more alert than prior date. Pt agreeable to therapy. Pt t/f sup to sit with min of 1 and stood x 3 trials with min of 1. Pt amb 6ft, then 12ft, then 8ft with HHA and forward trunk lean and decreased stride length. Pt returned to sup in bed and left with all needs met. Pt would cont to benefit from therapy upon DC.  -TW     Row Name  12/08/21 1414          Vital Signs    Pre Patient Position Supine  -TW     Intra Patient Position Standing  -TW     Post Patient Position Supine  -TW     Row Name 12/08/21 1414          Bed Mobility Goal 1 (PT)    Activity/Assistive Device (Bed Mobility Goal 1, PT) sit to supine/supine to sit  -TW     [place] Level/Cues Needed (Bed Mobility Goal 1, PT) modified independence  -TW     Time Frame (Bed Mobility Goal 1, PT) by discharge  -TW     Strategies/Barriers (Bed Mobility Goal 1, PT) Has hospital bed at home.  -TW     Progress/Outcomes (Bed Mobility Goal 1, PT) goal not met  -TW     Row Name 12/08/21 1414          Transfer Goal 1 (PT)    Activity/Assistive Device (Transfer Goal 1, PT) sit-to-stand/stand-to-sit; bed-to-chair/chair-to-bed  -TW     [place] Level/Cues Needed (Transfer Goal 1, PT) supervision required  -TW     Time Frame (Transfer Goal 1, PT) by discharge  -TW     Strategies/Barriers (Transfers Goal 1, PT) A-mary king.  -TW     Row Name 12/08/21 1414          Gait Training Goal 1 (PT)    Activity/Assistive Device (Gait Training Goal 1, PT) walker, rolling  -TW     [place] Level (Gait Training Goal 1, PT) supervision required  -TW     Distance (Gait Training Goal 1, PT) 25'x2 as tolerated.  -TW     Time Frame (Gait Training Goal 1, PT) by discharge  -TW     Strategies/Barriers (Gait Training Goal 1, PT) A-mary king.  -TW     Progress/Outcome (Gait Training Goal 1, PT) goal not met  -TW     Row Name 12/08/21 1414          Positioning and Restraints    Pre-Treatment Position in bed  -TW     Post Treatment Position bed  -TW     In Bed supine; call light within reach; encouraged to call for assist; exit alarm on; with family/caregiver  -TW     Row Name 12/08/21 1414          Therapy Assessment/Plan (PT)    Rehab Potential (PT) good, to achieve stated therapy goals  -TW     Criteria for Skilled Interventions Met (PT) yes; skilled treatment is necessary  -TW           User Key  (r) = Recorded  By, (t) = Taken By, (c) = Cosigned By    Initials Name Provider Type    TW Kale Puente PTA Physical Therapy Assistant                Physical Therapy Education                 Title: PT OT SLP Therapies (In Progress)     Topic: Physical Therapy (In Progress)     Point: Mobility training (In Progress)     Learning Progress Summary           Patient Acceptance, E, NR by  at 12/3/2021 1251                   Point: Home exercise program (Not Started)     Learner Progress:  Not documented in this visit.          Point: Body mechanics (Not Started)     Learner Progress:  Not documented in this visit.          Point: Precautions (Not Started)     Learner Progress:  Not documented in this visit.                      User Key     Initials Effective Dates Name Provider Type Discipline     06/16/21 -  Cristobal Mansfield PTA Physical Therapy Assistant PT              PT Recommendation and Plan  Anticipated Discharge Disposition (PT): home with assist, home with home health  Therapy Frequency (PT): other (see comments) (5-7 days/week)  Plan of Care Reviewed With: patient, spouse  Progress: improving  Outcome Summary: Pt much more alert than prior date. Pt agreeable to therapy. Pt t/f sup to sit with min of 1 and stood x 3 trials with min of 1. Pt amb 6ft, then 12ft, then 8ft with HHA and forward trunk lean and decreased stride length. Pt returned to sup in bed and left with all needs met. Pt would cont to benefit from therapy upon DC.       Time Calculation:    PT Charges     Row Name 12/08/21 1537             Time Calculation    Start Time 1414  -TW      Stop Time 1440  -TW      Time Calculation (min) 26 min  -TW              Time Calculation- PT    Total Timed Code Minutes- PT 26 minute(s)  -TW            User Key  (r) = Recorded By, (t) = Taken By, (c) = Cosigned By    Initials Name Provider Type    TW Kale Puente PTA Physical Therapy Assistant              Therapy Charges for Today     Code Description  Service Date Service Provider Modifiers Qty    24476271750  PT THER SUPP EA 15 MIN 2021 Kale Puente, NEFTALI GP 1    60653969013 HC PT THERAPEUTIC ACT EA 15 MIN 2021 Kale Puente PTA GP 1    [id number]  GAIT TRAINING EA 15 MIN 2021 Kale Puente PTA GP 1          PT G-Codes  Outcome Measure Options: AM-PAC 6 Clicks Basic Mobility (PT)  AM-PAC 6 Clicks Score (PT): 18  AM-PAC 6 Clicks Score (OT): 20    Kale Puente PTA  2021      Electronically signed by Kale Puente PTA at 21 1538          Occupational Therapy Notes (most recent note)      Erlinda Aguilar COTA at 21 1259          Patient Name: Hasmukh Ham  : 1933    MRN: [id number]                              Today's Date: 2021       Admit Date: 2021    Visit Dx:     ICD-10-CM ICD-9-CM   1. Pneumonia of both lungs due to infectious organism, unspecified part of lung  J18.9 483.8   2. Precordial pain  R07.2 786.51   3. Atrial fibrillation, unspecified type (HCC)  I48.91 427.31   4. Impaired mobility and ADLs  Z74.09 V49.89    Z78.9    5. Impaired functional mobility, balance, gait, and endurance  Z74.09 V49.89     Patient Active Problem List   Diagnosis   • Dilated aortic root (HCC)   • Non-rheumatic tricuspid valve insufficiency   • Pulmonary emphysema (HCC)   • Atrial fibrillation and flutter (HCC)   • Bradycardia   • CAD (coronary artery disease)   • Neuropathy   • Abdominal hernia   • Neck pain   • Dementia (CMS/HCC)   • Diabetic neuropathy (HCC)   • Gastroesophageal reflux disease without esophagitis   • Generalized osteoarthritis   • 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 (HCC)   • Nocturia   • Osteoarthritis of multiple joints   • Hyperlipidemia   • Pain in joint involving ankle and foot   • Pneumonia of left lower lobe due to infectious organism   • Arthropathy of hand   • Paroxysmal tachycardia (HCC)   • Osteoarthrosis involving more than one site but  not generalized   • Chronic right shoulder pain   • Rotator cuff syndrome   • Partial tear of subscapularis tendon   • Infraspinatus tendon tear   • Supraspinatus tendon tear   • Bilateral carotid artery stenosis   • Pulmonary HTN (HCC)   • Acute interstitial pneumonia (HCC)   • Chronic obstructive lung disease (HCC)   • Diabetes mellitus (HCC)   • Hypertension   • Chest pain   • Shortness of breath   • Angina pectoris (HCC)   • Myocardial infarction (HCC)   • Ingrown toenail   • Heart problem   • Degenerative joint disease involving multiple joints   • Chronic obstructive pulmonary disease (COPD) (HCC)   • Bleeding disorder (HCC)   • Chronic obstructive pulmonary disease with acute exacerbation (HCC)   • Failure of outpatient treatment   • Sepsis (HCC)   • 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)   • Chronic diastolic CHF (congestive heart failure) ([facility])   • SOB (shortness of breath)   • Cause of injury, fall   • Right hip pain   • Chronic pain of right knee   • Primary osteoarthritis of right knee   • Left shoulder pain   • Left arm pain   • AC joint arthropathy   • Syncope   • Inflammatory arthritis   • Elevated troponin   • Fever   • Paroxysmal atrial fibrillation with rapid ventricular response ([facility])   • Fibrosis of lung (HCC)   • Type 2 diabetes mellitus (HCC)   • Chondrocalcinosis of right knee   • Nontraumatic complete tear of right rotator cuff   • Pneumonia of both lungs due to infectious organism     Past Medical History:   Diagnosis Date   • Basal cell carcinoma    • Bilateral carotid artery stenosis    • Bilateral carotid artery stenosis    • Bleeding disorder (HCC)    • CHF (congestive heart failure) (HCC)    • Chronic obstructive pulmonary disease (COPD) (HCC)    • Coronary arteriosclerosis    • Degenerative joint disease involving multiple joints    • Dementia ([facility])    • Diabetes mellitus ([facility])    • Heart problem    • History of stomach ulcers    • Hyperlipidemia    • Hypertension     • Ingrown toenail    • Myocardial infarction (HCC)      Past Surgical History:   Procedure Laterality Date   • BACK SURGERY     • CARDIAC CATHETERIZATION N/A 6/6/2017    Procedure: Right Heart Cath;  Surgeon: Jeff Maciel MD PhD;  Location: [facility] CATH INVASIVE LOCATION;  Service:    • CARDIAC CATHETERIZATION N/A 2/14/2018    Procedure: Coronary angiography;  Surgeon: Moisés Davila MD;  Location: [facility] CATH INVASIVE LOCATION;  Service:    • CARDIAC CATHETERIZATION N/A 10/28/2021    Procedure: Left Heart Cath;  Surgeon: Carine Johnson MD;  Location: [facility] CATH INVASIVE LOCATION;  Service: Cardiology;  Laterality: N/A;   • CORONARY ANGIOPLASTY WITH STENT PLACEMENT     • CORONARY STENT PLACEMENT     • HERNIA REPAIR     • LUNG BIOPSY     • LUNG SURGERY     • NECK SURGERY     • THORACOTOMY Left 1977   • VENTRAL HERNIA REPAIR        General Information     Row Name 12/08/21 0901          OT Time and Intention    Document Type therapy note (daily note)  -     Mode of Treatment individual therapy; occupational therapy  -     Row Name 12/08/21 0901          General Information    Patient Profile Reviewed yes  -KD     Existing Precautions/Restrictions fall  -KD     Row Name 12/08/21 0901          Cognition    Orientation Status (Cognition) oriented to; person  -KD     Row Name 12/08/21 0901          Safety Issues, Functional Mobility    Impairments Affecting Function (Mobility) strength; endurance/activity tolerance; shortness of breath; balance; pain; cognition  -           User Key  (r) = Recorded By, (t) = Taken By, (c) = Cosigned By    Initials Name Provider Type     Erlinda Aguilar COTA Occupational Therapy Assistant                 Mobility/ADL's    No documentation.                Obj/Interventions     Row Name 12/08/21 0901          Shoulder (Therapeutic Exercise)    Shoulder (Therapeutic Exercise) AROM (active range of motion)  -     Shoulder AROM (Therapeutic Exercise) external  rotation; bilateral; internal rotation; horizontal aBduction/aDduction  -KD     Row Name 12/08/21 0901          Elbow/Forearm (Therapeutic Exercise)    Elbow/Forearm (Therapeutic Exercise) AROM (active range of motion)  -KD     Elbow/Forearm AROM (Therapeutic Exercise) bilateral; flexion; extension; supination; pronation  -KD     Row Name 12/08/21 0901          Wrist (Therapeutic Exercise)    Wrist (Therapeutic Exercise) AROM (active range of motion)  -KD     Wrist AROM (Therapeutic Exercise) bilateral; flexion; extension; radial deviation  -KD     Row Name 12/08/21 0901          Therapeutic Exercise    Therapeutic Exercise shoulder; elbow/forearm; wrist; hand  -KD           User Key  (r) = Recorded By, (t) = Taken By, (c) = Cosigned By    Initials Name Provider Type    Erlinda Martínez COTA Occupational Therapy Assistant               Goals/Plan    No documentation.                Clinical Impression     Row Name 12/08/21 0901          Pain Scale: Numbers Pre/Post-Treatment    Pretreatment Pain Rating 8/10  -KD     Posttreatment Pain Rating 8/10  -KD     Pain Location neck  -KD     Pain Intervention(s) Nursing Notified  -     Row Name 12/08/21 0901          Plan of Care Review    Plan of Care Reviewed With patient  -KD     Progress improving  -     Outcome Summary Pt less confused ths date. Pt c/o 8/10 neck pain and deferes OOB. Pt jalil Ther ex to BUE's in most planes w/ 2lb HW. Pt limited on Shld flex to pt's jalil this date. Spouse present throughout tx.  -KD     Row Name 12/08/21 0901          Therapy Assessment/Plan (OT)    Therapy Frequency (OT) other (see comments)  5-7 days a week  -KD     Row Name 12/08/21 0901          Therapy Plan Review/Discharge Plan (OT)    Anticipated Discharge Disposition (OT) home with home health; home with 24/7 care  -KD     Row Name 12/08/21 0901          Vital Signs    Pre Systolic BP Rehab 112  -KD     Pre Treatment Diastolic BP 57  -KD     Pretreatment Heart Rate  (beats/min) 79  -KD     Pre SpO2 (%) 98  -KD     O2 Delivery Pre Treatment room air  -KD     Pre Patient Position Supine  -KD     Intra Patient Position Sitting  -KD     Post Patient Position Supine  -KD           User Key  (r) = Recorded By, (t) = Taken By, (c) = Cosigned By    Initials Name Provider Type    Erlinda Martínez COTA Occupational Therapy Assistant               Outcome Measures     Row Name 12/08/21 0901          How much help from another is currently needed...    Putting on and taking off regular lower body clothing? 3  -KD     Bathing (including washing, rinsing, and drying) 3  -KD     Toileting (which includes using toilet bed pan or urinal) 3  -KD     Putting on and taking off regular upper body clothing 3  -KD     Taking care of personal grooming (such as brushing teeth) 4  -KD     Eating meals 4  -KD     AM-PAC 6 Clicks Score (OT) 20  -KD           User Key  (r) = Recorded By, (t) = Taken By, (c) = Cosigned By    Initials Name Provider Type    Erlinda Martínez COTA Occupational Therapy Assistant                Occupational Therapy Education                 Title: PT OT SLP Therapies (In Progress)     Topic: Occupational Therapy (In Progress)     Point: ADL training (In Progress)     Description:   Instruct learner(s) on proper safety adaptation and remediation techniques during self care or transfers.   Instruct in proper use of assistive devices.              Learning Progress Summary           Patient Acceptance, E, NR by BB at 12/6/2021 1200                   Point: Home exercise program (Not Started)     Description:   Instruct learner(s) on appropriate technique for monitoring, assisting and/or progressing therapeutic exercises/activities.              Learner Progress:  Not documented in this visit.          Point: Precautions (Done)     Description:   Instruct learner(s) on prescribed precautions during self-care and functional transfers.              Learning Progress Summary            Patient Acceptance, E,TB, VU by  at 12/2/2021 1325    Comment: POC, role of OT, transfer training                   Point: Body mechanics (In Progress)     Description:   Instruct learner(s) on proper positioning and spine alignment during self-care, functional mobility activities and/or exercises.              Learning Progress Summary           Patient Acceptance, E, NR by BB at 12/6/2021 1200    Acceptance, E,TB, VU by  at 12/2/2021 1325    Comment: POC, role of OT, transfer training                               User Key     Initials Effective Dates Name Provider Type Discipline     06/16/21 -  Susannah Villalta COTA Occupational Therapy Assistant OT     06/14/21 -  Israel Black, OT Occupational Therapist OT              OT Recommendation and Plan  Therapy Frequency (OT): other (see comments) (5-7 days a week)  Plan of Care Review  Plan of Care Reviewed With: patient  Progress: improving  Outcome Summary: Pt less confused ths date. Pt c/o 8/10 neck pain and deferes OOB. Pt jalil Ther ex to BUE's in most planes w/ 2lb HW. Pt limited on Shld flex to pt's jalil this date. Spouse present throughout tx.     Time Calculation:    Time Calculation- OT     Row Name 12/08/21 0901             Time Calculation- OT    OT Start Time 0901  -KD      OT Stop Time 0925  -KD      OT Time Calculation (min) 24 min  -KD      Total Timed Code Minutes- OT 24 minute(s)  -KD      OT Received On 12/08/21  -KD              Timed Charges    59476 - OT Therapeutic Exercise Minutes 24  -KD      51066 - OT Self Care/Mgmt Minutes --  -KD              Total Minutes    Timed Charges Total Minutes 24  -KD       Total Minutes 24  -KD            User Key  (r) = Recorded By, (t) = Taken By, (c) = Cosigned By    Initials Name Provider Type    Erlinda Martínez COTA Occupational Therapy Assistant              Therapy Charges for Today     Code Description Service Date Service Provider Modifiers Qty    50526279602 HC OT SELF  CARE/MGMT/TRAIN EA 15 MIN 12/7/2021 Erlinda Aguilar COTA GO 1    81802093823 HC OT THER PROC EA 15 MIN 12/8/2021 Erlinda Aguilar COTA GO 2               KRYSTIN Gilmore  12/8/2021    Electronically signed by Erlinda Aguilar COTA at 12/08/21 1259

## 2021-12-08 NOTE — PROGRESS NOTES
"[facility]  [address] [place] [address]. [address]  T - [phone]  F - [phone]     Progress Note          PATIENT  DEMOGRAPHICS   PATIENT NAME: Hasmukh Ham                      PHYSICIAN: Jennifer George MD  : 1933  MRN: [id number]   LOS: 6 days    Patient Care Team:  Paolo Rey MD as PCP - General  Subjective   SUBJECTIVE   Doing well off amiodarone drip no soa         Objective   OBJECTIVE   Vital Signs  Temp:  [96.9 °F (36.1 °C)-98.6 °F (37 °C)] 97.1 °F (36.2 °C)  Heart Rate:  [57-89] 70  Resp:  [16-22] 16  BP: (112-176)/(57-96) 161/82    Flowsheet Rows      First Filed Value   Admission Height 171.5 cm (67.5\") Documented at 2021 1451   Admission Weight 68 kg (150 lb) Documented at 2021 1451           I/O last 3 completed shifts:  In: 720 [P.O.:720]  Out: 1600 [Urine:1600]    PHYSICAL EXAM    Physical Exam  Constitutional:       Appearance: He is well-developed.   HENT:      Head: Normocephalic and atraumatic.   Eyes:      Conjunctiva/sclera: Conjunctivae normal.      Pupils: Pupils are equal, round, and reactive to light.   Cardiovascular:      Rate and Rhythm: Normal rate and regular rhythm.   Pulmonary:      Effort: Pulmonary effort is normal.      Breath sounds: Normal breath sounds.   Abdominal:      Palpations: Abdomen is soft.   Musculoskeletal:      Cervical back: Neck supple.      Right lower leg: No edema.      Left lower leg: No edema.   Skin:     General: Skin is warm and dry.   Neurological:      Mental Status: He is alert and oriented to person, place, and time.   Psychiatric:         Mood and Affect: Mood normal.         Behavior: Behavior normal.         RESULTS   Results Review:    Results from last 7 days   Lab Units 21  0604 21  0643 21  1059 21  0713 21  0713 21  0703 21  0703 21  0526 21  0526   SODIUM mmol/L 140 139 137   < > 139   < > 139   < > 138   POTASSIUM mmol/L 4.6 " 4.5 4.4   < > 4.2   < > 4.4   < > 4.6   CHLORIDE mmol/L 104 106 102   < > 98   < > 100   < > 103   CO2 mmol/L 28.0 23.0 24.0   < > 26.0   < > 24.0   < > 26.0   BUN mg/dL 44* 47* 61*   < > 58*   < > 48*   < > 19   CREATININE mg/dL 1.18 1.31* 1.47*   < > 1.84*   < > 1.88*   < > 1.16   CALCIUM mg/dL 8.5* 8.8 8.7   < > 9.3   < > 8.8   < > 8.7   BILIRUBIN mg/dL  --   --   --   --  0.5  --  0.3  --  0.2   ALK PHOS U/L  --   --   --   --  54  --  54  --  56   ALT (SGPT) U/L  --   --   --   --  19  --  14  --  12   AST (SGOT) U/L  --   --   --   --  18  --  15  --  12   GLUCOSE mg/dL 159* 177* 219*   < > 175*   < > 166*   < > 192*    < > = values in this interval not displayed.       Estimated Creatinine Clearance: 41.6 mL/min (by C-G formula based on SCr of 1.18 mg/dL).    Results from last 7 days   Lab Units 12/01/21  1510   MAGNESIUM mg/dL 1.6             Results from last 7 days   Lab Units 12/08/21  0604 12/05/21  0713 12/04/21  0703 12/02/21  0526 12/01/21  1510   WBC 10*3/mm3 13.98* 15.59* 15.32* 10.27 13.38*   HEMOGLOBIN g/dL 11.1* 13.1 11.4* 11.6* 12.5*   PLATELETS 10*3/mm3 188 238 184 168 184       Results from last 7 days   Lab Units 12/01/21  1626   INR  1.02         Imaging Results (Last 24 Hours)     ** No results found for the last 24 hours. **           MEDICATIONS    amiodarone, 200 mg, Oral, Q24H  cetirizine, 5 mg, Oral, Daily  clopidogrel, 75 mg, Oral, Daily  famotidine, 20 mg, Oral, BID AC  furosemide, 20 mg, Intravenous, BID  insulin aspart, 0-9 Units, Subcutaneous, TID AC  ipratropium-albuterol, 3 mL, Nebulization, 4x Daily - RT  lactulose, 10 g, Oral, Daily  levoFLOXacin, 750 mg, Oral, Q48H  methylPREDNISolone sodium succinate, 40 mg, Intravenous, Q24H  metoprolol tartrate, 25 mg, Oral, Q12H  polyethylene glycol, 17 g, Oral, Daily  ranolazine, 1,000 mg, Oral, Q12H  sodium chloride, 10 mL, Intravenous, Q12H           Assessment/Plan   ASSESSMENT / PLAN      Pneumonia of both lungs due to infectious  organism    1.  JILL on CKD- baseline creatinine 1.2, up to 1.8 peak and now better and at baseline.      Etiology of JILL is not immediately apparent.  He does complain of nausea but has had no vomiting.  No diarrhea.  No hypotension noted in the record.  No new nephrotoxic medications.   US negative for hydro, Tray negative, UA bland, FENa 3.7% consistent with intrinsic injury.    Keep lasix and switch to po      2.  Pneumonia- continue antibiotics and management primary team     3.  Atrial fibrillation with rvr  now on po amiodarone. Not a candidate for anticoagulation. On metoprolol     4.  DM2     5.  Hypertension- well-controlled. Resume lisinopril from tomorrow      6.  COPD     7.  History of CAD diastolic dysfunction    8. Constipation now has BM with  Lactulose    F/u in office in 2-3 week s           This document has been electronically signed by Jennifer George MD on December 8, 2021 10:39 CST

## 2021-12-08 NOTE — PROGRESS NOTES
[facility]  INPATIENT PROGRESS NOTE    Length of Stay: 6  Date of Admission: 12/1/2021  Primary Care Physician: Paolo Rey MD    Subjective   Chief Complaint: confused    HPI:  Pt less confused this am.  He is sitting in bed and is pleasant.  He denies SOA, CP, palpitations.      Per nursing report, patient had increased confusion with paranoia today.      Review of Systems   Unable to perform ROS: Dementia          Objective    Temp:  [96.8 °F (36 °C)-98.6 °F (37 °C)] 96.8 °F (36 °C)  Heart Rate:  [57-79] 61  Resp:  [16-20] 16  BP: (112-161)/(57-92) 143/77    Physical Exam  Constitutional:       Appearance: Normal appearance.   HENT:      Head: Normocephalic and atraumatic.      Right Ear: External ear normal.      Left Ear: External ear normal.      Nose: Nose normal.      Mouth/Throat:      Mouth: Mucous membranes are moist.      Pharynx: Oropharynx is clear.   Eyes:      General: No scleral icterus.     Extraocular Movements: Extraocular movements intact.      Pupils: Pupils are equal, round, and reactive to light.   Cardiovascular:      Rate and Rhythm: Tachycardia present. Rhythm regularly irregular.   Pulmonary:      Breath sounds: No wheezing, rhonchi or rales.   Abdominal:      Palpations: Abdomen is soft.      Tenderness: There is no abdominal tenderness. There is no guarding or rebound.   Musculoskeletal:      Cervical back: Neck supple.      Right lower leg: No edema.      Left lower leg: No edema.   Lymphadenopathy:      Cervical: No cervical adenopathy.   Skin:     General: Skin is warm and dry.      Findings: No rash.   Neurological:      Mental Status: He is alert. He is disoriented.             Results Review:  I have reviewed the labs, radiology results, and diagnostic studies.    Laboratory Data:   Results from last 7 days   Lab Units 12/08/21  0604 12/07/21  0643 12/06/21  1059 12/05/21  0713 12/05/21  0713 12/04/21  0703  12/04/21  0703 12/02/21  0526 12/02/21  0526   SODIUM mmol/L 140 139 137   < > 139   < > 139   < > 138   POTASSIUM mmol/L 4.6 4.5 4.4   < > 4.2   < > 4.4   < > 4.6   CHLORIDE mmol/L 104 106 102   < > 98   < > 100   < > 103   CO2 mmol/L 28.0 23.0 24.0   < > 26.0   < > 24.0   < > 26.0   BUN mg/dL 44* 47* 61*   < > 58*   < > 48*   < > 19   CREATININE mg/dL 1.18 1.31* 1.47*   < > 1.84*   < > 1.88*   < > 1.16   GLUCOSE mg/dL 159* 177* 219*   < > 175*   < > 166*   < > 192*   CALCIUM mg/dL 8.5* 8.8 8.7   < > 9.3   < > 8.8   < > 8.7   BILIRUBIN mg/dL  --   --   --   --  0.5  --  0.3  --  0.2   ALK PHOS U/L  --   --   --   --  54  --  54  --  56   ALT (SGPT) U/L  --   --   --   --  19  --  14  --  12   AST (SGOT) U/L  --   --   --   --  18  --  15  --  12   ANION GAP mmol/L 8.0 10.0 11.0   < > 15.0   < > 15.0   < > 9.0    < > = values in this interval not displayed.     Estimated Creatinine Clearance: 41.6 mL/min (by C-G formula based on SCr of 1.18 mg/dL).          Results from last 7 days   Lab Units 12/08/21  0604 12/05/21  0713 12/04/21 0703 12/02/21 0526   WBC 10*3/mm3 13.98* 15.59* 15.32* 10.27   HEMOGLOBIN g/dL 11.1* 13.1 11.4* 11.6*   HEMATOCRIT % 33.9* 40.4 35.1* 37.5   PLATELETS 10*3/mm3 188 238 184 168     Results from last 7 days   Lab Units 12/01/21  1626   INR  1.02       Culture Data:   No results found for: BLOODCX  No results found for: URINECX  No results found for: RESPCX  No results found for: WOUNDCX  No results found for: STOOLCX  No components found for: BODYFLD    Radiology Data:   Imaging Results (Last 24 Hours)     ** No results found for the last 24 hours. **          I have reviewed the patient's current medications.     Assessment/Plan     Active Hospital Problems    Diagnosis    • Pneumonia of both lungs due to infectious organism        Plan:      1.  Pneumonia   2.  Acute/Chronic renal failure   3.  delerium with baseline dementia   4.  PAF - not on LTAC due to history of GIB   5.  Chronic  hypoxic resp failure on home O2   6.  Elevated trop  7.  HTN, benign  8.  COPD    - cont levaquin - day #6/7  - cont to decrease steroids - may be contributing to delerium and paranoia  - cont nebs   - cont cardiac meds per cardiology - cont amio and metoprlol  - cont po lasix   -resume lisinopril in am  - family has requesting home with home health at [place].              Discharge Planning: I expect patient to be discharged to home with home health.     Kelvin Aguilar MD

## 2021-12-08 NOTE — THERAPY TREATMENT NOTE
Acute Care - Physical Therapy Treatment Note  [facility]     Patient Name: Hasmukh Ham  : 1933  MRN: [id number]  Today's Date: 2021      Visit Dx:     ICD-10-CM ICD-9-CM   1. Pneumonia of both lungs due to infectious organism, unspecified part of lung  J18.9 483.8   2. Precordial pain  R07.2 786.51   3. Atrial fibrillation, unspecified type ([facility])  I48.91 427.31   4. Impaired mobility and ADLs  Z74.09 V49.89    Z78.9    5. Impaired functional mobility, balance, gait, and endurance  Z74.09 V49.89     Patient Active Problem List   Diagnosis   • Dilated aortic root ([facility])   • Non-rheumatic tricuspid valve insufficiency   • Pulmonary emphysema ([facility])   • Atrial fibrillation and flutter ([facility])   • Bradycardia   • CAD (coronary artery disease)   • Neuropathy   • Abdominal hernia   • Neck pain   • Dementia (CMS/HCC)   • Diabetic neuropathy ([facility])   • Gastroesophageal reflux disease without esophagitis   • Generalized osteoarthritis   • 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 ([facility])   • Nocturia   • Osteoarthritis of multiple joints   • Hyperlipidemia   • Pain in joint involving ankle and foot   • Pneumonia of left lower lobe due to infectious organism   • Arthropathy of hand   • Paroxysmal tachycardia ([facility])   • Osteoarthrosis involving more than one site but not generalized   • Chronic right shoulder pain   • Rotator cuff syndrome   • Partial tear of subscapularis tendon   • Infraspinatus tendon tear   • Supraspinatus tendon tear   • Bilateral carotid artery stenosis   • Pulmonary HTN (HCC)   • Acute interstitial pneumonia (HCC)   • Chronic obstructive lung disease (HCC)   • Diabetes mellitus (HCC)   • Hypertension   • Chest pain   • Shortness of breath   • Angina pectoris (HCC)   • Myocardial infarction ([facility])   • Ingrown toenail   • Heart problem   • Degenerative joint disease involving multiple joints   • Chronic obstructive pulmonary disease (COPD) (HCC)   • Bleeding disorder ([facility])   •  Chronic obstructive pulmonary disease with acute exacerbation (HCC)   • Failure of outpatient treatment   • Sepsis (HCC)   • 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)   • Chronic diastolic CHF (congestive heart failure) ([facility])   • SOB (shortness of breath)   • Cause of injury, fall   • Right hip pain   • Chronic pain of right knee   • Primary osteoarthritis of right knee   • Left shoulder pain   • Left arm pain   • AC joint arthropathy   • Syncope   • Inflammatory arthritis   • Elevated troponin   • Fever   • Paroxysmal atrial fibrillation with rapid ventricular response ([facility])   • Fibrosis of lung (HCC)   • Type 2 diabetes mellitus ([facility])   • Chondrocalcinosis of right knee   • Nontraumatic complete tear of right rotator cuff   • Pneumonia of both lungs due to infectious organism     Past Medical History:   Diagnosis Date   • Basal cell carcinoma    • Bilateral carotid artery stenosis    • Bilateral carotid artery stenosis    • Bleeding disorder ([facility])    • CHF (congestive heart failure) ([facility])    • Chronic obstructive pulmonary disease (COPD) ([facility])    • Coronary arteriosclerosis    • Degenerative joint disease involving multiple joints    • Dementia ([facility])    • Diabetes mellitus ([facility])    • Heart problem    • History of stomach ulcers    • Hyperlipidemia    • Hypertension    • Ingrown toenail    • Myocardial infarction ([facility])      Past Surgical History:   Procedure Laterality Date   • BACK SURGERY     • CARDIAC CATHETERIZATION N/A 6/6/2017    Procedure: Right Heart Cath;  Surgeon: Jeff Maciel MD PhD;  Location: [facility] CATH INVASIVE LOCATION;  Service:    • CARDIAC CATHETERIZATION N/A 2/14/2018    Procedure: Coronary angiography;  Surgeon: Moisés Davila MD;  Location: [facility] CATH INVASIVE LOCATION;  Service:    • CARDIAC CATHETERIZATION N/A 10/28/2021    Procedure: Left Heart Cath;  Surgeon: Carine Johnson MD;  Location: [facility] CATH INVASIVE LOCATION;  Service: Cardiology;   Laterality: N/A;   • CORONARY ANGIOPLASTY WITH STENT PLACEMENT     • CORONARY STENT PLACEMENT     • HERNIA REPAIR     • LUNG BIOPSY     • LUNG SURGERY     • NECK SURGERY     • THORACOTOMY Left 1977   • VENTRAL HERNIA REPAIR       PT Assessment (last 12 hours)     PT Evaluation and Treatment     Row Name 12/08/21 1414          Physical Therapy Time and Intention    Subjective Information complains of; fatigue  -TW     Document Type therapy note (daily note)  -TW     Mode of Treatment physical therapy; individual therapy  -TW     Patient Effort good  -TW     Row Name 12/08/21 1414          General Information    Patient Profile Reviewed yes  -TW     Referring Physician none  -TW     Patient Observations alert; cooperative; agree to therapy  -TW     Patient/Family/Caregiver Comments/Observations Pt's wife present throughout tx.  -TW     General Observations of Patient Pt supine in bed.  -TW     Existing Precautions/Restrictions fall  Pt not wearing O2 and nsg is aware.  -TW     Row Name 12/08/21 1414          Cognition    Affect/Mental Status (Cognitive) WFL  -TW     Orientation Status (Cognition) oriented x 4; verbal cues/prompts needed for orientation  -TW     Follows Commands (Cognition) WFL  -TW     Cognitive Function (Cognitive) WFL  -TW     Personal Safety Interventions fall prevention program maintained; gait belt; nonskid shoes/slippers when out of bed  -TW     Row Name 12/08/21 1414          Pain Scale: Numbers Pre/Post-Treatment    Pretreatment Pain Rating 0/10 - no pain  -TW     Posttreatment Pain Rating 0/10 - no pain  -TW     Row Name 12/08/21 1414          Range of Motion Comprehensive    General Range of Motion bilateral lower extremity ROM WFL  -TW     Row Name 12/08/21 1414          Bed Mobility    Bed Mobility supine-sit; sit-supine; scooting/bridging  -TW     Scooting/Bridging [place] (Bed Mobility) contact guard  -TW     Supine-Sit [place] (Bed Mobility) minimum assist (75% patient effort)   -TW     Sit-Supine [place] (Bed Mobility) contact guard  -TW     Assistive Device (Bed Mobility) draw sheet; head of bed elevated; bed rails  -TW     Row Name 12/08/21 1414          Transfers    Transfers sit-stand transfer; stand-sit transfer  -TW     Sit-Stand [place] (Transfers) minimum assist (75% patient effort)  Pt performed sit to stand t/f 3 times for amb.  -TW     Stand-Sit [place] (Transfers) minimum assist (75% patient effort)  -TW     Row Name 12/08/21 1414          Sit-Stand Transfer    Assistive Device (Sit-Stand Transfers) walker, front-wheeled  -TW     Row Name 12/08/21 1414          Stand-Sit Transfer    Assistive Device (Stand-Sit Transfers) walker, front-wheeled  -TW     Row Name 12/08/21 1414          Gait/Stairs (Locomotion)    Gait/Stairs Locomotion gait/ambulation independence; distance ambulated; gait/ambulation assistive device; gait pattern; gait deviations  -TW     [place] Level (Gait) contact guard; minimum assist (75% patient effort); verbal cues  -TW     Assistive Device (Gait) other (see comments)  HHA  -TW     Distance in Feet (Gait) 6ft, 12ft then 8ft.  -TW     Deviations/Abnormal Patterns (Gait) stride length decreased  -TW     Bilateral Gait Deviations forward flexed posture  -TW     Row Name 12/08/21 1414          Safety Issues, Functional Mobility    Impairments Affecting Function (Mobility) strength; endurance/activity tolerance; shortness of breath; balance; pain  -TW     Row Name 12/08/21 1414          Plan of Care Review    Plan of Care Reviewed With patient; spouse  -TW     Progress improving  -TW     Outcome Summary Pt much more alert than prior date. Pt agreeable to therapy. Pt t/f sup to sit with min of 1 and stood x 3 trials with min of 1. Pt amb 6ft, then 12ft, then 8ft with HHA and forward trunk lean and decreased stride length. Pt returned to sup in bed and left with all needs met. Pt would cont to benefit from therapy upon DC.  -TW     Row Name  12/08/21 1414          Vital Signs    Pre Patient Position Supine  -TW     Intra Patient Position Standing  -TW     Post Patient Position Supine  -TW     Row Name 12/08/21 1414          Bed Mobility Goal 1 (PT)    Activity/Assistive Device (Bed Mobility Goal 1, PT) sit to supine/supine to sit  -TW     [place] Level/Cues Needed (Bed Mobility Goal 1, PT) modified independence  -TW     Time Frame (Bed Mobility Goal 1, PT) by discharge  -TW     Strategies/Barriers (Bed Mobility Goal 1, PT) Has hospital bed at home.  -TW     Progress/Outcomes (Bed Mobility Goal 1, PT) goal not met  -TW     Row Name 12/08/21 1414          Transfer Goal 1 (PT)    Activity/Assistive Device (Transfer Goal 1, PT) sit-to-stand/stand-to-sit; bed-to-chair/chair-to-bed  -TW     [place] Level/Cues Needed (Transfer Goal 1, PT) supervision required  -TW     Time Frame (Transfer Goal 1, PT) by discharge  -TW     Strategies/Barriers (Transfers Goal 1, PT) A-mary king.  -TW     Row Name 12/08/21 1414          Gait Training Goal 1 (PT)    Activity/Assistive Device (Gait Training Goal 1, PT) walker, rolling  -TW     [place] Level (Gait Training Goal 1, PT) supervision required  -TW     Distance (Gait Training Goal 1, PT) 25'x2 as tolerated.  -TW     Time Frame (Gait Training Goal 1, PT) by discharge  -TW     Strategies/Barriers (Gait Training Goal 1, PT) A-mary king.  -TW     Progress/Outcome (Gait Training Goal 1, PT) goal not met  -TW     Row Name 12/08/21 1414          Positioning and Restraints    Pre-Treatment Position in bed  -TW     Post Treatment Position bed  -TW     In Bed supine; call light within reach; encouraged to call for assist; exit alarm on; with family/caregiver  -TW     Row Name 12/08/21 1414          Therapy Assessment/Plan (PT)    Rehab Potential (PT) good, to achieve stated therapy goals  -TW     Criteria for Skilled Interventions Met (PT) yes; skilled treatment is necessary  -TW           User Key  (r) = Recorded  By, (t) = Taken By, (c) = Cosigned By    Initials Name Provider Type    TW Kale Puente PTA Physical Therapy Assistant                Physical Therapy Education                 Title: PT OT SLP Therapies (In Progress)     Topic: Physical Therapy (In Progress)     Point: Mobility training (In Progress)     Learning Progress Summary           Patient Acceptance, E, NR by  at 12/3/2021 1251                   Point: Home exercise program (Not Started)     Learner Progress:  Not documented in this visit.          Point: Body mechanics (Not Started)     Learner Progress:  Not documented in this visit.          Point: Precautions (Not Started)     Learner Progress:  Not documented in this visit.                      User Key     Initials Effective Dates Name Provider Type Discipline     06/16/21 -  Cristobal Mansfield PTA Physical Therapy Assistant PT              PT Recommendation and Plan  Anticipated Discharge Disposition (PT): home with assist, home with home health  Therapy Frequency (PT): other (see comments) (5-7 days/week)  Plan of Care Reviewed With: patient, spouse  Progress: improving  Outcome Summary: Pt much more alert than prior date. Pt agreeable to therapy. Pt t/f sup to sit with min of 1 and stood x 3 trials with min of 1. Pt amb 6ft, then 12ft, then 8ft with HHA and forward trunk lean and decreased stride length. Pt returned to sup in bed and left with all needs met. Pt would cont to benefit from therapy upon DC.       Time Calculation:    PT Charges     Row Name 12/08/21 1537             Time Calculation    Start Time 1414  -TW      Stop Time 1440  -TW      Time Calculation (min) 26 min  -TW              Time Calculation- PT    Total Timed Code Minutes- PT 26 minute(s)  -TW            User Key  (r) = Recorded By, (t) = Taken By, (c) = Cosigned By    Initials Name Provider Type    TW Kale Puente PTA Physical Therapy Assistant              Therapy Charges for Today     Code Description  Service Date Service Provider Modifiers Qty    [id number]  PT THER SUPP EA 15 MIN 12/7/2021 Kale Puente, PTA GP 1    21100767949 HC PT THERAPEUTIC ACT EA 15 MIN 12/8/2021 Kale Puente, NEFTALI GP 1    [id number]  GAIT TRAINING EA 15 MIN 12/8/2021 Kale Puente, NEFTALI GP 1          PT G-Codes  Outcome Measure Options: AM-PAC 6 Clicks Basic Mobility (PT)  AM-PAC 6 Clicks Score (PT): 18  AM-PAC 6 Clicks Score (OT): 20    Kale Puente PTA  12/8/2021

## 2021-12-09 NOTE — THERAPY TREATMENT NOTE
Acute Care - Physical Therapy Treatment Note  [facility]     Patient Name: Hasmukh Ham  : 1933  MRN: [id number]  Today's Date: 2021      Visit Dx:     ICD-10-CM ICD-9-CM   1. Pneumonia of both lungs due to infectious organism, unspecified part of lung  J18.9 483.8   2. Precordial pain  R07.2 786.51   3. Atrial fibrillation, unspecified type ([facility])  I48.91 427.31   4. Impaired mobility and ADLs  Z74.09 V49.89    Z78.9    5. Impaired functional mobility, balance, gait, and endurance  Z74.09 V49.89     Patient Active Problem List   Diagnosis   • Dilated aortic root ([facility])   • Non-rheumatic tricuspid valve insufficiency   • Pulmonary emphysema ([facility])   • Atrial fibrillation and flutter ([facility])   • Bradycardia   • CAD (coronary artery disease)   • Neuropathy   • Abdominal hernia   • Neck pain   • Dementia (CMS/HCC)   • Diabetic neuropathy ([facility])   • Gastroesophageal reflux disease without esophagitis   • Generalized osteoarthritis   • 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 ([facility])   • Nocturia   • Osteoarthritis of multiple joints   • Hyperlipidemia   • Pain in joint involving ankle and foot   • Pneumonia of left lower lobe due to infectious organism   • Arthropathy of hand   • Paroxysmal tachycardia ([facility])   • Osteoarthrosis involving more than one site but not generalized   • Chronic right shoulder pain   • Rotator cuff syndrome   • Partial tear of subscapularis tendon   • Infraspinatus tendon tear   • Supraspinatus tendon tear   • Bilateral carotid artery stenosis   • Pulmonary HTN (HCC)   • Acute interstitial pneumonia (HCC)   • Chronic obstructive lung disease (HCC)   • Diabetes mellitus (HCC)   • Hypertension   • Chest pain   • Shortness of breath   • Angina pectoris (HCC)   • Myocardial infarction ([facility])   • Ingrown toenail   • Heart problem   • Degenerative joint disease involving multiple joints   • Chronic obstructive pulmonary disease (COPD) (HCC)   • Bleeding disorder ([facility])   •  Chronic obstructive pulmonary disease with acute exacerbation (HCC)   • Failure of outpatient treatment   • Sepsis (HCC)   • 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)   • Chronic diastolic CHF (congestive heart failure) ([facility])   • SOB (shortness of breath)   • Cause of injury, fall   • Right hip pain   • Chronic pain of right knee   • Primary osteoarthritis of right knee   • Left shoulder pain   • Left arm pain   • AC joint arthropathy   • Syncope   • Inflammatory arthritis   • Elevated troponin   • Fever   • Paroxysmal atrial fibrillation with rapid ventricular response ([facility])   • Fibrosis of lung (HCC)   • Type 2 diabetes mellitus ([facility])   • Chondrocalcinosis of right knee   • Nontraumatic complete tear of right rotator cuff   • Pneumonia of both lungs due to infectious organism     Past Medical History:   Diagnosis Date   • Basal cell carcinoma    • Bilateral carotid artery stenosis    • Bilateral carotid artery stenosis    • Bleeding disorder ([facility])    • CHF (congestive heart failure) ([facility])    • Chronic obstructive pulmonary disease (COPD) ([facility])    • Coronary arteriosclerosis    • Degenerative joint disease involving multiple joints    • Dementia ([facility])    • Diabetes mellitus ([facility])    • Heart problem    • History of stomach ulcers    • Hyperlipidemia    • Hypertension    • Ingrown toenail    • Myocardial infarction ([facility])      Past Surgical History:   Procedure Laterality Date   • BACK SURGERY     • CARDIAC CATHETERIZATION N/A 6/6/2017    Procedure: Right Heart Cath;  Surgeon: Jeff Maciel MD PhD;  Location: [facility] CATH INVASIVE LOCATION;  Service:    • CARDIAC CATHETERIZATION N/A 2/14/2018    Procedure: Coronary angiography;  Surgeon: Moisés Davila MD;  Location: [facility] CATH INVASIVE LOCATION;  Service:    • CARDIAC CATHETERIZATION N/A 10/28/2021    Procedure: Left Heart Cath;  Surgeon: Carine Johnson MD;  Location: [facility] CATH INVASIVE LOCATION;  Service: Cardiology;   Laterality: N/A;   • CORONARY ANGIOPLASTY WITH STENT PLACEMENT     • CORONARY STENT PLACEMENT     • HERNIA REPAIR     • LUNG BIOPSY     • LUNG SURGERY     • NECK SURGERY     • THORACOTOMY Left 1977   • VENTRAL HERNIA REPAIR       PT Assessment (last 12 hours)     PT Evaluation and Treatment     Row Name 12/09/21 1410          Physical Therapy Time and Intention    Subjective Information complains of; weakness; pain  -LN     Document Type therapy note (daily note)  -LN     Mode of Treatment physical therapy; individual therapy  -LN     Patient Effort good  -LN     Row Name 12/09/21 1410          General Information    Patient Profile Reviewed yes  -LN     Existing Precautions/Restrictions fall  Pt not wearing O2 and nsg is aware.  -LN     Row Name 12/09/21 1410          Cognition    Affect/Mental Status (Cognitive) WFL  -LN     Orientation Status (Cognition) oriented to; person; place  -LN     Follows Commands (Cognition) WFL  -LN     Cognitive Function (Cognitive) WFL  -LN     Row Name 12/09/21 1410          Pain Scale: Numbers Pre/Post-Treatment    Pretreatment Pain Rating 9/10  -LN     Posttreatment Pain Rating 9/10  -LN     Pain Location - Orientation generalized  -LN     Pre/Posttreatment Pain Comment nsg made aware  -LN     Row Name 12/09/21 1410          Range of Motion Comprehensive    General Range of Motion bilateral lower extremity ROM WFL  -LN     Row Name 12/09/21 1410          Bed Mobility    Bed Mobility supine-sit; sit-supine; scooting/bridging  -LN     Scooting/Bridging [place] (Bed Mobility) contact guard  max of 2 up in bed-pt assisted with pushing with legs  -LN     Supine-Sit [place] (Bed Mobility) standby assist  -LN     Assistive Device (Bed Mobility) head of bed elevated; bed rails  -LN     Row Name 12/09/21 1410          Transfers    Transfers sit-stand transfer; stand-sit transfer  -LN     Sit-Stand [place] (Transfers) minimum assist (75% patient effort)  -LN     Stand-Sit  [place] (Transfers) minimum assist (75% patient effort)  -LN     [place] Level (Toilet Transfer) minimum assist (75% patient effort)  -LN     Assistive Device (Toilet Transfer) commode, bedside without drop arms; grab bars/safety frame; walker, front-wheeled  -LN     Row Name 12/09/21 1410          Sit-Stand Transfer    Assistive Device (Sit-Stand Transfers) walker, front-wheeled  -LN     Row Name 12/09/21 1410          Stand-Sit Transfer    Assistive Device (Stand-Sit Transfers) walker, front-wheeled  -LN     Row Name 12/09/21 1410          Toilet Transfer    Type (Toilet Transfer) sit-stand; stand-sit  -LN     Row Name 12/09/21 1410          Gait/Stairs (Locomotion)    Gait/Stairs Locomotion gait/ambulation independence; distance ambulated; gait/ambulation assistive device; gait pattern; gait deviations  -LN     [place] Level (Gait) minimum assist (75% patient effort); verbal cues  -LN     Assistive Device (Gait) walker, front-wheeled  -     Distance in Feet (Gait) 5'x2  -LN     Deviations/Abnormal Patterns (Gait) stride length decreased  -LN     Bilateral Gait Deviations forward flexed posture  -LN     Row Name 12/09/21 1410          Safety Issues, Functional Mobility    Impairments Affecting Function (Mobility) strength; endurance/activity tolerance; shortness of breath; balance; pain  -     Row Name 12/09/21 1410          Hip (Therapeutic Exercise)    Hip (Therapeutic Exercise) AROM (active range of motion)  -LN     Hip AROM (Therapeutic Exercise) bilateral; flexion; aBduction; aDduction  -     Row Name 12/09/21 1410          Knee (Therapeutic Exercise)    Knee (Therapeutic Exercise) AROM (active range of motion)  -LN     Knee AROM (Therapeutic Exercise) bilateral; LAQ (long arc quad)  -     Row Name 12/09/21 1410          Ankle (Therapeutic Exercise)    Ankle (Therapeutic Exercise) AROM (active range of motion)  -LN     Ankle AROM (Therapeutic Exercise) bilateral; dorsiflexion;  plantarflexion  limited on l ankle  -LN     Row Name 12/09/21 1410          Plan of Care Review    Plan of Care Reviewed With patient  -LN     Outcome Summary sup-sit-sup cg/sba of 1,amb 5'x2 with rw and min of 1 pt lacking total extension gerda le's  -LN     Row Name 12/09/21 1410          Vital Signs    Post Systolic BP Rehab 116  -LN     Post Treatment Diastolic BP 75  -LN     Posttreatment Heart Rate (beats/min) 86  -LN     Post SpO2 (%) 94  -LN     Pre Patient Position Supine  -LN     Intra Patient Position Standing  -LN     Post Patient Position Supine  -LN     Row Name 12/09/21 1410          Bed Mobility Goal 1 (PT)    Activity/Assistive Device (Bed Mobility Goal 1, PT) sit to supine/supine to sit  -LN     [place] Level/Cues Needed (Bed Mobility Goal 1, PT) modified independence  -LN     Time Frame (Bed Mobility Goal 1, PT) by discharge  -LN     Strategies/Barriers (Bed Mobility Goal 1, PT) Has hospital bed at home.  -LN     Progress/Outcomes (Bed Mobility Goal 1, PT) goal not met  -LN     Row Name 12/09/21 1410          Transfer Goal 1 (PT)    Activity/Assistive Device (Transfer Goal 1, PT) sit-to-stand/stand-to-sit; bed-to-chair/chair-to-bed  -LN     [place] Level/Cues Needed (Transfer Goal 1, PT) supervision required  -LN     Time Frame (Transfer Goal 1, PT) by discharge  -LN     Strategies/Barriers (Transfers Goal 1, PT) A-fib tachmolly.  -LN     Row Name 12/09/21 1410          Gait Training Goal 1 (PT)    Activity/Assistive Device (Gait Training Goal 1, PT) walker, rolling  -LN     [place] Level (Gait Training Goal 1, PT) supervision required  -LN     Distance (Gait Training Goal 1, PT) 25'x2 as tolerated.  -LN     Time Frame (Gait Training Goal 1, PT) by discharge  -LN     Strategies/Barriers (Gait Training Goal 1, PT) A-fib tachmolyl.  -LN     Progress/Outcome (Gait Training Goal 1, PT) goal not met  -LN     Row Name 12/09/21 1410          Positioning and Restraints    In Bed notified nsg;  supine; call light within reach; encouraged to call for assist; exit alarm on  -LN     Row Name 12/09/21 1410          Therapy Assessment/Plan (PT)    Rehab Potential (PT) good, to achieve stated therapy goals  -LN     Criteria for Skilled Interventions Met (PT) yes; skilled treatment is necessary  -LN           User Key  (r) = Recorded By, (t) = Taken By, (c) = Cosigned By    Initials Name Provider Type    LN Maria Esther Smith PTA Physical Therapy Assistant                Physical Therapy Education                 Title: PT OT SLP Therapies (In Progress)     Topic: Physical Therapy (In Progress)     Point: Mobility training (In Progress)     Learning Progress Summary           Patient Acceptance, E, NR by TESS at 12/3/2021 1251                   Point: Home exercise program (Not Started)     Learner Progress:  Not documented in this visit.          Point: Body mechanics (Not Started)     Learner Progress:  Not documented in this visit.          Point: Precautions (Not Started)     Learner Progress:  Not documented in this visit.                      User Key     Initials Effective Dates Name Provider Type Discipline     06/16/21 -  Cristobal Mansfield PTA Physical Therapy Assistant PT              PT Recommendation and Plan  Anticipated Discharge Disposition (PT): home with assist, home with home health  Therapy Frequency (PT): other (see comments) (5-7 days/week)  Plan of Care Reviewed With: patient  Outcome Summary: sup-sit-sup cg/sba of 1,amb 5'x2 with rw and min of 1 pt lacking total extension gerda le's       Time Calculation:    PT Charges     Row Name 12/09/21 1517             Time Calculation    Start Time 1410  -LN      Stop Time 1440  -LN      Time Calculation (min) 30 min  -LN      PT Received On 12/09/21  -LN              Time Calculation- PT    Total Timed Code Minutes- PT 30 minute(s)  -LN            User Key  (r) = Recorded By, (t) = Taken By, (c) = Cosigned By    Initials Name Provider Type    LN Luis  Maria Esther MEZA PTA Physical Therapy Assistant              Therapy Charges for Today     Code Description Service Date Service Provider Modifiers Qty    [id number]  PT THERAPEUTIC ACT EA 15 MIN 12/9/2021 Maria Esther Smith, NEFTALI GP 1    [id number]  PT THER PROC EA 15 MIN 12/9/2021 Maria Esther Smith PTA GP 1          PT G-Codes  Outcome Measure Options: AM-PAC 6 Clicks Basic Mobility (PT)  AM-PAC 6 Clicks Score (PT): 18  AM-PAC 6 Clicks Score (OT): 20    Maria Esther Smith PTA  12/9/2021

## 2021-12-09 NOTE — PLAN OF CARE
Goal Outcome Evaluation:  Plan of Care Reviewed With: patient           Outcome Summary: sup-sit-sup cg/sba of 1,amb 5'x2 with rw and min of 1 pt lacking total extension gerda le's

## 2021-12-09 NOTE — CONSULTS
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  Pt resting.  Wife present and reported pt had lost 38 lb the past 6 mo.  Voiced pt food preferences.  Willing for pt to receive skim milk with meals, yogurt at breakfast and ice cream with lunch and supper.  Intake 100% - 1x, 75% - 2x, 50% - 4x.  Blood glucose is elevated.  Sliding scale novolog prescribed.  Meds include prednisone.  Pt reported to have nausea, some vomiting.  Pepcid, PRN Zofran prescribed.  Will maintain pt on prescribed diet, add supplements to meals, honor food preferences.             Reason for Assessment     Row Name 12/09/21 1547          Reason for Assessment    Reason For Assessment per organizational policy  Length of Stay                    Labs/Tests/Procedures/Meds     Row Name 12/09/21 1547          Labs/Procedures/Meds    Lab Results Reviewed reviewed, pertinent            Medications    Pertinent Medications Reviewed reviewed, pertinent                  Estimated/Assessed Needs     Row Name 12/09/21 1548          Calculation Measurements    Weight Used For Calculations 67.9 kg (149 lb 11.1 oz)            Estimated/Assessed Needs    Additional Documentation Calorie Requirements (Group); Fluid Requirements (Group); Montrose-St. Jeor Equation (Group); Protein Requirements (Group)            Calorie Requirements    Estimated Calorie Requirement (kcal/day) 1699            Montrose-St. Jeor Equation    RMR (Montrose-St. Jeor Equation) 1307.625            Protein Requirements    Weight Used For Protein Calculations 67.9 kg (149 lb 11.1 oz)     Est Protein Requirement Amount (gms/kg) 1.2 gm protein     Estimated Protein Requirements  (gms/day) 81.48            Fluid Requirements    Fluid Requirements (mL/day) 1697     RDA Method (mL) 1697                Nutrition Prescription Ordered     Row Name 12/09/21 1550          Nutrition Prescription PO    Current PO Diet Regular     Common Modifiers Cardiac                Evaluation of Received Nutrient/Fluid Intake     Row Name 12/09/21 1550          PO Evaluation    Number of Meals 7     % PO Intake 100% - 1x, 75% - 2x, 505 - 4x                     Electronically signed by:  Patricia Sanchez RD  12/09/21 15:51 CST

## 2021-12-09 NOTE — SIGNIFICANT NOTE
12/09/21 1401   OTHER   Discipline occupational therapy assistant   Rehab Time/Intention   Session Not Performed patient/family declined, not feeling well  (Pt asleep upon entry in AM. Spouse and Nsg present reporting pt nauseated. No tx this date.)      Notified patient did not find any order recently from [facility]. Patient will call them to send again. Please see also below; patient requesting order for mammogram. Please call patient when order is in system so she can schedule. Thanks.

## 2021-12-09 NOTE — PLAN OF CARE
Goal Outcome Evaluation:  Plan of Care Reviewed With: spouse        Progress: no change  Outcome Summary: Initial assessment.  Intake 100% - 1x, 75% - 2x, 50% - 4x.  Will maintain pt on prescribed diet, add supplements, honor food preferences.  Encourge intake.

## 2021-12-09 NOTE — PROGRESS NOTES
[facility] Medicine Services  INPATIENT PROGRESS NOTE    Length of Stay: 7  Date of Admission: 12/1/2021  Primary Care Physician: Paolo Rey MD    Subjective   Chief Complaint: confused    HPI: Pt remains confused but pleasant.  He has no complaints.  His wife is at bedside.  She has now requested that the patient gog to a swing bed in [place] prior to return home.        Review of Systems   Unable to perform ROS: Dementia          Objective    Temp:  [97.1 °F (36.2 °C)-97.5 °F (36.4 °C)] 97.2 °F (36.2 °C)  Heart Rate:  [55-85] 85  Resp:  [16] 16  BP: (112-140)/(59-92) 112/64    Physical Exam  Constitutional:       Appearance: Normal appearance.   HENT:      Head: Normocephalic and atraumatic.      Right Ear: External ear normal.      Left Ear: External ear normal.      Nose: Nose normal.      Mouth/Throat:      Mouth: Mucous membranes are moist.      Pharynx: Oropharynx is clear.   Eyes:      General: No scleral icterus.     Extraocular Movements: Extraocular movements intact.      Pupils: Pupils are equal, round, and reactive to light.   Cardiovascular:      Rate and Rhythm: Tachycardia present. Rhythm regularly irregular.   Pulmonary:      Breath sounds: No wheezing, rhonchi or rales.   Abdominal:      Palpations: Abdomen is soft.      Tenderness: There is no abdominal tenderness. There is no guarding or rebound.   Musculoskeletal:      Cervical back: Neck supple.      Right lower leg: No edema.      Left lower leg: No edema.   Lymphadenopathy:      Cervical: No cervical adenopathy.   Skin:     General: Skin is warm and dry.      Findings: No rash.   Neurological:      Mental Status: He is alert. He is disoriented.             Results Review:  I have reviewed the labs, radiology results, and diagnostic studies.    Laboratory Data:   Results from last 7 days   Lab Units 12/09/21  0625 12/08/21  0604 12/07/21  0643 12/06/21  1059 12/05/21  0713 12/04/21  0703  12/04/21  0703   SODIUM mmol/L 137 140 139   < > 139   < > 139   POTASSIUM mmol/L 4.0 4.6 4.5   < > 4.2   < > 4.4   CHLORIDE mmol/L 100 104 106   < > 98   < > 100   CO2 mmol/L 29.0 28.0 23.0   < > 26.0   < > 24.0   BUN mg/dL 38* 44* 47*   < > 58*   < > 48*   CREATININE mg/dL 1.15 1.18 1.31*   < > 1.84*   < > 1.88*   GLUCOSE mg/dL 122* 159* 177*   < > 175*   < > 166*   CALCIUM mg/dL 9.0 8.5* 8.8   < > 9.3   < > 8.8   BILIRUBIN mg/dL  --   --   --   --  0.5  --  0.3   ALK PHOS U/L  --   --   --   --  54  --  54   ALT (SGPT) U/L  --   --   --   --  19  --  14   AST (SGOT) U/L  --   --   --   --  18  --  15   ANION GAP mmol/L 8.0 8.0 10.0   < > 15.0   < > 15.0    < > = values in this interval not displayed.     Estimated Creatinine Clearance: 42.6 mL/min (by C-G formula based on SCr of 1.15 mg/dL).          Results from last 7 days   Lab Units 12/08/21  0604 12/05/21  0713 12/04/21  0703   WBC 10*3/mm3 13.98* 15.59* 15.32*   HEMOGLOBIN g/dL 11.1* 13.1 11.4*   HEMATOCRIT % 33.9* 40.4 35.1*   PLATELETS 10*3/mm3 188 238 184           Culture Data:   No results found for: BLOODCX  No results found for: URINECX  No results found for: RESPCX  No results found for: WOUNDCX  No results found for: STOOLCX  No components found for: BODYFLD    Radiology Data:   Imaging Results (Last 24 Hours)     ** No results found for the last 24 hours. **          I have reviewed the patient's current medications.     Assessment/Plan     Active Hospital Problems    Diagnosis    • Pneumonia of both lungs due to infectious organism        Plan:      1.  Pneumonia   2.  Acute/Chronic renal failure   3.  delerium with baseline dementia   4.  PAF - not on LTAC due to history of GIB   5.  Chronic hypoxic resp failure on home O2   6.  Elevated trop  7.  HTN, benign  8.  COPD    - cont levaquin - day #7/7  - cont to decrease steroids - may be contributing to delerium and paranoia  - cont nebs   - cont cardiac meds per cardiology - cont amio and  metoprlol  - cont po lasix   - cont lisinopril   - family has requested SNF at dc - referral made swing bed in [place].              Discharge Planning: I expect patient to be discharged to swing bed at [place].      Kelvin Aguilar MD

## 2021-12-09 NOTE — PROGRESS NOTES
"[facility] Progress Note   LOS: 7 days   Patient Care Team:  Paolo Rey MD as PCP - General    Chief Complaint:    Chief Complaint   Patient presents with   • Palpitations   • Chest Pain   • Fatigue        Subjective     Interval History:      No acute events overnight.  Remains in AF with improved rate. Tolerating po Amiodarone.     Objective     Vital Sign Min/Max for last 24 hours  Temp  Min: 96.8 °F (36 °C)  Max: 97.5 °F (36.4 °C)   BP  Min: 113/65  Max: 143/77   Pulse  Min: 55  Max: 76   Resp  Min: 16  Max: 18   SpO2  Min: 94 %  Max: 99 %   Flow (L/min)  Min: 2  Max: 2   Weight  Min: 67.9 kg (149 lb 9.6 oz)  Max: 67.9 kg (149 lb 9.6 oz)     Flowsheet Rows      First Filed Value   Admission Height 171.5 cm (67.5\") Documented at 12/01/2021 1451   Admission Weight 68 kg (150 lb) Documented at 12/01/2021 1451            12/06/21  0630 12/08/21  0300 12/09/21  0615   Weight: 68.2 kg (150 lb 4.8 oz) 68 kg (149 lb 14.4 oz) 67.9 kg (149 lb 9.6 oz)       Physical Exam: no changes to P/E  Physical Exam  Vitals and nursing note reviewed.   Constitutional:       General: He is not in acute distress.     Appearance: Normal appearance. He is well-developed. He is not ill-appearing, toxic-appearing or diaphoretic.   HENT:      Head: Normocephalic.      Right Ear: External ear normal.      Left Ear: External ear normal.   Eyes:      General: Lids are normal.      Pupils: Pupils are equal, round, and reactive to light.   Neck:      Thyroid: No thyromegaly.      Vascular: No carotid bruit or JVD.      Trachea: No tracheal deviation.   Cardiovascular:      Rate and Rhythm: Normal rate. Rhythm irregularly irregular.      Chest Wall: PMI is not displaced. No thrill.      Heart sounds: Normal heart sounds, S1 normal and S2 normal. No murmur heard.  No friction rub. No gallop. No S3 or S4 sounds.    Pulmonary:      Effort: Pulmonary effort is normal. No respiratory distress.      Breath sounds: Normal breath sounds. No " stridor. No wheezing or rales.   Chest:      Chest wall: No tenderness.   Abdominal:      General: Bowel sounds are normal. There is no distension.      Palpations: Abdomen is soft.      Tenderness: There is no abdominal tenderness.   Musculoskeletal:      Right lower leg: No edema.      Left lower leg: No edema.   Skin:     General: Skin is warm and dry.      Findings: No erythema or rash.   Neurological:      Mental Status: He is alert. Mental status is at baseline.          Results Review:     Results from last 7 days   Lab Units 12/09/21  0625 12/08/21  0604 12/07/21  0643 12/06/21  1059 12/05/21  0713 12/04/21  0703 12/04/21  0703   SODIUM mmol/L 137 140 139   < > 139   < > 139   POTASSIUM mmol/L 4.0 4.6 4.5   < > 4.2   < > 4.4   CHLORIDE mmol/L 100 104 106   < > 98   < > 100   CO2 mmol/L 29.0 28.0 23.0   < > 26.0   < > 24.0   BUN mg/dL 38* 44* 47*   < > 58*   < > 48*   CREATININE mg/dL 1.15 1.18 1.31*   < > 1.84*   < > 1.88*   CALCIUM mg/dL 9.0 8.5* 8.8   < > 9.3   < > 8.8   BILIRUBIN mg/dL  --   --   --   --  0.5  --  0.3   ALK PHOS U/L  --   --   --   --  54  --  54   ALT (SGPT) U/L  --   --   --   --  19  --  14   AST (SGOT) U/L  --   --   --   --  18  --  15   GLUCOSE mg/dL 122* 159* 177*   < > 175*   < > 166*    < > = values in this interval not displayed.       Estimated Creatinine Clearance: 42.6 mL/min (by C-G formula based on SCr of 1.15 mg/dL).                Results from last 7 days   Lab Units 12/08/21  0604 12/05/21  0713 12/04/21  0703   WBC 10*3/mm3 13.98* 15.59* 15.32*   HEMOGLOBIN g/dL 11.1* 13.1 11.4*   PLATELETS 10*3/mm3 188 238 184           Lab Results   Component Value Date    PROBNP 920.0 12/01/2021       I/O last 3 completed shifts:  In: 600 [P.O.:600]  Out: 1600 [Urine:1600]    Cardiographics:  ECG/EMG Results (last 24 hours)     ** No results found for the last 24 hours. **        Results for orders placed during the hospital encounter of 10/25/21    Adult Transthoracic Echo Limited  W/ Cont if Necessary Per Protocol    Interpretation Summary  · Significant beat to beat variability but estimated EF appears around 40-45%.  · Left ventricular systolic function is mildly decreased.  · Estimated right ventricular systolic pressure from tricuspid regurgitation is normal (<35 mmHg).  · Left ventricular diastolic function was not assessed.  · Left atrial volume is moderately increased.      Imaging Results (Most Recent)     Procedure Component Value Units Date/Time    XR Chest 1 View [[id number]] Collected: 12/05/21 1229     Updated: 12/05/21 1820    Narrative:          EXAM:  XR CHEST 1 VIEW    TECHNIQUE:   Single frontal radiograph of the chest.    VIEWS:  1 view    CLINICAL HISTORY:   88 years  Male  Shortness of breath, J18.9 Pneumonia, unspecified  organism R07.2 Precordial pain I48.91 Unspecified atrial  fibrillation Z74.09 Other reduced mobility Z78.9 Other specified  health status Z74.09 Other reduced mobility    COMPARISON:   December 1, 2021 chest x-ray    FINDINGS:   Lungs:  Patchy bilateral interstitial opacities are not  significantly changed  Pleura: Small left pleural effusion. No pneumothorax.  Heart/mediastinum: The cardiac silhouette is not enlarged.  Bones: No acute osseous findings.  Soft tissues: Unremarkable.      Impression:      1.  Patchy bilateral interstitial opacities are not significantly  changed.  2.  Small left pleural effusion.    Electronically signed by:  Rc Johnson MD  12/5/2021 6:19  PM CST Workstation: [id number]    US Renal Bilateral [[id number]] Collected: 12/04/21 2047     Updated: 12/05/21 0035    Narrative:      EXAM:    US Retroperitoneal Limited, Renal    CLINICAL HISTORY:    The patient is 88 years old and is Male; JILL, J18.9 Pneumonia,  unspecified organism R07.2 Precordial pain I48.91 Unspecified  atrial fibrillation Z74.09 Other reduced mobility Z78.9 Other  specified health status Z74.09 Other reduced mobility    TECHNIQUE:    Real-time limited  ultrasound of the retroperitoneum with image  documentation.    COMPARISON:    CT of the abdomen and pelvis June 22, 2021    FINDINGS:    RIGHT KIDNEY:  The right kidney measures 10.4 cm in length.  No  stones.  No hydronephrosis.    LEFT KIDNEY:  Bilobed left renal cyst measuring 7.8 x 4.7 x 5.6  cm is present. The left kidney measures 11.0 cm in length.  No  stones.  No hydronephrosis.    BLADDER:  The bladder is well distended.      Impression:        Large left renal cyst. Otherwise, unremarkable renal  ultrasound.    Electronically signed by:  Masha Baires MD  12/5/2021 12:33 AM  CST Workstation: [id number]    XR Chest 1 View [[id number]] Collected: 12/01/21 1515     Updated: 12/01/21 1607    Narrative:        PROCEDURE: Single chest view portable    REASON FOR EXAM:Chest Pain triage protocol  Chest Pain Triage Protocol    FINDINGS: Comparison exam dated October 25, 2021. Cardiac size  appears within normal limits. Left upper lobe perihilar and left  lung base infrahilar interstitial groundglass opacities. Right  upper lobe peripheral small interstitial groundglass opacity.  Lungs are otherwise clear. Pleural spaces are normal. No acute  osseous abnormality. Stable epidural stimulator lead in the mid  thoracic spine region.      Impression:      1.  Left upper lobe perihilar and left lung base infrahilar  interstitial groundglass opacities. Right upper lobe peripheral  small interstitial groundglass opacity. These opacities would be  suspicious for pneumonia including possible atypical viral  etiology and/or pulmonary edema. Recommend clinical correlation.    Electronically signed by:  Nelson hCristianson MD  12/1/2021 4:06 PM CST  Workstation: [id number]          XR Chest 1 View    Result Date: 12/5/2021  1.  Patchy bilateral interstitial opacities are not significantly changed. 2.  Small left pleural effusion. Electronically signed by:  Rc Johnson MD  12/5/2021 6:19 PM CST Workstation: [id number]    XR  Chest 1 View    Result Date: 12/1/2021  1.  Left upper lobe perihilar and left lung base infrahilar interstitial groundglass opacities. Right upper lobe peripheral small interstitial groundglass opacity. These opacities would be suspicious for pneumonia including possible atypical viral etiology and/or pulmonary edema. Recommend clinical correlation. Electronically signed by:  Nelson Christianson MD  12/1/2021 4:06 PM CST Workstation: [id number]    US Renal Bilateral    Result Date: 12/5/2021    Large left renal cyst. Otherwise, unremarkable renal ultrasound. Electronically signed by:  Masha Baires MD  12/5/2021 12:33 AM CST Workstation: [id number]      Medication Review:     Current Facility-Administered Medications:   •  acetaminophen (TYLENOL) tablet 650 mg, 650 mg, Oral, Q4H PRN, Christal Gonzalez MD  •  albuterol (PROVENTIL) nebulizer solution 0.083% 2.5 mg/3mL, 2.5 mg, Nebulization, Q4H PRN, Christal Gonzalez MD  •  amiodarone (PACERONE) tablet 200 mg, 200 mg, Oral, Q24H, Shari Jones APRN, 200 mg at 12/08/21 0927  •  cetirizine (zyrTEC) tablet 5 mg, 5 mg, Oral, Daily, Christal Gonzalez MD, 5 mg at 12/08/21 0927  •  clopidogrel (PLAVIX) tablet 75 mg, 75 mg, Oral, Daily, Christal Gonzalez MD, 75 mg at 12/08/21 0927  •  dextrose (D50W) (25 g/50 mL) IV injection 25 g, 25 g, Intravenous, Q15 Min PRN, Kelvin Aguilar MD  •  dextrose (GLUTOSE) oral gel 15 g, 15 g, Oral, Q15 Min PRN, Kelvin Aguilar MD  •  famotidine (PEPCID) tablet 20 mg, 20 mg, Oral, BID AC, Christal Gonzalez MD, 20 mg at 12/08/21 1721  •  First Mouthwash (Magic Mouthwash) 10 mL, 10 mL, Swish & Spit, 4x Daily PRN, Behroozi, Saeid, MD, 10 mL at 12/08/21 2101  •  furosemide (LASIX) tablet 20 mg, 20 mg, Oral, BID, Jennifer George MD, 20 mg at 12/09/21 0613  •  glucagon (human recombinant) (GLUCAGEN DIAGNOSTIC) injection 1 mg, 1 mg, Subcutaneous, Q15 Min PRN, Kelvin Aguilar MD  •  HYDROcodone-acetaminophen (NORCO)  7.5-325 MG per tablet 1 tablet, 1 tablet, Oral, Q6H PRN, Christal Gonzalez MD, 1 tablet at 12/09/21 0613  •  insulin aspart (novoLOG) injection 0-9 Units, 0-9 Units, Subcutaneous, TID AC, Kelvin Aguilar MD, 4 Units at 12/08/21 1303  •  ipratropium-albuterol (DUO-NEB) nebulizer solution 3 mL, 3 mL, Nebulization, 4x Daily - RT, Kelvin Aguilar MD, 3 mL at 12/09/21 0735  •  lactulose (CHRONULAC) 10 GM/15ML solution 10 g, 10 g, Oral, Daily, Jennifer George MD, 10 g at 12/08/21 0927  •  levoFLOXacin (LEVAQUIN) tablet 750 mg, 750 mg, Oral, Q48H, Kelvin Aguilar MD, 750 mg at 12/07/21 2125  •  lisinopril (PRINIVIL,ZESTRIL) tablet 5 mg, 5 mg, Oral, Q24H, Jennifer George MD  •  melatonin tablet 3 mg, 3 mg, Oral, Nightly PRN, Behroozi, Saeid, MD, 3 mg at 12/05/21 2024  •  metoprolol tartrate (LOPRESSOR) tablet 25 mg, 25 mg, Oral, Q12H, Shari Jones APRN, 25 mg at 12/08/21 2056  •  nitroglycerin (NITROSTAT) SL tablet 0.4 mg, 0.4 mg, Sublingual, Q5 Min PRN, Christal Gonzalez MD  •  ondansetron (ZOFRAN) injection 4 mg, 4 mg, Intravenous, Q4H PRN, Karan Segovia MD, 4 mg at 12/04/21 1631  •  polyethylene glycol (MIRALAX) packet 17 g, 17 g, Oral, Daily, Christal Gonzalez MD, 17 g at 12/06/21 0825  •  predniSONE (DELTASONE) tablet 40 mg, 40 mg, Oral, Daily With Breakfast, Kelvin Aguilar MD  •  ranolazine (RANEXA) 12 hr tablet 1,000 mg, 1,000 mg, Oral, Q12H, Christal Gonzalez MD, 1,000 mg at 12/08/21 2056  •  sodium chloride 0.9 % flush 10 mL, 10 mL, Intravenous, PRN, Zack Castro MD  •  sodium chloride 0.9 % flush 10 mL, 10 mL, Intravenous, Q12H, Kelvin Aguilar MD, 10 mL at 12/08/21 0928  •  sodium chloride 0.9 % flush 10 mL, 10 mL, Intravenous, PRN, Kelvin Aguilar MD    Assessment/Plan       Pneumonia of both lungs due to infectious organism    AF with RVR:  Mr. Hasmukh Ham is an 88-year-old  male with past medical history of GI bleed with AV malformation,  frequent falls/orthostasis, CAD status post PCI in October 2021 to the mid LAD with critical stenosis of the RCA recommended medical management, history of CHF, CKD, hypertension, type 2 diabetes mellitus who presented to the hospital last week with shortness of breath and malaise.  Patient developed JILL with a creatinine of 1.8.  Patient found to have pneumonia.  During hospitalization patient was noted to be in atrial fibrillation with RVR.  Mildly diagnostic troponin likely multifactorial and demand ischemia, ongoing suspicion of ischemia low. Troponin trended down.  Patient was given IV amiodarone 150 mg Lasix and started on amiodarone drip.  -Oral anticoagulation contraindicated due to history of bleeding and HAS-BLED score of 5.   -Continue Amiodarone 200mg daily  -Metoprolol tartrate to 25 mg twice daily    CAD s/p PCI: He will continue with antiplatelet therapy with Plavix, Nexa 1000 mg twice daily, Lopressor    History of chronic CHF due to diastolic dysfunction: diuretic management per nephrology.  Patient appears euvolemic upon exam today and not in acute exacerbation    Pneumonia: Management per primary team    JILL on CKD: Nephrology following and managing diuretics.     Acute delirium with baseline dementia: intermittent, better today. Wife at bedside.       Plan for disposition: May d/c from our standpoint. We will SIGN OFF. Thank you for allowing us to participate in the care of Mr. Hasmukh Ham.            This document has been electronically signed by EVE Aguilar on December 9, 2021 09:26 CST   Electronically signed by EVE Aguilar, 12/09/21, 9:26 AM CST.

## 2021-12-09 NOTE — PLAN OF CARE
Goal Outcome Evaluation:  VSS, ammio drip stopped today, con't po meds. Wife at bedside. Assist x 2 to bathroom today. Call light within reach.

## 2021-12-10 NOTE — PAYOR COMM NOTE
"    Mita Cbaallero RN Psychiatric  [phone]     Phone  [phone]      Fax  Cont. Stay Review      Hasmukh Ham (88 y.o. Male)             Date of Birth Social Security Number Address Home Phone MRN    06/01/1933  [address] [place] [address] [phone] [id number]    Congregational Marital Status             Sikhism        Admission Date Admission Type Admitting Provider Attending Provider Department, Room/Bed    12/1/21 Emergency Christal Gonzalez MD Gerlach, Elizabeth T, MD [facility] 3 [place], 319/1    Discharge Date Discharge Disposition Discharge Destination                         Attending Provider: Christal Gonzalez MD    Allergies: Atorvastatin, Penicillins, Azithromycin, Cefdinir, Clarithromycin, Pravastatin, Benadryl Allergy [Diphenhydramine Hcl]    Isolation: None   Infection: None   Code Status: CPR   Advance Care Planning Activity    Ht: 170.2 cm (67\")   Wt: 68.1 kg (150 lb 3.2 oz)    Admission Cmt: None   Principal Problem: None                Active Insurance as of 12/1/2021     Primary Coverage     Payor Plan Insurance Group Employer/Plan Group    AETNA MEDICARE REPLACEMENT AETNA MEDICARE REPLACEMENT [id number]     Payor Plan Address Payor Plan Phone Number Payor Plan Fax Number Effective Dates    [address] [phone]  4/1/2019 - None Entered    [facility] [id number]       Subscriber Name Subscriber Birth Date Member ID       HASMUKH HAM 6/1/1933 [id number]                 Emergency Contacts      (Rel.) Home Phone Work Phone Mobile Phone    Rimma Durant (Spouse) [phone] -- [phone]    Ilan Ham (Brother) -- -- [phone]            Vital Signs (last day)     Date/Time Temp Temp src Pulse Resp BP Patient Position SpO2    12/10/21 0744 97.3 (36.3) Temporal 87 16 124/75 Lying 95    12/10/21 0309 97.3 (36.3) Temporal 57 16 119/66 Lying 97    " 12/10/21 0238 -- -- 60 -- -- -- --    12/09/21 2308 97.1 (36.2) Temporal 74 16 130/68 Lying 96    12/09/21 1935 98 (36.7) Temporal 64 16 107/62 Lying 96    12/09/21 1610 -- -- 59 -- -- -- --    12/09/21 1500 97.2 (36.2) Temporal 76 16 104/66 Lying 96    12/09/21 1139 97.2 (36.2) Temporal 85 16 112/64 Lying 94    12/09/21 0746 97.2 (36.2) Temporal 68 16 140/92 Lying 97    12/09/21 0737 -- -- 63 -- -- -- --    12/09/21 0735 -- -- 55 16 -- -- 99    12/09/21 0615 -- -- 60 -- -- -- --    12/09/21 0311 97.5 (36.4) Temporal 58 16 113/65 Lying 97    12/09/21 0135 -- -- 76 -- -- -- --    12/09/21 0106 -- -- 62 -- -- -- --          Oxygen Therapy (last day)     Date/Time SpO2 Device (Oxygen Therapy) Flow (L/min) Oxygen Concentration (%) ETCO2 (mmHg)    12/10/21 0744 95 room air -- -- --    12/10/21 0309 97 room air -- -- --    12/09/21 2308 96 room air -- -- --    12/09/21 1935 96 room air -- -- --    12/09/21 1500 96 room air -- -- --    12/09/21 1139 94 room air -- -- --    12/09/21 1030 -- room air -- -- --    12/09/21 0746 97 room air -- -- --    12/09/21 0735 99 room air -- -- --    12/09/21 0311 97 nasal cannula; humidified 2 -- --          Current Facility-Administered Medications   Medication Dose Route Frequency Provider Last Rate Last Admin   • acetaminophen (TYLENOL) tablet 650 mg  650 mg Oral Q4H PRN Christal Gonzalez MD       • albuterol (PROVENTIL) nebulizer solution 0.083% 2.5 mg/3mL  2.5 mg Nebulization Q4H PRN Christal Gonzalez MD       • amiodarone (PACERONE) tablet 200 mg  200 mg Oral Q24H Shari Jones APRN   200 mg at 12/09/21 1027   • cetirizine (zyrTEC) tablet 5 mg  5 mg Oral Daily Christal Gonzalez MD   5 mg at 12/09/21 1027   • clopidogrel (PLAVIX) tablet 75 mg  75 mg Oral Daily Christal Gonzalez MD   75 mg at 12/09/21 1027   • dextrose (D50W) (25 g/50 mL) IV injection 25 g  25 g Intravenous Q15 Min PRN Kelvin Aguilar MD       • dextrose (GLUTOSE) oral gel 15 g  15 g Oral  Q15 Min PRN Kelvin Aguilar MD       • famotidine (PEPCID) tablet 20 mg  20 mg Oral BID AC Christal Gonzalez MD   20 mg at 12/09/21 1708   • First Mouthwash (Magic Mouthwash) 10 mL  10 mL Swish & Spit 4x Daily PRN Behroozi, Saeid, MD   10 mL at 12/08/21 2101   • furosemide (LASIX) tablet 20 mg  20 mg Oral BID Jennifer George MD   20 mg at 12/10/21 0607   • glucagon (human recombinant) (GLUCAGEN DIAGNOSTIC) injection 1 mg  1 mg Subcutaneous Q15 Min PRN Kelvin Aguilar MD       • HYDROcodone-acetaminophen (NORCO) 7.5-325 MG per tablet 1 tablet  1 tablet Oral Q6H PRN Christal Gonzalez MD   1 tablet at 12/09/21 1449   • insulin aspart (novoLOG) injection 0-9 Units  0-9 Units Subcutaneous TID  Kelvin Aguilar MD   2 Units at 12/09/21 1707   • ipratropium-albuterol (DUO-NEB) nebulizer solution 3 mL  3 mL Nebulization 4x Daily - RT Kelvin Aguilar MD   3 mL at 12/09/21 0735   • lactulose (CHRONULAC) 10 GM/15ML solution 10 g  10 g Oral Daily Jennifer George MD   10 g at 12/08/21 0927   • lisinopril (PRINIVIL,ZESTRIL) tablet 5 mg  5 mg Oral Q24H Jennifer George MD   5 mg at 12/09/21 1027   • melatonin tablet 3 mg  3 mg Oral Nightly PRN Behroozi, Saeid, MD   3 mg at 12/05/21 2024   • metoprolol tartrate (LOPRESSOR) tablet 25 mg  25 mg Oral Q12H Shari Jones APRN   25 mg at 12/09/21 1026   • nitroglycerin (NITROSTAT) SL tablet 0.4 mg  0.4 mg Sublingual Q5 Min PRN Christal Gonzalez MD       • ondansetron (ZOFRAN) injection 4 mg  4 mg Intravenous Q4H PRN Karan Segovia MD   4 mg at 12/04/21 1631   • polyethylene glycol (MIRALAX) packet 17 g  17 g Oral Daily Christal Gonzalez MD   17 g at 12/06/21 0825   • predniSONE (DELTASONE) tablet 40 mg  40 mg Oral Daily With Breakfast Kelvin Aguilar MD   40 mg at 12/09/21 1339   • ranolazine (RANEXA) 12 hr tablet 1,000 mg  1,000 mg Oral Q12H Christal Gonzalez MD   1,000 mg at 12/09/21 1027   • sodium chloride 0.9 % flush 10 mL  10 mL  Intravenous PRN Zack Castro MD       • sodium chloride 0.9 % flush 10 mL  10 mL Intravenous Q12H Kelvin Aguilar MD   10 mL at 12/09/21 2054   • sodium chloride 0.9 % flush 10 mL  10 mL Intravenous PRN Kelvin Aguilar MD            Physician Progress Notes (last 48 hours)      Kelvin Aguilar MD at 12/09/21 1241              [facility] Medicine Services  INPATIENT PROGRESS NOTE    Length of Stay: 7  Date of Admission: 12/1/2021  Primary Care Physician: Paolo Rey MD    Subjective   Chief Complaint: confused    HPI: Pt remains confused but pleasant.  He has no complaints.  His wife is at bedside.  She has now requested that the patient gog to a swing bed in [place] prior to return home.        Review of Systems   Unable to perform ROS: Dementia          Objective    Temp:  [97.1 °F (36.2 °C)-97.5 °F (36.4 °C)] 97.2 °F (36.2 °C)  Heart Rate:  [55-85] 85  Resp:  [16] 16  BP: (112-140)/(59-92) 112/64    Physical Exam  Constitutional:       Appearance: Normal appearance.   HENT:      Head: Normocephalic and atraumatic.      Right Ear: External ear normal.      Left Ear: External ear normal.      Nose: Nose normal.      Mouth/Throat:      Mouth: Mucous membranes are moist.      Pharynx: Oropharynx is clear.   Eyes:      General: No scleral icterus.     Extraocular Movements: Extraocular movements intact.      Pupils: Pupils are equal, round, and reactive to light.   Cardiovascular:      Rate and Rhythm: Tachycardia present. Rhythm regularly irregular.   Pulmonary:      Breath sounds: No wheezing, rhonchi or rales.   Abdominal:      Palpations: Abdomen is soft.      Tenderness: There is no abdominal tenderness. There is no guarding or rebound.   Musculoskeletal:      Cervical back: Neck supple.      Right lower leg: No edema.      Left lower leg: No edema.   Lymphadenopathy:      Cervical: No cervical adenopathy.   Skin:     General: Skin is warm and  dry.      Findings: No rash.   Neurological:      Mental Status: He is alert. He is disoriented.             Results Review:  I have reviewed the labs, radiology results, and diagnostic studies.    Laboratory Data:   Results from last 7 days   Lab Units 12/09/21  0625 12/08/21  0604 12/07/21  0643 12/06/21  1059 12/05/21  0713 12/04/21  0703 12/04/21  0703   SODIUM mmol/L 137 140 139   < > 139   < > 139   POTASSIUM mmol/L 4.0 4.6 4.5   < > 4.2   < > 4.4   CHLORIDE mmol/L 100 104 106   < > 98   < > 100   CO2 mmol/L 29.0 28.0 23.0   < > 26.0   < > 24.0   BUN mg/dL 38* 44* 47*   < > 58*   < > 48*   CREATININE mg/dL 1.15 1.18 1.31*   < > 1.84*   < > 1.88*   GLUCOSE mg/dL 122* 159* 177*   < > 175*   < > 166*   CALCIUM mg/dL 9.0 8.5* 8.8   < > 9.3   < > 8.8   BILIRUBIN mg/dL  --   --   --   --  0.5  --  0.3   ALK PHOS U/L  --   --   --   --  54  --  54   ALT (SGPT) U/L  --   --   --   --  19  --  14   AST (SGOT) U/L  --   --   --   --  18  --  15   ANION GAP mmol/L 8.0 8.0 10.0   < > 15.0   < > 15.0    < > = values in this interval not displayed.     Estimated Creatinine Clearance: 42.6 mL/min (by C-G formula based on SCr of 1.15 mg/dL).          Results from last 7 days   Lab Units 12/08/21  0604 12/05/21  0713 12/04/21  0703   WBC 10*3/mm3 13.98* 15.59* 15.32*   HEMOGLOBIN g/dL 11.1* 13.1 11.4*   HEMATOCRIT % 33.9* 40.4 35.1*   PLATELETS 10*3/mm3 188 238 184           Culture Data:   No results found for: BLOODCX  No results found for: URINECX  No results found for: RESPCX  No results found for: WOUNDCX  No results found for: STOOLCX  No components found for: BODYFLD    Radiology Data:   Imaging Results (Last 24 Hours)     ** No results found for the last 24 hours. **          I have reviewed the patient's current medications.     Assessment/Plan     Active Hospital Problems    Diagnosis    • Pneumonia of both lungs due to infectious organism        Plan:      1.  Pneumonia   2.  Acute/Chronic renal failure   3.   delerium with baseline dementia   4.  PAF - not on LTAC due to history of GIB   5.  Chronic hypoxic resp failure on home O2   6.  Elevated trop  7.  HTN, benign  8.  COPD    - cont levaquin - day #7/7  - cont to decrease steroids - may be contributing to delerium and paranoia  - cont nebs   - cont cardiac meds per cardiology - cont amio and metoprlol  - cont po lasix   - cont lisinopril   - family has requested SNF at [place] - referral made swing bed in [place].              Discharge Planning: I expect patient to be discharged to swing bed at [place].      Kelvin Aguilar MD    Electronically signed by Kelvin Aguilar MD at 12/09/21 1259     Shari Jones APRN at 12/09/21 0900     Attestation signed by Mana Curtis MD at 12/09/21 1312    I have reviewed this documentation patient was evaluated and physical exam was performed. The patient is in good shape today his mental status back to baseline heart rate is well controlled family present the room. Waiting for the placement patient admitted with increasing shortness of breath with a diagnosis of pneumonia with a background history of paroxysmal to persistent atrial fibrillation had history of orthostasis intolerant to multiple different antihypertensive medication. He does a history of CAD s/p PCI to LAD. Clinically he is not in volume overload at the time of evaluations. We will continue amiodarone and low-dose beta-blocker. Agree with Shari Laureano regarding assessment recommendations and management.                  [facility] Progress Note   LOS: 7 days   Patient Care Team:  Paolo Rey MD as PCP - General    Chief Complaint:    Chief Complaint   Patient presents with   • Palpitations   • Chest Pain   • Fatigue        Subjective     Interval History:      No acute events overnight.  Remains in AF with improved rate. Tolerating po Amiodarone.     Objective     Vital Sign Min/Max for last 24 hours  Temp  Min: 96.8 °F (36 °C)  Max:  "97.5 °F (36.4 °C)   BP  Min: 113/65  Max: 143/77   Pulse  Min: 55  Max: 76   Resp  Min: 16  Max: 18   SpO2  Min: 94 %  Max: 99 %   Flow (L/min)  Min: 2  Max: 2   Weight  Min: 67.9 kg (149 lb 9.6 oz)  Max: 67.9 kg (149 lb 9.6 oz)     Flowsheet Rows      First Filed Value   Admission Height 171.5 cm (67.5\") Documented at 12/01/2021 1451   Admission Weight 68 kg (150 lb) Documented at 12/01/2021 1451            12/06/21  0630 12/08/21  0300 12/09/21  0615   Weight: 68.2 kg (150 lb 4.8 oz) 68 kg (149 lb 14.4 oz) 67.9 kg (149 lb 9.6 oz)       Physical Exam: no changes to P/E  Physical Exam  Vitals and nursing note reviewed.   Constitutional:       General: He is not in acute distress.     Appearance: Normal appearance. He is well-developed. He is not ill-appearing, toxic-appearing or diaphoretic.   HENT:      Head: Normocephalic.      Right Ear: External ear normal.      Left Ear: External ear normal.   Eyes:      General: Lids are normal.      Pupils: Pupils are equal, round, and reactive to light.   Neck:      Thyroid: No thyromegaly.      Vascular: No carotid bruit or JVD.      Trachea: No tracheal deviation.   Cardiovascular:      Rate and Rhythm: Normal rate. Rhythm irregularly irregular.      Chest Wall: PMI is not displaced. No thrill.      Heart sounds: Normal heart sounds, S1 normal and S2 normal. No murmur heard.  No friction rub. No gallop. No S3 or S4 sounds.    Pulmonary:      Effort: Pulmonary effort is normal. No respiratory distress.      Breath sounds: Normal breath sounds. No stridor. No wheezing or rales.   Chest:      Chest wall: No tenderness.   Abdominal:      General: Bowel sounds are normal. There is no distension.      Palpations: Abdomen is soft.      Tenderness: There is no abdominal tenderness.   Musculoskeletal:      Right lower leg: No edema.      Left lower leg: No edema.   Skin:     General: Skin is warm and dry.      Findings: No erythema or rash.   Neurological:      Mental Status: He is " alert. Mental status is at baseline.          Results Review:     Results from last 7 days   Lab Units 12/09/21  0625 12/08/21  0604 12/07/21  0643 12/06/21  1059 12/05/21  0713 12/04/21  0703 12/04/21  0703   SODIUM mmol/L 137 140 139   < > 139   < > 139   POTASSIUM mmol/L 4.0 4.6 4.5   < > 4.2   < > 4.4   CHLORIDE mmol/L 100 104 106   < > 98   < > 100   CO2 mmol/L 29.0 28.0 23.0   < > 26.0   < > 24.0   BUN mg/dL 38* 44* 47*   < > 58*   < > 48*   CREATININE mg/dL 1.15 1.18 1.31*   < > 1.84*   < > 1.88*   CALCIUM mg/dL 9.0 8.5* 8.8   < > 9.3   < > 8.8   BILIRUBIN mg/dL  --   --   --   --  0.5  --  0.3   ALK PHOS U/L  --   --   --   --  54  --  54   ALT (SGPT) U/L  --   --   --   --  19  --  14   AST (SGOT) U/L  --   --   --   --  18  --  15   GLUCOSE mg/dL 122* 159* 177*   < > 175*   < > 166*    < > = values in this interval not displayed.       Estimated Creatinine Clearance: 42.6 mL/min (by C-G formula based on SCr of 1.15 mg/dL).                Results from last 7 days   Lab Units 12/08/21  0604 12/05/21  0713 12/04/21  0703   WBC 10*3/mm3 13.98* 15.59* 15.32*   HEMOGLOBIN g/dL 11.1* 13.1 11.4*   PLATELETS 10*3/mm3 188 238 184           Lab Results   Component Value Date    PROBNP 920.0 12/01/2021       I/O last 3 completed shifts:  In: 600 [P.O.:600]  Out: 1600 [Urine:1600]    Cardiographics:  ECG/EMG Results (last 24 hours)     ** No results found for the last 24 hours. **        Results for orders placed during the hospital encounter of 10/25/21    Adult Transthoracic Echo Limited W/ Cont if Necessary Per Protocol    Interpretation Summary  · Significant beat to beat variability but estimated EF appears around 40-45%.  · Left ventricular systolic function is mildly decreased.  · Estimated right ventricular systolic pressure from tricuspid regurgitation is normal (<35 mmHg).  · Left ventricular diastolic function was not assessed.  · Left atrial volume is moderately increased.      Imaging Results (Most Recent)      Procedure Component Value Units Date/Time    XR Chest 1 View [[id number]] Collected: 12/05/21 1229     Updated: 12/05/21 1820    Narrative:          EXAM:  XR CHEST 1 VIEW    TECHNIQUE:   Single frontal radiograph of the chest.    VIEWS:  1 view    CLINICAL HISTORY:   88 years  Male  Shortness of breath, J18.9 Pneumonia, unspecified  organism R07.2 Precordial pain I48.91 Unspecified atrial  fibrillation Z74.09 Other reduced mobility Z78.9 Other specified  health status Z74.09 Other reduced mobility    COMPARISON:   December 1, 2021 chest x-ray    FINDINGS:   Lungs:  Patchy bilateral interstitial opacities are not  significantly changed  Pleura: Small left pleural effusion. No pneumothorax.  Heart/mediastinum: The cardiac silhouette is not enlarged.  Bones: No acute osseous findings.  Soft tissues: Unremarkable.      Impression:      1.  Patchy bilateral interstitial opacities are not significantly  changed.  2.  Small left pleural effusion.    Electronically signed by:  Rc Johnson MD  12/5/2021 6:19  PM CST Workstation: [id number]    US Renal Bilateral [[id number]] Collected: 12/04/21 2047     Updated: 12/05/21 0035    Narrative:      EXAM:    US Retroperitoneal Limited, Renal    CLINICAL HISTORY:    The patient is 88 years old and is Male; JLIL, J18.9 Pneumonia,  unspecified organism R07.2 Precordial pain I48.91 Unspecified  atrial fibrillation Z74.09 Other reduced mobility Z78.9 Other  specified health status Z74.09 Other reduced mobility    TECHNIQUE:    Real-time limited ultrasound of the retroperitoneum with image  documentation.    COMPARISON:    CT of the abdomen and pelvis June 22, 2021    FINDINGS:    RIGHT KIDNEY:  The right kidney measures 10.4 cm in length.  No  stones.  No hydronephrosis.    LEFT KIDNEY:  Bilobed left renal cyst measuring 7.8 x 4.7 x 5.6  cm is present. The left kidney measures 11.0 cm in length.  No  stones.  No hydronephrosis.    BLADDER:  The bladder is well distended.       Impression:        Large left renal cyst. Otherwise, unremarkable renal  ultrasound.    Electronically signed by:  Masha Baires MD  12/5/2021 12:33 AM  CST Workstation: [id number]    XR Chest 1 View [[id number]] Collected: 12/01/21 1515     Updated: 12/01/21 1607    Narrative:        PROCEDURE: Single chest view portable    REASON FOR EXAM:Chest Pain triage protocol  Chest Pain Triage Protocol    FINDINGS: Comparison exam dated October 25, 2021. Cardiac size  appears within normal limits. Left upper lobe perihilar and left  lung base infrahilar interstitial groundglass opacities. Right  upper lobe peripheral small interstitial groundglass opacity.  Lungs are otherwise clear. Pleural spaces are normal. No acute  osseous abnormality. Stable epidural stimulator lead in the mid  thoracic spine region.      Impression:      1.  Left upper lobe perihilar and left lung base infrahilar  interstitial groundglass opacities. Right upper lobe peripheral  small interstitial groundglass opacity. These opacities would be  suspicious for pneumonia including possible atypical viral  etiology and/or pulmonary edema. Recommend clinical correlation.    Electronically signed by:  Nelson Christianson MD  12/1/2021 4:06 PM CST  Workstation: [id number]          XR Chest 1 View    Result Date: 12/5/2021  1.  Patchy bilateral interstitial opacities are not significantly changed. 2.  Small left pleural effusion. Electronically signed by:  Rc Johnson MD  12/5/2021 6:19 PM CST Workstation: [id number]    XR Chest 1 View    Result Date: 12/1/2021  1.  Left upper lobe perihilar and left lung base infrahilar interstitial groundglass opacities. Right upper lobe peripheral small interstitial groundglass opacity. These opacities would be suspicious for pneumonia including possible atypical viral etiology and/or pulmonary edema. Recommend clinical correlation. Electronically signed by:  Nelson Christianson MD  12/1/2021 4:06 PM CST Workstation:  [id number]    US Renal Bilateral    Result Date: 12/5/2021    Large left renal cyst. Otherwise, unremarkable renal ultrasound. Electronically signed by:  Masha Baires MD  12/5/2021 12:33 AM CST Workstation: [id number]      Medication Review:     Current Facility-Administered Medications:   •  acetaminophen (TYLENOL) tablet 650 mg, 650 mg, Oral, Q4H PRN, Christal Gonzalez MD  •  albuterol (PROVENTIL) nebulizer solution 0.083% 2.5 mg/3mL, 2.5 mg, Nebulization, Q4H PRN, Christal Gonzalez MD  •  amiodarone (PACERONE) tablet 200 mg, 200 mg, Oral, Q24H, Shari Jones APRN, 200 mg at 12/08/21 0927  •  cetirizine (zyrTEC) tablet 5 mg, 5 mg, Oral, Daily, Christal Gonzalez MD, 5 mg at 12/08/21 0927  •  clopidogrel (PLAVIX) tablet 75 mg, 75 mg, Oral, Daily, Christal Gonzalez MD, 75 mg at 12/08/21 0927  •  dextrose (D50W) (25 g/50 mL) IV injection 25 g, 25 g, Intravenous, Q15 Min PRN, Kelvin Aguilar MD  •  dextrose (GLUTOSE) oral gel 15 g, 15 g, Oral, Q15 Min PRN, Kelvin Aguilar MD  •  famotidine (PEPCID) tablet 20 mg, 20 mg, Oral, BID AC, Christal Gonzalez MD, 20 mg at 12/08/21 1721  •  First Mouthwash (Magic Mouthwash) 10 mL, 10 mL, Swish & Spit, 4x Daily PRN, Behroozi, Saeid, MD, 10 mL at 12/08/21 2101  •  furosemide (LASIX) tablet 20 mg, 20 mg, Oral, BID, Jennifer George MD, 20 mg at 12/09/21 0613  •  glucagon (human recombinant) (GLUCAGEN DIAGNOSTIC) injection 1 mg, 1 mg, Subcutaneous, Q15 Min PRN, Kelvin Aguilar MD  •  HYDROcodone-acetaminophen (NORCO) 7.5-325 MG per tablet 1 tablet, 1 tablet, Oral, Q6H PRN, Christal Gonzalez MD, 1 tablet at 12/09/21 0613  •  insulin aspart (novoLOG) injection 0-9 Units, 0-9 Units, Subcutaneous, TID AC, Kelvin Aguilar MD, 4 Units at 12/08/21 1303  •  ipratropium-albuterol (DUO-NEB) nebulizer solution 3 mL, 3 mL, Nebulization, 4x Daily - RT, Kelvin Aguilar MD, 3 mL at 12/09/21 0735  •  lactulose (CHRONULAC) 10 GM/15ML solution 10  g, 10 g, Oral, Daily, Jennifer George MD, 10 g at 12/08/21 0927  •  levoFLOXacin (LEVAQUIN) tablet 750 mg, 750 mg, Oral, Q48H, Kelvin Aguilar MD, 750 mg at 12/07/21 2125  •  lisinopril (PRINIVIL,ZESTRIL) tablet 5 mg, 5 mg, Oral, Q24H, Jennifer George MD  •  melatonin tablet 3 mg, 3 mg, Oral, Nightly PRN, Behroozi, Saeid, MD, 3 mg at 12/05/21 2024  •  metoprolol tartrate (LOPRESSOR) tablet 25 mg, 25 mg, Oral, Q12H, Shari Jones APRN, 25 mg at 12/08/21 2056  •  nitroglycerin (NITROSTAT) SL tablet 0.4 mg, 0.4 mg, Sublingual, Q5 Min PRN, Christal Gonzalez MD  •  ondansetron (ZOFRAN) injection 4 mg, 4 mg, Intravenous, Q4H PRN, Karan Segovia MD, 4 mg at 12/04/21 1631  •  polyethylene glycol (MIRALAX) packet 17 g, 17 g, Oral, Daily, Christal Gonzalez MD, 17 g at 12/06/21 0825  •  predniSONE (DELTASONE) tablet 40 mg, 40 mg, Oral, Daily With Breakfast, Kelvin Aguilar MD  •  ranolazine (RANEXA) 12 hr tablet 1,000 mg, 1,000 mg, Oral, Q12H, Christal Gonzalez MD, 1,000 mg at 12/08/21 2056  •  sodium chloride 0.9 % flush 10 mL, 10 mL, Intravenous, PRN, Zack Castro MD  •  sodium chloride 0.9 % flush 10 mL, 10 mL, Intravenous, Q12H, Kelvin Aguilar MD, 10 mL at 12/08/21 0928  •  sodium chloride 0.9 % flush 10 mL, 10 mL, Intravenous, PRN, Kelvin Aguilar MD    Assessment/Plan       Pneumonia of both lungs due to infectious organism    AF with RVR:  Mr. Hasmukh Ham is an 88-year-old  male with past medical history of GI bleed with AV malformation, frequent falls/orthostasis, CAD status post PCI in October 2021 to the mid LAD with critical stenosis of the RCA recommended medical management, history of CHF, CKD, hypertension, type 2 diabetes mellitus who presented to the hospital last week with shortness of breath and malaise.  Patient developed JILL with a creatinine of 1.8.  Patient found to have pneumonia.  During hospitalization patient was noted to be in atrial  fibrillation with RVR.  Mildly diagnostic troponin likely multifactorial and demand ischemia, ongoing suspicion of ischemia low. Troponin trended down.  Patient was given IV amiodarone 150 mg Lasix and started on amiodarone drip.  -Oral anticoagulation contraindicated due to history of bleeding and HAS-BLED score of 5.   -Continue Amiodarone 200mg daily  -Metoprolol tartrate to 25 mg twice daily    CAD s/p PCI: He will continue with antiplatelet therapy with Plavix, Nexa 1000 mg twice daily, Lopressor    History of chronic CHF due to diastolic dysfunction: diuretic management per nephrology.  Patient appears euvolemic upon exam today and not in acute exacerbation    Pneumonia: Management per primary team    JILL on CKD: Nephrology following and managing diuretics.     Acute delirium with baseline dementia: intermittent, better today. Wife at bedside.       Plan for disposition: May d/c from our standpoint. We will SIGN OFF. Thank you for allowing us to participate in the care of Mr. Hasmukh Ham.            This document has been electronically signed by EVE Aguilar on December 9, 2021 09:26 CST   Electronically signed by EVE Aguilar, 12/09/21, 9:26 AM CST.      Electronically signed by Mana Curtis MD at 12/09/21 1312     Kelvin Aguilar MD at 12/08/21 1515              [facility] Medicine Services  INPATIENT PROGRESS NOTE    Length of Stay: 6  Date of Admission: 12/1/2021  Primary Care Physician: Paolo Rey MD    Subjective   Chief Complaint: confused    HPI:  Pt less confused this am.  He is sitting in bed and is pleasant.  He denies SOA, CP, palpitations.      Per nursing report, patient had increased confusion with paranoia today.      Review of Systems   Unable to perform ROS: Dementia          Objective    Temp:  [96.8 °F (36 °C)-98.6 °F (37 °C)] 96.8 °F (36 °C)  Heart Rate:  [57-79] 61  Resp:  [16-20] 16  BP: (112-161)/(57-92)  143/77    Physical Exam  Constitutional:       Appearance: Normal appearance.   HENT:      Head: Normocephalic and atraumatic.      Right Ear: External ear normal.      Left Ear: External ear normal.      Nose: Nose normal.      Mouth/Throat:      Mouth: Mucous membranes are moist.      Pharynx: Oropharynx is clear.   Eyes:      General: No scleral icterus.     Extraocular Movements: Extraocular movements intact.      Pupils: Pupils are equal, round, and reactive to light.   Cardiovascular:      Rate and Rhythm: Tachycardia present. Rhythm regularly irregular.   Pulmonary:      Breath sounds: No wheezing, rhonchi or rales.   Abdominal:      Palpations: Abdomen is soft.      Tenderness: There is no abdominal tenderness. There is no guarding or rebound.   Musculoskeletal:      Cervical back: Neck supple.      Right lower leg: No edema.      Left lower leg: No edema.   Lymphadenopathy:      Cervical: No cervical adenopathy.   Skin:     General: Skin is warm and dry.      Findings: No rash.   Neurological:      Mental Status: He is alert. He is disoriented.             Results Review:  I have reviewed the labs, radiology results, and diagnostic studies.    Laboratory Data:   Results from last 7 days   Lab Units 12/08/21  0604 12/07/21  0643 12/06/21  1059 12/05/21  0713 12/05/21  0713 12/04/21  0703 12/04/21  0703 12/02/21  0526 12/02/21  0526   SODIUM mmol/L 140 139 137   < > 139   < > 139   < > 138   POTASSIUM mmol/L 4.6 4.5 4.4   < > 4.2   < > 4.4   < > 4.6   CHLORIDE mmol/L 104 106 102   < > 98   < > 100   < > 103   CO2 mmol/L 28.0 23.0 24.0   < > 26.0   < > 24.0   < > 26.0   BUN mg/dL 44* 47* 61*   < > 58*   < > 48*   < > 19   CREATININE mg/dL 1.18 1.31* 1.47*   < > 1.84*   < > 1.88*   < > 1.16   GLUCOSE mg/dL 159* 177* 219*   < > 175*   < > 166*   < > 192*   CALCIUM mg/dL 8.5* 8.8 8.7   < > 9.3   < > 8.8   < > 8.7   BILIRUBIN mg/dL  --   --   --   --  0.5  --  0.3  --  0.2   ALK PHOS U/L  --   --   --   --  54  --   54  --  56   ALT (SGPT) U/L  --   --   --   --  19  --  14  --  12   AST (SGOT) U/L  --   --   --   --  18  --  15  --  12   ANION GAP mmol/L 8.0 10.0 11.0   < > 15.0   < > 15.0   < > 9.0    < > = values in this interval not displayed.     Estimated Creatinine Clearance: 41.6 mL/min (by C-G formula based on SCr of 1.18 mg/dL).          Results from last 7 days   Lab Units 12/08/21  0604 12/05/21  0713 12/04/21  0703 12/02/21  0526   WBC 10*3/mm3 13.98* 15.59* 15.32* 10.27   HEMOGLOBIN g/dL 11.1* 13.1 11.4* 11.6*   HEMATOCRIT % 33.9* 40.4 35.1* 37.5   PLATELETS 10*3/mm3 188 238 184 168     Results from last 7 days   Lab Units 12/01/21  1626   INR  1.02       Culture Data:   No results found for: BLOODCX  No results found for: URINECX  No results found for: RESPCX  No results found for: WOUNDCX  No results found for: STOOLCX  No components found for: BODYFLD    Radiology Data:   Imaging Results (Last 24 Hours)     ** No results found for the last 24 hours. **          I have reviewed the patient's current medications.     Assessment/Plan     Active Hospital Problems    Diagnosis    • Pneumonia of both lungs due to infectious organism        Plan:      1.  Pneumonia   2.  Acute/Chronic renal failure   3.  delerium with baseline dementia   4.  PAF - not on LTAC due to history of GIB   5.  Chronic hypoxic resp failure on home O2   6.  Elevated trop  7.  HTN, benign  8.  COPD    - cont levaquin - day #6/7  - cont to decrease steroids - may be contributing to delerium and paranoia  - cont nebs   - cont cardiac meds per cardiology - cont amio and metoprlol  - cont po lasix   -resume lisinopril in am  - family has requesting home with home health at [place].              Discharge Planning: I expect patient to be discharged to home with home health.     Kelvin Aguilar MD    Electronically signed by Kelvin Aguilar MD at 12/08/21 1524     Jennifer George MD at 12/08/21 1039          [facility]  [address]  "[facility] [address]   - [phone]  F - [phone]     Progress Note          PATIENT  DEMOGRAPHICS   PATIENT NAME: Hasmukh Ham                      PHYSICIAN: Jennifer George MD  : 1933  MRN: [id number]   LOS: 6 days    Patient Care Team:  Paolo Rey MD as PCP - General  Subjective   SUBJECTIVE   Doing well off amiodarone drip no soa         Objective   OBJECTIVE   Vital Signs  Temp:  [96.9 °F (36.1 °C)-98.6 °F (37 °C)] 97.1 °F (36.2 °C)  Heart Rate:  [57-89] 70  Resp:  [16-22] 16  BP: (112-176)/(57-96) 161/82    Flowsheet Rows      First Filed Value   Admission Height 171.5 cm (67.5\") Documented at 2021 1451   Admission Weight 68 kg (150 lb) Documented at 2021 1451           I/O last 3 completed shifts:  In: 720 [P.O.:720]  Out: 1600 [Urine:1600]    PHYSICAL EXAM    Physical Exam  Constitutional:       Appearance: He is well-developed.   HENT:      Head: Normocephalic and atraumatic.   Eyes:      Conjunctiva/sclera: Conjunctivae normal.      Pupils: Pupils are equal, round, and reactive to light.   Cardiovascular:      Rate and Rhythm: Normal rate and regular rhythm.   Pulmonary:      Effort: Pulmonary effort is normal.      Breath sounds: Normal breath sounds.   Abdominal:      Palpations: Abdomen is soft.   Musculoskeletal:      Cervical back: Neck supple.      Right lower leg: No edema.      Left lower leg: No edema.   Skin:     General: Skin is warm and dry.   Neurological:      Mental Status: He is alert and oriented to person, place, and time.   Psychiatric:         Mood and Affect: Mood normal.         Behavior: Behavior normal.         RESULTS   Results Review:    Results from last 7 days   Lab Units 21  0604 21  0643 21  1059 21  0713 21  0713 21  0703 21  0703 21  0526 21  0526   SODIUM mmol/L 140 139 137   < > 139   < > 139   < > 138   POTASSIUM mmol/L 4.6 4.5 4.4   < > 4.2   < > 4.4   < > 4.6 "   CHLORIDE mmol/L 104 106 102   < > 98   < > 100   < > 103   CO2 mmol/L 28.0 23.0 24.0   < > 26.0   < > 24.0   < > 26.0   BUN mg/dL 44* 47* 61*   < > 58*   < > 48*   < > 19   CREATININE mg/dL 1.18 1.31* 1.47*   < > 1.84*   < > 1.88*   < > 1.16   CALCIUM mg/dL 8.5* 8.8 8.7   < > 9.3   < > 8.8   < > 8.7   BILIRUBIN mg/dL  --   --   --   --  0.5  --  0.3  --  0.2   ALK PHOS U/L  --   --   --   --  54  --  54  --  56   ALT (SGPT) U/L  --   --   --   --  19  --  14  --  12   AST (SGOT) U/L  --   --   --   --  18  --  15  --  12   GLUCOSE mg/dL 159* 177* 219*   < > 175*   < > 166*   < > 192*    < > = values in this interval not displayed.       Estimated Creatinine Clearance: 41.6 mL/min (by C-G formula based on SCr of 1.18 mg/dL).    Results from last 7 days   Lab Units 12/01/21  1510   MAGNESIUM mg/dL 1.6             Results from last 7 days   Lab Units 12/08/21  0604 12/05/21  0713 12/04/21  0703 12/02/21  0526 12/01/21  1510   WBC 10*3/mm3 13.98* 15.59* 15.32* 10.27 13.38*   HEMOGLOBIN g/dL 11.1* 13.1 11.4* 11.6* 12.5*   PLATELETS 10*3/mm3 188 238 184 168 184       Results from last 7 days   Lab Units 12/01/21  1626   INR  1.02         Imaging Results (Last 24 Hours)     ** No results found for the last 24 hours. **           MEDICATIONS    amiodarone, 200 mg, Oral, Q24H  cetirizine, 5 mg, Oral, Daily  clopidogrel, 75 mg, Oral, Daily  famotidine, 20 mg, Oral, BID AC  furosemide, 20 mg, Intravenous, BID  insulin aspart, 0-9 Units, Subcutaneous, TID AC  ipratropium-albuterol, 3 mL, Nebulization, 4x Daily - RT  lactulose, 10 g, Oral, Daily  levoFLOXacin, 750 mg, Oral, Q48H  methylPREDNISolone sodium succinate, 40 mg, Intravenous, Q24H  metoprolol tartrate, 25 mg, Oral, Q12H  polyethylene glycol, 17 g, Oral, Daily  ranolazine, 1,000 mg, Oral, Q12H  sodium chloride, 10 mL, Intravenous, Q12H           Assessment/Plan   ASSESSMENT / PLAN      Pneumonia of both lungs due to infectious organism    1.  JILL on CKD- baseline  creatinine 1.2, up to 1.8 peak and now better and at baseline.      Etiology of JILL is not immediately apparent.  He does complain of nausea but has had no vomiting.  No diarrhea.  No hypotension noted in the record.  No new nephrotoxic medications.   US negative for hydro, Tray negative, UA bland, FENa 3.7% consistent with intrinsic injury.    Keep lasix and switch to po      2.  Pneumonia- continue antibiotics and management primary team     3.  Atrial fibrillation with rvr  now on po amiodarone. Not a candidate for anticoagulation. On metoprolol     4.  DM2     5.  Hypertension- well-controlled. Resume lisinopril from tomorrow      6.  COPD     7.  History of CAD diastolic dysfunction    8. Constipation now has BM with  Lactulose    F/u in office in 2-3 week s           This document has been electronically signed by Jennifer George MD on December 8, 2021 10:39 CST           Electronically signed by Jennifer George MD at 12/08/21 1042     Shari Jones APRN at 12/08/21 0958     Attestation signed by Mana Curtis MD at 12/08/21 1258    I have reviewed this documentation and face-to-face examination was performed his wife are present in the room at the time of evaluation.  Significant improvement in mental status clinical condition and less confused today.  Will discontinue IV amiodarone continue oral amiodarone 200 mg and low-dose beta-blocker.  I agree with Shari Laureano regarding assessment evaluation and recommendation                    [facility] Cardiology Progress Note   LOS: 6 days   Patient Care Team:  Paolo Rey MD as PCP - General    Chief Complaint:    Chief Complaint   Patient presents with   • Palpitations   • Chest Pain   • Fatigue        Subjective     Interval History:      No acute events overnight. Pt less confused today and pleasant. Remains in AF with improved rate. Will transition to po Amiodarone today.    Objective     Vital Sign Min/Max for last 24 hours  Temp  Min: 96.9 °F  "(36.1 °C)  Max: 98.6 °F (37 °C)   BP  Min: 112/57  Max: 176/96   Pulse  Min: 57  Max: 89   Resp  Min: 16  Max: 22   SpO2  Min: 97 %  Max: 99 %   Flow (L/min)  Min: 2  Max: 2   Weight  Min: 68 kg (149 lb 14.4 oz)  Max: 68 kg (149 lb 14.4 oz)     Flowsheet Rows      First Filed Value   Admission Height 171.5 cm (67.5\") Documented at 12/01/2021 1451   Admission Weight 68 kg (150 lb) Documented at 12/01/2021 1451            12/05/21  0343 12/06/21  0630 12/08/21  0300   Weight: 66.9 kg (147 lb 8 oz) 68.2 kg (150 lb 4.8 oz) 68 kg (149 lb 14.4 oz)       Physical Exam:  Physical Exam  Vitals and nursing note reviewed.   Constitutional:       General: He is not in acute distress.     Appearance: Normal appearance. He is well-developed. He is not ill-appearing, toxic-appearing or diaphoretic.   HENT:      Head: Normocephalic.      Right Ear: External ear normal.      Left Ear: External ear normal.   Eyes:      General: Lids are normal.      Pupils: Pupils are equal, round, and reactive to light.   Neck:      Thyroid: No thyromegaly.      Vascular: No carotid bruit or JVD.      Trachea: No tracheal deviation.   Cardiovascular:      Rate and Rhythm: Normal rate. Rhythm irregularly irregular.      Chest Wall: PMI is not displaced. No thrill.      Heart sounds: Normal heart sounds, S1 normal and S2 normal. No murmur heard.  No friction rub. No gallop. No S3 or S4 sounds.    Pulmonary:      Effort: Pulmonary effort is normal. No respiratory distress.      Breath sounds: Normal breath sounds. No stridor. No wheezing or rales.   Chest:      Chest wall: No tenderness.   Abdominal:      General: Bowel sounds are normal. There is no distension.      Palpations: Abdomen is soft.      Tenderness: There is no abdominal tenderness.   Musculoskeletal:      Right lower leg: No edema.      Left lower leg: No edema.   Skin:     General: Skin is warm and dry.      Findings: No erythema or rash.   Neurological:      Mental Status: He is alert. " Mental status is at baseline.          Results Review:     Results from last 7 days   Lab Units 12/08/21  0604 12/07/21  0643 12/06/21  1059 12/05/21  0713 12/05/21  0713 12/04/21  0703 12/04/21 0703 12/02/21 0526 12/02/21  0526   SODIUM mmol/L 140 139 137   < > 139   < > 139   < > 138   POTASSIUM mmol/L 4.6 4.5 4.4   < > 4.2   < > 4.4   < > 4.6   CHLORIDE mmol/L 104 106 102   < > 98   < > 100   < > 103   CO2 mmol/L 28.0 23.0 24.0   < > 26.0   < > 24.0   < > 26.0   BUN mg/dL 44* 47* 61*   < > 58*   < > 48*   < > 19   CREATININE mg/dL 1.18 1.31* 1.47*   < > 1.84*   < > 1.88*   < > 1.16   CALCIUM mg/dL 8.5* 8.8 8.7   < > 9.3   < > 8.8   < > 8.7   BILIRUBIN mg/dL  --   --   --   --  0.5  --  0.3  --  0.2   ALK PHOS U/L  --   --   --   --  54  --  54  --  56   ALT (SGPT) U/L  --   --   --   --  19  --  14  --  12   AST (SGOT) U/L  --   --   --   --  18  --  15  --  12   GLUCOSE mg/dL 159* 177* 219*   < > 175*   < > 166*   < > 192*    < > = values in this interval not displayed.       Estimated Creatinine Clearance: 41.6 mL/min (by C-G formula based on SCr of 1.18 mg/dL).    Results from last 7 days   Lab Units 12/01/21  1510   MAGNESIUM mg/dL 1.6             Results from last 7 days   Lab Units 12/08/21  0604 12/05/21  0713 12/04/21  0703 12/02/21 0526 12/01/21  1510   WBC 10*3/mm3 13.98* 15.59* 15.32* 10.27 13.38*   HEMOGLOBIN g/dL 11.1* 13.1 11.4* 11.6* 12.5*   PLATELETS 10*3/mm3 188 238 184 168 184       Results from last 7 days   Lab Units 12/01/21  1626   INR  1.02     Lab Results   Component Value Date    PROBNP 920.0 12/01/2021       I/O last 3 completed shifts:  In: 720 [P.O.:720]  Out: 1600 [Urine:1600]    Cardiographics:  ECG/EMG Results (last 24 hours)     ** No results found for the last 24 hours. **        Results for orders placed during the hospital encounter of 10/25/21    Adult Transthoracic Echo Limited W/ Cont if Necessary Per Protocol    Interpretation Summary  · Significant beat to beat  variability but estimated EF appears around 40-45%.  · Left ventricular systolic function is mildly decreased.  · Estimated right ventricular systolic pressure from tricuspid regurgitation is normal (<35 mmHg).  · Left ventricular diastolic function was not assessed.  · Left atrial volume is moderately increased.      Imaging Results (Most Recent)     Procedure Component Value Units Date/Time    XR Chest 1 View [[id number]] Collected: 12/05/21 1229     Updated: 12/05/21 1820    Narrative:          EXAM:  XR CHEST 1 VIEW    TECHNIQUE:   Single frontal radiograph of the chest.    VIEWS:  1 view    CLINICAL HISTORY:   88 years  Male  Shortness of breath, J18.9 Pneumonia, unspecified  organism R07.2 Precordial pain I48.91 Unspecified atrial  fibrillation Z74.09 Other reduced mobility Z78.9 Other specified  health status Z74.09 Other reduced mobility    COMPARISON:   December 1, 2021 chest x-ray    FINDINGS:   Lungs:  Patchy bilateral interstitial opacities are not  significantly changed  Pleura: Small left pleural effusion. No pneumothorax.  Heart/mediastinum: The cardiac silhouette is not enlarged.  Bones: No acute osseous findings.  Soft tissues: Unremarkable.      Impression:      1.  Patchy bilateral interstitial opacities are not significantly  changed.  2.  Small left pleural effusion.    Electronically signed by:  Rc Johnson MD  12/5/2021 6:19  PM CST Workstation: [id number]    US Renal Bilateral [[id number]] Collected: 12/04/21 2047     Updated: 12/05/21 0035    Narrative:      EXAM:    US Retroperitoneal Limited, Renal    CLINICAL HISTORY:    The patient is 88 years old and is Male; JILL, J18.9 Pneumonia,  unspecified organism R07.2 Precordial pain I48.91 Unspecified  atrial fibrillation Z74.09 Other reduced mobility Z78.9 Other  specified health status Z74.09 Other reduced mobility    TECHNIQUE:    Real-time limited ultrasound of the retroperitoneum with image  documentation.    COMPARISON:    CT of the  abdomen and pelvis June 22, 2021    FINDINGS:    RIGHT KIDNEY:  The right kidney measures 10.4 cm in length.  No  stones.  No hydronephrosis.    LEFT KIDNEY:  Bilobed left renal cyst measuring 7.8 x 4.7 x 5.6  cm is present. The left kidney measures 11.0 cm in length.  No  stones.  No hydronephrosis.    BLADDER:  The bladder is well distended.      Impression:        Large left renal cyst. Otherwise, unremarkable renal  ultrasound.    Electronically signed by:  Masha Baires MD  12/5/2021 12:33 AM  CST Workstation: [id number]    XR Chest 1 View [[id number]] Collected: 12/01/21 1515     Updated: 12/01/21 1607    Narrative:        PROCEDURE: Single chest view portable    REASON FOR EXAM:Chest Pain triage protocol  Chest Pain Triage Protocol    FINDINGS: Comparison exam dated October 25, 2021. Cardiac size  appears within normal limits. Left upper lobe perihilar and left  lung base infrahilar interstitial groundglass opacities. Right  upper lobe peripheral small interstitial groundglass opacity.  Lungs are otherwise clear. Pleural spaces are normal. No acute  osseous abnormality. Stable epidural stimulator lead in the mid  thoracic spine region.      Impression:      1.  Left upper lobe perihilar and left lung base infrahilar  interstitial groundglass opacities. Right upper lobe peripheral  small interstitial groundglass opacity. These opacities would be  suspicious for pneumonia including possible atypical viral  etiology and/or pulmonary edema. Recommend clinical correlation.    Electronically signed by:  Nelson Christianson MD  12/1/2021 4:06 PM CST  Workstation: [id number]          XR Chest 1 View    Result Date: 12/5/2021  1.  Patchy bilateral interstitial opacities are not significantly changed. 2.  Small left pleural effusion. Electronically signed by:  Rc Johnson MD  12/5/2021 6:19 PM CST Workstation: [id number]    XR Chest 1 View    Result Date: 12/1/2021  1.  Left upper lobe perihilar and left lung base  infrahilar interstitial groundglass opacities. Right upper lobe peripheral small interstitial groundglass opacity. These opacities would be suspicious for pneumonia including possible atypical viral etiology and/or pulmonary edema. Recommend clinical correlation. Electronically signed by:  Nelson Christianson MD  12/1/2021 4:06 PM CST Workstation: [id number]    US Renal Bilateral    Result Date: 12/5/2021    Large left renal cyst. Otherwise, unremarkable renal ultrasound. Electronically signed by:  Masha Baires MD  12/5/2021 12:33 AM CST Workstation: [id number]      Medication Review:     Current Facility-Administered Medications:   •  acetaminophen (TYLENOL) tablet 650 mg, 650 mg, Oral, Q4H PRN, Christal Gonzalez MD  •  albuterol (PROVENTIL) nebulizer solution 0.083% 2.5 mg/3mL, 2.5 mg, Nebulization, Q4H PRN, Christal Gonzalez MD  •  amiodarone (PACERONE) tablet 200 mg, 200 mg, Oral, Q24H, Shari Jones APRN  •  cetirizine (zyrTEC) tablet 5 mg, 5 mg, Oral, Daily, Christal Gonzalez MD, 5 mg at 12/07/21 0941  •  clopidogrel (PLAVIX) tablet 75 mg, 75 mg, Oral, Daily, Christal Gonzalez MD, 75 mg at 12/07/21 0940  •  dextrose (D50W) (25 g/50 mL) IV injection 25 g, 25 g, Intravenous, Q15 Min PRN, Kelvin Aguilar MD  •  dextrose (GLUTOSE) oral gel 15 g, 15 g, Oral, Q15 Min PRN, Kelvin Aguilar MD  •  famotidine (PEPCID) tablet 20 mg, 20 mg, Oral, BID AC, Christal Gonzalez MD, 20 mg at 12/07/21 1822  •  First Mouthwash (Magic Mouthwash) 10 mL, 10 mL, Swish & Spit, 4x Daily PRN, Behroozi, Saeid, MD, 10 mL at 12/04/21 0146  •  furosemide (LASIX) injection 20 mg, 20 mg, Intravenous, BID, Jennifer George MD, 20 mg at 12/08/21 0617  •  glucagon (human recombinant) (GLUCAGEN DIAGNOSTIC) injection 1 mg, 1 mg, Subcutaneous, Q15 Min PRN, Kelvin Aguilar MD  •  HYDROcodone-acetaminophen (NORCO) 7.5-325 MG per tablet 1 tablet, 1 tablet, Oral, Q6H PRN, Christal Gonzalez MD, 1 tablet at 12/08/21  0141  •  insulin aspart (novoLOG) injection 0-9 Units, 0-9 Units, Subcutaneous, TID AC, Kelvin Aguilar MD, 2 Units at 12/07/21 1824  •  ipratropium-albuterol (DUO-NEB) nebulizer solution 3 mL, 3 mL, Nebulization, 4x Daily - RT, Kelvin Aguilar MD, 3 mL at 12/08/21 0715  •  lactulose (CHRONULAC) 10 GM/15ML solution 10 g, 10 g, Oral, Daily, Jennifer George MD, 10 g at 12/07/21 0940  •  levoFLOXacin (LEVAQUIN) tablet 750 mg, 750 mg, Oral, Q48H, Kelvin Aguilar MD, 750 mg at 12/07/21 2125  •  melatonin tablet 3 mg, 3 mg, Oral, Nightly PRN, Behroozi, Saeid, MD, 3 mg at 12/05/21 2024  •  methylPREDNISolone sodium succinate (SOLU-Medrol) injection 40 mg, 40 mg, Intravenous, Q24H, Kelvin Aguilar MD, 40 mg at 12/07/21 1821  •  metoprolol tartrate (LOPRESSOR) tablet 25 mg, 25 mg, Oral, Q12H, Shari Jones APRN, 25 mg at 12/07/21 2100  •  nitroglycerin (NITROSTAT) SL tablet 0.4 mg, 0.4 mg, Sublingual, Q5 Min PRN, Christal Gonzalez MD  •  ondansetron (ZOFRAN) injection 4 mg, 4 mg, Intravenous, Q4H PRN, aKran Segovia MD, 4 mg at 12/04/21 1631  •  polyethylene glycol (MIRALAX) packet 17 g, 17 g, Oral, Daily, Christal Gonzalez MD, 17 g at 12/06/21 0825  •  ranolazine (RANEXA) 12 hr tablet 1,000 mg, 1,000 mg, Oral, Q12H, Christal Gonzalez MD, 1,000 mg at 12/07/21 2101  •  sodium chloride 0.9 % flush 10 mL, 10 mL, Intravenous, PRN, Zack Castro MD  •  sodium chloride 0.9 % flush 10 mL, 10 mL, Intravenous, Q12H, Kelvin Aguilar MD, 10 mL at 12/07/21 2101  •  sodium chloride 0.9 % flush 10 mL, 10 mL, Intravenous, PRN, Kelvin Aguilar MD    Assessment/Plan       Pneumonia of both lungs due to infectious organism    AF with RVR:  Mr. Hasmukh Ham is an 88-year-old  male with past medical history of GI bleed with AV malformation, frequent falls/orthostasis, CAD status post PCI in October 2021 to the mid LAD with critical stenosis of the RCA recommended medical management,  history of CHF, CKD, hypertension, type 2 diabetes mellitus who presented to the hospital last week with shortness of breath and malaise.  Patient developed JILL with a creatinine of 1.8.  Patient found to have pneumonia.  During hospitalization patient was noted to be in atrial fibrillation with RVR.  Mildly diagnostic troponin likely multifactorial and demand ischemia, ongoing suspicion of ischemia low. Troponin trended down.  Patient was given IV amiodarone 150 mg Lasix and started on amiodarone drip.  -Oral anticoagulation contraindicated due to history of bleeding and HAS-BLED score of 5.   -Stop Amiodarone gtt and transition to Amiodarone 200mg daily  -Metoprolol tartrate to 25 mg twice daily      CAD s/p PCI: He will continue with antiplatelet therapy with Plavix, Nexa 1000 mg twice daily, Lopressor    History of chronic CHF due to diastolic dysfunction: diuretic management per nephrology.  Patient appears euvolemic upon exam today and not in acute exacerbation    Pneumonia: Management per primary team    JILL on CKD: Nephrology following and managing diuretics.     Acute delirium with baseline dementia: Worsened confusion today, wife at bedside      Plan for disposition: Continue [address] [place]            This document has been electronically signed by EVE Aguilar on December 8, 2021 09:25 CST     Electronically signed by EVE Aguilar, 12/08/21, 9:25 AM CST.  .      Electronically signed by Mana Curtis MD at 12/08/21 1255       Medical Student Notes (last 24 hours)  Notes from 12/09/21 0807 through 12/10/21 0807   No notes of this type exist for this encounter.         Consult Notes (last 24 hours)  Notes from 12/09/21 0807 through 12/10/21 0807   No notes of this type exist for this encounter.

## 2021-12-10 NOTE — THERAPY TREATMENT NOTE
Acute Care - Physical Therapy Treatment Note  [facility]     Patient Name: Hasmukh Ham  : 1933  MRN: [id number]  Today's Date: 12/10/2021      Visit Dx:     ICD-10-CM ICD-9-CM   1. Pneumonia of both lungs due to infectious organism, unspecified part of lung  J18.9 483.8   2. Precordial pain  R07.2 786.51   3. Atrial fibrillation, unspecified type ([facility])  I48.91 427.31   4. Impaired mobility and ADLs  Z74.09 V49.89    Z78.9    5. Impaired functional mobility, balance, gait, and endurance  Z74.09 V49.89     Patient Active Problem List   Diagnosis   • Dilated aortic root ([facility])   • Non-rheumatic tricuspid valve insufficiency   • Pulmonary emphysema ([facility])   • Atrial fibrillation and flutter ([facility])   • Bradycardia   • CAD (coronary artery disease)   • Neuropathy   • Abdominal hernia   • Neck pain   • Dementia (CMS/HCC)   • Diabetic neuropathy ([facility])   • Gastroesophageal reflux disease without esophagitis   • Generalized osteoarthritis   • 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 ([facility])   • Nocturia   • Osteoarthritis of multiple joints   • Hyperlipidemia   • Pain in joint involving ankle and foot   • Pneumonia of left lower lobe due to infectious organism   • Arthropathy of hand   • Paroxysmal tachycardia ([facility])   • Osteoarthrosis involving more than one site but not generalized   • Chronic right shoulder pain   • Rotator cuff syndrome   • Partial tear of subscapularis tendon   • Infraspinatus tendon tear   • Supraspinatus tendon tear   • Bilateral carotid artery stenosis   • Pulmonary HTN (HCC)   • Acute interstitial pneumonia (HCC)   • Chronic obstructive lung disease (HCC)   • Diabetes mellitus (HCC)   • Hypertension   • Chest pain   • Shortness of breath   • Angina pectoris (HCC)   • Myocardial infarction ([facility])   • Ingrown toenail   • Heart problem   • Degenerative joint disease involving multiple joints   • Chronic obstructive pulmonary disease (COPD) (HCC)   • Bleeding disorder ([facility])   •  Chronic obstructive pulmonary disease with acute exacerbation (HCC)   • Failure of outpatient treatment   • Sepsis (HCC)   • 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)   • Chronic diastolic CHF (congestive heart failure) ([facility])   • SOB (shortness of breath)   • Cause of injury, fall   • Right hip pain   • Chronic pain of right knee   • Primary osteoarthritis of right knee   • Left shoulder pain   • Left arm pain   • AC joint arthropathy   • Syncope   • Inflammatory arthritis   • Elevated troponin   • Fever   • Paroxysmal atrial fibrillation with rapid ventricular response ([facility])   • Fibrosis of lung (HCC)   • Type 2 diabetes mellitus ([facility])   • Chondrocalcinosis of right knee   • Nontraumatic complete tear of right rotator cuff   • Pneumonia of both lungs due to infectious organism     Past Medical History:   Diagnosis Date   • Basal cell carcinoma    • Bilateral carotid artery stenosis    • Bilateral carotid artery stenosis    • Bleeding disorder ([facility])    • CHF (congestive heart failure) ([facility])    • Chronic obstructive pulmonary disease (COPD) ([facility])    • Coronary arteriosclerosis    • Degenerative joint disease involving multiple joints    • Dementia ([facility])    • Diabetes mellitus ([facility])    • Heart problem    • History of stomach ulcers    • Hyperlipidemia    • Hypertension    • Ingrown toenail    • Myocardial infarction ([facility])      Past Surgical History:   Procedure Laterality Date   • BACK SURGERY     • CARDIAC CATHETERIZATION N/A 6/6/2017    Procedure: Right Heart Cath;  Surgeon: Jeff Maciel MD PhD;  Location: [facility] CATH INVASIVE LOCATION;  Service:    • CARDIAC CATHETERIZATION N/A 2/14/2018    Procedure: Coronary angiography;  Surgeon: Moisés Davila MD;  Location: [facility] CATH INVASIVE LOCATION;  Service:    • CARDIAC CATHETERIZATION N/A 10/28/2021    Procedure: Left Heart Cath;  Surgeon: Carine Johnson MD;  Location: [facility] CATH INVASIVE LOCATION;  Service: Cardiology;   "Laterality: N/A;   • CORONARY ANGIOPLASTY WITH STENT PLACEMENT     • CORONARY STENT PLACEMENT     • HERNIA REPAIR     • LUNG BIOPSY     • LUNG SURGERY     • NECK SURGERY     • THORACOTOMY Left 1977   • VENTRAL HERNIA REPAIR       PT Assessment (last 12 hours)     PT Evaluation and Treatment     Row Name 12/10/21 1455          Physical Therapy Time and Intention    Subjective Information pain; complains of  -LN     Document Type therapy note (daily note)  -LN     Mode of Treatment physical therapy; individual therapy  -LN     Patient Effort good  -LN     Comment won't let wife leave \"somebody is trying to kill both of us\"  -LN     Row Name 12/10/21 1457          General Information    Patient Profile Reviewed yes  -LN     Existing Precautions/Restrictions fall  Pt not wearing O2 and nsg is aware.  -LN     Row Name 12/10/21 1456          Cognition    Affect/Mental Status (Cognitive) WFL  -LN     Orientation Status (Cognition) oriented to; person; place  -LN     Follows Commands (Cognition) WFL  -LN     Cognitive Function (Cognitive) WFL  -LN     Row Name 12/10/21 1459          Pain Scale: Numbers Pre/Post-Treatment    Pretreatment Pain Rating 9/10  -LN     Posttreatment Pain Rating 9/10  -LN     Pain Location - Orientation generalized  -LN     Pre/Posttreatment Pain Comment pain meds not due  -LN     Row Name 12/10/21 1453          Range of Motion Comprehensive    General Range of Motion bilateral lower extremity ROM WFL  -LN     Row Name 12/10/21 145          Bed Mobility    Bed Mobility supine-sit; sit-supine; scooting/bridging  -LN     Supine-Sit [place] (Bed Mobility) contact guard  -LN     Sit-Supine [place] (Bed Mobility) standby assist  -LN     Assistive Device (Bed Mobility) head of bed elevated; bed rails  -LN     Row Name 12/10/21 1456          Transfers    Transfers sit-stand transfer; stand-sit transfer  -LN     Sit-Stand [place] (Transfers) minimum assist (75% patient effort)  -LN     " Stand-Sit [place] (Transfers) minimum assist (75% patient effort)  -LN     [place] Level (Toilet Transfer) minimum assist (75% patient effort)  -LN     Assistive Device (Toilet Transfer) commode, bedside without drop arms; grab bars/safety frame; walker, front-wheeled  -LN     Row Name 12/10/21 1455          Sit-Stand Transfer    Assistive Device (Sit-Stand Transfers) walker, front-wheeled  -LN     Row Name 12/10/21 1455          Stand-Sit Transfer    Assistive Device (Stand-Sit Transfers) walker, front-wheeled  -LN     Row Name 12/10/21 1455          Toilet Transfer    Type (Toilet Transfer) sit-stand; stand-sit  -LN     Row Name 12/10/21 1455          Gait/Stairs (Locomotion)    Gait/Stairs Locomotion gait/ambulation independence; distance ambulated; gait/ambulation assistive device; gait pattern; gait deviations  -LN     [place] Level (Gait) minimum assist (75% patient effort); verbal cues  -LN     Assistive Device (Gait) walker, front-wheeled  -LN     Distance in Feet (Gait) 5,13x2  -LN     Deviations/Abnormal Patterns (Gait) stride length decreased  -LN     Bilateral Gait Deviations forward flexed posture  -LN     Comment (Gait/Stairs) gerda knees flexed  -LN     Row Name 12/10/21 1455          Safety Issues, Functional Mobility    Impairments Affecting Function (Mobility) strength; endurance/activity tolerance; shortness of breath; balance; pain  -LN     Row Name 12/10/21 1452          Plan of Care Review    Plan of Care Reviewed With patient  -LN     Outcome Summary sup-sit-sup cg/sba of 1,sit-stand-sit min of 1;amb 13'x2,5' with rw and min of 1  -LN     Row Name 12/10/21 1455          Vital Signs    Intratreatment Heart Rate (beats/min) 72  -LN     Posttreatment Heart Rate (beats/min) 62  -LN     Intra SpO2 (%) 96  -LN     O2 Delivery Intra Treatment room air  -LN     Post SpO2 (%) 97  -LN     Pre Patient Position Supine  -LN     Intra Patient Position Standing  -LN     Post Patient Position  Supine  -LN     Row Name 12/10/21 1455          Bed Mobility Goal 1 (PT)    Activity/Assistive Device (Bed Mobility Goal 1, PT) sit to supine/supine to sit  -LN     [place] Level/Cues Needed (Bed Mobility Goal 1, PT) modified independence  -LN     Time Frame (Bed Mobility Goal 1, PT) by discharge  -LN     Strategies/Barriers (Bed Mobility Goal 1, PT) Has hospital bed at home.  -LN     Progress/Outcomes (Bed Mobility Goal 1, PT) goal not met  -LN     Row Name 12/10/21 1455          Transfer Goal 1 (PT)    Activity/Assistive Device (Transfer Goal 1, PT) sit-to-stand/stand-to-sit; bed-to-chair/chair-to-bed  -LN     [place] Level/Cues Needed (Transfer Goal 1, PT) supervision required  -LN     Time Frame (Transfer Goal 1, PT) by discharge  -LN     Strategies/Barriers (Transfers Goal 1, PT) A-mary king.  -LN     Row Name 12/10/21 1455          Gait Training Goal 1 (PT)    Activity/Assistive Device (Gait Training Goal 1, PT) walker, rolling  -LN     [place] Level (Gait Training Goal 1, PT) supervision required  -LN     Distance (Gait Training Goal 1, PT) 25'x2 as tolerated.  -LN     Time Frame (Gait Training Goal 1, PT) by discharge  -LN     Strategies/Barriers (Gait Training Goal 1, PT) Amary holt.  -LN     Progress/Outcome (Gait Training Goal 1, PT) goal not met  -LN     Row Name 12/10/21 1455          Positioning and Restraints    In Bed notified nsg; supine; call light within reach; encouraged to call for assist; exit alarm on  -LN     Row Name 12/10/21 1455          Therapy Assessment/Plan (PT)    Rehab Potential (PT) good, to achieve stated therapy goals  -LN     Criteria for Skilled Interventions Met (PT) yes; skilled treatment is necessary  -LN           User Key  (r) = Recorded By, (t) = Taken By, (c) = Cosigned By    Initials Name Provider Type    Maria Esther Yi PTA Physical Therapy Assistant                Physical Therapy Education                 Title: PT OT SLP Therapies (Done)      Topic: Physical Therapy (Done)     Point: Mobility training (Done)     Learning Progress Summary           Patient Acceptance, E,TB, VU by SC at 12/10/2021 1357    Acceptance, E, NR by  at 12/3/2021 1251                   Point: Home exercise program (Done)     Learning Progress Summary           Patient Acceptance, E,TB, VU by SC at 12/10/2021 1357                   Point: Body mechanics (Done)     Learning Progress Summary           Patient Acceptance, E,TB, VU by SC at 12/10/2021 1357                   Point: Precautions (Done)     Learning Progress Summary           Patient Acceptance, E,TB, VU by SC at 12/10/2021 1357                               User Key     Initials Effective Dates Name Provider Type Discipline     06/16/21 -  Cristobal Mansfield PTA Physical Therapy Assistant PT    SC 07/21/21 -  Mayela Sauceda RN Registered Nurse Nurse              PT Recommendation and Plan  Anticipated Discharge Disposition (PT): home with assist, home with home health  Therapy Frequency (PT): other (see comments) (5-7 days/week)  Plan of Care Reviewed With: patient  Outcome Summary: sup-sit-sup cg/sba of 1,sit-stand-sit min of 1;amb 13'x2,5' with rw and min of 1       Time Calculation:    PT Charges     Row Name 12/10/21 1533             Time Calculation    Start Time 1455  -LN      Stop Time 1534  -LN      Time Calculation (min) 39 min  -LN      PT Received On 12/10/21  -LN              Time Calculation- PT    Total Timed Code Minutes- PT 39 minute(s)  -LN            User Key  (r) = Recorded By, (t) = Taken By, (c) = Cosigned By    Initials Name Provider Type    LN Maria Esther Smith PTA Physical Therapy Assistant              Therapy Charges for Today     Code Description Service Date Service Provider Modifiers Qty    71304392960 HC PT THERAPEUTIC ACT EA 15 MIN 12/9/2021 Maria Esther Smith PTA GP 1    26265360173 HC PT THER PROC EA 15 MIN 12/9/2021 Maria Esther Smith PTA GP 1    54894482834 HC PT THERAPEUTIC ACT EA 15 MIN  12/10/2021 Maria Esther Smith PTA GP 2    22000575933 HC GAIT TRAINING EA 15 MIN 12/10/2021 Maria Esther Smith PTA GP 1          PT G-Codes  Outcome Measure Options: AM-PAC 6 Clicks Basic Mobility (PT)  AM-PAC 6 Clicks Score (PT): 18  AM-PAC 6 Clicks Score (OT): 20    Maria Esther Smith PTA  12/10/2021

## 2021-12-10 NOTE — PROGRESS NOTES
[facility] Medicine Services  INPATIENT PROGRESS NOTE    Length of Stay: 8  Date of Admission: 12/1/2021  Primary Care Physician: Paolo Rey MD    Subjective   Chief Complaint: Confusion  HPI:    Patient appears fine now but has intermittent confusion.  Reports his breathing is stable.  Wife is still uncomfortable taking him home as she is only care provider for him.    Review of Systems   Respiratory: Negative for shortness of breath.    Cardiovascular: Negative for chest pain.          All pertinent negatives and positives are as above. All other systems have been reviewed and are negative unless otherwise stated.     Objective    Temp:  [97.1 °F (36.2 °C)-98 °F (36.7 °C)] 97.3 °F (36.3 °C)  Heart Rate:  [57-87] 79  Resp:  [16] 16  BP: (104-130)/(62-75) 124/75  Physical Exam  Vitals and nursing note reviewed.   Constitutional:       Appearance: He is well-developed.   HENT:      Head: Normocephalic.   Cardiovascular:      Rate and Rhythm: Normal rate and regular rhythm.      Heart sounds: Normal heart sounds. No murmur heard.  No friction rub. No gallop.    Pulmonary:      Breath sounds: No stridor.   Abdominal:      General: There is no distension.      Palpations: Abdomen is soft. There is no mass.      Tenderness: There is no abdominal tenderness. There is no guarding.      Hernia: No hernia is present.   Musculoskeletal:         General: No deformity.   Skin:     General: Skin is warm and dry.      Findings: No erythema.   Psychiatric:         Behavior: Behavior normal.             Results Review:  I have reviewed the labs, radiology results, and diagnostic studies.    Laboratory Data:   Lab Results (last 24 hours)     Procedure Component Value Units Date/Time    POC Glucose Once [[id number]]  (Abnormal) Collected: 12/10/21 0653    Specimen: Blood Updated: 12/10/21 0708     Glucose 137 mg/dL      Comment: RN NotifiedOperator: [id number] VANEGAS FAITHMeter ID:  [id number]       Basic Metabolic Panel [[id number]]  (Abnormal) Collected: 12/10/21 0510    Specimen: Blood Updated: 12/10/21 0628     Glucose 157 mg/dL      BUN 49 mg/dL      Creatinine 1.39 mg/dL      Sodium 137 mmol/L      Potassium 4.6 mmol/L      Comment: Slight hemolysis detected by analyzer. Results may be affected.        Chloride 97 mmol/L      CO2 30.0 mmol/L      Calcium 8.8 mg/dL      eGFR Non African Amer 48 mL/min/1.73      BUN/Creatinine Ratio 35.3     Anion Gap 10.0 mmol/L     Narrative:      GFR Normal >60  Chronic Kidney Disease <60  Kidney Failure <15      POC Glucose Once [[id number]]  (Abnormal) Collected: 12/09/21 1029    Specimen: Blood Updated: 12/10/21 0441     Glucose 155 mg/dL      Comment: RN NotifiedOperator: [id number] ANA PAULA PALACIOSNIFERMeter ID: [id number]       POC Glucose Once [[id number]]  (Abnormal) Collected: 12/09/21 1939    Specimen: Blood Updated: 12/09/21 2306     Glucose 268 mg/dL      Comment: RN NotifiedOperator: [id number] [place] FAITHMeter ID: [id number]       POC Glucose Once [[id number]]  (Abnormal) Collected: 12/09/21 1629    Specimen: Blood Updated: 12/09/21 1654     Glucose 151 mg/dL      Comment: RN NotifiedOperator: [id number] ANA PAULA JENNIFERMeter ID: [id number]       Extra Tubes [[id number]] Collected: 12/09/21 0626    Specimen: Blood, Venous Line Updated: 12/09/21 1145    Narrative:      The following orders were created for panel order Extra Tubes.  Procedure                               Abnormality         Status                     ---------                               -----------         ------                     Lavender Top[[id number]]                                     Final result                 Please view results for these tests on the individual orders.    Lavender Top [[id number]] Collected: 12/09/21 0626    Specimen: Blood Updated: 12/09/21 1145     Extra Tube hold for add-on     Comment: Auto resulted             Culture Data:   No results found  for: BLOODCX  No results found for: URINECX  No results found for: RESPCX  No results found for: WOUNDCX  No results found for: STOOLCX  No components found for: BODYFLD    Radiology Data:   Imaging Results (Last 24 Hours)     ** No results found for the last 24 hours. **          I have reviewed the patient's current medications.     Assessment/Plan     Active Hospital Problems    Diagnosis    • Pneumonia of both lungs due to infectious organism        Pneumonia-resolving, has finished 7 days of IV Levaquin    Chronic hypoxic respiratory failure-patient is on supplemental oxygen at home, appears to be at baseline right now    Atrial fibrillation-cardiology managing, continue monitoring his heart rate.    Delirium-he does have baseline dementia and has intermittent confusion, we will continue providing supportive care    Acute on chronic renal failure-continue monitor creatinine level    Hypertension-continue monitor blood pressure    COPD-continue with nebulizer treatments as needed    Deconditioning-continue with PT OT    Disposition-Case management is working for placement in swing bed in [place].    DVT prophylaxis-SCD    Patient is full code    I confirmed that the patient's Advance Care Plan is present, code status is documented, or surrogate decision maker is listed in the patient's medical record.       Karan Segovia MD   12/10/21   10:24 CST

## 2021-12-10 NOTE — PLAN OF CARE
Problem: Adult Inpatient Plan of Care  Goal: Plan of Care Review  Outcome: Ongoing, Progressing  Goal: Patient-Specific Goal (Individualized)  Outcome: Ongoing, Progressing  Goal: Absence of Hospital-Acquired Illness or Injury  Outcome: Ongoing, Progressing  Intervention: Identify and Manage Fall Risk  Recent Flowsheet Documentation  Taken 12/10/2021 0956 by Mayela Sauceda RN  Safety Promotion/Fall Prevention: nonskid shoes/slippers when out of bed  Intervention: Prevent Infection  Recent Flowsheet Documentation  Taken 12/10/2021 0956 by Mayela Sauceda RN  Infection Prevention: rest/sleep promoted  Goal: Optimal Comfort and Wellbeing  Outcome: Ongoing, Progressing  Intervention: Provide Person-Centered Care  Recent Flowsheet Documentation  Taken 12/10/2021 0956 by Mayela Sauceda RN  Trust Relationship/Rapport:   care explained   emotional support provided  Goal: Readiness for Transition of Care  Outcome: Ongoing, Progressing     Problem: Fall Injury Risk  Goal: Absence of Fall and Fall-Related Injury  Outcome: Ongoing, Progressing  Intervention: Identify and Manage Contributors to Fall Injury Risk  Recent Flowsheet Documentation  Taken 12/10/2021 0956 by Mayela Sauceda RN  Medication Review/Management: medications reviewed  Intervention: Promote Injury-Free Environment  Recent Flowsheet Documentation  Taken 12/10/2021 0956 by Mayela Sauceda RN  Safety Promotion/Fall Prevention: nonskid shoes/slippers when out of bed     Problem: Arrhythmia/Dysrhythmia  Goal: Normalized Cardiac Rhythm  Outcome: Ongoing, Progressing     Problem: COPD Comorbidity  Goal: Maintenance of COPD Symptom Control  Outcome: Ongoing, Progressing  Intervention: Maintain COPD-Symptom Control  Recent Flowsheet Documentation  Taken 12/10/2021 0956 by Mayela Sauceda RN  Medication Review/Management: medications reviewed     Problem: Diabetes Comorbidity  Goal: Blood Glucose Level Within Desired Range  Outcome: Ongoing,  Progressing  Intervention: Maintain Glycemic Control  Recent Flowsheet Documentation  Taken 12/10/2021 0956 by Mayela Sauceda RN  Glycemic Management: blood glucose monitoring     Problem: Hypertension Comorbidity  Goal: Blood Pressure in Desired Range  Outcome: Ongoing, Progressing  Intervention: Maintain Hypertension-Management Strategies  Recent Flowsheet Documentation  Taken 12/10/2021 0956 by Mayela Sauceda RN  Medication Review/Management: medications reviewed     Problem: Skin Injury Risk Increased  Goal: Skin Health and Integrity  Outcome: Ongoing, Progressing  Intervention: Optimize Skin Protection  Recent Flowsheet Documentation  Taken 12/10/2021 0956 by Mayela Sauceda RN  Pressure Reduction Techniques: frequent weight shift encouraged  Pressure Reduction Devices: specialty bed utilized   Goal Outcome Evaluation:

## 2021-12-10 NOTE — THERAPY TREATMENT NOTE
Patient Name: Hasmukh Ham  : 1933    MRN: [id number]                              Today's Date: 12/10/2021       Admit Date: 2021    Visit Dx:     ICD-10-CM ICD-9-CM   1. Pneumonia of both lungs due to infectious organism, unspecified part of lung  J18.9 483.8   2. Precordial pain  R07.2 786.51   3. Atrial fibrillation, unspecified type ([facility])  I48.91 427.31   4. Impaired mobility and ADLs  Z74.09 V49.89    Z78.9    5. Impaired functional mobility, balance, gait, and endurance  Z74.09 V49.89     Patient Active Problem List   Diagnosis   • Dilated aortic root (HCC)   • Non-rheumatic tricuspid valve insufficiency   • Pulmonary emphysema ([facility])   • Atrial fibrillation and flutter ([facility])   • Bradycardia   • CAD (coronary artery disease)   • Neuropathy   • Abdominal hernia   • Neck pain   • Dementia (CMS/HCC)   • Diabetic neuropathy ([facility])   • Gastroesophageal reflux disease without esophagitis   • Generalized osteoarthritis   • 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 ([facility])   • Nocturia   • Osteoarthritis of multiple joints   • Hyperlipidemia   • Pain in joint involving ankle and foot   • Pneumonia of left lower lobe due to infectious organism   • Arthropathy of hand   • Paroxysmal tachycardia ([facility])   • Osteoarthrosis involving more than one site but not generalized   • Chronic right shoulder pain   • Rotator cuff syndrome   • Partial tear of subscapularis tendon   • Infraspinatus tendon tear   • Supraspinatus tendon tear   • Bilateral carotid artery stenosis   • Pulmonary HTN (HCC)   • Acute interstitial pneumonia (HCC)   • Chronic obstructive lung disease (HCC)   • Diabetes mellitus (HCC)   • Hypertension   • Chest pain   • Shortness of breath   • Angina pectoris (HCC)   • Myocardial infarction (HCC)   • Ingrown toenail   • Heart problem   • Degenerative joint disease involving multiple joints   • Chronic obstructive pulmonary disease (COPD) (HCC)   • Bleeding disorder ([facility])   • Chronic  obstructive pulmonary disease with acute exacerbation (HCC)   • Failure of outpatient treatment   • Sepsis (HCC)   • 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)   • Chronic diastolic CHF (congestive heart failure) (HCC)   • SOB (shortness of breath)   • Cause of injury, fall   • Right hip pain   • Chronic pain of right knee   • Primary osteoarthritis of right knee   • Left shoulder pain   • Left arm pain   • AC joint arthropathy   • Syncope   • Inflammatory arthritis   • Elevated troponin   • Fever   • Paroxysmal atrial fibrillation with rapid ventricular response ([facility])   • Fibrosis of lung (HCC)   • Type 2 diabetes mellitus (HCC)   • Chondrocalcinosis of right knee   • Nontraumatic complete tear of right rotator cuff   • Pneumonia of both lungs due to infectious organism     Past Medical History:   Diagnosis Date   • Basal cell carcinoma    • Bilateral carotid artery stenosis    • Bilateral carotid artery stenosis    • Bleeding disorder (HCC)    • CHF (congestive heart failure) ([facility])    • Chronic obstructive pulmonary disease (COPD) ([facility])    • Coronary arteriosclerosis    • Degenerative joint disease involving multiple joints    • Dementia ([facility])    • Diabetes mellitus ([facility])    • Heart problem    • History of stomach ulcers    • Hyperlipidemia    • Hypertension    • Ingrown toenail    • Myocardial infarction ([facility])      Past Surgical History:   Procedure Laterality Date   • BACK SURGERY     • CARDIAC CATHETERIZATION N/A 6/6/2017    Procedure: Right Heart Cath;  Surgeon: Jeff Maciel MD PhD;  Location: [facility] CATH INVASIVE LOCATION;  Service:    • CARDIAC CATHETERIZATION N/A 2/14/2018    Procedure: Coronary angiography;  Surgeon: Moisés Davila MD;  Location: [facility] CATH INVASIVE LOCATION;  Service:    • CARDIAC CATHETERIZATION N/A 10/28/2021    Procedure: Left Heart Cath;  Surgeon: Carine Johnson MD;  Location: [facility] CATH INVASIVE LOCATION;  Service: Cardiology;  Laterality: N/A;    • CORONARY ANGIOPLASTY WITH STENT PLACEMENT     • CORONARY STENT PLACEMENT     • HERNIA REPAIR     • LUNG BIOPSY     • LUNG SURGERY     • NECK SURGERY     • THORACOTOMY Left 1977   • VENTRAL HERNIA REPAIR        General Information     Row Name 12/10/21 0925          OT Time and Intention    Document Type therapy note (daily note)  -     Mode of Treatment individual therapy; occupational therapy  -     Row Name 12/10/21 0925          General Information    Patient Profile Reviewed yes  -     Existing Precautions/Restrictions fall  -     Row Name 12/10/21 0925          Cognition    Orientation Status (Cognition) oriented to; person; place  -     Row Name 12/10/21 0925          Safety Issues, Functional Mobility    Impairments Affecting Function (Mobility) strength; endurance/activity tolerance; shortness of breath; balance; pain  -           User Key  (r) = Recorded By, (t) = Taken By, (c) = Cosigned By    Initials Name Provider Type     Erlinda Aguilar COTA Occupational Therapy Assistant                 Mobility/ADL's     Row Name 12/10/21 0925          Bed Mobility    Supine-Sit [place] (Bed Mobility) standby assist  -     Sit-Supine [place] (Bed Mobility) standby assist  -     Assistive Device (Bed Mobility) head of bed elevated; bed rails  -     Comment (Bed Mobility) Pt sat EOB SBA  -     Row Name 12/10/21 0925          Transfers    Sit-Stand [place] (Transfers) minimum assist (75% patient effort); verbal cues  -     Row Name 12/10/21 0925          Sit-Stand Transfer    Assistive Device (Sit-Stand Transfers) walker, front-wheeled  -     Row Name 12/10/21 0925          Functional Mobility    Functional Mobility- Comment Pt side stepped ~2-3' to hob w/RW and vc's  -     Row Name 12/10/21 0925          Activities of Daily Living    BADL Assessment/Intervention bathing; upper body dressing  -     Row Name 12/10/21 0925          Grooming Assessment/Training     [place] (Grooming) grooming skills; wash face, hands; standby assist; set up  -KD     Position (Grooming) edge of bed sitting  -KD     Row Name 12/10/21 0925          Bathing Assessment/Intervention    [place] (Bathing) upper body; hand over hand; set up  -KD     Assistive Devices (Bathing) bath mitt  -KD     Position (Bathing) edge of bed sitting  -KD           User Key  (r) = Recorded By, (t) = Taken By, (c) = Cosigned By    Initials Name Provider Type    Erlinda Martínez COTA Occupational Therapy Assistant               Obj/Interventions     Row Name 12/10/21 0925          Elbow/Forearm (Therapeutic Exercise)    Elbow/Forearm AROM (Therapeutic Exercise) bilateral; flexion; extension  -KD     Row Name 12/10/21 0925          Wrist (Therapeutic Exercise)    Wrist AROM (Therapeutic Exercise) bilateral; flexion; extension  -KD     Row Name 12/10/21 0925          Hand (Therapeutic Exercise)    Hand (Therapeutic Exercise) AROM (active range of motion)  -     Hand AROM/AAROM (Therapeutic Exercise) finger flexion; finger extension  -KD     Row Name 12/10/21 0925          Therapeutic Exercise    Therapeutic Exercise wrist; hand; elbow/forearm  -KD           User Key  (r) = Recorded By, (t) = Taken By, (c) = Cosigned By    Initials Name Provider Type    Erlinda Martínez COTA Occupational Therapy Assistant               Goals/Plan     Row Name 12/10/21 0925          Transfer Goal 1 (OT)    Activity/Assistive Device (Transfer Goal 1, OT) toilet  -KD     [place]/Cues Needed (Transfer Goal 1, OT) modified independence  -KD     Time Frame (Transfer Goal 1, OT) long term goal (LTG); by discharge  -KD     Progress/Outcome (Transfer Goal 1, OT) goal not met  -KD     Row Name 12/10/21 0925          Bathing Goal 1 (OT)    Activity/Device (Bathing Goal 1, OT) bathing skills, all  -KD     [place]/Cues Needed (Bathing Goal 1, OT) supervision required  -KD     Time Frame (Bathing Goal  1, OT) long term goal (LTG); by discharge  -KD     Progress/Outcomes (Bathing Goal 1, OT) goal not met  -KD     Row Name 12/10/21 0925          Dressing Goal 1 (OT)    Activity/Device (Dressing Goal 1, OT) lower body dressing  -KD     [place]/Cues Needed (Dressing Goal 1, OT) supervision required  -KD     Time Frame (Dressing Goal 1, OT) long term goal (LTG); by discharge  -KD     Progress/Outcome (Dressing Goal 1, OT) goal not met  -KD           User Key  (r) = Recorded By, (t) = Taken By, (c) = Cosigned By    Initials Name Provider Type    KD Erlinda Aguilar COTA Occupational Therapy Assistant               Clinical Impression     Row Name 12/10/21 0925          Pain Scale: Numbers Pre/Post-Treatment    Pretreatment Pain Rating 0/10 - no pain  -KD     Posttreatment Pain Rating 0/10 - no pain  -KD     Row Name 12/10/21 0925          Plan of Care Review    Plan of Care Reviewed With patient  -KD     Progress improving  -KD     Outcome Summary OT tx complete this date. Sup-sit-SBA w/bed rail and HOB elevated. Pt sat EOB-SBA. UB bathing-[place] assist. Grooming-SBA w/ vc's. Pt Max A for pericare. Sit-stand- Min A. Pt side stepped 2-3' CGA w/ vc's to HOB. Sit-sup-SBA. Pt long sitting in bed upon exit w/ all needs in reach. Cont OT POC.  -KD     Row Name 12/10/21 0925          Therapy Assessment/Plan (OT)    Therapy Frequency (OT) other (see comments)  5-7 days a week  -KD     Row Name 12/10/21 0925          Therapy Plan Review/Discharge Plan (OT)    Anticipated Discharge Disposition (OT) home with home health; home with 24/7 care  -KD     Row Name 12/10/21 0925          Vital Signs    Pre Systolic BP Rehab 119  -KD     Pre Treatment Diastolic BP 66  -KD     Pretreatment Heart Rate (beats/min) 57  -KD     Pre SpO2 (%) 97  -KD     O2 Delivery Pre Treatment room air  -KD     Pre Patient Position Supine  -KD     Intra Patient Position Standing  -KD     Post Patient Position Sitting  -KD     Row Name 12/10/21 0925           Positioning and Restraints    Pre-Treatment Position in bed  -KD     Post Treatment Position bed  -KD     In Bed fowlers; call light within reach; encouraged to call for assist; exit alarm on  -KD           User Key  (r) = Recorded By, (t) = Taken By, (c) = Cosigned By    Initials Name Provider Type    Erlinda Martínez COTA Occupational Therapy Assistant               Outcome Measures     Row Name 12/10/21 0925          How much help from another is currently needed...    Putting on and taking off regular lower body clothing? 3  -KD     Bathing (including washing, rinsing, and drying) 3  -KD     Toileting (which includes using toilet bed pan or urinal) 3  -KD     Putting on and taking off regular upper body clothing 3  -KD     Taking care of personal grooming (such as brushing teeth) 4  -KD     Eating meals 4  -KD     AM-PAC 6 Clicks Score (OT) 20  -KD           User Key  (r) = Recorded By, (t) = Taken By, (c) = Cosigned By    Initials Name Provider Type    Erlinda Martínez COTA Occupational Therapy Assistant                Occupational Therapy Education                 Title: PT OT SLP Therapies (In Progress)     Topic: Occupational Therapy (In Progress)     Point: ADL training (In Progress)     Description:   Instruct learner(s) on proper safety adaptation and remediation techniques during self care or transfers.   Instruct in proper use of assistive devices.              Learning Progress Summary           Patient Acceptance, E, NR by BB at 12/6/2021 1200                   Point: Home exercise program (Not Started)     Description:   Instruct learner(s) on appropriate technique for monitoring, assisting and/or progressing therapeutic exercises/activities.              Learner Progress:  Not documented in this visit.          Point: Precautions (Done)     Description:   Instruct learner(s) on prescribed precautions during self-care and functional transfers.              Learning Progress Summary            Patient Acceptance, E,TB, VU by  at 12/2/2021 1325    Comment: POC, role of OT, transfer training                   Point: Body mechanics (In Progress)     Description:   Instruct learner(s) on proper positioning and spine alignment during self-care, functional mobility activities and/or exercises.              Learning Progress Summary           Patient Acceptance, E, NR by BB at 12/6/2021 1200    Acceptance, E,TB, VU by  at 12/2/2021 1325    Comment: POC, role of OT, transfer training                               User Key     Initials Effective Dates Name Provider Type Discipline     06/16/21 -  Susannah Villalta COTA Occupational Therapy Assistant OT     06/14/21 -  Israel Black, OT Occupational Therapist OT              OT Recommendation and Plan  Therapy Frequency (OT): other (see comments) (5-7 days a week)  Plan of Care Review  Plan of Care Reviewed With: patient  Progress: improving  Outcome Summary: OT tx complete this date. Sup-sit-SBA w/bed rail and HOB elevated. Pt sat EOB-SBA. UB bathing-[place] assist. Grooming-SBA w/ vc's. Pt Max A for pericare. Sit-stand- Min A. Pt side stepped 2-3' CGA w/ vc's to HOB. Sit-sup-SBA. Pt long sitting in bed upon exit w/ all needs in reach. Cont OT POC.     Time Calculation:    Time Calculation- OT     Row Name 12/10/21 0925             Time Calculation- OT    OT Start Time 0925  -KD      OT Stop Time 1005  -KD      OT Time Calculation (min) 40 min  -KD      Total Timed Code Minutes- OT 40 minute(s)  -KD      OT Received On 12/10/21  -KD              Timed Charges    93862 - OT Therapeutic Exercise Minutes 17  -KD      35599 - OT Self Care/Mgmt Minutes 23  -KD              Total Minutes    Timed Charges Total Minutes 40  -KD       Total Minutes 40  -KD            User Key  (r) = Recorded By, (t) = Taken By, (c) = Cosigned By    Initials Name Provider Type    KD Erlinda Aguilar COTA Occupational Therapy Assistant              Therapy Charges for Today      Code Description Service Date Service Provider Modifiers Qty    12743688822 HC OT SELF CARE/MGMT/TRAIN EA 15 MIN 12/10/2021 Erlinda Aguilar COTA GO 2    91145373503 HC OT THER PROC EA 15 MIN 12/10/2021 Erlinda Aguilar COTA GO 1               KRYSTIN Gilmore  12/10/2021

## 2021-12-10 NOTE — DISCHARGE PLACEMENT REQUEST
"Hasmukh Ham (88 y.o. Male)             Date of Birth Social Security Number Address Home Phone MRN    06/01/1933  [address] [place] [address] [phone] [id number]    [place] Marital Status             Protestant        Admission Date Admission Type Admitting Provider Attending Provider Department, Room/Bed    12/1/21 Emergency Christal Gonzalez MD Gerlach, Elizabeth T, MD [address], 319/1    Discharge Date Discharge Disposition Discharge Destination                         Attending Provider: Christal Gonzalez MD    Allergies: Atorvastatin, Penicillins, Azithromycin, Cefdinir, Clarithromycin, Pravastatin, Benadryl Allergy [Diphenhydramine Hcl]    Isolation: None   Infection: None   Code Status: CPR   Advance Care Planning Activity    Ht: 170.2 cm (67\")   Wt: 68.1 kg (150 lb 3.2 oz)    Admission Cmt: None   Principal Problem: None                Active Insurance as of 12/1/2021     Primary Coverage     Payor Plan Insurance Group Employer/Plan Group    AETNA MEDICARE REPLACEMENT AETNA MEDICARE REPLACEMENT [id number]     Payor Plan Address Payor Plan Phone Number Payor Plan Fax Number Effective Dates    [address] [phone]  4/1/2019 - None Entered    [facility] [address]       Subscriber Name Subscriber Birth Date Member ID       HASMUKH HAM 6/1/1933 [id number]                 Emergency Contacts      (Rel.) Home Phone Work Phone Mobile Phone    Rimma Durant (Spouse) [phone] -- [phone]    Ilan Ham (Brother) -- -- [phone]            Insurance Information                AETNA MEDICARE REPLACEMENT/AETNA MEDICARE REPLACEMENT Phone: [phone]    Subscriber: Hasmukh Ham Subscriber#: [id number]    Group#: [id number] Precert#: --             Physical Therapy Notes (last 24 hours)      Maria Esther Smith, PTA at 12/09/21 1517  Version 1 of 1       Goal Outcome Evaluation:  Plan " of Care Reviewed With: patient           Outcome Summary: sup-sit-sup cg/sba of 1,amb 5'x2 with rw and min of 1 pt lacking total extension gerda le's    Electronically signed by Maria Esther Smith PTA at 21 1517     Maria Esther Smith PTA at 21 1518  Version 1 of 1         Acute Care - Physical Therapy Treatment Note  [facility]     Patient Name: Hasmukh Ham  : 1933  MRN: [id number]  Today's Date: 2021      Visit Dx:     ICD-10-CM ICD-9-CM   1. Pneumonia of both lungs due to infectious organism, unspecified part of lung  J18.9 483.8   2. Precordial pain  R07.2 786.51   3. Atrial fibrillation, unspecified type (HCC)  I48.91 427.31   4. Impaired mobility and ADLs  Z74.09 V49.89    Z78.9    5. Impaired functional mobility, balance, gait, and endurance  Z74.09 V49.89     Patient Active Problem List   Diagnosis   • Dilated aortic root (HCC)   • Non-rheumatic tricuspid valve insufficiency   • Pulmonary emphysema (HCC)   • Atrial fibrillation and flutter (HCC)   • Bradycardia   • CAD (coronary artery disease)   • Neuropathy   • Abdominal hernia   • Neck pain   • Dementia (CMS/HCC)   • Diabetic neuropathy (HCC)   • Gastroesophageal reflux disease without esophagitis   • Generalized osteoarthritis   • 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 (HCC)   • Nocturia   • Osteoarthritis of multiple joints   • Hyperlipidemia   • Pain in joint involving ankle and foot   • Pneumonia of left lower lobe due to infectious organism   • Arthropathy of hand   • Paroxysmal tachycardia (HCC)   • Osteoarthrosis involving more than one site but not generalized   • Chronic right shoulder pain   • Rotator cuff syndrome   • Partial tear of subscapularis tendon   • Infraspinatus tendon tear   • Supraspinatus tendon tear   • Bilateral carotid artery stenosis   • Pulmonary HTN (HCC)   • Acute interstitial pneumonia (HCC)   • Chronic obstructive lung disease (HCC)   • Diabetes mellitus (HCC)   • Hypertension   •  Chest pain   • Shortness of breath   • Angina pectoris (HCC)   • Myocardial infarction (HCC)   • Ingrown toenail   • Heart problem   • Degenerative joint disease involving multiple joints   • Chronic obstructive pulmonary disease (COPD) (HCC)   • Bleeding disorder (HCC)   • Chronic obstructive pulmonary disease with acute exacerbation (HCC)   • Failure of outpatient treatment   • Sepsis (HCC)   • 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)   • Chronic diastolic CHF (congestive heart failure) (HCC)   • SOB (shortness of breath)   • Cause of injury, fall   • Right hip pain   • Chronic pain of right knee   • Primary osteoarthritis of right knee   • Left shoulder pain   • Left arm pain   • AC joint arthropathy   • Syncope   • Inflammatory arthritis   • Elevated troponin   • Fever   • Paroxysmal atrial fibrillation with rapid ventricular response (HCC)   • Fibrosis of lung (HCC)   • Type 2 diabetes mellitus (HCC)   • Chondrocalcinosis of right knee   • Nontraumatic complete tear of right rotator cuff   • Pneumonia of both lungs due to infectious organism     Past Medical History:   Diagnosis Date   • Basal cell carcinoma    • Bilateral carotid artery stenosis    • Bilateral carotid artery stenosis    • Bleeding disorder (HCC)    • CHF (congestive heart failure) (HCC)    • Chronic obstructive pulmonary disease (COPD) (HCC)    • Coronary arteriosclerosis    • Degenerative joint disease involving multiple joints    • Dementia (HCC)    • Diabetes mellitus (HCC)    • Heart problem    • History of stomach ulcers    • Hyperlipidemia    • Hypertension    • Ingrown toenail    • Myocardial infarction (HCC)      Past Surgical History:   Procedure Laterality Date   • BACK SURGERY     • CARDIAC CATHETERIZATION N/A 6/6/2017    Procedure: Right Heart Cath;  Surgeon: Jeff Maciel MD PhD;  Location: [facility] INVASIVE LOCATION;  Service:    • CARDIAC CATHETERIZATION N/A 2/14/2018    Procedure: Coronary  angiography;  Surgeon: Moisés Davila MD;  Location: [facility] CATH INVASIVE LOCATION;  Service:    • CARDIAC CATHETERIZATION N/A 10/28/2021    Procedure: Left Heart Cath;  Surgeon: Carine Johnson MD;  Location: Health system CATH INVASIVE LOCATION;  Service: Cardiology;  Laterality: N/A;   • CORONARY ANGIOPLASTY WITH STENT PLACEMENT     • CORONARY STENT PLACEMENT     • HERNIA REPAIR     • LUNG BIOPSY     • LUNG SURGERY     • NECK SURGERY     • THORACOTOMY Left 1977   • VENTRAL HERNIA REPAIR       PT Assessment (last 12 hours)     PT Evaluation and Treatment     Row Name 12/09/21 1410          Physical Therapy Time and Intention    Subjective Information complains of; weakness; pain  -LN     Document Type therapy note (daily note)  -LN     Mode of Treatment physical therapy; individual therapy  -LN     Patient Effort good  -LN     Row Name 12/09/21 1410          General Information    Patient Profile Reviewed yes  -LN     Existing Precautions/Restrictions fall  Pt not wearing O2 and nsg is aware.  -LN     Row Name 12/09/21 1410          Cognition    Affect/Mental Status (Cognitive) WFL  -LN     Orientation Status (Cognition) oriented to; person; place  -LN     Follows Commands (Cognition) WFL  -LN     Cognitive Function (Cognitive) WFL  -LN     Row Name 12/09/21 1410          Pain Scale: Numbers Pre/Post-Treatment    Pretreatment Pain Rating 9/10  -LN     Posttreatment Pain Rating 9/10  -LN     Pain Location - Orientation generalized  -LN     Pre/Posttreatment Pain Comment nsg made aware  -LN     Row Name 12/09/21 1410          Range of Motion Comprehensive    General Range of Motion bilateral lower extremity ROM WFL  -LN     Row Name 12/09/21 1410          Bed Mobility    Bed Mobility supine-sit; sit-supine; scooting/bridging  -LN     Scooting/Bridging [place] (Bed Mobility) contact guard  max of 2 up in bed-pt assisted with pushing with legs  -LN     Supine-Sit [place] (Bed Mobility) standby assist   -LN     Assistive Device (Bed Mobility) head of bed elevated; bed rails  -     Row Name 12/09/21 1410          Transfers    Transfers sit-stand transfer; stand-sit transfer  -LN     Sit-Stand [place] (Transfers) minimum assist (75% patient effort)  -LN     Stand-Sit [place] (Transfers) minimum assist (75% patient effort)  -LN     [place] Level (Toilet Transfer) minimum assist (75% patient effort)  -LN     Assistive Device (Toilet Transfer) commode, bedside without drop arms; grab bars/safety frame; walker, front-wheeled  -LN     Row Name 12/09/21 1410          Sit-Stand Transfer    Assistive Device (Sit-Stand Transfers) walker, front-wheeled  -LN     Row Name 12/09/21 1410          Stand-Sit Transfer    Assistive Device (Stand-Sit Transfers) walker, front-wheeled  -LN     Row Name 12/09/21 1410          Toilet Transfer    Type (Toilet Transfer) sit-stand; stand-sit  -     Row Name 12/09/21 1410          Gait/Stairs (Locomotion)    Gait/Stairs Locomotion gait/ambulation independence; distance ambulated; gait/ambulation assistive device; gait pattern; gait deviations  -LN     [place] Level (Gait) minimum assist (75% patient effort); verbal cues  -LN     Assistive Device (Gait) walker, front-wheeled  -     Distance in Feet (Gait) 5'x2  -LN     Deviations/Abnormal Patterns (Gait) stride length decreased  -LN     Bilateral Gait Deviations forward flexed posture  -     Row Name 12/09/21 1410          Safety Issues, Functional Mobility    Impairments Affecting Function (Mobility) strength; endurance/activity tolerance; shortness of breath; balance; pain  -     Row Name 12/09/21 1410          Hip (Therapeutic Exercise)    Hip (Therapeutic Exercise) AROM (active range of motion)  -     Hip AROM (Therapeutic Exercise) bilateral; flexion; aBduction; aDduction  -     Row Name 12/09/21 1410          Knee (Therapeutic Exercise)    Knee (Therapeutic Exercise) AROM (active range of motion)  -      Knee AROM (Therapeutic Exercise) bilateral; LAQ (long arc quad)  -LN     Row Name 12/09/21 1410          Ankle (Therapeutic Exercise)    Ankle (Therapeutic Exercise) AROM (active range of motion)  -LN     Ankle AROM (Therapeutic Exercise) bilateral; dorsiflexion; plantarflexion  limited on l ankle  -LN     Row Name 12/09/21 1410          Plan of Care Review    Plan of Care Reviewed With patient  -LN     Outcome Summary sup-sit-sup cg/sba of 1,amb 5'x2 with rw and min of 1 pt lacking total extension gerda le's  -LN     Row Name 12/09/21 1410          Vital Signs    Post Systolic BP Rehab 116  -LN     Post Treatment Diastolic BP 75  -LN     Posttreatment Heart Rate (beats/min) 86  -LN     Post SpO2 (%) 94  -LN     Pre Patient Position Supine  -LN     Intra Patient Position Standing  -LN     Post Patient Position Supine  -LN     Row Name 12/09/21 1410          Bed Mobility Goal 1 (PT)    Activity/Assistive Device (Bed Mobility Goal 1, PT) sit to supine/supine to sit  -LN     [place] Level/Cues Needed (Bed Mobility Goal 1, PT) modified independence  -LN     Time Frame (Bed Mobility Goal 1, PT) by discharge  -LN     Strategies/Barriers (Bed Mobility Goal 1, PT) Has hospital bed at home.  -LN     Progress/Outcomes (Bed Mobility Goal 1, PT) goal not met  -LN     Row Name 12/09/21 1410          Transfer Goal 1 (PT)    Activity/Assistive Device (Transfer Goal 1, PT) sit-to-stand/stand-to-sit; bed-to-chair/chair-to-bed  -LN     [place] Level/Cues Needed (Transfer Goal 1, PT) supervision required  -LN     Time Frame (Transfer Goal 1, PT) by discharge  -LN     Strategies/Barriers (Transfers Goal 1, PT) A-fib, tachy.  -LN     Row Name 12/09/21 1410          Gait Training Goal 1 (PT)    Activity/Assistive Device (Gait Training Goal 1, PT) walker, rolling  -LN     [place] Level (Gait Training Goal 1, PT) supervision required  -LN     Distance (Gait Training Goal 1, PT) 25'x2 as tolerated.  -LN     Time Frame (Gait  Training Goal 1, PT) by discharge  -LN     Strategies/Barriers (Gait Training Goal 1, PT) A-fib, tachy.  -LN     Progress/Outcome (Gait Training Goal 1, PT) goal not met  -LN     Row Name 12/09/21 1410          Positioning and Restraints    In Bed notified nsg; supine; call light within reach; encouraged to call for assist; exit alarm on  -LN     Row Name 12/09/21 1410          Therapy Assessment/Plan (PT)    Rehab Potential (PT) good, to achieve stated therapy goals  -LN     Criteria for Skilled Interventions Met (PT) yes; skilled treatment is necessary  -LN           User Key  (r) = Recorded By, (t) = Taken By, (c) = Cosigned By    Initials Name Provider Type    LN Maria Esther Smith, NEFTALI Physical Therapy Assistant                Physical Therapy Education                 Title: PT OT SLP Therapies (In Progress)     Topic: Physical Therapy (In Progress)     Point: Mobility training (In Progress)     Learning Progress Summary           Patient Acceptance, E, NR by  at 12/3/2021 1251                   Point: Home exercise program (Not Started)     Learner Progress:  Not documented in this visit.          Point: Body mechanics (Not Started)     Learner Progress:  Not documented in this visit.          Point: Precautions (Not Started)     Learner Progress:  Not documented in this visit.                      User Key     Initials Effective Dates Name Provider Type Discipline     06/16/21 -  Cristobal Mansfield PTA Physical Therapy Assistant PT              PT Recommendation and Plan  Anticipated Discharge Disposition (PT): home with assist, home with home health  Therapy Frequency (PT): other (see comments) (5-7 days/week)  Plan of Care Reviewed With: patient  Outcome Summary: sup-sit-sup cg/sba of 1,amb 5'x2 with rw and min of 1 pt lacking total extension gerda le's       Time Calculation:    PT Charges     Row Name 12/09/21 1517             Time Calculation    Start Time 1410  -LN      Stop Time 1440  -LN      Time  Calculation (min) 30 min  -LN      PT Received On 21  -LN              Time Calculation- PT    Total Timed Code Minutes- PT 30 minute(s)  -LN            User Key  (r) = Recorded By, (t) = Taken By, (c) = Cosigned By    Initials Name Provider Type    Maria Esther Yi PTA Physical Therapy Assistant              Therapy Charges for Today     Code Description Service Date Service Provider Modifiers Qty    93826525027 HC PT THERAPEUTIC ACT EA 15 MIN 2021 Maria Esther Smith PTA GP 1    33821776550 HC PT THER PROC EA 15 MIN 2021 Maria Esther Smith PTA GP 1          PT G-Codes  Outcome Measure Options: AM-PAC 6 Clicks Basic Mobility (PT)  AM-PAC 6 Clicks Score (PT): 18  AM-PAC 6 Clicks Score (OT): 20    Maria Esther Smith PTA  2021      Electronically signed by Maria Esther Smith PTA at 21 1518          Occupational Therapy Notes (last 48 hours)      Erlinda Aguilar COTA at 21 1402             21 1401   OTHER   Discipline occupational therapy assistant   Rehab Time/Intention   Session Not Performed patient/family declined, not feeling well  (Pt asleep upon entry in AM. Spouse and Nsg present reporting pt nauseated. No tx this date.)       Electronically signed by Erlinda Aguilar COTA at 21 1402     Erlinda Aguilar COTA at 21 1259          Patient Name: Hasmukh Ham  : 1933    MRN: [id number]                              Today's Date: 2021       Admit Date: 2021    Visit Dx:     ICD-10-CM ICD-9-CM   1. Pneumonia of both lungs due to infectious organism, unspecified part of lung  J18.9 483.8   2. Precordial pain  R07.2 786.51   3. Atrial fibrillation, unspecified type (HCC)  I48.91 427.31   4. Impaired mobility and ADLs  Z74.09 V49.89    Z78.9    5. Impaired functional mobility, balance, gait, and endurance  Z74. V49.89     Patient Active Problem List   Diagnosis   • Dilated aortic root (HCC)   • Non-rheumatic tricuspid valve insufficiency   • Pulmonary emphysema  (HCC)   • Atrial fibrillation and flutter (HCC)   • Bradycardia   • CAD (coronary artery disease)   • Neuropathy   • Abdominal hernia   • Neck pain   • Dementia (CMS/HCC)   • Diabetic neuropathy (HCC)   • Gastroesophageal reflux disease without esophagitis   • Generalized osteoarthritis   • 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 (HCC)   • Nocturia   • Osteoarthritis of multiple joints   • Hyperlipidemia   • Pain in joint involving ankle and foot   • Pneumonia of left lower lobe due to infectious organism   • Arthropathy of hand   • Paroxysmal tachycardia (HCC)   • Osteoarthrosis involving more than one site but not generalized   • Chronic right shoulder pain   • Rotator cuff syndrome   • Partial tear of subscapularis tendon   • Infraspinatus tendon tear   • Supraspinatus tendon tear   • Bilateral carotid artery stenosis   • Pulmonary HTN (HCC)   • Acute interstitial pneumonia (HCC)   • Chronic obstructive lung disease (HCC)   • Diabetes mellitus (HCC)   • Hypertension   • Chest pain   • Shortness of breath   • Angina pectoris (HCC)   • Myocardial infarction (HCC)   • Ingrown toenail   • Heart problem   • Degenerative joint disease involving multiple joints   • Chronic obstructive pulmonary disease (COPD) (HCC)   • Bleeding disorder (HCC)   • Chronic obstructive pulmonary disease with acute exacerbation (HCC)   • Failure of outpatient treatment   • Sepsis (HCC)   • 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)   • Chronic diastolic CHF (congestive heart failure) (HCC)   • SOB (shortness of breath)   • Cause of injury, fall   • Right hip pain   • Chronic pain of right knee   • Primary osteoarthritis of right knee   • Left shoulder pain   • Left arm pain   • AC joint arthropathy   • Syncope   • Inflammatory arthritis   • Elevated troponin   • Fever   • Paroxysmal atrial fibrillation with rapid ventricular response (HCC)   • Fibrosis of lung (HCC)   • Type 2 diabetes mellitus (HCC)   •  Chondrocalcinosis of right knee   • Nontraumatic complete tear of right rotator cuff   • Pneumonia of both lungs due to infectious organism     Past Medical History:   Diagnosis Date   • Basal cell carcinoma    • Bilateral carotid artery stenosis    • Bilateral carotid artery stenosis    • Bleeding disorder (HCC)    • CHF (congestive heart failure) (HCC)    • Chronic obstructive pulmonary disease (COPD) (HCC)    • Coronary arteriosclerosis    • Degenerative joint disease involving multiple joints    • Dementia (HCC)    • Diabetes mellitus ([facility])    • Heart problem    • History of stomach ulcers    • Hyperlipidemia    • Hypertension    • Ingrown toenail    • Myocardial infarction ([facility])      Past Surgical History:   Procedure Laterality Date   • BACK SURGERY     • CARDIAC CATHETERIZATION N/A 6/6/2017    Procedure: Right Heart Cath;  Surgeon: Jeff Maciel MD PhD;  Location: [facility] CATH INVASIVE LOCATION;  Service:    • CARDIAC CATHETERIZATION N/A 2/14/2018    Procedure: Coronary angiography;  Surgeon: Moisés Davila MD;  Location: [facility] CATH INVASIVE LOCATION;  Service:    • CARDIAC CATHETERIZATION N/A 10/28/2021    Procedure: Left Heart Cath;  Surgeon: Carine Johnson MD;  Location: [facility] CATH INVASIVE LOCATION;  Service: Cardiology;  Laterality: N/A;   • CORONARY ANGIOPLASTY WITH STENT PLACEMENT     • CORONARY STENT PLACEMENT     • HERNIA REPAIR     • LUNG BIOPSY     • LUNG SURGERY     • NECK SURGERY     • THORACOTOMY Left 1977   • VENTRAL HERNIA REPAIR        General Information     Row Name 12/08/21 0901          OT Time and Intention    Document Type therapy note (daily note)  -KD     Mode of Treatment individual therapy; occupational therapy  -KD     Row Name 12/08/21 0901          General Information    Patient Profile Reviewed yes  -KD     Existing Precautions/Restrictions fall  -KD     Row Name 12/08/21 0901          Cognition    Orientation Status (Cognition) oriented to; person  -KD      Row Name 12/08/21 0901          Safety Issues, Functional Mobility    Impairments Affecting Function (Mobility) strength; endurance/activity tolerance; shortness of breath; balance; pain; cognition  -           User Key  (r) = Recorded By, (t) = Taken By, (c) = Cosigned By    Initials Name Provider Type    Erlinda Martínez COTA Occupational Therapy Assistant                 Mobility/ADL's    No documentation.                Obj/Interventions     Row Name 12/08/21 0901          Shoulder (Therapeutic Exercise)    Shoulder (Therapeutic Exercise) AROM (active range of motion)  -     Shoulder AROM (Therapeutic Exercise) external rotation; bilateral; internal rotation; horizontal aBduction/aDduction  -     Row Name 12/08/21 0901          Elbow/Forearm (Therapeutic Exercise)    Elbow/Forearm (Therapeutic Exercise) AROM (active range of motion)  -     Elbow/Forearm AROM (Therapeutic Exercise) bilateral; flexion; extension; supination; pronation  -     Row Name 12/08/21 0901          Wrist (Therapeutic Exercise)    Wrist (Therapeutic Exercise) AROM (active range of motion)  -     Wrist AROM (Therapeutic Exercise) bilateral; flexion; extension; radial deviation  -     Row Name 12/08/21 0901          Therapeutic Exercise    Therapeutic Exercise shoulder; elbow/forearm; wrist; hand  -           User Key  (r) = Recorded By, (t) = Taken By, (c) = Cosigned By    Initials Name Provider Type    Erlinda Martínez COTA Occupational Therapy Assistant               Goals/Plan    No documentation.                Clinical Impression     [facility] Name 12/08/21 0901          Pain Scale: Numbers Pre/Post-Treatment    Pretreatment Pain Rating 8/10  -KD     Posttreatment Pain Rating 8/10  -KD     Pain Location neck  -     Pain Intervention(s) Nursing Notified  -     Row Name 12/08/21 0901          Plan of Care Review    Plan of Care Reviewed With patient  -KD     Progress improving  -     Outcome Summary Pt less confused  ths date. Pt c/o 8/10 neck pain and deferes OOB. Pt jalil Ther ex to BUE's in most planes w/ 2lb HW. Pt limited on Shld flex to pt's jalil this date. Spouse present throughout tx.  -KD     Row Name 12/08/21 0901          Therapy Assessment/Plan (OT)    Therapy Frequency (OT) other (see comments)  5-7 days a week  -KD     Row Name 12/08/21 0901          Therapy Plan Review/Discharge Plan (OT)    Anticipated Discharge Disposition (OT) home with home health; home with 24/7 care  -KD     Row Name 12/08/21 0901          Vital Signs    Pre Systolic BP Rehab 112  -KD     Pre Treatment Diastolic BP 57  -KD     Pretreatment Heart Rate (beats/min) 79  -KD     Pre SpO2 (%) 98  -KD     O2 Delivery Pre Treatment room air  -KD     Pre Patient Position Supine  -KD     Intra Patient Position Sitting  -KD     Post Patient Position Supine  -KD           User Key  (r) = Recorded By, (t) = Taken By, (c) = Cosigned By    Initials Name Provider Type    Erlinda Martínez COTA Occupational Therapy Assistant               Outcome Measures     Row Name 12/08/21 0901          How much help from another is currently needed...    Putting on and taking off regular lower body clothing? 3  -KD     Bathing (including washing, rinsing, and drying) 3  -KD     Toileting (which includes using toilet bed pan or urinal) 3  -KD     Putting on and taking off regular upper body clothing 3  -KD     Taking care of personal grooming (such as brushing teeth) 4  -KD     Eating meals 4  -KD     AM-PAC 6 Clicks Score (OT) 20  -KD           User Key  (r) = Recorded By, (t) = Taken By, (c) = Cosigned By    Initials Name Provider Type    Erlinda Martínez COTA Occupational Therapy Assistant                Occupational Therapy Education                 Title: PT OT SLP Therapies (In Progress)     Topic: Occupational Therapy (In Progress)     Point: ADL training (In Progress)     Description:   Instruct learner(s) on proper safety adaptation and remediation techniques  during self care or transfers.   Instruct in proper use of assistive devices.              Learning Progress Summary           Patient Acceptance, E, NR by BB at 12/6/2021 1200                   Point: Home exercise program (Not Started)     Description:   Instruct learner(s) on appropriate technique for monitoring, assisting and/or progressing therapeutic exercises/activities.              Learner Progress:  Not documented in this visit.          Point: Precautions (Done)     Description:   Instruct learner(s) on prescribed precautions during self-care and functional transfers.              Learning Progress Summary           Patient Acceptance, E,TB, VU by  at 12/2/2021 1325    Comment: POC, role of OT, transfer training                   Point: Body mechanics (In Progress)     Description:   Instruct learner(s) on proper positioning and spine alignment during self-care, functional mobility activities and/or exercises.              Learning Progress Summary           Patient Acceptance, E, NR by BB at 12/6/2021 1200    Acceptance, E,TB, VU by  at 12/2/2021 1325    Comment: POC, role of OT, transfer training                               User Key     Initials Effective Dates Name Provider Type Discipline     06/16/21 -  Susannah Villalta COTA Occupational Therapy Assistant OT     06/14/21 -  Israel Black OT Occupational Therapist OT              OT Recommendation and Plan  Therapy Frequency (OT): other (see comments) (5-7 days a week)  Plan of Care Review  Plan of Care Reviewed With: patient  Progress: improving  Outcome Summary: Pt less confused ths date. Pt c/o 8/10 neck pain and deferes OOB. Pt jalil Ther ex to BUE's in most planes w/ 2lb HW. Pt limited on Shld flex to pt's jalil this date. Spouse present throughout tx.     Time Calculation:    Time Calculation- OT     Row Name 12/08/21 0901             Time Calculation- OT    OT Start Time 0901  -KD      OT Stop Time 0925  -KD      OT Time  Calculation (min) 24 min  -KD      Total Timed Code Minutes- OT 24 minute(s)  -KD      OT Received On 12/08/21  -KD              Timed Charges    43475 - OT Therapeutic Exercise Minutes 24  -KD      93549 - OT Self Care/Mgmt Minutes --  -KD              Total Minutes    Timed Charges Total Minutes 24  -KD       Total Minutes 24  -KD            User Key  (r) = Recorded By, (t) = Taken By, (c) = Cosigned By    Initials Name Provider Type     Erlinda Aguilar COTA Occupational Therapy Assistant              Therapy Charges for Today     Code Description Service Date Service Provider Modifiers Qty    43405174762 HC OT SELF CARE/MGMT/TRAIN EA 15 MIN 12/7/2021 Erlinda Aguilar COTA GO 1    80555975041 HC OT THER PROC EA 15 MIN 12/8/2021 Erlinda Aguilar COTA GO 2               KRYSTIN Gilmore  12/8/2021    Electronically signed by Erlinda Aguilar COTA at 12/08/21 1259     Erlinda Aguilar COTA at 12/08/21 1258          Problem: Adult Inpatient Plan of Care  Goal: Plan of Care Review  Recent Flowsheet Documentation  Taken 12/8/2021 0901 by Erlinda Aguilar COTA  Progress: improving  Plan of Care Reviewed With: patient  Outcome Summary: Pt less confused this date. Pt c/o 8/10 neck pain and defers OOB. Pt jalil Ther ex to BUE's in most planes w/ 2lb HW. Pt limited on Shld flex to pt's jalil this date. Spouse present throughout tx.       Electronically signed by Erlinda Aguilar COTA at 12/08/21 3265

## 2021-12-10 NOTE — PLAN OF CARE
Goal Outcome Evaluation:  Plan of Care Reviewed With: patient           Outcome Summary: sup-sit-sup cg/sba of 1,sit-stand-sit min of 1;amb 13'x2,5' with rw and min of 1

## 2021-12-10 NOTE — PLAN OF CARE
Goal Outcome Evaluation:  Plan of Care Reviewed With: patient        Progress: improving  Outcome Summary: OT tx complete this date. Sup-sit-SBA w/bed rail and HOB elevated. Pt sat EOB-SBA. UB bathing-[place] assist. Grooming-SBA w/ vc's. Pt Max A for pericare. Sit-stand- Min A. Pt side stepped 2-3' CGA w/ vc's to HOB. Sit-sup-SBA. Pt long sitting in bed upon exit w/ all needs in reach. Cont OT POC.

## 2021-12-11 NOTE — THERAPY TREATMENT NOTE
Acute Care - Physical Therapy Treatment Note  [facility]     Patient Name: Hasmukh Ham  : 1933  MRN: [id number]  Today's Date: 2021      Visit Dx:     ICD-10-CM ICD-9-CM   1. Pneumonia of both lungs due to infectious organism, unspecified part of lung  J18.9 483.8   2. Precordial pain  R07.2 786.51   3. Atrial fibrillation, unspecified type ([facility])  I48.91 427.31   4. Impaired mobility and ADLs  Z74.09 V49.89    Z78.9    5. Impaired functional mobility, balance, gait, and endurance  Z74.09 V49.89     Patient Active Problem List   Diagnosis   • Dilated aortic root ([facility])   • Non-rheumatic tricuspid valve insufficiency   • Pulmonary emphysema ([facility]ly [facility])   • Atrial fibrillation and flutter ([facility])   • Bradycardia   • CAD (coronary artery disease)   • Neuropathy   • Abdominal hernia   • Neck pain   • Dementia (CMS/HCC)   • Diabetic neuropathy ([facility])   • Gastroesophageal reflux disease without esophagitis   • Generalized osteoarthritis   • 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 ([facility])   • Nocturia   • Osteoarthritis of multiple joints   • Hyperlipidemia   • Pain in joint involving ankle and foot   • Pneumonia of left lower lobe due to infectious organism   • Arthropathy of hand   • Paroxysmal tachycardia ([facility])   • Osteoarthrosis involving more than one site but not generalized   • Chronic right shoulder pain   • Rotator cuff syndrome   • Partial tear of subscapularis tendon   • Infraspinatus tendon tear   • Supraspinatus tendon tear   • Bilateral carotid artery stenosis   • Pulmonary HTN (HCC)   • Acute interstitial pneumonia (HCC)   • Chronic obstructive lung disease (HCC)   • Diabetes mellitus (HCC)   • Hypertension   • Chest pain   • Shortness of breath   • Angina pectoris (HCC)   • Myocardial infarction ([facility])   • Ingrown toenail   • Heart problem   • Degenerative joint disease involving multiple joints   • Chronic obstructive pulmonary disease (COPD) (HCC)   • Bleeding disorder ([facility])   •  Chronic obstructive pulmonary disease with acute exacerbation (HCC)   • Failure of outpatient treatment   • Sepsis (HCC)   • 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)   • Chronic diastolic CHF (congestive heart failure) (Formerly [facility])   • SOB (shortness of breath)   • Cause of injury, fall   • Right hip pain   • Chronic pain of right knee   • Primary osteoarthritis of right knee   • Left shoulder pain   • Left arm pain   • AC joint arthropathy   • Syncope   • Inflammatory arthritis   • Elevated troponin   • Fever   • Paroxysmal atrial fibrillation with rapid ventricular response ([facility])   • Fibrosis of lung (HCC)   • Type 2 diabetes mellitus ([facility])   • Chondrocalcinosis of right knee   • Nontraumatic complete tear of right rotator cuff   • Pneumonia of both lungs due to infectious organism     Past Medical History:   Diagnosis Date   • Basal cell carcinoma    • Bilateral carotid artery stenosis    • Bilateral carotid artery stenosis    • Bleeding disorder ([facility])    • CHF (congestive heart failure) (Formerly [facility])    • Chronic obstructive pulmonary disease (COPD) (Formerly [facility])    • Coronary arteriosclerosis    • Degenerative joint disease involving multiple joints    • Dementia ([facility])    • Diabetes mellitus ([facility])    • Heart problem    • History of stomach ulcers    • Hyperlipidemia    • Hypertension    • Ingrown toenail    • Myocardial infarction (Former[facility])      Past Surgical History:   Procedure Laterality Date   • BACK SURGERY     • CARDIAC CATHETERIZATION N/A 6/6/2017    Procedure: Right Heart Cath;  Surgeon: Jeff Maciel MD PhD;  Location: [facility] CATH INVASIVE LOCATION;  Service:    • CARDIAC CATHETERIZATION N/A 2/14/2018    Procedure: Coronary angiography;  Surgeon: Moisés Davila MD;  Location: [facility] CATH INVASIVE LOCATION;  Service:    • CARDIAC CATHETERIZATION N/A 10/28/2021    Procedure: Left Heart Cath;  Surgeon: Carine Johnson MD;  Location: [facility] CATH INVASIVE LOCATION;  Service: Cardiology;   Laterality: N/A;   • CORONARY ANGIOPLASTY WITH STENT PLACEMENT     • CORONARY STENT PLACEMENT     • HERNIA REPAIR     • LUNG BIOPSY     • LUNG SURGERY     • NECK SURGERY     • THORACOTOMY Left 1977   • VENTRAL HERNIA REPAIR       PT Assessment (last 12 hours)     PT Evaluation and Treatment     Row Name 12/11/21 0927          Physical Therapy Time and Intention    Subjective Information no complaints  -CA     Document Type therapy note (daily note)  -CA     Mode of Treatment individual therapy; physical therapy  -CA     Patient Effort good  -CA     Row Name 12/11/21 0927          General Information    Existing Precautions/Restrictions fall  -CA     Row Name 12/11/21 0927          Cognition    Affect/Mental Status (Cognitive) WFL  -CA     Orientation Status (Cognition) oriented x 3  -CA     Personal Safety Interventions fall prevention program maintained; gait belt; muscle strengthening facilitated; nonskid shoes/slippers when out of bed  -CA     Row Name 12/11/21 0927          Bed Mobility    Bed Mobility supine-sit; sit-supine  -CA     Scooting/Bridging Martin (Bed Mobility) standby assist; contact guard  -CA     Supine-Sit [place] (Bed Mobility) standby assist; contact guard  -CA     Row Name 12/11/21 0927          Transfers    Transfers sit-stand transfer; stand-sit transfer  -CA     Sit-Stand Martin (Transfers) contact guard  -CA     Stand-Sit [place] (Transfers) contact guard  -CA     Row Name 12/11/21 0927          Sit-Stand Transfer    Assistive Device (Sit-Stand Transfers) walker, front-wheeled  -CA     Row Name 12/11/21 0927          Stand-Sit Transfer    Assistive Device (Stand-Sit Transfers) walker, front-wheeled  -CA     Row Name 12/11/21 0927          Gait/Stairs (Locomotion)    Martin Level (Gait) contact guard  -CA     Assistive Device (Gait) walker, front-wheeled  -CA     Distance in Feet (Gait) 6' and 16'  -CA     Bilateral Gait Deviations forward flexed posture  -CA      Row Name 12/11/21 0927          Hip (Therapeutic Exercise)    Hip (Therapeutic Exercise) AROM (active range of motion)  -CA     Hip AROM (Therapeutic Exercise) bilateral; flexion; extension  -CA     Row Name 12/11/21 0927          Knee (Therapeutic Exercise)    Knee (Therapeutic Exercise) AROM (active range of motion)  -CA     Knee AROM (Therapeutic Exercise) bilateral; flexion; extension  -CA     Row Name 12/11/21 0927          Ankle (Therapeutic Exercise)    Ankle (Therapeutic Exercise) AROM (active range of motion)  -CA     Ankle AROM (Therapeutic Exercise) bilateral; dorsiflexion; plantarflexion  -CA     Row Name 12/11/21 0927          Positioning and Restraints    Pre-Treatment Position in bed  -CA     Post Treatment Position bed  -CA     In Bed supine; call light within reach; encouraged to call for assist; exit alarm on  -CA           User Key  (r) = Recorded By, (t) = Taken By, (c) = Cosigned By    Initials Name Provider Type    CA Piero Giordano PTA Physical Therapy Assistant                Physical Therapy Education                 Title: PT OT SLP Therapies (Done)     Topic: Physical Therapy (Done)     Point: Mobility training (Done)     Learning Progress Summary           Patient Acceptance, E,TB, VU by SC at 12/10/2021 1357    Acceptance, E, NR by  at 12/3/2021 1251                   Point: Home exercise program (Done)     Learning Progress Summary           Patient Acceptance, E,TB, VU by SC at 12/10/2021 1357                   Point: Body mechanics (Done)     Learning Progress Summary           Patient Acceptance, E,TB, VU by SC at 12/10/2021 1357                   Point: Precautions (Done)     Learning Progress Summary           Patient Acceptance, E,TB, VU by SC at 12/10/2021 1357                               User Key     Initials Effective Dates Name Provider Type [facility] 06/16/21 -  Cristobal Mansfield PTA Physical Therapy Assistant PT    SC 07/21/21 -  Mayela Sauceda RN Registered  Nurse Nurse              PT Recommendation and Plan     Plan of Care Reviewed With: patient  Progress: improving  Outcome Summary: pt. was SBA/CGA for bed mobility and was CGA for sit to stand tranfers.  Pt. amb. 6' and then 16' with RW and CGA. Pt. fatigued with gait and t/f back to bed post gait.  Pt. did perform LE therex EOB prior to gait   Outcome Measures     Row Name 12/11/21 0927             How much help from another person do you currently need...    Turning from your back to your side while in flat bed without using bedrails? 3  -CA      Moving from lying on back to sitting on the side of a flat bed without bedrails? 3  -CA      Moving to and from a bed to a chair (including a wheelchair)? 3  -CA      Standing up from a chair using your arms (e.g., wheelchair, bedside chair)? 3  -CA      Climbing 3-5 steps with a railing? 3  -CA      To walk in hospital room? 3  -CA      AM-PAC 6 Clicks Score (PT) 18  -CA              Functional Assessment    Outcome Measure Options AM-PAC 6 Clicks Basic Mobility (PT)  -CA            User Key  (r) = Recorded By, (t) = Taken By, (c) = Cosigned By    Initials Name Provider Type    Piero Caldera PTA Physical Therapy Assistant                 Time Calculation:    PT Charges     Row Name 12/11/21 1422             Time Calculation    Start Time 0927  -CA      Stop Time 0952  -CA      Time Calculation (min) 25 min  -CA      PT Received On 12/11/21  -CA              Time Calculation- PT    Total Timed Code Minutes- PT 25 minute(s)  -CA            User Key  (r) = Recorded By, (t) = Taken By, (c) = Cosigned By    Initials Name Provider Type    Piero Caldera PTA Physical Therapy Assistant              Therapy Charges for Today     Code Description Service Date Service Provider Modifiers Qty    23481467980 HC GAIT TRAINING EA 15 MIN 12/11/2021 Piero Giordano PTA GP 1    32686885276 HC PT THER PROC EA 15 MIN 12/11/2021 Piero Giordano PTA GP 1          PT G-Codes  Outcome  Measure Options: AM-PAC 6 Clicks Basic Mobility (PT)  AM-PAC 6 Clicks Score (PT): 18  AM-PAC 6 Clicks Score (OT): 20    Piero Giordano, PTA  12/11/2021

## 2021-12-11 NOTE — PROGRESS NOTES
[facility] Medicine Services  INPATIENT PROGRESS NOTE    Length of Stay: 9  Date of Admission: 12/1/2021  Primary Care Physician: Paolo Rey MD    Subjective   Chief Complaint: Confusion  HPI:    No acute events overnight. He feels somewhat sleepy this AM. But no dyspnea, CP, leg swelling. His wife is present and she is wanting Hasmukh to tell me about injury that happened to his right shoulder, but the patient cannot recall. Wife states injury occurred on Monday and that he has been confused with agitation.       Review of Systems   Unable to perform ROS: Dementia   Respiratory: Negative for shortness of breath.    Cardiovascular: Negative for chest pain.      All pertinent negatives and positives are as above. All other systems have been reviewed and are negative unless otherwise stated.     Objective    Temp:  [96.9 °F (36.1 °C)-97.8 °F (36.6 °C)] 97.1 °F (36.2 °C)  Heart Rate:  [58-90] 58  Resp:  [17-18] 17  BP: (104-139)/(55-73) 127/73  Physical Exam  Vitals and nursing note reviewed.   Constitutional:       Appearance: He is well-developed.   HENT:      Head: Normocephalic.   Cardiovascular:      Rate and Rhythm: Normal rate and regular rhythm.      Heart sounds: Normal heart sounds. No murmur heard.  No friction rub. No gallop.    Pulmonary:      Breath sounds: No stridor.   Abdominal:      General: There is no distension.      Palpations: Abdomen is soft. There is no mass.      Tenderness: There is no abdominal tenderness. There is no guarding.      Hernia: No hernia is present.   Musculoskeletal:         General: No deformity.   Skin:     General: Skin is warm and dry.      Findings: No erythema.   Neurological:      Mental Status: He is oriented to person, place, and time. Mental status is at baseline.   Psychiatric:         Behavior: Behavior normal.       Results Review:  I have reviewed the labs, radiology results, and diagnostic studies.    Laboratory  Data:   Lab Results (last 24 hours)     Procedure Component Value Units Date/Time    POC Glucose Once [[id number]]  (Abnormal) Collected: 12/11/21 1015    Specimen: Blood Updated: 12/11/21 1028     Glucose 148 mg/dL      Comment: Result Not ConfirmedOperator: [id number] EUGENIO MIRANDAMeter ID: [id number]       Comprehensive Metabolic Panel [[id number]]  (Abnormal) Collected: 12/11/21 0531    Specimen: Blood Updated: 12/11/21 0630     Glucose 134 mg/dL      BUN 57 mg/dL      Creatinine 1.45 mg/dL      Sodium 137 mmol/L      Potassium 4.0 mmol/L      Comment: Slight hemolysis detected by analyzer. Results may be affected.        Chloride 98 mmol/L      CO2 30.0 mmol/L      Calcium 8.8 mg/dL      Total Protein 5.7 g/dL      Albumin 3.40 g/dL      ALT (SGPT) 19 U/L      AST (SGOT) 18 U/L      Comment: Slight hemolysis detected by analyzer. Results may be affected.        Alkaline Phosphatase 68 U/L      Total Bilirubin 0.5 mg/dL      eGFR Non African Amer 46 mL/min/1.73      Globulin 2.3 gm/dL      A/G Ratio 1.5 g/dL      BUN/Creatinine Ratio 39.3     Anion Gap 9.0 mmol/L     Narrative:      GFR Normal >60  Chronic Kidney Disease <60  Kidney Failure <15      POC Glucose Once [[id number]]  (Abnormal) Collected: 12/10/21 1604    Specimen: Blood Updated: 12/10/21 1617     Glucose 203 mg/dL      Comment: : [id number] KENDRA Jeff ID: [id number]             Culture Data:   No results found for: BLOODCX  No results found for: URINECX  No results found for: RESPCX  No results found for: WOUNDCX  No results found for: STOOLCX  No components found for: BODYFLD    Radiology Data:   Imaging Results (Last 24 Hours)     ** No results found for the last 24 hours. **          I have reviewed the patient's current medications.     Assessment/Plan     Active Hospital Problems    Diagnosis    • Pneumonia of both lungs due to infectious organism        Pneumonia-resolving, has finished 7 days of IV Levaquin    Chronic  hypoxic respiratory failure-patient is on supplemental oxygen at home, on room air here    Atrial fibrillation- currently rate and rhythm-controlled on amiodarone, metoprolol.    Delirium-he does have baseline dementia and has intermittent confusion, we will continue providing supportive care    Acute on chronic renal failure-continue monitor creatinine level    Hypertension-continue monitor blood pressure    COPD-continue with nebulizer treatments as needed    Deconditioning-continue with PT OT    Disposition-Case management is working for placement in swing bed in [place], likely Monday at earliest due to insurance pre-auth requirements    DVT prophylaxis-SCD    Patient is full code    I confirmed that the patient's Advance Care Plan is present, code status is documented, or surrogate decision maker is listed in the patient's medical record.       Gabriel Gonzalez, DO   12/11/21   12:38 CST

## 2021-12-11 NOTE — PLAN OF CARE
Problem: Adult Inpatient Plan of Care  Goal: Plan of Care Review  Outcome: Ongoing, Progressing  Flowsheets (Taken 12/11/2021 0907)  Progress: improving  Plan of Care Reviewed With: patient  Outcome Summary: pt. was SBA/CGA for bed mobility and was CGA for sit to stand tranfers.  Pt. amb. 6' and then 16' with RW and CGA. Pt. fatigued with gait and t/f back to bed post gait.  Pt. did perform LE therex EOB prior to gait   Goal Outcome Evaluation:  Plan of Care Reviewed With: patient        Progress: improving  Outcome Summary: pt. was SBA/CGA for bed mobility and was CGA for sit to stand tranfers.  Pt. amb. 6' and then 16' with RW and CGA. Pt. fatigued with gait and t/f back to bed post gait.  Pt. did perform LE therex EOB prior to gait

## 2021-12-11 NOTE — CONSULTS
Inpatient Consult to Psychiatry    12/11/2021    Referring Provider: Dr. Segovia  Reason for Consultation: confusion    Source of History:  Patient's wife, chart review and the patient    HPI:    Patient is a 88 y.o. male with a history of chronic lung disease, CAD with non-STEMI on 10/25/2021 resulting in placement of 2 stents on 10/28/2021, diabetes mellitus type 2, benign hypertension who presented to the ED due to to shortness of breath and was admitted due to pneumonia.    Onset of symptoms was gradual starting several days ago.  Symptoms have been present on an intermittent basis. Symptoms are associated with medical illness.  Symptoms are aggravated by problems with health.   Symptoms improve with none identified.  Patient's symptoms occur in the context of no prior psych history.,    Patient is seen in his room with his wife present.  They both note that he is doing much better today.  He is oriented to person, place, year and month.  He also knew that he is here for pneumonia.  Wife notes that he started getting confused prior to hospitalization.  She notes he is much better today.  They note no prior psych history.    Psychiatric Review Of Systems:  confusion and Pertinent items are noted in HPI.    History  Past psychiatric history:    Psychiatric Hospitalizations: Patient has had no prior hospitalizations.    Suicide Attempts: Patient has had no prior suicide attempts.    Prior Treatment and Medications Tried: none    History of violence or legal issues: The patient has no significant history of legal issues.    Social History:    Substance Abuse: denies any A&D history.    Current Relationships:     Occupation: retiree  Living Situation: with wife    Social History     Socioeconomic History   • Marital status:    Tobacco Use   • Smoking status: Former Smoker   • Smokeless tobacco: Never Used   Vaping Use   • Vaping Use: Never used   Substance and Sexual Activity   • Alcohol use: No   • Drug  use: No   • Sexual activity: Not Currently     Comment:          Family History:    Family History   Problem Relation Age of Onset   • Hypertension Father    • Heart disease Father    • Diabetes Father    • Diabetes Mother    • Lung disease Other    • Cancer Other        Past Medical and Surgical History:    Past Medical History:   Diagnosis Date   • Basal cell carcinoma    • Bilateral carotid artery stenosis    • Bilateral carotid artery stenosis    • Bleeding disorder (HCC)    • CHF (congestive heart failure) (HCC)    • Chronic obstructive pulmonary disease (COPD) (HCC)    • Coronary arteriosclerosis    • Degenerative joint disease involving multiple joints    • Dementia (HCC)    • Diabetes mellitus (HCC)    • Heart problem    • History of stomach ulcers    • Hyperlipidemia    • Hypertension    • Ingrown toenail    • Myocardial infarction ([facility])      Past Surgical History:   Procedure Laterality Date   • BACK SURGERY     • CARDIAC CATHETERIZATION N/A 6/6/2017    Procedure: Right Heart Cath;  Surgeon: Jeff Maciel MD PhD;  Location: [facility] CATH INVASIVE LOCATION;  Service:    • CARDIAC CATHETERIZATION N/A 2/14/2018    Procedure: Coronary angiography;  Surgeon: Moisés Davila MD;  Location: [facility] CATH INVASIVE LOCATION;  Service:    • CARDIAC CATHETERIZATION N/A 10/28/2021    Procedure: Left Heart Cath;  Surgeon: Carine Johnson MD;  Location: [facility] CATH INVASIVE LOCATION;  Service: Cardiology;  Laterality: N/A;   • CORONARY ANGIOPLASTY WITH STENT PLACEMENT     • CORONARY STENT PLACEMENT     • HERNIA REPAIR     • LUNG BIOPSY     • LUNG SURGERY     • NECK SURGERY     • THORACOTOMY Left 1977   • VENTRAL HERNIA REPAIR       Allergies:  Atorvastatin, Penicillins, Azithromycin, Cefdinir, Clarithromycin, Pravastatin, and Benadryl allergy [diphenhydramine hcl]  Medications Prior to Admission   Medication Sig Dispense Refill Last Dose   • albuterol (PROVENTIL) (2.5 MG/3ML) 0.083% nebulizer  solution Take 2.5 mg by nebulization Every 4 (Four) Hours As Needed for Wheezing. 125 vial 0 12/1/2021 at Unknown time   • Brovana 15 MCG/2ML nebulizer solution 3 (Three) Times a Day.   12/1/2021 at Unknown time   • clopidogrel (PLAVIX) 75 MG tablet Take 75 mg by mouth Daily.   12/1/2021 at Unknown time   • famotidine (PEPCID) 20 MG tablet Take 20 mg by mouth 2 (Two) Times a Day.   12/1/2021 at Unknown time   • fluticasone (FLONASE) 50 MCG/ACT nasal spray 2 sprays into each nostril Daily.   12/1/2021 at Unknown time   • furosemide (LASIX) 40 MG tablet Take 1 tablet by mouth Daily. (Patient taking differently: Take 40 mg by mouth 2 (Two) Times a Day.) 30 tablet 11 12/1/2021 at Unknown time   • glimepiride (AMARYL) 2 MG tablet Take 2 mg by mouth Every Morning Before Breakfast.   12/1/2021 at Unknown time   • lisinopril (PRINIVIL,ZESTRIL) 5 MG tablet Take 1 tablet by mouth Daily. 90 tablet 3 12/1/2021 at Unknown time   • magnesium oxide (MAG-OX) 400 MG tablet Take 250 mg by mouth Daily.   12/1/2021 at Unknown time   • Red Yeast Rice 600 MG tablet Take 600 mg by mouth 2 (Two) Times a Day.   12/1/2021 at Unknown time   • acetaminophen (TYLENOL) 325 MG tablet Take 2 tablets by mouth Every 4 (Four) Hours As Needed for Mild Pain .      • Blood Glucose Monitoring Suppl (ONE TOUCH ULTRA 2) w/Device kit Daily. as directed      • cetirizine (zyrTEC) 10 MG tablet Take 10 mg by mouth Daily.      • docusate sodium 100 MG capsule Take 100 mg by mouth.      • Fluticasone Furoate (ARNUITY ELLIPTA) 200 MCG/ACT aerosol powder  Inhale.      • Fluticasone-Umeclidin-Vilant (Trelegy Ellipta) 100-62.5-25 MCG/INH aerosol powder  Inhale 1 inhaler.      • HYDROcodone-acetaminophen (NORCO) 7.5-325 MG per tablet Take 1 tablet by mouth Every 6 (Six) Hours As Needed for Moderate Pain . 12 tablet 0    • ipratropium (ATROVENT) 0.02 % nebulizer solution INHALE THE CONTENTS OF 1 VIAL IN NEBULIZER EVERY 4 HOURS AS NEEDED FOR WHEEZING      •  ipratropium-albuterol (DUO-NEB) 0.5-2.5 mg/mL nebulizer Take 3 mL by nebulization 4 (Four) Times a Day. 120 vial 1    • Lancets (OneTouch Delica Plus [id number]) misc 1 each by Other route Daily.      • meclizine (ANTIVERT) 12.5 MG tablet 1 tablet po q 6 hr prn severe nausea      • nitroglycerin (NITROSTAT) 0.4 MG SL tablet place 1 tablet under the tongue if needed every 5 minutes for hair...  (REFER TO PRESCRIPTION NOTES).  0    • nystatin (MYCOSTATIN) 100,000 unit/mL suspension SWISH AND SWALLOW 2 ML EVERY 4 HOURS AS NEEDED      • O2 (OXYGEN) Inhale 2 L/min Every Night. W/ CPAP      • OneTouch Verio test strip TEST EVERY DAY      • polyethylene glycol (MIRALAX) 17 g packet Dissolve 1 packet (17 g) in 4 to 8 ounces of beverage and drink by mouth Daily. 30 packet 1    • predniSONE (DELTASONE) 10 MG tablet Daily.      • ranolazine (RANEXA) 1000 MG 12 hr tablet Take 1 tablet by mouth Every 12 (Twelve) Hours. 60 tablet 11    • Umeclidinium-Vilanterol 62.5-25 MCG/INH aerosol powder  Inhale 1 puff Daily. 1 each 11        Medical Review Of Systems:  Constitutional: positive for fatigue  Eyes: negative  Ears, nose, mouth, throat, and face: negative  Respiratory: positive for pneumonia  Cardiovascular: positive for A. Fib  Gastrointestinal: negative  Genitourinary:negative  Integument/breast: positive for bruise on right shoulder  Hematologic/lymphatic: negative  Musculoskeletal:positive for arthralgias    All other systems reviewed and are negative.    Objective     Vital Signs    Temp:  [96.9 °F (36.1 °C)-97.8 °F (36.6 °C)] 97.5 °F (36.4 °C)  Heart Rate:  [58-88] 67  Resp:  [17-18] 18  BP: (104-139)/(55-73) 130/68    Physical Exam:   General Appearance: alert, appears stated age and cooperative,  Hygiene:   fair  Gait & Station: deferred, in bed  Musculoskeletal: No tremors or abnormal involuntary movements    Mental Status Exam:   Cooperation:  Cooperative  Eye Contact:  Good  Psychomotor Behavior:  Appropriate  Mood:  "\"Fine\"  Affect:  normal  Speech:  Normal  Thought Process:  Poverty of thought  Associations: Goal Directed  Thought Content:     Normal   Suicidal:  None   Homicidal:  None   Hallucinations:  None   Delusion:  None  Cognitive Functioning:   Consciousness: awake and alert   Orientation:  Person, Place, Time and Situation   Attention: normal Concentration: Impaired   Language:  Intact Vocabulary: Average   Short Term Memory: Deficits   Long Term Memory: Deficits   Fund of Knowledge: Below Average  Reliability:  adequate  Insight:  limited  Judgement:  limited  Impulse Control:  limited    Diagnostic Data: Results source: EMR     Lab Results (last 72 hours)     Procedure Component Value Units Date/Time    POC Glucose Once [[id number]]  (Abnormal) Collected: 12/11/21 1614    Specimen: Blood Updated: 12/11/21 1637     Glucose 215 mg/dL      Comment: RN NotifiedOperator: [id number] EUGENIO BARRIGAEnergateMeter ID: [id number]       POC Glucose Once [[id number]]  (Abnormal) Collected: 12/11/21 1015    Specimen: Blood Updated: 12/11/21 1028     Glucose 148 mg/dL      Comment: Result Not ConfirmedOperator: [id number] EUGENIO MIRANDAMeter ID: [id number]       Comprehensive Metabolic Panel [[id number]]  (Abnormal) Collected: 12/11/21 0531    Specimen: Blood Updated: 12/11/21 0630     Glucose 134 mg/dL      BUN 57 mg/dL      Creatinine 1.45 mg/dL      Sodium 137 mmol/L      Potassium 4.0 mmol/L      Comment: Slight hemolysis detected by analyzer. Results may be affected.        Chloride 98 mmol/L      CO2 30.0 mmol/L      Calcium 8.8 mg/dL      Total Protein 5.7 g/dL      Albumin 3.40 g/dL      ALT (SGPT) 19 U/L      AST (SGOT) 18 U/L      Comment: Slight hemolysis detected by analyzer. Results may be affected.        Alkaline Phosphatase 68 U/L      Total Bilirubin 0.5 mg/dL      eGFR Non African Amer 46 mL/min/1.73      Globulin 2.3 gm/dL      A/G Ratio 1.5 g/dL      BUN/Creatinine Ratio 39.3     Anion Gap 9.0 mmol/L     Narrative:   "    GFR Normal >60  Chronic Kidney Disease <60  Kidney Failure <15      POC Glucose Once [[id number]]  (Abnormal) Collected: 12/10/21 1604    Specimen: Blood Updated: 12/10/21 1617     Glucose 203 mg/dL      Comment: : [id number] KENDRA Aguilar ID: [id number]       POC Glucose Once [[id number]]  (Abnormal) Collected: 12/10/21 1031    Specimen: Blood Updated: 12/10/21 1054     Glucose 181 mg/dL      Comment: RN NotifiedOperator: [id number] GOSS NITHYAFERMeter ID: [id number]       POC Glucose Once [[id number]]  (Abnormal) Collected: 12/10/21 0653    Specimen: Blood Updated: 12/10/21 0708     Glucose 137 mg/dL      Comment: RN NotifiedOperator: [id number] VANEGAS FAITHMeter ID: [id number]       Basic Metabolic Panel [[id number]]  (Abnormal) Collected: 12/10/21 0510    Specimen: Blood Updated: 12/10/21 0628     Glucose 157 mg/dL      BUN 49 mg/dL      Creatinine 1.39 mg/dL      Sodium 137 mmol/L      Potassium 4.6 mmol/L      Comment: Slight hemolysis detected by analyzer. Results may be affected.        Chloride 97 mmol/L      CO2 30.0 mmol/L      Calcium 8.8 mg/dL      eGFR Non African Amer 48 mL/min/1.73      BUN/Creatinine Ratio 35.3     Anion Gap 10.0 mmol/L     Narrative:      GFR Normal >60  Chronic Kidney Disease <60  Kidney Failure <15      POC Glucose Once [[id number]]  (Abnormal) Collected: 12/09/21 1029    Specimen: Blood Updated: 12/10/21 0441     Glucose 155 mg/dL      Comment: RN NotifiedOperator: [id number] GOSS PHILIPNIFERMeter ID: [id number]       POC Glucose Once [[id number]]  (Abnormal) Collected: 12/09/21 1939    Specimen: Blood Updated: 12/09/21 2306     Glucose 268 mg/dL      Comment: RN NotifiedOperator: [id number] VANEGAS FAITHMeter ID: [id number]       POC Glucose Once [[id number]]  (Abnormal) Collected: 12/09/21 1629    Specimen: Blood Updated: 12/09/21 1654     Glucose 151 mg/dL      Comment: RN NotifiedOperator: [id number] ANA PAULA Ann ID: [id number]       Extra  Tubes [[id number]] Collected: 12/09/21 0626    Specimen: Blood, Venous Line Updated: 12/09/21 1145    Narrative:      The following orders were created for panel order Extra Tubes.  Procedure                               Abnormality         Status                     ---------                               -----------         ------                     Lavender Top[[id number]]                                     Final result                 Please view results for these tests on the individual orders.    Lavender Top [[id number]] Collected: 12/09/21 0626    Specimen: Blood Updated: 12/09/21 1145     Extra Tube hold for add-on     Comment: Auto resulted       Basic Metabolic Panel [[id number]]  (Abnormal) Collected: 12/09/21 0625    Specimen: Blood Updated: 12/09/21 0714     Glucose 122 mg/dL      BUN 38 mg/dL      Creatinine 1.15 mg/dL      Sodium 137 mmol/L      Potassium 4.0 mmol/L      Chloride 100 mmol/L      CO2 29.0 mmol/L      Calcium 9.0 mg/dL      eGFR Non African Amer 60 mL/min/1.73      BUN/Creatinine Ratio 33.0     Anion Gap 8.0 mmol/L     Narrative:      GFR Normal >60  Chronic Kidney Disease <60  Kidney Failure <15      POC Glucose Once [[id number]]  (Normal) Collected: 12/09/21 0638    Specimen: Blood Updated: 12/09/21 0701     Glucose 113 mg/dL      Comment: RN NotifiedOperator: [id number] GAURAV SYBILMeter ID: [id number]       POC Glucose Once [[id number]]  (Normal) Collected: 12/08/21 1630    Specimen: Blood Updated: 12/09/21 0019     Glucose 128 mg/dL      Comment: RN NotifiedOperator: [id number] TANVIR MADISONMeter ID: [id number]       POC Glucose Once [[id number]]  (Abnormal) Collected: 12/08/21 1937    Specimen: Blood Updated: 12/09/21 0011     Glucose 152 mg/dL      Comment: RN NotifiedOperator: [id number] VANEGAS FAITHMeter ID: [id number]           Imaging Results (Last 72 Hours)     ** No results found for the last 72 hours. **          Assessment/Plan       Pneumonia of both lungs due to  infectious organism      Recommendations:    Delirium: patient's confusion appears to be improving.  Patient may have some underlying neurocognitive deficits.  Recommend continued care of underlying medical conditions.  Delirium may take longer than underlying illness to recover.    Pneumonia/Chronic hypoxia: completed IV levaquin.  The levaquin may have contributed to the confusion during treatment.  Chronic hypoxia can cause confusion.    A. Fib: continue amiodarone and metoprolol.  Amiodarone can create confusion and hallucinations in some patients.    Acute on Chronic renal failure: creatinine improved but still slightly higher than baseline.  This can contribute to confusion and delirium.    Shoulder bruise: spoke with nursing to have hospitalist examine further and consider x-ray.    Thank you for allowing me to participate in the care of this patient.  Please contact me with any further questions or concerns.    Solitario Jay MD  12/11/21  16:55 CST

## 2021-12-12 NOTE — THERAPY TREATMENT NOTE
Acute Care - Physical Therapy Treatment Note  [facility]     Patient Name: Hasmukh Ham  : 1933  MRN: [id number]  Today's Date: 2021      Visit Dx:     ICD-10-CM ICD-9-CM   1. Pneumonia of both lungs due to infectious organism, unspecified part of lung  J18.9 483.8   2. Precordial pain  R07.2 786.51   3. Atrial fibrillation, unspecified type ([facility])  I48.91 427.31   4. Impaired mobility and ADLs  Z74.09 V49.89    Z78.9    5. Impaired functional mobility, balance, gait, and endurance  Z74.09 V49.89     Patient Active Problem List   Diagnosis   • Dilated aortic root ([facility])   • Non-rheumatic tricuspid valve insufficiency   • Pulmonary emphysema ([facility])   • Atrial fibrillation and flutter ([facility])   • Bradycardia   • CAD (coronary artery disease)   • Neuropathy   • Abdominal hernia   • Neck pain   • Dementia (CMS/HCC)   • Diabetic neuropathy ([facility])   • Gastroesophageal reflux disease without esophagitis   • Generalized osteoarthritis   • 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 ([facility])   • Nocturia   • Osteoarthritis of multiple joints   • Hyperlipidemia   • Pain in joint involving ankle and foot   • Pneumonia of left lower lobe due to infectious organism   • Arthropathy of hand   • Paroxysmal tachycardia ([facility])   • Osteoarthrosis involving more than one site but not generalized   • Chronic right shoulder pain   • Rotator cuff syndrome   • Partial tear of subscapularis tendon   • Infraspinatus tendon tear   • Supraspinatus tendon tear   • Bilateral carotid artery stenosis   • Pulmonary HTN (HCC)   • Acute interstitial pneumonia (HCC)   • Chronic obstructive lung disease (HCC)   • Diabetes mellitus (HCC)   • Hypertension   • Chest pain   • Shortness of breath   • Angina pectoris (HCC)   • Myocardial infarction ([facility])   • Ingrown toenail   • Heart problem   • Degenerative joint disease involving multiple joints   • Chronic obstructive pulmonary disease (COPD) (HCC)   • Bleeding disorder ([facility])   •  Chronic obstructive pulmonary disease with acute exacerbation (HCC)   • Failure of outpatient treatment   • Sepsis (HCC)   • 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)   • Chronic diastolic CHF (congestive heart failure) ([facility])   • SOB (shortness of breath)   • Cause of injury, fall   • Right hip pain   • Chronic pain of right knee   • Primary osteoarthritis of right knee   • Left shoulder pain   • Left arm pain   • AC joint arthropathy   • Syncope   • Inflammatory arthritis   • Elevated troponin   • Fever   • Paroxysmal atrial fibrillation with rapid ventricular response ([facility])   • Fibrosis of lung (HCC)   • Type 2 diabetes mellitus ([facility])   • Chondrocalcinosis of right knee   • Nontraumatic complete tear of right rotator cuff   • Pneumonia of both lungs due to infectious organism     Past Medical History:   Diagnosis Date   • Basal cell carcinoma    • Bilateral carotid artery stenosis    • Bilateral carotid artery stenosis    • Bleeding disorder ([facility])    • CHF (congestive heart failure) ([facility])    • Chronic obstructive pulmonary disease (COPD) ([facility])    • Coronary arteriosclerosis    • Degenerative joint disease involving multiple joints    • Dementia ([facility])    • Diabetes mellitus ([facility])    • Heart problem    • History of stomach ulcers    • Hyperlipidemia    • Hypertension    • Ingrown toenail    • Myocardial infarction ([facility])      Past Surgical History:   Procedure Laterality Date   • BACK SURGERY     • CARDIAC CATHETERIZATION N/A 6/6/2017    Procedure: Right Heart Cath;  Surgeon: Jeff Maciel MD PhD;  Location: [facility] CATH INVASIVE LOCATION;  Service:    • CARDIAC CATHETERIZATION N/A 2/14/2018    Procedure: Coronary angiography;  Surgeon: Moisés Davila MD;  Location: [facility] CATH INVASIVE LOCATION;  Service:    • CARDIAC CATHETERIZATION N/A 10/28/2021    Procedure: Left Heart Cath;  Surgeon: Carine Johnson MD;  Location: [facility] CATH INVASIVE LOCATION;  Service: Cardiology;   Laterality: N/A;   • CORONARY ANGIOPLASTY WITH STENT PLACEMENT     • CORONARY STENT PLACEMENT     • HERNIA REPAIR     • LUNG BIOPSY     • LUNG SURGERY     • NECK SURGERY     • THORACOTOMY Left 1977   • VENTRAL HERNIA REPAIR       PT Assessment (last 12 hours)     PT Evaluation and Treatment     [facility] Name 12/12/21 1310          Physical Therapy Time and Intention    Document Type therapy note (daily note)  -     Mode of Treatment individual therapy; physical therapy  -     Patient Effort good  -[facility] Name 12/12/21 1310          General Information    Existing Precautions/Restrictions fall  -[facility] Name 12/12/21 1310          Cognition    Affect/Mental Status (Cognitive) WFL  -     Orientation Status (Cognition) oriented x 3  -     Personal Safety Interventions fall prevention program maintained; gait belt; muscle strengthening facilitated; nonskid shoes/slippers when out of bed; supervised activity  -[facility] Name 12/12/21 1310          Pain    Additional Documentation Pain Scale: Numbers Pre/Post-Treatment (Group)  -TESS     Row Name 12/12/21 1310          Pain Scale: Numbers Pre/Post-Treatment    Pretreatment Pain Rating 8/10  -     Posttreatment Pain Rating 8/10  -     Pain Location neck  -[facility] 12/12/21 1310          Pain Scale: Word Pre/Post-Treatment    Pain Intervention(s) Repositioned  -[facility] Name 12/12/21 1310          Bed Mobility    Bed Mobility --  -     Scooting/Bridging [place] (Bed Mobility) --  -     Supine-Sit [place] (Bed Mobility) --  -[facility] Name 12/12/21 1310          Transfers    Transfers sit-stand transfer; stand-sit transfer; other (see comments)  -     Comment (Transfers) pt t/f recliner to chair w/ HHA due to RW being in BR blocked by recliner. pt is very unsteady w/o RW and required assist to prevent fall. pt attempted to sit prematurely.  -     Sit-Stand [place] (Transfers) contact guard; minimum assist (75% patient effort)   -     Stand-Sit [place] (Transfers) contact guard; minimum assist (75% patient effort)  -     Row Name 12/12/21 1310          Sit-Stand Transfer    Assistive Device (Sit-Stand Transfers) walker, front-wheeled  -TESS     Row Name 12/12/21 1310          Stand-Sit Transfer    Assistive Device (Stand-Sit Transfers) walker, front-wheeled  -TESS     Row Name 12/12/21 1310          Gait/Stairs (Locomotion)    Gait/Stairs Locomotion gait/ambulation independence; gait/ambulation assistive device; distance ambulated; gait pattern; gait deviations; maintains weight-bearing status  -     [place] Level (Gait) contact guard; minimum assist (75% patient effort)  -     Assistive Device (Gait) walker, front-wheeled  -     Distance in Feet (Gait) 7, 20, 24  -     Deviations/Abnormal Patterns (Gait) andie decreased; gait speed decreased; stride length decreased  gerda flexed knees/hips  -     Bilateral Gait Deviations forward flexed posture  -     Row Name 12/12/21 1310          Vital Signs    Pre Systolic BP Rehab 118  -TESS     Pre Treatment Diastolic BP 69  -TESS     Post Systolic BP Rehab 116  -TESS     Post Treatment Diastolic BP 78  -TESS     Pretreatment Heart Rate (beats/min) 72  -TESS     Intratreatment Heart Rate (beats/min) 92  -TESS     Posttreatment Heart Rate (beats/min) 90  -TESS     Pre SpO2 (%) 98  -TESS     O2 Delivery Pre Treatment supplemental O2  -TESS     Intra SpO2 (%) 99  -TESS     O2 Delivery Intra Treatment supplemental O2  -TESS     Post SpO2 (%) 99  -TESS     O2 Delivery Post Treatment supplemental O2  -TESS     Pre Patient Position Sitting  -     Post Patient Position Sitting  -     Row Name 12/12/21 1310          Bed Mobility Goal 1 (PT)    Activity/Assistive Device (Bed Mobility Goal 1, PT) sit to supine/supine to sit  -TESS     [place] Level/Cues Needed (Bed Mobility Goal 1, PT) modified independence  -TESS     Time Frame (Bed Mobility Goal 1, PT) by discharge  -     Strategies/Barriers (Bed Mobility  Goal 1, PT) Has hospital bed at home.  -TESS     Progress/Outcomes (Bed Mobility Goal 1, PT) goal not met  -TESS     Row Name 12/12/21 1310          Transfer Goal 1 (PT)    Activity/Assistive Device (Transfer Goal 1, PT) sit-to-stand/stand-to-sit; bed-to-chair/chair-to-bed  -TESS     [place] Level/Cues Needed (Transfer Goal 1, PT) supervision required  -TESS     Time Frame (Transfer Goal 1, PT) by discharge  -TESS     Strategies/Barriers (Transfers Goal 1, PT) Amary holt.  -TESS     Row Name 12/12/21 1310          Gait Training Goal 1 (PT)    Activity/Assistive Device (Gait Training Goal 1, PT) walker, rolling  -TESS     [place] Level (Gait Training Goal 1, PT) supervision required  -TESS     Distance (Gait Training Goal 1, PT) 25'x2 as tolerated.  -TESS     Time Frame (Gait Training Goal 1, PT) by discharge  -TESS     Strategies/Barriers (Gait Training Goal 1, PT) A-mary king.  -TESS     Progress/Outcome (Gait Training Goal 1, PT) goal not met  -TESS     Row Name 12/12/21 1310          Positioning and Restraints    Pre-Treatment Position in bed  -TESS     Post Treatment Position bed  -TESS     In Bed fowlers; call light within reach; encouraged to call for assist; exit alarm on  all needs met  -           User Key  (r) = Recorded By, (t) = Taken By, (c) = Cosigned By    Initials Name Provider Type    Cristobal Sullivan, PTA Physical Therapy Assistant                Physical Therapy Education                 Title: PT OT SLP Therapies (In Progress)     Topic: Physical Therapy (In Progress)     Point: Mobility training (In Progress)     Learning Progress Summary           Patient Acceptance, E, NR by  at 12/12/2021 1436    Acceptance, E, NR by  at 12/12/2021 1436    Acceptance, E,TB, VU by SC at 12/10/2021 1357    Acceptance, E, NR by  at 12/3/2021 1251                   Point: Home exercise program (Done)     Learning Progress Summary           Patient Acceptance, E,TB, VU by SC at 12/10/2021 1357                   Point:  Body mechanics (Done)     Learning Progress Summary           Patient Acceptance, E,TB, VU by SC at 12/10/2021 1357                   Point: Precautions (Done)     Learning Progress Summary           Patient Acceptance, E,TB, VU by SC at 12/10/2021 1357                               User Key     Initials Effective Dates Name Provider Type Discipline     06/16/21 -  Cristobal Mansfield PTA Physical Therapy Assistant PT    SC 07/21/21 -  Mayela Sauceda RN Registered Nurse Nurse              PT Recommendation and Plan  Anticipated Discharge Disposition (PT): home with assist, home with home health  Therapy Frequency (PT): other (see comments) (5-7 days/week)  Plan of Care Reviewed With: patient  Progress: improving  Outcome Summary: pt responded well to PT w/ multiple gait trips up 24 ft CGA-min A w/ RW. pt t/f recliner to chair w/ HHA due to RW in BR, blocked by recliner. pt is very unsteady w/o RW and required assist to prevent fall. no new goals met at this time. pt would continue to benefit from PT services.   Outcome Measures     Row Name 12/12/21 1310 12/11/21 0948          How much help from another person do you currently need...    Turning from your back to your side while in flat bed without using bedrails? 3  - 3  -CA     Moving from lying on back to sitting on the side of a flat bed without bedrails? 3  - 3  -CA     Moving to and from a bed to a chair (including a wheelchair)? 3  - 3  -CA     Standing up from a chair using your arms (e.g., wheelchair, bedside chair)? 3  - 3  -CA     Climbing 3-5 steps with a railing? 3  -TESS 3  -CA     To walk in hospital room? 3  -TESS 3  -CA     AM-PAC 6 Clicks Score (PT) 18  - 18  -CA            Functional Assessment    Outcome Measure Options AM-PAC 6 Clicks Basic Mobility (PT)  - AM-PAC 6 Clicks Basic Mobility (PT)  -CA           User Key  (r) = Recorded By, (t) = Taken By, (c) = Cosigned By    Initials Name Provider Type    CA Piero Giordano PTA Physical  Therapy Assistant    Cristobal Sullivan PTA Physical Therapy Assistant                 Time Calculation:    PT Charges     Row Name 12/12/21 1441             Time Calculation    Start Time 1310  -TESS      Stop Time 1333  -ETSS      Time Calculation (min) 23 min  -TESS              Time Calculation- PT    Total Timed Code Minutes- PT 23 minute(s)  -TESS              Timed Charges    71353 - Gait Training Minutes  15  -TESS      73086 - PT Therapeutic Activity Minutes 8  -TESS              Total Minutes    Timed Charges Total Minutes 23  -TESS       Total Minutes 23  -TESS            User Key  (r) = Recorded By, (t) = Taken By, (c) = Cosigned By    Initials Name Provider Type    Cristobal Sullivan PTA Physical Therapy Assistant              Therapy Charges for Today     Code Description Service Date Service Provider Modifiers Qty    98907236034 HC GAIT TRAINING EA 15 MIN 12/12/2021 Cristobal Mansfield PTA GP 1    80464286245 HC PT THERAPEUTIC ACT EA 15 MIN 12/12/2021 Cristobal Mansfield PTA GP 1          PT G-Codes  Outcome Measure Options: AM-PAC 6 Clicks Basic Mobility (PT)  AM-PAC 6 Clicks Score (PT): 18  AM-PAC 6 Clicks Score (OT): 20    Cristobal Mansfield PTA  12/12/2021

## 2021-12-12 NOTE — NURSING NOTE
Pt wife came to nursing desk states she thinks pt is having a seizer.walked into pt's room, pt is Alert and able to answer all questions appropriate. VSS. No distress noted. Will continue to monitor. MD made aware.     Pt's daughter also inquired about getting assistant living placement for both pt and wife due to their mental status and physical condition. Spoke with Shweta  related to her request.

## 2021-12-12 NOTE — PLAN OF CARE
Problem: Adult Inpatient Plan of Care  Goal: Plan of Care Review  Recent Flowsheet Documentation  Taken 12/12/2021 0941 by Susannah Villalta COTA  Progress: no change  Plan of Care Reviewed With: patient  Outcome Summary: Pt is Min A for toilet t/f. Pt recently had  a bath. Fall prevention discussed with pt. Continue OT POC   Goal Outcome Evaluation:  Plan of Care Reviewed With: patient        Progress: no change  Outcome Summary: Pt is Min A for toilet t/f. Pt recently had  a bath. Fall prevention discussed with pt. Continue OT POC

## 2021-12-12 NOTE — THERAPY TREATMENT NOTE
Patient Name: Hasmukh Ham  : 1933    MRN: [id number]                              Today's Date: 2021       Admit Date: 2021    Visit Dx:     ICD-10-CM ICD-9-CM   1. Pneumonia of both lungs due to infectious organism, unspecified part of lung  J18.9 483.8   2. Precordial pain  R07.2 786.51   3. Atrial fibrillation, unspecified type ([facility])  I48.91 427.31   4. Impaired mobility and ADLs  Z74.09 V49.89    Z78.9    5. Impaired functional mobility, balance, gait, and endurance  Z74.09 V49.89     Patient Active Problem List   Diagnosis   • Dilated aortic root (HCC)   • Non-rheumatic tricuspid valve insufficiency   • Pulmonary emphysema ([facility])   • Atrial fibrillation and flutter ([facility])   • Bradycardia   • CAD (coronary artery disease)   • Neuropathy   • Abdominal hernia   • Neck pain   • Dementia (CMS/HCC)   • Diabetic neuropathy ([facility])   • Gastroesophageal reflux disease without esophagitis   • Generalized osteoarthritis   • 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 ([facility])   • Nocturia   • Osteoarthritis of multiple joints   • Hyperlipidemia   • Pain in joint involving ankle and foot   • Pneumonia of left lower lobe due to infectious organism   • Arthropathy of hand   • Paroxysmal tachycardia ([facility])   • Osteoarthrosis involving more than one site but not generalized   • Chronic right shoulder pain   • Rotator cuff syndrome   • Partial tear of subscapularis tendon   • Infraspinatus tendon tear   • Supraspinatus tendon tear   • Bilateral carotid artery stenosis   • Pulmonary HTN (HCC)   • Acute interstitial pneumonia (HCC)   • Chronic obstructive lung disease (HCC)   • Diabetes mellitus (HCC)   • Hypertension   • Chest pain   • Shortness of breath   • Angina pectoris (HCC)   • Myocardial infarction (HCC)   • Ingrown toenail   • Heart problem   • Degenerative joint disease involving multiple joints   • Chronic obstructive pulmonary disease (COPD) (HCC)   • Bleeding disorder ([facility])   • Chronic  obstructive pulmonary disease with acute exacerbation (HCC)   • Failure of outpatient treatment   • Sepsis (HCC)   • 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)   • Chronic diastolic CHF (congestive heart failure) (HCC)   • SOB (shortness of breath)   • Cause of injury, fall   • Right hip pain   • Chronic pain of right knee   • Primary osteoarthritis of right knee   • Left shoulder pain   • Left arm pain   • AC joint arthropathy   • Syncope   • Inflammatory arthritis   • Elevated troponin   • Fever   • Paroxysmal atrial fibrillation with rapid ventricular response ([facility])   • Fibrosis of lung (HCC)   • Type 2 diabetes mellitus (HCC)   • Chondrocalcinosis of right knee   • Nontraumatic complete tear of right rotator cuff   • Pneumonia of both lungs due to infectious organism     Past Medical History:   Diagnosis Date   • Basal cell carcinoma    • Bilateral carotid artery stenosis    • Bilateral carotid artery stenosis    • Bleeding disorder (HCC)    • CHF (congestive heart failure) ([facility])    • Chronic obstructive pulmonary disease (COPD) ([facility])    • Coronary arteriosclerosis    • Degenerative joint disease involving multiple joints    • Dementia ([facility])    • Diabetes mellitus ([facility])    • Heart problem    • History of stomach ulcers    • Hyperlipidemia    • Hypertension    • Ingrown toenail    • Myocardial infarction ([facility])      Past Surgical History:   Procedure Laterality Date   • BACK SURGERY     • CARDIAC CATHETERIZATION N/A 6/6/2017    Procedure: Right Heart Cath;  Surgeon: Jeff Maciel MD PhD;  Location: [facility] CATH INVASIVE LOCATION;  Service:    • CARDIAC CATHETERIZATION N/A 2/14/2018    Procedure: Coronary angiography;  Surgeon: Moisés Davila MD;  Location: [facility] CATH INVASIVE LOCATION;  Service:    • CARDIAC CATHETERIZATION N/A 10/28/2021    Procedure: Left Heart Cath;  Surgeon: Carine Johnson MD;  Location: [facility] CATH INVASIVE LOCATION;  Service: Cardiology;  Laterality: N/A;    • CORONARY ANGIOPLASTY WITH STENT PLACEMENT     • CORONARY STENT PLACEMENT     • HERNIA REPAIR     • LUNG BIOPSY     • LUNG SURGERY     • NECK SURGERY     • THORACOTOMY Left 1977   • VENTRAL HERNIA REPAIR        General Information     Row Name 12/12/21 0941          OT Time and Intention    Document Type therapy note (daily note)  -BB     Mode of Treatment individual therapy; occupational therapy  -[facility] Name 12/12/21 0941          General Information    Patient Profile Reviewed yes  -BB     Existing Precautions/Restrictions fall  -[facility] Name 12/12/21 0941          Cognition    Orientation Status (Cognition) oriented x 3  -[facility] Name 12/12/21 0941          Safety Issues, Functional Mobility    Impairments Affecting Function (Mobility) strength; endurance/activity tolerance; shortness of breath; balance; pain  -           User Key  (r) = Recorded By, (t) = Taken By, (c) = Cosigned By    Initials Name Provider Type    BB Susannah Villalta COTA Occupational Therapy Assistant                 Mobility/ADL's     Row Name 12/12/21 0941          Bed Mobility    Bed Mobility supine-sit; sit-supine  -BB     Scooting/Bridging [place] (Bed Mobility) not tested  -     Supine-Sit [place] (Bed Mobility) standby assist; contact guard  -BB     Sit-Supine [place] (Bed Mobility) standby assist  -BB     Assistive Device (Bed Mobility) head of bed elevated; bed rails  -[facility] Name 12/12/21 0941          Transfers    Sit-Stand [place] (Transfers) not tested  -     [place] Level (Toilet Transfer) minimum assist (75% patient effort)  -     Assistive Device (Toilet Transfer) commode, bedside without drop arms; grab bars/safety frame; walker, front-wheeled  -[facility] Name 12/12/21 0941          Sit-Stand Transfer    Assistive Device (Sit-Stand Transfers) --  -[facility] Name 12/12/21 0941          Toilet Transfer    Type (Toilet Transfer) sit-stand; stand-sit  -[facility] Name  12/12/21 0941          Functional Mobility    Functional Mobility- Ind. Level --  far side of be >toilet>far side of bed with 2 RBs and HR<110  -BB     Functional Mobility- Device rolling walker  -BB     Row Name 12/12/21 0941          Lower Body Dressing Assessment/Training    [place] Level (Lower Body Dressing) --  increased t/e for socks with pt decline edu on sock aid  -BB     Position (Lower Body Dressing) --  -BB     Row Name 12/12/21 0941          Grooming Assessment/Training    [place] Level (Grooming) grooming skills; wash face, hands; standby assist  -BB     Position (Grooming) edge of bed sitting  -BB     Row Name 12/12/21 0941          Toileting Assessment/Training    [place] Level (Toileting) --  -BB     Assistive Devices (Toileting) --  -BB     Position (Toileting) edge of bed sitting  -BB     Row Name 12/12/21 0941          Bathing Assessment/Intervention    [place] Level (Bathing) --  -BB     Assistive Devices (Bathing) --  -BB     Position (Bathing) --  -BB           User Key  (r) = Recorded By, (t) = Taken By, (c) = Cosigned By    Initials Name Provider Type    Susannah Cuevas COTA Occupational Therapy Assistant               Obj/Interventions     Row Name 12/12/21 0941          Range of Motion Comprehensive    General Range of Motion bilateral lower extremity ROM WFL  -BB           User Key  (r) = Recorded By, (t) = Taken By, (c) = Cosigned By    Initials Name Provider Type    Susannah Cuevas COTA Occupational Therapy Assistant               Goals/Plan     Row Name 12/12/21 0941          Transfer Goal 1 (OT)    Activity/Assistive Device (Transfer Goal 1, OT) toilet  -BB     [place] Level/Cues Needed (Transfer Goal 1, OT) modified independence  -BB     Time Frame (Transfer Goal 1, OT) long term goal (LTG); by discharge  -BB     Progress/Outcome (Transfer Goal 1, OT) goal not met  -BB     Row Name 12/12/21 0941          Bathing Goal 1 (OT)    Activity/Device  (Bathing Goal 1, OT) bathing skills, all  -BB     [place] Level/Cues Needed (Bathing Goal 1, OT) supervision required  -BB     Time Frame (Bathing Goal 1, OT) long term goal (LTG); by discharge  -BB     Progress/Outcomes (Bathing Goal 1, OT) goal not met  -BB     Row Name 12/12/21 0941          Dressing Goal 1 (OT)    Activity/Device (Dressing Goal 1, OT) lower body dressing  -BB     [place]/Cues Needed (Dressing Goal 1, OT) supervision required  -BB     Time Frame (Dressing Goal 1, OT) long term goal (LTG); by discharge  -BB     Progress/Outcome (Dressing Goal 1, OT) goal not met  -BB           User Key  (r) = Recorded By, (t) = Taken By, (c) = Cosigned By    Initials Name Provider Type    BB Susannah Villalta COTA Occupational Therapy Assistant               Clinical Impression     Row Name 12/12/21 0941          Pain Scale: Numbers Pre/Post-Treatment    Pretreatment Pain Rating 7/10  -BB     Posttreatment Pain Rating 8/10  -BB     Pain Location - Orientation --  generalized  -BB     Pain Intervention(s) Repositioned  -BB     Row Name 12/12/21 0941          Plan of Care Review    Plan of Care Reviewed With patient  -BB     Progress no change  -BB     Outcome Summary Pt is Min A for toilet t/f. Pt recently had  a bath. Fall prevention discussed with pt. Continue OT POC  -BB     Row Name 12/12/21 0941          Therapy Assessment/Plan (OT)    Rehab Potential (OT) good, to achieve stated therapy goals  -BB     Criteria for Skilled Therapeutic Interventions Met (OT) yes; skilled treatment is necessary  -BB     Therapy Frequency (OT) other (see comments)  5-7 days a week  -BB     Row Name 12/12/21 0941          Therapy Plan Review/Discharge Plan (OT)    Anticipated Discharge Disposition (OT) home with home health; home with 24/7 care  -BB     Row Name 12/12/21 0941          Vital Signs    Posttreatment Heart Rate (beats/min) 88  -BB     Pre SpO2 (%) 96  -BB     O2 Delivery Pre Treatment supplemental O2  -BB      Pre Patient Position Supine  -BB     Row Name 12/12/21 0941          Positioning and Restraints    Pre-Treatment Position in bed  -BB     Post Treatment Position bed  -BB     In Bed fowlers; call light within reach; encouraged to call for assist; exit alarm on  -BB           User Key  (r) = Recorded By, (t) = Taken By, (c) = Cosigned By    Initials Name Provider Type    Susannah Cuevas COTA Occupational Therapy Assistant               Outcome Measures     Row Name 12/12/21 0941          How much help from another is currently needed...    Bathing (including washing, rinsing, and drying) 3  -BB     Toileting (which includes using toilet bed pan or urinal) 3  -BB     Putting on and taking off regular upper body clothing 3  -BB     Taking care of personal grooming (such as brushing teeth) 4  -BB     Eating meals 4  -BB           User Key  (r) = Recorded By, (t) = Taken By, (c) = Cosigned By    Initials Name Provider Type    Susannah Cuevas COTA Occupational Therapy Assistant                Occupational Therapy Education                 Title: PT OT SLP Therapies (In Progress)     Topic: Occupational Therapy (In Progress)     Point: ADL training (In Progress)     Description:   Instruct learner(s) on proper safety adaptation and remediation techniques during self care or transfers.   Instruct in proper use of assistive devices.              Learning Progress Summary           Patient Acceptance, E, NR by  at 12/12/2021 1347    Acceptance, E,TB, VU by SC at 12/10/2021 1357    Acceptance, E, NR by  at 12/6/2021 1200                   Point: Home exercise program (Done)     Description:   Instruct learner(s) on appropriate technique for monitoring, assisting and/or progressing therapeutic exercises/activities.              Learning Progress Summary           Patient Acceptance, E,TB, VU by SC at 12/10/2021 1357                   Point: Precautions (Done)     Description:   Instruct learner(s) on  prescribed precautions during self-care and functional transfers.              Learning Progress Summary           Patient Acceptance, E,TB, VU by SC at 12/10/2021 1357    Acceptance, E,TB, VU by  at 12/2/2021 1325    Comment: POC, role of OT, transfer training                   Point: Body mechanics (Done)     Description:   Instruct learner(s) on proper positioning and spine alignment during self-care, functional mobility activities and/or exercises.              Learning Progress Summary           Patient Acceptance, E,TB, VU by SC at 12/10/2021 1357    Acceptance, E, NR by  at 12/6/2021 1200    Acceptance, E,TB, VU by  at 12/2/2021 1325    Comment: POC, role of OT, transfer training                               User Key     Initials Effective Dates Name Provider Type Discipline     06/16/21 -  Susannah Villalta COTA Occupational Therapy Assistant OT     06/14/21 -  Israel Black, OT Occupational Therapist OT    SC 07/21/21 -  Mayela Sauceda RN Registered Nurse Nurse              OT Recommendation and Plan  Therapy Frequency (OT): other (see comments) (5-7 days a week)  Plan of Care Review  Plan of Care Reviewed With: patient  Progress: no change  Outcome Summary: Pt is Min A for toilet t/f. Pt recently had  a bath. Fall prevention discussed with pt. Continue OT POC     Time Calculation:    Time Calculation- OT     Row Name 12/12/21 1349             Time Calculation- OT    OT Start Time 0941  -BB      OT Stop Time 1005  -BB      OT Time Calculation (min) 24 min  -BB      Total Timed Code Minutes- OT 24 minute(s)  -BB      OT Received On 12/12/21  -BB              Timed Charges    54257 - OT Self Care/Mgmt Minutes 24  -BB              Total Minutes    Timed Charges Total Minutes 24  -BB       Total Minutes 24  -BB            User Key  (r) = Recorded By, (t) = Taken By, (c) = Cosigned By    Initials Name Provider Type    BB Susannah Villalta COTA Occupational Therapy Assistant               Therapy Charges for Today     Code Description Service Date Service Provider Modifiers Qty    [id number] HC OT SELF CARE/MGMT/TRAIN EA 15 MIN 12/12/2021 Susannah Villalta, KRYSTIN GO 2               KRYSTIN Morrison  12/12/2021

## 2021-12-12 NOTE — PROGRESS NOTES
[facility] Medicine Services  INPATIENT PROGRESS NOTE    Length of Stay: 10  Date of Admission: 12/1/2021  Primary Care Physician: Paolo Rey MD    Subjective   Chief Complaint: Confusion  HPI:    No acute events overnight. R shoulder x-rayed overnight, negative. No CP, soa. Still awaiting insurance pre-auth.     Review of Systems   Unable to perform ROS: Dementia   Respiratory: Negative for shortness of breath.    Cardiovascular: Negative for chest pain.      All pertinent negatives and positives are as above. All other systems have been reviewed and are negative unless otherwise stated.     Objective    Temp:  [96.6 °F (35.9 °C)-98.4 °F (36.9 °C)] 97.5 °F (36.4 °C)  Heart Rate:  [60-88] 88  Resp:  [16-18] 16  BP: (115-142)/(59-78) 121/62  Physical Exam  Vitals and nursing note reviewed.   Constitutional:       Appearance: He is well-developed.   HENT:      Head: Normocephalic.   Cardiovascular:      Rate and Rhythm: Normal rate and regular rhythm.      Heart sounds: Normal heart sounds. No murmur heard.  No friction rub. No gallop.    Pulmonary:      Breath sounds: No stridor.   Abdominal:      General: There is no distension.      Palpations: Abdomen is soft. There is no mass.      Tenderness: There is no abdominal tenderness. There is no guarding.      Hernia: No hernia is present.   Musculoskeletal:         General: No deformity.   Skin:     General: Skin is warm and dry.      Findings: Bruising (r shoulder) present. No erythema.   Neurological:      Mental Status: He is oriented to person, place, and time. Mental status is at baseline.   Psychiatric:         Behavior: Behavior normal.       Results Review:  I have reviewed the labs, radiology results, and diagnostic studies.    Laboratory Data:   Lab Results (last 24 hours)     Procedure Component Value Units Date/Time    POC Glucose Once [[id number]]  (Abnormal) Collected: 12/12/21 1039    Specimen: Blood  Updated: 12/12/21 1053     Glucose 168 mg/dL      Comment: RN NotifiedOperator: [id number] EUGENIO BARRIGAFERMeter ID: [id number]       POC Glucose Once [[id number]]  (Abnormal) Collected: 12/12/21 0617    Specimen: Blood Updated: 12/12/21 0631     Glucose 181 mg/dL      Comment: RN NotifiedOperator: [id number] ANSHU MCGEENMeter ID: [id number]       POC Glucose Once [[id number]]  (Normal) Collected: 12/12/21 0357    Specimen: Blood Updated: 12/12/21 0504     Glucose 127 mg/dL      Comment: RN NotifiedOperator: [id number] ANSHU MCGEENMeter ID: [id number]       POC Glucose Once [[id number]]  (Abnormal) Collected: 12/11/21 2007    Specimen: Blood Updated: 12/11/21 2114     Glucose 240 mg/dL      Comment: RN NotifiedOperator: [id number] ANSHU MCGEENMeter ID: [id number]       POC Glucose Once [[id number]]  (Abnormal) Collected: 12/11/21 1614    Specimen: Blood Updated: 12/11/21 1637     Glucose 215 mg/dL      Comment: RN NotifiedOperator: [id number] EUGENIO BARRIGAFERMeter ID: [id number]             Culture Data:   No results found for: BLOODCX  No results found for: URINECX  No results found for: RESPCX  No results found for: WOUNDCX  No results found for: STOOLCX  No components found for: BODYFLD    Radiology Data:   Imaging Results (Last 24 Hours)     Procedure Component Value Units Date/Time    XR Shoulder 2+ View Right [[id number]] Collected: 12/11/21 1942     Updated: 12/12/21 0052    Narrative:      XR SHOULDER 2 OR MORE VIEWS    INDICATION: 88 years Male; ecchymosis, possible injury, J18.9  Pneumonia, unspecified organism R07.2 Precordial pain I48.91  Unspecified atrial fibrillation Z74.09 Other reduced mobility  Z78.9 Other specified health status Z74.09 Other reduced mobility    Technique: 3 views of the right shoulder.    COMPARISON: 6/25/2020.    FINDINGS:  Bones: No acute fracture or subluxation of the acromioclavicular  joint or glenohumeral joint. Decrease distance between the  humeral head and acromion  with bone-on-bone appearance,  associated with subchondral sclerosis and subchondral cystic  change concerning for chronic rotator cuff tear. Mild joint space  narrowing at the acromioclavicular joint with moderate osteophyte  formation.  Soft tissues: Unremarkable.  Incidental findings: None.      Impression:      No acute fracture or subluxation.  Decrease distance between the humeral head and acromion with  bone-on-bone appearance, associated with subchondral sclerosis  and subchondral cystic change concerning for chronic rotator cuff  tear.    Electronically signed by:  Julio César Pineda  12/12/2021 12:51 AM CST  Workstation: [id number]          I have reviewed the patient's current medications.     Assessment/Plan     Active Hospital Problems    Diagnosis    • Pneumonia of both lungs due to infectious organism        Pneumonia-resolving, has finished 7 days of IV Levaquin    Chronic hypoxic respiratory failure-On prednisone 10 mg chronically for fibrotic lungs, ucrrently on prednisone 20 mg, wean slowly. patient is on supplemental oxygen at home, on room air here.     Atrial fibrillation- currently rate and rhythm-controlled on amiodarone, metoprolol.    Delirium-he does have baseline dementia and has intermittent confusion, we will continue providing supportive care    Acute on chronic renal failure-monitor    Hypertension-continue monitor blood pressure    COPD-continue with nebulizer treatments as needed    R shoulder ecchymosis-X-ray negative for acute issue, but shows what is likely chronic rotator cuff tear and can be followed-up outpatient.    Deconditioning-continue with PT OT    Disposition-Case management is working for placement in swing bed in [place], likely Monday at earliest due to insurance pre-auth requirements    DVT prophylaxis-SCD    Patient is full code    I confirmed that the patient's Advance Care Plan is present, code status is documented, or surrogate decision maker is listed in the  patient's medical record.       Gabriel Gonzalez DO   12/12/21   12:23 CST

## 2021-12-13 PROBLEM — F03.90 DEMENTIA (HCC): Status: ACTIVE | Noted: 2021-01-01

## 2021-12-13 PROBLEM — N18.30 CKD (CHRONIC KIDNEY DISEASE) STAGE 3, GFR 30-59 ML/MIN: Chronic | Status: ACTIVE | Noted: 2021-01-01

## 2021-12-13 PROBLEM — J96.11 CHRONIC RESPIRATORY FAILURE WITH HYPOXIA (HCC): Chronic | Status: ACTIVE | Noted: 2021-01-01

## 2021-12-13 PROBLEM — N18.30 CKD (CHRONIC KIDNEY DISEASE) STAGE 3, GFR 30-59 ML/MIN (HCC): Status: ACTIVE | Noted: 2021-01-01

## 2021-12-13 PROBLEM — J84.10 PULMONARY FIBROSIS: Chronic | Status: ACTIVE | Noted: 2017-03-02

## 2021-12-13 PROBLEM — I48.20 ATRIAL FIBRILLATION, CHRONIC (HCC): Status: ACTIVE | Noted: 2021-01-01

## 2021-12-13 PROBLEM — I48.20 ATRIAL FIBRILLATION, CHRONIC (HCC): Chronic | Status: ACTIVE | Noted: 2021-01-01

## 2021-12-13 PROBLEM — J96.11 CHRONIC RESPIRATORY FAILURE WITH HYPOXIA (HCC): Status: ACTIVE | Noted: 2021-01-01

## 2021-12-13 PROBLEM — F03.90 DEMENTIA (HCC): Chronic | Status: ACTIVE | Noted: 2021-01-01

## 2021-12-13 NOTE — PAYOR COMM NOTE
"    Mita Caballero RN [facility]  [phone]      Phone  [phone]       Fax  Cont. Stay Review      Hasmukh Ham (88 y.o. Male)             Date of Birth Social Security Number Address Home Phone MRN    06/01/1933  [address] [place] [address] [phone] [id number]    Islam Marital Status             [place]        Admission Date Admission Type Admitting Provider Attending Provider Department, Room/Bed    12/1/21 Emergency Christal Gonzalez MD Gerlach, Elizabeth T, MD [facility] [address], 319/1    Discharge Date Discharge Disposition Discharge Destination                         Attending Provider: Christal Gonzalez MD    Allergies: Atorvastatin, Penicillins, Azithromycin, Cefdinir, Clarithromycin, Pravastatin, Benadryl Allergy [Diphenhydramine Hcl]    Isolation: None   Infection: None   Code Status: CPR   Advance Care Planning Activity    Ht: 170.2 cm (67\")   Wt: 66.6 kg (146 lb 14.4 oz)    Admission Cmt: None   Principal Problem: None                Active Insurance as of 12/1/2021     Primary Coverage     Payor Plan Insurance Group Employer/Plan Group    AETNA MEDICARE REPLACEMENT AETNA MEDICARE REPLACEMENT [id number]     Payor Plan Address Payor Plan Phone Number Payor Plan Fax Number Effective Dates    [address] [phone]  4/1/2019 - None Entered    [facility] [id number]       Subscriber Name Subscriber Birth Date Member ID       HASMUKH HAM 6/1/1933 [id number]                 Emergency Contacts      (Rel.) Home Phone Work Phone Mobile Phone    Rimma Durant (Spouse) [phone] -- [phone]    Ilan Ham (Brother) -- -- [phone]            Vital Signs (last day)     Date/Time Temp Temp src Pulse Resp BP Patient Position SpO2    12/13/21 0807 -- -- 68 18 -- -- 99    12/13/21 0737 97.3 (36.3) Temporal 68 18 112/58 Lying 100    12/13/21 0723 -- -- 56 " -- -- -- --    12/13/21 0420 96.9 (36.1) Oral 59 18 108/61 Lying 99    12/13/21 0041 -- -- 74 -- -- -- --    12/12/21 2325 98 (36.7) Oral 77 16 122/60 Lying 95    12/12/21 1931 96.9 (36.1) Oral 80 16 105/59 Lying 96    12/12/21 1725 -- -- 80 16 125/77 Lying 99    12/12/21 1623 -- -- 95 -- -- -- --    12/12/21 1546 97.5 (36.4) Temporal 75 16 131/60 Lying 96    12/12/21 1509 -- -- 77 -- -- -- 96    12/12/21 1127 97.5 (36.4) Temporal 88 16 121/62 Lying 95    12/12/21 1100 -- -- 85 16 -- -- 99    12/12/21 0746 97.5 (36.4) Temporal 75 18 142/67 Lying 100    12/12/21 0722 -- -- 61 -- -- -- --    12/12/21 0351 97.9 (36.6) Oral 71 18 131/68 Lying 96    12/12/21 0038 -- -- 68 -- -- -- --          Oxygen Therapy (last day)     Date/Time SpO2 Device (Oxygen Therapy) Flow (L/min) Oxygen Concentration (%) ETCO2 (mmHg)    12/13/21 0807 99 nasal cannula 2 -- --    12/13/21 0737 100 -- -- -- --    12/13/21 0420 99 nasal cannula -- -- --    12/12/21 2325 95 nasal cannula -- -- --    12/12/21 1931 96 nasal cannula -- -- --    12/12/21 1725 99 room air -- -- --    12/12/21 1546 96 -- -- -- --    12/12/21 1509 96 room air -- -- --    12/12/21 1127 95 room air -- -- --    12/12/21 1100 99 room air -- -- --    12/12/21 0746 100 room air -- -- --    12/12/21 0351 96 room air -- -- --          Current Facility-Administered Medications   Medication Dose Route Frequency Provider Last Rate Last Admin   • acetaminophen (TYLENOL) tablet 650 mg  650 mg Oral Q4H PRN Christal oGnzalez MD   650 mg at 12/10/21 1343   • albuterol (PROVENTIL) nebulizer solution 0.083% 2.5 mg/3mL  2.5 mg Nebulization Q4H PRN Christal Gonzalez MD   2.5 mg at 12/12/21 1059   • amiodarone (PACERONE) tablet 200 mg  200 mg Oral Q24H Shari Jones APRN   200 mg at 12/13/21 0826   • cetirizine (zyrTEC) tablet 5 mg  5 mg Oral Daily Christal Gonzalez MD   5 mg at 12/12/21 0827   • clopidogrel (PLAVIX) tablet 75 mg  75 mg Oral Daily Christal Gonzalez MD    75 mg at 12/13/21 0826   • dextrose (D50W) (25 g/50 mL) IV injection 25 g  25 g Intravenous Q15 Min PRN Kelvin Aguilar MD       • dextrose (GLUTOSE) oral gel 15 g  15 g Oral Q15 Min PRN Kelvin Aguilar MD       • famotidine (PEPCID) tablet 20 mg  20 mg Oral BID Christal Kirby MD   20 mg at 12/13/21 0826   • First Mouthwash (Magic Mouthwash) 10 mL  10 mL Swish & Spit 4x Daily PRN Behroozi, Saeid, MD   10 mL at 12/10/21 1129   • furosemide (LASIX) tablet 20 mg  20 mg Oral BID Jennifer George MD   20 mg at 12/13/21 0616   • glucagon (human recombinant) (GLUCAGEN DIAGNOSTIC) injection 1 mg  1 mg Subcutaneous Q15 Min PRN Kelvin Aguilar MD       • HYDROcodone-acetaminophen (NORCO) 7.5-325 MG per tablet 1 tablet  1 tablet Oral Q6H PRN Christal Gonzalez MD   1 tablet at 12/13/21 0223   • insulin aspart (novoLOG) injection 0-9 Units  0-9 Units Subcutaneous TID AC Kelvin Aguilar MD   4 Units at 12/12/21 1713   • ipratropium-albuterol (DUO-NEB) nebulizer solution 3 mL  3 mL Nebulization 4x Daily - RT Kelvin Aguilar MD   3 mL at 12/13/21 0807   • lactulose (CHRONULAC) 10 GM/15ML solution 10 g  10 g Oral Daily Jennifer George MD   10 g at 12/11/21 0854   • lisinopril (PRINIVIL,ZESTRIL) tablet 5 mg  5 mg Oral Q24H Jennifer George MD   5 mg at 12/13/21 0826   • melatonin tablet 3 mg  3 mg Oral Nightly PRN Behroozi, Saeid, MD   3 mg at 12/05/21 2024   • metoprolol tartrate (LOPRESSOR) tablet 25 mg  25 mg Oral Q12H Shari Jones APRN   25 mg at 12/13/21 0826   • nitroglycerin (NITROSTAT) SL tablet 0.4 mg  0.4 mg Sublingual Q5 Min PRN Christal Gonzalez MD       • ondansetron (ZOFRAN) injection 4 mg  4 mg Intravenous Q4H PRN Karan Segovia MD   4 mg at 12/04/21 1631   • polyethylene glycol (MIRALAX) packet 17 g  17 g Oral Daily Christal Gonzalez MD   17 g at 12/11/21 0854   • predniSONE (DELTASONE) tablet 20 mg  20 mg Oral Daily With Breakfast Karan Segovia MD   20 mg at  12/13/21 0826   • ranolazine (RANEXA) 12 hr tablet 1,000 mg  1,000 mg Oral Q12H Christal Gonzalez MD   1,000 mg at 12/13/21 0826   • sodium chloride 0.9 % flush 10 mL  10 mL Intravenous PRN Zack Castro MD       • sodium chloride 0.9 % flush 10 mL  10 mL Intravenous Q12H Kelvin Aguilar MD   10 mL at 12/13/21 0827   • sodium chloride 0.9 % flush 10 mL  10 mL Intravenous PRN Kelvin Aguilar MD            Physician Progress Notes (last 48 hours)      Gabriel Gonzalez DO at 12/12/21 1223              [facility] Medicine Services  INPATIENT PROGRESS NOTE    Length of Stay: 10  Date of Admission: 12/1/2021  Primary Care Physician: Paolo Rey MD    Subjective   Chief Complaint: Confusion  HPI:    No acute events overnight. R shoulder x-rayed overnight, negative. No CP, soa. Still awaiting insurance pre-auth.     Review of Systems   Unable to perform ROS: Dementia   Respiratory: Negative for shortness of breath.    Cardiovascular: Negative for chest pain.      All pertinent negatives and positives are as above. All other systems have been reviewed and are negative unless otherwise stated.     Objective    Temp:  [96.6 °F (35.9 °C)-98.4 °F (36.9 °C)] 97.5 °F (36.4 °C)  Heart Rate:  [60-88] 88  Resp:  [16-18] 16  BP: (115-142)/(59-78) 121/62  Physical Exam  Vitals and nursing note reviewed.   Constitutional:       Appearance: He is well-developed.   HENT:      Head: Normocephalic.   Cardiovascular:      Rate and Rhythm: Normal rate and regular rhythm.      Heart sounds: Normal heart sounds. No murmur heard.  No friction rub. No gallop.    Pulmonary:      Breath sounds: No stridor.   Abdominal:      General: There is no distension.      Palpations: Abdomen is soft. There is no mass.      Tenderness: There is no abdominal tenderness. There is no guarding.      Hernia: No hernia is present.   Musculoskeletal:         General: No deformity.   Skin:     General:  Skin is warm and dry.      Findings: Bruising (r shoulder) present. No erythema.   Neurological:      Mental Status: He is oriented to person, place, and time. Mental status is at baseline.   Psychiatric:         Behavior: Behavior normal.       Results Review:  I have reviewed the labs, radiology results, and diagnostic studies.    Laboratory Data:   Lab Results (last 24 hours)     Procedure Component Value Units Date/Time    POC Glucose Once [[id number]]  (Abnormal) Collected: 12/12/21 1039    Specimen: Blood Updated: 12/12/21 1053     Glucose 168 mg/dL      Comment: RN NotifiedOperator: [id number] MATAD PHILIPNIFERMeter ID: [id number]       POC Glucose Once [[id number]]  (Abnormal) Collected: 12/12/21 0617    Specimen: Blood Updated: 12/12/21 0631     Glucose 181 mg/dL      Comment: RN NotifiedOperator: [id number] ANSHU GOLDMANARYNMeter ID: [id number]       POC Glucose Once [[id number]]  (Normal) Collected: 12/12/21 0357    Specimen: Blood Updated: 12/12/21 0504     Glucose 127 mg/dL      Comment: RN NotifiedOperator: [id number] ANSHU KATARYNMeter ID: [id number]       POC Glucose Once [[id number]]  (Abnormal) Collected: 12/11/21 2007    Specimen: Blood Updated: 12/11/21 2114     Glucose 240 mg/dL      Comment: RN NotifiedOperator: [id number] ANSHU GOLDMANARYNMeter ID: [id number]       POC Glucose Once [[id number]]  (Abnormal) Collected: 12/11/21 1614    Specimen: Blood Updated: 12/11/21 1637     Glucose 215 mg/dL      Comment: RN NotifiedOperator: [id number] MATAD PHILIPNIFERMeter ID: [id number]             Culture Data:   No results found for: BLOODCX  No results found for: URINECX  No results found for: RESPCX  No results found for: WOUNDCX  No results found for: STOOLCX  No components found for: BODYFLD    Radiology Data:   Imaging Results (Last 24 Hours)     Procedure Component Value Units Date/Time    XR Shoulder 2+ View Right [[id number]] Collected: 12/11/21 1942     Updated: 12/12/21 0052    Narrative:      XR  SHOULDER 2 OR MORE VIEWS    INDICATION: 88 years Male; ecchymosis, possible injury, J18.9  Pneumonia, unspecified organism R07.2 Precordial pain I48.91  Unspecified atrial fibrillation Z74.09 Other reduced mobility  Z78.9 Other specified health status Z74.09 Other reduced mobility    Technique: 3 views of the right shoulder.    COMPARISON: 6/25/2020.    FINDINGS:  Bones: No acute fracture or subluxation of the acromioclavicular  joint or glenohumeral joint. Decrease distance between the  humeral head and acromion with bone-on-bone appearance,  associated with subchondral sclerosis and subchondral cystic  change concerning for chronic rotator cuff tear. Mild joint space  narrowing at the acromioclavicular joint with moderate osteophyte  formation.  Soft tissues: Unremarkable.  Incidental findings: None.      Impression:      No acute fracture or subluxation.  Decrease distance between the humeral head and acromion with  bone-on-bone appearance, associated with subchondral sclerosis  and subchondral cystic change concerning for chronic rotator cuff  tear.    Electronically signed by:  Julio César Pineda  12/12/2021 12:51 AM CST  Workstation: [id number]          I have reviewed the patient's current medications.     Assessment/Plan     Active Hospital Problems    Diagnosis    • Pneumonia of both lungs due to infectious organism        Pneumonia-resolving, has finished 7 days of IV Levaquin    Chronic hypoxic respiratory failure-On prednisone 10 mg chronically for fibrotic lungs, ucrrently on prednisone 20 mg, wean slowly. patient is on supplemental oxygen at home, on room air here.     Atrial fibrillation- currently rate and rhythm-controlled on amiodarone, metoprolol.    Delirium-he does have baseline dementia and has intermittent confusion, we will continue providing supportive care    Acute on chronic renal failure-monitor    Hypertension-continue monitor blood pressure    COPD-continue with nebulizer treatments as  needed    R shoulder ecchymosis-X-ray negative for acute issue, but shows what is likely chronic rotator cuff tear and can be followed-up outpatient.    Deconditioning-continue with PT OT    Disposition-Case management is working for placement in swing bed in [place], likely Monday at earliest due to insurance pre-auth requirements    DVT prophylaxis-SCD    Patient is full code    I confirmed that the patient's Advance Care Plan is present, code status is documented, or surrogate decision maker is listed in the patient's medical record.       Gabriel Gonzalez DO   12/12/21   12:23 CST      Electronically signed by Gabriel Gonzalez DO at 12/12/21 1307     Gabriel Gonzalez DO at 12/11/21 1238              [facility] Medicine Services  INPATIENT PROGRESS NOTE    Length of Stay: 9  Date of Admission: 12/1/2021  Primary Care Physician: Paolo Rey MD    Subjective   Chief Complaint: Confusion  HPI:    No acute events overnight. He feels somewhat sleepy this AM. But no dyspnea, CP, leg swelling. His wife is present and she is wanting Hasmukh to tell me about injury that happened to his right shoulder, but the patient cannot recall. Wife states injury occurred on Monday and that he has been confused with agitation.       Review of Systems   Unable to perform ROS: Dementia   Respiratory: Negative for shortness of breath.    Cardiovascular: Negative for chest pain.      All pertinent negatives and positives are as above. All other systems have been reviewed and are negative unless otherwise stated.     Objective    Temp:  [96.9 °F (36.1 °C)-97.8 °F (36.6 °C)] 97.1 °F (36.2 °C)  Heart Rate:  [58-90] 58  Resp:  [17-18] 17  BP: (104-139)/(55-73) 127/73  Physical Exam  Vitals and nursing note reviewed.   Constitutional:       Appearance: He is well-developed.   HENT:      Head: Normocephalic.   Cardiovascular:      Rate and Rhythm: Normal rate and regular rhythm.      Heart sounds:  Normal heart sounds. No murmur heard.  No friction rub. No gallop.    Pulmonary:      Breath sounds: No stridor.   Abdominal:      General: There is no distension.      Palpations: Abdomen is soft. There is no mass.      Tenderness: There is no abdominal tenderness. There is no guarding.      Hernia: No hernia is present.   Musculoskeletal:         General: No deformity.   Skin:     General: Skin is warm and dry.      Findings: No erythema.   Neurological:      Mental Status: He is oriented to person, place, and time. Mental status is at baseline.   Psychiatric:         Behavior: Behavior normal.       Results Review:  I have reviewed the labs, radiology results, and diagnostic studies.    Laboratory Data:   Lab Results (last 24 hours)     Procedure Component Value Units Date/Time    POC Glucose Once [[id number]]  (Abnormal) Collected: 12/11/21 1015    Specimen: Blood Updated: 12/11/21 1028     Glucose 148 mg/dL      Comment: Result Not ConfirmedOperator: [id number] EUGENIO PALACIOSNIFERMeter ID: [id number]       Comprehensive Metabolic Panel [[id number]]  (Abnormal) Collected: 12/11/21 0531    Specimen: Blood Updated: 12/11/21 0630     Glucose 134 mg/dL      BUN 57 mg/dL      Creatinine 1.45 mg/dL      Sodium 137 mmol/L      Potassium 4.0 mmol/L      Comment: Slight hemolysis detected by analyzer. Results may be affected.        Chloride 98 mmol/L      CO2 30.0 mmol/L      Calcium 8.8 mg/dL      Total Protein 5.7 g/dL      Albumin 3.40 g/dL      ALT (SGPT) 19 U/L      AST (SGOT) 18 U/L      Comment: Slight hemolysis detected by analyzer. Results may be affected.        Alkaline Phosphatase 68 U/L      Total Bilirubin 0.5 mg/dL      eGFR Non African Amer 46 mL/min/1.73      Globulin 2.3 gm/dL      A/G Ratio 1.5 g/dL      BUN/Creatinine Ratio 39.3     Anion Gap 9.0 mmol/L     Narrative:      GFR Normal >60  Chronic Kidney Disease <60  Kidney Failure <15      POC Glucose Once [[id number]]  (Abnormal) Collected: 12/10/21 9578     Specimen: Blood Updated: 12/10/21 1617     Glucose 203 mg/dL      Comment: : [id number] KENDRA Aguilar ID: [id number]             Culture Data:   No results found for: BLOODCX  No results found for: URINECX  No results found for: RESPCX  No results found for: WOUNDCX  No results found for: STOOLCX  No components found for: BODYFLD    Radiology Data:   Imaging Results (Last 24 Hours)     ** No results found for the last 24 hours. **          I have reviewed the patient's current medications.     Assessment/Plan     Active Hospital Problems    Diagnosis    • Pneumonia of both lungs due to infectious organism        Pneumonia-resolving, has finished 7 days of IV Levaquin    Chronic hypoxic respiratory failure-patient is on supplemental oxygen at home, on room air here    Atrial fibrillation- currently rate and rhythm-controlled on amiodarone, metoprolol.    Delirium-he does have baseline dementia and has intermittent confusion, we will continue providing supportive care    Acute on chronic renal failure-continue monitor creatinine level    Hypertension-continue monitor blood pressure    COPD-continue with nebulizer treatments as needed    Deconditioning-continue with PT OT    Disposition-Case management is working for placement in swing bed in [place], likely Monday at earliest due to insurance pre-auth requirements    DVT prophylaxis-SCD    Patient is full code    I confirmed that the patient's Advance Care Plan is present, code status is documented, or surrogate decision maker is listed in the patient's medical record.       Gabriel Gonzalez DO   12/11/21   12:38 CST      Electronically signed by Gabriel Gonzalez DO at 12/11/21 1244       Medical Student Notes (last 24 hours)  Notes from 12/12/21 0956 through 12/13/21 0956   No notes of this type exist for this encounter.         Consult Notes (last 24 hours)  Notes from 12/12/21 0956 through 12/13/21 0956   No notes of this type exist for this  encounter.

## 2021-12-13 NOTE — DISCHARGE SUMMARY
Discharge Summary  Jc Alba MD  Hospitalist     Date of Discharge:  12/13/2021    Discharge Diagnosis:      Bilateral pneumonia    Chronic respiratory failure with hypoxia (HCC)    COPD (chronic obstructive pulmonary disease) (HCC)    Pulmonary fibrosis (HCC)    Diabetes mellitus (HCC)    Atrial fibrillation, chronic (HCC)    CKD (chronic kidney disease) stage 3, GFR 30-59 ml/min (HCC)    Dementia (HCC)      Presenting Problem/History of Present Illness  Shortness of breath    Hospital Course  Patient is a 88 y.o. male admitted for shortness of breath and cough due to possible bilateral pneumonia.  The patient has longstanding respiratory problems, chronic respiratory failure/pulmonary fibrosis and is on Oxygen. He improved to the help of symptomatic treatment, IV antibiotics, IV steroids, nebulized treatments and supportive care.    His vital signs are stable, his Oxygen saturation is well within normal limit on the 2 L/minute nasal cannula he uses at home and he will be discharged under the care of Home Health as the insurance has denied him LTAC placement in [place], [place] as desired    He remains in atrial fibrillation but chronic anticoagulation is contraindicated in his case given the episodes of bleeding experienced recently.    Consults:   Consults     Date and Time Order Name Status Description    12/10/2021  3:28 PM Inpatient Psychiatrist Consult Completed     12/4/2021  9:47 AM Inpatient Nephrology Consult Completed     12/2/2021 12:39 PM Inpatient Cardiology Consult Completed     12/1/2021  5:18 PM Hospitalist (on-call MD unless specified)            Pertinent Test Results: Coagulase negative Staphylococcus in 1 out of 4 bottles, probably contaminant    Condition on Discharge: stable    Vital Signs  Temp:  [96.9 °F (36.1 °C)-98 °F (36.7 °C)] 97.2 °F (36.2 °C)  Heart Rate:  [56-95] 75  Resp:  [16-18] 18  BP: (105-131)/(58-79) 107/79    Physical Exam:  Physical Exam  Vitals reviewed.   Constitutional:        General: He is not in acute distress.     Appearance: He is ill-appearing.   HENT:      Head: Normocephalic and atraumatic.   Eyes:      General: No scleral icterus.     Extraocular Movements: Extraocular movements intact.      Pupils: Pupils are equal, round, and reactive to light.   Cardiovascular:      Rate and Rhythm: Normal rate. Rhythm irregular.   Pulmonary:      Effort: Pulmonary effort is normal. No respiratory distress.      Breath sounds: Normal breath sounds. No stridor. No wheezing or rales.   Chest:      Chest wall: No tenderness.   Abdominal:      General: Bowel sounds are normal. There is no distension.      Palpations: Abdomen is soft. There is no mass.      Tenderness: There is no abdominal tenderness. There is no right CVA tenderness, left CVA tenderness or guarding.      Hernia: No hernia is present.   Musculoskeletal:         General: No swelling or deformity. Normal range of motion.      Cervical back: Normal range of motion and neck supple. Tenderness present. No rigidity.      Right lower leg: No edema.      Left lower leg: No edema.   Skin:     General: Skin is warm and dry.      Coloration: Skin is not jaundiced or pale.      Findings: No bruising or lesion.   Neurological:      General: No focal deficit present.      Mental Status: Mental status is at baseline.      Cranial Nerves: No cranial nerve deficit.      Comments: Pleasantly confused         Discharge Disposition  Home-Health Care [facility]    Discharge Medications     Discharge Medications      New Medications      Instructions Start Date   amiodarone 200 MG tablet  Commonly known as: PACERONE   200 mg, Oral, Every 24 Hours Scheduled   Start Date: December 14, 2021     metoprolol tartrate 25 MG tablet  Commonly known as: LOPRESSOR   25 mg, Oral, Every 12 Hours Scheduled         Changes to Medications      Instructions Start Date   furosemide 40 MG tablet  Commonly known as: LASIX  What changed: when to take this   40 mg, Oral,  Daily         Continue These Medications      Instructions Start Date   acetaminophen 325 MG tablet  Commonly known as: TYLENOL   650 mg, Oral, Every 4 Hours PRN      albuterol (2.5 MG/3ML) 0.083% nebulizer solution  Commonly known as: PROVENTIL   2.5 mg, Nebulization, Every 4 Hours PRN      Brovana 15 MCG/2ML nebulizer solution  Generic drug: arformoterol   3 Times Daily      cetirizine 10 MG tablet  Commonly known as: zyrTEC   10 mg, Oral, Daily      clopidogrel 75 MG tablet  Commonly known as: PLAVIX   75 mg, Oral, Daily      docusate sodium 100 MG capsule   100 mg, Oral      famotidine 20 MG tablet  Commonly known as: PEPCID   20 mg, Oral, 2 Times Daily      fluticasone 50 MCG/ACT nasal spray  Commonly known as: FLONASE   2 sprays, Nasal, Daily      glimepiride 2 MG tablet  Commonly known as: AMARYL   2 mg, Oral, Every Morning Before Breakfast      HYDROcodone-acetaminophen 7.5-325 MG per tablet  Commonly known as: NORCO   1 tablet, Oral, Every 6 Hours PRN      ipratropium 0.02 % nebulizer solution  Commonly known as: ATROVENT   INHALE THE CONTENTS OF 1 VIAL IN NEBULIZER EVERY 4 HOURS AS NEEDED FOR WHEEZING      ipratropium-albuterol 0.5-2.5 mg/3 ml nebulizer  Commonly known as: DUO-NEB   3 mL, Nebulization, 4 Times Daily      lisinopril 5 MG tablet  Commonly known as: PRINIVIL,ZESTRIL   5 mg, Oral, Daily      magnesium oxide 400 MG tablet  Commonly known as: MAG-OX   250 mg, Oral, Daily      nitroglycerin 0.4 MG SL tablet  Commonly known as: NITROSTAT   place 1 tablet under the tongue if needed every 5 minutes for hair...  (REFER TO PRESCRIPTION NOTES).      nystatin 100,000 unit/mL suspension  Commonly known as: MYCOSTATIN   SWISH AND SWALLOW 2 ML EVERY 4 HOURS AS NEEDED      O2  Commonly known as: OXYGEN   2 L/min, Inhalation, Nightly, W/ CPAP       ONE TOUCH ULTRA 2 w/Device kit   Daily, as directed      OneTouch Delica Plus [id number] misc   1 each, Other, Daily      OneTouch Verio test strip  Generic drug:  glucose blood   TEST EVERY DAY      polyethylene glycol 17 g packet  Commonly known as: MIRALAX   Dissolve 1 packet (17 g) in 4 to 8 ounces of beverage and drink by mouth Daily.      predniSONE 10 MG tablet  Commonly known as: DELTASONE   Daily      ranolazine 1000 MG 12 hr tablet  Commonly known as: RANEXA   1,000 mg, Oral, Every 12 Hours Scheduled      Red Yeast Rice 600 MG tablet   600 mg, Oral, 2 Times Daily      Trelegy Ellipta 100-62.5-25 MCG/INH inhaler  Generic drug: Fluticasone-Umeclidin-Vilant   1 inhaler, Inhalation      umeclidinium-vilanterol 62.5-25 MCG/INH aerosol powder  inhaler  Commonly known as: ANORO ELLIPTA   1 puff, Inhalation, Daily         Stop These Medications    Arnuity Ellipta 200 MCG/ACT  Generic drug: Fluticasone Furoate     meclizine 12.5 MG tablet  Commonly known as: ANTIVERT            Discharge Diet:   Diet Instructions     Diet: Regular      Discharge Diet: Regular          Activity at Discharge:   Activity Instructions     Activity as Tolerated            Follow-up Appointments  Future Appointments   Date Time Provider Department Center   5/24/2022  2:00 PM Mana Curtis MD [facility] None   5/27/2022 11:00 AM Carine Johnson MD [facility] None     Additional Instructions for the Follow-ups that You Need to Schedule     Ambulatory Referral to Home Health   As directed      Face to Face Visit Date: 12/13/2021    Follow-up provider for Plan of Care?: I treated the patient in an acute care facility and will not continue treatment after discharge.    Follow-up provider: SULMA NUNES [[id number]]    Reason/Clinical Findings: respiratory failure    Describe mobility limitations that make leaving home difficult: respiratory failure    Nursing/Therapeutic Services Requested: Skilled Nursing Physical Therapy Occupational Therapy    Skilled nursing orders: Mini-nebs Medication education Cardiopulmonary assessments O2 instruction    PT orders: Therapeutic exercise    Occupational  orders: Strengthening    Frequency: 1 Week 1         Discharge Follow-up with PCP   As directed       Currently Documented PCP:    Paolo Rey MD    PCP Phone Number:    [phone]     Follow Up Details: in 1 week                Jc Alba MD  12/13/21  15:08 CST

## 2021-12-13 NOTE — PLAN OF CARE
Goal Outcome Evaluation:  VSS, alert and oriented today. No distress noted. Wife at bedside. Call light within reach.

## 2021-12-13 NOTE — PLAN OF CARE
Goal Outcome Evaluation:              Outcome Summary: Vss; Patients wife voiced concerns about the patient being a little confused. She said he had an epidose during dayshift when his eyes rolled back into his head and she said he had a seizure.

## 2021-12-13 NOTE — DISCHARGE PLACEMENT REQUEST
"Hasmukh Ham (88 y.o. Male)   [facility] Case management  Phone [phone]  Fax [phone]            Date of Birth Social Security Number Address Home Phone MRN    06/01/1933  [address] [place], [place] [id number] [phone] [id number]    Taoism Marital Status             Moravian        Admission Date Admission Type Admitting Provider Attending Provider Department, Room/Bed    12/1/21 Emergency Christal Gonzalez MD Gerlach, Elizabeth T, MD [address], 319/1    Discharge Date Discharge Disposition Discharge Destination           [place] [facility] [place]ending Provider: Christal Gonzalez MD    Allergies: Atorvastatin, Penicillins, Azithromycin, Cefdinir, Clarithromycin, Pravastatin, Benadryl Allergy [Diphenhydramine Hcl]    Isolation: None   Infection: None   Code Status: CPR   Advance Care Planning Activity    Ht: 170.2 cm (67\")   Wt: 66.6 kg (146 lb 14.4 oz)    Admission Cmt: None   Principal Problem: Bilateral pneumonia [J18.9]                 Active Insurance as of 12/1/2021     Primary Coverage     Payor Plan Insurance Group Employer/Plan Group    AETNA MEDICARE REPLACEMENT AETNA MEDICARE REPLACEMENT [id number]     Payor Plan Address Payor Plan Phone Number Payor Plan Fax Number Effective Dates    [address] [phone]  4/1/2019 - None Entered    [facility] [id number]       Subscriber Name Subscriber Birth Date Member ID       HASMUKH HAM 6/1/1933 [id number]                 Emergency Contacts      (Rel.) Home Phone Work Phone Mobile Phone    Rimma Durant (Spouse) [phone] -- [phone]    Ilan Ham (Brother) -- -- [phone]               History & Physical      Kelvin Aguilar MD at 12/01/21 [facility]                [facility] Medicine Admission      Date of Admission: 12/1/2021      Primary Care Physician: Paolo Rey MD      Chief " Complaint: Shortness of breath    HPI: Mr. Ham is an 88-year-old gentleman with a history of chronic lung disease, CAD with non-STEMI on 10/25/2021 resulting in placement of 2 stents on 10/28/2021, diabetes mellitus type 2, benign hypertension who presents to the emergency room with complaints of increasing shortness of breath and general malaise over the last 3 to 4 days.  He states that he is not felt normal and been back to his baseline since his last hospitalization back at the end of October.  Over the last few days his began to decline rapidly with weakness, malaise, cough with sputum, shortness of breath.  His appetite is also declined.  He denies fevers or chills.  He denies peripheral edema.  He denies orthopnea.  He denies chest pain.      Concurrent Medical History:  has a past medical history of Basal cell carcinoma, Bilateral carotid artery stenosis, Bilateral carotid artery stenosis, Bleeding disorder ([facility]), CHF (congestive heart failure) ([facility]), Chronic obstructive pulmonary disease (COPD) ([facility]), Coronary arteriosclerosis, Degenerative joint disease involving multiple joints, Dementia ([facility]), Diabetes mellitus ([facility]), Heart problem, History of stomach ulcers, Hyperlipidemia, Hypertension, Ingrown toenail, and Myocardial infarction ([facility]).    Past Surgical History:  has a past surgical history that includes Hernia repair; Lung biopsy; Ventral hernia repair; Thoracotomy (Left, 1977); Cardiac catheterization (N/A, 6/6/2017); Coronary stent placement; Neck surgery; Cardiac catheterization (N/A, 2/14/2018); Lung surgery; Back surgery; Coronary angioplasty with stent; and Cardiac catheterization (N/A, 10/28/2021).    Family History: family history includes Cancer in an other family member; Diabetes in his father and mother; Heart disease in his father; Hypertension in his father; Lung disease in an other family member.   Social History:  reports that he has quit smoking. He has never used smokeless tobacco.  "He reports that he does not drink alcohol and does not use drugs.    Allergies:   Allergies   Allergen Reactions   • Atorvastatin Anaphylaxis   • Penicillins Rash   • Azithromycin Rash   • Cefdinir Other (See Comments)     \"i burned up\" and break out in a rash   • Clarithromycin Rash and Itching   • Pravastatin Unknown - High Severity     Myalgia  Myalgia   • Benadryl Allergy [Diphenhydramine Hcl] Other (See Comments)     Pt states \"it knocks me out.\"       Medications:   Prior to Admission medications    Medication Sig Start Date End Date Taking? Authorizing Provider   acetaminophen (TYLENOL) 325 MG tablet Take 2 tablets by mouth Every 4 (Four) Hours As Needed for Mild Pain . 2/15/18   Jc Alba MD   albuterol (PROVENTIL) (2.5 MG/3ML) 0.083% nebulizer solution Take 2.5 mg by nebulization Every 4 (Four) Hours As Needed for Wheezing. 12/23/17   Adi Puente MD   Blood Glucose Monitoring Suppl (ONE TOUCH ULTRA 2) w/Device kit Daily. as directed 2/11/21   Bill Rader MD   Brovana 15 MCG/2ML nebulizer solution 3 (Three) Times a Day. 10/16/20   Bill Rader MD   cetirizine (zyrTEC) 10 MG tablet Take 10 mg by mouth Daily.    Bill Rader MD   clopidogrel (PLAVIX) 75 MG tablet Take 75 mg by mouth Daily. 2/3/16   Bill Rader MD   docusate sodium 100 MG capsule Take 100 mg by mouth.    Bill Rader MD   famotidine (PEPCID) 20 MG tablet Take 20 mg by mouth 2 (Two) Times a Day.    Bill Rader MD   fluticasone (FLONASE) 50 MCG/ACT nasal spray 2 sprays into each nostril Daily.    Bill Rader MD   Fluticasone Furoate (ARNUITY ELLIPTA) 200 MCG/ACT aerosol powder  Inhale.    Bill Rader MD   Fluticasone-Umeclidin-Vilant (Trelegy Ellipta) 100-62.5-25 MCG/INH aerosol powder  Inhale 1 inhaler.    Bill Rader MD   furosemide (LASIX) 40 MG tablet Take 1 tablet by mouth Daily. 11/23/21   Carine Johnson MD   glimepiride (AMARYL) 2 " MG tablet Take 2 mg by mouth Every Morning Before Breakfast.    Bill Rader MD   HYDROcodone-acetaminophen (NORCO) 7.5-325 MG per tablet Take 1 tablet by mouth Every 6 (Six) Hours As Needed for Moderate Pain . 6/21/21   Gauri Sweeney APRN   ipratropium (ATROVENT) 0.02 % nebulizer solution INHALE THE CONTENTS OF 1 VIAL IN NEBULIZER EVERY 4 HOURS AS NEEDED FOR WHEEZING 11/23/20   Bill Rader MD   ipratropium-albuterol (DUO-NEB) 0.5-2.5 mg/mL nebulizer Take 3 mL by nebulization 4 (Four) Times a Day. 9/28/17   Jc Alba MD   Lancets (OneTouch Delica Plus [id number]) misc 1 each by Other route Daily. 2/26/21   Bill Rader MD   lisinopril (PRINIVIL,ZESTRIL) 5 MG tablet Take 1 tablet by mouth Daily. 11/22/21   Mana Curtis MD   magnesium oxide (MAG-OX) 400 MG tablet Take 250 mg by mouth Daily.    Bill Rader MD   meclizine (ANTIVERT) 12.5 MG tablet 1 tablet po q 6 hr prn severe nausea 6/1/20   Bill Rader MD   nitroglycerin (NITROSTAT) 0.4 MG SL tablet place 1 tablet under the tongue if needed every 5 minutes for hair...  (REFER TO PRESCRIPTION NOTES). 1/11/17   Bill Rader MD   nystatin (MYCOSTATIN) 100,000 unit/mL suspension SWISH AND SWALLOW 2 ML EVERY 4 HOURS AS NEEDED 11/2/21   Bill Rader MD   O2 (OXYGEN) Inhale 2 L/min Every Night. W/ CPAP    Provider, Historical, MD   OneTouch Verio test strip TEST EVERY DAY 11/15/21   Bill Rader MD   polyethylene glycol (MIRALAX) 17 g packet Dissolve 1 packet (17 g) in 4 to 8 ounces of beverage and drink by mouth Daily. 6/30/21   Jc Alba MD   predniSONE (DELTASONE) 10 MG tablet Daily. 8/25/21   Bill Rader MD   ranolazine (RANEXA) 1000 MG 12 hr tablet Take 1 tablet by mouth Every 12 (Twelve) Hours. 11/23/21   Carine Johnson MD   Red Yeast Rice 600 MG tablet Take 600 mg by mouth 2 (Two) Times a Day.    Provider, MD Bill   Umeclidinium-Vilanterol 62.5-25 MCG/INH  aerosol powder  Inhale 1 puff Daily. 3/3/17   Brea Higginbotham MD       Review of Systems:  Review of Systems   Comprehensive review of systems completed.  Pertinent positives and negatives per HPI.  All others reviewed negative.  Physical Exam:   Temp:  [96.7 °F (35.9 °C)-98.3 °F (36.8 °C)] 96.7 °F (35.9 °C)  Heart Rate:  [] 104  Resp:  [18-20] 18  BP: (101-138)/(62-88) 115/77  Physical Exam  Constitutional:       General: He is not in acute distress.     Appearance: Normal appearance. He is not ill-appearing.   HENT:      Head: Normocephalic and atraumatic.      Right Ear: External ear normal.      Left Ear: External ear normal.      Nose: Nose normal.      Mouth/Throat:      Mouth: Mucous membranes are dry.   Eyes:      General: No scleral icterus.     Extraocular Movements: Extraocular movements intact.      Pupils: Pupils are equal, round, and reactive to light.   Cardiovascular:      Rate and Rhythm: Rhythm irregular.   Pulmonary:      Effort: Pulmonary effort is normal.      Breath sounds: Wheezing present.   Abdominal:      Palpations: Abdomen is soft.      Tenderness: There is no abdominal tenderness. There is no guarding.   Musculoskeletal:      Cervical back: Neck supple.      Right lower leg: No edema.      Left lower leg: No edema.   Skin:     General: Skin is warm and dry.      Findings: No rash.   Neurological:      General: No focal deficit present.      Mental Status: He is alert and oriented to person, place, and time. Mental status is at baseline.   Psychiatric:         Mood and Affect: Mood normal.         Behavior: Behavior normal.           Results Reviewed:  I have personally reviewed current lab, radiology, and data and agree with results.  Lab Results (last 24 hours)     Procedure Component Value Units Date/Time    Procalcitonin [[id number]]  (Normal) Collected: 12/01/21 1720    Specimen: Blood Updated: 12/01/21 1836     Procalcitonin 0.07 ng/mL     Narrative:      As a Marker for  "Sepsis (Non-Neonates):     1. <0.5 ng/mL represents a low risk of severe sepsis and/or septic shock.  2. >2 ng/mL represents a high risk of severe sepsis and/or septic shock.    As a Marker for Lower Respiratory Tract Infections that require antibiotic therapy:  PCT on Admission     Antibiotic Therapy             6-12 Hrs later  >0.5                          Strongly Recommended            >0.25 - <0.5             Recommended  0.1 - 0.25                  Discouraged                       Remeasure/reassess PCT  <0.1                         Strongly Discouraged         Remeasure/reassess PCT      As 28 day mortality risk marker: \"Change in Procalcitonin Result\" (>80% or <=80%) if Day 0 (or Day 1) and Day 4 values are available. Refer to http://www.Gradient Xpct-calculator.com/    Change in PCT <=80 %   A decrease of PCT levels below or equal to 80% defines a positive change in PCT test result representing a higher risk for 28-day all-cause mortality of patients diagnosed with severe sepsis or septic shock.    Change in PCT >80 %   A decrease of PCT levels of more than 80% defines a negative change in PCT result representing a lower risk for 28-day all-cause mortality of patients diagnosed with severe sepsis or septic shock.                Troponin [[id number]]  (Normal) Collected: 12/01/21 1720    Specimen: Blood Updated: 12/01/21 1737     Troponin T 0.024 ng/mL     Narrative:      Troponin T Reference Range:  <= 0.03 ng/mL-   Negative for AMI  >0.03 ng/mL-     Abnormal for myocardial necrosis.  Clinicians would have to utilize clinical acumen, EKG, Troponin and serial changes to determine if it is an Acute Myocardial Infarction or myocardial injury due to an underlying chronic condition.       Results may be falsely decreased if patient taking Biotin.      COVID-19 and FLU A/B PCR - Swab, Nasopharynx [[id number]]  (Normal) Collected: 12/01/21 1625    Specimen: Swab from Nasopharynx Updated: 12/01/21 1650     COVID19 Not " Detected     Influenza A PCR Not Detected     Influenza B PCR Not Detected    Narrative:      Fact sheet for providers: https://www.fda.gov/media/154321/download    Fact sheet for patients: https://www.fda.gov/media/123302/download    Test performed by PCR.    Protime-INR [[id number]]  (Normal) Collected: 12/01/21 1626    Specimen: Blood Updated: 12/01/21 1647     Protime 13.3 Seconds      INR 1.02    Narrative:      Therapeutic range for most indications is 2.0-3.0 INR,  or 2.5-3.5 for mechanical heart valves.    Lactic Acid, Plasma [[id number]]  (Normal) Collected: 12/01/21 1626    Specimen: Blood Updated: 12/01/21 1641     Lactate 1.4 mmol/L     Blood Culture - Blood, Arm, Left [[id number]] Collected: 12/01/21 1626    Specimen: Blood from Arm, Left Updated: 12/01/21 1626    Blood Culture - Blood, Hand, Right [[id number]] Collected: 12/01/21 1626    Specimen: Blood from Hand, Right Updated: 12/01/21 1626    [place] Draw [[id number]] Collected: 12/01/21 1510    Specimen: Blood Updated: 12/01/21 1616    Narrative:      The following orders were created for panel order [place] Draw.  Procedure                               Abnormality         Status                     ---------                               -----------         ------                     Green Top (Gel)[[id number]]                                  Final result               Lavender Top[[id number]]                                     Final result               Gold Top - SST[[id number]]                                   Final result               Light Blue Top[[id number]]                                   Final result                 Please view results for these tests on the individual orders.    Gold Top - SST [[id number]] Collected: 12/01/21 1510    Specimen: Blood Updated: 12/01/21 1616     Extra Tube Hold for add-ons.     Comment: Auto resulted.       Green Top (Gel) [[id number]] Collected: 12/01/21 1510    Specimen: Blood Updated: 12/01/21 1616     Extra  Tube Hold for add-ons.     Comment: Auto resulted.       Lavender Top [[id number]] Collected: 12/01/21 1510    Specimen: Blood Updated: 12/01/21 1616     Extra Tube hold for add-on     Comment: Auto resulted       Light Blue Top [[id number]] Collected: 12/01/21 1510    Specimen: Blood Updated: 12/01/21 1616     Extra Tube hold for add-on     Comment: Auto resulted       CK [[id number]]  (Normal) Collected: 12/01/21 1510    Specimen: Blood Updated: 12/01/21 1613     Creatine Kinase 27 U/L     Magnesium [[id number]]  (Normal) Collected: 12/01/21 1510    Specimen: Blood Updated: 12/01/21 1613     Magnesium 1.6 mg/dL     Comprehensive Metabolic Panel [[id number]]  (Abnormal) Collected: 12/01/21 1510    Specimen: Blood Updated: 12/01/21 1536     Glucose 120 mg/dL      BUN 16 mg/dL      Creatinine 1.06 mg/dL      Sodium 139 mmol/L      Potassium 4.5 mmol/L      Chloride 105 mmol/L      CO2 26.0 mmol/L      Calcium 9.2 mg/dL      Total Protein 6.3 g/dL      Albumin 3.60 g/dL      ALT (SGPT) 16 U/L      AST (SGOT) 15 U/L      Alkaline Phosphatase 61 U/L      Total Bilirubin 0.3 mg/dL      eGFR Non African Amer 66 mL/min/1.73      Globulin 2.7 gm/dL      A/G Ratio 1.3 g/dL      BUN/Creatinine Ratio 15.1     Anion Gap 8.0 mmol/L     Narrative:      GFR Normal >60  Chronic Kidney Disease <60  Kidney Failure <15      Troponin [[id number]]  (Abnormal) Collected: 12/01/21 1510    Specimen: Blood Updated: 12/01/21 1536     Troponin T 0.032 ng/mL     Narrative:      Troponin T Reference Range:  <= 0.03 ng/mL-   Negative for AMI  >0.03 ng/mL-     Abnormal for myocardial necrosis.  Clinicians would have to utilize clinical acumen, EKG, Troponin and serial changes to determine if it is an Acute Myocardial Infarction or myocardial injury due to an underlying chronic condition.       Results may be falsely decreased if patient taking Biotin.      BNP [[id number]]  (Normal) Collected: 12/01/21 1510    Specimen: Blood Updated: 12/01/21 1534      proBNP 920.0 pg/mL     Narrative:      Among patients with dyspnea, NT-proBNP is highly sensitive for the detection of acute congestive heart failure. In addition NT-proBNP of <300 pg/ml effectively rules out acute congestive heart failure with 99% negative predictive value.    Results may be falsely decreased if patient taking Biotin.      CBC & Differential [[id number]]  (Abnormal) Collected: 12/01/21 1510    Specimen: Blood Updated: 12/01/21 1514    Narrative:      The following orders were created for panel order CBC & Differential.  Procedure                               Abnormality         Status                     ---------                               -----------         ------                     CBC Auto Differential[[id number]]        Abnormal            Final result                 Please view results for these tests on the individual orders.    CBC Auto Differential [[id number]]  (Abnormal) Collected: 12/01/21 1510    Specimen: Blood Updated: 12/01/21 1514     WBC 13.38 10*3/mm3      RBC 4.29 10*6/mm3      Hemoglobin 12.5 g/dL      Hematocrit 39.0 %      MCV 90.9 fL      MCH 29.1 pg      MCHC 32.1 g/dL      RDW 18.0 %      RDW-SD 59.1 fl      MPV 11.4 fL      Platelets 184 10*3/mm3      Neutrophil % 83.4 %      Lymphocyte % 10.5 %      Monocyte % 3.1 %      Eosinophil % 0.4 %      Basophil % 0.4 %      Immature Grans % 2.2 %      Neutrophils, Absolute 11.16 10*3/mm3      Lymphocytes, Absolute 1.40 10*3/mm3      Monocytes, Absolute 0.42 10*3/mm3      Eosinophils, Absolute 0.05 10*3/mm3      Basophils, Absolute 0.06 10*3/mm3      Immature Grans, Absolute 0.29 10*3/mm3      nRBC 0.0 /100 WBC         Imaging Results (Last 24 Hours)     Procedure Component Value Units Date/Time    XR Chest 1 View [[id number]] Collected: 12/01/21 1515     Updated: 12/01/21 1607    Narrative:        PROCEDURE: Single chest view portable    REASON FOR EXAM:Chest Pain triage protocol  Chest Pain Triage Protocol    FINDINGS:  Comparison exam dated October 25, 2021. Cardiac size  appears within normal limits. Left upper lobe perihilar and left  lung base infrahilar interstitial groundglass opacities. Right  upper lobe peripheral small interstitial groundglass opacity.  Lungs are otherwise clear. Pleural spaces are normal. No acute  osseous abnormality. Stable epidural stimulator lead in the mid  thoracic spine region.      Impression:      1.  Left upper lobe perihilar and left lung base infrahilar  interstitial groundglass opacities. Right upper lobe peripheral  small interstitial groundglass opacity. These opacities would be  suspicious for pneumonia including possible atypical viral  etiology and/or pulmonary edema. Recommend clinical correlation.    Electronically signed by:  Nelson Christianson MD  12/1/2021 4:06 PM CST  Workstation: [id number]            Assessment:    Active Hospital Problems    Diagnosis    • Pneumonia of both lungs due to infectious organism          Plan:    1.  Pneumonia  2.  Elevated troponin  3.  CAD with history of recent NSTEMI with 2 stents placed on 10/28/2021  4.  Paroxysmal A. fib not on long-term anticoagulation secondary to GI bleed  5.  Diabetes mellitus type 2  6.  Benign hypertension  7.  Pulmonary fibrosis/COPD/coal miners pneumoconiosis.    -IV Levaquin  -IV steroids  -Duo nebs  -O2  -Home medications as tolerated  -Sliding scale insulin and adjust insulin dose based on blood sugars as elevations are expected with administration of steroids  -Hold heparin 7 anticoagulants given history of GI bleed -SCDs for prophylaxis  -Blood and sputum cultures.    I have utilized all available immediate resources to obtain, update, or review the patient's current medications.      I confirmed that the patient's Advance Care Plan is present, code status is documented, or surrogate decision maker is listed in the patient's medical record.      I discussed the patient's findings and my recommendations with: The patient  and his wife    Kelvin Aguilar MD                Electronically signed by Kelvin Aguilar MD at 21 1857       Physician Progress Notes (last 24 hours)  Notes from 21 1250 through 21 1250   No notes of this type exist for this encounter.       [address] [address]  Phone:  [phone]  Fax:  [phone] Date: Dec 13, 2021      Ambulatory Referral to Home Health     Patient:  Hasmukh Ham MRN:  [id number]   [address] [facility] [address] :  1933  SSN:    Phone: [phone] Sex:  M      INSURANCE PAYOR PLAN GROUP # SUBSCRIBER ID   Primary:    [employer] MEDICARE REPLACEMENT [id number]      Referring Provider Information:  IGNACIO ROSS Phone: [phone] Fax: [phone]      Referral Information:   # Visits:  999 Referral Type: Home Health [42]   Urgency:  Routine Referral Reason: Specialty Services Required   Start Date: Dec 13, 2021 End Date:  To be determined by Insurer   Diagnosis: Chronic respiratory failure with hypoxia (HCC) (J96.11 [ICD-10-CM] 518.83,799.02 [ICD-9-CM])      Refer to Dept:   Refer to Provider:   Refer to Facility:       Face to Face Visit Date: 2021  Follow-up provider for Plan of Care? I treated the patient in an acute care facility and will not continue treatment after discharge.  Follow-up provider: SULMA NUNES [[id number]]  Reason/Clinical Findings: respiratory failure  Describe mobility limitations that make leaving home difficult: respiratory failure  Nursing/Therapeutic Services Requested: Skilled Nursing  Nursing/Therapeutic Services Requested: Physical Therapy  Nursing/Therapeutic Services Requested: Occupational Therapy  Skilled nursing orders: Mini-nebs  Skilled nursing orders: Medication education  Skilled nursing orders: Cardiopulmonary assessments  Skilled nursing orders: O2 instruction  PT orders: Therapeutic  exercise  Occupational orders: Strengthening  Frequency: 1 Week 1     This document serves as a request of services and does not constitute Insurance authorization or approval of services.  To determine eligibility, please contact the members Insurance carrier to verify and review coverage.     If you have medical questions regarding this request for services. Please contact [address] at [phone] during normal business hours.         Authorizing Provider:Jc Alba MD  Authorizing Provider's NPI: [id number]  Order Entered By: Jc Alba MD 12/13/2021 12:42 PM     Electronically signed by: Jc Alba MD 12/13/2021 12:42 PM

## 2021-12-14 NOTE — PAYOR COMM NOTE
"Radha Watersmore  Case Management Extender  [phone] phone  [phone] fax    Auth# [id number]    Hasmukh Ham (88 y.o. Male)             Date of Birth Social Security Number Address Home Phone MRN    06/01/1933  [address] [address] [phone] [id number]    [employer]ism Marital Status             Uatsdin        Admission Date Admission Type Admitting Provider Attending Provider Department, Room/Bed    12/1/21 Emergency Christal Gonzalez MD  [address], 319/1    Discharge Date Discharge Disposition Discharge Destination          12/13/2021 Home-Health Care Svc              Attending Provider: (none)   Allergies: Atorvastatin, Penicillins, Azithromycin, Cefdinir, Clarithromycin, Pravastatin, Benadryl Allergy [Diphenhydramine Hcl]    Isolation: None   Infection: None   Code Status: Prior   Advance Care Planning Activity    Ht: 170.2 cm (67\")   Wt: 66.6 kg (146 lb 14.4 oz)    Admission Cmt: None   Principal Problem: Bilateral pneumonia [J18.9]                 Active Insurance as of 12/1/2021     Primary Coverage     Payor Plan Insurance Group Employer/Plan Group    AETNA MEDICARE REPLACEMENT AETNA MEDICARE REPLACEMENT [id number]     Payor Plan Address Payor Plan Phone Number Payor Plan Fax Number Effective Dates    [address] [phone]  4/1/2019 - None Entered    [facility] [address]       Subscriber Name Subscriber Birth Date Member ID       HASMUKH HAM 6/1/1933 [id number]                 Emergency Contacts      (Rel.) Home Phone Work Phone Mobile Phone    Rimma Durant (Spouse) [phone] -- [phone]    Ilan Ham (Brother) -- -- [phone]               Discharge Summary      Jc Alba MD at 12/13/21 1236          Discharge Summary  Jc Alba MD  Hospitalist     Date of Discharge:  12/13/2021    Discharge Diagnosis:      Bilateral pneumonia    Chronic respiratory " failure with hypoxia (HCC)    COPD (chronic obstructive pulmonary disease) (HCC)    Pulmonary fibrosis (HCC)    Diabetes mellitus (HCC)    Atrial fibrillation, chronic (HCC)    CKD (chronic kidney disease) stage 3, GFR 30-59 ml/min (HCC)    Dementia (HCC)      Presenting Problem/History of Present Illness  Shortness of breath    Hospital Course  Patient is a 88 y.o. male admitted for shortness of breath and cough due to possible bilateral pneumonia.  The patient has longstanding respiratory problems, chronic respiratory failure/pulmonary fibrosis and is on Oxygen. He improved to the help of symptomatic treatment, IV antibiotics, IV steroids, nebulized treatments and supportive care.    His vital signs are stable, his Oxygen saturation is well within normal limit on the 2 L/minute nasal cannula he uses at home and he will be discharged under the care of Home Health as the insurance has denied him LTAC placement in [place], [place] as desired    He remains in atrial fibrillation but chronic anticoagulation is contraindicated in his case given the episodes of bleeding experienced recently.    Consults:   Consults     Date and Time Order Name Status Description    12/10/2021  3:28 PM Inpatient Psychiatrist Consult Completed     12/4/2021  9:47 AM Inpatient Nephrology Consult Completed     12/2/2021 12:39 PM Inpatient Cardiology Consult Completed     12/1/2021  5:18 PM Hospitalist (on-call MD unless specified)            Pertinent Test Results: Coagulase negative Staphylococcus in 1 out of 4 bottles, probably contaminant    Condition on Discharge: stable    Vital Signs  Temp:  [96.9 °F (36.1 °C)-98 °F (36.7 °C)] 97.2 °F (36.2 °C)  Heart Rate:  [56-95] 75  Resp:  [16-18] 18  BP: (105-131)/(58-79) 107/79    Physical Exam:  Physical Exam  Vitals reviewed.   Constitutional:       General: He is not in acute distress.     Appearance: He is ill-appearing.   HENT:      Head: Normocephalic and atraumatic.   Eyes:      General: No  scleral icterus.     Extraocular Movements: Extraocular movements intact.      Pupils: Pupils are equal, round, and reactive to light.   Cardiovascular:      Rate and Rhythm: Normal rate. Rhythm irregular.   Pulmonary:      Effort: Pulmonary effort is normal. No respiratory distress.      Breath sounds: Normal breath sounds. No stridor. No wheezing or rales.   Chest:      Chest wall: No tenderness.   Abdominal:      General: Bowel sounds are normal. There is no distension.      Palpations: Abdomen is soft. There is no mass.      Tenderness: There is no abdominal tenderness. There is no right CVA tenderness, left CVA tenderness or guarding.      Hernia: No hernia is present.   Musculoskeletal:         General: No swelling or deformity. Normal range of motion.      Cervical back: Normal range of motion and neck supple. Tenderness present. No rigidity.      Right lower leg: No edema.      Left lower leg: No edema.   Skin:     General: Skin is warm and dry.      Coloration: Skin is not jaundiced or pale.      Findings: No bruising or lesion.   Neurological:      General: No focal deficit present.      Mental Status: Mental status is at baseline.      Cranial Nerves: No cranial nerve deficit.      Comments: Pleasantly confused         [place] Dis[place] [facility] [place]    [place]charge Medications     Discharge Medications      New Medications      Instructions Start Date   amiodarone 200 MG tablet  Commonly known as: PACERONE   200 mg, Oral, Every 24 Hours Scheduled   Start Date: December 14, 2021     metoprolol tartrate 25 MG tablet  Commonly known as: LOPRESSOR   25 mg, Oral, Every 12 Hours Scheduled         Changes to Medications      Instructions Start Date   furosemide 40 MG tablet  Commonly known as: LASIX  What changed: when to take this   40 mg, Oral, Daily         Continue These Medications      Instructions Start Date   acetaminophen 325 MG tablet  Commonly known as: TYLENOL   650 mg, Oral, Every 4 Hours  PRN      albuterol (2.5 MG/3ML) 0.083% nebulizer solution  Commonly known as: PROVENTIL   2.5 mg, Nebulization, Every 4 Hours PRN      Brovana 15 MCG/2ML nebulizer solution  Generic drug: arformoterol   3 Times Daily      cetirizine 10 MG tablet  Commonly known as: zyrTEC   10 mg, Oral, Daily      clopidogrel 75 MG tablet  Commonly known as: PLAVIX   75 mg, Oral, Daily      docusate sodium 100 MG capsule   100 mg, Oral      famotidine 20 MG tablet  Commonly known as: PEPCID   20 mg, Oral, 2 Times Daily      fluticasone 50 MCG/ACT nasal spray  Commonly known as: FLONASE   2 sprays, Nasal, Daily      glimepiride 2 MG tablet  Commonly known as: AMARYL   2 mg, Oral, Every Morning Before Breakfast      HYDROcodone-acetaminophen 7.5-325 MG per tablet  Commonly known as: NORCO   1 tablet, Oral, Every 6 Hours PRN      ipratropium 0.02 % nebulizer solution  Commonly known as: ATROVENT   INHALE THE CONTENTS OF 1 VIAL IN NEBULIZER EVERY 4 HOURS AS NEEDED FOR WHEEZING      ipratropium-albuterol 0.5-2.5 mg/3 ml nebulizer  Commonly known as: DUO-NEB   3 mL, Nebulization, 4 Times Daily      lisinopril 5 MG tablet  Commonly known as: PRINIVIL,ZESTRIL   5 mg, Oral, Daily      magnesium oxide 400 MG tablet  Commonly known as: MAG-OX   250 mg, Oral, Daily      nitroglycerin 0.4 MG SL tablet  Commonly known as: NITROSTAT   place 1 tablet under the tongue if needed every 5 minutes for hair...  (REFER TO PRESCRIPTION NOTES).      nystatin 100,000 unit/mL suspension  Commonly known as: MYCOSTATIN   SWISH AND SWALLOW 2 ML EVERY 4 HOURS AS NEEDED      O2  Commonly known as: OXYGEN   2 L/min, Inhalation, Nightly, W/ CPAP       ONE TOUCH ULTRA 2 w/Device kit   Daily, as directed      OneTouch Delica Plus [id number] misc   1 each, Other, Daily      OneTouch Verio test strip  Generic drug: glucose blood   TEST EVERY DAY      polyethylene glycol 17 g packet  Commonly known as: MIRALAX   Dissolve 1 packet (17 g) in 4 to 8 ounces of beverage and  drink by mouth Daily.      predniSONE 10 MG tablet  Commonly known as: DELTASONE   Daily      ranolazine 1000 MG 12 hr tablet  Commonly known as: RANEXA   1,000 mg, Oral, Every 12 Hours Scheduled      Red Yeast Rice 600 MG tablet   600 mg, Oral, 2 Times Daily      Trelegy Ellipta 100-62.5-25 MCG/INH inhaler  Generic drug: Fluticasone-Umeclidin-Vilant   1 inhaler, Inhalation      umeclidinium-vilanterol 62.5-25 MCG/INH aerosol powder  inhaler  Commonly known as: ANORO ELLIPTA   1 puff, Inhalation, Daily         Stop These Medications    Arnuity Ellipta 200 MCG/ACT  Generic drug: Fluticasone Furoate     meclizine 12.5 MG tablet  Commonly known as: ANTIVERT            Discharge Diet:   Diet Instructions     Diet: Regular      Discharge Diet: Regular          Activity at Discharge:   Activity Instructions     Activity as Tolerated            Follow-up Appointments  Future Appointments   Date Time Provider Department Center   5/24/2022  2:00 PM Mana Curtis MD [facility] MAD None   5/27/2022 11:00 AM Carine Johnson MD [facility] POW None     Additional Instructions for the Follow-ups that You Need to Schedule     Ambulatory Referral to Home Health   As directed      Face to Face Visit Date: 12/13/2021    Follow-up provider for Plan of Care?: I treated the patient in an acute care facility and will not continue treatment after discharge.    Follow-up provider: SULMA REY [[id number]]    Reason/Clinical Findings: respiratory failure    Describe mobility limitations that make leaving home difficult: respiratory failure    Nursing/Therapeutic Services Requested: Skilled Nursing Physical Therapy Occupational Therapy    Skilled nursing orders: Mini-nebs Medication education Cardiopulmonary assessments O2 instruction    PT orders: Therapeutic exercise    Occupational orders: Strengthening    Frequency: 1 Week 1         Discharge Follow-up with PCP   As directed       Currently Documented PCP:    Sulma Rey MD    PCP  Phone Number:    [phone]     Follow Up Details: in 1 week                Jc Alba MD  12/13/21  15:08 CST          Electronically signed by Jc Alba MD at 12/13/21 0851

## 2021-12-14 NOTE — OUTREACH NOTE
Prep Survey      Responses   [facility] facility patient discharged from? [place]   Is LACE score < 7 ? No   Emergency Room discharge w/ pulse ox? No   Eligibility Readm Mgmt   Discharge diagnosis Bilateral pneumonia   Does the patient have one of the following disease processes/diagnoses(primary or secondary)? COPD/Pneumonia   Does the patient have Home health ordered? Yes   What is the Home health agency?  [facility]    Is there a DME ordered? No   Medication alerts for this patient Amiodarone HCL    Prep survey completed? Yes          Rita Virgen RN

## 2021-12-20 NOTE — OUTREACH NOTE
COPD/PN Week 1 Survey      Responses   [facility] patient discharged from? [place]   Does the patient have one of the following disease processes/diagnoses(primary or secondary)? COPD/Pneumonia   Was the primary reason for admission: Pneumonia   Week 1 attempt successful? No   Unsuccessful attempts Attempt 1          Shaylee Sherman LPN

## 2021-12-22 NOTE — OUTREACH NOTE
COPD/PN Week 2 Survey      Responses   [facility] patient discharged from? [place]   Does the patient have one of the following disease processes/diagnoses(primary or secondary)? COPD/Pneumonia   Was the primary reason for admission: Pneumonia   Week 2 attempt successful? Yes   Call start time 1019   Call end time 1025   Discharge diagnosis Bilateral pneumonia   Meds reviewed with patient/caregiver? Yes   Is the patient having any side effects they believe may be caused by any medication additions or changes? No   Does the patient have all medications ordered at discharge? Yes   Is the patient taking all medications as directed (includes completed medication regime)? Yes   Does the patient have a primary care provider?  Yes   Does the patient have an appointment with their PCP or specialist within 7 days of discharge? Yes   Has the patient kept scheduled appointments due by today? Yes   What is the Home health agency?  [facility]    Has home health visited the patient within 72 hours of discharge? Yes   Pulse Ox monitoring Intermittent   Pulse Ox device source Patient   O2 Sat comments 92% on 2L O2   O2 Sat: education provided When to seek care   O2 Sat education comments instructed to go to ED for pulse ox <90% and unresolving   Did the patient receive a copy of their discharge instructions? Yes   Nursing interventions Reviewed instructions with patient   What is the patient's perception of their health status since discharge? Improving   Nursing Interventions Nurse provided patient education   Are the patient's immunizations up to date?  Yes   Is the patient/caregiver able to teach back the hierarchy of who to call/visit for symptoms/problems? PCP, Specialist, Home health nurse, Urgent Care, ED, 911 Yes   Additional teach back comments pt states each day is better than the day before, pt optimistic that he is healing   Is the patient/caregiver able to teach back signs and symptoms of worsening condition:  Fever/chills,  Shortness of breath,  Chest pain   Is the patient/caregiver able to teach back importance of completing antibiotic course of treatment? Yes   Week 2 call completed? Yes          Laurita Chavez RN

## 2021-12-22 NOTE — PAYOR COMM NOTE
"  Marcy Donnelly  Case Management Extender  [phone] Phone  [phone] Fax    Need Approval for CONT IP stay Ref#[id number]  Hasmukh Ham (88 y.o. Male)             Date of Birth Social Security Number Address Home Phone MRN    06/01/1933  [address] [address] [phone] [id number]    Orthodox Marital Status             Protestant        Admission Date Admission Type Admitting Provider Attending Provider Department, Room/Bed    12/1/21 Emergency Christal Gonzalez MD  [address], 319/1      Discharge Date Discharge Disposition Discharge Destination          12/13/2021 Home-Health Care Svc              Attending Provider: (none)   Allergies: Atorvastatin, Penicillins, Azithromycin, Cefdinir, Clarithromycin, Pravastatin, Benadryl Allergy [Diphenhydramine Hcl]    Isolation: None   Infection: None   Code Status: Prior   Advance Care Planning Activity    Ht: 170.2 cm (67\")   Wt: 66.6 kg (146 lb 14.4 oz)    Admission Cmt: None   Principal Problem: Bilateral pneumonia [J18.9]                 Active Insurance as of 12/1/2021     Primary Coverage     Payor Plan Insurance Group Employer/Plan Group    AETNA MEDICARE REPLACEMENT AETNA MEDICARE REPLACEMENT [id number]     Payor Plan Address Payor Plan Phone Number Payor Plan Fax Number Effective Dates    [address] [phone]  4/1/2019 - None Entered    [facility] [address]       Subscriber Name Subscriber Birth Date Member ID       HASMUKH HAM 6/1/1933 [id number]                 Emergency Contacts      (Rel.) Home Phone Work Phone Mobile Phone    Rimma Durant (Spouse) [phone] -- [phone]    Ilan Ham (Brother) -- -- [phone]               Discharge Summary      Jc Alba MD at 12/13/21 1236          Discharge Summary  Jc Alba MD  Hospitalist     Date of Discharge:  12/13/2021    Discharge Diagnosis:      Bilateral " pneumonia    Chronic respiratory failure with hypoxia (HCC)    COPD (chronic obstructive pulmonary disease) (HCC)    Pulmonary fibrosis (HCC)    Diabetes mellitus (HCC)    Atrial fibrillation, chronic (HCC)    CKD (chronic kidney disease) stage 3, GFR 30-59 ml/min (HCC)    Dementia (HCC)      Presenting Problem/History of Present Illness  Shortness of breath    Hospital Course  Patient is a 88 y.o. male admitted for shortness of breath and cough due to possible bilateral pneumonia.  The patient has longstanding respiratory problems, chronic respiratory failure/pulmonary fibrosis and is on Oxygen. He improved to the help of symptomatic treatment, IV antibiotics, IV steroids, nebulized treatments and supportive care.    His vital signs are stable, his Oxygen saturation is well within normal limit on the 2 L/minute nasal cannula he uses at home and he will be discharged under the care of Home Health as the insurance has denied him LTAC placement in [place], [place] as desired    He remains in atrial fibrillation but chronic anticoagulation is contraindicated in his case given the episodes of bleeding experienced recently.    Consults:   Consults     Date and Time Order Name Status Description    12/10/2021  3:28 PM Inpatient Psychiatrist Consult Completed     12/4/2021  9:47 AM Inpatient Nephrology Consult Completed     12/2/2021 12:39 PM Inpatient Cardiology Consult Completed     12/1/2021  5:18 PM Hospitalist (on-call MD unless specified)            Pertinent Test Results: Coagulase negative Staphylococcus in 1 out of 4 bottles, probably contaminant    Condition on Discharge: stable    Vital Signs  Temp:  [96.9 °F (36.1 °C)-98 °F (36.7 °C)] 97.2 °F (36.2 °C)  Heart Rate:  [56-95] 75  Resp:  [16-18] 18  BP: (105-131)/(58-79) 107/79    Physical Exam:  Physical Exam  Vitals reviewed.   Constitutional:       General: He is not in acute distress.     Appearance: He is ill-appearing.   HENT:      Head: Normocephalic and  atraumatic.   Eyes:      General: No scleral icterus.     Extraocular Movements: Extraocular movements intact.      Pupils: Pupils are equal, round, and reactive to light.   Cardiovascular:      Rate and Rhythm: Normal rate. Rhythm irregular.   Pulmonary:      Effort: Pulmonary effort is normal. No respiratory distress.      Breath sounds: Normal breath sounds. No stridor. No wheezing or rales.   Chest:      Chest wall: No tenderness.   Abdominal:      General: Bowel sounds are normal. There is no distension.      Palpations: Abdomen is soft. There is no mass.      Tenderness: There is no abdominal tenderness. There is no right CVA tenderness, left CVA tenderness or guarding.      Hernia: No hernia is present.   Musculoskeletal:         General: No swelling or deformity. Normal range of motion.      Cervical back: Normal range of motion and neck supple. Tenderness present. No rigidity.      Right lower leg: No edema.      Left lower leg: No edema.   Skin:     General: Skin is warm and dry.      Coloration: Skin is not jaundiced or pale.      Findings: No bruising or lesion.   Neurological:      General: No focal deficit present.      Mental Status: Mental status is at baseline.      Cranial Nerves: No cranial nerve deficit.      Comments: Pleasantly confused         Discharge Disposition  Home-Health Care [facility] [place]    Discharge Medications     Discharge Medications      New Medications      Instructions Start Date   amiodarone 200 MG tablet  Commonly known as: PACERONE   200 mg, Oral, Every 24 Hours Scheduled   Start Date: December 14, 2021     metoprolol tartrate 25 MG tablet  Commonly known as: LOPRESSOR   25 mg, Oral, Every 12 Hours Scheduled         Changes to Medications      Instructions Start Date   furosemide 40 MG tablet  Commonly known as: LASIX  What changed: when to take this   40 mg, Oral, Daily         Continue These Medications      Instructions Start Date   acetaminophen 325 MG tablet  Commonly known as:  TYLENOL   650 mg, Oral, Every 4 Hours PRN      albuterol (2.5 MG/3ML) 0.083% nebulizer solution  Commonly known as: PROVENTIL   2.5 mg, Nebulization, Every 4 Hours PRN      Brovana 15 MCG/2ML nebulizer solution  Generic drug: arformoterol   3 Times Daily      cetirizine 10 MG tablet  Commonly known as: zyrTEC   10 mg, Oral, Daily      clopidogrel 75 MG tablet  Commonly known as: PLAVIX   75 mg, Oral, Daily      docusate sodium 100 MG capsule   100 mg, Oral      famotidine 20 MG tablet  Commonly known as: PEPCID   20 mg, Oral, 2 Times Daily      fluticasone 50 MCG/ACT nasal spray  Commonly known as: FLONASE   2 sprays, Nasal, Daily      glimepiride 2 MG tablet  Commonly known as: AMARYL   2 mg, Oral, Every Morning Before Breakfast      HYDROcodone-acetaminophen 7.5-325 MG per tablet  Commonly known as: NORCO   1 tablet, Oral, Every 6 Hours PRN      ipratropium 0.02 % nebulizer solution  Commonly known as: ATROVENT   INHALE THE CONTENTS OF 1 VIAL IN NEBULIZER EVERY 4 HOURS AS NEEDED FOR WHEEZING      ipratropium-albuterol 0.5-2.5 mg/3 ml nebulizer  Commonly known as: DUO-NEB   3 mL, Nebulization, 4 Times Daily      lisinopril 5 MG tablet  Commonly known as: PRINIVIL,ZESTRIL   5 mg, Oral, Daily      magnesium oxide 400 MG tablet  Commonly known as: MAG-OX   250 mg, Oral, Daily      nitroglycerin 0.4 MG SL tablet  Commonly known as: NITROSTAT   place 1 tablet under the tongue if needed every 5 minutes for hair...  (REFER TO PRESCRIPTION NOTES).      nystatin 100,000 unit/mL suspension  Commonly known as: MYCOSTATIN   SWISH AND SWALLOW 2 ML EVERY 4 HOURS AS NEEDED      O2  Commonly known as: OXYGEN   2 L/min, Inhalation, Nightly, W/ CPAP       ONE TOUCH ULTRA 2 w/Device kit   Daily, as directed      OneTouch Delica Plus [id number] misc   1 each, Other, Daily      OneTouch Verio test strip  Generic drug: glucose blood   TEST EVERY DAY      polyethylene glycol 17 g packet  Commonly known as: MIRALAX   Dissolve 1 packet (17  g) in 4 to 8 ounces of beverage and drink by mouth Daily.      predniSONE 10 MG tablet  Commonly known as: DELTASONE   Daily      ranolazine 1000 MG 12 hr tablet  Commonly known as: RANEXA   1,000 mg, Oral, Every 12 Hours Scheduled      Red Yeast Rice 600 MG tablet   600 mg, Oral, 2 Times Daily      Trelegy Ellipta 100-62.5-25 MCG/INH inhaler  Generic drug: Fluticasone-Umeclidin-Vilant   1 inhaler, Inhalation      umeclidinium-vilanterol 62.5-25 MCG/INH aerosol powder  inhaler  Commonly known as: ANORO ELLIPTA   1 puff, Inhalation, Daily         Stop These Medications    Arnuity Ellipta 200 MCG/ACT  Generic drug: Fluticasone Furoate     meclizine 12.5 MG tablet  Commonly known as: ANTIVERT            Discharge Diet:   Diet Instructions     Diet: Regular      Discharge Diet: Regular          Activity at Discharge:   Activity Instructions     Activity as Tolerated            Follow-up Appointments  Future Appointments   Date Time Provider Department Center   5/24/2022  2:00 PM Mana Curtis MD [facility] CD MAD None   5/27/2022 11:00 AM Carine Johnson MD [facility] CD POW None     Additional Instructions for the Follow-ups that You Need to Schedule     Ambulatory Referral to Home Health   As directed      Face to Face Visit Date: 12/13/2021    Follow-up provider for Plan of Care?: I treated the patient in an acute care facility and will not continue treatment after discharge.    Follow-up provider: SULMA NUNES [[id number]]    Reason/Clinical Findings: respiratory failure    Describe mobility limitations that make leaving home difficult: respiratory failure    Nursing/Therapeutic Services Requested: Skilled Nursing Physical Therapy Occupational Therapy    Skilled nursing orders: Mini-nebs Medication education Cardiopulmonary assessments O2 instruction    PT orders: Therapeutic exercise    Occupational orders: Strengthening    Frequency: 1 Week 1         Discharge Follow-up with PCP   As directed       Currently Documented  PCP:    Paolo Rey MD    PCP Phone Number:    [phone]     Follow Up Details: in 1 week                Jc Alba MD  12/13/21  15:08 CST          Electronically signed by Jc Alba MD at 12/13/21 9415

## 2022-01-01 ENCOUNTER — HOSPITAL ENCOUNTER (EMERGENCY)
Facility: HOSPITAL | Age: 87
Discharge: HOME | End: 2022-01-03
Attending: FAMILY MEDICINE | Admitting: FAMILY MEDICINE

## 2022-01-01 ENCOUNTER — READMISSION MANAGEMENT (OUTPATIENT)
Dept: CALL CENTER | Facility: HOSPITAL | Age: 87
End: 2022-01-01

## 2022-01-01 ENCOUNTER — APPOINTMENT (OUTPATIENT)
Dept: ULTRASOUND IMAGING | Facility: HOSPITAL | Age: 87
End: 2022-01-01

## 2022-01-01 ENCOUNTER — APPOINTMENT (OUTPATIENT)
Dept: CT IMAGING | Facility: HOSPITAL | Age: 87
End: 2022-01-01

## 2022-01-01 ENCOUNTER — TELEPHONE (OUTPATIENT)
Dept: SOCIAL WORK | Facility: HOSPITAL | Age: 87
End: 2022-01-01

## 2022-01-01 ENCOUNTER — APPOINTMENT (OUTPATIENT)
Dept: GENERAL RADIOLOGY | Facility: HOSPITAL | Age: 87
End: 2022-01-01

## 2022-01-01 ENCOUNTER — HOSPITAL ENCOUNTER (INPATIENT)
Facility: HOSPITAL | Age: 87
LOS: 4 days | End: 2022-01-09
Attending: FAMILY MEDICINE | Admitting: FAMILY MEDICINE

## 2022-01-01 ENCOUNTER — TELEPHONE (OUTPATIENT)
Dept: EMERGENCY DEPT | Facility: HOSPITAL | Age: 87
End: 2022-01-01

## 2022-01-01 ENCOUNTER — APPOINTMENT (OUTPATIENT)
Dept: INTERVENTIONAL RADIOLOGY/VASCULAR | Facility: HOSPITAL | Age: 87
End: 2022-01-01

## 2022-01-01 VITALS
RESPIRATION RATE: 20 BRPM | HEIGHT: 67 IN | WEIGHT: 150 LBS | SYSTOLIC BLOOD PRESSURE: 123 MMHG | TEMPERATURE: 97 F | OXYGEN SATURATION: 100 % | HEART RATE: 91 BPM | DIASTOLIC BLOOD PRESSURE: 86 MMHG | BODY MASS INDEX: 23.54 KG/M2

## 2022-01-01 VITALS
RESPIRATION RATE: 32 BRPM | SYSTOLIC BLOOD PRESSURE: 89 MMHG | HEIGHT: 67 IN | DIASTOLIC BLOOD PRESSURE: 50 MMHG | OXYGEN SATURATION: 65 % | BODY MASS INDEX: 26.12 KG/M2 | TEMPERATURE: 96.4 F | WEIGHT: 166.45 LBS

## 2022-01-01 DIAGNOSIS — R13.11 ORAL PHASE DYSPHAGIA: ICD-10-CM

## 2022-01-01 DIAGNOSIS — Z74.09 IMPAIRED FUNCTIONAL MOBILITY, BALANCE, GAIT, AND ENDURANCE: ICD-10-CM

## 2022-01-01 DIAGNOSIS — R53.83 FATIGUE, UNSPECIFIED TYPE: Primary | ICD-10-CM

## 2022-01-01 DIAGNOSIS — R11.2 NON-INTRACTABLE VOMITING WITH NAUSEA, UNSPECIFIED VOMITING TYPE: ICD-10-CM

## 2022-01-01 DIAGNOSIS — Z78.9 IMPAIRED MOBILITY AND ADLS: ICD-10-CM

## 2022-01-01 DIAGNOSIS — J18.9 PNEUMONIA OF LEFT LOWER LOBE DUE TO INFECTIOUS ORGANISM: Primary | ICD-10-CM

## 2022-01-01 DIAGNOSIS — Z74.09 IMPAIRED MOBILITY AND ADLS: ICD-10-CM

## 2022-01-01 LAB
ALBUMIN SERPL-MCNC: 3.1 G/DL (ref 3.5–5.2)
ALBUMIN SERPL-MCNC: 3.6 G/DL (ref 3.5–5.2)
ALBUMIN SERPL-MCNC: 4 G/DL (ref 3.5–5.2)
ALBUMIN SERPL-MCNC: 4.3 G/DL (ref 3.5–5.2)
ALBUMIN/GLOB SERPL: 0.9 G/DL
ALBUMIN/GLOB SERPL: 1.1 G/DL
ALBUMIN/GLOB SERPL: 1.2 G/DL
ALBUMIN/GLOB SERPL: 1.7 G/DL
ALBUMIN/GLOB SERPL: 1.9 G/DL
ALP SERPL-CCNC: 111 U/L (ref 39–117)
ALP SERPL-CCNC: 115 U/L (ref 39–117)
ALP SERPL-CCNC: 72 U/L (ref 39–117)
ALP SERPL-CCNC: 74 U/L (ref 39–117)
ALP SERPL-CCNC: 76 U/L (ref 39–117)
ALP SERPL-CCNC: 91 U/L (ref 39–117)
ALT SERPL W P-5'-P-CCNC: 1003 U/L (ref 1–41)
ALT SERPL W P-5'-P-CCNC: 11 U/L (ref 1–41)
ALT SERPL W P-5'-P-CCNC: 11 U/L (ref 1–41)
ALT SERPL W P-5'-P-CCNC: 45 U/L (ref 1–41)
ALT SERPL W P-5'-P-CCNC: 508 U/L (ref 1–41)
ALT SERPL W P-5'-P-CCNC: 8 U/L (ref 1–41)
AMMONIA BLD-SCNC: 17 UMOL/L (ref 16–60)
ANION GAP SERPL CALCULATED.3IONS-SCNC: 10 MMOL/L (ref 5–15)
ANION GAP SERPL CALCULATED.3IONS-SCNC: 12 MMOL/L (ref 5–15)
ANION GAP SERPL CALCULATED.3IONS-SCNC: 12 MMOL/L (ref 5–15)
ANION GAP SERPL CALCULATED.3IONS-SCNC: 14 MMOL/L (ref 5–15)
ANION GAP SERPL CALCULATED.3IONS-SCNC: 19 MMOL/L (ref 5–15)
ANISOCYTOSIS BLD QL: ABNORMAL
ARTERIAL PATENCY WRIST A: ABNORMAL
ARTERIAL PATENCY WRIST A: ABNORMAL
AST SERPL-CCNC: 15 U/L (ref 1–40)
AST SERPL-CCNC: 1919 U/L (ref 1–40)
AST SERPL-CCNC: 20 U/L (ref 1–40)
AST SERPL-CCNC: 22 U/L (ref 1–40)
AST SERPL-CCNC: 86 U/L (ref 1–40)
AST SERPL-CCNC: 899 U/L (ref 1–40)
ATMOSPHERIC PRESS: 752 MMHG
ATMOSPHERIC PRESS: 755 MMHG
BACTERIA BLD CULT: ABNORMAL
BACTERIA SPEC AEROBE CULT: ABNORMAL
BACTERIA SPEC AEROBE CULT: ABNORMAL
BACTERIA UR QL AUTO: ABNORMAL /HPF
BACTERIA UR QL AUTO: ABNORMAL /HPF
BASE EXCESS BLDA CALC-SCNC: -10.7 MMOL/L (ref 0–2)
BASE EXCESS BLDA CALC-SCNC: -13.1 MMOL/L (ref 0–2)
BASOPHILS # BLD AUTO: 0.06 10*3/MM3 (ref 0–0.2)
BASOPHILS # BLD AUTO: 0.07 10*3/MM3 (ref 0–0.2)
BASOPHILS NFR BLD AUTO: 0.3 % (ref 0–1.5)
BASOPHILS NFR BLD AUTO: 0.4 % (ref 0–1.5)
BASOPHILS NFR BLD AUTO: 0.7 % (ref 0–1.5)
BASOPHILS NFR BLD AUTO: 0.7 % (ref 0–1.5)
BDY SITE: ABNORMAL
BDY SITE: ABNORMAL
BILIRUB CONJ SERPL-MCNC: 0.9 MG/DL (ref 0–0.3)
BILIRUB INDIRECT SERPL-MCNC: 0.6 MG/DL
BILIRUB SERPL-MCNC: 0.3 MG/DL (ref 0–1.2)
BILIRUB SERPL-MCNC: 0.4 MG/DL (ref 0–1.2)
BILIRUB SERPL-MCNC: 0.4 MG/DL (ref 0–1.2)
BILIRUB SERPL-MCNC: 0.5 MG/DL (ref 0–1.2)
BILIRUB SERPL-MCNC: 1.1 MG/DL (ref 0–1.2)
BILIRUB SERPL-MCNC: 1.5 MG/DL (ref 0–1.2)
BILIRUB UR QL STRIP: ABNORMAL
BILIRUB UR QL STRIP: NEGATIVE
BOTTLE TYPE: ABNORMAL
BUN SERPL-MCNC: 17 MG/DL (ref 8–23)
BUN SERPL-MCNC: 21 MG/DL (ref 8–23)
BUN SERPL-MCNC: 22 MG/DL (ref 8–23)
BUN SERPL-MCNC: 28 MG/DL (ref 8–23)
BUN SERPL-MCNC: 35 MG/DL (ref 8–23)
BUN/CREAT SERPL: 13.8 (ref 7–25)
BUN/CREAT SERPL: 14.3 (ref 7–25)
BUN/CREAT SERPL: 14.5 (ref 7–25)
BUN/CREAT SERPL: 17.4 (ref 7–25)
BUN/CREAT SERPL: 18.6 (ref 7–25)
CALCIUM SPEC-SCNC: 8.1 MG/DL (ref 8.6–10.5)
CALCIUM SPEC-SCNC: 8.8 MG/DL (ref 8.6–10.5)
CALCIUM SPEC-SCNC: 9.5 MG/DL (ref 8.6–10.5)
CHLORIDE SERPL-SCNC: 100 MMOL/L (ref 98–107)
CHLORIDE SERPL-SCNC: 105 MMOL/L (ref 98–107)
CHLORIDE SERPL-SCNC: 106 MMOL/L (ref 98–107)
CHLORIDE SERPL-SCNC: 108 MMOL/L (ref 98–107)
CHLORIDE SERPL-SCNC: 94 MMOL/L (ref 98–107)
CK SERPL-CCNC: 36 U/L (ref 20–200)
CK SERPL-CCNC: 48 U/L (ref 20–200)
CLARITY UR: CLEAR
CLARITY UR: CLEAR
CO2 SERPL-SCNC: 16 MMOL/L (ref 22–29)
CO2 SERPL-SCNC: 20 MMOL/L (ref 22–29)
CO2 SERPL-SCNC: 20 MMOL/L (ref 22–29)
CO2 SERPL-SCNC: 26 MMOL/L (ref 22–29)
CO2 SERPL-SCNC: 27 MMOL/L (ref 22–29)
COARSE GRAN CASTS URNS QL MICRO: ABNORMAL /LPF
COLOR UR: ABNORMAL
COLOR UR: ABNORMAL
CREAT SERPL-MCNC: 1.19 MG/DL (ref 0.76–1.27)
CREAT SERPL-MCNC: 1.45 MG/DL (ref 0.76–1.27)
CREAT SERPL-MCNC: 1.59 MG/DL (ref 0.76–1.27)
CREAT SERPL-MCNC: 1.61 MG/DL (ref 0.76–1.27)
CREAT SERPL-MCNC: 1.88 MG/DL (ref 0.76–1.27)
CREAT UR-MCNC: 66.6 MG/DL
D-DIMER, QUANTITATIVE (MAD,POW, STR): 2568 NG/ML (FEU) (ref 0–470)
D-LACTATE SERPL-SCNC: 1.7 MMOL/L (ref 0.5–2)
D-LACTATE SERPL-SCNC: 2.3 MMOL/L (ref 0.5–2)
D-LACTATE SERPL-SCNC: 3.1 MMOL/L (ref 0.5–2)
DEPRECATED RDW RBC AUTO: 54.9 FL (ref 37–54)
DEPRECATED RDW RBC AUTO: 56 FL (ref 37–54)
DEPRECATED RDW RBC AUTO: 58.2 FL (ref 37–54)
DEPRECATED RDW RBC AUTO: 59.6 FL (ref 37–54)
DEPRECATED RDW RBC AUTO: 64.2 FL (ref 37–54)
EOSINOPHIL # BLD AUTO: 0 10*3/MM3 (ref 0–0.4)
EOSINOPHIL # BLD AUTO: 0 10*3/MM3 (ref 0–0.4)
EOSINOPHIL # BLD AUTO: 0.02 10*3/MM3 (ref 0–0.4)
EOSINOPHIL # BLD AUTO: 0.48 10*3/MM3 (ref 0–0.4)
EOSINOPHIL # BLD MANUAL: 0.35 10*3/MM3 (ref 0–0.4)
EOSINOPHIL NFR BLD AUTO: 0 % (ref 0.3–6.2)
EOSINOPHIL NFR BLD AUTO: 0 % (ref 0.3–6.2)
EOSINOPHIL NFR BLD AUTO: 0.2 % (ref 0.3–6.2)
EOSINOPHIL NFR BLD AUTO: 4.5 % (ref 0.3–6.2)
EOSINOPHIL NFR BLD MANUAL: 3 % (ref 0.3–6.2)
ERYTHROCYTE [DISTWIDTH] IN BLOOD BY AUTOMATED COUNT: 16.5 % (ref 12.3–15.4)
ERYTHROCYTE [DISTWIDTH] IN BLOOD BY AUTOMATED COUNT: 16.6 % (ref 12.3–15.4)
ERYTHROCYTE [DISTWIDTH] IN BLOOD BY AUTOMATED COUNT: 16.8 % (ref 12.3–15.4)
ERYTHROCYTE [DISTWIDTH] IN BLOOD BY AUTOMATED COUNT: 17.3 % (ref 12.3–15.4)
ERYTHROCYTE [DISTWIDTH] IN BLOOD BY AUTOMATED COUNT: 18.4 % (ref 12.3–15.4)
FERRITIN SERPL-MCNC: ABNORMAL NG/ML (ref 30–400)
FLUAV RNA RESP QL NAA+PROBE: NOT DETECTED
FLUAV SUBTYP SPEC NAA+PROBE: NOT DETECTED
FLUBV RNA ISLT QL NAA+PROBE: NOT DETECTED
FLUBV RNA RESP QL NAA+PROBE: NOT DETECTED
GAS FLOW AIRWAY: 2 LPM
GAS FLOW AIRWAY: 3 LPM
GFR SERPL CREATININE-BSD FRML MDRD: 34 ML/MIN/1.73
GFR SERPL CREATININE-BSD FRML MDRD: 41 ML/MIN/1.73
GFR SERPL CREATININE-BSD FRML MDRD: 41 ML/MIN/1.73
GFR SERPL CREATININE-BSD FRML MDRD: 46 ML/MIN/1.73
GFR SERPL CREATININE-BSD FRML MDRD: 58 ML/MIN/1.73
GLOBULIN UR ELPH-MCNC: 2.1 GM/DL
GLOBULIN UR ELPH-MCNC: 2.1 GM/DL
GLOBULIN UR ELPH-MCNC: 2.5 GM/DL
GLOBULIN UR ELPH-MCNC: 2.9 GM/DL
GLOBULIN UR ELPH-MCNC: 3.3 GM/DL
GLUCOSE BLDC GLUCOMTR-MCNC: 100 MG/DL (ref 70–130)
GLUCOSE BLDC GLUCOMTR-MCNC: 119 MG/DL (ref 70–130)
GLUCOSE BLDC GLUCOMTR-MCNC: 121 MG/DL (ref 70–130)
GLUCOSE BLDC GLUCOMTR-MCNC: 124 MG/DL (ref 70–130)
GLUCOSE BLDC GLUCOMTR-MCNC: 124 MG/DL (ref 70–130)
GLUCOSE BLDC GLUCOMTR-MCNC: 125 MG/DL (ref 70–130)
GLUCOSE BLDC GLUCOMTR-MCNC: 127 MG/DL (ref 70–130)
GLUCOSE BLDC GLUCOMTR-MCNC: 130 MG/DL (ref 70–130)
GLUCOSE BLDC GLUCOMTR-MCNC: 170 MG/DL (ref 70–130)
GLUCOSE BLDC GLUCOMTR-MCNC: 171 MG/DL (ref 70–130)
GLUCOSE BLDC GLUCOMTR-MCNC: 185 MG/DL (ref 70–130)
GLUCOSE BLDC GLUCOMTR-MCNC: 208 MG/DL (ref 70–130)
GLUCOSE BLDC GLUCOMTR-MCNC: 235 MG/DL (ref 70–130)
GLUCOSE BLDC GLUCOMTR-MCNC: 249 MG/DL (ref 70–130)
GLUCOSE BLDC GLUCOMTR-MCNC: 254 MG/DL (ref 70–130)
GLUCOSE BLDC GLUCOMTR-MCNC: 256 MG/DL (ref 70–130)
GLUCOSE BLDC GLUCOMTR-MCNC: 89 MG/DL (ref 70–130)
GLUCOSE BLDC GLUCOMTR-MCNC: 93 MG/DL (ref 70–130)
GLUCOSE BLDC GLUCOMTR-MCNC: 95 MG/DL (ref 70–130)
GLUCOSE SERPL-MCNC: 105 MG/DL (ref 65–99)
GLUCOSE SERPL-MCNC: 128 MG/DL (ref 65–99)
GLUCOSE SERPL-MCNC: 186 MG/DL (ref 65–99)
GLUCOSE SERPL-MCNC: 219 MG/DL (ref 65–99)
GLUCOSE SERPL-MCNC: 60 MG/DL (ref 65–99)
GLUCOSE UR STRIP-MCNC: NEGATIVE MG/DL
GLUCOSE UR STRIP-MCNC: NEGATIVE MG/DL
GRAM STN SPEC: ABNORMAL
GRAM STN SPEC: ABNORMAL
HAV IGM SERPL QL IA: NORMAL
HBV CORE IGM SERPL QL IA: NORMAL
HBV SURFACE AG SERPL QL IA: NORMAL
HCO3 BLDA-SCNC: 13.3 MMOL/L (ref 20–26)
HCO3 BLDA-SCNC: 14.1 MMOL/L (ref 20–26)
HCT VFR BLD AUTO: 29.9 % (ref 37.5–51)
HCT VFR BLD AUTO: 30.4 % (ref 37.5–51)
HCT VFR BLD AUTO: 32.8 % (ref 37.5–51)
HCT VFR BLD AUTO: 37.1 % (ref 37.5–51)
HCT VFR BLD AUTO: 38.3 % (ref 37.5–51)
HCV AB SER DONR QL: NORMAL
HGB BLD-MCNC: 10.4 G/DL (ref 13–17.7)
HGB BLD-MCNC: 11.8 G/DL (ref 13–17.7)
HGB BLD-MCNC: 12 G/DL (ref 13–17.7)
HGB BLD-MCNC: 9.2 G/DL (ref 13–17.7)
HGB BLD-MCNC: 9.5 G/DL (ref 13–17.7)
HGB UR QL STRIP.AUTO: NEGATIVE
HGB UR QL STRIP.AUTO: NEGATIVE
HOLD SPECIMEN: NORMAL
HOLD SPECIMEN: NORMAL
HYALINE CASTS UR QL AUTO: ABNORMAL /LPF
HYALINE CASTS UR QL AUTO: ABNORMAL /LPF
IMM GRANULOCYTES # BLD AUTO: 0.31 10*3/MM3 (ref 0–0.05)
IMM GRANULOCYTES # BLD AUTO: 0.52 10*3/MM3 (ref 0–0.05)
IMM GRANULOCYTES # BLD AUTO: 0.59 10*3/MM3 (ref 0–0.05)
IMM GRANULOCYTES # BLD AUTO: 0.8 10*3/MM3 (ref 0–0.05)
IMM GRANULOCYTES NFR BLD AUTO: 2.9 % (ref 0–0.5)
IMM GRANULOCYTES NFR BLD AUTO: 3.1 % (ref 0–0.5)
IMM GRANULOCYTES NFR BLD AUTO: 4.5 % (ref 0–0.5)
IMM GRANULOCYTES NFR BLD AUTO: 5.2 % (ref 0–0.5)
INHALED O2 CONCENTRATION: <21 %
ISOLATED FROM: ABNORMAL
ISOLATED FROM: ABNORMAL
KETONES UR QL STRIP: ABNORMAL
KETONES UR QL STRIP: NEGATIVE
LEUKOCYTE ESTERASE UR QL STRIP.AUTO: ABNORMAL
LEUKOCYTE ESTERASE UR QL STRIP.AUTO: NEGATIVE
LYMPHOCYTES # BLD AUTO: 0.79 10*3/MM3 (ref 0.7–3.1)
LYMPHOCYTES # BLD AUTO: 1.32 10*3/MM3 (ref 0.7–3.1)
LYMPHOCYTES # BLD AUTO: 1.46 10*3/MM3 (ref 0.7–3.1)
LYMPHOCYTES # BLD AUTO: 2.43 10*3/MM3 (ref 0.7–3.1)
LYMPHOCYTES # BLD MANUAL: 2.78 10*3/MM3 (ref 0.7–3.1)
LYMPHOCYTES NFR BLD AUTO: 14.5 % (ref 19.6–45.3)
LYMPHOCYTES NFR BLD AUTO: 22.8 % (ref 19.6–45.3)
LYMPHOCYTES NFR BLD AUTO: 4.4 % (ref 19.6–45.3)
LYMPHOCYTES NFR BLD AUTO: 7 % (ref 19.6–45.3)
LYMPHOCYTES NFR BLD MANUAL: 10 % (ref 5–12)
Lab: ABNORMAL
Lab: ABNORMAL
MAGNESIUM SERPL-MCNC: 1.7 MG/DL (ref 1.6–2.4)
MAGNESIUM SERPL-MCNC: 1.8 MG/DL (ref 1.6–2.4)
MCH RBC QN AUTO: 29.1 PG (ref 26.6–33)
MCH RBC QN AUTO: 29.5 PG (ref 26.6–33)
MCH RBC QN AUTO: 29.6 PG (ref 26.6–33)
MCH RBC QN AUTO: 29.6 PG (ref 26.6–33)
MCH RBC QN AUTO: 30.1 PG (ref 26.6–33)
MCHC RBC AUTO-ENTMCNC: 30.8 G/DL (ref 31.5–35.7)
MCHC RBC AUTO-ENTMCNC: 31.3 G/DL (ref 31.5–35.7)
MCHC RBC AUTO-ENTMCNC: 31.3 G/DL (ref 31.5–35.7)
MCHC RBC AUTO-ENTMCNC: 31.7 G/DL (ref 31.5–35.7)
MCHC RBC AUTO-ENTMCNC: 31.8 G/DL (ref 31.5–35.7)
MCV RBC AUTO: 91.6 FL (ref 79–97)
MCV RBC AUTO: 93.2 FL (ref 79–97)
MCV RBC AUTO: 94.3 FL (ref 79–97)
MCV RBC AUTO: 96.1 FL (ref 79–97)
MCV RBC AUTO: 96.2 FL (ref 79–97)
MODALITY: ABNORMAL
MODALITY: ABNORMAL
MONOCYTES # BLD AUTO: 0.2 10*3/MM3 (ref 0.1–0.9)
MONOCYTES # BLD AUTO: 1.19 10*3/MM3 (ref 0.1–0.9)
MONOCYTES # BLD AUTO: 1.24 10*3/MM3 (ref 0.1–0.9)
MONOCYTES # BLD AUTO: 1.28 10*3/MM3 (ref 0.1–0.9)
MONOCYTES # BLD: 1.16 10*3/MM3 (ref 0.1–0.9)
MONOCYTES NFR BLD AUTO: 11.2 % (ref 5–12)
MONOCYTES NFR BLD AUTO: 2 % (ref 5–12)
MONOCYTES NFR BLD AUTO: 6.8 % (ref 5–12)
MONOCYTES NFR BLD AUTO: 6.9 % (ref 5–12)
MRSA DNA SPEC QL NAA+PROBE: POSITIVE
NEUTROPHILS # BLD AUTO: 7.3 10*3/MM3 (ref 1.7–7)
NEUTROPHILS NFR BLD AUTO: 15.06 10*3/MM3 (ref 1.7–7)
NEUTROPHILS NFR BLD AUTO: 15.7 10*3/MM3 (ref 1.7–7)
NEUTROPHILS NFR BLD AUTO: 57.9 % (ref 42.7–76)
NEUTROPHILS NFR BLD AUTO: 6.17 10*3/MM3 (ref 1.7–7)
NEUTROPHILS NFR BLD AUTO: 7.8 10*3/MM3 (ref 1.7–7)
NEUTROPHILS NFR BLD AUTO: 77.4 % (ref 42.7–76)
NEUTROPHILS NFR BLD AUTO: 82.7 % (ref 42.7–76)
NEUTROPHILS NFR BLD AUTO: 83.9 % (ref 42.7–76)
NEUTROPHILS NFR BLD MANUAL: 58 % (ref 42.7–76)
NEUTS BAND NFR BLD MANUAL: 5 % (ref 0–5)
NITRITE UR QL STRIP: NEGATIVE
NITRITE UR QL STRIP: NEGATIVE
NRBC BLD AUTO-RTO: 0 /100 WBC (ref 0–0.2)
NRBC BLD AUTO-RTO: 0 /100 WBC (ref 0–0.2)
NRBC BLD AUTO-RTO: 0.2 /100 WBC (ref 0–0.2)
NRBC BLD AUTO-RTO: 1.6 /100 WBC (ref 0–0.2)
PCO2 BLDA: 27.5 MM HG (ref 35–45)
PCO2 BLDA: 31.7 MM HG (ref 35–45)
PH BLDA: 7.23 PH UNITS (ref 7.35–7.45)
PH BLDA: 7.32 PH UNITS (ref 7.35–7.45)
PH UR STRIP.AUTO: 5.5 [PH] (ref 5–9)
PH UR STRIP.AUTO: <=5 [PH] (ref 5–9)
PLAT MORPH BLD: NORMAL
PLATELET # BLD AUTO: 184 10*3/MM3 (ref 140–450)
PLATELET # BLD AUTO: 276 10*3/MM3 (ref 140–450)
PLATELET # BLD AUTO: 330 10*3/MM3 (ref 140–450)
PLATELET # BLD AUTO: 395 10*3/MM3 (ref 140–450)
PLATELET # BLD AUTO: 425 10*3/MM3 (ref 140–450)
PMV BLD AUTO: 10.6 FL (ref 6–12)
PMV BLD AUTO: 10.6 FL (ref 6–12)
PMV BLD AUTO: 10.7 FL (ref 6–12)
PMV BLD AUTO: 10.8 FL (ref 6–12)
PMV BLD AUTO: 11.6 FL (ref 6–12)
PO2 BLDA: 110 MM HG (ref 83–108)
PO2 BLDA: 115 MM HG (ref 83–108)
POLYCHROMASIA BLD QL SMEAR: ABNORMAL
POTASSIUM SERPL-SCNC: 3.7 MMOL/L (ref 3.5–5.2)
POTASSIUM SERPL-SCNC: 4.5 MMOL/L (ref 3.5–5.2)
POTASSIUM SERPL-SCNC: 5 MMOL/L (ref 3.5–5.2)
POTASSIUM SERPL-SCNC: 5.5 MMOL/L (ref 3.5–5.2)
POTASSIUM SERPL-SCNC: 5.7 MMOL/L (ref 3.5–5.2)
POTASSIUM SERPL-SCNC: 5.8 MMOL/L (ref 3.5–5.2)
PROT SERPL-MCNC: 5.6 G/DL (ref 6–8.5)
PROT SERPL-MCNC: 5.7 G/DL (ref 6–8.5)
PROT SERPL-MCNC: 6 G/DL (ref 6–8.5)
PROT SERPL-MCNC: 6.1 G/DL (ref 6–8.5)
PROT SERPL-MCNC: 6.2 G/DL (ref 6–8.5)
PROT SERPL-MCNC: 6.4 G/DL (ref 6–8.5)
PROT UR QL STRIP: ABNORMAL
PROT UR QL STRIP: ABNORMAL
QT INTERVAL: 348 MS
QT INTERVAL: 418 MS
QTC INTERVAL: 455 MS
QTC INTERVAL: 508 MS
RBC # BLD AUTO: 3.11 10*6/MM3 (ref 4.14–5.8)
RBC # BLD AUTO: 3.16 10*6/MM3 (ref 4.14–5.8)
RBC # BLD AUTO: 3.52 10*6/MM3 (ref 4.14–5.8)
RBC # BLD AUTO: 4.05 10*6/MM3 (ref 4.14–5.8)
RBC # BLD AUTO: 4.06 10*6/MM3 (ref 4.14–5.8)
RBC # UR STRIP: ABNORMAL /HPF
RBC # UR STRIP: ABNORMAL /HPF
REF LAB TEST METHOD: ABNORMAL
REF LAB TEST METHOD: ABNORMAL
SAO2 % BLDCOA: 97.3 % (ref 94–99)
SAO2 % BLDCOA: 98.3 % (ref 94–99)
SARS-COV-2 RNA PNL SPEC NAA+PROBE: NOT DETECTED
SARS-COV-2 RNA RESP QL NAA+PROBE: NOT DETECTED
SODIUM SERPL-SCNC: 131 MMOL/L (ref 136–145)
SODIUM SERPL-SCNC: 138 MMOL/L (ref 136–145)
SODIUM SERPL-SCNC: 138 MMOL/L (ref 136–145)
SODIUM SERPL-SCNC: 139 MMOL/L (ref 136–145)
SODIUM SERPL-SCNC: 143 MMOL/L (ref 136–145)
SODIUM UR-SCNC: 38 MMOL/L
SP GR UR STRIP: 1.02 (ref 1–1.03)
SP GR UR STRIP: 1.02 (ref 1–1.03)
SQUAMOUS #/AREA URNS HPF: ABNORMAL /HPF
SQUAMOUS #/AREA URNS HPF: ABNORMAL /HPF
TROPONIN T SERPL-MCNC: 0.04 NG/ML (ref 0–0.03)
UROBILINOGEN UR QL STRIP: ABNORMAL
UROBILINOGEN UR QL STRIP: ABNORMAL
VANCOMYCIN PEAK SERPL-MCNC: 27.1 MCG/ML (ref 20–40)
VANCOMYCIN TROUGH SERPL-MCNC: 16.3 MCG/ML (ref 5–20)
VARIANT LYMPHS NFR BLD MANUAL: 20 % (ref 19.6–45.3)
VARIANT LYMPHS NFR BLD MANUAL: 4 % (ref 0–5)
VENTILATOR MODE: ABNORMAL
VENTILATOR MODE: ABNORMAL
WBC # UR STRIP: ABNORMAL /HPF
WBC # UR STRIP: ABNORMAL /HPF
WBC MORPH BLD: NORMAL
WBC NRBC COR # BLD: 10.07 10*3/MM3 (ref 3.4–10.8)
WBC NRBC COR # BLD: 10.65 10*3/MM3 (ref 3.4–10.8)
WBC NRBC COR # BLD: 11.58 10*3/MM3 (ref 3.4–10.8)
WBC NRBC COR # BLD: 17.95 10*3/MM3 (ref 3.4–10.8)
WBC NRBC COR # BLD: 18.96 10*3/MM3 (ref 3.4–10.8)
WHOLE BLOOD HOLD SPECIMEN: NORMAL
YEAST URNS QL MICRO: ABNORMAL /HPF

## 2022-01-01 PROCEDURE — 96361 HYDRATE IV INFUSION ADD-ON: CPT

## 2022-01-01 PROCEDURE — 25010000002 METHYLPREDNISOLONE PER 40 MG: Performed by: FAMILY MEDICINE

## 2022-01-01 PROCEDURE — 87186 SC STD MICRODIL/AGAR DIL: CPT | Performed by: FAMILY MEDICINE

## 2022-01-01 PROCEDURE — 25010000002 VANCOMYCIN 1 G RECONSTITUTED SOLUTION: Performed by: FAMILY MEDICINE

## 2022-01-01 PROCEDURE — 25010000002 CEFEPIME PER 500 MG: Performed by: FAMILY MEDICINE

## 2022-01-01 PROCEDURE — 71045 X-RAY EXAM CHEST 1 VIEW: CPT

## 2022-01-01 PROCEDURE — 99283 EMERGENCY DEPT VISIT LOW MDM: CPT

## 2022-01-01 PROCEDURE — 94799 UNLISTED PULMONARY SVC/PX: CPT

## 2022-01-01 PROCEDURE — 87147 CULTURE TYPE IMMUNOLOGIC: CPT | Performed by: FAMILY MEDICINE

## 2022-01-01 PROCEDURE — 94660 CPAP INITIATION&MGMT: CPT

## 2022-01-01 PROCEDURE — 82728 ASSAY OF FERRITIN: CPT | Performed by: FAMILY MEDICINE

## 2022-01-01 PROCEDURE — 25010000002 ENOXAPARIN PER 10 MG: Performed by: FAMILY MEDICINE

## 2022-01-01 PROCEDURE — 71275 CT ANGIOGRAPHY CHEST: CPT

## 2022-01-01 PROCEDURE — 87040 BLOOD CULTURE FOR BACTERIA: CPT | Performed by: FAMILY MEDICINE

## 2022-01-01 PROCEDURE — 97162 PT EVAL MOD COMPLEX 30 MIN: CPT

## 2022-01-01 PROCEDURE — 80202 ASSAY OF VANCOMYCIN: CPT | Performed by: FAMILY MEDICINE

## 2022-01-01 PROCEDURE — 25010000002 ALBUMIN HUMAN 25% PER 50 ML: Performed by: FAMILY MEDICINE

## 2022-01-01 PROCEDURE — 87636 SARSCOV2 & INF A&B AMP PRB: CPT | Performed by: FAMILY MEDICINE

## 2022-01-01 PROCEDURE — 36600 WITHDRAWAL OF ARTERIAL BLOOD: CPT

## 2022-01-01 PROCEDURE — 82803 BLOOD GASES ANY COMBINATION: CPT

## 2022-01-01 PROCEDURE — 80053 COMPREHEN METABOLIC PANEL: CPT | Performed by: FAMILY MEDICINE

## 2022-01-01 PROCEDURE — 82962 GLUCOSE BLOOD TEST: CPT

## 2022-01-01 PROCEDURE — 82140 ASSAY OF AMMONIA: CPT | Performed by: FAMILY MEDICINE

## 2022-01-01 PROCEDURE — 85025 COMPLETE CBC W/AUTO DIFF WBC: CPT | Performed by: FAMILY MEDICINE

## 2022-01-01 PROCEDURE — 25010000002 MORPHINE PER 10 MG: Performed by: FAMILY MEDICINE

## 2022-01-01 PROCEDURE — 93010 ELECTROCARDIOGRAM REPORT: CPT | Performed by: INTERNAL MEDICINE

## 2022-01-01 PROCEDURE — 97166 OT EVAL MOD COMPLEX 45 MIN: CPT

## 2022-01-01 PROCEDURE — 97110 THERAPEUTIC EXERCISES: CPT

## 2022-01-01 PROCEDURE — 82550 ASSAY OF CK (CPK): CPT | Performed by: FAMILY MEDICINE

## 2022-01-01 PROCEDURE — 87150 DNA/RNA AMPLIFIED PROBE: CPT | Performed by: FAMILY MEDICINE

## 2022-01-01 PROCEDURE — 83735 ASSAY OF MAGNESIUM: CPT | Performed by: FAMILY MEDICINE

## 2022-01-01 PROCEDURE — 36415 COLL VENOUS BLD VENIPUNCTURE: CPT | Performed by: FAMILY MEDICINE

## 2022-01-01 PROCEDURE — 80074 ACUTE HEPATITIS PANEL: CPT | Performed by: FAMILY MEDICINE

## 2022-01-01 PROCEDURE — 84300 ASSAY OF URINE SODIUM: CPT | Performed by: FAMILY MEDICINE

## 2022-01-01 PROCEDURE — 76937 US GUIDE VASCULAR ACCESS: CPT

## 2022-01-01 PROCEDURE — 87641 MR-STAPH DNA AMP PROBE: CPT | Performed by: FAMILY MEDICINE

## 2022-01-01 PROCEDURE — 84484 ASSAY OF TROPONIN QUANT: CPT | Performed by: FAMILY MEDICINE

## 2022-01-01 PROCEDURE — 87077 CULTURE AEROBIC IDENTIFY: CPT | Performed by: FAMILY MEDICINE

## 2022-01-01 PROCEDURE — 83605 ASSAY OF LACTIC ACID: CPT | Performed by: FAMILY MEDICINE

## 2022-01-01 PROCEDURE — P9047 ALBUMIN (HUMAN), 25%, 50ML: HCPCS | Performed by: FAMILY MEDICINE

## 2022-01-01 PROCEDURE — 94760 N-INVAS EAR/PLS OXIMETRY 1: CPT

## 2022-01-01 PROCEDURE — 94640 AIRWAY INHALATION TREATMENT: CPT

## 2022-01-01 PROCEDURE — 63710000001 INSULIN ASPART PER 5 UNITS: Performed by: FAMILY MEDICINE

## 2022-01-01 PROCEDURE — 76775 US EXAM ABDO BACK WALL LIM: CPT

## 2022-01-01 PROCEDURE — 81001 URINALYSIS AUTO W/SCOPE: CPT | Performed by: FAMILY MEDICINE

## 2022-01-01 PROCEDURE — 93005 ELECTROCARDIOGRAM TRACING: CPT

## 2022-01-01 PROCEDURE — 94761 N-INVAS EAR/PLS OXIMETRY MLT: CPT

## 2022-01-01 PROCEDURE — 92610 EVALUATE SWALLOWING FUNCTION: CPT | Performed by: SPEECH-LANGUAGE PATHOLOGIST

## 2022-01-01 PROCEDURE — 96360 HYDRATION IV INFUSION INIT: CPT

## 2022-01-01 PROCEDURE — 93005 ELECTROCARDIOGRAM TRACING: CPT | Performed by: FAMILY MEDICINE

## 2022-01-01 PROCEDURE — 97535 SELF CARE MNGMENT TRAINING: CPT

## 2022-01-01 PROCEDURE — 93976 VASCULAR STUDY: CPT

## 2022-01-01 PROCEDURE — 63710000001 INSULIN REGULAR HUMAN PER 5 UNITS: Performed by: FAMILY MEDICINE

## 2022-01-01 PROCEDURE — 97530 THERAPEUTIC ACTIVITIES: CPT

## 2022-01-01 PROCEDURE — 82248 BILIRUBIN DIRECT: CPT | Performed by: FAMILY MEDICINE

## 2022-01-01 PROCEDURE — 74176 CT ABD & PELVIS W/O CONTRAST: CPT

## 2022-01-01 PROCEDURE — 85007 BL SMEAR W/DIFF WBC COUNT: CPT | Performed by: FAMILY MEDICINE

## 2022-01-01 PROCEDURE — 36410 VNPNXR 3YR/> PHY/QHP DX/THER: CPT

## 2022-01-01 PROCEDURE — C1751 CATH, INF, PER/CENT/MIDLINE: HCPCS

## 2022-01-01 PROCEDURE — 76705 ECHO EXAM OF ABDOMEN: CPT

## 2022-01-01 PROCEDURE — 25010000002 ONDANSETRON PER 1 MG: Performed by: FAMILY MEDICINE

## 2022-01-01 PROCEDURE — 25010000002 VANCOMYCIN 10 G RECONSTITUTED SOLUTION

## 2022-01-01 PROCEDURE — 84132 ASSAY OF SERUM POTASSIUM: CPT | Performed by: FAMILY MEDICINE

## 2022-01-01 PROCEDURE — 85379 FIBRIN DEGRADATION QUANT: CPT | Performed by: FAMILY MEDICINE

## 2022-01-01 PROCEDURE — 99285 EMERGENCY DEPT VISIT HI MDM: CPT

## 2022-01-01 PROCEDURE — 92526 ORAL FUNCTION THERAPY: CPT | Performed by: SPEECH-LANGUAGE PATHOLOGIST

## 2022-01-01 PROCEDURE — 82570 ASSAY OF URINE CREATININE: CPT | Performed by: FAMILY MEDICINE

## 2022-01-01 PROCEDURE — 63710000001 ONDANSETRON PER 8 MG: Performed by: FAMILY MEDICINE

## 2022-01-01 PROCEDURE — 0 IOPAMIDOL PER 1 ML: Performed by: FAMILY MEDICINE

## 2022-01-01 RX ORDER — SODIUM CHLORIDE 0.9 % (FLUSH) 0.9 %
10 SYRINGE (ML) INJECTION AS NEEDED
Status: DISCONTINUED | OUTPATIENT
Start: 2022-01-01 | End: 2022-01-01

## 2022-01-01 RX ORDER — MORPHINE SULFATE 2 MG/ML
1 INJECTION, SOLUTION INTRAMUSCULAR; INTRAVENOUS EVERY 4 HOURS PRN
Status: DISCONTINUED | OUTPATIENT
Start: 2022-01-01 | End: 2022-01-01

## 2022-01-01 RX ORDER — LIDOCAINE HYDROCHLORIDE 20 MG/ML
5 SOLUTION OROPHARYNGEAL EVERY 4 HOURS PRN
Status: DISCONTINUED | OUTPATIENT
Start: 2022-01-01 | End: 2022-01-01 | Stop reason: HOSPADM

## 2022-01-01 RX ORDER — HALOPERIDOL 5 MG/ML
1 INJECTION INTRAMUSCULAR EVERY 4 HOURS PRN
Status: DISCONTINUED | OUTPATIENT
Start: 2022-01-01 | End: 2022-01-01 | Stop reason: HOSPADM

## 2022-01-01 RX ORDER — ALBUTEROL SULFATE 2.5 MG/3ML
2.5 SOLUTION RESPIRATORY (INHALATION) EVERY 4 HOURS PRN
Status: DISCONTINUED | OUTPATIENT
Start: 2022-01-01 | End: 2022-01-01

## 2022-01-01 RX ORDER — SODIUM CHLORIDE 0.9 % (FLUSH) 0.9 %
10 SYRINGE (ML) INJECTION EVERY 12 HOURS SCHEDULED
Status: DISCONTINUED | OUTPATIENT
Start: 2022-01-01 | End: 2022-01-01

## 2022-01-01 RX ORDER — CALCIUM CARBONATE 200(500)MG
2 TABLET,CHEWABLE ORAL 2 TIMES DAILY PRN
Status: DISCONTINUED | OUTPATIENT
Start: 2022-01-01 | End: 2022-01-01

## 2022-01-01 RX ORDER — METHYLPREDNISOLONE SODIUM SUCCINATE 40 MG/ML
40 INJECTION, POWDER, LYOPHILIZED, FOR SOLUTION INTRAMUSCULAR; INTRAVENOUS EVERY 12 HOURS
Status: DISCONTINUED | OUTPATIENT
Start: 2022-01-01 | End: 2022-01-01

## 2022-01-01 RX ORDER — ACETAMINOPHEN 325 MG/1
650 TABLET ORAL EVERY 4 HOURS PRN
Status: DISCONTINUED | OUTPATIENT
Start: 2022-01-01 | End: 2022-01-01 | Stop reason: HOSPADM

## 2022-01-01 RX ORDER — DEXTROSE MONOHYDRATE 25 G/50ML
50 INJECTION, SOLUTION INTRAVENOUS
Status: DISCONTINUED | OUTPATIENT
Start: 2022-01-01 | End: 2022-01-01

## 2022-01-01 RX ORDER — LORAZEPAM 0.5 MG/1
1 TABLET ORAL 4 TIMES DAILY PRN
Status: DISCONTINUED | OUTPATIENT
Start: 2022-01-01 | End: 2022-01-01

## 2022-01-01 RX ORDER — LORAZEPAM 2 MG/ML
0.5 INJECTION INTRAMUSCULAR
Status: DISCONTINUED | OUTPATIENT
Start: 2022-01-01 | End: 2022-01-01 | Stop reason: HOSPADM

## 2022-01-01 RX ORDER — MAGNESIUM SULFATE HEPTAHYDRATE 40 MG/ML
2 INJECTION, SOLUTION INTRAVENOUS AS NEEDED
Status: DISCONTINUED | OUTPATIENT
Start: 2022-01-01 | End: 2022-01-01

## 2022-01-01 RX ORDER — LORAZEPAM 0.5 MG/1
0.5 TABLET ORAL 3 TIMES DAILY PRN
Status: DISCONTINUED | OUTPATIENT
Start: 2022-01-01 | End: 2022-01-01

## 2022-01-01 RX ORDER — CETIRIZINE HYDROCHLORIDE 5 MG/1
5 TABLET ORAL DAILY
Status: DISCONTINUED | OUTPATIENT
Start: 2022-01-01 | End: 2022-01-01

## 2022-01-01 RX ORDER — FAMOTIDINE 20 MG/1
20 TABLET, FILM COATED ORAL
Status: DISCONTINUED | OUTPATIENT
Start: 2022-01-01 | End: 2022-01-01

## 2022-01-01 RX ORDER — DIPHENOXYLATE HYDROCHLORIDE AND ATROPINE SULFATE 2.5; .025 MG/1; MG/1
1 TABLET ORAL
Status: DISCONTINUED | OUTPATIENT
Start: 2022-01-01 | End: 2022-01-01 | Stop reason: HOSPADM

## 2022-01-01 RX ORDER — LANOLIN ALCOHOL/MO/W.PET/CERES
6 CREAM (GRAM) TOPICAL NIGHTLY PRN
Status: DISCONTINUED | OUTPATIENT
Start: 2022-01-01 | End: 2022-01-01

## 2022-01-01 RX ORDER — BISACODYL 5 MG/1
5 TABLET, DELAYED RELEASE ORAL DAILY PRN
Status: DISCONTINUED | OUTPATIENT
Start: 2022-01-01 | End: 2022-01-01

## 2022-01-01 RX ORDER — LORAZEPAM 0.5 MG/1
0.5 TABLET ORAL 4 TIMES DAILY PRN
Status: DISCONTINUED | OUTPATIENT
Start: 2022-01-01 | End: 2022-01-01

## 2022-01-01 RX ORDER — LORAZEPAM 0.5 MG/1
0.5 TABLET ORAL ONCE
Status: COMPLETED | OUTPATIENT
Start: 2022-01-01 | End: 2022-01-01

## 2022-01-01 RX ORDER — LORAZEPAM 2 MG/ML
1 INJECTION INTRAMUSCULAR
Status: DISCONTINUED | OUTPATIENT
Start: 2022-01-01 | End: 2022-01-01 | Stop reason: HOSPADM

## 2022-01-01 RX ORDER — RANOLAZINE 500 MG/1
1000 TABLET, EXTENDED RELEASE ORAL EVERY 12 HOURS SCHEDULED
Status: DISCONTINUED | OUTPATIENT
Start: 2022-01-01 | End: 2022-01-01

## 2022-01-01 RX ORDER — SODIUM POLYSTYRENE SULFONATE 15 G/60ML
30 SUSPENSION ORAL; RECTAL ONCE
Status: DISCONTINUED | OUTPATIENT
Start: 2022-01-01 | End: 2022-01-01

## 2022-01-01 RX ORDER — ACETAMINOPHEN 325 MG/1
650 TABLET ORAL EVERY 4 HOURS PRN
Status: DISCONTINUED | OUTPATIENT
Start: 2022-01-01 | End: 2022-01-01

## 2022-01-01 RX ORDER — NITROGLYCERIN 0.4 MG/1
0.4 TABLET SUBLINGUAL
Status: DISCONTINUED | OUTPATIENT
Start: 2022-01-01 | End: 2022-01-01

## 2022-01-01 RX ORDER — SODIUM CHLORIDE 9 MG/ML
INJECTION, SOLUTION INTRAVENOUS
Status: DISCONTINUED
Start: 2022-01-01 | End: 2022-01-01 | Stop reason: HOSPADM

## 2022-01-01 RX ORDER — NALOXONE HCL 0.4 MG/ML
0.4 VIAL (ML) INJECTION
Status: DISCONTINUED | OUTPATIENT
Start: 2022-01-01 | End: 2022-01-01

## 2022-01-01 RX ORDER — NITROGLYCERIN 80 MG/1
1 PATCH TRANSDERMAL SEE ADMIN INSTRUCTIONS
Qty: 30 PATCH | Refills: 11 | Status: SHIPPED | OUTPATIENT
Start: 2022-01-01

## 2022-01-01 RX ORDER — ONDANSETRON 2 MG/ML
4 INJECTION INTRAMUSCULAR; INTRAVENOUS EVERY 6 HOURS PRN
Status: DISCONTINUED | OUTPATIENT
Start: 2022-01-01 | End: 2022-01-01

## 2022-01-01 RX ORDER — FUROSEMIDE 20 MG/1
20 TABLET ORAL ONCE
Status: COMPLETED | OUTPATIENT
Start: 2022-01-01 | End: 2022-01-01

## 2022-01-01 RX ORDER — ONDANSETRON 4 MG/1
4 TABLET, ORALLY DISINTEGRATING ORAL EVERY 6 HOURS PRN
Qty: 10 TABLET | Refills: 0 | Status: SHIPPED | OUTPATIENT
Start: 2022-01-01

## 2022-01-01 RX ORDER — ONDANSETRON 4 MG/1
4 TABLET, FILM COATED ORAL EVERY 6 HOURS PRN
Status: DISCONTINUED | OUTPATIENT
Start: 2022-01-01 | End: 2022-01-01

## 2022-01-01 RX ORDER — SCOLOPAMINE TRANSDERMAL SYSTEM 1 MG/1
1 PATCH, EXTENDED RELEASE TRANSDERMAL
Status: DISCONTINUED | OUTPATIENT
Start: 2022-01-01 | End: 2022-01-01 | Stop reason: HOSPADM

## 2022-01-01 RX ORDER — LORAZEPAM 2 MG/ML
2 INJECTION INTRAMUSCULAR
Status: DISCONTINUED | OUTPATIENT
Start: 2022-01-01 | End: 2022-01-01 | Stop reason: HOSPADM

## 2022-01-01 RX ORDER — ALBUMIN (HUMAN) 12.5 G/50ML
25 SOLUTION INTRAVENOUS 2 TIMES DAILY
Status: COMPLETED | OUTPATIENT
Start: 2022-01-01 | End: 2022-01-01

## 2022-01-01 RX ORDER — ALBUMIN (HUMAN) 12.5 G/50ML
25 SOLUTION INTRAVENOUS ONCE
Status: COMPLETED | OUTPATIENT
Start: 2022-01-01 | End: 2022-01-01

## 2022-01-01 RX ORDER — LORAZEPAM 2 MG/ML
1 CONCENTRATE ORAL
Status: DISCONTINUED | OUTPATIENT
Start: 2022-01-01 | End: 2022-01-01 | Stop reason: HOSPADM

## 2022-01-01 RX ORDER — SODIUM CHLORIDE 0.9 % (FLUSH) 0.9 %
10 SYRINGE (ML) INJECTION AS NEEDED
Status: DISCONTINUED | OUTPATIENT
Start: 2022-01-01 | End: 2022-01-01 | Stop reason: HOSPADM

## 2022-01-01 RX ORDER — LORAZEPAM 0.5 MG/1
0.5 TABLET ORAL
Status: DISCONTINUED | OUTPATIENT
Start: 2022-01-01 | End: 2022-01-01 | Stop reason: HOSPADM

## 2022-01-01 RX ORDER — POTASSIUM CHLORIDE 1.5 G/1.77G
40 POWDER, FOR SOLUTION ORAL AS NEEDED
Status: DISCONTINUED | OUTPATIENT
Start: 2022-01-01 | End: 2022-01-01

## 2022-01-01 RX ORDER — DEXTROSE MONOHYDRATE 25 G/50ML
INJECTION, SOLUTION INTRAVENOUS
Status: COMPLETED
Start: 2022-01-01 | End: 2022-01-01

## 2022-01-01 RX ORDER — FLUTICASONE PROPIONATE 50 MCG
2 SPRAY, SUSPENSION (ML) NASAL DAILY
Status: DISCONTINUED | OUTPATIENT
Start: 2022-01-01 | End: 2022-01-01

## 2022-01-01 RX ORDER — KETOROLAC TROMETHAMINE 15 MG/ML
15 INJECTION, SOLUTION INTRAMUSCULAR; INTRAVENOUS EVERY 6 HOURS PRN
Status: DISCONTINUED | OUTPATIENT
Start: 2022-01-01 | End: 2022-01-01 | Stop reason: HOSPADM

## 2022-01-01 RX ORDER — SODIUM CHLORIDE 9 MG/ML
125 INJECTION, SOLUTION INTRAVENOUS CONTINUOUS
Status: DISCONTINUED | OUTPATIENT
Start: 2022-01-01 | End: 2022-01-01 | Stop reason: HOSPADM

## 2022-01-01 RX ORDER — MAGNESIUM SULFATE HEPTAHYDRATE 40 MG/ML
4 INJECTION, SOLUTION INTRAVENOUS AS NEEDED
Status: DISCONTINUED | OUTPATIENT
Start: 2022-01-01 | End: 2022-01-01

## 2022-01-01 RX ORDER — SODIUM CHLORIDE 9 MG/ML
125 INJECTION, SOLUTION INTRAVENOUS CONTINUOUS
Status: DISCONTINUED | OUTPATIENT
Start: 2022-01-01 | End: 2022-01-01

## 2022-01-01 RX ORDER — IPRATROPIUM BROMIDE AND ALBUTEROL SULFATE 2.5; .5 MG/3ML; MG/3ML
3 SOLUTION RESPIRATORY (INHALATION)
Status: DISCONTINUED | OUTPATIENT
Start: 2022-01-01 | End: 2022-01-01

## 2022-01-01 RX ORDER — HYDROMORPHONE HCL 110MG/55ML
1.5 PATIENT CONTROLLED ANALGESIA SYRINGE INTRAVENOUS
Status: DISCONTINUED | OUTPATIENT
Start: 2022-01-01 | End: 2022-01-01 | Stop reason: HOSPADM

## 2022-01-01 RX ORDER — CIPROFLOXACIN HYDROCHLORIDE 3.5 MG/ML
2 SOLUTION/ DROPS TOPICAL
Status: COMPLETED | OUTPATIENT
Start: 2022-01-01 | End: 2022-01-01

## 2022-01-01 RX ORDER — AMIODARONE HYDROCHLORIDE 200 MG/1
200 TABLET ORAL
Status: DISCONTINUED | OUTPATIENT
Start: 2022-01-01 | End: 2022-01-01

## 2022-01-01 RX ORDER — LORAZEPAM 2 MG/1
2 TABLET ORAL
Status: DISCONTINUED | OUTPATIENT
Start: 2022-01-01 | End: 2022-01-01 | Stop reason: HOSPADM

## 2022-01-01 RX ORDER — AMOXICILLIN 250 MG
2 CAPSULE ORAL 2 TIMES DAILY PRN
Status: DISCONTINUED | OUTPATIENT
Start: 2022-01-01 | End: 2022-01-01

## 2022-01-01 RX ORDER — HALOPERIDOL 1 MG/1
1 TABLET ORAL EVERY 4 HOURS PRN
Status: DISCONTINUED | OUTPATIENT
Start: 2022-01-01 | End: 2022-01-01 | Stop reason: HOSPADM

## 2022-01-01 RX ORDER — ACETAMINOPHEN 160 MG/5ML
650 SOLUTION ORAL EVERY 4 HOURS PRN
Status: DISCONTINUED | OUTPATIENT
Start: 2022-01-01 | End: 2022-01-01

## 2022-01-01 RX ORDER — ALBUMIN (HUMAN) 12.5 G/50ML
25 SOLUTION INTRAVENOUS 2 TIMES DAILY
Status: DISCONTINUED | OUTPATIENT
Start: 2022-01-01 | End: 2022-01-01

## 2022-01-01 RX ORDER — ACETAMINOPHEN 650 MG/1
650 SUPPOSITORY RECTAL EVERY 4 HOURS PRN
Status: DISCONTINUED | OUTPATIENT
Start: 2022-01-01 | End: 2022-01-01

## 2022-01-01 RX ORDER — POTASSIUM CHLORIDE 750 MG/1
40 CAPSULE, EXTENDED RELEASE ORAL AS NEEDED
Status: DISCONTINUED | OUTPATIENT
Start: 2022-01-01 | End: 2022-01-01

## 2022-01-01 RX ORDER — LORAZEPAM 2 MG/ML
0.5 CONCENTRATE ORAL
Status: DISCONTINUED | OUTPATIENT
Start: 2022-01-01 | End: 2022-01-01 | Stop reason: HOSPADM

## 2022-01-01 RX ORDER — ACETAMINOPHEN 160 MG/5ML
650 SOLUTION ORAL EVERY 4 HOURS PRN
Status: DISCONTINUED | OUTPATIENT
Start: 2022-01-01 | End: 2022-01-01 | Stop reason: HOSPADM

## 2022-01-01 RX ORDER — SODIUM CHLORIDE 9 MG/ML
100 INJECTION, SOLUTION INTRAVENOUS CONTINUOUS
Status: DISCONTINUED | OUTPATIENT
Start: 2022-01-01 | End: 2022-01-01

## 2022-01-01 RX ORDER — CLOPIDOGREL BISULFATE 75 MG/1
75 TABLET ORAL DAILY
Status: DISCONTINUED | OUTPATIENT
Start: 2022-01-01 | End: 2022-01-01

## 2022-01-01 RX ORDER — FUROSEMIDE 40 MG/1
40 TABLET ORAL 2 TIMES DAILY
COMMUNITY

## 2022-01-01 RX ORDER — LORAZEPAM 0.5 MG/1
1 TABLET ORAL
Status: DISCONTINUED | OUTPATIENT
Start: 2022-01-01 | End: 2022-01-01 | Stop reason: HOSPADM

## 2022-01-01 RX ORDER — DEXTROSE MONOHYDRATE 25 G/50ML
50 INJECTION, SOLUTION INTRAVENOUS ONCE
Status: COMPLETED | OUTPATIENT
Start: 2022-01-01 | End: 2022-01-01

## 2022-01-01 RX ORDER — ACETAMINOPHEN 650 MG/1
650 SUPPOSITORY RECTAL EVERY 4 HOURS PRN
Status: DISCONTINUED | OUTPATIENT
Start: 2022-01-01 | End: 2022-01-01 | Stop reason: HOSPADM

## 2022-01-01 RX ORDER — ALBUMIN (HUMAN) 12.5 G/50ML
25 SOLUTION INTRAVENOUS 3 TIMES DAILY
Status: DISCONTINUED | OUTPATIENT
Start: 2022-01-01 | End: 2022-01-01

## 2022-01-01 RX ORDER — CARBOXYMETHYLCELLULOSE SODIUM 5 MG/ML
1 SOLUTION/ DROPS OPHTHALMIC
Status: DISCONTINUED | OUTPATIENT
Start: 2022-01-01 | End: 2022-01-01 | Stop reason: HOSPADM

## 2022-01-01 RX ORDER — HYDROCODONE BITARTRATE AND ACETAMINOPHEN 7.5; 325 MG/1; MG/1
1 TABLET ORAL EVERY 6 HOURS PRN
Status: DISCONTINUED | OUTPATIENT
Start: 2022-01-01 | End: 2022-01-01

## 2022-01-01 RX ORDER — NICOTINE POLACRILEX 4 MG
15 LOZENGE BUCCAL
Status: DISCONTINUED | OUTPATIENT
Start: 2022-01-01 | End: 2022-01-01

## 2022-01-01 RX ORDER — BUDESONIDE AND FORMOTEROL FUMARATE DIHYDRATE 160; 4.5 UG/1; UG/1
2 AEROSOL RESPIRATORY (INHALATION)
Status: DISCONTINUED | OUTPATIENT
Start: 2022-01-01 | End: 2022-01-01

## 2022-01-01 RX ORDER — LORAZEPAM 2 MG/ML
2 CONCENTRATE ORAL
Status: DISCONTINUED | OUTPATIENT
Start: 2022-01-01 | End: 2022-01-01 | Stop reason: HOSPADM

## 2022-01-01 RX ORDER — DEXTROSE MONOHYDRATE 25 G/50ML
25 INJECTION, SOLUTION INTRAVENOUS
Status: DISCONTINUED | OUTPATIENT
Start: 2022-01-01 | End: 2022-01-01

## 2022-01-01 RX ORDER — DEXTROSE AND SODIUM CHLORIDE 5; .45 G/100ML; G/100ML
125 INJECTION, SOLUTION INTRAVENOUS CONTINUOUS
Status: DISCONTINUED | OUTPATIENT
Start: 2022-01-01 | End: 2022-01-01

## 2022-01-01 RX ORDER — POLYETHYLENE GLYCOL 3350 17 G/17G
17 POWDER, FOR SOLUTION ORAL DAILY PRN
Status: DISCONTINUED | OUTPATIENT
Start: 2022-01-01 | End: 2022-01-01

## 2022-01-01 RX ORDER — HALOPERIDOL 2 MG/ML
1 SOLUTION ORAL EVERY 4 HOURS PRN
Status: DISCONTINUED | OUTPATIENT
Start: 2022-01-01 | End: 2022-01-01 | Stop reason: HOSPADM

## 2022-01-01 RX ORDER — BISACODYL 10 MG
10 SUPPOSITORY, RECTAL RECTAL DAILY PRN
Status: DISCONTINUED | OUTPATIENT
Start: 2022-01-01 | End: 2022-01-01

## 2022-01-01 RX ORDER — NOREPINEPHRINE BIT/0.9 % NACL 8 MG/250ML
.02-.3 INFUSION BOTTLE (ML) INTRAVENOUS
Status: DISCONTINUED | OUTPATIENT
Start: 2022-01-01 | End: 2022-01-01

## 2022-01-01 RX ADMIN — METHYLPREDNISOLONE SODIUM SUCCINATE 40 MG: 40 INJECTION, POWDER, FOR SOLUTION INTRAMUSCULAR; INTRAVENOUS at 18:38

## 2022-01-01 RX ADMIN — BUDESONIDE AND FORMOTEROL FUMARATE DIHYDRATE 2 PUFF: 160; 4.5 AEROSOL RESPIRATORY (INHALATION) at 08:24

## 2022-01-01 RX ADMIN — FLUTICASONE PROPIONATE 2 SPRAY: 50 SPRAY, METERED NASAL at 08:44

## 2022-01-01 RX ADMIN — CIPROFLOXACIN 2 DROP: 3 SOLUTION OPHTHALMIC at 21:02

## 2022-01-01 RX ADMIN — METHYLPREDNISOLONE SODIUM SUCCINATE 40 MG: 40 INJECTION, POWDER, FOR SOLUTION INTRAMUSCULAR; INTRAVENOUS at 18:39

## 2022-01-01 RX ADMIN — ONDANSETRON 4 MG: 2 INJECTION INTRAMUSCULAR; INTRAVENOUS at 19:08

## 2022-01-01 RX ADMIN — INSULIN ASPART 4 UNITS: 100 INJECTION, SOLUTION INTRAVENOUS; SUBCUTANEOUS at 16:56

## 2022-01-01 RX ADMIN — ENOXAPARIN SODIUM 40 MG: 40 INJECTION SUBCUTANEOUS at 20:02

## 2022-01-01 RX ADMIN — SODIUM CHLORIDE 1000 ML: 9 INJECTION, SOLUTION INTRAVENOUS at 14:28

## 2022-01-01 RX ADMIN — IPRATROPIUM BROMIDE AND ALBUTEROL SULFATE 3 ML: 2.5; .5 SOLUTION RESPIRATORY (INHALATION) at 19:32

## 2022-01-01 RX ADMIN — RANOLAZINE 1000 MG: 500 TABLET, FILM COATED, EXTENDED RELEASE ORAL at 20:02

## 2022-01-01 RX ADMIN — MORPHINE SULFATE 1 MG: 2 INJECTION, SOLUTION INTRAMUSCULAR; INTRAVENOUS at 22:28

## 2022-01-01 RX ADMIN — RANOLAZINE 1000 MG: 500 TABLET, FILM COATED, EXTENDED RELEASE ORAL at 08:13

## 2022-01-01 RX ADMIN — SODIUM CHLORIDE, PRESERVATIVE FREE 10 ML: 5 INJECTION INTRAVENOUS at 08:22

## 2022-01-01 RX ADMIN — VANCOMYCIN HYDROCHLORIDE 1000 MG: 1 INJECTION, POWDER, LYOPHILIZED, FOR SOLUTION INTRAVENOUS at 14:28

## 2022-01-01 RX ADMIN — FAMOTIDINE 20 MG: 20 TABLET ORAL at 16:55

## 2022-01-01 RX ADMIN — LORAZEPAM 0.5 MG: 0.5 TABLET ORAL at 06:51

## 2022-01-01 RX ADMIN — SODIUM CHLORIDE 125 ML/HR: 9 INJECTION, SOLUTION INTRAVENOUS at 15:30

## 2022-01-01 RX ADMIN — ALBUTEROL SULFATE 2.5 MG: 2.5 SOLUTION RESPIRATORY (INHALATION) at 19:41

## 2022-01-01 RX ADMIN — CEFEPIME HYDROCHLORIDE 2 G: 2 INJECTION, POWDER, FOR SOLUTION INTRAVENOUS at 18:38

## 2022-01-01 RX ADMIN — FAMOTIDINE 20 MG: 20 TABLET ORAL at 17:09

## 2022-01-01 RX ADMIN — HYDROCODONE BITARTRATE AND ACETAMINOPHEN 1 TABLET: 7.5; 325 TABLET ORAL at 00:48

## 2022-01-01 RX ADMIN — ACETAMINOPHEN 650 MG: 325 TABLET, FILM COATED ORAL at 14:35

## 2022-01-01 RX ADMIN — ONDANSETRON HYDROCHLORIDE 4 MG: 4 TABLET, FILM COATED ORAL at 08:13

## 2022-01-01 RX ADMIN — IPRATROPIUM BROMIDE AND ALBUTEROL SULFATE 3 ML: 2.5; .5 SOLUTION RESPIRATORY (INHALATION) at 10:55

## 2022-01-01 RX ADMIN — HYDROCODONE BITARTRATE AND ACETAMINOPHEN 1 TABLET: 7.5; 325 TABLET ORAL at 03:35

## 2022-01-01 RX ADMIN — ALBUMIN (HUMAN) 25 G: 0.25 INJECTION, SOLUTION INTRAVENOUS at 15:44

## 2022-01-01 RX ADMIN — RANOLAZINE 1000 MG: 500 TABLET, FILM COATED, EXTENDED RELEASE ORAL at 08:51

## 2022-01-01 RX ADMIN — LORAZEPAM 0.5 MG: 0.5 TABLET ORAL at 16:38

## 2022-01-01 RX ADMIN — CIPROFLOXACIN 2 DROP: 3 SOLUTION OPHTHALMIC at 17:11

## 2022-01-01 RX ADMIN — FAMOTIDINE 20 MG: 20 TABLET ORAL at 19:56

## 2022-01-01 RX ADMIN — DEXTROSE MONOHYDRATE: 500 INJECTION PARENTERAL at 14:41

## 2022-01-01 RX ADMIN — MORPHINE SULFATE 1 MG: 2 INJECTION, SOLUTION INTRAMUSCULAR; INTRAVENOUS at 02:45

## 2022-01-01 RX ADMIN — DEXTROSE MONOHYDRATE 50 ML: 25 INJECTION, SOLUTION INTRAVENOUS at 15:51

## 2022-01-01 RX ADMIN — ALBUMIN HUMAN 25 G: 0.25 SOLUTION INTRAVENOUS at 08:36

## 2022-01-01 RX ADMIN — FUROSEMIDE 20 MG: 20 TABLET ORAL at 11:59

## 2022-01-01 RX ADMIN — SODIUM CHLORIDE 2040 ML: 9 INJECTION, SOLUTION INTRAVENOUS at 15:46

## 2022-01-01 RX ADMIN — CLOPIDOGREL 75 MG: 75 TABLET, FILM COATED ORAL at 19:56

## 2022-01-01 RX ADMIN — CEFEPIME HYDROCHLORIDE 2 G: 2 INJECTION, POWDER, FOR SOLUTION INTRAVENOUS at 20:03

## 2022-01-01 RX ADMIN — ACETAMINOPHEN 650 MG: 325 TABLET, FILM COATED ORAL at 08:13

## 2022-01-01 RX ADMIN — Medication 1250 MG: at 13:23

## 2022-01-01 RX ADMIN — IPRATROPIUM BROMIDE AND ALBUTEROL SULFATE 3 ML: 2.5; .5 SOLUTION RESPIRATORY (INHALATION) at 04:29

## 2022-01-01 RX ADMIN — BUDESONIDE AND FORMOTEROL FUMARATE DIHYDRATE 2 PUFF: 160; 4.5 AEROSOL RESPIRATORY (INHALATION) at 18:52

## 2022-01-01 RX ADMIN — CIPROFLOXACIN 2 DROP: 3 SOLUTION OPHTHALMIC at 05:16

## 2022-01-01 RX ADMIN — Medication: at 20:22

## 2022-01-01 RX ADMIN — CEFEPIME HYDROCHLORIDE 2 G: 2 INJECTION, POWDER, FOR SOLUTION INTRAVENOUS at 06:32

## 2022-01-01 RX ADMIN — HYDROCODONE BITARTRATE AND ACETAMINOPHEN 1 TABLET: 7.5; 325 TABLET ORAL at 17:09

## 2022-01-01 RX ADMIN — CEFEPIME HYDROCHLORIDE 2 G: 2 INJECTION, POWDER, FOR SOLUTION INTRAVENOUS at 19:03

## 2022-01-01 RX ADMIN — SODIUM CHLORIDE 125 ML/HR: 9 INJECTION, SOLUTION INTRAVENOUS at 14:27

## 2022-01-01 RX ADMIN — AMIODARONE HYDROCHLORIDE 200 MG: 200 TABLET ORAL at 08:51

## 2022-01-01 RX ADMIN — ENOXAPARIN SODIUM 40 MG: 40 INJECTION SUBCUTANEOUS at 19:02

## 2022-01-01 RX ADMIN — HUMAN INSULIN 10 UNITS: 100 INJECTION, SOLUTION SUBCUTANEOUS at 16:10

## 2022-01-01 RX ADMIN — ACETAMINOPHEN 650 MG: 325 TABLET, FILM COATED ORAL at 20:45

## 2022-01-01 RX ADMIN — ALBUMIN HUMAN 25 G: 0.25 SOLUTION INTRAVENOUS at 20:09

## 2022-01-01 RX ADMIN — ALBUMIN HUMAN 25 G: 0.25 SOLUTION INTRAVENOUS at 23:16

## 2022-01-01 RX ADMIN — IPRATROPIUM BROMIDE AND ALBUTEROL SULFATE 3 ML: 2.5; .5 SOLUTION RESPIRATORY (INHALATION) at 14:50

## 2022-01-01 RX ADMIN — METHYLPREDNISOLONE SODIUM SUCCINATE 40 MG: 40 INJECTION, POWDER, FOR SOLUTION INTRAMUSCULAR; INTRAVENOUS at 19:57

## 2022-01-01 RX ADMIN — CEFEPIME HYDROCHLORIDE 2 G: 2 INJECTION, POWDER, FOR SOLUTION INTRAVENOUS at 18:39

## 2022-01-01 RX ADMIN — ALBUMIN HUMAN 25 G: 0.25 SOLUTION INTRAVENOUS at 20:35

## 2022-01-01 RX ADMIN — FLUTICASONE PROPIONATE 2 SPRAY: 50 SPRAY, METERED NASAL at 08:53

## 2022-01-01 RX ADMIN — IPRATROPIUM BROMIDE AND ALBUTEROL SULFATE 3 ML: 2.5; .5 SOLUTION RESPIRATORY (INHALATION) at 22:09

## 2022-01-01 RX ADMIN — Medication 400 MG: at 08:13

## 2022-01-01 RX ADMIN — INSULIN ASPART 2 UNITS: 100 INJECTION, SOLUTION INTRAVENOUS; SUBCUTANEOUS at 06:51

## 2022-01-01 RX ADMIN — RANOLAZINE 1000 MG: 500 TABLET, FILM COATED, EXTENDED RELEASE ORAL at 08:36

## 2022-01-01 RX ADMIN — ALBUMIN HUMAN 25 G: 0.25 SOLUTION INTRAVENOUS at 08:23

## 2022-01-01 RX ADMIN — VANCOMYCIN HYDROCHLORIDE 1000 MG: 1 INJECTION, POWDER, LYOPHILIZED, FOR SOLUTION INTRAVENOUS at 13:16

## 2022-01-01 RX ADMIN — Medication 0.02 MCG/KG/MIN: at 16:56

## 2022-01-01 RX ADMIN — FAMOTIDINE 20 MG: 20 TABLET ORAL at 06:32

## 2022-01-01 RX ADMIN — IPRATROPIUM BROMIDE AND ALBUTEROL SULFATE 3 ML: 2.5; .5 SOLUTION RESPIRATORY (INHALATION) at 14:00

## 2022-01-01 RX ADMIN — LORAZEPAM 0.5 MG: 0.5 TABLET ORAL at 20:02

## 2022-01-01 RX ADMIN — CIPROFLOXACIN 2 DROP: 3 SOLUTION OPHTHALMIC at 14:37

## 2022-01-01 RX ADMIN — SODIUM BICARBONATE 100 MEQ: 84 INJECTION, SOLUTION INTRAVENOUS at 08:44

## 2022-01-01 RX ADMIN — RANOLAZINE 1000 MG: 500 TABLET, FILM COATED, EXTENDED RELEASE ORAL at 20:46

## 2022-01-01 RX ADMIN — SODIUM BICARBONATE 50 MEQ: 84 INJECTION INTRAVENOUS at 15:56

## 2022-01-01 RX ADMIN — DEXTROSE MONOHYDRATE: 25 INJECTION, SOLUTION INTRAVENOUS at 14:41

## 2022-01-01 RX ADMIN — CLOPIDOGREL 75 MG: 75 TABLET, FILM COATED ORAL at 08:13

## 2022-01-01 RX ADMIN — CETIRIZINE HYDROCHLORIDE 5 MG: 5 TABLET ORAL at 08:36

## 2022-01-01 RX ADMIN — ALBUMIN HUMAN 25 G: 0.25 SOLUTION INTRAVENOUS at 15:34

## 2022-01-01 RX ADMIN — SODIUM CHLORIDE 100 ML/HR: 9 INJECTION, SOLUTION INTRAVENOUS at 15:54

## 2022-01-01 RX ADMIN — INSULIN ASPART 3 UNITS: 100 INJECTION, SOLUTION INTRAVENOUS; SUBCUTANEOUS at 11:06

## 2022-01-01 RX ADMIN — Medication 400 MG: at 08:36

## 2022-01-01 RX ADMIN — IPRATROPIUM BROMIDE AND ALBUTEROL SULFATE 3 ML: 2.5; .5 SOLUTION RESPIRATORY (INHALATION) at 17:18

## 2022-01-01 RX ADMIN — ENOXAPARIN SODIUM 40 MG: 40 INJECTION SUBCUTANEOUS at 21:22

## 2022-01-01 RX ADMIN — AMIODARONE HYDROCHLORIDE 200 MG: 200 TABLET ORAL at 08:36

## 2022-01-01 RX ADMIN — ALBUMIN HUMAN 25 G: 0.25 SOLUTION INTRAVENOUS at 20:08

## 2022-01-01 RX ADMIN — METHYLPREDNISOLONE SODIUM SUCCINATE 40 MG: 40 INJECTION, POWDER, FOR SOLUTION INTRAMUSCULAR; INTRAVENOUS at 06:35

## 2022-01-01 RX ADMIN — CEFEPIME HYDROCHLORIDE 2 G: 2 INJECTION, POWDER, FOR SOLUTION INTRAVENOUS at 08:51

## 2022-01-01 RX ADMIN — LORAZEPAM 0.5 MG: 0.5 TABLET ORAL at 10:20

## 2022-01-01 RX ADMIN — SODIUM CHLORIDE, PRESERVATIVE FREE 10 ML: 5 INJECTION INTRAVENOUS at 21:23

## 2022-01-01 RX ADMIN — IPRATROPIUM BROMIDE AND ALBUTEROL SULFATE 3 ML: 2.5; .5 SOLUTION RESPIRATORY (INHALATION) at 08:24

## 2022-01-01 RX ADMIN — SODIUM CHLORIDE 500 ML: 9 INJECTION, SOLUTION INTRAVENOUS at 15:26

## 2022-01-01 RX ADMIN — CETIRIZINE HYDROCHLORIDE 5 MG: 5 TABLET ORAL at 08:51

## 2022-01-01 RX ADMIN — SODIUM CHLORIDE 125 ML/HR: 9 INJECTION, SOLUTION INTRAVENOUS at 02:34

## 2022-01-01 RX ADMIN — SODIUM CHLORIDE 100 ML/HR: 9 INJECTION, SOLUTION INTRAVENOUS at 02:15

## 2022-01-01 RX ADMIN — INSULIN ASPART 3 UNITS: 100 INJECTION, SOLUTION INTRAVENOUS; SUBCUTANEOUS at 11:59

## 2022-01-01 RX ADMIN — CEFEPIME HYDROCHLORIDE 2 G: 2 INJECTION, POWDER, FOR SOLUTION INTRAVENOUS at 06:35

## 2022-01-01 RX ADMIN — FLUTICASONE PROPIONATE 2 SPRAY: 50 SPRAY, METERED NASAL at 08:23

## 2022-01-01 RX ADMIN — METHYLPREDNISOLONE SODIUM SUCCINATE 40 MG: 40 INJECTION, POWDER, FOR SOLUTION INTRAMUSCULAR; INTRAVENOUS at 19:02

## 2022-01-01 RX ADMIN — RANOLAZINE 1000 MG: 500 TABLET, FILM COATED, EXTENDED RELEASE ORAL at 21:23

## 2022-01-01 RX ADMIN — FLUTICASONE PROPIONATE 2 SPRAY: 50 SPRAY, METERED NASAL at 20:03

## 2022-01-01 RX ADMIN — SODIUM BICARBONATE 100 MEQ: 84 INJECTION INTRAVENOUS at 05:46

## 2022-01-01 RX ADMIN — METHYLPREDNISOLONE SODIUM SUCCINATE 40 MG: 40 INJECTION, POWDER, FOR SOLUTION INTRAMUSCULAR; INTRAVENOUS at 06:52

## 2022-01-01 RX ADMIN — SODIUM BICARBONATE 100 MEQ: 84 INJECTION, SOLUTION INTRAVENOUS at 21:53

## 2022-01-01 RX ADMIN — SODIUM CHLORIDE 125 ML/HR: 9 INJECTION, SOLUTION INTRAVENOUS at 20:04

## 2022-01-01 RX ADMIN — CETIRIZINE HYDROCHLORIDE 5 MG: 5 TABLET ORAL at 19:57

## 2022-01-01 RX ADMIN — Medication 400 MG: at 19:57

## 2022-01-01 RX ADMIN — METHYLPREDNISOLONE SODIUM SUCCINATE 40 MG: 40 INJECTION, POWDER, FOR SOLUTION INTRAMUSCULAR; INTRAVENOUS at 06:41

## 2022-01-01 RX ADMIN — IPRATROPIUM BROMIDE AND ALBUTEROL SULFATE 3 ML: 2.5; .5 SOLUTION RESPIRATORY (INHALATION) at 06:33

## 2022-01-01 RX ADMIN — CLOPIDOGREL 75 MG: 75 TABLET, FILM COATED ORAL at 08:51

## 2022-01-01 RX ADMIN — SODIUM CHLORIDE, PRESERVATIVE FREE 10 ML: 5 INJECTION INTRAVENOUS at 08:13

## 2022-01-01 RX ADMIN — FAMOTIDINE 20 MG: 20 TABLET ORAL at 06:51

## 2022-01-01 RX ADMIN — ALBUMIN HUMAN 25 G: 0.25 SOLUTION INTRAVENOUS at 09:24

## 2022-01-01 RX ADMIN — CETIRIZINE HYDROCHLORIDE 5 MG: 5 TABLET ORAL at 08:13

## 2022-01-01 RX ADMIN — SODIUM CHLORIDE 125 ML/HR: 9 INJECTION, SOLUTION INTRAVENOUS at 06:36

## 2022-01-01 RX ADMIN — BUDESONIDE AND FORMOTEROL FUMARATE DIHYDRATE 2 PUFF: 160; 4.5 AEROSOL RESPIRATORY (INHALATION) at 06:40

## 2022-01-01 RX ADMIN — INSULIN ASPART 3 UNITS: 100 INJECTION, SOLUTION INTRAVENOUS; SUBCUTANEOUS at 06:35

## 2022-01-01 RX ADMIN — DEXTROSE AND SODIUM CHLORIDE 125 ML/HR: 5; 450 INJECTION, SOLUTION INTRAVENOUS at 15:27

## 2022-01-01 RX ADMIN — CLOPIDOGREL 75 MG: 75 TABLET, FILM COATED ORAL at 08:36

## 2022-01-01 RX ADMIN — IOPAMIDOL 71 ML: 755 INJECTION, SOLUTION INTRAVENOUS at 13:21

## 2022-01-01 RX ADMIN — SODIUM CHLORIDE 500 ML: 9 INJECTION, SOLUTION INTRAVENOUS at 14:27

## 2022-01-01 RX ADMIN — LORAZEPAM 1 MG: 0.5 TABLET ORAL at 08:36

## 2022-01-01 RX ADMIN — VANCOMYCIN HYDROCHLORIDE 1000 MG: 1 INJECTION, POWDER, LYOPHILIZED, FOR SOLUTION INTRAVENOUS at 12:53

## 2022-01-01 RX ADMIN — INSULIN ASPART 2 UNITS: 100 INJECTION, SOLUTION INTRAVENOUS; SUBCUTANEOUS at 17:06

## 2022-01-01 RX ADMIN — IPRATROPIUM BROMIDE AND ALBUTEROL SULFATE 3 ML: 2.5; .5 SOLUTION RESPIRATORY (INHALATION) at 18:52

## 2022-01-01 RX ADMIN — AMIODARONE HYDROCHLORIDE 200 MG: 200 TABLET ORAL at 19:57

## 2022-01-01 RX ADMIN — AMIODARONE HYDROCHLORIDE 200 MG: 200 TABLET ORAL at 08:13

## 2022-01-01 RX ADMIN — Medication 400 MG: at 08:51

## 2022-01-01 RX ADMIN — ENOXAPARIN SODIUM 40 MG: 40 INJECTION SUBCUTANEOUS at 20:31

## 2022-01-01 RX ADMIN — CIPROFLOXACIN 2 DROP: 3 SOLUTION OPHTHALMIC at 10:53

## 2022-01-01 RX ADMIN — FAMOTIDINE 20 MG: 20 TABLET ORAL at 06:36

## 2022-01-03 NOTE — OUTREACH NOTE
COPD/PN Week 3 Survey      Responses   [facility] patient discharged from? [place]   Does the patient have one of the following disease processes/diagnoses(primary or secondary)? COPD/Pneumonia   Was the primary reason for admission: Pneumonia   Week 3 attempt successful? No   Unsuccessful attempts Attempt 1          Jessica Sierra RN

## 2022-01-04 NOTE — TELEPHONE ENCOUNTER
Placed a call to Dr Vallejo office, left a message with his nurse.  I let them know this patient was seen in the ED last night and patient is in need of home services. Resuming HH,palliative care, and Hospice was discussed by me with patient and wife.  I asked Dr Lopez nurse to call patient and offer service as recommended by Dr Rey.

## 2022-01-05 PROBLEM — R78.81 BACTEREMIA: Status: ACTIVE | Noted: 2022-01-01

## 2022-01-06 NOTE — OUTREACH NOTE
COPD/PN Week 3 Survey      Responses   [facility] patient discharged from? [place]   Does the patient have one of the following disease processes/diagnoses(primary or secondary)? COPD/Pneumonia   Was the primary reason for admission: Pneumonia   Week 3 attempt successful? No   Unsuccessful attempts Attempt 2   Rescheduled Revoked   Revoke Readmitted          Desiree Higginbotham RN

## 2022-01-09 PROBLEM — K72.00 SHOCK LIVER: Status: ACTIVE | Noted: 2022-01-01

## 2022-01-10 LAB
BACTERIA SPEC AEROBE CULT: NORMAL
BACTERIA SPEC AEROBE CULT: NORMAL

## 2023-06-25 NOTE — PLAN OF CARE
Problem: Patient Care Overview (Adult)  Goal: Plan of Care Review    10/12/17 0404   Coping/Psychosocial Response Interventions   Plan Of Care Reviewed With patient;spouse   Patient Care Overview   Progress no change       Goal: Adult Individualization and Mutuality  Outcome: Ongoing (interventions implemented as appropriate)  Goal: Discharge Needs Assessment  Outcome: Ongoing (interventions implemented as appropriate)    10/12/17 0404   Self-Care   Equipment Currently Used at Home walker, rolling;cane, straight;oxygen;nebulizer   Living Environment   Transportation Available car;family or friend will provide   Discharge Needs Assessment   Current Discharge Risk chronically ill   Discharge Disposition still a patient         Problem: Acute Coronary Syndrome (ACS) (Adult)  Goal: Signs and Symptoms of Listed Potential Problems Will be Absent or Manageable (Acute Coronary Syndrome)  Outcome: Ongoing (interventions implemented as appropriate)    10/12/17 0404   Acute Coronary Syndrome (ACS)   Problems Assessed (Acute Coronary Syndrome (ACS)) all   Problems Present (Acute Coronary Syndrome (ACS)) none         Problem: Diabetes, Type 2 (Adult)  Goal: Signs and Symptoms of Listed Potential Problems Will be Absent or Manageable (Diabetes, Type 2)  Outcome: Ongoing (interventions implemented as appropriate)    10/12/17 0404   Diabetes, Type 2   Problems Assessed (Type 2 Diabetes) all   Problems Present (Type 2 Diabetes) none         Problem: Pain, Chronic (Adult)  Goal: Identify Related Risk Factors and Signs and Symptoms  Outcome: Outcome(s) achieved Date Met:  10/12/17    10/12/17 0404   Pain, Chronic   Related Risk Factors (Chronic Pain) disease process   Signs and Symptoms (Chronic Pain) fatigue/weakness       Goal: Acceptable Pain Control/Comfort Level  Outcome: Ongoing (interventions implemented as appropriate)    10/12/17 0404   Pain, Chronic (Adult)   Acceptable Pain Control/Comfort Level making progress toward  outcome         Problem: COPD, Chronic Bronchitis/Emphysema (Adult)  Goal: Signs and Symptoms of Listed Potential Problems Will be Absent or Manageable (COPD, Chronic Bronchitis/Emphysema)  Outcome: Ongoing (interventions implemented as appropriate)    10/12/17 040   COPD, Chronic Bronchitis/Emphysema   Problems Assessed (COPD, Chronic Bronchitis/Emphysema) all   Problems Present (COPD, Chronic Bronchitis/Emphysema) none            no difficulties - - -

## (undated) DEVICE — PK CATH LAB 60

## (undated) DEVICE — HI-TORQUE BALANCE MIDDLEWEIGHT GUIDE WIRE W/HYDROCOAT .014 J TIP 3.0 CM X 300 CM: Brand: HI-TORQUE BALANCE MIDDLEWEIGHT

## (undated) DEVICE — MODEL AT P65, P/N 701554-001KIT CONTENTS: HAND CONTROLLER, 3-WAY HIGH-PRESSURE STOPCOCK WITH ROTATING END AND PREMIUM HIGH-PRESSURE TUBING: Brand: ANGIOTOUCH® KIT

## (undated) DEVICE — ANGIO-SEAL VIP VASCULAR CLOSURE DEVICE: Brand: ANGIO-SEAL

## (undated) DEVICE — MINI TREK™ II CORONARY DILATATION CATHETER 2.00 MM X 15 MM / OVER-THE-WIRE: Brand: MINI TREK™

## (undated) DEVICE — GW PERIPH GUIDERIGHT STD/J/TP PTFE/PCOAT SS 0.038IN 5X150CM

## (undated) DEVICE — GLIDESHEATH BASIC HYDROPHILIC COATED INTRODUCER SHEATH: Brand: GLIDESHEATH

## (undated) DEVICE — Device

## (undated) DEVICE — GW PTFE EMERALD HC J TIP STD .025 3MM 150CM

## (undated) DEVICE — MODEL BT2000 P/N 700287-012KIT CONTENTS: MANIFOLD WITH SALINE AND CONTRAST PORTS, SALINE TUBING WITH SPIKE AND HAND SYRINGE, TRANSDUCER: Brand: BT2000 AUTOMATED MANIFOLD KIT

## (undated) DEVICE — ST CVR PROB PULLUP ULTRASND 5X48IN

## (undated) DEVICE — ARTERIAL NEEDLE: Brand: UNBRANDED

## (undated) DEVICE — 6F .070 XB LAD 3.5 100CM: Brand: VISTA BRITE TIP

## (undated) DEVICE — MINI TREK CORONARY DILATATION CATHETER 2.0 MM X 20 MM / RAPID-EXCHANGE: Brand: MINI TREK

## (undated) DEVICE — DEV INFL MONARCH 20ML

## (undated) DEVICE — A2000 MULTI-USE SYRINGE KIT, P/N 701277-003KIT CONTENTS: 100ML CONTRAST RESERVOIR AND TUBING WITH CONTRAST SPIKE AND CLAMP: Brand: A2000 MULTI-USE SYRINGE KIT

## (undated) DEVICE — COPILOT KIT INCLUDES BLEEDBACK CONTROL VALVE / GUIDE WIRE INTRODUCER / TORQUE DEVICE: Brand: ACCESSORIES

## (undated) DEVICE — CATH F6 ST JR 4 100CM: Brand: SUPERTORQUE

## (undated) DEVICE — ELECTRODE,RT,STRESS,FOAM,50PK: Brand: MEDLINE

## (undated) DEVICE — PINNACLE INTRODUCER SHEATH: Brand: PINNACLE

## (undated) DEVICE — INTRO SHEATH ART/FEM ENGAGE .038 6F12CM

## (undated) DEVICE — CATH GUIDE LAUNCHER JL4.0 6F 100CM

## (undated) DEVICE — KT INTRO MINISTICK MAX W/GW NITNL/TUNG ECHO 4F 21G 7CM

## (undated) DEVICE — CATH DIAG IMPULSE M/ PK 145 5FR

## (undated) DEVICE — CATH THERMODIL SWANGANZ 4LUM 6F 110CM

## (undated) DEVICE — CATH F6 ST JL 4 100CM: Brand: SUPERTORQUE